# Patient Record
Sex: MALE | Race: WHITE | NOT HISPANIC OR LATINO | Employment: OTHER | URBAN - METROPOLITAN AREA
[De-identification: names, ages, dates, MRNs, and addresses within clinical notes are randomized per-mention and may not be internally consistent; named-entity substitution may affect disease eponyms.]

---

## 2019-07-01 ENCOUNTER — OFFICE VISIT (OUTPATIENT)
Dept: FAMILY MEDICINE | Facility: CLINIC | Age: 49
End: 2019-07-01

## 2019-07-01 VITALS
DIASTOLIC BLOOD PRESSURE: 79 MMHG | BODY MASS INDEX: 31.48 KG/M2 | HEART RATE: 92 BPM | SYSTOLIC BLOOD PRESSURE: 113 MMHG | WEIGHT: 258.5 LBS | TEMPERATURE: 98.5 F | OXYGEN SATURATION: 94 % | HEIGHT: 76 IN

## 2019-07-01 DIAGNOSIS — F41.1 GENERALIZED ANXIETY DISORDER: ICD-10-CM

## 2019-07-01 DIAGNOSIS — M54.50 CHRONIC BILATERAL LOW BACK PAIN WITHOUT SCIATICA: Primary | ICD-10-CM

## 2019-07-01 DIAGNOSIS — G89.29 CHRONIC BILATERAL LOW BACK PAIN WITHOUT SCIATICA: Primary | ICD-10-CM

## 2019-07-01 DIAGNOSIS — F17.220 TOBACCO DEPENDENCE DUE TO CHEWING TOBACCO: ICD-10-CM

## 2019-07-01 RX ORDER — QUETIAPINE FUMARATE 400 MG/1
400 TABLET, FILM COATED ORAL AT BEDTIME
COMMUNITY
End: 2019-07-01

## 2019-07-01 RX ORDER — NAPROXEN 500 MG/1
500 TABLET ORAL 2 TIMES DAILY WITH MEALS
COMMUNITY
End: 2019-07-01

## 2019-07-01 RX ORDER — QUETIAPINE FUMARATE 400 MG/1
400 TABLET, FILM COATED ORAL AT BEDTIME
Qty: 30 TABLET | Refills: 1 | Status: SHIPPED | OUTPATIENT
Start: 2019-07-01 | End: 2019-08-19

## 2019-07-01 RX ORDER — NAPROXEN 500 MG/1
500 TABLET ORAL 2 TIMES DAILY WITH MEALS
Qty: 60 TABLET | Refills: 1 | Status: SHIPPED | OUTPATIENT
Start: 2019-07-01 | End: 2019-08-19

## 2019-07-01 RX ORDER — GABAPENTIN 400 MG/1
1200 CAPSULE ORAL 3 TIMES DAILY
Qty: 270 CAPSULE | Refills: 1 | Status: SHIPPED | OUTPATIENT
Start: 2019-07-01 | End: 2019-08-19

## 2019-07-01 RX ORDER — GABAPENTIN 600 MG/1
600 TABLET ORAL 3 TIMES DAILY
COMMUNITY
End: 2019-07-01

## 2019-07-01 ASSESSMENT — MIFFLIN-ST. JEOR: SCORE: 2126.11

## 2019-07-01 NOTE — PROGRESS NOTES
REASON FOR VISIT:  Establish Care and Medication Refills      HISTORY OF PRESENT ILLNESS: Jose M Barclay is a 49 year old male, who is new to [facility] and in need of medication refills.  He has a hx of alcoholism, substance abuse and anxiety, and is currently in treatment at [facility] for his alcoholism.  He is also in a methadone program through [facility], but was told his insurance would only cover office visits to [facility].  He has been in treatment for 3 months.  He is in counseling through his [facility] recovery program.  He recently quit the use of chewing tobacco, and is using Nicorrete 4 mg gum.  He reports riding a bike and using some weight training frequently.  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  He reports all his immunizations are up to date.    Jose M's medications include Seroquel 400 mg at bedtime for sleep and anxiety, Gabapentin 400 mg tabs, taking 3 tabs TID to treat his anxiety as well, and Naproxen 500 mg BID and Zanaflex 4 mg BID for lower back pain.  He also uses Nicorrette gum 4 mg and methadone 80 mg daily.  He has recently run out of some of his medications, including his Seroquel, which he last took last night, and gabapentin, which he ran out of 3 days ago.  He reports having blood work through [facility] typically, but can not remember the last time he had his blood drawn.    Jose M is currently in treatment at [facility], for alcoholism.  He has been in treatment for 3 months.  He is also on the methadone program through [facility].  He notes that he went into treatment by his own choice, due to being sick of not being able to function.  He reports he had a combination of alcohol and substance abuse prior to treatment.      SOCIAL HISTORY:   Social History     Social History Narrative    7/1/2019    Jose M is , and has 2 living children who are adults.  He had a daughter, who passed away.  He developed issues with drugs and alcohol in his late 30s.  " He is currently receiving treatment at [facility], and is also in a methadone program at [facility].  He grew up in [place], [place] outside of [place].  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  He is currently receiving treatment at a [facility] program, and will then move to a group home house in the next few months.       MEDICATIONS:  Reviewed.       REVIEW OF SYSTEMS:  POSITIVE for hx of broken ribs and concussions.  Negative for weight change.  Otherwise, the ROS is negative.      OBJECTIVE:      EXAM:  VITAL SIGNS: /79 (BP Location: Left arm, Patient Position: Sitting, Cuff Size: Adult Large)   Pulse 92   Temp 98.5  F (36.9  C) (Oral)   Ht 1.918 m (6' 3.5\")   Wt 117.3 kg (258 lb 8 oz)   SpO2 94%   BMI 31.88 kg/m     Jose M is a 49 year old, muscular, male, who is in treatment for alcoholism and opiate abuse.  He appears anxious, but very pleasant and open about his medical issues.  He has brought with him records from [facility]. We are unable to locate his records from [facility] or elsewhere.  Constitutional: healthy and alert   Cardiovascular: negative, PMI normal. No lifts, heaves, or thrills. RRR. No murmurs, clicks gallops or rub  Respiratory:  Lungs clear bilaterally  Head: Normocephalic. No masses, lesions, tenderness or abnormalities  Neck: Neck supple. No adenopathy. Thyroid symmetric, normal size,, Carotids without bruits.  ENT: ENT exam normal, no neck nodes or sinus tenderness  MS: Good ROM of knees and hips  SKIN: no suspicious lesions or rashes        LABORATORY DATA: Not applicable.      ASSESSMENT AND PLAN:   1. Jose M is a 50 yo who is in addiction treatment for alcoholism and opiate abuse. He is presenting for his first visit to the [facility] as required by his treatment program. Although his treatment program is through [facility], Jose M was told that his insurance only covered visits in our [employer] system.   No past records available at todays visit.   He's in " "need of medication refills.   Brings in records from [facility] for recovery [phone]        A/P     1. Chronic bilateral low back pain without sciatica     - naproxen (NAPROSYN) 500 MG tablet; Take 1 tablet (500 mg) by mouth 2 times daily (with meals)  Dispense: 60 tablet; Refill: 1  - tiZANidine (ZANAFLEX) 4 MG tablet; Take 1 tablet (4 mg) by mouth 2 times daily  Dispense: 60 tablet; Refill: 1     2. Tobacco dependence due to chewing tobacco  -Continue to avoid use. Refilled:   - nicotine polacrilex (NICORETTE) 4 MG gum; Place 1 each (4 mg) inside cheek as needed for smoking cessation  Dispense: 100 each; Refill: 1     3. Generalized anxiety disorder  - Continue with counseling at [facility]. Also, refilled:   - gabapentin (NEURONTIN) 400 MG capsule; Take 3 capsules (1,200 mg) by mouth 3 times daily  Dispense: 270 capsule; Refill: 1  - QUEtiapine (SEROQUEL) 400 MG tablet; Take 1 tablet (400 mg) by mouth At Bedtime  Dispense: 30 tablet; Refill: 1     4. For Preventive Health Care;  -Await records from [facility] and any other systems within \"Care Everywhere\"  -Follow up in about 6-8 weeks. Discuss Immunizations (likely up to date), colorectal cancer screening, and Cardiovascular Cancer screening.       Call with any problems or questions.       IPedro, am serving as a scribe to document services personally performed by Jr Giron MD based on data collection and the provider's statements to me. Dr. Giron has reviewed, edited and approved the above note.     --Jr Giron MD  "

## 2019-07-01 NOTE — NURSING NOTE
"49 year old  Chief Complaint   Patient presents with     Recheck Medication     Establish care and medication refills       Blood pressure 113/79, pulse 92, temperature 98.5  F (36.9  C), temperature source Oral, height 1.918 m (6' 3.5\"), weight 117.3 kg (258 lb 8 oz), SpO2 94 %. Body mass index is 31.88 kg/m .  There is no problem list on file for this patient.      Wt Readings from Last 2 Encounters:   07/01/19 117.3 kg (258 lb 8 oz)     BP Readings from Last 3 Encounters:   07/01/19 113/79         Current Outpatient Medications   Medication     gabapentin (NEURONTIN) 600 MG tablet     naproxen (NAPROSYN) 500 MG tablet     nicotine polacrilex (NICORETTE) 4 MG gum     QUEtiapine (SEROQUEL) 400 MG tablet     tiZANidine (ZANAFLEX) 4 MG tablet     No current facility-administered medications for this visit.        Social History     Tobacco Use     Smoking status: Current Every Day Smoker     Types: Dip, chew, snus or snuff     Smokeless tobacco: Former User     Types: Chew   Substance Use Topics     Alcohol use: Not Currently     Drug use: Not Currently       Health Maintenance Due   Topic Date Due     PREVENTIVE CARE VISIT  1969     COLONOSCOPY  05/03/1979     HIV SCREENING  05/03/1984     DTAP/TDAP/TD IMMUNIZATION (1 - Tdap) 05/03/1994     LIPID  05/03/2004     PHQ-2  01/01/2019     ZOSTER IMMUNIZATION (1 of 2) 05/03/2019       No results found for: PAP      July 1, 2019 9:49 AM  "

## 2019-07-01 NOTE — PATIENT INSTRUCTIONS
"Jose M is a 50 yo who is in addiction treatment for alcoholism and opiate abuse. He is presenting for his first visit to the [facility] as required by his treatment program. Although his treatment program is through [facility], Jose M was told that his insurance only covered visits in our [email or web address] system.   [facility]   He's in need of medication refills.   Brings in records from [facility] for recovery [phone]      A/P    1. Chronic bilateral low back pain without sciatica    - naproxen (NAPROSYN) 500 MG tablet; Take 1 tablet (500 mg) by mouth 2 times daily (with meals)  Dispense: 60 tablet; Refill: 1  - tiZANidine (ZANAFLEX) 4 MG tablet; Take 1 tablet (4 mg) by mouth 2 times daily  Dispense: 60 tablet; Refill: 1    2. Tobacco dependence due to chewing tobacco  -Continue to avoid use. Refilled:   - nicotine polacrilex (NICORETTE) 4 MG gum; Place 1 each (4 mg) inside cheek as needed for smoking cessation  Dispense: 100 each; Refill: 1    3. Generalized anxiety disorder  - Continue with counseling at [facility]. Also, refilled:   - gabapentin (NEURONTIN) 400 MG capsule; Take 3 capsules (1,200 mg) by mouth 3 times daily  Dispense: 270 capsule; Refill: 1  - QUEtiapine (SEROQUEL) 400 MG tablet; Take 1 tablet (400 mg) by mouth At Bedtime  Dispense: 30 tablet; Refill: 1    4. For Preventive Health Care;  -Await records from [facility] and any other systems within \"Care Everywhere\"  -Follow up in about 6-8 weeks. Discuss Immunizations (likely up to date), colorectal cancer screening, and Cardiovascular Cancer screening.       "

## 2019-08-19 ENCOUNTER — OFFICE VISIT (OUTPATIENT)
Dept: FAMILY MEDICINE | Facility: CLINIC | Age: 49
End: 2019-08-19
Payer: COMMERCIAL

## 2019-08-19 VITALS
BODY MASS INDEX: 33.3 KG/M2 | WEIGHT: 270 LBS | DIASTOLIC BLOOD PRESSURE: 75 MMHG | SYSTOLIC BLOOD PRESSURE: 110 MMHG | OXYGEN SATURATION: 96 % | HEART RATE: 80 BPM | TEMPERATURE: 98.4 F

## 2019-08-19 DIAGNOSIS — M54.50 CHRONIC BILATERAL LOW BACK PAIN WITHOUT SCIATICA: ICD-10-CM

## 2019-08-19 DIAGNOSIS — R79.89 ELEVATED LFTS: ICD-10-CM

## 2019-08-19 DIAGNOSIS — F17.220 TOBACCO DEPENDENCE DUE TO CHEWING TOBACCO: ICD-10-CM

## 2019-08-19 DIAGNOSIS — G89.29 CHRONIC BILATERAL LOW BACK PAIN WITHOUT SCIATICA: ICD-10-CM

## 2019-08-19 DIAGNOSIS — R79.89 ABNORMAL TSH: ICD-10-CM

## 2019-08-19 DIAGNOSIS — R61 DIAPHORESIS: Primary | ICD-10-CM

## 2019-08-19 DIAGNOSIS — F41.1 GENERALIZED ANXIETY DISORDER: ICD-10-CM

## 2019-08-19 LAB
ALBUMIN SERPL-MCNC: 3.6 G/DL (ref 3.3–4.6)
ALP SERPL-CCNC: 66 U/L (ref 40–150)
ALT SERPL-CCNC: 44 U/L (ref 0–70)
AST SERPL-CCNC: 51 U/L (ref 0–55)
BILIRUB SERPL-MCNC: 0.6 MG/DL (ref 0.2–1.3)
BUN SERPL-MCNC: 23 MG/DL (ref 7–30)
CALCIUM SERPL-MCNC: 9.2 MG/DL (ref 8.5–10.4)
CHLORIDE SERPLBLD-SCNC: 106 MMOL/L (ref 94–109)
CO2 SERPL-SCNC: 29 MMOL/L (ref 20–32)
CREAT SERPL-MCNC: 0.8 MG/DL (ref 0.8–1.5)
EGFR CALCULATED (BLACK REFERENCE): 131.6
EGFR CALCULATED (NON BLACK REFERENCE): 108.8
GLUCOSE SERPL-MCNC: 105 MG/DL (ref 60–99)
HBA1C MFR BLD: 5.1 % (ref 4.1–5.7)
POTASSIUM SERPL-SCNC: 3.9 MMOL/L (ref 3.4–5.3)
PROT SERPL-MCNC: 7.6 G/DL (ref 6.8–8.8)
SODIUM SERPL-SCNC: 142 MMOL/L (ref 137.3–146.3)
T4 FREE SERPL-MCNC: 0.72 NG/DL (ref 0.76–1.46)
TSH SERPL DL<=0.005 MIU/L-ACNC: 4.05 MU/L (ref 0.4–4)

## 2019-08-19 RX ORDER — NAPROXEN 500 MG/1
500 TABLET ORAL 2 TIMES DAILY WITH MEALS
Qty: 60 TABLET | Refills: 1 | Status: SHIPPED | OUTPATIENT
Start: 2019-08-19 | End: 2019-10-11

## 2019-08-19 RX ORDER — QUETIAPINE FUMARATE 400 MG/1
400 TABLET, FILM COATED ORAL AT BEDTIME
Qty: 30 TABLET | Refills: 1 | Status: SHIPPED | OUTPATIENT
Start: 2019-08-19 | End: 2019-10-11

## 2019-08-19 RX ORDER — GABAPENTIN 400 MG/1
1200 CAPSULE ORAL 3 TIMES DAILY
Qty: 270 CAPSULE | Refills: 1 | Status: SHIPPED | OUTPATIENT
Start: 2019-08-19 | End: 2019-10-11

## 2019-08-19 NOTE — PATIENT INSTRUCTIONS
Jose M is a 50 yo who is still living at [facility]. Moving out Sept 3 to a step down location    He's here for medication refills. See notes from July 1, 2019 for details.   Also, has a new complaint of sweating and in the past has had high LFTs and slightly high TSH    PLAN:  1. Generalized anxiety disorder  - gabapentin (NEURONTIN) 400 MG capsule; Take 3 capsules (1,200 mg) by mouth 3 times daily  Dispense: 270 capsule; Refill: 1  - QUEtiapine (SEROQUEL) 400 MG tablet; Take 1 tablet (400 mg) by mouth At Bedtime  Dispense: 30 tablet; Refill: 1    2. Chronic bilateral low back pain without sciatica    - naproxen (NAPROSYN) 500 MG tablet; Take 1 tablet (500 mg) by mouth 2 times daily (with meals)  Dispense: 60 tablet; Refill: 1  - tiZANidine (ZANAFLEX) 4 MG tablet; Take 1 tablet (4 mg) by mouth 2 times daily  Dispense: 60 tablet; Refill: 1    Discussed decreasing the above medications but Jose M feels they are necessary daily    3. Tobacco dependence due to chewing tobacco    - nicotine polacrilex (NICORETTE) 4 MG gum; Place 1 each (4 mg) inside cheek as needed for smoking cessation  Dispense: 100 each; Refill: 1    4. Diaphoresis    - Hemoglobin A1c ([place])    5. Abnormal TSH    - TSH with free T4 reflex    6. Elevated LFTs    - Comprehensive Metabolic Panel ([place])    Follow up in 2 months, sooner if needed

## 2019-08-19 NOTE — NURSING NOTE
49 year old  Chief Complaint   Patient presents with     Refill Request     pt requesting refills on all his medications and also has a few questions that he wants to ask the doctor.       Blood pressure 110/75, pulse 80, temperature 98.4  F (36.9  C), temperature source Oral, weight 122.5 kg (270 lb), SpO2 96 %. Body mass index is 33.3 kg/m .  There is no problem list on file for this patient.      Wt Readings from Last 2 Encounters:   08/19/19 122.5 kg (270 lb)   07/01/19 117.3 kg (258 lb 8 oz)     BP Readings from Last 3 Encounters:   08/19/19 110/75   07/01/19 113/79         Current Outpatient Medications   Medication     gabapentin (NEURONTIN) 400 MG capsule     naproxen (NAPROSYN) 500 MG tablet     nicotine polacrilex (NICORETTE) 4 MG gum     QUEtiapine (SEROQUEL) 400 MG tablet     tiZANidine (ZANAFLEX) 4 MG tablet     No current facility-administered medications for this visit.        Social History     Tobacco Use     Smoking status: Current Every Day Smoker     Types: Dip, chew, snus or snuff     Smokeless tobacco: Former User     Types: Chew   Substance Use Topics     Alcohol use: Not Currently     Drug use: Not Currently       Health Maintenance Due   Topic Date Due     PREVENTIVE CARE VISIT  1969     COLONOSCOPY  05/03/1979     HIV SCREENING  05/03/1984     DTAP/TDAP/TD IMMUNIZATION (1 - Tdap) 05/03/1994     LIPID  05/03/2004     ZOSTER IMMUNIZATION (1 of 2) 05/03/2019       No results found for: PAP      August 19, 2019 1:23 PM

## 2019-08-19 NOTE — PROGRESS NOTES
REASON FOR VISIT: Anxiety, Chronic Bilateral Low Back Pain, Tobacco Dependence and Diaphoresis      HISTORY OF PRESENT ILLNESS: Jose M Barclay is a 49 year old male who presents for a medication follow up.  He is requesting medication refills on all his medications except for methadone, which he [place] from [facility].  He has a hx of alcoholism, substance abuse and anxiety, and is currently in treatment at [facility] for his alcoholism.  He is also in a methadone program through [facility], but was told his insurance would only cover office visits for [facility].  He quit chewing tobacco 4-5 weeks ago and has been using 4 mg Nicorette gum.  Since his last visit on 7/1/2019, he reports he has been doing well.  He is still in treatment at [facility], and is preparing to move to a step down program on 9/3/2019.  He reports his mood has been stable and he has been walking frequently.    Jose M's medications include Seroquel 400 mg at bedtime for sleep and anxiety, Gabapentin 400 mg tabs, taking 3 tabs TID to treat his anxiety as well, and Naproxen 500 mg BID and Zanaflex 4 mg BID for lower back pain.  He also uses Nicorette gum 4 mg and methadone 80 mg daily.  He receives his methadone from [facility].  He is requesting refills on medications other than methadone.    Jose M has noticed increased sweating over the past several months.  After walking a couple blocks or up stairs, he will be sweating excessively.  He also has been waking up very sweaty.  Jose M reports his last labs were completed in April, he had normal electrolytes and CBC.  He had a hepatic function panel on 4/25/2019, where his AST was 88 and ALT was 78, with all others in normal range.  He reports he was not drinking at this time.  His most recent TSH was 6.16 on 12/12/2018.  Jose M reports he has not had these labs rechecked since.         SOCIAL HISTORY:   Social History     Social History Narrative    7/1/2019    Jose M is , and has 2 living children who are adults.  He had a daughter,  who passed away.  He developed issues with drugs and alcohol in his late 30s.  He is currently receiving treatment at [facility], and is also in a methadone program at [facility].  He grew up in [place], [place] outside of [place].  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  He is currently receiving treatment at a [facility] program, and will then move to a shelter house in the next few months.         MEDICATIONS:  Reviewed.       REVIEW OF SYSTEMS:  POSITIVE for excessive sweating. Otherwise, the ROS is negative.      OBJECTIVE:      EXAM: Jose M is a 49 year old, pleasant male.  VITAL SIGNS: /75   Pulse 80   Temp 98.4  F (36.9  C) (Oral)   Wt 122.5 kg (270 lb)   SpO2 96%   BMI 33.30 kg/m    Constitutional: healthy and alert  Cardiovascular: negative, PMI normal. No lifts, heaves, or thrills. RRR. No murmurs, clicks gallops or rub  Respiratory: Lungs clear bilaterally.        LABORATORY DATA: No results found for this or any previous visit.        ASSESSMENT AND PLAN:   1. Jose M is a 50 yo who is still living at [facility]. Moving out Sept 3 to a step down location     He's here for medication refills. See notes from July 1, 2019 for details.   Also, has a new complaint of sweating and in the past has had high LFTs and slightly high TSH     PLAN:  1. Generalized anxiety disorder  - gabapentin (NEURONTIN) 400 MG capsule; Take 3 capsules (1,200 mg) by mouth 3 times daily  Dispense: 270 capsule; Refill: 1  - QUEtiapine (SEROQUEL) 400 MG tablet; Take 1 tablet (400 mg) by mouth At Bedtime  Dispense: 30 tablet; Refill: 1     2. Chronic bilateral low back pain without sciatica     - naproxen (NAPROSYN) 500 MG tablet; Take 1 tablet (500 mg) by mouth 2 times daily (with meals)  Dispense: 60 tablet; Refill: 1  - tiZANidine (ZANAFLEX) 4 MG tablet; Take 1 tablet (4 mg) by mouth 2 times daily  Dispense: 60 tablet; Refill: 1     Discussed decreasing the above medications but Jose M feels they  are necessary daily     3. Tobacco dependence due to chewing tobacco     - nicotine polacrilex (NICORETTE) 4 MG gum; Place 1 each (4 mg) inside cheek as needed for smoking cessation  Dispense: 100 each; Refill: 1     4. Diaphoresis     - Hemoglobin A1c ([place])     5. Abnormal TSH     - TSH with free T4 reflex     6. Elevated LFTs     - Comprehensive Metabolic Panel ([place])     Follow up in 2 months, sooner if needed      Call with any problems or questions.          I, Pedro Foster, am serving as a scribe to document services personally performed by Jr Giron MD based on data collection and the provider's statements to me. Dr. Giron has reviewed, edited and approved the above note.     --Jr Giron MD

## 2019-09-04 ENCOUNTER — TELEPHONE (OUTPATIENT)
Dept: FAMILY MEDICINE | Facility: CLINIC | Age: 49
End: 2019-09-04

## 2019-09-04 DIAGNOSIS — R94.6 ABNORMAL FINDING ON THYROID FUNCTION TEST: Primary | ICD-10-CM

## 2019-09-04 NOTE — TELEPHONE ENCOUNTER
-I phoned Jose M to discuss his lab results.   The TSH was very slightly high and the FT4 was very slightly low. I suggest that we recheck at next visit.     Also, apologized that we must reschedule his Oct 7 visit. He was fine with that and is awaiting a call from our clinic.     --Jr Giron MD

## 2019-10-11 ENCOUNTER — OFFICE VISIT (OUTPATIENT)
Dept: FAMILY MEDICINE | Facility: CLINIC | Age: 49
End: 2019-10-11

## 2019-10-11 ENCOUNTER — OFFICE VISIT (OUTPATIENT)
Dept: BEHAVIORAL HEALTH | Facility: CLINIC | Age: 49
End: 2019-10-11

## 2019-10-11 VITALS
OXYGEN SATURATION: 98 % | HEIGHT: 74 IN | WEIGHT: 275 LBS | HEART RATE: 86 BPM | TEMPERATURE: 97.9 F | BODY MASS INDEX: 35.29 KG/M2 | SYSTOLIC BLOOD PRESSURE: 97 MMHG | DIASTOLIC BLOOD PRESSURE: 65 MMHG

## 2019-10-11 DIAGNOSIS — M54.41 CHRONIC BILATERAL LOW BACK PAIN WITH RIGHT-SIDED SCIATICA: ICD-10-CM

## 2019-10-11 DIAGNOSIS — Z23 NEED FOR INFLUENZA VACCINATION: ICD-10-CM

## 2019-10-11 DIAGNOSIS — L84 CALLUS OF FOOT: ICD-10-CM

## 2019-10-11 DIAGNOSIS — G89.29 CHRONIC BILATERAL LOW BACK PAIN WITHOUT SCIATICA: ICD-10-CM

## 2019-10-11 DIAGNOSIS — G89.29 CHRONIC BILATERAL LOW BACK PAIN WITH RIGHT-SIDED SCIATICA: ICD-10-CM

## 2019-10-11 DIAGNOSIS — F10.20 ALCOHOL USE DISORDER, SEVERE, IN CONTROLLED ENVIRONMENT (H): Primary | ICD-10-CM

## 2019-10-11 DIAGNOSIS — F41.1 GENERALIZED ANXIETY DISORDER: Primary | ICD-10-CM

## 2019-10-11 DIAGNOSIS — F17.220 TOBACCO DEPENDENCE DUE TO CHEWING TOBACCO: ICD-10-CM

## 2019-10-11 DIAGNOSIS — R79.89 ABNORMAL SERUM THYROID STIMULATING HORMONE (TSH) LEVEL: ICD-10-CM

## 2019-10-11 DIAGNOSIS — M54.50 CHRONIC BILATERAL LOW BACK PAIN WITHOUT SCIATICA: ICD-10-CM

## 2019-10-11 LAB
T4 FREE SERPL-MCNC: 0.73 NG/DL (ref 0.76–1.46)
TSH SERPL DL<=0.005 MIU/L-ACNC: 5.46 MU/L (ref 0.4–4)

## 2019-10-11 RX ORDER — GABAPENTIN 400 MG/1
1200 CAPSULE ORAL 3 TIMES DAILY
Qty: 270 CAPSULE | Refills: 1 | Status: SHIPPED | OUTPATIENT
Start: 2019-10-11 | End: 2019-11-29

## 2019-10-11 RX ORDER — AMMONIUM LACTATE 12 G/100G
CREAM TOPICAL 2 TIMES DAILY
Qty: 385 G | Refills: 0 | Status: SHIPPED | OUTPATIENT
Start: 2019-10-11 | End: 2019-11-29

## 2019-10-11 RX ORDER — NAPROXEN 500 MG/1
500 TABLET ORAL 2 TIMES DAILY WITH MEALS
Qty: 60 TABLET | Refills: 1 | Status: SHIPPED | OUTPATIENT
Start: 2019-10-11 | End: 2019-11-29

## 2019-10-11 RX ORDER — QUETIAPINE FUMARATE 400 MG/1
400 TABLET, FILM COATED ORAL AT BEDTIME
Qty: 30 TABLET | Refills: 1 | Status: SHIPPED | OUTPATIENT
Start: 2019-10-11 | End: 2019-11-29

## 2019-10-11 ASSESSMENT — ANXIETY QUESTIONNAIRES
6. BECOMING EASILY ANNOYED OR IRRITABLE: SEVERAL DAYS
3. WORRYING TOO MUCH ABOUT DIFFERENT THINGS: SEVERAL DAYS
IF YOU CHECKED OFF ANY PROBLEMS ON THIS QUESTIONNAIRE, HOW DIFFICULT HAVE THESE PROBLEMS MADE IT FOR YOU TO DO YOUR WORK, TAKE CARE OF THINGS AT HOME, OR GET ALONG WITH OTHER PEOPLE: VERY DIFFICULT
5. BEING SO RESTLESS THAT IT IS HARD TO SIT STILL: SEVERAL DAYS
2. NOT BEING ABLE TO STOP OR CONTROL WORRYING: SEVERAL DAYS
GAD7 TOTAL SCORE: 7
7. FEELING AFRAID AS IF SOMETHING AWFUL MIGHT HAPPEN: NOT AT ALL
1. FEELING NERVOUS, ANXIOUS, OR ON EDGE: SEVERAL DAYS

## 2019-10-11 ASSESSMENT — MIFFLIN-ST. JEOR: SCORE: 2177.39

## 2019-10-11 ASSESSMENT — PATIENT HEALTH QUESTIONNAIRE - PHQ9
5. POOR APPETITE OR OVEREATING: MORE THAN HALF THE DAYS
SUM OF ALL RESPONSES TO PHQ QUESTIONS 1-9: 11

## 2019-10-11 NOTE — PATIENT INSTRUCTIONS
"ASSESSMENT/PLAN:    --*Main thing is worsening anxiety:  Had our Licensed social worker, Shawn Ullrich visit with Ray. They are currently meeting to discuss counseling options.   Continue:    - gabapentin (NEURONTIN) 400 MG capsule; Take 3 capsules (1,200 mg) by mouth 3 times daily  Dispense: 270 capsule; Refill: 1  - QUEtiapine (SEROQUEL) 400 MG tablet; Take 1 tablet (400 mg) by mouth At Bedtime  Dispense: 30 tablet; Refill: 1    --Need for influenza vaccination    - C RIV4 (FLUBLOK) VACCINE RECOMBINANT DNA PRSRV ANTIBIO FREE, IM  - ADMIN INFLUENZA VIRUS VACCINE    --Chronic bilateral low back pain without sciatica    - naproxen (NAPROSYN) 500 MG tablet; Take 1 tablet (500 mg) by mouth 2 times daily (with meals)  Dispense: 60 tablet; Refill: 1  - tiZANidine (ZANAFLEX) 4 MG tablet; Take 1 tablet (4 mg) by mouth 2 times daily  Dispense: 60 tablet; Refill: 1  - SPINE SURGERY REFERRAL    --Tobacco dependence due to chewing tobacco    - nicotine polacrilex (NICORETTE) 4 MG gum; Use 4 mg gum as needed. Please dispense MINT flavor  Dispense: 100 each; Refill: 1    --Borderline Thyroid abnormalities  Ordered TSH and FT4 to be checked today or with next blood draw. Ordered as a \"future lab\"    Follow up with Dr. Giron in 2 months. Return sooner as needed      --Jr Giron MD  [facility], Department of Family Medicine and Community Health  "

## 2019-10-11 NOTE — LETTER
[facility]  [address]  [place] [address]  Phone: [phone]  Fax: [phone]       Date: October 23, 2019      Jose M Barclay  [address] [facility] [address]        Hi Jose M,  Well, your thyroid tests were essentially unchanged with the levels being very slightly out of the normal range. Usually, things declare themselves with a follow up test.   I suggest another test in a few months.   Let us know if you have questions,  --Jr Giron MD    Resulted Orders   TSH with free T4 reflex   Result Value Ref Range    TSH 5.46 (H) 0.40 - 4.00 mU/L   T4 free   Result Value Ref Range    T4 Free 0.73 (L) 0.76 - 1.46 ng/dL

## 2019-10-11 NOTE — PROGRESS NOTES
Patient had appointment with his primary care physician, Dr. Giron. [facility] services were offered. No immediate safety/risk issues were reported or identified.  Explained the role of the [facility] and provided informational handout and contact information for the [facility].  Jose M expressed desire to engage in [facility] services for stress management, which will help him to maintain sobriety and manage chronic pain.  Jose M affirmed ability to schedule appt with [facility].           Shawn G. Ullrich, [facility], Behavioral Health Clinician

## 2019-10-11 NOTE — NURSING NOTE
"49 year old  Chief Complaint   Patient presents with     Recheck Medication     refill on meds ,  discus referral , Pyschologist, [facility]        Blood pressure 97/65, pulse 86, temperature 97.9  F (36.6  C), temperature source Oral, height 1.88 m (6' 2.02\"), weight 124.7 kg (275 lb), SpO2 98 %. Body mass index is 35.29 kg/m .  There is no problem list on file for this patient.      Wt Readings from Last 2 Encounters:   10/11/19 124.7 kg (275 lb)   08/19/19 122.5 kg (270 lb)     BP Readings from Last 3 Encounters:   10/11/19 97/65   08/19/19 110/75   07/01/19 113/79         Current Outpatient Medications   Medication     gabapentin (NEURONTIN) 400 MG capsule     naproxen (NAPROSYN) 500 MG tablet     nicotine polacrilex (NICORETTE) 4 MG gum     QUEtiapine (SEROQUEL) 400 MG tablet     tiZANidine (ZANAFLEX) 4 MG tablet     No current facility-administered medications for this visit.          Social History     Tobacco Use     Smoking status: Current Every Day Smoker     Types: Dip, chew, snus or snuff     Smokeless tobacco: Former User     Types: Chew   Substance Use Topics     Alcohol use: Not Currently     Drug use: Not Currently       Health Maintenance Due   Topic Date Due     PREVENTIVE CARE VISIT  1969     COLONOSCOPY  05/03/1979     HIV SCREENING  05/03/1984     PNEUMOCOCCAL IMMUNIZATION 19-64 MEDIUM RISK (1 of 1 - PPSV23) 05/03/1988     DTAP/TDAP/TD IMMUNIZATION (1 - Tdap) 05/03/1994     LIPID  05/03/2004     INFLUENZA VACCINE (1) 09/01/2019     ZOSTER IMMUNIZATION (1 of 2) 05/03/2019       No results found for: PAP  Prior to immunization administration, verified patients identity using patient s name and date of birth. Please see Immunization Activity for additional information.       "

## 2019-10-11 NOTE — PROGRESS NOTES
Jose M Barclay is a 49 year old male who presents to [facility] today for follow up. This is my 3rd time seeing him over the past 4 months.     Now living in [facility] residence with 5 other men. There is a full time nurse.     He needs refills of Seroquel, Naproxy, Tizanadine and Gabapentin.   Also, needs Nicorette gum (prefers mint).     * Jose M is suffering from worsening social anxiety that is causing him to feel depressed and anxious. Denies feelings of self-harm.     Also, Jose M states that he wants to see Neurosurgery at [facility] due to his back pain with sciatica R > L.   States his back pain is always at a 6-7/10 pain.     Review Of Systems:  As per above with back pain and increasing anxiety.   No chest pain or shortness of breath.   Cry callouses on heels.       Current Outpatient Medications   Medication Sig Dispense Refill     gabapentin (NEURONTIN) 400 MG capsule Take 3 capsules (1,200 mg) by mouth 3 times daily 270 capsule 1     naproxen (NAPROSYN) 500 MG tablet Take 1 tablet (500 mg) by mouth 2 times daily (with meals) 60 tablet 1     nicotine polacrilex (NICORETTE) 4 MG gum Place 1 each (4 mg) inside cheek as needed for smoking cessation 100 each 1     QUEtiapine (SEROQUEL) 400 MG tablet Take 1 tablet (400 mg) by mouth At Bedtime 30 tablet 1     tiZANidine (ZANAFLEX) 4 MG tablet Take 1 tablet (4 mg) by mouth 2 times daily 60 tablet 1       No Known Allergies     Social:   Social History     Patient does not qualify to have social determinant information on file (likely too young).   Social History Narrative    7/1/2019    Jose M is , and has 2 living children who are adults.  He had a daughter, who passed away.  He developed issues with drugs and alcohol in his late 30s.  He is currently receiving treatment at [facility], and is also in a methadone program at [facility].  He grew up in [place], right outside of [place].  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  He  "moved to [place] in 1997.  He is currently receiving treatment at a [facility] program, and will then move to a care home house in the next few months.         EXAM    Vitals: BP 97/65 (BP Location: Left arm, Patient Position: Sitting, Cuff Size: Adult Large)   Pulse 86   Temp 97.9  F (36.6  C) (Oral)   Ht 1.88 m (6' 2.02\")   Wt 124.7 kg (275 lb)   SpO2 98%   BMI 35.29 kg/m    BMI= Body mass index is 35.29 kg/m .  Jose M appears more anxious. He makes normal eye contact. Polite and cooperative with interview. Asking and receptive to help.   PHQ-9 at around 12 (sheet in other room now) with no feelings of self-harm      Feet/heels with callouses and very dry skin. No infection.    Labs from Aug reviewed with TSH slightly high at 4.05 and T4 slightly low at 0.72        ASSESSMENT/PLAN:    --*Main thing is worsening anxiety:  Had our Licensed social worker, Shawn Ullrich visit with Jose M. They are currently meeting to discuss counseling options.   Continue:    - gabapentin (NEURONTIN) 400 MG capsule; Take 3 capsules (1,200 mg) by mouth 3 times daily  Dispense: 270 capsule; Refill: 1  - QUEtiapine (SEROQUEL) 400 MG tablet; Take 1 tablet (400 mg) by mouth At Bedtime  Dispense: 30 tablet; Refill: 1    --Need for influenza vaccination    - C RIV4 (FLUBLOK) VACCINE RECOMBINANT DNA PRSRV ANTIBIO FREE, IM  - ADMIN INFLUENZA VIRUS VACCINE    --Chronic bilateral low back pain without sciatica    - naproxen (NAPROSYN) 500 MG tablet; Take 1 tablet (500 mg) by mouth 2 times daily (with meals)  Dispense: 60 tablet; Refill: 1  - tiZANidine (ZANAFLEX) 4 MG tablet; Take 1 tablet (4 mg) by mouth 2 times daily  Dispense: 60 tablet; Refill: 1  - SPINE SURGERY REFERRAL    --Tobacco dependence due to chewing tobacco    - nicotine polacrilex (NICORETTE) 4 MG gum; Use 4 mg gum as needed. Please dispense MINT flavor  Dispense: 100 each; Refill: 1    --Borderline Thyroid abnormalities  Ordered TSH and FT4 to be checked today or with next blood draw. " "Ordered as a \"future lab\"    -Callouses on feet  Lachydrin ointment    Follow up with Dr. Giron in 2 months. Return sooner as needed    Over 50% of the 25 min in room time spent in coordinating care    --Jr Giron MD  [facility], Department of Family Medicine and Community Health  "

## 2019-10-11 NOTE — NURSING NOTE
"Injectable Influenza Immunization Documentation    1.  Has the patient received the information for the injectable influenza vaccine? YES     2. Is the patient 6 months of age or older? YES     3. Does the patient have any of the following contraindications?         Severe allergy to eggs? No     Severe allergic reaction to previous influenza vaccines? No   Severe allergy to latex? No       History of Guillain-Atlantic syndrome? No     Currently have a temperature greater than 100.4F? No        4.  Severely egg allergic patients should have flu vaccine eligibility assessed by an MD, RN, or pharmacist, and those who received flu vaccine should be observed for 15 min by an MD, RN, Pharmacist, Medical Technician, or member of clinic staff.\": YES    5. Latex-allergic patients should be given latex-free influenza vaccine Yes. Please reference the Vaccine latex table to determine if your clinic s product is latex-containing.       Vaccination given by Zuleyka Caruso CMA        "

## 2019-10-12 ASSESSMENT — ANXIETY QUESTIONNAIRES: GAD7 TOTAL SCORE: 7

## 2019-11-04 ENCOUNTER — OFFICE VISIT (OUTPATIENT)
Dept: FAMILY MEDICINE | Facility: CLINIC | Age: 49
End: 2019-11-04

## 2019-11-04 VITALS
OXYGEN SATURATION: 98 % | BODY MASS INDEX: 36.06 KG/M2 | SYSTOLIC BLOOD PRESSURE: 93 MMHG | TEMPERATURE: 98.2 F | HEIGHT: 74 IN | HEART RATE: 79 BPM | DIASTOLIC BLOOD PRESSURE: 61 MMHG | RESPIRATION RATE: 18 BRPM | WEIGHT: 281 LBS

## 2019-11-04 DIAGNOSIS — F17.220 TOBACCO DEPENDENCE DUE TO CHEWING TOBACCO: ICD-10-CM

## 2019-11-04 DIAGNOSIS — R61 DIAPHORESIS: Primary | ICD-10-CM

## 2019-11-04 DIAGNOSIS — M54.41 CHRONIC BILATERAL LOW BACK PAIN WITH RIGHT-SIDED SCIATICA: ICD-10-CM

## 2019-11-04 DIAGNOSIS — M54.16 LUMBAR BACK PAIN WITH RADICULOPATHY AFFECTING LEFT LOWER EXTREMITY: ICD-10-CM

## 2019-11-04 DIAGNOSIS — G89.29 CHRONIC BILATERAL LOW BACK PAIN WITH RIGHT-SIDED SCIATICA: ICD-10-CM

## 2019-11-04 LAB
% GRANULOCYTES: 36 %G (ref 40–75)
ALBUMIN SERPL-MCNC: 3.6 G/DL (ref 3.3–4.6)
ALP SERPL-CCNC: 53 U/L (ref 40–150)
ALT SERPL-CCNC: 30 U/L (ref 0–70)
AST SERPL-CCNC: 34 U/L (ref 0–55)
BILIRUB SERPL-MCNC: 0.7 MG/DL (ref 0.2–1.3)
BUN SERPL-MCNC: 12 MG/DL (ref 7–30)
CALCIUM SERPL-MCNC: 9.3 MG/DL (ref 8.5–10.4)
CHLORIDE SERPLBLD-SCNC: 106 MMOL/L (ref 94–109)
CO2 SERPL-SCNC: 28 MMOL/L (ref 20–32)
CREAT SERPL-MCNC: 0.9 MG/DL (ref 0.8–1.5)
EGFR CALCULATED (BLACK REFERENCE): 114.9
EGFR CALCULATED (NON BLACK REFERENCE): 94.9
ERYTHROCYTE [DISTWIDTH] IN BLOOD BY AUTOMATED COUNT: 13.3 %
GLUCOSE SERPL-MCNC: 92 MG/DL (ref 60–99)
GRANULOCYTES #: 1.5 K/UL (ref 1.6–8.3)
HCT VFR BLD AUTO: 38.4 % (ref 40–53)
HEMOGLOBIN: 12.4 G/DL (ref 13.3–17.7)
LYMPHOCYTES # BLD AUTO: 2 K/UL (ref 0.8–5.3)
LYMPHOCYTES NFR BLD AUTO: 50.5 %L (ref 20–48)
MCH RBC QN AUTO: 29.9 PG (ref 26.5–35)
MCHC RBC AUTO-ENTMCNC: 32.3 G/DL (ref 32–36)
MCV RBC AUTO: 92.5 FL (ref 78–100)
MID #: 0.5 K/UL (ref 0–2.2)
MID %: 13.5 %M (ref 0–20)
PLATELET # BLD AUTO: 278 K/UL (ref 150–450)
POTASSIUM SERPL-SCNC: 4.2 MMOL/L (ref 3.4–5.3)
PROT SERPL-MCNC: 6.8 G/DL (ref 6.8–8.8)
RBC # BLD AUTO: 4.15 M/UL (ref 4.4–5.9)
SODIUM SERPL-SCNC: 141 MMOL/L (ref 137.3–146.3)
T4 FREE SERPL-MCNC: 0.75 NG/DL (ref 0.76–1.46)
TSH SERPL DL<=0.005 MIU/L-ACNC: 4.33 MU/L (ref 0.4–4)
WBC # BLD AUTO: 4 K/UL (ref 4–11)

## 2019-11-04 ASSESSMENT — MIFFLIN-ST. JEOR: SCORE: 2204.61

## 2019-11-04 ASSESSMENT — PAIN SCALES - GENERAL: PAINLEVEL: SEVERE PAIN (7)

## 2019-11-04 NOTE — PROGRESS NOTES
"REASON FOR VISIT:  Bilateral Low Back Pain      HISTORY OF PRESENT ILLNESS: Jose M Barclay is a 49 year old male who presents for bilateral low back pain.  I have seen him twice in the past 4 months to discuss this, and at his last visit, I gave him a spine surgery referral.  He has been taking naproxen 500 mg BID and tizanidine 4 mg BID for his back pain.  He has had some changes in his insurance recently, which have caused issues with following through with his spine surgery.  He has been found to be disabled by doctors in [facility] due to his back pain.  He is requesting another referral to spine surgery, as his back pain has been worsening.  This will be done at [facility], as he has had MRI's completed there previously.  Today, he reports that he does get sciatica in both legs, worse in his RIGHT than LEFT, causing him to wake up during the night.    He has continued to sweat excessively, which he has discussed in the past.  He has been needing to change shirts 2-3 times per day due to sweating.  He had an elevated TSH on 10/11/2019, which we will recheck today.  Previous CBC's have been normal.  He denies fevers.    He is requesting refills of his Nicorette gum.  He reports that his refills for his gum from his last visit on 10/11/2019 did not come through.  He is not smoking.    His callouses on his feet have been resolved with Lachydrin ointment.      SOCIAL HISTORY:   Social History     Patient does not qualify to have social determinant information on file (likely too young).   Social History Narrative    11/4/2019: He is living in a full house with 8 people, which has been stressful.  He had an interview for an apartment 3 weeks ago, and expects to hear from them soon.        10/11/2019    Jose M is , and has 2 living children who are adults.  He had a daughter, who passed away.  He developed issues with drugs and alcohol in his late 30s.  He is now in \"[facility] residence with Nurse assistance. Previously was " "receiving treatment at [facility], and is also in a methadone program at [facility].  He grew up in [place], right outside of [place].  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  He is currently receiving treatment at a [facility] program, and will then move to a shelter house in the next few months.           MEDICATIONS:  Reviewed.   Current Outpatient Medications   Medication Sig Dispense Refill     ammonium lactate (LAC-HYDRIN) 12 % external cream Apply topically 2 times daily 385 g 0     gabapentin (NEURONTIN) 400 MG capsule Take 3 capsules (1,200 mg) by mouth 3 times daily 270 capsule 1     naproxen (NAPROSYN) 500 MG tablet Take 1 tablet (500 mg) by mouth 2 times daily (with meals) 60 tablet 1     nicotine polacrilex (NICORETTE) 4 MG gum Use 4 mg gum as needed. Please dispense MINT flavor 100 each 1     QUEtiapine (SEROQUEL) 400 MG tablet Take 1 tablet (400 mg) by mouth At Bedtime 30 tablet 1     tiZANidine (ZANAFLEX) 4 MG tablet Take 1 tablet (4 mg) by mouth 2 times daily 60 tablet 1           REVIEW OF SYSTEMS:  POSITIVE for low back pain, excessive sweating.  Negative for fever.   Otherwise, the ROS is negative.      OBJECTIVE:      EXAM: Jose M is a 49 year old who appears to be anxious about his recent health issues.  VITAL SIGNS: BP 93/61   Pulse 79   Temp 98.2  F (36.8  C) (Oral)   Resp 18   Ht 1.88 m (6' 2.02\")   Wt 127.5 kg (281 lb)   SpO2 98%   BMI 36.06 kg/m    Constitutional: healthy and alert   Cardiovascular: negative, PMI normal. No lifts, heaves, or thrills. RRR. No murmurs, clicks gallops or rub  Respiratory: Lungs clear bilaterally  Neck: Neck supple. No adenopathy. Thyroid symmetric, normal size,          LABORATORY DATA: Labs, pending.      ASSESSMENT AND PLAN:   1. Tobacco dependence due to chewing tobacco  New Rx for: - nicotine polacrilex (NICORETTE) 4 MG gum; Use 4 mg gum as needed. Please dispense MINT flavor  Dispense: 100 each; " Refill: 1    2. Diaphoresis  No fevers. Will check labs  - CBC with Diff Plt ([facility])  - TSH with free T4 reflex  - Comprehensive Metabolic Panel ([place])  -CRP    3. Lumbar back pain with radiculopathy affecting both lower extremities  Per his request, referred to [facility] Neurosurgery as they have his past MRI reports.          I, Pedro Foster, am serving as a scribe to document services personally performed by Jr Giron MD based on data collection and the provider's statements to me. Dr. Giron has reviewed, edited and approved the above note.     --Jr Giron MD

## 2019-11-04 NOTE — LETTER
[facility]  [address]  [place] [facility] [address]  Phone: [phone]  Fax: [phone]       Date: November 7, 2019      Jose M [address]  [facility] [address]        Ray,  Your thyroid levels were closer to normal than in the previous check. I suggest waiting about 6 months prior to repeating the levels.   Rest of labs were in the generally normal range. We can discuss further at your next visit.  --Jr Giron MD    Resulted Orders   CBC with Diff Plt ([facility])   Result Value Ref Range    WBC 4.0 4.0 - 11.0 K/uL    Lymphocytes # 2.0 0.8 - 5.3 K/uL    % Lymphocytes 50.5 (H) 20.0 - 48.0 %L    Mid # 0.5 0.0 - 2.2 K/uL    Mid % 13.5 0.0 - 20.0 %M    GRANULOCYTES # 1.5 (L) 1.6 - 8.3 K/uL    % Granulocytes 36.0 (L) 40.0 - 75.0 %G    RBC 4.15 (L) 4.40 - 5.90 M/uL    Hemoglobin 12.4 (L) 13.3 - 17.7 g/dL    Hematocrit 38.4 (L) 40.0 - 53.0 %    MCV 92.5 78.0 - 100.0 fL    MCH 29.9 26.5 - 35.0 pg    MCHC 32.3 32.0 - 36.0 g/dL    Platelets 278.0 150.0 - 450.0 K/uL    RDW 13.3 %   TSH with free T4 reflex   Result Value Ref Range    TSH 4.33 (H) 0.40 - 4.00 mU/L   Comprehensive Metabolic Panel ([place])   Result Value Ref Range    Glucose 92.0 60.0 - 99.0 mg/dL    Urea Nitrogen 12.0 7.0 - 30.0 mg/dL    Calcium 9.3 8.5 - 10.4 mg/dL    Creatinine 0.9 0.8 - 1.5 mg/dL    eGFR Calculated (Non Black Reference) 94.9 >60.0    eGFR Calculated (Black Reference) 114.9 >60.0    Sodium 141.0 137.3 - 146.3 mmol/L    Potassium 4.2 3.4 - 5.3 mmol/L    Chloride 106.0 94.0 - 109.0 mmol/L    Carbon Dioxide 28.0 20.0 - 32.0 mmol/L    Albumin 3.6 3.3 - 4.6 g/dL    Alkaline Phosphatase 53.0 40.0 - 150.0 U/L    ALT 30.0 0.0 - 70.0 U/L    AST 34.0 0.0 - 55.0 U/L    Bilirubin Total 0.7 0.2 - 1.3 mg/dL    Protein Total 6.8 6.8 - 8.8 g/dL   T4 free   Result Value Ref Range    T4 Free 0.75 (L) 0.76 - 1.46 ng/dL

## 2019-11-04 NOTE — NURSING NOTE
"49 year old  Chief Complaint   Patient presents with     Back Pain     lower back pain that radiates to both legs but mainly the right leg       Blood pressure 93/61, pulse 79, temperature 98.2  F (36.8  C), temperature source Oral, resp. rate 18, height 1.88 m (6' 2.02\"), weight 127.5 kg (281 lb), SpO2 98 %. Body mass index is 36.06 kg/m .  There is no problem list on file for this patient.      Wt Readings from Last 2 Encounters:   11/04/19 127.5 kg (281 lb)   10/11/19 124.7 kg (275 lb)     BP Readings from Last 3 Encounters:   11/04/19 93/61   10/11/19 97/65   08/19/19 110/75         Current Outpatient Medications   Medication     ammonium lactate (LAC-HYDRIN) 12 % external cream     gabapentin (NEURONTIN) 400 MG capsule     naproxen (NAPROSYN) 500 MG tablet     nicotine polacrilex (NICORETTE) 4 MG gum     tiZANidine (ZANAFLEX) 4 MG tablet     QUEtiapine (SEROQUEL) 400 MG tablet     No current facility-administered medications for this visit.        Social History     Tobacco Use     Smoking status: Current Every Day Smoker     Types: Dip, chew, snus or snuff     Smokeless tobacco: Former User     Types: Chew   Substance Use Topics     Alcohol use: Not Currently     Drug use: Not Currently       Health Maintenance Due   Topic Date Due     PREVENTIVE CARE VISIT  1969     COLONOSCOPY  05/03/1979     HIV SCREENING  05/03/1984     LIPID  05/03/2004     ZOSTER IMMUNIZATION (1 of 2) 05/03/2019       No results found for: PAP      November 4, 2019 10:55 AM  "

## 2019-11-25 ENCOUNTER — TELEPHONE (OUTPATIENT)
Dept: ORTHOPEDICS | Facility: CLINIC | Age: 49
End: 2019-11-25

## 2019-11-25 NOTE — TELEPHONE ENCOUNTER
[facility] Call Center    Phone Message    May a detailed message be left on voicemail: yes    Reason for Call: Medication Refill Request    Has the patient contacted the pharmacy for the refill? Yes   Name of medication being requested: nicotine polacrilex (NICORETTE) 4 MG gum  Provider who prescribed the medication:   Pharmacy: [id number] - [place], [place] - [id number]  Date medication is needed: asap         Action Taken: Message routed to:  [facility]: thomas

## 2019-11-26 DIAGNOSIS — F17.220 TOBACCO DEPENDENCE DUE TO CHEWING TOBACCO: ICD-10-CM

## 2019-11-26 NOTE — TELEPHONE ENCOUNTER
[facility]    Phone Message    May a detailed message be left on voicemail: yes, Pt is wanting to get a call back from a nurse when nurse has put in refill request for Pt.     Reason for Call: Medication Refill Request    Has the patient contacted the pharmacy for the refill? Yes   Name of medication being requested: nicotine polacrilex (NICORETTE) 4 MG gum  Provider who prescribed the medication: Jr Giron  Pharmacy: [id number] - [place], [place] - [address]  Date medication is needed: 11/26/2019, ASAP,          Action Taken: Message routed to:  [facility]: [facility] Nurse Pool

## 2019-11-26 NOTE — TELEPHONE ENCOUNTER
Spoke to pharmacy, patient is filling Nicorette gum #100 every 7 days, last fills:  11/4/19 #100  11/11/19 #100    Routing refill request to provider for review/approval because: Do you want a referral to [facility] for smoking cessation and plan for Nicorette use?     Trena Harrison RN  11/26/19  8:59 AM

## 2019-11-29 ENCOUNTER — OFFICE VISIT (OUTPATIENT)
Dept: FAMILY MEDICINE | Facility: CLINIC | Age: 49
End: 2019-11-29

## 2019-11-29 VITALS
OXYGEN SATURATION: 96 % | HEART RATE: 87 BPM | BODY MASS INDEX: 37.22 KG/M2 | WEIGHT: 290 LBS | DIASTOLIC BLOOD PRESSURE: 78 MMHG | SYSTOLIC BLOOD PRESSURE: 115 MMHG | TEMPERATURE: 97.9 F | HEIGHT: 74 IN

## 2019-11-29 DIAGNOSIS — L84 CALLUS OF FOOT: ICD-10-CM

## 2019-11-29 DIAGNOSIS — F07.81 POST CONCUSSIVE ENCEPHALOPATHY: ICD-10-CM

## 2019-11-29 DIAGNOSIS — F10.21 ALCOHOLISM IN REMISSION (H): ICD-10-CM

## 2019-11-29 DIAGNOSIS — F41.1 GENERALIZED ANXIETY DISORDER: ICD-10-CM

## 2019-11-29 DIAGNOSIS — Z98.890 H/O SHOULDER SURGERY: ICD-10-CM

## 2019-11-29 DIAGNOSIS — R21 FACIAL RASH: Primary | ICD-10-CM

## 2019-11-29 DIAGNOSIS — G89.29 CHRONIC BILATERAL LOW BACK PAIN WITH SCIATICA, SCIATICA LATERALITY UNSPECIFIED: ICD-10-CM

## 2019-11-29 DIAGNOSIS — G89.29 CHRONIC BILATERAL LOW BACK PAIN WITHOUT SCIATICA: ICD-10-CM

## 2019-11-29 DIAGNOSIS — M54.50 CHRONIC BILATERAL LOW BACK PAIN WITHOUT SCIATICA: ICD-10-CM

## 2019-11-29 DIAGNOSIS — F17.220 TOBACCO DEPENDENCE DUE TO CHEWING TOBACCO: ICD-10-CM

## 2019-11-29 DIAGNOSIS — M54.40 CHRONIC BILATERAL LOW BACK PAIN WITH SCIATICA, SCIATICA LATERALITY UNSPECIFIED: ICD-10-CM

## 2019-11-29 RX ORDER — AMMONIUM LACTATE 12 G/100G
CREAM TOPICAL 2 TIMES DAILY
Qty: 385 G | Refills: 0 | Status: SHIPPED | OUTPATIENT
Start: 2019-11-29 | End: 2020-02-21

## 2019-11-29 RX ORDER — QUETIAPINE FUMARATE 400 MG/1
400 TABLET, FILM COATED ORAL AT BEDTIME
Qty: 30 TABLET | Refills: 1 | Status: SHIPPED | OUTPATIENT
Start: 2019-11-29 | End: 2020-01-31

## 2019-11-29 RX ORDER — NAPROXEN 500 MG/1
500 TABLET ORAL 2 TIMES DAILY WITH MEALS
Qty: 60 TABLET | Refills: 1 | Status: SHIPPED | OUTPATIENT
Start: 2019-11-29 | End: 2020-01-31

## 2019-11-29 RX ORDER — HYDROCORTISONE 2.5 %
CREAM (GRAM) TOPICAL 2 TIMES DAILY
Qty: 30 G | Refills: 1 | Status: SHIPPED | OUTPATIENT
Start: 2019-11-29 | End: 2020-03-03

## 2019-11-29 RX ORDER — GABAPENTIN 400 MG/1
1200 CAPSULE ORAL 3 TIMES DAILY
Qty: 270 CAPSULE | Refills: 1 | Status: SHIPPED | OUTPATIENT
Start: 2019-11-29 | End: 2020-01-31

## 2019-11-29 ASSESSMENT — MIFFLIN-ST. JEOR: SCORE: 2245.43

## 2019-11-29 NOTE — NURSING NOTE
"49 year old  Chief Complaint   Patient presents with     Recheck Medication     review of medication        Blood pressure 115/78, pulse 87, temperature 97.9  F (36.6  C), temperature source Oral, height 1.88 m (6' 2.02\"), weight 131.5 kg (290 lb), SpO2 96 %. Body mass index is 37.22 kg/m .  There is no problem list on file for this patient.      Wt Readings from Last 2 Encounters:   11/29/19 131.5 kg (290 lb)   11/04/19 127.5 kg (281 lb)     BP Readings from Last 3 Encounters:   11/29/19 115/78   11/04/19 93/61   10/11/19 97/65         Current Outpatient Medications   Medication     ammonium lactate (LAC-HYDRIN) 12 % external cream     gabapentin (NEURONTIN) 400 MG capsule     naproxen (NAPROSYN) 500 MG tablet     nicotine polacrilex (NICORETTE) 4 MG gum     QUEtiapine (SEROQUEL) 400 MG tablet     tiZANidine (ZANAFLEX) 4 MG tablet     No current facility-administered medications for this visit.        Social History     Tobacco Use     Smoking status: Current Every Day Smoker     Types: Dip, chew, snus or snuff     Smokeless tobacco: Former User     Types: Chew   Substance Use Topics     Alcohol use: Not Currently     Drug use: Not Currently       Health Maintenance Due   Topic Date Due     PREVENTIVE CARE VISIT  1969     COLONOSCOPY  05/03/1979     HIV SCREENING  05/03/1984     LIPID  05/03/2004     ZOSTER IMMUNIZATION (1 of 2) 05/03/2019       No results found for: PAP      November 29, 2019 1:54 PM    "

## 2019-11-29 NOTE — PATIENT INSTRUCTIONS
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 Only new issue is a facial rash.   This could be either due to dry skin with irritation from beard and shaving. Possibly an autoimmune rash by appearance      1. Tobacco dependence due to chewing tobacco    - nicotine polacrilex (NICORETTE) 4 MG gum; Use 4 mg gum as needed. Please dispense MINT flavor  Dispense: 100 each; Refill: 1  -OK to give 2 pieces per hour    2. Generalized anxiety disorder  -No change  - QUEtiapine (SEROQUEL) 400 MG tablet; Take 1 tablet (400 mg) by mouth At Bedtime  Dispense: 30 tablet; Refill: 1  - gabapentin (NEURONTIN) 400 MG capsule; Take 3 capsules (1,200 mg) by mouth 3 times daily  Dispense: 270 capsule; Refill: 1    3. Chronic bilateral low back pain without sciatica  No change  - tiZANidine (ZANAFLEX) 4 MG tablet; Take 1 tablet (4 mg) by mouth 2 times daily  Dispense: 60 tablet; Refill: 1  - naproxen (NAPROSYN) 500 MG tablet; Take 1 tablet (500 mg) by mouth 2 times daily (with meals)  Dispense: 60 tablet; Refill: 1    4. Callus of foot  -Improved with moisturizer  - ammonium lactate (LAC-HYDRIN) 12 % external cream; Apply topically 2 times daily  Dispense: 385 g; Refill: 0    5. Facial rash  See above  - hydrocortisone 2.5 % cream; Apply topically 2 times daily  Dispense: 30 g; Refill: 1  - DERMATOLOGY REFERRAL    6. Persistent complaint of sweating with normal recent labs  -Unclear cause  -Will ask Dermatology to evaluate    Follow up in 2 months    --Jr Giron MD  [facility], Department of Family Medicine and Community Health

## 2019-11-29 NOTE — PROGRESS NOTES
Hollis Barclay is a 49 year old male who presents to [facility] today for scheduled follow up. Jose M's history has been outlined in past notes.     He's doing OK. Still feeling very anxious. This is stable.   He is still sweating at times for no reason.   No fevers.   Has a new complaint of a facial rash for the past month. He notices this weather he shaves or not. Shaving irritates the rash, but not shaving doesn't help much.     About to move to a different living situation in [place] which is good for him.       Review Of Systems:  Has otherwise been in usual state of health.  This includes anxiety with a PEDRO LUIS score of 16  Also, PHQ-9 score of 12.  No plans for self-harms.     Problem list per EMR:  Patient Active Problem List   Diagnosis     Post concussive encephalopathy     H/O shoulder surgery     Alcoholism in remission (H)     Bilateral ACL tear     Chronic back pain       Current Outpatient Medications   Medication Sig Dispense Refill     ammonium lactate (LAC-HYDRIN) 12 % external cream Apply topically 2 times daily 385 g 0     gabapentin (NEURONTIN) 400 MG capsule Take 3 capsules (1,200 mg) by mouth 3 times daily 270 capsule 1     hydrocortisone 2.5 % cream Apply topically 2 times daily 30 g 1     naproxen (NAPROSYN) 500 MG tablet Take 1 tablet (500 mg) by mouth 2 times daily (with meals) 60 tablet 1     nicotine polacrilex (NICORETTE) 4 MG gum Use 4 mg gum as needed. Please dispense MINT flavor 100 each 1     QUEtiapine (SEROQUEL) 400 MG tablet Take 1 tablet (400 mg) by mouth At Bedtime 30 tablet 1     tiZANidine (ZANAFLEX) 4 MG tablet Take 1 tablet (4 mg) by mouth 2 times daily 60 tablet 1       No Known Allergies     Social:   Social History     Social History Narrative    11/4/2019: He is living in a full house with 8 people, which has been stressful.  He had an interview for an apartment 3 weeks ago, and expects to hear from them soon.        10/11/2019    Jose M is , and has 2 living  "children who are adults.  He had a daughter, who passed away.  He developed issues with drugs and alcohol in his late 30s.  He is now in [facility] residence with Nurse assistance. Previously was receiving treatment at [facility], and is also in a [facility].  He grew up in [place], right outside of [place].  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  He is currently receiving treatment at a [facility] program, and will then move to a jail house in the next few months.         EXAM    Vitals: /78 (BP Location: Left arm, Patient Position: Sitting, Cuff Size: Adult Large)   Pulse 87   Temp 97.9  F (36.6  C) (Oral)   Ht 1.88 m (6' 2.02\")   Wt 131.5 kg (290 lb)   SpO2 96%   BMI 37.22 kg/m    BMI= Body mass index is 37.22 kg/m .  Appears in usual state of health. Anxious, but appears calmer than usual  Face is with a blotchy, reddish discrete irregularly shaped macules over the beard line. He is clean shaved.  No rash elsewhere  Gait is normal.     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 Only new issue is a facial rash.   This could be either due to dry skin with irritation from beard and shaving. Possibly an autoimmune rash by appearance      1. Tobacco dependence due to chewing tobacco    - nicotine polacrilex (NICORETTE) 4 MG gum; Use 4 mg gum as needed. Please dispense MINT flavor  Dispense: 100 each; Refill: 1  -OK to give 2 pieces per hour    2. Generalized anxiety disorder  -No change  - QUEtiapine (SEROQUEL) 400 MG tablet; Take 1 tablet (400 mg) by mouth At Bedtime  Dispense: 30 tablet; Refill: 1  - gabapentin (NEURONTIN) 400 MG capsule; Take 3 capsules (1,200 mg) by mouth 3 times daily  Dispense: 270 capsule; Refill: 1    3. Chronic bilateral low back pain without sciatica  No change  - tiZANidine (ZANAFLEX) 4 MG tablet; Take 1 tablet (4 mg) by mouth 2 times daily  Dispense: 60 " tablet; Refill: 1  - naproxen (NAPROSYN) 500 MG tablet; Take 1 tablet (500 mg) by mouth 2 times daily (with meals)  Dispense: 60 tablet; Refill: 1    4. Callus of foot  -Improved with moisturizer  - ammonium lactate (LAC-HYDRIN) 12 % external cream; Apply topically 2 times daily  Dispense: 385 g; Refill: 0    5. Facial rash  See above  - hydrocortisone 2.5 % cream; Apply topically 2 times daily  Dispense: 30 g; Refill: 1  - DERMATOLOGY REFERRAL    6. Persistent complaint of sweating with normal recent labs  -Unclear cause  -Will ask Dermatology to evaluate    Follow up in 2 months  Over 50% of the 25 minutes in room time was spent discussing the above treatment and diagnostic plan    --Jr Giron MD  [facility], Department of Family Medicine and Community Health

## 2019-12-03 ENCOUNTER — MEDICAL CORRESPONDENCE (OUTPATIENT)
Dept: HEALTH INFORMATION MANAGEMENT | Facility: CLINIC | Age: 49
End: 2019-12-03

## 2019-12-13 ENCOUNTER — MEDICAL CORRESPONDENCE (OUTPATIENT)
Dept: HEALTH INFORMATION MANAGEMENT | Facility: CLINIC | Age: 49
End: 2019-12-13

## 2019-12-17 DIAGNOSIS — F17.220 TOBACCO DEPENDENCE DUE TO CHEWING TOBACCO: ICD-10-CM

## 2019-12-17 NOTE — TELEPHONE ENCOUNTER
Patient is due back to clinic in January - has follow up scheduled with PCP    Notes from last office visit:    - nicotine polacrilex (NICORETTE) 4 MG gum; Use 4 mg gum as needed. Please dispense MINT flavor  Dispense: 100 each; Refill: 1  -OK to give 2 pieces per hour       Trena Harrison RN  12/17/19  2:48 PM

## 2019-12-17 NOTE — TELEPHONE ENCOUNTER
Health Call Center    Phone Message    May a detailed message be left on voicemail: yes    Reason for Call: Other: Patient calling to request an updated script be sent to his pharmacy regarding Nicorette gum, patient stated he was told he can chew 2 pieces rather than 1 and has run out and his pharmacy will not refill until an updated script is sent. please call to discuss/confirm thank you.      Action Taken: Message routed to:  [facility]: [place]

## 2019-12-22 DIAGNOSIS — F17.220 TOBACCO DEPENDENCE DUE TO CHEWING TOBACCO: ICD-10-CM

## 2019-12-23 NOTE — TELEPHONE ENCOUNTER
Reviewed this refill  Request with Mack.  He states pt can use 1 to 2 pieces of gum,   Minimal interval of 1 hour between use, and max of 22 pieces per day.  He gave OK for  giving 240 pieces per fill, with 11 additional refills.     From pts' first use hx of gum , he seems to have used 22 pieces per day, as they only filled with 110 pieces 5 days ago. And he is now out    faxed clarification to pharmacy and also sent new refill to [place] pharmacy   Sheron Mittal RN  December 23, 2019 11:04 AM           START Amoxil TWICE DAILY x 10 DAYS    START Flovent Inhaler, WITH SPACING-CHAMBER, 1 puff TWICE DAILY, for 10 DAYS    RECHECK in 10-14 days if cough is not improved        Learning About Metered-Dose Inhalers for Children  What is a metered-dose inhaler? A metered-dose inhaler lets your child breathe medicine directly into the lungs. Inhaled medicine works faster than the same medicine in a pill. An inhaler lets your child take less medicine than he or she would if it were taken as a pill. \"Metered-dose\" means that the inhaler gives a measured amount of medicine each time your child uses it. This type of inhaler delivers medicine in the form of a liquid mist.  The doctor may want your child to use a spacer with the inhaler. A spacer is a chamber that attaches to the inhaler. The chamber holds the medicine before your child inhales it. That way, your child can inhale the medicine in as many breaths as he or she needs. Doctors recommend using a spacer with most metered-dose inhalers, especially those with corticosteroid medicines. A regular spacer has a mouthpiece. Some younger children have a hard time using it. They may need a face mask with a spacer instead. Your child can use the face mask instead of the mouthpiece. The mask fits over the child's mouth and nose. How does your child use a metered-dose inhaler? To get started  · Ask the doctor, respiratory therapist, or pharmacist to watch your child use the inhaler the first time. It might help if your child practices using it in front of a mirror. Be sure your child uses the inhaler exactly as prescribed. · Check that your child has the correct medicine. If your child uses several inhalers, put a label on each one so that he or she knows which one to use at the right time. · Keep track of how much medicine is in the inhaler. Check the label to see how many doses are in it.  If you and your child know how many puffs to take, you can replace the inhaler before it runs out. Your doctor or pharmacist can teach you and your child how to keep track of how much medicine is left. · If your child is using corticosteroids, have your child gargle and rinse the mouth with water after use. Or have your child brush his or her teeth and spit after using the inhaler. Do not let your child swallow the water. Swallowing the water will increase the chance that the medicine will get into the bloodstream. This may make it more likely that your child will have side effects from the medicine. To use a spacer with an inhaler  1. Have your child shake the inhaler. Remove the inhaler cap, and place the mouthpiece of the inhaler into the spacer. Check the inhaler instructions to see if you need to prime the inhaler before you use it. If it needs priming, follow the instructions on how to prime it. 2. Remove the cap from the spacer. 3. The inhaler should be upright with the mouthpiece at the bottom. 4. Have your child tilt his or her head back a little and breathe out slowly and completely. 5. Place the spacer's mouthpiece in your child's mouth. 6. Have your child press down on the inhaler to spray one puff of medicine into the spacer. Then have your child take one deep, slow breath. Have your child hold his or her breath for 10 seconds. This will let the medicine settle in the lungs. Some spacers have a whistle. If you hear it, have your child breathe in more slowly. 7. If your child needs to take a second dose, wait 30 to 60 seconds. This lets the inhaler valve refill. To use an inhaler without a spacer  1. Have your child shake the inhaler as directed. Remove the cap. Check the inhaler instructions to see if you need to prime your child's inhaler before you use it. If it needs priming, follow the instructions on how to prime it. 2. The inhaler should be upright with the mouthpiece at the bottom.   3. Have your child tilt his or her head back a little and breathe out slowly and completely. 4. The inhaler can be positioned in one of two ways:  ¨ The inhaler mouthpiece can be in your child's mouth. This method is easier for most children. It also lowers the risk that any of the medicine will get into the eyes. ¨ Or the mouthpiece can be held 1 to 2 inches in front of your child's open mouth. With this method, the lips should not be closed over the mouthpiece. Instead, your child's mouth should be open as wide as possible. This method, with the mouthpiece in front of your child's open mouth, may be better for getting the medicine into the lungs. But some children may find it too hard to do. 5. Your child can start taking slow, even breaths through the mouth. Press down on the inhaler one time. Then have your child inhale fully. 6. Have your child hold his or her breath for 10 seconds. This will let the medicine settle in the lungs. If your child needs to take a second dose, wait 30 to 60 seconds to let the inhaler valve refill. Follow-up care is a key part of your child's treatment and safety. Be sure to make and go to all appointments, and call your doctor if your child is having problems. It's also a good idea to know your child's test results and keep a list of the medicines your child takes. Where can you learn more? Go to http://kristi-jacek.info/. Enter D341 in the search box to learn more about \"Learning About Metered-Dose Inhalers for Children. \"  Current as of: March 25, 2017  Content Version: 11.3  © 6747-4134 [employer]. Care instructions adapted under license by [employer] (which disclaims liability or warranty for this information). If you have questions about a medical condition or this instruction, always ask your healthcare professional. [place] [address] any warranty or liability for your use of this information.            Cough in Children: Care Instructions  Your Care Instructions  A cough is how your child's body responds to something that bothers his or her throat or airways. Many things can cause a cough. Your child might cough because of a cold or the flu, bronchitis, or asthma. Cigarette smoke, postnasal drip, allergies, and stomach acid that backs up into the throat also can cause coughs. A cough is a symptom, not a disease. Most coughs stop when the cause, such as a cold, goes away. You can take a few steps at home to help your child cough less and feel better. Follow-up care is a key part of your child's treatment and safety. Be sure to make and go to all appointments, and call your doctor if your child is having problems. It's also a good idea to know your child's test results and keep a list of the medicines your child takes. How can you care for your child at home? · Have your child drink plenty of water and other fluids. This may help soothe a dry or sore throat. Honey or lemon juice in hot water or tea may ease a dry cough. Do not give honey to a child younger than 3year old. It may contain bacteria that are harmful to infants. · Be careful with cough and cold medicines. Don't give them to children younger than 6, because they don't work for children that age and can even be harmful. For children 6 and older, always follow all the instructions carefully. Make sure you know how much medicine to give and how long to use it. And use the dosing device if one is included. · Keep your child away from smoke. Do not smoke or let anyone else smoke around your child or in your house. · Help your child avoid exposure to smoke, dust, or other pollutants, or have your child wear a face mask. Check with your doctor or pharmacist to find out which type of face mask will give your child the most benefit. When should you call for help? Call 911 anytime you think your child may need emergency care. For example, call if:  ? · Your child has severe trouble breathing.  Symptoms may include:  ¨ Using the belly muscles to breathe. ¨ The chest sinking in or the nostrils flaring when your child struggles to breathe. ? · Your child's skin and fingernails are gray or blue. ? · Your child coughs up large amounts of blood or what looks like coffee grounds. ?Call your doctor now or seek immediate medical care if:  ? · Your child coughs up blood. ? · Your child has new or worse trouble breathing. ? · Your child has a new or higher fever. ? Watch closely for changes in your child's health, and be sure to contact your doctor if:  ? · Your child has a new symptom, such as an earache or a rash. ? · Your child coughs more deeply or more often, especially if you notice more mucus or a change in the color of the mucus. ? · Your child does not get better as expected. Where can you learn more? Go to http://kristi-jacek.info/. Enter G801 in the search box to learn more about \"Cough in Children: Care Instructions. \"  Current as of: May 12, 2017  Content Version: 11.4  © 6756-2016 [employer]. Care instructions adapted under license by [employer] (which disclaims liability or warranty for this information). If you have questions about a medical condition or this instruction, always ask your healthcare professional. [address] any warranty or liability for your use of this information.

## 2020-01-14 ENCOUNTER — TELEPHONE (OUTPATIENT)
Dept: FAMILY MEDICINE | Facility: CLINIC | Age: 50
End: 2020-01-14

## 2020-01-14 NOTE — TELEPHONE ENCOUNTER
[facility] Call Center    Phone Message    May a detailed message be left on voicemail: yes    Reason for Call: Other: Elyse from patients living facility called stating that patient is requesting something for heartburn, they do need an order for any medications and that order can be sent to [place]. If any questions please call Elyse thank you.      Action Taken: Message routed to:  [facility]: [place]

## 2020-01-15 NOTE — TELEPHONE ENCOUNTER
YADIRA Pappas or other staff to call back to discuss need for med for heartburn.  Angeli Garcia RN  [facility]

## 2020-01-16 NOTE — TELEPHONE ENCOUNTER
Spoke to pt - states he has been bothered by heartburn symptoms in recent days. He asked for TUMS, as this usually works for heartburn. Staff at his facility could not give him this, as they need a MD order for this. Pt states he just bought TUMS, and is using them.  His symptoms are relieved - he will call back prn.  Angeli Garcia RN  [facility]

## 2020-01-16 NOTE — TELEPHONE ENCOUNTER
Elyse returned call requesting a return call be made to the patient to discuss symptoms. Thank you. Patient can be reached at [phone]

## 2020-01-24 ENCOUNTER — TELEPHONE (OUTPATIENT)
Dept: ORTHOPEDICS | Facility: CLINIC | Age: 50
End: 2020-01-24

## 2020-01-24 ENCOUNTER — TELEPHONE (OUTPATIENT)
Dept: FAMILY MEDICINE | Facility: CLINIC | Age: 50
End: 2020-01-24

## 2020-01-24 DIAGNOSIS — M54.50 CHRONIC BILATERAL LOW BACK PAIN WITHOUT SCIATICA: ICD-10-CM

## 2020-01-24 DIAGNOSIS — F41.1 GENERALIZED ANXIETY DISORDER: ICD-10-CM

## 2020-01-24 DIAGNOSIS — R21 FACIAL RASH: ICD-10-CM

## 2020-01-24 DIAGNOSIS — L84 CALLUS OF FOOT: ICD-10-CM

## 2020-01-24 DIAGNOSIS — F17.220 TOBACCO DEPENDENCE DUE TO CHEWING TOBACCO: ICD-10-CM

## 2020-01-24 DIAGNOSIS — G89.29 CHRONIC BILATERAL LOW BACK PAIN WITHOUT SCIATICA: ICD-10-CM

## 2020-01-24 NOTE — TELEPHONE ENCOUNTER
Health Call Center    Phone Message    May a detailed message be left on voicemail: yes    Reason for Call: Other: Elyse calling from [facility] and stating that Jose M [place] on the [place] restricted [place] and need his medications transferred to their pharmacy, they need new scripts sent to [facility] fax [phone], include PRN's as well. Please call Elyse with any questions. Thank you.      Action Taken: Message routed to:  [facility]: [place]

## 2020-01-24 NOTE — TELEPHONE ENCOUNTER
[facility]    Phone Message    May a detailed message be left on voicemail: yes    Reason for Call: Other: [facility] needs level of need paperwork. It was faxed to the clinic, needs to be sent back asap for appt next week.      Action Taken: Message routed to:  [facility]: Nurses

## 2020-01-24 NOTE — TELEPHONE ENCOUNTER
Spoke to Delta at [facility] - patient can have new Rx sent to new pharmacy at his next appointment with PCP next Friday. Pharmacy has been updated.     [facility] (PHARMACY) - [place], [place]    Trena Harrison RN  01/24/20  12:23 PM

## 2020-01-27 NOTE — TELEPHONE ENCOUNTER
Level of Need Assessment Form for Medicaid transportation. Routing to [facility] for review/completion as deemed appropriate.     Trena Harrison RN  01/27/20  9:21 AM

## 2020-01-31 ENCOUNTER — OFFICE VISIT (OUTPATIENT)
Dept: FAMILY MEDICINE | Facility: CLINIC | Age: 50
End: 2020-01-31
Payer: MEDICAID

## 2020-01-31 VITALS
BODY MASS INDEX: 37.6 KG/M2 | HEART RATE: 103 BPM | WEIGHT: 293 LBS | HEIGHT: 74 IN | SYSTOLIC BLOOD PRESSURE: 140 MMHG | OXYGEN SATURATION: 93 % | TEMPERATURE: 97.5 F | DIASTOLIC BLOOD PRESSURE: 97 MMHG

## 2020-01-31 DIAGNOSIS — M54.50 CHRONIC BILATERAL LOW BACK PAIN WITHOUT SCIATICA: ICD-10-CM

## 2020-01-31 DIAGNOSIS — F17.220 TOBACCO DEPENDENCE DUE TO CHEWING TOBACCO: ICD-10-CM

## 2020-01-31 DIAGNOSIS — G89.29 CHRONIC BILATERAL LOW BACK PAIN WITHOUT SCIATICA: ICD-10-CM

## 2020-01-31 DIAGNOSIS — M79.18 RIGHT BUTTOCK PAIN: ICD-10-CM

## 2020-01-31 DIAGNOSIS — M79.18 RIGHT BUTTOCK PAIN: Primary | ICD-10-CM

## 2020-01-31 DIAGNOSIS — F41.1 GENERALIZED ANXIETY DISORDER: ICD-10-CM

## 2020-01-31 DIAGNOSIS — F40.10 SOCIAL ANXIETY DISORDER: ICD-10-CM

## 2020-01-31 RX ORDER — HYDROCODONE BITARTRATE AND ACETAMINOPHEN 5; 300 MG/1; MG/1
TABLET ORAL
Qty: 10 TABLET | Refills: 0 | Status: SHIPPED | OUTPATIENT
Start: 2020-01-31 | End: 2020-02-13

## 2020-01-31 RX ORDER — QUETIAPINE FUMARATE 400 MG/1
400 TABLET, FILM COATED ORAL AT BEDTIME
Qty: 30 TABLET | Refills: 1 | Status: SHIPPED | OUTPATIENT
Start: 2020-01-31 | End: 2020-06-17

## 2020-01-31 RX ORDER — GABAPENTIN 400 MG/1
1200 CAPSULE ORAL 3 TIMES DAILY
Qty: 270 CAPSULE | Refills: 1 | Status: SHIPPED | OUTPATIENT
Start: 2020-01-31 | End: 2020-06-17

## 2020-01-31 RX ORDER — NAPROXEN 500 MG/1
500 TABLET ORAL 2 TIMES DAILY WITH MEALS
Qty: 60 TABLET | Refills: 1 | Status: SHIPPED | OUTPATIENT
Start: 2020-01-31 | End: 2020-02-26

## 2020-01-31 RX ORDER — POLYETHYLENE GLYCOL 3350 17 G/17G
1 POWDER, FOR SOLUTION ORAL AT BEDTIME
Qty: 116 G | Refills: 0 | Status: SHIPPED | OUTPATIENT
Start: 2020-01-31 | End: 2020-03-03

## 2020-01-31 ASSESSMENT — MIFFLIN-ST. JEOR: SCORE: 2259.04

## 2020-01-31 ASSESSMENT — PAIN SCALES - GENERAL: PAINLEVEL: WORST PAIN (10)

## 2020-01-31 NOTE — PATIENT INSTRUCTIONS
Jose M is a 48 yo with chronic medical issues as documented in past and today here 1 day after a fall onto his RIGHT buttocks.   He's requested pain medication for that.     Refilled his chronic meds.    A/P  1. Generalized anxiety disorder    - gabapentin (NEURONTIN) 400 MG capsule; Take 3 capsules (1,200 mg) by mouth 3 times daily  Dispense: 270 capsule; Refill: 1  - QUEtiapine (SEROQUEL) 400 MG tablet; Take 1 tablet (400 mg) by mouth At Bedtime  Dispense: 30 tablet; Refill: 1    2. Chronic bilateral low back pain without sciatica    - naproxen (NAPROSYN) 500 MG tablet; Take 1 tablet (500 mg) by mouth 2 times daily (with meals)  Dispense: 60 tablet; Refill: 1  - tiZANidine (ZANAFLEX) 4 MG tablet; Take 1 tablet (4 mg) by mouth 2 times daily  Dispense: 60 tablet; Refill: 1    3. Tobacco dependence due to chewing tobacco    - nicotine polacrilex (NICORETTE) 4 MG gum; CHEW 1-2 PEICES BY MOUTH AS NEEDED. Min interval between administration- 1 hour.  Max of 22 pieces per day.  Dispense: 240 each; Refill: 11    4. Right buttock pain    - X-ray Pelvis w/ unilateral hip right; Future  - HYDROcodone-Acetaminophen (VICODIN) 5-300 MG TABS; Take 1 tab every 6 hours as needed for pain  Dispense: 10 tablet; Refill: 0  Also, stool softeners    - polyethylene glycol (MIRALAX) powder; Take 17 g (1 capful) by mouth At Bedtime  Dispense: 116 g; Refill: 0    5. Social anxiety disorder  Letter written so that he can ride cabs rather than buses.       Return in 2 months

## 2020-01-31 NOTE — NURSING NOTE
"49 year old  Chief Complaint   Patient presents with     Refill Request     pt is needing refill on Gabapentin, naproxen, Seroquel and Zanaflex,      Fall     Pt feel on ice yesterday on his right side leg and back and would like something for pain.        Blood pressure (!) 140/97, pulse 103, temperature 97.5  F (36.4  C), temperature source Oral, height 1.88 m (6' 2.02\"), weight 132.9 kg (293 lb), SpO2 93 %. Body mass index is 37.6 kg/m .  Patient Active Problem List   Diagnosis     Post concussive encephalopathy     H/O shoulder surgery     Alcoholism in remission (H)     Bilateral ACL tear     Chronic back pain       Wt Readings from Last 2 Encounters:   01/31/20 132.9 kg (293 lb)   11/29/19 131.5 kg (290 lb)     BP Readings from Last 3 Encounters:   01/31/20 (!) 140/97   11/29/19 115/78   11/04/19 93/61         Current Outpatient Medications   Medication     ammonium lactate (LAC-HYDRIN) 12 % external cream     gabapentin (NEURONTIN) 400 MG capsule     hydrocortisone 2.5 % cream     naproxen (NAPROSYN) 500 MG tablet     nicotine polacrilex (NICORETTE) 4 MG gum     QUEtiapine (SEROQUEL) 400 MG tablet     tiZANidine (ZANAFLEX) 4 MG tablet     No current facility-administered medications for this visit.        Social History     Tobacco Use     Smoking status: Current Every Day Smoker     Types: Dip, chew, snus or snuff     Smokeless tobacco: Former User     Types: Chew   Substance Use Topics     Alcohol use: Not Currently     Drug use: Not Currently       Health Maintenance Due   Topic Date Due     PREVENTIVE CARE VISIT  1970     COLONOSCOPY  05/03/1979     HIV SCREENING  05/03/1984     LIPID  05/03/2004     ZOSTER IMMUNIZATION (1 of 2) 05/03/2019       No results found for: PAP      January 31, 2020 12:58 PM  "

## 2020-01-31 NOTE — PROGRESS NOTES
"REASON FOR VISIT:  Chronic Bilateral Back Pain, R>L; and Anxiety      HISTORY OF PRESENT ILLNESS: Hollis Barclay is a 49 year old male who presents for chronic bilateral back pain, currently worse on the RIGHT than LEFT after a fall yesterday.  I have met him 5 times since 07/01/2019 for a variety of issues.    Jose M slipped on ice yesterday, and landed on his RIGHT hip and lower back.  He has been in significant pain since then, and notes he was unable to sleep last night.  He has had significant difficulty with ambulation.  He does have a history of chronic back pain, and is currently taking Naproxen 500 mg BID, tizanidine 4 mg BID and gabapentin 1200 mg TID for his chronic pain, of which he is requesting refills.  He is asking for \"something to get him through the day\".  Currently, he has been taking Tylenol for his pain, which has provided very little relief.    Jose M has a history of generalized anxiety, which we have discussed in the past.  He is currently taking gabapentin 1200 mg TID and seroquel 400 mg q hs for his anxiety.  He is currently living in an apartment with one roommate in [place], and notes this is doing well.    Jose M has a history of tobacco dependance, and has been using nicorette gum recently.  He is requesting refills today.    Also, Jose M had forms sent in from his insurance for his medical cab rides he receives.  He is asking if I have received these, and asking they be completed.    SOCIAL HISTORY:   Social History     Social History Narrative    1/31/2020:  Jose M is now living in an apartment with one roommate in [place].  He is happy with his current living situation.        11/4/2019: He is living in a full house with 8 people, which has been stressful.  He had an interview for an apartment 3 weeks ago, and expects to hear from them soon.        10/11/2019    Jose M is , and has 2 living children who are adults.  He had a daughter, who passed away.  He developed issues with " "drugs and alcohol in his late 30s.  He is now in [facility] residence with Nurse assistance. Previously was receiving treatment at [facility], and is also in a methadone program at [facility].  He grew up in [place] of [place].  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  He is currently receiving treatment at a [facility] program, and will then move to a snf house in the next few months.        MEDICATIONS:  Reviewed.     Current Outpatient Medications   Medication Sig Dispense Refill     ammonium lactate (LAC-HYDRIN) 12 % external cream Apply topically 2 times daily 385 g 0     gabapentin (NEURONTIN) 400 MG capsule Take 3 capsules (1,200 mg) by mouth 3 times daily 270 capsule 1     hydrocortisone 2.5 % cream Apply topically 2 times daily 30 g 1     naproxen (NAPROSYN) 500 MG tablet Take 1 tablet (500 mg) by mouth 2 times daily (with meals) 60 tablet 1     nicotine polacrilex (NICORETTE) 4 MG gum CHEW 1-2 PEICES BY MOUTH AS NEEDED. Min interval between administration- 1 hour.  Max of 22 pieces per day. 240 each 11     QUEtiapine (SEROQUEL) 400 MG tablet Take 1 tablet (400 mg) by mouth At Bedtime 30 tablet 1     tiZANidine (ZANAFLEX) 4 MG tablet Take 1 tablet (4 mg) by mouth 2 times daily 60 tablet 1         REVIEW OF SYSTEMS:  POSITIVE for chronic bilateral low back pain, currently worse on the RIGHT than LEFT due to a recent fall. Otherwise, the ROS is negative.      OBJECTIVE:      EXAM: Jose M is a 49 year old male who appears to be in significant pain from low right back and right buttocks pain..  VITAL SIGNS: BP (!) 140/97   Pulse 103   Temp 97.5  F (36.4  C) (Oral)   Ht 1.88 m (6' 2.02\")   Wt 132.9 kg (293 lb)   SpO2 93%   BMI 37.60 kg/m    He is able to rise from a seated position but grimaces with pain.  Able to bear weight.  Able to perform a straight leg raise.  Right low back has a lack of lordosis but no other gross abnormalities.  " No redness or warmth.  Pain is from the right lower lumbar spine through to the ischial tuberosity.  Right hip has generally normal range of motion.  Reflexes are +1 and bilaterally symmetrical at the patella and the Achilles.    Sensation is intact distally.      LABORATORY DATA: X-Rays completed and reviewed today.  AP of the pelvis and a lateral of the right hip - no fractures seen.        ASSESSMENT AND PLAN:   1. Jose M is a 48 yo with chronic medical issues as documented in past and today here 1 day after a fall onto his RIGHT buttocks.   He's requested pain medication for that.     Refilled his chronic meds.    A/P  1. Generalized anxiety disorder    - gabapentin (NEURONTIN) 400 MG capsule; Take 3 capsules (1,200 mg) by mouth 3 times daily  Dispense: 270 capsule; Refill: 1  - QUEtiapine (SEROQUEL) 400 MG tablet; Take 1 tablet (400 mg) by mouth At Bedtime  Dispense: 30 tablet; Refill: 1    2. Chronic bilateral low back pain without sciatica    - naproxen (NAPROSYN) 500 MG tablet; Take 1 tablet (500 mg) by mouth 2 times daily (with meals)  Dispense: 60 tablet; Refill: 1  - tiZANidine (ZANAFLEX) 4 MG tablet; Take 1 tablet (4 mg) by mouth 2 times daily  Dispense: 60 tablet; Refill: 1    3. Tobacco dependence due to chewing tobacco    - nicotine polacrilex (NICORETTE) 4 MG gum; CHEW 1-2 PEICES BY MOUTH AS NEEDED. Min interval between administration- 1 hour.  Max of 22 pieces per day.  Dispense: 240 each; Refill: 11    4. Right buttock pain  I checked the [place] prescribers database and no one else has been giving raise medications.  He denies any history of drug abuse although admits to history of alcohol abuse.  - X-ray Pelvis w/ unilateral hip right; Future  - HYDROcodone-Acetaminophen (VICODIN) 5-300 MG TABS; Take 1 tab every 6 hours as needed for pain  Dispense: 10 tablet; Refill: 0  Also, stool softeners    - polyethylene glycol (MIRALAX) powder; Take 17 g (1 capful) by mouth At Bedtime  Dispense: 116 g;  Refill: 0    5. Social anxiety disorder  Letter written so that he can ride cabs rather than buses.     Return in 2 months  Over 50% of the 25-minute in room time was spent discussing above care.    Call with any problems or questions.     I, Pedro Foster, am serving as a scribe to document services personally performed by Jr Giron MD based on data collection and the provider's statements to me. Dr. Giron has reviewed, edited and approved the above note.     --Jr Giron MD

## 2020-02-04 ENCOUNTER — TELEPHONE (OUTPATIENT)
Dept: FAMILY MEDICINE | Facility: CLINIC | Age: 50
End: 2020-02-04

## 2020-02-04 NOTE — TELEPHONE ENCOUNTER
Prior Authorization Retail Medication Request    Medication/Dose: hydrocodone 5-300 mg tabs  ICD code (if different than what is on RX):  same  Previously Tried and Failed:    Rationale:      Insurance Name:  same  Insurance ID:  same      Pharmacy Information (if different than what is on RX)  Name:  [facility]  Phone:  [phone]    Angeli Garcia RN  [facility]

## 2020-02-06 NOTE — TELEPHONE ENCOUNTER
Central Prior Authorization Team   Phone: [phone]    PA Initiation    Medication: HYDROcodone-Acetaminophen (VICODIN) 5-300 MG TABS  Insurance Company: [place] Medicaid ([facility]) - Phone [phone] Fax [phone]  Pharmacy Filling the Rx: [id number] [address]  Filling Pharmacy Phone: [phone]  Filling Pharmacy Fax: [phone]  Start Date: 2/6/2020

## 2020-02-07 NOTE — TELEPHONE ENCOUNTER
PRIOR AUTHORIZATION DENIED    Medication: HYDROcodone-Acetaminophen (VICODIN) 5-300 MG TABS - P/A DENIED    Denial Date: 2/6/2020    Denial Rational:       Appeal Information:

## 2020-02-13 ENCOUNTER — TELEPHONE (OUTPATIENT)
Dept: FAMILY MEDICINE | Facility: CLINIC | Age: 50
End: 2020-02-13

## 2020-02-13 ENCOUNTER — TELEPHONE (OUTPATIENT)
Dept: ORTHOPEDICS | Facility: CLINIC | Age: 50
End: 2020-02-13

## 2020-02-13 NOTE — TELEPHONE ENCOUNTER
[facility] Call [facility]    Phone Message    May a detailed message be left on voicemail: yes     Reason for Call: Medication Question or concern regarding medication   Prescription Clarification  Name of Medication: sertraline - not found on med list  Prescribing Provider: according to caller    Pharmacy: n/a   What on the order needs clarification? There was a error when uploading pt's med list - reporting pt has not taken sertraline for the last 2 months - notified called I was unable to find this med on our med list.          Action Taken: Message routed to:  [facility]: [facility]    Travel Screening: Not Applicable

## 2020-02-13 NOTE — TELEPHONE ENCOUNTER
[facility] Call [facility]    Phone Message    May a detailed message be left on voicemail: yes     Reason for Call: Other: Group Home Staff, Elyse, reporting to provider:  pt went to [facility] today due to increasing alcohol consumption. Pt was becoming unresponsive. Pt is on methadone. KRYSTIN was at .286.  Please call Group Home staff if needed. Thank you.    Action Taken: Message routed to:  [facility]: Nurse Pool    Travel Screening: Not Applicable

## 2020-02-13 NOTE — TELEPHONE ENCOUNTER
Talked to Elyse and informed her that sertraline is not on patient's medication list. Will fax over a Med List that we have on file. Also, patient is restricted to [facility], they were not aware of this.    Trena Harrison RN  02/13/20  2:35 PM

## 2020-02-13 NOTE — TELEPHONE ENCOUNTER
[facility] [place] shows [place], snapshot updated to reflect restrictions.     Patient is [place] eligible due to dx: Disabled  Eligibility begin date: 12/1/19  Eligible for CADI waiver    Restricted Recipient Program - [facility] unit [phone] or [phone]

## 2020-02-13 NOTE — TELEPHONE ENCOUNTER
[facility] Call [facility]    Phone Message    May a detailed message be left on voicemail: yes     Reason for Call: Medication Refill Request    Has the patient contacted the pharmacy for the refill? Yes   Name of medication being requested: sertraline  Provider who prescribed the medication: not sure, could not see medication in chart  Pharmacy: [facility] pharmacy, Fax# [phone]  Date medication is needed: asap. Thank you.         Action Taken: Message routed to:  [facility]: Nurse Pool    Travel Screening: Not Applicable

## 2020-02-20 DIAGNOSIS — L84 CALLUS OF FOOT: ICD-10-CM

## 2020-02-20 NOTE — TELEPHONE ENCOUNTER
Last time prescribed: 11/29/19 , 385g  x 0 refills  Last office visit: 1/31/2020  Next appointment: 3/30/2020    Prescription approved per [facility] Refill Protocol.  Trena Harrison RN  02/21/20  10:04 AM

## 2020-02-21 ENCOUNTER — TRANSFERRED RECORDS (OUTPATIENT)
Dept: HEALTH INFORMATION MANAGEMENT | Facility: CLINIC | Age: 50
End: 2020-02-21

## 2020-02-21 RX ORDER — AMMONIUM LACTATE 12 G/100G
CREAM TOPICAL 2 TIMES DAILY
Qty: 385 G | Refills: 0 | Status: SHIPPED | OUTPATIENT
Start: 2020-02-21 | End: 2020-03-03

## 2020-02-26 ENCOUNTER — TELEPHONE (OUTPATIENT)
Dept: FAMILY MEDICINE | Facility: CLINIC | Age: 50
End: 2020-02-26

## 2020-02-26 DIAGNOSIS — F41.1 GENERALIZED ANXIETY DISORDER: ICD-10-CM

## 2020-02-26 DIAGNOSIS — F10.21 ALCOHOLISM IN REMISSION (H): Primary | ICD-10-CM

## 2020-02-26 RX ORDER — FOLIC ACID 1 MG/1
1 TABLET ORAL DAILY
COMMUNITY
Start: 2020-02-26 | End: 2020-02-28

## 2020-02-26 RX ORDER — LANOLIN ALCOHOL/MO/W.PET/CERES
100 CREAM (GRAM) TOPICAL DAILY
COMMUNITY
Start: 2020-02-26 | End: 2020-02-28

## 2020-02-26 RX ORDER — HYDROXYZINE PAMOATE 50 MG/1
50 CAPSULE ORAL EVERY 4 HOURS PRN
COMMUNITY
Start: 2020-02-25 | End: 2020-02-28

## 2020-02-26 NOTE — TELEPHONE ENCOUNTER
[facility] Call Center    Phone Message    May a detailed message be left on voicemail: yes     Reason for Call: Other: The patient needs a Referral because he a [facility] patient and needs something sent to Medicaid that it's ok that he goes to [facility] for alcohol withdrawal, please call Rich's office to address any questions or concerns.      Action Taken: Message routed to:  [facility]: [facility]    Travel Screening: Not Applicable

## 2020-02-26 NOTE — TELEPHONE ENCOUNTER
I tried calling the number given by Rich, that number is not in service and not assigned. Not able to reach the caller.     Trena Harrison RN  02/26/20  1:44 PM

## 2020-02-26 NOTE — PROGRESS NOTES
Patient discharge from hospital with new med orders:  Start folic acid, Multivitamin, thiamine, hydroxyzine  Discontinue: naproxen    Orders signed by Dr. Giron and sent back to [facility]. Med list updated.     Trena Harrison RN  02/26/20  4:41 PM

## 2020-02-28 RX ORDER — MULTIVITAMIN,THERAPEUTIC
1 TABLET ORAL DAILY
Qty: 30 TABLET | Refills: 4 | Status: SHIPPED | OUTPATIENT
Start: 2020-02-28 | End: 2020-03-03

## 2020-02-28 RX ORDER — LANOLIN ALCOHOL/MO/W.PET/CERES
100 CREAM (GRAM) TOPICAL DAILY
Qty: 30 TABLET | Refills: 4 | Status: SHIPPED | OUTPATIENT
Start: 2020-02-28 | End: 2020-03-03

## 2020-02-28 RX ORDER — FOLIC ACID 1 MG/1
1 TABLET ORAL DAILY
Qty: 30 TABLET | Refills: 4 | Status: SHIPPED | OUTPATIENT
Start: 2020-02-28 | End: 2020-03-03

## 2020-02-28 RX ORDER — HYDROXYZINE PAMOATE 50 MG/1
50 CAPSULE ORAL EVERY 4 HOURS PRN
Qty: 90 CAPSULE | Refills: 4 | Status: SHIPPED | OUTPATIENT
Start: 2020-02-28 | End: 2020-03-03

## 2020-02-28 NOTE — PROGRESS NOTES
[facility] pharmacy requesting e-scribed orders sent to them for new orders. They cannot take the signed orders from the [place] since those are not really Rx.    Trena Harrison RN  02/28/20  11:13 AM

## 2020-03-02 ENCOUNTER — HOSPITAL ENCOUNTER (INPATIENT)
Facility: CLINIC | Age: 50
LOS: 2 days | Discharge: NURSING, REHAB OR LONG-TERM CARE | End: 2020-03-06
Attending: FAMILY MEDICINE | Admitting: PSYCHIATRY & NEUROLOGY
Payer: MEDICAID

## 2020-03-02 DIAGNOSIS — F11.10 OPIOID ABUSE, CONTINUOUS (H): ICD-10-CM

## 2020-03-02 DIAGNOSIS — F41.1 GENERALIZED ANXIETY DISORDER: ICD-10-CM

## 2020-03-02 DIAGNOSIS — F33.2 SEVERE RECURRENT MAJOR DEPRESSION WITHOUT PSYCHOTIC FEATURES (H): ICD-10-CM

## 2020-03-02 DIAGNOSIS — R45.1 AGITATION REQUIRING SEDATION PROTOCOL: ICD-10-CM

## 2020-03-02 DIAGNOSIS — F10.939 ALCOHOL WITHDRAWAL SYNDROME WITH COMPLICATION (H): ICD-10-CM

## 2020-03-02 DIAGNOSIS — Z87.828 HISTORY OF TRAUMATIC HEAD INJURY: ICD-10-CM

## 2020-03-02 DIAGNOSIS — F10.929 ALCOHOLIC INTOXICATION WITH COMPLICATION (H): ICD-10-CM

## 2020-03-02 DIAGNOSIS — Z87.820 HISTORY OF TRAUMATIC BRAIN INJURY: ICD-10-CM

## 2020-03-02 DIAGNOSIS — F41.9 ANXIETY: ICD-10-CM

## 2020-03-02 PROCEDURE — 99285 EMERGENCY DEPT VISIT HI MDM: CPT | Mod: Z6 | Performed by: FAMILY MEDICINE

## 2020-03-02 PROCEDURE — 99285 EMERGENCY DEPT VISIT HI MDM: CPT | Mod: 25 | Performed by: FAMILY MEDICINE

## 2020-03-02 RX ORDER — OLANZAPINE 10 MG/2ML
10 INJECTION, POWDER, FOR SOLUTION INTRAMUSCULAR ONCE
Status: COMPLETED | OUTPATIENT
Start: 2020-03-02 | End: 2020-03-03

## 2020-03-03 ENCOUNTER — APPOINTMENT (OUTPATIENT)
Dept: GENERAL RADIOLOGY | Facility: CLINIC | Age: 50
End: 2020-03-03
Attending: EMERGENCY MEDICINE
Payer: MEDICAID

## 2020-03-03 ENCOUNTER — TELEPHONE (OUTPATIENT)
Dept: BEHAVIORAL HEALTH | Facility: CLINIC | Age: 50
End: 2020-03-03

## 2020-03-03 ENCOUNTER — MEDICAL CORRESPONDENCE (OUTPATIENT)
Dept: HEALTH INFORMATION MANAGEMENT | Facility: CLINIC | Age: 50
End: 2020-03-03

## 2020-03-03 LAB
ALBUMIN SERPL-MCNC: 3.7 G/DL (ref 3.4–5)
ALCOHOL BREATH TEST: 0.4 (ref 0–0.01)
ALP SERPL-CCNC: 89 U/L (ref 40–150)
ALT SERPL W P-5'-P-CCNC: 141 U/L (ref 0–70)
AMPHETAMINES UR QL SCN: NEGATIVE
ANION GAP SERPL CALCULATED.3IONS-SCNC: 7 MMOL/L (ref 3–14)
AST SERPL W P-5'-P-CCNC: 200 U/L (ref 0–45)
BARBITURATES UR QL: NEGATIVE
BASOPHILS # BLD AUTO: 0 10E9/L (ref 0–0.2)
BASOPHILS NFR BLD AUTO: 0.6 %
BENZODIAZ UR QL: POSITIVE
BILIRUB SERPL-MCNC: 0.9 MG/DL (ref 0.2–1.3)
BUN SERPL-MCNC: 15 MG/DL (ref 7–30)
CALCIUM SERPL-MCNC: 8.4 MG/DL (ref 8.5–10.1)
CANNABINOIDS UR QL SCN: NEGATIVE
CHLORIDE SERPL-SCNC: 103 MMOL/L (ref 94–109)
CO2 SERPL-SCNC: 29 MMOL/L (ref 20–32)
COCAINE UR QL: NEGATIVE
CREAT SERPL-MCNC: 0.68 MG/DL (ref 0.66–1.25)
DIFFERENTIAL METHOD BLD: NORMAL
EOSINOPHIL # BLD AUTO: 0 10E9/L (ref 0–0.7)
EOSINOPHIL NFR BLD AUTO: 0.6 %
ERYTHROCYTE [DISTWIDTH] IN BLOOD BY AUTOMATED COUNT: 14.1 % (ref 10–15)
ETHANOL UR QL SCN: POSITIVE
GFR SERPL CREATININE-BSD FRML MDRD: >90 ML/MIN/{1.73_M2}
GLUCOSE SERPL-MCNC: 98 MG/DL (ref 70–99)
HCT VFR BLD AUTO: 42.1 % (ref 40–53)
HGB BLD-MCNC: 14.2 G/DL (ref 13.3–17.7)
IMM GRANULOCYTES # BLD: 0 10E9/L (ref 0–0.4)
IMM GRANULOCYTES NFR BLD: 0 %
LYMPHOCYTES # BLD AUTO: 1.9 10E9/L (ref 0.8–5.3)
LYMPHOCYTES NFR BLD AUTO: 38 %
MCH RBC QN AUTO: 30.8 PG (ref 26.5–33)
MCHC RBC AUTO-ENTMCNC: 33.7 G/DL (ref 31.5–36.5)
MCV RBC AUTO: 91 FL (ref 78–100)
MONOCYTES # BLD AUTO: 0.5 10E9/L (ref 0–1.3)
MONOCYTES NFR BLD AUTO: 9.6 %
NEUTROPHILS # BLD AUTO: 2.6 10E9/L (ref 1.6–8.3)
NEUTROPHILS NFR BLD AUTO: 51.2 %
NRBC # BLD AUTO: 0 10*3/UL
NRBC BLD AUTO-RTO: 0 /100
OPIATES UR QL SCN: NEGATIVE
PLATELET # BLD AUTO: 320 10E9/L (ref 150–450)
POTASSIUM SERPL-SCNC: 3.2 MMOL/L (ref 3.4–5.3)
PROT SERPL-MCNC: 7.8 G/DL (ref 6.8–8.8)
RBC # BLD AUTO: 4.61 10E12/L (ref 4.4–5.9)
SODIUM SERPL-SCNC: 139 MMOL/L (ref 133–144)
WBC # BLD AUTO: 5.1 10E9/L (ref 4–11)

## 2020-03-03 PROCEDURE — 25000128 H RX IP 250 OP 636: Performed by: FAMILY MEDICINE

## 2020-03-03 PROCEDURE — 80320 DRUG SCREEN QUANTALCOHOLS: CPT | Performed by: FAMILY MEDICINE

## 2020-03-03 PROCEDURE — 82075 ASSAY OF BREATH ETHANOL: CPT | Performed by: FAMILY MEDICINE

## 2020-03-03 PROCEDURE — 96372 THER/PROPH/DIAG INJ SC/IM: CPT | Performed by: FAMILY MEDICINE

## 2020-03-03 PROCEDURE — 80307 DRUG TEST PRSMV CHEM ANLYZR: CPT | Performed by: FAMILY MEDICINE

## 2020-03-03 PROCEDURE — 84100 ASSAY OF PHOSPHORUS: CPT | Performed by: EMERGENCY MEDICINE

## 2020-03-03 PROCEDURE — 25000132 ZZH RX MED GY IP 250 OP 250 PS 637: Performed by: FAMILY MEDICINE

## 2020-03-03 PROCEDURE — 25000132 ZZH RX MED GY IP 250 OP 250 PS 637: Performed by: EMERGENCY MEDICINE

## 2020-03-03 PROCEDURE — 83735 ASSAY OF MAGNESIUM: CPT | Performed by: EMERGENCY MEDICINE

## 2020-03-03 PROCEDURE — 80053 COMPREHEN METABOLIC PANEL: CPT | Performed by: EMERGENCY MEDICINE

## 2020-03-03 PROCEDURE — 71046 X-RAY EXAM CHEST 2 VIEWS: CPT

## 2020-03-03 PROCEDURE — 90791 PSYCH DIAGNOSTIC EVALUATION: CPT

## 2020-03-03 PROCEDURE — 85025 COMPLETE CBC W/AUTO DIFF WBC: CPT | Performed by: EMERGENCY MEDICINE

## 2020-03-03 RX ORDER — OLANZAPINE 10 MG/1
10 TABLET, ORALLY DISINTEGRATING ORAL
Status: COMPLETED | OUTPATIENT
Start: 2020-03-03 | End: 2020-03-03

## 2020-03-03 RX ORDER — FOLIC ACID 1 MG/1
1 TABLET ORAL DAILY
Status: DISCONTINUED | OUTPATIENT
Start: 2020-03-03 | End: 2020-03-06 | Stop reason: HOSPADM

## 2020-03-03 RX ORDER — METHADONE HYDROCHLORIDE 10 MG/ML
100 CONCENTRATE ORAL ONCE
Status: DISCONTINUED | OUTPATIENT
Start: 2020-03-03 | End: 2020-03-03 | Stop reason: CLARIF

## 2020-03-03 RX ORDER — LORAZEPAM 1 MG/1
1-4 TABLET ORAL EVERY 30 MIN PRN
Status: DISCONTINUED | OUTPATIENT
Start: 2020-03-03 | End: 2020-03-04 | Stop reason: CLARIF

## 2020-03-03 RX ORDER — QUETIAPINE FUMARATE 400 MG/1
400 TABLET, FILM COATED ORAL AT BEDTIME
Status: DISCONTINUED | OUTPATIENT
Start: 2020-03-03 | End: 2020-03-04

## 2020-03-03 RX ORDER — METHADONE HYDROCHLORIDE 5 MG/5ML
29 SOLUTION ORAL DAILY
Status: ON HOLD | COMMUNITY
End: 2021-08-23

## 2020-03-03 RX ORDER — MULTIPLE VITAMINS W/ MINERALS TAB 9MG-400MCG
1 TAB ORAL DAILY
Status: DISCONTINUED | OUTPATIENT
Start: 2020-03-03 | End: 2020-03-06 | Stop reason: HOSPADM

## 2020-03-03 RX ORDER — LORAZEPAM 1 MG/1
2 TABLET ORAL ONCE
Status: COMPLETED | OUTPATIENT
Start: 2020-03-03 | End: 2020-03-03

## 2020-03-03 RX ORDER — IBUPROFEN 600 MG/1
600 TABLET, FILM COATED ORAL ONCE
Status: COMPLETED | OUTPATIENT
Start: 2020-03-03 | End: 2020-03-03

## 2020-03-03 RX ORDER — POTASSIUM CHLORIDE 1.5 G/1.58G
40 POWDER, FOR SOLUTION ORAL ONCE
Status: COMPLETED | OUTPATIENT
Start: 2020-03-03 | End: 2020-03-04

## 2020-03-03 RX ORDER — METHADONE HYDROCHLORIDE 5 MG/5ML
100 SOLUTION ORAL ONCE
Status: COMPLETED | OUTPATIENT
Start: 2020-03-03 | End: 2020-03-03

## 2020-03-03 RX ORDER — LANOLIN ALCOHOL/MO/W.PET/CERES
100 CREAM (GRAM) TOPICAL DAILY
Status: DISCONTINUED | OUTPATIENT
Start: 2020-03-03 | End: 2020-03-06 | Stop reason: HOSPADM

## 2020-03-03 RX ORDER — ACETAMINOPHEN 325 MG/1
650 TABLET ORAL ONCE
Status: COMPLETED | OUTPATIENT
Start: 2020-03-03 | End: 2020-03-03

## 2020-03-03 RX ADMIN — NICOTINE POLACRILEX 4 MG: 4 GUM, CHEWING BUCCAL at 15:01

## 2020-03-03 RX ADMIN — LORAZEPAM 2 MG: 1 TABLET ORAL at 10:26

## 2020-03-03 RX ADMIN — QUETIAPINE FUMARATE 400 MG: 400 TABLET ORAL at 22:53

## 2020-03-03 RX ADMIN — MULTIPLE VITAMINS W/ MINERALS TAB 1 TABLET: TAB at 16:19

## 2020-03-03 RX ADMIN — NICOTINE POLACRILEX 4 MG: 4 GUM, CHEWING BUCCAL at 20:09

## 2020-03-03 RX ADMIN — LORAZEPAM 2 MG: 1 TABLET ORAL at 16:18

## 2020-03-03 RX ADMIN — LORAZEPAM 2 MG: 1 TABLET ORAL at 20:09

## 2020-03-03 RX ADMIN — NICOTINE POLACRILEX 4 MG: 4 GUM, CHEWING BUCCAL at 17:03

## 2020-03-03 RX ADMIN — THIAMINE HCL TAB 100 MG 100 MG: 100 TAB at 16:19

## 2020-03-03 RX ADMIN — ACETAMINOPHEN 650 MG: 325 TABLET, FILM COATED ORAL at 10:26

## 2020-03-03 RX ADMIN — NICOTINE POLACRILEX 4 MG: 4 GUM, CHEWING BUCCAL at 02:01

## 2020-03-03 RX ADMIN — OLANZAPINE 10 MG: 10 TABLET, ORALLY DISINTEGRATING ORAL at 13:41

## 2020-03-03 RX ADMIN — IBUPROFEN 600 MG: 600 TABLET ORAL at 15:02

## 2020-03-03 RX ADMIN — METHADONE HYDROCHLORIDE 100 MG: 5 SOLUTION ORAL at 13:04

## 2020-03-03 RX ADMIN — NICOTINE POLACRILEX 4 MG: 4 GUM, CHEWING BUCCAL at 13:41

## 2020-03-03 RX ADMIN — OLANZAPINE 10 MG: 10 INJECTION, POWDER, LYOPHILIZED, FOR SOLUTION INTRAMUSCULAR at 00:04

## 2020-03-03 RX ADMIN — FOLIC ACID 1 MG: 1 TABLET ORAL at 16:19

## 2020-03-03 NOTE — PHARMACY-ADMISSION MEDICATION HISTORY
Admission medication history interview status for the 3/2/2020 admission is complete. See Epic admission navigator for allergy information, pharmacy, prior to admission medications and immunization status.     Medication history interview sources:  patient, medication fill history, [facility] (see methadone note)    Changes made to PTA medication list (reason)  Added: methadone  Deleted: ammonium lactate cream, folic acid, hydrocortisone cream, hydroxyzine, multivitamin, polyethylene glycol, thiamine  Changed: updated nicotine dose    Additional medication history information (including reliability of information, actions taken by pharmacist):  - see methadone note for dose confirmation      Prior to Admission medications    Medication Sig Last Dose Taking? Auth Provider   gabapentin (NEURONTIN) 400 MG capsule Take 3 capsules (1,200 mg) by mouth 3 times daily 3/2/2020 at PM Yes Jr Giron MD   methadone (DOLPHINE) 5 MG/5ML solution Take 100 mg by mouth daily From [facility] 3/3/2020 at 1300 Yes Unknown, Entered By History   QUEtiapine (SEROQUEL) 400 MG tablet Take 1 tablet (400 mg) by mouth At Bedtime 3/2/2020 at Unknown time Yes Jr Giron MD   tiZANidine (ZANAFLEX) 4 MG tablet Take 1 tablet (4 mg) by mouth 2 times daily 3/2/2020 at Unknown time Yes Jr Giron MD   nicotine polacrilex (NICORETTE) 4 MG gum CHEW 1-2 PEICES BY MOUTH AS NEEDED. Min interval between administration- 1 hour.  Max of 22 pieces per day.  Patient taking differently: Place 8 mg inside cheek every hour as needed CHEW 1-2 PEICES BY MOUTH AS NEEDED. Min interval between administration- 1 hour.  Max of 22 pieces per day.   Jr Giron MD         Medication history completed by: Kunal Serra, PharmD

## 2020-03-03 NOTE — ED TRIAGE NOTES
At 1730 staff at [facility] called because pt was intoxicated. Pt blew 0.35 but no action was taken by EMS. Pt became more aggressive and combative at around 2200 and EMS was called again. Pt cannot form a sentence and was not cooperative with EMS on arrival. Pt brought to ED in spit zamora and restraints.

## 2020-03-03 NOTE — ED NOTES
At 2355 patient removed from restraints after stating that he would just go to sleep if they were removed and he would be cooperative. Stated that he would not harm staff or spit on staff.

## 2020-03-03 NOTE — ED NOTES
"Pt. arrived by EMS, yelling \"Fuck you\"  and  mumbling other incomprehensible words.  Fighting against restraints that EMS had applied.  When asked if restraints were removed could he cooperative with staff.  Pt. answered, \"No.\"  Code green called and pt. put in 5 pt. restraints.  MD notified and zyprexa ordered.  Pt. still fighting against restraints when medicated.  Spit zamora removed after pt. restrained.  "

## 2020-03-03 NOTE — ED NOTES
Attempted to meet with pt. for an assessment per MD request. After several requests pt. refuses to engage in an assessment, he is irritable, states he has a headache. He also refused a breathalyzer for the nursing staff. Pt. will be given some pain medication for his head ache and he agreed to participate in an assessment in one hour.

## 2020-03-03 NOTE — ED NOTES
Within an hour after restraint an in person face to face assessment was completed at 0048, including an evaluation of the patient's immediate reaction to the intervention, behavioral assessment and review/assessment of history, drugs and medications, recent labs, etc., and behavioral condition.  The patient experienced: No adverse physical outcome from seclusion/restraint initiation.  The intervention of restraint or seclusion needs to continue.       Alonzo Dailey MD  03/03/20 0048

## 2020-03-03 NOTE — PHARMACY
Called [facility] addition program to check on methadone dose.  Pt received 100 mg dose daily, two doses were given on 3/2 for 3/2 and 3/3.       Eunice Kirby RPH, PharmD

## 2020-03-03 NOTE — ED NOTES
Bed: ED14  Expected date:   Expected time:   Means of arrival:   [facility]  49[facility] intoxicated, combative  In 4pt restraints  ETA 2341
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
## 2020-03-03 NOTE — ED PROVIDER NOTES
History     Chief Complaint   Patient presents with     Alcohol Intoxication     pt intoxicated and combative     HPI  Hollis Barclay is a 49 year old male who presents emergency room by ambulance for alcohol intoxication with agitated behavior.  Patient is a poor history giver.  History per paramedics patient lives at a group home in [place].  Patient had been drinking apparently noted to be 0.35 earlier today.  Patient otherwise later became more agitated somewhat aggressive in his behavior therefore was transferred and restraints along with fascial for spitting to the ER.  Patient currently here in the ER states to our care team that he is going to be physically aggressive and poses a danger to others at least currently at this point in his current state of mind.  No other history was given at this point.  Reviewing records patient has had previous admissions for altered mental status due to acute alcohol intoxication along with some opioid abuse along with history of traumatic brain injury is been evaluated in the ER and also been hospitalized for alcohol withdrawal etc.  No reports of significant trauma identified or reported.    I have reviewed the Medications, Allergies, Past Medical and Surgical History, and Social History in the Epic system.    Review of Systems   Unable to perform ROS: Psychiatric disorder       Physical Exam   BP: 138/84  Pulse: 88  Temp: 97  F (36.1  C)  Resp: 16  Weight: 133.4 kg (294 lb)  SpO2: 99 %      Physical Exam  Vitals signs and nursing note reviewed.   Constitutional:       General: He is in acute distress.      Comments: Patient currently is in restraints per paramedics we have not transferred him yet to our care bed.  Patient seems still somewhat agitated not cooperative and calm here in the ER.  Poses a risk to others.   HENT:      Head: Normocephalic.      Comments: Negative siddiqui signs pupils both equal     Nose: Nose normal.   Eyes:      Extraocular Movements:  Extraocular movements intact.      Conjunctiva/sclera: Conjunctivae normal.      Pupils: Pupils are equal, round, and reactive to light.   Neck:      Musculoskeletal: No neck rigidity or muscular tenderness.   Cardiovascular:      Rate and Rhythm: Normal rate.   Pulmonary:      Effort: No respiratory distress.   Abdominal:      Tenderness: There is no guarding.   Musculoskeletal:         General: No swelling.   Skin:     Capillary Refill: Capillary refill takes less than 2 seconds.      Coloration: Skin is not jaundiced.      Findings: No bruising.   Neurological:      Comments: Patient history of traumatic brain injury acutely intoxicated this point somewhat poor history giver unclear as far as baseline.   Psychiatric:      Comments: Intoxicated agitated at this point posing danger to others at this point         ED Course        Procedures        In the ER patient was evaluated on arrival he was continued five-point restraints initially given Zyprexa 10 mg IM.  Patient then reassessed several times.  Now is been taken out of restraints would like Nicorette gum seems to be fixated on this at this point which we did offer him some.  We did order breathalyzer along with a drug screen also.  Reviewing records patient has had some episodes of some alcohol induced behavior changes at this point patient is more cooperative out of restraints we will continue to monitor the situation and reassess him once he is sober for any concerns of psychiatric illness.  At this point I reexamined him also there is no obvious signs of trauma as far as head trauma etc.  In an acute setting.    Plan at this point will be to let him sober up and reassess him from a mental health standpoint make sure there are no acute issues that are needed at this point.  Will sign out to the night physician.                Labs Ordered and Resulted from Time of ED Arrival Up to the Time of Departure from the ED   ALCOHOL BREATH TEST POCT - Abnormal;  Notable for the following components:       Result Value    Alcohol Breath Test 0.40 (*)     All other components within normal limits   DRUG ABUSE SCREEN 6 CHEM DEP URINE ([facility])            Assessments & Plan (with Medical Decision Making)  49-year-old male lives at a group home history of traumatic brain injury history of some underlying anxiety and other mental health issues borderline personality disorder etc. with history of alcohol use presented reported acutely intoxicated agitated behavior patient brought to the ER in restraints and initially continue these with Zyprexa 10 mg IM given.  Patient now taken out of restraints and obvious visible signs of trauma patient seemed fixated on wanting Nicorette gum he is been otherwise cooperative here in the ER.  Plan at this point once he is sober and calms down will need reassessment/assessment by mental health to make sure there is no underlying mental health issues and then further disposition can be made.  Will sign out to my physician in the ED.         I have reviewed the nursing notes.    I have reviewed the findings, diagnosis, plan and need for follow up with the patient.    New Prescriptions    No medications on file       Final diagnoses:   Alcoholic intoxication with complication (H)   Agitation requiring sedation protocol   History of traumatic head injury   Anxiety       3/2/2020   [facility] EMERGENCY DEPARTMENT    This note was created at least in part by the use of dragon voice dictation system. Inadvertent typographical errors may still exist.  Alonzo Dailey MD.         Alonzo Dailey MD  03/03/20 1994

## 2020-03-03 NOTE — ED NOTES
Patient is currently a boarder in the ED.   Patient was offered hygiene supplies: The patient is currently asleep. Hygiene supplies will be offered to the patient when they wake up.  Patient was offered to ambulate: Patient is asleep at this time.  Patient was ordered a breakfast/lunch/dinner tray: Yes, the patient was given their lunch tray and the patient will be given their lunch tray when the trays arrive.

## 2020-03-03 NOTE — TELEPHONE ENCOUNTER
S:  10 AM    ED calling requesting bed in detox for a 48 YO M w/ acute intoxication    B:  Hx of TBI, ETOH dependance, mood disorder, depression, anxiety, personality disorder,chronic LBP, came to  ED late last night-breathalizer was .40. Pt needed to be restrained when he first arrived.  Currently pt is irritable, shakey. Was  given Zyprexa 10mg shortly after arriving in ED and Ativan 2mg at 10 AM.   Previously in [facility] for ETOH detox  2/21/20-2/25/20.     Pt is restricted to [facility].  PCP is at [facility].   ED  informed  that pt may lose housing in his   unless he attends IP treatment.  Pt reports taking Seroquel and hydoxyzine        Utox pending      A: Vol      R: Awaiting lab results.  Placed on Detox board.

## 2020-03-03 NOTE — ED NOTES
Pt signed out to me by Dr. Henry at 7am      Situation:   Pt presented to the ER acutely intoxicated with a BAL of 0.4.  Pt was given zyprexa overnight due to agitation.     Plan:  Recommend reassessment in the morning when more awake.      Shift Report:  When I examined the pt he was thirsty and mildly anxious.   Pt was seen by DEC . Pt is currently in group home.    Will admit to detox for further care.       Signed:  Yojana Moore MD  March 3, 2020 at 10:15 AM         Yojana Moore MD  03/03/20 1015

## 2020-03-04 PROBLEM — F10.939 ALCOHOL WITHDRAWAL SYNDROME WITH COMPLICATION, WITH UNSPECIFIED COMPLICATION (H): Status: ACTIVE | Noted: 2020-03-04

## 2020-03-04 LAB
MAGNESIUM SERPL-MCNC: 1.7 MG/DL (ref 1.6–2.3)
PHOSPHATE SERPL-MCNC: 1.5 MG/DL (ref 2.5–4.5)

## 2020-03-04 PROCEDURE — 99207 ZZC CONSULT E&M CHANGED TO INITIAL LEVEL: CPT | Performed by: NURSE PRACTITIONER

## 2020-03-04 PROCEDURE — 25000132 ZZH RX MED GY IP 250 OP 250 PS 637: Performed by: EMERGENCY MEDICINE

## 2020-03-04 PROCEDURE — 25000132 ZZH RX MED GY IP 250 OP 250 PS 637: Performed by: PSYCHIATRY & NEUROLOGY

## 2020-03-04 PROCEDURE — 99222 1ST HOSP IP/OBS MODERATE 55: CPT | Performed by: NURSE PRACTITIONER

## 2020-03-04 PROCEDURE — 25000132 ZZH RX MED GY IP 250 OP 250 PS 637: Performed by: FAMILY MEDICINE

## 2020-03-04 PROCEDURE — 12800008 ZZH R&B CD ADULT

## 2020-03-04 PROCEDURE — 25000128 H RX IP 250 OP 636: Performed by: PSYCHIATRY & NEUROLOGY

## 2020-03-04 PROCEDURE — HZ2ZZZZ DETOXIFICATION SERVICES FOR SUBSTANCE ABUSE TREATMENT: ICD-10-PCS | Performed by: PSYCHIATRY & NEUROLOGY

## 2020-03-04 PROCEDURE — 99223 1ST HOSP IP/OBS HIGH 75: CPT | Mod: AI | Performed by: PSYCHIATRY & NEUROLOGY

## 2020-03-04 RX ORDER — TIZANIDINE 2 MG/1
4 TABLET ORAL
Status: DISCONTINUED | OUTPATIENT
Start: 2020-03-04 | End: 2020-03-04

## 2020-03-04 RX ORDER — IBUPROFEN 600 MG/1
600 TABLET, FILM COATED ORAL ONCE
Status: COMPLETED | OUTPATIENT
Start: 2020-03-04 | End: 2020-03-04

## 2020-03-04 RX ORDER — METHADONE HYDROCHLORIDE 5 MG/5ML
100 SOLUTION ORAL DAILY
Status: DISCONTINUED | OUTPATIENT
Start: 2020-03-04 | End: 2020-03-06 | Stop reason: HOSPADM

## 2020-03-04 RX ORDER — TRAZODONE HYDROCHLORIDE 50 MG/1
50 TABLET, FILM COATED ORAL
Status: DISCONTINUED | OUTPATIENT
Start: 2020-03-04 | End: 2020-03-06 | Stop reason: HOSPADM

## 2020-03-04 RX ORDER — QUETIAPINE FUMARATE 400 MG/1
400 TABLET, FILM COATED ORAL AT BEDTIME
Status: DISCONTINUED | OUTPATIENT
Start: 2020-03-04 | End: 2020-03-06 | Stop reason: HOSPADM

## 2020-03-04 RX ORDER — ONDANSETRON 4 MG/1
4 TABLET, ORALLY DISINTEGRATING ORAL EVERY 6 HOURS PRN
Status: DISCONTINUED | OUTPATIENT
Start: 2020-03-04 | End: 2020-03-06 | Stop reason: HOSPADM

## 2020-03-04 RX ORDER — LOPERAMIDE HCL 2 MG
2 CAPSULE ORAL 4 TIMES DAILY PRN
Status: DISCONTINUED | OUTPATIENT
Start: 2020-03-04 | End: 2020-03-06 | Stop reason: HOSPADM

## 2020-03-04 RX ORDER — ACETAMINOPHEN 325 MG/1
650 TABLET ORAL EVERY 4 HOURS PRN
Status: DISCONTINUED | OUTPATIENT
Start: 2020-03-04 | End: 2020-03-06 | Stop reason: HOSPADM

## 2020-03-04 RX ORDER — NALOXONE HYDROCHLORIDE 0.4 MG/ML
.1-.4 INJECTION, SOLUTION INTRAMUSCULAR; INTRAVENOUS; SUBCUTANEOUS
Status: DISCONTINUED | OUTPATIENT
Start: 2020-03-04 | End: 2020-03-06 | Stop reason: HOSPADM

## 2020-03-04 RX ORDER — OLANZAPINE 10 MG/2ML
10 INJECTION, POWDER, FOR SOLUTION INTRAMUSCULAR
Status: DISCONTINUED | OUTPATIENT
Start: 2020-03-04 | End: 2020-03-06 | Stop reason: HOSPADM

## 2020-03-04 RX ORDER — OLANZAPINE 10 MG/1
10 TABLET ORAL
Status: DISCONTINUED | OUTPATIENT
Start: 2020-03-04 | End: 2020-03-06 | Stop reason: HOSPADM

## 2020-03-04 RX ORDER — HYDROXYZINE HYDROCHLORIDE 25 MG/1
25 TABLET, FILM COATED ORAL EVERY 4 HOURS PRN
Status: DISCONTINUED | OUTPATIENT
Start: 2020-03-04 | End: 2020-03-06 | Stop reason: HOSPADM

## 2020-03-04 RX ORDER — BISACODYL 10 MG
10 SUPPOSITORY, RECTAL RECTAL DAILY PRN
Status: DISCONTINUED | OUTPATIENT
Start: 2020-03-04 | End: 2020-03-06 | Stop reason: HOSPADM

## 2020-03-04 RX ORDER — TIZANIDINE 2 MG/1
4 TABLET ORAL 2 TIMES DAILY PRN
Status: DISCONTINUED | OUTPATIENT
Start: 2020-03-04 | End: 2020-03-06 | Stop reason: HOSPADM

## 2020-03-04 RX ORDER — GABAPENTIN 400 MG/1
1200 CAPSULE ORAL 3 TIMES DAILY
Status: DISCONTINUED | OUTPATIENT
Start: 2020-03-04 | End: 2020-03-06 | Stop reason: HOSPADM

## 2020-03-04 RX ORDER — DIAZEPAM 5 MG
5-20 TABLET ORAL EVERY 30 MIN PRN
Status: DISCONTINUED | OUTPATIENT
Start: 2020-03-04 | End: 2020-03-06 | Stop reason: HOSPADM

## 2020-03-04 RX ORDER — ALUMINA, MAGNESIA, AND SIMETHICONE 2400; 2400; 240 MG/30ML; MG/30ML; MG/30ML
30 SUSPENSION ORAL EVERY 4 HOURS PRN
Status: DISCONTINUED | OUTPATIENT
Start: 2020-03-04 | End: 2020-03-06 | Stop reason: HOSPADM

## 2020-03-04 RX ADMIN — NICOTINE POLACRILEX 8 MG: 4 GUM, CHEWING ORAL at 06:44

## 2020-03-04 RX ADMIN — NICOTINE POLACRILEX 8 MG: 4 GUM, CHEWING ORAL at 18:01

## 2020-03-04 RX ADMIN — NICOTINE POLACRILEX 8 MG: 4 GUM, CHEWING ORAL at 14:10

## 2020-03-04 RX ADMIN — QUETIAPINE FUMARATE 400 MG: 400 TABLET ORAL at 21:27

## 2020-03-04 RX ADMIN — NICOTINE POLACRILEX 8 MG: 4 GUM, CHEWING ORAL at 21:55

## 2020-03-04 RX ADMIN — HYDROXYZINE HYDROCHLORIDE 25 MG: 25 TABLET, FILM COATED ORAL at 08:22

## 2020-03-04 RX ADMIN — DIAZEPAM 10 MG: 5 TABLET ORAL at 20:09

## 2020-03-04 RX ADMIN — GABAPENTIN 1200 MG: 400 CAPSULE ORAL at 11:51

## 2020-03-04 RX ADMIN — FOLIC ACID 1 MG: 1 TABLET ORAL at 08:22

## 2020-03-04 RX ADMIN — DIAZEPAM 10 MG: 5 TABLET ORAL at 06:42

## 2020-03-04 RX ADMIN — DIAZEPAM 10 MG: 5 TABLET ORAL at 11:49

## 2020-03-04 RX ADMIN — GABAPENTIN 1200 MG: 400 CAPSULE ORAL at 20:08

## 2020-03-04 RX ADMIN — DIAZEPAM 10 MG: 5 TABLET ORAL at 16:42

## 2020-03-04 RX ADMIN — GABAPENTIN 1200 MG: 400 CAPSULE ORAL at 08:21

## 2020-03-04 RX ADMIN — LORAZEPAM 2 MG: 1 TABLET ORAL at 00:44

## 2020-03-04 RX ADMIN — METHADONE HYDROCHLORIDE 100 MG: 5 SOLUTION ORAL at 14:07

## 2020-03-04 RX ADMIN — ONDANSETRON 4 MG: 4 TABLET, ORALLY DISINTEGRATING ORAL at 08:21

## 2020-03-04 RX ADMIN — NICOTINE POLACRILEX 8 MG: 4 GUM, CHEWING ORAL at 08:22

## 2020-03-04 RX ADMIN — NICOTINE POLACRILEX 8 MG: 4 GUM, CHEWING ORAL at 20:10

## 2020-03-04 RX ADMIN — DIAZEPAM 10 MG: 5 TABLET ORAL at 02:08

## 2020-03-04 RX ADMIN — NICOTINE POLACRILEX 8 MG: 4 GUM, CHEWING ORAL at 11:49

## 2020-03-04 RX ADMIN — TIZANIDINE 4 MG: 2 TABLET ORAL at 08:21

## 2020-03-04 RX ADMIN — POTASSIUM CHLORIDE 40 MEQ: 1.5 POWDER, FOR SOLUTION ORAL at 00:34

## 2020-03-04 RX ADMIN — NICOTINE POLACRILEX 4 MG: 4 GUM, CHEWING BUCCAL at 00:37

## 2020-03-04 RX ADMIN — IBUPROFEN 600 MG: 600 TABLET ORAL at 00:54

## 2020-03-04 RX ADMIN — DIAZEPAM 10 MG: 5 TABLET ORAL at 08:22

## 2020-03-04 ASSESSMENT — ACTIVITIES OF DAILY LIVING (ADL)
COGNITION: 0 - NO COGNITION ISSUES REPORTED
BATHING: 0-->INDEPENDENT
AMBULATION: 0-->INDEPENDENT
RETIRED_COMMUNICATION: 0-->UNDERSTANDS/COMMUNICATES WITHOUT DIFFICULTY
RETIRED_EATING: 0-->INDEPENDENT
FALL_HISTORY_WITHIN_LAST_SIX_MONTHS: NO
DRESS: 0-->INDEPENDENT
TRANSFERRING: 0-->INDEPENDENT
SWALLOWING: 0-->SWALLOWS FOODS/LIQUIDS WITHOUT DIFFICULTY
TOILETING: 0-->INDEPENDENT

## 2020-03-04 ASSESSMENT — MIFFLIN-ST. JEOR: SCORE: 2186.21

## 2020-03-04 NOTE — ED NOTES
ED to Behavioral Floor Handoff    SITUATION  Hollis Barclay is a 49 year old male who speaks English and lives in a home with family members The patient arrived in the ED by private car from home with a complaint of Alcohol Intoxication (pt intoxicated and combative)  .The patient's current symptoms started/worsened 5 day(s) ago and during this time the symptoms have increased.   In the ED, pt was diagnosed with   Final diagnoses:   Alcoholic intoxication with complication (H)   Agitation requiring sedation protocol   History of traumatic head injury   Anxiety        Initial vitals were: BP: 138/84  Pulse: 88  Temp: 97  F (36.1  C)  Resp: 16  Weight: 133.4 kg (294 lb)  SpO2: 99 %   --------  Is the patient diabetic? No   If yes, last blood glucose? --     If yes, was this treated in the ED? --  --------  Is the patient inebriated (ETOH) Yes or Impaired on other substances? Yes  MSSA done? Yes  Last MSSA score: 18  Were withdrawal symptoms treated? Yes  Does the patient have a seizure history? No. If yes, date of most recent seizure--  --------  Is the patient patient experiencing suicidal ideation? denies current or recent suicidal ideation     Homicidal ideation? denies current or recent homicidal ideation or behaviors.    Self-injurious behavior/urges? denies current or recent self injurious behavior or ideation.  ------  Was pt aggressive in the ED Yes  Was a code called Yes  Is the pt now cooperative? Yes  -------  Meds given in ED:   Medications   LORazepam (ATIVAN) tablet 1-4 mg (2 mg Oral Given 3/3/20 2009)   vitamin B1 (THIAMINE) tablet 100 mg (100 mg Oral Given 3/3/20 1619)   folic acid (FOLVITE) tablet 1 mg (1 mg Oral Given 3/3/20 1619)   multivitamin w/minerals (THERA-VIT-M) tablet 1 tablet (1 tablet Oral Given 3/3/20 1619)   OLANZapine (zyPREXA) injection 10 mg (10 mg Intramuscular Given 3/3/20 0004)   nicotine polacrilex (NICORETTE) gum 4 mg (4 mg Buccal Given 3/3/20 0201)   OLANZapine zydis (zyPREXA)  ODT tab 10 mg (10 mg Oral Given 3/3/20 1341)   acetaminophen (TYLENOL) tablet 650 mg (650 mg Oral Given 3/3/20 1026)   LORazepam (ATIVAN) tablet 2 mg (2 mg Oral Given 3/3/20 1026)   methadone (DOLPHINE) solution 100 mg (100 mg Oral Given 3/3/20 1304)   nicotine polacrilex (NICORETTE) gum 4 mg (4 mg Buccal Given 3/3/20 1341)   ibuprofen (ADVIL/MOTRIN) tablet 600 mg (600 mg Oral Given 3/3/20 1502)   nicotine polacrilex (NICORETTE) gum 4 mg (4 mg Buccal Given 3/3/20 1501)   nicotine polacrilex (NICORETTE) gum 4 mg (4 mg Buccal Given 3/3/20 1703)   nicotine polacrilex (NICORETTE) gum 4 mg (4 mg Buccal Given 3/3/20 2009)      Family present during ED course? No  Family currently present? No    BACKGROUND  Does the patient have a cognitive impairment or developmental disability? Yes  Allergies: No Known Allergies.   Social demographics are   Social History     Socioeconomic History     Marital status:      Spouse name: None     Number of children: None     Years of education: None     Highest education level: None   Occupational History     None   Social Needs     Financial resource strain: None     Food insecurity:     Worry: None     Inability: None     Transportation needs:     Medical: None     Non-medical: None   Tobacco Use     Smoking status: Current Every Day Smoker     Types: Dip, chew, snus or snuff     Smokeless tobacco: Former User     Types: Chew   Substance and Sexual Activity     Alcohol use: Yes     Comment: intoxicated     Drug use: Not Currently     Sexual activity: None   Lifestyle     Physical activity:     Days per week: None     Minutes per session: None     Stress: None   Relationships     Social connections:     Talks on phone: None     Gets together: None     Attends [employer] service: None     Active member of club or organization: None     Attends meetings of clubs or organizations: None     Relationship status: None     Intimate partner violence:     Fear of current or ex partner: None  "    Emotionally abused: None     Physically abused: None     Forced sexual activity: None   Other Topics Concern     None   Social History Narrative    1/31/2020:  Jose M is now living in an apartment with one roommate in [place].  He is happy with his current living situation.        11/4/2019: He is living in a full house with 8 people, which has been stressful.  He had an interview for an apartment 3 weeks ago, and expects to hear from them soon.        10/11/2019    Jose M is , and has 2 living children who are adults.  He had a daughter, who passed away.  He developed issues with drugs and alcohol in his late 30s.  He is now in [facility] residence with Nurse assistance. Previously was receiving treatment at [facility], and is also in a methadone program at [facility].  He grew up in [place], right outside of [place].  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  He is currently receiving treatment at a [facility] program, and will then move to a [facility] in the next few months.        ASSESSMENT  Labs results   Labs Ordered and Resulted from Time of ED Arrival Up to the Time of Departure from the ED   DRUG ABUSE SCREEN 6 CHEM DEP URINE ([facility]) - Abnormal; Notable for the following components:       Result Value    Benzodiazepine Qual Urine Positive (*)     Ethanol Qual Urine Positive (*)     All other components within normal limits   ALCOHOL BREATH TEST POCT - Abnormal; Notable for the following components:    Alcohol Breath Test 0.40 (*)     All other components within normal limits   MSSA SCORE AND VS   NOTIFY      Imaging Studies:   Recent Results (from the past 24 hour(s))   XR Chest 2 Views    Narrative    CHEST TWO VIEWS  3/3/2020 4:11 PM     HISTORY: Left-sided lateral rib pain.    COMPARISON: None.      Impression    IMPRESSION: Normal.    ELVIA KHOURY MD      Most recent vital signs BP (!) 167/104   Pulse 95   Temp 99.2  F (37.3  C) " (Oral)   Resp 16   Wt 133.4 kg (294 lb)   SpO2 97%   BMI 37.73 kg/m     Abnormal labs/tests/findings requiring intervention:---   Pain control: pt had none  Nausea control: pt had none    RECOMMENDATION  Are any infection precautions needed (MRSA, VRE, etc.)? No If yes, what infection? --  ---  Does the patient have mobility issues? independently. If yes, what device does the pt use? ---  ---  Is patient on 72 hour hold or commitment? No If on 72 hour hold, have hold and rights been given to patient? N/A  Are admitting orders written if after 10 p.m. ?No  Tasks needing to be completed:---     Mary Ellen Aguilar, [place]AP[place]O   [phone] [facility]   [phone] [facility]

## 2020-03-04 NOTE — PROGRESS NOTES
"Hollis Barclay is a 49 year old male who was referred by ems.  History was provided by PATIENT who was a fair historian. History limited by patient being in-and-out of sleep, somnolent, and mumbling.     CHIEF COMPLAINT: \"feeling sick\"    HISTORY OF PRESENT ILLNESS:      Patient is a 49 y.o. M with history of TBI and bipolar disorder, and long-standing history of alcohol addiction, who on 3/2 was brought by ambulance from group home to emergency room for alcohol intoxication and agitation. He was physically aggressive and given Zyprexa and put on restraints in ER. He was deemed medically stable and transferred to detox today 3/4 for alcohol withdrawal.     He reports feeling present symptoms of sweats, chills, nausea, and back pain. He reports being sober for almost a year until he relapsed ~1 month ago. He has been drinking ~2 L of vodka daily, last drink 2 days ago. He was unable to identify a trigger for why he started drinking 1 month ago.     He has a history of bipolar disorder and anxiety, on Zyprexa, gabapentin, and quetiapine. He says that when he is manic he experiences elated mood, inflated self-esteem, increased energy, decreased need for sleep, and impulsiveness. He is also on a methadone program, currently on 100 mg daily. He did receive doses on 3/2 and 3/3. He denies smoking or use of any recreational drugs recently.      Patient has been using the following substances: alcohol  Started at age 13, became a problem during his 20s.    Patient has tolerance, withdrawal, progressive use, loss of control, spending more time and more amount than intended. Patient has made attempts to quit, is experiencing cravings, and reports negative consequences.  Patient does not bowden.    Patient does have a history of seizures, reports 3-4 times.   Patient does have a history of delirium tremens.    Patient does have a history of overdose attempt.   Patient does not have a history of IV use.  Patient does not have a " history of hepatitis or HIV.      DRUG OF CHOICE - alcohol     PREVIOUS DETOX/TREATMENT PROGRAMS- has been in inpatient treatments 10+ times.   HISTORY OF OVERDOSE- reports attempted overdose                 USE DISORDER - CRITERIA  +admits to unsuccessful efforts to cut down or control use  +admits to cravings or a strong desire to use   + admits to failure to fulfill obligations at work, school or home  + admits to ruined relationships including divorces and estrangement from children  + admits to continued use despite negative consequences    Denies current thoughts of suicide or harming others.      Endorses current visual hallucinations.     PSYCHIATRIC REVIEW OF SYSTEMS:         Psychiatric Review of Systems:   Depression:   Reports: depressed mood, changes in sleep  Vero: : racing thoughts, increase in energy, elated mood, increased goal-directed activities, inflated self-esteem, impulsiveness.   Psychosis:   Reports: visual hallucinations  Denies: auditory hallucinations, paranoia  Anxiety:   Reports: feeling anxious chronically    Symptoms of attention deficit disorder include a failure to pay attention to detail, a pattern of careless mistakes, a pattern of inattentive listening, a failure to follow through with projects, poor personal organization, losing necessary objects, distractibility, forgetfulness.    Symptoms of borderline personality disorder include a fear of abandonment, unstable self-image, impulsive behavior, dissociative feeling, intense anger, unstable personal relationships, chronic feelings of boredom, periods of intense depressed mood.          PSYCHIATRIC HISTORY     Previous diagnoses:   1. Alcohol dependence, severe  2. Bipolar disorder  3. Anxiety  4. Tobacco dependence      Past court commitments: 1 time, last year  SIB /SUICIDE ATTEMPTS: 3 attempts of overdose on alcohol / drugs      Psych Hosp: 8-9 times  Outpatient Programs: none  Inpatient cd trt: 10-12 times  Out pt cd trt:  none    PAST PSYCH MED TRIALS   Says Seroquel is the only medication that has been effective for his bipolar disorder.       SOCIAL HISTORY                                                                         Living Situation/Family/Relationships- lives in a group home. . 2 living adult children. 1 diseased child.   Trauma History (self-report)- None  Legal- None    Early History/Education- Completed 9 years of education. Reports his childhood was happy.           Family History:   FAMILY HISTORY: History reviewed. No pertinent family history.  Family Mental Health History-  None known to patient.     Substance Use Problems - brother with alcohol dependence           Physical ROS:   The patient endorsed back pain. The remainder of 10-point review of systems was negative except as noted in HPI.         PTA Medications:     Medications Prior to Admission   Medication Sig Dispense Refill Last Dose     gabapentin (NEURONTIN) 400 MG capsule Take 3 capsules (1,200 mg) by mouth 3 times daily 270 capsule 1 3/2/2020 at PM     methadone (DOLPHINE) 5 MG/5ML solution Take 100 mg by mouth daily From [facility]   3/3/2020 at 1300     QUEtiapine (SEROQUEL) 400 MG tablet Take 1 tablet (400 mg) by mouth At Bedtime 30 tablet 1 3/2/2020 at Unknown time     tiZANidine (ZANAFLEX) 4 MG tablet Take 1 tablet (4 mg) by mouth 2 times daily 60 tablet 1 3/2/2020 at Unknown time     nicotine polacrilex (NICORETTE) 4 MG gum CHEW 1-2 PEICES BY MOUTH AS NEEDED. Min interval between administration- 1 hour.  Max of 22 pieces per day. (Patient taking differently: Place 8 mg inside cheek every hour as needed CHEW 1-2 PEICES BY MOUTH AS NEEDED. Min interval between administration- 1 hour.  Max of 22 pieces per day.) 240 each 11           Allergies:   No Known Allergies       Labs:     Recent Results (from the past 48 hour(s))   Alcohol breath test POCT    Collection Time: 03/03/20  2:05 AM   Result Value Ref Range    Alcohol Breath Test  0.40 (A) 0.00 - 0.01   Drug abuse screen 6 urine (chem dep) ([facility])    Collection Time: 03/03/20  2:43 PM   Result Value Ref Range    Amphetamine Qual Urine Negative NEG^Negative    Barbiturates Qual Urine Negative NEG^Negative    Benzodiazepine Qual Urine Positive (A) NEG^Negative    Cannabinoids Qual Urine Negative NEG^Negative    Cocaine Qual Urine Negative NEG^Negative    Ethanol Qual Urine Positive (A) NEG^Negative    Opiates Qualitative Urine Negative NEG^Negative   CBC with platelets differential    Collection Time: 03/03/20 10:52 PM   Result Value Ref Range    WBC 5.1 4.0 - 11.0 10e9/L    RBC Count 4.61 4.4 - 5.9 10e12/L    Hemoglobin 14.2 13.3 - 17.7 g/dL    Hematocrit 42.1 40.0 - 53.0 %    MCV 91 78 - 100 fl    MCH 30.8 26.5 - 33.0 pg    MCHC 33.7 31.5 - 36.5 g/dL    RDW 14.1 10.0 - 15.0 %    Platelet Count 320 150 - 450 10e9/L    Diff Method Automated Method     % Neutrophils 51.2 %    % Lymphocytes 38.0 %    % Monocytes 9.6 %    % Eosinophils 0.6 %    % Basophils 0.6 %    % Immature Granulocytes 0.0 %    Nucleated RBCs 0 0 /100    Absolute Neutrophil 2.6 1.6 - 8.3 10e9/L    Absolute Lymphocytes 1.9 0.8 - 5.3 10e9/L    Absolute Monocytes 0.5 0.0 - 1.3 10e9/L    Absolute Eosinophils 0.0 0.0 - 0.7 10e9/L    Absolute Basophils 0.0 0.0 - 0.2 10e9/L    Abs Immature Granulocytes 0.0 0 - 0.4 10e9/L    Absolute Nucleated RBC 0.0    Comprehensive metabolic panel    Collection Time: 03/03/20 10:52 PM   Result Value Ref Range    Sodium 139 133 - 144 mmol/L    Potassium 3.2 (L) 3.4 - 5.3 mmol/L    Chloride 103 94 - 109 mmol/L    Carbon Dioxide 29 20 - 32 mmol/L    Anion Gap 7 3 - 14 mmol/L    Glucose 98 70 - 99 mg/dL    Urea Nitrogen 15 7 - 30 mg/dL    Creatinine 0.68 0.66 - 1.25 mg/dL    GFR Estimate >90 >60 mL/min/[1.73_m2]    GFR Estimate If Black >90 >60 mL/min/[1.73_m2]    Calcium 8.4 (L) 8.5 - 10.1 mg/dL    Bilirubin Total 0.9 0.2 - 1.3 mg/dL    Albumin 3.7 3.4 - 5.0 g/dL    Protein Total 7.8 6.8 - 8.8 g/dL     "Alkaline Phosphatase 89 40 - 150 U/L     (H) 0 - 70 U/L     (H) 0 - 45 U/L          Physical and Psychiatric Examination:     BP (!) 135/103   Pulse 95   Temp 97.6  F (36.4  C)   Resp 16   Ht 1.93 m (6' 4\")   Wt 122.5 kg (270 lb)   SpO2 96%   BMI 32.87 kg/m    Weight is 270 lbs 0 oz  Body mass index is 32.87 kg/m .    Physical Exam:  General appearance: WDWN, alert. NAD.   HENT: normocephalic and atraumatic head. Eyes anicteric.   Respiratory: non-labored breathing.   Skin: diaphoretic.   Neuro: Somnolent. No focal deficits. Moves all 4 extremities.             Past Medical History:   PAST MEDICAL HISTORY:   Past Medical History:   Diagnosis Date     Alcoholism in remission (H)      Bilateral ACL tear      Chronic back pain      Closed left arm fracture 1985     H/O shoulder surgery      Post concussive encephalopathy      Social anxiety disorder      PAST SURGICAL HISTORY: History reviewed. No pertinent surgical history.       MENTAL STATUS EXAM:    Constitutional: General appearance of patient: Appropriately dressed but slightly unkempt adult male. In and out of sleep.     Attitude: superficially cooperative  Eye Contact: poor  Mood: tired  Affect: congruent   Speech: slow, mumbled,    Psychomotor Behavior: no evidence of tardive dyskinesia, dystonia, or tics  Thought Process: logical and linear  Associations: no loose associations  Thought Content: no evidence of psychotic thought or active suicidal ideation  Insight: fair  Judgment: fair  Oriented to: person and time, but not place  Attention Span and Concentration: patient continually falls asleep during exam  Recent and Remote Memory: intact  Language: english with appropriate syntax and vocabulary  Fund of Knowledge: appropriate  Muscle Strength and Tone: normal  Gait and Station: Normal    There are no abnormal or psychotic thoughts, no preoccupations, no overvalued ideas, no rumination, no obsessions, no compulsions, no somatic " concerns, no hypochrondriasis, no ideas of reference, and no delusions. Patient denies homicidal thoughts. Patient denies suicidal thoughts.     Musculoskeletal: Patient shows no abnormalities of motor activity: there is no tremor, no tic, and no dystonia. There is no apparent muscle atrophy, strength and tone appear normal, and there are no abnormal movements.  Patient has normal gait and stance.        dx  Alcohol use disorder severe  Alcohol withdrawal severe  Bipolar affective disorder moderate without psychosis  Opiate use disorder severe on methadone maintenance    Plan  Alcohol use disorder severe  Patient wants to do treatment  Patient with long-standing psychiatric and substance abuse history. His alcohol dependence has negatively affected his life in numerous ways, including with work and in relationships. He has elevated LFTs and a low potassium level, related to his alcohol intake. He is currently going through withdrawal. We will detox him and assess his appropriateness for treatment programs after he becomes more alert.     Alcohol withdrawal severe  Patient has a history of seizures and DTs  His withdrawal is complicated by having tremor agitation sweats elevated pulse 98 elevated temperature he scored a 9 on alcohol withdrawal score and received 20 mg of diazepam  Since his admission he has scored consistently 9 9 and received 40 mg of Valium      Bipolar affective disorder severe moderate without psychosis  We will continue Seroquel 400 mg patient signed neuroleptic consent    Opiate use disorder severe on methadone maintenance  Dose confirmed will continue methadone maintenance 100 mg  We will monitor for excessive sedation while on Valium for detox    Nicotine use disorder severe  Will order nicotine replacement    Anxiety disorder NOS  Will order hydroxyzine    Patient will be seen by internal medicine  Patient has been unable to stop using drugs in the community due to both physical and  psychological symptoms. Continued use will put the patient at risk for medical and/or psychiatric complications.    I HAVE REVIEWED AND SUMMARIZED OLD RECORDS including his medication reconcilation of his home medications  and PDMP   I HAVE SPOKEN WITH RN ABOUT MEDICATIONS AND DETOX SCORES  I HAVE SPOKEN WITH CM ABOUT PTS TREATMETN OPTIONS

## 2020-03-04 NOTE — ED NOTES
Patient was signed out to me at change of shift by Dr. Moore, he was seen last evening for alcohol intoxication.  Per Dr. Moore, patient was pending a detox bed.    It appears that labs had not been ordered for the patient so I have placed orders for a CBC and CMP.    Results for orders placed or performed during the hospital encounter of 03/02/20   XR Chest 2 Views     Status: None    Narrative    CHEST TWO VIEWS  3/3/2020 4:11 PM     HISTORY: Left-sided lateral rib pain.    COMPARISON: None.      Impression    IMPRESSION: Normal.    ELVIA KHOURY MD   Drug abuse screen 6 urine (chem dep) ([facility])     Status: Abnormal   Result Value Ref Range    Amphetamine Qual Urine Negative NEG^Negative    Barbiturates Qual Urine Negative NEG^Negative    Benzodiazepine Qual Urine Positive (A) NEG^Negative    Cannabinoids Qual Urine Negative NEG^Negative    Cocaine Qual Urine Negative NEG^Negative    Ethanol Qual Urine Positive (A) NEG^Negative    Opiates Qualitative Urine Negative NEG^Negative   CBC with platelets differential     Status: None   Result Value Ref Range    WBC 5.1 4.0 - 11.0 10e9/L    RBC Count 4.61 4.4 - 5.9 10e12/L    Hemoglobin 14.2 13.3 - 17.7 g/dL    Hematocrit 42.1 40.0 - 53.0 %    MCV 91 78 - 100 fl    MCH 30.8 26.5 - 33.0 pg    MCHC 33.7 31.5 - 36.5 g/dL    RDW 14.1 10.0 - 15.0 %    Platelet Count 320 150 - 450 10e9/L    Diff Method Automated Method     % Neutrophils 51.2 %    % Lymphocytes 38.0 %    % Monocytes 9.6 %    % Eosinophils 0.6 %    % Basophils 0.6 %    % Immature Granulocytes 0.0 %    Nucleated RBCs 0 0 /100    Absolute Neutrophil 2.6 1.6 - 8.3 10e9/L    Absolute Lymphocytes 1.9 0.8 - 5.3 10e9/L    Absolute Monocytes 0.5 0.0 - 1.3 10e9/L    Absolute Eosinophils 0.0 0.0 - 0.7 10e9/L    Absolute Basophils 0.0 0.0 - 0.2 10e9/L    Abs Immature Granulocytes 0.0 0 - 0.4 10e9/L    Absolute Nucleated RBC 0.0    Comprehensive metabolic panel     Status: Abnormal   Result Value Ref Range    Sodium 139 133  - 144 mmol/L    Potassium 3.2 (L) 3.4 - 5.3 mmol/L    Chloride 103 94 - 109 mmol/L    Carbon Dioxide 29 20 - 32 mmol/L    Anion Gap 7 3 - 14 mmol/L    Glucose 98 70 - 99 mg/dL    Urea Nitrogen 15 7 - 30 mg/dL    Creatinine 0.68 0.66 - 1.25 mg/dL    GFR Estimate >90 >60 mL/min/[1.73_m2]    GFR Estimate If Black >90 >60 mL/min/[1.73_m2]    Calcium 8.4 (L) 8.5 - 10.1 mg/dL    Bilirubin Total 0.9 0.2 - 1.3 mg/dL    Albumin 3.7 3.4 - 5.0 g/dL    Protein Total 7.8 6.8 - 8.8 g/dL    Alkaline Phosphatase 89 40 - 150 U/L     (H) 0 - 70 U/L     (H) 0 - 45 U/L   Alcohol breath test POCT     Status: Abnormal   Result Value Ref Range    Alcohol Breath Test 0.40 (A) 0.00 - 0.01       CBC shows a normal white count, normal hemoglobin, normal platelet count.  CMP shows minimal hypokalemia with a potassium of 3.2, we did order oral potassium replacement 40 mEq here in the emergency department.  Bilirubin is within normal limits, alkaline phosphatase is within normal limits.  ALT is 141 and AST is 200, likely secondary to alcoholic transaminitis.  He is stable at this point for admission to detox.     Tori Thompson MD  03/03/20 [id number]

## 2020-03-04 NOTE — PLAN OF CARE
Behavioral Team Discussion: (3/4/2020)    Continued Stay Criteria/Rationale: Patient admitted for alcohol withdrawal and Use Disorder.  Plan: The following services will be provided to the patient; psychiatric assessment, medication management, therapeutic milieu, individual and group support, and skills groups.   Participants: 3A Provider: Dr. Jeremías Toro MD; 3A RN's: Shukri Martin, RN; 3A CM's: Lima Grimes .  Summary/Recommendation: Providers will assess today for treatment recommendations, discharge planning, and aftercare plans. CM will meet with pt for discharge planning.   Medical/Physical: Chronic back pain  Precautions:   Behavioral Orders   Procedures     Assault precautions     Code 1 - Restrict to Unit     Routine Programming     As clinically indicated     Status 15     Every 15 minutes.     Withdrawal precautions     Rationale for change in precautions or plan: N/A  Progress: No Change.

## 2020-03-04 NOTE — PROGRESS NOTES
Met with Pt to initiate discharge planning.  Pt states he needs his head to clear before he can discuss his plans.  Advised Pt we could meet late in the day when he impoves.  Pt acknowledged.

## 2020-03-04 NOTE — CONSULTS
"  Internal Medicine Consult - Initial Visit       Hollis Barclay MRN# [id number]   YOB: 1970 Age: 49 year old   Date of Admission: 3/2/2020  PCP: Jr Giron  Date of Service: 3/4/2020    Referring Provider: Jeremías Toro MD  Reason for Consult: Medical co-management of detox          Assessment and Recommendations:   Hollis \"Jose M\"Ning is a 49 year old male with a history of TBI, polysubstance abuse, alcohol abuse, borderline personality disorder, anxiety, and depression admitted for detox from alcohol.        # Alcohol withdrawal, hx of alcohol abuse - MSSA 10 this shift.  Recently admitted to [facility] for alcohol detox in 2/2020.  Agitated and physical aggressive on admission requiring Zyprexa and restraints. History of withdrawal seizures and DTs per chart review.   - Seizure precautions     - Continue MSSA   - Folvite, multi-vites, thiamine supplementation   - Further management per Psychiatry     # Hx opiate abuse - On Methadone 100mg daily through MAT at [facility].  Last QTc 374 on EKG from 2/21/2020.  - Agree w/ continuing Methadone     # Elevated LFTs - ,  on admission. .  Likely 2/2 alcohol use.  TTP over RUQ.    - Recheck CMP in AM       # Hypokalemia - K 3.2 on admission.  Mag 1.7.  Received replacement x 1 prior to coming to unit.         Medicine will follow along peripherally pending repeat labs.  Thank you for this consult.       Sherie Gleason, CNP, APRN  Internal Medicine [facility] Hospitalist  [facility]  Pager [phone]           History of Present Illness:   History is obtained from the patient and medical record.     This patient is a 49 year old male with a history of TBI, polysubstance abuse, anxiety, and depression admitted for detox from alcohol.      Internal Medicine service was asked to see patient for medical co-management of detox.  Jose M is resting in bed.  He is minimally cooperative with answering questions.  He keeps " "his eyes closed and is no apparent distress.  When asked about symptoms, he replies \"all of the above\".            Review of Systems:   A 10 point ROS was performed and negative unless otherwise noted in HPI.           Past Medical History:   Reviewed and updated in Epic.  Past Medical History:   Diagnosis Date     Alcoholism in remission (H)      Bilateral ACL tear      Chronic back pain      Closed left arm fracture 1985     H/O shoulder surgery      Post concussive encephalopathy      Social anxiety disorder              Past Surgical History:   Reviewed and updated in Epic.  History reviewed. No pertinent surgical history.          Social History:   Reviewed and updated in [facility].  Social History     Socioeconomic History     Marital status:      Spouse name: Not on file     Number of children: Not on file     Years of education: Not on file     Highest education level: Not on file   Occupational History     Not on file   Social Needs     Financial resource strain: Not on file     Food insecurity:     Worry: Not on file     Inability: Not on file     Transportation needs:     Medical: Not on file     Non-medical: Not on file   Tobacco Use     Smoking status: Current Every Day Smoker     Types: Dip, chew, snus or snuff     Smokeless tobacco: Former User     Types: Chew   Substance and Sexual Activity     Alcohol use: Yes     Comment: intoxicated     Drug use: Not Currently     Sexual activity: Not on file   Lifestyle     Physical activity:     Days per week: Not on file     Minutes per session: Not on file     Stress: Not on file   Relationships     Social connections:     Talks on phone: Not on file     Gets together: Not on file     Attends Spiritism service: Not on file     Active member of club or organization: Not on file     Attends meetings of clubs or organizations: Not on file     Relationship status: Not on file     Intimate partner violence:     Fear of current or ex partner: Not on file     " "Emotionally abused: Not on file     Physically abused: Not on file     Forced sexual activity: Not on file   Other Topics Concern     Not on file   Social History Narrative    1/31/2020:  Jose M is now living in an apartment with one roommate in [place].  He is happy with his current living situation.        11/4/2019: He is living in a full house with 8 people, which has been stressful.  He had an interview for an apartment 3 weeks ago, and expects to hear from them soon.        10/11/2019    Jose M is , and has 2 living children who are adults.  He had a daughter, who passed away.  He developed issues with drugs and alcohol in his late 30s.  He is now in [facility] residence with Nurse assistance. Previously was receiving treatment at [facility], and is also in a methadone program at [facility].  He grew up in [place], [place] outside of [place].  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  He is currently receiving treatment at a [facility] program, [facility]              Family History:   Reviewed and updated in Epic.  History reviewed. No pertinent family history.          Allergies:   No Known Allergies          Medications:     Current Facility-Administered Medications   Medication     acetaminophen (TYLENOL) tablet 650 mg     alum & mag hydroxide-simethicone (MAALOX  ES) suspension 30 mL     bisacodyl (DULCOLAX) Suppository 10 mg     diazepam (VALIUM) tablet 5-20 mg     folic acid (FOLVITE) tablet 1 mg     gabapentin (NEURONTIN) capsule 1,200 mg     hydrOXYzine (ATARAX) tablet 25 mg     loperamide (IMODIUM) capsule 2 mg     magnesium hydroxide (MILK OF MAGNESIA) suspension 30 mL     multivitamin w/minerals (THERA-VIT-M) tablet 1 tablet     nicotine polacrilex (NICORETTE) gum 4-8 mg     OLANZapine (zyPREXA) tablet 10 mg    Or     OLANZapine (zyPREXA) injection 10 mg     ondansetron (ZOFRAN-ODT) ODT tab " "4 mg     QUEtiapine (SEROquel) tablet 400 mg     tiZANidine (ZANAFLEX) tablet 4 mg     traZODone (DESYREL) tablet 50 mg     vitamin B1 (THIAMINE) tablet 100 mg            Physical Exam:   Blood pressure (!) 135/103, pulse 95, temperature 97.6  F (36.4  C), resp. rate 16, height 1.93 m (6' 4\"), weight 122.5 kg (270 lb), SpO2 96 %.  Body mass index is 32.87 kg/m .    GENERAL: Sleepy, but oriented x 3. Well nourished, well developed.  No acute distress.    HEENT: Normocephalic, atraumatic.  Mucous membranes moist.   CV: RRR. S1, S2. No murmurs appreciated.   RESPIRATORY: Effort normal on room air. Lungs CTAB with no wheezing, rales, or rhonchi.   GI: Abdomen soft but slightly distended, bowel sounds present x all 4 quadrants. TTP over RUQ.   NEUROLOGICAL: No focal deficits.   MUSCULOSKELETAL: No joint swelling or tenderness. Moves all extremities.   EXTREMITIES: No gross deformities. No peripheral edema. Intact bilateral pedal pulses.   SKIN: Grossly warm, dry, and intact. No jaundice. No rashes.             Data:   I personally reviewed the following studies:    ROUTINE IP LABS (Last four results)  CMP   Recent Labs   Lab 03/03/20  2252      POTASSIUM 3.2*   CHLORIDE 103   CO2 29   ANIONGAP 7   GLC 98   BUN 15   CR 0.68   KACY 8.4*   PROTTOTAL 7.8   ALBUMIN 3.7   BILITOTAL 0.9   ALKPHOS 89   *   *     CBC   Recent Labs   Lab 03/03/20  2252   WBC 5.1   RBC 4.61   HGB 14.2   HCT 42.1   MCV 91   MCH 30.8   MCHC 33.7   RDW 14.1        INR No lab results found in last 7 days.    Unresulted Labs Ordered in the Past 30 Days of this Admission     No orders found from 2/1/2020 to 3/3/2020.             "

## 2020-03-04 NOTE — H&P
"[facility]    History and Physical - Hospitalist Service       Date of Admission:  3/2/2020    Assessment & Plan   Hollis Barclay is a 49 year old male admitted on 3/2/2020. He ***    #Acute Alcohol Intoxication, Agitation   #Chronic Pain  #     Diet: Regular Diet Adult    DVT Prophylaxis: {DVT PROPHYLAXIS:338084}  Barksdale Catheter: not present  Code Status: Full Code      Disposition Plan   Expected discharge: {estimated:834752}, recommended to {Expected disposition location:943866} once {DC Goals:312757}.  Entered: Shayy Michael 03/04/2020, 9:24 AM     The patient's care was discussed with the { :843136}.    Shayy DAKOTA Michael  [facility] [place]    ______________________________________________________________________    Chief Complaint   ***    {History obtained from:0589561::\"History is obtained from the patient\"}    History of Present Illness   Hollis Barclay is a 49 year old male with a history of alcohol abuse disorder with withdrawal, opioid use disorder, TBI, chronic pain, anxiety, and borderline personality disorder admitted from the ED *** for acute alcohol intoxication and agitation. Patient consumed roughly ***L/day with his last drink on ***. *** history of withdrawal seizures.       Review of Systems    {For notewriter, delete & use smartphrase \"ROSByAge\":336764}    Past Medical History    I have reviewed this patient's medical history and updated it with pertinent information if needed.   Past Medical History:   Diagnosis Date     Alcoholism in remission (H)      Bilateral ACL tear      Chronic back pain      Closed left arm fracture 1985     H/O shoulder surgery      Post concussive encephalopathy      Social anxiety disorder        Past Surgical History   I have reviewed this patient's surgical history and updated it with pertinent information if needed.  History reviewed. No pertinent surgical history.    Social History   I have reviewed " this patient's social history and updated it with pertinent information if needed.  Social History     Tobacco Use     Smoking status: Current Every Day Smoker     Types: Dip, chew, snus or snuff     Smokeless tobacco: Former User     Types: Chew   Substance Use Topics     Alcohol use: Yes     Comment: intoxicated     Drug use: Not Currently       Family History   I have reviewed this patient's family history and updated it with pertinent information if needed.   History reviewed. No pertinent family history.***    Prior to Admission Medications   Prior to Admission Medications   Prescriptions Last Dose Informant Patient Reported? Taking?   QUEtiapine (SEROQUEL) 400 MG tablet 3/2/2020 at Unknown time  No Yes   Sig: Take 1 tablet (400 mg) by mouth At Bedtime   gabapentin (NEURONTIN) 400 MG capsule 3/2/2020 at PM  No Yes   Sig: Take 3 capsules (1,200 mg) by mouth 3 times daily   methadone (DOLPHINE) 5 MG/5ML solution 3/3/2020 at 1300  Yes Yes   Sig: Take 100 mg by mouth daily From [facility] program   nicotine polacrilex (NICORETTE) 4 MG gum   No No   Sig: CHEW 1-2 PEICES BY MOUTH AS NEEDED. Min interval between administration- 1 hour.  Max of 22 pieces per day.   Patient taking differently: Place 8 mg inside cheek every hour as needed CHEW 1-2 PEICES BY MOUTH AS NEEDED. Min interval between administration- 1 hour.  Max of 22 pieces per day.   tiZANidine (ZANAFLEX) 4 MG tablet 3/2/2020 at Unknown time  No Yes   Sig: Take 1 tablet (4 mg) by mouth 2 times daily      Facility-Administered Medications: None     Allergies   No Known Allergies    Physical Exam   Vital Signs: Temp: 97.6  F (36.4  C) Temp src: Oral BP: (!) 135/103 Pulse: 95   Resp: 16 SpO2: 96 % O2 Device: None (Room air)    Weight: 270 lbs 0 oz    {OPTIONAL -- recommend using personal SmartPhrase or Notewriter for exam    :039452}    Data   Data reviewed today: I reviewed all medications, new labs and imaging results over the last 24 hours. I personally reviewed  "{Choose images/EKG's you personally looked at today:036609::\"no images or EKG's today\"}.    {OPTIONAL -- Collapsible Data:934995}  "

## 2020-03-04 NOTE — PROGRESS NOTES
Insurance Company Name Restricted ma  Restricted Recipient Program Please call the [facility] unit for additional information. The telephone number is [phone] or [phone].   You are a provider for a Restricted Recipient for: Physician Services , Inpatient Hospital , Outpatient Hospital , Diagnostic Lab   Provider number [id number], CRISS TORRES MD} is a provider for a Restricted Recipient for: Physician Services , Nurse Practitioner Services   Provider number [id number], [facility]} is a provider for a Restricted Recipient for: Physician Services , Nurse Practitioner Services , Diagnostic Lab   Provider number [id number], [facility]} is a provider for a Restricted Recipient for: Pharmacy   If you are providing services other than the restricted services listed above, you may bill DHS. If you have questions, please call [phone] or [phone].

## 2020-03-04 NOTE — PROGRESS NOTES
03/04/20 0112   Patient Belongings   Did you bring any home meds/supplements to the hospital?  No   Patient Belongings locker;returned to patient at discharge   Patient Belongings Put in Hospital Secure Location (Security or Locker, etc.) other (see comments)   Belongings Search Yes   Clothing Search Yes   Second Staff Nishant & Tony MATT   BLUE STORAGE BIN: shorts, sweats with string & socks  BLUE MED BIN: set of keys  SECURITY: NONE  NO WALLET. NO PHONE. NO SHOES. NO JACKET.  A               Admission:  I am responsible for any personal items that are not sent to the safe or pharmacy.  [facility] is not responsible for loss, theft or damage of any property in my possession.    Signature:  _________________________________ Date: _______  Time: _____                                              Staff Signature:  ____________________________ Date: ________  Time: _____      2nd Staff person, if patient is unable/unwilling to sign:    Signature: ________________________________ Date: ________  Time: _____     Discharge:  [facility] has returned all of my personal belongings:    Signature: _________________________________ Date: ________  Time: _____                                          Staff Signature:  ____________________________ Date: ________  Time: _____

## 2020-03-04 NOTE — PLAN OF CARE
"RICKIE Barclay is a 49 year old year old male voluntarily admitted to detox from alcohol.     S = Situation:   Patient reported drinking 1.75L of alcohol daily; last drink is unknown. BAL in the ED was 0.40 at 0205 on 3/3/20.    Per report, patient arrived in the ED in 5-points due to agitation and aggression.     B  = Background:   Patient has a history of altered mental status due to acute alcohol intoxication and hx of opiod use. Pt has a history of TBI.     He leaves in a group home. Group home informed him that he needed to go through detox before being admitted back. Pt plans to return to the group home upon discharge.    Hx of anxiety, chronic back pain and borderline personality disorder.     Vital Signs: BP (!) 122/90   Pulse 130   Temp 97.6  F (36.4  C) (Oral)   Resp 16   Ht 1.93 m (6' 4\")   Wt 122.5 kg (270 lb)   SpO2 93%   BMI 32.87 kg/m        Abnormal labs: ALT=141, THO=982, Potassium 3.2 (replaced).  A  =  Assessment:   Patient was cooperative with completing a search, but declined to do the admission assessment due to being tired from the Seroquel administered in the ED. MSSA=10; valium 10mg administered.    R =   Request or Recommendation:   15 minute checks  MSSA protocol with valium  Labs: lipid, B12, TSH, GGT  [place] [facility]  Withdrawal and assault precautions.    "

## 2020-03-04 NOTE — PLAN OF CARE
This worker attempting to confirm patient's methadone maintenance dose.  Left VM at [facility] @ [phone].  Awaiting response.    At a slightly later juncture, [facility] did respond, confirming patient's daily maintenance dose of 100 mg/day. Attending MD Toro updated accordingly as to above.

## 2020-03-04 NOTE — TELEPHONE ENCOUNTER
2334 ED called to say pt's labs have resulted and is ready for detox.    2339 CBC results within normal range  Drug screen positive for benzos  Potassium 3.2 (replaced in ED)  Calcium 8.4        2347 On-call accepts.  [place].  Placed in queue at 2349.  Unit notified at 2350 and will call ED when available for report.  ED notified at 2352.

## 2020-03-05 LAB
ALBUMIN SERPL-MCNC: 3 G/DL (ref 3.4–5)
ALP SERPL-CCNC: 75 U/L (ref 40–150)
ALT SERPL W P-5'-P-CCNC: 101 U/L (ref 0–70)
ANION GAP SERPL CALCULATED.3IONS-SCNC: 5 MMOL/L (ref 3–14)
AST SERPL W P-5'-P-CCNC: 113 U/L (ref 0–45)
BILIRUB SERPL-MCNC: 0.6 MG/DL (ref 0.2–1.3)
BUN SERPL-MCNC: 20 MG/DL (ref 7–30)
CALCIUM SERPL-MCNC: 8.5 MG/DL (ref 8.5–10.1)
CHLORIDE SERPL-SCNC: 104 MMOL/L (ref 94–109)
CHOLEST SERPL-MCNC: 158 MG/DL
CO2 SERPL-SCNC: 31 MMOL/L (ref 20–32)
CREAT SERPL-MCNC: 0.79 MG/DL (ref 0.66–1.25)
GFR SERPL CREATININE-BSD FRML MDRD: >90 ML/MIN/{1.73_M2}
GGT SERPL-CCNC: 205 U/L (ref 0–75)
GLUCOSE SERPL-MCNC: 100 MG/DL (ref 70–99)
HDLC SERPL-MCNC: 60 MG/DL
LDLC SERPL CALC-MCNC: 79 MG/DL
NONHDLC SERPL-MCNC: 98 MG/DL
POTASSIUM SERPL-SCNC: 3.2 MMOL/L (ref 3.4–5.3)
PROT SERPL-MCNC: 6.8 G/DL (ref 6.8–8.8)
SODIUM SERPL-SCNC: 140 MMOL/L (ref 133–144)
T4 FREE SERPL-MCNC: 0.8 NG/DL (ref 0.76–1.46)
TRIGL SERPL-MCNC: 95 MG/DL
TSH SERPL DL<=0.005 MIU/L-ACNC: 8.16 MU/L (ref 0.4–4)
VIT B12 SERPL-MCNC: 333 PG/ML (ref 193–986)

## 2020-03-05 PROCEDURE — 84443 ASSAY THYROID STIM HORMONE: CPT | Performed by: PSYCHIATRY & NEUROLOGY

## 2020-03-05 PROCEDURE — 80061 LIPID PANEL: CPT | Performed by: PSYCHIATRY & NEUROLOGY

## 2020-03-05 PROCEDURE — 82977 ASSAY OF GGT: CPT | Performed by: PSYCHIATRY & NEUROLOGY

## 2020-03-05 PROCEDURE — H2035 A/D TX PROGRAM, PER HOUR: HCPCS

## 2020-03-05 PROCEDURE — 99232 SBSQ HOSP IP/OBS MODERATE 35: CPT | Performed by: PSYCHIATRY & NEUROLOGY

## 2020-03-05 PROCEDURE — 25000132 ZZH RX MED GY IP 250 OP 250 PS 637: Performed by: PSYCHIATRY & NEUROLOGY

## 2020-03-05 PROCEDURE — 36415 COLL VENOUS BLD VENIPUNCTURE: CPT | Performed by: PSYCHIATRY & NEUROLOGY

## 2020-03-05 PROCEDURE — 80053 COMPREHEN METABOLIC PANEL: CPT | Performed by: PSYCHIATRY & NEUROLOGY

## 2020-03-05 PROCEDURE — 99207 ZZC CDG-MDM COMPONENT: MEETS MODERATE - UP CODED: CPT | Performed by: PSYCHIATRY & NEUROLOGY

## 2020-03-05 PROCEDURE — 12800008 ZZH R&B CD ADULT

## 2020-03-05 PROCEDURE — 84439 ASSAY OF FREE THYROXINE: CPT | Performed by: PSYCHIATRY & NEUROLOGY

## 2020-03-05 PROCEDURE — 82607 VITAMIN B-12: CPT | Performed by: PSYCHIATRY & NEUROLOGY

## 2020-03-05 PROCEDURE — 25000128 H RX IP 250 OP 636: Performed by: PSYCHIATRY & NEUROLOGY

## 2020-03-05 PROCEDURE — 25000132 ZZH RX MED GY IP 250 OP 250 PS 637: Performed by: EMERGENCY MEDICINE

## 2020-03-05 RX ADMIN — ONDANSETRON 4 MG: 4 TABLET, ORALLY DISINTEGRATING ORAL at 08:18

## 2020-03-05 RX ADMIN — NICOTINE POLACRILEX 8 MG: 4 GUM, CHEWING ORAL at 10:55

## 2020-03-05 RX ADMIN — NICOTINE POLACRILEX 8 MG: 4 GUM, CHEWING ORAL at 20:28

## 2020-03-05 RX ADMIN — DIAZEPAM 10 MG: 5 TABLET ORAL at 08:19

## 2020-03-05 RX ADMIN — GABAPENTIN 1200 MG: 400 CAPSULE ORAL at 19:08

## 2020-03-05 RX ADMIN — NICOTINE POLACRILEX 8 MG: 4 GUM, CHEWING ORAL at 21:45

## 2020-03-05 RX ADMIN — NICOTINE POLACRILEX 8 MG: 4 GUM, CHEWING ORAL at 16:03

## 2020-03-05 RX ADMIN — GABAPENTIN 1200 MG: 400 CAPSULE ORAL at 08:05

## 2020-03-05 RX ADMIN — NICOTINE POLACRILEX 8 MG: 4 GUM, CHEWING ORAL at 14:51

## 2020-03-05 RX ADMIN — ACETAMINOPHEN 650 MG: 325 TABLET, FILM COATED ORAL at 14:06

## 2020-03-05 RX ADMIN — METHADONE HYDROCHLORIDE 100 MG: 5 SOLUTION ORAL at 08:05

## 2020-03-05 RX ADMIN — NICOTINE POLACRILEX 8 MG: 4 GUM, CHEWING ORAL at 17:42

## 2020-03-05 RX ADMIN — ACETAMINOPHEN 650 MG: 325 TABLET, FILM COATED ORAL at 20:28

## 2020-03-05 RX ADMIN — THIAMINE HCL TAB 100 MG 100 MG: 100 TAB at 08:05

## 2020-03-05 RX ADMIN — QUETIAPINE FUMARATE 400 MG: 400 TABLET ORAL at 21:23

## 2020-03-05 RX ADMIN — MULTIPLE VITAMINS W/ MINERALS TAB 1 TABLET: TAB at 08:06

## 2020-03-05 RX ADMIN — NICOTINE POLACRILEX 8 MG: 4 GUM, CHEWING ORAL at 13:30

## 2020-03-05 RX ADMIN — GABAPENTIN 1200 MG: 400 CAPSULE ORAL at 13:30

## 2020-03-05 RX ADMIN — NICOTINE POLACRILEX 8 MG: 4 GUM, CHEWING ORAL at 08:05

## 2020-03-05 RX ADMIN — FOLIC ACID 1 MG: 1 TABLET ORAL at 08:05

## 2020-03-05 RX ADMIN — NICOTINE POLACRILEX 8 MG: 4 GUM, CHEWING ORAL at 19:08

## 2020-03-05 ASSESSMENT — ACTIVITIES OF DAILY LIVING (ADL): HYGIENE/GROOMING: INDEPENDENT

## 2020-03-05 NOTE — PROGRESS NOTES
"Met with Pt to offer assistance with discharge planning.  Pt states he is waiting to hear from his group home and plans to accept direction from them.  Offered to contact group home on Pt's behalf.  Pt stated: \"I just want to be left alone until I am through with withdrawal and then I will stay until everything is worked out.\"  Advised Pt that once he is out of withdrawal he will be discharged and the time constraint will limit 's ability to assist with discharge planning.  Pt did not offer a response.  Pt advised to request assistance if needed.  "

## 2020-03-05 NOTE — H&P
"Hollis Barclay is a 49 year old male who was referred by ems.  History was provided by PATIENT who was a fair historian. History limited by patient being in-and-out of sleep, somnolent, and mumbling.     CHIEF COMPLAINT: \"feeling sick\"    HISTORY OF PRESENT ILLNESS:      Patient is a 49 y.o. M with history of TBI and bipolar disorder, and long-standing history of alcohol addiction, who on 3/2 was brought by ambulance from group home to emergency room for alcohol intoxication and agitation. He was physically aggressive and given Zyprexa and put on restraints in ER. He was deemed medically stable and transferred to detox today 3/4 for alcohol withdrawal.     He reports feeling present symptoms of sweats, chills, nausea, and back pain. He reports being sober for almost a year until he relapsed ~1 month ago. He has been drinking ~2 L of vodka daily, last drink 2 days ago. He was unable to identify a trigger for why he started drinking 1 month ago.     He has a history of bipolar disorder and anxiety, on Zyprexa, gabapentin, and quetiapine. He says that when he is manic he experiences elated mood, inflated self-esteem, increased energy, decreased need for sleep, and impulsiveness. He is also on a methadone program, currently on 100 mg daily. He did receive doses on 3/2 and 3/3. He denies smoking or use of any recreational drugs recently.      Patient has been using the following substances: alcohol  Started at age 13, became a problem during his 20s.    Patient has tolerance, withdrawal, progressive use, loss of control, spending more time and more amount than intended. Patient has made attempts to quit, is experiencing cravings, and reports negative consequences.  Patient does not bowden.    Patient does have a history of seizures, reports 3-4 times.   Patient does have a history of delirium tremens.    Patient does have a history of overdose attempt.   Patient does not have a history of IV use.  Patient does not have a " history of hepatitis or HIV.      DRUG OF CHOICE - alcohol     PREVIOUS DETOX/TREATMENT PROGRAMS- has been in inpatient treatments 10+ times.   HISTORY OF OVERDOSE- reports attempted overdose                 USE DISORDER - CRITERIA  +admits to unsuccessful efforts to cut down or control use  +admits to cravings or a strong desire to use   + admits to failure to fulfill obligations at work, school or home  + admits to ruined relationships including divorces and estrangement from children  + admits to continued use despite negative consequences    Denies current thoughts of suicide or harming others.      Endorses current visual hallucinations.     PSYCHIATRIC REVIEW OF SYSTEMS:         Psychiatric Review of Systems:   Depression:   Reports: depressed mood, changes in sleep  Vero: : racing thoughts, increase in energy, elated mood, increased goal-directed activities, inflated self-esteem, impulsiveness.   Psychosis:   Reports: visual hallucinations  Denies: auditory hallucinations, paranoia  Anxiety:   Reports: feeling anxious chronically    Symptoms of attention deficit disorder include a failure to pay attention to detail, a pattern of careless mistakes, a pattern of inattentive listening, a failure to follow through with projects, poor personal organization, losing necessary objects, distractibility, forgetfulness.    Symptoms of borderline personality disorder include a fear of abandonment, unstable self-image, impulsive behavior, dissociative feeling, intense anger, unstable personal relationships, chronic feelings of boredom, periods of intense depressed mood.          PSYCHIATRIC HISTORY     Previous diagnoses:   1. Alcohol dependence, severe  2. Bipolar disorder  3. Anxiety  4. Tobacco dependence      Past court commitments: 1 time, last year  SIB /SUICIDE ATTEMPTS: 3 attempts of overdose on alcohol / drugs      Psych Hosp: 8-9 times  Outpatient Programs: none  Inpatient cd trt: 10-12 times  Out pt cd trt:  none    PAST PSYCH MED TRIALS   Says Seroquel is the only medication that has been effective for his bipolar disorder.       SOCIAL HISTORY                                                                         Living Situation/Family/Relationships- lives in a group home. . 2 living adult children. 1 diseased child.   Trauma History (self-report)- None  Legal- None    Early History/Education- Completed 9 years of education. Reports his childhood was happy.           Family History:   FAMILY HISTORY: History reviewed. No pertinent family history.  Family Mental Health History-  None known to patient.     Substance Use Problems - brother with alcohol dependence           Physical ROS:   The patient endorsed back pain. The remainder of 10-point review of systems was negative except as noted in HPI.         PTA Medications:     Medications Prior to Admission   Medication Sig Dispense Refill Last Dose     gabapentin (NEURONTIN) 400 MG capsule Take 3 capsules (1,200 mg) by mouth 3 times daily 270 capsule 1 3/2/2020 at PM     methadone (DOLPHINE) 5 MG/5ML solution Take 100 mg by mouth daily [place] [facility] [place]   3/3/2020 at 1300     QUEtiapine (SEROQUEL) 400 MG tablet Take 1 tablet (400 mg) by mouth At Bedtime 30 tablet 1 3/2/2020 at Unknown time     tiZANidine (ZANAFLEX) 4 MG tablet Take 1 tablet (4 mg) by mouth 2 times daily 60 tablet 1 3/2/2020 at Unknown time     nicotine polacrilex (NICORETTE) 4 MG gum CHEW 1-2 PEICES BY MOUTH AS NEEDED. Min interval between administration- 1 hour.  Max of 22 pieces per day. (Patient taking differently: Place 8 mg inside cheek every hour as needed CHEW 1-2 PEICES BY MOUTH AS NEEDED. Min interval between administration- 1 hour.  Max of 22 pieces per day.) 240 each 11           Allergies:   No Known Allergies       Labs:     Recent Results (from the past 48 hour(s))   Drug abuse screen 6 urine (chem dep) ([facility])    Collection Time: 03/03/20  2:43 PM   Result Value Ref Range     Amphetamine Qual Urine Negative NEG^Negative    Barbiturates Qual Urine Negative NEG^Negative    Benzodiazepine Qual Urine Positive (A) NEG^Negative    Cannabinoids Qual Urine Negative NEG^Negative    Cocaine Qual Urine Negative NEG^Negative    Ethanol Qual Urine Positive (A) NEG^Negative    Opiates Qualitative Urine Negative NEG^Negative   CBC with platelets differential    Collection Time: 03/03/20 10:52 PM   Result Value Ref Range    WBC 5.1 4.0 - 11.0 10e9/L    RBC Count 4.61 4.4 - 5.9 10e12/L    Hemoglobin 14.2 13.3 - 17.7 g/dL    Hematocrit 42.1 40.0 - 53.0 %    MCV 91 78 - 100 fl    MCH 30.8 26.5 - 33.0 pg    MCHC 33.7 31.5 - 36.5 g/dL    RDW 14.1 10.0 - 15.0 %    Platelet Count 320 150 - 450 10e9/L    Diff Method Automated Method     % Neutrophils 51.2 %    % Lymphocytes 38.0 %    % Monocytes 9.6 %    % Eosinophils 0.6 %    % Basophils 0.6 %    % Immature Granulocytes 0.0 %    Nucleated RBCs 0 0 /100    Absolute Neutrophil 2.6 1.6 - 8.3 10e9/L    Absolute Lymphocytes 1.9 0.8 - 5.3 10e9/L    Absolute Monocytes 0.5 0.0 - 1.3 10e9/L    Absolute Eosinophils 0.0 0.0 - 0.7 10e9/L    Absolute Basophils 0.0 0.0 - 0.2 10e9/L    Abs Immature Granulocytes 0.0 0 - 0.4 10e9/L    Absolute Nucleated RBC 0.0    Comprehensive metabolic panel    Collection Time: 03/03/20 10:52 PM   Result Value Ref Range    Sodium 139 133 - 144 mmol/L    Potassium 3.2 (L) 3.4 - 5.3 mmol/L    Chloride 103 94 - 109 mmol/L    Carbon Dioxide 29 20 - 32 mmol/L    Anion Gap 7 3 - 14 mmol/L    Glucose 98 70 - 99 mg/dL    Urea Nitrogen 15 7 - 30 mg/dL    Creatinine 0.68 0.66 - 1.25 mg/dL    GFR Estimate >90 >60 mL/min/[1.73_m2]    GFR Estimate If Black >90 >60 mL/min/[1.73_m2]    Calcium 8.4 (L) 8.5 - 10.1 mg/dL    Bilirubin Total 0.9 0.2 - 1.3 mg/dL    Albumin 3.7 3.4 - 5.0 g/dL    Protein Total 7.8 6.8 - 8.8 g/dL    Alkaline Phosphatase 89 40 - 150 U/L     (H) 0 - 70 U/L     (H) 0 - 45 U/L   Magnesium    Collection Time: 03/03/20  "10:52 PM   Result Value Ref Range    Magnesium 1.7 1.6 - 2.3 mg/dL   Phosphorus    Collection Time: 03/03/20 10:52 PM   Result Value Ref Range    Phosphorus 1.5 (L) 2.5 - 4.5 mg/dL   Lipid panel    Collection Time: 03/05/20  7:01 AM   Result Value Ref Range    Cholesterol 158 <200 mg/dL    Triglycerides 95 <150 mg/dL    HDL Cholesterol 60 >39 mg/dL    LDL Cholesterol Calculated 79 <100 mg/dL    Non HDL Cholesterol 98 <130 mg/dL   TSH with free T4 reflex and/or T3 as indicated    Collection Time: 03/05/20  7:01 AM   Result Value Ref Range    TSH 8.16 (H) 0.40 - 4.00 mU/L   GGT    Collection Time: 03/05/20  7:01 AM   Result Value Ref Range     (H) 0 - 75 U/L   Comprehensive metabolic panel    Collection Time: 03/05/20  7:01 AM   Result Value Ref Range    Sodium 140 133 - 144 mmol/L    Potassium 3.2 (L) 3.4 - 5.3 mmol/L    Chloride 104 94 - 109 mmol/L    Carbon Dioxide 31 20 - 32 mmol/L    Anion Gap 5 3 - 14 mmol/L    Glucose 100 (H) 70 - 99 mg/dL    Urea Nitrogen 20 7 - 30 mg/dL    Creatinine 0.79 0.66 - 1.25 mg/dL    GFR Estimate >90 >60 mL/min/[1.73_m2]    GFR Estimate If Black >90 >60 mL/min/[1.73_m2]    Calcium 8.5 8.5 - 10.1 mg/dL    Bilirubin Total 0.6 0.2 - 1.3 mg/dL    Albumin 3.0 (L) 3.4 - 5.0 g/dL    Protein Total 6.8 6.8 - 8.8 g/dL    Alkaline Phosphatase 75 40 - 150 U/L     (H) 0 - 70 U/L     (H) 0 - 45 U/L   T4 free    Collection Time: 03/05/20  7:01 AM   Result Value Ref Range    T4 Free 0.80 0.76 - 1.46 ng/dL          Physical and Psychiatric Examination:     /57   Pulse 99   Temp 97.4  F (36.3  C) (Oral)   Resp 16   Ht 1.93 m (6' 4\")   Wt 122.5 kg (270 lb)   SpO2 (!) 88%   BMI 32.87 kg/m    Weight is 270 lbs 0 oz  Body mass index is 32.87 kg/m .    Physical Exam:  General appearance: WDWN, alert. NAD.   HENT: normocephalic and atraumatic head. Eyes anicteric.   Respiratory: non-labored breathing.   Skin: diaphoretic.   Neuro: Somnolent. No focal deficits. Moves all 4 " extremities.             Past Medical History:   PAST MEDICAL HISTORY:   Past Medical History:   Diagnosis Date     Alcoholism in remission (H)      Bilateral ACL tear      Chronic back pain      Closed left arm fracture 1985     H/O shoulder surgery      Post concussive encephalopathy      Social anxiety disorder      PAST SURGICAL HISTORY: History reviewed. No pertinent surgical history.       MENTAL STATUS EXAM:    Constitutional: General appearance of patient: Appropriately dressed but slightly unkempt adult male. In and out of sleep.     Attitude: superficially cooperative  Eye Contact: poor  Mood: tired  Affect: congruent   Speech: slow, mumbled,    Psychomotor Behavior: no evidence of tardive dyskinesia, dystonia, or tics  Thought Process: logical and linear  Associations: no loose associations  Thought Content: no evidence of psychotic thought or active suicidal ideation  Insight: fair  Judgment: fair  Oriented to: person and time, but not place  Attention Span and Concentration: patient continually falls asleep during exam  Recent and Remote Memory: intact  Language: english with appropriate syntax and vocabulary  Fund of Knowledge: appropriate  Muscle Strength and Tone: normal  Gait and Station: Normal    There are no abnormal or psychotic thoughts, no preoccupations, no overvalued ideas, no rumination, no obsessions, no compulsions, no somatic concerns, no hypochrondriasis, no ideas of reference, and no delusions. Patient denies homicidal thoughts. Patient denies suicidal thoughts.     Musculoskeletal: Patient shows no abnormalities of motor activity: there is no tremor, no tic, and no dystonia. There is no apparent muscle atrophy, strength and tone appear normal, and there are no abnormal movements.  Patient has normal gait and stance.        dx  Alcohol use disorder severe  Alcohol withdrawal severe  Bipolar affective disorder moderate without psychosis  Opiate use disorder severe on methadone  maintenance    Plan  Alcohol use disorder severe  Patient wants to do treatment  Patient with long-standing psychiatric and substance abuse history. His alcohol dependence has negatively affected his life in numerous ways, including with work and in relationships. He has elevated LFTs and a low potassium level, related to his alcohol intake. He is currently going through withdrawal. We will detox him and assess his appropriateness for treatment programs after he becomes more alert.     Alcohol withdrawal severe  Patient has a history of seizures and DTs  His withdrawal is complicated by having tremor agitation sweats elevated pulse 98 elevated temperature he scored a 9 on alcohol withdrawal score and received 20 mg of diazepam  Since his admission he has scored consistently 9 9 and received 40 mg of Valium      Bipolar affective disorder severe moderate without psychosis  We will continue Seroquel 400 mg patient signed neuroleptic consent    Opiate use disorder severe on methadone maintenance  Dose confirmed will continue methadone maintenance 100 mg  We will monitor for excessive sedation while on Valium for detox    Nicotine use disorder severe  Will order nicotine replacement    Anxiety disorder NOS  Will order hydroxyzine    Patient will be seen by internal medicine  Patient has been unable to stop using drugs in the community due to both physical and psychological symptoms. Continued use will put the patient at risk for medical and/or psychiatric complications.    I HAVE REVIEWED AND SUMMARIZED OLD RECORDS including his medication reconcilation of his home medications  and PDMP   I HAVE SPOKEN WITH RN ABOUT MEDICATIONS AND DETOX SCORES  I HAVE SPOKEN WITH CM ABOUT PTS TREATMETN OPTIONS

## 2020-03-05 NOTE — PROGRESS NOTES
"Writer/RN received call from Pt's group home regarding treatment plans.    Leidy (primary advocate) [phone]    Leidy informed that Pt's housing is at risk due to recent behaviors. Leidy stated that Pt said he would do outpatient treatment \"last time\" but that he never attended. Leidy would like to encourage inpatient treatment.   "

## 2020-03-05 NOTE — PROGRESS NOTES
Pt signed AKIL for his residence.  Placed call and left VM.  Pt plans to find a second peer  and attend [facility] meetings in person rather than online.  Pt states he just completed a residential treatment program and feels harm reduction is his best approach.  Advised Pt to seek further assistance if needed.  Pt acknowledged.

## 2020-03-05 NOTE — PROGRESS NOTES
"[facility], [place]   Psychiatric Progress Note    Interim history   This is a 49 year old male with Alcohol use disorder severe  Alcohol withdrawal severe  Bipolar affective disorder moderate without psychosis  Opiate use disorder severe on methadone maintenancePt seen in rounds. Patient's mood is tired, poor energy  Energy Level:MODERATE  Sleep:No concerns, sleeps well through night  Appetite:normal motivation interest good  Denied any Suicidal/homicidal ideation/plan intent.  Denied any psychosis  No prior suicde attempts  No access to gun  Pt is in alcohol withdrawal still being monitered every 4 hrs for it,   Pt mssa score are monitered  Tolerating meds and has no side effects.              Medications:     Current Facility-Administered Medications   Medication     acetaminophen (TYLENOL) tablet 650 mg     alum & mag hydroxide-simethicone (MAALOX  ES) suspension 30 mL     bisacodyl (DULCOLAX) Suppository 10 mg     diazepam (VALIUM) tablet 5-20 mg     folic acid (FOLVITE) tablet 1 mg     gabapentin (NEURONTIN) capsule 1,200 mg     hydrOXYzine (ATARAX) tablet 25 mg     loperamide (IMODIUM) capsule 2 mg     magnesium hydroxide (MILK OF MAGNESIA) suspension 30 mL     methadone (DOLPHINE) solution 100 mg     multivitamin w/minerals (THERA-VIT-M) tablet 1 tablet     naloxone (NARCAN) injection 0.1-0.4 mg     nicotine polacrilex (NICORETTE) gum 4-8 mg     OLANZapine (zyPREXA) tablet 10 mg    Or     OLANZapine (zyPREXA) injection 10 mg     ondansetron (ZOFRAN-ODT) ODT tab 4 mg     QUEtiapine (SEROquel) tablet 400 mg     tiZANidine (ZANAFLEX) tablet 4 mg     traZODone (DESYREL) tablet 50 mg     vitamin B1 (THIAMINE) tablet 100 mg             Allergies:   No Known Allergies         Psychiatric Examination:   Blood pressure 136/57, pulse 99, temperature 97.4  F (36.3  C), temperature source Oral, resp. rate 16, height 1.93 m (6' 4\"), weight 122.5 kg (270 lb), SpO2 (!) 88 %.  Weight is 270 lbs " 0 oz  Body mass index is 32.87 kg/m .    Appearance:  awake, alert and adequately groomed  Attitude:  cooperative  Eye Contact:  good  Mood:  better  Affect:  appropriate and in normal range and mood congruent  Speech:  clear, coherent rate /rhythm are good  Psychomotor Behavior:  no evidence of tardive dyskinesia, dystonia, or tics and intact station, gait and muscle tone  Throught Process:  logical  Associations:  no loose associations  Thought Content:  no evidence of suicidal ideation or homicidal ideation, no evidence of psychotic thought, no auditory hallucinations present and no visual hallucinations present  Insight:  fair  Judgement:  intact  Oriented to:  time, person, and place  Attention Span and Concentration:  intact  Recent and Remote Memory:  intact  Language fund of knowledge are adequate         Labs:     No results found for: NTBNPI, NTBNP  Lab Results   Component Value Date    WBC 5.1 03/03/2020    HGB 14.2 03/03/2020    HCT 42.1 03/03/2020    MCV 91 03/03/2020     03/03/2020     Lab Results   Component Value Date    TSH 8.16 (H) 03/05/2020         DX      PLAN       dx  Alcohol use disorder severe  Alcohol withdrawal severe  Bipolar affective disorder moderate without psychosis  Opiate use disorder severe on methadone maintenance     Plan  Alcohol use disorder severe  Patient wants to do treatment  Patient with long-standing psychiatric and substance abuse history. His alcohol dependence has negatively affected his life in numerous ways, including with work and in relationships. He has elevated LFTs and a low potassium level, related to his alcohol intake. He is currently going through withdrawal. We will detox him and assess his appropriateness for treatment programs after he becomes more alert.      Alcohol withdrawal severe  Patient has a history of seizures and DTs  His withdrawal is complicated by having tremor agitation sweats elevated pulse 103 elevated temperature he scored a 7 on  alcohol withdrawal score and received 10 mg of diazepam  Since his admission he has scored consistently high and received 70 mg of Valium      MSSA    Eating Disturbances: ate and enjoyed all of it or not applicable  Tremor: 1 - not visibly apparent but can be felt by the examiner placing his fingertip slightly against the patient's fingertips  Sleep Disturbance: slept through the night or not applicable  Clouding of Sensorium: no evidence  Hallucinations: 0 - none  Quality of Contact: 0 - awareness of examiner and people around him/her  Agitation: 1 - somewhat more than normal activity  Paroxysmal Sweats: 1 - barely perceptible sweating  Temperature: 99.5 or below  Pulse: 3 - 90 to 99  Total MSSA Score: 7    Bipolar affective disorder severe moderate without psychosis  We will continue Seroquel 400 mg patient signed neuroleptic consent     Opiate use disorder severe on methadone maintenance  Dose confirmed will continue methadone maintenance 100 mg  We will monitor for excessive sedation while on Valium for detox     Nicotine use disorder severe  Will order nicotine replacement     Anxiety disorder NOS  Will order hydroxyzine   Alcohol intoxication/withdrawal, presently is on MSSA protocol with Valium. Continue the same MSSA protocol as ordered. Continue thiamine 100 mg p.o. daily, M.V.I. one p.o. daily and folate 1 mg p.o. Daily  Will continue mssa protocal to detox off alcohol on valium,  Pt is c/o of termor , agitation poor sleep and poor appetite, he has sweats, feels shakey  On mssa client scored scored today and  needed needed  mg po as of yet , total dose since admission was       Supportive psychotheraoy provided, arnaud talked about recovery enviroment, relapse prevention, triggers to use.  Discussed with patient many issues of addiction,triggers, relapse, and establishing a solid recovery program.  Asked pt to be med complinat     Pt not open to naltrexone/antabuse/campral    Intervention done by me during  this visit:    Laboratory/Imaging: reviewed with patient   Consults: internal medicine consult reviewed  Patient will be treated in therapeutic milieu with appropriate individual and group therapies as described.  PDMP CHECKED     Supportive psychotheraoy provided, arnaud talked about recovery enviroment, relapse prevention, triggers to use.  Discussed with patient many issues of addiction,triggers, relapse, and establishing a solid recovery program.  Asked pt to be med complinat   Medical diagnoses to be addressed this admission:    Plan:  # Elevated LFTs - ,  on admission. .  Likely 2/2 alcohol use.  TTP over RUQ.    - Recheck CMP in AM        # Hypokalemia - K 3.2 on admission.  Mag 1.7.  Received replacement x 1 prior to coming to unit.          Legal Status: voluntary    Safety Assessment:   Checks:  15 min  Precautions: withdrawal precautions  Pt has not required locked seclusion or restraints in the past 24 hours to maintain safety, please refer to RN documentation for further details.  Discussed with patient many issues of addiction,triggers, relapse, and establishing a solid recovery program.  Able to give informed consent:  YES   Discussed Risks/Benefits/Side Effects/Alternatives: YES    After discussion of the indications, risks, benefits, alternatives and consequences of no treatment, the patient elects to complete detox and do trt.

## 2020-03-06 VITALS
BODY MASS INDEX: 32.88 KG/M2 | RESPIRATION RATE: 16 BRPM | OXYGEN SATURATION: 96 % | DIASTOLIC BLOOD PRESSURE: 101 MMHG | HEART RATE: 83 BPM | HEIGHT: 76 IN | TEMPERATURE: 96.4 F | WEIGHT: 270 LBS | SYSTOLIC BLOOD PRESSURE: 149 MMHG

## 2020-03-06 PROCEDURE — 99239 HOSP IP/OBS DSCHRG MGMT >30: CPT | Performed by: PSYCHIATRY & NEUROLOGY

## 2020-03-06 PROCEDURE — 25000132 ZZH RX MED GY IP 250 OP 250 PS 637: Performed by: PSYCHIATRY & NEUROLOGY

## 2020-03-06 PROCEDURE — 25000132 ZZH RX MED GY IP 250 OP 250 PS 637: Performed by: EMERGENCY MEDICINE

## 2020-03-06 RX ADMIN — FOLIC ACID 1 MG: 1 TABLET ORAL at 07:59

## 2020-03-06 RX ADMIN — MULTIPLE VITAMINS W/ MINERALS TAB 1 TABLET: TAB at 07:59

## 2020-03-06 RX ADMIN — ACETAMINOPHEN 650 MG: 325 TABLET, FILM COATED ORAL at 04:47

## 2020-03-06 RX ADMIN — ACETAMINOPHEN 650 MG: 325 TABLET, FILM COATED ORAL at 09:27

## 2020-03-06 RX ADMIN — METHADONE HYDROCHLORIDE 100 MG: 5 SOLUTION ORAL at 07:59

## 2020-03-06 RX ADMIN — NICOTINE POLACRILEX 8 MG: 4 GUM, CHEWING ORAL at 07:21

## 2020-03-06 RX ADMIN — NICOTINE POLACRILEX 8 MG: 4 GUM, CHEWING ORAL at 04:47

## 2020-03-06 RX ADMIN — NICOTINE POLACRILEX 8 MG: 4 GUM, CHEWING ORAL at 09:27

## 2020-03-06 RX ADMIN — NICOTINE POLACRILEX 8 MG: 4 GUM, CHEWING ORAL at 08:29

## 2020-03-06 RX ADMIN — GABAPENTIN 1200 MG: 400 CAPSULE ORAL at 07:59

## 2020-03-06 RX ADMIN — NICOTINE POLACRILEX 8 MG: 4 GUM, CHEWING ORAL at 10:39

## 2020-03-06 RX ADMIN — THIAMINE HCL TAB 100 MG 100 MG: 100 TAB at 07:59

## 2020-03-06 ASSESSMENT — ACTIVITIES OF DAILY LIVING (ADL)
HYGIENE/GROOMING: INDEPENDENT
ORAL_HYGIENE: INDEPENDENT
DRESS: INDEPENDENT
LAUNDRY: WITH SUPERVISION

## 2020-03-06 NOTE — PLAN OF CARE
Problem: Substance Withdrawal  Goal: Substance Withdrawal  Description  Signs and symptoms of listed problems will be absent or manageable.  1. Detoxification from Alcohol using the [facility] valium protocol  2. Patient will complete assessment paperwork  3.  Patient will meet with  to discuss treatment and discharge planning  4. Physical examination and Lab evaluation by MD  5. Patients oral intake will be greater than 75 % of meals to meet estimated needs  6. Adequate fluid intake     Outcome: Adequate for Discharge  Pt's alcohol withdrawal is complete. Pt had received a total of 70 mg of valium and 8 mg of ativan for withdrawal. No oversedation noted. Pt out on unit. Anxious to be discharge back home. States he lives in [facility] in [facility].  Pt reports his mood as slightly anxious rating his anxiety level a 5 on a 0-10 severe scale. Denies SI. Reports adequate sleep and appetite. Pt is on methadone maintenance 100 mg a day and will follow up with [facility].   Pt states he has a supply of methadone at home. Pt states he has a supply of all his medications at home. Pt assisted with transportation home per unit manager.   Reviewed discharge instructions with patient and he verbalized understanding.   MAR and lab copy for discharge provided. Pt states he attends smart recovery groups.   See discharge instructions.

## 2020-03-06 NOTE — DISCHARGE INSTRUCTIONS
Behavioral [facility] Discharge Planning and Instructions  THANK YOU FOR CHOOSING THE [address]: [phone]    Summary: You were admitted to, then processed through,   on 3/4/20 for detoxification from alcohol.  A medical examination was performed that included lab work. You have met with a  and opted to return to your residence and increase your after care to include 2 peer coaches and attending meetings in person.  Please make your recovery a priority, Mr. Barclay.     Main Diagnosis:  Per Dr. Toro psychiatrist  DISCHARGE DIAGNOSES:   Axis I:     1.  Alcohol use disorder, severe.   2.  Bipolar affective disorder, moderate, without psychotic use disorder.   3.  Opiate use disorder, severe.     Major Treatments, Procedures and Findings:  Your alcohol withdrawal was treated with valium using the Modified Selective Severity Assessment (MSSA) protocol protocol.   You have had a chemical dependency assessment.  You have had blood drawn, and the results have been reviewed with you.  Please take a copy of your laboratory work with you to your next provider appointment.    You plan [place] [facility] [place] [facility]    Symptoms to Report:  If you experience more anxiety, confusion, sleeplessness, deep sadness or thoughts of suicide, notify your treatment team or notify your primary care physician. IF THE SYMPTOMS YOU ARE EXPERIENCING ARE A MEDICAL EMERGENCY, CALL 911 IMMEDIATELY.     Lifestyle Adjustment: Health Action Plan:  1.Create a daily schedule  2. Eat Healthy  3. Plan Enjoyable Sober Activities  4. Use Problem Solving Skills and Deal with Issues as they Arise.   5. Be Physically Active  6. Take your medications as prescribed  7. Get enough restful sleep  8. Practice Relaxation  9. Spend time with Supportive People  10. No use of alcohol, illegal drugs or addictive medications other than what is currently prescribed.   11.AA, NA Sponsor are excellent  resources for support      Please keep your methadone in a safe place preferably in a locked box and out of reach of children/pets/others.     Safety Notice:  The rates for overdoses and deaths from opioids is constantly rising and is a current health epidemic. Please be aware that your tolerance level to any form of opioids has decreased since your hospitalization. Resuming opioid use and/or taking opioids in any form or in combination with any other drugs or alcohol is life-threatening.    Be advised that returning to your drug-using environment and being around people using drugs puts you at high risk for relapse. Healthier support is available daily at local AA or NA meetings.        Primary Provider:  Dr. Dejesus  [address]  [phone]  Friday, March 20 2020 @ 1:00 pm    [facility] [place] maintenance       Resources:  [facility] [phone] Support Group:  AA/NA and Sponsor/support.  Crisis Intervention: 405.321.1802 or 265-871-6766. Call anytime for help.  [place] on Mental Illness (www.[place].ari.org): 220.201.5716 or 207-576-7966.  Alcoholics Anonymous (www.alcoholics-anonymous.org): Check your phone book for your local chapter.  Suicide Awareness Voices of Education (www.save.org): 417.595.3611  National Suicide Prevention Line (www.mentalhealthmn.org): 496.940.1706  Mental Health Consumer/Survivor Network of [place] (www.mhcsn.net): 804.114.3333 or 044-438-6912.  Mental Health Association of [place] (www.mentalhealth.org): 584.375.4160 or 615-318-0569  Substance Abuse and Mental Health Services. (www.samhsa.gov)    [place] [facility] ([facility])  [facility] connects people seeking recovery to resources that help foster and sustain long-term recovery.  Whether you are seeking resources for treatment, transportation, housing, job training, education, health care or other pathways to recovery, [facility] is a great place to start. 668.961.6794  www.[place]covery.org    General Medication Instruction: See your medication papers for instructions. Take all medicines as directed.  Make no changes unless your doctor suggests them. Go to all your doctor visits.  Be sure to have all your required lab tests. This way, your medicines may be refilled on time. Do not use any drugs not prescribed by your provider. AA/NA and sponsors are excellent resources for support. Avoid alcohol at all costs!    Please Note:  If you have any questions at anytime after you are discharged please call the [facility] [place] detoxification garber [address]AW at [phone].  [facility], Behavioral Intake [phone]. Please take this discharge folder with you to all your follow up appointments, it contains your lab results, diagnosis, medication list and discharge recommendations. THANK YOU FOR CHOOSING THE [facility]

## 2020-03-06 NOTE — PROGRESS NOTES
Behavioral Health  Note    Behavioral Health  Spirituality Group Note    UNIT [address]    Name: Hollis Barclay YOB: 1970   MRN: [id number] Age: 49 year old      Patient attended -led group, which included discussion of spirituality, coping with illness and building resilience.    Patient attended group for 1.0 hrs.    The patient actively participated in group discussion and patient demonstrated an appreciation of topic's application for their personal circumstances.     Topic/Focus of today's spirituality group: Self-Compassion and Reverence - Transforming our Narrative for Recovery (Shame vs. Agency)  IMR tie-in: Taking Action  Practice Taught: Design-your-own [place]-Kindness Meditation      Shayy Lopez MDiv, [facility]  Lead , Adult Mental Health and Addiction  Pager [phone]

## 2020-03-06 NOTE — DISCHARGE SUMMARY
Admit Date:     03/02/2020        More than 35 minutes spent on discharge summary, doing the discharge instructions, discharge medication, discharge mental status examination.      DISCHARGE DIAGNOSES:   Axis I:     1.  Alcohol use disorder, severe.   2.  Bipolar affective disorder, moderate, without psychotic use disorder.   3.  Opiate use disorder, severe.        Please see detailed admission note by Dr. Toro on 03/04/2020.      HOSPITAL COURSE:  During the hospitalization, the patient was detoxed off alcohol using MSSA protocol.  He had a history of DTs and seizures.  He was continued on Seroquel.  He was continued on methadone.  During the hospitalization, the patient was seen by Sherie Gleason for Internal Medicine consult.  He had elevated liver enzymes.  He had hypokalemia, which was replaced.  Methadone dose was confirmed.  EKG was normal.  During the hospitalization, the patient had lab work done, which shows normal comprehensive metabolic panel.  Albumin is 3, ALT is 101.  AST is 113.  TSH is 8.16, but T4 was normal at 0.80.  During the hospitalization, the patient's energy, motivation, sleep and interest improved.  He did not have any suicidal or homicidal ideation, plan or intent.  He met with the  and his plan is to do treatment.  His plan is to go back to his group home.  His plan is to attend smart meetings in person.  His prognosis is guarded if he relapses.      DISCHARGE DISPOSITION:  The patient going home.  He has to make his primary care appointments.  He has not done it yet.  He will be doing that before he gets discharged.      DISCHARGE MENTAL STATUS EXAMINATION:  The patient is alert, oriented x 3.  Recent and remote memory, language and fund of knowledge are all adequate.  No loose associations.  The patient does not have any active suicidal or homicidal ideation, plan or intent.       Primary Provider:  Dr. Dejesus  [address]  [address]  [phone]  Friday, March 20 2020 @ 1:00 pm     [facility] [place]    Jose M Barclay   Home Medication Instructions GIDEON:[id number]    Printed on:03/06/20 4227   Medication Information                      gabapentin (NEURONTIN) 400 MG capsule  Take 3 capsules (1,200 mg) by mouth 3 times daily             methadone (DOLPHINE) 5 MG/5ML solution  Take 100 mg by mouth daily From [facility]             nicotine polacrilex (NICORETTE) 4 MG gum  CHEW 1-2 PEICES BY MOUTH AS NEEDED. Min interval between administration- 1 hour.  Max of 22 pieces per day.             QUEtiapine (SEROQUEL) 400 MG tablet  Take 1 tablet (400 mg) by mouth At Bedtime             tiZANidine (ZANAFLEX) 4 MG tablet  Take 1 tablet (4 mg) by mouth 2 times daily                    Labs:     Recent Results (from the past 48 hour(s))   Vitamin B12    Collection Time: 03/05/20  7:01 AM   Result Value Ref Range    Vitamin B12 333 193 - 986 pg/mL   Lipid panel    Collection Time: 03/05/20  7:01 AM   Result Value Ref Range    Cholesterol 158 <200 mg/dL    Triglycerides 95 <150 mg/dL    HDL Cholesterol 60 >39 mg/dL    LDL Cholesterol Calculated 79 <100 mg/dL    Non HDL Cholesterol 98 <130 mg/dL   TSH with free T4 reflex and/or T3 as indicated    Collection Time: 03/05/20  7:01 AM   Result Value Ref Range    TSH 8.16 (H) 0.40 - 4.00 mU/L   GGT    Collection Time: 03/05/20  7:01 AM   Result Value Ref Range     (H) 0 - 75 U/L   Comprehensive metabolic panel    Collection Time: 03/05/20  7:01 AM   Result Value Ref Range    Sodium 140 133 - 144 mmol/L    Potassium 3.2 (L) 3.4 - 5.3 mmol/L    Chloride 104 94 - 109 mmol/L    Carbon Dioxide 31 20 - 32 mmol/L    Anion Gap 5 3 - 14 mmol/L    Glucose 100 (H) 70 - 99 mg/dL    Urea Nitrogen 20 7 - 30 mg/dL    Creatinine 0.79 0.66 - 1.25 mg/dL    GFR Estimate >90 >60 mL/min/[1.73_m2]    GFR Estimate If Black >90 >60 mL/min/[1.73_m2]    Calcium 8.5 8.5 - 10.1 mg/dL    Bilirubin Total 0.6 0.2 - 1.3 mg/dL     Albumin 3.0 (L) 3.4 - 5.0 g/dL    Protein Total 6.8 6.8 - 8.8 g/dL    Alkaline Phosphatase 75 40 - 150 U/L     (H) 0 - 70 U/L     (H) 0 - 45 U/L   T4 free    Collection Time: 03/05/20  7:01 AM   Result Value Ref Range    T4 Free 0.80 0.76 - 1.46 ng/dL     Because this patient meets criteria for an Alcohol Use Disorder, I performed the following brief intervention on the date of this note:              1) Expressed concern that the patient is drinking at unhealthy levels known to increase their risk of alcohol related problems              2) Gave feedback linking alcohol use and health, including personalized feedback explaining how alcohol use can interact with their medical and/or psychiatric problems, and with prescribed medications.              3) Advised patient to abstain.      DISCHARGE MENTAL STATUS EXAMINATION:  The patient is alert, oriented x3.  Good fund of knowledge.  Good use of language.  Recent and remote memory, language, fund of knowledge are all adequate.  Euthymic mood congruent affect  Speech normal rate/rhythm linear tp no loose asso,The patient does not have any active suicidal or homicidal ideation.  Does not have any auditory or visual hallucination.  Fair insight/judgment At this time, the patient was stable to be discharged.        Pt was not determined to not be a danger to himself or others. At the current time of discharge, the patient does not meet criteria for involuntary hospitalization. On the day of discharge, the patient reports that they do not have suicidal or homicidal ideation and would never hurt themselves or others. Steps taken to minimize risk include: assessing patient s behavior and thought process daily during hospital stay, discharging patient with adequate plan for follow up for mental and physical health and discussing safety plan of returning to the hospital should the patient ever have thoughts of harming themselves or others. Therefore, based on  all available evidence including the factors cited above, the patient does not appear to be at imminent risk for self-harm, and is appropriate for outpatient level of care.     Educated about side effects/risk vs benefits /alternative including non treatment.Pt consented to be on medication.     .Total time spent on discharge summary more than 35 min  More than  20 min  planning, coordination of care, medication reconciliation and performance of physical exam on day of discharge.Care was coordinated with unit RN and unit therapist    .      GOVIND CROWLEY MD             D: 2020   T: 2020   MT: HELDER      Name:     GRETA FERREIRA   MRN:      [id number]        Account:        [id number]   :      1970           Admit Date:     2020                                  Discharge Date:       Document: [id number]       cc: Jr Giron MD

## 2020-03-08 ENCOUNTER — TRANSFERRED RECORDS (OUTPATIENT)
Dept: HEALTH INFORMATION MANAGEMENT | Facility: CLINIC | Age: 50
End: 2020-03-08

## 2020-03-08 ENCOUNTER — APPOINTMENT (OUTPATIENT)
Dept: CT IMAGING | Facility: CLINIC | Age: 50
End: 2020-03-08
Attending: EMERGENCY MEDICINE
Payer: MEDICAID

## 2020-03-08 ENCOUNTER — HOSPITAL ENCOUNTER (OUTPATIENT)
Facility: CLINIC | Age: 50
Setting detail: OBSERVATION
Discharge: HOME | End: 2020-03-11
Attending: EMERGENCY MEDICINE | Admitting: SURGERY
Payer: MEDICAID

## 2020-03-08 DIAGNOSIS — S22.31XA CLOSED FRACTURE OF ONE RIB OF RIGHT SIDE, INITIAL ENCOUNTER: Primary | ICD-10-CM
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
## 2020-03-08 PROCEDURE — 96375 TX/PRO/DX INJ NEW DRUG ADDON: CPT | Performed by: EMERGENCY MEDICINE

## 2020-03-08 PROCEDURE — 25000128 H RX IP 250 OP 636: Performed by: EMERGENCY MEDICINE

## 2020-03-08 PROCEDURE — 96374 THER/PROPH/DIAG INJ IV PUSH: CPT | Performed by: EMERGENCY MEDICINE

## 2020-03-08 PROCEDURE — 99285 EMERGENCY DEPT VISIT HI MDM: CPT | Mod: Z6 | Performed by: EMERGENCY MEDICINE

## 2020-03-08 PROCEDURE — 25000132 ZZH RX MED GY IP 250 OP 250 PS 637: Performed by: EMERGENCY MEDICINE

## 2020-03-08 PROCEDURE — 72125 CT NECK SPINE W/O DYE: CPT

## 2020-03-08 PROCEDURE — 70450 CT HEAD/BRAIN W/O DYE: CPT

## 2020-03-08 PROCEDURE — 68200002 ZZH TRAUMA EVALUATION W/O CC LEVEL II: Performed by: EMERGENCY MEDICINE

## 2020-03-08 PROCEDURE — 99285 EMERGENCY DEPT VISIT HI MDM: CPT | Mod: 25 | Performed by: EMERGENCY MEDICINE

## 2020-03-08 RX ORDER — LORAZEPAM 2 MG/ML
0.5 INJECTION INTRAMUSCULAR ONCE
Status: COMPLETED | OUTPATIENT
Start: 2020-03-08 | End: 2020-03-08

## 2020-03-08 RX ORDER — HYDROMORPHONE HYDROCHLORIDE 1 MG/ML
0.5 INJECTION, SOLUTION INTRAMUSCULAR; INTRAVENOUS; SUBCUTANEOUS
Status: COMPLETED | OUTPATIENT
Start: 2020-03-08 | End: 2020-03-08

## 2020-03-08 RX ADMIN — NICOTINE POLACRILEX 8 MG: 4 GUM, CHEWING ORAL at 23:24

## 2020-03-08 RX ADMIN — LORAZEPAM 0.5 MG: 2 INJECTION INTRAMUSCULAR; INTRAVENOUS at 22:25

## 2020-03-08 RX ADMIN — HYDROMORPHONE HYDROCHLORIDE 0.5 MG: 1 INJECTION, SOLUTION INTRAMUSCULAR; INTRAVENOUS; SUBCUTANEOUS at 22:25

## 2020-03-08 NOTE — LETTER
Health Information Management Services               Recipient:  Jessica Agudelo      Sender:  MARQUEZ [place], [facility]. Phone: [phone], Pager: [phone]        Date: March 10, 2020  Patient Name:  Hollis Barclay  Routing Message:    Please globally assess RF for TCU, he is ready for discharge and may need a Level II screen. If you have nursing questions please call [phone].        The documents accompanying this notice contain confidential information belonging to the sender.  This information is intended only for the use of the individual or entity named above.  The authorized recipient of this information is prohibited from disclosing this information to any other party and is required to destroy the information after its stated need has been fulfilled, unless otherwise required by state law.      If you are not the intended recipient, you are hereby notified that any disclosure, copy, distribution or action taken in reliance on the contents of these documents is strictly prohibited.  If you have received this document in error, please return it by fax to [phone] with a note on the cover sheet explaining why you are returning it (e.g. not your patient, not your provider, etc.).  If you need further assistance, please call [employer] Centralized Transcription at [phone].  Documents may also be returned by mail to [employer], , [facility] [address], [place], [place] [address].

## 2020-03-08 NOTE — LETTER
Transition Communication Hand-off for Care Transitions to Next Level of Care Provider    Name: Hollis Barclay  : 1970  MRN #: [id number]  Primary Care Provider: Jr Giron     Primary Clinic: [address] [place] [id number]     Reason for Hospitalization:  Closed fracture of one rib of right side, initial encounter [S22.31XA]  Admit Date/Time: 3/8/2020 10:03 PM  Discharge Date: 3/11/2020  Payor Source: Payor[place] / Plan: [place] REST[place] / Product Type: Medicaid /        Reason for Communication Hand-off Referral: Other continuity of care    Discharge Plan: discharged to home with HH PT/OT     Discharge Needs Assessment:  Needs      Most Recent Value   Equipment Currently Used at Home  none            Follow-up plan:    Future Appointments   Date Time Provider Department Center   3/30/2020  2:20 PM Jr Giron MD [facility] Own[facility]       Any outstanding tests or procedures:        Referrals     Future Labs/Procedures    Home Care PT Referral for Hospital Discharge     Comments:    [facility] [phone]    PT/OT to eval and treat    Your provider has ordered home care - physical therapy. If you have not been contacted within 2 days of your discharge please call the department phone number listed on the top of this document.          Supplies     Future Labs/Procedures    Walker Order     Process Instructions:    By signing this order, the Authorizing Provider is attesting that they have completed a face-to-face evaluation on the patient to determine their need for this equipment in the last 60 days. A new face-to-face evaluation is required each time  A new prescription for on eo f the specified items is ordered.   If an additional provider completed the evaluation, please indicate their name below.     **As of 2018, an order requisition and face sheet will print for all DME orders. Please give printed order and face sheet to patient if not obtaining product  from [facility] closet.     Comments:    DME Documentation:   Describe the reason for need to support medical necessity: difficulty walking due to right 11th rib fracture.     I, the undersigned, certify that the above prescribed supplies are medically necessary for this patient and is both reasonable and necessary in reference to accepted standards of medical and necessary in reference to accepted standards of medical practice in the treatment of this patient's condition and is not prescribed as a convenience.          Key Recommendations:  F/u with PCP as previously scheduled    ENRIQUE Hurtado  Phone: [phone]  Pager: [phone]  To contact weekend RNCC, dial [phone] and enter pager number [phone] at prompt. This pager can not be contacted by text page or outside line.     AVS/Discharge Summary is the source of truth; this is a helpful guide for improved communication of patient story

## 2020-03-08 NOTE — LETTER
Health Information Management Services               Recipient:  Vic ambrociotomas Dalia        Sender:  MARQUEZ Beach, [facility]. Phone: [phone], Pager: [phone]        Date: March 10, 2020  Patient Name:  Hollis Barclay  Routing Message:    Please globally review RF for TCU, he is ready for discharge and may need a Level II screen. If you have nursing questions please call [phone].        The documents accompanying this notice contain confidential information belonging to the sender.  This information is intended only for the use of the individual or entity named above.  The authorized recipient of this information is prohibited from disclosing this information to any other party and is required to destroy the information after its stated need has been fulfilled, unless otherwise required by state law.      If you are not the intended recipient, you are hereby notified that any disclosure, copy, distribution or action taken in reliance on the contents of these documents is strictly prohibited.  If you have received this document in error, please return it by fax to [phone] with a note on the cover sheet explaining why you are returning it (e.g. not your patient, not your provider, etc.).  If you need further assistance, please call [employer] Centralized Transcription at [phone].  Documents may also be returned by mail to [employer], , [facility] [address], [place], [place] [address].

## 2020-03-09 ENCOUNTER — APPOINTMENT (OUTPATIENT)
Dept: PHYSICAL THERAPY | Facility: CLINIC | Age: 50
End: 2020-03-09
Payer: MEDICAID

## 2020-03-09 PROBLEM — S22.39XA RIB FRACTURE: Status: ACTIVE | Noted: 2020-03-09

## 2020-03-09 PROCEDURE — 99233 SBSQ HOSP IP/OBS HIGH 50: CPT | Performed by: NURSE PRACTITIONER

## 2020-03-09 PROCEDURE — 25000132 ZZH RX MED GY IP 250 OP 250 PS 637: Performed by: NURSE PRACTITIONER

## 2020-03-09 PROCEDURE — 25000132 ZZH RX MED GY IP 250 OP 250 PS 637: Performed by: SURGERY

## 2020-03-09 PROCEDURE — 97530 THERAPEUTIC ACTIVITIES: CPT | Mod: GP

## 2020-03-09 PROCEDURE — 96376 TX/PRO/DX INJ SAME DRUG ADON: CPT

## 2020-03-09 PROCEDURE — 25000128 H RX IP 250 OP 636: Performed by: EMERGENCY MEDICINE

## 2020-03-09 PROCEDURE — 25000128 H RX IP 250 OP 636: Performed by: STUDENT IN AN ORGANIZED HEALTH CARE EDUCATION/TRAINING PROGRAM

## 2020-03-09 PROCEDURE — 96375 TX/PRO/DX INJ NEW DRUG ADDON: CPT

## 2020-03-09 PROCEDURE — 96376 TX/PRO/DX INJ SAME DRUG ADON: CPT | Performed by: EMERGENCY MEDICINE

## 2020-03-09 PROCEDURE — 25000132 ZZH RX MED GY IP 250 OP 250 PS 637: Performed by: STUDENT IN AN ORGANIZED HEALTH CARE EDUCATION/TRAINING PROGRAM

## 2020-03-09 PROCEDURE — G0378 HOSPITAL OBSERVATION PER HR: HCPCS

## 2020-03-09 PROCEDURE — 97162 PT EVAL MOD COMPLEX 30 MIN: CPT | Mod: GP,59

## 2020-03-09 PROCEDURE — 40000141 ZZH STATISTIC PERIPHERAL IV START W/O US GUIDANCE

## 2020-03-09 RX ORDER — KETOROLAC TROMETHAMINE 30 MG/ML
30 INJECTION, SOLUTION INTRAMUSCULAR; INTRAVENOUS ONCE
Status: DISCONTINUED | OUTPATIENT
Start: 2020-03-09 | End: 2020-03-09

## 2020-03-09 RX ORDER — METHOCARBAMOL 750 MG/1
750 TABLET, FILM COATED ORAL 3 TIMES DAILY
Qty: 15 TABLET | Refills: 0 | COMMUNITY
Start: 2020-03-09 | End: 2020-04-18

## 2020-03-09 RX ORDER — NALOXONE HYDROCHLORIDE 0.4 MG/ML
.1-.4 INJECTION, SOLUTION INTRAMUSCULAR; INTRAVENOUS; SUBCUTANEOUS
Status: DISCONTINUED | OUTPATIENT
Start: 2020-03-09 | End: 2020-03-11 | Stop reason: HOSPADM

## 2020-03-09 RX ORDER — ONDANSETRON 4 MG/1
4 TABLET, ORALLY DISINTEGRATING ORAL EVERY 6 HOURS PRN
Status: DISCONTINUED | OUTPATIENT
Start: 2020-03-09 | End: 2020-03-11 | Stop reason: HOSPADM

## 2020-03-09 RX ORDER — HYDROXYZINE HYDROCHLORIDE 10 MG/1
10 TABLET, FILM COATED ORAL EVERY 4 HOURS PRN
Status: DISCONTINUED | OUTPATIENT
Start: 2020-03-09 | End: 2020-03-11 | Stop reason: HOSPADM

## 2020-03-09 RX ORDER — GABAPENTIN 300 MG/1
1200 CAPSULE ORAL 3 TIMES DAILY
Status: DISCONTINUED | OUTPATIENT
Start: 2020-03-09 | End: 2020-03-11 | Stop reason: HOSPADM

## 2020-03-09 RX ORDER — ACETAMINOPHEN 325 MG/1
650 TABLET ORAL EVERY 4 HOURS PRN
Status: DISCONTINUED | OUTPATIENT
Start: 2020-03-09 | End: 2020-03-11 | Stop reason: HOSPADM

## 2020-03-09 RX ORDER — OXYCODONE HYDROCHLORIDE 5 MG/1
10-15 TABLET ORAL EVERY 4 HOURS PRN
Status: DISCONTINUED | OUTPATIENT
Start: 2020-03-09 | End: 2020-03-11 | Stop reason: HOSPADM

## 2020-03-09 RX ORDER — DIAZEPAM 5 MG
5 TABLET ORAL ONCE
Status: COMPLETED | OUTPATIENT
Start: 2020-03-09 | End: 2020-03-09

## 2020-03-09 RX ORDER — QUETIAPINE FUMARATE 100 MG/1
400 TABLET, FILM COATED ORAL AT BEDTIME
Status: DISCONTINUED | OUTPATIENT
Start: 2020-03-09 | End: 2020-03-11 | Stop reason: HOSPADM

## 2020-03-09 RX ORDER — LIDOCAINE 4 G/G
2 PATCH TOPICAL
Status: DISCONTINUED | OUTPATIENT
Start: 2020-03-09 | End: 2020-03-09

## 2020-03-09 RX ORDER — ACETAMINOPHEN 650 MG/1
650 SUPPOSITORY RECTAL EVERY 4 HOURS PRN
Status: DISCONTINUED | OUTPATIENT
Start: 2020-03-09 | End: 2020-03-11 | Stop reason: HOSPADM

## 2020-03-09 RX ORDER — HYDROMORPHONE HYDROCHLORIDE 1 MG/ML
0.5 INJECTION, SOLUTION INTRAMUSCULAR; INTRAVENOUS; SUBCUTANEOUS
Status: DISCONTINUED | OUTPATIENT
Start: 2020-03-09 | End: 2020-03-09

## 2020-03-09 RX ORDER — METHOCARBAMOL 750 MG/1
750 TABLET, FILM COATED ORAL 3 TIMES DAILY
Status: DISCONTINUED | OUTPATIENT
Start: 2020-03-09 | End: 2020-03-11 | Stop reason: HOSPADM

## 2020-03-09 RX ORDER — ACETAMINOPHEN 325 MG/1
650 TABLET ORAL EVERY 4 HOURS PRN
COMMUNITY
Start: 2020-03-09 | End: 2021-01-25

## 2020-03-09 RX ORDER — LIDOCAINE 40 MG/G
CREAM TOPICAL
Status: DISCONTINUED | OUTPATIENT
Start: 2020-03-09 | End: 2020-03-11 | Stop reason: HOSPADM

## 2020-03-09 RX ORDER — METHOCARBAMOL 750 MG/1
750 TABLET, FILM COATED ORAL 3 TIMES DAILY PRN
Status: DISCONTINUED | OUTPATIENT
Start: 2020-03-09 | End: 2020-03-09

## 2020-03-09 RX ORDER — OXYCODONE HYDROCHLORIDE 5 MG/1
5-10 TABLET ORAL
Status: DISCONTINUED | OUTPATIENT
Start: 2020-03-09 | End: 2020-03-09

## 2020-03-09 RX ORDER — METHADONE HYDROCHLORIDE 5 MG/5ML
100 SOLUTION ORAL DAILY
Status: DISCONTINUED | OUTPATIENT
Start: 2020-03-09 | End: 2020-03-09

## 2020-03-09 RX ORDER — ONDANSETRON 2 MG/ML
4 INJECTION INTRAMUSCULAR; INTRAVENOUS EVERY 6 HOURS PRN
Status: DISCONTINUED | OUTPATIENT
Start: 2020-03-09 | End: 2020-03-11 | Stop reason: HOSPADM

## 2020-03-09 RX ORDER — DIPHENHYDRAMINE HYDROCHLORIDE 50 MG/ML
25 INJECTION INTRAMUSCULAR; INTRAVENOUS ONCE
Status: DISCONTINUED | OUTPATIENT
Start: 2020-03-09 | End: 2020-03-09

## 2020-03-09 RX ORDER — KETOROLAC TROMETHAMINE 15 MG/ML
15 INJECTION, SOLUTION INTRAMUSCULAR; INTRAVENOUS ONCE
Status: DISCONTINUED | OUTPATIENT
Start: 2020-03-09 | End: 2020-03-09

## 2020-03-09 RX ORDER — OXYCODONE HYDROCHLORIDE 10 MG/1
10-15 TABLET ORAL EVERY 4 HOURS PRN
Qty: 10 TABLET | Refills: 0 | Status: SHIPPED | OUTPATIENT
Start: 2020-03-09 | End: 2020-03-11

## 2020-03-09 RX ORDER — LIDOCAINE 4 G/G
2 PATCH TOPICAL
Status: DISCONTINUED | OUTPATIENT
Start: 2020-03-09 | End: 2020-03-11 | Stop reason: HOSPADM

## 2020-03-09 RX ORDER — METHADONE HYDROCHLORIDE 10 MG/ML
100 CONCENTRATE ORAL DAILY
Status: DISCONTINUED | OUTPATIENT
Start: 2020-03-09 | End: 2020-03-11 | Stop reason: HOSPADM

## 2020-03-09 RX ADMIN — LIDOCAINE 2 PATCH: 560 PATCH PERCUTANEOUS; TOPICAL; TRANSDERMAL at 20:04

## 2020-03-09 RX ADMIN — ONDANSETRON 4 MG: 2 INJECTION INTRAMUSCULAR; INTRAVENOUS at 08:34

## 2020-03-09 RX ADMIN — HYDROMORPHONE HYDROCHLORIDE 1 MG: 1 INJECTION, SOLUTION INTRAMUSCULAR; INTRAVENOUS; SUBCUTANEOUS at 04:05

## 2020-03-09 RX ADMIN — OXYCODONE HYDROCHLORIDE 15 MG: 5 TABLET ORAL at 09:31

## 2020-03-09 RX ADMIN — NICOTINE POLACRILEX 8 MG: 4 GUM, CHEWING ORAL at 12:35

## 2020-03-09 RX ADMIN — METHOCARBAMOL 750 MG: 750 TABLET, FILM COATED ORAL at 13:57

## 2020-03-09 RX ADMIN — QUETIAPINE FUMARATE 400 MG: 100 TABLET ORAL at 23:11

## 2020-03-09 RX ADMIN — NICOTINE POLACRILEX 8 MG: 4 GUM, CHEWING ORAL at 17:23

## 2020-03-09 RX ADMIN — TIZANIDINE 4 MG: 4 TABLET ORAL at 08:24

## 2020-03-09 RX ADMIN — GABAPENTIN 1200 MG: 300 CAPSULE ORAL at 08:24

## 2020-03-09 RX ADMIN — OXYCODONE HYDROCHLORIDE 15 MG: 5 TABLET ORAL at 13:57

## 2020-03-09 RX ADMIN — METHOCARBAMOL 750 MG: 750 TABLET, FILM COATED ORAL at 20:04

## 2020-03-09 RX ADMIN — NICOTINE POLACRILEX 8 MG: 4 GUM, CHEWING ORAL at 10:39

## 2020-03-09 RX ADMIN — METHOCARBAMOL 750 MG: 750 TABLET, FILM COATED ORAL at 04:08

## 2020-03-09 RX ADMIN — NICOTINE POLACRILEX 8 MG: 4 GUM, CHEWING ORAL at 16:22

## 2020-03-09 RX ADMIN — NICOTINE POLACRILEX 8 MG: 4 GUM, CHEWING ORAL at 23:11

## 2020-03-09 RX ADMIN — NICOTINE POLACRILEX 8 MG: 4 GUM, CHEWING ORAL at 14:01

## 2020-03-09 RX ADMIN — NICOTINE POLACRILEX 8 MG: 4 GUM, CHEWING ORAL at 07:08

## 2020-03-09 RX ADMIN — DIAZEPAM 5 MG: 5 TABLET ORAL at 04:07

## 2020-03-09 RX ADMIN — HYDROXYZINE HYDROCHLORIDE 10 MG: 10 TABLET, FILM COATED ORAL at 21:52

## 2020-03-09 RX ADMIN — OXYCODONE HYDROCHLORIDE 15 MG: 5 TABLET ORAL at 18:01

## 2020-03-09 RX ADMIN — NICOTINE POLACRILEX 8 MG: 4 GUM, CHEWING ORAL at 18:39

## 2020-03-09 RX ADMIN — NICOTINE POLACRILEX 8 MG: 4 GUM, CHEWING ORAL at 05:24

## 2020-03-09 RX ADMIN — TIZANIDINE 4 MG: 4 TABLET ORAL at 16:22

## 2020-03-09 RX ADMIN — HYDROMORPHONE HYDROCHLORIDE 0.5 MG: 1 INJECTION, SOLUTION INTRAMUSCULAR; INTRAVENOUS; SUBCUTANEOUS at 07:00

## 2020-03-09 RX ADMIN — HYDROMORPHONE HYDROCHLORIDE 0.5 MG: 1 INJECTION, SOLUTION INTRAMUSCULAR; INTRAVENOUS; SUBCUTANEOUS at 03:06

## 2020-03-09 RX ADMIN — NICOTINE POLACRILEX 8 MG: 4 GUM, CHEWING ORAL at 21:52

## 2020-03-09 RX ADMIN — ONDANSETRON 4 MG: 4 TABLET, ORALLY DISINTEGRATING ORAL at 16:22

## 2020-03-09 RX ADMIN — METHADONE HYDROCHLORIDE 100 MG: 10 CONCENTRATE ORAL at 11:08

## 2020-03-09 RX ADMIN — NICOTINE POLACRILEX 8 MG: 4 GUM, CHEWING ORAL at 04:09

## 2020-03-09 RX ADMIN — HYDROMORPHONE HYDROCHLORIDE 0.5 MG: 1 INJECTION, SOLUTION INTRAMUSCULAR; INTRAVENOUS; SUBCUTANEOUS at 00:44

## 2020-03-09 RX ADMIN — ACETAMINOPHEN 650 MG: 325 TABLET, FILM COATED ORAL at 13:57

## 2020-03-09 RX ADMIN — NICOTINE POLACRILEX 8 MG: 4 GUM, CHEWING ORAL at 20:09

## 2020-03-09 RX ADMIN — ACETAMINOPHEN 650 MG: 325 TABLET, FILM COATED ORAL at 20:16

## 2020-03-09 RX ADMIN — GABAPENTIN 1200 MG: 300 CAPSULE ORAL at 13:56

## 2020-03-09 RX ADMIN — OXYCODONE HYDROCHLORIDE 15 MG: 5 TABLET ORAL at 21:52

## 2020-03-09 RX ADMIN — OXYCODONE HYDROCHLORIDE 10 MG: 5 TABLET ORAL at 05:20

## 2020-03-09 RX ADMIN — GABAPENTIN 1200 MG: 300 CAPSULE ORAL at 20:04

## 2020-03-09 ASSESSMENT — MIFFLIN-ST. JEOR: SCORE: 2261.5

## 2020-03-09 NOTE — PROGRESS NOTES
"/75 (BP Location: Left arm)   Pulse 86   Temp 98.3  F (36.8  C) (Oral)   Resp 18   Ht 1.93 m (6' 4\")   Wt 130 kg (286 lb 9.6 oz)   SpO2 97%   BMI 34.89 kg/m      Observation Goals:    -Adequate pain control on oral analgesia.   Met   -Vital Signs normal or at patient baseline.   Met  -Tolerating oral intake to maintain hydration.   Met  -Diagnostic tests and consults completed and resulted   Met  -Cleared for discharge from consultants' standpoint if involved in care   Met  -Safe disposition plan has been identified   Met  -Return to baseline functional status  Not Met     "

## 2020-03-09 NOTE — ED TRIAGE NOTES
Arrives via EMS, xfer from [facility]. States fell on ice yesterday, identified R rib fracture prior to transfer. On methadone maintenance, given methadone at [facility]. Vitally stable on room air. Given lidocaine patch prior to transfer.

## 2020-03-09 NOTE — PLAN OF CARE
Situation: Admission from ED. Right 11th rib fracture due to fall. PMH of alcohol dependence, chronic low back pain and social anxiety disorder.  Neuros: A&Ox4. Calls appropriately. Cooperative with cares. Anxious.   Activity: Ax1   Cardiac: HTN. Provider notified.   Respiratory: O2 99% on RA. Denies SOB   Diet: Regular  GI/: +BS. Last BM 3/7/20. Using urinal at bed side  Skin: Pt refused full skin check due to pain.  RN unable to assess legs, buttocks, Coccyx and back. All other skin CDI   Lines/Drains: L PIV SL  Pain: Reports severe r sided rib pain    PRN:  PRN dilaudid X2. Robaxin X1. Oxy x1. One time dose of valium and dilaudid also given.  Plan: Continue POC. Pain management.      No acute changes this shift

## 2020-03-09 NOTE — PROGRESS NOTES
Tertiary Survey Progress Note     Date of Service (when I saw the patient): 03/09/2020     Assessment & Plan     Trauma Mechanism: mechanical fall  Known Injuries:  1. Right 11th rib fracture  2. TBI with no intracranial hemmorhage    Tertiary Survey completed with no additional injuries identified                Procedure(s):none  Other diagnosis  Generalized anxiety disorder taking gabapentin and quetiapine at home  Chronic bilateral low back pain without sciatica taking naproxen and tizanidine at home  Tobacco dependence using gum  Social anxiety disorder  Right hip pain 2/2 fall 1/30/2020      Musculoskeletal:  #fall  #rib fractures  -pain management (see below)  - Physical therapy consult.  -encouraged activity, working with therapy, up out of bed    Neuro/Pain:  # Acute pain  #social anxiety  #generalized anxiety disorder  #tobacco dependence  #chronic low back pain  #chronic alcohol dependence  #history of opioid abuse  -multimodal pain relief: acetaminophen, gabapentin PTA 1200mg tid, lidocaine patches, methadone at home dose, robaxin 750 tid, tizanidine PTA 4mg bid, oxycodone 10-15 q 4 hours prn.   -Pt refused toradol with concurrent benedryl dosing secondary to concerns over hives  -anxiety medications: PTA quetiapine, started atarax for anxiety  -Nicotine gum prn  - Monitor neurological status.   - Maintain circadian rhythm.  Lights on during the day.  Off at night, minimize cares at night.  OOB during the day.  -No IV opioids or benzos to be given, patient needs to be compliant with therapy, discussed at length    Pulmonary:  # Right 11th rib fracture on OSH CT  - Supplemental oxygen to keep saturation above 92 %.  - Aggressive pulmonary hygiene. Incentive spirometer while awake   -pain management    Cardiovascular:    #no current acute issues    GI/Nutrition:    #no current acute issues  -regular diet    Renal/ Fluids/Electrolytes:  #no current  acute issues     Endocrine:  #no current acute issues  - No management indication.     Infectious disease:   #no current acute issues  - No indications for antibiotics.     Hematology:    #no current acute issues   - Admitted with Hb of 14.2.   - Threshold for transfusion if hgb <7.0 or signs/symptoms of hypoperfusion.       Skin:  #no current acute issues  - dilgent cares to prevent skin breakdown and wound formation.      Lines/ tubes/ drains:  - PIV    General Cares:    PPI/H2 blocker:  none   DVT prophylaxis: mechanical   Bowel Regimen/Date of last stool: ordered   Pulmonary toilet: IS   ETOH screen completed: yes   Lines / drains: PIV    Discharge goals:     Adequate pain management: ongoing    VSS x24 hours: ongoing    Hemoglobin stable x 48 hours: ongoing    Ambulating safely and/or therapy evals complete: ongoing    Drains/lines removed or plan in place to manage: ongoing    Teaching done: ongoing    Expected D/C date: 3/9/2020    Interval History    Overnight focused on pain management. Reports poor control. Complains of all over pain and states is unable to move in any way. Denies chest pain, denies shortness of breath.     Review of Systems   Review Of Systems  Skin: negative  Eyes: negative  Ears/Nose/Throat: negative  Respiratory: No shortness of breath, dyspnea on exertion, cough, or hemoptysis  Cardiovascular: negative  Gastrointestinal: negative  Genitourinary: negative  Musculoskeletal: positive for negative and back pain  Neurologic: positive for positive for positive for negative and local weakness and local weakness  Psychiatric: positive for excessive alcohol consumption  Hematologic/Lymphatic/Immunologic: negative and bleeding disorder  Endocrine: negative and diabetes     Physical Exam     [place] Coma Scale - Total 15/15       Frailty Questionnaire: Patient age 49      Physical Exam  Constitutional:       General: He is not in acute distress.     Appearance: Normal appearance. He is not  ill-appearing or diaphoretic.   HENT:      Head: Normocephalic and atraumatic.      Right Ear: External ear normal.      Left Ear: External ear normal.      Nose: Nose normal. No congestion or rhinorrhea.      Mouth/Throat:      Mouth: Mucous membranes are moist.      Pharynx: No oropharyngeal exudate or posterior oropharyngeal erythema.   Eyes:      Extraocular Movements: Extraocular movements intact.      Conjunctiva/sclera: Conjunctivae normal.      Pupils: Pupils are equal, round, and reactive to light.   Neck:      Musculoskeletal: Normal range of motion and neck supple. No neck rigidity or muscular tenderness.   Cardiovascular:      Rate and Rhythm: Normal rate and regular rhythm.      Pulses: Normal pulses.      Heart sounds: Normal heart sounds. No murmur. No friction rub. No gallop.    Pulmonary:      Effort: Pulmonary effort is normal. No respiratory distress.      Breath sounds: Normal breath sounds. No stridor. No wheezing, rhonchi or rales.   Abdominal:      General: There is no distension.      Palpations: Abdomen is soft.      Tenderness: There is no abdominal tenderness. There is no guarding.   Musculoskeletal: Normal range of motion.         General: No swelling, tenderness, deformity or signs of injury.      Right lower leg: No edema.      Left lower leg: No edema.   Skin:     General: Skin is warm and dry.      Capillary Refill: Capillary refill takes less than 2 seconds.   Neurological:      General: No focal deficit present.      Mental Status: He is alert and oriented to person, place, and time. Mental status is at baseline.      Cranial Nerves: No cranial nerve deficit.      Sensory: No sensory deficit.   Psychiatric:      Comments: Cooperative for conversation, refused to turn for observation of skin on back. Semi agitated and perseverating.          Temp: 95.5  F (35.3  C) Temp src: Oral BP: (!) 158/91 Pulse: 75 Heart Rate: 68 Resp: 18 SpO2: 97 % O2 Device: None (Room air)    Vitals:     03/09/20 0325   Weight: 130 kg (286 lb 9.6 oz)     Vital Signs with Ranges  Temp:  [95.5  F (35.3  C)-98  F (36.7  C)] 95.5  F (35.3  C)  Pulse:  [66-75] 75  Heart Rate:  [68] 68  Resp:  [16-18] 18  BP: (144-171)/() 158/91  SpO2:  [97 %-99 %] 97 %  No intake/output data recorded.      Reed Cunningham NP  To contact the trauma service use job code pager [phone],   Numeric texts or alpha text through [facility]

## 2020-03-09 NOTE — PLAN OF CARE
"BP (!) 148/93 (BP Location: Left arm)   Pulse 73   Temp 96.3  F (35.7  C) (Oral)   Resp 18   Ht 1.93 m (6' 4\")   Wt 130 kg (286 lb 9.6 oz)   SpO2 99%   BMI 34.89 kg/m     Patient reports pain is tolerable with Oxycodone every 4 hours, Tylenol, Robaxin, and Neurontin. Patient has refused to get up out of bed, but has committed to work with PT this afternoon.  Abdomen soft with bowel sounds present, patient reports he is passing gas but no BM this shift. Tolerating diet. Voiding adequate amounts of urine.   "

## 2020-03-09 NOTE — ED NOTES
Bed: [facility]  Expected date:   Expected time:   Means of arrival:   Comments:  [facility] xfer  Trauma consult

## 2020-03-09 NOTE — H&P
"[facility], [place]    History and Physical / Consult note: Trauma Service       Date of Admission:  3/8/2020    Time of Admission/Consult Request (page/call): 2125    Time of my evaluation: 2200  Consulting services:  None    Assessment & Plan    Trauma mechanism: Mechanical Fall   Time/date of injury: 2000 3/7/2020  Known Injuries:  1. Right 11th rib fracture  2. TBI with no intracranial hemmorhage    Plan:  - Admit to Trauma, observation, attending: Dr. Carrillo  - Pain control with tylenol, PRN oxy/dilaudid  - OT TBI screen  - Pulmonary toilet  - Regular diet    Code status: Full confirmed with patient.     General Cares:  GI Prophylaxis: none  DVT Prophylaxis: mechanical  Date of last stool/Bowel Regimen:3/7/20  Pulmonary toilet: yes  ETOH: This patient was asked if in the last 3-6 months there has been a time when he had  5 or more drinks in a single day/outing.. Patient answer to the screening question was in the negative. No intervention needed.  Primary Care Physician   Jr Giron    Chief Complaint   Right flank/back pain, right hip pain    History is obtained from the patient    History of Present Illness   Hollis Barclay is a 49 year old male who presents with right flank pain and right hip pain after falling on ice last night. He reports he slipped and fell backwards landing on his right side and hitting his head. He reports \"blacking out\" briefly. He denies pain elsewhere. He reports some nausea due to the pain, denies vomiting. He denies changes in sensation, headache, changes in vision, parathesias, or weakness.     Past Medical History    I have reviewed this patient's medical history and updated it with pertinent information if needed.   Past Medical History:   Diagnosis Date     Alcoholism in remission (H)      Bilateral ACL tear      Chronic back pain      Closed left arm fracture 1985     H/O shoulder surgery      Post concussive encephalopathy      Social " anxiety disorder        Past Surgical History   I have reviewed this patient's surgical history and updated it with pertinent information if needed.  History reviewed. No pertinent surgical history.  Prior to Admission Medications   Prior to Admission Medications   Prescriptions Last Dose Informant Patient Reported? Taking?   QUEtiapine (SEROQUEL) 400 MG tablet   No No   Sig: Take 1 tablet (400 mg) by mouth At Bedtime   gabapentin (NEURONTIN) 400 MG capsule   No No   Sig: Take 3 capsules (1,200 mg) by mouth 3 times daily   methadone (DOLPHINE) 5 MG/5ML solution   Yes No   Sig: Take 100 mg by mouth daily From [facility] program   nicotine polacrilex (NICORETTE) 4 MG gum   No No   Sig: CHEW 1-2 PEICES BY MOUTH AS NEEDED. Min interval between administration- 1 hour.  Max of 22 pieces per day.   Patient taking differently: Place 8 mg inside cheek every hour as needed CHEW 1-2 PEICES BY MOUTH AS NEEDED. Min interval between administration- 1 hour.  Max of 22 pieces per day.   tiZANidine (ZANAFLEX) 4 MG tablet   No No   Sig: Take 1 tablet (4 mg) by mouth 2 times daily      Facility-Administered Medications: None     Allergies   Allergies   Allergen Reactions     Nsaids        Social History   Social History     Socioeconomic History     Marital status:      Spouse name: Not on file     Number of children: Not on file     Years of education: Not on file     Highest education level: Not on file   Occupational History     Not on file   Social Needs     Financial resource strain: Not on file     Food insecurity     Worry: Not on file     Inability: Not on file     Transportation needs     Medical: Not on file     Non-medical: Not on file   Tobacco Use     Smoking status: Current Every Day Smoker     Types: Dip, chew, snus or snuff     Smokeless tobacco: Former User     Types: Chew   Substance and Sexual Activity     Alcohol use: Yes     Comment: intoxicated     Drug use: Not Currently     Sexual activity: Not on file  "  Lifestyle     Physical activity     Days per week: Not on file     Minutes per session: Not on file     Stress: Not on file   Relationships     Social connections     Talks on phone: Not on file     Gets together: Not on file     Attends Jewish service: Not on file     Active member of club or organization: Not on file     Attends meetings of clubs or organizations: Not on file     Relationship status: Not on file     Intimate partner violence     Fear of current or ex partner: Not on file     Emotionally abused: Not on file     Physically abused: Not on file     Forced sexual activity: Not on file   Other Topics Concern     Not on file   Social History Narrative    1/31/2020:  Jose M is now living in an apartment with one roommate in [place].  He is happy with his current living situation.        11/4/2019: He is living in a full house with 8 people, which has been stressful.  He had an interview for an apartment 3 weeks ago, and expects to hear from them soon.        10/11/2019    Jose M is , and has 2 living children who are adults.  He had a daughter, who passed away.  He developed issues with drugs and alcohol in his late 30s.  He is now in [facility] residence with Nurse assistance. Previously was receiving treatment at [facility], and is also in a methadone program at [facility].  He grew up in [place], right outside of [place].  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  He is currently receiving treatment at a [facility] program, and will then move to a senior care house in the next few months.       Family History   I have reviewed this patient's family history and updated it with pertinent information if needed.   History reviewed. No pertinent family history.    Review of Systems   CONSTITUTIONAL: No fever, chills, sweats, fatigue   EYES: no visual blurring, no double vision or visual loss  ENT: no decrease in hearing, no tinnitus, no vertigo, " no hoarseness  RESPIRATORY: no shortness of breath, no cough, no sputum   CARDIOVASCULAR: no palpitations, no chest  pain, no exertional chest pain or pressure  GASTROINTESTINAL: no nausea or vomiting, or abd pain  GENITOURINARY: no dysuria, no frequency or hesitancy, no hematuria  MUSCULOSKELETAL: no weakness, no redness, no swelling, no joint pain,   SKIN: no rashes, ecchymoses, abrasions or lacerations  NEUROLOGIC: no numbness or tingling of hands, no numbness or tingling  of feet, no syncope, no tremors or weakness  PSYCHIATRIC: no sleep disturbances, no anxiety or depression    Physical Exam   Temp: 96.8  F (36  C) Temp src: Oral BP: (!) 151/108 Pulse: 66   Resp: 18 SpO2: 97 % O2 Device: None (Room air)    Vital Signs with Ranges  Temp:  [96.8  F (36  C)] 96.8  F (36  C)  Pulse:  [66] 66  Resp:  [18] 18  BP: (151)/(108) 151/108  SpO2:  [97 %] 97 % 0 lbs 0 oz    Primary Survey:  Airway: patient talking  Breathing: symmetric respiratory effort bilaterally  Circulation: central pulses present and peripheral pulses present  Disability: Pupils - left 4 mm and brisk, right 4 mm and brisk   [place] Coma Scale - Total 15/15  Eye Response (E): 4  4= spontaneous,  3= to verbal/voice, 2=  to pain, 1= No response   Verbal Response (V): 5   5= Orientated, converses,  4= Confused, converses, 3= Inappropriate words,  2= Incomprehensible sounds,  1=No response   Motor Response (M): 6   6= Obeys commands, 5= Localizes to pain, 4= Withdrawal to pain, 3=Fexion to pain, 2= Extension to pain, 1= No response    Secondary Survey:  General: alert, oriented to person, place, time  Neuro: PERRLA. EOMI. CN II-XII grossly intact. No focal deficits. Strength 5/5 x 4 extremities.  Sensation intact.  Head: small posterior contusion, normocephalic, trachea midline  Eyes:  Pupils 4mm, EOMI, corneas and conjunctivae clear  Ears: pearly grey bilateral TMs and non-inflamed external ear canals  Nose: nares patent, no drainage, nasal septum  non-tender  Mouth/Throat: no exudates or erythema,  no dental tenderness or malocclusions, no tongue lacerations  Neck:  no cervical collar present. +midline tenderness, + pain with ROM, + pain with axial loading   Chest/Pulmonary: normal respiratory rate and rhythm,  bilateral clear breath sounds, no wheezes, rales or rhonchi, no chest wall tenderness or deformities,   Cardiovascular: S1, S2,  normal and regular rate and rhythm, no murmurs  Abdomen: soft, non-tender, no guarding, no rebound tenderness and no tenderness to palpation  :  pelvis stable to lateral compression, no kaba, no urine assessed  Musculoskel/Extremities: normal extremities, full AROM of major joints without tenderness, edema, erythema, ecchymosis, or abrasions. palpable PP. no edema.   Back/Spine: no deformity, no midline tenderness, no sacral tenderness, no step-offs and no abrasions or contusions  Hands: no gross deformities of hands or fingers. Full AROM of hand and fingers in flexion and extension.  strength equal and symmetric.   Psychiatric: affect/mood normal, cooperative, normal judgement/insight and memory intact  Skin: no rashes, laceration, ecchymosis, skin warm and dry.     I personally reviewed:  The chest x-ray - negative for injury, CT report from [facility] positive for right 11th rib fracture  The pelvis x-ray: negative    Studies:  CT Head w/o Contrast   Final Result   IMPRESSION:   HEAD CT:   1.  No acute intracranial process.      CERVICAL SPINE CT:   1.  Multilevel degenerative changes throughout the cervical spine. No evidence of an acute osseous abnormality.   2.  There is at least moderate canal stenosis from C4-C5 through C6-C7 secondary to degenerative change. Multilevel bilateral neural foraminal narrowing, greatest at C5-C6.      CT Cervical Spine w/o Contrast   Final Result   IMPRESSION:   HEAD CT:   1.  No acute intracranial process.      CERVICAL SPINE CT:   1.  Multilevel degenerative changes throughout the  cervical spine. No evidence of an acute osseous abnormality.   2.  There is at least moderate canal stenosis from C4-C5 through C6-C7 secondary to degenerative change. Multilevel bilateral neural foraminal narrowing, greatest at C5-C6.          Ty Miles

## 2020-03-09 NOTE — DISCHARGE SUMMARY
"[facility], [place]    Discharge Summary  Trauma Surgery Service    Date of Admission:  3/8/2020  Date of Discharge:  3/9/2020  Attending Physician: Dr. Santillan  Discharging Provider: Reed Cunningham CNP  Date of Service (when I saw the patient): 03/09/20    Primary Provider: Jr Giron  Primary Care clinic: [address] [place] [id number]  Phone: [phone]  Fax number: [phone]     Discharge Diagnoses   1. Right 11th rib fracture  2. Closed head injury with positive loss of consciousness    Other diagnosis  Generalized anxiety disorder taking gabapentin and quetiapine  Chronic bilateral low back pain without sciatica taking naproxen and tizanidine  Tobacco dependence using gum  Social anxiety disorder  Right hip pain 2/2 fall 1/30/2020    Tertiary Survey 3/9/2020 completed with no additional injuries identified     Hospital Course    Hollis Barclay is a 49 year old male who presents with right flank pain and right hip pain after falling on ice 3/7. He reports he slipped and fell backwards landing on his right side and hitting his head. He reports \"blacking out\" briefly. He denies pain elsewhere. He reported some nausea due to the pain, denied vomiting. He denied changes in sensation, headache, changes in vision, parathesias, or weakness.       Neurosurgery Head Injury:  CT evaluation at the OSH did not reveal any intracranial processes. IMPRESSION:  HEAD CT:  1.  No acute intracranial process.  No follow up needed.       Rib Fractures:  # Right 11th rib fracture on OSH CT  In rib fracture management, pulmonary toilet and good pain control allowing for good pulmonary toilet is paramount.  Prior to discharge, pain was controlled for Hollis Barclay as noted below.    In order to prevent common pulmonary complications found with rib fracture patients, Hollis Barclay will need to continue with aggressive pulmonary toileting that includes, incentive spirometry, " coughing and deep breathing exercises.  We recommend these continue at a minimum of QID for one month after discharge.  Patient has been provided for handout with instructions regarding this. Activity as tolerated per rib pain. Follow up with primary care provider for any follow up issues.     Acute pain  The patient was treated for his rib fracture pain with multimodal pain therapy including acetaminophen 650 mg q 4hr prn, lidocaine 4% patches, oxycodone 10-15 mg q 4hr prn, atarax 10mg q4hr prn. His gabapentin dose was increased from PTA level of 400mg tid to 1200 mg tid to aid with his acute pain level. He is being discharged with the dose of 400mg tid. He is being discharged with 10 doses of oxycodone and 10 doses of atarax. Mr. Barclay is expected to follow up with his primary care provider, Dr. Giron, regarding his need for further acute pain management.     Chronic Pain  Mr. Barclay's chronic pain was managed utilizing his prior to admission medications: gabapentin 400mg tid, methadone 100 mg daily, robaxin 750mg tid, and tizanidine 4mg bid. It is expected that he will follow up with the [facility] methadone clinic for further dosing of his methadone. It is expected that he will follow up with Jr Giron MD, regarding dosing of his gabapentin, robaxin, and tizanidine.   Other medical issues  This patient has been diagnosed with social anxiety, generalized anxiety disorder, tobacco dependence, chronic low back pain, and has a history of chronic alcohol dependence and opioid abuse. His PTA medications were prescribed at previous dosage levels, and it is expected he will follow up with these needs with his regular primary care physician, Jr Giron MD.     Therapy Recommendations:   Current status of physical therapies on discharge: The patient will not be eligible for TCU stay 2/2 criminal background and alcohol use behaviors. It has been discussed with the patient that he will be going home and  has the ability to have home therapy. He expresses understanding of this.   Current status: Pain and pain behaviors are significantly limiting to mobility.  Stood at bedside with very tremulous legs and poor balance, high pain.  Reports difficulty weight-bearing on RLE due to rib and lateral thigh pain.  Unable to ambulate.  High falls risk for many reasons, and question if he would induce a fall with staff - recommend 2 person assist with walker and gait belt for all mobility for safety.  Functional performance is inconsistent, pain presentation is inconsistent.  Barriers to return to prior living situation: pain, unable to ambulate, 2 flights of stairs to enter his apartment  Recommendations for discharge: TCU  Rationale for recommendations: Unfortunately there are many barriers to TCU placement.  He has not demonstrated safey to leave the hospital, however functional capabilities have been variable and difficult to assess.  Recommend HH services.  Will issue pt a FWW for homegoing.      SUBJECTIVE: Patient complains of chronic pain throughout the night. Provided home methadone dose. Patient expresses anxiety at thought of going home, intent or etiology of anxiety unknown whether regarding home care or PTA anxiety and substance use. Denies chest pain, denies shortness of breath or difficulty breathing.     Physical Exam   Temp: 95.7  F (35.4  C) Temp src: Oral BP: (!) 142/79 Pulse: 81 Heart Rate: 77 Resp: 16 SpO2: 99 % O2 Device: None (Room air)    Vitals:    03/09/20 0325   Weight: 130 kg (286 lb 9.6 oz)     Vital Signs with Ranges  Temp:  [95.7  F (35.4  C)-98.5  F (36.9  C)] 95.7  F (35.4  C)  Pulse:  [73-94] 81  Heart Rate:  [71-83] 77  Resp:  [15-18] 16  BP: (116-148)/(71-93) 142/79  SpO2:  [93 %-99 %] 99 %  I/O last 3 completed shifts:  In: 340 [P.O.:340]  Out: 800 [Urine:800]    Constitutional: Awake, alert, restless, and appears stated age.  Eyes: Lids and lashes normal, pupils equal, round and reactive to  "light, extra ocular muscles intact, sclera clear, conjunctiva normal.  ENT: Normocephalic, without obvious abnormality, atraumatic, sinuses nontender on palpation, external ears without lesions  Respiratory: No increased work of breathing, good air exchange, clear to auscultation bilaterally, no crackles or wheezing.  Cardiovascular: Normal apical impulse, regular rate and rhythm, normal S1 and S2, no S3 or S4, and no murmur noted.  GI: No scars, normal bowel sounds, soft, non-distended, non-tender, no masses palpated, no hepatosplenomegaly.  Skin: No bruising or bleeding, normal skin color, texture, turgor, no redness, warmth, or swelling, no rashes, no lesions, no abnormal moles, nails normal without discoloration or clubbing and no jaundice.  Musculoskeletal: There is no redness, warmth, or swelling of the joints.  Full range of motion noted.  Motor strength is 5 out of 5 all extremities bilaterally.  Tone is normal.  Neurologic: Awake, alert, oriented to name, place and time.  Cranial nerves II-XII are grossly intact.   Neuropsychiatric: Calm, normal eye contact, alert, normal affect, oriented to self, place, time and situation, memory for past and recent events intact and thought process normal. Inconsistent tremor present when being observed by others that has irregular, body wide appearance but focused in hands and lower legs.    Discharge Disposition   Discharged to home  Condition at discharge: Stable  Discharge VS: Blood pressure (!) 142/79, pulse 81, temperature 95.7  F (35.4  C), temperature source Oral, resp. rate 16, height 1.93 m (6' 4\"), weight 130 kg (286 lb 9.6 oz), SpO2 99 %.    Consultations This Hospital Stay   OCCUPATIONAL THERAPY ADULT IP CONSULT  PHYSICAL THERAPY ADULT IP CONSULT  VASCULAR ACCESS CARE ADULT IP CONSULT    Discharge Orders      Reason for your hospital stay    Fall, single rib fracture     Adult [facility] Follow-up and recommended labs and tests    Follow up with your primary " "care provider for continued medical care and hospital follow up in 5-10 days.  Follow up with your [facility] methadone clinic for continuing pain management.    Trauma Clinic- as needed  [facility]  [address]  [address] [place] [address]   Appointments: [phone]        You have been involved in a recent trauma incident resulting in an injury.  Studies show us that people affected by trauma have higher levels of post-traumatic stress disorder (PTSD) and/or depressive symptoms during the year following an injury.     Please consider the following.  Have you:  Had migraines about the event(s) or thought about the event(s) when you didn't want to?  Tried hard not to think about the event(s) or went out of your way to avoid situations that reminded you of the event(s)?  Been constantly on guard, watchful, or easily startled?  Felt numb or detached from people, activities, or your surroundings?   Felt guilty or unable to stop blaming yourself or others for the event(s) or any problems the event (s) may have caused?    If you answered \"yes\" to 3 or more of these questions, or if you simply want to discuss any of your feelings further, we recommend that you talk with your Primary Care Provider or a mental health professional.     Activity    Your activity upon discharge: activity as tolerated.   Contact primary care provider for increasing chest wall pain, shortness of breath. Follow up with your usual [facility] pain management clinic for ongoing pain management needs.     When to contact your care team    Call your primary doctor if you have any of the following:  increased shortness of breath, difficulty or increasing pain with breathing.     Walker Order    DME Documentation:   Describe the reason for need to support medical necessity: difficulty walking due to right 11th rib fracture.     I, the undersigned, certify that the above prescribed supplies are medically necessary for this patient " and is both reasonable and necessary in reference to accepted standards of medical and necessary in reference to accepted standards of medical practice in the treatment of this patient's condition and is not prescribed as a convenience.     Diet    Follow this diet upon discharge: Orders Placed This Encounter      Regular Diet Adult     Discharge Medications   Current Discharge Medication List      START taking these medications    Details   acetaminophen (TYLENOL) 325 MG tablet Take 2 tablets (650 mg) by mouth every 4 hours as needed for mild pain  Qty:      Associated Diagnoses: Closed fracture of one rib of right side, initial encounter      hydrOXYzine (ATARAX) 10 MG tablet Take 1 tablet (10 mg) by mouth every 4 hours as needed for anxiety  Qty: 10 tablet, Refills: 0    Associated Diagnoses: Closed fracture of one rib of right side, initial encounter      methocarbamol (ROBAXIN) 750 MG tablet Take 1 tablet (750 mg) by mouth 3 times daily  Qty: 15 tablet, Refills: 0    Associated Diagnoses: Closed fracture of one rib of right side, initial encounter      oxyCODONE (ROXICODONE) 10 MG tablet Take 1-1.5 tablets (10-15 mg) by mouth every 4 hours as needed for moderate to severe pain  Qty: 10 tablet, Refills: 0    Associated Diagnoses: Closed fracture of one rib of right side, initial encounter         CONTINUE these medications which have NOT CHANGED    Details   gabapentin (NEURONTIN) 400 MG capsule Take 3 capsules (1,200 mg) by mouth 3 times daily  Qty: 270 capsule, Refills: 1    Associated Diagnoses: Generalized anxiety disorder      methadone (DOLPHINE) 5 MG/5ML solution Take 100 mg by mouth daily From [facility] program      nicotine polacrilex (NICORETTE) 4 MG gum CHEW 1-2 PEICES BY MOUTH AS NEEDED. Min interval between administration- 1 hour.  Max of 22 pieces per day.  Qty: 240 each, Refills: 11    Associated Diagnoses: Tobacco dependence due to chewing tobacco      QUEtiapine (SEROQUEL) 400 MG tablet Take 1 tablet  (400 mg) by mouth At Bedtime  Qty: 30 tablet, Refills: 1    Associated Diagnoses: Generalized anxiety disorder      tiZANidine (ZANAFLEX) 4 MG tablet Take 1 tablet (4 mg) by mouth 2 times daily  Qty: 60 tablet, Refills: 1    Associated Diagnoses: Chronic bilateral low back pain without sciatica           Allergies   Allergies   Allergen Reactions     Nsaids      Data   Most Recent 3 CBC's:  Recent Labs   Lab Test 03/03/20 2252 11/04/19  1109   WBC 5.1  --    HGB 14.2 12.4*   MCV 91 92.5     --       Most Recent 3 BMP's:  Recent Labs   Lab Test 03/05/20  0701 03/03/20  2252 11/04/19  1116    139 141.0   POTASSIUM 3.2* 3.2* 4.2   CHLORIDE 104 103 106.0   CO2 31 29 28.0   BUN 20 15 12.0   CR 0.79 0.68 0.9   ANIONGAP 5 7  --    KACY 8.5 8.4* 9.3   * 98 92.0     Most Recent 2 LFT's:  Recent Labs   Lab Test 03/05/20  0701 03/03/20  2252   * 200*   * 141*   ALKPHOS 75 89   BILITOTAL 0.6 0.9     Most Recent INR's and Anticoagulation Dosing History:  Anticoagulation Dose History     There is no flowsheet data to display.        Most Recent 3 Troponin's:No lab results found.  Most Recent 6 Bacteria Isolates From Any Culture (See EPIC Reports for Culture Details):No lab results found.  Most Recent TSH, T4 and A1c Labs:  Recent Labs   Lab Test 03/05/20  0701 08/19/19  1351   TSH 8.16*   < >  --    T4 0.80   < >  --    A1C  --   --  5.1    < > = values in this interval not displayed.     Results for orders placed or performed during the hospital encounter of 03/08/20   CT Head w/o Contrast    Narrative    EXAM: CT HEAD W/O CONTRAST, CT CERVICAL SPINE W/O CONTRAST  LOCATION: [facility]  DATE/TIME: 3/8/2020 11:50 PM    INDICATION: Pain after fall  COMPARISON: None.  TECHNIQUE:   HEAD CT: Without IV contrast. Multiplanar reformats. Dose reduction techniques were used.  CERVICAL SPINE CT: Routine without IV contrast. Multiplanar reformats. Dose reduction techniques were  used.    FINDINGS:   HEAD CT:   INTRACRANIAL CONTENTS: No intracranial hemorrhage, extraaxial collection, or mass effect.  No CT evidence of acute infarct. Normal parenchymal attenuation. Normal ventricles and sulci. Atherosclerotic calcification in the left vertebral artery    VISUALIZED ORBITS/SINUSES/MASTOIDS: No intraorbital abnormality. No paranasal sinus mucosal disease. No middle ear or mastoid effusion.    BONES/SOFT TISSUES: No acute abnormality.    CERVICAL SPINE CT:   VERTEBRA: Trace degenerative retrolisthesis of C5 on C6. No fracture or posttraumatic subluxation.     CANAL/FORAMINA: Multilevel degenerative disc height loss, uncovertebral hypertrophy, and facet arthropathy. There is at least moderate canal stenosis from C4-C5 through C6-C7. Multilevel bilateral neural foraminal narrowing, greatest at C5-C6 where there   is moderate to severe narrowing bilaterally.    PARASPINAL: No extraspinal abnormality. Visualized lung fields are clear.      Impression    IMPRESSION:  HEAD CT:  1.  No acute intracranial process.    CERVICAL SPINE CT:  1.  Multilevel degenerative changes throughout the cervical spine. No evidence of an acute osseous abnormality.  2.  There is at least moderate canal stenosis from C4-C5 through C6-C7 secondary to degenerative change. Multilevel bilateral neural foraminal narrowing, greatest at C5-C6.   CT Cervical Spine w/o Contrast    Narrative    EXAM: CT HEAD W/O CONTRAST, CT CERVICAL SPINE W/O CONTRAST  LOCATION: [facility]  DATE/TIME: 3/8/2020 11:50 PM    INDICATION: Pain after fall  COMPARISON: None.  TECHNIQUE:   HEAD CT: Without IV contrast. Multiplanar reformats. Dose reduction techniques were used.  CERVICAL SPINE CT: Routine without IV contrast. Multiplanar reformats. Dose reduction techniques were used.    FINDINGS:   HEAD CT:   INTRACRANIAL CONTENTS: No intracranial hemorrhage, extraaxial collection, or mass effect.  No CT evidence of acute infarct. Normal  parenchymal attenuation. Normal ventricles and sulci. Atherosclerotic calcification in the left vertebral artery    VISUALIZED ORBITS/SINUSES/MASTOIDS: No intraorbital abnormality. No paranasal sinus mucosal disease. No middle ear or mastoid effusion.    BONES/SOFT TISSUES: No acute abnormality.    CERVICAL SPINE CT:   VERTEBRA: Trace degenerative retrolisthesis of C5 on C6. No fracture or posttraumatic subluxation.     CANAL/FORAMINA: Multilevel degenerative disc height loss, uncovertebral hypertrophy, and facet arthropathy. There is at least moderate canal stenosis from C4-C5 through C6-C7. Multilevel bilateral neural foraminal narrowing, greatest at C5-C6 where there   is moderate to severe narrowing bilaterally.    PARASPINAL: No extraspinal abnormality. Visualized lung fields are clear.      Impression    IMPRESSION:  HEAD CT:  1.  No acute intracranial process.    CERVICAL SPINE CT:  1.  Multilevel degenerative changes throughout the cervical spine. No evidence of an acute osseous abnormality.  2.  There is at least moderate canal stenosis from C4-C5 through C6-C7 secondary to degenerative change. Multilevel bilateral neural foraminal narrowing, greatest at C5-C6.       Time Spent on this Encounter   Reed MAZA NP, personally saw the patient today and spent greater than 30 minutes discharging this patient.    We appreciate the opportunity to care for your patient while in the hospital.  Should you have any questions about their injuries or this discharge summary our contact information is below.    Trauma Services  [facility]   Department of Critical Care and Acute Care Surgery  [address] [place] [address] [address]  [place] [address]  Office: [phone]

## 2020-03-09 NOTE — PLAN OF CARE
Discharge Planner PT   Patient plan for discharge: does not feel he can go home.   Current status: PT eval completed to facilitate discharge planning. VSS throughout session, reports no less than 8/10 pain in L flank, timing of mobility with pain meds. Pt very tremulous at rest, VSS throughout. Pt is modA for supine>sit, very effortful. Requires mod A initially in sitting for safety 2/2 heavy forward lean and CGA for sitting ~5min. Pt demos partial stand w/ modA of 2 and FWW. Requires max A of 2 for sit>supine.   Barriers to return to prior living situation: medical, pain, stairs, mobility, safety  Recommendations for discharge: TCU  Rationale for recommendations: Pt is well below baseline, limited by pain and unsafe without significant assist for mobility. Pt unsafe to discharge to previous living situation at this time. Recommend rehab stay to progress IND w/ functional mobility prior to discharge home. Pt on OBS status, will monitor and progress safe discharge planning/ functional IND as indicated.        Entered by: Imani Luke 03/09/2020 2:46 PM

## 2020-03-09 NOTE — PROGRESS NOTES
03/09/20 1400   Quick Adds   Type of Visit Initial PT Evaluation       Present no   Living Environment   Lives With alone   Living Arrangements apartment   Home Accessibility stairs to enter home   Number of Stairs, Main Entrance other (see comments)  (2 flights)   Stair Railings, Main Entrance railing on right side (ascending)   Transportation Anticipated family or friend will provide   Living Environment Comment PT: Pt reports living alone in apartment, does not have much support from friends/ family in area.    Self-Care   Usual Activity Tolerance good   Current Activity Tolerance poor   Regular Exercise No   Equipment Currently Used at Home none   Activity/Exercise/Self-Care Comment PT: Pt reports no regular exercise program, community ambulator.    Functional Level Prior   Ambulation 0-->independent   Transferring 0-->independent   Toileting 0-->independent   Bathing 0-->independent   Communication 0-->understands/communicates without difficulty   Swallowing 0-->swallows foods/liquids without difficulty   Cognition 0 - no cognition issues reported   Fall history within last six months yes   Number of times patient has fallen within last six months 3   Which of the above functional risks had a recent onset or change? ambulation;transferring   Prior Functional Level Comment PT: Pt reports falling on ice and hitting backside. Has fallen two other times this winter, reports all on ice.   General Information   Onset of Illness/Injury or Date of Surgery - Date 03/08/20   Referring Physician Ty Miles MD    Patient/Family Goals Statement PT: to decrease pain   Pertinent History of Current Problem (include personal factors and/or comorbidities that impact the POC) PT: fell on ice, 11th rib fx and TBI   Precautions/Limitations fall precautions   Heart Disease Risk Factors Lack of physical activity;Medical history;Gender;Age   General Observations PT: Pt in supine, agreeable to  therapy with encouragement. Very tremulous   General Info Comments PT: Activity orders: Up ad kathleen   Cognitive Status Examination   Orientation orientation to person, place and time   Level of Consciousness alert   Follows Commands and Answers Questions 100% of the time   Personal Safety and Judgment impaired;at risk behaviors demonstrated   Memory intact   Cognitive Comment PT: Pt perseverating on topics, requries firm cues in safety 2/2 decreased safety awareness, willing to push himself beyond what is safe   Pain Assessment   Patient Currently in Pain Yes, see Vital Sign flowsheet  (significant pain, 8/10)   Integumentary/Edema   Integumentary/Edema no deficits were identifed   Posture    Posture Forward head position;Protracted shoulders   Range of Motion (ROM)   ROM Comment WFL, limited 2/2 pain   Strength   Strength Comments appearing WFL but limite acutely 2/2 pain, does not tolerate formal assessment   Bed Mobility   Bed Mobility Comments mod A supine>sit, maxA of 2 sit>supine, unsafe when coming to sit EOB   Transfer Skills   Transfer Comments modA of 2 to FWW, partial stand   Gait   Gait Comments NT   Balance   Balance Comments requires mod A initially for heavy forward lean when coming to sit EOB, CGA for safety once finding balance.    Sensory Examination   Sensory Perception Comments reports no sensory changes   Coordination   Coordination Comments tremulous whole body, limiting fine motor coordination   Muscle Tone   Muscle Tone no deficits were identified   Modality Interventions   Planned Modality Interventions Comments none   General Therapy Interventions   Planned Therapy Interventions bed mobility training;gait training;transfer training;risk factor education;home program guidelines;progressive activity/exercise;strengthening   Clinical Impression   Criteria for Skilled Therapeutic Intervention yes, treatment indicated   PT Diagnosis impaired functional mobility   Influenced by the following  "impairments pain, medical status, decreased activity tolerance   Functional limitations due to impairments impaired bed mobility, transfers, gait, decreased ability to access home environment.    Clinical Presentation Evolving/Changing   Clinical Presentation Rationale current status, clinical judgement, home set up   Clinical Decision Making (Complexity) Moderate complexity   Therapy Frequency 5x/week   Predicted Duration of Therapy Intervention (days/wks) 3-4 days   Anticipated Equipment Needs at Discharge walker   Anticipated Discharge Disposition Transitional Care Facility;Home with Home Therapy   Risk & Benefits of therapy have been explained Yes   Patient, Family & other staff in agreement with plan of care Yes   Clinical Impression Comments see care plan note   [facility] TM \"6 Clicks\"   2016, Trustees of [facility], under license to [employer].  All rights reserved.   6 Clicks Short Forms Basic Mobility Inpatient Short Form   [facility]  \"6 Clicks\" V.2 Basic Mobility Inpatient Short Form   1. Turning from your back to your side while in a flat bed without using bedrails? 2 - A Lot   2. Moving from lying on your back to sitting on the side of a flat bed without using bedrails? 2 - A Lot   3. Moving to and from a bed to a chair (including a wheelchair)? 1 - Total   4. Standing up from a chair using your arms (e.g., wheelchair, or bedside chair)? 2 - A Lot   5. To walk in hospital room? 1 - Total   6. Climbing 3-5 steps with a railing? 1 - Total   Basic Mobility Raw Score (Score out of 24.Lower scores equate to lower levels of function) 9   Total Evaluation Time   Total Evaluation Time (Minutes) 5     "

## 2020-03-10 ENCOUNTER — APPOINTMENT (OUTPATIENT)
Dept: PHYSICAL THERAPY | Facility: CLINIC | Age: 50
End: 2020-03-10
Payer: MEDICAID

## 2020-03-10 ENCOUNTER — HOME CARE/HOSPICE - HEALTHEAST (OUTPATIENT)
Dept: HOME HEALTH SERVICES | Facility: HOME HEALTH | Age: 50
End: 2020-03-10

## 2020-03-10 PROCEDURE — 25000132 ZZH RX MED GY IP 250 OP 250 PS 637: Performed by: NURSE PRACTITIONER

## 2020-03-10 PROCEDURE — 25000132 ZZH RX MED GY IP 250 OP 250 PS 637: Performed by: STUDENT IN AN ORGANIZED HEALTH CARE EDUCATION/TRAINING PROGRAM

## 2020-03-10 PROCEDURE — G0378 HOSPITAL OBSERVATION PER HR: HCPCS

## 2020-03-10 PROCEDURE — 25000132 ZZH RX MED GY IP 250 OP 250 PS 637: Performed by: SURGERY

## 2020-03-10 PROCEDURE — 97530 THERAPEUTIC ACTIVITIES: CPT | Mod: GP

## 2020-03-10 RX ORDER — HYDROXYZINE HYDROCHLORIDE 10 MG/1
10 TABLET, FILM COATED ORAL EVERY 4 HOURS PRN
Qty: 10 TABLET | Refills: 0 | Status: SHIPPED | OUTPATIENT
Start: 2020-03-10 | End: 2020-06-17

## 2020-03-10 RX ADMIN — OXYCODONE HYDROCHLORIDE 15 MG: 5 TABLET ORAL at 17:48

## 2020-03-10 RX ADMIN — TIZANIDINE 4 MG: 4 TABLET ORAL at 09:10

## 2020-03-10 RX ADMIN — NICOTINE POLACRILEX 8 MG: 4 GUM, CHEWING ORAL at 11:43

## 2020-03-10 RX ADMIN — NICOTINE POLACRILEX 8 MG: 4 GUM, CHEWING ORAL at 14:59

## 2020-03-10 RX ADMIN — OXYCODONE HYDROCHLORIDE 15 MG: 5 TABLET ORAL at 09:10

## 2020-03-10 RX ADMIN — NICOTINE POLACRILEX 8 MG: 4 GUM, CHEWING ORAL at 17:17

## 2020-03-10 RX ADMIN — NICOTINE POLACRILEX 8 MG: 4 GUM, CHEWING ORAL at 23:58

## 2020-03-10 RX ADMIN — OXYCODONE HYDROCHLORIDE 15 MG: 5 TABLET ORAL at 13:42

## 2020-03-10 RX ADMIN — NICOTINE POLACRILEX 8 MG: 4 GUM, CHEWING ORAL at 20:18

## 2020-03-10 RX ADMIN — METHADONE HYDROCHLORIDE 100 MG: 10 CONCENTRATE ORAL at 09:16

## 2020-03-10 RX ADMIN — NICOTINE POLACRILEX 8 MG: 4 GUM, CHEWING ORAL at 13:48

## 2020-03-10 RX ADMIN — ACETAMINOPHEN 650 MG: 325 TABLET, FILM COATED ORAL at 00:24

## 2020-03-10 RX ADMIN — METHOCARBAMOL 750 MG: 750 TABLET, FILM COATED ORAL at 13:42

## 2020-03-10 RX ADMIN — OXYCODONE HYDROCHLORIDE 15 MG: 5 TABLET ORAL at 01:58

## 2020-03-10 RX ADMIN — NICOTINE POLACRILEX 8 MG: 4 GUM, CHEWING ORAL at 09:10

## 2020-03-10 RX ADMIN — NICOTINE POLACRILEX 8 MG: 4 GUM, CHEWING ORAL at 02:05

## 2020-03-10 RX ADMIN — NICOTINE POLACRILEX 8 MG: 4 GUM, CHEWING ORAL at 10:09

## 2020-03-10 RX ADMIN — TIZANIDINE 4 MG: 4 TABLET ORAL at 17:17

## 2020-03-10 RX ADMIN — METHOCARBAMOL 750 MG: 750 TABLET, FILM COATED ORAL at 09:10

## 2020-03-10 RX ADMIN — QUETIAPINE FUMARATE 400 MG: 100 TABLET ORAL at 22:52

## 2020-03-10 RX ADMIN — NICOTINE POLACRILEX 8 MG: 4 GUM, CHEWING ORAL at 22:52

## 2020-03-10 RX ADMIN — METHOCARBAMOL 750 MG: 750 TABLET, FILM COATED ORAL at 20:14

## 2020-03-10 RX ADMIN — OXYCODONE HYDROCHLORIDE 15 MG: 5 TABLET ORAL at 21:44

## 2020-03-10 RX ADMIN — GABAPENTIN 1200 MG: 300 CAPSULE ORAL at 09:10

## 2020-03-10 RX ADMIN — GABAPENTIN 1200 MG: 300 CAPSULE ORAL at 20:14

## 2020-03-10 RX ADMIN — NICOTINE POLACRILEX 8 MG: 4 GUM, CHEWING ORAL at 21:44

## 2020-03-10 RX ADMIN — ACETAMINOPHEN 650 MG: 325 TABLET, FILM COATED ORAL at 14:59

## 2020-03-10 RX ADMIN — GABAPENTIN 1200 MG: 300 CAPSULE ORAL at 13:42

## 2020-03-10 RX ADMIN — NICOTINE POLACRILEX 8 MG: 4 GUM, CHEWING ORAL at 01:07

## 2020-03-10 NOTE — PROGRESS NOTES
Observation goals:      -Adequate pain control on oral analgesia.   Met   -Vital Signs normal or at patient baseline.   Met  -Tolerating oral intake to maintain hydration.   Met  -Diagnostic tests and consults completed and resulted   Met  -Cleared for discharge from consultants' standpoint if involved in care   Met  -Safe disposition plan has been identified   Met  -Return to baseline functional status  Not Met- patient's pain is limiting his mobility

## 2020-03-10 NOTE — PLAN OF CARE
Discharge Planner PT   Patient plan for discharge: unsure  Current status: Pain and pain behaviors are significantly limiting to mobility.  Stood at bedside with very tremulous legs and poor balance, high pain.  Reports difficulty weight-bearing on RLE due to rib and lateral thigh pain.  Unable to ambulate.  High falls risk for many reasons, and question if he would induce a fall with staff - recommend 2 person assist with walker and gait belt for all mobility for safety.  Functional performance is inconsistent, pain presentation is inconsistent.  Addendum 1150:  Spoke with Trauma team, pt will discharge hospital today. Pt plans to scoot up the stairs from seated position.  Barriers to return to prior living situation: pain, unable to ambulate, 2 flights of stairs to enter his apartment  Recommendations for discharge: TCU  Rationale for recommendations: Unfortunately there are many barriers to TCU placement.  He has not demonstrated safey to leave the hospital, however functional capabilities have been variable and difficult to assess.  Recommend HH services.  Will issue pt a FWW for homegoing.        Entered by: Destiny Winters 03/10/2020 10:50 AM

## 2020-03-10 NOTE — PLAN OF CARE
-Observation goals:      -Adequate pain control on oral analgesia.   Met   -Vital Signs normal or at patient baseline.   Met  -Tolerating oral intake to maintain hydration.   Met  -Diagnostic tests and consults completed and resulted   Met  -Cleared for discharge from consultants' standpoint if involved in care   Met  -Safe disposition plan has been identified   Met  -Return to baseline functional status  Not Met- patient's pain is limiting his mobility              There has been no changes since last documented. Patient has been sleeping.

## 2020-03-10 NOTE — PLAN OF CARE
"/79 (BP Location: Left arm)   Pulse 81   Temp 95.9  F (35.5  C) (Oral)   Resp 18   Ht 1.93 m (6' 4\")   Wt 130 kg (286 lb 9.6 oz)   SpO2 92%   BMI 34.89 kg/m    Patient reports pain is tolerable with Oxycodone every 4 hours, Tylenol, Robaxin,and Neurontin. Patient able to sit up in bed and stand at bed side. Walked to door by himself and using walker, writer not witnessed.  Plan to work with PT in the am and then discharge. Continue with POC.  "

## 2020-03-10 NOTE — PROGRESS NOTES
[facility]   Referral received via care team for home care services. Patient's service address is in our [facility] service area. Patient will receive services from [facility]. All patient demographics and orders will be sent to [facility]. Care team notified. For any questions or concerns regarding home care services please call [phone].     Sheri Grier RN   [facility] Care Liaison   [phone]

## 2020-03-10 NOTE — SIGNIFICANT EVENT
Significant Event Note    Time of event: 3:56 PM March 10, 2020    Description of event:  Patient was set to leave the facility to return to home. Rides to his contracted pharmacy and to his home were arranged. As he was being prepared to leave, he had an unwitnessed episode of unsteadiness leading to near-fall.     Plan:  Trauma NP discussed with patient, patient will have one additional therapy session tomorrow, 3/11/2020, and then be discharged to home via wheelchair if necessary. He may have home therapy if he agrees.     Discussed with: bedside nurse, patient    Reed Cunningham, MIAN

## 2020-03-10 NOTE — ED PROVIDER NOTES
[place] EMERGENCY DEPARTMENT ([facility])  3/08/20  History     Chief Complaint   Patient presents with     Fall     The history is provided by the patient and medical records.     Hollis Barclay is a 49 year old male with a past medical history of alcohol dependence, chronic low back pain, and social anxiety disorder who presents to the Emergency Department for evaluation after a fall.  Per chart review, patient was seen at [facility] ED earlier today (3/8/2020) for increased anxiety, and pain.  Patient underwent a CT abdomen pelvis with IV contrast with results posted below.    EXAM: CT ABDOMEN PELVIS WO ORAL W IV CONTRAST-3/8/2020  IMPRESSION:   1.  New tiny nonobstructing stone left kidney.    2.  Fatty liver.    3.  Previous appendectomy.  Addendum: Mildly displaced right 11th rib fracture.    Patient was then transferred to the [facility] for further evaluation    Patient presents the ED today after transfer from [facility] ED after he had a mechanical fall on ice.  Patient states he landed on his back during this fall.  Patient states it is difficult to walk, and has been increasingly weak since the fall.  Patient also states he is fairly anxious.    Past Medical History:   Diagnosis Date     Alcoholism in remission (H)      Bilateral ACL tear      Chronic back pain      Closed left arm fracture 1985     H/O shoulder surgery      Post concussive encephalopathy      Social anxiety disorder        History reviewed. No pertinent surgical history.    History reviewed. No pertinent family history.    Social History     Tobacco Use     Smoking status: Current Every Day Smoker     Types: Dip, chew, snus or snuff     Smokeless tobacco: Former User     Types: Chew   Substance Use Topics     Alcohol use: Yes     Comment: intoxicated       Current Facility-Administered Medications   Medication     acetaminophen (TYLENOL) Suppository 650 mg     acetaminophen (TYLENOL) tablet  "650 mg     gabapentin (NEURONTIN) capsule 1,200 mg     hydrOXYzine (ATARAX) tablet 10 mg     Lidocaine (LIDOCARE) 4 % Patch 2 patch    And     lidocaine patch in PLACE     lidocaine (LMX4) cream     lidocaine 1 % 0.1-1 mL     methadone (DOLOPHINE-INTENSOL) 10 MG/ML (HIGH CONC) solution 100 mg     methocarbamol (ROBAXIN) tablet 750 mg     naloxone (NARCAN) injection 0.1-0.4 mg     nicotine (NICORETTE) gum 8 mg     ondansetron (ZOFRAN-ODT) ODT tab 4 mg    Or     ondansetron (ZOFRAN) injection 4 mg     oxyCODONE (ROXICODONE) tablet 10-15 mg     QUEtiapine (SEROquel) tablet 400 mg     sodium chloride (PF) 0.9% PF flush 3 mL     sodium chloride (PF) 0.9% PF flush 3 mL     tiZANidine (ZANAFLEX) tablet 4 mg        Allergies   Allergen Reactions     Nsaids      I have reviewed the Medications, Allergies, Past Medical and Surgical History, and Social History in the Epic system.    Review of Systems   Constitutional: Negative for fever.   HENT: Negative for congestion.    Eyes: Negative for redness.   Respiratory: Negative for shortness of breath.    Cardiovascular: Negative for chest pain.   Gastrointestinal: Positive for abdominal pain.   Genitourinary: Negative for difficulty urinating.   Musculoskeletal: Positive for back pain. Negative for arthralgias and neck stiffness.   Skin: Negative for color change.   Neurological: Positive for weakness. Negative for headaches.   Psychiatric/Behavioral: Negative for confusion.       Physical Exam   BP: (!) 151/108  Pulse: 66  Heart Rate: 68  Temp: 96.8  F (36  C)  Resp: 18  Height: 193 cm (6' 4\")  Weight: 130 kg (286 lb 9.6 oz)  SpO2: 97 %      Physical Exam  Constitutional:       General: He is not in acute distress.     Appearance: He is not diaphoretic.   HENT:      Head: Normocephalic.      Mouth/Throat:      Pharynx: No oropharyngeal exudate.   Eyes:      Extraocular Movements: Extraocular movements intact.   Neck:      Musculoskeletal: Neck supple.  Upper cervical posterior " neck tenderness  Cardiovascular:      Heart sounds: Normal heart sounds.   Pulmonary:      Effort: No respiratory distress.      Breath sounds: Normal breath sounds.   Abdominal:      General: There is no distension.      Palpations: Abdomen is soft.      Tenderness: There is no abdominal tenderness.   Musculoskeletal:         General: No deformity.   Skin:     General: Skin is dry.   Neurological:      Mental Status: He is alert.      Comments: alert   Psychiatric:         Behavior: Behavior normal.         ED Course   10:23 PM  The patient was seen and examined by Oliver Christie DO in Room EDW.    Medications   nicotine (NICORETTE) gum 8 mg (8 mg Buccal Given 3/9/20 2311)   gabapentin (NEURONTIN) capsule 1,200 mg (1,200 mg Oral Given 3/9/20 2004)   QUEtiapine (SEROquel) tablet 400 mg (400 mg Oral Given 3/9/20 2311)   tiZANidine (ZANAFLEX) tablet 4 mg (4 mg Oral Given 3/9/20 1622)   lidocaine 1 % 0.1-1 mL (has no administration in time range)   lidocaine (LMX4) cream (has no administration in time range)   sodium chloride (PF) 0.9% PF flush 3 mL (has no administration in time range)   sodium chloride (PF) 0.9% PF flush 3 mL (3 mLs Intracatheter Given 3/9/20 2009)   ondansetron (ZOFRAN-ODT) ODT tab 4 mg (4 mg Oral Given 3/9/20 1622)     Or   ondansetron (ZOFRAN) injection 4 mg ( Intravenous See Alternative 3/9/20 1622)   acetaminophen (TYLENOL) tablet 650 mg (650 mg Oral Given 3/10/20 0024)   acetaminophen (TYLENOL) Suppository 650 mg (has no administration in time range)   naloxone (NARCAN) injection 0.1-0.4 mg (has no administration in time range)   oxyCODONE (ROXICODONE) tablet 10-15 mg (15 mg Oral Given 3/9/20 2152)   Lidocaine (LIDOCARE) 4 % Patch 2 patch (2 patches Transdermal Patch/Med Applied 3/9/20 2004)     And   lidocaine patch in PLACE ( Transdermal Patch in Place 3/9/20 2013)   methocarbamol (ROBAXIN) tablet 750 mg (750 mg Oral Given 3/9/20 2004)   methadone (DOLOPHINE-INTENSOL) 10 MG/ML (HIGH CONC)  solution 100 mg (100 mg Oral Given 3/9/20 1108)   hydrOXYzine (ATARAX) tablet 10 mg (10 mg Oral Given 3/9/20 2152)   HYDROmorphone (PF) (DILAUDID) injection 0.5 mg (0.5 mg Intravenous Given 3/8/20 2225)   LORazepam (ATIVAN) injection 0.5 mg (0.5 mg Intravenous Given 3/8/20 2225)   diazepam (VALIUM) tablet 5 mg (5 mg Oral Given 3/9/20 0407)   HYDROmorphone (DILAUDID) injection 1 mg (1 mg Intravenous Given 3/9/20 0405)         Procedures         Results for orders placed or performed during the hospital encounter of 03/08/20   CT Head w/o Contrast     Status: None    Narrative    EXAM: CT HEAD W/O CONTRAST, CT CERVICAL SPINE W/O CONTRAST  LOCATION: [facility]  DATE/TIME: 3/8/2020 11:50 PM    INDICATION: Pain after fall  COMPARISON: None.  TECHNIQUE:   HEAD CT: Without IV contrast. Multiplanar reformats. Dose reduction techniques were used.  CERVICAL SPINE CT: Routine without IV contrast. Multiplanar reformats. Dose reduction techniques were used.    FINDINGS:   HEAD CT:   INTRACRANIAL CONTENTS: No intracranial hemorrhage, extraaxial collection, or mass effect.  No CT evidence of acute infarct. Normal parenchymal attenuation. Normal ventricles and sulci. Atherosclerotic calcification in the left vertebral artery    VISUALIZED ORBITS/SINUSES/MASTOIDS: No intraorbital abnormality. No paranasal sinus mucosal disease. No middle ear or mastoid effusion.    BONES/SOFT TISSUES: No acute abnormality.    CERVICAL SPINE CT:   VERTEBRA: Trace degenerative retrolisthesis of C5 on C6. No fracture or posttraumatic subluxation.     CANAL/FORAMINA: Multilevel degenerative disc height loss, uncovertebral hypertrophy, and facet arthropathy. There is at least moderate canal stenosis from C4-C5 through C6-C7. Multilevel bilateral neural foraminal narrowing, greatest at C5-C6 where there   is moderate to severe narrowing bilaterally.    PARASPINAL: No extraspinal abnormality. Visualized lung fields are clear.      Impression     IMPRESSION:  HEAD CT:  1.  No acute intracranial process.    CERVICAL SPINE CT:  1.  Multilevel degenerative changes throughout the cervical spine. No evidence of an acute osseous abnormality.  2.  There is at least moderate canal stenosis from C4-C5 through C6-C7 secondary to degenerative change. Multilevel bilateral neural foraminal narrowing, greatest at C5-C6.   CT Cervical Spine w/o Contrast     Status: None    Narrative    EXAM: CT HEAD W/O CONTRAST, CT CERVICAL SPINE W/O CONTRAST  LOCATION: [facility]  DATE/TIME: 3/8/2020 11:50 PM    INDICATION: Pain after fall  COMPARISON: None.  TECHNIQUE:   HEAD CT: Without IV contrast. Multiplanar reformats. Dose reduction techniques were used.  CERVICAL SPINE CT: Routine without IV contrast. Multiplanar reformats. Dose reduction techniques were used.    FINDINGS:   HEAD CT:   INTRACRANIAL CONTENTS: No intracranial hemorrhage, extraaxial collection, or mass effect.  No CT evidence of acute infarct. Normal parenchymal attenuation. Normal ventricles and sulci. Atherosclerotic calcification in the left vertebral artery    VISUALIZED ORBITS/SINUSES/MASTOIDS: No intraorbital abnormality. No paranasal sinus mucosal disease. No middle ear or mastoid effusion.    BONES/SOFT TISSUES: No acute abnormality.    CERVICAL SPINE CT:   VERTEBRA: Trace degenerative retrolisthesis of C5 on C6. No fracture or posttraumatic subluxation.     CANAL/FORAMINA: Multilevel degenerative disc height loss, uncovertebral hypertrophy, and facet arthropathy. There is at least moderate canal stenosis from C4-C5 through C6-C7. Multilevel bilateral neural foraminal narrowing, greatest at C5-C6 where there   is moderate to severe narrowing bilaterally.    PARASPINAL: No extraspinal abnormality. Visualized lung fields are clear.      Impression    IMPRESSION:  HEAD CT:  1.  No acute intracranial process.    CERVICAL SPINE CT:  1.  Multilevel degenerative changes throughout the cervical  spine. No evidence of an acute osseous abnormality.  2.  There is at least moderate canal stenosis from C4-C5 through C6-C7 secondary to degenerative change. Multilevel bilateral neural foraminal narrowing, greatest at C5-C6.       Labs Ordered and Resulted from Time of ED Arrival Up to the Time of Departure from the ED - No data to display  No results found for this or any previous visit (from the past 24 hour(s)).       Assessments & Plan (with Medical Decision Making)   49-year-old male presents office with a chief complaint of right 11th displaced rib fracture.  Pain control has been an issue with him as he is very opiate tolerant and on home methadone.  He was transferred from [facility].  He received further patient medication here in the emergency department.  He did have some degree of tenderness to his neck.  CT scan of the head neck was not performed at previous facility so it was done here.  No acute injuries are identified.  Patient was evaluated by trauma surgery service and they will admit the patient for pain control.    I have reviewed the nursing notes.    I have reviewed the findings, diagnosis, plan and need for follow up with the patient.    Current Discharge Medication List      START taking these medications    Details   acetaminophen (TYLENOL) 325 MG tablet Take 2 tablets (650 mg) by mouth every 4 hours as needed for mild pain  Qty:      Associated Diagnoses: Closed fracture of one rib of right side, initial encounter      methocarbamol (ROBAXIN) 750 MG tablet Take 1 tablet (750 mg) by mouth 3 times daily  Qty: 15 tablet, Refills: 0    Associated Diagnoses: Closed fracture of one rib of right side, initial encounter      oxyCODONE (ROXICODONE) 10 MG tablet Take 1-1.5 tablets (10-15 mg) by mouth every 4 hours as needed for moderate to severe pain  Qty: 10 tablet, Refills: 0    Associated Diagnoses: Closed fracture of one rib of right side, initial encounter             Final diagnoses:   Closed  fracture of one rib of right side, initial encounter   I, Josh Ann, am serving as a trained medical scribe to document services personally performed by  Ty Christie DO, based on the provider's statements to me.      I, Ty Christie DO, was physically present and have reviewed and verified the accuracy of this note documented by Josh Ann.     3/8/2020   [facility], [place], EMERGENCY DEPARTMENT     Ty Christie DO  03/10/20 0059

## 2020-03-10 NOTE — PROGRESS NOTES
Care Coordinator Progress Note    Admission Date/Time:  3/8/2020  Attending MD:  Kushal Carrillo MD    Data  Chart reviewed, discussed with interdisciplinary team.   Patient was admitted for: Closed fracture of one rib of right side, initial encounter.    Concerns with insurance coverage for discharge needs: None.  Current Living Situation: Patient lives alone.  Support System: Uninvolved  Services Involved: no services involved prior to admission  Transportation at Discharge: MA transportation,  MNET [phone]  Transportation to Medical Appointments:   - MNET  Barriers to Discharge: rehab recommending TCU, lives alone     Coordination of Care and Referrals: Provided patient/family with options for Home Care.        Assessment  Patient is a 49 year old male who is admitted with right rib fracture.  PT recommending TCU, but due to variety of medical/social background TCU is not an option.  PT recommending home PT/OT.  Met with patient at bedside to introduce RNCC role and discuss discharge planning.  Patient reports living in an independent apartment in [place].  Patient is on methadone and goes daily to [facility] for his methadone daily.  He has MA MNET rides scheduled daily for these appointments.  Patient agreeable to home PT/OT.  After giving choice of agency patient chose [facility](P: [phone], F: [phone]), referral made and orders placed.  Patient is restricted to pharmacy.  Patient will need to  all discharge medications from [facility](P: [phone]).  Patient also will need MNET ride arranged for him to get to the pharmacy and then to his apartment.  Patient reports being very unsteady on his feet and does not feel like he is safe to go home.  PT plans to issue patient a walker for discharge.  He plans on going for a walk with nursing this afternoon.  Patient does report he has stairs to get into his apartment complex and once he gets in there is more stairs to  get into his apartment.  He reports he has no friends or family that are able to assist him.  RNCC will continue to follow and assist with discharge planning.      Update 4pm: Per nursing patient tried to get up by himself and walk to the hallway and had a near fall.  Plan is for patient to work with PT tomorrow morning and then discharge to home.  Bedside RN also updated this writer that staff, Delta, from [facility](P: [phone], F: [phone]), called and would like discharge orders and perscriptions faxed to them at time of discharge as they manage his medications.  Called and left a vm to discuss what services they provide for the patient.  Awaiting a return call.                     Plan  Anticipated Discharge Date:  3/11/2020  Anticipated Discharge Plan:  Home with [facility] for home PT/OT    ENRIQUE Hurtado  Phone: [phone]  Pager: [phone]  To contact weekend RNCC, dial [phone] and enter pager number [phone] at prompt. This pager can not be contacted by text page or outside line.

## 2020-03-10 NOTE — PROGRESS NOTES
"Social Work: Assessment with Discharge Plan    Patient Name:  Hollis Barclay  :  1970  Age:  49 year old  MRN:  [id number]  Risk/Complexity Score:  Filed Complexity Screen Score: 8  Completed assessment with:  Chart review, pt    Presenting Information   Reason for Referral:  Discharge plan and Potential need for community services upon discharge  Date of Intake:  March 10, 2020  Referral Source:  Chart Review  Decision Maker:  Pt  Alternate Decision Maker:  Per [facility] policy  Health Care Directive: Did not discuss this visit  Living Situation:  [facility]ment[facility] which per the agency website is a supportive living environment that has nursing staff available on site, has 24 hour staffing (staff manage medications and provide meals), and offers case management.   Previous Functional Status:  Independent  Patient and family understanding of hospitalization:  Restorative  Cultural/Language/Spiritual Considerations:  Pt is an english speaking male  Adjustment to Illness:  Difficult to assess but pt presents as in quite a bit of pain.   Pt was seen laying still and comfortably in bed and after a couple minutes of talking with SW began to have leg shaking and arm shaking. SW pointed out that pt was about to spill his drink he was holding and pt apologized and said he shakes a lot from a TBI. SW asked if pt is saying that a head injury leads to his shaking and pt said yes, and that agitation makes the shaking worse and said at time of visit he was in pain and stressed.   Pt reported being scared to go home by himself and is worried that if he slips and falls he will \"break my head\".     Physical Health  Reason for Admission:    1. Closed fracture of one rib of right side, initial encounter      Services Needed/Recommended:  TCU    Mental Health/Chemical Dependency  Diagnosis:  Per H&P pt has a diagnosis of alcoholism in remission and social anxiety disorder. Per chart review pt also has a diagnosis " of borderline personality disorder and has an extensive history of inpatient psych hospitalizations, with an indication of at least 2 hospitalizations in the last 2 years (which would trigger the need for a Level II [place] screen for SNF placement).   SW asked pt how he feels about being at a TCU with social anxiety and pt reported that if there is a goal or focus or something he is working towards vs just hanging out with people it would be ok.   Pt reported he had an 11 month period of sobriety then relapsed. Pt denied having been drinking during the fall that led to this admission but acknowledged recent alcohol use. Pt noted having a sobriety mentor.   Per chart review and a recent visit to [facility] there were concerns about pt faking shaking and confabulating.   Support/Services in Place:  Pt reported he has a  at his supportive living facility but denies having a mental health . It is unclear if pt has any official mental health services in place.   Services Needed/Recommended:  Pt would potentially benefit from a mental health  and potentially DBT.     Support System  Significant relationship at present time:  Unclear  Family of origin is available for support:  Unclear  Other support available:  Pt reports having a CADI  and a  at his living complex. Pt also noted having a sobriety mentor.   Gaps in support system:  Unclear  Patient is caregiver to:  None     Provider Information   Primary Care Physician:  Jr Giron   [phone]   Clinic:  [address] [place] [address] / [facility] [address]      :  CADI  but pt was not able to provide their name or phone number. Pt also noted having a  at his living complex and RNCC is going to attempt to reach this person- see RNCC documentation for details.     Financial   Income Source:  Did not discuss  Financial Concerns:  Pt reported having concerns that if he goes to a TCU it  "may be considered \"double dipping\" in terms of his MA. Pt reported that his MA/CADI waiver pays for his apartment and he is worried that if he goes to a TCU he may lose his housing and end up homeless. SW encouraged pt to contact his housing and his CADI  to explore this.   Insurance:    Payor/Plan Subscriber Name Rel Member # Group #   MEDICAID [place] - MEDIC[place]* GRETA FERREIRA [facility] [id number]       [address] [address]       Discharge Plan   Patient and family discharge goal:  Pt reported he plans to go home tomorrow but also reported he would be \"grateful\" to TCU referrals being made knowing that placement may not be an option for pt.   Pt reported understanding that his criminal record may be an issue but said he didn't hurt anyone. SW explained that SNFs do background checks on everyone as a part of their admission screening process. SW also explained that pt's alcohol use will likely be a concern for SNFs.   Provided education on discharge plan:  YES  Patient agreeable to discharge plan:  Potentially. Pt would like TCU referrals made but wants to ensure he won't lose his apartment if he goes to a TCU. SW explained that securing placement for pt could be difficult given social factors for pt.   Pt reported he doesn't expect a \"[facility]\" kind of place but \"[facility]\". SW explained that given factors of pt's situation it may not be a cream of the crop place if pt is accepted somewhere and pt may be accepted at what pt would consider a not so great facility.   A list of Medicare Certified Facilities was provided to the patient and/or family to encourage patient choice. Patient's choices for facility are:  No, as pt will have limited options for placement. SW explained referrals that can be made within the  system as well as global referrals that can be made to [facility]. Pt was agreeable to these referrals being made and deciding how to proceed after talking with " his CADI  and working with PT tomorrow.   MARYCRUZ made referrals to the following TCUs:  - [facility]- Silvia will review, waiting to hear back. MARYCRUZ had also asked Silvia to review pt for ARU and Silvia reported that after review pt is declined for ARU due to not meeting medical management needs and having no rehab diagnosis needs.   - [facility]- referral sent via Epic, waiting to hear back.   - [facility] (p. [phone], efax. [phone], has access to Epic)- left vm for Jammie, referral sent via Epic, waiting to hear back.   - [facility] (p. [phone], efax. [phone])- spoke with Dalia, referral sent via Epic, waiting to hear back.   Will NH provide Skilled rehabilitation or complex medical:  YES  General information regarding anticipated insurance coverage and possible out of pocket cost was discussed. Patient and patient's family are aware patient may incur the cost of transportation to the facility, pending insurance payment: Not at this time  Barriers to discharge:  Bed availability/acceptance, pt would need a Level II screen if there is an accepting TCU    Discharge Recommendations   Anticipated Disposition:  TBD  Transportation Needs:  Medical:  Other:  W/c or cab  Name of Transportation Company and Phone:  [facility] if needed- [phone]    Additional comments   Pt is 49 year old male whom admitted after falling and sustaining a rib fracture. Pt is medically ready for discharge once a safe discharge plan is established.     MARQUEZ Beach, LICSW     [phone] (pager) [phone]  3/10/2020

## 2020-03-10 NOTE — PROGRESS NOTES
"/79 (BP Location: Left arm)   Pulse 86   Temp 97.3  F (36.3  C) (Oral)   Resp 16   Ht 1.93 m (6' 4\")   Wt 130 kg (286 lb 9.6 oz)   SpO2 95%   BMI 34.89 kg/m      Observation Goals:     -Adequate pain control on oral analgesia.   Met   -Vital Signs normal or at patient baseline.   Met  -Tolerating oral intake to maintain hydration.   Met  -Diagnostic tests and consults completed and resulted   Met  -Cleared for discharge from consultants' standpoint if involved in care   Met  -Safe disposition plan has been identified   Met  -Return to baseline functional status  Not Met- pt still complains of increased pain, which is limiting his ability to be ambulatory      "

## 2020-03-10 NOTE — PLAN OF CARE
-Adequate pain control on oral analgesia.   Met   -Vital Signs normal or at patient baseline.   Met  -Tolerating oral intake to maintain hydration.   Met  -Diagnostic tests and consults completed and resulted   Met  -Cleared for discharge from consultants' standpoint if involved in care   Met  -Safe disposition plan has been identified   Met  -Return to baseline functional status  Not Met- patient's pain is limiting his mobility               Patient has discharge orders and they were reviewed with patient. Patient tried getting up by himself and had to sit down in hallway with help of someone bringing a chair.  Paged trauma to question discharge at this time.

## 2020-03-10 NOTE — PLAN OF CARE
-Observation goals:     -Adequate pain control on oral analgesia.   Met   -Vital Signs normal or at patient baseline.   Met  -Tolerating oral intake to maintain hydration.   Met  -Diagnostic tests and consults completed and resulted   Met  -Cleared for discharge from consultants' standpoint if involved in care   Met  -Safe disposition plan has been identified   Met  -Return to baseline functional status  Not Met- patient's pain is limiting his mobility             Vitals:   T: 96.5 (oral)   R: 15  HR: 77  BP: 117/71  O2:93% on RA     During the shift:   Patient was taking Nicotine gum hourly, vitals remained stable throughout the night. Right peripheral IV flushed & is patent. Oxy was given for pain management. Sound asleep during the last set of vitals (4:34 am).

## 2020-03-11 ENCOUNTER — APPOINTMENT (OUTPATIENT)
Dept: PHYSICAL THERAPY | Facility: CLINIC | Age: 50
End: 2020-03-11
Payer: MEDICAID

## 2020-03-11 ENCOUNTER — PATIENT OUTREACH (OUTPATIENT)
Dept: CARE COORDINATION | Facility: CLINIC | Age: 50
End: 2020-03-11

## 2020-03-11 VITALS
DIASTOLIC BLOOD PRESSURE: 69 MMHG | HEART RATE: 81 BPM | RESPIRATION RATE: 18 BRPM | HEIGHT: 76 IN | WEIGHT: 286.6 LBS | BODY MASS INDEX: 34.9 KG/M2 | OXYGEN SATURATION: 96 % | TEMPERATURE: 96.9 F | SYSTOLIC BLOOD PRESSURE: 127 MMHG

## 2020-03-11 LAB — POTASSIUM SERPL-SCNC: 3.7 MMOL/L (ref 3.4–5.3)

## 2020-03-11 PROCEDURE — G0378 HOSPITAL OBSERVATION PER HR: HCPCS

## 2020-03-11 PROCEDURE — 99221 1ST HOSP IP/OBS SF/LOW 40: CPT | Performed by: NURSE PRACTITIONER

## 2020-03-11 PROCEDURE — 99239 HOSP IP/OBS DSCHRG MGMT >30: CPT | Performed by: NURSE PRACTITIONER

## 2020-03-11 PROCEDURE — 25000132 ZZH RX MED GY IP 250 OP 250 PS 637: Performed by: SURGERY

## 2020-03-11 PROCEDURE — 25000132 ZZH RX MED GY IP 250 OP 250 PS 637: Performed by: NURSE PRACTITIONER

## 2020-03-11 PROCEDURE — 25000132 ZZH RX MED GY IP 250 OP 250 PS 637: Performed by: STUDENT IN AN ORGANIZED HEALTH CARE EDUCATION/TRAINING PROGRAM

## 2020-03-11 PROCEDURE — 97530 THERAPEUTIC ACTIVITIES: CPT | Mod: GP

## 2020-03-11 PROCEDURE — 84132 ASSAY OF SERUM POTASSIUM: CPT | Performed by: NURSE PRACTITIONER

## 2020-03-11 PROCEDURE — 97116 GAIT TRAINING THERAPY: CPT | Mod: GP,59

## 2020-03-11 PROCEDURE — 36415 COLL VENOUS BLD VENIPUNCTURE: CPT | Performed by: NURSE PRACTITIONER

## 2020-03-11 RX ORDER — METHOCARBAMOL 750 MG/1
750 TABLET, FILM COATED ORAL 3 TIMES DAILY
Status: DISCONTINUED | OUTPATIENT
Start: 2020-03-11 | End: 2020-03-11

## 2020-03-11 RX ORDER — OXYCODONE HYDROCHLORIDE 5 MG/1
10 TABLET ORAL EVERY 6 HOURS PRN
Qty: 12 TABLET | Refills: 0 | Status: SHIPPED | OUTPATIENT
Start: 2020-03-11 | End: 2020-04-18

## 2020-03-11 RX ADMIN — METHADONE HYDROCHLORIDE 100 MG: 10 CONCENTRATE ORAL at 07:57

## 2020-03-11 RX ADMIN — NICOTINE POLACRILEX 8 MG: 4 GUM, CHEWING ORAL at 07:58

## 2020-03-11 RX ADMIN — NICOTINE POLACRILEX 8 MG: 4 GUM, CHEWING ORAL at 03:09

## 2020-03-11 RX ADMIN — ACETAMINOPHEN 650 MG: 325 TABLET, FILM COATED ORAL at 01:19

## 2020-03-11 RX ADMIN — NICOTINE POLACRILEX 8 MG: 4 GUM, CHEWING ORAL at 09:31

## 2020-03-11 RX ADMIN — OXYCODONE HYDROCHLORIDE 15 MG: 5 TABLET ORAL at 02:09

## 2020-03-11 RX ADMIN — OXYCODONE HYDROCHLORIDE 15 MG: 5 TABLET ORAL at 11:47

## 2020-03-11 RX ADMIN — NICOTINE POLACRILEX 8 MG: 4 GUM, CHEWING ORAL at 10:44

## 2020-03-11 RX ADMIN — NICOTINE POLACRILEX 8 MG: 4 GUM, CHEWING ORAL at 01:13

## 2020-03-11 RX ADMIN — OXYCODONE HYDROCHLORIDE 15 MG: 5 TABLET ORAL at 07:57

## 2020-03-11 RX ADMIN — GABAPENTIN 1200 MG: 300 CAPSULE ORAL at 07:57

## 2020-03-11 RX ADMIN — NICOTINE POLACRILEX 8 MG: 4 GUM, CHEWING ORAL at 12:57

## 2020-03-11 RX ADMIN — NICOTINE POLACRILEX 8 MG: 4 GUM, CHEWING ORAL at 11:42

## 2020-03-11 RX ADMIN — TIZANIDINE 4 MG: 4 TABLET ORAL at 07:58

## 2020-03-11 RX ADMIN — METHOCARBAMOL 750 MG: 750 TABLET, FILM COATED ORAL at 07:58

## 2020-03-11 RX ADMIN — ACETAMINOPHEN 650 MG: 325 TABLET, FILM COATED ORAL at 09:31

## 2020-03-11 NOTE — PLAN OF CARE
7B PT -   Discharge Planner PT   Patient plan for discharge: home with FWW  Current status: Patient with drastically improved functional mobility since last session although with inconsistencies throughout session including variability between IND for bed mobility and mod A pending supports as well as IND for sit<>stand versus 2 person min A to FWW. Despite fluctuations in functional performance patient ambulated 100', 50', 50', 50', 50' in session and navigated x8 stairs safely. Patient notes improvement in functional performance and decrease in pain with distraction.   Barriers to return to prior living situation: medical status  Recommendations for discharge: home with FWW and HH PT  Rationale for recommendations: While patient continues to be below baseline mobility PT recommends patient discharge home when medically stable as patient demonstrates sufficient mobility for safe bed mobility, transfers, gait, and stairs without assist. Patient will benefit from continued PT in home health setting to maximize IND although anticipate patient to continue to demonstrate functional improvements without PT intervention.   Entered by: Oliver Grey 03/11/2020 11:55 AM

## 2020-03-11 NOTE — PLAN OF CARE
A&O (forgetful) VSS on RA. PIV SL. Up to commode with gait belt and walker. Reg diet, fair/good appetite. Plan to discharge tomorrow after PT. Calls to make needs known.

## 2020-03-11 NOTE — PROGRESS NOTES
[facility]   Patients's insurance is out of network with [facility]. [facility] has requested this patient's home care episode be transferred to our deferral partner [facility] [phone]. Care team and patient notified.     Sheri Grier RN   [facility] Liaison   [phone]

## 2020-03-11 NOTE — PLAN OF CARE
"/71 (BP Location: Left arm)   Pulse 81   Temp 98.3  F (36.8  C) (Oral)   Resp 18   Ht 1.93 m (6' 4\")   Wt 130 kg (286 lb 9.6 oz)   SpO2 91%   BMI 34.89 kg/m      Observation Goals:    - Adequate pain control on oral analgesia.   Met  - Vital Signs normal or at patient baseline.   Met  - Tolerating oral intake to maintain hydration.   Met  - Diagnostic tests and consults completed and resulted   Met  - Cleared for discharge from consultants' standpoint if involved in care   Met  - Safe disposition plan has been identified   Met  - Return to baseline functional status   Meeting     Plan-discharge 3/11 to prior living arrangement   "

## 2020-03-11 NOTE — PLAN OF CARE
Physical Therapy Discharge Summary    Reason for therapy discharge:    Discharged to home with home therapy.    Progress towards therapy goal(s). See goals on Care Plan in [facility] electronic health record for goal details.  Goals partially met.  Barriers to achieving goals:   discharge from facility.    Therapy recommendation(s):    Continued therapy is recommended.  Rationale/Recommendations:  patient will benefit from skilled home PT for further progression of mobility and pain management.

## 2020-03-11 NOTE — DISCHARGE SUMMARY
"[facility], [place]    Discharge Summary  Trauma Surgery Service    Date of Admission:  3/8/2020  Date of Discharge:  3/9/2020  Attending Physician: Dr. Santillan  Discharging Provider: Reed Cunningham CNP  Date of Service (when I saw the patient): 03/09/20    Primary Provider: Jr Giron  Primary Care clinic: 1 [address] [place] [id number]  Phone: [phone]  Fax number: [phone]     Discharge Diagnoses   1. Right 11th rib fracture  2. Closed head injury with positive loss of consciousness    Other diagnosis  Generalized anxiety disorder taking gabapentin and quetiapine  Chronic bilateral low back pain without sciatica taking naproxen and tizanidine  Tobacco dependence using gum  Social anxiety disorder  Right hip pain 2/2 fall 1/30/2020    Tertiary Survey 3/9/2020 completed with no additional injuries identified     Hospital Course    Hollis Barclay is a 49 year old male who presents with right flank pain and right hip pain after falling on ice 3/7. He reports he slipped and fell backwards landing on his right side and hitting his head. He reports \"blacking out\" briefly. He denies pain elsewhere. He reported some nausea due to the pain, denied vomiting. He denied changes in sensation, headache, changes in vision, parathesias, or weakness. He was ready to be discharged yesterday, but it was decided that he needed to work with physical therapy one more day and then be discharged. I rechecked his potassium today and it was normal. He is agreeable to go home and feels that he is safe with the use of the walker.      Neurosurgery Head Injury:  CT evaluation at the OSH did not reveal any intracranial processes. IMPRESSION:  HEAD CT:  1.  No acute intracranial process.  No follow up needed.       Rib Fractures:  # Right 11th rib fracture on OSH CT  In rib fracture management, pulmonary toilet and good pain control allowing for good pulmonary toilet is paramount.  " Prior to discharge, pain was controlled for Hollis Barclay as noted below.    In order to prevent common pulmonary complications found with rib fracture patients, Hollis Barclay will need to continue with aggressive pulmonary toileting that includes, incentive spirometry, coughing and deep breathing exercises.  We recommend these continue at a minimum of QID for one month after discharge.  Patient has been provided for handout with instructions regarding this. Activity as tolerated per rib pain. Follow up with primary care provider for any follow up issues.     Acute pain  The patient was treated for his rib fracture pain with multimodal pain therapy including acetaminophen 650 mg q 4hr prn, lidocaine 4% patches, oxycodone 10-15 mg q 4hr prn, atarax 10mg q4hr prn. His gabapentin dose was increased from PTA level of 400mg tid to 1200 mg tid to aid with his acute pain level. He is being discharged with the dose of 400mg tid. He is being discharged with 12 tablets of oxycodone and 10 doses of atarax. Mr. Barclay is expected to follow up with his primary care provider, Dr. Giron, regarding his need for further acute pain management.     Chronic Pain  Mr. Barclay's chronic pain was managed utilizing his prior to admission medications: gabapentin 400mg tid, methadone 100 mg daily, robaxin 750mg tid, and tizanidine 4mg bid. It is expected that he will follow up with the [facility] [place] [place] for further dosing of his methadone. It is expected that he will follow up with rJ Giron MD, regarding dosing of his gabapentin, robaxin, and tizanidine.   Other medical issues  This patient has been diagnosed with social anxiety, generalized anxiety disorder, tobacco dependence, chronic low back pain, and has a history of chronic alcohol dependence and opioid abuse. His PTA medications were prescribed at previous dosage levels, and it is expected he will follow up with these needs with his regular primary care physician,  Jr Giron MD.     Therapy Recommendations:   Current status of physical therapies on discharge: The patient will not be eligible for TCU stay 2/2 criminal background and alcohol use behaviors. It has been discussed with the patient that he will be going home and has the ability to have home therapy. He expresses understanding of this.   Barriers to return to prior living situation: medical status  Recommendations for discharge: home with FWW and HH PT  Rationale for recommendations: While patient continues to be below baseline mobility PT recommends patient discharge home when medically stable as patient demonstrates sufficient mobility for safe bed mobility, transfers, gait, and stairs without assist. Patient will benefit from continued PT in home health setting to maximize IND although anticipate patient to continue to demonstrate functional improvements without PT intervention.     SUBJECTIVE: States he feels better today and is ready to go home.      Physical Exam   Temp: 96.9  F (36.1  C) Temp src: Oral BP: 127/69   Heart Rate: 75 Resp: 18 SpO2: 96 % O2 Device: None (Room air)    Vitals:    03/09/20 0325   Weight: 130 kg (286 lb 9.6 oz)     Vital Signs with Ranges  Temp:  [95.9  F (35.5  C)-98.3  F (36.8  C)] 96.9  F (36.1  C)  Heart Rate:  [66-92] 75  Resp:  [15-18] 18  BP: (104-127)/(64-79) 127/69  SpO2:  [91 %-97 %] 96 %  I/O last 3 completed shifts:  In: 1140 [P.O.:1140]  Out: 1825 [Urine:1825]    Constitutional: Awake, alert, restless, and appears stated age.  Eyes: Lids and lashes normal, pupils equal, round and reactive to light, extra ocular muscles intact, sclera clear, conjunctiva normal.  ENT: Normocephalic, without obvious abnormality, atraumatic, sinuses nontender on palpation, external ears without lesions  Respiratory: No increased work of breathing, good air exchange, clear to auscultation bilaterally, no crackles or wheezing.  Cardiovascular: Normal apical impulse, regular rate and  "rhythm, normal S1 and S2, no S3 or S4, and no murmur noted.  GI: No scars, normal bowel sounds, soft, non-distended, non-tender, no masses palpated, no hepatosplenomegaly.  Skin: No bruising or bleeding, normal skin color, texture, turgor, no redness, warmth, or swelling, no rashes, no lesions, no abnormal moles, nails normal without discoloration or clubbing and no jaundice.  Musculoskeletal: There is no redness, warmth, or swelling of the joints.  Full range of motion noted.  Motor strength is 5 out of 5 all extremities bilaterally.  Tone is normal.  Neurologic: Awake, alert, oriented to name, place and time.  Cranial nerves II-XII are grossly intact.   Neuropsychiatric: Calm, normal eye contact, alert, normal affect, oriented to self, place, time and situation, memory for past and recent events intact and thought process normal. Inconsistent tremor present when being observed by others that has irregular, body wide appearance but focused in hands and lower legs.    Discharge Disposition   Discharged to home  Condition at discharge: Stable  Discharge VS: Blood pressure 127/69, pulse 81, temperature 96.9  F (36.1  C), temperature source Oral, resp. rate 18, height 1.93 m (6' 4\"), weight 130 kg (286 lb 9.6 oz), SpO2 96 %.    Consultations This Hospital Stay   OCCUPATIONAL THERAPY ADULT IP CONSULT  PHYSICAL THERAPY ADULT IP CONSULT  VASCULAR ACCESS CARE ADULT IP CONSULT  PSYCHIATRY IP CONSULT    Discharge Orders      Home Care PT Referral for Hospital Discharge      Reason for your hospital stay    Fall, single rib fracture     Adult [facility] Follow-up and recommended labs and tests    Follow up with your primary care provider for continued medical care and hospital follow up in 5-10 days.  Follow up with your [facility] methadone clinic for continuing pain management.    Trauma Clinic- as needed  [facility]  [address]  [address] [place] [address]   Appointments: [phone]        You " "have been involved in a recent trauma incident resulting in an injury.  Studies show us that people affected by trauma have higher levels of post-traumatic stress disorder (PTSD) and/or depressive symptoms during the year following an injury.     Please consider the following.  Have you:  Had migraines about the event(s) or thought about the event(s) when you didn't want to?  Tried hard not to think about the event(s) or went out of your way to avoid situations that reminded you of the event(s)?  Been constantly on guard, watchful, or easily startled?  Felt numb or detached from people, activities, or your surroundings?   Felt guilty or unable to stop blaming yourself or others for the event(s) or any problems the event (s) may have caused?    If you answered \"yes\" to 3 or more of these questions, or if you simply want to discuss any of your feelings further, we recommend that you talk with your Primary Care Provider or a mental health professional.     Activity    Your activity upon discharge: activity as tolerated.   Contact primary care provider for increasing chest wall pain, shortness of breath. Follow up with your usual [facility] pain management clinic for ongoing pain management needs.     When to contact your care team    Call your primary doctor if you have any of the following:  increased shortness of breath, difficulty or increasing pain with breathing.     MD face to face encounter    Documentation of Face to Face and Certification for Home Health Services    I certify that patient: Hollis Barclay is under my care and that I, or a nurse practitioner or physician's assistant working with me, had a face-to-face encounter that meets the physician face-to-face encounter requirements with this patient on: 3/10/2020.    This encounter with the patient was in whole, or in part, for the following medical condition, which is the primary reason for home health care: rib fracture.    I certify that, based on my " findings, the following services are medically necessary home health services: Occupational Therapy and Physical Therapy.    My clinical findings support the need for the above services because: Occupational Therapy Services are needed to assess and treat cognitive ability and address ADL safety due to impairment in endurance. and Physical Therapy Services are needed to assess and treat the following functional impairments: mobility.    Based on the above findings. I certify that this patient is confined to the home and needs intermittent skilled nursing care, physical therapy and/or speech therapy.  The patient is under my care, and I have initiated the establishment of the plan of care.  This patient will be followed by a physician who will periodically review the plan of care.  Physician/Provider to provide follow up care: Jr Giron    Attending hospital physician (the Medicare certified [place] provider): Chauncey Dickens NP  Physician Signature: See electronic signature associated with these discharge orders.  Date: 3/10/2020     Walker Order    DME Documentation:   Describe the reason for need to support medical necessity: difficulty walking due to right 11th rib fracture.     I, the undersigned, certify that the above prescribed supplies are medically necessary for this patient and is both reasonable and necessary in reference to accepted standards of medical and necessary in reference to accepted standards of medical practice in the treatment of this patient's condition and is not prescribed as a convenience.     Diet    Follow this diet upon discharge: Orders Placed This Encounter      Regular Diet Adult     Discharge Medications   Current Discharge Medication List      START taking these medications    Details   acetaminophen (TYLENOL) 325 MG tablet Take 2 tablets (650 mg) by mouth every 4 hours as needed for mild pain  Qty:      Associated Diagnoses: Closed fracture of one rib of right side, initial  encounter      hydrOXYzine (ATARAX) 10 MG tablet Take 1 tablet (10 mg) by mouth every 4 hours as needed for anxiety  Qty: 10 tablet, Refills: 0    Associated Diagnoses: Closed fracture of one rib of right side, initial encounter      methocarbamol (ROBAXIN) 750 MG tablet Take 1 tablet (750 mg) by mouth 3 times daily  Qty: 15 tablet, Refills: 0    Associated Diagnoses: Closed fracture of one rib of right side, initial encounter      oxyCODONE (ROXICODONE) 5 MG tablet Take 2 tablets (10 mg) by mouth every 6 hours as needed for pain  Qty: 12 tablet, Refills: 0    Associated Diagnoses: Closed fracture of one rib of right side, initial encounter         CONTINUE these medications which have NOT CHANGED    Details   gabapentin (NEURONTIN) 400 MG capsule Take 3 capsules (1,200 mg) by mouth 3 times daily  Qty: 270 capsule, Refills: 1    Associated Diagnoses: Generalized anxiety disorder      methadone (DOLPHINE) 5 MG/5ML solution Take 100 mg by mouth daily From [facility]      nicotine polacrilex (NICORETTE) 4 MG gum CHEW 1-2 PEICES BY MOUTH AS NEEDED. Min interval between administration- 1 hour.  Max of 22 pieces per day.  Qty: 240 each, Refills: 11    Associated Diagnoses: Tobacco dependence due to chewing tobacco      QUEtiapine (SEROQUEL) 400 MG tablet Take 1 tablet (400 mg) by mouth At Bedtime  Qty: 30 tablet, Refills: 1    Associated Diagnoses: Generalized anxiety disorder      tiZANidine (ZANAFLEX) 4 MG tablet Take 1 tablet (4 mg) by mouth 2 times daily  Qty: 60 tablet, Refills: 1    Associated Diagnoses: Chronic bilateral low back pain without sciatica           Allergies   Allergies   Allergen Reactions     Nsaids      Data   Most Recent 3 CBC's:  Recent Labs   Lab Test 03/03/20 2252 11/04/19  1109   WBC 5.1  --    HGB 14.2 12.4*   MCV 91 92.5     --       Most Recent 3 BMP's:  Recent Labs   Lab Test 03/11/20  0759 03/05/20  0701 03/03/20 2252 11/04/19  1116   NA  --  140 139 141.0   POTASSIUM 3.7 3.2*  3.2* 4.2   CHLORIDE  --  104 103 106.0   CO2  --  31 29 28.0   BUN  --  20 15 12.0   CR  --  0.79 0.68 0.9   ANIONGAP  --  5 7  --    KACY  --  8.5 8.4* 9.3   GLC  --  100* 98 92.0     Most Recent 2 LFT's:  Recent Labs   Lab Test 03/05/20  0701 03/03/20  2252   * 200*   * 141*   ALKPHOS 75 89   BILITOTAL 0.6 0.9     Most Recent INR's and Anticoagulation Dosing History:  Anticoagulation Dose History     There is no flowsheet data to display.        Most Recent 3 Troponin's:No lab results found.  Most Recent 6 Bacteria Isolates From Any Culture (See EPIC Reports for Culture Details):No lab results found.  Most Recent TSH, T4 and A1c Labs:  Recent Labs   Lab Test 03/05/20  0701  08/19/19  1351   TSH 8.16*   < >  --    T4 0.80   < >  --    A1C  --   --  5.1    < > = values in this interval not displayed.     Results for orders placed or performed during the hospital encounter of 03/08/20   CT Head w/o Contrast    Narrative    EXAM: CT HEAD W/O CONTRAST, CT CERVICAL SPINE W/O CONTRAST  LOCATION: [facility]  DATE/TIME: 3/8/2020 11:50 PM    INDICATION: Pain after fall  COMPARISON: None.  TECHNIQUE:   HEAD CT: Without IV contrast. Multiplanar reformats. Dose reduction techniques were used.  CERVICAL SPINE CT: Routine without IV contrast. Multiplanar reformats. Dose reduction techniques were used.    FINDINGS:   HEAD CT:   INTRACRANIAL CONTENTS: No intracranial hemorrhage, extraaxial collection, or mass effect.  No CT evidence of acute infarct. Normal parenchymal attenuation. Normal ventricles and sulci. Atherosclerotic calcification in the left vertebral artery    VISUALIZED ORBITS/SINUSES/MASTOIDS: No intraorbital abnormality. No paranasal sinus mucosal disease. No middle ear or mastoid effusion.    BONES/SOFT TISSUES: No acute abnormality.    CERVICAL SPINE CT:   VERTEBRA: Trace degenerative retrolisthesis of C5 on C6. No fracture or posttraumatic subluxation.     CANAL/FORAMINA: Multilevel  degenerative disc height loss, uncovertebral hypertrophy, and facet arthropathy. There is at least moderate canal stenosis from C4-C5 through C6-C7. Multilevel bilateral neural foraminal narrowing, greatest at C5-C6 where there   is moderate to severe narrowing bilaterally.    PARASPINAL: No extraspinal abnormality. Visualized lung fields are clear.      Impression    IMPRESSION:  HEAD CT:  1.  No acute intracranial process.    CERVICAL SPINE CT:  1.  Multilevel degenerative changes throughout the cervical spine. No evidence of an acute osseous abnormality.  2.  There is at least moderate canal stenosis from C4-C5 through C6-C7 secondary to degenerative change. Multilevel bilateral neural foraminal narrowing, greatest at C5-C6.   CT Cervical Spine w/o Contrast    Narrative    EXAM: CT HEAD W/O CONTRAST, CT CERVICAL SPINE W/O CONTRAST  LOCATION: [facility]  DATE/TIME: 3/8/2020 11:50 PM    INDICATION: Pain after fall  COMPARISON: None.  TECHNIQUE:   HEAD CT: Without IV contrast. Multiplanar reformats. Dose reduction techniques were used.  CERVICAL SPINE CT: Routine without IV contrast. Multiplanar reformats. Dose reduction techniques were used.    FINDINGS:   HEAD CT:   INTRACRANIAL CONTENTS: No intracranial hemorrhage, extraaxial collection, or mass effect.  No CT evidence of acute infarct. Normal parenchymal attenuation. Normal ventricles and sulci. Atherosclerotic calcification in the left vertebral artery    VISUALIZED ORBITS/SINUSES/MASTOIDS: No intraorbital abnormality. No paranasal sinus mucosal disease. No middle ear or mastoid effusion.    BONES/SOFT TISSUES: No acute abnormality.    CERVICAL SPINE CT:   VERTEBRA: Trace degenerative retrolisthesis of C5 on C6. No fracture or posttraumatic subluxation.     CANAL/FORAMINA: Multilevel degenerative disc height loss, uncovertebral hypertrophy, and facet arthropathy. There is at least moderate canal stenosis from C4-C5 through C6-C7. Multilevel  bilateral neural foraminal narrowing, greatest at C5-C6 where there   is moderate to severe narrowing bilaterally.    PARASPINAL: No extraspinal abnormality. Visualized lung fields are clear.      Impression    IMPRESSION:  HEAD CT:  1.  No acute intracranial process.    CERVICAL SPINE CT:  1.  Multilevel degenerative changes throughout the cervical spine. No evidence of an acute osseous abnormality.  2.  There is at least moderate canal stenosis from C4-C5 through C6-C7 secondary to degenerative change. Multilevel bilateral neural foraminal narrowing, greatest at C5-C6.       Time Spent on this Encounter   Madelyn MAAZ NP, personally saw the patient today and spent greater than 30 minutes discharging this patient.    We appreciate the opportunity to care for your patient while in the hospital.  Should you have any questions about their injuries or this discharge summary our contact information is below.    Trauma Services  [facility]   Department of Critical Care and Acute Care Surgery  [address] [place] [address] [address]  [place] [address]  Office: [phone]
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
## 2020-03-11 NOTE — PROGRESS NOTES
Vitals:    03/10/20 2049 03/11/20 0008 03/11/20 0319 03/11/20 0746   BP: 104/71 123/64 126/71 127/69   BP Location: Left arm Left arm Left arm Left arm   Pulse:       Resp: 18 15 18 18   Temp: 98.3  F (36.8  C) 98.1  F (36.7  C) 96.6  F (35.9  C) 96.9  F (36.1  C)   TempSrc: Oral Oral Oral Oral   SpO2: 91% 97% 95% 96%   Weight:       Height:         Pt discharged back to group home with taxi ride. Prescription sent with patient. Pt given AVS, explained details and pt verbalized understanding. Belongings sent with patient.

## 2020-03-11 NOTE — PROGRESS NOTES
Social Work Services Progress Note    Hospital Day: 4  Date of Initial Social Work Evaluation:  3/10/20- please see for details  Collaborated with:  Chart review, Trauma team, nursing facilities, ENRIQUE Hopkins    Data:  Pt is 49 year old male whom admitted after falling and sustaining a rib fracture. Pt is medically ready for discharge once a safe discharge plan is established.     SW made TCU referrals yesterday in case this is needed.     Per Trauma NP Madelyn pt is ready for discharge today.     Received update from ENRIQUE Hopkins that PT staff told her pt is able to discharge to home instead of TCU.     Received updates from the following TCUs:  - [facility]- pt is declined due to not having a medical management need.   - [facility]- no bed availability and no anticipated openings.     Intervention:  SW did not meet with pt for this encounter.     Assessment:  See bedside RN, PT/OT, medical team notes    Plan:    Anticipated Disposition:  Home with services    Barriers to d/c plan:  NA    Follow Up:  TBD, MARYCRUZ will sign off at this time as RNCC will assist with discharge to home.    MARQUEZ Beach, [address]   [phone] (pager) [phone]  3/11/2020

## 2020-03-11 NOTE — PROGRESS NOTES
Vitals:    03/10/20 2049 03/11/20 0008 03/11/20 0319 03/11/20 0746   BP: 104/71 123/64 126/71 127/69   BP Location: Left arm Left arm Left arm Left arm   Pulse:       Resp: 18 15 18 18   Temp: 98.3  F (36.8  C) 98.1  F (36.7  C) 96.6  F (35.9  C) 96.9  F (36.1  C)   TempSrc: Oral Oral Oral Oral   SpO2: 91% 97% 95% 96%   Weight:       Height:         Pt up with SBA, walker. Voiding spont. Some pain, took oxycodone, gabapentin, acetaminophen, robaxin. Tolerating diet. Probable discharge later. Continue to monitor.

## 2020-03-11 NOTE — PROGRESS NOTES
Care Coordinator - Discharge Planning    Admission Date/Time:  3/8/2020  Attending MD:  No att. providers found     Data  Date of initial CC assessment:  3/10/2020  Chart reviewed, discussed with interdisciplinary team.   Patient was admitted for:   1. Closed fracture of one rib of right side, initial encounter         Assessment   Full assessment completed in previous note    PT now recommending home with walker and HHPT.  Walker issued to patient by PT.  Called and spoke with Delta at [facility](P: [phone], F: [phone]).  Delta verified that there is 24/7 staff available at his apartment complex.  Staff is not able to physically assist patient, but assist patient with meals, medication management, rides to appointments etc.  They also arrange his appointments for him to get to his methadone clinic daily.  She verified that patient can receive home care services in his apartment.  Let her know that patient would have home PT/OT.  [facility] unable to accept the referral and have sent referral to [facility] who can accept.  She asked that discharge orders be faxed, this was done.  Per MD team patient medically ready for discharge to home.  [facility] cab ride arranged to transport patient to his pharmacy to  his medications and then to his apartment.  Ride was set up with  transportation.  Patient to f/u in clinic with his PCP 3/30 as previously scheduled.              Plan  Anticipated Discharge Date:  3/11/2020  Anticipated Discharge Plan:  Return to home with resumption of services and HH PT/OT through [facility]    CTS Handoff completed:  YES    ENRIQUE Hurtado  Phone: [phone]  Pager: [phone]  To contact weekend RNSAJI, dial [phone] and enter pager number [phone] at prompt. This pager can not be contacted by text page or outside line.

## 2020-03-11 NOTE — PLAN OF CARE
Observation Goals:     - Adequate pain control on oral analgesia.   Met  - Vital Signs normal or at patient baseline.   Met  - Tolerating oral intake to maintain hydration.   Met  - Diagnostic tests and consults completed and resulted   Met  - Cleared for discharge from consultants' standpoint if involved in care   Met  - Safe disposition plan has been identified   Met  - Return to baseline functional status   Meeting      Plan-discharge 3/11 to prior living arrangement

## 2020-03-12 NOTE — PROGRESS NOTES
Tried calling patient two times.  This number is no longer in service. There will be no post discharge call made and chart will be closed. Deja Braxton CMA Post Discharge Team

## 2020-03-19 NOTE — PROGRESS NOTES
[facility]      OUTPATIENT PHYSICAL THERAPY EVALUATION  PLAN OF TREATMENT FOR OUTPATIENT REHABILITATION  (COMPLETE FOR INITIAL CLAIMS ONLY)  Patient's Last Name, First Name, M.I.  YOB: 1970  Hollis Barclay                           Provider's Name  [facility] Medical Record No.  [id number]                               Onset Date:  03/08/20   Start of Care Date:  (P) 03/09/20      Type:     _X_PT   ___OT   ___SLP Medical Diagnosis:  (P) see above for PMH and diagnosis                        PT Diagnosis:  impaired functional mobility   Visits from SOC:  1   _________________________________________________________________________________  Plan of Treatment/Functional Goals    Planned Interventions:  , none  bed mobility training, gait training, transfer training, risk factor education, home program guidelines, progressive activity/exercise, strengthening,       Goals: See Physical Therapy Goals on Care Plan in [employer] electronic health record.    Therapy Frequency: 5x/week  Predicted Duration of Therapy Intervention: 3-4 days  ___Fabián Pedro, BRICE___________________________________________________________________________    I CERTIFY THE NEED FOR THESE SERVICES FURNISHED UNDER        THIS PLAN OF TREATMENT AND WHILE UNDER MY CARE     (Physician co-signature of this document indicates review and certification of the therapy plan).                Certification date from: (P) 03/09/20, Certification date to: (P) 03/11/20    Referring Physician: Ty Miles MD             Initial Assessment        See Physical Therapy evaluation dated (P) 03/09/20 in Epic electronic health record.

## 2020-03-26 ENCOUNTER — TELEPHONE (OUTPATIENT)
Dept: FAMILY MEDICINE | Facility: CLINIC | Age: 50
End: 2020-03-26

## 2020-03-26 NOTE — TELEPHONE ENCOUNTER
[facility] Call Center    Phone Message    May a detailed message be left on voicemail: no     Reason for Call: Medication Question or concern regarding medication   Prescription Clarification  Name of Medication: Multi vitamin and Thiamine  Prescribing Provider: Dr. Giron   Pharmacy: [facility]   What on the order needs clarification? Danelle at 's care center requesting these medications be sent to the pt's pharmacy and a copy of the order be faxed to them at [phone]          Action Taken: Message routed to:  [facility]: [place]    Travel Screening: Not Applicable

## 2020-03-27 NOTE — TELEPHONE ENCOUNTER
Called the [place] pharmacy, they do have on file the Rx for Multivitamin and Thiamine, #30 + 4 refills. They report that patient's MA insurance was not active so they were not able to process it.     I called Danelle at [facility] facility, [facility] - relayed message above and asked them to contact pharmacy with patient's new MA information.    Call back to clinic for further questions or assistance.     Trena Harrison RN  03/27/20  10:44 AM

## 2020-04-02 ENCOUNTER — TELEPHONE (OUTPATIENT)
Dept: FAMILY MEDICINE | Facility: CLINIC | Age: 50
End: 2020-04-02

## 2020-04-02 NOTE — TELEPHONE ENCOUNTER
[facility] Call Center    Phone Message    May a detailed message be left on voicemail: no     Reason for Call: Other: Babita with [facility] states pt has been admitted to their hospital, and due to his restricted MA status, needs signed referrals from his PCP. Babita is sending forms over now; please sign and send forms.    Action Taken: Message routed to:  [facility]: [facility] [place]    Travel Screening: Not Applicable

## 2020-04-02 NOTE — TELEPHONE ENCOUNTER
Referral completed and submitted to [facility]. Copy of referral faxed to [facility] for their records.    EFRAIN Harris

## 2020-04-06 NOTE — PROGRESS NOTES
[facility]      OUTPATIENT PHYSICAL THERAPY EVALUATION  PLAN OF TREATMENT FOR OUTPATIENT REHABILITATION  (COMPLETE FOR INITIAL CLAIMS ONLY)  Patient's Last Name, First Name, M.I.  YOB: 1970  Hollis Barclay                           Provider's Name  [facility] Medical Record No.  [id number]                               Onset Date:  03/08/20   Start of Care Date:  03/09/20      Type:     _X_PT   ___OT   ___SLP Medical Diagnosis:  see above for PMH and diagnosis                        PT Diagnosis:  impaired functional mobility   Visits from SOC:  1   _________________________________________________________________________________  Plan of Treatment/Functional Goals    Planned Interventions:  , none  bed mobility training, gait training, transfer training, risk factor education, home program guidelines, progressive activity/exercise, strengthening,       Goals: See Physical Therapy Goals on Care Plan in [employer] electronic health record.    Therapy Frequency: 5x/week  Predicted Duration of Therapy Intervention: 3-4 days  ___Ty Miles MD____________________________________________________________________________    I CERTIFY THE NEED FOR THESE SERVICES FURNISHED UNDER        THIS PLAN OF TREATMENT AND WHILE UNDER MY CARE     (Physician co-signature of this document indicates review and certification of the therapy plan).                Certification date from: 03/09/20, Certification date to: 03/11/20    Referring Physician: Ty Miles MD             Initial Assessment        See Physical Therapy evaluation dated 03/09/20 in Epic electronic health record.

## 2020-04-13 ENCOUNTER — TELEPHONE (OUTPATIENT)
Dept: FAMILY MEDICINE | Facility: CLINIC | Age: 50
End: 2020-04-13

## 2020-04-13 NOTE — TELEPHONE ENCOUNTER
Pe[place] [facility] [place] - patient being discharged from hospital on 4/15/20. While in hospital, patient found to have Hep C detected on PCR.  ALT and AST elevated while in hospital, these have come down while in hospital. They ask for referral to GI.     Advised Perez to schedule with Dr. Velasco at [facility], as Dr. Giron is out of the clinic indefinitely. [place] Medicaid states patient can be seen by any provider at [facility].     Patient to have TELEPHONE VISIT.  Referral form for GI is available once patient has visit with provider.     Trena Harrison RN  04/13/20  3:38 PM

## 2020-04-13 NOTE — TELEPHONE ENCOUNTER
[facility] Call Center    Phone Message    May a detailed message be left on voicemail: yes     Reason for Call: Other: Perez is a nurse coordinator calling from the [facility] to schedule a hospital follow up with Dr. Giron for Ray. Ray will be discharged from their hospital tomorow (4/14/20). Perez can be called back to schedule if it is before noon tomorrow, otherwise Ray can be called to schedule. Perez also stated that their providers would like Ray to be referred to a GI clinic, but because Ray is restricted they are not able to refer him and would like Dr. Giron to look into referring him. Please give Perez a call back [phone] to schedule for Ray if it is before noon tomorrow (4/14/20). If not, please give Ray a call at your earliest convenience to schedule.     Action Taken: Message routed to:  [facility]: Primary Care    Travel Screening: Not Applicable

## 2020-04-15 ENCOUNTER — TELEPHONE (OUTPATIENT)
Dept: FAMILY MEDICINE | Facility: CLINIC | Age: 50
End: 2020-04-15

## 2020-04-15 NOTE — TELEPHONE ENCOUNTER
Called Elyse at [facility] - informed her that patient would need Telephone Visit for completion of the Level of Need Assessment Form, and also for HFU. Patient has been in hospital and ED about 15 times since his last office visit. Patient is restricted recipient with [place] Medicaid - OK to schedule with any provider at [facility].     Trena Harrison RN  04/15/20  12:43 PM

## 2020-04-15 NOTE — TELEPHONE ENCOUNTER
[facility] Call Center    Phone Message    May a detailed message be left on voicemail: yes     Reason for Call: Form or Letter   Type or form/letter needing completion: Ray's transportation company [employer] sent a letter for levels of need for Dr. Giron to sign so rides can be set up for pt's appointments. Please include that cabs are good for pt to use because buses gives patient anxiety causing him to miss his appointments. [employer] should have sent the letter to the clinic yesterday. Please fill it out and send it back. Thank you.   Provider: Dr. Giron  Date form needed: ASAP so transportation can be set up.   Once completed: Fax form to: Directions should be on the form.       Action Taken: Message routed to:  [facility]    [place] Screening: Not Applicable

## 2020-04-16 ENCOUNTER — TELEPHONE (OUTPATIENT)
Dept: FAMILY MEDICINE | Facility: CLINIC | Age: 50
End: 2020-04-16

## 2020-04-16 ENCOUNTER — NURSE TRIAGE (OUTPATIENT)
Dept: NURSING | Facility: CLINIC | Age: 50
End: 2020-04-16

## 2020-04-17 ENCOUNTER — TELEPHONE (OUTPATIENT)
Dept: FAMILY MEDICINE | Facility: CLINIC | Age: 50
End: 2020-04-17

## 2020-04-17 NOTE — TELEPHONE ENCOUNTER
[facility]    Phone Message    May a detailed message be left on voicemail: yes     Reason for Call: Other: Monika calling from [facility] to let Dr. Giron know that Jose M has been without Methadone for two days, because Jose M refuses to take the bus to the methadone clinic. Please give Monika a call back with any additional questions.     Action Taken: Message routed to:  [facility]: Primary Care    Travel Screening: Not Applicable

## 2020-04-17 NOTE — TELEPHONE ENCOUNTER
Call from: Leidy, staff from [facility] ([facility])  Phone #: [phone]    Leidy currently not with patient Ray at this time. Staff requests this message sent to PCP. Reports that patient is having some tremors and involuntary movements. Thinks it may be caused by current change in medication dose. FNA unable to triage and gather further information as staff is not with the patient at this time.     Requests for a PCP phone visit. FNA advised to have her call clinic on 4/17 by midday if she does not hear back in AM.  PCP Dr. Giron, currently covered by Dr. Rod Crews RN/[place] Nurse Advisor      Reason for Disposition    [1] Caller requesting NON-URGENT health information AND [2] PCP's office is the best resource    Protocols used: INFORMATION ONLY CALL-A-

## 2020-04-17 NOTE — TELEPHONE ENCOUNTER
Clinic Action Needed: Yes, follow up for phone appointment  FNA Triage Call  Presenting Problem:    Call from: Leidy, staff from [facility] (senior care)  Phone #: [phone]    Leidy currently not with patient Ray at this time. Staff requests this message sent to PCP. Reports that patient is having some tremors and involuntary movements. Thinks it may be caused by current change in medication dose. FNA unable to triage and gather further information as staff is not with the patient at this time.     Requests for a PCP phone visit. FNA advised to have her call clinic on 4/17 by midday if she does not hear back in AM.  PCP Dr. Giron, currently covered by Dr. Rod Crews RN/[place] Nurse Advisor      Routed to: RN pool    Please be sure to close this encounter once this patient's issue/question has been addressed.

## 2020-04-17 NOTE — TELEPHONE ENCOUNTER
Called staff at [facility]. Patient returned there from hospital stay on 4/14/20 evening. Discharge dose of sertraline 150 mg daily. Patient reported tremors/shaking on 4/15/20 and refused continuing sertraline. Today, no tremors/shaking. Patient has no other symptoms. Patient is agreeable to returning to sertraline if titrated up, or alternative medication.     Patient has HFU appointment today with Dr. Velasco, can discuss medication management at that appointment. [facility] asks for an update following that appointment to know medications. Will send an updated Med List to them.    Trena Harrison RN  04/17/20  8:15 AM

## 2020-04-18 ENCOUNTER — HOSPITAL ENCOUNTER (EMERGENCY)
Facility: CLINIC | Age: 50
Discharge: HOME | End: 2020-04-18
Attending: EMERGENCY MEDICINE | Admitting: EMERGENCY MEDICINE
Payer: MEDICAID

## 2020-04-18 ENCOUNTER — TELEPHONE (OUTPATIENT)
Dept: BEHAVIORAL HEALTH | Facility: CLINIC | Age: 50
End: 2020-04-18

## 2020-04-18 VITALS
RESPIRATION RATE: 18 BRPM | HEART RATE: 105 BPM | DIASTOLIC BLOOD PRESSURE: 80 MMHG | OXYGEN SATURATION: 97 % | SYSTOLIC BLOOD PRESSURE: 114 MMHG | TEMPERATURE: 97.8 F

## 2020-04-18 DIAGNOSIS — F10.20 UNCOMPLICATED ALCOHOL DEPENDENCE (H): ICD-10-CM

## 2020-04-18 DIAGNOSIS — F11.23 OPIOID DEPENDENCE WITH WITHDRAWAL (H): ICD-10-CM

## 2020-04-18 LAB
ALBUMIN SERPL-MCNC: 4.4 G/DL (ref 3.4–5)
ALCOHOL BREATH TEST: 0.15 (ref 0–0.01)
ALP SERPL-CCNC: 89 U/L (ref 40–150)
ALT SERPL W P-5'-P-CCNC: 289 U/L (ref 0–70)
AMPHETAMINES UR QL SCN: POSITIVE
ANION GAP SERPL CALCULATED.3IONS-SCNC: 15 MMOL/L (ref 3–14)
AST SERPL W P-5'-P-CCNC: 640 U/L (ref 0–45)
BARBITURATES UR QL: NEGATIVE
BASOPHILS # BLD AUTO: 0 10E9/L (ref 0–0.2)
BASOPHILS NFR BLD AUTO: 0.1 %
BENZODIAZ UR QL: POSITIVE
BILIRUB SERPL-MCNC: 1 MG/DL (ref 0.2–1.3)
BUN SERPL-MCNC: 60 MG/DL (ref 7–30)
CALCIUM SERPL-MCNC: 9.1 MG/DL (ref 8.5–10.1)
CANNABINOIDS UR QL SCN: NEGATIVE
CHLORIDE SERPL-SCNC: 94 MMOL/L (ref 94–109)
CO2 SERPL-SCNC: 23 MMOL/L (ref 20–32)
COCAINE UR QL: NEGATIVE
CREAT SERPL-MCNC: 1.05 MG/DL (ref 0.66–1.25)
DIFFERENTIAL METHOD BLD: ABNORMAL
EOSINOPHIL # BLD AUTO: 0 10E9/L (ref 0–0.7)
EOSINOPHIL NFR BLD AUTO: 0.1 %
ERYTHROCYTE [DISTWIDTH] IN BLOOD BY AUTOMATED COUNT: 13.7 % (ref 10–15)
ETHANOL UR QL SCN: POSITIVE
GFR SERPL CREATININE-BSD FRML MDRD: 82 ML/MIN/{1.73_M2}
GLUCOSE SERPL-MCNC: 112 MG/DL (ref 70–99)
HCT VFR BLD AUTO: 44.5 % (ref 40–53)
HGB BLD-MCNC: 15.3 G/DL (ref 13.3–17.7)
IMM GRANULOCYTES # BLD: 0 10E9/L (ref 0–0.4)
IMM GRANULOCYTES NFR BLD: 0.3 %
LYMPHOCYTES # BLD AUTO: 2.4 10E9/L (ref 0.8–5.3)
LYMPHOCYTES NFR BLD AUTO: 22 %
MCH RBC QN AUTO: 30.9 PG (ref 26.5–33)
MCHC RBC AUTO-ENTMCNC: 34.4 G/DL (ref 31.5–36.5)
MCV RBC AUTO: 90 FL (ref 78–100)
MONOCYTES # BLD AUTO: 0.7 10E9/L (ref 0–1.3)
MONOCYTES NFR BLD AUTO: 6 %
NEUTROPHILS # BLD AUTO: 8 10E9/L (ref 1.6–8.3)
NEUTROPHILS NFR BLD AUTO: 71.5 %
NRBC # BLD AUTO: 0 10*3/UL
NRBC BLD AUTO-RTO: 0 /100
OPIATES UR QL SCN: NEGATIVE
PLATELET # BLD AUTO: 386 10E9/L (ref 150–450)
POTASSIUM SERPL-SCNC: 3 MMOL/L (ref 3.4–5.3)
PROT SERPL-MCNC: 9.4 G/DL (ref 6.8–8.8)
RBC # BLD AUTO: 4.95 10E12/L (ref 4.4–5.9)
SARS-COV-2 PCR COMMENT: NORMAL
SARS-COV-2 RNA SPEC QL NAA+PROBE: NEGATIVE
SARS-COV-2 RNA SPEC QL NAA+PROBE: NORMAL
SODIUM SERPL-SCNC: 132 MMOL/L (ref 133–144)
SPECIMEN SOURCE: NORMAL
SPECIMEN SOURCE: NORMAL
WBC # BLD AUTO: 11.1 10E9/L (ref 4–11)

## 2020-04-18 PROCEDURE — 82075 ASSAY OF BREATH ETHANOL: CPT | Performed by: EMERGENCY MEDICINE

## 2020-04-18 PROCEDURE — 80307 DRUG TEST PRSMV CHEM ANLYZR: CPT | Performed by: EMERGENCY MEDICINE

## 2020-04-18 PROCEDURE — 25000132 ZZH RX MED GY IP 250 OP 250 PS 637: Performed by: EMERGENCY MEDICINE

## 2020-04-18 PROCEDURE — 85025 COMPLETE CBC W/AUTO DIFF WBC: CPT | Performed by: EMERGENCY MEDICINE

## 2020-04-18 PROCEDURE — 80053 COMPREHEN METABOLIC PANEL: CPT | Performed by: EMERGENCY MEDICINE

## 2020-04-18 PROCEDURE — 99284 EMERGENCY DEPT VISIT MOD MDM: CPT | Mod: Z6 | Performed by: EMERGENCY MEDICINE

## 2020-04-18 PROCEDURE — 80320 DRUG SCREEN QUANTALCOHOLS: CPT | Performed by: EMERGENCY MEDICINE

## 2020-04-18 PROCEDURE — 99284 EMERGENCY DEPT VISIT MOD MDM: CPT | Performed by: EMERGENCY MEDICINE

## 2020-04-18 PROCEDURE — 87635 SARS-COV-2 COVID-19 AMP PRB: CPT | Performed by: EMERGENCY MEDICINE

## 2020-04-18 RX ORDER — POTASSIUM CHLORIDE 1.5 G/1.58G
40 POWDER, FOR SOLUTION ORAL ONCE
Status: COMPLETED | OUTPATIENT
Start: 2020-04-18 | End: 2020-04-18

## 2020-04-18 RX ORDER — LANOLIN ALCOHOL/MO/W.PET/CERES
100 CREAM (GRAM) TOPICAL DAILY
Status: DISCONTINUED | OUTPATIENT
Start: 2020-04-18 | End: 2020-04-18 | Stop reason: HOSPADM

## 2020-04-18 RX ORDER — FOLIC ACID 1 MG/1
1 TABLET ORAL DAILY
Status: DISCONTINUED | OUTPATIENT
Start: 2020-04-18 | End: 2020-04-18 | Stop reason: HOSPADM

## 2020-04-18 RX ORDER — HYDROCORTISONE 2.5 %
CREAM (GRAM) TOPICAL 2 TIMES DAILY
Status: ON HOLD | COMMUNITY
End: 2020-08-19

## 2020-04-18 RX ORDER — MULTIPLE VITAMINS W/ MINERALS TAB 9MG-400MCG
1 TAB ORAL DAILY
Status: DISCONTINUED | OUTPATIENT
Start: 2020-04-18 | End: 2020-04-18 | Stop reason: HOSPADM

## 2020-04-18 RX ORDER — FOLIC ACID 1 MG/1
1 TABLET ORAL DAILY
COMMUNITY
End: 2020-07-20

## 2020-04-18 RX ORDER — METHADONE HYDROCHLORIDE 5 MG/5ML
90 SOLUTION ORAL ONCE
Status: COMPLETED | OUTPATIENT
Start: 2020-04-18 | End: 2020-04-18

## 2020-04-18 RX ORDER — AMMONIUM LACTATE 12 G/100G
CREAM TOPICAL 2 TIMES DAILY
Status: ON HOLD | COMMUNITY
End: 2020-08-19

## 2020-04-18 RX ORDER — DIAZEPAM 5 MG
5-20 TABLET ORAL EVERY 30 MIN PRN
Status: DISCONTINUED | OUTPATIENT
Start: 2020-04-18 | End: 2020-04-18 | Stop reason: HOSPADM

## 2020-04-18 RX ADMIN — METHADONE HYDROCHLORIDE 90 MG: 5 SOLUTION ORAL at 17:32

## 2020-04-18 RX ADMIN — DIAZEPAM 10 MG: 5 TABLET ORAL at 15:49

## 2020-04-18 RX ADMIN — THERA TABS 1 TABLET: TAB at 15:49

## 2020-04-18 RX ADMIN — THIAMINE HCL TAB 100 MG 100 MG: 100 TAB at 15:49

## 2020-04-18 RX ADMIN — NICOTINE POLACRILEX 8 MG: 4 GUM, CHEWING ORAL at 17:36

## 2020-04-18 RX ADMIN — FOLIC ACID 1 MG: 1 TABLET ORAL at 15:49

## 2020-04-18 RX ADMIN — POTASSIUM CHLORIDE 40 MEQ: 1.5 POWDER, FOR SOLUTION ORAL at 14:21

## 2020-04-18 ASSESSMENT — ENCOUNTER SYMPTOMS
ADENOPATHY: 0
HEADACHES: 0
BRUISES/BLEEDS EASILY: 0
SLEEP DISTURBANCE: 0
CONFUSION: 0
FEVER: 0
ABDOMINAL PAIN: 0
HALLUCINATIONS: 0
COLOR CHANGE: 0
EYE REDNESS: 0
CHILLS: 0
SHORTNESS OF BREATH: 0
DIFFICULTY URINATING: 0
POLYDIPSIA: 0
VOMITING: 0
ARTHRALGIAS: 0
NECK STIFFNESS: 0
NAUSEA: 0

## 2020-04-18 NOTE — ED NOTES
"Patient was signed out to me at change of shift by Dr. Troy.  Please see his note for initial details.  Briefly, this is a 49-year-old male with a history of opiate abuse and alcohol abuse who presented to the emergency department in withdrawal and requesting detox.  Plan per Dr. Troy was to admit to 3A detox.  One complicating factor was that when the patient initially arrived in the emergency department he answered \"yes \"to all of the COVID screening questions.  When Dr. Troy asked these questions he denied having the symptoms.  Dr. Troy did speak to [facility] about this and the request was that they get a COVID swab on this patient.  Dr. Troy had been told by the lab that we would get results within a few hours.  I subsequently received a phone call from lab indicating that this is not a test that will result in a few hours and their average turnaround time is 24 hours.  He therefore cannot be admitted to 3A and would need to go to medicine service.    In the meantime the patient did begin to complain of opiate withdrawal.  Reportedly had not had his methadone in several days.  Our ED pharmacist did call and verify his dose of 90 mg of methadone.  I did give him 1 dose of methadone, 90 mg here in the ED.  He subsequently indicated that he would like to be discharged and is no longer interested in detox.  Patient was then discharged.     Tori Thompson MD  04/18/20 6910    "

## 2020-04-18 NOTE — ED NOTES
Pt has a lot of gum in his mouth. He refuses to spit it out. He has been offered a patch for nicotine but he has refused to take it

## 2020-04-18 NOTE — ED PROVIDER NOTES
"    [facility] EMERGENCY DEPARTMENT ([facility])    4/18/20      History     Chief Complaint   Patient presents with     Addiction Problem     Pt has been drinking and has not taken his methadone x 1 week     HPI  Hollis Barclay is a 49 year old male with a complicated past medical history of social anxiety disorder, postconcussive encephalopathy, alcoholism, opioid dependence (on methadone treatment), ABRAHAM, pneumococcal sepsis, and severe recurrent major depression who presents to the Emergency Department for evaluation of alcohol intoxication, and methadone withdrawal.  Patient states that he has not been taking his methadone for 5-6 days.  When asked why he has stopped taking this he states that he \"is not sure\".  Patient states he has been on methadone treatment for roughly a year and is currently on 100 mg.  He denies any other drug use for the past 5 days but states he has been drinking alcohol, heavily.  Patient states he has been drinking 6- 1/2 gallons of of vodka over the last 5 days.  Last drink was last night.  No history of seizures from alcohol withdrawal.  The patient denies any history of diabetes, or hypertension.  Patient does endorse wavering thoughts of suicide, but denies homicidal ideation.  He denies any URI symptoms including fevers, or cough.    Past Medical History  Past Medical History:   Diagnosis Date     Alcoholism in remission (H)      Bilateral ACL tear      Chronic back pain      Closed left arm fracture 1985     H/O shoulder surgery      Post concussive encephalopathy      Social anxiety disorder      History reviewed. No pertinent surgical history.  ammonium lactate (AMLACTIN) 12 % external cream  folic acid (FOLVITE) 1 MG tablet  hydrocortisone 2.5 % cream  methadone (DOLPHINE) 5 MG/5ML solution  nicotine polacrilex (NICORETTE) 4 MG gum  QUEtiapine (SEROQUEL) 400 MG tablet  sertraline (ZOLOFT) 50 MG tablet  tiZANidine (ZANAFLEX) 4 MG tablet  acetaminophen (TYLENOL) 325 MG " tablet  gabapentin (NEURONTIN) 400 MG capsule  hydrOXYzine (ATARAX) 10 MG tablet      Allergies   Allergen Reactions     Nsaids      Past medical history, past surgical history, medications, and allergies were reviewed with the patient. Additional pertinent items: None    Family History  No family history on file.  Family history was reviewed with the patient. Additional pertinent items: None    Social History  Social History     Tobacco Use     Smoking status: Former Smoker     Types: Dip, chew, snus or snuff     Smokeless tobacco: Former User     Types: Chew   Substance Use Topics     Alcohol use: Yes     Drug use: Not Currently      Social history was reviewed with the patient. Additional pertinent items: None    Review of Systems   Constitutional: Negative for chills and fever.   HENT: Negative for congestion.    Eyes: Negative for redness.   Respiratory: Negative for shortness of breath.    Cardiovascular: Negative for chest pain.   Gastrointestinal: Negative for abdominal pain, nausea and vomiting.   Endocrine: Negative for polydipsia and polyuria.   Genitourinary: Negative for difficulty urinating.   Musculoskeletal: Negative for arthralgias and neck stiffness.   Skin: Negative for color change.   Neurological: Negative for headaches.   Hematological: Negative for adenopathy. Does not bruise/bleed easily.   Psychiatric/Behavioral: Positive for suicidal ideas. Negative for behavioral problems, confusion, hallucinations and sleep disturbance.   All other systems reviewed and are negative.    A complete review of systems was performed with pertinent positives and negatives noted in the HPI, and all other systems negative.    Physical Exam   BP: (!) 155/99  Pulse: 104  Heart Rate: 91  Temp: 97.5  F (36.4  C)  Resp: 18  SpO2: 95 %  Physical Exam  Vitals signs and nursing note reviewed.   Constitutional:       General: He is not in acute distress.     Appearance: Normal appearance. He is not diaphoretic.   HENT:       Head: Normocephalic and atraumatic.      Nose: Nose normal.      Mouth/Throat:      Mouth: Mucous membranes are moist.      Pharynx: Oropharynx is clear.   Eyes:      General: No scleral icterus.     Extraocular Movements: Extraocular movements intact.      Conjunctiva/sclera: Conjunctivae normal.   Neck:      Musculoskeletal: Normal range of motion and neck supple.   Cardiovascular:      Rate and Rhythm: Normal rate.   Pulmonary:      Effort: No respiratory distress.      Breath sounds: Normal breath sounds.   Musculoskeletal: Normal range of motion.   Skin:     Coloration: Skin is not pale.      Findings: No erythema or rash.   Neurological:      General: No focal deficit present.      Mental Status: He is alert and oriented to person, place, and time.      Sensory: No sensory deficit.      Coordination: Coordination normal.   Psychiatric:         Mood and Affect: Mood normal.         Thought Content: Thought content normal.      Comments: Passive suicidal thoughts without plan.  No homicidal ideations.  Linear thought process.  No signs of active hallucinations.         ED Course   12:42 PM  The patient was seen and examined by Yas Troy MD in Room ED05.     Procedures                         Results for orders placed or performed during the hospital encounter of 04/18/20   Comprehensive metabolic panel     Status: Abnormal   Result Value Ref Range    Sodium 132 (L) 133 - 144 mmol/L    Potassium 3.0 (L) 3.4 - 5.3 mmol/L    Chloride 94 94 - 109 mmol/L    Carbon Dioxide 23 20 - 32 mmol/L    Anion Gap 15 (H) 3 - 14 mmol/L    Glucose 112 (H) 70 - 99 mg/dL    Urea Nitrogen 60 (H) 7 - 30 mg/dL    Creatinine 1.05 0.66 - 1.25 mg/dL    GFR Estimate 82 >60 mL/min/[1.73_m2]    GFR Estimate If Black >90 >60 mL/min/[1.73_m2]    Calcium 9.1 8.5 - 10.1 mg/dL    Bilirubin Total 1.0 0.2 - 1.3 mg/dL    Albumin 4.4 3.4 - 5.0 g/dL    Protein Total 9.4 (H) 6.8 - 8.8 g/dL    Alkaline Phosphatase 89 40 - 150 U/L     (H) 0  - 70 U/L     (HH) 0 - 45 U/L   CBC with platelets differential     Status: Abnormal   Result Value Ref Range    WBC 11.1 (H) 4.0 - 11.0 10e9/L    RBC Count 4.95 4.4 - 5.9 10e12/L    Hemoglobin 15.3 13.3 - 17.7 g/dL    Hematocrit 44.5 40.0 - 53.0 %    MCV 90 78 - 100 fl    MCH 30.9 26.5 - 33.0 pg    MCHC 34.4 31.5 - 36.5 g/dL    RDW 13.7 10.0 - 15.0 %    Platelet Count 386 150 - 450 10e9/L    Diff Method Automated Method     % Neutrophils 71.5 %    % Lymphocytes 22.0 %    % Monocytes 6.0 %    % Eosinophils 0.1 %    % Basophils 0.1 %    % Immature Granulocytes 0.3 %    Nucleated RBCs 0 0 /100    Absolute Neutrophil 8.0 1.6 - 8.3 10e9/L    Absolute Lymphocytes 2.4 0.8 - 5.3 10e9/L    Absolute Monocytes 0.7 0.0 - 1.3 10e9/L    Absolute Eosinophils 0.0 0.0 - 0.7 10e9/L    Absolute Basophils 0.0 0.0 - 0.2 10e9/L    Abs Immature Granulocytes 0.0 0 - 0.4 10e9/L    Absolute Nucleated RBC 0.0    Drug abuse screen 6 urine (chem dep)     Status: Abnormal   Result Value Ref Range    Amphetamine Qual Urine Positive (A) NEG^Negative    Barbiturates Qual Urine Negative NEG^Negative    Benzodiazepine Qual Urine Positive (A) NEG^Negative    Cannabinoids Qual Urine Negative NEG^Negative    Cocaine Qual Urine Negative NEG^Negative    Ethanol Qual Urine Positive (A) NEG^Negative    Opiates Qualitative Urine Negative NEG^Negative   COVID-19 Virus (Coronavirus) by PCR Nasopharyngeal     Status: None    Specimen: Nasopharyngeal   Result Value Ref Range    COVID-19 Virus PCR to [facility] - Source Nasopharyngeal     COVID-19 Virus PCR to [facility] - Result       Test received-See reflex to IDDL test SARS CoV2 (COVID-19) Virus RT-PCR   Alcohol breath test POCT     Status: Abnormal   Result Value Ref Range    Alcohol Breath Test 0.149 (A) 0.00 - 0.01     Medications   diazepam (VALIUM) tablet 5-20 mg (10 mg Oral Given 4/18/20 9705)   multivitamin w/minerals (THERA-VIT-M) tablet 1 tablet (1 tablet Oral Given 4/18/20 9956)   folic acid  (FOLVITE) tablet 1 mg (1 mg Oral Given 4/18/20 1549)   vitamin B1 (THIAMINE) tablet 100 mg (100 mg Oral Given 4/18/20 1549)   potassium chloride (KLOR-CON) Packet 40 mEq (40 mEq Oral Given 4/18/20 1421)        Assessments & Plan (with Medical Decision Making)   49-year-old man presenting with willingness to check into detox for management of alcohol dependence.  Patient also states that he is on daily methadone which she has not had for 5 days.  His COWS score is 5, placing him in mild opiate withdrawal category.  He will be given ODT Zofran and oral clonidine for management of opiate withdrawal.  Breathalyzed alcohol level is elevated 0.149.  Laboratory studies will be obtained and he will be admitted to detox.    Patient's laboratory studies returned with mild leukocytosis, WBC is 11,100. There is no evidence of anemia, hemoglobin is normal at 15.3.  Electrolytes show no evidence of dehydration, creatinine is normal at 1.05.  There is hypokalemia with potassium of 3.0.  This will be repleted with oral potassium chloride solution.  AST is elevated 640 and ALT is elevated 289; this is consistent with alcoholic hepatitis.  COVID 19 testing was performed per request of 3A admitting physician.      I have reviewed the nursing notes. I have reviewed the findings, diagnosis, plan and need for follow up with the patient.    New Prescriptions    No medications on file       Final diagnoses:   Uncomplicated alcohol dependence (H)   Opioid dependence with withdrawal (H)   Josh MAZA, am serving as a trained medical scribe to document services personally performed by Yas Troy MD, based on the provider's statements to me.      Yas MAZA MD, was physically present and have reviewed and verified the accuracy of this note documented by Josh Ann.      Emergency Medicine   [facility], [place], EMERGENCY DEPARTMENT  4/18/2020     Yas Troy MD  04/18/20 0810

## 2020-04-18 NOTE — ED NOTES
Patient indicates he no longer would like to be admitted to the hospital. Patient requests to go home following the dose of Methadone. Provider notified.

## 2020-04-18 NOTE — PHARMACY-CONSULT NOTE
Methadone Clinic Information Note    Clinic Name: [facility].   Clinic Location (city): [place], [place]   Received methadone 90mg po qday 3/28-4/1; was hospitalized at [facility] 4/1-4/14.  Received 90mg dose at group home on 4/15 but did not return to clinic for further doses.       Jessica Monet, PharmD, MPH, BCPS

## 2020-04-18 NOTE — ED AVS SNAPSHOT
[facility], [place], Emergency Department  [address]  [facility] [address]  Phone:  [phone]  Fax:  [phone]                                    Hollis Barclay   MRN: [id number]    Department:  [facility], Emergency Department   Date of Visit:  4/18/2020           After Visit Summary Signature Page    I have received my discharge instructions, and my questions have been answered. I have discussed any challenges I see with this plan with the nurse or doctor.    ..........................................................................................................................................  Patient/Patient Representative Signature      ..........................................................................................................................................  Patient Representative Print Name and Relationship to Patient    ..................................................               ................................................  Date                                   Time    ..........................................................................................................................................  Reviewed by Signature/Title    ...................................................              ..............................................  Date                                               Time          22EPIC Rev 08/18

## 2020-04-18 NOTE — ED NOTES
ED to Behavioral Floor Handoff    SITUATION  Hollis Barclay is a 49 year old male who speaks English and lives in a home alone The patient arrived in the ED by ambulance from home with a complaint of Addiction Problem (Pt has been drinking and has not taken his methadone x 1 week)  .The patient's current symptoms started/worsened 1 month(s) ago and during this time the symptoms have increased.   In the ED, pt was diagnosed with   Final diagnoses:   Uncomplicated alcohol dependence (H)   Opioid dependence with withdrawal (H)        Initial vitals were: BP: (!) 155/99  Pulse: 104  Heart Rate: 91  Temp: 97.5  F (36.4  C)  Resp: 18  SpO2: 95 %   --------  Is the patient diabetic? No   If yes, last blood glucose? --     If yes, was this treated in the ED? --  --------  Is the patient inebriated (ETOH) Yes or Impaired on other substances? Yes  MSSA done? Yes  Last MSSA score: --    Were withdrawal symptoms treated? Yes  Does the patient have a seizure history? Yes. If yes, date of most recent seizure--  --------  Is the patient patient experiencing suicidal ideation? denies current or recent suicidal ideation     Homicidal ideation? denies current or recent homicidal ideation or behaviors.    Self-injurious behavior/urges? denies current or recent self injurious behavior or ideation.  ------  Was pt aggressive in the ED No  Was a code called No  Is the pt now cooperative? Yes  -------  Meds given in ED:   Medications   diazepam (VALIUM) tablet 5-20 mg (10 mg Oral Given 4/18/20 1549)   multivitamin w/minerals (THERA-VIT-M) tablet 1 tablet (1 tablet Oral Given 4/18/20 1549)   folic acid (FOLVITE) tablet 1 mg (1 mg Oral Given 4/18/20 1549)   vitamin B1 (THIAMINE) tablet 100 mg (100 mg Oral Given 4/18/20 1549)   potassium chloride (KLOR-CON) Packet 40 mEq (40 mEq Oral Given 4/18/20 1421)      Family present during ED course? No  Family currently present? No    BACKGROUND  Does the patient have a cognitive impairment or  "developmental disability? No  Allergies:   Allergies   Allergen Reactions     Nsaids    .   Social demographics are   Social History     Socioeconomic History     Marital status:      Spouse name: None     Number of children: None     Years of education: None     Highest education level: None   Occupational History     None   Social Needs     Financial resource strain: None     Food insecurity     Worry: None     Inability: None     Transportation needs     Medical: None     Non-medical: None   Tobacco Use     Smoking status: Former Smoker     Types: Dip, chew, snus or snuff     Smokeless tobacco: Former User     Types: Chew   Substance and Sexual Activity     Alcohol use: Yes     Drug use: Not Currently     Sexual activity: None   Lifestyle     Physical activity     Days per week: None     Minutes per session: None     Stress: None   Relationships     Social connections     Talks on phone: None     Gets together: None     Attends Zoroastrianism service: None     Active member of club or organization: None     Attends meetings of clubs or organizations: None     Relationship status: None     Intimate partner violence     Fear of current or ex partner: None     Emotionally abused: None     Physically abused: None     Forced sexual activity: None   Other Topics Concern     None   Social History Narrative    1/31/2020:  Jose M is now living in an apartment with one roommate in [place].  He is happy with his current living situation.        11/4/2019: He is living in a full house with 8 people, which has been stressful.  He had an interview for an apartment 3 weeks ago, and expects to hear from them soon.        10/11/2019    Jose M is , and has 2 living children who are adults.  He had a daughter, who passed away.  He developed issues with drugs and alcohol in his late 30s.  He is now in [facility] residence with Nurse assistance. Previously was receiving treatment at [facility], and is also in a methadone program " at [facility].  He grew up in [place], right outside of [place].  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  He is currently receiving treatment at a [facility] program, and will then move to a [facility] house in the next few months.        ASSESSMENT  Labs results   Labs Ordered and Resulted from Time of ED Arrival Up to the Time of Departure from the ED   COMPREHENSIVE METABOLIC PANEL - Abnormal; Notable for the following components:       Result Value    Sodium 132 (*)     Potassium 3.0 (*)     Anion Gap 15 (*)     Glucose 112 (*)     Urea Nitrogen 60 (*)     Protein Total 9.4 (*)      (*)      (*)     All other components within normal limits   CBC WITH PLATELETS DIFFERENTIAL - Abnormal; Notable for the following components:    WBC 11.1 (*)     All other components within normal limits   DRUG ABUSE SCREEN 6 CHEM DEP URINE ([facility]) - Abnormal; Notable for the following components:    Amphetamine Qual Urine Positive (*)     Benzodiazepine Qual Urine Positive (*)     Ethanol Qual Urine Positive (*)     All other components within normal limits   ALCOHOL BREATH TEST POCT - Abnormal; Notable for the following components:    Alcohol Breath Test 0.149 (*)     All other components within normal limits   COVID-19 VIRUS (CORONAVIRUS) BY PCR   SARS-COV-2 (COVID-19) VIRUS RT-PCR   MSSA SCORE AND VS   NOTIFY      Imaging Studies: No results found for this or any previous visit (from the past 24 hour(s)).   Most recent vital signs BP (!) 143/95   Pulse 104   Temp 97.5  F (36.4  C) (Oral)   Resp 16   SpO2 97%    Abnormal labs/tests/findings requiring intervention:---   Pain control: poor  Nausea control: good    RECOMMENDATION  Are any infection precautions needed (MRSA, VRE, etc.)? yes If yes, what infection? -- Patient is currently covid ruleout due to self reported sxs  ---  Does the patient have mobility issues? independently. If yes, what device does  the pt use? ---  ---  Is patient on 72 hour hold or commitment? No If on 72 hour hold, have hold and rights been given to patient? N/A  Are admitting orders written if after 10 p.m. ?N/A  Tasks needing to be completed:---     Deja Au RN   [facility]--    [phone] [place] ED   [phone] [facility]

## 2020-04-18 NOTE — ED NOTES
I have performed an in person assessment of the patient. Based on this assessment the patient no longer requires a one on one attendant at this point in time.    Yas Troy MD  12:31 PM  April 18, 2020         Yas Troy MD  04/18/20 7016

## 2020-04-18 NOTE — ED NOTES
Sepsis BPA alert has fired and lactate was ordered No   If not, the reason was provider confirmed patient was not septic and Dr. Thompson was notified.  Vitals 24 hr (last day)     Date/Time Temp Resp Pulse Heart Rate Pulse/Heart Rate Source BP    04/18/20 1658  97.7 (36.5)  24  120  --  Monitor  114/82    04/18/20 1523  97.5 (36.4)  16  104  --  Monitor  (!) 143/95    04/18/20 1416  98 (36.7)  16  --  93  --  113/80    04/18/20 1152  97.5 (36.4)  18  --  91  --  (!) 155/99            WBC   Date Value Ref Range Status   04/18/2020 11.1 (H) 4.0 - 11.0 10e9/L Final

## 2020-04-18 NOTE — PHARMACY-ADMISSION MEDICATION HISTORY
Admission Medication History status for the 4/18/2020 admission is complete.  See EPIC admission navigator for Prior to Admission medications.    Medication history sources:  MAR from [facility] ([phone]), Care Everywhere ([facility]), Dispense Report, Chart Review    Medication history source reliability: Good - MAR complete but patient was hospitalized at [facility] 4/1-4/14/20 so missing administration information from that time    Medication adherence:  Poor - patient frequently refuses medications at group home    Changes made to PTA medication list (reason)  Added: Ammonium Lactate, Hydrocortisone Cream  Deleted:     -Oxycodone 5 mg, take 2 tablets every 6 hours prn pain (discontinued per MAR)    -Methocarbamol 750 mg three times daily (not on MAR, only 5 days supply prescribed after March hospitalization per 3/11/20 Discharge Summary)   Changed: None    Additional medication history information (including reliability of information, actions taken by pharmacist):   -Per MAR, patient doses methadone at the methadone clinic on Monday, Thursday, and Friday and doses at the facility Sunday, Tuesday, Wednesday, and Saturday.   -Called methadone clinic ([facility]) to verify dose, patient was on 90 mg daily from 3/28-4/1 but this is their last documented dose. Patient was likely receiving doses during hospitalization from 4/1-4/14 and had a 90 mg dose administered at group home on 4/15. Patient was documented absent from methadone clinic on 4/16 and 4/17.   -Per 4/17 Telephone Encounter by MASOOD Harrison, patient was discharged from hospital with a dose of sertraline 150 mg daily. Patient reported tremors/shaking on 4/15/20 and refused 150 mg dose, but MAR shows took 50 mg dose. Patient is agreeable to returning to sertraline if titrated up, or alternative medication.   -Discharge Summary from [facility] on 4/14/20 also included discharge orders for:    Vitamin D3 1000  units daily     Hydroxyzine 50 mg every 4 hours prn anxiety   Trazodone 50 mg at bedtime as needed for sleep    Urea 20% cream, apply to skin at bedtime  However, these medications were not included on group home MAR.   -PRN orders for acetaminophen, hydroxyzine, and nicotine gum are on MAR but last doses not included. Only PRN medication administered in April was the nicotine gum.   -Patient received oxycodone discontinued on 4/15/20 per MAR. Patient received prescription for 12 tablets on 3/12/20 after breaking a rib so likely ran out well before 4/15.     Time spent in this activity: 1 hour    Medication history completed by: CAITLIN Sotelo    Prior to Admission medications    Medication Sig Last Dose Taking? Auth Provider   ammonium lactate (AMLACTIN) 12 % external cream Apply topically 2 times daily 4/18/2020 at Unknown time Yes Unknown, Entered By History   folic acid (FOLVITE) 1 MG tablet Take 1 mg by mouth daily 4/16/2020 at Unknown time Yes Reported, Patient   hydrocortisone 2.5 % cream Apply topically 2 times daily 4/15/2020 at Unknown time Yes Unknown, Entered By History   methadone (DOLPHINE) 5 MG/5ML solution Take 90 mg by mouth daily From [facility] program  4/15/2020 at Unknown time Yes Unknown, Entered By History   nicotine polacrilex (NICORETTE) 4 MG gum Place 4-8 mg inside cheek every hour as needed (nicotine cravings) Max 22 pieces per day. 4/18/2020 at Unknown time Yes Unknown, Entered By History   QUEtiapine (SEROQUEL) 400 MG tablet Take 1 tablet (400 mg) by mouth At Bedtime 4/16/2020 at Unknown time Yes Jr Giron MD   sertraline (ZOLOFT) 50 MG tablet Take 50 mg by mouth every morning  4/15/2020 at Unknown time Yes Reported, Patient   tiZANidine (ZANAFLEX) 4 MG tablet Take 1 tablet (4 mg) by mouth 2 times daily 4/16/2020 at Unknown time Yes Jr Giron MD   acetaminophen (TYLENOL) 325 MG tablet Take 2 tablets (650 mg) by mouth every 4 hours as needed for mild pain Unknown at  Unknown time  Reed Cunningham NP   gabapentin (NEURONTIN) 400 MG capsule Take 3 capsules (1,200 mg) by mouth 3 times daily 4/16/2020 at Unknown time  Jr Giron MD   hydrOXYzine (ATARAX) 10 MG tablet Take 1 tablet (10 mg) by mouth every 4 hours as needed for anxiety Unknown at Unknown time  Reed Cunningham NP

## 2020-04-18 NOTE — TELEPHONE ENCOUNTER
"Pt presents in  ed seeking detox for etoh.  B: pt drinking daily to intoxication, blackouts. Pt states drinking 6 gallons (approx 24 litres) of vodka past 4 days.  Unable to verify this amount. Pt last drink was \"last Night\". Breathalyzer 0.149.  Pt also states 100mg methadone from [facility] clinic.  States no mthadone past 5 days due to etoh binge. Denies mh symptoms.   A: etoh detox. Cooperative vol.  R:   -- Pt has been flagged as positive for Covid screening. ANS notified, states will need to be tested and ruled out before admit to detox.   ED notified    "

## 2020-04-18 NOTE — DISCHARGE INSTRUCTIONS
You came to the emergency department today requesting detox.  You have subsequently requested discharge.  We recommend that you follow-up with your established clinics.  If you are interested in detox in the future, call the phone number listed below to find out if there are any detox beds available.    [facility] Intake: [phone]

## 2020-04-20 ENCOUNTER — TELEPHONE (OUTPATIENT)
Dept: EMERGENCY MEDICINE | Facility: CLINIC | Age: 50
End: 2020-04-20

## 2020-04-20 NOTE — TELEPHONE ENCOUNTER
[facility] Emergency Department Lab result notification     Patient/parent Name  Tres/patient advocate for [facility]     Reason for call  Requesting lab result    Lab Result  Coronavirus (COVID-19) Notification    Patient notified of Negative COVID-19.    Patient can discontinue Quarantine and is free to resume normal activities.  If Patient has questions that you are not able to answer they can contact PCP or MD hotline ([phone])    Please Contact your PCP clinic or return to the Emergency department if your:  Symptoms worsen or other concerning symptom's.    Current symptoms  10:39AM: Requesting Covid 19 results, has no other questions.  Recommendations/Instructions  Results given.    [RN Name]  Khloe Rodriguez RN  [facility] RN  Lung Nodule and ED Lab Result RN  Epic pool (ED late result f/u RN): P [id number]  FV INCIDENTAL RADIOLOGY F/U NURSES: P [id number]  # [phone]      Copy of Lab result   SARS-CoV-2 COVID-19 Virus (Coronavirus) RT-PCR Nasopharyngeal [[id number]] (Order [id number])   Order Requisition     SARS-CoV-2 COVID-19 Virus (Coronavirus) RT-PCR Nasopharyngeal (Order #[id number]) on 4/18/20    Exam Information     Exam Date  Exam Time  Accession #  Results     4/18/20   2:05 PM  [id number]     Specimen Information:  Nasopharyngeal          Component  Collected  Lab    SARS-CoV-2 Virus Specimen Source  04/18/2020  2:05 PM  225    Nasopharyngeal    SARS-CoV-2 PCR Result  04/18/2020  2:05 PM  225    NEGATIVE    Comment:    SARS-CoV2 (COVID-19) RNA not detected, presumed negative.    SARS-CoV-2 PCR Comment  04/18/2020  2:05 PM  225    The Simplexa COVID-19 direct PCR assay by [employer] on the [employer] instrument has been   given Emergency Use Authorization (EUA) for the in vitro qualitative detection of RNA from    the SARS-CoV2 virus in nasopharyngeal swabs in viral transport medium from patients with   signs and symptoms of infection who are suspected of  COVID-19. Performance is unknown in   asymptomatic patients.    Comment:    Nasopharyngeal specimen is the preferred choice for swab-based SARS-CoV2   testing. Sample types other than nasopharyngeal swabs were not included in the    EUA. The test result from other sample types must be integrated into clinical    context for interpretation.   A negative result does not rule out the presence of real-time PCR inhibitors   in the specimen or COVID-19 RNA in concentration below the limit of detection   of the assay. The possibility of a false negative should be considered if the   patients recent exposure or clinical presentation suggests COVID-19.   Additional testing or repeat testing requires consultation with the   laboratory.   Positive results should also be reported in accordance with local, state, and   federal regulations. This test was validated by [facility]. This laboratory is certified under the   Clinical Laboratory Improvement Amendments of 1988 (CLIA-88) as qualified to   perform high complexity clinical laboratory testing.

## 2020-04-21 ENCOUNTER — TELEPHONE (OUTPATIENT)
Dept: BEHAVIORAL HEALTH | Facility: CLINIC | Age: 50
End: 2020-04-21

## 2020-04-21 NOTE — TELEPHONE ENCOUNTER
[facility] rec'd request from pt's PCP to follow up with pt after recent ED visit.     [facility] contacted Ray, no answer, left  requesting he return message by contacting [facility] and request to speak with John (did not leave specific medical information or nature of services on vm).    Shawn Ullrich [facility], [facility]  Behavioral health Clinician

## 2020-04-22 ENCOUNTER — MEDICAL CORRESPONDENCE (OUTPATIENT)
Dept: HEALTH INFORMATION MANAGEMENT | Facility: CLINIC | Age: 50
End: 2020-04-22

## 2020-04-22 ENCOUNTER — TELEPHONE (OUTPATIENT)
Dept: ORTHOPEDICS | Facility: CLINIC | Age: 50
End: 2020-04-22

## 2020-04-22 NOTE — TELEPHONE ENCOUNTER
Request received from [facility] for referral to their clinic to treat patient for opioid addiction. Form for Medical Referral for [place] Restricted Recipient Program completed and faxed to [facility] [place] [facility].     Trena Harrison RN  04/22/20  10:52 AM

## 2020-04-22 NOTE — TELEPHONE ENCOUNTER
[facility] Call Center    Phone Message    May a detailed message be left on voicemail: yes     Reason for Call: Other: Request: Clinic would like to be added to the Restricted Recipient Program. Please call Alisha back with any questions or concerns.    Action Taken: Message routed to:  [facility]: PCC    Travel Screening: Not Applicable

## 2020-04-22 NOTE — PROGRESS NOTES
[facility]      OUTPATIENT PHYSICAL THERAPY EVALUATION  PLAN OF TREATMENT FOR OUTPATIENT REHABILITATION  (COMPLETE FOR INITIAL CLAIMS ONLY)  Patient's Last Name, First Name, M.I.  YOB: 1970  Hollis Barclay                           Provider's Name  [facility] Medical Record No.  [id number]                               Onset Date:  03/08/20   Start of Care Date:  03/09/20      Type:     _X_PT   ___OT   ___SLP Medical Diagnosis:  see above for PMH and diagnosis                        PT Diagnosis:  impaired functional mobility   Visits from SOC:  1   _________________________________________________________________________________  Plan of Treatment/Functional Goals    Planned Interventions:  , none  bed mobility training, gait training, transfer training, risk factor education, home program guidelines, progressive activity/exercise, strengthening,       Goals: See Physical Therapy Goals on Care Plan in [employer] electronic health record.    Therapy Frequency: 5x/week  Predicted Duration of Therapy Intervention: 3-4 days  __________Fabián Pedro CNP__________________________________________________    I CERTIFY THE NEED FOR THESE SERVICES FURNISHED UNDER        THIS PLAN OF TREATMENT AND WHILE UNDER MY CARE     (Physician co-signature of this document indicates review and certification of the therapy plan).                Certification date from: 03/09/20, Certification date to: 03/11/20    Referring Physician: Ty Miles MD             Initial Assessment        See Physical Therapy evaluation dated 03/09/20 in Epic electronic health record.

## 2020-05-27 ENCOUNTER — TELEPHONE (OUTPATIENT)
Dept: FAMILY MEDICINE | Facility: CLINIC | Age: 50
End: 2020-05-27

## 2020-05-27 NOTE — TELEPHONE ENCOUNTER
Called and LVM for MARYCRUZ Benitez at [facility]. Patient should be discharged with 30 days of medication to allow for him to remain on medication and to get into PCP for hospital follow up appointment. Patient has been in ED/hospital about 15 times since last office visit. Please call back to speak to a nurse and/or to schedule with PCP.    If his restriction requires a referral to the ordering inpatient provider, we can submit that as well/.    Trena Harrison RN  05/27/20  1:28 PM

## 2020-05-27 NOTE — TELEPHONE ENCOUNTER
Health Call Center    Phone Message    May a detailed message be left on voicemail: yes     Reason for Call: Other: Eli calling from [facility] to request some prescriptions for Jose M. Eli said that Jose M is a resctricted patient, so they needed to reach out to Dr. Giron team to prescribe the medications. Eli says Jose M is needing Gabapentin 600 mg three times a day, and also Seroquel 400 mg. Eli said that Jose M would get this from the [id number] - [place], [place] - [phone]. Please give Eli a call with any questions at her direct line of [phone]. Jsoe M will be discharging from there soon.      Action Taken: Message routed to:  [facility]: Primary Care    Travel Screening: Not Applicable

## 2020-06-08 ENCOUNTER — VIRTUAL VISIT (OUTPATIENT)
Dept: FAMILY MEDICINE | Facility: CLINIC | Age: 50
End: 2020-06-08
Payer: MEDICAID

## 2020-06-08 DIAGNOSIS — R45.851 SUICIDAL IDEATION: ICD-10-CM

## 2020-06-08 DIAGNOSIS — F10.21 ALCOHOLISM IN REMISSION (H): ICD-10-CM

## 2020-06-08 DIAGNOSIS — F33.9 EPISODE OF RECURRENT MAJOR DEPRESSIVE DISORDER, UNSPECIFIED DEPRESSION EPISODE SEVERITY (H): ICD-10-CM

## 2020-06-08 DIAGNOSIS — F40.10 SOCIAL ANXIETY DISORDER: Primary | ICD-10-CM

## 2020-06-08 RX ORDER — BUPROPION HYDROCHLORIDE 100 MG/1
200 TABLET ORAL 2 TIMES DAILY
Qty: 120 TABLET | Refills: 1 | Status: SHIPPED | OUTPATIENT
Start: 2020-06-08 | End: 2020-08-04

## 2020-06-08 RX ORDER — BUPROPION HYDROCHLORIDE 200 MG/1
200 TABLET, EXTENDED RELEASE ORAL
COMMUNITY
Start: 2020-05-28 | End: 2020-06-08

## 2020-06-08 RX ORDER — ARIPIPRAZOLE 2 MG/1
2 TABLET ORAL
COMMUNITY
Start: 2020-05-29 | End: 2020-07-20

## 2020-06-08 NOTE — PROGRESS NOTES
"Family Medicine Telephone Visit Note  Consent and telephone details are below    Subjective     Hollis Barclay is a 50 year old male who presents to clinic today for the following health issues:    Chief Complaint   Patient presents with     Hospital F/U     pt was seen for Mental Health he was in the hospital for 37 days     Refill Request     pt requesting refills on all medications     We are speaking for the first time since he became suicidal, and resorted back to drinking alcohol.  He had a emergency room visits on March 31.  Then had an inpatient psychiatric stay.  Had another emergency room visit April 18 where he had been drinking 6-1/2 gallons of vodka over the last 5 days per the emergency room report .  Had another emergency room visit on April 21.  He now states to be doing very well.  He continues to have flashes of suicidal thoughts, but these are becoming less frequent.     He is now back in \"intensive rehab services\" which is a 90 day program with hopes of transitioning back to an apartment. His counselor is \"Anastacia\", an employee of [employer]. He needs a referral for psychiatry.     Also, in need of a change Wellbutrin IR that he takes twice daily.  States that he is tapering off of methadone.      Review of Systems     States that he is otherwise in his usual state of health.  Denies any drinking of alcohol over the past 4 weeks.  As per above occasional intermittent suicidal ideation but this is becoming less frequent.    Objective:  Jose M sounds in a better mood than any other previous visit I have had with him.  Granted this was over the phone but he appeared quite calm.  He had just the 2 concerns being the referral for psychiatry and the medication.  No other complaints or concerns.  Happy to return in another month.    Assessment/Plan:  Jose M is a 51 yo with multiple medical issues who most recently had a return to alcohol use and increased depression and suicidal ideation    1. Social " "anxiety disorder    2. Suicidal ideation    3. Alcoholism in remission (H)  - MENTAL HEALTH REFERRAL  - Adult; Psychiatry; Psychiatry; [facility]: Psychiatry Clinic [phone]; We will contact you to schedule the appointment or please call with any questions    4. Episode of recurrent major depressive disorder, unspecified depression episode severity (H)    - buPROPion (WELLBUTRIN) 100 MG tablet; Take 2 tablets (200 mg) by mouth 2 times daily  Dispense: 120 tablet; Refill: 1    Follow up in 1 month    TELEPHONE VISIT DETAILS and Consent  Hollis Barclay is a 50 year old male who is being evaluated via a billable telephone visit.      The patient has been notified of following:     \"This telephone visit will be conducted via a call between you and your physician/provider. We have found that certain health care needs can be provided without the need for a physical exam.  This service lets us provide the care you need with a short phone conversation.  If a prescription is necessary we can send it directly to your pharmacy.  If lab work is needed we can place an order for that and you can then stop by our lab to have the test done at a later time.    Telephone visits are billed at different rates depending on your insurance coverage. During this emergency period, for some insurers they may be billed the same as an in-person visit.  Please reach out to your insurance provider with any questions.    If during the course of the call the physician/provider feels a telephone visit is not appropriate, you will not be charged for this service.\"    Patient has given verbal consent for Telephone visit?  Yes    How would you like to obtain your AVS? [employer]    After Visit Information:  Patient chose to view AVS via [employer]    Appointment start time: 10:25  Appointment end time: 10:40  Phone call duration:  15 minutes              "

## 2020-06-17 DIAGNOSIS — F33.9 EPISODE OF RECURRENT MAJOR DEPRESSIVE DISORDER, UNSPECIFIED DEPRESSION EPISODE SEVERITY (H): ICD-10-CM

## 2020-06-17 DIAGNOSIS — F41.1 GENERALIZED ANXIETY DISORDER: ICD-10-CM

## 2020-06-17 DIAGNOSIS — F17.200 TOBACCO USE DISORDER: Primary | ICD-10-CM

## 2020-06-17 RX ORDER — ARIPIPRAZOLE 2 MG/1
2 TABLET ORAL DAILY
Qty: 30 TABLET | Refills: 1 | OUTPATIENT
Start: 2020-06-17

## 2020-06-17 RX ORDER — HYDROXYZINE PAMOATE 25 MG/1
25 CAPSULE ORAL EVERY 6 HOURS PRN
Qty: 90 CAPSULE | Refills: 1 | Status: SHIPPED | OUTPATIENT
Start: 2020-06-17 | End: 2021-01-25

## 2020-06-17 RX ORDER — GABAPENTIN 600 MG/1
1200 TABLET ORAL 3 TIMES DAILY
Qty: 180 TABLET | Refills: 1 | Status: SHIPPED | OUTPATIENT
Start: 2020-06-17 | End: 2020-07-20

## 2020-06-17 RX ORDER — QUETIAPINE FUMARATE 400 MG/1
400 TABLET, FILM COATED ORAL AT BEDTIME
Qty: 30 TABLET | Refills: 1 | Status: SHIPPED | OUTPATIENT
Start: 2020-06-17 | End: 2020-08-26

## 2020-06-17 NOTE — TELEPHONE ENCOUNTER
[facility] Call Center    Phone Message    May a detailed message be left on voicemail: Yes    Reason for Call: Medication Refill Request    Has the patient contacted the pharmacy for the refill? Yes   Name of medication being requested: ARIPiprazole (ABILIFY) 2 MG tablet, hydrOXYzine (ATARAX) 10 MG tablet, gabapentin (NEURONTIN) 400 MG capsule, QUEtiapine (SEROQUEL) 400 MG tablet, tiZANidine (ZANAFLEX) 4 MG tablet, and nicotine polacrilex (NICORETTE) 4 MG gum   Provider who prescribed the medication: Jr Giron MD  Pharmacy: [facility] [place] - [place], [place] - [address]   Date medication is needed: ASAP    Patient would like a call back from Dr. Giron Nurse. Patient states he needs his medication and that he has been sick the last 3 days. Patient states that [facility] faxed in a request on Saturday and Monday. Patient just had a virtual visit with Dr. iGron on 06/08/2020. Please advise.     Action Taken: Message routed to:  [facility]: [facility]    Travel Screening: Not Applicable

## 2020-06-17 NOTE — TELEPHONE ENCOUNTER
I called the number provided - it is for [employer] and they gave me the residents phone number: [phone]    1. aripiprazole 2 mg q.a.m. new medication - do you want to fill it or should it go to psychiatry?  2. Vistaril 25 mg q.6 hourly p.r.n. New dose  3. Neurontin 600 mg 2 tablets 3 times a day NEW DOSE  4. nicotine gum 4 mg q.2 hourly new dose  5. quetiapine 400 mg at bedtime.       Sertraline and tizanidine taken off medication list, these was not on discharge med list.     Trena Harrison RN  06/17/20  4:08 PM

## 2020-06-18 RX ORDER — BUPROPION HYDROCHLORIDE 100 MG/1
TABLET ORAL
Qty: 120 TABLET | Refills: 1 | OUTPATIENT
Start: 2020-06-18

## 2020-06-18 NOTE — TELEPHONE ENCOUNTER
Called and spoke to pharmacy - patient received the bupropion order and still has a refill remaining. This was sent in error and they do not need bupropion at this time.     Trena Harrison RN  06/18/20  12:28 PM

## 2020-07-20 ENCOUNTER — VIRTUAL VISIT (OUTPATIENT)
Dept: FAMILY MEDICINE | Facility: CLINIC | Age: 50
End: 2020-07-20
Payer: MEDICAID

## 2020-07-20 VITALS — BODY MASS INDEX: 32.88 KG/M2 | HEIGHT: 76 IN | WEIGHT: 270 LBS

## 2020-07-20 DIAGNOSIS — F40.10 SOCIAL ANXIETY DISORDER: Primary | ICD-10-CM

## 2020-07-20 DIAGNOSIS — F41.1 GENERALIZED ANXIETY DISORDER: ICD-10-CM

## 2020-07-20 DIAGNOSIS — F39 MOOD DISORDER (H): ICD-10-CM

## 2020-07-20 DIAGNOSIS — Z12.12 SCREENING FOR COLORECTAL CANCER: ICD-10-CM

## 2020-07-20 DIAGNOSIS — G89.29 OTHER CHRONIC PAIN: ICD-10-CM

## 2020-07-20 DIAGNOSIS — F33.9 EPISODE OF RECURRENT MAJOR DEPRESSIVE DISORDER, UNSPECIFIED DEPRESSION EPISODE SEVERITY (H): ICD-10-CM

## 2020-07-20 DIAGNOSIS — F10.21 ALCOHOLISM IN REMISSION (H): ICD-10-CM

## 2020-07-20 DIAGNOSIS — Z12.11 SCREENING FOR COLORECTAL CANCER: ICD-10-CM

## 2020-07-20 RX ORDER — ARIPIPRAZOLE 2 MG/1
2 TABLET ORAL DAILY
Qty: 60 TABLET | Refills: 0 | Status: SHIPPED | OUTPATIENT
Start: 2020-07-20 | End: 2020-09-21

## 2020-07-20 RX ORDER — GABAPENTIN 600 MG/1
1200 TABLET ORAL 3 TIMES DAILY
Qty: 180 TABLET | Refills: 1 | Status: SHIPPED | OUTPATIENT
Start: 2020-07-20 | End: 2020-10-05

## 2020-07-20 RX ORDER — FOLIC ACID 1 MG/1
1 TABLET ORAL DAILY
Qty: 60 TABLET | Refills: 0 | Status: SHIPPED | OUTPATIENT
Start: 2020-07-20 | End: 2020-09-21

## 2020-07-20 ASSESSMENT — MIFFLIN-ST. JEOR: SCORE: 2181.21

## 2020-07-20 NOTE — PROGRESS NOTES
"Family Medicine Telephone Visit Note    TELEPHONE VISIT DETAILS and Consent  Can you please confirm what state you are currently located in? [place]      Hollis Barclay is a 50 year old male who is being evaluated via a billable telephone visit.        The patient has been notified of following:     \"This telephone visit will be conducted via a call between you and your physician/provider. We have found that certain health care needs can be provided without the need for a physical exam.  This service lets us provide the care you need with a short phone conversation.  If a prescription is necessary we can send it directly to your pharmacy.  If lab work is needed we can place an order for that and you can then stop by our lab to have the test done at a later time.    Telephone visits are billed at different rates depending on your insurance coverage. During this emergency period, for some insurers they may be billed the same as an in-person visit.  Please reach out to your insurance provider with any questions.    If during the course of the call the physician/provider feels a telephone visit is not appropriate, you will not be charged for this service.\"    Patient has given verbal consent for Telephone visit?  Yes    How would you like to obtain your AVS? Mail a copy    Appointment start time: 1pm  [facility]     Hollis Barclay is a 50 year old male who presents for a scheduled telephone visit today for the following health issues:    Chief Complaint   Patient presents with     medication refills     Referral     Psycharist, colonscopy      Today, needs a refill on a few medications, e.g. Abilify which works with his Seroquel for mood stabilization.  Neurontin and folic acid.    Also a referral for a colonoscopy  Requests a psychiatrist referral.    Jose M sounded very well. He's still staying at the [facility] (with [facility] others). This is on \"lock-down.\" Overall, he's getting along with the others. Has a single room. Due to the " "pandemic, the 7 individuals are all avoiding outside contact.     Moving into own apartment in 1 month.         Current Outpatient Medications   Medication Sig Dispense Refill     acetaminophen (TYLENOL) 325 MG tablet Take 2 tablets (650 mg) by mouth every 4 hours as needed for mild pain       ARIPiprazole (ABILIFY) 2 MG tablet Take 2 mg by mouth       buPROPion (WELLBUTRIN) 100 MG tablet Take 2 tablets (200 mg) by mouth 2 times daily 120 tablet 1     folic acid (FOLVITE) 1 MG tablet Take 1 mg by mouth daily       gabapentin (NEURONTIN) 600 MG tablet Take 2 tablets (1,200 mg) by mouth 3 times daily 180 tablet 1     methadone (DOLPHINE) 5 MG/5ML solution Take 90 mg by mouth daily From [facility] program        nicotine polacrilex (NICORETTE) 4 MG gum Place 1 each (4 mg) inside cheek every 2 hours as needed (nicotine cravings) 360 each 1     QUEtiapine (SEROQUEL) 400 MG tablet Take 1 tablet (400 mg) by mouth At Bedtime 30 tablet 1     ammonium lactate (AMLACTIN) 12 % external cream Apply topically 2 times daily       hydrocortisone 2.5 % cream Apply topically 2 times daily       hydrOXYzine (VISTARIL) 25 MG capsule Take 1 capsule (25 mg) by mouth every 6 hours as needed for itching (Patient not taking: Reported on 7/20/2020) 90 capsule 1         Review of Systems   Constitutional, HEENT, cardiovascular, pulmonary, gi and gu systems are negative, except as otherwise noted.      Objective    Ht 1.93 m (6' 4\")   Wt 122.5 kg (270 lb)   BMI 32.87 kg/m            Body mass index is 32.87 kg/m .  Ray sounded well.   Seemed far less anxious than in past.         Assessment/Plan:    1. Social anxiety disorder  2. Generalized anxiety disorder  3. Episode of recurrent major depressive disorder, unspecified depression episode severity (H)    - MENTAL HEALTH REFERRAL  - Adult; Outpatient Treatment; Individual/Couples/Family/Group Therapy/Health Psychology; [facility]: [facility] [phone]; We will contact you to " schedule the appointment or please call with any questions    4. Screening for colorectal cancer    - GASTROENTEROLOGY ADULT REF PROCEDURE ONLY; Future    5. Other chronic pain    - gabapentin (NEURONTIN) 600 MG tablet; Take 2 tablets (1,200 mg) by mouth 3 times daily  Dispense: 180 tablet; Refill: 1    6. Mood disorder (H)    - ARIPiprazole (ABILIFY) 2 MG tablet; Take 1 tablet (2 mg) by mouth daily  Dispense: 60 tablet; Refill: 0    7. Alcoholism in remission (H)    - folic acid (FOLVITE) 1 MG tablet; Take 1 tablet (1 mg) by mouth daily  Dispense: 60 tablet; Refill: 0    Return to clinic in about 6 weeks.   Transferred to  for scheduling.     After Visit Information:  Will print and mail AVS     Appointment start time: 1pm  Appointment end time: 1:15pm  Phone call duration:  15 minutes

## 2020-08-03 DIAGNOSIS — F33.9 EPISODE OF RECURRENT MAJOR DEPRESSIVE DISORDER, UNSPECIFIED DEPRESSION EPISODE SEVERITY (H): ICD-10-CM

## 2020-08-04 RX ORDER — BUPROPION HYDROCHLORIDE 100 MG/1
TABLET ORAL
Qty: 120 TABLET | Refills: 1 | Status: SHIPPED | OUTPATIENT
Start: 2020-08-04 | End: 2020-10-05

## 2020-08-04 NOTE — TELEPHONE ENCOUNTER
Last Office Visit: 7/20/20 with Dr. Giron  [facility] Appointments: 9/9/20  Medication last refilled: 6/8/20     Medication is being filled for 1 time refill only due to:  Keep upcoming appointment    Trena Harrison RN  08/04/20  11:00 AM

## 2020-08-19 ENCOUNTER — RESULTS ONLY (OUTPATIENT)
Dept: EMERGENCY MEDICINE | Facility: CLINIC | Age: 50
End: 2020-08-19

## 2020-08-19 ENCOUNTER — HOSPITAL ENCOUNTER (INPATIENT)
Facility: CLINIC | Age: 50
LOS: 1 days | Discharge: HOME | End: 2020-08-20
Attending: EMERGENCY MEDICINE | Admitting: HOSPITALIST
Payer: MEDICAID

## 2020-08-19 DIAGNOSIS — F10.239 ALCOHOL DEPENDENCE WITH WITHDRAWAL WITH COMPLICATION (H): ICD-10-CM

## 2020-08-19 DIAGNOSIS — K08.89 PAIN, DENTAL: ICD-10-CM

## 2020-08-19 PROBLEM — F10.939 ALCOHOL WITHDRAWAL (H): Status: ACTIVE | Noted: 2020-08-19

## 2020-08-19 LAB
ALBUMIN SERPL-MCNC: 4.3 G/DL (ref 3.4–5)
ALP SERPL-CCNC: 59 U/L (ref 40–150)
ALT SERPL W P-5'-P-CCNC: 49 U/L (ref 0–70)
ANION GAP SERPL CALCULATED.3IONS-SCNC: 3 MMOL/L (ref 3–14)
AST SERPL W P-5'-P-CCNC: 46 U/L (ref 0–45)
BILIRUB SERPL-MCNC: 0.5 MG/DL (ref 0.2–1.3)
BUN SERPL-MCNC: 14 MG/DL (ref 7–30)
CALCIUM SERPL-MCNC: 8.7 MG/DL (ref 8.5–10.1)
CHLORIDE SERPL-SCNC: 104 MMOL/L (ref 94–109)
CO2 SERPL-SCNC: 30 MMOL/L (ref 20–32)
CREAT SERPL-MCNC: 0.62 MG/DL (ref 0.66–1.25)
DIFFERENTIAL METHOD BLD: NORMAL
ERYTHROCYTE [DISTWIDTH] IN BLOOD BY AUTOMATED COUNT: 14.3 % (ref 10–15)
ETHANOL SERPL-MCNC: 0.18 G/DL
GFR SERPL CREATININE-BSD FRML MDRD: >90 ML/MIN/{1.73_M2}
GLUCOSE SERPL-MCNC: 112 MG/DL (ref 70–99)
HCT VFR BLD AUTO: 43.8 % (ref 40–53)
HGB BLD-MCNC: 14.8 G/DL (ref 13.3–17.7)
LIPASE SERPL-CCNC: 46 U/L (ref 73–393)
LYMPHOCYTES # BLD AUTO: 2.1 10E9/L (ref 0.8–5.3)
LYMPHOCYTES NFR BLD AUTO: 30 %
MAGNESIUM SERPL-MCNC: 2.3 MG/DL (ref 1.6–2.3)
MCH RBC QN AUTO: 30.5 PG (ref 26.5–33)
MCHC RBC AUTO-ENTMCNC: 33.8 G/DL (ref 31.5–36.5)
MCV RBC AUTO: 90 FL (ref 78–100)
MONOCYTES # BLD AUTO: 0.4 10E9/L (ref 0–1.3)
MONOCYTES NFR BLD AUTO: 5 %
NEUTROPHILS # BLD AUTO: 4.6 10E9/L (ref 1.6–8.3)
NEUTROPHILS NFR BLD AUTO: 65 %
PHOSPHATE SERPL-MCNC: 3.7 MG/DL (ref 2.5–4.5)
PLATELET # BLD AUTO: 331 10E9/L (ref 150–450)
POTASSIUM SERPL-SCNC: 3.9 MMOL/L (ref 3.4–5.3)
PROT SERPL-MCNC: 8.8 G/DL (ref 6.8–8.8)
RBC # BLD AUTO: 4.86 10E12/L (ref 4.4–5.9)
SODIUM SERPL-SCNC: 137 MMOL/L (ref 133–144)
WBC # BLD AUTO: 7.1 10E9/L (ref 4–11)

## 2020-08-19 PROCEDURE — 93005 ELECTROCARDIOGRAM TRACING: CPT

## 2020-08-19 PROCEDURE — 25000132 ZZH RX MED GY IP 250 OP 250 PS 637: Performed by: HOSPITALIST

## 2020-08-19 PROCEDURE — 80320 DRUG SCREEN QUANTALCOHOLS: CPT | Performed by: EMERGENCY MEDICINE

## 2020-08-19 PROCEDURE — 25800030 ZZH RX IP 258 OP 636: Performed by: EMERGENCY MEDICINE

## 2020-08-19 PROCEDURE — 99222 1ST HOSP IP/OBS MODERATE 55: CPT | Performed by: PSYCHIATRY & NEUROLOGY

## 2020-08-19 PROCEDURE — U0003 INFECTIOUS AGENT DETECTION BY NUCLEIC ACID (DNA OR RNA); SEVERE ACUTE RESPIRATORY SYNDROME CORONAVIRUS 2 (SARS-COV-2) (CORONAVIRUS DISEASE [COVID-19]), AMPLIFIED PROBE TECHNIQUE, MAKING USE OF HIGH THROUGHPUT TECHNOLOGIES AS DESCRIBED BY CMS-2020-01-R: HCPCS | Performed by: EMERGENCY MEDICINE

## 2020-08-19 PROCEDURE — 25000128 H RX IP 250 OP 636: Performed by: HOSPITALIST

## 2020-08-19 PROCEDURE — 80053 COMPREHEN METABOLIC PANEL: CPT | Performed by: EMERGENCY MEDICINE

## 2020-08-19 PROCEDURE — 99223 1ST HOSP IP/OBS HIGH 75: CPT | Mod: AI | Performed by: HOSPITALIST

## 2020-08-19 PROCEDURE — 85025 COMPLETE CBC W/AUTO DIFF WBC: CPT | Performed by: EMERGENCY MEDICINE

## 2020-08-19 PROCEDURE — 25800030 ZZH RX IP 258 OP 636: Performed by: HOSPITALIST

## 2020-08-19 PROCEDURE — 84100 ASSAY OF PHOSPHORUS: CPT | Performed by: EMERGENCY MEDICINE

## 2020-08-19 PROCEDURE — 12000000 ZZH R&B MED SURG/OB

## 2020-08-19 PROCEDURE — 96365 THER/PROPH/DIAG IV INF INIT: CPT

## 2020-08-19 PROCEDURE — 99285 EMERGENCY DEPT VISIT HI MDM: CPT | Mod: 25

## 2020-08-19 PROCEDURE — 25000128 H RX IP 250 OP 636: Performed by: EMERGENCY MEDICINE

## 2020-08-19 PROCEDURE — 25000132 ZZH RX MED GY IP 250 OP 250 PS 637: Performed by: EMERGENCY MEDICINE

## 2020-08-19 PROCEDURE — HZ2ZZZZ DETOXIFICATION SERVICES FOR SUBSTANCE ABUSE TREATMENT: ICD-10-PCS | Performed by: HOSPITALIST

## 2020-08-19 PROCEDURE — 83690 ASSAY OF LIPASE: CPT | Performed by: EMERGENCY MEDICINE

## 2020-08-19 PROCEDURE — 25000125 ZZHC RX 250: Performed by: EMERGENCY MEDICINE

## 2020-08-19 PROCEDURE — C9803 HOPD COVID-19 SPEC COLLECT: HCPCS

## 2020-08-19 PROCEDURE — 83735 ASSAY OF MAGNESIUM: CPT | Performed by: EMERGENCY MEDICINE

## 2020-08-19 RX ORDER — POTASSIUM CHLORIDE 1500 MG/1
20-40 TABLET, EXTENDED RELEASE ORAL
Status: DISCONTINUED | OUTPATIENT
Start: 2020-08-19 | End: 2020-08-20 | Stop reason: HOSPADM

## 2020-08-19 RX ORDER — POTASSIUM CHLORIDE 1.5 G/1.58G
20-40 POWDER, FOR SOLUTION ORAL
Status: DISCONTINUED | OUTPATIENT
Start: 2020-08-19 | End: 2020-08-20 | Stop reason: HOSPADM

## 2020-08-19 RX ORDER — PROCHLORPERAZINE 25 MG
25 SUPPOSITORY, RECTAL RECTAL EVERY 12 HOURS PRN
Status: DISCONTINUED | OUTPATIENT
Start: 2020-08-19 | End: 2020-08-20 | Stop reason: HOSPADM

## 2020-08-19 RX ORDER — DIAZEPAM 10 MG/2ML
5-10 INJECTION, SOLUTION INTRAMUSCULAR; INTRAVENOUS EVERY 30 MIN PRN
Status: DISCONTINUED | OUTPATIENT
Start: 2020-08-19 | End: 2020-08-20 | Stop reason: HOSPADM

## 2020-08-19 RX ORDER — AMOXICILLIN 250 MG
1 CAPSULE ORAL 2 TIMES DAILY PRN
Status: DISCONTINUED | OUTPATIENT
Start: 2020-08-19 | End: 2020-08-20 | Stop reason: HOSPADM

## 2020-08-19 RX ORDER — SODIUM CHLORIDE, SODIUM LACTATE, POTASSIUM CHLORIDE, CALCIUM CHLORIDE 600; 310; 30; 20 MG/100ML; MG/100ML; MG/100ML; MG/100ML
INJECTION, SOLUTION INTRAVENOUS CONTINUOUS
Status: DISCONTINUED | OUTPATIENT
Start: 2020-08-19 | End: 2020-08-20 | Stop reason: HOSPADM

## 2020-08-19 RX ORDER — ACETAMINOPHEN 325 MG/1
650 TABLET ORAL ONCE
Status: COMPLETED | OUTPATIENT
Start: 2020-08-19 | End: 2020-08-19

## 2020-08-19 RX ORDER — DIAZEPAM 10 MG/2ML
INJECTION, SOLUTION INTRAMUSCULAR; INTRAVENOUS
Status: DISCONTINUED
Start: 2020-08-19 | End: 2020-08-19

## 2020-08-19 RX ORDER — POTASSIUM CHLORIDE 7.45 MG/ML
10 INJECTION INTRAVENOUS
Status: DISCONTINUED | OUTPATIENT
Start: 2020-08-19 | End: 2020-08-20 | Stop reason: HOSPADM

## 2020-08-19 RX ORDER — ACETAMINOPHEN 325 MG/1
650 TABLET ORAL EVERY 4 HOURS PRN
Status: DISCONTINUED | OUTPATIENT
Start: 2020-08-19 | End: 2020-08-20 | Stop reason: HOSPADM

## 2020-08-19 RX ORDER — BISACODYL 10 MG
10 SUPPOSITORY, RECTAL RECTAL DAILY PRN
Status: DISCONTINUED | OUTPATIENT
Start: 2020-08-19 | End: 2020-08-20 | Stop reason: HOSPADM

## 2020-08-19 RX ORDER — POTASSIUM CL/LIDO/0.9 % NACL 10MEQ/0.1L
10 INTRAVENOUS SOLUTION, PIGGYBACK (ML) INTRAVENOUS
Status: DISCONTINUED | OUTPATIENT
Start: 2020-08-19 | End: 2020-08-20 | Stop reason: HOSPADM

## 2020-08-19 RX ORDER — MULTIPLE VITAMINS W/ MINERALS TAB 9MG-400MCG
1 TAB ORAL DAILY
Status: DISCONTINUED | OUTPATIENT
Start: 2020-08-19 | End: 2020-08-20 | Stop reason: HOSPADM

## 2020-08-19 RX ORDER — DIAZEPAM 5 MG
10 TABLET ORAL EVERY 30 MIN PRN
Status: DISCONTINUED | OUTPATIENT
Start: 2020-08-19 | End: 2020-08-20 | Stop reason: HOSPADM

## 2020-08-19 RX ORDER — BUPROPION HYDROCHLORIDE 100 MG/1
200 TABLET ORAL 2 TIMES DAILY
Status: DISCONTINUED | OUTPATIENT
Start: 2020-08-19 | End: 2020-08-20 | Stop reason: HOSPADM

## 2020-08-19 RX ORDER — LIDOCAINE 40 MG/G
CREAM TOPICAL
Status: DISCONTINUED | OUTPATIENT
Start: 2020-08-19 | End: 2020-08-20 | Stop reason: HOSPADM

## 2020-08-19 RX ORDER — AMOXICILLIN 250 MG
2 CAPSULE ORAL 2 TIMES DAILY PRN
Status: DISCONTINUED | OUTPATIENT
Start: 2020-08-19 | End: 2020-08-20 | Stop reason: HOSPADM

## 2020-08-19 RX ORDER — POTASSIUM CHLORIDE 29.8 MG/ML
20 INJECTION INTRAVENOUS
Status: DISCONTINUED | OUTPATIENT
Start: 2020-08-19 | End: 2020-08-19 | Stop reason: CLARIF

## 2020-08-19 RX ORDER — MAGNESIUM SULFATE HEPTAHYDRATE 40 MG/ML
4 INJECTION, SOLUTION INTRAVENOUS EVERY 4 HOURS PRN
Status: DISCONTINUED | OUTPATIENT
Start: 2020-08-19 | End: 2020-08-20 | Stop reason: HOSPADM

## 2020-08-19 RX ORDER — ARIPIPRAZOLE 2 MG/1
2 TABLET ORAL DAILY
Status: DISCONTINUED | OUTPATIENT
Start: 2020-08-19 | End: 2020-08-20 | Stop reason: HOSPADM

## 2020-08-19 RX ORDER — ONDANSETRON 4 MG/1
4 TABLET, ORALLY DISINTEGRATING ORAL EVERY 6 HOURS PRN
Status: DISCONTINUED | OUTPATIENT
Start: 2020-08-19 | End: 2020-08-20 | Stop reason: HOSPADM

## 2020-08-19 RX ORDER — ACETAMINOPHEN 650 MG/1
650 SUPPOSITORY RECTAL EVERY 4 HOURS PRN
Status: DISCONTINUED | OUTPATIENT
Start: 2020-08-19 | End: 2020-08-20 | Stop reason: HOSPADM

## 2020-08-19 RX ORDER — HYDROXYZINE PAMOATE 25 MG/1
25 CAPSULE ORAL EVERY 6 HOURS PRN
Status: DISCONTINUED | OUTPATIENT
Start: 2020-08-19 | End: 2020-08-20 | Stop reason: HOSPADM

## 2020-08-19 RX ORDER — IBUPROFEN 600 MG/1
TABLET, FILM COATED ORAL
Status: DISCONTINUED
Start: 2020-08-19 | End: 2020-08-19

## 2020-08-19 RX ORDER — FOLIC ACID 1 MG/1
1 TABLET ORAL DAILY
Status: DISCONTINUED | OUTPATIENT
Start: 2020-08-19 | End: 2020-08-20 | Stop reason: HOSPADM

## 2020-08-19 RX ORDER — METHADONE HYDROCHLORIDE 10 MG/ML
90 CONCENTRATE ORAL EVERY MORNING
Status: DISCONTINUED | OUTPATIENT
Start: 2020-08-19 | End: 2020-08-19

## 2020-08-19 RX ORDER — LANOLIN ALCOHOL/MO/W.PET/CERES
100 CREAM (GRAM) TOPICAL DAILY
Status: DISCONTINUED | OUTPATIENT
Start: 2020-08-19 | End: 2020-08-20 | Stop reason: HOSPADM

## 2020-08-19 RX ORDER — POLYETHYLENE GLYCOL 3350 17 G/17G
17 POWDER, FOR SOLUTION ORAL DAILY PRN
Status: DISCONTINUED | OUTPATIENT
Start: 2020-08-19 | End: 2020-08-20 | Stop reason: HOSPADM

## 2020-08-19 RX ORDER — GABAPENTIN 600 MG/1
1200 TABLET ORAL 3 TIMES DAILY
Status: DISCONTINUED | OUTPATIENT
Start: 2020-08-19 | End: 2020-08-20 | Stop reason: HOSPADM

## 2020-08-19 RX ORDER — QUETIAPINE FUMARATE 25 MG/1
25-50 TABLET, FILM COATED ORAL EVERY 6 HOURS PRN
Status: DISCONTINUED | OUTPATIENT
Start: 2020-08-19 | End: 2020-08-20 | Stop reason: HOSPADM

## 2020-08-19 RX ORDER — NALOXONE HYDROCHLORIDE 0.4 MG/ML
.1-.4 INJECTION, SOLUTION INTRAMUSCULAR; INTRAVENOUS; SUBCUTANEOUS
Status: DISCONTINUED | OUTPATIENT
Start: 2020-08-19 | End: 2020-08-20 | Stop reason: HOSPADM

## 2020-08-19 RX ORDER — PROCHLORPERAZINE MALEATE 5 MG
10 TABLET ORAL EVERY 6 HOURS PRN
Status: DISCONTINUED | OUTPATIENT
Start: 2020-08-19 | End: 2020-08-20 | Stop reason: HOSPADM

## 2020-08-19 RX ORDER — ONDANSETRON 2 MG/ML
4 INJECTION INTRAMUSCULAR; INTRAVENOUS EVERY 6 HOURS PRN
Status: DISCONTINUED | OUTPATIENT
Start: 2020-08-19 | End: 2020-08-20 | Stop reason: HOSPADM

## 2020-08-19 RX ORDER — METHADONE HYDROCHLORIDE 10 MG/ML
55 CONCENTRATE ORAL DAILY
Status: DISCONTINUED | OUTPATIENT
Start: 2020-08-19 | End: 2020-08-20 | Stop reason: HOSPADM

## 2020-08-19 RX ADMIN — HYDROXYZINE PAMOATE 25 MG: 25 CAPSULE ORAL at 21:07

## 2020-08-19 RX ADMIN — SODIUM CHLORIDE, POTASSIUM CHLORIDE, SODIUM LACTATE AND CALCIUM CHLORIDE: 600; 310; 30; 20 INJECTION, SOLUTION INTRAVENOUS at 06:38

## 2020-08-19 RX ADMIN — NICOTINE POLACRILEX 8 MG: 4 GUM, CHEWING BUCCAL at 15:41

## 2020-08-19 RX ADMIN — NICOTINE POLACRILEX 8 MG: 4 GUM, CHEWING BUCCAL at 16:48

## 2020-08-19 RX ADMIN — NICOTINE POLACRILEX 8 MG: 4 GUM, CHEWING BUCCAL at 19:39

## 2020-08-19 RX ADMIN — NICOTINE POLACRILEX 8 MG: 4 GUM, CHEWING BUCCAL at 13:32

## 2020-08-19 RX ADMIN — BUPROPION HYDROCHLORIDE 200 MG: 100 TABLET, FILM COATED ORAL at 15:54

## 2020-08-19 RX ADMIN — FOLIC ACID: 5 INJECTION, SOLUTION INTRAMUSCULAR; INTRAVENOUS; SUBCUTANEOUS at 05:11

## 2020-08-19 RX ADMIN — NICOTINE POLACRILEX 8 MG: 4 GUM, CHEWING BUCCAL at 21:08

## 2020-08-19 RX ADMIN — DIAZEPAM 10 MG: 5 TABLET ORAL at 10:29

## 2020-08-19 RX ADMIN — DIAZEPAM 10 MG: 5 TABLET ORAL at 19:39

## 2020-08-19 RX ADMIN — DIAZEPAM 10 MG: 5 TABLET ORAL at 08:27

## 2020-08-19 RX ADMIN — GABAPENTIN 1200 MG: 600 TABLET, FILM COATED ORAL at 15:41

## 2020-08-19 RX ADMIN — ACETAMINOPHEN 650 MG: 325 TABLET, FILM COATED ORAL at 06:43

## 2020-08-19 RX ADMIN — DIAZEPAM 10 MG: 5 TABLET ORAL at 07:08

## 2020-08-19 RX ADMIN — ACETAMINOPHEN 650 MG: 325 TABLET, FILM COATED ORAL at 18:43

## 2020-08-19 RX ADMIN — NICOTINE POLACRILEX 8 MG: 4 GUM, CHEWING BUCCAL at 18:06

## 2020-08-19 RX ADMIN — ACETAMINOPHEN 650 MG: 325 TABLET, FILM COATED ORAL at 10:23

## 2020-08-19 RX ADMIN — DIAZEPAM 10 MG: 5 INJECTION, SOLUTION INTRAMUSCULAR; INTRAVENOUS at 06:33

## 2020-08-19 RX ADMIN — GABAPENTIN 1200 MG: 600 TABLET, FILM COATED ORAL at 10:23

## 2020-08-19 RX ADMIN — SODIUM CHLORIDE, POTASSIUM CHLORIDE, SODIUM LACTATE AND CALCIUM CHLORIDE: 600; 310; 30; 20 INJECTION, SOLUTION INTRAVENOUS at 21:46

## 2020-08-19 RX ADMIN — DIAZEPAM 10 MG: 5 TABLET ORAL at 16:48

## 2020-08-19 RX ADMIN — AMOXICILLIN AND CLAVULANATE POTASSIUM 1 TABLET: 875; 125 TABLET, FILM COATED ORAL at 19:39

## 2020-08-19 RX ADMIN — NICOTINE POLACRILEX 8 MG: 4 GUM, CHEWING BUCCAL at 14:39

## 2020-08-19 RX ADMIN — DIAZEPAM 10 MG: 5 TABLET ORAL at 11:55

## 2020-08-19 RX ADMIN — METHADONE HYDROCHLORIDE 55 MG: 10 CONCENTRATE ORAL at 10:21

## 2020-08-19 RX ADMIN — QUETIAPINE 400 MG: 300 TABLET, FILM COATED ORAL at 21:07

## 2020-08-19 RX ADMIN — BUPROPION HYDROCHLORIDE 200 MG: 100 TABLET, FILM COATED ORAL at 10:24

## 2020-08-19 RX ADMIN — FOLIC ACID 1 MG: 1 TABLET ORAL at 08:27

## 2020-08-19 RX ADMIN — MULTIPLE VITAMINS W/ MINERALS TAB 1 TABLET: TAB at 08:27

## 2020-08-19 RX ADMIN — GABAPENTIN 1200 MG: 600 TABLET, FILM COATED ORAL at 21:07

## 2020-08-19 RX ADMIN — ARIPIPRAZOLE 2 MG: 2 TABLET ORAL at 10:23

## 2020-08-19 RX ADMIN — DIAZEPAM 10 MG: 5 TABLET ORAL at 14:42

## 2020-08-19 RX ADMIN — NICOTINE POLACRILEX 8 MG: 4 GUM, CHEWING BUCCAL at 12:03

## 2020-08-19 RX ADMIN — THIAMINE HCL TAB 100 MG 100 MG: 100 TAB at 08:27

## 2020-08-19 RX ADMIN — ACETAMINOPHEN 650 MG: 325 TABLET, FILM COATED ORAL at 05:11

## 2020-08-19 RX ADMIN — SODIUM CHLORIDE, POTASSIUM CHLORIDE, SODIUM LACTATE AND CALCIUM CHLORIDE: 600; 310; 30; 20 INJECTION, SOLUTION INTRAVENOUS at 14:41

## 2020-08-19 RX ADMIN — ONDANSETRON 4 MG: 2 INJECTION INTRAMUSCULAR; INTRAVENOUS at 09:18

## 2020-08-19 RX ADMIN — ACETAMINOPHEN 650 MG: 325 TABLET, FILM COATED ORAL at 14:39

## 2020-08-19 RX ADMIN — DIAZEPAM 10 MG: 5 TABLET ORAL at 21:07

## 2020-08-19 RX ADMIN — NICOTINE POLACRILEX 8 MG: 4 GUM, CHEWING BUCCAL at 10:29

## 2020-08-19 ASSESSMENT — ENCOUNTER SYMPTOMS
HEADACHES: 1
ABDOMINAL PAIN: 0
VOMITING: 1
SHORTNESS OF BREATH: 0
FEVER: 0
COUGH: 0
NAUSEA: 1

## 2020-08-19 ASSESSMENT — ACTIVITIES OF DAILY LIVING (ADL)
RETIRED_COMMUNICATION: 0-->UNDERSTANDS/COMMUNICATES WITHOUT DIFFICULTY
FALL_HISTORY_WITHIN_LAST_SIX_MONTHS: NO
ADLS_ACUITY_SCORE: 14
BATHING: 0-->INDEPENDENT
COGNITION: 0 - NO COGNITION ISSUES REPORTED
DRESS: 0-->INDEPENDENT
AMBULATION: 0-->INDEPENDENT
ADLS_ACUITY_SCORE: 14
TRANSFERRING: 0-->INDEPENDENT
ADLS_ACUITY_SCORE: 14
SWALLOWING: 0-->SWALLOWS FOODS/LIQUIDS WITHOUT DIFFICULTY
TOILETING: 0-->INDEPENDENT
RETIRED_EATING: 0-->INDEPENDENT

## 2020-08-19 ASSESSMENT — MIFFLIN-ST. JEOR: SCORE: 2290.08

## 2020-08-19 NOTE — H&P
"      [facility]    History and Physical - Hospitalist Service       Date of Admission:  8/19/2020    Assessment & Plan   Hollis Barclay is a 50 year old male admitted on 8/19/2020.  Past history of depression, anxiety, opioid dependence on methadone maintenance, alcohol abuse who presents with acute alcohol withdrawal after quitting drinking.    ADDENDUM:  Paged regarding patient reporting some drainage from painful tooth.  Will order Augmentin.  Can evaluate in AM if any imaging or OMFS input indicated.    Acute alcohol withdrawal  Alcohol dependence  Hx of alcohol abuse, achieving intermittent periods of sobriety.  Reports drinking 1/2 gallon of whiskey daily for several months with last drink at 6pm on 8/18.  Hypertensive, tremulous with tongue fasciculations and marked anxiety at arrival with admission BAL 0.18.  AST mildly elevated.  Reports history of withdrawal seizure.  - CIWA protocol with prn valium  - continue PTA gabapentin  - oral thiamine, folate, MVI  -  ml/hr   - electrolyte protocol  - he is agreeable to Psych and CD consults    Anxiety   Depression  Reports mood is \"pretty bad\" and is agreeable to Psych evaluation.  - continue PTA abilify, Wellbutrin, Seroquel pending Psych input    Opioid dependence on methadone maintenance  - resume PTA methadone once dose verified    Nicotine dependence  - continue PTA nicotine gum     Diet: Advance Diet as Tolerated: Clear Liquid Diet    DVT Prophylaxis: Pneumatic Compression Devices  Barksdale Catheter: not present  Code Status: Full Code           Disposition Plan   Expected discharge: 2 - 3 days, recommended to prior living arrangement once alcohol withdrawal resolved.  Entered: Eric Muniz MD 08/19/2020, 8:49 AM     The patient's care was discussed with the Bedside Nurse and Patient.    Eric Muniz MD  [facility]    ______________________________________________________________________    Chief Complaint   Alcohol " "withdrawal    History is obtained from the patient and chart review    History of Present Illness   Hollis Barclay is a 50 year old male who reports he has been drinking approximately 1/2 gallon of whiskey daily for the past several months.  He decided to quit and his last drink was 6 PM last evening and he now presents in acute alcohol withdrawal.  He complains of anxiety, diaphoresis, shakes and sensation of skin crawling.  He reports symptoms are similar to prior episodes of withdrawal.  He reports a history of alcohol withdrawal seizure.  He reports his mood is \"pretty bad\" and is agreeable to both psychiatric and CD counselor evaluation.  He complains of some chronic dental pain which generally improves after taking his methadone for the day and reports his pain is currently at baseline.  Also reports some nausea.  Denies any abdominal pain.  He is primarily concerned about getting his methadone dosing this morning.  His remainder review of systems otherwise negative.    Review of Systems    The 10 point Review of Systems is negative other than noted in the HPI or here.     Past Medical History    Alcohol abuse  Depression  Anxiety  Opioid dependence on methadone maintenance    Past Surgical History   Right shoulder surgery x3  Left elbow bursectomy  Appendectomy    Social History   Reports drinking 1/2 gallon whiskey daily for the past several months.  He has achieved intermittent periods of sobriety.  History of chewing tobacco, though is currently using nicotine gum.  He denies any illicit drug use.    Family History     Denies any significant family history.    Prior to Admission Medications   Prior to Admission Medications   Prescriptions Last Dose Informant Patient Reported? Taking?   ARIPiprazole (ABILIFY) 2 MG tablet 8/18/2020 at Unknown time  No Yes   Sig: Take 1 tablet (2 mg) by mouth daily   QUEtiapine (SEROQUEL) 400 MG tablet 8/18/2020 at Unknown time  No Yes   Sig: Take 1 tablet (400 mg) by mouth " At Bedtime   acetaminophen (TYLENOL) 325 MG tablet Past Week at Unknown time  No Yes   Sig: Take 2 tablets (650 mg) by mouth every 4 hours as needed for mild pain   buPROPion (WELLBUTRIN) 100 MG tablet 8/18/2020 at Unknown time  No Yes   Sig: TAKE 2 TABLETS BY MOUTH TWICE A DAY   folic acid (FOLVITE) 1 MG tablet 8/18/2020 at Unknown time  No Yes   Sig: Take 1 tablet (1 mg) by mouth daily   gabapentin (NEURONTIN) 600 MG tablet 8/18/2020 at Unknown time  No Yes   Sig: Take 2 tablets (1,200 mg) by mouth 3 times daily   hydrOXYzine (VISTARIL) 25 MG capsule 8/18/2020 at Unknown time  No Yes   Sig: Take 1 capsule (25 mg) by mouth every 6 hours as needed for itching   methadone (DOLPHINE) 5 MG/5ML solution 8/18/2020 at Unknown time  Yes Yes   Sig: Take 90 mg by mouth daily From [facility] program    nicotine polacrilex (NICORETTE) 4 MG gum   No No   Sig: Place 1 each (4 mg) inside cheek every 2 hours as needed (nicotine cravings)   Patient taking differently: Place 8 mg inside cheek every hour as needed (nicotine cravings) Max of 22 pieces per day.      Facility-Administered Medications: None     Allergies   Allergies   Allergen Reactions     Nsaids        Physical Exam   Vital Signs: Temp: 98.4  F (36.9  C) Temp src: Oral BP: (!) 142/97 Pulse: 95   Resp: 16 SpO2: 99 % O2 Device: None (Room air)    Weight: 294 lbs 0 oz    General Appearance: Well-nourished male, anxious and restless in bed  Eyes: Pupils equal, round reactive to light, sclera anicteric  HEENT: Mucous membranes moist, no neck lymphadenopathy, tongue fasciculations  Respiratory: Lungs clear to auscultation bilaterally, no wheeze or crackles, no tachypnea  Cardiovascular: Regular rhythm, normal S1/S2, no murmurs, rubs or gallops  GI: Abdomen soft, nontender, nondistended, normal bowel sounds  Lymph/Hematologic: No peripheral edema  Skin: No rash or bruising  Musculoskeletal: Extremities warm well perfused  Neurologic: Alert and appropriate, cranial nerves grossly  intact, significant tremulousness of the extremities  Psychiatric: Anxious affect    Data   Data reviewed today: I reviewed all medications, new labs and imaging results over the last 24 hours. I personally reviewed no images or EKG's today.    Recent Labs   Lab 08/19/20  0315   WBC 7.1   HGB 14.8   MCV 90         POTASSIUM 3.9   CHLORIDE 104   CO2 30   BUN 14   CR 0.62*   ANIONGAP 3   KACY 8.7   *   ALBUMIN 4.3   PROTTOTAL 8.8   BILITOTAL 0.5   ALKPHOS 59   ALT 49   AST 46*   LIPASE 46*

## 2020-08-19 NOTE — PHARMACY-ADMISSION MEDICATION HISTORY
Methadone Clinic Information Note    Clinic Name: [facility]  Clinic Location (city): [place], [place]  Phone Number: [phone]  Prescriber's Name: Dr. Raheem Gonzalez

## 2020-08-19 NOTE — ED PROVIDER NOTES
"  History   Chief Complaint  Nausea     HPI   Hollis Barclay is a 50 year old male with a history of alcoholism, opioid abuse, and suicidal ideation who presents via EMS for evaluation of nausea.  The patient reports that he was last sober 18 months ago and was drinking up until 1800 this evening; however, he ran out of alcohol. Since then, he has been experiencing nausea, vomiting, and a severe headache. The patient also states that he  busted a tooth.   He denies planning on continuing to drink in the morning and states that he needs to get back to his sobriety program.     Allergies  No medication allergies     Medications  Wellbutrin  Seroquel  Methadone    Past Medical History  Alcoholism  ACL tear  Chronic back pain  Social anxiety disorder  Rib fracture  Post concussive encephalopathy   Suicidal ideation  Bipolar affective disorder   Anxiety  TBI  Borderline personality disorder     Past Surgical History  Appendectomy  Shoulder surgery  Cholecystectomy  Bursectomy  Arm surgery     Family History  No family history on file.    Social History  Tobacco use: currently chews, former smoker  Alcohol use: alcoholism  Drug use: history of opioid abuse  Marital Status:      Review of Systems   Constitutional: Negative for fever.   HENT: Positive for dental problem.    Respiratory: Negative for cough and shortness of breath.    Cardiovascular: Negative for chest pain.   Gastrointestinal: Positive for nausea and vomiting. Negative for abdominal pain.   Neurological: Positive for headaches.   All other systems reviewed and are negative.    Physical Exam     Patient Vitals for the past 24 hrs:   BP Temp Temp src Pulse Resp SpO2 Height Weight   08/19/20 0430 123/74 -- -- -- -- 96 % -- --   08/19/20 0429 -- -- -- -- -- -- 1.93 m (6' 4\") 133.4 kg (294 lb)   08/19/20 0300 (!) 153/113 97.7  F (36.5  C) Temporal 87 18 99 % -- --     Physical Exam  General: Appears well-developed and well-nourished.   Head: No signs of " trauma.   Mouth/Throat: Oropharynx is clear and moist. Poor dentition.  No fluctuance of facial swelling.  Neck: Normal range of motion. No nuchal rigidity. No cervical adenopathy  CV: Tachycardic and regular rhythm.    Resp: Effort normal and breath sounds normal. No respiratory distress.   GI: Soft. There is no tenderness.  No rebound or guarding.  Normal bowel sounds.  No CVA tenderness.  MSK: Normal range of motion. no edema. No Calf tenderness.  Neuro: The patient is alert and oriented. strength in upper/lower extremities normal and symmetrical. Sensation normal. Speech normal.  GCS 15  Skin: Skin is warm and dry. No rash noted.   Psych: Anxious       Emergency Department Course   EKG  Indication: nausea   Time: 0304  Rate 89 bpm. VA interval 194. QRS duration 94. QT/QTc 380/462. P-R-T axes 42 -60 60.  Normal sinus rhythm. Left axis deviation. Abnormal ECG.   Read time: 0307  Read by Ulises Howard MD    Laboratory:  Laboratory findings were communicated with the patient who voiced understanding of the findings.    CBC: WBC: 7.1, HGB: 14.8, PLT: 331  CMP: Glucose 112 (H), Creatinine 0.62 (L), AST 46 (H) o/w WNL   Lipase: 46 (L)  Alcohol ethyl: 0.18 (H)    Asymptomatic COVID: pending    Interventions:  5 mg valium (x2)  Banana bag  600 mg Ibuprofen     Emergency Department Course:  Past medical records, nursing notes, and vitals reviewed.    0245 I physically examined the patient as documented above.    EKG obtained in the ED, see results above.     IV was inserted and blood was drawn for laboratory testing, results above.    A Nasopharyngeal swab was obtained here in the ED.    0522 I rechecked the patient and discussed the findings of their workup thus far.    0539 I consulted with Dr. Reddy, on call hospitalist, regarding the patient's history and presentation here in the emergency department.     Findings and plan explained to the Patient who consents to admission. Discussed the patient with   Maureen, who will admit the patient to a obs bed for further monitoring, evaluation, and treatment.    I personally reviewed the laboratory results with the Patient and answered all related questions prior to admission.     Impression & Plan   Medical Decision Making:  Hollis Barclay is a 50-year-old gentleman who presents requesting help for alcohol.  Reports that he has been drinking heavily but has not had any alcohol since approximately 6 PM. He reports going through withdrawal with nausea, vomiting, anxiety, and generally not feeling well.  Also reports having tooth pain which has been getting worse.  Patient was tachycardic and hypertensive initially.  After receiving fluids and IV Valium his vital signs did improve.  As this point he is primarily complaining of tooth and head pain.  He was given Tylenol and ibuprofen with little effect.  I did offer to do dental block but the patient did not desire this.  Patient is on chronic methadone for history of opiate abuse.  Given the patient did desire detox from alcohol, I ultimately decided to admit the patient to the hospital where he can continue to receive his methadone along with other medications.    Diagnosis:    ICD-10-CM    1. Alcohol dependence with withdrawal with complication (H)  F10.239    2. Pain, dental  K08.89        Disposition:  Admitted to Dr. Reddy.    Scribe Disclosure:  I, Cleve Zamora, am serving as a scribe on 8/19/2020 at 4:31 AM to personally document services performed by Ulises Howard MD based on my observations and the provider's statements to me.        Ulises Howard MD  08/19/20 3196

## 2020-08-19 NOTE — PROGRESS NOTES
Spoke with Aidan at [facility]. Discussed that patient is restricted to their hospital. Presently they do not have a bed. Requested that our unit call them daily regarding bed situation..

## 2020-08-19 NOTE — ED TRIAGE NOTES
Patient found to be sleeping on the bus when it reached [facility] station,  tried to wake him but was unable to do so. EMS was called and patient was found to be intoxicated but stated that he had not had a drink since 1800 and he said that he was going through withdrawal. Patient also complained of a severe headache and dental pain.

## 2020-08-19 NOTE — CONSULTS
"Consult Date:  08/19/2020      REASON FOR CONSULTATION:  Depression, anxiety and alcohol abuse.      REQUESTING PHYSICIAN:  Dr. Muniz.      IDENTIFYING DATA:  Jose M is a 50-year-old gentleman with a known alcohol use disorder, history of traumatic brain injury and depression.  He comes in an acute alcohol withdrawal, drinking a half gallon of whiskey daily for several months.  Last drink was yesterday.      CHIEF COMPLAINT:  \"I'm in withdrawal.\"      HISTORY OF PRESENT ILLNESS:  The patient is a 50-year-old gentleman with a known history of an alcohol use disorder, opiate use disorder and depression.  He typically takes 90 mg of methadone daily through [facility]'s methadone program.  He is currently flushed, diaphoretic, retching.  He indicates that he has been drinking heavily.  He usually takes Abilify, Wellbutrin and Seroquel.  He is not currently endorsing active symptoms of depression, anxiety, or suicidal thinking.  It looks like he had a recent office visit with his provider and things were \"going fine,\" but obviously he did not disclose that he had been drinking.  He had some treatment through [facility].  He is a bit uncooperative with me, fixating on wanting his methadone because he has not taken it for at least a day.  I reviewed his laboratory studies and he does have mild elevations in his lipase at 46 along with his AST at 46.  His drug screen was not ordered this admission, but his blood alcohol level was 0.18.      On further questioning, he does not endorse hypomania, katey, psychosis, generalized anxiety, panic, OCD, eating disorder history, or ADHD.  He describes consequences from his \"traumatic brain injury\" leading to concerns about depression.  He says he might be interested in talking with somebody about getting treatment.  From what I can tell, he was taking Abilify, Seroquel, Wellbutrin, Methadone and Vistaril, along with gabapentin.  He says he would like to continue those " medications.      PAST PSYCHIATRIC HISTORY:  As above.      PAST CHEMICAL DEPENDENCY HISTORY:  Notable for an alcohol use disorder and an opiate use disorder.  He is currently on methadone maintenance through [facility] 90 mg daily.      FAMILY HISTORY:  Unknown.      SOCIAL HISTORY:  The patient is  with children.  He was agitated with me during the visit and unable or unwilling to answer questions about his work situation or social environment.      REVIEW OF SYSTEMS:  A 10-point review of systems is unchanged from Dr. Muniz's H and P 08/19/2020 at 9:01 a.m.      MOST RECENT VITAL SIGNS:  Blood pressure 142/97, temp 98.4, pulse 95, respiratory rate 16, oxygen saturation 99%.      MENTAL STATUS EXAMINATION:  Appearance:  The patient is flushed.  He is sitting at the bedside retching.  He is a poor historian.  Speech is irritable in tone.  Use of language appropriate.  Motor exam fidgety and agitated.  Affect is irritable.  Mood dysphoric.  Thought process logical, coherent and goal directed.  No loosening of associations, no flight of ideas.  No formal thought disorder on exam.  Thought content negative for hallucinations, delusions, paranoia, suicidal or homicidal ideation.  Insight and judgment intact.  Cognitive exam:  The patient is alert and oriented x 3.  Concentration poor.  Recent and remote memory intact.  General fund of knowledge average.      IMPRESSION:  The patient is a 50-year-old gentleman presenting with a severe alcohol use disorder, currently in florid withdrawal.  He has a concurrent opiate use disorder and a history of depression with TBI.  We should resume his usual outpatient psych meds, including his methadone.  I ordered the 90 mg dose this morning, but would also suggest that get verified.  I looked at records and it does appear he is on 90 mg through [facility].      DIAGNOSES:   1.  Alcohol use disorder, severe.   2.  Alcohol withdrawal.   3.  Major depressive  disorder by history.   4.  Opiate use disorder.   5.  TBI.      PLAN:   1.  Continue current outpatient psychotropic meds.   2.  Consider CD assessment if he is agreeable to this once he clears withdrawal.   3.  Resume his outpatient dose of methadone.   4.  Reconsult Psychiatry as needed.         FLORINA FOWLER MD             D: 2020   T: 2020   MT: HELDER      Name:     GRETA FERREIRA   MRN:      [id number]        Account:       [id number]   :      1970           Consult Date:  2020      Document: [id number]

## 2020-08-19 NOTE — PLAN OF CARE
Summary:     DATE & TIME: 8/19/20 5877-6739  Cognitive Concerns/ Orientation : A&Ox4   BEHAVIOR & AGGRESSION TOOL COLOR: green  CIWA SCORE: 14,8,12, 5; total of 30 mg of valium PO this shift so far.  ABNL VS/O2: VSS  MOBILITY: up in room independently; initially refused bed alarm but has demonstrated steadiness  PAIN MANAGMENT: scheduled methadone and prn tylenol; chronic pain of back and acute pain of tooth upper left.  DIET: clears, advanced to regular this afternoon  BOWEL/BLADDER: continent  ABNL LAB/BG:   DRAIN/DEVICES: GISELLE PIV, LR @ 125/hr.  TELEMETRY RHYTHM: n/a  SKIN: declines assessment; no issues apparent; is diaphoretic at times.  TESTS/PROCEDURES:   D/C DAY/GOALS/PLACE: when alcohol withdrawal resolved and plan  OTHER IMPORTANT INFO: Nicorette gum prn.

## 2020-08-19 NOTE — ED NOTES
Bed: ED06  Expected date:   Expected time:   Means of arrival:   Comments:  531  50 Alcohol Withdrawls  Headache/Nausea  0220

## 2020-08-19 NOTE — ED NOTES
"[facility]  ED Nurse Handoff Report    ED Chief complaint: Alcohol Intoxication and Withdrawal      ED Diagnosis:   Final diagnoses:   None       Code Status: Full Code    Allergies:   Allergies   Allergen Reactions     Nsaids        Patient Story: patient here with nausea and vomiting.He has Hx of alcoholism ,opioid abuse,suicidal ideation. He last drank at 6 pm yesterday. He was found at [email or web address] bus stop sleeping . He came here via EMS. He also c/o a headache and tooth pain.Patient is also in a methadone clinic  Focused Assessment:  See above    Treatments and/or interventions provided: tylenol,motrin banana bag,and valium 5 mg times 2  Patient's response to treatments and/or interventions: ongoing    To be done/followed up on inpatient unit:        Does this patient have any cognitive concerns?:     Activity level - Baseline/Home:  Independent  Activity Level - Current:   Independent    Patient's Preferred language: English   Needed?: No    Isolation: None  Infection: Not Applicable  Bariatric?: No    Vital Signs:   Vitals:    08/19/20 0300 08/19/20 0429 08/19/20 0430   BP: (!) 153/113  123/74   Pulse: 87     Resp: 18     Temp: 97.7  F (36.5  C)     TempSrc: Temporal     SpO2: 99%  96%   Weight:  133.4 kg (294 lb)    Height:  1.93 m (6' 4\")        Cardiac Rhythm:     Was the PSS-3 completed:   Yes  What interventions are required if any?               Family Comments: .  OBS brochure/video discussed/provided to patient/family: No              Name of person given brochure if not patient: .              Relationship to patient: .    For the majority of the shift this patient's behavior was Green.   Behavioral interventions performed were none.    ED NURSE PHONE NUMBER: [phone]       "

## 2020-08-19 NOTE — PHARMACY-ADMISSION MEDICATION HISTORY
Pharmacy Medication History  Admission medication history interview status for the 8/19/2020  admission is complete. See EPIC admission navigator for prior to admission medications     Medication history sources: Patient, [facility]  Medication history source reliability: Good    Significant changes made to the medication list:  Changed methadone dose to 55 mg daily (was 90 mg daily)    Additional medication history information:   Pt was last seen at [facility] methadone clinic on 8/13 and received 6 take home doses.    Medication reconciliation completed by provider prior to medication history? Yes    Time spent in this activity: 15 minutes       Prior to Admission medications    Medication Sig Last Dose Taking? Auth Provider   acetaminophen (TYLENOL) 325 MG tablet Take 2 tablets (650 mg) by mouth every 4 hours as needed for mild pain Past Week at Unknown time Yes Reed Cunningham, MIAN   ARIPiprazole (ABILIFY) 2 MG tablet Take 1 tablet (2 mg) by mouth daily 8/18/2020 at Unknown time Yes Jr Giron MD   buPROPion (WELLBUTRIN) 100 MG tablet TAKE 2 TABLETS BY MOUTH TWICE A DAY 8/18/2020 at Unknown time Yes Jr Giron MD   folic acid (FOLVITE) 1 MG tablet Take 1 tablet (1 mg) by mouth daily 8/18/2020 at Unknown time Yes Jr Giron MD   gabapentin (NEURONTIN) 600 MG tablet Take 2 tablets (1,200 mg) by mouth 3 times daily 8/18/2020 at Unknown time Yes Jr Giron MD   hydrOXYzine (VISTARIL) 25 MG capsule Take 1 capsule (25 mg) by mouth every 6 hours as needed for itching 8/18/2020 at Unknown time Yes Jr Giron MD   methadone (DOLPHINE) 5 MG/5ML solution Take 55 mg by mouth daily From [facility] program  8/18/2020 at Unknown time Yes Unknown, Entered By History   nicotine polacrilex (NICORETTE) 4 MG gum Place 1 each (4 mg) inside cheek every 2 hours as needed (nicotine cravings)  Patient taking differently: Place 8 mg inside cheek every hour as needed (nicotine cravings)  Max of 22 pieces per day.  Yes Jr Giron MD   QUEtiapine (SEROQUEL) 400 MG tablet Take 1 tablet (400 mg) by mouth At Bedtime 8/18/2020 at Unknown time Yes Jr Giron MD

## 2020-08-19 NOTE — CONSULTS
8/19/20 CD consult acknowledged.      Per chart review, pt does not have active funding listed. The writer e-mailed unit  requesting sw provide [facility] Rule 25 application to patient. Requested unit  e-mail chem dep assessors completed Rule 25 application and at that time consult will be completed if patient is appropriate for consult and appropriate for CD treatment services.         Tania Rueda, LPCC, LADC

## 2020-08-19 NOTE — PROGRESS NOTES
RECEIVING UNIT ED HANDOFF REVIEW    ED Nurse Handoff Report was reviewed by: Jeremías Linda RN on August 19, 2020 at 5:59 AM

## 2020-08-20 VITALS
OXYGEN SATURATION: 97 % | HEIGHT: 76 IN | SYSTOLIC BLOOD PRESSURE: 144 MMHG | WEIGHT: 294 LBS | RESPIRATION RATE: 18 BRPM | BODY MASS INDEX: 35.8 KG/M2 | TEMPERATURE: 97.6 F | DIASTOLIC BLOOD PRESSURE: 89 MMHG | HEART RATE: 83 BPM

## 2020-08-20 LAB
SARS-COV-2 RNA SPEC QL NAA+PROBE: NOT DETECTED
SPECIMEN SOURCE: NORMAL

## 2020-08-20 PROCEDURE — 99239 HOSP IP/OBS DSCHRG MGMT >30: CPT | Performed by: STUDENT IN AN ORGANIZED HEALTH CARE EDUCATION/TRAINING PROGRAM

## 2020-08-20 PROCEDURE — 25000132 ZZH RX MED GY IP 250 OP 250 PS 637: Performed by: HOSPITALIST

## 2020-08-20 PROCEDURE — 25800030 ZZH RX IP 258 OP 636: Performed by: HOSPITALIST

## 2020-08-20 RX ADMIN — GABAPENTIN 1200 MG: 600 TABLET, FILM COATED ORAL at 08:12

## 2020-08-20 RX ADMIN — NICOTINE POLACRILEX 8 MG: 4 GUM, CHEWING BUCCAL at 00:01

## 2020-08-20 RX ADMIN — METHADONE HYDROCHLORIDE 55 MG: 10 CONCENTRATE ORAL at 09:00

## 2020-08-20 RX ADMIN — SODIUM CHLORIDE, POTASSIUM CHLORIDE, SODIUM LACTATE AND CALCIUM CHLORIDE: 600; 310; 30; 20 INJECTION, SOLUTION INTRAVENOUS at 04:58

## 2020-08-20 RX ADMIN — NICOTINE POLACRILEX 8 MG: 4 GUM, CHEWING BUCCAL at 05:07

## 2020-08-20 RX ADMIN — AMOXICILLIN AND CLAVULANATE POTASSIUM 1 TABLET: 875; 125 TABLET, FILM COATED ORAL at 08:12

## 2020-08-20 RX ADMIN — THIAMINE HCL TAB 100 MG 100 MG: 100 TAB at 08:13

## 2020-08-20 RX ADMIN — FOLIC ACID 1 MG: 1 TABLET ORAL at 08:13

## 2020-08-20 RX ADMIN — BUPROPION HYDROCHLORIDE 200 MG: 100 TABLET, FILM COATED ORAL at 08:13

## 2020-08-20 RX ADMIN — ARIPIPRAZOLE 2 MG: 2 TABLET ORAL at 08:13

## 2020-08-20 RX ADMIN — MULTIPLE VITAMINS W/ MINERALS TAB 1 TABLET: TAB at 08:13

## 2020-08-20 RX ADMIN — ACETAMINOPHEN 650 MG: 325 TABLET, FILM COATED ORAL at 05:06

## 2020-08-20 ASSESSMENT — ACTIVITIES OF DAILY LIVING (ADL)
ADLS_ACUITY_SCORE: 14

## 2020-08-20 NOTE — PLAN OF CARE
"Summary:   admit with Etoh withdrawal yest.  DATE & TIME: 8/19/20 3-11pm shift  Cognitive Concerns/ Orientation : A&Ox4   BEHAVIOR & AGGRESSION TOOL COLOR: yellow  CIWA SCORE: 11, 8, 7. 30 mg valium tonight - 60mg total today.   ABNL VS/O2: VSS on RA- anxious  MOBILITY: up in room independently; demonstrates steadiness on feet  PAIN MANAGMENT: scheduled methadone and prn tylenol; chronic pain of back and acute pain of tooth lower left. MD notified for tooth pain but meds restricted right now- no change - add Augmentin.  DIET: regular diet- better appetite- declined use of zofran this karl.  BOWEL/BLADDER: continent voiding well  ABNL LAB/BG: covid negative  DRAIN/DEVICES: GISELLE PIV, LR @ 125/hr.  TELEMETRY RHYTHM: n/a  SKIN: declines full skin assess. no issues apparent; is diaphoretic at times.  TESTS/PROCEDURES: none scheduled  D/C DAY/GOALS/PLACE: when alcohol withdrawal resolved and plan- pt hopes to discharge \"tomorrow back home.\"  OTHER IMPORTANT INFO: Nicorette gum prn- using frequently- every awake hour.- pt very upset when his [facility] housing called for an update which was not given because the patient didn't consent to RN releasing any information- initially he wanted to leave AMA but after the patient made an unknown phone call- he decided to stay- he says he must leave by 8 AM tomorrow to make it to the 9AM meeting with his care coordinator and - he states this meeting can't be done over the phone.     MD on-call felt pt would be holdable tonight if decided to leave but he stayed- this warrants early involvement of MD, psych, and security for the patient ASAP on AM shift.   "

## 2020-08-20 NOTE — PROGRESS NOTES
On Call    In Alcohol withdrawal, had 80 mg of Valium so far today.  Was considering leaving AMA.    If attempts to leave would place a 72 hour hold  Patient now agreeable to staying.     Jairon Berman MD  Pager: [phone]  Cell Phone:  [phone]

## 2020-08-20 NOTE — PLAN OF CARE
Summary:   admit with Etoh withdrawal   DATE & TIME: 8/19/20 4652-1428  Cognitive Concerns/ Orientation : A&Ox4   BEHAVIOR & AGGRESSION TOOL COLOR: Green  CIWA SCORE: 6, 7  ABNL VS/O2: VSS ex. HTN  on RA-  Mildly anxious  MOBILITY: up in room independently;steady on feet  PAIN MANAGMENT: denies pain  DIET: regular diet, denies nausea  BOWEL/BLADDER: continent voiding in BR  ABNL LAB/BG: covid negative  DRAIN/DEVICES: GISELLE PIV, LR @ 125/hr.  TELEMETRY RHYTHM: n/a  SKIN: declines full skin assess. no issues apparent; slightly diaphoretic at times.  TESTS/PROCEDURES: none scheduled  D/C DAY/GOALS/PLACE: when alcohol withdrawal resolved and safe discharge plan in place.  OTHER IMPORTANT INFO: PRN Nicorette gum. Continue to monitor.

## 2020-08-20 NOTE — PLAN OF CARE
Discharge    Patient discharged to home  via LYFT; ambulated to exit door with accompaniment.  Care plan note:  CIWA score 6; no valium given since yesterday.  States will cease drinking alcohol with meetings/support.  He continues to have tooth pain, on oral antibiotic and he plans to see dentist next week.      Listed belongings gathered and returned to patient. Yes  Care Plan and Patient education resolved: Yes  Prescriptions if needed, hard copies sent with patient  escribed to [facility] per patient request.  Home and hospital acquired medications returned to patient: NA  Medication Bin checked and emptied on discharge Yes  Follow up appointment made for patient: No, he preferred to make the appointment himself.

## 2020-08-20 NOTE — PROVIDER NOTIFICATION
MD Notification    Notified Person: MD    Notified Person Name: MD Adan    Notification Date/Time: 8/20/2020 0514    Notification Interaction: amcom    Purpose of Notification:      Room 614 bed 1 R.F.    pt. requesting that his scheduled Methadone be given early around 7a instead of 9a when it is scheduled. States he normally takes it first thing in the morning.     Thanks,  Ligia MAYFIELD       Orders Received:    Okay to give medication Early    Comments:

## 2020-08-21 NOTE — DISCHARGE SUMMARY
"[facility]  Hospitalist Discharge Summary      Date of Admission:  8/19/2020  Date of Discharge:  8/20/2020  9:24 AM  Discharging Provider: Darrell Drummond MD      Discharge Diagnoses     Acute alcohol withdrawal  Alcohol dependence    Follow-ups Needed After Discharge   Follow-up Appointments     Follow-up and recommended labs and tests       Follow up with primary care provider, Jr Giron, within 7 days   for hospital follow- up.  The following labs/tests are recommended: None.             Unresulted Labs Ordered in the Past 30 Days of this Admission     No orders found from 7/20/2020 to 8/20/2020.        Discharge Disposition   Discharged to home  Condition at discharge: Stable    Hospital Course     Hollis Barclay is a 50 year old male admitted on 8/19/2020.  Past history of depression, anxiety, opioid dependence on methadone maintenance, alcohol abuse who presents with acute alcohol withdrawal after quitting drinking.     Acute alcohol withdrawal  Alcohol dependence  Hx of alcohol abuse, achieving intermittent periods of sobriety.  Reports drinking 1/2 gallon of whiskey daily for several months with last drink at 6pm on 8/18.  Hypertensive, tremulous with tongue fasciculations and marked anxiety at arrival with admission BAL 0.18.  AST mildly elevated.  Reports history of withdrawal seizure. Improved with CIWA protocol and did not require any ativan night prior to discharge.   Plan:  - continue PTA gabapentin  - Encouraged alcohol cessation/abstinence     Anxiety   Depression  Reports mood is \"pretty bad\" and is agreeable to Psych evaluation.  - continue PTA abilify, Wellbutrin, Seroquel pending Psych input     Opioid dependence on methadone maintenance  - resume PTA methadone      Nicotine dependence  - continue PTA nicotine gum    Painful tooth.   Assessment: possibly tooth cavitity versus developing abscess (but no fever, no drainage).   Plan  - Augmentin  - Follow up with dentist "     Consultations This Hospital Stay   PSYCHIATRY IP CONSULT  CHEMICAL DEPENDENCY IP CONSULT    Code Status   Prior    Time Spent on this Encounter   I, Darrell Drummond MD, personally saw the patient today and spent greater than 30 minutes discharging this patient.       Darrell Drummond MD  [facility]  ______________________________________________________________________    Physical Exam   Vital Signs:                   Weight: 294 lbs 0 oz    Primary Care Physician   Jr Giron    Discharge Orders      Reason for your hospital stay    You were admitted for alcohol withdrawal and needed medications to treat significant alcohol withdrawals. .     Follow-up and recommended labs and tests     Follow up with primary care provider, Jr Giron, within 7 days for hospital follow- up.  The following labs/tests are recommended: None.     Activity    Your activity upon discharge: activity as tolerated     Diet    Follow this diet upon discharge: Orders Placed This Encounter      Advance Diet as Tolerated: Regular Diet Adult       Significant Results and Procedures   Most Recent 3 CBC's:  Recent Labs   Lab Test 08/19/20  0315 04/18/20  1302 03/03/20  2252   WBC 7.1 11.1* 5.1   HGB 14.8 15.3 14.2   MCV 90 90 91    386 320     Most Recent 3 BMP's:  Recent Labs   Lab Test 08/19/20  0315 04/18/20  1302 03/11/20  0759 03/05/20  0701    132*  --  140   POTASSIUM 3.9 3.0* 3.7 3.2*   CHLORIDE 104 94  --  104   CO2 30 23  --  31   BUN 14 60*  --  20   CR 0.62* 1.05  --  0.79   ANIONGAP 3 15*  --  5   KACY 8.7 9.1  --  8.5   * 112*  --  100*     Most Recent 2 LFT's:  Recent Labs   Lab Test 08/19/20 0315 04/18/20  1302   AST 46* 640*   ALT 49 289*   ALKPHOS 59 89   BILITOTAL 0.5 1.0     Most Recent 3 INR's:No lab results found.,   Results for orders placed or performed during the hospital encounter of 03/08/20   CT Head w/o Contrast    Narrative    EXAM: CT HEAD W/O CONTRAST, CT CERVICAL  SPINE W/O CONTRAST  LOCATION: [facility]  DATE/TIME: 3/8/2020 11:50 PM    INDICATION: Pain after fall  COMPARISON: None.  TECHNIQUE:   HEAD CT: Without IV contrast. Multiplanar reformats. Dose reduction techniques were used.  CERVICAL SPINE CT: Routine without IV contrast. Multiplanar reformats. Dose reduction techniques were used.    FINDINGS:   HEAD CT:   INTRACRANIAL CONTENTS: No intracranial hemorrhage, extraaxial collection, or mass effect.  No CT evidence of acute infarct. Normal parenchymal attenuation. Normal ventricles and sulci. Atherosclerotic calcification in the left vertebral artery    VISUALIZED ORBITS/SINUSES/MASTOIDS: No intraorbital abnormality. No paranasal sinus mucosal disease. No middle ear or mastoid effusion.    BONES/SOFT TISSUES: No acute abnormality.    CERVICAL SPINE CT:   VERTEBRA: Trace degenerative retrolisthesis of C5 on C6. No fracture or posttraumatic subluxation.     CANAL/FORAMINA: Multilevel degenerative disc height loss, uncovertebral hypertrophy, and facet arthropathy. There is at least moderate canal stenosis from C4-C5 through C6-C7. Multilevel bilateral neural foraminal narrowing, greatest at C5-C6 where there   is moderate to severe narrowing bilaterally.    PARASPINAL: No extraspinal abnormality. Visualized lung fields are clear.      Impression    IMPRESSION:  HEAD CT:  1.  No acute intracranial process.    CERVICAL SPINE CT:  1.  Multilevel degenerative changes throughout the cervical spine. No evidence of an acute osseous abnormality.  2.  There is at least moderate canal stenosis from C4-C5 through C6-C7 secondary to degenerative change. Multilevel bilateral neural foraminal narrowing, greatest at C5-C6.   CT Cervical Spine w/o Contrast    Narrative    EXAM: CT HEAD W/O CONTRAST, CT CERVICAL SPINE W/O CONTRAST  LOCATION: [facility]  DATE/TIME: 3/8/2020 11:50 PM    INDICATION: Pain after fall  COMPARISON: None.  TECHNIQUE:   HEAD CT: Without  IV contrast. Multiplanar reformats. Dose reduction techniques were used.  CERVICAL SPINE CT: Routine without IV contrast. Multiplanar reformats. Dose reduction techniques were used.    FINDINGS:   HEAD CT:   INTRACRANIAL CONTENTS: No intracranial hemorrhage, extraaxial collection, or mass effect.  No CT evidence of acute infarct. Normal parenchymal attenuation. Normal ventricles and sulci. Atherosclerotic calcification in the left vertebral artery    VISUALIZED ORBITS/SINUSES/MASTOIDS: No intraorbital abnormality. No paranasal sinus mucosal disease. No middle ear or mastoid effusion.    BONES/SOFT TISSUES: No acute abnormality.    CERVICAL SPINE CT:   VERTEBRA: Trace degenerative retrolisthesis of C5 on C6. No fracture or posttraumatic subluxation.     CANAL/FORAMINA: Multilevel degenerative disc height loss, uncovertebral hypertrophy, and facet arthropathy. There is at least moderate canal stenosis from C4-C5 through C6-C7. Multilevel bilateral neural foraminal narrowing, greatest at C5-C6 where there   is moderate to severe narrowing bilaterally.    PARASPINAL: No extraspinal abnormality. Visualized lung fields are clear.      Impression    IMPRESSION:  HEAD CT:  1.  No acute intracranial process.    CERVICAL SPINE CT:  1.  Multilevel degenerative changes throughout the cervical spine. No evidence of an acute osseous abnormality.  2.  There is at least moderate canal stenosis from C4-C5 through C6-C7 secondary to degenerative change. Multilevel bilateral neural foraminal narrowing, greatest at C5-C6.       Discharge Medications   Discharge Medication List as of 8/20/2020  9:08 AM      CONTINUE these medications which have CHANGED    Details   amoxicillin-clavulanate (AUGMENTIN) 875-125 MG tablet Take 1 tablet by mouth every 12 hours, Disp-14 tablet,R-0, E-Prescribe         CONTINUE these medications which have NOT CHANGED    Details   acetaminophen (TYLENOL) 325 MG tablet Take 2 tablets (650 mg) by mouth every 4  hours as needed for mild pain, OTC      ARIPiprazole (ABILIFY) 2 MG tablet Take 1 tablet (2 mg) by mouth daily, Disp-60 tablet,R-0, E-Prescribe      buPROPion (WELLBUTRIN) 100 MG tablet TAKE 2 TABLETS BY MOUTH TWICE A DAY, Disp-120 tablet,R-1, E-PrescribeMaximum Refills Reached      folic acid (FOLVITE) 1 MG tablet Take 1 tablet (1 mg) by mouth daily, Disp-60 tablet,R-0, E-Prescribe      gabapentin (NEURONTIN) 600 MG tablet Take 2 tablets (1,200 mg) by mouth 3 times daily, Disp-180 tablet,R-1, E-Prescribe      hydrOXYzine (VISTARIL) 25 MG capsule Take 1 capsule (25 mg) by mouth every 6 hours as needed for itching, Disp-90 capsule,R-1, E-Prescribe      methadone (DOLPHINE) 5 MG/5ML solution Take 55 mg by mouth daily [place] [facility] [place]      nicotine polacrilex (NICORETTE) 4 MG gum Place 1 each (4 mg) inside cheek every 2 hours as needed (nicotine cravings), Disp-360 each,R-1, E-Prescribe      QUEtiapine (SEROQUEL) 400 MG tablet Take 1 tablet (400 mg) by mouth At Bedtime, Disp-30 tablet,R-1, E-Prescribe           Allergies   Allergies   Allergen Reactions     Nsaids

## 2020-08-24 DIAGNOSIS — F41.1 GENERALIZED ANXIETY DISORDER: ICD-10-CM

## 2020-08-25 ENCOUNTER — TELEPHONE (OUTPATIENT)
Dept: NEUROSURGERY | Facility: CLINIC | Age: 50
End: 2020-08-25

## 2020-08-25 ENCOUNTER — TELEPHONE (OUTPATIENT)
Dept: GASTROENTEROLOGY | Facility: CLINIC | Age: 50
End: 2020-08-25

## 2020-08-25 ENCOUNTER — HOSPITAL ENCOUNTER (OUTPATIENT)
Facility: CLINIC | Age: 50
End: 2020-08-25
Attending: INTERNAL MEDICINE | Admitting: INTERNAL MEDICINE
Payer: MEDICAID

## 2020-08-25 ENCOUNTER — TELEPHONE (OUTPATIENT)
Dept: ORTHOPEDICS | Facility: CLINIC | Age: 50
End: 2020-08-25

## 2020-08-25 DIAGNOSIS — Z11.59 ENCOUNTER FOR SCREENING FOR OTHER VIRAL DISEASES: Primary | ICD-10-CM

## 2020-08-25 NOTE — TELEPHONE ENCOUNTER
Last time prescribed: 6/17/20 , 30 tabs/caps x 1 refills  Last office visit: 7/20/20  Next appointment: 9/9/20    Medication is being filled for 1 time refill only due to:  KEEP UPCOMING APPOINTMENT WITH DR. BRIAN Harrison RN  08/26/20  11:43 AM

## 2020-08-25 NOTE — TELEPHONE ENCOUNTER
[facility] Call Center    Phone Message    May a detailed message be left on voicemail: yes     Reason for Call: Medication Refill Request    Has the patient contacted the pharmacy for the refill? Yes   Name of medication being requested: pt would like something else besides gabapentin, Like lyrica. He wants to titrate off of Methadone  Provider who prescribed the medication: Dr Giron  Pharmacy: [facility] [place] [id number] - [place], [place] - [address]    Date medication is needed: ASAP   Pt doctor on staff said that he should switch from gabapentin to Lyrica. Please call to discuss.      Action Taken: Message routed to:  [facility]    [place] Screening: Not Applicable

## 2020-08-25 NOTE — TELEPHONE ENCOUNTER
Patient is scheduled for colonoscopy with Dr. Hale    Spoke with: patient    Date of Procedure: 9-10-20    Location: [address]    Sedation Type MAC    Informed patient they will need an adult  yes    Informed Patient of COVID Test Requirement yes    Preferred Pharmacy for Pre Prescription chart    Confirmed Nurse will call to complete assessment yes    Additional comments: none

## 2020-08-25 NOTE — TELEPHONE ENCOUNTER
[facility] Call Center    Phone Message    May a detailed message be left on voicemail: yes     Reason for Call: Appointment Intake    Referring Provider Name: Jr Giron MD  Diagnosis and/or Symptoms: Lumbar back pain with radiculopathy affecting left lower extremity, chronic bilateral low back pain with right-sided sciatica    Referral in [facility] on 11/4/19.     Action Taken: Message routed to:  [facility] ([facility]):  NEUROSURGERY    Travel Screening: Not Applicable

## 2020-08-25 NOTE — TELEPHONE ENCOUNTER
Patient states he wants to go off his methadone, he feels being tied to going in every 2 weeks is too constricting and [place] go for 2 weeks at a time otherwise do without it. Advised pt that he should discuss with methadone provider through [facility] who manages that. He has upcoming appointment with PCP here to discuss medications. He reports wanting to change to a different pain medication. No change in medications after last ED visit last week. All questions answered.     Trena Harrison RN  08/25/20  12:47 PM

## 2020-08-26 RX ORDER — QUETIAPINE FUMARATE 400 MG/1
400 TABLET, FILM COATED ORAL AT BEDTIME
Qty: 30 TABLET | Refills: 0 | Status: SHIPPED | OUTPATIENT
Start: 2020-08-26 | End: 2020-09-23

## 2020-09-02 DIAGNOSIS — Z12.11 SPECIAL SCREENING FOR MALIGNANT NEOPLASMS, COLON: Primary | ICD-10-CM

## 2020-09-02 RX ORDER — BISACODYL 5 MG/1
10 TABLET, DELAYED RELEASE ORAL DAILY
Qty: 4 TABLET | Refills: 0 | Status: SHIPPED | OUTPATIENT
Start: 2020-09-02 | End: 2020-09-04

## 2020-09-02 NOTE — NURSING NOTE
[facility] Dulcolax et 2-day Golytely: [id number] - [place], [place] - [address] [place] [address] Road, [address]     Anisha Thomas RN   [facility]

## 2020-09-04 ENCOUNTER — DOCUMENTATION ONLY (OUTPATIENT)
Dept: CARE COORDINATION | Facility: CLINIC | Age: 50
End: 2020-09-04

## 2020-09-09 ENCOUNTER — TELEPHONE (OUTPATIENT)
Dept: GASTROENTEROLOGY | Facility: CLINIC | Age: 50
End: 2020-09-09

## 2020-09-09 DIAGNOSIS — F17.200 TOBACCO USE DISORDER: ICD-10-CM

## 2020-09-09 RX ORDER — ONDANSETRON 2 MG/ML
4 INJECTION INTRAMUSCULAR; INTRAVENOUS
Status: CANCELLED | OUTPATIENT
Start: 2020-09-09

## 2020-09-09 RX ORDER — LIDOCAINE 40 MG/G
CREAM TOPICAL
Status: CANCELLED | OUTPATIENT
Start: 2020-09-09

## 2020-09-09 NOTE — TELEPHONE ENCOUNTER
Called patient to inquire about COVID test status, as no valid COVID test appears in the [facility] system.  Unable to reach patient, LVM for return call. Explained that if COVID test was not done within 4 day window of procedure, procedure would have to be cancelled.  Callback number provided.

## 2020-09-10 PROCEDURE — 45378 DIAGNOSTIC COLONOSCOPY: CPT | Performed by: INTERNAL MEDICINE

## 2020-09-10 NOTE — TELEPHONE ENCOUNTER
Last seen virtual visit 7/20/20, no future appt noted     Medication is being filled for 1 time refill only due to:  Patient needs to be seen because needs BP recheck .       Sheron Mittal RN  September 10, 2020 11:11 AM

## 2020-09-14 DIAGNOSIS — F39 MOOD DISORDER (H): ICD-10-CM

## 2020-09-14 DIAGNOSIS — F10.21 ALCOHOLISM IN REMISSION (H): ICD-10-CM

## 2020-09-15 NOTE — TELEPHONE ENCOUNTER
Last office visit was on 07/20/2020, no future visits scheduled.    Patient needs to schedule an appointment with PCP.    Trena Harrison RN  09/21/20  9:15 AM

## 2020-09-21 RX ORDER — ARIPIPRAZOLE 2 MG/1
2 TABLET ORAL DAILY
Qty: 30 TABLET | Refills: 2 | Status: SHIPPED | OUTPATIENT
Start: 2020-09-21 | End: 2020-10-05

## 2020-09-21 RX ORDER — FOLIC ACID 1 MG/1
1 TABLET ORAL DAILY
Qty: 30 TABLET | Refills: 2 | Status: SHIPPED | OUTPATIENT
Start: 2020-09-21 | End: 2020-12-08

## 2020-09-22 ENCOUNTER — PATIENT OUTREACH (OUTPATIENT)
Dept: FAMILY MEDICINE | Facility: CLINIC | Age: 50
End: 2020-09-22

## 2020-09-22 DIAGNOSIS — F41.1 GENERALIZED ANXIETY DISORDER: ICD-10-CM

## 2020-09-22 NOTE — TELEPHONE ENCOUNTER
Spoke w/pt, informed him he was due for a visit with Dr. Giron. Also discussed whether he has followed up with psych. Says he is on a waiting list to see psych through [facility] but has not gotten an appointment yet.  Transferred pt to  to schedule follow up appointment.    EFRAIN Harris

## 2020-09-22 NOTE — TELEPHONE ENCOUNTER
Last time prescribed: 8/26/20 , 30 tabs/caps x 0 refills  Last office visit: 7/20/20  Next appointment: 10/5/20    Patient needs to keep upcoming appointment. He has h/o not showing up for appointments.     Trena Harrison RN  09/23/20  3:28 PM

## 2020-09-23 RX ORDER — QUETIAPINE FUMARATE 400 MG/1
400 TABLET, FILM COATED ORAL AT BEDTIME
Qty: 30 TABLET | Refills: 0 | Status: SHIPPED | OUTPATIENT
Start: 2020-09-23 | End: 2020-10-05

## 2020-09-24 DIAGNOSIS — F17.200 TOBACCO USE DISORDER: ICD-10-CM

## 2020-09-25 NOTE — TELEPHONE ENCOUNTER
Last Office Visit: 7/20/20  Future [facility] Appointments: 10/5/20  Medication last refilled: 9/10/20 #360 pieces    BP Readings from Last 3 Encounters:   08/20/20 (!) 144/89   04/18/20 114/80   03/11/20 127/69     Medication is being filled for 1 time refill only due to:  Needs to keep upcoming appointment.     Trena Harrison RN  09/25/20  12:58 PM

## 2020-10-05 ENCOUNTER — OFFICE VISIT (OUTPATIENT)
Dept: FAMILY MEDICINE | Facility: CLINIC | Age: 50
End: 2020-10-05
Payer: MEDICAID

## 2020-10-05 VITALS
WEIGHT: 282.5 LBS | BODY MASS INDEX: 34.4 KG/M2 | HEART RATE: 106 BPM | OXYGEN SATURATION: 95 % | TEMPERATURE: 98.6 F | SYSTOLIC BLOOD PRESSURE: 115 MMHG | HEIGHT: 76 IN | DIASTOLIC BLOOD PRESSURE: 82 MMHG

## 2020-10-05 DIAGNOSIS — G89.29 OTHER CHRONIC PAIN: ICD-10-CM

## 2020-10-05 DIAGNOSIS — F33.9 EPISODE OF RECURRENT MAJOR DEPRESSIVE DISORDER, UNSPECIFIED DEPRESSION EPISODE SEVERITY (H): ICD-10-CM

## 2020-10-05 DIAGNOSIS — S61.210A LACERATION OF RIGHT INDEX FINGER WITHOUT FOREIGN BODY WITHOUT DAMAGE TO NAIL, INITIAL ENCOUNTER: ICD-10-CM

## 2020-10-05 DIAGNOSIS — F41.1 GENERALIZED ANXIETY DISORDER: ICD-10-CM

## 2020-10-05 DIAGNOSIS — Z23 NEED FOR PROPHYLACTIC VACCINATION AND INOCULATION AGAINST INFLUENZA: Primary | ICD-10-CM

## 2020-10-05 DIAGNOSIS — F17.200 TOBACCO USE DISORDER: ICD-10-CM

## 2020-10-05 DIAGNOSIS — F39 MOOD DISORDER (H): ICD-10-CM

## 2020-10-05 RX ORDER — BUPROPION HYDROCHLORIDE 100 MG/1
TABLET ORAL
Qty: 360 TABLET | Refills: 1 | Status: SHIPPED | OUTPATIENT
Start: 2020-10-05 | End: 2021-01-25

## 2020-10-05 RX ORDER — MUPIROCIN 20 MG/G
OINTMENT TOPICAL 3 TIMES DAILY
Qty: 22 G | Refills: 0 | Status: SHIPPED | OUTPATIENT
Start: 2020-10-05 | End: 2020-11-11

## 2020-10-05 RX ORDER — QUETIAPINE FUMARATE 400 MG/1
400 TABLET, FILM COATED ORAL AT BEDTIME
Qty: 90 TABLET | Refills: 1 | Status: SHIPPED | OUTPATIENT
Start: 2020-10-05 | End: 2021-03-25

## 2020-10-05 RX ORDER — GABAPENTIN 600 MG/1
1200 TABLET ORAL 3 TIMES DAILY
Qty: 540 TABLET | Refills: 1 | Status: SHIPPED | OUTPATIENT
Start: 2020-10-05 | End: 2021-03-25

## 2020-10-05 RX ORDER — ARIPIPRAZOLE 2 MG/1
2 TABLET ORAL DAILY
Qty: 30 TABLET | Refills: 2 | Status: SHIPPED | OUTPATIENT
Start: 2020-10-05 | End: 2021-01-25

## 2020-10-05 ASSESSMENT — ANXIETY QUESTIONNAIRES
5. BEING SO RESTLESS THAT IT IS HARD TO SIT STILL: NOT AT ALL
7. FEELING AFRAID AS IF SOMETHING AWFUL MIGHT HAPPEN: NOT AT ALL
3. WORRYING TOO MUCH ABOUT DIFFERENT THINGS: SEVERAL DAYS
1. FEELING NERVOUS, ANXIOUS, OR ON EDGE: SEVERAL DAYS
IF YOU CHECKED OFF ANY PROBLEMS ON THIS QUESTIONNAIRE, HOW DIFFICULT HAVE THESE PROBLEMS MADE IT FOR YOU TO DO YOUR WORK, TAKE CARE OF THINGS AT HOME, OR GET ALONG WITH OTHER PEOPLE: SOMEWHAT DIFFICULT
6. BECOMING EASILY ANNOYED OR IRRITABLE: SEVERAL DAYS
2. NOT BEING ABLE TO STOP OR CONTROL WORRYING: MORE THAN HALF THE DAYS
GAD7 TOTAL SCORE: 6

## 2020-10-05 ASSESSMENT — PATIENT HEALTH QUESTIONNAIRE - PHQ9
5. POOR APPETITE OR OVEREATING: SEVERAL DAYS
SUM OF ALL RESPONSES TO PHQ QUESTIONS 1-9: 8

## 2020-10-05 ASSESSMENT — MIFFLIN-ST. JEOR: SCORE: 2237.66

## 2020-10-05 NOTE — PROGRESS NOTES
"Hollis Barclay is a 50 year old male who presents to [facility] today for Q-3 month med recheck.   He's doing well.   Now, into Speed walking and light weights. He's lost 12 lbs in past 2 months. No alcohol for 2 months.   Still living in assisted [facility]\" mental health home. Name is [facility]       Review Of Systems:  Cut dorsum of LEFT index finger in sink 2 days ago.   Up to date on Tetanus immunization.     Cardiovascular: negative  Respiratory: No shortness of breath, dyspnea on exertion, cough, or hemoptysis  Gastrointestinal: negative  Genitourinary: negative    Problem list per EMR:  Patient Active Problem List   Diagnosis     Post concussive encephalopathy     H/O shoulder surgery     Alcoholism in remission (H)     Bilateral ACL tear     Chronic back pain     Social anxiety disorder     Alcohol withdrawal syndrome with complication, with unspecified complication (H)     Rib fracture     Alcohol withdrawal (H)       Current Outpatient Medications   Medication Sig Dispense Refill     acetaminophen (TYLENOL) 325 MG tablet Take 2 tablets (650 mg) by mouth every 4 hours as needed for mild pain       ARIPiprazole (ABILIFY) 2 MG tablet Take 1 tablet (2 mg) by mouth daily 30 tablet 2     buPROPion (WELLBUTRIN) 100 MG tablet TAKE 2 TABLETS BY MOUTH TWICE A  tablet 1     folic acid (FOLVITE) 1 MG tablet Take 1 tablet (1 mg) by mouth daily 30 tablet 2     gabapentin (NEURONTIN) 600 MG tablet Take 2 tablets (1,200 mg) by mouth 3 times daily 180 tablet 1     hydrOXYzine (VISTARIL) 25 MG capsule Take 1 capsule (25 mg) by mouth every 6 hours as needed for itching 90 capsule 1     methadone (DOLPHINE) 5 MG/5ML solution Take 55 mg by mouth daily From [facility] program        nicotine polacrilex (NICORETTE) 4 MG gum PLACE 1 GUM INSIDE CHEEK EVERY 2 HOURS AS NEEDED FOR SMOKING CESSATION ( MAX 22 PIECE DAILY ) 100 each 0     QUEtiapine (SEROQUEL) 400 MG tablet Take 1 tablet (400 mg) by mouth At Bedtime 30 " "tablet 0       Allergies   Allergen Reactions     Nsaids         Social:   Unchanged. See HPI    EXAM    Vitals: /82 (BP Location: Right arm, Patient Position: Sitting, Cuff Size: Adult Large)   Pulse 106   Temp 98.6  F (37  C) (Temporal)   Ht 1.93 m (6' 3.98\")   Wt 128.1 kg (282 lb 8 oz)   SpO2 95%   BMI 34.40 kg/m    BMI= Body mass index is 34.4 kg/m .  Appears calmer than in the past. Improved eye-contact. Appears less anxious.    PHQ-9 at 8. No feelings of self-harm.  PEDRO LUIS-7 at 6.       LEFT hand index finger dorsum of PIP with laceration. Slight redness around. No extending redness. Full ROM      Impression:   51 yo with multiple physical and mental health issues. Doing better than any time that I have seen in the past. He's exercising more, has lost about 12.5 lbs, and has decreased his methadone from 110/day to 55 /day. Managed by [facility].   ASSESSMENT/PLAN:    1. Generalized anxiety disorder  Refill- QUEtiapine (SEROQUEL) 400 MG tablet; Take 1 tablet (400 mg) by mouth At Bedtime  Dispense: 90 tablet; Refill: 1  - gabapentin (NEURONTIN) 600 MG tablet; Take 2 tablets (1,200 mg) by mouth 3 times daily  Dispense: 540 tablet; Refill: 1    2. Tobacco use disorder  Refill  - nicotine polacrilex (NICORETTE) 4 MG gum; PLACE 2 GUM INSIDE CHEEK EVERY 2 HOURS AS NEEDED FOR SMOKING CESSATION ( MAX 22 PIECE DAILY )  Dispense: 240 each; Refill: 6    3. Other chronic pain  Refill:   - gabapentin (NEURONTIN) 600 MG tablet; Take 2 tablets (1,200 mg) by mouth 3 times daily  Dispense: 540 tablet; Refill: 1    4. Episode of recurrent major depressive disorder, unspecified depression episode severity (H)  Doing well.   - buPROPion (WELLBUTRIN) 100 MG tablet; TAKE 2 TABLETS BY MOUTH TWICE A DAY  Dispense: 360 tablet; Refill: 1    5. Mood disorder (H)  Asked again if he's seen Psychiatry. He has not due to COVID. States that he's on the wait list.   Emphasized again for the need to have an evaluation when able. Will refill " for a month with 2 Refills.   - ARIPiprazole (ABILIFY) 2 MG tablet; Take 1 tablet (2 mg) by mouth daily  Dispense: 30 tablet; Refill: 2    6. Need for prophylactic vaccination and inoculation against influenza    - FluBlok preserve-free/prefilled, 18+ years [69546]  - ADMIN INFLUENZA VIRUS VACCINE    7. Laceration of right index finger without foreign body without damage to nail, initial encounter  Apply  - mupirocin (BACTROBAN) 2 % external ointment; Apply topically 3 times daily  Dispense: 22 g; Refill: 0  -*If any extending redness or other signs of infection, return to clinic    Follow up in 3 months. Sooner if needed.   --Jr Giron MD  [facility], Department of Family Medicine and Community Health

## 2020-10-05 NOTE — NURSING NOTE
"50 year old  Chief Complaint   Patient presents with     Recheck Medication     f/u check refills meds      Laceration     right hand 2nd finger x 2 days       Blood pressure 115/82, pulse 106, temperature 98.6  F (37  C), temperature source Temporal, height 1.93 m (6' 3.98\"), weight 128.1 kg (282 lb 8 oz), SpO2 95 %. Body mass index is 34.4 kg/m .  Patient Active Problem List   Diagnosis     Post concussive encephalopathy     H/O shoulder surgery     Alcoholism in remission (H)     Bilateral ACL tear     Chronic back pain     Social anxiety disorder     Alcohol withdrawal syndrome with complication, with unspecified complication (H)     Rib fracture     Alcohol withdrawal (H)       Wt Readings from Last 2 Encounters:   10/05/20 128.1 kg (282 lb 8 oz)   08/19/20 133.4 kg (294 lb)     BP Readings from Last 3 Encounters:   10/05/20 115/82   08/20/20 (!) 144/89   04/18/20 114/80         Current Outpatient Medications   Medication     acetaminophen (TYLENOL) 325 MG tablet     ARIPiprazole (ABILIFY) 2 MG tablet     buPROPion (WELLBUTRIN) 100 MG tablet     folic acid (FOLVITE) 1 MG tablet     gabapentin (NEURONTIN) 600 MG tablet     hydrOXYzine (VISTARIL) 25 MG capsule     methadone (DOLPHINE) 5 MG/5ML solution     nicotine polacrilex (NICORETTE) 4 MG gum     QUEtiapine (SEROQUEL) 400 MG tablet     No current facility-administered medications for this visit.        Social History     Tobacco Use     Smoking status: Former Smoker     Types: Dip, chew, snus or snuff     Smokeless tobacco: Former User     Types: Chew   Substance Use Topics     Alcohol use: Yes     Drug use: Not Currently       Health Maintenance Due   Topic Date Due     PREVENTIVE CARE VISIT  1970     DEPRESSION ACTION PLAN  1970     COLORECTAL CANCER SCREENING  05/03/1979     HIV SCREENING  05/03/1984     HEPATITIS B IMMUNIZATION (1 of 3 - Risk 3-dose series) 05/03/1988     ZOSTER IMMUNIZATION (1 of 2) 05/03/2019     PHQ-9  04/11/2020     " INFLUENZA VACCINE (1) 09/01/2020       No results found for: PAP      October 5, 2020 11:15 AM  Prior to immunization administration, verified patients identity using patient s name and date of birth. Please see Immunization Activity for additional information.     Screening Questionnaire for Adult Immunization    Are you sick today?   No   Do you have allergies to medications, food, a vaccine component or latex?   No   Have you ever had a serious reaction after receiving a vaccination?   No   Do you have a long-term health problem with heart, lung, kidney, or metabolic disease (e.g., diabetes), asthma, a blood disorder, no spleen, complement component deficiency, a cochlear implant, or a spinal fluid leak?  Are you on long-term aspirin therapy?   No   Do you have cancer, leukemia, HIV/AIDS, or any other immune system problem?   No   Do you have a parent, brother, or sister with an immune system problem?   No   In the past 3 months, have you taken medications that affect  your immune system, such as prednisone, other steroids, or anticancer drugs; drugs for the treatment of rheumatoid arthritis, Crohn s disease, or psoriasis; or have you had radiation treatments?   No   Have you had a seizure, or a brain or other nervous system problem?   Yes   During the past year, have you received a transfusion of blood or blood    products, or been given immune (gamma) globulin or antiviral drug?   No   For women: Are you pregnant or is there a chance you could become       pregnant during the next month?   No   Have you received any vaccinations in the past 4 weeks?   No     Immunization questionnaire answers were all negative.        Per orders of Dr. Giron , injection of flu shot  given by Brii Odell. Patient instructed to remain in clinic for 15 minutes afterwards, and to report any adverse reaction to me immediately.       Screening performed by Brii Odell on 10/5/2020 at 11:15 AM.

## 2020-10-05 NOTE — PATIENT INSTRUCTIONS
Impression:   51 yo with multiple physical and mental health issues. Doing better than any time that I have seen in the past. He's exercising more, has lost about 12.5 lbs, and has decreased his methadone from 110/day to 55 /day. [facility]   ASSESSMENT/PLAN:    1. Generalized anxiety disorder  Refill- QUEtiapine (SEROQUEL) 400 MG tablet; Take 1 tablet (400 mg) by mouth At Bedtime  Dispense: 90 tablet; Refill: 1  - gabapentin (NEURONTIN) 600 MG tablet; Take 2 tablets (1,200 mg) by mouth 3 times daily  Dispense: 540 tablet; Refill: 1    2. Tobacco use disorder  Refill  - nicotine polacrilex (NICORETTE) 4 MG gum; PLACE 2 GUM INSIDE CHEEK EVERY 2 HOURS AS NEEDED FOR SMOKING CESSATION ( MAX 22 PIECE DAILY )  Dispense: 240 each; Refill: 6    3. Other chronic pain  Refill:   - gabapentin (NEURONTIN) 600 MG tablet; Take 2 tablets (1,200 mg) by mouth 3 times daily  Dispense: 540 tablet; Refill: 1    4. Episode of recurrent major depressive disorder, unspecified depression episode severity (H)  Doing well.   - buPROPion (WELLBUTRIN) 100 MG tablet; TAKE 2 TABLETS BY MOUTH TWICE A DAY  Dispense: 360 tablet; Refill: 1    5. Mood disorder (H)  Asked again if he's seen Psychiatry. He has not due to COVID. States that he's on the wait list.   Emphasized again for the need to have an evaluation when able. Will refill for a month with 2 Refills.   - ARIPiprazole (ABILIFY) 2 MG tablet; Take 1 tablet (2 mg) by mouth daily  Dispense: 30 tablet; Refill: 2    6. Need for prophylactic vaccination and inoculation against influenza    - FluBlok preserve-free/prefilled, 18+ years [04384]  - ADMIN INFLUENZA VIRUS VACCINE    7. Laceration of right index finger without foreign body without damage to nail, initial encounter  Apply  - mupirocin (BACTROBAN) 2 % external ointment; Apply topically 3 times daily  Dispense: 22 g; Refill: 0  -*If any extending redness or other signs of infection, return to clinic    Follow up in 3 months.  Sooner if needed.     --Jr Giron MD  [facility], Department of Family Medicine and Community Health

## 2020-10-06 ASSESSMENT — ANXIETY QUESTIONNAIRES: GAD7 TOTAL SCORE: 6

## 2020-11-10 DIAGNOSIS — K59.00 CONSTIPATION: ICD-10-CM

## 2020-11-10 DIAGNOSIS — K59.00 CONSTIPATION, UNSPECIFIED CONSTIPATION TYPE: Primary | ICD-10-CM

## 2020-11-10 DIAGNOSIS — S61.210A LACERATION OF RIGHT INDEX FINGER WITHOUT FOREIGN BODY WITHOUT DAMAGE TO NAIL, INITIAL ENCOUNTER: ICD-10-CM

## 2020-11-11 RX ORDER — MUPIROCIN 20 MG/G
OINTMENT TOPICAL
Qty: 22 G | Refills: 0 | Status: SHIPPED | OUTPATIENT
Start: 2020-11-11 | End: 2021-01-25

## 2020-11-11 NOTE — TELEPHONE ENCOUNTER
[facility] Call Center    Phone Message    May a detailed message be left on voicemail: yes     Reason for Call: Medication Question or concern regarding medication   Prescription Clarification  Name of Medication: docusate sodium 100mg  Prescribing Provider: n/a   Pharmacy: Jr Giron MD   What on the order needs clarification?   The patient would like to get a refill for this medication, however its not on the med list please review and follow up with patient asap          Action Taken: Message routed to:  [facility]: [facility]    Travel Screening: Not Applicable

## 2020-11-11 NOTE — TELEPHONE ENCOUNTER
Per Dr. Giron,  OK for refill on this ointment one more time.   I spoke with nurse Alisha  At pt's residence,  And she states his wound is doing very well, healing and no signs and sx of any infection.  They would just like to have another tube on hand.       Sheron Mittal RN  November 11, 2020 1:47 PM

## 2020-11-12 RX ORDER — DOCUSATE SODIUM 100 MG/1
100 CAPSULE, LIQUID FILLED ORAL 2 TIMES DAILY
Qty: 60 CAPSULE | Refills: 11 | Status: SHIPPED | OUTPATIENT
Start: 2020-11-12 | End: 2021-01-25

## 2020-11-12 NOTE — TELEPHONE ENCOUNTER
Routing refill request to provider for review/approval because: COLACE not on the Medication List - I called the RN at his facility - he has been taking this BID. I've teed up this, approve if OK.    Trena Harrison RN  11/12/20  10:07 AM

## 2020-11-16 ENCOUNTER — MEDICAL CORRESPONDENCE (OUTPATIENT)
Dept: HEALTH INFORMATION MANAGEMENT | Facility: CLINIC | Age: 50
End: 2020-11-16

## 2020-11-17 ENCOUNTER — TELEPHONE (OUTPATIENT)
Dept: ORTHOPEDICS | Facility: CLINIC | Age: 50
End: 2020-11-17

## 2020-11-17 NOTE — TELEPHONE ENCOUNTER
Plan for  When patient withdrawls        [facility]    Phone Message    May a detailed message be left on voicemail: yes     Reason for Call: Medication Question or concern regarding medication   Prescription Clarification  Name of Medication: methadone (DOLPHINE) 5 MG/5ML solution  Prescribing Provider: N/A   Pharmacy: N/A   What on the order needs clarification? Home Care Nurse is reporting that the patient is requesting for 1 weeks worth of this medication to be dispensed to him. Nurse reports patient states he cannot go in and take the medication everyday. Nurse reports that the patient said either he gets 1 weeks worth of medications or he will stop taking the medication. Nurse would like a call back with a plan for when the patient is having withdraws. Nurse states that the patient is their new client.         Action Taken: Message routed to:  [facility]: [facility]    Travel Screening: Not Applicable

## 2020-11-18 NOTE — TELEPHONE ENCOUNTER
Called CHAPARRO Lee at [facility] and informed her that Dr. Giron does not order the methadone, and pt has received this at [facility]. She will call there next to discuss his methadone medication request.     Trena Harrison RN  11/18/20  2:52 PM

## 2020-12-07 DIAGNOSIS — F10.21 ALCOHOLISM IN REMISSION (H): ICD-10-CM

## 2020-12-08 RX ORDER — FOLIC ACID 1 MG/1
TABLET ORAL
Qty: 90 TABLET | Refills: 1 | Status: ON HOLD | OUTPATIENT
Start: 2020-12-08 | End: 2021-05-04

## 2020-12-08 NOTE — TELEPHONE ENCOUNTER
Last seen 10/5/20,  Future appt 1/6/21      Prescription approved per [facility] [place] Refill Protocol.      Sheron Mittal RN  December 8, 2020 1:22 PM

## 2020-12-11 DIAGNOSIS — F17.200 TOBACCO USE DISORDER: ICD-10-CM

## 2020-12-14 NOTE — TELEPHONE ENCOUNTER
Last Office Visit: 10/5/20  Future [facility] Appointments: none  Medication last refilled: 10/5/20 #240 + 6 refills    Call to pharmacy - patient has used up all the refills. He is taking the max of 22 per day so the current order of #240 is lasting about 10 days - they give him 2 x boxes of 110 pieces each.     Dr. Giron - do you want to continue with ordering this? I've teed up a 30-day supply which would be #660 (6 boxes of 110 each)    Trena Harrison RN  12/14/20  11:09 AM

## 2020-12-29 DIAGNOSIS — G89.29 OTHER CHRONIC PAIN: Primary | ICD-10-CM

## 2020-12-30 RX ORDER — IBUPROFEN 800 MG/1
TABLET, FILM COATED ORAL
Qty: 90 TABLET | Refills: 0 | Status: SHIPPED | OUTPATIENT
Start: 2020-12-30 | End: 2021-01-25

## 2020-12-30 NOTE — TELEPHONE ENCOUNTER
Routing refill request to provider for review/approval because:    Not on Med List - never prescribed from here    Labs out of range:  AST not WNL    Trena Harrison RN  12/30/20  2:39 PM

## 2021-01-06 ENCOUNTER — NURSE TRIAGE (OUTPATIENT)
Dept: NURSING | Facility: CLINIC | Age: 51
End: 2021-01-06

## 2021-01-11 ENCOUNTER — TELEPHONE (OUTPATIENT)
Dept: ORTHOPEDICS | Facility: CLINIC | Age: 51
End: 2021-01-11

## 2021-01-11 NOTE — TELEPHONE ENCOUNTER
"[facility] Call Center    Phone Message    May a detailed message be left on voicemail: yes     Reason for Call: Other: Patient needing something other than \"[place]\" for constipation. Patient states he is struggling with this. Patient needs an appointment but message in chart states he needs to speak to financial counselor first. Message left for her to follow up with patient. Please follow up with patient regarding constipation. Thank you!      Action Taken: Message routed to:  [facility]: [facility]    Travel Screening: Not Applicable                                                                        "

## 2021-01-25 ENCOUNTER — TELEPHONE (OUTPATIENT)
Dept: FAMILY MEDICINE | Facility: CLINIC | Age: 51
End: 2021-01-25

## 2021-01-25 ENCOUNTER — OFFICE VISIT (OUTPATIENT)
Dept: FAMILY MEDICINE | Facility: CLINIC | Age: 51
End: 2021-01-25
Payer: MEDICAID

## 2021-01-25 VITALS
HEIGHT: 76 IN | HEART RATE: 87 BPM | TEMPERATURE: 97.1 F | OXYGEN SATURATION: 96 % | SYSTOLIC BLOOD PRESSURE: 128 MMHG | BODY MASS INDEX: 35.19 KG/M2 | WEIGHT: 289 LBS | DIASTOLIC BLOOD PRESSURE: 88 MMHG

## 2021-01-25 DIAGNOSIS — F33.9 EPISODE OF RECURRENT MAJOR DEPRESSIVE DISORDER, UNSPECIFIED DEPRESSION EPISODE SEVERITY (H): ICD-10-CM

## 2021-01-25 DIAGNOSIS — F10.21 ALCOHOLISM IN REMISSION (H): ICD-10-CM

## 2021-01-25 DIAGNOSIS — F39 MOOD DISORDER (H): ICD-10-CM

## 2021-01-25 DIAGNOSIS — F41.1 GENERALIZED ANXIETY DISORDER: ICD-10-CM

## 2021-01-25 DIAGNOSIS — S22.31XA CLOSED FRACTURE OF ONE RIB OF RIGHT SIDE, INITIAL ENCOUNTER: ICD-10-CM

## 2021-01-25 DIAGNOSIS — G89.29 OTHER CHRONIC PAIN: ICD-10-CM

## 2021-01-25 DIAGNOSIS — M25.461 PAIN AND SWELLING OF RIGHT KNEE: Primary | ICD-10-CM

## 2021-01-25 DIAGNOSIS — M25.561 PAIN AND SWELLING OF RIGHT KNEE: Primary | ICD-10-CM

## 2021-01-25 DIAGNOSIS — K59.00 CONSTIPATION, UNSPECIFIED CONSTIPATION TYPE: ICD-10-CM

## 2021-01-25 RX ORDER — IBUPROFEN 800 MG/1
800 TABLET, FILM COATED ORAL EVERY 8 HOURS PRN
Qty: 90 TABLET | Refills: 1 | Status: SHIPPED | OUTPATIENT
Start: 2021-01-25 | End: 2021-03-25

## 2021-01-25 RX ORDER — DOCUSATE SODIUM 100 MG/1
100 CAPSULE, LIQUID FILLED ORAL 2 TIMES DAILY
Qty: 60 CAPSULE | Refills: 11 | Status: ON HOLD | OUTPATIENT
Start: 2021-01-25 | End: 2021-05-04

## 2021-01-25 RX ORDER — ACETAMINOPHEN 500 MG
TABLET ORAL
Qty: 180 TABLET | Refills: 2 | Status: ON HOLD | OUTPATIENT
Start: 2021-01-25 | End: 2021-05-04

## 2021-01-25 RX ORDER — BUPROPION HYDROCHLORIDE 100 MG/1
TABLET ORAL
Qty: 360 TABLET | Refills: 1 | Status: ON HOLD | OUTPATIENT
Start: 2021-01-25 | End: 2021-05-04

## 2021-01-25 RX ORDER — ARIPIPRAZOLE 2 MG/1
2 TABLET ORAL DAILY
Qty: 30 TABLET | Refills: 2 | Status: SHIPPED | OUTPATIENT
Start: 2021-01-25 | End: 2021-03-25

## 2021-01-25 ASSESSMENT — ANXIETY QUESTIONNAIRES
7. FEELING AFRAID AS IF SOMETHING AWFUL MIGHT HAPPEN: SEVERAL DAYS
GAD7 TOTAL SCORE: 7
IF YOU CHECKED OFF ANY PROBLEMS ON THIS QUESTIONNAIRE, HOW DIFFICULT HAVE THESE PROBLEMS MADE IT FOR YOU TO DO YOUR WORK, TAKE CARE OF THINGS AT HOME, OR GET ALONG WITH OTHER PEOPLE: SOMEWHAT DIFFICULT
1. FEELING NERVOUS, ANXIOUS, OR ON EDGE: SEVERAL DAYS
6. BECOMING EASILY ANNOYED OR IRRITABLE: SEVERAL DAYS
5. BEING SO RESTLESS THAT IT IS HARD TO SIT STILL: SEVERAL DAYS
3. WORRYING TOO MUCH ABOUT DIFFERENT THINGS: SEVERAL DAYS
2. NOT BEING ABLE TO STOP OR CONTROL WORRYING: SEVERAL DAYS

## 2021-01-25 ASSESSMENT — MIFFLIN-ST. JEOR: SCORE: 2267.15

## 2021-01-25 ASSESSMENT — PATIENT HEALTH QUESTIONNAIRE - PHQ9: 5. POOR APPETITE OR OVEREATING: SEVERAL DAYS

## 2021-01-25 NOTE — TELEPHONE ENCOUNTER
[place]ified on [place] patient needs to go to [facility] for diagnostic testing as his insurance is restricted to that location for these services.    Eunice

## 2021-01-25 NOTE — PATIENT INSTRUCTIONS
ASSESSMENT/PLAN:    1. RIGHT knee posterior swelling. Likely a Baker's Cyst   - Sent for Ultrasound to evaluate  - Discussed pathology of Baker's Cysts  -Ice (20 mins at a time, 3 times/day)  -Compression with knee sleeve if able to get a pharmacy    2. Chronic pain, stable. Refilled several meds      3. Tobacco abuse  - not smoking  -Refilled Nicotine gum    4. Opioid addiction   - On methadone per [facility]    5. Follow up in 3 months    --Jr Giron MD

## 2021-01-25 NOTE — NURSING NOTE
"50 year old  Chief Complaint   Patient presents with     Recheck Medication     medication refills      Musculoskeletal Problem     right knee lump on the back tendon  painful x 1.5 wks        Blood pressure (!) 170/84, pulse 87, temperature 97.1  F (36.2  C), temperature source Temporal, height 1.93 m (6' 3.98\"), weight 131.1 kg (289 lb), SpO2 96 %. Body mass index is 35.19 kg/m .  Patient Active Problem List   Diagnosis     Post concussive encephalopathy     H/O shoulder surgery     Alcoholism in remission (H)     Bilateral ACL tear     Chronic back pain     Social anxiety disorder     Alcohol withdrawal syndrome with complication, with unspecified complication (H)     Rib fracture     Alcohol withdrawal (H)       Wt Readings from Last 2 Encounters:   01/25/21 131.1 kg (289 lb)   10/05/20 128.1 kg (282 lb 8 oz)     BP Readings from Last 3 Encounters:   01/25/21 (!) 170/84   10/05/20 115/82   08/20/20 (!) 144/89         Current Outpatient Medications   Medication     acetaminophen (TYLENOL) 325 MG tablet     ARIPiprazole (ABILIFY) 2 MG tablet     buPROPion (WELLBUTRIN) 100 MG tablet     docusate sodium (COLACE) 100 MG capsule     folic acid (FOLVITE) 1 MG tablet     gabapentin (NEURONTIN) 600 MG tablet     ibuprofen (ADVIL/MOTRIN) 800 MG tablet     methadone (DOLPHINE) 5 MG/5ML solution     mupirocin (BACTROBAN) 2 % external ointment     nicotine polacrilex (NICORETTE) 4 MG gum     QUEtiapine (SEROQUEL) 400 MG tablet     No current facility-administered medications for this visit.        Social History     Tobacco Use     Smoking status: Former Smoker     Types: Dip, chew, snus or snuff     Smokeless tobacco: Former User     Types: Chew   Substance Use Topics     Alcohol use: Yes     Drug use: Not Currently       Health Maintenance Due   Topic Date Due     PREVENTIVE CARE VISIT  1969     DEPRESSION ACTION PLAN  1969     COLORECTAL CANCER SCREENING  05/03/1979     HIV SCREENING  05/03/1984     HEPATITIS " C SCREENING  05/03/1987     HEPATITIS B IMMUNIZATION (1 of 3 - Risk 3-dose series) 05/03/1988     ZOSTER IMMUNIZATION (1 of 2) 05/03/2019       No results found for: PAP      January 25, 2021 10:41 AM

## 2021-01-25 NOTE — PROGRESS NOTES
OVERVIEW: Hollis Barclay is a 50 year old male who presents to [facility] today for evaluation of a swelling behind his right knee and for multiple medication refills      ASSESSMENT/PLAN:    1. RIGHT knee posterior swelling. Likely a Baker's Cyst   - Sent for Ultrasound to evaluate  - Discussed pathology of Baker's Cysts  -Ice (20 mins at a time, 3 times/day)  -Compression with knee sleeve if able to get a pharmacy  For scheduling at [facility] ([facility], [facility] [place]), call [phone] or [phone]     For scheduling in the [place] ([facility] [place]) call  [phone] or  [phone]        For scheduling in [place] ([facility], [facility]) call [phone]  or   [phone]          2. Chronic pain, stable. Refilled several meds      3. Tobacco abuse  - not smoking  -Refilled Nicotine gum    4. Opioid addiction   - On methadone per [facility]    5. Follow up in 3 months    --Jr Girno MD      SUBJECTIVE:   Jose M has been doing fairly well recently.    Still living in [place]. Has a housemate. Has lots of independence. Under [employer] program (for people with TBI who are trying to get independent).     He presents today for a refill of multiple medications.  In addition he reports that a swelling behind his right he developed about a week or 2 ago.  He cannot recall any new injury to his knees.  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      Review Of Systems:    Has otherwise been in usual state of health.  Still in chronic pain.  This is unchanged.    Problem list per EMR:  Patient Active Problem List   Diagnosis     Post concussive encephalopathy     H/O shoulder surgery     Alcoholism in remission (H)     Bilateral ACL tear     Chronic back pain     Social anxiety disorder     Alcohol withdrawal syndrome with complication, with unspecified complication (H)     Rib fracture      "Alcohol withdrawal (H)       Current Outpatient Medications   Medication Sig Dispense Refill     acetaminophen (TYLENOL) 325 MG tablet Take 2 tablets (650 mg) by mouth every 4 hours as needed for mild pain       ARIPiprazole (ABILIFY) 2 MG tablet Take 1 tablet (2 mg) by mouth daily 30 tablet 2     buPROPion (WELLBUTRIN) 100 MG tablet TAKE 2 TABLETS BY MOUTH TWICE A  tablet 1     docusate sodium (COLACE) 100 MG capsule Take 1 capsule (100 mg) by mouth 2 times daily 60 capsule 11     folic acid (FOLVITE) 1 MG tablet TAKE 1 TABLET BY MOUTH DAILY 90 tablet 1     gabapentin (NEURONTIN) 600 MG tablet Take 2 tablets (1,200 mg) by mouth 3 times daily 540 tablet 1     ibuprofen (ADVIL/MOTRIN) 800 MG tablet TAKE 1 TABLET BY MOUTH THREE TIMES A DAY WITH FOOD AS NEEDED 90 tablet 0     methadone (DOLPHINE) 5 MG/5ML solution Take 55 mg by mouth daily From [facility] program        mupirocin (BACTROBAN) 2 % external ointment APPLY TOPICALLY THREE TIMES A DAY 22 g 0     nicotine polacrilex (NICORETTE) 4 MG gum PLACE 2 GUM INSIDE CHEEK EVERY 2 HOURS AS NEEDED FOR SMOKING CESSATION ( MAX 22 PIECE DAILY ) 660 each 3     QUEtiapine (SEROQUEL) 400 MG tablet Take 1 tablet (400 mg) by mouth At Bedtime 90 tablet 1       Allergies   Allergen Reactions     Nsaids             Social:   See above      OBJECTIVE    Vitals: BP (!) 170/84 (BP Location: Right arm, Patient Position: Sitting, Cuff Size: Adult Large)   Pulse 87   Temp 97.1  F (36.2  C) (Temporal)   Ht 1.93 m (6' 3.98\")   Wt 131.1 kg (289 lb)   SpO2 96%   BMI 35.19 kg/m    BMI= Body mass index is 35.19 kg/m .     Repeat BP  /88   Pulse 87   Temp 97.1  F (36.2  C) (Temporal)   Ht 1.93 m (6' 3.98\")   Wt 131.1 kg (289 lb)   SpO2 96%   BMI 35.19 kg/m      Wt Readings from Last 5 Encounters:   01/25/21 131.1 kg (289 lb)   10/05/20 128.1 kg (282 lb 8 oz)   08/19/20 133.4 kg (294 lb)   07/20/20 122.5 kg (270 lb)   03/09/20 130 kg (286 lb 9.6 oz)     Rate appears more calm " than during usual visits.  He is a very large muscularly built 50-year-old.    He is able to rise from a seated position without difficulty.  His gait is in the normal range.    Right knee-    Posterior aspect has a swelling that appears consistent with a Baker's cyst.  He has a small effusion noted anteriorly.  No redness or warmth.  He is able to get to near full extension.  Also has active flexion.  His ligaments appear stable throughout.  His hip has pain-free range of motion.    SEE TOP OF NOTE FOR ASSESSMENT AND PLAN    --Jr Giron MD  [facility], Department of Family Medicine and Community Health

## 2021-01-26 ASSESSMENT — ANXIETY QUESTIONNAIRES: GAD7 TOTAL SCORE: 7

## 2021-01-27 DIAGNOSIS — G89.29 OTHER CHRONIC PAIN: ICD-10-CM

## 2021-01-28 RX ORDER — IBUPROFEN 800 MG/1
TABLET, FILM COATED ORAL
Qty: 90 TABLET | Refills: 1 | OUTPATIENT
Start: 2021-01-28

## 2021-01-28 NOTE — TELEPHONE ENCOUNTER
This ibuprofen med refill was just done on 1/25/21 per Mack when pt in for appt     Duplicate.    Sheron Mittal RN  January 28, 2021 7:31 AM

## 2021-02-01 DIAGNOSIS — G89.29 OTHER CHRONIC PAIN: ICD-10-CM

## 2021-02-03 ENCOUNTER — ANCILLARY PROCEDURE (OUTPATIENT)
Dept: ULTRASOUND IMAGING | Facility: CLINIC | Age: 51
End: 2021-02-03
Attending: FAMILY MEDICINE
Payer: MEDICAID

## 2021-02-03 DIAGNOSIS — M25.461 PAIN AND SWELLING OF RIGHT KNEE: ICD-10-CM

## 2021-02-03 DIAGNOSIS — M25.561 PAIN AND SWELLING OF RIGHT KNEE: ICD-10-CM

## 2021-02-03 RX ORDER — IBUPROFEN 800 MG/1
TABLET, FILM COATED ORAL
Qty: 90 TABLET | Refills: 1 | OUTPATIENT
Start: 2021-02-03

## 2021-02-03 NOTE — TELEPHONE ENCOUNTER
Duplicate request - this medication was filled on 1/25/21 #90 + 1 refill.    Trena Harrison RN  02/03/21  11:13 AM

## 2021-02-22 ENCOUNTER — TELEPHONE (OUTPATIENT)
Dept: FAMILY MEDICINE | Facility: CLINIC | Age: 51
End: 2021-02-22

## 2021-02-22 NOTE — TELEPHONE ENCOUNTER
Pt left message with the  clinic - states he had LE ultrasound earlier in Feb - he is wanting Dr Giron to call him with result.   Pt does not have Mychart , but it appears a result letter was sent.   Pt requesting Dr Giron call for discussion - [phone].  Angeli Garcia RN  [facility]    At 5:20pm on Feb 22 I phoned Ray. He did not answer. I left him a message stating that there was no clot, and that the swelling is likely due to a cyst from knee arthritic swelling.   --Jr Giron MD

## 2021-02-28 DIAGNOSIS — G89.29 OTHER CHRONIC PAIN: ICD-10-CM

## 2021-03-01 ENCOUNTER — TELEPHONE (OUTPATIENT)
Dept: FAMILY MEDICINE | Facility: CLINIC | Age: 51
End: 2021-03-01

## 2021-03-01 RX ORDER — IBUPROFEN 800 MG/1
TABLET, FILM COATED ORAL
Qty: 90 TABLET | Refills: 1 | OUTPATIENT
Start: 2021-03-01

## 2021-03-01 NOTE — TELEPHONE ENCOUNTER
"Pt had US of lower right leg 2/3/21 - negative for DVT. He calls today because past 4 days he c/o swelling of that right leg from knee part way down calf to top of socks. He describes it as \"fluid/edema dammed up at the elastic from the socks\". He had a bulge in back of knee before and could have been Baker's cyst. He still has that but it has reduced in size. He denies chest pain, shortness of breath, change in skin color or temperature compared to other leg. He describes the swelling as being about 1/4 to 1/3 the size of other leg. It is better upon waking and worsens as day goes on. Elevation has helped.    Dr. Giron - please advise.    Trena Harrison RN  03/01/21  12:15 PM    "

## 2021-03-01 NOTE — TELEPHONE ENCOUNTER
Refill for ibuprofen denied for now - not due until 3/25/21. Phoned [facility], and spoke to staff there re: this.  Angeli Garcia RN  [facility]

## 2021-03-02 NOTE — TELEPHONE ENCOUNTER
Patient called and relayed the message from Dr. Giron. Pt verbalized understanding and agrees with this plan.     Trena Harrison, RN  03/02/21  9:13 AM  +++++++++++++++++++++++++++++++++  This sounds like the Baker's cyst ruptured and there's swelling going down the leg. I suggest gentle compression, e.g. an Ace wrap.   Can you let him know? If he has further concerns, can go to E.R. as would need a repeat ultrasound.   OK?   --Chauncey

## 2021-03-22 DIAGNOSIS — F39 MOOD DISORDER (H): ICD-10-CM

## 2021-03-22 DIAGNOSIS — G89.29 OTHER CHRONIC PAIN: ICD-10-CM

## 2021-03-22 DIAGNOSIS — F41.1 GENERALIZED ANXIETY DISORDER: ICD-10-CM

## 2021-03-25 DIAGNOSIS — F17.200 TOBACCO USE DISORDER: ICD-10-CM

## 2021-03-25 RX ORDER — IBUPROFEN 800 MG/1
TABLET, FILM COATED ORAL
Qty: 90 TABLET | Refills: 1 | Status: ON HOLD | OUTPATIENT
Start: 2021-03-25 | End: 2021-05-04

## 2021-03-25 RX ORDER — GABAPENTIN 600 MG/1
TABLET ORAL
Qty: 180 TABLET | Refills: 3 | Status: ON HOLD | OUTPATIENT
Start: 2021-03-25 | End: 2021-05-04

## 2021-03-25 RX ORDER — ARIPIPRAZOLE 2 MG/1
TABLET ORAL
Qty: 30 TABLET | Refills: 3 | Status: ON HOLD | OUTPATIENT
Start: 2021-03-25 | End: 2021-05-04

## 2021-03-25 RX ORDER — QUETIAPINE FUMARATE 400 MG/1
TABLET, FILM COATED ORAL
Qty: 30 TABLET | Refills: 3 | Status: ON HOLD | OUTPATIENT
Start: 2021-03-25 | End: 2021-05-04

## 2021-03-25 NOTE — TELEPHONE ENCOUNTER
Last Office Visit: 1/25/21 and return in 3 months  Future [facility] Appointments: none  Medication last refilled:     SEROQUEL - 10/5/20 #90 + 1 RF    IBUPROFEN - 1/25/21 #90 + 1 RF     GABAPENTIN - 10/5/20 #540 + 1RF    ABILIFY - 1/25/21 #30 + 1 RF     Routing refill request to provider for review/approval because:  Do you want to continue ordering all these for patient? He is due back for a visit with you in April/May.     Trena Harrison RN  03/25/21  11:37 AM

## 2021-03-26 NOTE — TELEPHONE ENCOUNTER
Last Office Visit: 1/25/21  Future [facility] Appointments: 4/26/21  Medication last refilled: 12/14/20 - 30 day supply + 3 refills    Called pharmacy - last refill picked up 3/1/21 and pt is getting this filled early each month. Informed that the supply of #660 should last 30 days, they will note this at their end.     Next fill is then due: 4/13/21    Dr. Giron - do you want to keep the Rx as is or increase # from 22 pieces max/day? Should he see Kelly for smoking cessation options if he is using #660 in a couple weeks?    Trena Harrison, RN  03/26/21  3:14 PM

## 2021-04-01 ENCOUNTER — TELEPHONE (OUTPATIENT)
Dept: FAMILY MEDICINE | Facility: CLINIC | Age: 51
End: 2021-04-01

## 2021-04-01 NOTE — TELEPHONE ENCOUNTER
Who is calling? Patient   Medication name: nicotine polacrilex (NICORETTE)   Is this a refill request? Yes  Have they contacted the pharmacy?  Yes  Pharmacy: [facility] [place] [id number] - [place], [place] - [address]  Question/Concern: completely out and wants filled asap, says he's biting his nails at this point   Would patient like a call back? No

## 2021-04-05 NOTE — TELEPHONE ENCOUNTER
"I suggest keeping \"as is,\" and if he needs more then yes to see Alisa Mendoza-Chauncey   " Pt tolerated IVFs well. Ativan given for nausea.

## 2021-04-28 ENCOUNTER — HOSPITAL ENCOUNTER (EMERGENCY)
Facility: CLINIC | Age: 51
Discharge: ANOTHER HOSPITAL | End: 2021-04-29
Attending: EMERGENCY MEDICINE | Admitting: EMERGENCY MEDICINE
Payer: MEDICAID

## 2021-04-28 DIAGNOSIS — E87.6 HYPOKALEMIA: ICD-10-CM

## 2021-04-28 DIAGNOSIS — F10.920 ALCOHOLIC INTOXICATION WITHOUT COMPLICATION (H): ICD-10-CM

## 2021-04-28 PROCEDURE — 36415 COLL VENOUS BLD VENIPUNCTURE: CPT

## 2021-04-28 PROCEDURE — 96368 THER/DIAG CONCURRENT INF: CPT

## 2021-04-28 PROCEDURE — 99285 EMERGENCY DEPT VISIT HI MDM: CPT | Mod: 25

## 2021-04-28 PROCEDURE — 96365 THER/PROPH/DIAG IV INF INIT: CPT

## 2021-04-28 PROCEDURE — 250N000013 HC RX MED GY IP 250 OP 250 PS 637: Performed by: EMERGENCY MEDICINE

## 2021-04-28 PROCEDURE — C9803 HOPD COVID-19 SPEC COLLECT: HCPCS

## 2021-04-28 PROCEDURE — 82075 ASSAY OF BREATH ETHANOL: CPT

## 2021-04-28 RX ORDER — QUETIAPINE FUMARATE 200 MG/1
400 TABLET, FILM COATED ORAL AT BEDTIME
Status: DISCONTINUED | OUTPATIENT
Start: 2021-04-28 | End: 2021-04-29 | Stop reason: HOSPADM

## 2021-04-28 RX ORDER — OLANZAPINE 10 MG/1
10 TABLET, ORALLY DISINTEGRATING ORAL 2 TIMES DAILY PRN
Status: DISCONTINUED | OUTPATIENT
Start: 2021-04-28 | End: 2021-04-29 | Stop reason: HOSPADM

## 2021-04-28 RX ADMIN — QUETIAPINE 400 MG: 200 TABLET, FILM COATED ORAL at 23:32

## 2021-04-28 ASSESSMENT — MIFFLIN-ST. JEOR: SCORE: 2217.5

## 2021-04-29 ENCOUNTER — HOSPITAL ENCOUNTER (INPATIENT)
Facility: CLINIC | Age: 51
LOS: 6 days | Discharge: PSYCHIATRIC OR SUBSTANCE USE FACILITY | End: 2021-05-05
Attending: PSYCHIATRY & NEUROLOGY | Admitting: PSYCHIATRY & NEUROLOGY
Payer: MEDICAID

## 2021-04-29 ENCOUNTER — TELEPHONE (OUTPATIENT)
Dept: BEHAVIORAL HEALTH | Facility: CLINIC | Age: 51
End: 2021-04-29

## 2021-04-29 VITALS
HEART RATE: 95 BPM | SYSTOLIC BLOOD PRESSURE: 155 MMHG | WEIGHT: 278 LBS | BODY MASS INDEX: 33.85 KG/M2 | DIASTOLIC BLOOD PRESSURE: 85 MMHG | HEIGHT: 76 IN | RESPIRATION RATE: 20 BRPM | TEMPERATURE: 97.2 F | OXYGEN SATURATION: 96 %

## 2021-04-29 DIAGNOSIS — K59.00 CONSTIPATION, UNSPECIFIED CONSTIPATION TYPE: ICD-10-CM

## 2021-04-29 DIAGNOSIS — F17.200 TOBACCO USE DISORDER: ICD-10-CM

## 2021-04-29 DIAGNOSIS — F39 MOOD DISORDER (H): ICD-10-CM

## 2021-04-29 DIAGNOSIS — F10.20 UNCOMPLICATED ALCOHOL DEPENDENCE (H): Primary | ICD-10-CM

## 2021-04-29 DIAGNOSIS — F41.1 GENERALIZED ANXIETY DISORDER: ICD-10-CM

## 2021-04-29 DIAGNOSIS — F10.21 ALCOHOLISM IN REMISSION (H): ICD-10-CM

## 2021-04-29 LAB
ALBUMIN SERPL-MCNC: 3.2 G/DL (ref 3.4–5)
ALBUMIN UR-MCNC: 50 MG/DL
ALCOHOL BREATH TEST: 0.04 (ref 0–0.01)
ALP SERPL-CCNC: 82 U/L (ref 40–150)
ALT SERPL W P-5'-P-CCNC: 121 U/L (ref 0–70)
AMPHETAMINES UR QL SCN: POSITIVE
ANION GAP SERPL CALCULATED.3IONS-SCNC: 7 MMOL/L (ref 3–14)
APPEARANCE UR: CLEAR
AST SERPL W P-5'-P-CCNC: 234 U/L (ref 0–45)
BARBITURATES UR QL: NEGATIVE
BASOPHILS # BLD AUTO: 0 10E9/L (ref 0–0.2)
BASOPHILS NFR BLD AUTO: 0.8 %
BENZODIAZ UR QL: NEGATIVE
BILIRUB SERPL-MCNC: 0.8 MG/DL (ref 0.2–1.3)
BILIRUB UR QL STRIP: NEGATIVE
BUN SERPL-MCNC: 20 MG/DL (ref 7–30)
CALCIUM SERPL-MCNC: 8.4 MG/DL (ref 8.5–10.1)
CANNABINOIDS UR QL SCN: NEGATIVE
CHLORIDE SERPL-SCNC: 98 MMOL/L (ref 94–109)
CO2 SERPL-SCNC: 30 MMOL/L (ref 20–32)
COCAINE UR QL: POSITIVE
COLOR UR AUTO: YELLOW
CREAT SERPL-MCNC: 0.83 MG/DL (ref 0.66–1.25)
DIFFERENTIAL METHOD BLD: ABNORMAL
EOSINOPHIL # BLD AUTO: 0 10E9/L (ref 0–0.7)
EOSINOPHIL NFR BLD AUTO: 0.8 %
ERYTHROCYTE [DISTWIDTH] IN BLOOD BY AUTOMATED COUNT: 13.2 % (ref 10–15)
GFR SERPL CREATININE-BSD FRML MDRD: >90 ML/MIN/{1.73_M2}
GLUCOSE SERPL-MCNC: 78 MG/DL (ref 70–99)
GLUCOSE UR STRIP-MCNC: NEGATIVE MG/DL
HCT VFR BLD AUTO: 38.7 % (ref 40–53)
HGB BLD-MCNC: 13 G/DL (ref 13.3–17.7)
HGB UR QL STRIP: NEGATIVE
IMM GRANULOCYTES # BLD: 0 10E9/L (ref 0–0.4)
IMM GRANULOCYTES NFR BLD: 0.4 %
KETONES UR STRIP-MCNC: NEGATIVE MG/DL
LABORATORY COMMENT REPORT: NORMAL
LEUKOCYTE ESTERASE UR QL STRIP: NEGATIVE
LYMPHOCYTES # BLD AUTO: 2.2 10E9/L (ref 0.8–5.3)
LYMPHOCYTES NFR BLD AUTO: 46.8 %
MCH RBC QN AUTO: 32.1 PG (ref 26.5–33)
MCHC RBC AUTO-ENTMCNC: 33.6 G/DL (ref 31.5–36.5)
MCV RBC AUTO: 96 FL (ref 78–100)
MONOCYTES # BLD AUTO: 0.5 10E9/L (ref 0–1.3)
MONOCYTES NFR BLD AUTO: 10.5 %
MUCOUS THREADS #/AREA URNS LPF: PRESENT /LPF
NEUTROPHILS # BLD AUTO: 1.9 10E9/L (ref 1.6–8.3)
NEUTROPHILS NFR BLD AUTO: 40.7 %
NITRATE UR QL: NEGATIVE
NRBC # BLD AUTO: 0 10*3/UL
NRBC BLD AUTO-RTO: 0 /100
OPIATES UR QL SCN: NEGATIVE
PCP UR QL SCN: NEGATIVE
PH UR STRIP: 6 PH (ref 5–7)
PLATELET # BLD AUTO: 180 10E9/L (ref 150–450)
PLATELET # BLD EST: ABNORMAL 10*3/UL
POTASSIUM SERPL-SCNC: 2.9 MMOL/L (ref 3.4–5.3)
PROT SERPL-MCNC: 7 G/DL (ref 6.8–8.8)
RBC # BLD AUTO: 4.05 10E12/L (ref 4.4–5.9)
RBC #/AREA URNS AUTO: 1 /HPF (ref 0–2)
RBC MORPH BLD: ABNORMAL
SARS-COV-2 RNA RESP QL NAA+PROBE: NEGATIVE
SODIUM SERPL-SCNC: 135 MMOL/L (ref 133–144)
SOURCE: ABNORMAL
SP GR UR STRIP: 1.03 (ref 1–1.03)
SPECIMEN SOURCE: NORMAL
SPERM #/AREA URNS HPF: PRESENT /HPF
SQUAMOUS #/AREA URNS AUTO: 0 /HPF (ref 0–1)
UROBILINOGEN UR STRIP-MCNC: 0 MG/DL (ref 0–2)
WBC # BLD AUTO: 4.8 10E9/L (ref 4–11)
WBC #/AREA URNS AUTO: 1 /HPF (ref 0–5)

## 2021-04-29 PROCEDURE — HZ2ZZZZ DETOXIFICATION SERVICES FOR SUBSTANCE ABUSE TREATMENT: ICD-10-PCS | Performed by: PSYCHIATRY & NEUROLOGY

## 2021-04-29 PROCEDURE — 85025 COMPLETE CBC W/AUTO DIFF WBC: CPT | Performed by: EMERGENCY MEDICINE

## 2021-04-29 PROCEDURE — 250N000013 HC RX MED GY IP 250 OP 250 PS 637: Performed by: EMERGENCY MEDICINE

## 2021-04-29 PROCEDURE — 250N000011 HC RX IP 250 OP 636: Performed by: PSYCHIATRY & NEUROLOGY

## 2021-04-29 PROCEDURE — 250N000013 HC RX MED GY IP 250 OP 250 PS 637: Performed by: PSYCHIATRY & NEUROLOGY

## 2021-04-29 PROCEDURE — 250N000013 HC RX MED GY IP 250 OP 250 PS 637: Performed by: PHYSICIAN ASSISTANT

## 2021-04-29 PROCEDURE — 80053 COMPREHEN METABOLIC PANEL: CPT | Performed by: EMERGENCY MEDICINE

## 2021-04-29 PROCEDURE — 258N000003 HC RX IP 258 OP 636: Performed by: EMERGENCY MEDICINE

## 2021-04-29 PROCEDURE — 128N000004 HC R&B CD ADULT

## 2021-04-29 PROCEDURE — 80307 DRUG TEST PRSMV CHEM ANLYZR: CPT | Performed by: EMERGENCY MEDICINE

## 2021-04-29 PROCEDURE — 250N000011 HC RX IP 250 OP 636: Performed by: EMERGENCY MEDICINE

## 2021-04-29 PROCEDURE — 81001 URINALYSIS AUTO W/SCOPE: CPT | Performed by: EMERGENCY MEDICINE

## 2021-04-29 PROCEDURE — 87635 SARS-COV-2 COVID-19 AMP PRB: CPT | Performed by: EMERGENCY MEDICINE

## 2021-04-29 PROCEDURE — 99223 1ST HOSP IP/OBS HIGH 75: CPT | Mod: AI | Performed by: PSYCHIATRY & NEUROLOGY

## 2021-04-29 RX ORDER — POTASSIUM CHLORIDE 7.45 MG/ML
10 INJECTION INTRAVENOUS ONCE
Status: COMPLETED | OUTPATIENT
Start: 2021-04-29 | End: 2021-04-29

## 2021-04-29 RX ORDER — MAGNESIUM SULFATE HEPTAHYDRATE 40 MG/ML
2 INJECTION, SOLUTION INTRAVENOUS ONCE
Status: COMPLETED | OUTPATIENT
Start: 2021-04-29 | End: 2021-04-29

## 2021-04-29 RX ORDER — METHADONE HYDROCHLORIDE 10 MG/ML
55 CONCENTRATE ORAL ONCE
Status: COMPLETED | OUTPATIENT
Start: 2021-04-29 | End: 2021-04-29

## 2021-04-29 RX ORDER — POTASSIUM CHLORIDE 1.5 G/1.58G
40 POWDER, FOR SOLUTION ORAL ONCE
Status: COMPLETED | OUTPATIENT
Start: 2021-04-29 | End: 2021-04-29

## 2021-04-29 RX ORDER — QUETIAPINE FUMARATE 400 MG/1
400 TABLET, FILM COATED ORAL AT BEDTIME
Status: DISCONTINUED | OUTPATIENT
Start: 2021-04-29 | End: 2021-05-05 | Stop reason: HOSPADM

## 2021-04-29 RX ORDER — METHADONE HYDROCHLORIDE 5 MG/5ML
55 SOLUTION ORAL DAILY
Status: DISCONTINUED | OUTPATIENT
Start: 2021-04-30 | End: 2021-05-05 | Stop reason: HOSPADM

## 2021-04-29 RX ORDER — NALOXONE HYDROCHLORIDE 0.4 MG/ML
0.2 INJECTION, SOLUTION INTRAMUSCULAR; INTRAVENOUS; SUBCUTANEOUS
Status: DISCONTINUED | OUTPATIENT
Start: 2021-04-29 | End: 2021-05-05 | Stop reason: HOSPADM

## 2021-04-29 RX ORDER — FOLIC ACID 1 MG/1
1 TABLET ORAL DAILY
Status: DISCONTINUED | OUTPATIENT
Start: 2021-04-29 | End: 2021-05-05 | Stop reason: HOSPADM

## 2021-04-29 RX ORDER — SODIUM CHLORIDE 9 MG/ML
INJECTION, SOLUTION INTRAVENOUS ONCE
Status: COMPLETED | OUTPATIENT
Start: 2021-04-29 | End: 2021-04-29

## 2021-04-29 RX ORDER — IBUPROFEN 600 MG/1
600 TABLET, FILM COATED ORAL EVERY 6 HOURS PRN
Status: DISPENSED | OUTPATIENT
Start: 2021-04-29 | End: 2021-05-01

## 2021-04-29 RX ORDER — HYDROXYZINE HYDROCHLORIDE 25 MG/1
25 TABLET, FILM COATED ORAL EVERY 4 HOURS PRN
Status: DISCONTINUED | OUTPATIENT
Start: 2021-04-29 | End: 2021-05-01

## 2021-04-29 RX ORDER — NALOXONE HYDROCHLORIDE 0.4 MG/ML
0.4 INJECTION, SOLUTION INTRAMUSCULAR; INTRAVENOUS; SUBCUTANEOUS
Status: DISCONTINUED | OUTPATIENT
Start: 2021-04-29 | End: 2021-05-05 | Stop reason: HOSPADM

## 2021-04-29 RX ORDER — GABAPENTIN 600 MG/1
1200 TABLET ORAL 3 TIMES DAILY
Status: DISCONTINUED | OUTPATIENT
Start: 2021-04-29 | End: 2021-05-05 | Stop reason: HOSPADM

## 2021-04-29 RX ORDER — DIAZEPAM 5 MG
10 TABLET ORAL ONCE
Status: COMPLETED | OUTPATIENT
Start: 2021-04-29 | End: 2021-04-29

## 2021-04-29 RX ORDER — OLANZAPINE 5 MG/1
5 TABLET, ORALLY DISINTEGRATING ORAL 3 TIMES DAILY PRN
Status: DISCONTINUED | OUTPATIENT
Start: 2021-04-29 | End: 2021-05-01

## 2021-04-29 RX ORDER — LANOLIN ALCOHOL/MO/W.PET/CERES
100 CREAM (GRAM) TOPICAL DAILY
Status: DISCONTINUED | OUTPATIENT
Start: 2021-04-29 | End: 2021-05-05 | Stop reason: HOSPADM

## 2021-04-29 RX ORDER — DOCUSATE SODIUM 100 MG/1
100 CAPSULE, LIQUID FILLED ORAL 2 TIMES DAILY
Status: DISCONTINUED | OUTPATIENT
Start: 2021-04-29 | End: 2021-05-05 | Stop reason: HOSPADM

## 2021-04-29 RX ORDER — POTASSIUM CHLORIDE 750 MG/1
40 TABLET, EXTENDED RELEASE ORAL ONCE
Status: COMPLETED | OUTPATIENT
Start: 2021-04-29 | End: 2021-04-29

## 2021-04-29 RX ORDER — ONDANSETRON 4 MG/1
4 TABLET, ORALLY DISINTEGRATING ORAL EVERY 6 HOURS PRN
Status: DISCONTINUED | OUTPATIENT
Start: 2021-04-29 | End: 2021-05-05 | Stop reason: HOSPADM

## 2021-04-29 RX ORDER — ARIPIPRAZOLE 2 MG/1
2 TABLET ORAL DAILY
Status: DISCONTINUED | OUTPATIENT
Start: 2021-04-29 | End: 2021-05-05 | Stop reason: HOSPADM

## 2021-04-29 RX ORDER — DIAZEPAM 5 MG
5-20 TABLET ORAL EVERY 30 MIN PRN
Status: DISCONTINUED | OUTPATIENT
Start: 2021-04-29 | End: 2021-05-03

## 2021-04-29 RX ORDER — ONDANSETRON 4 MG/1
4 TABLET, ORALLY DISINTEGRATING ORAL ONCE
Status: COMPLETED | OUTPATIENT
Start: 2021-04-29 | End: 2021-04-29

## 2021-04-29 RX ORDER — MULTIPLE VITAMINS W/ MINERALS TAB 9MG-400MCG
1 TAB ORAL DAILY
Status: DISCONTINUED | OUTPATIENT
Start: 2021-04-29 | End: 2021-05-05 | Stop reason: HOSPADM

## 2021-04-29 RX ORDER — ATENOLOL 50 MG/1
50 TABLET ORAL DAILY PRN
Status: DISCONTINUED | OUTPATIENT
Start: 2021-04-29 | End: 2021-05-03

## 2021-04-29 RX ADMIN — DOCUSATE SODIUM 100 MG: 100 CAPSULE, LIQUID FILLED ORAL at 20:15

## 2021-04-29 RX ADMIN — DIAZEPAM 10 MG: 5 TABLET ORAL at 13:55

## 2021-04-29 RX ADMIN — NICOTINE POLACRILEX 8 MG: 4 GUM, CHEWING BUCCAL at 17:31

## 2021-04-29 RX ADMIN — MAGNESIUM SULFATE HEPTAHYDRATE 2 G: 40 INJECTION, SOLUTION INTRAVENOUS at 09:51

## 2021-04-29 RX ADMIN — ONDANSETRON 4 MG: 4 TABLET, ORALLY DISINTEGRATING ORAL at 20:15

## 2021-04-29 RX ADMIN — NICOTINE POLACRILEX 8 MG: 4 GUM, CHEWING BUCCAL at 16:09

## 2021-04-29 RX ADMIN — DIAZEPAM 10 MG: 5 TABLET ORAL at 16:13

## 2021-04-29 RX ADMIN — METHADONE HYDROCHLORIDE 55 MG: 10 CONCENTRATE ORAL at 03:50

## 2021-04-29 RX ADMIN — DIAZEPAM 10 MG: 5 TABLET ORAL at 17:32

## 2021-04-29 RX ADMIN — NICOTINE POLACRILEX 8 MG: 4 GUM, CHEWING BUCCAL at 20:15

## 2021-04-29 RX ADMIN — ARIPIPRAZOLE 2 MG: 2 TABLET ORAL at 14:00

## 2021-04-29 RX ADMIN — IBUPROFEN 600 MG: 600 TABLET ORAL at 14:23

## 2021-04-29 RX ADMIN — QUETIAPINE FUMARATE 400 MG: 400 TABLET ORAL at 21:09

## 2021-04-29 RX ADMIN — POTASSIUM CHLORIDE 40 MEQ: 750 TABLET, EXTENDED RELEASE ORAL at 13:55

## 2021-04-29 RX ADMIN — DIAZEPAM 10 MG: 5 TABLET ORAL at 11:55

## 2021-04-29 RX ADMIN — NICOTINE POLACRILEX 4 MG: 4 GUM, CHEWING BUCCAL at 14:22

## 2021-04-29 RX ADMIN — GABAPENTIN 1200 MG: 600 TABLET, FILM COATED ORAL at 20:15

## 2021-04-29 RX ADMIN — POTASSIUM CHLORIDE 10 MEQ: 7.46 INJECTION, SOLUTION INTRAVENOUS at 09:51

## 2021-04-29 RX ADMIN — POTASSIUM CHLORIDE 40 MEQ: 1.5 POWDER, FOR SOLUTION ORAL at 09:53

## 2021-04-29 RX ADMIN — GABAPENTIN 1200 MG: 600 TABLET, FILM COATED ORAL at 14:00

## 2021-04-29 RX ADMIN — DIAZEPAM 10 MG: 5 TABLET ORAL at 20:15

## 2021-04-29 RX ADMIN — ONDANSETRON 4 MG: 4 TABLET, ORALLY DISINTEGRATING ORAL at 07:25

## 2021-04-29 RX ADMIN — ONDANSETRON 4 MG: 4 TABLET, ORALLY DISINTEGRATING ORAL at 14:24

## 2021-04-29 RX ADMIN — SODIUM CHLORIDE: 9 INJECTION, SOLUTION INTRAVENOUS at 10:02

## 2021-04-29 RX ADMIN — DIAZEPAM 10 MG: 5 TABLET ORAL at 23:56

## 2021-04-29 ASSESSMENT — ACTIVITIES OF DAILY LIVING (ADL)
DIFFICULTY_EATING/SWALLOWING: NO
NUMBER_OF_TIMES_PATIENT_HAS_FALLEN_WITHIN_LAST_SIX_MONTHS: 1
DRESSING/BATHING_DIFFICULTY: NO
DIFFICULTY_COMMUNICATING: NO
WALKING_OR_CLIMBING_STAIRS_DIFFICULTY: NO
FALL_HISTORY_WITHIN_LAST_SIX_MONTHS: YES
TOILETING_ISSUES: NO

## 2021-04-29 ASSESSMENT — MIFFLIN-ST. JEOR: SCORE: 2158.53

## 2021-04-29 NOTE — ED NOTES
Patient open eyes to verbal stimulation. RN introduced self to patient. Updated on continued POC and waits. Patient complaints of back pain. Patient reports chronic pain. Patient states he normally takes Methadone for his pain. States he missed yesterday's dose. Patient reports he lives at [facility] home at [address] [place] [address] [place] [address]. Patient does not know the facilities phone number.

## 2021-04-29 NOTE — ED NOTES
RN called [facility] @ [phone]. No answer. Voice mail [facility]. MD notified

## 2021-04-29 NOTE — ED PROVIDER NOTES
"  History   Chief Complaint:  Alcohol Intoxication     The history is provided by the patient.      Hollis Barclay is a 50 year old male with history of alcoholism, bipolar affective disorder, and chronic pain on methadone who presents with alcohol intoxication. Jose M endorses use of alcohol and cocaine (\"just a little bit\") today. When he returned to his group home intoxicated, they were concerned and called EMS. Jose M denies suicidal ideation and has no concerns at this time.    Review of Systems   Psychiatric/Behavioral: Negative for suicidal ideas.   All other systems reviewed and are negative.    Allergies:  NSAIDS    Medications:  Abilify  Wellbutrin  Colace  Folvite  Gabapentin  Methadone  Seroquel    Past Medical History:    Alcoholism in remission  Bilateral ACL tear  Chronic back pain  Closed left arm fracture  H/O shoulder injury  Post concussive encephalopathy  Social anxiety disorder  Rib fracture     Past Surgical History:    No reported surgical history    Family History:    No reported family history    Social History:  Unaccompanied in ED  Arrives from a group home  Alcohol use as per HPI  Cocaine use today    Physical Exam     Patient Vitals for the past 24 hrs:   BP Temp Temp src Pulse Resp SpO2 Height Weight   04/29/21 0317 109/55 -- -- 106 20 94 % -- --   04/28/21 2217 122/78 97.2  F (36.2  C) Temporal 101 18 97 % 1.93 m (6' 4\") 126.1 kg (278 lb)       Physical Exam  General: WD/WN; well appearing middle aged  man; cooperative  Head:  Atraumatic  Eyes:  Conjunctivae, lids, and sclerae are normal  ENT:    Normal nose; MMM  Neck:  Supple; normal ROM  Resp:  No respiratory distress  GI:  Nondistended    MS:  Normal ROM  Skin:  Warm; non-diaphoretic; no pallor  Neuro: Awake; A&Ox3  Psych:  Normal mood and affect; mildly dysarthric speech; no suicidal thought content  Vitals reviewed.    Emergency Department Course     Emergency Department Course:  Reviewed:  I reviewed nursing notes, vitals, " past medical history, and Care Everywhere.    Assessments:  2232 I obtained history and examined the patient as noted above.   0605 I rechecked the patient.    Interventions:  2332 Seroquel 400 mg PO  0350 Methadone 55 mg PO    Disposition:  Care of the patient was transferred to my colleague Dr. Evans pending discharge when his group home can be reached.     Impression & Plan   Medical Decision Making:  Jose M is a 50 year old man who endorses the use of alcohol and cocaine today.  Reportedly his group home was concerned by his intoxication which prompted his visit.  He has no complaints.  He denies suicidal ideation.  He appears well on exam with mildly dysarthric speech consistent with intoxication.  While under my care he was allowed to rest and metabolize his alcohol.  He was given his home Seroquel and methadone. On final reevaluation, he is clinically sober and appropriate for discharge.  We are having some difficulty getting ahold of his group home and will continue attempts to contact them such that he can be discharged.  At the end of my shift, Jose M is awaiting discharge and was endorsed to my partner, Dr. Evans.    Diagnosis:    ICD-10-CM    1. Alcoholic intoxication without complication (H)  F10.920        Discharge Medications:  New Prescriptions    No medications on file       Scribe Disclosure:  I, Melrose Justin, am serving as a scribe at 10:37 PM on 4/28/2021 to document services personally performed by Kalli Barroso MD based on my observations and the provider's statements to me.              Kalli Barroso MD  04/29/21 0757

## 2021-04-29 NOTE — PROGRESS NOTES
PDMP as of 4/29/2021:     Hollis Barclay  Risk Indicators  NARX SCORES  Narcotic  000  Sedative  000  Stimulant  000  Explanation and GuidanceOVERDOSE RISK SCORE 000  (Range 000-999)  Explanation and Guidance  ADDITIONAL RISK INDICATORS ( 0 )  Explanation and GuidanceThis NarxCare report is based on search criteria supplied and the data entered by the dispensing pharmacy. For more information about any prescription, please contact the dispensing pharmacy or the prescriber. NarxCare scores and reports are intended to aid, not replace, medical decision making. None of the information presented should be used as sole justification for providing or refusing to provide medications. The information on this report is not warranted as accurate or complete.  Graphs  RX GRAPH   Narcotic  Buprenorphine  Sedative  Stimulant  Other  All Prescribers  Prescribers  2 - Jr Chong  1 - Ubaldo Kendall  Timeline 04/29  2m  6m  1y  2y  Buprenorphine mg  16  4  0  28  Timeline 04/29  2m  6m  1y  2y  Morphine MgEq (MME)  200  80  0  320  Timeline 04/29  2m  6m  1y  2y  Lorazepam MgEq (LME)  10  2  0  18  Timeline 04/29  2m  6m  1y  2y  *Per CDC guidance, the MME conversion factors prescribed or provided as part of the medication-assisted treatment for opioid use disorder should not be used to benchmark against dosage thresholds meant for opioids prescribed for pain. Buprenorphine products have no agreed upon morphine equivalency, and as partial opioid agonists, are not expected to be associated with overdose risk in the same dose-dependent manner as doses for full agonist opioids. MME = morphine milligram equivalents. LME = Lorazepam milligram equivalents. mg = dose in milligrams.  Summary  Summary  Total Prescriptions:  13  Total Prescribers:  2  Total Pharmacies:  1  Narcotics* (excluding Buprenorphine)  Current Qty:  0  Current MME/day:  0.00  30 Day Avg MME/day:  0.00  Sedatives*  Current Qty:  0  Current LME/day:  0.00  30  Day Avg LME/day:  0.00  Buprenorphine*  Current Qty:  0  Current mg/day:  0.00  30 Day Avg mg/day:  0.00  Rx Data  PRESCRIPTIONS  Total Prescriptions: 13  Total Private Pay: 0  Fill Date ID Written Drug Qty Days Prescriber Rx # Pharmacy Refill Daily Dose * Pymt Type   04/22/2021 2 03/25/2021 Gabapentin 600 Mg Tablet  180.00 [address] [id number] Gen (0831) 1/3  [place]icaid [place]  03/29/2021 2 03/25/2021 Gabapentin 600 Mg Tablet  180.00 30 St Sto [id number] Gen (0831) 0/3  [place]icaid [place]  02/25/2021 2 10/05/2020 Gabapentin 600 Mg Tablet  180.00 30 St Sto [id number] Gen (0831) 5/5  [place]  01/27/2021 2 10/05/2020 Gabapentin 600 Mg Tablet  180.00 30 St Sto [id number] Gen (0831) 4/5  [place]  12/30/2020 2 10/05/2020 Gabapentin 600 Mg Tablet  180.00 30 St Sto [id number] Gen (0831) 3/4  [place]  12/04/2020 2 10/05/2020 Gabapentin 600 Mg Tablet  180.00 30 St Sto [id number] Gen (0831) 2/3  [place]  11/10/2020 2 10/05/2020 Gabapentin 600 Mg Tablet  180.00 30 St Sto [id number] Gen (0831) 1/2  [place]  10/15/2020 1 10/05/2020 Gabapentin 600 Mg Tablet  180.00 30 St Sto [id number] Gen (0831) 0/1  [place]  09/14/2020 1 07/20/2020 Gabapentin 600 Mg Tablet  180.00 30 St Sto [id number] Gen (0831) 1/1  [place]  08/17/2020 1 07/20/2020 Gabapentin 600 Mg Tablet  180.00 30 St Sto [id number] Gen (0831) 0/1  [place]  07/13/2020 1 06/17/2020 Gabapentin 600 Mg Tablet  180.00 30 St Sto [id number] Gen (0831) 1/1  [place]  06/17/2020 1 06/17/2020 Gabapentin 600 Mg Tablet  180.00 30 St Sto [id number] Gen (0831) 0/1  [place]  05/28/2020 1 05/28/2020 Gabapentin 600 Mg Tablet  90.00 [address] [id number] Gen (0831) 0/0  [place]icaid [place]  *Per CDC guidance, the MME conversion factors prescribed or provided as part of the medication-assisted treatment for opioid use disorder should not be used to benchmark against dosage thresholds meant for opioids prescribed for pain. Buprenorphine products have no agreed upon morphine equivalency, and as partial opioid agonists, are not  expected to be associated with overdose risk in the same dose-dependent manner as doses for full agonist opioids. MME = morphine milligram equivalents. LME = Lorazepam milligram equivalents. mg = dose in milligrams.  Providers  Total Providers: 2  Name Address [facility] Zipcode Phone  Ubaldo Kendall [address] [place] [phone]  Jr Giron  [facility] [address] [facility] [address] [phone]  Pharmacies  Total Pharmacies: 1  Name Address [facility] Zipcode Phone  [facility] (5526) [address] [place] [phone]  The report provided is based upon the search criteria entered and the corresponding data as it has been reported by dispenser(s). If erroneous information is identified or additional information is needed, please contact the dispenser or the prescriber provided on the report. Date Sold signifies the date the prescription was sold (left the pharmacy). The absence of Date Sold does not necessarily indicate the prescription was not dispensed. Fill Date represents the date the medication was filled or prepared by the pharmacy. Note, federal regulation (CFR Title 42: Part 2) requires patient consent prior to releasing certain patient data from federally funded opioid treatment programs (OTPs). As such, controlled substances dispensed from OTPs for medication-assisted treatment may not appear in the [place]  report. Morphine milligram equivalent (MME) conversion factors published by the CDC are used in the MME calculation. Per the CDC, the MME conversion factor is intended only for analytic purposes where prescription data are used to retrospectively calculate daily MME to inform analyses of risks associated with opioid prescribing. This value does not constitute clinical guidance or recommendations for converting patients from one form of opioid analgesic to another. Per the CDC, the conversion factors for drugs prescribed or  provided, as part of medication-assisted treatment for opioid use disorder should not be used to benchmark against MME dosage thresholds meant for opioids prescribed for pain. Buprenorphine products listed in the CDC s MME file do not have an associated conversion factor. Lastly, the [facility] notes, in clinical practice, calculating MME for methadone often involves a sliding-scale approach, whereby the conversion factor increases with increasing dose. The conversion factor of 3 for methadone presented in this file could underestimate MME for a given patient. This report contains confidential information, including patient identifiers, and is not a public record. The information on this report must be treated as protected health information and is only to be disclosed to others as authorized by applicable state and Federal regulations.

## 2021-04-29 NOTE — ED NOTES
Patient resting quietly on cart with eyes closed. Respirations are regular and unlabored. RN at bedside with discharge paperwork. Patient began to have hand and body tremors when RN entered room. Patient reports that he would like to see his doctor. Patient reports that he has been drinking 1/5 - 1/2 gallon of Vodka a day for the past 2 months and would like help getting sober. MD Barroso updated. VORB.

## 2021-04-29 NOTE — TELEPHONE ENCOUNTER
Patient cleared and ready for behavioral bed placement: Yes     S: 51 y/o male presented to the [facility] ED requesting detox.     B: Hx Alcohol abuse, anxiety, TBI, schizophrenia, bipolar d/o, chronic pain back.  Pt reported he drinks about 1/5-1/2 gallon of alcohol a day for the past few months.  BIB EMS from his group home after he was found to be intoxicated.  Pt has a hx of chronic back pain for which he received methadone but missed his dose yesterday.  Pt reported he has a hx of seizures, vomiting, tremors and hallucinations with withdrawal.  No reported MH sx.    A: Voluntary  Alert and ambulating independently.  No acute medical concerns at this time.  COVID has been ordered.  No reported sx.  Drug screen, CBC, CMP and UA have been ordered.  Awaiting lab results    R: 0708 ED notified about Intake having to verify if pt would be able to receive his methadone while admitted to detox.  Awaiting labs.  Case has been passed to days at 0708 to track.

## 2021-04-29 NOTE — PHARMACY
Methadone Clinic Information Note    Clinic Name: [facility] [place] [facility]  Clinic Location & Phone Number: [place], [place] [phone]    Verified patient's current methadone dose is 55 mg daily.   Last dose was administered on 4/27/21.   Does patient receive take-home doses? No    Jairon Marquez, PharmD, BCPS  [facility] Building: Ascom *[phone]

## 2021-04-29 NOTE — PLAN OF CARE
Behavioral Team Discussion: (4/30/2021)    Continued Stay Criteria/Rationale: Patient admitted for alcohol withdrawal, complicated.  Plan: The following services will be provided to the patient; psychiatric assessment, medication management, therapeutic milieu, individual and group support, and skills groups.   Participants: 3A Provider: Dr. Mian Barriga MD; 3A RN's: Madelyn Hinojosa RN; 3A CM's: Lima Grimes MA [facility] and Babita Bedoya [facility]   Summary/Recommendation: Providers will assess today for treatment recommendations, discharge planning, and aftercare plans. CM will meet with pt for discharge planning.   Medical/Physical: Internal medicine consult to be  completed 4/30/2021.  Precautions:   Behavioral Orders   Procedures     Assault precautions     Code 1 - Restrict to Unit     Fall precautions     Routine Programming     As clinically indicated     Seizure precautions     Status 15     Every 15 minutes.     Withdrawal precautions     Rationale for change in precautions or plan: N/A  Progress: No Change.

## 2021-04-29 NOTE — ED NOTES
Patient updated on continued POC and waits. Patient verbalized understanding. Denies any needs at present. Bed in lowest position with side rails up x 2. Lights remain off per patient's request. Patient refused offered blankets. Patient has water at bedside. Door shut per patient's request.

## 2021-04-29 NOTE — ED NOTES
Patient lying on right side with eyes closed. Respirations are regular and unlabored. Patient repositions self independently on cart. Continue to monitor.

## 2021-04-29 NOTE — ED TRIAGE NOTES
"Pt arrives via EMS from group home. Pt was away from group home and became intoxicated. Does not know how much he drank.. States he also snorted \"3/4 of an 8-ball\" of cocaine. Pt is fidgety and borderline uncooperative. Of note pt has Hx of schizophrenia and Bipolar   "

## 2021-04-29 NOTE — H&P
Identifying info formation   patient is a 49 y.o. M with history of TBI and bipolar disorder, and long-standing history of alcohol addiction,   Patient also has history of schizophrenia    Patient is a poor historian for a lot of symptoms he says he has traumatic brain injury and he cannot remember.  History of present illness    Patient came from the group home he has been living in a group home for the past 5 months .   2 months ago he relapsed to drinking alcohol.  He has been drinking half a gallon daily  He has tolerance to alcohol withdrawal from alcohol progressive loss of control tried to quit unsuccessfully     In terms of withdrawal he reports feeling present symptoms of sshakes, nausea skins crawling headaches, anxiety     He has a history of bipolar disorder and anxiety, on Zyprexa, gabapentin, and quetiapine.     He says that when he is manic he experiences elated mood, inflated self-esteem, increased energy, decreased need for sleep, and impulsiveness.     He is also on a methadone program, currently on 55 mg mg daily.  He was taking previously 100 mg his dose was reduced by patient to 55 mg       He denies smoking .    Patient has been using cocaine and methamphetamine he gets quite irritable when pressed for details he says he has traumatic brain injury cannot remember.      Patient has been using the following substances: alcohol  Started at age 13, became a problem during his 20s.     Patient has tolerance, withdrawal, progressive use, loss of control, spending more time and more amount than intended. Patient has made attempts to quit, is experiencing cravings, and reports negative consequences.  Patient does not bowden.     Patient does have a history of seizures, reports 3-4 times.   Patient does have a history of delirium tremens.     Patient does have a history of overdose attempt.   Patient does not have a history of IV use.  Patient does not have a history of hepatitis or HIV.      DRUG OF  CHOICE - alcohol     PREVIOUS DETOX/TREATMENT PROGRAMS- has been in inpatient treatments 10+ times.   HISTORY OF OVERDOSE- reports attempted overdose     H/o opiate addiction and is on methadone ,vague on details   Denies current thoughts of suicide or harming others.       Endorses current visual hallucinations.      PSYCHIATRIC REVIEW OF SYSTEMS:          Psychiatric Review of Systems:   Depression:    He is vague not able to elicit neurovegetative symptoms of depression  Vero: : He is vague not able to elicit symptoms of vero  In previous reports he did say racing thoughts, increase in energy, elated mood, increased goal-directed activities, inflated self-esteem, impulsiveness.   Psychosis:   Denied  visual hallucinations  Denies: auditory hallucinations, paranoia  Anxiety:   Reports: feeling anxious but vague in describing symptoms     Symptoms of attention deficit disorder include a failure to pay attention to detail, a pattern of careless mistakes, a pattern of inattentive listening, a failure to follow through with projects, poor personal organization, losing necessary objects, distractibility, forgetfulness.     Symptoms of borderline personality disorder include a fear of abandonment, unstable self-image, impulsive behavior, dissociative feeling, intense anger, unstable personal relationships, chronic feelings of boredom, periods of intense depressed mood.            PSYCHIATRIC HISTORY      Schizophrenia bipolar affective disorder  1. Alcohol dependence, severe  2. Bipolar disorder  3. Anxiety  4. Tobacco dependence        Past court commitments: 1 time, 2019 vague on details  SIB /SUICIDE ATTEMPTS: 3 attempts of overdose on alcohol / drugs        Psych Hosp: 8-9 times  Outpatient Programs: none  Inpatient cd trt: 10-12 times  Out pt cd trt: none     PAST PSYCH MED TRIALS   Says Seroquel is the only medication that has been effective for his bipolar disorder.         SOCIAL HISTORY                                                                          Living Situation/Family/Relationships- lives in a group home. . 2 living adult children. 1 diseased child.   Trauma History (self-report)- None  Legal- None     Early History/Education- Completed 9 years of education. Reports his childhood was happy.             Family History:     Family Mental Health History-  None known to patient.     Substance Use Problems - brother with alcohol dependence          Medical h/o   A 10-point review of systems is reviewed and is negative except for psychiatric symptoms above.       Allergies reviewed    vitals patient's temperature is 97.2 respiratory is 20 blood pressure is 139/92  Appearance:  awake, alert, appeared as age stated, adequate groomed and slightly unkempt  Attitude:  cooperative  Eye Contact:  good  Mood:   Irritable  Affect:  congruent   Speech:  clear, coherent normal rate   Psychomotor Behavior:  no evidence of tardive dyskinesia, dystonia, or tics  Thought Process:  logical, linear and goal oriented  Associations:  no loose associations  Thought Content:  no evidence of psychotic thought and active suicidal ideation present  Denied any active suicidal /homicidation ideation plan intent   Insight:  fair  Judgment:  fair  Oriented to:  time, person, and place  Attention Span and Concentration:  intact  Recent and Remote Memory:  intact  Language:  english with appropriate syntax and vocabulary  Fund of Knowledge: appropriate  Muscle Strength and Tone: normal  Gait and Station: Normal      Assessment and plan    Patient is 50-year old  male he has a biological split predisposition brother has alcoholism.  Patient has chronic mental illness is living in a group home but continues to use alcohol his U tox is also positive for methamphetamine and cocaine         Diagnosis  Alcohol use disorder severe  Alcohol withdrawal severe  Opiate use disorder on methadone maintenance  Bipolar affective disorder  most recent episode depressed chronic    Plan  Patient will detox of alcohol using [facility] protocol on Valium  Patient complains of feeling nauseous tremulous  vitals patient's temperature is 97.2 respiratory is 20 blood pressure is 139/92    Patient will have symptomatic detox from cocaine and methamphetamine    Patient is taking  methadone maintenance doses not confirmed will reorder once dose is confirmed      For his bipolar affective disorder patient takes Seroquel gabapentin will reorder the doses    Patient has hypokalemia potassium is 2.9 we will talk with internal medicine in order internal medicine against consult    Patient has elevated liver enzymes   pattern suggestive of alcoholism nonviral suspected we will put internal medicine consult       I HAVE REVIEWED LABS WITH PT AND TALKED ABOUT RESULTS WITH PT  I HAVE REVIEWED AND SUMMARIZED OLD RECORDS including his medication reconcilation of his home medications  and PDMP   I HAVE SPOKEN WITH RN ABOUT MEDICATIONS AND withdrawl SCORES  I HAVE SPOKEN WITH CM ABOUT PTS TREATMETN OPTIONS     Length of stay 3 to 5 days    Patient on CD treatment

## 2021-04-29 NOTE — PHARMACY-ADMISSION MEDICATION HISTORY
Admission Medication History Completed by Pharmacy    See [facility] Admission Navigator for allergy information, preferred outpatient pharmacy, prior to admission medications and immunization status.     Medication History Sources:     Dispense report    Staff at [facility] ([phone])    Changes made to PTA medication list (reason):    Added: None    Deleted: None    Changed: None    Additional Information:    Patient not interviewed as part of this medication history. Please discuss any OTC/non-prescription medication use and last doses with the patient that may not be reflected in the list below.    Methadone dose verified with [facility] services, please refer to that note for complete details.    Dispense report:  o Gabapentin 600 mg last filled 04/22/2021 for quantity 180 for 30 day supply    Prior to Admission medications    Medication Sig Last Dose Taking? Auth Provider   acetaminophen (TYLENOL) 500 MG tablet Take 1-2 tabs every 8 hours as needed for pain. No more than 6 tabs in 24 hours 4/28/2021 at Unknown time Yes Jr Giron MD   ARIPiprazole (ABILIFY) 2 MG tablet TAKE 1 TABLET BY MOUTH DAILY 4/28/2021 at AM Yes Jr Giron MD   buPROPion (WELLBUTRIN) 100 MG tablet TAKE 2 TABLETS BY MOUTH TWICE A DAY 4/28/2021 at 1700 Yes Jr Giron MD   docusate sodium (COLACE) 100 MG capsule Take 1 capsule (100 mg) by mouth 2 times daily 4/28/2021 at PM Yes Jr Giron MD   folic acid (FOLVITE) 1 MG tablet TAKE 1 TABLET BY MOUTH DAILY 4/28/2021 at AM Yes Jr Giron MD   gabapentin (NEURONTIN) 600 MG tablet TAKE 2 TABLETS BY MOUTH THREE TIMES A DAY 4/28/2021 at 1600 Yes Jr Giron MD   ibuprofen (ADVIL/MOTRIN) 800 MG tablet TAKE 1 TABLET BY MOUTH EVERY 8 HOURS AS NEEDED FOR PAIN  Yes Jr Giron MD   methadone (DOLPHINE) 5 MG/5ML solution Take 55 mg by mouth daily From [facility] program  4/27/2021 at Unknown time Yes  Unknown, Entered By History   nicotine polacrilex (NICORETTE) 4 MG gum PLACE 2 GUM INSIDE CHEEK EVERY 2 HOURS AS NEEDED FOR SMOKING CESSATION ( MAX 22 PIECE DAILY )  Yes Jr Giron MD   QUEtiapine (SEROQUEL) 400 MG tablet TAKE 1 TABLET BY MOUTH EVERY NIGHT AT BEDTIME 4/28/2021 at PM Yes Jr Giron MD       Date completed: 04/29/21    Medication history completed by: Deja Hernandez, HansaD

## 2021-04-29 NOTE — PROGRESS NOTES
04/29/21 1251   Patient Belongings   Did you bring any home meds/supplements to the hospital?  No   Patient Belongings other (see comments)   Belongings Search Yes   Clothing Search Yes   Second Staff oskar     Nothing in storage: no extra clothing     nothing in med room: no phone, no wallet,     A               Admission:  I am responsible for any personal items that are not sent to the safe or pharmacy.  [facility] is not responsible for loss, theft or damage of any property in my possession.    Signature:  _________________________________ Date: _______  Time: _____                                              Staff Signature:  ____________________________ Date: ________  Time: _____      2nd Staff person, if patient is unable/unwilling to sign:    Signature: ________________________________ Date: ________  Time: _____     Discharge:  [facility] has returned all of my personal belongings:    Signature: _________________________________ Date: ________  Time: _____                                          Staff Signature:  ____________________________ Date: ________  Time: _____

## 2021-04-29 NOTE — ED NOTES
RN reached a female caregiver at [facility]. Notified of patient's readiness to return to his residence. Caregiver states patient has [employer] to arrange for transportation home. Caregiver reports patient know how to use [employer] to arrange transportation home.

## 2021-04-29 NOTE — ED NOTES
"Patient reports willingness to go to detox, \"as long as it is somewhere that will give me meds to help with my withdrawal, not just somewhere they lock me in a room.\" RN informed patient that she would check on detox availabilities and then make a plan with him. Patient verbalized understanding. Patient's room door closed per his request.  "

## 2021-04-29 NOTE — PLAN OF CARE
Coming to us from the [facility], patient arrives on garber seeking solace from the ravages of alcohol abuse. Averaging approximately 1/2 gallon of spirit of hard liquor per day for several years, he is also on Methadone maintenance from [facility] at 55 mg(ml) pr day.  He denies other substance abuse at the moment. Upon admission, patient is very tremulous, somewhat anxious, and somewhat unsteady of gait.  He denies current suicidality, yet does acknowledge more than once suicide attempt in his past. He also reports a solitary seizure in his past, approximately six months ago.   He is looking forward to eventual transition to a treatment facility.  Will continue to monitor as prudent.  He is Restricted Recipient.

## 2021-04-29 NOTE — ED NOTES
Patient continues to rest quietly on cart with eyes closed. Respirations are regular and unlabored. Patient repositions self independently on cart. Continue to monitor.

## 2021-04-29 NOTE — TELEPHONE ENCOUNTER
R: Awaiting labs to be drawn @ 7:15am pending placement  Pt breathe alcohol is .044   ALT 0 - 70 U/L 121High      AST 0 - 45 U/L 234High      COVID: resulted  NEGATIVE    Paged provider at 8:14am to present for 3A/Cortez Toro called back at 8:16am and request a pharmacy reconciliation and clarity of Methadone dose.   Intake called [facility] ED nurse @ 8:18am and requested Dr's request of Medication reconciliation - and confirming Pts methadone dose.    Provider called at 8:34a and also requests [facility] has  pt sign a release for the methadone program  Pt put in que at 8:36am  3A Charge nurse called for disposition at 8:37am  ED Updated with placement AND  to call for report asap -   Pharmacy rec completed.

## 2021-04-29 NOTE — PROGRESS NOTES
3/4/2020   Insurance Company Name Restricted ma  Restricted Recipient Program Please call the [facility] unit for additional information. The telephone number is [phone] or [phone].   You are a provider for a Restricted Recipient for: Physician Services , Inpatient Hospital , Outpatient Hospital , Diagnostic Lab   Provider number [id number], CRISS TORRES MD} is a provider for a Restricted Recipient for: Physician Services , Nurse Practitioner Services   Provider number [id number], [facility]} is a provider for a Restricted Recipient for: Physician Services , Nurse Practitioner Services , Diagnostic Lab   Provider number [id number], [facility]} is a provider for a Restricted Recipient for: Pharmacy   If you are providing services other than the restricted services listed above, you may bill DHS. If you have questions, please call [phone] or [phone].

## 2021-04-29 NOTE — ED NOTES
Patient is resting quietly on cart with eyes closed. Respirations are regular and unlabored. Patient repositions self independently on cart. Continue to monitor.

## 2021-04-29 NOTE — ED NOTES
Patient sitting on end of cart with head in his hands. Patient complaints of feeling tremulous and anxious Patient updated on continued POC and needs for admission to [facility]. Patient states willingness to be admitted and to have medical testing done (breathylizer, labs, urine, covid swab). MD Evans updated. VORB. Patient swabbed for covid without complications. Patient tolerated well. Labs drawn without complications. Specimens labeled and sent for analysis. Patient complaints of nausea. Patient medicated as ordered per protocol. Patient given urinal and requested to provide a urine sample.

## 2021-04-29 NOTE — ED NOTES
Patient lying quietly on cart with eyes closed. Respirations are regular and unlabored. Patient repositions self independently on cart. Continue to monitor.

## 2021-04-30 ENCOUNTER — APPOINTMENT (OUTPATIENT)
Dept: ULTRASOUND IMAGING | Facility: CLINIC | Age: 51
End: 2021-04-30
Attending: NURSE PRACTITIONER
Payer: MEDICAID

## 2021-04-30 LAB
ALBUMIN SERPL-MCNC: 3 G/DL (ref 3.4–5)
ALP SERPL-CCNC: 77 U/L (ref 40–150)
ALT SERPL W P-5'-P-CCNC: 97 U/L (ref 0–70)
ANION GAP SERPL CALCULATED.3IONS-SCNC: 5 MMOL/L (ref 3–14)
AST SERPL W P-5'-P-CCNC: 150 U/L (ref 0–45)
BILIRUB SERPL-MCNC: 0.5 MG/DL (ref 0.2–1.3)
BUN SERPL-MCNC: 21 MG/DL (ref 7–30)
CALCIUM SERPL-MCNC: 8.9 MG/DL (ref 8.5–10.1)
CHLORIDE SERPL-SCNC: 104 MMOL/L (ref 94–109)
CO2 SERPL-SCNC: 30 MMOL/L (ref 20–32)
CREAT SERPL-MCNC: 0.8 MG/DL (ref 0.66–1.25)
GFR SERPL CREATININE-BSD FRML MDRD: >90 ML/MIN/{1.73_M2}
GLUCOSE SERPL-MCNC: 118 MG/DL (ref 70–99)
MAGNESIUM SERPL-MCNC: 2.1 MG/DL (ref 1.6–2.3)
POTASSIUM SERPL-SCNC: 3.8 MMOL/L (ref 3.4–5.3)
PROT SERPL-MCNC: 6.3 G/DL (ref 6.8–8.8)
SODIUM SERPL-SCNC: 139 MMOL/L (ref 133–144)

## 2021-04-30 PROCEDURE — 80053 COMPREHEN METABOLIC PANEL: CPT | Performed by: NURSE PRACTITIONER

## 2021-04-30 PROCEDURE — G0463 HOSPITAL OUTPT CLINIC VISIT: HCPCS

## 2021-04-30 PROCEDURE — 250N000013 HC RX MED GY IP 250 OP 250 PS 637: Performed by: PSYCHIATRY & NEUROLOGY

## 2021-04-30 PROCEDURE — 36415 COLL VENOUS BLD VENIPUNCTURE: CPT | Performed by: NURSE PRACTITIONER

## 2021-04-30 PROCEDURE — 83735 ASSAY OF MAGNESIUM: CPT | Performed by: NURSE PRACTITIONER

## 2021-04-30 PROCEDURE — 99232 SBSQ HOSP IP/OBS MODERATE 35: CPT | Performed by: PSYCHIATRY & NEUROLOGY

## 2021-04-30 PROCEDURE — 128N000004 HC R&B CD ADULT

## 2021-04-30 PROCEDURE — 93971 EXTREMITY STUDY: CPT | Mod: 26 | Performed by: RADIOLOGY

## 2021-04-30 PROCEDURE — 99221 1ST HOSP IP/OBS SF/LOW 40: CPT | Performed by: NURSE PRACTITIONER

## 2021-04-30 PROCEDURE — 99207 PR CDG-HISTORY COMPONENT: MEETS DETAILED - DOWN CODED LACK OF PFSH: CPT | Performed by: NURSE PRACTITIONER

## 2021-04-30 PROCEDURE — 93971 EXTREMITY STUDY: CPT | Mod: RT

## 2021-04-30 PROCEDURE — 250N000011 HC RX IP 250 OP 636: Performed by: PSYCHIATRY & NEUROLOGY

## 2021-04-30 PROCEDURE — 99207 PR CONSULT E&M CHANGED TO INITIAL LEVEL: CPT | Performed by: NURSE PRACTITIONER

## 2021-04-30 PROCEDURE — 250N000013 HC RX MED GY IP 250 OP 250 PS 637: Performed by: NURSE PRACTITIONER

## 2021-04-30 RX ORDER — CLONIDINE HYDROCHLORIDE 0.1 MG/1
0.1 TABLET ORAL 2 TIMES DAILY PRN
Status: DISCONTINUED | OUTPATIENT
Start: 2021-04-30 | End: 2021-05-02

## 2021-04-30 RX ADMIN — DIAZEPAM 10 MG: 5 TABLET ORAL at 04:28

## 2021-04-30 RX ADMIN — CLONIDINE HYDROCHLORIDE 0.1 MG: 0.1 TABLET ORAL at 08:38

## 2021-04-30 RX ADMIN — DIAZEPAM 10 MG: 5 TABLET ORAL at 16:14

## 2021-04-30 RX ADMIN — IBUPROFEN 600 MG: 600 TABLET ORAL at 14:15

## 2021-04-30 RX ADMIN — NICOTINE POLACRILEX 8 MG: 4 GUM, CHEWING BUCCAL at 18:01

## 2021-04-30 RX ADMIN — ARIPIPRAZOLE 2 MG: 2 TABLET ORAL at 08:23

## 2021-04-30 RX ADMIN — THIAMINE HCL TAB 100 MG 100 MG: 100 TAB at 08:23

## 2021-04-30 RX ADMIN — NICOTINE POLACRILEX 8 MG: 4 GUM, CHEWING BUCCAL at 11:37

## 2021-04-30 RX ADMIN — METHADONE HYDROCHLORIDE 55 MG: 5 SOLUTION ORAL at 08:25

## 2021-04-30 RX ADMIN — FOLIC ACID 1 MG: 1 TABLET ORAL at 08:23

## 2021-04-30 RX ADMIN — DOCUSATE SODIUM 100 MG: 100 CAPSULE, LIQUID FILLED ORAL at 20:12

## 2021-04-30 RX ADMIN — DIAZEPAM 10 MG: 5 TABLET ORAL at 12:23

## 2021-04-30 RX ADMIN — NICOTINE POLACRILEX 8 MG: 4 GUM, CHEWING BUCCAL at 20:12

## 2021-04-30 RX ADMIN — DIAZEPAM 10 MG: 5 TABLET ORAL at 14:15

## 2021-04-30 RX ADMIN — DIAZEPAM 10 MG: 5 TABLET ORAL at 10:16

## 2021-04-30 RX ADMIN — NICOTINE POLACRILEX 8 MG: 4 GUM, CHEWING BUCCAL at 22:19

## 2021-04-30 RX ADMIN — DIAZEPAM 10 MG: 5 TABLET ORAL at 02:14

## 2021-04-30 RX ADMIN — NICOTINE POLACRILEX 8 MG: 4 GUM, CHEWING BUCCAL at 13:23

## 2021-04-30 RX ADMIN — GABAPENTIN 1200 MG: 600 TABLET, FILM COATED ORAL at 13:23

## 2021-04-30 RX ADMIN — ONDANSETRON 4 MG: 4 TABLET, ORALLY DISINTEGRATING ORAL at 08:22

## 2021-04-30 RX ADMIN — ONDANSETRON 4 MG: 4 TABLET, ORALLY DISINTEGRATING ORAL at 16:14

## 2021-04-30 RX ADMIN — DIAZEPAM 10 MG: 5 TABLET ORAL at 20:12

## 2021-04-30 RX ADMIN — GABAPENTIN 1200 MG: 600 TABLET, FILM COATED ORAL at 20:11

## 2021-04-30 RX ADMIN — MULTIPLE VITAMINS W/ MINERALS TAB 1 TABLET: TAB at 08:23

## 2021-04-30 RX ADMIN — IBUPROFEN 600 MG: 600 TABLET ORAL at 04:27

## 2021-04-30 RX ADMIN — DOCUSATE SODIUM 100 MG: 100 CAPSULE, LIQUID FILLED ORAL at 08:23

## 2021-04-30 RX ADMIN — DIAZEPAM 10 MG: 5 TABLET ORAL at 08:23

## 2021-04-30 RX ADMIN — GABAPENTIN 1200 MG: 600 TABLET, FILM COATED ORAL at 08:23

## 2021-04-30 RX ADMIN — QUETIAPINE FUMARATE 400 MG: 400 TABLET ORAL at 22:00

## 2021-04-30 ASSESSMENT — ACTIVITIES OF DAILY LIVING (ADL)
ORAL_HYGIENE: INDEPENDENT
DRESS: STREET CLOTHES;INDEPENDENT
ORAL_HYGIENE: INDEPENDENT
LAUNDRY: WITH SUPERVISION
LAUNDRY: WITH SUPERVISION
DRESS: INDEPENDENT
HYGIENE/GROOMING: INDEPENDENT
HYGIENE/GROOMING: INDEPENDENT

## 2021-04-30 NOTE — PROGRESS NOTES
"[facility], [place]   Psychiatric Progress Note        Interim History:   The patient's care was discussed with the treatment team during the daily team meeting and/or staff's chart notes were reviewed.  Staff report patient is still in detox. Has a restricted provider. May have a DVT.     The patient reports that he feels \"like crap.\" Still having shakes, has a headache and is nauseated. Was able to eat some. Slept \"a little bit.\" Denies SI. Is not interested in CD treatment at this time and wants to detox only.          Medications:       ARIPiprazole  2 mg Oral Daily     docusate sodium  100 mg Oral BID     folic acid  1 mg Oral Daily     gabapentin  1,200 mg Oral TID     methadone  55 mg Oral Daily     multivitamin w/minerals  1 tablet Oral Daily     QUEtiapine  400 mg Oral At Bedtime     thiamine  100 mg Oral Daily          Allergies:     Allergies   Allergen Reactions     Cucumber Extract      Cucumber the vegable     Nsaids      No problem with oral NSAIDs--Toradol injection itching only.          Labs:     Recent Results (from the past 24 hour(s))   Comprehensive metabolic panel    Collection Time: 04/30/21 11:23 AM   Result Value Ref Range    Sodium 139 133 - 144 mmol/L    Potassium 3.8 3.4 - 5.3 mmol/L    Chloride 104 94 - 109 mmol/L    Carbon Dioxide 30 20 - 32 mmol/L    Anion Gap 5 3 - 14 mmol/L    Glucose 118 (H) 70 - 99 mg/dL    Urea Nitrogen 21 7 - 30 mg/dL    Creatinine 0.80 0.66 - 1.25 mg/dL    GFR Estimate >90 >60 mL/min/[1.73_m2]    GFR Estimate If Black >90 >60 mL/min/[1.73_m2]    Calcium 8.9 8.5 - 10.1 mg/dL    Bilirubin Total 0.5 0.2 - 1.3 mg/dL    Albumin 3.0 (L) 3.4 - 5.0 g/dL    Protein Total 6.3 (L) 6.8 - 8.8 g/dL    Alkaline Phosphatase 77 40 - 150 U/L    ALT 97 (H) 0 - 70 U/L     (H) 0 - 45 U/L   Magnesium    Collection Time: 04/30/21 11:23 AM   Result Value Ref Range    Magnesium 2.1 1.6 - 2.3 mg/dL          Psychiatric Examination:     /72   " "Pulse 79   Temp 98.5  F (36.9  C) (Temporal)   Resp 16   Ht 1.93 m (6' 4\")   Wt 120.2 kg (265 lb)   SpO2 94%   BMI 32.26 kg/m    Weight is 265 lbs 0 oz  Body mass index is 32.26 kg/m .  Orthostatic Vitals     None            Appearance: awake, alert and adequately groomed  Attitude:  cooperative  Eye Contact:  fair  Mood:  \"crap\"  Affect:  intensity is blunted  Speech:  clear, coherent  Psychomotor Behavior:  no evidence of tardive dyskinesia, dystonia, or tics  Thought Process:  linear  Associations:  no loose associations  Thought Content:  no evidence of suicidal ideation or homicidal ideation and no evidence of psychotic thought  Insight:  fair  Judgement:  fair  Oriented to:  time, person, and place  Attention Span and Concentration:  intact  Recent and Remote Memory:  fair    Clinical Global Impressions  First:     Most recent:            Precautions:     Behavioral Orders   Procedures     Assault precautions     Code 2     Fall precautions     Routine Programming     As clinically indicated     Seizure precautions     Status 15     Every 15 minutes.     Withdrawal precautions          Diagnoses:     Alcohol use disorder severe  Alcohol withdrawal severe  Opiate use disorder on methadone maintenance  Bipolar affective disorder most recent episode depressed chronic         Plan:     1) Continue MSSA.   2) Continue Abilify, Seroquel and gabapentin.   3) Wellbutrin held on admission.   4) Continue Methadone.   5) Patient wants detox only and then back to his group home.       Disposition Plan   Reason for ongoing admission: requires detoxification from substance that poses a risk of bodily harm during withdrawal period  Discharge location: home with self-care  Discharge Medications: not ordered  Follow-up Appointments: not scheduled  Legal Status: voluntary    Entered by: Mian Barriga on 4/30/2021 at 12:44 PM                "

## 2021-04-30 NOTE — CONSULTS
[facility] Nurse Inpatient Wound Assessment   Reason for consultation: Evaluate and treat bilateral foot wounds    Assessment  Bilateral foot wounds due to Trauma  Status: initial assessment    Treatment Plan  Bilateral feet wounds: Daily    Apply sween cream (pink-topped tube) to bottoms of feet, ideally after a shower.   Please be careful in shower as cream can make feet slippery when wet, even after 24 hrs.  OK to wear socks, no topical dressing needed on sores.      Orders Written  Recommended provider order: None, at this time  [facility] Nurse follow-up plan:weekly  Nursing to notify the Provider(s) and re-consult the [facility] Nurse if wound(s) deteriorates or new skin concern.    Patient History  According to provider note(s):  patient is a 49 y.o. M with history of TBI and bipolar disorder, and long-standing history of alcohol addiction,   Patient also has history of schizophrenia     Patient is a poor historian for a lot of symptoms he says he has traumatic brain injury and he cannot remember.    Objective Data    Active Diet Order  Orders Placed This Encounter      Regular Diet Adult      Output:   No intake/output data recorded.    Risk Assessment:   Sensory Perception: 4-->no impairment  Moisture: 3-->occasionally moist  Activity: 3-->walks occasionally  Mobility: 4-->no limitation  Nutrition: 3-->adequate  Friction and Shear: 3-->no apparent problem  Elder Score: 20                          Labs:   Recent Labs   Lab 04/30/21  1123 04/29/21  0718   ALBUMIN 3.0* 3.2*   HGB  --  13.0*   WBC  --  4.8       Physical Exam  Areas of skin assessed: focused bilateral feet    Wound Location:  Bilateral feet    Left medial foot    Left lateral foot    Right foot    Date of last photo 4/30  Wound History: present on admission, appears to be healing lacerations or splits in callous due to dry feet.  Pt thought he may have cut his feet walking without socks or shoes, but unsure.  Noted very dry calloused feet on assessment.  No active  drainage at this time, does not appear infected.     Wound Base: 100 % non-granular tissue     Palpation of the wound bed: normal      Drainage: none     Description of drainage: none     Measurements (length x width x depth, in cm)   Left medial: 0.4 x 1.3 x 0.2 cm  Left lateral: 0.1 x 1 x 0.1 cm  Right great toe base: 0.3 x 1.8 x 0.2 cm  Periwound skin: intact, dry/scaly and calloused      Color: normal and consistent with surrounding tissue      Temperature: normal   Odor: none  Pain: denies , none      Interventions  Visual inspection and assessment completed   Wound Care Rationale Protect periwound skin and Promote moist wound healing without tissue dehydration   Wound Care: completed by RN  Supplies: floor stock, discussed with RN and discussed with patient  Current off-loading measures: Pillows  Current support surface: Standard  Foam mattress  Education provided to: plan of care and wound progress  Discussed plan of care with Patient and Nurse    Lisa Frank RN CWOCN

## 2021-04-30 NOTE — PROGRESS NOTES
Met with Pt to initiate discharge planning.  Pt is currently declining any assistance.  Pt appeared distressed as he was attempting to eat and struggling with tremors.  Informed Pt that writer would check in with him later.  Pt acknowledged.

## 2021-04-30 NOTE — CONSULTS
Internal Medicine Consult - Initial Visit       Hollis Barclay MRN# [id number]   YOB: 1970 Age: 50 year old   Date of Admission: 4/29/2021  PCP: Jr Giron  Date of Service: 4/30/2021    Referring Provider: Mian Barriga MD  Reason for Consult: Medical co-management of detox          Assessment and Recommendations:   Hollis Barclay is a 50 year old male with a history of alcohol use disorder, chronic pain on methadone, bipolar disorder, schizophrenia, depression, and anxiety admitted to station 3A for alcohol withdrawal and detox.      # Alcohol withdrawal, hx of alcohol use disorder   # Polysubstance abuse  MSSA 10 this shift.  Unable to quantify amount of alcohol consumed lately.  Reports hx of withdrawal seizures.  Utox on admission positive for amphetamines and cocaine.   - Seizure precautions   - Continue MSSA   - Folvite, multi-vites, thiamine supplementation   - Further management per Psychiatry     # Acute HTN - Likely 2/2 alcohol withdrawal.  BPs elevated to 170/117 this AM.  No known hx underlying HTN.    - Start Clonidine 0.1mg BID PRN for SBP > 160,      # RLE swelling and tenderness - Reports increased pain, swelling, and overall muscle tightness in RLE for at least the past month.  Denies falls or trauma to the leg.   - Check US RLE to evaluate for DVT    # Possible RLE abscess  # Bilateral wounds to feet  Reports area of swelling on anterior aspect of thigh directly above knee ongoing for last 1-2 months.  TTP on exam and occasionally w/ bleeding and pus.  On exam, has cuts on soles of feet and below R great toe in various stages of healing, but reporting worsening pain.  No drainage or erythema.   - US soft tissue to evaluate for possible abscess above R knee along thigh   - Keep affected areas clean and dry   - If drainage noted from fluctuant area above knee, please send sterile cx     - WOCN placed for foot wounds     # Elevated LFTs - ,   on admission.  2:1 hepatocellular injury pattern c/w alcohol use.  Repeat LFTs today are downtrending, ALT 97, .      # Hypokalemia - K 2.9 on admission, replaced x 1 with improvement to 3.8. Mag wnl.     # Chronic pain on methadone   - Continue PTA Methadone 55mg daily   - Would check EKG to ensure stable QTc if considering dose increase    # Schizophrenia, bipolar disorder, depression, anxiety - On Abilify 2mg daily, Wellbutrin 200mg BID, and Seroquel 400mg at HS PTA.    - Hold Wellbutrin for now given risk for seizure w/ alcohol withdrawal   - Further management per Psychiatry     Medicine will continue to follow along for medical co-management and review of US results.  Recommendations relayed to primary team via this progress note.  Thank you for the opportunity to be involved in this patient's care.    Sherie Gleason CNP, APRN  Internal Medicine [facility] Hospitalist  [facility]  Pager [phone]           History of Present Illness:   History is obtained from the patient and medical record.     This patient is a 50 year old male with a history of alcohol use disorder, chronic pain on methadone, bipolar disorder, schizophrenia, depression, and anxiety admitted to station 3A for alcohol withdrawal and detox.     Internal Medicine service was asked to see patient for medical co-management of detox.  Jose M is resting in bed.  Initially is very tremulous and does not want to talk, but symptoms improved and agreeable to history and exam.  Jose M reports concern about wounds on his soles of his feet.  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  He denies history of diabetes, but is worried about this risk with his history of alcohol use.  He also reports R leg swelling that is new.  He has noticed this over the last 1-2 months.  Denies any related trauma or injury to his leg.  Denies hx of blood clots.  He denies  "chest pain and dyspnea.  He also notes having two recent episodes where he \"dropped out\" and \"felt like a marionette that had all the strings cut\" and felt numb all over.  No issues like this since being admitted to .  He does endorse a history of seizures related to alcohol withdrawal.             Review of Systems:   A 10 point ROS was performed and negative unless otherwise noted in HPI.           Past Medical History:   Reviewed and updated in Epic.  Past Medical History:   Diagnosis Date     Alcoholism in remission (H)      Bilateral ACL tear      Chronic back pain      Closed left arm fracture 1985     H/O shoulder surgery      Post concussive encephalopathy      Social anxiety disorder              Past Surgical History:   Reviewed and updated in Epic.  No past surgical history on file.          Social History:   Reviewed and updated in [facility].  Social History     Socioeconomic History     Marital status:      Spouse name: Not on file     Number of children: Not on file     Years of education: Not on file     Highest education level: Not on file   Occupational History     Not on file   Social Needs     Financial resource strain: Not on file     Food insecurity     Worry: Not on file     Inability: Not on file     Transportation needs     Medical: Not on file     Non-medical: Not on file   Tobacco Use     Smoking status: Former Smoker     Types: Dip, chew, snus or snuff     Smokeless tobacco: Former User     Types: Chew   Substance and Sexual Activity     Alcohol use: Yes     Drug use: Not Currently     Sexual activity: Not on file   Lifestyle     Physical activity     Days per week: Not on file     Minutes per session: Not on file     Stress: Not on file   Relationships     Social connections     Talks on phone: Not on file     Gets together: Not on file     Attends [employer] service: Not on file     Active member of club or organization: Not on file     Attends meetings of clubs or organizations: Not " "on file     Relationship status: Not on file     Intimate partner violence     Fear of current or ex partner: Not on file     Emotionally abused: Not on file     Physically abused: Not on file     Forced sexual activity: Not on file   Other Topics Concern     Not on file   Social History Narrative    1/31/2020:  Jose M is now living in an apartment with one roommate in [place].  He is happy with his current living situation.        11/4/2019: He is living in a full house with 8 people, which has been stressful.  He had an interview for an apartment 3 weeks ago, and expects to hear from them soon.        10/11/2019    Jose M is , and has 2 living children who are adults.  He had a daughter, who passed away.  He developed issues with drugs and alcohol in his late 30s.  He is now in [facility] residence with Nurse assistance. Previously was receiving treatment at [facility], and is also in a methadone program at [facility].  He grew up in [place], right outside of [place].  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  He is currently receiving treatment at a [facility] program, [facility]              Family History:   Reviewed and updated in Epic.  No family history on file.          Allergies:     Allergies   Allergen Reactions     Cucumber Extract      Cucumber the vegable     Nsaids      No problem with oral NSAIDs--Toradol injection itching only.             Medications:     Current Facility-Administered Medications   Medication     ARIPiprazole (ABILIFY) tablet 2 mg     atenolol (TENORMIN) tablet 50 mg     cloNIDine (CATAPRES) tablet 0.1 mg     diazepam (VALIUM) tablet 5-20 mg     docusate sodium (COLACE) capsule 100 mg     folic acid (FOLVITE) tablet 1 mg     gabapentin (NEURONTIN) tablet 1,200 mg     hydrOXYzine (ATARAX) tablet 25 mg     ibuprofen (ADVIL/MOTRIN) tablet 600 mg     methadone (DOLPHINE) " "solution 55 mg     multivitamin w/minerals (THERA-VIT-M) tablet 1 tablet     naloxone (NARCAN) injection 0.2 mg    Or     naloxone (NARCAN) injection 0.4 mg    Or     naloxone (NARCAN) injection 0.2 mg    Or     naloxone (NARCAN) injection 0.4 mg     nicotine polacrilex (NICORETTE) gum 4-8 mg     OLANZapine zydis (zyPREXA) ODT tab 5 mg     ondansetron (ZOFRAN-ODT) ODT tab 4 mg     QUEtiapine (SEROquel) tablet 400 mg     thiamine (B-1) tablet 100 mg            Physical Exam:   Blood pressure (!) 170/117, pulse 76, temperature 98.2  F (36.8  C), temperature source Temporal, resp. rate 16, height 1.93 m (6' 4\"), weight 120.2 kg (265 lb), SpO2 100 %.  Body mass index is 32.26 kg/m .    GENERAL: Alert and oriented x 3. Well nourished, well developed.  Tall body habitus.  No acute distress.    HEENT: Normocephalic, atraumatic. Anicteric sclera. Mucous membranes moist.   CV: RRR. S1, S2. No murmurs appreciated.   RESPIRATORY: Effort normal on room air. Lungs CTAB with no wheezing, rales, or rhonchi.   GI: Abdomen soft and non distended, bowel sounds present x all 4 quadrants. No tenderness, rebound, or guarding.   NEUROLOGICAL: No focal deficits. Follows commands.  Strength equal in upper and lower extremities.   MUSCULOSKELETAL: No joint swelling or tenderness. Moves all extremities.   EXTREMITIES: No gross deformities. RLE w/ non pitting edema, trace erythema, and muscle tightness, R greater than L.   SKIN: Grossly warm, dry, and intact. No jaundice.  Small gray discolored area of fluctuance above R knee, TTP but no bleeding or exudate.             Data:   I personally reviewed the following studies:    ROUTINE IP LABS (Last four results)  CMP   Recent Labs   Lab 04/29/21  0718      POTASSIUM 2.9*   CHLORIDE 98   CO2 30   ANIONGAP 7   GLC 78   BUN 20   CR 0.83   KACY 8.4*   PROTTOTAL 7.0   ALBUMIN 3.2*   BILITOTAL 0.8   ALKPHOS 82   *   *     CBC   Recent Labs   Lab 04/29/21  0718   WBC 4.8   RBC 4.05* "   HGB 13.0*   HCT 38.7*   MCV 96   MCH 32.1   MCHC 33.6   RDW 13.2        INR No lab results found in last 7 days.    Unresulted Labs Ordered in the Past 30 Days of this Admission     No orders found for last 31 day(s).

## 2021-04-30 NOTE — PROGRESS NOTES
MSSA scores of 8/8/9 pt receives 30mg of valium for alcohol. Up once mid shift c/o headache and received Ibuprofen 600mg to good effect as he sleeps the remainder of noc.

## 2021-04-30 NOTE — CONSULTS
"  Internal Medicine Consult - Initial Visit       Hollis Barclay MRN# [id number]   YOB: 1970 Age: 50 year old   Date of Admission: 4/29/2021  PCP: Jr Giron  Date of Service: 4/30/2021    Referring Provider: Mian Barriga MD  Reason for Consult: Co-management alcohol withdrawl         Assessment and Recommendations:   Hollis Barclay is a 50 year old year old with a history of alcoholism, schizophrenia, bipolar affective disorder, and chronic pain on methadone seeking alcohol detox.    # Alcohol Substance Use Disorder - Elevated LFTs, ALT (121), Ast (234) likely 2/2 alcohol withdrawal.  # Polysubstance abuse    # Hypokalemia - K+ levels 2.9 mmol/L on admission.    #Hypertension - Elevated B/P 117/72. Utox 4/29/21 positive for cocaine and amphetamine use. Subjective endorsement of cocaine use \"3/4 of an 8-ball\" prior to admission on 4/28/21        Recommendations reviewed with attending physician  ***.       ***  Physician Assistant Student  [facility]            History of Present Illness:   History is obtained from the patient and medical record.     Hollis Barclay is a 50 year old year old with a history of alcoholism, schizophrenia, bipolar affective disorder, and chronic pain on methadone admitted for alcohol detox. Internal Medicine service was asked to see patient for assessment of hypokalemia    Hx of alcohol withdrawal seizures              Review of Systems:   A 10 point ROS was performed and negative unless otherwise noted in HPI.           Past Medical History:   Reviewed and updated in Epic.  Past Medical History:   Diagnosis Date     Alcoholism in remission (H)      Bilateral ACL tear      Chronic back pain      Closed left arm fracture 1985     H/O shoulder surgery      Post concussive encephalopathy      Social anxiety disorder              Past Surgical History:   Reviewed and updated in Epic.  No past surgical history on file.          Social " "History:   Reviewed and updated in [facility].  Social History     Socioeconomic History     Marital status:      Spouse name: Not on file     Number of children: Not on file     Years of education: Not on file     Highest education level: Not on file   Occupational History     Not on file   Social Needs     Financial resource strain: Not on file     Food insecurity     Worry: Not on file     Inability: Not on file     Transportation needs     Medical: Not on file     Non-medical: Not on file   Tobacco Use     Smoking status: Former Smoker     Types: Dip, chew, snus or snuff     Smokeless tobacco: Former User     Types: Chew   Substance and Sexual Activity     Alcohol use: Yes     Drug use: Not Currently     Sexual activity: Not on file   Lifestyle     Physical activity     Days per week: Not on file     Minutes per session: Not on file     Stress: Not on file   Relationships     Social connections     Talks on phone: Not on file     Gets together: Not on file     Attends Worship service: Not on file     Active member of club or organization: Not on file     Attends meetings of clubs or organizations: Not on file     Relationship status: Not on file     Intimate partner violence     Fear of current or ex partner: Not on file     Emotionally abused: Not on file     Physically abused: Not on file     Forced sexual activity: Not on file   Other Topics Concern     Not on file   Social History Narrative    1/31/2020:  Jose M is now living in an apartment with one roommate in [place].  He is happy with his current living situation.        11/4/2019: He is living in a full house with 8 people, which has been stressful.  He had an interview for an apartment 3 weeks ago, and expects to hear from them soon.        10/11/2019    Jose M is , and has 2 living children who are adults.  He had a daughter, who passed away.  He developed issues with drugs and alcohol in his late 30s.  He is now in \"[facility] residence " "with Nurse assistance. Previously was receiving treatment at [facility], and [place] also [place] [facility]  He grew up in [place], [place] outside of [place].  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  He is currently receiving treatment at a [facility] program, and will then move to a jail house in the next few months.              Family History:   Reviewed and updated in Epic.  No family history on file.          Allergies:     Allergies   Allergen Reactions     Cucumber Extract      Cucumber the vegable     Nsaids      No problem with oral NSAIDs--Toradol injection itching only.             Medications:     Current Facility-Administered Medications   Medication     ARIPiprazole (ABILIFY) tablet 2 mg     atenolol (TENORMIN) tablet 50 mg     cloNIDine (CATAPRES) tablet 0.1 mg     diazepam (VALIUM) tablet 5-20 mg     docusate sodium (COLACE) capsule 100 mg     folic acid (FOLVITE) tablet 1 mg     gabapentin (NEURONTIN) tablet 1,200 mg     hydrOXYzine (ATARAX) tablet 25 mg     ibuprofen (ADVIL/MOTRIN) tablet 600 mg     methadone (DOLPHINE) solution 55 mg     multivitamin w/minerals (THERA-VIT-M) tablet 1 tablet     naloxone (NARCAN) injection 0.2 mg    Or     naloxone (NARCAN) injection 0.4 mg    Or     naloxone (NARCAN) injection 0.2 mg    Or     naloxone (NARCAN) injection 0.4 mg     nicotine polacrilex (NICORETTE) gum 4-8 mg     OLANZapine zydis (zyPREXA) ODT tab 5 mg     ondansetron (ZOFRAN-ODT) ODT tab 4 mg     QUEtiapine (SEROquel) tablet 400 mg     thiamine (B-1) tablet 100 mg            Physical Exam:   Blood pressure (!) 170/117, pulse 76, temperature 98.2  F (36.8  C), temperature source Temporal, resp. rate 16, height 1.93 m (6' 4\"), weight 120.2 kg (265 lb), SpO2 100 %.  Body mass index is 32.26 kg/m .    GENERAL: Alert and oriented x 3. Well nourished, well developed.  No acute distress.    HEENT: Normocephalic, atraumatic. Anicteric " sclera. Mucous membranes moist.   CV: RRR. S1, S2. No murmurs appreciated.   RESPIRATORY: Effort normal on ***. Lungs CTAB with no wheezing, rales, or rhonchi.   GI: Abdomen soft and non distended, bowel sounds present x all 4 quadrants. No tenderness, rebound, or guarding.   NEUROLOGICAL: No focal deficits. Follows commands.  Strength *** in upper and lower extremities.   MUSCULOSKELETAL: No joint swelling or tenderness. Moves all extremities.   EXTREMITIES: No gross deformities. No peripheral edema. Intact bilateral pedal pulses.   SKIN: Grossly warm, dry, and intact. No jaundice. No rashes.             Data:   I personally reviewed the following studies:  ***  Unresulted Labs Ordered in the Past 30 Days of this Admission     No orders found for last 31 day(s).

## 2021-04-30 NOTE — PROGRESS NOTES
This RN contacted ultrasound. Per ultrasound they will call back when they are ready to take the patient.

## 2021-04-30 NOTE — PLAN OF CARE
"Problem: Behavioral Health Plan of Care  Goal: Plan of Care Review  Recent Flowsheet Documentation  Taken 4/30/2021 0909 by Madelyn Hinojosa RN  Plan of Care Reviewed With: patient  Patient Agreement with Plan of Care: agrees     Problem: Substance Withdrawal  Goal: Substance Withdrawal  Description: Signs and symptoms of listed problems will be absent or manageable.  Outcome: Declining    Patient is up and visible in the milieu for breakfast and withdrawal assessments. Patient is ambulating balanced and steady. Patient is alert and oriented x 4. Patient is not attending/participating in unit programming. Pt is not social with peers. Affect is tense/flat, mood is agitated/tense/irritable. Patient denies SI/SIB/HI. Pt denies auditory/visual hallucinations but did report \"last night I was seeing spots.\" Pt currently denies this. Patient verbally contracts for safety on the unit. Patient is tolerating medications well, denies any current side effects.     Patient reports a good appetite, but poor sleep. Patient wants detox only. Rest, fluids, and food encouraged. Status 15 checks remain. Patient denies any unmet needs at this time.     Blood pressure 117/72, pulse 79, temperature 98.5  F (36.9  C), temperature source Temporal, resp. rate 16, height 1.93 m (6' 4\"), weight 120.2 kg (265 lb), SpO2 94 %.    "

## 2021-04-30 NOTE — PROVIDER NOTIFICATION
"   04/30/21 0808   Vital Signs   Temp 98.2  F (36.8  C)   Temp src Temporal   Resp 16   Pulse 76   Pulse Rate Source Monitor   BP (!) 170/117     Patient hypertensive in the context of etoh withdrawal. Patient MSSA's this shift = 10, 10, 10 and 9. Patient medicated with 10mg valium x 4 per MSSA protocol (see MAR). Patient is highly tremulous, diaphoretic. Patient unable to hold a cup of water at times. Internal medicine CNP, Kristin Gleason, paged and notified of patient BP in AM. Clonidine 0.1mg given x 1 per parameters and Internal medicine order  (see MAR).     Patient is highly tremulous, diaphoretic. Patient is requesting double portions with all meals on the unit and states, \"I'm a big juan, this food isn't enough.\" Patient presents as agitated, irritable. Patient requested/received Zofran 4mg ODT x 1 for nausea (See MAR).     Patient vital signs re-assessed:  Blood pressure (!) 157/96, pulse 77, temperature 98.1  F (36.7  C), temperature source Temporal, resp. rate 16, height 1.93 m (6' 4\"), weight 120.2 kg (265 lb), SpO2 94 %.    At 1200, patient reports to this RN that he has cuts on his feet from prior to admission. Patient reports he thinks he got these from walking outside. Patient has cuts on his feet in all various stages of healing. Patient cleaned feet and has them covered at this time. Internal medicine CNP, Kristin Gleason, notified of this. Wound care consult order placed per Internal Medicine.   "

## 2021-05-01 PROCEDURE — H2035 A/D TX PROGRAM, PER HOUR: HCPCS | Mod: HQ

## 2021-05-01 PROCEDURE — 250N000013 HC RX MED GY IP 250 OP 250 PS 637: Performed by: PSYCHIATRY & NEUROLOGY

## 2021-05-01 PROCEDURE — 128N000004 HC R&B CD ADULT

## 2021-05-01 RX ORDER — IBUPROFEN 600 MG/1
600 TABLET, FILM COATED ORAL EVERY 6 HOURS PRN
Status: DISCONTINUED | OUTPATIENT
Start: 2021-05-01 | End: 2021-05-05 | Stop reason: HOSPADM

## 2021-05-01 RX ORDER — OLANZAPINE 5 MG/1
5 TABLET, ORALLY DISINTEGRATING ORAL 3 TIMES DAILY PRN
Status: DISCONTINUED | OUTPATIENT
Start: 2021-05-01 | End: 2021-05-05 | Stop reason: HOSPADM

## 2021-05-01 RX ORDER — DIPHENHYDRAMINE HCL 25 MG
25 CAPSULE ORAL EVERY 6 HOURS PRN
Status: DISCONTINUED | OUTPATIENT
Start: 2021-05-01 | End: 2021-05-04

## 2021-05-01 RX ADMIN — NICOTINE POLACRILEX 8 MG: 4 GUM, CHEWING BUCCAL at 18:33

## 2021-05-01 RX ADMIN — METHADONE HYDROCHLORIDE 55 MG: 5 SOLUTION ORAL at 08:09

## 2021-05-01 RX ADMIN — THIAMINE HCL TAB 100 MG 100 MG: 100 TAB at 08:00

## 2021-05-01 RX ADMIN — ARIPIPRAZOLE 2 MG: 2 TABLET ORAL at 08:00

## 2021-05-01 RX ADMIN — GABAPENTIN 1200 MG: 600 TABLET, FILM COATED ORAL at 14:18

## 2021-05-01 RX ADMIN — HYDROXYZINE HYDROCHLORIDE 25 MG: 25 TABLET, FILM COATED ORAL at 00:47

## 2021-05-01 RX ADMIN — NICOTINE POLACRILEX 8 MG: 4 GUM, CHEWING BUCCAL at 23:26

## 2021-05-01 RX ADMIN — NICOTINE POLACRILEX 8 MG: 4 GUM, CHEWING BUCCAL at 19:44

## 2021-05-01 RX ADMIN — GABAPENTIN 1200 MG: 600 TABLET, FILM COATED ORAL at 20:10

## 2021-05-01 RX ADMIN — QUETIAPINE FUMARATE 400 MG: 400 TABLET ORAL at 21:06

## 2021-05-01 RX ADMIN — MULTIPLE VITAMINS W/ MINERALS TAB 1 TABLET: TAB at 08:00

## 2021-05-01 RX ADMIN — DOCUSATE SODIUM 100 MG: 100 CAPSULE, LIQUID FILLED ORAL at 08:00

## 2021-05-01 RX ADMIN — OLANZAPINE 5 MG: 5 TABLET, ORALLY DISINTEGRATING ORAL at 08:06

## 2021-05-01 RX ADMIN — DIPHENHYDRAMINE HYDROCHLORIDE 25 MG: 25 CAPSULE ORAL at 11:06

## 2021-05-01 RX ADMIN — NICOTINE POLACRILEX 8 MG: 4 GUM, CHEWING BUCCAL at 02:04

## 2021-05-01 RX ADMIN — DIAZEPAM 10 MG: 5 TABLET ORAL at 00:08

## 2021-05-01 RX ADMIN — DIAZEPAM 10 MG: 5 TABLET ORAL at 04:53

## 2021-05-01 RX ADMIN — IBUPROFEN 600 MG: 600 TABLET ORAL at 04:54

## 2021-05-01 RX ADMIN — DOCUSATE SODIUM 100 MG: 100 CAPSULE, LIQUID FILLED ORAL at 20:10

## 2021-05-01 RX ADMIN — IBUPROFEN 600 MG: 600 TABLET ORAL at 10:35

## 2021-05-01 RX ADMIN — NICOTINE POLACRILEX 8 MG: 4 GUM, CHEWING BUCCAL at 16:06

## 2021-05-01 RX ADMIN — FOLIC ACID 1 MG: 1 TABLET ORAL at 08:00

## 2021-05-01 RX ADMIN — DIAZEPAM 5 MG: 5 TABLET ORAL at 16:06

## 2021-05-01 RX ADMIN — NICOTINE POLACRILEX 8 MG: 4 GUM, CHEWING BUCCAL at 08:02

## 2021-05-01 RX ADMIN — NICOTINE POLACRILEX 8 MG: 4 GUM, CHEWING BUCCAL at 14:17

## 2021-05-01 RX ADMIN — NICOTINE POLACRILEX 8 MG: 4 GUM, CHEWING BUCCAL at 11:06

## 2021-05-01 RX ADMIN — NICOTINE POLACRILEX 8 MG: 4 GUM, CHEWING BUCCAL at 21:06

## 2021-05-01 RX ADMIN — GABAPENTIN 1200 MG: 600 TABLET, FILM COATED ORAL at 08:00

## 2021-05-01 RX ADMIN — IBUPROFEN 600 MG: 600 TABLET ORAL at 16:41

## 2021-05-01 RX ADMIN — DIAZEPAM 10 MG: 5 TABLET ORAL at 08:00

## 2021-05-01 ASSESSMENT — ACTIVITIES OF DAILY LIVING (ADL)
LAUNDRY: WITH SUPERVISION
DRESS: SCRUBS (BEHAVIORAL HEALTH);INDEPENDENT
HYGIENE/GROOMING: INDEPENDENT;PROMPTS
ORAL_HYGIENE: INDEPENDENT

## 2021-05-01 NOTE — PROGRESS NOTES
MSSA scores of 11/11 pt receives 20 mg of valium this shift. Pt has poor sleep despite receiving prn hydroxyzine early in shift.

## 2021-05-01 NOTE — PLAN OF CARE
"S: Jose M has been more visible in the milieu, today. He has participated in Art Therapy group.  He is eating and drinking normally.  He denies any thoughts of self harm, suicide or thoughts of harming others.  He complained of low posterior flank pain, rated at an 8.  Writer obtained an order for Ibuprofen which he previously had ordered for a limited time, but the order had .  Pt has NSAIDS listed as one of his allergies.  When writer brought it to his attention he denied having an allergy to NSAIDS, stating, \"I'm not allergic to NSAIDS, I don't know how that got into my chart\". \"I've already been taking it since I was here.\"  Writer observed that he had received it many times already since being hereand it was documented on his record.  Jose M had his VS taken and came to writer to have his assessment done.  His BP was quite elevated (146/114).  He exhibits minimal physical symptoms of withdrawal: no sweats, no tremors.  He is restless. When he was told he only scored a six and he would not be receiving any diazepam, he seemed distressed.  Writer offered other PRN's: Benedryl, Zyprexa but he did not want anything other than the diazepam. Writer brought out his scheduled medications and a PRN clonidine.  He refused the clonidine.  While writer was reporting off [facility] called and wanted to know if he was scheduled to discharge tomorrow. There was no AKIL, so the RN, Elle Davison told them he would call back tomorrow.  Seconds later, Jose M came out of his room and the RN told Jose M about the call. He was allowed to use the phone to call his group home and he did not get through.    He signed an AKIL FOR THE GROUP HOME  He also asked for something to eat and was given a turkey sandwich. A dose of nicotine gum was administered.  B: Pt admitted for alcohol withdrawal and detoxification.  A:  Pt in moderate alcohol withdrawal AEB MSSA scores of 8 & 6.  R:  Administered diazepam 5 mg, ibuprofen 600 mg, scheduled Colace " 100 mg, gabapentin 1,200 mg and Seroquel 400 mg. Nicotine gum two (4 mg) pieces was given five times this evening.  Continue to monitor and medicate as ordered and indicated.

## 2021-05-01 NOTE — PLAN OF CARE
S:  Jose M has been minimally visible in the milieu, instead staying in his room reading.  He has not participated in Music or AA via [facility] group.  He left the unit  briefly (escorted by EULALIO Nj) for a right LE US to rule out DVT.  The results stated that there was no evidence of DVT.  Pt was informed that the US results were negative for a blood clot.  Nothing else was discussed.       He is eating and drinking normally. He received double portions.  Writer observed him during dinner.  His appetite is good. He appears less tremulous while taking bites, then when he presents to the desk at assessment time, or when asking for something (possibly exaggerating his tremor?)  He denies any thoughts of self harm, suicide or thoughts of harming others.  He endorsed anxiety and depression which he rated at 8 & 7 respectively.  He reported tactile hallucinations, stated it felt like he had ants crawling on his arms. He showered and applied lotion to his feet.  He came to the desk around ten o'clock and requested a snack.  He ate a meals-worth of snack food and took his Seroquel.  He requested someone find him a book, he didn't like what was in the 3AW bookcase.  One of the PA's went looking for something more interesting.  He stated that he goes through a book or two a day.   B: Pt admitted for alcohol withdrawal and detoxification.  Please see Epic for detailed PMH.   A:  Pt in moderate alcohol withdrawal AEB MSSA scores of 12 & 13.  R:  Administered diazepam 10 mg twice, ondansetron ODT 4 mg once and nicotine gum two pieces (4 mg each) thrice and regularly scheduled medications docusate 100 mg, gabapentin 1200 mg and quetiapine 400 mg.  Continue to monitor and medicate as ordered and indicated.

## 2021-05-02 PROCEDURE — 250N000013 HC RX MED GY IP 250 OP 250 PS 637: Performed by: PSYCHIATRY & NEUROLOGY

## 2021-05-02 PROCEDURE — 128N000004 HC R&B CD ADULT

## 2021-05-02 RX ORDER — CLONIDINE HYDROCHLORIDE 0.1 MG/1
0.1 TABLET ORAL 2 TIMES DAILY
Status: DISCONTINUED | OUTPATIENT
Start: 2021-05-03 | End: 2021-05-05 | Stop reason: HOSPADM

## 2021-05-02 RX ADMIN — GABAPENTIN 1200 MG: 600 TABLET, FILM COATED ORAL at 13:24

## 2021-05-02 RX ADMIN — NICOTINE POLACRILEX 8 MG: 4 GUM, CHEWING BUCCAL at 19:57

## 2021-05-02 RX ADMIN — NICOTINE POLACRILEX 8 MG: 4 GUM, CHEWING BUCCAL at 16:15

## 2021-05-02 RX ADMIN — ARIPIPRAZOLE 2 MG: 2 TABLET ORAL at 08:01

## 2021-05-02 RX ADMIN — IBUPROFEN 600 MG: 600 TABLET ORAL at 18:35

## 2021-05-02 RX ADMIN — NICOTINE POLACRILEX 8 MG: 4 GUM, CHEWING BUCCAL at 11:00

## 2021-05-02 RX ADMIN — METHADONE HYDROCHLORIDE 55 MG: 5 SOLUTION ORAL at 08:02

## 2021-05-02 RX ADMIN — GABAPENTIN 1200 MG: 600 TABLET, FILM COATED ORAL at 19:56

## 2021-05-02 RX ADMIN — FOLIC ACID 1 MG: 1 TABLET ORAL at 08:02

## 2021-05-02 RX ADMIN — NICOTINE POLACRILEX 8 MG: 4 GUM, CHEWING BUCCAL at 04:30

## 2021-05-02 RX ADMIN — NICOTINE POLACRILEX 8 MG: 4 GUM, CHEWING BUCCAL at 08:01

## 2021-05-02 RX ADMIN — THIAMINE HCL TAB 100 MG 100 MG: 100 TAB at 08:01

## 2021-05-02 RX ADMIN — QUETIAPINE FUMARATE 400 MG: 400 TABLET ORAL at 21:13

## 2021-05-02 RX ADMIN — DOCUSATE SODIUM 100 MG: 100 CAPSULE, LIQUID FILLED ORAL at 19:56

## 2021-05-02 RX ADMIN — OLANZAPINE 5 MG: 5 TABLET, ORALLY DISINTEGRATING ORAL at 13:24

## 2021-05-02 RX ADMIN — DIPHENHYDRAMINE HYDROCHLORIDE 25 MG: 25 CAPSULE ORAL at 00:50

## 2021-05-02 RX ADMIN — DOCUSATE SODIUM 100 MG: 100 CAPSULE, LIQUID FILLED ORAL at 08:01

## 2021-05-02 RX ADMIN — OLANZAPINE 5 MG: 5 TABLET, ORALLY DISINTEGRATING ORAL at 22:08

## 2021-05-02 RX ADMIN — NICOTINE POLACRILEX 8 MG: 4 GUM, CHEWING BUCCAL at 18:31

## 2021-05-02 RX ADMIN — MULTIPLE VITAMINS W/ MINERALS TAB 1 TABLET: TAB at 08:01

## 2021-05-02 RX ADMIN — NICOTINE POLACRILEX 8 MG: 4 GUM, CHEWING BUCCAL at 02:01

## 2021-05-02 RX ADMIN — GABAPENTIN 1200 MG: 600 TABLET, FILM COATED ORAL at 08:01

## 2021-05-02 RX ADMIN — NICOTINE POLACRILEX 8 MG: 4 GUM, CHEWING BUCCAL at 13:28

## 2021-05-02 RX ADMIN — NICOTINE POLACRILEX 8 MG: 4 GUM, CHEWING BUCCAL at 22:08

## 2021-05-02 RX ADMIN — IBUPROFEN 600 MG: 600 TABLET ORAL at 11:00

## 2021-05-02 RX ADMIN — IBUPROFEN 600 MG: 600 TABLET ORAL at 04:28

## 2021-05-02 ASSESSMENT — ACTIVITIES OF DAILY LIVING (ADL)
DRESS: SCRUBS (BEHAVIORAL HEALTH);INDEPENDENT;STREET CLOTHES
DRESS: SCRUBS (BEHAVIORAL HEALTH)
HYGIENE/GROOMING: INDEPENDENT
ORAL_HYGIENE: INDEPENDENT
LAUNDRY: WITH SUPERVISION
ORAL_HYGIENE: INDEPENDENT
LAUNDRY: WITH SUPERVISION
HYGIENE/GROOMING: INDEPENDENT

## 2021-05-02 NOTE — PROGRESS NOTES
" 05/1/21 1800   Groups   Details    (Pyschotherapy)         Number of patients attending the group:  5  Group Length:  1 Hours     Group Therapy Type:      Summary of Group / Topics Discussed:        The  Psychotherapy group goal is to promote insight to positive choice and change. Group processing is within a supportive and safe environment. Patients will process emotions using verbal group and expressive psychotherapy interventions including visual art/writing interventions.     Group interventions support patients by: Letter of encouragement to self     Modalities to reach these goals include: Supportive Listening, Positive Psychology     Subjective -patient report of mood today- \"despondent\"     Objective/ Intervention- Goal of group and Therapeutic modality utilized- group processing/ writing post card to encourage self on recovery journey     Group Response- engaged     Patient Response-Pt participated on the project and was thoughtful in his writing. In his check in he reported being despondent but his writing was hopeful and optimistic. He did have problems complying with masking in group policy, he didn't seem to be defiant but just forgetful it appeared.    Carlos Walter, YADIRAFT, ATR-BC                 "

## 2021-05-02 NOTE — PLAN OF CARE
"S:  Jose M has been visible in the milieu only when he comes out to get something. He has not spent any time socializing. He has not participated in group.  He is eating and drinking normally.  He denies any thoughts of self harm, suicide or thoughts of harming others.  He stated that his anxiety was a 9 and his depression was a 10.  He identified his mood as \"stressed out\" because he learned that his group home stated that they would not be taking him back due to his destructive and threatening behavior. He was assured that the [facility]  would be working with him to help facilitate his transition to another residence.  B: Pt admitted for alcohol withdrawal and detoxification.  See Epic for detailed PMH.  A:  Pt medically stable in the alcohol withdrawal process AEB MSSA score of 6 & and < 8 for more than 24 hours.  R:  Pt removed from the alcohol withdrawal monitoring process (taken OOD).  Administered Ibuprofen 600 mg for generalized pain rated at a 7 and nicotine gum 4 mg (2 pieces) given four times and olanzapine 5 mg.  Continue to monitor and medicate as ordered and indicated.   "

## 2021-05-02 NOTE — PLAN OF CARE
"  Problem: Substance Withdrawal  Intervention: Substance Withdrawal  Recent Flowsheet Documentation  Taken 5/2/2021 1356 by Elle Miramontes RN  Substance Withdrawal Interventions:   interventions implemented as appropriate   monitor substance withdrawal process   seizure precautions   encourage nutrition and hydration   assess patient response to medication  Alcohol Withdrawl Interventions:   monitor need for prn medication   provide emotional support   establish therapeutic relationship   assist with developing & utilizing healthy coping strategies    Pt being monitored for alcohol withdrawal. No medications for alcohol withdrawal this shift. Pt has received a total of 175 mg of valium since admission.   No oversedation noted.     Pt isolative to his room.     Out for meals.     Pt has been attempting to get a hold of the [facility] his living facility.   A person mony from [facility] called to the desk to report that they will not be taking Ray back as he has done destructive behavior and that we should not discharge him.   Mony said that they have been trying to contact Rays outside  but have been unable to.     Mony was provided unit  Lima Winslow contact information and instructed him to call her tomorrow.     Jose M is aware that [facility] may not take him back.   At first he said \"I'm just going to leave and go drink.\"  Pt then wanted valium.   Pt encouraged to discussed situation with Lima tomorrow.   Pt accepting of taking a prn of Zyprexa.     Pt went to room.     Pt denies feeling depressed. Denies SI. Reports he does feel anxious.     Medicine is continuing to monitor  possible issue with fluid in his knee.     Pt continues on methadone 55 mg.   He signed a AKIL for [facility] addiction clinic.     Pt is on a restricted pharmacy and MD.   No new medications have been ordered and pt says there are refills available at his restricted pharmacy per pt's report.              "

## 2021-05-02 NOTE — PROGRESS NOTES
MSSA score of 5, pt required no valium this shift. Pt sleeps only about 3 hours this noc despite administration of benadryl and ibuprofen for chronic back pain. Pt declined offer of PRN Zyprexa.

## 2021-05-03 PROCEDURE — 250N000013 HC RX MED GY IP 250 OP 250 PS 637: Performed by: PSYCHIATRY & NEUROLOGY

## 2021-05-03 PROCEDURE — 250N000013 HC RX MED GY IP 250 OP 250 PS 637: Performed by: PHYSICIAN ASSISTANT

## 2021-05-03 PROCEDURE — 99231 SBSQ HOSP IP/OBS SF/LOW 25: CPT | Performed by: PHYSICIAN ASSISTANT

## 2021-05-03 PROCEDURE — 99232 SBSQ HOSP IP/OBS MODERATE 35: CPT | Performed by: PSYCHIATRY & NEUROLOGY

## 2021-05-03 PROCEDURE — 128N000004 HC R&B CD ADULT

## 2021-05-03 PROCEDURE — 250N000013 HC RX MED GY IP 250 OP 250 PS 637: Performed by: NURSE PRACTITIONER

## 2021-05-03 RX ORDER — LIDOCAINE 4 G/G
2 PATCH TOPICAL
Status: DISCONTINUED | OUTPATIENT
Start: 2021-05-03 | End: 2021-05-05 | Stop reason: HOSPADM

## 2021-05-03 RX ORDER — ACETAMINOPHEN 500 MG
500 TABLET ORAL EVERY 6 HOURS PRN
Status: DISCONTINUED | OUTPATIENT
Start: 2021-05-03 | End: 2021-05-05 | Stop reason: HOSPADM

## 2021-05-03 RX ADMIN — CLONIDINE HYDROCHLORIDE 0.1 MG: 0.1 TABLET ORAL at 20:40

## 2021-05-03 RX ADMIN — NICOTINE POLACRILEX 4 MG: 4 GUM, CHEWING BUCCAL at 21:59

## 2021-05-03 RX ADMIN — DIPHENHYDRAMINE HYDROCHLORIDE 25 MG: 25 CAPSULE ORAL at 00:50

## 2021-05-03 RX ADMIN — ARIPIPRAZOLE 2 MG: 2 TABLET ORAL at 07:53

## 2021-05-03 RX ADMIN — NICOTINE POLACRILEX 8 MG: 4 GUM, CHEWING BUCCAL at 12:20

## 2021-05-03 RX ADMIN — METHADONE HYDROCHLORIDE 55 MG: 5 SOLUTION ORAL at 07:53

## 2021-05-03 RX ADMIN — IBUPROFEN 600 MG: 600 TABLET ORAL at 12:22

## 2021-05-03 RX ADMIN — CLONIDINE HYDROCHLORIDE 0.1 MG: 0.1 TABLET ORAL at 07:54

## 2021-05-03 RX ADMIN — NICOTINE POLACRILEX 8 MG: 4 GUM, CHEWING BUCCAL at 00:51

## 2021-05-03 RX ADMIN — ACETAMINOPHEN 500 MG: 500 TABLET, FILM COATED ORAL at 12:23

## 2021-05-03 RX ADMIN — IBUPROFEN 600 MG: 600 TABLET ORAL at 07:09

## 2021-05-03 RX ADMIN — NICOTINE POLACRILEX 8 MG: 4 GUM, CHEWING BUCCAL at 18:48

## 2021-05-03 RX ADMIN — MULTIPLE VITAMINS W/ MINERALS TAB 1 TABLET: TAB at 07:53

## 2021-05-03 RX ADMIN — NICOTINE POLACRILEX 8 MG: 4 GUM, CHEWING BUCCAL at 14:33

## 2021-05-03 RX ADMIN — DOCUSATE SODIUM 100 MG: 100 CAPSULE, LIQUID FILLED ORAL at 07:53

## 2021-05-03 RX ADMIN — NICOTINE POLACRILEX 8 MG: 4 GUM, CHEWING BUCCAL at 10:09

## 2021-05-03 RX ADMIN — NICOTINE POLACRILEX 8 MG: 4 GUM, CHEWING BUCCAL at 07:10

## 2021-05-03 RX ADMIN — LIDOCAINE 2 PATCH: 560 PATCH PERCUTANEOUS; TOPICAL; TRANSDERMAL at 20:40

## 2021-05-03 RX ADMIN — FOLIC ACID 1 MG: 1 TABLET ORAL at 07:53

## 2021-05-03 RX ADMIN — GABAPENTIN 1200 MG: 600 TABLET, FILM COATED ORAL at 12:20

## 2021-05-03 RX ADMIN — QUETIAPINE FUMARATE 400 MG: 400 TABLET ORAL at 20:39

## 2021-05-03 RX ADMIN — NICOTINE POLACRILEX 8 MG: 4 GUM, CHEWING BUCCAL at 16:15

## 2021-05-03 RX ADMIN — DIPHENHYDRAMINE HYDROCHLORIDE 25 MG: 25 CAPSULE ORAL at 20:40

## 2021-05-03 RX ADMIN — GABAPENTIN 1200 MG: 600 TABLET, FILM COATED ORAL at 07:54

## 2021-05-03 RX ADMIN — NICOTINE POLACRILEX 8 MG: 4 GUM, CHEWING BUCCAL at 20:40

## 2021-05-03 RX ADMIN — DICLOFENAC SODIUM 4 G: 10 GEL TOPICAL at 11:33

## 2021-05-03 RX ADMIN — THIAMINE HCL TAB 100 MG 100 MG: 100 TAB at 07:54

## 2021-05-03 RX ADMIN — GABAPENTIN 1200 MG: 600 TABLET, FILM COATED ORAL at 20:39

## 2021-05-03 RX ADMIN — ATENOLOL 50 MG: 50 TABLET ORAL at 00:50

## 2021-05-03 NOTE — PROGRESS NOTES
Brief Internal Medicine Note   2 May 2021     US reviewed.  Negative for clot.  Small suprapatellar fluid collection noted.  No obvious appearance of cellulitis.  Reviewed findings w/ IM attending MD.  Will continue to monitor for now.      BPs higher throughout the day.  Pt noted to appear anxious on the unit and per chart review, experienced stressors today regarding his discharge plan.  Added clonidine as scheduled medication for now w/ hold parameters and PRN Hydralazine.   IM will follow peripherally for monitoring BPs in setting of withdrawal.     Sherie Gleason, CNP, APRN  Internal Medicine [facility]  Pager [phone]

## 2021-05-03 NOTE — PLAN OF CARE
Pt slept 5 hours this shift. Approached desk around 0045 complaining of shakiness, appeared to be irritable. VS taken, HR of 106. PRN atenolol and benadryl given at this time. Pt later requested PRN ibuprofen. No other concerns at this time.

## 2021-05-03 NOTE — PROGRESS NOTES
Brief Medicine Note    Medicine following for knee pain, rash. I attempted to meet with the patient this morning. He became agitated and I was unable to thoroughly evaluate his knee.     Paged by RN that patient is having ongoing pain and also some low back pain. He is receiving gabapentin, ibuprofen PRN, and is also on methadone.     Plan:   - Tylenol 500 mg q6h PRN. Maximum of 2g/day due to elevated LFTs.   - Trial topical lidocaine patch (do not apply to areas of broken skin)   - Trial topical Voltaren gel (do not apply to areas of broken skin)      Paloma William PA-C  Hospitalist Service  Pager: [phone]

## 2021-05-03 NOTE — PROGRESS NOTES
"Writer updated that pt is not able to return to his current group home due to violent behaviors. Pt signed AKIL for group home [facility], placed call and spoke with director Channing ([phone]). Channing confirms pt is unable to return due to property destruction, violent behaviors, and threatening staff and residents. Writer updated pt who states \"I have bad social anxiety so I can't be in large settings like shelters. If I don't have a place to go you better send me to mental health or I will freak the fuck out.\" Writer assured pt that we will work with his current  Elisabeth through [employer] [facility]. Pt signed AKIL for [facility], placed call to Elisabeth ([phone]) and left voicemail message requesting return call to discuss options. Waiting for return call.     Update: Pt approached writer, stated he spoke with [facility] and is upset that they are \"kicking me out for having a seizure and falling into the side of the house.\" Pt reports he does not understand why he is being discharged from [facility] \"when other people do crack cocaine in their rooms.\" Pt states \"how am I supposed to stay calm, I just found out I am homeless, fuck homelessness. I'll go cut my throat. I ain't being homeless again.\" Writer attempted to redirect pt to staying calm while we work on getting more information on possible referrals to alternate homes. Updated RN that pt may benefit from PRN medication for anxiety. [facility] continues    Update II: No return call from [facility], placed call to supervisor Nadia at [phone]. Left message requesting return call.  "

## 2021-05-03 NOTE — PROGRESS NOTES
"[facility], [place]   Psychiatric Progress Note        Interim History:   The patient's care was discussed with the treatment team during the daily team meeting and/or staff's chart notes were reviewed.  Staff report patient is out of detox. May not be able to return to his group home.     The patient reports that he still feels \"like crap.\" Not sleeping well. Says that he has still had nausea. Denies SI. Still endorses \"shakes and sweats.\" Not sure what is going on with his group home.          Medications:       ARIPiprazole  2 mg Oral Daily     cloNIDine  0.1 mg Oral BID     diclofenac  4 g Topical 4x Daily     docusate sodium  100 mg Oral BID     folic acid  1 mg Oral Daily     gabapentin  1,200 mg Oral TID     lidocaine  2 patch Transdermal Q24h    And     lidocaine   Transdermal Q8H     methadone  55 mg Oral Daily     multivitamin w/minerals  1 tablet Oral Daily     QUEtiapine  400 mg Oral At Bedtime     thiamine  100 mg Oral Daily          Allergies:     Allergies   Allergen Reactions     Hydroxyzine Hives     Previously unreported by patient, this is a new patient declaration as of 5/1/21.     Cucumber Extract      Cucumber the vegable     Nsaids      No problem with oral NSAIDs--Toradol injection itching only.          Labs:     No results found for this or any previous visit (from the past 24 hour(s)).       Psychiatric Examination:     /74   Pulse 69   Temp 97.5  F (36.4  C) (Temporal)   Resp 16   Ht 1.93 m (6' 4\")   Wt 120.2 kg (265 lb)   SpO2 98%   BMI 32.26 kg/m    Weight is 265 lbs 0 oz  Body mass index is 32.26 kg/m .  Orthostatic Vitals     None            Appearance: awake, alert and adequately groomed  Attitude:  cooperative  Eye Contact:  fair  Mood:  \"crap\"  Affect:  intensity is blunted  Speech:  clear, coherent  Psychomotor Behavior:  no evidence of tardive dyskinesia, dystonia, or tics  Thought Process:  linear  Associations:  no loose " associations  Thought Content:  no evidence of suicidal ideation or homicidal ideation and no evidence of psychotic thought  Insight:  fair  Judgement:  fair  Oriented to:  time, person, and place  Attention Span and Concentration:  intact  Recent and Remote Memory:  fair            Precautions:     Behavioral Orders   Procedures     Assault precautions     Code 2     Fall precautions     Routine Programming     As clinically indicated     Seizure precautions     Status 15     Every 15 minutes.          Diagnoses:     Alcohol use disorder severe  Alcohol withdrawal severe  Opiate use disorder on methadone maintenance  Bipolar affective disorder most recent episode depressed chronic         Plan:     1) Stop MSSA.   2) Continue Abilify, Seroquel and gabapentin.   3) Wellbutrin held on admission.   4) Continue Methadone.   5) Patient wants detox only and then back to his group home. Group home is not allowing him to return. CTC exploring options for disposition.       Disposition Plan   Reason for ongoing admission: requires detoxification from substance that poses a risk of bodily harm during withdrawal period  Discharge location: home with self-care  Discharge Medications: not ordered  Follow-up Appointments: not scheduled  Legal Status: voluntary    Entered by: Mian Barriga on 5/3//2021 at 11:31 AM

## 2021-05-03 NOTE — PLAN OF CARE
Pt out of detox.  Observed up in milieu throughout shift.  Irritable in morning but mood appeared to soften throughout shift.  Pt complained of ongoing back and knee pain not improved by ibuprofen or gabapentin, medicine team ordered Voltaren gel which was given with some relief.  Pt is eating and drinking normally.  Pt is not allowed to return to current group home d/t hx of aggression, threats, and property damage.  SW is working on new placement.  Pt denies thoughts of self-harm, suicide, or violence towards others excepting outburst to SW following news of group home discharge.  Will continue to monitor and follow plan of care.

## 2021-05-04 ENCOUNTER — TELEPHONE (OUTPATIENT)
Dept: FAMILY MEDICINE | Facility: CLINIC | Age: 51
End: 2021-05-04

## 2021-05-04 PROCEDURE — 250N000013 HC RX MED GY IP 250 OP 250 PS 637: Performed by: NURSE PRACTITIONER

## 2021-05-04 PROCEDURE — 128N000004 HC R&B CD ADULT

## 2021-05-04 PROCEDURE — 250N000013 HC RX MED GY IP 250 OP 250 PS 637: Performed by: PSYCHIATRY & NEUROLOGY

## 2021-05-04 PROCEDURE — 99232 SBSQ HOSP IP/OBS MODERATE 35: CPT | Performed by: PSYCHIATRY & NEUROLOGY

## 2021-05-04 PROCEDURE — 250N000013 HC RX MED GY IP 250 OP 250 PS 637: Performed by: CLINICAL NURSE SPECIALIST

## 2021-05-04 PROCEDURE — 250N000013 HC RX MED GY IP 250 OP 250 PS 637: Performed by: PHYSICIAN ASSISTANT

## 2021-05-04 RX ORDER — BUPROPION HYDROCHLORIDE 100 MG/1
100 TABLET ORAL 2 TIMES DAILY
Qty: 60 TABLET | Refills: 0 | Status: SHIPPED | OUTPATIENT
Start: 2021-05-04 | End: 2021-08-16

## 2021-05-04 RX ORDER — BUPROPION HYDROCHLORIDE 150 MG/1
150 TABLET ORAL DAILY
Qty: 30 TABLET | Refills: 0 | Status: SHIPPED | OUTPATIENT
Start: 2021-05-04 | End: 2021-05-04

## 2021-05-04 RX ORDER — FOLIC ACID 1 MG/1
1000 TABLET ORAL DAILY
Qty: 90 TABLET | Refills: 0 | Status: SHIPPED | OUTPATIENT
Start: 2021-05-04 | End: 2021-06-03

## 2021-05-04 RX ORDER — BUPROPION HYDROCHLORIDE 150 MG/1
150 TABLET ORAL DAILY
Status: DISCONTINUED | OUTPATIENT
Start: 2021-05-04 | End: 2021-05-04

## 2021-05-04 RX ORDER — LANOLIN ALCOHOL/MO/W.PET/CERES
100 CREAM (GRAM) TOPICAL DAILY
Qty: 30 TABLET | Refills: 0 | Status: SHIPPED | OUTPATIENT
Start: 2021-05-05 | End: 2021-08-16

## 2021-05-04 RX ORDER — GABAPENTIN 600 MG/1
1200 TABLET ORAL 3 TIMES DAILY
Qty: 180 TABLET | Refills: 0 | Status: SHIPPED | OUTPATIENT
Start: 2021-05-04 | End: 2021-08-02

## 2021-05-04 RX ORDER — MULTIPLE VITAMINS W/ MINERALS TAB 9MG-400MCG
1 TAB ORAL DAILY
Qty: 30 TABLET | Refills: 0 | Status: SHIPPED | OUTPATIENT
Start: 2021-05-05 | End: 2021-08-16

## 2021-05-04 RX ORDER — QUETIAPINE FUMARATE 400 MG/1
400 TABLET, FILM COATED ORAL AT BEDTIME
Qty: 30 TABLET | Refills: 1 | Status: ON HOLD | OUTPATIENT
Start: 2021-05-04 | End: 2021-08-23

## 2021-05-04 RX ORDER — BUPROPION HYDROCHLORIDE 100 MG/1
100 TABLET ORAL 2 TIMES DAILY
Status: DISCONTINUED | OUTPATIENT
Start: 2021-05-04 | End: 2021-05-05 | Stop reason: HOSPADM

## 2021-05-04 RX ORDER — QUETIAPINE FUMARATE 100 MG/1
100 TABLET, FILM COATED ORAL
Status: DISCONTINUED | OUTPATIENT
Start: 2021-05-04 | End: 2021-05-05 | Stop reason: HOSPADM

## 2021-05-04 RX ORDER — DIPHENHYDRAMINE HCL 50 MG
50 CAPSULE ORAL EVERY 6 HOURS PRN
Status: DISCONTINUED | OUTPATIENT
Start: 2021-05-04 | End: 2021-05-05 | Stop reason: HOSPADM

## 2021-05-04 RX ORDER — ARIPIPRAZOLE 2 MG/1
2 TABLET ORAL DAILY
Qty: 30 TABLET | Refills: 0 | Status: ON HOLD | OUTPATIENT
Start: 2021-05-04 | End: 2021-08-23

## 2021-05-04 RX ORDER — DOCUSATE SODIUM 100 MG/1
100 CAPSULE, LIQUID FILLED ORAL 2 TIMES DAILY
Qty: 60 CAPSULE | Refills: 0 | Status: ON HOLD | OUTPATIENT
Start: 2021-05-04 | End: 2021-08-23

## 2021-05-04 RX ADMIN — DIPHENHYDRAMINE HYDROCHLORIDE 50 MG: 50 CAPSULE ORAL at 02:24

## 2021-05-04 RX ADMIN — DOCUSATE SODIUM 100 MG: 100 CAPSULE, LIQUID FILLED ORAL at 07:59

## 2021-05-04 RX ADMIN — MULTIPLE VITAMINS W/ MINERALS TAB 1 TABLET: TAB at 07:59

## 2021-05-04 RX ADMIN — GABAPENTIN 1200 MG: 600 TABLET, FILM COATED ORAL at 07:59

## 2021-05-04 RX ADMIN — THIAMINE HCL TAB 100 MG 100 MG: 100 TAB at 07:59

## 2021-05-04 RX ADMIN — NICOTINE POLACRILEX 8 MG: 4 GUM, CHEWING BUCCAL at 07:59

## 2021-05-04 RX ADMIN — ACETAMINOPHEN 500 MG: 500 TABLET, FILM COATED ORAL at 13:55

## 2021-05-04 RX ADMIN — QUETIAPINE FUMARATE 400 MG: 400 TABLET ORAL at 21:26

## 2021-05-04 RX ADMIN — CLONIDINE HYDROCHLORIDE 0.1 MG: 0.1 TABLET ORAL at 07:59

## 2021-05-04 RX ADMIN — IBUPROFEN 600 MG: 600 TABLET ORAL at 02:23

## 2021-05-04 RX ADMIN — BUPROPION HYDROCHLORIDE 100 MG: 100 TABLET, FILM COATED ORAL at 11:50

## 2021-05-04 RX ADMIN — IBUPROFEN 600 MG: 600 TABLET ORAL at 07:59

## 2021-05-04 RX ADMIN — IBUPROFEN 600 MG: 600 TABLET ORAL at 13:55

## 2021-05-04 RX ADMIN — METHADONE HYDROCHLORIDE 55 MG: 5 SOLUTION ORAL at 07:58

## 2021-05-04 RX ADMIN — ACETAMINOPHEN 500 MG: 500 TABLET, FILM COATED ORAL at 01:14

## 2021-05-04 RX ADMIN — FOLIC ACID 1 MG: 1 TABLET ORAL at 07:59

## 2021-05-04 RX ADMIN — DICLOFENAC SODIUM 4 G: 10 GEL TOPICAL at 13:56

## 2021-05-04 RX ADMIN — ARIPIPRAZOLE 2 MG: 2 TABLET ORAL at 07:59

## 2021-05-04 RX ADMIN — BUPROPION HYDROCHLORIDE 100 MG: 100 TABLET, FILM COATED ORAL at 16:39

## 2021-05-04 RX ADMIN — CLONIDINE HYDROCHLORIDE 0.1 MG: 0.1 TABLET ORAL at 21:25

## 2021-05-04 RX ADMIN — GABAPENTIN 1200 MG: 600 TABLET, FILM COATED ORAL at 21:26

## 2021-05-04 RX ADMIN — ACETAMINOPHEN 500 MG: 500 TABLET, FILM COATED ORAL at 07:59

## 2021-05-04 RX ADMIN — NICOTINE POLACRILEX 8 MG: 4 GUM, CHEWING BUCCAL at 09:31

## 2021-05-04 RX ADMIN — NICOTINE POLACRILEX 8 MG: 4 GUM, CHEWING BUCCAL at 11:39

## 2021-05-04 RX ADMIN — GABAPENTIN 1200 MG: 600 TABLET, FILM COATED ORAL at 13:55

## 2021-05-04 RX ADMIN — ACETAMINOPHEN 500 MG: 500 TABLET, FILM COATED ORAL at 21:25

## 2021-05-04 RX ADMIN — NICOTINE POLACRILEX 8 MG: 4 GUM, CHEWING BUCCAL at 16:39

## 2021-05-04 RX ADMIN — NICOTINE POLACRILEX 8 MG: 4 GUM, CHEWING BUCCAL at 21:26

## 2021-05-04 RX ADMIN — NICOTINE POLACRILEX 8 MG: 4 GUM, CHEWING BUCCAL at 13:55

## 2021-05-04 RX ADMIN — IBUPROFEN 600 MG: 600 TABLET ORAL at 22:30

## 2021-05-04 RX ADMIN — NICOTINE POLACRILEX 8 MG: 4 GUM, CHEWING BUCCAL at 17:49

## 2021-05-04 RX ADMIN — QUETIAPINE FUMARATE 100 MG: 100 TABLET ORAL at 02:24

## 2021-05-04 RX ADMIN — DIPHENHYDRAMINE HYDROCHLORIDE 50 MG: 50 CAPSULE ORAL at 19:47

## 2021-05-04 RX ADMIN — NICOTINE POLACRILEX 8 MG: 4 GUM, CHEWING BUCCAL at 19:29

## 2021-05-04 NOTE — TELEPHONE ENCOUNTER
Called Dmitry back and got prescriber information to submit a referral to the restricted recipient program.  Referral completed.    Maryann Galicia, ROSIOSW

## 2021-05-04 NOTE — PROGRESS NOTES
Pt scheduled to admit to [facility] on 5/5 by 12:00 noon. Cab being arranged for transportation at 10:30 am.  Pt and team notified, AVS updated. Left voicemail message with  Elisabeth providing update.    Update: Received call from pt's previous group home, [facility]. They will be dropping off pt's belongings to the unit sometime today. Report that pt has hunting knives in his belongings. Staff informed, knives will be sent down to security after belongings have been searched. Pt will need to follow-up with security after discharge from hospital to claim his knives.

## 2021-05-04 NOTE — TELEPHONE ENCOUNTER
Who is calling? Dmitry - [facility]  Reason for Call:Austenloisabhinav is currently in [facility].  Dmitry is requesting that the restriction on patient's medications be lifted so they can prescribe medications. Dmitry is asking that this be done as soon as possible, since the patient will be discharged tomorrow morning.

## 2021-05-04 NOTE — PROGRESS NOTES
Discharge pharmacy called indicating difficulty in removing restriction. This RN referred pharmacy to call [facility] per info in note from RN Dmitry Waller ([phone]). Pharmacy said that [place] did not know the patient.    RN gave phone numbers in note from Opal Neville ([phone]; [phone]) to discharge pharmacy at 17:50 (this RN was unable to respond sooner to pharmacy due to other issue on unit)    Pharmacist said he would work on issue for 10 minutes until pharmacy closed for the night. Pharmacist is aware Pt is leaving at 10:30a on 5/5.

## 2021-05-04 NOTE — PLAN OF CARE
"Pt has been pacing the ross and is now sitting in chair across from nurse station. Pt appears restless and mildly agitated.     Pt was overheard telling Psrhett LEAVITT that \"I am out of here tomorrow regardless\" and also asked Psy DAGMAR if he could get something \"for my nerves\"    Pt received lidocaine patches at [facility] for back pain. Pt refused voltaren gel.  "

## 2021-05-04 NOTE — PROGRESS NOTES
4/30/2021  Insurance Company Name restricted ma  Insurance Company Phone # not applicable  Rep Name not applicable  Call Ref # not applicable  Term date not applicable  Services not covered   No Special Transportation.  No Hospice.    Restricted Recipient Program  Please call the [facility] unit for additional information. The telephone number is [phone] or [phone].  Provider number [id number], CRISS TORRES MD is a provider for a Restricted Recipient for: Physician Services ,  Nurse Practitioner Services  Provider number [id number], [facility] is a provider for a Restricted Recipient for: Physician Services ,  Nurse Practitioner Services ,  Diagnostic Lab  Provider number [id number], [facility] is a provider for a Restricted Recipient for: Pharmacy  Provider number [id number], [facility] is a provider for a Restricted Recipient for: Physician Services ,  Inpatient Hospital ,  Outpatient Hospital ,  Diagnostic Lab  If you are providing services other than the restricted services listed above, you may bill DHS. If you have questions, please call [phone] or [phone].

## 2021-05-04 NOTE — PROGRESS NOTES
"Pt has been pacing the ross and is now sitting in chair across from nurse station. Pt appears restless and mildly agitated.    Pt was overheard telling Gabrielle LEAVITT that \"I am out of here tomorrow regardless\" and also asked Gabrielle LEAVITT if he could get something \"for my nerves\"  "

## 2021-05-04 NOTE — PLAN OF CARE
"This worker has attempted contact with patient's Restricted Recipient provider, Dr. DAKOTA Giron of the [facility], #[phone]. Clinic liaison accepted this worker's request to expedite completion and sending of required forms, so that [facility] may in turn fill all discharge medication orders.  Awaiting return call from clinic  and/or RN to verify/update progress on any lifting of said restriction.  Will continue to monitor as prudent.    Patient continue to report and maintain absence of all suicidal ideation.   Addendum: [facility] liaison returned call, noting that all required information to effectively \"lift\" restriction was completed.  This worker called [facility], requesting that all discharge medications be filled today and delivered to [facility], in preparation for tomorrow's early AM discharge.    "

## 2021-05-04 NOTE — PROGRESS NOTES
"[facility], [place]   Psychiatric Progress Note        Interim History:   The patient's care was discussed with the treatment team during the daily team meeting and/or staff's chart notes were reviewed.  Staff report patient is out of detox. May not be able to return to his group home.     Patient reports that he is stressed he slept horribly his energy is down he lost his appetite    He denies any active suicide ideation plan or intent he denies any auditory visual hallucinations    He mainly wants to restart his Wellbutrin for his mood      Patient doesn't want to go to treatment we have not been able to contact his          Medications:       ARIPiprazole  2 mg Oral Daily     cloNIDine  0.1 mg Oral BID     diclofenac  4 g Topical 4x Daily     docusate sodium  100 mg Oral BID     folic acid  1 mg Oral Daily     gabapentin  1,200 mg Oral TID     lidocaine  2 patch Transdermal Q24h    And     lidocaine   Transdermal Q8H     methadone  55 mg Oral Daily     multivitamin w/minerals  1 tablet Oral Daily     QUEtiapine  400 mg Oral At Bedtime     thiamine  100 mg Oral Daily          Allergies:     Allergies   Allergen Reactions     Hydroxyzine Hives     Previously unreported by patient, this is a new patient declaration as of 5/1/21.     Cucumber Extract      Cucumber the vegable     Nsaids      No problem with oral NSAIDs--Toradol injection itching only.          Labs:     No results found for this or any previous visit (from the past 24 hour(s)).       Psychiatric Examination:     BP (!) 141/92   Pulse 70   Temp 97.7  F (36.5  C) (Temporal)   Resp 16   Ht 1.93 m (6' 4\")   Wt 120.2 kg (265 lb)   SpO2 98%   BMI 32.26 kg/m    Weight is 265 lbs 0 oz  Body mass index is 32.26 kg/m .  Orthostatic Vitals     None            Appearance: awake, alert and adequately groomed  Attitude:  cooperative  Eye Contact:  fair  Mood:  \"crap\"  Affect:  intensity is blunted  Speech:  clear, " coherent  Psychomotor Behavior:  no evidence of tardive dyskinesia, dystonia, or tics  Thought Process:  linear  Associations:  no loose associations  Thought Content:  no evidence of suicidal ideation or homicidal ideation and no evidence of psychotic thought  Insight:  fair  Judgement:  fair  Oriented to:  time, person, and place  Attention Span and Concentration:  intact  Recent and Remote Memory:  fair            Precautions:     Behavioral Orders   Procedures     Assault precautions     Code 2     Fall precautions     Routine Programming     As clinically indicated     Seizure precautions     Status 15     Every 15 minutes.          Diagnoses:     Alcohol use disorder severe  Alcohol withdrawal severe  Opiate use disorder on methadone maintenance  Bipolar affective disorder most recent episode depressed chronic         Plan:     1) Stop MSSA.   2) Continue Abilify, Seroquel and gabapentin.   3) Wellbutrin will restart  4) Continue Methadone.   5) Patient wants detox only and then back to his group home. Group home is not allowing him to return. CTC exploring options for disposition.       Disposition Plan   Reason for ongoing admission: requires detoxification from substance that poses a risk of bodily harm during withdrawal period  Discharge location: home with self-care  Discharge Medications: not ordered  Follow-up Appointments: not scheduled  Legal Status: voluntary    Entered by: Jeremías Toro on 5/3//2021 at 10:31 AM

## 2021-05-04 NOTE — PROGRESS NOTES
Pt awake and agitated requesting meds for sleep. Contacted Psyc on-call and seroquel 100mg and Benadryl 50mg given per order along with tylenol then later Ibuprofen for HA pain with good results as t is observed to sleep through-out the noc.

## 2021-05-05 ENCOUNTER — TELEPHONE (OUTPATIENT)
Dept: FAMILY MEDICINE | Facility: CLINIC | Age: 51
End: 2021-05-05

## 2021-05-05 VITALS
SYSTOLIC BLOOD PRESSURE: 115 MMHG | OXYGEN SATURATION: 98 % | BODY MASS INDEX: 32.27 KG/M2 | RESPIRATION RATE: 16 BRPM | HEIGHT: 76 IN | TEMPERATURE: 97.3 F | DIASTOLIC BLOOD PRESSURE: 77 MMHG | HEART RATE: 78 BPM | WEIGHT: 265 LBS

## 2021-05-05 PROCEDURE — 250N000013 HC RX MED GY IP 250 OP 250 PS 637: Performed by: CLINICAL NURSE SPECIALIST

## 2021-05-05 PROCEDURE — 250N000013 HC RX MED GY IP 250 OP 250 PS 637: Performed by: NURSE PRACTITIONER

## 2021-05-05 PROCEDURE — 250N000013 HC RX MED GY IP 250 OP 250 PS 637: Performed by: PSYCHIATRY & NEUROLOGY

## 2021-05-05 PROCEDURE — 250N000013 HC RX MED GY IP 250 OP 250 PS 637: Performed by: PHYSICIAN ASSISTANT

## 2021-05-05 PROCEDURE — 99239 HOSP IP/OBS DSCHRG MGMT >30: CPT | Performed by: PSYCHIATRY & NEUROLOGY

## 2021-05-05 RX ADMIN — IBUPROFEN 600 MG: 600 TABLET ORAL at 06:17

## 2021-05-05 RX ADMIN — DIPHENHYDRAMINE HYDROCHLORIDE 50 MG: 50 CAPSULE ORAL at 01:51

## 2021-05-05 RX ADMIN — ACETAMINOPHEN 500 MG: 500 TABLET, FILM COATED ORAL at 08:16

## 2021-05-05 RX ADMIN — THIAMINE HCL TAB 100 MG 100 MG: 100 TAB at 08:16

## 2021-05-05 RX ADMIN — FOLIC ACID 1 MG: 1 TABLET ORAL at 08:16

## 2021-05-05 RX ADMIN — GABAPENTIN 1200 MG: 600 TABLET, FILM COATED ORAL at 08:15

## 2021-05-05 RX ADMIN — MULTIPLE VITAMINS W/ MINERALS TAB 1 TABLET: TAB at 08:16

## 2021-05-05 RX ADMIN — BUPROPION HYDROCHLORIDE 100 MG: 100 TABLET, FILM COATED ORAL at 08:16

## 2021-05-05 RX ADMIN — QUETIAPINE FUMARATE 100 MG: 100 TABLET ORAL at 01:51

## 2021-05-05 RX ADMIN — CLONIDINE HYDROCHLORIDE 0.1 MG: 0.1 TABLET ORAL at 08:16

## 2021-05-05 RX ADMIN — ARIPIPRAZOLE 2 MG: 2 TABLET ORAL at 08:16

## 2021-05-05 RX ADMIN — NICOTINE POLACRILEX 8 MG: 4 GUM, CHEWING BUCCAL at 08:16

## 2021-05-05 RX ADMIN — DOCUSATE SODIUM 100 MG: 100 CAPSULE, LIQUID FILLED ORAL at 08:16

## 2021-05-05 RX ADMIN — METHADONE HYDROCHLORIDE 55 MG: 5 SOLUTION ORAL at 08:17

## 2021-05-05 ASSESSMENT — ACTIVITIES OF DAILY LIVING (ADL)
HYGIENE/GROOMING: INDEPENDENT
LAUNDRY: WITH SUPERVISION
ORAL_HYGIENE: INDEPENDENT
DRESS: INDEPENDENT

## 2021-05-05 NOTE — PLAN OF CARE
Pt to be discharged to [facility] beds at the [facility]. AVS and labs reviewed with patient, all questions answered and copies sent with patient. Pt discharged with all medications he brought in, all medications ordered by provider too soon to fill. Pt discharged with all belongings dropped off by group home. Discharged by cab.

## 2021-05-05 NOTE — PROGRESS NOTES
Pt requests and receives seroquel 100mg and Benadryl 50mg at 0200 with good results sleeping until 0600 then requests and receives ibuprofen 600mg for chronic back pain. Pt denies any suicidal or homicidal ideation.

## 2021-05-05 NOTE — DISCHARGE INSTRUCTIONS
Behavioral Discharge Planning and Instructions  THANK YOU FOR CHOOSING THE [facility]  [facility]  [phone]    Summary: You were admitted to [facility] on 4/29/21 for detoxification from alcohol.  A medical exam was performed that included lab work. You have met with a  and opted to be referred to [facility].  Please take care and make your recovery a priority, Hollis!    Recommendation:  Lydia Colvin     Main Diagnosis: Per Dr. Linus MD; psychiatrist  Alcohol use disorder severe  Alcohol withdrawal severe  Opiate use disorder on methadone maintenance  Bipolar affective disorder most recent episode depressed chronic    Major Treatments, Procedures and Findings:  You were detoxed from alcohol with the Modified Selective Severity Protocol using Valium. You have met with a  to develop a treatment plan for discharge.  You have had labs drawn and a copy of those labs will be sent home with you.  Please bring your lab results with to your follow up doctor appointment.    You were maintained on your methadone dosing. You plan to continue with [facility] addiction clinic For methadone maintenance.     Symptoms to Report:  If you experience more anxiety, confusion, sleeplessness, deep sadness or thoughts of suicide, notify your treatment team or notify your primary care physician. IF ANY OF THE SYMPTOMS YOU ARE EXPERIENCING ARE A MEDICAL EMERGENCY CALL 911 IMMEDIATELY.     Lifestyle Adjustment: Health Action Plan:  1.Create a daily schedule  2. Eat Healthy  3. Plan Enjoyable Sober Activities  4. Use Problem Solving Skills and Deal with Issues as they Arise.   5. Be Physically Active  6. Take your medications as prescribed  7. Get enough restful sleep  8. Practice Relaxation  9. Spend time with Supportive People  10. No use of alcohol, illegal drugs or addictive medications other than what is currently prescribed.   11.AA, NA Sponsor are excellent resources for support  12. Explore  "how  you can utilize spirituality in your recovery    Primary Provider:  [facility]  Medical clinic in [place], [place]  COVID-19 info: [email or web address]  Address: [address], [place] [address]  Phone: [phone]    [facility]  Addiction treatment center in [place], [place]  Located in: [facility]  Address: [address] [place] [address] [place] [address]  Phone: [phone]    Disposition: [facility]  May 5th at 10:30 am  [address] [place] [address]  [phone]    Facts about COVID19 at www.cdc.gov/COVID19 and www.[place].gov/covid19    Keeping hands clean is one of the most important steps we can take to avoid getting sick and spreading germs to others.  Please wash your hands frequently and lather with soap for at least 20 seconds!      You are aware you should make a follow up appointment with your primary care doctor for medical and medication management    Resources:     Resources for on line recovery meetings:    *due to covid-19 AA/NA meetings are being held online*    AA meetings can be found online; search for them at: http://aa-intergroup.org/directory.php  AA meetings via Z[place]OM for [place] area can be found online at: https://a[place].org/find-a-meeting/holiday-closings/  NA meetings via Z[place]OM for [place] area can be found online at: https://sites.hoccer.com/view/[place]regionofnarcoticsanonymous/home?authuser=2  Www.Bellmetric.org  has online resources for meeting and recovery care including Podcast \"Let's Talk:Addiction & Recovery Podcasts  Www.mnrecovery.org     DISCHARGE RESOURCES:    Recovery apps for your phone to locate current in person and zoom recovery meetings  [place] - meeting carmen  AA  - meeting carmen  Meeting guide - meeting carmen  Quick NA meeting - meeting carmen  Manisha- has various apps    My Mental Health Crisis Plan allows individuals with serious mental illness " to:  Clearly state treatment preferences, including treatments to use and those not to use; medications to use and those not to use; preferences for hospitals; and preferences for doctors and other mental health professionals.  Decide who can act on their behalf, by designating a trusted person (sometimes referred to as  healthcare agent,   proxy,  or  health care power of  ) as a decision-maker on their behalf. Some states require appointment of a decision-maker to carry out the PAD instructions.  Identify whom to notify in the event of a mental health crisis.  Share the plan with others, including doctors, other members of the care team, and family and friends.    -SMART Recovery - self management for addiction recovery:  www.smartrecovery.org    -Pathways ~ A Health Crisis Resource & Support Center: 609.719.6968.  -[facility] [phone]   -[facility] Behavioral Intake 691-569-1118 or 847-965-1160.  -Suicide Awareness Voices of Education (SAVE) (www.save.org): 499-670-UNMZ (1952)  -National Suicide Prevention Line (www.mentalhealthmn.org): 342-929-YZFG (4313)  -National Trinity on Mental Illness (www.mn.ari.org): 611.886.5090 or 695-451-6431.  -Ymkl5ljbi: text the word LIFE to 32404 for immediate support and crisis intervention  -Mental Health Consumer/Survivor Network of [place] (www.mhcsn.net): 833.978.1778 or 184-361-4355  -Mental Health Association of [place] (www.mentalhealth.org): 826.510.3060 or 146-744-1329     -Substance Abuse and Mental Health Services (www.samhsa.gov)  -Harm Reduction Coalition (www. Harmreduction.org)  -www.prescribetoprevent.org or http://prescribetoprevent.org/video  -Poison control 5-169-136-7870   **[place] Opioid Prevention Coalition: www.opioidcoalition.org    Sober Support Group Information:  AA/NA & Sponsor/Support  -Alcoholics Anonymous (www.alcoholics-anonymous.org): for local information 24 hours/day  -AA Intergroup service office in  [place] (http://www.aastpaul.org/) 659.919.4301  -AA Intergroup service office in [facility]: 765.950.1536. (http://www.a[place].org/)  -Narcotics Anonymous (www.naminn[place].org) (365) 805-7159   **Sober Fun Activities: www.soberNovian Healthactivities.[employer]/[facility]//[facility] Recovery Connection ([facility])  [facility] connects people seeking recovery to resources that help foster and sustain long-term recovery.  Whether you are seeking resources for treatment, transportation, housing, job training, education, health care or other pathways to recovery, [facility] is a great place to start.    Phone: 545.315.2818.  www.minnesotaRyan (Great listing of all types of recovery and non-recovery related resources)      General Medication Instructions:   See your medication sheet(s) for instructions.   Take all medicines as directed.  Make no changes unless your doctor suggests them.   Go to all your doctor visits.  Be sure to have all your required lab tests. This way, your medicines can be refilled on time.  Do not use any drugs not prescribed by your provider.  AA/NA and Sponsors are excellent resources for support  Avoid alcohol.    Any follow up concerns:  Nursing questions call the Unit -[facility] [phone]  Medical Record call [phone]  Outpatient Behavioral Intake call [phone]  LP+ Wait List/Bed Availability call [phone]    The entire treatment team has appreciated the opportunity to work with you Hollis.  We wish you the best in the future and with your lifelong recovery goals. Please bring this discharge folder with you to all follow up appointments.  It contains your lab results, diagnosis, medication list and discharge recommendations.    THANK YOU FOR CHOOSING THE [facility]

## 2021-05-05 NOTE — PLAN OF CARE
Problem: Suicidal Behavior  Goal: Suicidal Behavior is Absent or Managed  Outcome: Improving       Pt denied SI. Pt plan is to discharge on Wed 5/5. Pt endorsed back pain, received Tylenol 500 mg.

## 2021-05-05 NOTE — TELEPHONE ENCOUNTER
Jerry from [place] Restricted Recipient program called and said that they need 2 referrals for Ray's inpatient stay. One needs to be for the facility and another for the provider dr. Jermeías Toro. He also stated the most recent referral sent was not accurate. Let me know if I can help at all.    Eunice

## 2021-05-05 NOTE — DISCHARGE SUMMARY
Hollis Barclay MRN# [id number]   Age: 51 year old YOB: 1970     Date of Admission:  4/29/2021  Date of Discharge:  5/5/2021  Admitting Physician:  Jeremías Toro MD  Discharge Physician:  Jeremías Toro MD      DISCHARGE  DX    Alcohol use disorder severe    Opiate use disorder on methadone maintenance  Bipolar affective disorder most recent episode depressed chronic         Event Leading to Hospitalization:     See Admission note by admitting provider for patient encounter. for additional details.          Hospital Course:   PATIENT was admitted to [facility] attending  under DR toro, please review the detailed admit note on 4/29/2021   The patient was placed under status 15 (15 minute checks) to ensure patient safety.   MSSA protocol was initiated due to the patient's history of alcohol abuse and concern for withdrawal symptoms.  CBC, BMP and utox obtained.    All outpatient medications were continued he was continued on methadone maintenance      PATIENTdid participate in groups and was visible in the milieu.     The patient's symptoms of alcohol withdrawal improved.     Patients energy motivation , sleep appetite improved.  Pt completed detox . It was un eventful.      Discussed with patient medications for craving.  Spoke with patient about triggers coping skills relapse prevention.    CONSULTS DONE DURING PATIENTS HOSPITALIZATION.  Patient was seen by medicine on date 4/30/2021    This as per their medical consult        Assessment and Recommendations:   Hollis Barclay is a 50 year old male with a history of alcohol use disorder, chronic pain on methadone, bipolar disorder, schizophrenia, depression, and anxiety admitted to station 3[facility] for alcohol withdrawal and detox.       # Alcohol withdrawal, hx of alcohol use disorder   # Polysubstance abuse  MSSA 10 this shift.  Unable to quantify amount of alcohol consumed lately.  Reports hx of withdrawal seizures.  Utox on  admission positive for amphetamines and cocaine.   - Seizure precautions   - Continue MSSA   - Folvite, multi-vites, thiamine supplementation   - Further management per Psychiatry      # Acute HTN - Likely 2/2 alcohol withdrawal.  BPs elevated to 170/117 this AM.  No known hx underlying HTN.    - Start Clonidine 0.1mg BID PRN for SBP > 160,       # RLE swelling and tenderness - Reports increased pain, swelling, and overall muscle tightness in RLE for at least the past month.  Denies falls or trauma to the leg.   - Check US RLE to evaluate for DVT     # Possible RLE abscess  # Bilateral wounds to feet  Reports area of swelling on anterior aspect of thigh directly above knee ongoing for last 1-2 months.  TTP on exam and occasionally w/ bleeding and pus.  On exam, has cuts on soles of feet and below R great toe in various stages of healing, but reporting worsening pain.  No drainage or erythema.   - US soft tissue to evaluate for possible abscess above R knee along thigh   - Keep affected areas clean and dry   - If drainage noted from fluctuant area above knee, please send sterile cx     - WOCN placed for foot wounds      # Elevated LFTs - ,  on admission.  2:1 hepatocellular injury pattern c/w alcohol use.  Repeat LFTs today are downtrending, ALT 97, .       # Hypokalemia - K 2.9 on admission, replaced x 1 with improvement to 3.8. Mag wnl.     # Chronic pain on methadone   - Continue PTA Methadone 55mg daily   - Would check EKG to ensure stable QTc if considering dose increase     # Schizophrenia, bipolar disorder, depression, anxiety - On Abilify 2mg daily, Wellbutrin 200mg BID, and Seroquel 400mg at HS PTA.    - Hold Wellbutrin for now given risk for seizure w/ alcohol withdrawal   - Further management per Psychiatry        He was seen by the wound consult  Bilateral foot wounds due to Trauma  Status: initial assessment     Treatment Plan  Bilateral feet wounds: Daily    Apply sween cream  (pink-topped tube) to bottoms of feet, ideally after a shower.   Please be careful in shower as cream can make feet slippery when wet, even after 24 hrs.  OK to wear socks, no topical dressing needed on sores.      Orders Written  Recommended provider order: None, at this time  WO Nurse follow-up plan:weekly  Nursing to notify the Provider(s) and re-consult the WOC Nurse if wound(s) deteriorates or new skin concern.         Pt was seen by cm  As per recommendations from cm  Pt scheduled to admit to [facility] on 5/5 by 12:00 noon. Cab being arranged for transportation at 10:30 am.  Pt and team notified, AVS updated. Left voicemail message with  Elisabeth providing update.           Labs:reviewed with patient     No results found for this or any previous visit (from the past 48 hour(s)).      Recent Results (from the past 240 hour(s))   Alcohol breath test POCT    Collection Time: 04/29/21  7:03 AM   Result Value Ref Range    Alcohol Breath Test 0.044 (A) 0.00 - 0.01   Asymptomatic SARS-CoV-2 COVID-19 Virus (Coronavirus) by PCR    Collection Time: 04/29/21  7:04 AM    Specimen: Nasopharyngeal   Result Value Ref Range    SARS-CoV-2 Virus Specimen Source Nasopharyngeal     SARS-CoV-2 PCR Result NEGATIVE     SARS-CoV-2 PCR Comment (Note)    CBC with platelets differential    Collection Time: 04/29/21  7:18 AM   Result Value Ref Range    WBC 4.8 4.0 - 11.0 10e9/L    RBC Count 4.05 (L) 4.4 - 5.9 10e12/L    Hemoglobin 13.0 (L) 13.3 - 17.7 g/dL    Hematocrit 38.7 (L) 40.0 - 53.0 %    MCV 96 78 - 100 fl    MCH 32.1 26.5 - 33.0 pg    MCHC 33.6 31.5 - 36.5 g/dL    RDW 13.2 10.0 - 15.0 %    Platelet Count 180 150 - 450 10e9/L    Diff Method Automated Method     % Neutrophils 40.7 %    % Lymphocytes 46.8 %    % Monocytes 10.5 %    % Eosinophils 0.8 %    % Basophils 0.8 %    % Immature Granulocytes 0.4 %    Nucleated RBCs 0 0 /100    Absolute Neutrophil 1.9 1.6 - 8.3 10e9/L    Absolute Lymphocytes 2.2 0.8 - 5.3  10e9/L    Absolute Monocytes 0.5 0.0 - 1.3 10e9/L    Absolute Eosinophils 0.0 0.0 - 0.7 10e9/L    Absolute Basophils 0.0 0.0 - 0.2 10e9/L    Abs Immature Granulocytes 0.0 0 - 0.4 10e9/L    Absolute Nucleated RBC 0.0     RBC Morphology Consistent with reported results     Platelet Estimate       Automated count confirmed.  Platelet morphology is normal.   Comprehensive metabolic panel    Collection Time: 04/29/21  7:18 AM   Result Value Ref Range    Sodium 135 133 - 144 mmol/L    Potassium 2.9 (L) 3.4 - 5.3 mmol/L    Chloride 98 94 - 109 mmol/L    Carbon Dioxide 30 20 - 32 mmol/L    Anion Gap 7 3 - 14 mmol/L    Glucose 78 70 - 99 mg/dL    Urea Nitrogen 20 7 - 30 mg/dL    Creatinine 0.83 0.66 - 1.25 mg/dL    GFR Estimate >90 >60 mL/min/[1.73_m2]    GFR Estimate If Black >90 >60 mL/min/[1.73_m2]    Calcium 8.4 (L) 8.5 - 10.1 mg/dL    Bilirubin Total 0.8 0.2 - 1.3 mg/dL    Albumin 3.2 (L) 3.4 - 5.0 g/dL    Protein Total 7.0 6.8 - 8.8 g/dL    Alkaline Phosphatase 82 40 - 150 U/L     (H) 0 - 70 U/L     (H) 0 - 45 U/L   Drug abuse screen 77 urine (FL, RH, SH)    Collection Time: 04/29/21 10:00 AM   Result Value Ref Range    Amphetamine Qual Urine Positive (A) NEG^Negative    Barbiturates Qual Urine Negative NEG^Negative    Benzodiazepine Qual Urine Negative NEG^Negative    Cannabinoids Qual Urine Negative NEG^Negative    Cocaine Qual Urine Positive (A) NEG^Negative    Opiates Qualitative Urine Negative NEG^Negative    PCP Qual Urine Negative NEG^Negative   UA with Microscopic    Collection Time: 04/29/21 10:00 AM   Result Value Ref Range    Color Urine Yellow     Appearance Urine Clear     Glucose Urine Negative NEG^Negative mg/dL    Bilirubin Urine Negative NEG^Negative    Ketones Urine Negative NEG^Negative mg/dL    Specific Gravity Urine 1.028 1.003 - 1.035    Blood Urine Negative NEG^Negative    pH Urine 6.0 5.0 - 7.0 pH    Protein Albumin Urine 50 (A) NEG^Negative mg/dL    Urobilinogen mg/dL 0.0 0.0 -  2.0 mg/dL    Nitrite Urine Negative NEG^Negative    Leukocyte Esterase Urine Negative NEG^Negative    Source Midstream Urine     WBC Urine 1 0 - 5 /HPF    RBC Urine 1 0 - 2 /HPF    Squamous Epithelial /HPF Urine 0 0 - 1 /HPF    Mucous Urine Present (A) NEG^Negative /LPF    sperm Present (A) NEG^Negative /HPF   Comprehensive metabolic panel    Collection Time: 04/30/21 11:23 AM   Result Value Ref Range    Sodium 139 133 - 144 mmol/L    Potassium 3.8 3.4 - 5.3 mmol/L    Chloride 104 94 - 109 mmol/L    Carbon Dioxide 30 20 - 32 mmol/L    Anion Gap 5 3 - 14 mmol/L    Glucose 118 (H) 70 - 99 mg/dL    Urea Nitrogen 21 7 - 30 mg/dL    Creatinine 0.80 0.66 - 1.25 mg/dL    GFR Estimate >90 >60 mL/min/[1.73_m2]    GFR Estimate If Black >90 >60 mL/min/[1.73_m2]    Calcium 8.9 8.5 - 10.1 mg/dL    Bilirubin Total 0.5 0.2 - 1.3 mg/dL    Albumin 3.0 (L) 3.4 - 5.0 g/dL    Protein Total 6.3 (L) 6.8 - 8.8 g/dL    Alkaline Phosphatase 77 40 - 150 U/L    ALT 97 (H) 0 - 70 U/L     (H) 0 - 45 U/L   Magnesium    Collection Time: 04/30/21 11:23 AM   Result Value Ref Range    Magnesium 2.1 1.6 - 2.3 mg/dL            Because this patient meets criteria for an Alcohol Use Disorder, I performed the following brief intervention on the date of this note:              1) Expressed concern that the patient is drinking at unhealthy levels known to increase their risk of alcohol related problems              2) Gave feedback linking alcohol use and health, including personalized feedback explaining how alcohol use can interact with their medical and/or psychiatric problems, and with prescribed medications.              3) Advised patient to abstain.        Discussed with patient many issues of addiction,triggers, relapse, and establishing a solid recovery program.    DISCHARGE MENTAL STATUS EXAMINATION:  The patient is alert, oriented x3.  Good fund of knowledge.  Good use of language.  Recent and remote memory, language, fund of knowledge  are all adequate.  Euthymic mood congruent affect  Speech normal rate/rhythm linear tp no loose asso,The patient does not have any active suicidal or homicidal ideation.  Does not have any auditory or visual hallucination.  Fair insight/judgment At this time, the patient was stable to be discharged.        Pt was not determined to not be a danger to himself or others. At the current time of discharge, the patient does not meet criteria for involuntary hospitalization. On the day of discharge, the patient reports that they do not have suicidal or homicidal ideation and would never hurt themselves or others. Steps taken to minimize risk include: assessing patient s behavior and thought process daily during hospital stay, discharging patient with adequate plan for follow up for mental and physical health and discussing safety plan of returning to the hospital should the patient ever have thoughts of harming themselves or others. Therefore, based on all available evidence including the factors cited above, the patient does not appear to be at imminent risk for self-harm, and is appropriate for outpatient level of care.     Educated about side effects/risk vs benefits /alternative including non treatment.Pt consented to be on medication.     .Total time spent on discharge summary more than 35 min  More than  20 min  planning, coordination of care, medication reconciliation and performance of physical exam on day of discharge.Care was coordinated with unit RN and unit therapist       Jose M Barclay   Home Medication Instructions GIDEON:[id number]    Printed on:05/05/21 7485   Medication Information                      ARIPiprazole (ABILIFY) 2 MG tablet  Take 1 tablet (2 mg) by mouth daily             buPROPion (WELLBUTRIN) 100 MG tablet  Take 1 tablet (100 mg) by mouth 2 times daily             docusate sodium (COLACE) 100 MG capsule  Take 1 capsule (100 mg) by mouth 2 times daily             folic acid (FOLVITE) 1 MG  "tablet  Take 1 tablet (1,000 mcg) by mouth daily             gabapentin (NEURONTIN) 600 MG tablet  Take 2 tablets (1,200 mg) by mouth 3 times daily             methadone (DOLPHINE) 5 MG/5ML solution  Take 55 mg by mouth daily From [facility] program              multivitamin w/minerals (THERA-VIT-M) tablet  Take 1 tablet by mouth daily             nicotine polacrilex (NICORETTE) 4 MG gum  Take 1 gun q2 hr             QUEtiapine (SEROQUEL) 400 MG tablet  Take 1 tablet (400 mg) by mouth At Bedtime             thiamine (B-1) 100 MG tablet  Take 1 tablet (100 mg) by mouth daily                  Disposition: [facility]      Facts about COVID19 at www.cdc.gov/COVID19 and www.[place].gov/covid19     Keeping hands clean is one of the most important steps we can take to avoid getting sick and spreading germs to others.  Please wash your hands frequently and lather with soap for at least 20 seconds!     Medical Follow-Up:Primary Provider:  [facility]  Medical clinic in [place], [place]  COVID-19 info: [employer]  Address: [address] [place] [id number]  Phone: [phone]     [facility]  Addiction treatment center in [place], [place]  Located in: [facility]  Address: [address] [place] [address]  Phone: [phone]     Disposition: [facility]  May 5th at 10:30 am  [address] [place] [address]  [phone]       Treatment Follow-Up:mission lodge  .        \"Much or all of the text in this note was generated through the use of Dragon Dictate voice to text software. Errors in spelling or words which appear to be out of contact are unintentional, may be present due having escaped editing\"     "

## 2021-05-20 DIAGNOSIS — F17.200 TOBACCO USE DISORDER: ICD-10-CM

## 2021-05-21 NOTE — TELEPHONE ENCOUNTER
Last Office Visit: 1/25/21  [facility] [place]: None  Medication last refilled: 4/13/21 for 660    Prescription approved per [facility] Refill Protocol.      Sheron Mittal RN  May 21, 2021 10:13 AM

## 2021-06-01 DIAGNOSIS — F10.21 ALCOHOLISM IN REMISSION (H): ICD-10-CM

## 2021-06-02 NOTE — TELEPHONE ENCOUNTER
[facility] Call Center    Phone Message    May a detailed message be left on voicemail: yes     Reason for Call: Other: Pharmacy calling to request refill be sent over as soon as able, as patient has called them a couple of times to follow up on it already. Please send, or reach out to discuss     Action Taken: Message routed to:  [facility] ortho    Travel Screening: Not Applicable    Unsure who this would be routed to as patient is seen in FP, but pharmacist disconnected before able to transfer.

## 2021-06-03 RX ORDER — FOLIC ACID 1 MG/1
TABLET ORAL
Qty: 90 TABLET | Refills: 0 | Status: ON HOLD | OUTPATIENT
Start: 2021-06-03 | End: 2021-08-23

## 2021-06-15 DIAGNOSIS — G89.29 OTHER CHRONIC PAIN: ICD-10-CM

## 2021-06-15 RX ORDER — IBUPROFEN 800 MG/1
TABLET, FILM COATED ORAL
Qty: 90 TABLET | Refills: 1 | OUTPATIENT
Start: 2021-06-15

## 2021-06-15 NOTE — TELEPHONE ENCOUNTER
Last visit 1/25/21, no future visit.  Refusing refill for ibuprofen.  Angeli Garcia RN  [facility]

## 2021-06-18 ENCOUNTER — TELEPHONE (OUTPATIENT)
Dept: FAMILY MEDICINE | Facility: CLINIC | Age: 51
End: 2021-06-18

## 2021-06-18 NOTE — TELEPHONE ENCOUNTER
Who is calling? Jair   Reason for Call: Patient will be discharged today and sent to [facility] and will need medication refills. Jair will fax the specific medication requests once the MD's note is finished.

## 2021-06-18 NOTE — TELEPHONE ENCOUNTER
Care Coordinator from [facility] calls back. I informed her that according to our chart, patient is restricted to [facility] for ED and hospital care. He is at [place], and provider there needs to fill medications until patient is seen in clinic by PCP. Advised that CC contact pt's insurance and discuss with them. Patient will need clinic visit with PCP for further refills of any medications. All questions answered.   Trena Harrison RN  06/18/21  12:02 PM

## 2021-06-28 ENCOUNTER — TRANSFERRED RECORDS (OUTPATIENT)
Dept: HEALTH INFORMATION MANAGEMENT | Facility: CLINIC | Age: 51
End: 2021-06-28

## 2021-07-05 ENCOUNTER — TELEPHONE (OUTPATIENT)
Dept: FAMILY MEDICINE | Facility: CLINIC | Age: 51
End: 2021-07-05

## 2021-07-05 NOTE — TELEPHONE ENCOUNTER
"Call placed to patient to check in after his recent discharge from [facility]. He was there following hospitalization, and that was a temporary stay. The phone number listed is not able to go through to patient. It was tried several times, and indicates \"the patient is not able to take calls at this time\".     Trena Harrison MS RN-BC  07/05/21  2:11 PM      "

## 2021-07-06 NOTE — TELEPHONE ENCOUNTER
Tried patient's phone again, still not accepting calls. Called pt's emergency contact, that phone number is not accepting calls. Through chart review, saw different numbers listed for orthopedic visit, tried those numbers, they are for [facility].  Found patient's 's number, called and LM for CM requesting updated contact information. Awaiting return call.    ROSIO HarrisSW

## 2021-07-06 NOTE — TELEPHONE ENCOUNTER
Maryann - can you please try and reach this patient, or find a way to reach him? The phone number listed is not able to accept calls. He was recently hospitalized, discharged to crisis rehab, then discharged. Would like for him to follow up in the clinic with Dr. Giron.    Trena Harrison, MS RN-BC  07/06/21  12:16 PM

## 2021-07-07 DIAGNOSIS — F17.200 TOBACCO USE DISORDER: ICD-10-CM

## 2021-07-07 NOTE — TELEPHONE ENCOUNTER
Last Office Visit: 1/25/21  Future [facility] Appointments: None  Medication last refilled: 5/21/21 #660 with 0refills    Prescription approved per [facility] Refill Protocol.    Nora Felipe RN, BSN

## 2021-07-12 ENCOUNTER — TELEPHONE (OUTPATIENT)
Dept: FAMILY MEDICINE | Facility: CLINIC | Age: 51
End: 2021-07-12

## 2021-07-12 NOTE — TELEPHONE ENCOUNTER
Spoke with RN at [facility] where patient has been treated since 7/9/21 and discharging today. Pt is telling the RN that he gets oxycodone from Dr. Giron and is requesting oxycodone order at time of discharge for pain management. Informed RN that patient is not prescribed oxycodone and that is not recommended due to patient's opioid dependency problem, treated with methadone. RN and discharging MD agree that patient should not be prescribed oxycodone. Patient does have HFU appointment on 7/17/21.     Based on discharge instructions, needs follow up:     Dr. Brizuela - [facility] for spine problem    [facility] or [facility] (M[facility]one Clinic)  [address] [facility] [address]   Phone: [phone]     Jair Ochoa, DO   Specialty: Otolaryngology   [address] [place] [address] [address]  [facility] [address]   Phone: [phone]       From discharge plan: Plan was made to continue only methadone and gabapentin and Tylenol for pain control, not to prescribe any other narcotics including oxycodone    -- severe degenerative L3-S1, moderately severe bilateral foraminal stenosis L3-S1, Grade 2 spondylolisthesis L5/S1.   - seen by Dr. Brizuela/Spine  - recommend possible outpatient surgery  - order placed      Trena Harrison MS RN-BC  07/12/21  4:06 PM

## 2021-07-19 ENCOUNTER — PATIENT OUTREACH (OUTPATIENT)
Dept: FAMILY MEDICINE | Facility: CLINIC | Age: 51
End: 2021-07-19

## 2021-08-02 ENCOUNTER — TELEPHONE (OUTPATIENT)
Dept: FAMILY MEDICINE | Facility: CLINIC | Age: 51
End: 2021-08-02

## 2021-08-02 DIAGNOSIS — F10.20 UNCOMPLICATED ALCOHOL DEPENDENCE (H): ICD-10-CM

## 2021-08-02 RX ORDER — GABAPENTIN 600 MG/1
1200 TABLET ORAL 3 TIMES DAILY
Qty: 180 TABLET | Refills: 0 | Status: CANCELLED | OUTPATIENT
Start: 2021-08-02

## 2021-08-02 NOTE — TELEPHONE ENCOUNTER
Last Office Visit: 1/25/21  [facility] [place] 8/11/21  Medication last refilled: 5/4/21 #180 with 0 refill(s)     verified - last fill date: 7/5/21 # 30    Routing refill request to provider for review/approval because:  Drug not on the [facility] refill protocol     Patient is in a residential treatment center after his most recent hospitalization.      Nora Felipe RN, BSN

## 2021-08-02 NOTE — TELEPHONE ENCOUNTER
Last Office Visit: 1/25/21  Future [facility] Appointments: None  Medication last refilled: 5/4/21 #180 with 0 refill(s)     Verified:  6/18/21  #90     Message sent to schedulers to call patient and schedule appointment. DENIED UNTIL SEEN.    Routing refill request to provider for review/approval because:  Drug not on the [facility] refill protocol     Nora Felipe RN, BSN

## 2021-08-02 NOTE — TELEPHONE ENCOUNTER
"Called [facility] and patient is no longer at that location, as they are a short stay. Called [facility] - they provided the phone number as [phone] and report that patient is in a \"group home\" outpatient setting that offers 24-hour staffing. Called and spoke to the patient, he just needs gabapentin refilled for now, and agrees to schedule a follow up appointment with Dr. Giron.   Trena Harrison MS RN-BC  08/02/21  2:02 PM      "

## 2021-08-04 RX ORDER — GABAPENTIN 600 MG/1
TABLET ORAL
Qty: 180 TABLET | Refills: 0 | Status: ON HOLD | OUTPATIENT
Start: 2021-08-04 | End: 2021-08-23

## 2021-08-04 NOTE — TELEPHONE ENCOUNTER
Routing to Dr. Velasco because Dr. Giron out of clinic this week. Patient is scheduled with Dr. Giron next week. He is currently in [facility] group home.    Trena Harrison MS RN-BC  08/04/21  10:10 AM

## 2021-08-04 NOTE — TELEPHONE ENCOUNTER
Agreed to one time refill while Dr. Giron is out of the office.    Hollis was seen today for refill request.    Diagnoses and all orders for this visit:    Uncomplicated alcohol dependence (H)  -     gabapentin (NEURONTIN) 600 MG tablet; TAKE 2 TABLETS BY MOUTH THREE TIMES A DAY      Jose A Velasco MD  12:23 PM, August 4, 2021

## 2021-08-07 ENCOUNTER — HOSPITAL ENCOUNTER (EMERGENCY)
Facility: CLINIC | Age: 51
Discharge: HOME | End: 2021-08-07
Attending: NURSE PRACTITIONER | Admitting: NURSE PRACTITIONER
Payer: MEDICAID

## 2021-08-07 ENCOUNTER — APPOINTMENT (OUTPATIENT)
Dept: GENERAL RADIOLOGY | Facility: CLINIC | Age: 51
End: 2021-08-07
Attending: NURSE PRACTITIONER
Payer: MEDICAID

## 2021-08-07 VITALS
TEMPERATURE: 98.6 F | DIASTOLIC BLOOD PRESSURE: 112 MMHG | RESPIRATION RATE: 18 BRPM | SYSTOLIC BLOOD PRESSURE: 161 MMHG | HEART RATE: 91 BPM | OXYGEN SATURATION: 100 %
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
## 2021-08-07 DIAGNOSIS — G89.29 CHRONIC BILATERAL LOW BACK PAIN WITH RIGHT-SIDED SCIATICA: ICD-10-CM

## 2021-08-07 DIAGNOSIS — M54.41 CHRONIC BILATERAL LOW BACK PAIN WITH RIGHT-SIDED SCIATICA: ICD-10-CM

## 2021-08-07 PROCEDURE — 72072 X-RAY EXAM THORAC SPINE 3VWS: CPT

## 2021-08-07 PROCEDURE — 72100 X-RAY EXAM L-S SPINE 2/3 VWS: CPT

## 2021-08-07 PROCEDURE — 250N000013 HC RX MED GY IP 250 OP 250 PS 637: Performed by: NURSE PRACTITIONER

## 2021-08-07 PROCEDURE — 99284 EMERGENCY DEPT VISIT MOD MDM: CPT

## 2021-08-07 PROCEDURE — 250N000013 HC RX MED GY IP 250 OP 250 PS 637: Performed by: EMERGENCY MEDICINE

## 2021-08-07 RX ORDER — OXYCODONE HYDROCHLORIDE 5 MG/1
10 TABLET ORAL ONCE
Status: COMPLETED | OUTPATIENT
Start: 2021-08-07 | End: 2021-08-07

## 2021-08-07 RX ORDER — PREDNISONE 20 MG/1
TABLET ORAL
Qty: 10 TABLET | Refills: 0 | Status: SHIPPED | OUTPATIENT
Start: 2021-08-07 | End: 2021-08-16

## 2021-08-07 RX ORDER — LIDOCAINE 4 G/G
1 PATCH TOPICAL ONCE
Status: DISCONTINUED | OUTPATIENT
Start: 2021-08-07 | End: 2021-08-07 | Stop reason: HOSPADM

## 2021-08-07 RX ORDER — METHOCARBAMOL 500 MG/1
500 TABLET, FILM COATED ORAL 4 TIMES DAILY PRN
Qty: 20 TABLET | Refills: 0 | Status: SHIPPED | OUTPATIENT
Start: 2021-08-07 | End: 2021-08-16

## 2021-08-07 RX ORDER — OXYCODONE HYDROCHLORIDE 5 MG/1
5 TABLET ORAL EVERY 6 HOURS PRN
Qty: 10 TABLET | Refills: 0 | Status: SHIPPED | OUTPATIENT
Start: 2021-08-07 | End: 2021-08-10

## 2021-08-07 RX ADMIN — OXYCODONE HYDROCHLORIDE 10 MG: 5 TABLET ORAL at 14:02

## 2021-08-07 RX ADMIN — LIDOCAINE 1 PATCH: 246 PATCH TOPICAL at 12:17

## 2021-08-07 ASSESSMENT — ENCOUNTER SYMPTOMS
BACK PAIN: 1
VOMITING: 0
HEADACHES: 0
NECK PAIN: 0
WEAKNESS: 1

## 2021-08-07 NOTE — ED TRIAGE NOTES
From group home (TBI years ago). Last night tripped on stairs, twisted and fell onto lower back. Took Tylenol and Ibuprofen with no relief, woke up today in excruciating pain. EMS called, started IV and gave 100mcg Fentanyl total. VSS en route.

## 2021-08-07 NOTE — ED NOTES
Bed: ED10  Expected date:   Expected time:   Means of arrival:   Comments:  [place] [address] 51 M back pain

## 2021-08-07 NOTE — ED PROVIDER NOTES
"  History   Chief Complaint:  Back Pain and Fall     The history is provided by the patient.      Hollis Barclay is a 51 year old male on Gabapentin with history of bipolar disorder and TBI who presents via EMS with back pain after a fall. The patient reports he fell on the stairs onto his lower back about 15 hours ago. He denies LOC or hitting his head. The patient reports he went to sleep and woke up 11 hours ago in pain. He notes he is unable to lift leg without feeling pain in his back and his right leg feels \"draggy.\" His pain is now exacerbated with any movement. He reports that he took Ibuprofen and Tylenol but has seen no improvement in pain. He called EMS who gave him 100 mcg Fentanyl en route. He denies headache, neck pain, vomiting, and  problems. He is following with Dr. Brizuela, of surgery, for his chronic baseline back pain (see MRI below) and Dr. Giron, PCP.      MRI L Spine WO 6/11/21  IMPRESSION:  1. Chronic L5 pars defects with grade 1 anterolisthesis of L5 on S1, reactive marrow edema about the disc space and asymmetric to the right with adjacent soft tissue edema, reflecting active degenerative changes and which may function as a pain generator. Edematous changes about the L5-S1 disc level are most favored to represent degenerative change rather than secondary to inflammatory or infectious process. However, correlation with clinical symptoms and bioinflammatory markers is recommended  2. Severe right foraminal stenosis and moderate left foraminal stenosis with ganglionic abutment/compression at the L5-S1 level.  3. Other findings and details in the body of the report.    Review of Systems   Gastrointestinal: Negative for vomiting.   Genitourinary: Negative.    Musculoskeletal: Positive for back pain. Negative for neck pain.   Neurological: Positive for weakness (Right leg). Negative for syncope and headaches.   All other systems reviewed and are " negative.    Allergies:  Hydroxyzine  Nsaids     Medications:  aripiprazole  bupropion  docusate sodium  folic acid   gabapentin  methadone   nicotine polacrilex  quetiapine   Zoloft  Seroquel  Neurontin  Desyrel  Naproxen   tizanidine     Past Medical History:    Alcoholism in remission  Bilateral ACT tear  Bipolar disorder  Chemical dependency  Chronic back pain  Closed left arm fracture  Shoulder surgery  Post concussive encephalopathy   Social anxiety disorder  TBI  Alcohol dependence  Alcohol withdrawal  Rib fracture  PEDRO LUIS     Past Surgical History:    Shoulder replacement  Irrigation and debridement x 4  Bursectomy  Cholecystectomy  Appendectomy      Family History:    Alcohol/drug  Heart disease      Social History:  Patient presents alone.  Patient lives in group home.    Physical Exam     Patient Vitals for the past 24 hrs:   BP Temp Temp src Pulse Resp SpO2   08/07/21 1142 (!) 157/93 98.6  F (37  C) Temporal 94 18 100 %       Physical Exam   Dr. Monroe    GENERAL: Tremulous looks uncomfortable laying on his side  HEAD: atraumatic  EYES: pupils reactive, extraocular muscles intact, conjunctivae normal  ENT:  mucus membranes moist  NECK:  trachea midline, normal range of motion  RESPIRATORY: no tachypnea, breath sounds clear to auscultation   CVS: normal S1/S2, no murmurs, intact distal pulses  ABDOMEN: soft, nontender, nondistention  MUSCULOSKELETAL: Low back pain, straight leg raise reproduces pain on the right slightly on the left with straight leg raise good dorsiflexion plantarflexion of the great toe  SKIN: warm and dry, no acute rashes or ulceration  NEURO: GCS 15, cranial nerves intact, alert and oriented x3  PSYCH:  Mood/affect normal        Emergency Department Course   Imaging:  XR Lumbar Spine 2-3 views:  Three-view lumbar spine. Five lumbar type vertebral bodies   identified. Overall height is satisfactory. 4 mm retrolisthesis L3-L4,   6 mm anterior subluxation L5-S1 with presumed L5-S1 bilateral  pars   interarticularis defects minimally displaced. Interspace narrowing   L3-L4 through L5-S1. L5-S1 gas-vacuum phenomenon. Rightward lumbar   curve apex L3. No evidence for displaced lower rib fractures.   Satisfactory sacral and coccygeal alignment, as per radiology.     XR Thoracic Spine 3 views:  12 rib bearing thoracic segments are identified with overall   satisfactory height and alignment. No evidence for displaced rib   fractures. Degenerative cervical spondylosis. Otherwise negative, as per radiology.     Emergency Department Course:    Reviewed:  I reviewed nursing notes, vitals, past medical history and care everywhere    Assessments:  1300    Patient was transferred to me from Angie Madrid CNP. I accepted the patient.  1328 I obtained history and examined the patient as noted above.   1352 I rechecked the patient and explained findings.     Consults:   1330  I discussed distribution of pain medications with the group home director.     Interventions:  1217 Lidocare, 1 patch, Transdermal  1402 Roxicodone, 10 mg, Oral    Disposition:  The patient was discharged to home.     Impression & Plan   Medical Decision Making:    I was asked to see the patient for low back pain.  Reviewed his charts and see significant chronic back issues as well as being on methadone.  He reports to me that he has been off of methadone for 1 week and that in order to have surgery he needs to be off of methadone and I see a note in the computer with recent MRI and spine surgery consult and could do an outpatient surgery.  He does currently live in a group home and reports that they manage his medications.  I called to the  and spoke to him and he notes that the patient has been off of methadone and that they do in fact give his medications.  I tried reaching out to the methadone clinic and was unsuccessful talking to them as it was a Saturday afternoon.  I sent a email/sticky note to his primary care doctor.  I  "reviewed his medication history from [place] prescription monitoring and see gabapentin being listed but no other narcotics.  See notes stating that the patient has a high narcotic dependency or addiction history and will \"do anything\".  Certainly this is high risk case in terms of addiction history in terms of pain management but does report being off of methadone and trying to get back on track and have surgery.  Discussed with him short quantity of pain medication and muscle relaxers that will be controlled by the group home staff.  Further pain management will need to be done by his primary care doctor and or care team.  He is not have any red flags to warrant immediate imaging or admission at this time.         Diagnosis:    ICD-10-CM    1. Chronic bilateral low back pain with right-sided sciatica  M54.41     G89.29    2. Fall, initial encounter  W19.XXXA        Discharge Medications:  New Prescriptions    METHOCARBAMOL (ROBAXIN) 500 MG TABLET    Take 1 tablet (500 mg) by mouth 4 times daily as needed for muscle spasms    OXYCODONE (ROXICODONE) 5 MG TABLET    Take 1 tablet (5 mg) by mouth every 6 hours as needed for pain    PREDNISONE (DELTASONE) 20 MG TABLET    Take two tablets (= 40mg) each day for 5 (five) days       Scribe Disclosure:  I, Laura Villa, am serving as a scribe at 11:44 AM on 8/7/2021 to document services personally performed by Angie Madrid NP based on my observations and the provider's statements to me    I, Prema Luke, am serving as a scribe at 1:39 PM on 8/7/2021 to document services personally performed by Ben Monroe MD based on my observations and the provider's statements to me.            Ben Monroe MD  08/08/21 1035    "

## 2021-08-07 NOTE — ED NOTES
"Patient used call light and writer entered room. Patient reports, \"I asked the lady that was in here if I could talk to another doctor and she wasn't too happy with me. I want to see another doctor.\" Writer informed patient that another provider was asked to see patient and would be in as soon as possible. Patient verbalized understanding.   "

## 2021-08-16 ENCOUNTER — MEDICAL CORRESPONDENCE (OUTPATIENT)
Dept: HEALTH INFORMATION MANAGEMENT | Facility: CLINIC | Age: 51
End: 2021-08-16

## 2021-08-16 ENCOUNTER — HOSPITAL ENCOUNTER (INPATIENT)
Facility: CLINIC | Age: 51
LOS: 6 days | Discharge: HOME | End: 2021-08-23
Attending: EMERGENCY MEDICINE | Admitting: PSYCHIATRY & NEUROLOGY
Payer: MEDICAID

## 2021-08-16 ENCOUNTER — APPOINTMENT (OUTPATIENT)
Dept: GENERAL RADIOLOGY | Facility: CLINIC | Age: 51
End: 2021-08-16
Attending: EMERGENCY MEDICINE
Payer: MEDICAID

## 2021-08-16 ENCOUNTER — TELEPHONE (OUTPATIENT)
Dept: BEHAVIORAL HEALTH | Facility: CLINIC | Age: 51
End: 2021-08-16

## 2021-08-16 DIAGNOSIS — R79.89 LFT ELEVATION: ICD-10-CM

## 2021-08-16 DIAGNOSIS — Z20.822 CONTACT WITH AND (SUSPECTED) EXPOSURE TO COVID-19: ICD-10-CM

## 2021-08-16 DIAGNOSIS — R74.01 NONSPECIFIC ELEVATION OF LEVELS OF TRANSAMINASE OR LACTIC ACID DEHYDROGENASE (LDH): ICD-10-CM

## 2021-08-16 DIAGNOSIS — F10.21 ALCOHOLISM IN REMISSION (H): ICD-10-CM

## 2021-08-16 DIAGNOSIS — K59.00 CONSTIPATION, UNSPECIFIED CONSTIPATION TYPE: ICD-10-CM
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
## 2021-08-16 DIAGNOSIS — R00.0 TACHYCARDIA, UNSPECIFIED: ICD-10-CM

## 2021-08-16 DIAGNOSIS — F10.20 UNCOMPLICATED ALCOHOL DEPENDENCE (H): ICD-10-CM

## 2021-08-16 DIAGNOSIS — R74.02 NONSPECIFIC ELEVATION OF LEVELS OF TRANSAMINASE OR LACTIC ACID DEHYDROGENASE (LDH): ICD-10-CM

## 2021-08-16 DIAGNOSIS — F39 MOOD DISORDER (H): ICD-10-CM

## 2021-08-16 DIAGNOSIS — F10.229 ALCOHOL DEPENDENCE WITH INTOXICATION WITH COMPLICATION (H): Primary | ICD-10-CM

## 2021-08-16 DIAGNOSIS — S61.412A LACERATION OF LEFT HAND WITHOUT FOREIGN BODY, INITIAL ENCOUNTER: ICD-10-CM

## 2021-08-16 LAB
ALBUMIN SERPL-MCNC: 4 G/DL (ref 3.4–5)
ALCOHOL BREATH TEST: 0.2 (ref 0–0.01)
ALP SERPL-CCNC: 95 U/L (ref 40–150)
ALT SERPL W P-5'-P-CCNC: 87 U/L (ref 0–70)
AMPHETAMINES UR QL SCN: NORMAL
ANION GAP SERPL CALCULATED.3IONS-SCNC: 11 MMOL/L (ref 3–14)
AST SERPL W P-5'-P-CCNC: 63 U/L (ref 0–45)
BARBITURATES UR QL: NORMAL
BASOPHILS # BLD AUTO: 0.1 10E3/UL (ref 0–0.2)
BASOPHILS NFR BLD AUTO: 1 %
BENZODIAZ UR QL: NORMAL
BILIRUB SERPL-MCNC: 0.5 MG/DL (ref 0.2–1.3)
BUN SERPL-MCNC: 21 MG/DL (ref 7–30)
CALCIUM SERPL-MCNC: 8.8 MG/DL (ref 8.5–10.1)
CANNABINOIDS UR QL SCN: NORMAL
CHLORIDE BLD-SCNC: 104 MMOL/L (ref 94–109)
CO2 SERPL-SCNC: 25 MMOL/L (ref 20–32)
COCAINE UR QL: NORMAL
CREAT SERPL-MCNC: 0.75 MG/DL (ref 0.66–1.25)
EOSINOPHIL # BLD AUTO: 0 10E3/UL (ref 0–0.7)
EOSINOPHIL NFR BLD AUTO: 0 %
ERYTHROCYTE [DISTWIDTH] IN BLOOD BY AUTOMATED COUNT: 14.2 % (ref 10–15)
ETHANOL SERPL-MCNC: 0.22 G/DL
GFR SERPL CREATININE-BSD FRML MDRD: >90 ML/MIN/1.73M2
GLUCOSE BLD-MCNC: 103 MG/DL (ref 70–99)
HCT VFR BLD AUTO: 47.9 % (ref 40–53)
HGB BLD-MCNC: 15.9 G/DL (ref 13.3–17.7)
HOLD SPECIMEN: NORMAL
IMM GRANULOCYTES # BLD: 0 10E3/UL
IMM GRANULOCYTES NFR BLD: 0 %
LYMPHOCYTES # BLD AUTO: 3.5 10E3/UL (ref 0.8–5.3)
LYMPHOCYTES NFR BLD AUTO: 36 %
MAGNESIUM SERPL-MCNC: 2.2 MG/DL (ref 1.6–2.3)
MCH RBC QN AUTO: 29.9 PG (ref 26.5–33)
MCHC RBC AUTO-ENTMCNC: 33.2 G/DL (ref 31.5–36.5)
MCV RBC AUTO: 90 FL (ref 78–100)
MONOCYTES # BLD AUTO: 0.6 10E3/UL (ref 0–1.3)
MONOCYTES NFR BLD AUTO: 6 %
NEUTROPHILS # BLD AUTO: 5.4 10E3/UL (ref 1.6–8.3)
NEUTROPHILS NFR BLD AUTO: 57 %
NRBC # BLD AUTO: 0 10E3/UL
NRBC BLD AUTO-RTO: 0 /100
OPIATES UR QL SCN: NORMAL
PLATELET # BLD AUTO: 628 10E3/UL (ref 150–450)
POTASSIUM BLD-SCNC: 3.4 MMOL/L (ref 3.4–5.3)
PROT SERPL-MCNC: 8.8 G/DL (ref 6.8–8.8)
RBC # BLD AUTO: 5.31 10E6/UL (ref 4.4–5.9)
SARS-COV-2 RNA RESP QL NAA+PROBE: NEGATIVE
SODIUM SERPL-SCNC: 140 MMOL/L (ref 133–144)
WBC # BLD AUTO: 9.5 10E3/UL (ref 4–11)

## 2021-08-16 PROCEDURE — 80053 COMPREHEN METABOLIC PANEL: CPT | Performed by: EMERGENCY MEDICINE

## 2021-08-16 PROCEDURE — 85025 COMPLETE CBC W/AUTO DIFF WBC: CPT | Performed by: EMERGENCY MEDICINE

## 2021-08-16 PROCEDURE — 12001 RPR S/N/AX/GEN/TRNK 2.5CM/<: CPT | Performed by: EMERGENCY MEDICINE

## 2021-08-16 PROCEDURE — 90791 PSYCH DIAGNOSTIC EVALUATION: CPT

## 2021-08-16 PROCEDURE — HZ2ZZZZ DETOXIFICATION SERVICES FOR SUBSTANCE ABUSE TREATMENT: ICD-10-PCS | Performed by: PSYCHIATRY & NEUROLOGY

## 2021-08-16 PROCEDURE — 82075 ASSAY OF BREATH ETHANOL: CPT | Performed by: EMERGENCY MEDICINE

## 2021-08-16 PROCEDURE — 99285 EMERGENCY DEPT VISIT HI MDM: CPT | Mod: 25 | Performed by: EMERGENCY MEDICINE

## 2021-08-16 PROCEDURE — 96375 TX/PRO/DX INJ NEW DRUG ADDON: CPT | Performed by: EMERGENCY MEDICINE

## 2021-08-16 PROCEDURE — 96361 HYDRATE IV INFUSION ADD-ON: CPT | Performed by: EMERGENCY MEDICINE

## 2021-08-16 PROCEDURE — 73130 X-RAY EXAM OF HAND: CPT | Mod: LT

## 2021-08-16 PROCEDURE — 96365 THER/PROPH/DIAG IV INF INIT: CPT | Performed by: EMERGENCY MEDICINE

## 2021-08-16 PROCEDURE — 258N000003 HC RX IP 258 OP 636: Performed by: EMERGENCY MEDICINE

## 2021-08-16 PROCEDURE — 250N000009 HC RX 250: Performed by: EMERGENCY MEDICINE

## 2021-08-16 PROCEDURE — U0003 INFECTIOUS AGENT DETECTION BY NUCLEIC ACID (DNA OR RNA); SEVERE ACUTE RESPIRATORY SYNDROME CORONAVIRUS 2 (SARS-COV-2) (CORONAVIRUS DISEASE [COVID-19]), AMPLIFIED PROBE TECHNIQUE, MAKING USE OF HIGH THROUGHPUT TECHNOLOGIES AS DESCRIBED BY CMS-2020-01-R: HCPCS | Performed by: EMERGENCY MEDICINE

## 2021-08-16 PROCEDURE — 250N000013 HC RX MED GY IP 250 OP 250 PS 637: Performed by: EMERGENCY MEDICINE

## 2021-08-16 PROCEDURE — 93005 ELECTROCARDIOGRAM TRACING: CPT | Performed by: EMERGENCY MEDICINE

## 2021-08-16 PROCEDURE — C9803 HOPD COVID-19 SPEC COLLECT: HCPCS | Performed by: EMERGENCY MEDICINE

## 2021-08-16 PROCEDURE — 83735 ASSAY OF MAGNESIUM: CPT | Performed by: EMERGENCY MEDICINE

## 2021-08-16 PROCEDURE — 250N000011 HC RX IP 250 OP 636: Performed by: EMERGENCY MEDICINE

## 2021-08-16 PROCEDURE — 80307 DRUG TEST PRSMV CHEM ANLYZR: CPT | Performed by: EMERGENCY MEDICINE

## 2021-08-16 PROCEDURE — 96366 THER/PROPH/DIAG IV INF ADDON: CPT | Performed by: EMERGENCY MEDICINE

## 2021-08-16 PROCEDURE — 82077 ASSAY SPEC XCP UR&BREATH IA: CPT | Performed by: EMERGENCY MEDICINE

## 2021-08-16 PROCEDURE — 86140 C-REACTIVE PROTEIN: CPT | Performed by: NURSE PRACTITIONER

## 2021-08-16 PROCEDURE — 93010 ELECTROCARDIOGRAM REPORT: CPT | Performed by: EMERGENCY MEDICINE

## 2021-08-16 PROCEDURE — 36415 COLL VENOUS BLD VENIPUNCTURE: CPT | Performed by: EMERGENCY MEDICINE

## 2021-08-16 RX ORDER — LIDOCAINE HYDROCHLORIDE AND EPINEPHRINE 10; 10 MG/ML; UG/ML
INJECTION, SOLUTION INFILTRATION; PERINEURAL
Status: DISCONTINUED
Start: 2021-08-16 | End: 2021-08-16 | Stop reason: HOSPADM

## 2021-08-16 RX ORDER — ATENOLOL 50 MG/1
50 TABLET ORAL ONCE
Status: COMPLETED | OUTPATIENT
Start: 2021-08-16 | End: 2021-08-16

## 2021-08-16 RX ORDER — LORAZEPAM 1 MG/1
1-4 TABLET ORAL EVERY 30 MIN PRN
Status: DISCONTINUED | OUTPATIENT
Start: 2021-08-16 | End: 2021-08-16

## 2021-08-16 RX ORDER — LIDOCAINE HYDROCHLORIDE AND EPINEPHRINE 10; 10 MG/ML; UG/ML
20 INJECTION, SOLUTION INFILTRATION; PERINEURAL ONCE
Status: DISCONTINUED | OUTPATIENT
Start: 2021-08-16 | End: 2021-08-23 | Stop reason: HOSPADM

## 2021-08-16 RX ORDER — IBUPROFEN 800 MG/1
800 TABLET, FILM COATED ORAL EVERY 8 HOURS PRN
Status: ON HOLD | COMMUNITY
End: 2021-08-23

## 2021-08-16 RX ORDER — OLANZAPINE 10 MG/1
10 TABLET, ORALLY DISINTEGRATING ORAL ONCE
Status: COMPLETED | OUTPATIENT
Start: 2021-08-16 | End: 2021-08-16

## 2021-08-16 RX ORDER — METHADONE HYDROCHLORIDE 10 MG/1
30 TABLET ORAL ONCE
Status: DISCONTINUED | OUTPATIENT
Start: 2021-08-16 | End: 2021-08-16 | Stop reason: CLARIF

## 2021-08-16 RX ORDER — DIAZEPAM 5 MG
5-20 TABLET ORAL EVERY 30 MIN PRN
Status: DISCONTINUED | OUTPATIENT
Start: 2021-08-16 | End: 2021-08-17

## 2021-08-16 RX ORDER — DIAZEPAM 10 MG/2ML
10 INJECTION, SOLUTION INTRAMUSCULAR; INTRAVENOUS ONCE
Status: COMPLETED | OUTPATIENT
Start: 2021-08-16 | End: 2021-08-16

## 2021-08-16 RX ADMIN — NICOTINE POLACRILEX 2 MG: 2 GUM, CHEWING BUCCAL at 16:33

## 2021-08-16 RX ADMIN — DIAZEPAM 10 MG: 5 INJECTION, SOLUTION INTRAMUSCULAR; INTRAVENOUS at 19:23

## 2021-08-16 RX ADMIN — ATENOLOL 50 MG: 50 TABLET ORAL at 20:07

## 2021-08-16 RX ADMIN — NICOTINE POLACRILEX 4 MG: 2 GUM, CHEWING BUCCAL at 20:07

## 2021-08-16 RX ADMIN — FOLIC ACID: 5 INJECTION, SOLUTION INTRAMUSCULAR; INTRAVENOUS; SUBCUTANEOUS at 19:22

## 2021-08-16 RX ADMIN — LORAZEPAM 1 MG: 1 TABLET ORAL at 18:07

## 2021-08-16 RX ADMIN — NICOTINE POLACRILEX 2 MG: 2 GUM, CHEWING BUCCAL at 18:07

## 2021-08-16 RX ADMIN — NICOTINE POLACRILEX 2 MG: 2 GUM, CHEWING BUCCAL at 15:39

## 2021-08-16 RX ADMIN — SODIUM CHLORIDE 1000 ML: 9 INJECTION, SOLUTION INTRAVENOUS at 23:00

## 2021-08-16 RX ADMIN — NICOTINE POLACRILEX 4 MG: 2 GUM, CHEWING BUCCAL at 23:49

## 2021-08-16 RX ADMIN — DIAZEPAM 10 MG: 5 TABLET ORAL at 21:34

## 2021-08-16 RX ADMIN — OLANZAPINE 10 MG: 10 TABLET, ORALLY DISINTEGRATING ORAL at 14:11

## 2021-08-16 RX ADMIN — SODIUM CHLORIDE 1000 ML: 9 INJECTION, SOLUTION INTRAVENOUS at 18:21

## 2021-08-16 RX ADMIN — LORAZEPAM 2 MG: 1 TABLET ORAL at 16:08

## 2021-08-16 ASSESSMENT — ENCOUNTER SYMPTOMS
ARTHRALGIAS: 0
HEADACHES: 0
NECK STIFFNESS: 0
COLOR CHANGE: 0
SHORTNESS OF BREATH: 0
WOUND: 1
FEVER: 0
CONFUSION: 0
DIFFICULTY URINATING: 0
EYE REDNESS: 0
ABDOMINAL PAIN: 0

## 2021-08-16 NOTE — TELEPHONE ENCOUNTER
R:3A/Linus  513pm- Intake spoke with MD Barriga, Linus is requesting a DEC assessment be done first before moving forward with admission along with pt staying calm and cooperative provider reported to put pt under him.   515pm: Intake updated the ED to move forward with DEC.   1038pm: DEC  called intake  1040pm: pt placed on unit, MICD  1052pm-intake spoke with oncall provider.   1058pm- Unit notified, can take on night shift if pt of 1:1, if needing SIO not able to take on nights.  11:00pm- ED notified    Pt is endorsing plan or intent by overdosing,alcohol or cutting his wrists  Pt has acted on these in the past.   Pt able to maintain safety on unit  Pt reports feeling safer in hosp  Pt believes that his GH is being bugged, pt thinks hes being monitored through his watch  Pt thinks people are trying to keep tabs on him  Pt has been calm and collected  Pt has not had any aggression.    A: Vol

## 2021-08-16 NOTE — ED NOTES
Bed: HW04  Expected date: 8/16/21  Expected time: 1:25 PM  Means of arrival: Ambulance  Comments:  [address] 54M SI superficial cuts 10min out at 13:08

## 2021-08-16 NOTE — ED PROVIDER NOTES
"    [facility] EMERGENCY DEPARTMENT ([facility])   August 16, 2021  History     Chief Complaint   Patient presents with     Suicidal     cut hands, stabbed hand with fork     The history is provided by the patient and medical records.     Hollis Barclay is a 51 year old male with PMH significant for bipolar disorder, bipolar personality disorder, TBI, chronic pain syndrome on methadone, and polysubstance abuse who presents to the Emergency Department for evaluation of suicidal ideation.  Patient came here via ambulance for aggression towards group home staff and alcohol intoxication.  Patient was using a hunting knife to cut his left hand stating, \"I want to die\".  He also reports that he stuck a fork into that hand as well.  He reports this happened a couple hours ago.  Patient states that he has not been taking his methadone for the past 4-5 days.  He is worried that they are giving him something that is not his medication and he states that \"they are after me I part of some beta program, they bug me, there everywhere\".  He reports that he stopped taking his psychiatric medications a couple days ago.  He states that he got his hand to \"feel something real\".  Patient denies auditory visual hallucinations.  Patient also notes that he has been drinking a half a gallon of vodka a day for the past month.  He states that he has a history of alcohol withdrawal seizures and DTs.    PAST MEDICAL HISTORY:   Past Medical History:   Diagnosis Date     Alcoholism in remission (H)      Bilateral ACL tear      Bipolar disorder (H)      Borderline personality disorder (H)      Chemical dependency (H)      Chronic back pain      Closed left arm fracture 1985     H/O shoulder surgery      Post concussive encephalopathy      Social anxiety disorder      Social anxiety disorder      TBI (traumatic brain injury) (H)        PAST SURGICAL HISTORY: No past surgical history on file.    Past medical history, past surgical history, " medications, and allergies were reviewed with the patient. Additional pertinent items: None    FAMILY HISTORY: No family history on file.    SOCIAL HISTORY:   Social History     Tobacco Use     Smoking status: Former Smoker     Types: Dip, chew, snus or snuff     Smokeless tobacco: Former User     Types: Chew   Substance Use Topics     Alcohol use: Yes     Social history was reviewed with the patient. Additional pertinent items: None      Patient's Medications   New Prescriptions    No medications on file   Previous Medications    ARIPIPRAZOLE (ABILIFY) 2 MG TABLET    Take 1 tablet (2 mg) by mouth daily    DOCUSATE SODIUM (COLACE) 100 MG CAPSULE    Take 1 capsule (100 mg) by mouth 2 times daily    FOLIC ACID (FOLVITE) 1 MG TABLET    TAKE 1 TABLET BY MOUTH DAILY    GABAPENTIN (NEURONTIN) 600 MG TABLET    TAKE 2 TABLETS BY MOUTH THREE TIMES A DAY    IBUPROFEN (ADVIL/MOTRIN) 800 MG TABLET    Take 800 mg by mouth every 8 hours as needed for moderate pain    METHADONE (DOLPHINE) 5 MG/5ML SOLUTION    Take 55 mg by mouth daily [place] [facility] [place]     QUETIAPINE (SEROQUEL) 400 MG TABLET    Take 1 tablet (400 mg) by mouth At Bedtime   Modified Medications    No medications on file   Discontinued Medications    BUPROPION (WELLBUTRIN) 100 MG TABLET    Take 1 tablet (100 mg) by mouth 2 times daily    METHOCARBAMOL (ROBAXIN) 500 MG TABLET    Take 1 tablet (500 mg) by mouth 4 times daily as needed for muscle spasms    MULTIVITAMIN W/MINERALS (THERA-VIT-M) TABLET    Take 1 tablet by mouth daily    NICOTINE POLACRILEX (NICORETTE) 4 MG GUM    PLACE 2 GUM INSIDE CHEEK EVERY 2 HOURS AS NEEDED FOR SMOKING CESSATION ( MAX 22 PIECE DAILY )    PREDNISONE (DELTASONE) 20 MG TABLET    Take two tablets (= 40mg) each day for 5 (five) days    THIAMINE (B-1) 100 MG TABLET    Take 1 tablet (100 mg) by mouth daily          Allergies   Allergen Reactions     Hydroxyzine Hives     Previously unreported by patient, this is a new patient declaration as  of 5/1/21.     Cucumber Extract      Cucumber the vegable     Nsaids      No problem with oral NSAIDs--Toradol injection itching only.        Review of Systems   Constitutional: Negative for fever.   HENT: Negative for congestion.    Eyes: Negative for redness.   Respiratory: Negative for shortness of breath.    Cardiovascular: Negative for chest pain.   Gastrointestinal: Negative for abdominal pain.   Genitourinary: Negative for difficulty urinating.   Musculoskeletal: Negative for arthralgias and neck stiffness.   Skin: Positive for wound (left hand). Negative for color change.   Neurological: Negative for headaches.   Psychiatric/Behavioral: Positive for self-injury and suicidal ideas. Negative for confusion.   All other systems reviewed and are negative.        Physical Exam   BP: 139/78  Pulse: 67  Temp: 98.5  F (36.9  C)  Resp: 18  SpO2: 98 %      Physical Exam  Constitutional:       General: He is not in acute distress.     Appearance: He is well-developed. He is not diaphoretic.   HENT:      Head: Normocephalic and atraumatic.      Mouth/Throat:      Pharynx: No oropharyngeal exudate.   Eyes:      General: No scleral icterus.     Extraocular Movements:      Right eye: Nystagmus present.      Left eye: Nystagmus present.      Pupils: Pupils are equal, round, and reactive to light.   Cardiovascular:      Heart sounds: Normal heart sounds.   Pulmonary:      Effort: No respiratory distress.      Breath sounds: Normal breath sounds.   Abdominal:      General: Bowel sounds are normal.      Palpations: Abdomen is soft.      Tenderness: There is no abdominal tenderness.   Musculoskeletal:         General: No tenderness.   Skin:     General: Skin is warm.      Findings: No rash.   Neurological:      Mental Status: He is alert.      GCS: GCS eye subscore is 4. GCS verbal subscore is 5. GCS motor subscore is 6.      Cranial Nerves: Cranial nerve deficit (bilateral, lateral gaze nystagmus) present.   Psychiatric:          Speech: Speech is slurred.         ED Course   1:58 PM  The patient was seen and examined by Jr Smyth MD in Room HW04.      [facility]    -Laceration Repair    Date/Time: 8/16/2021 2:22 PM  Performed by: Jr Smyth MD  Authorized by: Jr Smyth MD     LACERATION DETAILS     Location:  Hand    Hand location:  L hand, dorsum    Length (cm):  2    Depth (mm):  3    REPAIR TYPE:     Repair type:  Simple      EXPLORATION:     Hemostasis achieved with:  Direct pressure    Wound exploration: wound explored through full range of motion and entire depth of wound probed and visualized      Wound extent: areolar tissue violated      Contaminated: no      TREATMENT:     Area cleansed with:  Saline and soap and water    Amount of cleaning:  Standard    Irrigation solution:  Tap water    Irrigation method:  Tap    Visualized foreign bodies/material removed: no      SKIN REPAIR     Repair method:  Sutures    Suture size:  4-0    Suture material:  Prolene    Suture technique:  Simple interrupted    Number of sutures:  2    APPROXIMATION     Approximation:  Close    POST-PROCEDURE DETAILS     Dressing:  Open (no dressing)      PROCEDURE   Patient Tolerance:  Patient tolerated the procedure well with no immediate complications                The medical record was reviewed and interpreted.  Current labs reviewed and interpreted.  Previous labs reviewed and interpreted.                 Results for orders placed or performed during the hospital encounter of 08/16/21 (from the past 24 hour(s))   Alcohol breath test POCT   Result Value Ref Range    Alcohol Breath Test 0.201 (A) 0.00 - 0.01   Extra Tube    Narrative    The following orders were created for panel order Extra Tube.  Procedure                               Abnormality         Status                     ---------                               -----------         ------                     Extra Red Top  Tube[[id number]]                               Final result                 Please view results for these tests on the individual orders.   Extra Red Top Tube   Result Value Ref Range    Hold Specimen JIC    Ethyl Alcohol Level   Result Value Ref Range    Alcohol ethyl 0.22 (H) <=0.01 g/dL   CBC with platelets differential    Narrative    The following orders were created for panel order CBC with platelets differential.  Procedure                               Abnormality         Status                     ---------                               -----------         ------                     CBC with platelets and d...[[id number]]  Abnormal            Final result                 Please view results for these tests on the individual orders.   Comprehensive metabolic panel   Result Value Ref Range    Sodium 140 133 - 144 mmol/L    Potassium 3.4 3.4 - 5.3 mmol/L    Chloride 104 94 - 109 mmol/L    Carbon Dioxide (CO2) 25 20 - 32 mmol/L    Anion Gap 11 3 - 14 mmol/L    Urea Nitrogen 21 7 - 30 mg/dL    Creatinine 0.75 0.66 - 1.25 mg/dL    Calcium 8.8 8.5 - 10.1 mg/dL    Glucose 103 (H) 70 - 99 mg/dL    Alkaline Phosphatase 95 40 - 150 U/L    AST 63 (H) 0 - 45 U/L    ALT 87 (H) 0 - 70 U/L    Protein Total 8.8 6.8 - 8.8 g/dL    Albumin 4.0 3.4 - 5.0 g/dL    Bilirubin Total 0.5 0.2 - 1.3 mg/dL    GFR Estimate >90 >60 mL/min/1.73m2   CBC with platelets and differential   Result Value Ref Range    WBC Count 9.5 4.0 - 11.0 10e3/uL    RBC Count 5.31 4.40 - 5.90 10e6/uL    Hemoglobin 15.9 13.3 - 17.7 g/dL    Hematocrit 47.9 40.0 - 53.0 %    MCV 90 78 - 100 fL    MCH 29.9 26.5 - 33.0 pg    MCHC 33.2 31.5 - 36.5 g/dL    RDW 14.2 10.0 - 15.0 %    Platelet Count 628 (H) 150 - 450 10e3/uL    % Neutrophils 57 %    % Lymphocytes 36 %    % Monocytes 6 %    % Eosinophils 0 %    % Basophils 1 %    % Immature Granulocytes 0 %    NRBCs per 100 WBC 0 <1 /100    Absolute Neutrophils 5.4 1.6 - 8.3 10e3/uL    Absolute Lymphocytes 3.5 0.8 - 5.3  10e3/uL    Absolute Monocytes 0.6 0.0 - 1.3 10e3/uL    Absolute Eosinophils 0.0 0.0 - 0.7 10e3/uL    Absolute Basophils 0.1 0.0 - 0.2 10e3/uL    Absolute Immature Granulocytes 0.0 <=0.0 10e3/uL    Absolute NRBCs 0.0 10e3/uL   Urine Drugs of Abuse Screen    Narrative    The following orders were created for panel order Urine Drugs of Abuse Screen.  Procedure                               Abnormality         Status                     ---------                               -----------         ------                     Drug abuse screen 1 urin...[[id number]]  Normal              Final result                 Please view results for these tests on the individual orders.   Drug abuse screen 1 urine (ED)   Result Value Ref Range    Amphetamines Urine Screen Negative Screen Negative    Barbiturates Urine Screen Negative Screen Negative    Benzodiazepines Urine Screen Negative Screen Negative    Cannabinoids Urine Screen Negative Screen Negative    Cocaine Urine Screen Negative Screen Negative    Opiates Urine Screen Negative Screen Negative     Medications   lidocaine 1% with EPINEPHrine 1:100,000 injection 20 mL (has no administration in time range)   lidocaine 1% with EPINEPHrine 1:100,000 1 %-1:839016 injection (has no administration in time range)   LORazepam (ATIVAN) tablet 1-4 mg (2 mg Oral Given 8/16/21 1608)   OLANZapine zydis (zyPREXA) ODT tab 10 mg (10 mg Oral Given 8/16/21 1411)   nicotine (NICORETTE) gum 4 mg (2 mg Buccal Given 8/16/21 1539)             Assessments & Plan (with Medical Decision Making)   51-year-old male who presents with self-injurious behavior and claims of hallucinations.  Differential includes substance-induced mood disorder, psychosis, other primary mental illness, withdrawal.  Exam reveals a left hand laceration and stigmata consistent with alcohol abuse and intoxication.  Laboratories reveal a CBC and comprehensive metabolic panel that are grossly unremarkable with exception of  elevations in ALT, AST and platelet count of 620.  Alcohol level is elevated at 0.20.  Urine toxicology screen is negative.  Ethanol level was elevated at 0.22.  Patient was placed on MSSA protocol.  The case was discussed at length with chemical dependency intake and the patient will be admitted to detox for further evaluation and management.    I have reviewed the nursing notes.    I have reviewed the findings, diagnosis, plan and need for follow up with the patient.    New Prescriptions    No medications on file       Final diagnoses:   Uncomplicated alcohol dependence (H)   LFT elevation   Laceration of left hand without foreign body, initial encounter     I, Pedro Benjamin, am serving as a trained medical scribe to document services personally performed by Jr Smyth MD, based on the provider's statements to me.     IJr MD, was physically present and have reviewed and verified the accuracy of this note documented by Pedro Benjamin.    Jr Smyth MD    8/16/2021   [facility] EMERGENCY DEPARTMENT     Jr Smyth MD  08/16/21 [id number]

## 2021-08-16 NOTE — ED TRIAGE NOTES
"Patient presents via ambulance due to aggression towards group home staff and alcohol intoxication. Patient was using hunting knife to cut left hand stating, \"I want to die.\" Group home staff called EMS; when police got there patient reported wanting to be seen in the hospital. Patient is here voluntarily. Cooperative for EMS. Per [facility].   "

## 2021-08-16 NOTE — PHARMACY-ADMISSION MEDICATION HISTORY
Admission Medication History Completed by Pharmacy    See [facility] Admission Navigator for allergy information, preferred outpatient pharmacy, prior to admission medications and immunization status.     Medication history sources:  [facility] medication list    Pertinent changes made to PTA medication list:  Added: N/A  Deleted: N/A  Changed: N/A    Additional medication history information:   - All meds per  medication list. Patient not interviewed as part of this medication history. Please discuss any OTC/non-prescription medication use and last doses with the patient that may not be reflected in the list below.    Prior to Admission medications    Medication Sig Last Dose Taking? Auth Provider   ARIPiprazole (ABILIFY) 2 MG tablet Take 1 tablet (2 mg) by mouth daily 8/16/2021 Yes Jeremías Toro MD   docusate sodium (COLACE) 100 MG capsule Take 1 capsule (100 mg) by mouth 2 times daily 8/16/2021 Yes Jeremías Toro MD   folic acid (FOLVITE) 1 MG tablet TAKE 1 TABLET BY MOUTH DAILY 8/16/2021 Yes Jr Giron MD   gabapentin (NEURONTIN) 600 MG tablet TAKE 2 TABLETS BY MOUTH THREE TIMES A DAY 8/16/2021 Yes Jose A Velasco MD   ibuprofen (ADVIL/MOTRIN) 800 MG tablet Take 800 mg by mouth every 8 hours as needed for moderate pain PRN Yes Unknown, Entered By History   methadone (DOLPHINE) 5 MG/5ML solution Take 29 mg by mouth daily From [facility]  8/2/2021 Yes Unknown, Entered By History   QUEtiapine (SEROQUEL) 400 MG tablet Take 1 tablet (400 mg) by mouth At Bedtime 8/16/2021 Yes Jeremías Toro MD     Date completed: 08/16/21    Medication history completed by:   Jairon Marquez, PharmD, BCPS  [facility]  Emergency Department: Ascom *[phone]

## 2021-08-16 NOTE — PHARMACY
Methadone Clinic Information Note    Clinic Name: [facility]  Clinic Location & Phone Number: [place], [place] [phone]    Verified patient's current methadone dose is 29 mg daily.   Last dose was administered on 8/2/21.  Does patient receive take-home doses? No    Jairon Marquez, PharmD, BCPS  [facility]  Emergency Department: Ascom *[phone]

## 2021-08-16 NOTE — ED NOTES
Patient received as sign-out at change of shift.  Please see initial note for complete details.  51 year old male who presented to the ED with alcohol intoxication.  Awaiting admission to detox  Acute events during this shift: Accepting psychiatrist requested DEC assessment.    6:08 PM the DEC  called in question whether patient was medically stable.  His vital signs were reviewed at this time and he is tachycardic with a heart rate of 134 and also has a lowblood pressure of 94/61.  He was reassessed at this time.  He is tremulous and tachycardic.  His pulse seems regular.  An EKG was ordered.  IV access was ordered and normal saline fluid resuscitation was ordered.         EKG Interpretation:      Interpreted by FLORINA BEDOYA MD, MD  Time reviewed:1850   Symptoms at time of EKG: tachycardia   Rhythm: Normal sinus  and Sinus tachycardia  Rate: 134  Axis: Normal  Ectopy: None  Conduction: Normal  ST Segments/ T Waves: No ST-T wave changes and No acute ischemic changes  Q Waves: None  Comparison to prior: Previously NSR    Clinical Impression: sinus tachycardia    Results for orders placed or performed during the hospital encounter of 08/16/21   -Laceration Repair     Status: None    Narrative    Jr Smyth MD     8/16/2021  4:24 PM  [facility]    -Laceration Repair    Date/Time: 8/16/2021 2:22 PM  Performed by: Jr Smyth MD  Authorized by: Jr Smyth MD     LACERATION DETAILS     Location:  Hand    Hand location:  L hand, dorsum    Length (cm):  2    Depth (mm):  3    REPAIR TYPE:     Repair type:  Simple      EXPLORATION:     Hemostasis achieved with:  Direct pressure    Wound exploration: wound explored through full range of motion and   entire depth of wound probed and visualized      Wound extent: areolar tissue violated      Contaminated: no      TREATMENT:     Area cleansed with:  Saline and soap and water    Amount of cleaning:  Standard     Irrigation solution:  Tap water    Irrigation method:  Tap    Visualized foreign bodies/material removed: no      SKIN REPAIR     Repair method:  Sutures    Suture size:  4-0    Suture material:  Prolene    Suture technique:  Simple interrupted    Number of sutures:  2    APPROXIMATION     Approximation:  Close    POST-PROCEDURE DETAILS     Dressing:  Open (no dressing)      PROCEDURE   Patient Tolerance:  Patient tolerated the procedure well with no immediate   complications     XR Hand Left G/E 3 Views     Status: None    Narrative    XR LEFT HAND THREE OR MORE VIEWS   8/16/2021 4:26 PM     HISTORY:  Left hand injury. Pain.      Impression    IMPRESSION: There are 2 small adjacent metallic foreign bodies at the  dorsal aspect of the third distal phalanx base. No acute fracture  identified.    PIOTR MORALEZ MD         SYSTEM ID:  [id number]   Ethyl Alcohol Level     Status: Abnormal   Result Value Ref Range    Alcohol ethyl 0.22 (H) <=0.01 g/dL   CBC with platelets differential     Status: Abnormal    Narrative    The following orders were created for panel order CBC with platelets differential.  Procedure                               Abnormality         Status                     ---------                               -----------         ------                     CBC with platelets and d...[[id number]]  Abnormal            Final result                 Please view results for these tests on the individual orders.   Comprehensive metabolic panel     Status: Abnormal   Result Value Ref Range    Sodium 140 133 - 144 mmol/L    Potassium 3.4 3.4 - 5.3 mmol/L    Chloride 104 94 - 109 mmol/L    Carbon Dioxide (CO2) 25 20 - 32 mmol/L    Anion Gap 11 3 - 14 mmol/L    Urea Nitrogen 21 7 - 30 mg/dL    Creatinine 0.75 0.66 - 1.25 mg/dL    Calcium 8.8 8.5 - 10.1 mg/dL    Glucose 103 (H) 70 - 99 mg/dL    Alkaline Phosphatase 95 40 - 150 U/L    AST 63 (H) 0 - 45 U/L    ALT 87 (H) 0 - 70 U/L    Protein Total 8.8 6.8 - 8.8 g/dL     Albumin 4.0 3.4 - 5.0 g/dL    Bilirubin Total 0.5 0.2 - 1.3 mg/dL    GFR Estimate >90 >60 mL/min/1.73m2   CBC with platelets and differential     Status: Abnormal   Result Value Ref Range    WBC Count 9.5 4.0 - 11.0 10e3/uL    RBC Count 5.31 4.40 - 5.90 10e6/uL    Hemoglobin 15.9 13.3 - 17.7 g/dL    Hematocrit 47.9 40.0 - 53.0 %    MCV 90 78 - 100 fL    MCH 29.9 26.5 - 33.0 pg    MCHC 33.2 31.5 - 36.5 g/dL    RDW 14.2 10.0 - 15.0 %    Platelet Count 628 (H) 150 - 450 10e3/uL    % Neutrophils 57 %    % Lymphocytes 36 %    % Monocytes 6 %    % Eosinophils 0 %    % Basophils 1 %    % Immature Granulocytes 0 %    NRBCs per 100 WBC 0 <1 /100    Absolute Neutrophils 5.4 1.6 - 8.3 10e3/uL    Absolute Lymphocytes 3.5 0.8 - 5.3 10e3/uL    Absolute Monocytes 0.6 0.0 - 1.3 10e3/uL    Absolute Eosinophils 0.0 0.0 - 0.7 10e3/uL    Absolute Basophils 0.1 0.0 - 0.2 10e3/uL    Absolute Immature Granulocytes 0.0 <=0.0 10e3/uL    Absolute NRBCs 0.0 10e3/uL   Drug abuse screen 1 urine (ED)     Status: Normal   Result Value Ref Range    Amphetamines Urine Screen Negative Screen Negative    Barbiturates Urine Screen Negative Screen Negative    Benzodiazepines Urine Screen Negative Screen Negative    Cannabinoids Urine Screen Negative Screen Negative    Cocaine Urine Screen Negative Screen Negative    Opiates Urine Screen Negative Screen Negative   Extra Red Top Tube     Status: None   Result Value Ref Range    Hold Specimen [facility]    Asymptomatic COVID-19 Virus (Coronavirus) by PCR Nasopharyngeal     Status: Normal    Specimen: Nasopharyngeal; Swab   Result Value Ref Range    SARS CoV2 PCR Negative Negative    Narrative    Testing was performed using the carlos  SARS-CoV-2 & Influenza A/B Assay on the carlos  [employer]  System.  This test should be ordered for the detection of SARS-COV-2 in individuals who meet SARS-CoV-2 clinical and/or epidemiological criteria. Test performance is unknown in asymptomatic patients.  This test is for in vitro  diagnostic use under the FDA EUA for laboratories certified under CLIA to perform moderate and/or high complexity testing. This test has not been FDA cleared or approved.  A negative test does not rule out the presence of PCR inhibitors in the specimen or target RNA in concentration below the limit of detection for the assay. The possibility of a false negative should be considered if the patient's recent exposure or clinical presentation suggests COVID-19.  [facility] are certified under the Clinical Laboratory Improvement Amendments of 1988 (CLIA-88) as qualified to perform moderate and/or high complexity laboratory testing.   Magnesium     Status: Normal   Result Value Ref Range    Magnesium 2.2 1.6 - 2.3 mg/dL   Alcohol breath test POCT     Status: Abnormal   Result Value Ref Range    Alcohol Breath Test 0.201 (A) 0.00 - 0.01   Urine Drugs of Abuse Screen     Status: Normal    Narrative    The following orders were created for panel order Urine Drugs of Abuse Screen.  Procedure                               Abnormality         Status                     ---------                               -----------         ------                     Drug abuse screen 1 urin...[[id number]]  Normal              Final result                 Please view results for these tests on the individual orders.   Extra Tube     Status: None    Narrative    The following orders were created for panel order Extra Tube.  Procedure                               Abnormality         Status                     ---------                               -----------         ------                     Extra Red Top Tube[[id number]]                               Final result                 Please view results for these tests on the individual orders.      Medications   lidocaine 1% with EPINEPHrine 1:100,000 injection 20 mL (has no administration in time range)   diazepam (VALIUM) tablet 5-20 mg (10 mg Oral Given 8/16/21 2131)  "  0.9% sodium chloride BOLUS (has no administration in time range)   OLANZapine zydis (zyPREXA) ODT tab 10 mg (10 mg Oral Given 8/16/21 1411)   nicotine (NICORETTE) gum 4 mg (2 mg Buccal Given 8/16/21 1539)   nicotine (NICORETTE) gum 4 mg (2 mg Buccal Given 8/16/21 1633)   nicotine (NICORETTE) gum 4 mg (2 mg Buccal Given 8/16/21 1807)   0.9% sodium chloride BOLUS (0 mLs Intravenous Stopped 8/16/21 1933)   sodium chloride 0.9 % 1,000 mL with Infuvite Adult 10 mL, thiamine 100 mg, folic acid 1 mg infusion ( Intravenous Stopped 8/16/21 2311)   diazepam (VALIUM) injection 10 mg (10 mg Intravenous Given 8/16/21 1923)   nicotine (NICORETTE) gum 4 mg (4 mg Buccal Given 8/16/21 2007)   atenolol (TENORMIN) tablet 50 mg (50 mg Oral Given 8/16/21 2007)      11:21 PM patient's vital signs normal.  He is no longer tremulous.  He was reinterviewed at this time.  He states he is able to contract for safety on the detox unit and does not have any concern that he will harm himself.  He states that he feels safe \"in a controlled setting.\"       Eric Lindsay MD  08/16/21 4083    "

## 2021-08-16 NOTE — TELEPHONE ENCOUNTER
S: [facility], [facility], 51/M, alcohol detox     B: Pt reports a half a gallon of vodka daily for the last month   Pt reports hx of sz and DTs  Pt denies other sub use     Medically cleared, eating, drinking, ambulating indep  Patient cleared and ready for behavioral bed placement: Yes   No covid concerns, test ordered     A: Voluntary     R:     CD admit     409pm - Intake left  for Chris   435pm - Estrellita called from [facility] and reports the pt might not be appropriate due to some MH concerns   439PM - Chris says that the pt would need a higher acuity unit and would like to escalate it to Linus who is acting med director for [facility]  441pm - Linus paged     Passed to mohamud to track

## 2021-08-17 PROBLEM — R79.89 LFT ELEVATION: Status: ACTIVE | Noted: 2021-08-17

## 2021-08-17 PROBLEM — S61.412A LACERATION OF LEFT HAND WITHOUT FOREIGN BODY, INITIAL ENCOUNTER: Status: ACTIVE | Noted: 2021-08-17

## 2021-08-17 LAB
ATRIAL RATE - MUSE: 134 BPM
DIASTOLIC BLOOD PRESSURE - MUSE: NORMAL MMHG
INTERPRETATION ECG - MUSE: NORMAL
P AXIS - MUSE: 54 DEGREES
PR INTERVAL - MUSE: 158 MS
QRS DURATION - MUSE: 78 MS
QT - MUSE: 302 MS
QTC - MUSE: 450 MS
R AXIS - MUSE: 0 DEGREES
SYSTOLIC BLOOD PRESSURE - MUSE: NORMAL MMHG
T AXIS - MUSE: 41 DEGREES
VENTRICULAR RATE- MUSE: 134 BPM

## 2021-08-17 PROCEDURE — H2032 ACTIVITY THERAPY, PER 15 MIN: HCPCS

## 2021-08-17 PROCEDURE — 250N000013 HC RX MED GY IP 250 OP 250 PS 637: Performed by: EMERGENCY MEDICINE

## 2021-08-17 PROCEDURE — 128N000001 HC R&B CD/MH ADULT

## 2021-08-17 PROCEDURE — 250N000013 HC RX MED GY IP 250 OP 250 PS 637: Performed by: PSYCHIATRY & NEUROLOGY

## 2021-08-17 PROCEDURE — 124N000001 HC R&B MH

## 2021-08-17 RX ORDER — BUPRENORPHINE 2 MG/1
4 TABLET SUBLINGUAL 4 TIMES DAILY
Status: DISCONTINUED | OUTPATIENT
Start: 2021-08-17 | End: 2021-08-19

## 2021-08-17 RX ORDER — NALOXONE HYDROCHLORIDE 0.4 MG/ML
0.4 INJECTION, SOLUTION INTRAMUSCULAR; INTRAVENOUS; SUBCUTANEOUS
Status: DISCONTINUED | OUTPATIENT
Start: 2021-08-17 | End: 2021-08-23 | Stop reason: HOSPADM

## 2021-08-17 RX ORDER — NALOXONE HYDROCHLORIDE 0.4 MG/ML
0.2 INJECTION, SOLUTION INTRAMUSCULAR; INTRAVENOUS; SUBCUTANEOUS
Status: DISCONTINUED | OUTPATIENT
Start: 2021-08-17 | End: 2021-08-23 | Stop reason: HOSPADM

## 2021-08-17 RX ORDER — MAGNESIUM HYDROXIDE/ALUMINUM HYDROXICE/SIMETHICONE 120; 1200; 1200 MG/30ML; MG/30ML; MG/30ML
30 SUSPENSION ORAL EVERY 4 HOURS PRN
Status: DISCONTINUED | OUTPATIENT
Start: 2021-08-17 | End: 2021-08-23 | Stop reason: HOSPADM

## 2021-08-17 RX ORDER — ACETAMINOPHEN 325 MG/1
650 TABLET ORAL EVERY 4 HOURS PRN
Status: DISCONTINUED | OUTPATIENT
Start: 2021-08-17 | End: 2021-08-23 | Stop reason: HOSPADM

## 2021-08-17 RX ORDER — MULTIPLE VITAMINS W/ MINERALS TAB 9MG-400MCG
1 TAB ORAL DAILY
Status: DISCONTINUED | OUTPATIENT
Start: 2021-08-17 | End: 2021-08-23 | Stop reason: HOSPADM

## 2021-08-17 RX ORDER — LANOLIN ALCOHOL/MO/W.PET/CERES
100 CREAM (GRAM) TOPICAL DAILY
Status: DISCONTINUED | OUTPATIENT
Start: 2021-08-17 | End: 2021-08-23 | Stop reason: HOSPADM

## 2021-08-17 RX ORDER — BUPRENORPHINE 2 MG/1
4 TABLET SUBLINGUAL 2 TIMES DAILY
Status: DISCONTINUED | OUTPATIENT
Start: 2021-08-17 | End: 2021-08-17

## 2021-08-17 RX ORDER — QUETIAPINE FUMARATE 400 MG/1
400 TABLET, FILM COATED ORAL AT BEDTIME
Status: DISCONTINUED | OUTPATIENT
Start: 2021-08-17 | End: 2021-08-17 | Stop reason: CLARIF

## 2021-08-17 RX ORDER — GABAPENTIN 300 MG/1
1200 CAPSULE ORAL 3 TIMES DAILY
Status: DISCONTINUED | OUTPATIENT
Start: 2021-08-17 | End: 2021-08-17

## 2021-08-17 RX ORDER — ACETAMINOPHEN 500 MG
1000 TABLET ORAL ONCE
Status: COMPLETED | OUTPATIENT
Start: 2021-08-17 | End: 2021-08-17

## 2021-08-17 RX ORDER — ONDANSETRON 4 MG/1
4 TABLET, ORALLY DISINTEGRATING ORAL EVERY 6 HOURS PRN
Status: DISCONTINUED | OUTPATIENT
Start: 2021-08-17 | End: 2021-08-23 | Stop reason: HOSPADM

## 2021-08-17 RX ORDER — DIAZEPAM 5 MG
5-20 TABLET ORAL EVERY 30 MIN PRN
Status: DISCONTINUED | OUTPATIENT
Start: 2021-08-17 | End: 2021-08-21

## 2021-08-17 RX ORDER — ARIPIPRAZOLE 2 MG/1
2 TABLET ORAL DAILY
Status: DISCONTINUED | OUTPATIENT
Start: 2021-08-17 | End: 2021-08-23 | Stop reason: HOSPADM

## 2021-08-17 RX ORDER — ATENOLOL 50 MG/1
50 TABLET ORAL DAILY PRN
Status: DISCONTINUED | OUTPATIENT
Start: 2021-08-17 | End: 2021-08-23 | Stop reason: HOSPADM

## 2021-08-17 RX ORDER — QUETIAPINE FUMARATE 400 MG/1
400 TABLET, FILM COATED ORAL
Status: DISCONTINUED | OUTPATIENT
Start: 2021-08-17 | End: 2021-08-18

## 2021-08-17 RX ORDER — GABAPENTIN 600 MG/1
1200 TABLET ORAL 3 TIMES DAILY
Status: DISCONTINUED | OUTPATIENT
Start: 2021-08-17 | End: 2021-08-17

## 2021-08-17 RX ORDER — DOCUSATE SODIUM 100 MG/1
100 CAPSULE, LIQUID FILLED ORAL 2 TIMES DAILY
Status: DISCONTINUED | OUTPATIENT
Start: 2021-08-17 | End: 2021-08-23 | Stop reason: HOSPADM

## 2021-08-17 RX ORDER — GABAPENTIN 300 MG/1
600 CAPSULE ORAL 3 TIMES DAILY
Status: DISCONTINUED | OUTPATIENT
Start: 2021-08-17 | End: 2021-08-17

## 2021-08-17 RX ORDER — BUPRENORPHINE 2 MG/1
4 TABLET SUBLINGUAL 3 TIMES DAILY
Status: DISCONTINUED | OUTPATIENT
Start: 2021-08-17 | End: 2021-08-17

## 2021-08-17 RX ORDER — FOLIC ACID 1 MG/1
1 TABLET ORAL DAILY
Status: DISCONTINUED | OUTPATIENT
Start: 2021-08-17 | End: 2021-08-23 | Stop reason: HOSPADM

## 2021-08-17 RX ORDER — GABAPENTIN 400 MG/1
1200 CAPSULE ORAL 3 TIMES DAILY
Status: DISCONTINUED | OUTPATIENT
Start: 2021-08-17 | End: 2021-08-18

## 2021-08-17 RX ADMIN — FOLIC ACID 1 MG: 1 TABLET ORAL at 12:58

## 2021-08-17 RX ADMIN — GABAPENTIN 1200 MG: 400 CAPSULE ORAL at 13:00

## 2021-08-17 RX ADMIN — BUPRENORPHINE HCL 4 MG: 2 TABLET SUBLINGUAL at 20:09

## 2021-08-17 RX ADMIN — DIAZEPAM 10 MG: 5 TABLET ORAL at 03:04

## 2021-08-17 RX ADMIN — ACETAMINOPHEN 650 MG: 325 TABLET, FILM COATED ORAL at 12:57

## 2021-08-17 RX ADMIN — DIAZEPAM 10 MG: 5 TABLET ORAL at 00:09

## 2021-08-17 RX ADMIN — ACETAMINOPHEN 1000 MG: 500 TABLET ORAL at 03:48

## 2021-08-17 RX ADMIN — GABAPENTIN 1200 MG: 400 CAPSULE ORAL at 03:47

## 2021-08-17 RX ADMIN — THIAMINE HCL TAB 100 MG 100 MG: 100 TAB at 12:58

## 2021-08-17 RX ADMIN — DIAZEPAM 10 MG: 5 TABLET ORAL at 16:05

## 2021-08-17 RX ADMIN — ARIPIPRAZOLE 2 MG: 2 TABLET ORAL at 08:40

## 2021-08-17 RX ADMIN — NICOTINE POLACRILEX 8 MG: 4 GUM, CHEWING ORAL at 20:12

## 2021-08-17 RX ADMIN — QUETIAPINE FUMARATE 400 MG: 100 TABLET ORAL at 03:48

## 2021-08-17 RX ADMIN — GABAPENTIN 1200 MG: 400 CAPSULE ORAL at 08:41

## 2021-08-17 RX ADMIN — DIAZEPAM 5 MG: 5 TABLET ORAL at 09:09

## 2021-08-17 RX ADMIN — GABAPENTIN 1200 MG: 400 CAPSULE ORAL at 20:09

## 2021-08-17 RX ADMIN — BUPRENORPHINE HCL 4 MG: 2 TABLET SUBLINGUAL at 14:32

## 2021-08-17 RX ADMIN — Medication 1 TABLET: at 12:58

## 2021-08-17 RX ADMIN — NICOTINE POLACRILEX 8 MG: 4 GUM, CHEWING ORAL at 22:25

## 2021-08-17 RX ADMIN — BUPRENORPHINE HCL 4 MG: 2 TABLET SUBLINGUAL at 17:36

## 2021-08-17 RX ADMIN — BUPRENORPHINE HCL 4 MG: 2 TABLET SUBLINGUAL at 13:04

## 2021-08-17 RX ADMIN — QUETIAPINE FUMARATE 400 MG: 400 TABLET ORAL at 21:59

## 2021-08-17 RX ADMIN — NICOTINE POLACRILEX 8 MG: 4 GUM, CHEWING ORAL at 16:21

## 2021-08-17 RX ADMIN — DIAZEPAM 10 MG: 5 TABLET ORAL at 20:09

## 2021-08-17 RX ADMIN — DIAZEPAM 10 MG: 5 TABLET ORAL at 14:35

## 2021-08-17 RX ADMIN — DOCUSATE SODIUM 100 MG: 100 CAPSULE, LIQUID FILLED ORAL at 20:09

## 2021-08-17 RX ADMIN — DIAZEPAM 10 MG: 5 TABLET ORAL at 12:58

## 2021-08-17 RX ADMIN — NICOTINE POLACRILEX 8 MG: 4 GUM, CHEWING ORAL at 13:01

## 2021-08-17 ASSESSMENT — MIFFLIN-ST. JEOR: SCORE: 2103.64

## 2021-08-17 ASSESSMENT — ACTIVITIES OF DAILY LIVING (ADL)
ORAL_HYGIENE: INDEPENDENT
DIFFICULTY_COMMUNICATING: NO
HYGIENE/GROOMING: SHOWER;INDEPENDENT
DRESS: SCRUBS (BEHAVIORAL HEALTH)
LAUNDRY: WITH SUPERVISION
DRESSING/BATHING_DIFFICULTY: NO
TOILETING_ISSUES: NO
DIFFICULTY_EATING/SWALLOWING: NO

## 2021-08-17 NOTE — PROGRESS NOTES
"   08/17/21 1442   Patient Belongings   Did you bring any home meds/supplements to the hospital?  No   Patient Belongings other (see comments)   Patient Belongings Put in Hospital Secure Location (Security or Locker, etc.) other (see comments)   Belongings Search Yes   Clothing Search Yes   Second Staff Jose A     MED ROOM BIN:   \"just hear for the alcohol\" t shirt  SECURITY:   Ebt card  A             Admission:  I am responsible for any personal items that are not sent to the safe or pharmacy.  [facility] is not responsible for loss, theft or damage of any property in my possession.    Signature:  _________________________________ Date: _______  Time: _____                                              Staff Signature:  ____________________________ Date: ________  Time: _____      2nd Staff person, if patient is unable/unwilling to sign:    Signature: ________________________________ Date: ________  Time: _____   Discharge:  [facility] has returned all of my personal belongings:    Signature: _________________________________ Date: ________  Time: _____                                          Staff Signature:  ____________________________ Date: ________  Time: _____     "

## 2021-08-17 NOTE — ED NOTES
[facility] ED Mental Health Handoff Note:       Brief HPI:  This is a 51 year old male signed out to me by Dr. Lindsay.  See initial ED Provider note for full details of the presentation. Interval history is pertinent for no acute issues overnight.    Home meds reviewed and ordered/administered: Yes    Medically stable for inpatient mental health admission: Yes.    Evaluated by mental health: Yes. The recommendation is for inpatient mental health treatment. Bed search in process    Safety concerns: At the time I received sign out, there were no safety concerns.    Hold Status:  Active Orders   Legal    Health Officer Authority to Detain (SHAWNA)     Frequency: Effective Now     Start Date/Time: 08/16/21 1445      Number of Occurrences: Until Specified     Order Comments: This patient presented with circumstances that have led me to be reasonably suspicious that the patient is at significant risk of self-harm. The patient's judgment to this situation appears to be impaired. Given the circumstances in which the patient presented, it is likely that the patient is at significant risk of attempting self harm if this situation is not investigated further. I am highly concerned that the patient is mentally ill and currently cannot safely care for oneself. This represents endangerment to the patient's well-being and safety, and I am placing a Health Officer Authority hold upon the patient at this time.              Exam:   Patient Vitals for the past 24 hrs:   BP Temp Temp src Pulse Resp SpO2 Weight   08/16/21 2245 125/81 -- -- 93 17 100 % --   08/16/21 1945 128/89 -- -- (!) 122 18 100 % --   08/16/21 1900 (!) 168/96 -- -- (!) 129 20 100 % --   08/16/21 1853 -- -- -- (!) 132 16 -- 120.2 kg (264 lb 15.9 oz)   08/16/21 1845 -- -- -- (!) 125 22 -- --   08/16/21 1830 -- -- -- (!) 128 15 -- --   08/16/21 1752 94/61 98.2  F (36.8  C) Oral (!) 134 18 95 % --   08/16/21 1531 -- 98.4  F (36.9  C) Oral (!) 139 18 98 % --    08/16/21 1344 139/78 98.5  F (36.9  C) Oral 67 18 98 % --           ED Course:    Medications   lidocaine 1% with EPINEPHrine 1:100,000 injection 20 mL (has no administration in time range)   diazepam (VALIUM) tablet 5-20 mg (10 mg Oral Given 8/17/21 0304)   QUEtiapine (SEROquel) tablet 400 mg (400 mg Oral Given 8/17/21 0348)   ARIPiprazole (ABILIFY) tablet 2 mg (has no administration in time range)   gabapentin (NEURONTIN) capsule 1,200 mg (1,200 mg Oral Given 8/17/21 0347)   OLANZapine zydis (zyPREXA) ODT tab 10 mg (10 mg Oral Given 8/16/21 1411)   nicotine (NICORETTE) gum 4 mg (2 mg Buccal Given 8/16/21 1539)   nicotine (NICORETTE) gum 4 mg (2 mg Buccal Given 8/16/21 1633)   nicotine (NICORETTE) gum 4 mg (2 mg Buccal Given 8/16/21 1807)   0.9% sodium chloride BOLUS (0 mLs Intravenous Stopped 8/16/21 1933)   sodium chloride 0.9 % 1,000 mL with Infuvite Adult 10 mL, thiamine 100 mg, folic acid 1 mg infusion ( Intravenous Stopped 8/16/21 2311)   diazepam (VALIUM) injection 10 mg (10 mg Intravenous Given 8/16/21 1923)   nicotine (NICORETTE) gum 4 mg (4 mg Buccal Given 8/16/21 2007)   atenolol (TENORMIN) tablet 50 mg (50 mg Oral Given 8/16/21 2007)   0.9% sodium chloride BOLUS (0 mLs Intravenous Stopped 8/17/21 0009)   nicotine (NICORETTE) gum 4 mg (4 mg Buccal Given 8/16/21 2349)   acetaminophen (TYLENOL) tablet 1,000 mg (1,000 mg Oral Given 8/17/21 0348)            There were no significant events during my shift.    Patient was signed out to the oncoming provider, Dr. Ramírez      Impression:    ICD-10-CM    1. Uncomplicated alcohol dependence (H)  F10.20    2. LFT elevation  R79.89    3. Laceration of left hand without foreign body, initial encounter  S61.412A        Plan:    1. Awaiting inpatient mental health admission/transfer.      RESULTS:   Results for orders placed or performed during the hospital encounter of 08/16/21 (from the past 24 hour(s))   Alcohol breath test POCT     Status: Abnormal    Collection  Time: 08/16/21  1:52 PM   Result Value Ref Range    Alcohol Breath Test 0.201 (A) 0.00 - 0.01   -Laceration Repair     Status: None    Collection Time: 08/16/21  2:22 PM    Jr Padilla MD     8/16/2021  4:24 PM  [facility]    -Laceration Repair    Date/Time: 8/16/2021 2:22 PM  Performed by: Jr Smyth MD  Authorized by: Jr Smyth MD     LACERATION DETAILS     Location:  Hand    Hand location:  L hand, dorsum    Length (cm):  2    Depth (mm):  3    REPAIR TYPE:     Repair type:  Simple      EXPLORATION:     Hemostasis achieved with:  Direct pressure    Wound exploration: wound explored through full range of motion and   entire depth of wound probed and visualized      Wound extent: areolar tissue violated      Contaminated: no      TREATMENT:     Area cleansed with:  Saline and soap and water    Amount of cleaning:  Standard    Irrigation solution:  Tap water    Irrigation method:  Tap    Visualized foreign bodies/material removed: no      SKIN REPAIR     Repair method:  Sutures    Suture size:  4-0    Suture material:  Prolene    Suture technique:  Simple interrupted    Number of sutures:  2    APPROXIMATION     Approximation:  Close    POST-PROCEDURE DETAILS     Dressing:  Open (no dressing)      PROCEDURE   Patient Tolerance:  Patient tolerated the procedure well with no immediate   complications     Extra Tube     Status: None    Collection Time: 08/16/21  2:28 PM    Narrative    The following orders were created for panel order Extra Tube.  Procedure                               Abnormality         Status                     ---------                               -----------         ------                     Extra Red Top Tube[[id number]]                               Final result                 Please view results for these tests on the individual orders.   Extra Red Top Tube     Status: None    Collection Time: 08/16/21  2:28 PM    Result Value Ref Range    Hold Specimen [facility]    Ethyl Alcohol Level     Status: Abnormal    Collection Time: 08/16/21  2:33 PM   Result Value Ref Range    Alcohol ethyl 0.22 (H) <=0.01 g/dL   CBC with platelets differential     Status: Abnormal    Collection Time: 08/16/21  2:33 PM    Narrative    The following orders were created for panel order CBC with platelets differential.  Procedure                               Abnormality         Status                     ---------                               -----------         ------                     CBC with platelets and d...[[id number]]  Abnormal            Final result                 Please view results for these tests on the individual orders.   Comprehensive metabolic panel     Status: Abnormal    Collection Time: 08/16/21  2:33 PM   Result Value Ref Range    Sodium 140 133 - 144 mmol/L    Potassium 3.4 3.4 - 5.3 mmol/L    Chloride 104 94 - 109 mmol/L    Carbon Dioxide (CO2) 25 20 - 32 mmol/L    Anion Gap 11 3 - 14 mmol/L    Urea Nitrogen 21 7 - 30 mg/dL    Creatinine 0.75 0.66 - 1.25 mg/dL    Calcium 8.8 8.5 - 10.1 mg/dL    Glucose 103 (H) 70 - 99 mg/dL    Alkaline Phosphatase 95 40 - 150 U/L    AST 63 (H) 0 - 45 U/L    ALT 87 (H) 0 - 70 U/L    Protein Total 8.8 6.8 - 8.8 g/dL    Albumin 4.0 3.4 - 5.0 g/dL    Bilirubin Total 0.5 0.2 - 1.3 mg/dL    GFR Estimate >90 >60 mL/min/1.73m2   CBC with platelets and differential     Status: Abnormal    Collection Time: 08/16/21  2:33 PM   Result Value Ref Range    WBC Count 9.5 4.0 - 11.0 10e3/uL    RBC Count 5.31 4.40 - 5.90 10e6/uL    Hemoglobin 15.9 13.3 - 17.7 g/dL    Hematocrit 47.9 40.0 - 53.0 %    MCV 90 78 - 100 fL    MCH 29.9 26.5 - 33.0 pg    MCHC 33.2 31.5 - 36.5 g/dL    RDW 14.2 10.0 - 15.0 %    Platelet Count 628 (H) 150 - 450 10e3/uL    % Neutrophils 57 %    % Lymphocytes 36 %    % Monocytes 6 %    % Eosinophils 0 %    % Basophils 1 %    % Immature Granulocytes 0 %    NRBCs per 100 WBC 0 <1 /100     Absolute Neutrophils 5.4 1.6 - 8.3 10e3/uL    Absolute Lymphocytes 3.5 0.8 - 5.3 10e3/uL    Absolute Monocytes 0.6 0.0 - 1.3 10e3/uL    Absolute Eosinophils 0.0 0.0 - 0.7 10e3/uL    Absolute Basophils 0.1 0.0 - 0.2 10e3/uL    Absolute Immature Granulocytes 0.0 <=0.0 10e3/uL    Absolute NRBCs 0.0 10e3/uL   Magnesium     Status: Normal    Collection Time: 08/16/21  2:33 PM   Result Value Ref Range    Magnesium 2.2 1.6 - 2.3 mg/dL   Urine Drugs of Abuse Screen     Status: Normal    Collection Time: 08/16/21  2:39 PM    Narrative    The following orders were created for panel order Urine Drugs of Abuse Screen.  Procedure                               Abnormality         Status                     ---------                               -----------         ------                     Drug abuse screen 1 urin...[[id number]]  Normal              Final result                 Please view results for these tests on the individual orders.   Drug abuse screen 1 urine (ED)     Status: Normal    Collection Time: 08/16/21  2:39 PM   Result Value Ref Range    Amphetamines Urine Screen Negative Screen Negative    Barbiturates Urine Screen Negative Screen Negative    Benzodiazepines Urine Screen Negative Screen Negative    Cannabinoids Urine Screen Negative Screen Negative    Cocaine Urine Screen Negative Screen Negative    Opiates Urine Screen Negative Screen Negative   XR Hand Left G/E 3 Views     Status: None    Collection Time: 08/16/21  4:26 PM    Narrative    XR LEFT HAND THREE OR MORE VIEWS   8/16/2021 4:26 PM     HISTORY:  Left hand injury. Pain.      Impression    IMPRESSION: There are 2 small adjacent metallic foreign bodies at the  dorsal aspect of the third distal phalanx base. No acute fracture  identified.    PIOTR MORALEZ MD         SYSTEM ID:  [id number]   Asymptomatic COVID-19 Virus (Coronavirus) by PCR Nasopharyngeal     Status: Normal    Collection Time: 08/16/21  4:27 PM    Specimen: Nasopharyngeal; Swab   Result Value  Ref Range    SARS CoV2 PCR Negative Negative    Narrative    Testing was performed using the carlos  SARS-CoV-2 & Influenza A/B Assay on the carlos  [employer]  System.  This test should be ordered for the detection of SARS-COV-2 in individuals who meet SARS-CoV-2 clinical and/or epidemiological criteria. Test performance is unknown in asymptomatic patients.  This test is for in vitro diagnostic use under the FDA EUA for laboratories certified under CLIA to perform moderate and/or high complexity testing. This test has not been FDA cleared or approved.  A negative test does not rule out the presence of PCR inhibitors in the specimen or target RNA in concentration below the limit of detection for the assay. The possibility of a false negative should be considered if the patient's recent exposure or clinical presentation suggests COVID-19.  [facility] are certified under the Clinical Laboratory Improvement Amendments of 1988 (CLIA-88) as qualified to perform moderate and/or high complexity laboratory testing.   EKG 12-lead, tracing only     Status: None (Preliminary result)    Collection Time: 08/16/21  7:07 PM   Result Value Ref Range    Systolic Blood Pressure  mmHg    Diastolic Blood Pressure  mmHg    Ventricular Rate 134 BPM    Atrial Rate 134 BPM    HI Interval 158 ms    QRS Duration 78 ms     ms    QTc 450 ms    P Axis 54 degrees    R AXIS 0 degrees    T Axis 41 degrees    Interpretation ECG Sinus tachycardia  Otherwise normal ECG                MD Raman Norris, Danelle Ledbetter MD  08/17/21 7444
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
## 2021-08-17 NOTE — PROGRESS NOTES
Vicente,  called unit ([phone]) wanting informations about potential discharge for pt. Pt refused to sign AKIL for facility. This was communicated to Vicente and she was understanding of information.

## 2021-08-17 NOTE — PLAN OF CARE
"Music Therapy Group note    Clinical Hours in session: 0.5    Number of patients in group: 4    Scope of service: psychodynamic     Patient progress: initial encounter    Intervention: Future Self Song/Letter    Goal of group: reflection     Patient response/reaction to treatment intervention(s): Music Therapist shared live, original song example of a letter or song to oneself.  Jose M listened and engaged in written prompt for their own letter afterwards.  He was called out of group for phone call with doctor for half the session, but did return and share his letter with MT after group was over.  He also spoke of his younger daughter dying being the catalyst for much of his drinking, to anesthetize.  Calm and cooperative.  He stated \"it's very important for me to be calm.\"       Shawanda Mora, [facility]  Board-Certified Music Therapist           "

## 2021-08-17 NOTE — ED NOTES
Hollis Barclay  August 16, 2021    Critical Safety Issues: Patient presents in the ED for suicidal ideation with plan and intent.        Current Suicidal Ideation/Self-Injurious Concerns/Methods: Cutting and Ingestion medications      Current or Historical Inappropriate Sexual Behavior: No      Current or Historical Aggression/Homicidal Ideation: Agitation/Hyperactivity, History of Violence and Impaired Self-Control      Triggers: Paranoia    Updated care team: Yes    For additional details see full LMHP assessment.       Nettie Sandoval LPCC

## 2021-08-17 NOTE — H&P
"51 year old man admitted from a group home with need for detox from alcohol and treating opiate withdrawal, after cutting ans stabbing hand with dining utensils.    8/17 1700: Blood pressure 139/79, pulse 106, temperature 97.8  F (36.6  C), temperature source Temporal, resp. rate 16, height 1.93 m (6' 4\"), weight 115.2 kg (254 lb), SpO2 97 %.    Telemed not successful due to technical difficulties, telephone interview occurred.    General impression: Alert.   Affect: fair  Mood: fair    Speech:  Slight dysarthric, rambling and production increased.   Eye contact: na.    Psychomotor behavior: na  Gait: normal by description    Abnormal movements: not assessed  Delusions: charted in the ER, reported today the home is \"bugged\" and he is in a \"beta program\" with testing and survalence.  Hallucinations:  possible    Thoughts: delusional  Associations: intact  Judgement:  poor  Insight: poor  Cognitions: intact in conversation  Memory:  intact in conversation  Orientation: normal    Not suicidal in hospital    Imp: 1.  Bipolar illness mixed severe with psychosis  2.  Opiate dependence with withdrawal  3.  Alcohol dependence with withdrawal, severe, complicated by Bipolar illness.  4.  History of knee injury and head injury    Plan: Stabilize psych by resuming meds,   2.  Valium for alcohol  3.  Subutex for opiates  From the ER:  Jr Smyth MD   Physician   Emergency Medicine   ED Provider Notes       Signed   Date of Service:  8/16/2021  1:20 PM   Creation Time:  8/16/2021  1:50 PM         Procedure Orders   -Laceration Repair [[id number]] ordered by Jr Smyth MD          [facility] EMERGENCY DEPARTMENT ([facility])   August 16, 2021  History           Chief Complaint   Patient presents with     Suicidal       cut hands, stabbed hand with fork      The history is provided by the patient and medical records.      Hollis Barclay is a 51 year old male with PMH significant for " "bipolar disorder, bipolar personality disorder, TBI, chronic pain syndrome on methadone, and polysubstance abuse who presents to the Emergency Department for evaluation of suicidal ideation.  Patient came here via ambulance for aggression towards group home staff and alcohol intoxication.  Patient was using a hunting knife to cut his left hand stating, \"I want to die\".  He also reports that he stuck a fork into that hand as well.  He reports this happened a couple hours ago.  Patient states that he has not been taking his methadone for the past 4-5 days.  He is worried that they are giving him something that is not his medication and he states that \"they are after me I part of some beta program, they bug me, there everywhere\".  He reports that he stopped taking his psychiatric medications a couple days ago.  He states that he got his hand to \"feel something real\".  Patient denies auditory visual hallucinations.  Patient also notes that he has been drinking a half a gallon of vodka a day for the past month.  He states that he has a history of alcohol withdrawal seizures and DTs.     PAST MEDICAL HISTORY:   Past Medical History        Past Medical History:   Diagnosis Date     Alcoholism in remission (H)       Bilateral ACL tear       Bipolar disorder (H)       Borderline personality disorder (H)       Chemical dependency (H)       Chronic back pain       Closed left arm fracture 1985     H/O shoulder surgery       Post concussive encephalopathy       Social anxiety disorder       Social anxiety disorder       TBI (traumatic brain injury) (H)              PAST SURGICAL HISTORY:   Past Surgical History   No past surgical history on file.        Past medical history, past surgical history, medications, and allergies were reviewed with the patient. Additional pertinent items: None     FAMILY HISTORY:   Family History   No family history on file.        SOCIAL HISTORY:   Social History            Tobacco Use     Smoking " status: Former Smoker       Types: Dip, chew, snus or snuff     Smokeless tobacco: Former User       Types: Chew   Substance Use Topics     Alcohol use: Yes      Social history was reviewed with the patient. Additional pertinent items: None             Patient's Medications   New Prescriptions     No medications on file   Previous Medications     ARIPIPRAZOLE (ABILIFY) 2 MG TABLET    Take 1 tablet (2 mg) by mouth daily     DOCUSATE SODIUM (COLACE) 100 MG CAPSULE    Take 1 capsule (100 mg) by mouth 2 times daily     FOLIC ACID (FOLVITE) 1 MG TABLET    TAKE 1 TABLET BY MOUTH DAILY     GABAPENTIN (NEURONTIN) 600 MG TABLET    TAKE 2 TABLETS BY MOUTH THREE TIMES A DAY     IBUPROFEN (ADVIL/MOTRIN) 800 MG TABLET    Take 800 mg by mouth every 8 hours as needed for moderate pain     METHADONE (DOLPHINE) 5 MG/5ML SOLUTION    Take 55 mg by mouth daily From [facility]      QUETIAPINE (SEROQUEL) 400 MG TABLET    Take 1 tablet (400 mg) by mouth At Bedtime   Modified Medications     No medications on file   Discontinued Medications     BUPROPION (WELLBUTRIN) 100 MG TABLET    Take 1 tablet (100 mg) by mouth 2 times daily     METHOCARBAMOL (ROBAXIN) 500 MG TABLET    Take 1 tablet (500 mg) by mouth 4 times daily as needed for muscle spasms     MULTIVITAMIN W/MINERALS (THERA-VIT-M) TABLET    Take 1 tablet by mouth daily     NICOTINE POLACRILEX (NICORETTE) 4 MG GUM    PLACE 2 GUM INSIDE CHEEK EVERY 2 HOURS AS NEEDED FOR SMOKING CESSATION ( MAX 22 PIECE DAILY )     PREDNISONE (DELTASONE) 20 MG TABLET    Take two tablets (= 40mg) each day for 5 (five) days     THIAMINE (B-1) 100 MG TABLET    Take 1 tablet (100 mg) by mouth daily                  Allergies   Allergen Reactions     Hydroxyzine Hives       Previously unreported by patient, this is a new patient declaration as of 5/1/21.     Cucumber Extract         Cucumber the vegable     Nsaids         No problem with oral NSAIDs--Toradol injection itching only.         Review of  Systems   Constitutional: Negative for fever.   HENT: Negative for congestion.    Eyes: Negative for redness.   Respiratory: Negative for shortness of breath.    Cardiovascular: Negative for chest pain.   Gastrointestinal: Negative for abdominal pain.   Genitourinary: Negative for difficulty urinating.   Musculoskeletal: Negative for arthralgias and neck stiffness.   Skin: Positive for wound (left hand). Negative for color change.   Neurological: Negative for headaches.   Psychiatric/Behavioral: Positive for self-injury and suicidal ideas. Negative for confusion.   All other systems reviewed and are negative.           Physical Exam   BP: 139/78  Pulse: 67  Temp: 98.5  F (36.9  C)  Resp: 18  SpO2: 98 %        Physical Exam  Constitutional:       General: He is not in acute distress.     Appearance: He is well-developed. He is not diaphoretic.   HENT:      Head: Normocephalic and atraumatic.      Mouth/Throat:      Pharynx: No oropharyngeal exudate.   Eyes:      General: No scleral icterus.     Extraocular Movements:      Right eye: Nystagmus present.      Left eye: Nystagmus present.      Pupils: Pupils are equal, round, and reactive to light.   Cardiovascular:      Heart sounds: Normal heart sounds.   Pulmonary:      Effort: No respiratory distress.      Breath sounds: Normal breath sounds.   Abdominal:      General: Bowel sounds are normal.      Palpations: Abdomen is soft.      Tenderness: There is no abdominal tenderness.   Musculoskeletal:         General: No tenderness.   Skin:     General: Skin is warm.      Findings: No rash.   Neurological:      Mental Status: He is alert.      GCS: GCS eye subscore is 4. GCS verbal subscore is 5. GCS motor subscore is 6.      Cranial Nerves: Cranial nerve deficit (bilateral, lateral gaze nystagmus) present.   Psychiatric:         Speech: Speech is slurred.            ED Course   1:58 PM  The patient was seen and examined by Jr Smyth MD in Room [address]  [facility]    -Laceration Repair     Date/Time: 8/16/2021 2:22 PM  Performed by: Jr Smyth MD  Authorized by: Jr Smyth MD      LACERATION DETAILS     Location:  Hand    Hand location:  L hand, dorsum    Length (cm):  2    Depth (mm):  3     REPAIR TYPE:     Repair type:  Simple        EXPLORATION:     Hemostasis achieved with:  Direct pressure    Wound exploration: wound explored through full range of motion and entire depth of wound probed and visualized      Wound extent: areolar tissue violated      Contaminated: no       TREATMENT:     Area cleansed with:  Saline and soap and water    Amount of cleaning:  Standard    Irrigation solution:  Tap water    Irrigation method:  Tap    Visualized foreign bodies/material removed: no       SKIN REPAIR     Repair method:  Sutures    Suture size:  4-0    Suture material:  Prolene    Suture technique:  Simple interrupted    Number of sutures:  2     APPROXIMATION     Approximation:  Close     POST-PROCEDURE DETAILS     Dressing:  Open (no dressing)        PROCEDURE   Patient Tolerance:  Patient tolerated the procedure well with no immediate complications                   The medical record was reviewed and interpreted.  Current labs reviewed and interpreted.  Previous labs reviewed and interpreted.                     Results for orders placed or performed during the hospital encounter of 08/16/21 (from the past 24 hour(s))   Alcohol breath test POCT   Result Value Ref Range     Alcohol Breath Test 0.201 (A) 0.00 - 0.01   Extra Tube     Narrative     The following orders were created for panel order Extra Tube.  Procedure                               Abnormality         Status                     ---------                               -----------         ------                     Extra Red Top Tube[[id number]]                               Final result                  Please view results for these tests on the individual orders.    Extra Red Top Tube   Result Value Ref Range     Hold Specimen [facility]     Ethyl Alcohol Level   Result Value Ref Range     Alcohol ethyl 0.22 (H) <=0.01 g/dL   CBC with platelets differential     Narrative     The following orders were created for panel order CBC with platelets differential.  Procedure                               Abnormality         Status                     ---------                               -----------         ------                     CBC with platelets and d...[[id number]]  Abnormal            Final result                  Please view results for these tests on the individual orders.   Comprehensive metabolic panel   Result Value Ref Range     Sodium 140 133 - 144 mmol/L     Potassium 3.4 3.4 - 5.3 mmol/L     Chloride 104 94 - 109 mmol/L     Carbon Dioxide (CO2) 25 20 - 32 mmol/L     Anion Gap 11 3 - 14 mmol/L     Urea Nitrogen 21 7 - 30 mg/dL     Creatinine 0.75 0.66 - 1.25 mg/dL     Calcium 8.8 8.5 - 10.1 mg/dL     Glucose 103 (H) 70 - 99 mg/dL     Alkaline Phosphatase 95 40 - 150 U/L     AST 63 (H) 0 - 45 U/L     ALT 87 (H) 0 - 70 U/L     Protein Total 8.8 6.8 - 8.8 g/dL     Albumin 4.0 3.4 - 5.0 g/dL     Bilirubin Total 0.5 0.2 - 1.3 mg/dL     GFR Estimate >90 >60 mL/min/1.73m2   CBC with platelets and differential   Result Value Ref Range     WBC Count 9.5 4.0 - 11.0 10e3/uL     RBC Count 5.31 4.40 - 5.90 10e6/uL     Hemoglobin 15.9 13.3 - 17.7 g/dL     Hematocrit 47.9 40.0 - 53.0 %     MCV 90 78 - 100 fL     MCH 29.9 26.5 - 33.0 pg     MCHC 33.2 31.5 - 36.5 g/dL     RDW 14.2 10.0 - 15.0 %     Platelet Count 628 (H) 150 - 450 10e3/uL     % Neutrophils 57 %     % Lymphocytes 36 %     % Monocytes 6 %     % Eosinophils 0 %     % Basophils 1 %     % Immature Granulocytes 0 %     NRBCs per 100 WBC 0 <1 /100     Absolute Neutrophils 5.4 1.6 - 8.3 10e3/uL     Absolute Lymphocytes 3.5 0.8 - 5.3 10e3/uL     Absolute Monocytes 0.6 0.0 - 1.3 10e3/uL     Absolute Eosinophils 0.0 0.0 - 0.7 10e3/uL      Absolute Basophils 0.1 0.0 - 0.2 10e3/uL     Absolute Immature Granulocytes 0.0 <=0.0 10e3/uL     Absolute NRBCs 0.0 10e3/uL   Urine Drugs of Abuse Screen     Narrative     The following orders were created for panel order Urine Drugs of Abuse Screen.  Procedure                               Abnormality         Status                     ---------                               -----------         ------                     Drug abuse screen 1 urin...[[id number]]  Normal              Final result                  Please view results for these tests on the individual orders.   Drug abuse screen 1 urine (ED)   Result Value Ref Range     Amphetamines Urine Screen Negative Screen Negative     Barbiturates Urine Screen Negative Screen Negative     Benzodiazepines Urine Screen Negative Screen Negative     Cannabinoids Urine Screen Negative Screen Negative     Cocaine Urine Screen Negative Screen Negative     Opiates Urine Screen Negative Screen Negative      Medications   lidocaine 1% with EPINEPHrine 1:100,000 injection 20 mL (has no administration in time range)   lidocaine 1% with EPINEPHrine 1:100,000 1 %-1:095920 injection (has no administration in time range)   LORazepam (ATIVAN) tablet 1-4 mg (2 mg Oral Given 8/16/21 1608)   OLANZapine zydis (zyPREXA) ODT tab 10 mg (10 mg Oral Given 8/16/21 1411)   nicotine (NICORETTE) gum 4 mg (2 mg Buccal Given 8/16/21 1539)            Assessments & Plan (with Medical Decision Making)   51-year-old male who presents with self-injurious behavior and claims of hallucinations.  Differential includes substance-induced mood disorder, psychosis, other primary mental illness, withdrawal.  Exam reveals a left hand laceration and stigmata consistent with alcohol abuse and intoxication.  Laboratories reveal a CBC and comprehensive metabolic panel that are grossly unremarkable with exception of elevations in ALT, AST and platelet count of 620.  Alcohol level is elevated at 0.20.  Urine  toxicology screen is negative.  Ethanol level was elevated at 0.22.  Patient was placed on MSSA protocol.  The case was discussed at length with chemical dependency intake and the patient will be admitted to detox for further evaluation and management.     I have reviewed the nursing notes.     I have reviewed the findings, diagnosis, plan and need for follow up with the patient.         New Prescriptions     No medications on file         Final diagnoses:   Uncomplicated alcohol dependence (H)   LFT elevation   Laceration of left hand without foreign body, initial encounter      I, Pedro Benjamin, am serving as a trained medical scribe to document services personally performed by Jr Smyth MD, based on the provider's statements to me.      I, Jr Smyth MD, was physically present and have reviewed and verified the accuracy of this note documented by Pedro Benjamin.     Jr Smyth MD     8/16/2021   [facility] EMERGENCY DEPARTMENT     Jr Smyth MD  08/16/21 [id number]
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
## 2021-08-18 ENCOUNTER — APPOINTMENT (OUTPATIENT)
Dept: GENERAL RADIOLOGY | Facility: CLINIC | Age: 51
End: 2021-08-18
Attending: NURSE PRACTITIONER
Payer: MEDICAID

## 2021-08-18 LAB — CRP SERPL-MCNC: <2.9 MG/L (ref 0–8)

## 2021-08-18 PROCEDURE — 124N000001 HC R&B MH

## 2021-08-18 PROCEDURE — 99207 PR CONSULT E&M CHANGED TO INITIAL LEVEL: CPT | Performed by: NURSE PRACTITIONER

## 2021-08-18 PROCEDURE — 99222 1ST HOSP IP/OBS MODERATE 55: CPT | Performed by: NURSE PRACTITIONER

## 2021-08-18 PROCEDURE — 73562 X-RAY EXAM OF KNEE 3: CPT | Mod: 26 | Performed by: RADIOLOGY

## 2021-08-18 PROCEDURE — 128N000001 HC R&B CD/MH ADULT

## 2021-08-18 PROCEDURE — 250N000013 HC RX MED GY IP 250 OP 250 PS 637: Performed by: NURSE PRACTITIONER

## 2021-08-18 PROCEDURE — 73562 X-RAY EXAM OF KNEE 3: CPT | Mod: RT

## 2021-08-18 PROCEDURE — 250N000013 HC RX MED GY IP 250 OP 250 PS 637: Performed by: PSYCHIATRY & NEUROLOGY

## 2021-08-18 PROCEDURE — 250N000013 HC RX MED GY IP 250 OP 250 PS 637: Performed by: EMERGENCY MEDICINE

## 2021-08-18 RX ORDER — GABAPENTIN 400 MG/1
1200 CAPSULE ORAL 4 TIMES DAILY
Status: DISCONTINUED | OUTPATIENT
Start: 2021-08-18 | End: 2021-08-23 | Stop reason: HOSPADM

## 2021-08-18 RX ORDER — DOXYCYCLINE 100 MG/1
100 CAPSULE ORAL EVERY 12 HOURS SCHEDULED
Status: COMPLETED | OUTPATIENT
Start: 2021-08-18 | End: 2021-08-22

## 2021-08-18 RX ORDER — QUETIAPINE FUMARATE 200 MG/1
200 TABLET, FILM COATED ORAL
Status: DISCONTINUED | OUTPATIENT
Start: 2021-08-18 | End: 2021-08-20

## 2021-08-18 RX ORDER — LIDOCAINE 4 G/G
1-3 PATCH TOPICAL
Status: DISCONTINUED | OUTPATIENT
Start: 2021-08-18 | End: 2021-08-23 | Stop reason: HOSPADM

## 2021-08-18 RX ORDER — PROPRANOLOL HYDROCHLORIDE 10 MG/1
10 TABLET ORAL 4 TIMES DAILY
Status: DISCONTINUED | OUTPATIENT
Start: 2021-08-18 | End: 2021-08-23 | Stop reason: HOSPADM

## 2021-08-18 RX ADMIN — NICOTINE POLACRILEX 8 MG: 4 GUM, CHEWING ORAL at 21:56

## 2021-08-18 RX ADMIN — PROPRANOLOL HYDROCHLORIDE 10 MG: 10 TABLET ORAL at 10:51

## 2021-08-18 RX ADMIN — DOXYCYCLINE 100 MG: 100 CAPSULE ORAL at 20:30

## 2021-08-18 RX ADMIN — Medication 1 TABLET: at 08:27

## 2021-08-18 RX ADMIN — PROPRANOLOL HYDROCHLORIDE 10 MG: 10 TABLET ORAL at 16:10

## 2021-08-18 RX ADMIN — FOLIC ACID 1 MG: 1 TABLET ORAL at 08:27

## 2021-08-18 RX ADMIN — DOXYCYCLINE 100 MG: 100 CAPSULE ORAL at 10:50

## 2021-08-18 RX ADMIN — QUETIAPINE 200 MG: 200 TABLET, FILM COATED ORAL at 21:56

## 2021-08-18 RX ADMIN — GABAPENTIN 1200 MG: 400 CAPSULE ORAL at 08:27

## 2021-08-18 RX ADMIN — BUPRENORPHINE HCL 4 MG: 2 TABLET SUBLINGUAL at 20:30

## 2021-08-18 RX ADMIN — THIAMINE HCL TAB 100 MG 100 MG: 100 TAB at 08:26

## 2021-08-18 RX ADMIN — DIAZEPAM 10 MG: 5 TABLET ORAL at 08:31

## 2021-08-18 RX ADMIN — BUPRENORPHINE HCL 4 MG: 2 TABLET SUBLINGUAL at 08:26

## 2021-08-18 RX ADMIN — ARIPIPRAZOLE 2 MG: 2 TABLET ORAL at 08:27

## 2021-08-18 RX ADMIN — DIAZEPAM 10 MG: 5 TABLET ORAL at 01:28

## 2021-08-18 RX ADMIN — DOCUSATE SODIUM 100 MG: 100 CAPSULE, LIQUID FILLED ORAL at 20:30

## 2021-08-18 RX ADMIN — GABAPENTIN 1200 MG: 400 CAPSULE ORAL at 20:30

## 2021-08-18 RX ADMIN — DIAZEPAM 10 MG: 5 TABLET ORAL at 16:10

## 2021-08-18 RX ADMIN — NICOTINE POLACRILEX 8 MG: 4 GUM, CHEWING ORAL at 10:55

## 2021-08-18 RX ADMIN — BUPRENORPHINE HCL 4 MG: 2 TABLET SUBLINGUAL at 16:11

## 2021-08-18 RX ADMIN — DIAZEPAM 10 MG: 5 TABLET ORAL at 03:58

## 2021-08-18 RX ADMIN — NICOTINE POLACRILEX 8 MG: 4 GUM, CHEWING ORAL at 14:33

## 2021-08-18 RX ADMIN — NICOTINE POLACRILEX 8 MG: 4 GUM, CHEWING ORAL at 16:11

## 2021-08-18 RX ADMIN — DOCUSATE SODIUM 100 MG: 100 CAPSULE, LIQUID FILLED ORAL at 08:27

## 2021-08-18 RX ADMIN — ACETAMINOPHEN 650 MG: 325 TABLET, FILM COATED ORAL at 08:26

## 2021-08-18 RX ADMIN — DIAZEPAM 10 MG: 5 TABLET ORAL at 10:54

## 2021-08-18 RX ADMIN — NICOTINE POLACRILEX 8 MG: 4 GUM, CHEWING ORAL at 20:50

## 2021-08-18 RX ADMIN — GABAPENTIN 1200 MG: 400 CAPSULE ORAL at 16:10

## 2021-08-18 RX ADMIN — PROPRANOLOL HYDROCHLORIDE 10 MG: 10 TABLET ORAL at 20:30

## 2021-08-18 RX ADMIN — GABAPENTIN 1200 MG: 400 CAPSULE ORAL at 10:51

## 2021-08-18 RX ADMIN — NICOTINE POLACRILEX 8 MG: 4 GUM, CHEWING ORAL at 08:26

## 2021-08-18 RX ADMIN — BUPRENORPHINE HCL 4 MG: 2 TABLET SUBLINGUAL at 11:47

## 2021-08-18 RX ADMIN — ACETAMINOPHEN 650 MG: 325 TABLET, FILM COATED ORAL at 03:48

## 2021-08-18 RX ADMIN — ACETAMINOPHEN 650 MG: 325 TABLET, FILM COATED ORAL at 14:33

## 2021-08-18 ASSESSMENT — ACTIVITIES OF DAILY LIVING (ADL)
HYGIENE/GROOMING: INDEPENDENT
DRESS: INDEPENDENT
ORAL_HYGIENE: INDEPENDENT

## 2021-08-18 NOTE — PROGRESS NOTES
PDMP as of 8/18/2021:     Hollis Barclay  Risk Indicators  NARX SCORES  Narcotic  080  Sedative  050  Stimulant  000  Explanation and GuidanceOVERDOSE RISK SCORE 200  (Range 000-999)  Explanation and Guidance  ADDITIONAL RISK INDICATORS ( 0 )  Explanation and GuidanceThis NarxCare report is based on search criteria supplied and the data entered by the dispensing pharmacy. For more information about any prescription, please contact the dispensing pharmacy or the prescriber. NarxCare scores and reports are intended to aid, not replace, medical decision making. None of the information presented should be used as sole justification for providing or refusing to provide medications. The information on this report is not warranted as accurate or complete.  Graphs  RX GRAPH   Narcotic  Buprenorphine  Sedative  Stimulant  Other  All Prescribers  Prescribers  4 - Jose A Velasco  3 - Ben Monroe MD  2 - Jr Chong  1 - NAA Broussard  Timeline  08/18  2m  6m  1y  2y  Buprenorphine mg  16  4  0  28  Timeline  08/18  2m  6m  1y  2y  Morphine MgEq (MME)  200  80  0  320  Timeline  08/18  2m  6m  1y  2y  Lorazepam MgEq (LME)  10  2  0  18  Timeline  08/18  2m  6m  1y  2y  *Per CDC guidance, the MME conversion factors prescribed or provided as part of the medication-assisted treatment for opioid use disorder should not be used to benchmark against dosage thresholds meant for opioids prescribed for pain. Buprenorphine products have no agreed upon morphine equivalency, and as partial opioid agonists, are not expected to be associated with overdose risk in the same dose-dependent manner as doses for full agonist opioids. MME = morphine milligram equivalents. LME = Lorazepam milligram equivalents. mg = dose in milligrams.  Summary  Summary  Total Prescriptions:  14  Total Prescribers:  4  Total Pharmacies:  3  Narcotics* (excluding Buprenorphine)  Current Qty:  0  Current MME/day:  0.00  30 Day Avg  MME/day:  2.50  Sedatives*  Current Qty:  0  Current LME/day:  0.00  30 Day Avg LME/day:  0.00  Buprenorphine*  Current Qty:  0  Current mg/day:  0.00  30 Day Avg mg/day:  0.00  Rx Data  PRESCRIPTIONS  Total Prescriptions: 14  Total Private Pay: 0  Fill Date ID Written Sold Drug Qty Days Prescriber Rx # Pharmacy Refill Daily Dose * Pymt Type   08/09/2021 2 08/09/2021 08/09/2021 Oxycodone Hcl 5 Mg Tablet  10.00 3  [facility] [id number] (7799) 0/0 25.00 MME [place]  08/04/2021 7 08/04/2021  Gabapentin 600 Mg Tablet  180.00 30 Ma Shaina [id number] Gen (0831) 0/0  [place]icaid [place]  07/05/2021 6 03/25/2021  Gabapentin 600 Mg Tablet  180.00 30 St Sto [id number] Gen (0831) 3/3  [place]  06/18/2021 1 06/18/2021 06/18/2021 Gabapentin 600 Mg Tablet  90.00 15 [facility] [id number] All (0577) 0/0  [place]icaid [place]  05/20/2021 5 03/25/2021  Gabapentin 600 Mg Tablet  180.00 30 St Sto [id number] Gen (0831) 2/3  [place]  04/22/2021 4 03/25/2021  Gabapentin 600 Mg Tablet  180.00 30 St Sto [id number] Gen (0831) 1/3  [place]  03/29/2021 4 03/25/2021  Gabapentin 600 Mg Tablet  180.00 30 St Sto [id number] Gen (0831) 0/3  [place]  02/25/2021 4 10/05/2020  Gabapentin 600 Mg Tablet  180.00 30 St Sto [id number] Gen (0831) 5/5  [place]  01/27/2021 4 10/05/2020  Gabapentin 600 Mg Tablet  180.00 30 St Sto [id number] Gen (0831) 4/5  [place]  12/30/2020 4 10/05/2020  Gabapentin 600 Mg Tablet  180.00 30 St Sto [id number] Gen (0831) 3/4  [place]  12/04/2020 4 10/05/2020  Gabapentin 600 Mg Tablet  180.00 30 St Sto [id number] Gen (0831) 2/3  [place]  11/10/2020 4 10/05/2020  Gabapentin 600 Mg Tablet  180.00 30 St Sto [id number] Gen (0831) 1/2  [place]  10/15/2020 3 10/05/2020  Gabapentin 600 Mg Tablet  180.00 30 St Sto [id number] Gen (0831) 0/1  [place]  09/14/2020 3 07/20/2020  Gabapentin 600 Mg Tablet  180.00 30 St Sto [id number] Gen (0831) 1/1  [place]  *Per CDC guidance, the MME conversion factors prescribed or provided as part of the medication-assisted  treatment for opioid use disorder should not be used to benchmark against dosage thresholds meant for opioids prescribed for pain. Buprenorphine products have no agreed upon morphine equivalency, and as partial opioid agonists, are not expected to be associated with overdose risk in the same dose-dependent manner as doses for full agonist opioids. MME = morphine milligram equivalents. LME = Lorazepam milligram equivalents. mg = dose in milligrams.  Providers  Total Providers: 4  Name Address [facility] [place] Zipcode Phone  Diane Lucero,  [address] [place] [address] [place] [address] [phone]  Ben Monroe MD [address] [facility] [id number] [phone]  Jr Giron  [address] [facility] [address]84[address] [phone]  Jose A Velasco [address] [facility] [id number] [phone]  Pharmacies  Total Pharmacies: 3  Name Address [facility] Zipcode Phone  [facility] [phone]) [address] [place] [address] [phone]  [employer] [phone]) [address] [facility] [address] [phone]  [facility] (3826) [address] [place] [address] [phone]  The report provided is based upon the search criteria entered and the corresponding data as it has been reported by dispenser(s). If erroneous information is identified or additional information is needed, please contact the dispenser or the prescriber provided on the report. Date Sold signifies the date the prescription was sold (left the pharmacy). The absence of Date Sold does not necessarily indicate the prescription was not dispensed. Fill Date represents the date the medication was filled or prepared by the pharmacy. Note, federal regulation (CFR Title 42: Part 2) requires patient consent prior to releasing certain patient data from federally funded opioid treatment programs (OTPs). As such, controlled substances dispensed from OTPs for medication-assisted treatment may not  appear in the [place]  report. Morphine milligram equivalent (MME) conversion factors published by the CDC are used in the MME calculation. Per the CDC, the MME conversion factor is intended only for analytic purposes where prescription data are used to retrospectively calculate daily MME to inform analyses of risks associated with opioid prescribing. This value does not constitute clinical guidance or recommendations for converting patients from one form of opioid analgesic to another. Per the CDC, the conversion factors for drugs prescribed or provided, as part of medication-assisted treatment for opioid use disorder should not be used to benchmark against MME dosage thresholds meant for opioids prescribed for pain. Buprenorphine products listed in the CDC s MME file do not have an associated conversion factor. Lastly, the CDC notes, in clinical practice, calculating MME for methadone often involves a sliding-scale approach, whereby the conversion factor increases with increasing dose. The conversion factor of 3 for methadone presented in this file could underestimate MME for a given patient. This report contains confidential information, including patient identifiers, and is not a public record. The information on this report must be treated as protected health information and is only to be disclosed to others as authorized by applicable state and Federal regulations.

## 2021-08-18 NOTE — PROGRESS NOTES
"Met with pt to discuss aftercare plans. Pt appeared to have delusions regarding his sober home, reports sober home is doing poor business but unable to discuss details specifically. Pt's speech was garbled and at times word salad. Pt's eye's were rolling into the back of his head. Writer asked pt if he had just been medicated, pt stated no and that he was waiting for medications but having a difficult time with anxiety. Pt was aprehensive to sign AKIL for group home but pt did eventually sign under the condition that all to be discussed was return back to group home, no sharing of medical records or additional information. Pt did appear to be paranoid stating \"don't tell them what I am saying, they don't want the  involved.\" It was difficult to understand what pt's needs were, but pt does appear to be agreeable to return back to group [facility]. Contact is Vicente: [phone] extension 2.   CM waiting for return call from [facility] to obtain additional collateral and information.    Update: Spoke with Vicente who reports pt can return to group home. Pt will need to be sent via cab to [facility], address in [place]. Vicente is aware pt will most likely discharge on 8/19/ or 8/20. Pt and treatment team updated.  "

## 2021-08-18 NOTE — PROGRESS NOTES
"SPIRITUAL HEALTH SERVICES  SPIRITUAL ASSESSMENT Progress Note  [facility] ([facility]) 3AW     REFERRAL SOURCE: Pt request for spiritual care on admission.    Check-in visit with Jose M in the TV lounge.  Jose M began by stating his higher power is \"Marcello Rc my Lord\" and how important his leena has been throughout his life.  Jose M mentioned his mother as a good source of support.    Jose M was garbled in his speech and nodded off in mid-sentence during our conversation multiple times and was agreeable to visiting later when he feels better. I shared brief prayer.    Plan: I will follow up with Jose M later today or tomorrow.    Liss Kerns MDiv  Associate   Pager [phone]  Office [phone]  [facility] remains available 24/7 for emergent requests/referrals, either by having the switchboard page the on-call  or by entering an ASAP/STAT consult in Epic (this will also page the on-call ). Routine Epic consults receive an initial response within 24 hours.    "

## 2021-08-18 NOTE — PLAN OF CARE
Behavioral Team Discussion: (8/18/2021)    Continued Stay Criteria/Rationale: Patient admitted for alcohol withdrawal, complicated by suicidal ideation, aggression, polysubstance abuse, bipolar disorder, borderline personality disorder, TBI.  Plan: The following services will be provided to the patient; psychiatric assessment, medication management, therapeutic milieu, individual and group support, and skills groups.   Participants: 3A Provider: Dr. Ulises Perez MD; 3A RN's: Dmitry Waller RN; 3A CM's: Opal Neville [facility], Lima Grimes MA [facility] and Babita Bedoya [facility]   Summary/Recommendation: Providers will assess today for treatment recommendations, discharge planning, and aftercare plans. CM will meet with pt for discharge planning.   Medical/Physical: Internal medicine consult to be completed 8/18/2021.  Precautions:   Behavioral Orders   Procedures     Code 1 - Restrict to Unit     Discontinue 1:1 attendant for suicide risk     Order Specific Question:   I have performed an in person assessment of the patient     Answer:   Based on this assessment the patient no longer requires a one on one attendant at this point in time.     Routine Programming     As clinically indicated     Status 15     Every 15 minutes.     Suicide precautions     Patients on Suicide Precautions should have a Combination Diet ordered that includes a Diet selection(s) AND a Behavioral Tray selection for Safe Tray - with utensils, or Safe Tray - NO utensils       Withdrawal precautions     Rationale for change in precautions or plan: N/A  Progress: No Change.

## 2021-08-18 NOTE — CONSULTS
Internal Medicine Consult - Initial Visit       Hollis Barclay MRN# [id number]   YOB: 1970 Age: 51 year old   Date of Admission: 8/16/2021  PCP: Jr Giron  Date of Service: 8/18/2021    Referring Provider: Ulises Perez MD  Reason for Consult: Medical co-management of detox          Assessment and Recommendations:   Hollis Barclay is a 51 year old male with a history of TBI, alcohol use disorder, polysubstance abuse, chronic pain on methadone, bipolar disorder, schizophrenia, depression, and anxiety admitted to station 3A for alcohol withdrawal and detox and possible suicidal ideation.      # Alcohol withdrawal, hx of alcohol use disorder - MSSA 8 this shift.  Reports hx of withdrawal seizures.    - Seizure precautions   - Continue MSSA   - Folvite, multi-vites, thiamine supplementation   - Further management per Psychiatry     # Self inflicted L hand wound w/ possible early cellulitis  # Multiple superficial lacerations   Pt stabbed dorsal surface of L hand with a fork and multiple small lac w/ knife.  S/p simple prolene sutures x 2 in the ED on 8/16.  Today noted to have swelling and serous drainage from two puncture sites.  No fevers, WBC 9.5.    - Start doxycycline 100mg BID x 5 days   - Ibuprofen PRN for pain, can also try cold packs   - Elevated hand when resting   - Monitor closely   - CRP, CBC in AM   - Sutures out in 7-10 days     # Elevated LFTs - Likely 2/2 alcohol use, though ALT > AST.  Denies abdominal pain.  - Check CMP in 1-2 days to ensure downtrend     # Thrombocytosis - Plt 628.  Likely reactive in setting of hand wound and possible cellulitis.    - Check CRP   - Repeat in 1-2 days to ensure downtrend    # R knee pain and swelling - New this admission.  Hx of lower extremity swelling, but no hx DVT/VTE.  Unsure of any trauma to the leg.  Previous US in 4/2021 w/ moderate knee joint effusion in suprapatellar space.      # Chronic pain - Previously on Methadone, but  per chart, has not taken in last 4-5 days to admission.  Normal QTc on EKG.   - Defer to primary team to resume methadone     # Schizophrenia, bipolar disorder, depression, anxiety - Per chart review, had stopped taking his psychiatric medications several days prior to admission.  On exam, he has garbled, non-sensical speech about his psychiatric illness but can describe medical issues and concerns.  Verbalized that he felt depressed and suicidal at his group home.   - Defer to Psychiatry     Medicine will continue to follow along to review repeat labs.  Recommendations relayed to primary team via this progress note.  Thank you for the opportunity to be involved in this patient's care.      Sherie Gleason, CNP, APRN  Internal Medicine [facility] Hospitalist  [facility]  Pager [phone]           History of Present Illness:   History is obtained from the patient and medical record.     This patient is a 51 year old male with a history of TBI, alcohol use disorder, polysubstance abuse, chronic pain on methadone, bipolar disorder, schizophrenia, depression, and anxiety admitted to station 3A for alcohol withdrawal and detox and possible suicidal ideation.        Internal Medicine service was asked to see patient for medical co-management of detox.  Jose M is seen in his room.  He reports left hand pain after stabbing himself with a fork and making multiple small lacerations with a knife.  He also reports bilateral knee pain right worse than left as well as swelling.  He is not known to have any history of DVTs.  Reports some difficulty with standing.  He denies abdominal pain, chest pain, shortness of breath, and fevers.  Eating and drinking okay.          Review of Systems:   A 10 point ROS was performed and negative unless otherwise noted in HPI.           Past Medical History:   Reviewed and updated in Epic.  Past Medical History:   Diagnosis Date     Alcoholism in remission (H)      Bilateral ACL  "tear      Bipolar disorder (H)      Borderline personality disorder (H)      Chemical dependency (H)      Chronic back pain      Closed left arm fracture 1985     H/O shoulder surgery      Post concussive encephalopathy      Social anxiety disorder      Social anxiety disorder      TBI (traumatic brain injury) (H)              Past Surgical History:   Reviewed and updated in Epic.  No past surgical history on file.          Social History:   Reviewed and updated in [facility].  Social History     Socioeconomic History     Marital status:      Spouse name: Not on file     Number of children: Not on file     Years of education: Not on file     Highest education level: Not on file   Occupational History     Not on file   Tobacco Use     Smoking status: Former Smoker     Types: Dip, chew, snus or snuff     Smokeless tobacco: Former User     Types: Chew   Substance and Sexual Activity     Alcohol use: Yes     Drug use: Not Currently     Sexual activity: Not on file   Other Topics Concern     Not on file   Social History Narrative    1/31/2020:  Jose M is now living in an apartment with one roommate in [place].  He is happy with his current living situation.        11/4/2019: He is living in a full house with 8 people, which has been stressful.  He had an interview for an apartment 3 weeks ago, and expects to hear from them soon.        10/11/2019    Jose M is , and has 2 living children who are adults.  He had a daughter, who passed away.  He developed issues with drugs and alcohol in his late 30s.  He is now in [facility] residence with Nurse assistance. Previously was receiving treatment at [facility] [place], and is also in a methadone program at [facility].  He grew up in [place], [place] outside of [place].  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  He is currently receiving treatment at a [facility] [place] program, and will then move to a [facility] in the next few " months.     Social Determinants of Health     Financial Resource Strain:      Difficulty of Paying Living Expenses:    Food Insecurity:      Worried About Running Out of Food in the Last Year:      Ran Out of Food in the Last Year:    Transportation Needs:      Lack of Transportation (Medical):      Lack of Transportation (Non-Medical):    Physical Activity:      Days of Exercise per Week:      Minutes of Exercise per Session:    Stress:      Feeling of Stress :    Social Connections:      Frequency of Communication with Friends and Family:      Frequency of Social Gatherings with Friends and Family:      Attends Judaism Services:      Active Member of Clubs or Organizations:      Attends Club or Organization Meetings:      Marital Status:    Intimate Partner Violence:      Fear of Current or Ex-Partner:      Emotionally Abused:      Physically Abused:      Sexually Abused:               Family History:   Reviewed and updated in Epic.  No family history on file.          Allergies:     Allergies   Allergen Reactions     Hydroxyzine Hives     Previously unreported by patient, this is a new patient declaration as of 5/1/21.     Cucumber Extract      Cucumber the vegable     Nsaids      No problem with oral NSAIDs--Toradol injection itching only.             Medications:     Current Facility-Administered Medications   Medication     acetaminophen (TYLENOL) tablet 650 mg     alum & mag hydroxide-simethicone (MAALOX) suspension 30 mL     ARIPiprazole (ABILIFY) tablet 2 mg     atenolol (TENORMIN) tablet 50 mg     buprenorphine (SUBUTEX) sublingual tablet 4 mg     diazepam (VALIUM) tablet 5-20 mg     docusate sodium (COLACE) capsule 100 mg     doxycycline hyclate (VIBRAMYCIN) capsule 100 mg     folic acid (FOLVITE) tablet 1 mg     gabapentin (NEURONTIN) capsule 1,200 mg     lidocaine 1% with EPINEPHrine 1:100,000 injection 20 mL     multivitamin w/minerals (THERA-VIT-M) tablet 1 tablet     naloxone (NARCAN) injection 0.2  "mg    Or     naloxone (NARCAN) injection 0.4 mg    Or     naloxone (NARCAN) injection 0.2 mg    Or     naloxone (NARCAN) injection 0.4 mg     nicotine (NICORETTE) gum 4-8 mg     ondansetron (ZOFRAN-ODT) ODT tab 4 mg     propranolol (INDERAL) tablet 10 mg     QUEtiapine (SEROquel) tablet 200 mg     thiamine (B-1) tablet 100 mg            Physical Exam:   Blood pressure 127/86, pulse 88, temperature 97.9  F (36.6  C), temperature source Temporal, resp. rate 16, height 1.93 m (6' 4\"), weight 115.2 kg (254 lb), SpO2 100 %.  Body mass index is 30.92 kg/m .    GENERAL: Drowsy.  Well nourished, well developed.  No acute distress.    HEENT: Normocephalic, atraumatic. Anicteric sclera. Mucous membranes moist.   CV: RRR. S1, S2. No murmurs appreciated.   RESPIRATORY: Effort normal on room air. Lungs CTAB with no wheezing, rales, or rhonchi.   GI: Abdomen soft and non distended, bowel sounds present x all 4 quadrants. No tenderness, rebound, or guarding.   NEUROLOGICAL: No focal deficits. Follows commands.  Strength equal in upper and lower extremities.   MUSCULOSKELETAL: R knee w/ posterior swelling. Moves all extremities.   EXTREMITIES: No gross deformities. No peripheral edema. Intact bilateral pedal pulses.   SKIN: Grossly warm, dry, and intact. No jaundice. Dorsal surface L hand with multiple superficial lacerations two deeper puncture wounds with clear drainage, no pus.  Small healing wound on L knee.              Data:   I personally reviewed the following studies:    ROUTINE IP LABS (Last four results)  CMP Recent Labs   Lab 08/16/21  1433      POTASSIUM 3.4   CHLORIDE 104   CO2 25   ANIONGAP 11   *   BUN 21   CR 0.75   KACY 8.8   MAG 2.2   PROTTOTAL 8.8   ALBUMIN 4.0   BILITOTAL 0.5   ALKPHOS 95   AST 63*   ALT 87*     CBC   Recent Labs   Lab 08/16/21  1433   WBC 9.5   RBC 5.31   HGB 15.9   HCT 47.9   MCV 90   MCH 29.9   MCHC 33.2   RDW 14.2   *     INR No lab results found in last 7 " days.      Unresulted Labs Ordered in the Past 30 Days of this Admission     No orders found from 7/17/2021 to 8/17/2021.

## 2021-08-18 NOTE — PLAN OF CARE
"  Problem: Substance Withdrawal  Goal: Substance Withdrawal  Description: Signs and symptoms of listed problems will be absent or manageable.  Outcome: No Change     Pt was visible in the milieu.  He stated he was able to eat all of his dinner. MSSA score of 9 and 9 given 10 mg of valium x 2. Pt states he is having VH of \"floater/spots.\" He stated he is \"sick and tired of life, but feel safe here.\" Pt wants to go to outpatient treatment after detox. COWs score of 13 and 13 given subutex 4mg x 2 during shift. Pt denies any other concerns during shift. Will continue to monitor.   "

## 2021-08-18 NOTE — PROGRESS NOTES
"Patient seen, chart reviewed, care discussed with staff.  Blood pressure 127/86, pulse 88, temperature 97.9  F (36.6  C), temperature source Temporal, resp. rate 16, height 1.93 m (6' 4\"), weight 115.2 kg (254 lb), SpO2 100 %.    General appearance: distressed  Alert.   Affect: anxious,   Mood: distressed, a bit intense  Speech:  normal.   Eye contact:  good.    Psychomotor behavior: restless, tremorsl  Gait: steady  Abnormal movements: mild cogwheeling, appears to have akathisia  Delusions:  Present regarding group home  Hallucinations:  none  Thoughts: distressed, focused on withdrawal  Associations: intact  Judgement: poor  Insight: poor  Cognitions: intact in conversation  Memory:  intact in conversation  Orientation: normal    Not suicidal.    Imp: Bipolar mixed with psychosis  2.  Akathisia from re-starting neuroleptics  3.  Alcohol withdrawal, moderate  4.  Opiate withdrawal,, stabilizing  5.  Note platelets of 628    Plan: Add propranolol  2.  Increase gabapentin to qid  3.  Continue withdrawal protocol and Subutex    Current Facility-Administered Medications   Medication     acetaminophen (TYLENOL) tablet 650 mg     alum & mag hydroxide-simethicone (MAALOX) suspension 30 mL     ARIPiprazole (ABILIFY) tablet 2 mg     atenolol (TENORMIN) tablet 50 mg     buprenorphine (SUBUTEX) sublingual tablet 4 mg     diazepam (VALIUM) tablet 5-20 mg     docusate sodium (COLACE) capsule 100 mg     doxycycline hyclate (VIBRAMYCIN) capsule 100 mg     folic acid (FOLVITE) tablet 1 mg     gabapentin (NEURONTIN) capsule 1,200 mg     lidocaine 1% with EPINEPHrine 1:100,000 injection 20 mL     multivitamin w/minerals (THERA-VIT-M) tablet 1 tablet     naloxone (NARCAN) injection 0.2 mg    Or     naloxone (NARCAN) injection 0.4 mg    Or     naloxone (NARCAN) injection 0.2 mg    Or     naloxone (NARCAN) injection 0.4 mg     nicotine (NICORETTE) gum 4-8 mg     ondansetron (ZOFRAN-ODT) ODT tab 4 mg     propranolol (INDERAL) tablet 10 " mg     QUEtiapine (SEROquel) tablet 200 mg     thiamine (B-1) tablet 100 mg     Recent Results (from the past 168 hour(s))   Alcohol breath test POCT    Collection Time: 08/16/21  1:52 PM   Result Value Ref Range    Alcohol Breath Test 0.201 (A) 0.00 - 0.01   Extra Red Top Tube    Collection Time: 08/16/21  2:28 PM   Result Value Ref Range    Hold Specimen JIC    Ethyl Alcohol Level    Collection Time: 08/16/21  2:33 PM   Result Value Ref Range    Alcohol ethyl 0.22 (H) <=0.01 g/dL   Comprehensive metabolic panel    Collection Time: 08/16/21  2:33 PM   Result Value Ref Range    Sodium 140 133 - 144 mmol/L    Potassium 3.4 3.4 - 5.3 mmol/L    Chloride 104 94 - 109 mmol/L    Carbon Dioxide (CO2) 25 20 - 32 mmol/L    Anion Gap 11 3 - 14 mmol/L    Urea Nitrogen 21 7 - 30 mg/dL    Creatinine 0.75 0.66 - 1.25 mg/dL    Calcium 8.8 8.5 - 10.1 mg/dL    Glucose 103 (H) 70 - 99 mg/dL    Alkaline Phosphatase 95 40 - 150 U/L    AST 63 (H) 0 - 45 U/L    ALT 87 (H) 0 - 70 U/L    Protein Total 8.8 6.8 - 8.8 g/dL    Albumin 4.0 3.4 - 5.0 g/dL    Bilirubin Total 0.5 0.2 - 1.3 mg/dL    GFR Estimate >90 >60 mL/min/1.73m2   CBC with platelets and differential    Collection Time: 08/16/21  2:33 PM   Result Value Ref Range    WBC Count 9.5 4.0 - 11.0 10e3/uL    RBC Count 5.31 4.40 - 5.90 10e6/uL    Hemoglobin 15.9 13.3 - 17.7 g/dL    Hematocrit 47.9 40.0 - 53.0 %    MCV 90 78 - 100 fL    MCH 29.9 26.5 - 33.0 pg    MCHC 33.2 31.5 - 36.5 g/dL    RDW 14.2 10.0 - 15.0 %    Platelet Count 628 (H) 150 - 450 10e3/uL    % Neutrophils 57 %    % Lymphocytes 36 %    % Monocytes 6 %    % Eosinophils 0 %    % Basophils 1 %    % Immature Granulocytes 0 %    NRBCs per 100 WBC 0 <1 /100    Absolute Neutrophils 5.4 1.6 - 8.3 10e3/uL    Absolute Lymphocytes 3.5 0.8 - 5.3 10e3/uL    Absolute Monocytes 0.6 0.0 - 1.3 10e3/uL    Absolute Eosinophils 0.0 0.0 - 0.7 10e3/uL    Absolute Basophils 0.1 0.0 - 0.2 10e3/uL    Absolute Immature Granulocytes 0.0 <=0.0  10e3/uL    Absolute NRBCs 0.0 10e3/uL   Magnesium    Collection Time: 08/16/21  2:33 PM   Result Value Ref Range    Magnesium 2.2 1.6 - 2.3 mg/dL   Drug abuse screen 1 urine (ED)    Collection Time: 08/16/21  2:39 PM   Result Value Ref Range    Amphetamines Urine Screen Negative Screen Negative    Barbiturates Urine Screen Negative Screen Negative    Benzodiazepines Urine Screen Negative Screen Negative    Cannabinoids Urine Screen Negative Screen Negative    Cocaine Urine Screen Negative Screen Negative    Opiates Urine Screen Negative Screen Negative   Asymptomatic COVID-19 Virus (Coronavirus) by PCR Nasopharyngeal    Collection Time: 08/16/21  4:27 PM    Specimen: Nasopharyngeal; Swab   Result Value Ref Range    SARS CoV2 PCR Negative Negative   EKG 12-lead, tracing only    Collection Time: 08/16/21  7:07 PM   Result Value Ref Range    Systolic Blood Pressure  mmHg    Diastolic Blood Pressure  mmHg    Ventricular Rate 134 BPM    Atrial Rate 134 BPM    CT Interval 158 ms    QRS Duration 78 ms     ms    QTc 450 ms    P Axis 54 degrees    R AXIS 0 degrees    T Axis 41 degrees    Interpretation ECG       Sinus tachycardia  Otherwise normal ECG    Unconfirmed report - interpretation of this ECG is computer generated - see medical record for final interpretation  Confirmed by - EMERGENCY ROOM, PHYSICIAN ([id number]),  CHUCK AGUILLON ([id number]) on 8/17/2021 7:09:05 AM

## 2021-08-18 NOTE — PLAN OF CARE
"  Problem: Substance Withdrawal  Goal: Substance Withdrawal  Description: Signs and symptoms of listed problems will be absent or manageable.  Outcome: Improving     Pt was restless, slept in short naps first part of the noc. MSSA  was 12 and 11, requiring valium x2 and tylenol for shoulders and lower back pain. Ice pack helpful for for some time. Pt was able to sleep when given ice packs for his back. He, however, woke up early at about 0630 asking for more medication. Tylenol not due till after 0700. Pt was told, is willing to wait. \" I'm being patient\"  Sat in the chair next to the nursing station doing stretches and said it helped his back. Pt slept approximately 5.5 hours.  "

## 2021-08-18 NOTE — PROGRESS NOTES
Restricted MA   PATIENT RESTRICTED TO [facility]     Benefit Information: Services Restricted to Following Provider   Covered: Individual   Service: Professional (Physician)   Insurance Type: Medicaid   Contracted Service Provider: CRISS TORRES MD   Health Care Financing Administration National Provider Identifier: [id number]     Benefit Information: Services Restricted to Following Provider   Covered: Individual   Service: Professional (Physician)   Insurance Type: Medicaid   Contracted Service Provider: [facility] National Provider Identifier: [id number]     Benefit Information: Services Restricted to Following Provider   Covered: Individual   Service: Diagnostic Lab   Insurance Type: Medicaid   Contracted Service Provider: [facility] National Provider Identifier: [id number]     Benefit Information: Services Restricted to Following Provider   Covered: Individual   Service: Professional (Physician)   Insurance Type: Medicaid   Contracted Service Provider: [facility] National Provider Identifier: [id number]     Benefit Information: Services Restricted to Following Provider   Covered: Individual   Service: Hospital - Inpatient   Insurance Type: Medicaid   Contracted Service Provider: [facility] National Provider Identifier: [id number]     Benefit Information: Services Restricted to Following Provider   Covered: Individual   Service: Hospital - Outpatient   Insurance Type: Medicaid   Contracted Service Provider: [facility] National Provider Identifier: [id number]     Benefit Information: Services Restricted to Following Provider   Covered: Individual   Service: Diagnostic Lab   Insurance Type: Medicaid   Contracted Service Provider: [facility]  Administration National Provider Identifier: [id number]

## 2021-08-19 LAB
CRP SERPL-MCNC: 51 MG/L (ref 0–8)
ERYTHROCYTE [DISTWIDTH] IN BLOOD BY AUTOMATED COUNT: 14 % (ref 10–15)
HCT VFR BLD AUTO: 40.5 % (ref 40–53)
HGB BLD-MCNC: 13.3 G/DL (ref 13.3–17.7)
MCH RBC QN AUTO: 31 PG (ref 26.5–33)
MCHC RBC AUTO-ENTMCNC: 32.8 G/DL (ref 31.5–36.5)
MCV RBC AUTO: 94 FL (ref 78–100)
PLATELET # BLD AUTO: 286 10E3/UL (ref 150–450)
RBC # BLD AUTO: 4.29 10E6/UL (ref 4.4–5.9)
WBC # BLD AUTO: 13.3 10E3/UL (ref 4–11)

## 2021-08-19 PROCEDURE — 128N000001 HC R&B CD/MH ADULT

## 2021-08-19 PROCEDURE — 250N000013 HC RX MED GY IP 250 OP 250 PS 637: Performed by: EMERGENCY MEDICINE

## 2021-08-19 PROCEDURE — 250N000013 HC RX MED GY IP 250 OP 250 PS 637: Performed by: NURSE PRACTITIONER

## 2021-08-19 PROCEDURE — 124N000001 HC R&B MH

## 2021-08-19 PROCEDURE — 85027 COMPLETE CBC AUTOMATED: CPT | Performed by: NURSE PRACTITIONER

## 2021-08-19 PROCEDURE — 250N000013 HC RX MED GY IP 250 OP 250 PS 637: Performed by: PSYCHIATRY & NEUROLOGY

## 2021-08-19 PROCEDURE — 36415 COLL VENOUS BLD VENIPUNCTURE: CPT | Performed by: NURSE PRACTITIONER

## 2021-08-19 PROCEDURE — 86140 C-REACTIVE PROTEIN: CPT | Performed by: NURSE PRACTITIONER

## 2021-08-19 RX ORDER — BUPRENORPHINE 8 MG/1
8 TABLET SUBLINGUAL 3 TIMES DAILY
Status: DISCONTINUED | OUTPATIENT
Start: 2021-08-19 | End: 2021-08-23

## 2021-08-19 RX ADMIN — DOCUSATE SODIUM 100 MG: 100 CAPSULE, LIQUID FILLED ORAL at 20:44

## 2021-08-19 RX ADMIN — ALUMINUM HYDROXIDE, MAGNESIUM HYDROXIDE, AND SIMETHICONE 30 ML: 200; 200; 20 SUSPENSION ORAL at 09:42

## 2021-08-19 RX ADMIN — Medication 1 TABLET: at 08:34

## 2021-08-19 RX ADMIN — BUPRENORPHINE HYDROCHLORIDE 8 MG: 8 TABLET SUBLINGUAL at 20:44

## 2021-08-19 RX ADMIN — ARIPIPRAZOLE 2 MG: 2 TABLET ORAL at 08:34

## 2021-08-19 RX ADMIN — QUETIAPINE 200 MG: 200 TABLET, FILM COATED ORAL at 21:57

## 2021-08-19 RX ADMIN — BUPRENORPHINE HCL 4 MG: 2 TABLET SUBLINGUAL at 08:31

## 2021-08-19 RX ADMIN — PROPRANOLOL HYDROCHLORIDE 10 MG: 10 TABLET ORAL at 16:50

## 2021-08-19 RX ADMIN — THIAMINE HCL TAB 100 MG 100 MG: 100 TAB at 08:34

## 2021-08-19 RX ADMIN — DOXYCYCLINE 100 MG: 100 CAPSULE ORAL at 20:44

## 2021-08-19 RX ADMIN — NICOTINE POLACRILEX 8 MG: 4 GUM, CHEWING ORAL at 20:52

## 2021-08-19 RX ADMIN — NICOTINE POLACRILEX 8 MG: 4 GUM, CHEWING ORAL at 13:17

## 2021-08-19 RX ADMIN — NICOTINE POLACRILEX 8 MG: 4 GUM, CHEWING ORAL at 16:55

## 2021-08-19 RX ADMIN — GABAPENTIN 1200 MG: 400 CAPSULE ORAL at 16:50

## 2021-08-19 RX ADMIN — NICOTINE POLACRILEX 8 MG: 4 GUM, CHEWING ORAL at 18:10

## 2021-08-19 RX ADMIN — DIAZEPAM 5 MG: 5 TABLET ORAL at 08:41

## 2021-08-19 RX ADMIN — GABAPENTIN 1200 MG: 400 CAPSULE ORAL at 20:44

## 2021-08-19 RX ADMIN — NICOTINE POLACRILEX 8 MG: 4 GUM, CHEWING ORAL at 22:02

## 2021-08-19 RX ADMIN — ACETAMINOPHEN 650 MG: 325 TABLET, FILM COATED ORAL at 13:20

## 2021-08-19 RX ADMIN — PROPRANOLOL HYDROCHLORIDE 10 MG: 10 TABLET ORAL at 11:39

## 2021-08-19 RX ADMIN — PROPRANOLOL HYDROCHLORIDE 10 MG: 10 TABLET ORAL at 08:34

## 2021-08-19 RX ADMIN — GABAPENTIN 1200 MG: 400 CAPSULE ORAL at 08:34

## 2021-08-19 RX ADMIN — PROPRANOLOL HYDROCHLORIDE 10 MG: 10 TABLET ORAL at 20:44

## 2021-08-19 RX ADMIN — DOCUSATE SODIUM 100 MG: 100 CAPSULE, LIQUID FILLED ORAL at 08:34

## 2021-08-19 RX ADMIN — BUPRENORPHINE HYDROCHLORIDE 8 MG: 8 TABLET SUBLINGUAL at 17:06

## 2021-08-19 RX ADMIN — FOLIC ACID 1 MG: 1 TABLET ORAL at 08:34

## 2021-08-19 RX ADMIN — NICOTINE POLACRILEX 8 MG: 4 GUM, CHEWING ORAL at 19:23

## 2021-08-19 RX ADMIN — BUPRENORPHINE HCL 4 MG: 2 TABLET SUBLINGUAL at 11:39

## 2021-08-19 RX ADMIN — GABAPENTIN 1200 MG: 400 CAPSULE ORAL at 11:38

## 2021-08-19 RX ADMIN — DOXYCYCLINE 100 MG: 100 CAPSULE ORAL at 08:35

## 2021-08-19 ASSESSMENT — ACTIVITIES OF DAILY LIVING (ADL)
ORAL_HYGIENE: INDEPENDENT
HYGIENE/GROOMING: HANDWASHING;SHOWER;INDEPENDENT
LAUNDRY: UNABLE TO COMPLETE
HYGIENE/GROOMING: PROMPTS
ORAL_HYGIENE: INDEPENDENT
DRESS: SCRUBS (BEHAVIORAL HEALTH);INDEPENDENT
DRESS: SCRUBS (BEHAVIORAL HEALTH)
LAUNDRY: UNABLE TO COMPLETE

## 2021-08-19 NOTE — PROVIDER NOTIFICATION
08/19/21 1100   COWS (Clinical Opiate Withdrawal Scale)   Resting Pulse Rate 1-->pulse rate    Sweating 1-->subjective report of chills or flushing   Restlessness 0-->able to sit still   Pupil Size 0-->pupils pinned or normal size for room light   Bone or Joint Aches 0-->not present   Runny Nose or Tearing 0-->none present   GI Upset 0-->no GI symptoms   Tremor 2-->slight tremor observable   Yawning 0-->no yawning   Anxiety or Irritability 0-->none   Gooseflesh Skin 0-->skin is smooth   Score 4   Modified Selective Severity Assessment (MSSA)   Eating Disturbances 0   Tremor 1   Sleep Disturbance 0   Clouding of Sensorium 0   Hallucinations 0   Quality of Contact 0   Agitation 1   Paroxysmal Sweats 0   Temperature 1   Pulse 3   Total MSSA Score 6     Scheduled subutex provided.  Nursing will continue to monitor.

## 2021-08-19 NOTE — PLAN OF CARE
Pt was visible and active in the milieu throughout the evening. Pt attended groups. Affect is restless and anxious. Pt denies SI/SIB.    MSSA scores this evenin & 5. 10mg of valium given this evening.    Seroquel PRN given at 2200 for sleep.    Pt has high dose of Gabapentin ordered. Writer called on-call provider to confirm dose ok to give. Provider ok'd dose.    Pt has serous drainage from would in hand. Pt given bacitracin and a band aid to cover it.      Continue to monitor MSSA per protocol.

## 2021-08-19 NOTE — PROGRESS NOTES
"SPIRITUAL HEALTH SERVICES  SPIRITUAL ASSESSMENT Progress Note  [facility] ([facility]) 3AW     REFERRAL SOURCE: follow up    Brief visit with Jose M in the [facility] area after breakfast.  \"I feel like crap\" was his check-in for today. Jose M was shaky but more clear in his speech today.  Shared brief prayer at Jose M's request.    Plan: I will follow up tomorrow.    Liss Kerns MDiv  Associate   Pager [phone]  Office [phone]  [facility] remains available 24/7 for emergent requests/referrals, either by having the switchboard page the on-call  or by entering an ASAP/STAT consult in Epic (this will also page the on-call ). Routine Epic consults receive an initial response within 24 hours.    "

## 2021-08-19 NOTE — PROVIDER NOTIFICATION
"Provider paged at patient request for additional pain medication after insisting \"vallium is for pain.\"     PRN tylenol given for description of \"aches all over.\"    Nursing will continue to monitor.  "

## 2021-08-19 NOTE — PLAN OF CARE
Problem: Substance Withdrawal  Goal: Substance Withdrawal  Description: Signs and symptoms of listed problems will be absent or manageable.  Outcome: Improving  Patient's MSSA score was a 6 and he appeared to be asleep throughout the night. No problem reported or identified.

## 2021-08-19 NOTE — PLAN OF CARE
"  Problem: Substance Withdrawal  Goal: Substance Withdrawal  Description: patient will remain safe while Signs and symptoms of listed problems will be absent or manageable.  Outcome: No Change    Patient in detox for alcohol and on Methadone maintenance; Beginning of shift, 1600 patient appears sedated and BP initially 97/50 and pulse 80, alert and oriented to person, place, date/time and agrees to drink fluids and eat snack before writer gives medications.     Recheck of VS improved at 1700  /79   Pulse 82   Temp 97.3  F (36.3  C) (Temporal)   Resp 16   Ht 1.93 m (6' 4\")   Wt 115.2 kg (254 lb)   SpO2 95%   BMI 30.92 kg/m   MSSA scores of 6 and 4 and COWS of 11 and 6, observed restlessness, anxiety, pain and mood lability; denies any SI/SIB or fall concerns; appetite good ensuring writer calls for his double portions this evening that were not initially delivered, takes medications including increased Subutex 8 mg and nicotine gum; accepting of direction, takes shower and up ambulating safely balanced gait and states he feels his knees are strong and not a falls risk;   Taking doxycycline for self inflicted hand injury and laceration given history of MRSA, drainage (guaze dressing applied) and elevated WBC's and CRP that Medicine is following patient for. Patient requesting results of his knee x-ray; no warmth, discoloration observed and patient advised to rest, ice and elevate until meet with provider tomorrow to discuss-patient agrees to this plan; multiple requests and cooperative accepting of direction and able to wait for staff to fulfill requests. Prn nicotine gum and Seroquel at HS. Patient more content/less irritable with increased medication and engaging in thoughtful conversation with staff.              "

## 2021-08-19 NOTE — PROVIDER NOTIFICATION
"   08/19/21 0800   COWS (Clinical Opiate Withdrawal Scale)   Resting Pulse Rate 2-->pulse rate 101-120   Sweating 1-->subjective report of chills or flushing   Restlessness 0-->able to sit still   Pupil Size 0-->pupils pinned or normal size for room light   Bone or Joint Aches 1-->mild/diffuse discomfort   Runny Nose or Tearing 0-->none present   GI Upset 1-->stomach cramps   Tremor 2-->slight tremor observable   Yawning 0-->no yawning   Anxiety or Irritability 1-->patient reports increasing irritability or anxiousness   Gooseflesh Skin 0-->skin is smooth   Score 8   Modified Selective Severity Assessment (MSSA)   Eating Disturbances 1   Tremor 3   Sleep Disturbance 0   Clouding of Sensorium 0   Hallucinations 0   Quality of Contact 0   Agitation 0   Paroxysmal Sweats 1   Temperature 1   Pulse 5   Total MSSA Score 11     /68   Pulse 112   Temp 98.2  F (36.8  C) (Temporal)   Resp 16   Ht 1.93 m (6' 4\")   Wt 115.2 kg (254 lb)   SpO2 95%   BMI 30.92 kg/m      5 mg PRN valium given. Scheduled subutex given.    Nursing will continue to monitor.  "

## 2021-08-19 NOTE — PLAN OF CARE
"Patient is med-compliant and possibly med-seeking as requests for \"more valium\" for \"pain\", \"for withdrawal\" and has not been accepting of information related to the MSSA process for monitoring withdrawal. Patient reported sleeping \"fine\", seen eating 100% and was provided PRN tylenol for \"pain all over\" at 1300 and reported \"Not enough help.\" Staff reported patient napping with head on lunch plate (in food) shortly after c/o intolerable pain. LAST valium 0841.    Patient denies SI/SIB.     08/19/21 1400   Sleep/Rest/Relaxation   Sleep/Rest/Relaxation (WDL) WDL   Behavioral Health   Thoughts/Cognition (WDL) ex   Insight poor   Judgement impaired   Affect/Mood (WDL) ex   Affect blunted, flat   Mood irritable   ADL Assessment (WDL) ex   Hygiene neglected grooming - unclean body, hair, teeth   Suicidality (WDL) WDL   1. Wish to be Dead (Recent) No   2. Non-Specific Active Suicidal Thoughts (Recent) No   Change in Protective Factors? No   Enviromental Risk Factors None   Self Injury (WDL) WDL   Elopement (WDL) WDL   Activity (WDL) WDL   Speech (WDL) ex   Medication Sensitivity (WDL) Ex   Medication Sensitivity sedation  (found asleep face in food/while prior c/o unmanageable pain)   Psychomotor Gait (WDL) Ex   Psychomotor / Gait slow   Overt Agression (WDL) WDL   Substance Withdrawal   Substance Withdrawal Modified Selective Severity Assessment (MSSA)   Substance Withdrawal Interventions   Substance Withdrawal Interventions monitor substance withdrawal process;assess patient response to medication;assess medication adherance   Alcohol Withdrawl Interventions monitor need for prn medication   Coping/Psychosocial   Verbalized Emotional State anxiety   Plan of Care Reviewed With patient   Patient Agreement with Plan of Care disagrees (describe)  (need more medication)   Trust Relationship/Rapport care explained;choices provided   Psycho Education   Type of Intervention 1:1 intervention   Response participates with " encouragement   Hours 0.5   Treatment Detail check in   Elder Risk Assessment   Sensory Perception 4-->no impairment   Moisture 4-->rarely moist   Activity 4-->walks frequently   Mobility 4-->no limitation   Nutrition 3-->adequate   Friction and Shear 3-->no apparent problem   Elder Score 22   Safety   Withdrawal monitor withdrawal process   Fall Assessment   Get up and Go Test 0 - pushes up, successful in 1 attempt   Fall Risk Interventions   Within arms reach in use No   Fall precaution band in place? No (see comments)  (refuse)   Cognition interventions no longer indicated   C-SSRS (Frequent Screener)   Q2 Suicidal Thoughts (Since Last Contact) no   Q6 Suicide Behavior no   Violence Risk   Feels Like Hurting Others no   Previous Attempt to Harm Others no   Activities of Daily Living   Hygiene/Grooming prompts   Oral Hygiene independent   Dress scrubs (behavioral health)   Laundry unable to complete   Room Organization independent     Nursing will continue to monitor.

## 2021-08-19 NOTE — PROGRESS NOTES
"Patient seen via telemedicine.  Care discussed with treatment team staff.  Blood pressure 97/58, pulse 99, temperature 97.8  F (36.6  C), temperature source Temporal, resp. rate 16, height 1.93 m (6' 4\"), weight 115.2 kg (254 lb), SpO2 95 %.    Patient Active Problem List   Diagnosis     Post concussive encephalopathy     H/O shoulder surgery     Alcoholism in remission (H)     Bilateral ACL tear     Chronic back pain     Social anxiety disorder     Alcohol withdrawal syndrome with complication, with unspecified complication (H)     Rib fracture     Alcohol withdrawal (H)     Alcohol dependence (H)     LFT elevation     Laceration of left hand without foreign body, initial encounter   Alcohol scores vary if he is being formally observed or observed across the room, sleepy at times with Valium  General appearance: fair  Alert.   Affect: fair, labile  Mood: fair    Speech:  normal.   Eye contact:  good.    Psychomotor behavior: normal  Gait: steady but looks pained  Abnormal movements: none  Delusions: less but present  Hallucinations:  none  Thoughts: concrete, foxused on pain, withdrawal  Associations: intact  Judgement: fair  Insight: fair  Cognitions: intact in conversation  Memory:  intact in conversation  Orientation: normal    Not suicidal.    Plan: increase Subutex to FDA max of 24mg day    Current Facility-Administered Medications   Medication     acetaminophen (TYLENOL) tablet 650 mg     alum & mag hydroxide-simethicone (MAALOX) suspension 30 mL     ARIPiprazole (ABILIFY) tablet 2 mg     atenolol (TENORMIN) tablet 50 mg     buprenorphine (SUBUTEX) sublingual tablet 8 mg     diazepam (VALIUM) tablet 5-20 mg     docusate sodium (COLACE) capsule 100 mg     doxycycline hyclate (VIBRAMYCIN) capsule 100 mg     folic acid (FOLVITE) tablet 1 mg     gabapentin (NEURONTIN) capsule 1,200 mg     Lidocaine (LIDOCARE) 4 % Patch 1-3 patch    And     lidocaine patch in PLACE     lidocaine 1% with EPINEPHrine 1:100,000 " injection 20 mL     multivitamin w/minerals (THERA-VIT-M) tablet 1 tablet     naloxone (NARCAN) injection 0.2 mg    Or     naloxone (NARCAN) injection 0.4 mg    Or     naloxone (NARCAN) injection 0.2 mg    Or     naloxone (NARCAN) injection 0.4 mg     nicotine polacrilex (NICORETTE) gum 4-8 mg     ondansetron (ZOFRAN-ODT) ODT tab 4 mg     propranolol (INDERAL) tablet 10 mg     QUEtiapine (SEROquel) tablet 200 mg     thiamine (B-1) tablet 100 mg     Recent Results (from the past 168 hour(s))   Alcohol breath test POCT    Collection Time: 08/16/21  1:52 PM   Result Value Ref Range    Alcohol Breath Test 0.201 (A) 0.00 - 0.01   Extra Red Top Tube    Collection Time: 08/16/21  2:28 PM   Result Value Ref Range    Hold Specimen JIC    Ethyl Alcohol Level    Collection Time: 08/16/21  2:33 PM   Result Value Ref Range    Alcohol ethyl 0.22 (H) <=0.01 g/dL   Comprehensive metabolic panel    Collection Time: 08/16/21  2:33 PM   Result Value Ref Range    Sodium 140 133 - 144 mmol/L    Potassium 3.4 3.4 - 5.3 mmol/L    Chloride 104 94 - 109 mmol/L    Carbon Dioxide (CO2) 25 20 - 32 mmol/L    Anion Gap 11 3 - 14 mmol/L    Urea Nitrogen 21 7 - 30 mg/dL    Creatinine 0.75 0.66 - 1.25 mg/dL    Calcium 8.8 8.5 - 10.1 mg/dL    Glucose 103 (H) 70 - 99 mg/dL    Alkaline Phosphatase 95 40 - 150 U/L    AST 63 (H) 0 - 45 U/L    ALT 87 (H) 0 - 70 U/L    Protein Total 8.8 6.8 - 8.8 g/dL    Albumin 4.0 3.4 - 5.0 g/dL    Bilirubin Total 0.5 0.2 - 1.3 mg/dL    GFR Estimate >90 >60 mL/min/1.73m2   CBC with platelets and differential    Collection Time: 08/16/21  2:33 PM   Result Value Ref Range    WBC Count 9.5 4.0 - 11.0 10e3/uL    RBC Count 5.31 4.40 - 5.90 10e6/uL    Hemoglobin 15.9 13.3 - 17.7 g/dL    Hematocrit 47.9 40.0 - 53.0 %    MCV 90 78 - 100 fL    MCH 29.9 26.5 - 33.0 pg    MCHC 33.2 31.5 - 36.5 g/dL    RDW 14.2 10.0 - 15.0 %    Platelet Count 628 (H) 150 - 450 10e3/uL    % Neutrophils 57 %    % Lymphocytes 36 %    % Monocytes 6 %     % Eosinophils 0 %    % Basophils 1 %    % Immature Granulocytes 0 %    NRBCs per 100 WBC 0 <1 /100    Absolute Neutrophils 5.4 1.6 - 8.3 10e3/uL    Absolute Lymphocytes 3.5 0.8 - 5.3 10e3/uL    Absolute Monocytes 0.6 0.0 - 1.3 10e3/uL    Absolute Eosinophils 0.0 0.0 - 0.7 10e3/uL    Absolute Basophils 0.1 0.0 - 0.2 10e3/uL    Absolute Immature Granulocytes 0.0 <=0.0 10e3/uL    Absolute NRBCs 0.0 10e3/uL   Magnesium    Collection Time: 08/16/21  2:33 PM   Result Value Ref Range    Magnesium 2.2 1.6 - 2.3 mg/dL   CRP inflammation    Collection Time: 08/16/21  2:33 PM   Result Value Ref Range    CRP Inflammation <2.9 0.0 - 8.0 mg/L   Drug abuse screen 1 urine (ED)    Collection Time: 08/16/21  2:39 PM   Result Value Ref Range    Amphetamines Urine Screen Negative Screen Negative    Barbiturates Urine Screen Negative Screen Negative    Benzodiazepines Urine Screen Negative Screen Negative    Cannabinoids Urine Screen Negative Screen Negative    Cocaine Urine Screen Negative Screen Negative    Opiates Urine Screen Negative Screen Negative   Asymptomatic COVID-19 Virus (Coronavirus) by PCR Nasopharyngeal    Collection Time: 08/16/21  4:27 PM    Specimen: Nasopharyngeal; Swab   Result Value Ref Range    SARS CoV2 PCR Negative Negative   EKG 12-lead, tracing only    Collection Time: 08/16/21  7:07 PM   Result Value Ref Range    Systolic Blood Pressure  mmHg    Diastolic Blood Pressure  mmHg    Ventricular Rate 134 BPM    Atrial Rate 134 BPM    MN Interval 158 ms    QRS Duration 78 ms     ms    QTc 450 ms    P Axis 54 degrees    R AXIS 0 degrees    T Axis 41 degrees    Interpretation ECG       Sinus tachycardia  Otherwise normal ECG    Unconfirmed report - interpretation of this ECG is computer generated - see medical record for final interpretation  Confirmed by - EMERGENCY ROOM, PHYSICIAN ([id number]),  CHUCK AGUILLON ([id number]) on 8/17/2021 7:09:05 AM     CRP inflammation    Collection Time: 08/19/21  7:40 AM    Result Value Ref Range    CRP Inflammation 51.0 (H) 0.0 - 8.0 mg/L   CBC with platelets    Collection Time: 08/19/21  7:40 AM   Result Value Ref Range    WBC Count 13.3 (H) 4.0 - 11.0 10e3/uL    RBC Count 4.29 (L) 4.40 - 5.90 10e6/uL    Hemoglobin 13.3 13.3 - 17.7 g/dL    Hematocrit 40.5 40.0 - 53.0 %    MCV 94 78 - 100 fL    MCH 31.0 26.5 - 33.0 pg    MCHC 32.8 31.5 - 36.5 g/dL    RDW 14.0 10.0 - 15.0 %    Platelet Count 286 150 - 450 10e3/uL       Video-Visit Details    Type of service:  Video Visit    Video Start Time (time video started): 1505    Video End Time (time video stopped): 1515    Originating Location (pt. Location): [facility] system[facility]    Distant Location (provider location): Provider remote location    Mode of Communication:  Video Conference via [employer]    Physician has received verbal consent for a Video Visit from the patient? Yes      Ulises Perez MD

## 2021-08-20 PROCEDURE — 128N000001 HC R&B CD/MH ADULT

## 2021-08-20 PROCEDURE — 124N000001 HC R&B MH

## 2021-08-20 PROCEDURE — 250N000013 HC RX MED GY IP 250 OP 250 PS 637: Performed by: EMERGENCY MEDICINE

## 2021-08-20 PROCEDURE — 250N000013 HC RX MED GY IP 250 OP 250 PS 637: Performed by: PSYCHIATRY & NEUROLOGY

## 2021-08-20 PROCEDURE — 250N000013 HC RX MED GY IP 250 OP 250 PS 637: Performed by: NURSE PRACTITIONER

## 2021-08-20 RX ORDER — QUETIAPINE FUMARATE 300 MG/1
300 TABLET, FILM COATED ORAL AT BEDTIME
Status: DISCONTINUED | OUTPATIENT
Start: 2021-08-20 | End: 2021-08-23 | Stop reason: HOSPADM

## 2021-08-20 RX ADMIN — NICOTINE POLACRILEX 8 MG: 4 GUM, CHEWING ORAL at 13:08

## 2021-08-20 RX ADMIN — ACETAMINOPHEN 650 MG: 325 TABLET, FILM COATED ORAL at 23:00

## 2021-08-20 RX ADMIN — DOXYCYCLINE 100 MG: 100 CAPSULE ORAL at 08:52

## 2021-08-20 RX ADMIN — DIAZEPAM 10 MG: 5 TABLET ORAL at 08:53

## 2021-08-20 RX ADMIN — NICOTINE POLACRILEX 8 MG: 4 GUM, CHEWING ORAL at 16:40

## 2021-08-20 RX ADMIN — FOLIC ACID 1 MG: 1 TABLET ORAL at 08:52

## 2021-08-20 RX ADMIN — BUPRENORPHINE HYDROCHLORIDE 8 MG: 8 TABLET SUBLINGUAL at 20:26

## 2021-08-20 RX ADMIN — PROPRANOLOL HYDROCHLORIDE 10 MG: 10 TABLET ORAL at 16:40

## 2021-08-20 RX ADMIN — DOXYCYCLINE 100 MG: 100 CAPSULE ORAL at 20:26

## 2021-08-20 RX ADMIN — PROPRANOLOL HYDROCHLORIDE 10 MG: 10 TABLET ORAL at 08:52

## 2021-08-20 RX ADMIN — ARIPIPRAZOLE 2 MG: 2 TABLET ORAL at 08:51

## 2021-08-20 RX ADMIN — DOCUSATE SODIUM 100 MG: 100 CAPSULE, LIQUID FILLED ORAL at 08:51

## 2021-08-20 RX ADMIN — NICOTINE POLACRILEX 8 MG: 4 GUM, CHEWING ORAL at 20:28

## 2021-08-20 RX ADMIN — QUETIAPINE 300 MG: 300 TABLET, FILM COATED ORAL at 22:06

## 2021-08-20 RX ADMIN — GABAPENTIN 1200 MG: 400 CAPSULE ORAL at 16:40

## 2021-08-20 RX ADMIN — ACETAMINOPHEN 650 MG: 325 TABLET, FILM COATED ORAL at 13:10

## 2021-08-20 RX ADMIN — DIAZEPAM 10 MG: 5 TABLET ORAL at 14:06

## 2021-08-20 RX ADMIN — BUPRENORPHINE HYDROCHLORIDE 8 MG: 8 TABLET SUBLINGUAL at 14:06

## 2021-08-20 RX ADMIN — PROPRANOLOL HYDROCHLORIDE 10 MG: 10 TABLET ORAL at 13:08

## 2021-08-20 RX ADMIN — GABAPENTIN 1200 MG: 400 CAPSULE ORAL at 13:07

## 2021-08-20 RX ADMIN — NICOTINE POLACRILEX 8 MG: 4 GUM, CHEWING ORAL at 08:53

## 2021-08-20 RX ADMIN — NICOTINE POLACRILEX 8 MG: 4 GUM, CHEWING ORAL at 18:31

## 2021-08-20 RX ADMIN — THIAMINE HCL TAB 100 MG 100 MG: 100 TAB at 08:53

## 2021-08-20 RX ADMIN — NICOTINE POLACRILEX 4 MG: 4 GUM, CHEWING ORAL at 14:07

## 2021-08-20 RX ADMIN — DOCUSATE SODIUM 100 MG: 100 CAPSULE, LIQUID FILLED ORAL at 20:26

## 2021-08-20 RX ADMIN — GABAPENTIN 1200 MG: 400 CAPSULE ORAL at 20:26

## 2021-08-20 RX ADMIN — BUPRENORPHINE HYDROCHLORIDE 8 MG: 8 TABLET SUBLINGUAL at 08:51

## 2021-08-20 RX ADMIN — Medication 1 TABLET: at 08:52

## 2021-08-20 RX ADMIN — PROPRANOLOL HYDROCHLORIDE 10 MG: 10 TABLET ORAL at 20:26

## 2021-08-20 RX ADMIN — GABAPENTIN 1200 MG: 400 CAPSULE ORAL at 08:52

## 2021-08-20 ASSESSMENT — ACTIVITIES OF DAILY LIVING (ADL)
LAUNDRY: UNABLE TO COMPLETE
HYGIENE/GROOMING: INDEPENDENT
DRESS: SCRUBS (BEHAVIORAL HEALTH)
ORAL_HYGIENE: INDEPENDENT

## 2021-08-20 NOTE — PLAN OF CARE
"Music Therapy Group note    Clinical Hours in session: 1.0    Number of patients in group: 6    Scope of service: psychodynamic     Patient progress: improving    Intervention: Letting Go     Goal of group: personal reflection    Patient response/reaction to treatment intervention(s): Jose M was receptive to music and written prompts on the topic in group today.  He was talkative, expressing desire for change and some hope.  He validated peers responses appropriately.  He validated himself for \"not getting mad at my nurse today\".  He was engaged and cooperative.     Shawanda Mora, [facility]  Board-Certified Music Therapist           "

## 2021-08-20 NOTE — PROGRESS NOTES
If pt discharges over the weekend, please send to [facility] in cab at the following address:     [facility]  [address] [place] [address]      *Please call Vicente at [facility] ([phone] or [phone]) to update on discharge.

## 2021-08-20 NOTE — PROGRESS NOTES
"Patient seen, chart reviewed, care discussed with staff.  Blood pressure 124/78, pulse 82, temperature 98.2  F (36.8  C), temperature source Temporal, resp. rate 16, height 1.93 m (6' 4\"), weight 115.2 kg (254 lb), SpO2 94 %.  Still in detox, he feels the valium is not given enough, notes he \"feels like crap\" and is afraid \"I fried my brain\"  General appearance: disheveled  Alert.   Affect: fair  Mood: fair    Speech:  A bit increased  Eye contact:  good.    Psychomotor behavior: tremulous  Gait: steady.    Abnormal movements: no TD  Delusions: about group home, continued  Hallucinations:  none  Thoughts: concrete, focused on withdrawal  Associations: intact  Judgement: poor  Insight: poor  Cognitions: intact in conversation  Memory:  intact in conversation  Orientation: normal    Not suicidal.  Sleep poor.   I noted I do not believe this relapse caused permanent damage    Patient Active Problem List   Diagnosis     Post concussive encephalopathy     H/O shoulder surgery     Alcoholism in remission (H)     Bilateral ACL tear     Chronic back pain     Social anxiety disorder     Alcohol withdrawal syndrome with complication, with unspecified complication (H)     Rib fracture     Alcohol withdrawal (H)     Alcohol dependence (H)     LFT elevation     Laceration of left hand without foreign body, initial encounter     Recent Results (from the past 168 hour(s))   Alcohol breath test POCT    Collection Time: 08/16/21  1:52 PM   Result Value Ref Range    Alcohol Breath Test 0.201 (A) 0.00 - 0.01   Extra Red Top Tube    Collection Time: 08/16/21  2:28 PM   Result Value Ref Range    Hold Specimen JIC    Ethyl Alcohol Level    Collection Time: 08/16/21  2:33 PM   Result Value Ref Range    Alcohol ethyl 0.22 (H) <=0.01 g/dL   Comprehensive metabolic panel    Collection Time: 08/16/21  2:33 PM   Result Value Ref Range    Sodium 140 133 - 144 mmol/L    Potassium 3.4 3.4 - 5.3 mmol/L    Chloride 104 94 - 109 mmol/L    Carbon " Dioxide (CO2) 25 20 - 32 mmol/L    Anion Gap 11 3 - 14 mmol/L    Urea Nitrogen 21 7 - 30 mg/dL    Creatinine 0.75 0.66 - 1.25 mg/dL    Calcium 8.8 8.5 - 10.1 mg/dL    Glucose 103 (H) 70 - 99 mg/dL    Alkaline Phosphatase 95 40 - 150 U/L    AST 63 (H) 0 - 45 U/L    ALT 87 (H) 0 - 70 U/L    Protein Total 8.8 6.8 - 8.8 g/dL    Albumin 4.0 3.4 - 5.0 g/dL    Bilirubin Total 0.5 0.2 - 1.3 mg/dL    GFR Estimate >90 >60 mL/min/1.73m2   CBC with platelets and differential    Collection Time: 08/16/21  2:33 PM   Result Value Ref Range    WBC Count 9.5 4.0 - 11.0 10e3/uL    RBC Count 5.31 4.40 - 5.90 10e6/uL    Hemoglobin 15.9 13.3 - 17.7 g/dL    Hematocrit 47.9 40.0 - 53.0 %    MCV 90 78 - 100 fL    MCH 29.9 26.5 - 33.0 pg    MCHC 33.2 31.5 - 36.5 g/dL    RDW 14.2 10.0 - 15.0 %    Platelet Count 628 (H) 150 - 450 10e3/uL    % Neutrophils 57 %    % Lymphocytes 36 %    % Monocytes 6 %    % Eosinophils 0 %    % Basophils 1 %    % Immature Granulocytes 0 %    NRBCs per 100 WBC 0 <1 /100    Absolute Neutrophils 5.4 1.6 - 8.3 10e3/uL    Absolute Lymphocytes 3.5 0.8 - 5.3 10e3/uL    Absolute Monocytes 0.6 0.0 - 1.3 10e3/uL    Absolute Eosinophils 0.0 0.0 - 0.7 10e3/uL    Absolute Basophils 0.1 0.0 - 0.2 10e3/uL    Absolute Immature Granulocytes 0.0 <=0.0 10e3/uL    Absolute NRBCs 0.0 10e3/uL   Magnesium    Collection Time: 08/16/21  2:33 PM   Result Value Ref Range    Magnesium 2.2 1.6 - 2.3 mg/dL   CRP inflammation    Collection Time: 08/16/21  2:33 PM   Result Value Ref Range    CRP Inflammation <2.9 0.0 - 8.0 mg/L   Drug abuse screen 1 urine (ED)    Collection Time: 08/16/21  2:39 PM   Result Value Ref Range    Amphetamines Urine Screen Negative Screen Negative    Barbiturates Urine Screen Negative Screen Negative    Benzodiazepines Urine Screen Negative Screen Negative    Cannabinoids Urine Screen Negative Screen Negative    Cocaine Urine Screen Negative Screen Negative    Opiates Urine Screen Negative Screen Negative    Asymptomatic COVID-19 Virus (Coronavirus) by PCR Nasopharyngeal    Collection Time: 08/16/21  4:27 PM    Specimen: Nasopharyngeal; Swab   Result Value Ref Range    SARS CoV2 PCR Negative Negative   EKG 12-lead, tracing only    Collection Time: 08/16/21  7:07 PM   Result Value Ref Range    Systolic Blood Pressure  mmHg    Diastolic Blood Pressure  mmHg    Ventricular Rate 134 BPM    Atrial Rate 134 BPM    OK Interval 158 ms    QRS Duration 78 ms     ms    QTc 450 ms    P Axis 54 degrees    R AXIS 0 degrees    T Axis 41 degrees    Interpretation ECG       Sinus tachycardia  Otherwise normal ECG    Unconfirmed report - interpretation of this ECG is computer generated - see medical record for final interpretation  Confirmed by - EMERGENCY ROOM, PHYSICIAN ([id number]),  CHUCK AGUILLON ([id number]) on 8/17/2021 7:09:05 AM     CRP inflammation    Collection Time: 08/19/21  7:40 AM   Result Value Ref Range    CRP Inflammation 51.0 (H) 0.0 - 8.0 mg/L   CBC with platelets    Collection Time: 08/19/21  7:40 AM   Result Value Ref Range    WBC Count 13.3 (H) 4.0 - 11.0 10e3/uL    RBC Count 4.29 (L) 4.40 - 5.90 10e6/uL    Hemoglobin 13.3 13.3 - 17.7 g/dL    Hematocrit 40.5 40.0 - 53.0 %    MCV 94 78 - 100 fL    MCH 31.0 26.5 - 33.0 pg    MCHC 32.8 31.5 - 36.5 g/dL    RDW 14.0 10.0 - 15.0 %    Platelet Count 286 150 - 450 10e3/uL     Plan:  Increase Seroquel  2.  Continue stabilizing

## 2021-08-20 NOTE — PLAN OF CARE
Patient visible in milieu, tense and irritable at times.  Bright affect, denies SI/SIB, endorses depression/anxiety.  MSSA this shift, 9 5, 9: COW's 11, 7, 9.  Received valium x 2.  Patient also on scheduled subutex.  Patient received prn tylenol per request this afternoon for head-aches.  Showered, good appetite, medication compliant.  Laceration on back of L hand, covered with bandaid.  Patient on antibiotics vibramycin.  Resting comfortably in room, will continue to monitor closely.

## 2021-08-21 LAB
CRP SERPL-MCNC: 66 MG/L (ref 0–8)
ERYTHROCYTE [DISTWIDTH] IN BLOOD BY AUTOMATED COUNT: 13.7 % (ref 10–15)
HCT VFR BLD AUTO: 36.5 % (ref 40–53)
HGB BLD-MCNC: 12.1 G/DL (ref 13.3–17.7)
MCH RBC QN AUTO: 30.8 PG (ref 26.5–33)
MCHC RBC AUTO-ENTMCNC: 33.2 G/DL (ref 31.5–36.5)
MCV RBC AUTO: 93 FL (ref 78–100)
PLATELET # BLD AUTO: 277 10E3/UL (ref 150–450)
RBC # BLD AUTO: 3.93 10E6/UL (ref 4.4–5.9)
WBC # BLD AUTO: 5.4 10E3/UL (ref 4–11)

## 2021-08-21 PROCEDURE — 85027 COMPLETE CBC AUTOMATED: CPT | Performed by: NURSE PRACTITIONER

## 2021-08-21 PROCEDURE — 250N000013 HC RX MED GY IP 250 OP 250 PS 637: Performed by: NURSE PRACTITIONER

## 2021-08-21 PROCEDURE — 250N000013 HC RX MED GY IP 250 OP 250 PS 637: Performed by: EMERGENCY MEDICINE

## 2021-08-21 PROCEDURE — 128N000001 HC R&B CD/MH ADULT

## 2021-08-21 PROCEDURE — 36416 COLLJ CAPILLARY BLOOD SPEC: CPT | Performed by: NURSE PRACTITIONER

## 2021-08-21 PROCEDURE — 250N000013 HC RX MED GY IP 250 OP 250 PS 637: Performed by: PSYCHIATRY & NEUROLOGY

## 2021-08-21 PROCEDURE — 86140 C-REACTIVE PROTEIN: CPT | Performed by: NURSE PRACTITIONER

## 2021-08-21 PROCEDURE — 124N000001 HC R&B MH

## 2021-08-21 RX ORDER — DIAZEPAM 5 MG
5 TABLET ORAL 3 TIMES DAILY
Status: COMPLETED | OUTPATIENT
Start: 2021-08-21 | End: 2021-08-23

## 2021-08-21 RX ADMIN — NICOTINE POLACRILEX 8 MG: 4 GUM, CHEWING ORAL at 12:54

## 2021-08-21 RX ADMIN — DIAZEPAM 5 MG: 5 TABLET ORAL at 13:42

## 2021-08-21 RX ADMIN — BUPRENORPHINE HYDROCHLORIDE 8 MG: 8 TABLET SUBLINGUAL at 07:28

## 2021-08-21 RX ADMIN — DOCUSATE SODIUM 100 MG: 100 CAPSULE, LIQUID FILLED ORAL at 07:28

## 2021-08-21 RX ADMIN — DOCUSATE SODIUM 100 MG: 100 CAPSULE, LIQUID FILLED ORAL at 20:28

## 2021-08-21 RX ADMIN — DIAZEPAM 5 MG: 5 TABLET ORAL at 20:28

## 2021-08-21 RX ADMIN — NICOTINE POLACRILEX 8 MG: 4 GUM, CHEWING ORAL at 14:00

## 2021-08-21 RX ADMIN — PROPRANOLOL HYDROCHLORIDE 10 MG: 10 TABLET ORAL at 16:58

## 2021-08-21 RX ADMIN — THIAMINE HCL TAB 100 MG 100 MG: 100 TAB at 07:28

## 2021-08-21 RX ADMIN — BUPRENORPHINE HYDROCHLORIDE 8 MG: 8 TABLET SUBLINGUAL at 13:42

## 2021-08-21 RX ADMIN — DOXYCYCLINE 100 MG: 100 CAPSULE ORAL at 20:27

## 2021-08-21 RX ADMIN — GABAPENTIN 1200 MG: 400 CAPSULE ORAL at 16:58

## 2021-08-21 RX ADMIN — PROPRANOLOL HYDROCHLORIDE 10 MG: 10 TABLET ORAL at 20:28

## 2021-08-21 RX ADMIN — GABAPENTIN 1200 MG: 400 CAPSULE ORAL at 07:28

## 2021-08-21 RX ADMIN — Medication 1 TABLET: at 07:28

## 2021-08-21 RX ADMIN — DICLOFENAC SODIUM 2 G: 10 GEL TOPICAL at 20:39

## 2021-08-21 RX ADMIN — PROPRANOLOL HYDROCHLORIDE 10 MG: 10 TABLET ORAL at 07:28

## 2021-08-21 RX ADMIN — ARIPIPRAZOLE 2 MG: 2 TABLET ORAL at 07:28

## 2021-08-21 RX ADMIN — QUETIAPINE 300 MG: 300 TABLET, FILM COATED ORAL at 21:55

## 2021-08-21 RX ADMIN — GABAPENTIN 1200 MG: 400 CAPSULE ORAL at 12:08

## 2021-08-21 RX ADMIN — NICOTINE POLACRILEX 8 MG: 4 GUM, CHEWING ORAL at 09:45

## 2021-08-21 RX ADMIN — PROPRANOLOL HYDROCHLORIDE 10 MG: 10 TABLET ORAL at 12:08

## 2021-08-21 RX ADMIN — FOLIC ACID 1 MG: 1 TABLET ORAL at 07:28

## 2021-08-21 RX ADMIN — ACETAMINOPHEN 650 MG: 325 TABLET, FILM COATED ORAL at 22:05

## 2021-08-21 RX ADMIN — GABAPENTIN 1200 MG: 400 CAPSULE ORAL at 20:27

## 2021-08-21 RX ADMIN — DOXYCYCLINE 100 MG: 100 CAPSULE ORAL at 07:28

## 2021-08-21 RX ADMIN — NICOTINE POLACRILEX 8 MG: 4 GUM, CHEWING ORAL at 16:58

## 2021-08-21 RX ADMIN — NICOTINE POLACRILEX 8 MG: 4 GUM, CHEWING ORAL at 20:27

## 2021-08-21 RX ADMIN — ACETAMINOPHEN 650 MG: 325 TABLET, FILM COATED ORAL at 09:44

## 2021-08-21 RX ADMIN — BUPRENORPHINE HYDROCHLORIDE 8 MG: 8 TABLET SUBLINGUAL at 20:27

## 2021-08-21 RX ADMIN — DIAZEPAM 10 MG: 5 TABLET ORAL at 09:43

## 2021-08-21 RX ADMIN — DICLOFENAC SODIUM 2 G: 10 GEL TOPICAL at 16:58

## 2021-08-21 ASSESSMENT — ACTIVITIES OF DAILY LIVING (ADL)
LAUNDRY: WITH SUPERVISION
ORAL_HYGIENE: INDEPENDENT
DRESS: SCRUBS (BEHAVIORAL HEALTH);INDEPENDENT
HYGIENE/GROOMING: INDEPENDENT

## 2021-08-21 NOTE — PROGRESS NOTES
"S:  Jose M has been visible in the milieu.  He has participated in Art therapy group.  He is eating and drinking normally.  He denies any thoughts of self harm, suicide or thoughts of harming others. He stated that, \"Earlier he thought about it (suicide) quite a bit.  Being here is hell\".  He endorses depression at a 10 and anxiety at a 9. He complained of right knee pain rated at a 9 and after receiving a topical analgesic said it had helped, bringing the pain level down to a 7.  His demeanor was less irritable this evening.  He even said he felt better knowing that he would be receiving a diazepam taper.  Pt /o of a HA.  B: Pt admitted for alcohol withdrawal and detoxification.  A:  Pt in minimal alcohol and opiate withdrawal AEB COWS and MSSA scores of 4 & 6 and 6 & 6.  R:  Administered scheduled gabapentin 1,200 mg and propranolol 10 mg as scheduled and PRN Voltaren gel 2 gm, nicotine gum (4 mg) twice and Tylenol 650 mg for HA. Continue to monitor and medicate as ordered and indicated.  "

## 2021-08-21 NOTE — PLAN OF CARE
Pt slept 4.5 hours. MSSA score 4 @0400. Pt woke at 0545 approached the desk and became dysregulated to the point of yelling down the hallway and throwing a cup of water and ice at the nurses station. Pt postured at staff however turned to walk back toward room. Pt verbalized being upset after being educated on withdrawal protocols. Pt continued to ramble at staff in loud voice down the entire hallway, waking another pt. Pt remains on Withdrawal precautions. Pt remains on 15 minute checks. Will continue to monitor.

## 2021-08-21 NOTE — PROGRESS NOTES
"S:  Jose M has been visible in the milieu.  He has participated in Music therapy and AA group.  He is eating and drinking normally.  He denies any current thoughts of self harm, suicide or thoughts of harming others.  However, he did say that sometimes he feels like \"Taking a bullet to the brain\".  He stated that he did not feel that way now, but he is depressed (rated at 7) and anxious (rated at an 8).  He talked about not wanting to lose his housing and that would happen if he went to an IP treatment.  B: Pt admitted for alcohol/opiate withdrawal and detoxification.  A:  Pt in minimal alcohol and mild opiate withdrawal AEB MSSA and COWS scores of 4 & 6 and 8 & 7 respectively.  R:  Administered regularly scheduled medications, nicotine gum PRN twice (8 mg each)and Tylenol 650 mg.  He refused the lidoderm patch, stated it causes his skin to blister.  He required no medication for alcohol withdrawal this shift. Continue to monitor and medicate as ordered and indicated.  "

## 2021-08-21 NOTE — PLAN OF CARE
"Patient has experienced a somewhat challenging day on Adan 3A.  He reports absence of all suicidal ideation while on adan.  He continues to focus, however, on desire for scheduled and PRN medications.  His presentation while not being formally observed is typically markedly different from moments when being assessed.  Having already received over 170 mg of Valium since his current admission, patient has transitioned from PRN Valium to a Valium taper, in order to wean in a therapeutically sound and safe manner, and also allow for eventual discharge from facility with patient officially \"Out of Detox\" and no longer consuming Valium  When this worker explained above to patient, he eventually accepted the information in a non-aggressive manner, moving his concerns elsewhere.  Will continue to monitor as prudent.    "

## 2021-08-22 PROCEDURE — 250N000013 HC RX MED GY IP 250 OP 250 PS 637: Performed by: EMERGENCY MEDICINE

## 2021-08-22 PROCEDURE — 124N000001 HC R&B MH

## 2021-08-22 PROCEDURE — 128N000001 HC R&B CD/MH ADULT

## 2021-08-22 PROCEDURE — 250N000013 HC RX MED GY IP 250 OP 250 PS 637: Performed by: PSYCHIATRY & NEUROLOGY

## 2021-08-22 PROCEDURE — 250N000013 HC RX MED GY IP 250 OP 250 PS 637: Performed by: NURSE PRACTITIONER

## 2021-08-22 RX ORDER — QUETIAPINE FUMARATE 50 MG/1
50 TABLET, FILM COATED ORAL 2 TIMES DAILY PRN
Status: DISCONTINUED | OUTPATIENT
Start: 2021-08-22 | End: 2021-08-23 | Stop reason: HOSPADM

## 2021-08-22 RX ADMIN — NICOTINE POLACRILEX 8 MG: 4 GUM, CHEWING ORAL at 14:30

## 2021-08-22 RX ADMIN — ACETAMINOPHEN 650 MG: 325 TABLET, FILM COATED ORAL at 22:18

## 2021-08-22 RX ADMIN — NICOTINE POLACRILEX 8 MG: 4 GUM, CHEWING ORAL at 16:43

## 2021-08-22 RX ADMIN — PROPRANOLOL HYDROCHLORIDE 10 MG: 10 TABLET ORAL at 16:39

## 2021-08-22 RX ADMIN — DIAZEPAM 5 MG: 5 TABLET ORAL at 16:39

## 2021-08-22 RX ADMIN — THIAMINE HCL TAB 100 MG 100 MG: 100 TAB at 07:32

## 2021-08-22 RX ADMIN — DOCUSATE SODIUM 100 MG: 100 CAPSULE, LIQUID FILLED ORAL at 07:31

## 2021-08-22 RX ADMIN — NICOTINE POLACRILEX 8 MG: 4 GUM, CHEWING ORAL at 20:37

## 2021-08-22 RX ADMIN — ACETAMINOPHEN 650 MG: 325 TABLET, FILM COATED ORAL at 07:49

## 2021-08-22 RX ADMIN — NICOTINE POLACRILEX 8 MG: 4 GUM, CHEWING ORAL at 13:19

## 2021-08-22 RX ADMIN — GABAPENTIN 1200 MG: 400 CAPSULE ORAL at 07:32

## 2021-08-22 RX ADMIN — DIAZEPAM 5 MG: 5 TABLET ORAL at 07:31

## 2021-08-22 RX ADMIN — PROPRANOLOL HYDROCHLORIDE 10 MG: 10 TABLET ORAL at 07:31

## 2021-08-22 RX ADMIN — NICOTINE POLACRILEX 8 MG: 4 GUM, CHEWING ORAL at 10:35

## 2021-08-22 RX ADMIN — PROPRANOLOL HYDROCHLORIDE 10 MG: 10 TABLET ORAL at 20:37

## 2021-08-22 RX ADMIN — NICOTINE POLACRILEX 8 MG: 4 GUM, CHEWING ORAL at 07:48

## 2021-08-22 RX ADMIN — NICOTINE POLACRILEX 8 MG: 4 GUM, CHEWING ORAL at 22:38

## 2021-08-22 RX ADMIN — Medication 1 TABLET: at 07:32

## 2021-08-22 RX ADMIN — DICLOFENAC SODIUM 2 G: 10 GEL TOPICAL at 08:54

## 2021-08-22 RX ADMIN — DOXYCYCLINE 100 MG: 100 CAPSULE ORAL at 07:31

## 2021-08-22 RX ADMIN — DICLOFENAC SODIUM 2 G: 10 GEL TOPICAL at 13:33

## 2021-08-22 RX ADMIN — DIAZEPAM 5 MG: 5 TABLET ORAL at 20:40

## 2021-08-22 RX ADMIN — GABAPENTIN 1200 MG: 400 CAPSULE ORAL at 11:12

## 2021-08-22 RX ADMIN — BUPRENORPHINE HYDROCHLORIDE 8 MG: 8 TABLET SUBLINGUAL at 07:32

## 2021-08-22 RX ADMIN — DOXYCYCLINE 100 MG: 100 CAPSULE ORAL at 20:37

## 2021-08-22 RX ADMIN — ACETAMINOPHEN 650 MG: 325 TABLET, FILM COATED ORAL at 11:39

## 2021-08-22 RX ADMIN — BUPRENORPHINE HYDROCHLORIDE 8 MG: 8 TABLET SUBLINGUAL at 13:19

## 2021-08-22 RX ADMIN — QUETIAPINE 300 MG: 300 TABLET, FILM COATED ORAL at 22:18

## 2021-08-22 RX ADMIN — DICLOFENAC SODIUM 2 G: 10 GEL TOPICAL at 22:21

## 2021-08-22 RX ADMIN — NICOTINE POLACRILEX 8 MG: 4 GUM, CHEWING ORAL at 11:40

## 2021-08-22 RX ADMIN — GABAPENTIN 1200 MG: 400 CAPSULE ORAL at 16:39

## 2021-08-22 RX ADMIN — FOLIC ACID 1 MG: 1 TABLET ORAL at 07:31

## 2021-08-22 RX ADMIN — DOCUSATE SODIUM 100 MG: 100 CAPSULE, LIQUID FILLED ORAL at 20:37

## 2021-08-22 RX ADMIN — ARIPIPRAZOLE 2 MG: 2 TABLET ORAL at 07:31

## 2021-08-22 RX ADMIN — BUPRENORPHINE HYDROCHLORIDE 8 MG: 8 TABLET SUBLINGUAL at 20:37

## 2021-08-22 RX ADMIN — GABAPENTIN 1200 MG: 400 CAPSULE ORAL at 20:37

## 2021-08-22 ASSESSMENT — ACTIVITIES OF DAILY LIVING (ADL)
ORAL_HYGIENE: INDEPENDENT
HYGIENE/GROOMING: INDEPENDENT
DRESS: SCRUBS (BEHAVIORAL HEALTH);INDEPENDENT
LAUNDRY: WITH SUPERVISION

## 2021-08-22 NOTE — PROGRESS NOTES
"SPIRITUAL HEALTH SERVICES   Spiritual Assessment Progress Note (Behavioral Health/CD Focus)  [facility] ([facility]) 3AW    REFERRAL SOURCE: follow up    On this visit, I met with Jose M in pt room for 15 minutes.  I engaged Jose M in reflective conversation around his motivations to quit drinking, explored his experience of God, and provided affirmation of positive characteristics.    EXPERIENCE OF ILLNESS/HOSPITALIZATION:  Jose M continues to have difficulty focusing, slurred speech and at times nods off in mid-sentence. Jose M spoke of today being \"up and down\" and spoke of his physical pain, and \"conundrum in considering motivations to keep drinking or to stop.\" Jose M clarified that his body could keep going, but it is his mind that tells him that this is no longer working and he should stop.    MEANING-MAKING:  Jose M identified a gonsalez strength of his is that when he commits to something he will do it; \"If I (Jose M) were to tell you that i'd go home today and not drink then i'd do it because I don't lie and because I told you.\" We wondered together what Jose M could commit to and feel successful in following through with.    SPIRITUALITY/VALUES/Latter-day:  [employer]ian.   Jose M talked today about valuing honesty and commitment, and how hard it is for him to pray and ask God for help when he knows in the back of his mind that he's thinking about drinking again. I offered re-framing around themes of God's unconditional love and help amidst knowing all about us.    COPING/SPIRITUAL PRACTICES: Jose M identified the negative coping practice of \"90% of the reason I drink is because of physical pain.\"    SUPPORT SYSTEMS:     PLAN: I will follow up with Jose M tomorrow/early this week.                                                                                                                                             Liss Kerns MDiv  Associate   Pager [phone]  Office [phone]  [facility] remains available 24/7 for emergent requests/referrals, " either by having the switchboard page the on-call  or by entering an ASAP/STAT consult in Epic (this will also page the on-call ). Routine Epic consults receive an initial response within 24 hours.

## 2021-08-22 NOTE — PROGRESS NOTES
"Patient is up to the nursing station and interactions are frequent but not intimidating at times and other interactions he seems to try and intimidate staff or appears angry in demeanor.     Pt up to the front nursing station frequently today reporting various complaints/issues.  He was heard loudly verbalizing his displeasure about someone putting his breakfast tray away \"I just went to the bathroom and now my tray is gone\". This interaction occurred near room 322 and that is approximately 90 feet from the nursing station and could easily be heard from the nursing station. Asked EULALIO Pappas to look for his tray in the tray cart and it was located and his meal was eaten. Per Jose A ZIMMERMAN pt had handed his tray to him on completion of breakfast and Jose A returned it to the cart. Pt stating he had nicotine gum on his tray and \"they took it\". No unchewed gum found on the tray in the cart per Mian TSAI. CHAPARRO Andres immediately gave pt additional gum. Pt at times has his mood border on aggressive and hostile but he seems to at least hold his composure and just loudly vent to no one in particular.     Another instance of him being hostile was regarding the therapeutic video in the morning \"I just watched this yesterday\" he states while walking away from the [facility]. Pt then asked for a phone and was told by CHAPARRO Davison that phones were off during group times. Pt then saw a patient in the [facility] on a portable phone and aggressively asked why that patient was allowed a phone. Elle Davison explained to the patient that sometimes exceptions for phones are made in cases that include assessments or interviews. Pt loudly said while walking away \"yeah, yeah I love you, I miss you\" stating this is what that patient was stating on the phone.     Updated all staff to be cautious for aggressive behavior and to maintain safe distance from patient due to the lability of his mood. Did updated CHAPARRO Andres who is pursuing assault precautions.   "

## 2021-08-22 NOTE — PROGRESS NOTES
08/21/21 2300   Groups   Details    (Psychotherapy)   Number of patients attending the group:  12  Group Length:  1 Hours     Group Therapy Type: Psychotherapy     Summary of Group / Topics Discussed:        The  Psychotherapy group goal is to promote insight to positive choice and change. Group processing is within a supportive and safe environment. Patients will process emotions using verbal group and expressive psychotherapy interventions including visual art/writing interventions.     Group interventions support patients by: cultivating resilience, self efficacy/empowerment and hope/optimism     Modalities to reach these goals include: positive/solution focused psychology  and Expressive Arts Therapies     Subjective -patient report of mood today- no report, he arrived late     Objective/ Intervention- Goal of group and Therapeutic modality utilized- watercolor symbol of strength and discussion about strength and resilience     Group Response- engaged large group     Patient Response- Pt was pleasant, cooperative and engaged.  He was quite vocal and conversational. He used a lot of symbolism and metaphor in his discussion and his paintings.    Carlos Walter, YADIRAFT, ATR-BC

## 2021-08-22 NOTE — PLAN OF CARE
Patient has experienced a largely positive day on Adan 3A.  He reports absence of all suicidal ideation while on adan.  He continues to verbalize discomfort in various areas of his body--primarily his right knee.  He finds some relief from scheduled medications, as well as PRN analgesic gel.  He is sporadically disgruntled, and sent into escalation at seemingly minor occurrences.  He has, however, accepted without difficulty his Valium taper, knowing that it is linked to ultimate discharge date. His alcohol-related symptoms, including a previously more marked tremor, have decreased significantly in the last 24 hours.  He looks forward to his eventual discharge to his previous group home.  Will continue to monitor as prudent.

## 2021-08-23 ENCOUNTER — TELEPHONE (OUTPATIENT)
Dept: FAMILY MEDICINE | Facility: CLINIC | Age: 51
End: 2021-08-23

## 2021-08-23 VITALS
SYSTOLIC BLOOD PRESSURE: 166 MMHG | OXYGEN SATURATION: 92 % | DIASTOLIC BLOOD PRESSURE: 96 MMHG | BODY MASS INDEX: 30.93 KG/M2 | HEIGHT: 76 IN | WEIGHT: 254 LBS | HEART RATE: 82 BPM | RESPIRATION RATE: 16 BRPM | TEMPERATURE: 97.2 F

## 2021-08-23 LAB — CRP SERPL-MCNC: 39 MG/L (ref 0–8)

## 2021-08-23 PROCEDURE — 86140 C-REACTIVE PROTEIN: CPT | Performed by: NURSE PRACTITIONER

## 2021-08-23 PROCEDURE — 99231 SBSQ HOSP IP/OBS SF/LOW 25: CPT | Performed by: PHYSICIAN ASSISTANT

## 2021-08-23 PROCEDURE — 250N000013 HC RX MED GY IP 250 OP 250 PS 637: Performed by: PSYCHIATRY & NEUROLOGY

## 2021-08-23 PROCEDURE — 250N000013 HC RX MED GY IP 250 OP 250 PS 637: Performed by: EMERGENCY MEDICINE

## 2021-08-23 PROCEDURE — 99207 PR CDG-MDM COMPONENT: MEETS MODERATE - UP CODED: CPT | Performed by: PHYSICIAN ASSISTANT

## 2021-08-23 PROCEDURE — 36416 COLLJ CAPILLARY BLOOD SPEC: CPT | Performed by: NURSE PRACTITIONER

## 2021-08-23 PROCEDURE — 99239 HOSP IP/OBS DSCHRG MGMT >30: CPT | Performed by: PSYCHIATRY & NEUROLOGY

## 2021-08-23 RX ORDER — BUPRENORPHINE AND NALOXONE 8; 2 MG/1; MG/1
1 FILM, SOLUBLE BUCCAL; SUBLINGUAL 3 TIMES DAILY
Status: DISCONTINUED | OUTPATIENT
Start: 2021-08-23 | End: 2021-08-23 | Stop reason: HOSPADM

## 2021-08-23 RX ORDER — ARIPIPRAZOLE 2 MG/1
2 TABLET ORAL DAILY
Qty: 30 TABLET | Refills: 0 | Status: ON HOLD | OUTPATIENT
Start: 2021-08-23 | End: 2021-09-27

## 2021-08-23 RX ORDER — QUETIAPINE FUMARATE 50 MG/1
50 TABLET, FILM COATED ORAL 2 TIMES DAILY PRN
Qty: 60 TABLET | Refills: 0 | Status: ON HOLD | OUTPATIENT
Start: 2021-08-23 | End: 2021-09-27

## 2021-08-23 RX ORDER — BUPRENORPHINE AND NALOXONE 8; 2 MG/1; MG/1
1 FILM, SOLUBLE BUCCAL; SUBLINGUAL 3 TIMES DAILY
Qty: 27 FILM | Refills: 0 | Status: SHIPPED | OUTPATIENT
Start: 2021-08-23 | End: 2021-09-01

## 2021-08-23 RX ORDER — QUETIAPINE FUMARATE 300 MG/1
300 TABLET, FILM COATED ORAL AT BEDTIME
Qty: 30 TABLET | Refills: 0 | Status: ON HOLD | OUTPATIENT
Start: 2021-08-23 | End: 2021-09-27

## 2021-08-23 RX ORDER — PROPRANOLOL HYDROCHLORIDE 10 MG/1
10 TABLET ORAL 4 TIMES DAILY
Qty: 60 TABLET | Refills: 1 | Status: ON HOLD | OUTPATIENT
Start: 2021-08-23 | End: 2021-09-27

## 2021-08-23 RX ORDER — LANOLIN ALCOHOL/MO/W.PET/CERES
100 CREAM (GRAM) TOPICAL DAILY
Qty: 30 TABLET | Refills: 0 | Status: ON HOLD | OUTPATIENT
Start: 2021-08-24 | End: 2021-09-27

## 2021-08-23 RX ORDER — FOLIC ACID 1 MG/1
1000 TABLET ORAL DAILY
Qty: 90 TABLET | Refills: 0 | Status: ON HOLD | OUTPATIENT
Start: 2021-08-23 | End: 2021-09-27

## 2021-08-23 RX ORDER — MULTIPLE VITAMINS W/ MINERALS TAB 9MG-400MCG
1 TAB ORAL DAILY
Qty: 30 TABLET | Refills: 0 | Status: ON HOLD | OUTPATIENT
Start: 2021-08-24 | End: 2021-09-27

## 2021-08-23 RX ORDER — DOCUSATE SODIUM 100 MG/1
100 CAPSULE, LIQUID FILLED ORAL 2 TIMES DAILY
Qty: 60 CAPSULE | Refills: 0 | Status: ON HOLD | OUTPATIENT
Start: 2021-08-23 | End: 2021-09-27

## 2021-08-23 RX ORDER — GABAPENTIN 400 MG/1
1200 CAPSULE ORAL 4 TIMES DAILY
Qty: 180 CAPSULE | Refills: 1 | Status: ON HOLD | OUTPATIENT
Start: 2021-08-23 | End: 2021-09-27

## 2021-08-23 RX ORDER — BUPRENORPHINE AND NALOXONE 8; 2 MG/1; MG/1
1 FILM, SOLUBLE BUCCAL; SUBLINGUAL 3 TIMES DAILY
Status: DISCONTINUED | OUTPATIENT
Start: 2021-08-23 | End: 2021-08-23

## 2021-08-23 RX ADMIN — FOLIC ACID 1 MG: 1 TABLET ORAL at 07:50

## 2021-08-23 RX ADMIN — THIAMINE HCL TAB 100 MG 100 MG: 100 TAB at 07:50

## 2021-08-23 RX ADMIN — NICOTINE POLACRILEX 8 MG: 4 GUM, CHEWING ORAL at 09:08

## 2021-08-23 RX ADMIN — BUPRENORPHINE HYDROCHLORIDE 8 MG: 8 TABLET SUBLINGUAL at 07:50

## 2021-08-23 RX ADMIN — DIAZEPAM 5 MG: 5 TABLET ORAL at 10:32

## 2021-08-23 RX ADMIN — GABAPENTIN 1200 MG: 400 CAPSULE ORAL at 12:10

## 2021-08-23 RX ADMIN — GABAPENTIN 1200 MG: 400 CAPSULE ORAL at 07:50

## 2021-08-23 RX ADMIN — PROPRANOLOL HYDROCHLORIDE 10 MG: 10 TABLET ORAL at 12:10

## 2021-08-23 RX ADMIN — ACETAMINOPHEN 650 MG: 325 TABLET, FILM COATED ORAL at 07:51

## 2021-08-23 RX ADMIN — PROPRANOLOL HYDROCHLORIDE 10 MG: 10 TABLET ORAL at 07:51

## 2021-08-23 RX ADMIN — NICOTINE POLACRILEX 8 MG: 4 GUM, CHEWING ORAL at 10:32

## 2021-08-23 RX ADMIN — DOCUSATE SODIUM 100 MG: 100 CAPSULE, LIQUID FILLED ORAL at 07:50

## 2021-08-23 RX ADMIN — Medication 1 TABLET: at 07:50

## 2021-08-23 RX ADMIN — NICOTINE POLACRILEX 8 MG: 4 GUM, CHEWING ORAL at 07:50

## 2021-08-23 RX ADMIN — ARIPIPRAZOLE 2 MG: 2 TABLET ORAL at 07:50

## 2021-08-23 NOTE — PROGRESS NOTES
Brief Medicine Note    Medicine following for left hand wound.     Patient examined today. He reports his hand pain and redness are both improving. Not having any drainage. His CRP is improved today.     Plan:   - He should continue doxycycline as prescribed to complete a 5-day course   - He will need to follow-up in clinic for suture removal 7-10 days after his initial lac repair   - Counseled patient on at-home wound care, signs/symptoms that would require re-evaluation    - Discharge instructions added to discharge navigator    Medicine will sign off. Please do not hesitate to contact if new questions or concerns arise.     Paloma William PA-C  Hospitalist Service  Pager: [phone]

## 2021-08-23 NOTE — PLAN OF CARE
"S:  Jose M has been visible in the milieu.  He has not participated in group.  He is eating and drinking normally.  He denies any thoughts of self harm, suicide or thoughts of harming others.  He states that he has depression rated at a 6 and anxiety rated at a 7.  When asked about his mood he said he \"Had no ups or downs, just kind of flat\".  He has been patient when waiting for his turn to receive his evening meds, deferring to others and allowing others to have a turn before him.  Jose M returned to the desk and asked for some Tylenol right before going to bed.  When writer went to get his medication from the [facility], he asked writer to go ahead and give him the last dose of diazepam for the day.  B: Pt admitted for alcohol and opiate withdrawal and detoxification.  A:  Pt in minimal opiate withdrawal AEB COWS scores of 4 & 7.  He is on a diazepam taper and is no longer being monitored for alcohol withdrawal.  R:  Administered one dose of diazepam 5 mg. He refused the  eight o'clock dose, but later requested it.  A second 5 mg dose was administered.  He received PRN doses of nicotine gum 8 mg, thrice, diclofenac gel 2 gm once and Tylenol 650 mg for back pain.  Continue to monitor and medicate as ordered and indicated.   "

## 2021-08-23 NOTE — TELEPHONE ENCOUNTER
Spoke with patient regarding his recent issue with not showing for appointments scheduled.  Patient is in a complex situation currently that makes receiving reminders difficult.  I also discussed with Dr Giron and Maryann (). They are in support of patient continuing with [facility] being his care home.    Cate Lion RN

## 2021-08-23 NOTE — DISCHARGE SUMMARY
Hollis Barclay MRN# [id number]   Age: 51 year old YOB: 1970     Date of Admission:  8/16/2021  Date of Discharge:  8/24/2021  Admitting Physician:  Ulises Perez MD  Discharge Physician:  Jeremías Toro MD      DISCHARGE  DX  Bipolar affective disorder most recent episode depressed  Opiate use disorder on Suboxone maintenance  Alcohol use disorder severe           Event Leading to Hospitalization:     See Admission note by admitting provider for patient encounter. for additional details.          Hospital Course:   PATIENT was admitted to Station 3Awith attending  under DR toro, please review the detailed admit note on 8/16 by Dr. Ulises Perez   The patient was placed under status 15 (15 minute checks) to ensure patient safety.   MSSA protocol was initiated due to the patient's history of alcohol abuse and concern for withdrawal symptoms.  CBC, BMP and utox obtained.    All outpatient medications were continued   His Seroquel was increased  He denied any active suicidal ideation plan or intent    PATIENTdid participate in groups and was visible in the milieu.     The patient's symptoms of alcohol withdrawal improved.     Patients energy motivation , sleep appetite improved.  Pt completed detox . It was un eventful.      Discussed with patient medications for craving.  Spoke with patient about triggers coping skills relapse prevention.    CONSULTS DONE DURING PATIENTS HOSPITALIZATION.  Patient was seen by medicine on date 8/18/2021    This as per their medical consult    Referring Provider: Ulises Perez MD  Reason for Consult: Medical co-management of detox           Assessment and Recommendations:   Hollis Barclay is a 51 year old male with a history of TBI, alcohol use disorder, polysubstance abuse, chronic pain on methadone, bipolar disorder, schizophrenia, depression, and anxiety admitted to station 3A for alcohol withdrawal and detox and possible suicidal ideation.       # Alcohol  withdrawal, hx of alcohol use disorder - MSSA 8 this shift.  Reports hx of withdrawal seizures.    - Seizure precautions   - Continue MSSA   - Folvite, multi-vites, thiamine supplementation   - Further management per Psychiatry     # Self inflicted L hand wound w/ possible early cellulitis  # Multiple superficial lacerations   Pt stabbed dorsal surface of L hand with a fork and multiple small lac w/ knife.  S/p simple prolene sutures x 2 in the ED on 8/16.  Today noted to have swelling and serous drainage from two puncture sites.  No fevers, WBC 9.5.    - Start doxycycline 100mg BID x 5 days   - Ibuprofen PRN for pain, can also try cold packs   - Elevated hand when resting   - Monitor closely   - CRP, CBC in AM   - Sutures out in 7-10 days      # Elevated LFTs - Likely 2/2 alcohol use, though ALT > AST.  Denies abdominal pain.  - Check CMP in 1-2 days to ensure downtrend      # Thrombocytosis - Plt 628.  Likely reactive in setting of hand wound and possible cellulitis.    - Check CRP   - Repeat in 1-2 days to ensure downtrend     # R knee pain and swelling - New this admission.  Hx of lower extremity swelling, but no hx DVT/VTE.  Unsure of any trauma to the leg.  Previous US in 4/2021 w/ moderate knee joint effusion in suprapatellar space.      # Chronic pain - Previously on Methadone, but per chart, has not taken in last 4-5 days to admission.  Normal QTc on EKG.   - Defer to primary team to resume methadone      # Schizophrenia, bipolar disorder, depression, anxiety - Per chart review, had stopped taking his psychiatric medications several days prior to admission.  On exam, he has garbled, non-sensical speech about his psychiatric illness but can describe medical issues and concerns.  Verbalized that he felt depressed and suicidal at his group home.   - Defer to Psychiatry        He was seen on the date of discharge by internal medicine assessment the noteMedicine Note     Medicine following for left hand wound.       Patient examined today. He reports his hand pain and redness are both improving. Not having any drainage. His CRP is improved today.      Plan:   - He should continue doxycycline as prescribed to complete a 5-day course   - He will need to follow-up in clinic for suture removal 7-10 days after his initial lac repair   - Counseled patient on at-home wound care, signs/symptoms that would require re-evaluation    - Discharge instructions added to discharge navigator     Medicine will sign off. Please do not hesitate to contact if new questions or concerns arise.          Pt was seen by cm  As per recommendations from cm  pt discharges over the weekend, please send to [facility] in cab at the following address:      [facility]  [address] [place] [address]        *Please call Vicente at [facility] ([phone] or [phone]) to update on discharge.                    Labs:reviewed with patient       Recent Results (from the past 48 hour(s))   CRP inflammation    Collection Time: 08/23/21  7:31 AM   Result Value Ref Range    CRP Inflammation 39.0 (H) 0.0 - 8.0 mg/L         Recent Results (from the past 240 hour(s))   Alcohol breath test POCT    Collection Time: 08/16/21  1:52 PM   Result Value Ref Range    Alcohol Breath Test 0.201 (A) 0.00 - 0.01   Extra Red Top Tube    Collection Time: 08/16/21  2:28 PM   Result Value Ref Range    Hold Specimen JIC    Ethyl Alcohol Level    Collection Time: 08/16/21  2:33 PM   Result Value Ref Range    Alcohol ethyl 0.22 (H) <=0.01 g/dL   Comprehensive metabolic panel    Collection Time: 08/16/21  2:33 PM   Result Value Ref Range    Sodium 140 133 - 144 mmol/L    Potassium 3.4 3.4 - 5.3 mmol/L    Chloride 104 94 - 109 mmol/L    Carbon Dioxide (CO2) 25 20 - 32 mmol/L    Anion Gap 11 3 - 14 mmol/L    Urea Nitrogen 21 7 - 30 mg/dL    Creatinine 0.75 0.66 - 1.25 mg/dL    Calcium 8.8 8.5 - 10.1 mg/dL    Glucose 103 (H) 70 - 99 mg/dL    Alkaline Phosphatase 95 40 -  150 U/L    AST 63 (H) 0 - 45 U/L    ALT 87 (H) 0 - 70 U/L    Protein Total 8.8 6.8 - 8.8 g/dL    Albumin 4.0 3.4 - 5.0 g/dL    Bilirubin Total 0.5 0.2 - 1.3 mg/dL    GFR Estimate >90 >60 mL/min/1.73m2   CBC with platelets and differential    Collection Time: 08/16/21  2:33 PM   Result Value Ref Range    WBC Count 9.5 4.0 - 11.0 10e3/uL    RBC Count 5.31 4.40 - 5.90 10e6/uL    Hemoglobin 15.9 13.3 - 17.7 g/dL    Hematocrit 47.9 40.0 - 53.0 %    MCV 90 78 - 100 fL    MCH 29.9 26.5 - 33.0 pg    MCHC 33.2 31.5 - 36.5 g/dL    RDW 14.2 10.0 - 15.0 %    Platelet Count 628 (H) 150 - 450 10e3/uL    % Neutrophils 57 %    % Lymphocytes 36 %    % Monocytes 6 %    % Eosinophils 0 %    % Basophils 1 %    % Immature Granulocytes 0 %    NRBCs per 100 WBC 0 <1 /100    Absolute Neutrophils 5.4 1.6 - 8.3 10e3/uL    Absolute Lymphocytes 3.5 0.8 - 5.3 10e3/uL    Absolute Monocytes 0.6 0.0 - 1.3 10e3/uL    Absolute Eosinophils 0.0 0.0 - 0.7 10e3/uL    Absolute Basophils 0.1 0.0 - 0.2 10e3/uL    Absolute Immature Granulocytes 0.0 <=0.0 10e3/uL    Absolute NRBCs 0.0 10e3/uL   Magnesium    Collection Time: 08/16/21  2:33 PM   Result Value Ref Range    Magnesium 2.2 1.6 - 2.3 mg/dL   CRP inflammation    Collection Time: 08/16/21  2:33 PM   Result Value Ref Range    CRP Inflammation <2.9 0.0 - 8.0 mg/L   Drug abuse screen 1 urine (ED)    Collection Time: 08/16/21  2:39 PM   Result Value Ref Range    Amphetamines Urine Screen Negative Screen Negative    Barbiturates Urine Screen Negative Screen Negative    Benzodiazepines Urine Screen Negative Screen Negative    Cannabinoids Urine Screen Negative Screen Negative    Cocaine Urine Screen Negative Screen Negative    Opiates Urine Screen Negative Screen Negative   Asymptomatic COVID-19 Virus (Coronavirus) by PCR Nasopharyngeal    Collection Time: 08/16/21  4:27 PM    Specimen: Nasopharyngeal; Swab   Result Value Ref Range    SARS CoV2 PCR Negative Negative   EKG 12-lead, tracing only    Collection  Time: 08/16/21  7:07 PM   Result Value Ref Range    Systolic Blood Pressure  mmHg    Diastolic Blood Pressure  mmHg    Ventricular Rate 134 BPM    Atrial Rate 134 BPM    ID Interval 158 ms    QRS Duration 78 ms     ms    QTc 450 ms    P Axis 54 degrees    R AXIS 0 degrees    T Axis 41 degrees    Interpretation ECG       Sinus tachycardia  Otherwise normal ECG    Unconfirmed report - interpretation of this ECG is computer generated - see medical record for final interpretation  Confirmed by - EMERGENCY ROOM, PHYSICIAN ([id number]),  CHUCK AGUILLON ([id number]) on 8/17/2021 7:09:05 AM     CRP inflammation    Collection Time: 08/19/21  7:40 AM   Result Value Ref Range    CRP Inflammation 51.0 (H) 0.0 - 8.0 mg/L   CBC with platelets    Collection Time: 08/19/21  7:40 AM   Result Value Ref Range    WBC Count 13.3 (H) 4.0 - 11.0 10e3/uL    RBC Count 4.29 (L) 4.40 - 5.90 10e6/uL    Hemoglobin 13.3 13.3 - 17.7 g/dL    Hematocrit 40.5 40.0 - 53.0 %    MCV 94 78 - 100 fL    MCH 31.0 26.5 - 33.0 pg    MCHC 32.8 31.5 - 36.5 g/dL    RDW 14.0 10.0 - 15.0 %    Platelet Count 286 150 - 450 10e3/uL   CRP inflammation    Collection Time: 08/21/21  7:26 AM   Result Value Ref Range    CRP Inflammation 66.0 (H) 0.0 - 8.0 mg/L   CBC with platelets    Collection Time: 08/21/21  7:26 AM   Result Value Ref Range    WBC Count 5.4 4.0 - 11.0 10e3/uL    RBC Count 3.93 (L) 4.40 - 5.90 10e6/uL    Hemoglobin 12.1 (L) 13.3 - 17.7 g/dL    Hematocrit 36.5 (L) 40.0 - 53.0 %    MCV 93 78 - 100 fL    MCH 30.8 26.5 - 33.0 pg    MCHC 33.2 31.5 - 36.5 g/dL    RDW 13.7 10.0 - 15.0 %    Platelet Count 277 150 - 450 10e3/uL   CRP inflammation    Collection Time: 08/23/21  7:31 AM   Result Value Ref Range    CRP Inflammation 39.0 (H) 0.0 - 8.0 mg/L            Because this patient meets criteria for an Alcohol Use Disorder, I performed the following brief intervention on the date of this note:              1) Expressed concern that the patient is drinking  at unhealthy levels known to increase their risk of alcohol related problems              2) Gave feedback linking alcohol use and health, including personalized feedback explaining how alcohol use can interact with their medical and/or psychiatric problems, and with prescribed medications.              3) Advised patient to abstain.    PT counseled on nicotine cessation and nicotine replacement provided    Discussed with patient many issues of addiction,triggers, relapse, and establishing a solid recovery program.    DISCHARGE MENTAL STATUS EXAMINATION:  The patient is alert, oriented x3.  Good fund of knowledge.  Good use of language.  Recent and remote memory, language, fund of knowledge are all adequate.  Euthymic mood congruent affect  Speech normal rate/rhythm linear tp no loose asso,The patient does not have any active suicidal or homicidal ideation.  Does not have any auditory or visual hallucination.  Fair insight/judgment At this time, the patient was stable to be discharged.        Pt was not determined to not be a danger to himself or others. At the current time of discharge, the patient does not meet criteria for involuntary hospitalization. On the day of discharge, the patient reports that they do not have suicidal or homicidal ideation and would never hurt themselves or others. Steps taken to minimize risk include: assessing patient s behavior and thought process daily during hospital stay, discharging patient with adequate plan for follow up for mental and physical health and discussing safety plan of returning to the hospital should the patient ever have thoughts of harming themselves or others. Therefore, based on all available evidence including the factors cited above, the patient does not appear to be at imminent risk for self-harm, and is appropriate for outpatient level of care.     Educated about side effects/risk vs benefits /alternative including non treatment.Pt consented to be on  medication.     .Total time spent on discharge summary more than 35 min  More than  20 min  planning, coordination of care, medication reconciliation and performance of physical exam on day of discharge.Care was coordinated with unit RN and unit therapist  His discharge medications are as below   Jose M Barclay   Home Medication Instructions GIDEON:[id number]    Printed on:08/24/21 1312   Medication Information                      ARIPiprazole (ABILIFY) 2 MG tablet  Take 1 tablet (2 mg) by mouth daily             buprenorphine HCl-naloxone HCl (SUBOXONE) 8-2 MG per film  Place 1 Film under the tongue 3 times daily for 9 days             diclofenac (VOLTAREN) 1 % topical gel  Apply 2 g topically 4 times daily as needed for moderate pain             docusate sodium (COLACE) 100 MG capsule  Take 1 capsule (100 mg) by mouth 2 times daily             folic acid (FOLVITE) 1 MG tablet  Take 1 tablet (1,000 mcg) by mouth daily             gabapentin (NEURONTIN) 400 MG capsule  Take 3 capsules (1,200 mg) by mouth 4 times daily             multivitamin w/minerals (THERA-VIT-M) tablet  Take 1 tablet by mouth daily             nicotine polacrilex (NICORETTE) 4 MG gum  Place 1-2 each (4-8 mg) inside cheek every hour as needed for other (nicotine withdrawal symptoms)             propranolol (INDERAL) 10 MG tablet  Take 1 tablet (10 mg) by mouth 4 times daily             QUEtiapine (SEROQUEL) 300 MG tablet  Take 1 tablet (300 mg) by mouth At Bedtime             QUEtiapine (SEROQUEL) 50 MG tablet  Take 1 tablet (50 mg) by mouth 2 times daily as needed (agitation)             thiamine (B-1) 100 MG tablet  Take 1 tablet (100 mg) by mouth daily                  Jose M Barclay   Home Medication Instructions GIDEON:[id number]    Printed on:08/23/21 1326   Medication Information                      ARIPiprazole (ABILIFY) 2 MG tablet  Take 1 tablet (2 mg) by mouth daily             buprenorphine HCl-naloxone HCl (SUBOXONE) 8-2 MG per  "film  Place 1 Film under the tongue 3 times daily for 9 days             diclofenac (VOLTAREN) 1 % topical gel  Apply 2 g topically 4 times daily as needed for moderate pain             docusate sodium (COLACE) 100 MG capsule  Take 1 capsule (100 mg) by mouth 2 times daily             folic acid (FOLVITE) 1 MG tablet  Take 1 tablet (1,000 mcg) by mouth daily             gabapentin (NEURONTIN) 400 MG capsule  Take 3 capsules (1,200 mg) by mouth 4 times daily             multivitamin w/minerals (THERA-VIT-M) tablet  Take 1 tablet by mouth daily             nicotine polacrilex (NICORETTE) 4 MG gum  Place 1-2 each (4-8 mg) inside cheek every hour as needed for other (nicotine withdrawal symptoms)             propranolol (INDERAL) 10 MG tablet  Take 1 tablet (10 mg) by mouth 4 times daily             QUEtiapine (SEROQUEL) 300 MG tablet  Take 1 tablet (300 mg) by mouth At Bedtime             QUEtiapine (SEROQUEL) 50 MG tablet  Take 1 tablet (50 mg) by mouth 2 times daily as needed (agitation)             thiamine (B-1) 100 MG tablet  Take 1 tablet (100 mg) by mouth daily                  Disposition: Medical Follow Up: Patient will see Dr. Giron of the [facility] on Wednesday, September 15, 2021 @ 10:25 AM     Group Home: [facility]: [address] [place] [address]  # [phone] x [phone] or x [phone]        Facts about COVID19 at www.cdc.gov/COVID19 and www.[place].gov/covid19     Keeping hands clean is one of the most important steps we can take to avoid getting sick and spreading germs to others.  Please wash your hands frequently and lather with soap for at least 20 seconds!     Patient at the time of discharge denied any active suicide lesion plan or intent he was optimistic of his recovery and looking forward to following with a Suboxone provider:  .        \"Much or all of the text in this note was generated through the use of Dragon Dictate voice to text software. Errors in spelling or words which " "appear to be out of contact are unintentional, may be present due having escaped editing\"     "

## 2021-08-23 NOTE — PROGRESS NOTES
[facility], [place]   Psychiatric Progress Note     Patient denied any active suicide ideation plan or intent

## 2021-08-23 NOTE — DISCHARGE INSTRUCTIONS
Behavioral [facility] Discharge Planning and Instructions  THANK YOU FOR CHOOSING THE [facility]  [facility]: [phone]    Summary: You were admitted to, then processed through, [facility] on August 17, 2021 for detoxification from alcohol.  A medical examination was performed that included lab work. You have met with a  and opted to return to your group home.  Please make your recovery a priority, Mr. Barclay.    Main Diagnosis:  Alcohol Dependence: COMPLICATED    Major Treatments, Procedures and Findings:  You were detoxified from alcohol using the appropriate protocol(s). You have had a chemical dependency assessment.  You have had blood drawn, and the results have been reviewed with you.  Please take a copy of your laboratory work with you to your next provider appointment.    Symptoms to Report:  If you experience more anxiety, confusion, sleeplessness, deep sadness or thoughts of suicide, notify your treatment team or notify your primary care physician. IF THE SYMPTOMS YOU ARE EXPERIENCING ARE A MEDICAL EMERGENCY, CALL 911 IMMEDIATELY.     Lifestyle Adjustment: Adjust your lifestyle to get enough sleep, relaxation, exercise and excellent nutrition. Continue to develop healthy coping skills to decrease stress and promote a sober living environment. Do NOT use alcohol, illegal drugs or addictive medications other than what is currently prescribed. AA, NA, and a sponsor are excellent resources for support.     Medical Follow Up: Patient will see Dr. Giron of the [facility] on Wednesday, September 15, 2021 @ 10:25 AM    Group Home: [facility]: [address] [place] [address]  # [phone] x [phone] or x [phone]    Resources:  [facility] [phone] Support Group:  AA/NA and Sponsor/support.  Crisis Intervention: 672.296.7322 or 010-214-3982. Call anytime for help.  [place] on [place]ness (www.[place].ari.org): 285.679.7877 or 050-629-7052.   Alcoholics Anonymous (www.alcoholics-anonymous.org): Check your phone book for your local chapter.  Suicide Awareness Voices of Education (www.save.org): 132.758.8301  National Suicide Prevention Line (www.mentalhealthmn.org): 692.976.1810  Mental Health Consumer/Survivor Network of [place] (www.mhcsn.net): 582.311.9234 or 740-942-5371.  Mental Health Association of [place] (www.mentalhealth.org): 796.696.2305 or 163-788-9564  Substance Abuse and Mental Health Services. (www.samhsa.gov)    [place] [facility] ([facility])  [facility] connects people seeking recovery to resources that help foster and sustain long-term recovery.  Whether you are seeking resources for treatment, transportation, housing, job training, education, health care or other pathways to recovery, [facility] is a great place to start. [phone] www.[facility].org    General Medication Instruction: See your medication papers for instructions. Take all medicines as directed.  Make no changes unless your doctor suggests them. Go to all your doctor visits.  Be sure to have all your required lab tests. This way, your medicines may be refilled on time. Do not use any drugs not prescribed by your provider. AA/NA and sponsors are excellent resources for support. Avoid alcohol at all costs!    Please Note:  If you have any questions at anytime after you are discharged please call the [facility] [place] detoxification garber 3AW at [phone].  [facility], Behavioral Intake [phone]. Please take this discharge folder with you to all your follow up appointments, it contains your lab results, diagnosis, medication list and discharge recommendations.    THANK YOU FOR CHOOSING THE [facility]

## 2021-08-24 ENCOUNTER — PATIENT OUTREACH (OUTPATIENT)
Dept: CARE COORDINATION | Facility: CLINIC | Age: 51
End: 2021-08-24

## 2021-08-24 DIAGNOSIS — Z71.89 OTHER SPECIFIED COUNSELING: ICD-10-CM

## 2021-08-24 NOTE — PROGRESS NOTES
Clinic Care Coordination Contact    Background: Care Coordination referral placed from [facility] discharge report for reason of patient meeting criteria for a TCM outreach call by [facility] team.    Assessment: Upon chart review, [facility] Team member will cancel/close the referral for TCM outreach due to reason below:     Patient has discharged to another healthcare facility, skilled nursing facility or group home.       Plan: Care Coordination referral for TCM outreach canceled.    DEVORAH Mei  [facility] [place], [facility]

## 2021-09-12 ENCOUNTER — TELEPHONE (OUTPATIENT)
Dept: BEHAVIORAL HEALTH | Facility: CLINIC | Age: 51
End: 2021-09-12

## 2021-09-12 ENCOUNTER — HOSPITAL ENCOUNTER (INPATIENT)
Facility: CLINIC | Age: 51
LOS: 15 days | Discharge: HOME | End: 2021-09-27
Attending: EMERGENCY MEDICINE | Admitting: PSYCHIATRY & NEUROLOGY
Payer: MEDICAID

## 2021-09-12 DIAGNOSIS — F10.939 ALCOHOL WITHDRAWAL SYNDROME WITH COMPLICATION, WITH UNSPECIFIED COMPLICATION (H): ICD-10-CM

## 2021-09-12 DIAGNOSIS — F10.21 ALCOHOLISM IN REMISSION (H): ICD-10-CM

## 2021-09-12 DIAGNOSIS — K59.00 CONSTIPATION, UNSPECIFIED CONSTIPATION TYPE: ICD-10-CM

## 2021-09-12 DIAGNOSIS — I10 ESSENTIAL HYPERTENSION: Primary | ICD-10-CM

## 2021-09-12 DIAGNOSIS — M54.42 CHRONIC RIGHT-SIDED LOW BACK PAIN WITH BILATERAL SCIATICA: ICD-10-CM

## 2021-09-12 DIAGNOSIS — F39 MOOD DISORDER (H): ICD-10-CM

## 2021-09-12 DIAGNOSIS — F15.120: ICD-10-CM

## 2021-09-12 DIAGNOSIS — E53.8 VITAMIN B12 DEFICIENCY (NON ANEMIC): ICD-10-CM

## 2021-09-12 DIAGNOSIS — F14.10 COCAINE ABUSE (H): ICD-10-CM

## 2021-09-12 DIAGNOSIS — F11.20 UNCOMPLICATED OPIOID DEPENDENCE (H): ICD-10-CM

## 2021-09-12 DIAGNOSIS — F07.81 POST CONCUSSIVE ENCEPHALOPATHY: ICD-10-CM

## 2021-09-12 DIAGNOSIS — K21.00 GASTROESOPHAGEAL REFLUX DISEASE WITH ESOPHAGITIS, UNSPECIFIED WHETHER HEMORRHAGE: ICD-10-CM

## 2021-09-12 DIAGNOSIS — F10.239 ALCOHOL DEPENDENCE WITH WITHDRAWAL WITH COMPLICATION (H): ICD-10-CM

## 2021-09-12 DIAGNOSIS — E03.9 HYPOTHYROIDISM, UNSPECIFIED TYPE: ICD-10-CM

## 2021-09-12 DIAGNOSIS — B49 FUNGAL INFECTION: ICD-10-CM

## 2021-09-12 DIAGNOSIS — G89.29 CHRONIC RIGHT-SIDED LOW BACK PAIN WITH BILATERAL SCIATICA: ICD-10-CM

## 2021-09-12 DIAGNOSIS — F25.1 SCHIZOAFFECTIVE DISORDER, DEPRESSIVE TYPE (H): ICD-10-CM

## 2021-09-12 DIAGNOSIS — Z20.822 CONTACT WITH AND (SUSPECTED) EXPOSURE TO COVID-19: ICD-10-CM

## 2021-09-12 DIAGNOSIS — M54.41 CHRONIC RIGHT-SIDED LOW BACK PAIN WITH BILATERAL SCIATICA: ICD-10-CM

## 2021-09-12 DIAGNOSIS — F10.229 ALCOHOL DEPENDENCE WITH INTOXICATION WITH COMPLICATION (H): ICD-10-CM

## 2021-09-12 LAB
ALBUMIN SERPL-MCNC: 3.7 G/DL (ref 3.4–5)
ALCOHOL BREATH TEST: 0 (ref 0–0.01)
ALP SERPL-CCNC: 91 U/L (ref 40–150)
ALT SERPL W P-5'-P-CCNC: 65 U/L (ref 0–70)
AMPHETAMINES UR QL SCN: ABNORMAL
ANION GAP SERPL CALCULATED.3IONS-SCNC: 6 MMOL/L (ref 3–14)
AST SERPL W P-5'-P-CCNC: 40 U/L (ref 0–45)
BARBITURATES UR QL: ABNORMAL
BASOPHILS # BLD AUTO: 0 10E3/UL (ref 0–0.2)
BASOPHILS NFR BLD AUTO: 1 %
BENZODIAZ UR QL: ABNORMAL
BILIRUB SERPL-MCNC: 0.7 MG/DL (ref 0.2–1.3)
BUN SERPL-MCNC: 25 MG/DL (ref 7–30)
CALCIUM SERPL-MCNC: 8.9 MG/DL (ref 8.5–10.1)
CANNABINOIDS UR QL SCN: ABNORMAL
CHLORIDE BLD-SCNC: 102 MMOL/L (ref 94–109)
CO2 SERPL-SCNC: 28 MMOL/L (ref 20–32)
COCAINE UR QL: ABNORMAL
CREAT SERPL-MCNC: 0.82 MG/DL (ref 0.66–1.25)
EOSINOPHIL # BLD AUTO: 0 10E3/UL (ref 0–0.7)
EOSINOPHIL NFR BLD AUTO: 1 %
ERYTHROCYTE [DISTWIDTH] IN BLOOD BY AUTOMATED COUNT: 14 % (ref 10–15)
ETHANOL SERPL-MCNC: <0.01 G/DL
GFR SERPL CREATININE-BSD FRML MDRD: >90 ML/MIN/1.73M2
GLUCOSE BLD-MCNC: 91 MG/DL (ref 70–99)
HCT VFR BLD AUTO: 40.6 % (ref 40–53)
HGB BLD-MCNC: 13.5 G/DL (ref 13.3–17.7)
HOLD SPECIMEN: NORMAL
IMM GRANULOCYTES # BLD: 0 10E3/UL
IMM GRANULOCYTES NFR BLD: 0 %
LYMPHOCYTES # BLD AUTO: 2.8 10E3/UL (ref 0.8–5.3)
LYMPHOCYTES NFR BLD AUTO: 43 %
MCH RBC QN AUTO: 30.5 PG (ref 26.5–33)
MCHC RBC AUTO-ENTMCNC: 33.3 G/DL (ref 31.5–36.5)
MCV RBC AUTO: 92 FL (ref 78–100)
MONOCYTES # BLD AUTO: 0.8 10E3/UL (ref 0–1.3)
MONOCYTES NFR BLD AUTO: 12 %
NEUTROPHILS # BLD AUTO: 2.8 10E3/UL (ref 1.6–8.3)
NEUTROPHILS NFR BLD AUTO: 43 %
NRBC # BLD AUTO: 0 10E3/UL
NRBC BLD AUTO-RTO: 0 /100
OPIATES UR QL SCN: ABNORMAL
PLATELET # BLD AUTO: 293 10E3/UL (ref 150–450)
POTASSIUM BLD-SCNC: 3.7 MMOL/L (ref 3.4–5.3)
PROT SERPL-MCNC: 7.8 G/DL (ref 6.8–8.8)
RBC # BLD AUTO: 4.42 10E6/UL (ref 4.4–5.9)
SARS-COV-2 RNA RESP QL NAA+PROBE: NEGATIVE
SODIUM SERPL-SCNC: 136 MMOL/L (ref 133–144)
WBC # BLD AUTO: 6.5 10E3/UL (ref 4–11)

## 2021-09-12 PROCEDURE — 124N000003 HC R&B MH SENIOR/ADOLESCENT

## 2021-09-12 PROCEDURE — 36415 COLL VENOUS BLD VENIPUNCTURE: CPT | Performed by: EMERGENCY MEDICINE

## 2021-09-12 PROCEDURE — 250N000009 HC RX 250: Performed by: EMERGENCY MEDICINE

## 2021-09-12 PROCEDURE — 82075 ASSAY OF BREATH ETHANOL: CPT | Performed by: EMERGENCY MEDICINE

## 2021-09-12 PROCEDURE — 250N000013 HC RX MED GY IP 250 OP 250 PS 637: Performed by: PSYCHIATRY & NEUROLOGY

## 2021-09-12 PROCEDURE — 96365 THER/PROPH/DIAG IV INF INIT: CPT | Performed by: EMERGENCY MEDICINE

## 2021-09-12 PROCEDURE — 99285 EMERGENCY DEPT VISIT HI MDM: CPT | Performed by: EMERGENCY MEDICINE

## 2021-09-12 PROCEDURE — 250N000013 HC RX MED GY IP 250 OP 250 PS 637: Performed by: EMERGENCY MEDICINE

## 2021-09-12 PROCEDURE — 80307 DRUG TEST PRSMV CHEM ANLYZR: CPT | Performed by: EMERGENCY MEDICINE

## 2021-09-12 PROCEDURE — 90791 PSYCH DIAGNOSTIC EVALUATION: CPT

## 2021-09-12 PROCEDURE — 250N000011 HC RX IP 250 OP 636: Performed by: EMERGENCY MEDICINE

## 2021-09-12 PROCEDURE — 99285 EMERGENCY DEPT VISIT HI MDM: CPT | Mod: 25 | Performed by: EMERGENCY MEDICINE

## 2021-09-12 PROCEDURE — 96361 HYDRATE IV INFUSION ADD-ON: CPT | Performed by: EMERGENCY MEDICINE

## 2021-09-12 PROCEDURE — 85025 COMPLETE CBC W/AUTO DIFF WBC: CPT | Performed by: EMERGENCY MEDICINE

## 2021-09-12 PROCEDURE — C9803 HOPD COVID-19 SPEC COLLECT: HCPCS | Performed by: EMERGENCY MEDICINE

## 2021-09-12 PROCEDURE — 80053 COMPREHEN METABOLIC PANEL: CPT | Performed by: EMERGENCY MEDICINE

## 2021-09-12 PROCEDURE — 82077 ASSAY SPEC XCP UR&BREATH IA: CPT | Performed by: EMERGENCY MEDICINE

## 2021-09-12 PROCEDURE — HZ2ZZZZ DETOXIFICATION SERVICES FOR SUBSTANCE ABUSE TREATMENT: ICD-10-PCS | Performed by: NURSE PRACTITIONER

## 2021-09-12 PROCEDURE — 258N000003 HC RX IP 258 OP 636: Performed by: EMERGENCY MEDICINE

## 2021-09-12 PROCEDURE — U0003 INFECTIOUS AGENT DETECTION BY NUCLEIC ACID (DNA OR RNA); SEVERE ACUTE RESPIRATORY SYNDROME CORONAVIRUS 2 (SARS-COV-2) (CORONAVIRUS DISEASE [COVID-19]), AMPLIFIED PROBE TECHNIQUE, MAKING USE OF HIGH THROUGHPUT TECHNOLOGIES AS DESCRIBED BY CMS-2020-01-R: HCPCS | Performed by: EMERGENCY MEDICINE

## 2021-09-12 PROCEDURE — 96366 THER/PROPH/DIAG IV INF ADDON: CPT | Performed by: EMERGENCY MEDICINE

## 2021-09-12 PROCEDURE — 96375 TX/PRO/DX INJ NEW DRUG ADDON: CPT | Performed by: EMERGENCY MEDICINE

## 2021-09-12 RX ORDER — BUPRENORPHINE AND NALOXONE 4; 1 MG/1; MG/1
1 FILM, SOLUBLE BUCCAL; SUBLINGUAL 2 TIMES DAILY
Status: DISCONTINUED | OUTPATIENT
Start: 2021-09-12 | End: 2021-09-12

## 2021-09-12 RX ORDER — LANOLIN ALCOHOL/MO/W.PET/CERES
100 CREAM (GRAM) TOPICAL DAILY
Status: DISCONTINUED | OUTPATIENT
Start: 2021-09-12 | End: 2021-09-27 | Stop reason: HOSPADM

## 2021-09-12 RX ORDER — ACETAMINOPHEN 500 MG
1000 TABLET ORAL ONCE
Status: COMPLETED | OUTPATIENT
Start: 2021-09-12 | End: 2021-09-12

## 2021-09-12 RX ORDER — LOPERAMIDE HCL 2 MG
2 CAPSULE ORAL 4 TIMES DAILY PRN
Status: DISCONTINUED | OUTPATIENT
Start: 2021-09-12 | End: 2021-09-27 | Stop reason: HOSPADM

## 2021-09-12 RX ORDER — LORAZEPAM 1 MG/1
1-4 TABLET ORAL EVERY 30 MIN PRN
Status: DISCONTINUED | OUTPATIENT
Start: 2021-09-12 | End: 2021-09-12

## 2021-09-12 RX ORDER — OLANZAPINE 10 MG/2ML
10 INJECTION, POWDER, FOR SOLUTION INTRAMUSCULAR 3 TIMES DAILY PRN
Status: DISCONTINUED | OUTPATIENT
Start: 2021-09-12 | End: 2021-09-27 | Stop reason: HOSPADM

## 2021-09-12 RX ORDER — AMLODIPINE BESYLATE 5 MG/1
5 TABLET ORAL DAILY
Status: DISCONTINUED | OUTPATIENT
Start: 2021-09-12 | End: 2021-09-27 | Stop reason: HOSPADM

## 2021-09-12 RX ORDER — PROPRANOLOL HYDROCHLORIDE 10 MG/1
10 TABLET ORAL 3 TIMES DAILY
Status: DISCONTINUED | OUTPATIENT
Start: 2021-09-12 | End: 2021-09-27 | Stop reason: HOSPADM

## 2021-09-12 RX ORDER — ONDANSETRON 2 MG/ML
4 INJECTION INTRAMUSCULAR; INTRAVENOUS ONCE
Status: COMPLETED | OUTPATIENT
Start: 2021-09-12 | End: 2021-09-12

## 2021-09-12 RX ORDER — GABAPENTIN 300 MG/1
600 CAPSULE ORAL 4 TIMES DAILY
Status: DISCONTINUED | OUTPATIENT
Start: 2021-09-12 | End: 2021-09-13

## 2021-09-12 RX ORDER — TRAZODONE HYDROCHLORIDE 50 MG/1
50 TABLET, FILM COATED ORAL
Status: DISCONTINUED | OUTPATIENT
Start: 2021-09-12 | End: 2021-09-27 | Stop reason: HOSPADM

## 2021-09-12 RX ORDER — DIAZEPAM 5 MG
5-20 TABLET ORAL EVERY 30 MIN PRN
Status: DISCONTINUED | OUTPATIENT
Start: 2021-09-12 | End: 2021-09-15

## 2021-09-12 RX ORDER — BUPRENORPHINE AND NALOXONE 4; 1 MG/1; MG/1
1 FILM, SOLUBLE BUCCAL; SUBLINGUAL DAILY
Status: DISCONTINUED | OUTPATIENT
Start: 2021-09-12 | End: 2021-09-12

## 2021-09-12 RX ORDER — AMOXICILLIN 250 MG
1 CAPSULE ORAL 2 TIMES DAILY PRN
Status: DISCONTINUED | OUTPATIENT
Start: 2021-09-12 | End: 2021-09-27 | Stop reason: HOSPADM

## 2021-09-12 RX ORDER — OLANZAPINE 10 MG/1
10 TABLET ORAL 3 TIMES DAILY PRN
Status: DISCONTINUED | OUTPATIENT
Start: 2021-09-12 | End: 2021-09-27 | Stop reason: HOSPADM

## 2021-09-12 RX ORDER — MULTIPLE VITAMINS W/ MINERALS TAB 9MG-400MCG
1 TAB ORAL DAILY
Status: DISCONTINUED | OUTPATIENT
Start: 2021-09-12 | End: 2021-09-27 | Stop reason: HOSPADM

## 2021-09-12 RX ORDER — ACETAMINOPHEN 325 MG/1
650 TABLET ORAL EVERY 4 HOURS PRN
Status: DISCONTINUED | OUTPATIENT
Start: 2021-09-12 | End: 2021-09-27 | Stop reason: HOSPADM

## 2021-09-12 RX ORDER — ONDANSETRON 4 MG/1
4 TABLET, ORALLY DISINTEGRATING ORAL EVERY 6 HOURS PRN
Status: DISCONTINUED | OUTPATIENT
Start: 2021-09-12 | End: 2021-09-27 | Stop reason: HOSPADM

## 2021-09-12 RX ORDER — MAGNESIUM HYDROXIDE/ALUMINUM HYDROXICE/SIMETHICONE 120; 1200; 1200 MG/30ML; MG/30ML; MG/30ML
30 SUSPENSION ORAL EVERY 4 HOURS PRN
Status: DISCONTINUED | OUTPATIENT
Start: 2021-09-12 | End: 2021-09-27 | Stop reason: HOSPADM

## 2021-09-12 RX ORDER — ARIPIPRAZOLE 2 MG/1
2 TABLET ORAL DAILY
Status: DISCONTINUED | OUTPATIENT
Start: 2021-09-12 | End: 2021-09-27 | Stop reason: HOSPADM

## 2021-09-12 RX ORDER — QUETIAPINE FUMARATE 50 MG/1
50 TABLET, FILM COATED ORAL 2 TIMES DAILY
Status: DISCONTINUED | OUTPATIENT
Start: 2021-09-12 | End: 2021-09-17

## 2021-09-12 RX ORDER — QUETIAPINE FUMARATE 300 MG/1
300 TABLET, FILM COATED ORAL AT BEDTIME
Status: DISCONTINUED | OUTPATIENT
Start: 2021-09-12 | End: 2021-09-27 | Stop reason: HOSPADM

## 2021-09-12 RX ORDER — FOLIC ACID 1 MG/1
1000 TABLET ORAL DAILY
Status: DISCONTINUED | OUTPATIENT
Start: 2021-09-12 | End: 2021-09-27 | Stop reason: HOSPADM

## 2021-09-12 RX ORDER — AMLODIPINE BESYLATE 5 MG/1
5 TABLET ORAL DAILY
Status: ON HOLD | COMMUNITY
Start: 2021-06-19 | End: 2021-09-27

## 2021-09-12 RX ORDER — DOCUSATE SODIUM 100 MG/1
100 CAPSULE, LIQUID FILLED ORAL 2 TIMES DAILY
Status: DISCONTINUED | OUTPATIENT
Start: 2021-09-12 | End: 2021-09-27 | Stop reason: HOSPADM

## 2021-09-12 RX ORDER — BUPRENORPHINE AND NALOXONE 4; 1 MG/1; MG/1
1 FILM, SOLUBLE BUCCAL; SUBLINGUAL 3 TIMES DAILY
Status: DISCONTINUED | OUTPATIENT
Start: 2021-09-12 | End: 2021-09-13

## 2021-09-12 RX ADMIN — PROPRANOLOL HYDROCHLORIDE 10 MG: 10 TABLET ORAL at 09:26

## 2021-09-12 RX ADMIN — NICOTINE POLACRILEX 4 MG: 2 GUM, CHEWING BUCCAL at 17:32

## 2021-09-12 RX ADMIN — DIAZEPAM 10 MG: 5 TABLET ORAL at 19:37

## 2021-09-12 RX ADMIN — NICOTINE POLACRILEX 4 MG: 2 GUM, CHEWING BUCCAL at 15:39

## 2021-09-12 RX ADMIN — GABAPENTIN 600 MG: 300 CAPSULE ORAL at 19:37

## 2021-09-12 RX ADMIN — DICLOFENAC SODIUM 2 G: 10 GEL TOPICAL at 22:09

## 2021-09-12 RX ADMIN — NICOTINE POLACRILEX 4 MG: 2 GUM, CHEWING BUCCAL at 09:52

## 2021-09-12 RX ADMIN — ACETAMINOPHEN 1000 MG: 500 TABLET ORAL at 15:06

## 2021-09-12 RX ADMIN — MULTIPLE VITAMINS W/ MINERALS TAB 1 TABLET: TAB at 18:33

## 2021-09-12 RX ADMIN — FOLIC ACID 1000 MCG: 1 TABLET ORAL at 18:34

## 2021-09-12 RX ADMIN — QUETIAPINE FUMARATE 50 MG: 50 TABLET ORAL at 19:37

## 2021-09-12 RX ADMIN — NICOTINE POLACRILEX 8 MG: 2 GUM, CHEWING ORAL at 19:37

## 2021-09-12 RX ADMIN — SODIUM CHLORIDE 2000 ML: 9 INJECTION, SOLUTION INTRAVENOUS at 09:24

## 2021-09-12 RX ADMIN — ARIPIPRAZOLE 2 MG: 2 TABLET ORAL at 09:23

## 2021-09-12 RX ADMIN — AMLODIPINE BESYLATE 5 MG: 5 TABLET ORAL at 09:26

## 2021-09-12 RX ADMIN — BUPRENORPHINE AND NALOXONE 1 FILM: 4; 1 FILM, SOLUBLE BUCCAL; SUBLINGUAL at 19:38

## 2021-09-12 RX ADMIN — LORAZEPAM 4 MG: 1 TABLET ORAL at 10:29

## 2021-09-12 RX ADMIN — ALUMINUM HYDROXIDE, MAGNESIUM HYDROXIDE, AND SIMETHICONE 30 ML: 200; 200; 20 SUSPENSION ORAL at 22:50

## 2021-09-12 RX ADMIN — BUPRENORPHINE AND NALOXONE 1 FILM: 4; 1 FILM, SOLUBLE BUCCAL; SUBLINGUAL at 13:56

## 2021-09-12 RX ADMIN — NICOTINE POLACRILEX 4 MG: 2 GUM, CHEWING BUCCAL at 13:03

## 2021-09-12 RX ADMIN — LORAZEPAM 2 MG: 1 TABLET ORAL at 15:06

## 2021-09-12 RX ADMIN — NICOTINE POLACRILEX 8 MG: 2 GUM, CHEWING ORAL at 22:08

## 2021-09-12 RX ADMIN — THIAMINE HCL TAB 100 MG 100 MG: 100 TAB at 18:34

## 2021-09-12 RX ADMIN — QUETIAPINE FUMARATE 50 MG: 50 TABLET ORAL at 09:26

## 2021-09-12 RX ADMIN — DIAZEPAM 5 MG: 5 TABLET ORAL at 22:07

## 2021-09-12 RX ADMIN — LIDOCAINE HYDROCHLORIDE 30 ML: 20 SOLUTION ORAL; TOPICAL at 13:55

## 2021-09-12 RX ADMIN — NICOTINE POLACRILEX 8 MG: 2 GUM, CHEWING ORAL at 18:33

## 2021-09-12 RX ADMIN — QUETIAPINE 300 MG: 300 TABLET, FILM COATED ORAL at 22:07

## 2021-09-12 RX ADMIN — PROPRANOLOL HYDROCHLORIDE 10 MG: 10 TABLET ORAL at 15:07

## 2021-09-12 RX ADMIN — DOCUSATE SODIUM 100 MG: 100 CAPSULE, LIQUID FILLED ORAL at 19:37

## 2021-09-12 RX ADMIN — FOLIC ACID: 5 INJECTION, SOLUTION INTRAMUSCULAR; INTRAVENOUS; SUBCUTANEOUS at 10:30

## 2021-09-12 RX ADMIN — LORAZEPAM 2 MG: 1 TABLET ORAL at 17:44

## 2021-09-12 RX ADMIN — ONDANSETRON 4 MG: 2 INJECTION INTRAMUSCULAR; INTRAVENOUS at 09:24

## 2021-09-12 RX ADMIN — PROPRANOLOL HYDROCHLORIDE 10 MG: 10 TABLET ORAL at 19:37

## 2021-09-12 ASSESSMENT — ENCOUNTER SYMPTOMS
SHORTNESS OF BREATH: 0
NECK STIFFNESS: 0
HEADACHES: 0
FEVER: 0
CONFUSION: 0
ARTHRALGIAS: 0
DIFFICULTY URINATING: 0
COLOR CHANGE: 0
ABDOMINAL PAIN: 0
EYE REDNESS: 0

## 2021-09-12 ASSESSMENT — ACTIVITIES OF DAILY LIVING (ADL)
DOING_ERRANDS_INDEPENDENTLY_DIFFICULTY: YES
DRESSING/BATHING_DIFFICULTY: NO
NUMBER_OF_TIMES_PATIENT_HAS_FALLEN_WITHIN_LAST_SIX_MONTHS: 2
TOILETING_ISSUES: NO
DIFFICULTY_COMMUNICATING: NO
DRESS: INDEPENDENT
PATIENT_/_FAMILY_COMMUNICATION_STYLE: SPOKEN LANGUAGE (ENGLISH OR BILINGUAL)
CONCENTRATING,_REMEMBERING_OR_MAKING_DECISIONS_DIFFICULTY: YES
FALL_HISTORY_WITHIN_LAST_SIX_MONTHS: YES
ORAL_HYGIENE: INDEPENDENT
VISION_MANAGEMENT: GLASSES FOR READING
WALKING_OR_CLIMBING_STAIRS_DIFFICULTY: NO
HEARING_DIFFICULTY_OR_DEAF: NO
WEAR_GLASSES_OR_BLIND: YES
DIFFICULTY_EATING/SWALLOWING: NO
WHICH_OF_THE_ABOVE_FUNCTIONAL_RISKS_HAD_A_RECENT_ONSET_OR_CHANGE?: FALL HISTORY
LAUNDRY: UNABLE TO COMPLETE
HYGIENE/GROOMING: INDEPENDENT

## 2021-09-12 ASSESSMENT — MIFFLIN-ST. JEOR: SCORE: 2181.66

## 2021-09-12 NOTE — ED NOTES
Bed: HW01  Expected date: 9/12/21  Expected time: 7:39 AM  Means of arrival: Ambulance  Comments:  [place] [address] 50yo male, ETOH

## 2021-09-12 NOTE — PHARMACY-ADMISSION MEDICATION HISTORY
Admission Medication History Completed by Pharmacy    See Epic Admission Navigator for allergy information, preferred outpatient pharmacy and prior to admission medications.     Medication History Sources:     Prescription fill history via Epic Surescripts data    Patient (via iPad in ED)     Additional Information:    No changes in PTA med list since patient discharged on 8/23    Prior to Admission medications    Medication Sig Last Dose  Auth Provider   amLODIPine (NORVASC) 5 MG tablet Take 5 mg by mouth daily Past Week  Reported, Patient       ARIPiprazole (ABILIFY) 2 MG tablet Take 1 tablet (2 mg) by mouth daily Past Week  Jeremías Toro MD       diclofenac (VOLTAREN) 1 % topical gel Apply 2 g topically 4 times daily as needed for moderate pain Past Week  Jeremías Toro MD       docusate sodium (COLACE) 100 MG capsule Take 1 capsule (100 mg) by mouth 2 times daily Past Week  Jeremías Toro MD       folic acid (FOLVITE) 1 MG tablet Take 1 tablet (1,000 mcg) by mouth daily Past Week  Jeremías Toro MD       gabapentin (NEURONTIN) 400 MG capsule Take 3 capsules (1,200 mg) by mouth 4 times daily Past Week  Jeremías Toro MD       multivitamin w/minerals (THERA-VIT-M) tablet Take 1 tablet by mouth daily Past Week  Jeremías Toro MD       nicotine polacrilex (NICORETTE) 4 MG gum Place 1-2 each (4-8 mg) inside cheek every hour as needed for other (nicotine withdrawal symptoms)     Past Week  Jeremías Toro MD   propranolol (INDERAL) 10 MG tablet Take 1 tablet (10 mg) by mouth 4 times daily Past Week  Jeremías Toro MD       QUEtiapine (SEROQUEL) 300 MG tablet Take 1 tablet (300 mg) by mouth At Bedtime Past Week  Jeremías Toro MD       QUEtiapine (SEROQUEL) 50 MG tablet Take 1 tablet (50 mg) by mouth 2 times daily as needed (agitation) Past Week  Jeremías Toro MD       thiamine (B-1) 100 MG tablet Take 1 tablet (100 mg) by mouth daily Past Week  Jeremías Toro  MD         Date completed: 09/12/21    Medication history completed by:   Ngoc Mancia, Pharm.D., [facility]  Behavioral Health Inpatient Pharmacist  [facility] ([facility]) Emergency Department  Phone: *[phone] ([employer])) or [phone]

## 2021-09-12 NOTE — ED PROVIDER NOTES
"ED Provider Note  [facility]      History     Chief Complaint   Patient presents with     Alcohol Problem     Here detox from alcohol, drinks a gallon on vodka daily, last drink was 12 hrs ago, hx of seizure and DT's     HPI  Hollis Barclay is a 51 year old male with a history of polysubstance abuse, chronic pain on Suboxone, bipolar disorder, schizophrenia, depression and alcohol use disorder who presents requesting detox.  Patient was discharged approximately 3 weeks ago (August 23 )stating that he was sober for 3 days and then relapsed.  He is drinking about a half a gallon of vodka a day with his last drink being approximately 9 hours ago.  He states that he has a history of DTs.  He states that he missed his appointment to refill Suboxone 3 days ago because \"I was hammered\".  He currently denies use of other drugs.  Apparently at discharge, he was given 9 days worth of Suboxone 8/2 mg tabs 3 times daily in replacement of methadone that he had discontinued prior to his admission on August 16.    Past Medical History  Past Medical History:   Diagnosis Date     Alcoholism in remission (H)      Bilateral ACL tear      Bipolar disorder (H)      Borderline personality disorder (H)      Chemical dependency (H)      Chronic back pain      Closed left arm fracture 1985     H/O shoulder surgery      Post concussive encephalopathy      Social anxiety disorder      Social anxiety disorder      TBI (traumatic brain injury) (H)      History reviewed. No pertinent surgical history.  amLODIPine (NORVASC) 5 MG tablet  ARIPiprazole (ABILIFY) 2 MG tablet  diclofenac (VOLTAREN) 1 % topical gel  docusate sodium (COLACE) 100 MG capsule  folic acid (FOLVITE) 1 MG tablet  gabapentin (NEURONTIN) 400 MG capsule  multivitamin w/minerals (THERA-VIT-M) tablet  nicotine polacrilex (NICORETTE) 4 MG gum  propranolol (INDERAL) 10 MG tablet  QUEtiapine (SEROQUEL) 300 MG tablet  QUEtiapine (SEROQUEL) 50 MG " tablet  thiamine (B-1) 100 MG tablet      Allergies   Allergen Reactions     Hydroxyzine Hives     Previously unreported by patient, this is a new patient declaration as of 5/1/21.     Cucumber Extract      Cucumber the vegable     Nsaids      No problem with oral NSAIDs--Toradol injection itching only.     Family History  History reviewed. No pertinent family history.  Social History   Social History     Tobacco Use     Smoking status: Former Smoker     Types: Dip, chew, snus or snuff     Smokeless tobacco: Former User     Types: Chew   Substance Use Topics     Alcohol use: Yes     Drug use: Not Currently      Past medical history, past surgical history, medications, allergies, family history, and social history were reviewed with the patient. No additional pertinent items.       Review of Systems   Constitutional: Negative for fever.   HENT: Negative for congestion.    Eyes: Negative for redness.   Respiratory: Negative for shortness of breath.    Cardiovascular: Negative for chest pain.   Gastrointestinal: Negative for abdominal pain.   Genitourinary: Negative for difficulty urinating.   Musculoskeletal: Negative for arthralgias and neck stiffness.   Skin: Negative for color change.   Neurological: Negative for headaches.   Psychiatric/Behavioral: Negative for confusion.     A complete review of systems was performed with pertinent positives and negatives noted in the HPI, and all other systems negative.    Physical Exam   BP: 100/65  Pulse: (!) 127  Temp: (!) 96  F (35.6  C)  Resp: 16  SpO2: 94 %  Physical Exam  Constitutional:       General: He is not in acute distress.     Appearance: He is not diaphoretic.   HENT:      Head: Atraumatic.   Eyes:      General: No scleral icterus.     Pupils: Pupils are equal, round, and reactive to light.   Cardiovascular:      Heart sounds: Normal heart sounds.   Pulmonary:      Effort: No respiratory distress.      Breath sounds: Normal breath sounds.   Abdominal:       General: Bowel sounds are normal.      Palpations: Abdomen is soft.      Tenderness: There is no abdominal tenderness.   Musculoskeletal:         General: No tenderness.   Skin:     General: Skin is warm.      Findings: No rash.   Neurological:      Motor: Tremor present.         ED Course      Procedures       The medical record was reviewed and interpreted.  Current labs reviewed and interpreted.  Previous labs reviewed and interpreted.       Results for orders placed or performed during the hospital encounter of 09/12/21   Extra Blue Top Tube     Status: None   Result Value Ref Range    Hold Specimen JIC    Extra Red Top Tube     Status: None   Result Value Ref Range    Hold Specimen JIC    Extra Green Top (Lithium Heparin) Tube     Status: None   Result Value Ref Range    Hold Specimen JIC    Extra Purple Top Tube     Status: None   Result Value Ref Range    Hold Specimen JIC    Extra Green Top (Lithium Heparin) ON ICE     Status: None   Result Value Ref Range    Hold Specimen JIC    CBC with platelets differential     Status: None    Narrative    The following orders were created for panel order CBC with platelets differential.  Procedure                               Abnormality         Status                     ---------                               -----------         ------                     CBC with platelets and d...[[id number]]                      Final result                 Please view results for these tests on the individual orders.   Comprehensive metabolic panel     Status: Normal   Result Value Ref Range    Sodium 136 133 - 144 mmol/L    Potassium 3.7 3.4 - 5.3 mmol/L    Chloride 102 94 - 109 mmol/L    Carbon Dioxide (CO2) 28 20 - 32 mmol/L    Anion Gap 6 3 - 14 mmol/L    Urea Nitrogen 25 7 - 30 mg/dL    Creatinine 0.82 0.66 - 1.25 mg/dL    Calcium 8.9 8.5 - 10.1 mg/dL    Glucose 91 70 - 99 mg/dL    Alkaline Phosphatase 91 40 - 150 U/L    AST 40 0 - 45 U/L    ALT 65 0 - 70 U/L    Protein Total  7.8 6.8 - 8.8 g/dL    Albumin 3.7 3.4 - 5.0 g/dL    Bilirubin Total 0.7 0.2 - 1.3 mg/dL    GFR Estimate >90 >60 mL/min/1.73m2   Ethyl Alcohol Level     Status: Normal   Result Value Ref Range    Alcohol ethyl <0.01 <=0.01 g/dL   Asymptomatic COVID-19 Virus (Coronavirus) by PCR Nasopharyngeal     Status: Normal    Specimen: Nasopharyngeal; Swab   Result Value Ref Range    SARS CoV2 PCR Negative Negative    Narrative    Testing was performed using the Xpert Xpress SARS-CoV-2 Assay on the  Cepheid Gene-Xpert Instrument Systems. Additional information about  this Emergency Use Authorization (EUA) assay can be found via the Lab  Guide. This test should be ordered for the detection of SARS-CoV-2 in  individuals who meet SARS-CoV-2 clinical and/or epidemiological  criteria. Test performance is unknown in asymptomatic patients. This  test is for in vitro diagnostic use under the FDA EUA for  laboratories certified under CLIA to perform high complexity testing.  This test has not been FDA cleared or approved. A negative result  does not rule out the presence of PCR inhibitors in the specimen or  target RNA in concentration below the limit of detection for the  assay. The possibility of a false negative should be considered if  the patient's recent exposure or clinical presentation suggests  COVID-19. This test was validated by the [facility]. This laboratory is certified under  the Clinical Laboratory Improvement Amendments of 1988 (CLIA-88) as  qualified to perform high complexity laboratory testing.     Drug abuse screen 1 urine (ED)     Status: Abnormal   Result Value Ref Range    Amphetamines Urine Screen Positive (A) Screen Negative    Barbiturates Urine Screen Negative Screen Negative    Benzodiazepines Urine Screen Positive (A) Screen Negative    Cannabinoids Urine Screen Negative Screen Negative    Cocaine Urine Screen Positive (A) Screen Negative    Opiates Urine  Screen Negative Screen Negative   CBC with platelets and differential     Status: None   Result Value Ref Range    WBC Count 6.5 4.0 - 11.0 10e3/uL    RBC Count 4.42 4.40 - 5.90 10e6/uL    Hemoglobin 13.5 13.3 - 17.7 g/dL    Hematocrit 40.6 40.0 - 53.0 %    MCV 92 78 - 100 fL    MCH 30.5 26.5 - 33.0 pg    MCHC 33.3 31.5 - 36.5 g/dL    RDW 14.0 10.0 - 15.0 %    Platelet Count 293 150 - 450 10e3/uL    % Neutrophils 43 %    % Lymphocytes 43 %    % Monocytes 12 %    % Eosinophils 1 %    % Basophils 1 %    % Immature Granulocytes 0 %    NRBCs per 100 WBC 0 <1 /100    Absolute Neutrophils 2.8 1.6 - 8.3 10e3/uL    Absolute Lymphocytes 2.8 0.8 - 5.3 10e3/uL    Absolute Monocytes 0.8 0.0 - 1.3 10e3/uL    Absolute Eosinophils 0.0 0.0 - 0.7 10e3/uL    Absolute Basophils 0.0 0.0 - 0.2 10e3/uL    Absolute Immature Granulocytes 0.0 <=0.0 10e3/uL    Absolute NRBCs 0.0 10e3/uL   Alcohol breath test POCT     Status: Normal   Result Value Ref Range    Alcohol Breath Test 0 0.00 - 0.01   [place] Draw     Status: None    Narrative    The following orders were created for panel order [place] Draw.  Procedure                               Abnormality         Status                     ---------                               -----------         ------                     Extra Blue Top Tube[[id number]]                              Final result               Extra Red Top Tube[[id number]]                               Final result               Extra Green Top (Lithium...[[id number]]                      Final result               Extra Purple Top Tube[[id number]]                            Final result               Extra Green Top (Lithium...[[id number]]                      Final result                 Please view results for these tests on the individual orders.   Urine Drugs of Abuse Screen     Status: Abnormal    Narrative    The following orders were created for panel order Urine Drugs of Abuse Screen.  Procedure                                Abnormality         Status                     ---------                               -----------         ------                     Drug abuse screen 1 urin...[[id number]]  Abnormal            Final result                 Please view results for these tests on the individual orders.     Medications   ARIPiprazole (ABILIFY) tablet 2 mg (2 mg Oral Given 9/12/21 0923)   amLODIPine (NORVASC) tablet 5 mg (5 mg Oral Given 9/12/21 0926)   propranolol (INDERAL) tablet 10 mg (10 mg Oral Given 9/12/21 1507)   QUEtiapine (SEROquel) tablet 50 mg (50 mg Oral Given 9/12/21 0926)   LORazepam (ATIVAN) tablet 1-4 mg (2 mg Oral Given 9/12/21 1506)   buprenorphine HCl-naloxone HCl (SUBOXONE) 4-1 MG per film 1 Film (1 Film Sublingual Given 9/12/21 1356)   nicotine (NICORETTE) gum 4 mg (4 mg Buccal Given 9/12/21 1539)   buprenorphine HCl-naloxone HCl (SUBOXONE) 4-1 MG per film 1 Film (has no administration in time range)   ondansetron (ZOFRAN) injection 4 mg (4 mg Intravenous Given 9/12/21 0924)   0.9% sodium chloride BOLUS (0 mLs Intravenous Stopped 9/12/21 1307)   sodium chloride 0.9 % 1,000 mL with Infuvite Adult 10 mL, thiamine 100 mg, folic acid 1 mg, magnesium sulfate 2 g infusion ( Intravenous Stopped 9/12/21 1307)   nicotine (NICORETTE) gum 4 mg (4 mg Buccal Given 9/12/21 0952)   nicotine (NICORETTE) gum 4 mg (4 mg Buccal Given 9/12/21 1303)   lidocaine (XYLOCAINE) 2 % 15 mL, alum & mag hydroxide-simethicone (MAALOX) 15 mL GI Cocktail (30 mLs Oral Given 9/12/21 1355)   acetaminophen (TYLENOL) tablet 1,000 mg (1,000 mg Oral Given 9/12/21 1506)        Assessments & Plan (with Medical Decision Making)   51-year-old male who presents requesting detox from alcohol differential included intoxication, medication reaction, withdrawal, malingering, mental illness.  Exam revealed the patient to have elevated heart rate and tremulousness consistent with alcohol withdrawal.  Alcohol level was 0.  CBC and comprehensive metabolic panel  were unremarkable.  Urine toxicology was positive for amphetamines, cocaine and benzodiazepines.  Patient was initiated on MSSA protocol and given Suboxone with history of opioid dependence.  Patient improved and was able to eat and drink without difficulty.  The case was discussed at length with the behavioral  as well as behavioral intake.  Patient will be admitted to mental health with dual diagnosis for alcohol withdrawal.    I have reviewed the nursing notes. I have reviewed the findings, diagnosis, plan and need for follow up with the patient.    New Prescriptions    No medications on file       Final diagnoses:   Alcohol dependence with withdrawal with complication (H)   Uncomplicated opioid dependence (H)       --  Jr Smyth MD  [facility] EMERGENCY DEPARTMENT  9/12/2021     Jr Smyth MD  09/12/21 5813
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
## 2021-09-12 NOTE — TELEPHONE ENCOUNTER
"S:  KING Parson called at 12:38pm with 51y/M in [place] ED for IP MH -  See [place]     P:  Pt presents to ED with complex care - seeking detox from al;cohol, but also endorsing SI.  Pt has dx of Polysubstance Abuse, Bipolar DO, hx of a TBI, and Personality DO.   Pt UTOX is positive for Amphetamines, Benzos and cocaine.  Pt endorses drinking 1 gallon of Vodka daily since last detox admission and is very remorseful for relapse 2 weeks ago.  Pt is remorseful and sad and regrets his choice to relapse and intermittent SI thoughts with plans.  Pt has been spitting out his meds.    Per care coordination nore by Dr Toro -  \"DUE TO this patients h/o of complex mental health needs, h/o suicide ideation, self harm and medication seeking behavior, an alternative admission should be attempted. This patient is best served by an admission to a mental health unit\"    A:  Vol    R:  Patient cleared and ready for behavioral bed placement: Yes   Pt placed on adult work list pending bed availability          "

## 2021-09-12 NOTE — ED TRIAGE NOTES
Pt states he missed his suboxone for over 24 hours because he has been drinking alcohol and missed his appointment at suboxone clinic. States has been drinking for about 2.5 weeks, 1.5 gallons or more of vodka daily, last drink about 10 hours ago. Pt reports history of withdrawal seizures, reports grand mal seizure a couple months ago.

## 2021-09-12 NOTE — SAFE
Hollis Barclay  September 12, 2021  SAFE Note    Critical Safety Issues: Detox from alcohol, benzos, cocaine, amphetamines. He missed his Suboxone dose for 24 hours. Hx. Of DT and withdrawal.       Current Suicidal Ideation/Self-Injurious Concerns/Methods: Ingestion drugs, pills. alcohol      Current or Historical Inappropriate Sexual Behavior: Unknown      Current or Historical Aggression/Homicidal Ideation: None - N/A    Updated care team: Yes: Dr. Smyth RN, Intake.     For additional details see full LMHP assessment.       MARQUEZ Dyer

## 2021-09-12 NOTE — TELEPHONE ENCOUNTER
507pm - Uma, on call provider, paged   511pm - Uma accepts     R: LAZ/Linus/Janis     Pt placed in queue   516pm - unit charge notified, ready for report   520pm - ED charge notified via text page

## 2021-09-13 LAB
CHOLEST SERPL-MCNC: 177 MG/DL
FOLATE SERPL-MCNC: 20.1 NG/ML
GGT SERPL-CCNC: 97 U/L (ref 0–75)
HDLC SERPL-MCNC: 78 MG/DL
LDLC SERPL CALC-MCNC: 87 MG/DL
NONHDLC SERPL-MCNC: 99 MG/DL
T4 FREE SERPL-MCNC: 0.72 NG/DL (ref 0.76–1.46)
TRIGL SERPL-MCNC: 59 MG/DL
TSH SERPL DL<=0.005 MIU/L-ACNC: 9.73 MU/L (ref 0.4–4)
VIT B12 SERPL-MCNC: 70 PG/ML (ref 193–986)

## 2021-09-13 PROCEDURE — G0177 OPPS/PHP; TRAIN & EDUC SERV: HCPCS

## 2021-09-13 PROCEDURE — 82977 ASSAY OF GGT: CPT | Performed by: PSYCHIATRY & NEUROLOGY

## 2021-09-13 PROCEDURE — 82746 ASSAY OF FOLIC ACID SERUM: CPT | Performed by: PSYCHIATRY & NEUROLOGY

## 2021-09-13 PROCEDURE — 84439 ASSAY OF FREE THYROXINE: CPT | Performed by: PSYCHIATRY & NEUROLOGY

## 2021-09-13 PROCEDURE — 99223 1ST HOSP IP/OBS HIGH 75: CPT | Mod: AI | Performed by: NURSE PRACTITIONER

## 2021-09-13 PROCEDURE — 124N000003 HC R&B MH SENIOR/ADOLESCENT

## 2021-09-13 PROCEDURE — 84443 ASSAY THYROID STIM HORMONE: CPT | Performed by: PSYCHIATRY & NEUROLOGY

## 2021-09-13 PROCEDURE — 36415 COLL VENOUS BLD VENIPUNCTURE: CPT | Performed by: PSYCHIATRY & NEUROLOGY

## 2021-09-13 PROCEDURE — 82607 VITAMIN B-12: CPT | Performed by: PSYCHIATRY & NEUROLOGY

## 2021-09-13 PROCEDURE — 250N000013 HC RX MED GY IP 250 OP 250 PS 637: Performed by: PSYCHIATRY & NEUROLOGY

## 2021-09-13 PROCEDURE — 82465 ASSAY BLD/SERUM CHOLESTEROL: CPT | Performed by: PSYCHIATRY & NEUROLOGY

## 2021-09-13 PROCEDURE — 250N000013 HC RX MED GY IP 250 OP 250 PS 637: Performed by: NURSE PRACTITIONER

## 2021-09-13 RX ORDER — GABAPENTIN 400 MG/1
1200 CAPSULE ORAL 3 TIMES DAILY
Status: DISCONTINUED | OUTPATIENT
Start: 2021-09-13 | End: 2021-09-17

## 2021-09-13 RX ORDER — BUPRENORPHINE AND NALOXONE 4; 1 MG/1; MG/1
2 FILM, SOLUBLE BUCCAL; SUBLINGUAL 3 TIMES DAILY
Status: DISCONTINUED | OUTPATIENT
Start: 2021-09-13 | End: 2021-09-27 | Stop reason: HOSPADM

## 2021-09-13 RX ADMIN — ACETAMINOPHEN 650 MG: 325 TABLET, FILM COATED ORAL at 17:20

## 2021-09-13 RX ADMIN — BUPRENORPHINE AND NALOXONE 2 FILM: 4; 1 FILM, SOLUBLE BUCCAL; SUBLINGUAL at 14:10

## 2021-09-13 RX ADMIN — GABAPENTIN 1200 MG: 400 CAPSULE ORAL at 08:25

## 2021-09-13 RX ADMIN — QUETIAPINE FUMARATE 50 MG: 50 TABLET ORAL at 20:12

## 2021-09-13 RX ADMIN — DIAZEPAM 5 MG: 5 TABLET ORAL at 14:10

## 2021-09-13 RX ADMIN — GABAPENTIN 1200 MG: 400 CAPSULE ORAL at 20:11

## 2021-09-13 RX ADMIN — NICOTINE POLACRILEX 8 MG: 2 GUM, CHEWING ORAL at 14:17

## 2021-09-13 RX ADMIN — NICOTINE POLACRILEX 8 MG: 2 GUM, CHEWING ORAL at 20:17

## 2021-09-13 RX ADMIN — NICOTINE POLACRILEX 8 MG: 2 GUM, CHEWING ORAL at 08:27

## 2021-09-13 RX ADMIN — DOCUSATE SODIUM 100 MG: 100 CAPSULE, LIQUID FILLED ORAL at 20:12

## 2021-09-13 RX ADMIN — NICOTINE POLACRILEX 8 MG: 2 GUM, CHEWING ORAL at 12:52

## 2021-09-13 RX ADMIN — DIAZEPAM 10 MG: 5 TABLET ORAL at 10:09

## 2021-09-13 RX ADMIN — NICOTINE POLACRILEX 8 MG: 2 GUM, CHEWING ORAL at 10:10

## 2021-09-13 RX ADMIN — ACETAMINOPHEN 650 MG: 325 TABLET, FILM COATED ORAL at 00:13

## 2021-09-13 RX ADMIN — ACETAMINOPHEN 650 MG: 325 TABLET, FILM COATED ORAL at 08:35

## 2021-09-13 RX ADMIN — PROPRANOLOL HYDROCHLORIDE 10 MG: 10 TABLET ORAL at 14:10

## 2021-09-13 RX ADMIN — ACETAMINOPHEN 650 MG: 325 TABLET, FILM COATED ORAL at 14:15

## 2021-09-13 RX ADMIN — ARIPIPRAZOLE 2 MG: 2 TABLET ORAL at 08:25

## 2021-09-13 RX ADMIN — ACETAMINOPHEN 650 MG: 325 TABLET, FILM COATED ORAL at 23:07

## 2021-09-13 RX ADMIN — BUPRENORPHINE AND NALOXONE 2 FILM: 4; 1 FILM, SOLUBLE BUCCAL; SUBLINGUAL at 08:26

## 2021-09-13 RX ADMIN — FOLIC ACID 1000 MCG: 1 TABLET ORAL at 08:26

## 2021-09-13 RX ADMIN — DOCUSATE SODIUM 100 MG: 100 CAPSULE, LIQUID FILLED ORAL at 08:25

## 2021-09-13 RX ADMIN — PROPRANOLOL HYDROCHLORIDE 10 MG: 10 TABLET ORAL at 08:25

## 2021-09-13 RX ADMIN — QUETIAPINE 300 MG: 300 TABLET, FILM COATED ORAL at 21:02

## 2021-09-13 RX ADMIN — DIAZEPAM 10 MG: 5 TABLET ORAL at 06:34

## 2021-09-13 RX ADMIN — MULTIPLE VITAMINS W/ MINERALS TAB 1 TABLET: TAB at 08:26

## 2021-09-13 RX ADMIN — GABAPENTIN 1200 MG: 400 CAPSULE ORAL at 14:10

## 2021-09-13 RX ADMIN — NICOTINE POLACRILEX 8 MG: 2 GUM, CHEWING ORAL at 18:31

## 2021-09-13 RX ADMIN — DIAZEPAM 10 MG: 5 TABLET ORAL at 00:04

## 2021-09-13 RX ADMIN — PROPRANOLOL HYDROCHLORIDE 10 MG: 10 TABLET ORAL at 20:11

## 2021-09-13 RX ADMIN — BUPRENORPHINE AND NALOXONE 2 FILM: 4; 1 FILM, SOLUBLE BUCCAL; SUBLINGUAL at 20:11

## 2021-09-13 RX ADMIN — QUETIAPINE FUMARATE 50 MG: 50 TABLET ORAL at 08:25

## 2021-09-13 RX ADMIN — THIAMINE HCL TAB 100 MG 100 MG: 100 TAB at 08:25

## 2021-09-13 ASSESSMENT — ACTIVITIES OF DAILY LIVING (ADL)
HYGIENE/GROOMING: INDEPENDENT
LAUNDRY: UNABLE TO COMPLETE
LAUNDRY: WITH SUPERVISION
HYGIENE/GROOMING: INDEPENDENT
DRESS: SCRUBS (BEHAVIORAL HEALTH);INDEPENDENT
ORAL_HYGIENE: INDEPENDENT
DRESS: SCRUBS (BEHAVIORAL HEALTH);STREET CLOTHES
ORAL_HYGIENE: INDEPENDENT
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
## 2021-09-13 NOTE — PLAN OF CARE
Pt. presented with flat/blunted affect but brighten on approach. His mood is  calm and cooperative with care. Rated his anxiety 10/10 and depression 8/10. Reported visual and auditory hallucination of seeing ghosts and whispers sound. Pt. denies SI and SIB. PRN tylenol given for back and leg pain x2 with minimal relief. He complained of having diarrhea x3, IM notified for PRN imodium.   MSSA score 7 at 0820, 11 at 1000 ( Received 10 mg of valium) and last score were 9 at 1403 ( 5 mg valium was given). COWS score was 6 and 6. Taking 4 nicotine gum at once, given x4 during the shift. Appetite is good, ate 100% of his breakfast and lunch. Social with staff and peers. Played card with other patient. He participated in group activities. Continue to monitor pt. per plan of care.

## 2021-09-13 NOTE — PLAN OF CARE
Problem: Substance Withdrawal  Goal: Substance Withdrawal  Description: Signs and symptoms of listed problems will be absent or manageable.  Outcome: No Change  Flowsheets (Taken 9/13/2021 0336)  Substance Withdrawal Assessed: all  Substance Withdrawal Present:   anxiety   affect   mood   sleep   insight     Patient slept on and off. He is actively showing alcohol withdrawal symptoms. His MSSA scores were 10-6 and 9. He was given Valium 10 mgs x 2. He remains easily agitated especially when redirected. BP ranges from 120//75; Pulse ranges from 73-87. Patient slept for 5 hours only. He also woke up early.

## 2021-09-13 NOTE — PROGRESS NOTES
"Initial Psychosocial Assessment     I have reviewed the chart, met with the patient, and developed Care Plan.  Information for assessment was obtained from: Chart review and patient interview.     Presenting Problem:  Per ED note:  Patient is a 51 year old  male who presented to the ED by Medics related to concerns for alcohol and Suboxon withdrawal. He states he has been drinking about one gallon of Vodka daily for the last two weeks, within two days of his most recent discharge from [facility]. He also reports being off his Suboxone for 24+ hours because he ran out and missed his appointment on Sept. 9th for it because he was intoxicated. He was also supposed to have an appointment for out pt. treatment but was not sure where or anything about a Rule 25.  He states last night he was at a friends using various drugs and returned to his group home. His  went through his room and found many empty bottles. He has been told that he will no longer be able to drink alcohol at the house as he was drinking more than they were aware of. The group home called 911 for patient and he presented requesting detox. Patient reports severe withdrawal symptoms at this time, he is particularly focused on getting Suboxone. He has a history of withdrawal seizures and DT's. He refuses to go to [address] [place] [address] and that if he did not get a detox bed he would return to his group home. Then he reported many complaints with his withdrawal symptoms. Dr. Smyth stated he does need some type of admission.        History of Mental Health and Chemical Dependency:  Prior admits at [facility] for MH, hx of commitment. Dx schizoaffective, bipolar, social anxiety, borderline personality d/o per pt. Hx of 2 serious overdoses. One year ago, pt took 90 day supply of 400 mg tabs of Seroquel with 5th vodka. Nine months ago, pt took \"a couple bottles of ritalin with 5th of vodka\".   Family Description ([place], Family " Psychiatric History):     Significant Life Events (Illness, Abuse, Trauma, Death):     Living Situation:   Unascertained at this time  Educational Background:    Studied mechanical engineering  Occupational History:   Unascertained at this time  Financial Status:  He is on Medical Assistance, and CADI.      Legal Issues:  None noted     Ethnic/Cultural Issues:  None noted.      Spiritual Orientation:   [place]   Service History:  Unknown     Social Functioning (organization, interests):  Games     Current Treatment Providers are:    Primary Care Provider: Yes, Dr. Jr Giron, [facility]  CADI worker named [place]  Educents Service Assessment/Plan:  Patient will have psychiatric assessment and medication management by the psychiatrist. Medications will be reviewed and adjusted per provider as indicated. The treatment team will continue to assess and stabilize the patient's mental health symptoms with the use of medications and therapeutic programming. Hospital staff will provide a safe environment and a therapeutic milieu. Staff will continue to assess patient as needed. Patient will participate in unit groups and activities. Patient will receive individual and group support on the unit.      CTC will do individual inpatient treatment planning and after care planning. CTC will discuss options for increasing community supports with the patient. CTC will coordinate with outpatient providers and will place referrals to ensure appropriate follow up care is in place.

## 2021-09-13 NOTE — CONSULTS
Patient Name: Hollis Barclay MRN# [id number]   Age: 51 year old YOB: 1970     Date of Service: 9/13/2021    Circumstances of recent discharge and re-admittance noted. Please refer to recent medicine H&P in charting completed by this writer and dated 8/18/21, which was reviewed and is up to date.     In brief, Jose M Barclay is a 51 year old male with a history of TBI, HTN, alcohol use disorder, polysubstance abuse, chronic pain, bipolar disorder, schizophrenia, depression, and anxiety who is admitted to [facility] for detox.  He was recently admitted to [facility] for alcohol withdrawal and detox 8/16-8/24.        Diagnoses:  # Polysubstance abuse - Utox this admission positive for amphetamines, cocaine, and benzos.  Neg for ETOH, but patient reports drinking about a 1/2 gallon vodka daily since relapsing 3 days after discharge from [facility] on 8/24.     # Schizophrenia, bipolar disorder - On Abilify, Propranolol, and Seroquel PTA.  Defer to psychiatry.   # HTN - Continue PTA Norvasc w/ hold parameters.    # Chronic pain - Started on Suboxone during admission 8 /16-8/24.  Per chart review, missed his recent appointment to refill Suboxone due to being intoxicated.  On Gabapentin 1200mg TID PTA.  Agree w/ continuing Suboxone and Gabapentin.  Utox neg for opioids.    # Hypothyroidism - TSH 9.73, free T4 0.72.  Possibly 2/2 substance use.  Will repeat in 1-2 days and initiate replacement if remains abnormal.    # Vitamin B12 deficiency - B12 low at 70 this admission.  Start replacement with cyanocobalamin 1000mcg daily.        Vitals and laboratory data were reviewed and are significant as discussed above. Please call the on-call [facility] for any follow up medical concerns if they arise.     Sherie Gleason, CNP, APRN  Internal Medicine [facility]  Pager [phone]

## 2021-09-13 NOTE — PROGRESS NOTES
09/12/21 8101   Patient Belongings   Did you bring any home meds/supplements to the hospital?  No   Patient Belongings locker   Patient Belongings Put in Hospital Secure Location (Security or Locker, etc.) clothing;shoes   Belongings Search Yes   Clothing Search Yes  (search done by Fei REYES with standby assist.)       In patient locker: 1 pair of sandals and 1 pair of pants.     A               Admission:  I am responsible for any personal items that are not sent to the safe or pharmacy.  [facility] is not responsible for loss, theft or damage of any property in my possession.    Signature:  _________________________________ Date: _______  Time: _____                                              Staff Signature:  ____________________________ Date: ________  Time: _____      2nd Staff person, if patient is unable/unwilling to sign:    Signature: ________________________________ Date: ________  Time: _____     Discharge:  [place] has returned all of my personal belongings:    Signature: _________________________________ Date: ________  Time: _____                                          Staff Signature:  ____________________________ Date: ________  Time: _____

## 2021-09-13 NOTE — PROGRESS NOTES
BEHAVIORAL TEAM DISCUSSION     Participants: Ulises Perez MD; Kathie MOYER, OT/R; Hemal Barreto, RN; Ton gil [facility].    Progress:    Patient is a 51 year old  male who presented to the ED by [facility] related to concerns for alcohol and Suboxon withdrawal. He stated he had been drinking about one gallon of Vodka daily for the last two weeks, within two days of his most recent discharge from [facility] Detox. He also reported being off his Suboxone for 24+ hours because he ran out and missed his appointment on Sept. 9th for it because he was intoxicated. He was also supposed to have an appointment for out pt. treatment but was not sure where or anything about a Rule 25.     Anticipated Length of stay: 5-7 days  Medical/Physical: See medical       Precautions:                Behavioral Orders   Procedures     Code 1 - Restrict to Unit             Order Specific Question:   I have performed an in person assessment of the patient       Answer:   Based on this assessment the patient does not require a one on one attendant at this point in time.       Order Specific Question:   Rationale       Answer:   Patient states able to remain safe in hospital       Order Specific Question:   Rationale       Answer:   Routine observations are sufficient to monitor safety.       Order Specific Question:   Rationale       Answer:   Modifications to care environment made to mitigate safety risk     Routine Programming       As clinically indicated     Status 15       Every 15 minutes.     Suicide precautions       Patients on Suicide Precautions should have a Combination Diet ordered that includes a Diet selection(s) AND a Behavioral Tray selection for Safe Tray - with utensils, or Safe Tray - NO utensils      Disposition Plan    Reason for ongoing admission: Pt is currently undergoing assessment and treatment  Disposition: Pt will return home upon discharge  Legal Status: Voluntary.     Plan: Psychiatric evaluation; medication  evaluation and adjustments as appropriate. Referrals for any new mental health services that are appropriate.  Rationale for change in precautions or plan: None  Pt will be engaged in the therapeutic milieu and groups where he will learn and practice positive coping skills to address his symptoms. Care coordination will target appropriate disposition needs.

## 2021-09-13 NOTE — PROGRESS NOTES
"   09/13/21 1200   General Information   Date Initially Attended OT 09/13/21   Clinical Impression   Affect Appropriate to situation   Orientation Oriented to person, place and time   Appearance and ADLs General cleanliness observed in most areas   Attention to Internal Stimuli No observed signs   Interaction Skills Initiates appropriately with staff;Initiates appropriately with peers   Ability to Communicate Needs Independent   Verbal Content Clear;Appropriate to topic;Articulate   Ability to Maintain Boundaries Maintains appropriate verbal boundaries;Maintains appropriate physical boundaries   Participation Initiates participation   Concentration Concentrates 50 minutes   Ability to Concentrate Without difficulty   Follows and Comprehends Directions Independently follows multi-step directions   Memory Delayed and immediate recall intact   Organization Independently organizes all tasks   Decision Making Independent   Planning and Problem Solving Independently plans ahead   Ability to Apply and Learn Concepts Applies within group structure   Frustrations / Stress Tolerance Independently identifies sources of frustration/stress   Level of Insight Insightful into needs, issues, goals   Self Esteem Can identify positives   Social Supports Has knowledge of support systems   General Observation/Plan   General Observations/Plan See Comments   Attended 2 of 2 OT groups. He participated for 55 minutes in a goal oriented task group with 4 pts. He took time to make decisions, plan and organize steps on a familiar step task. He asked assistance when needed with supplies, was social and pleasant with others. He talked about the plan and need to find better coping skills other than drinking. He stated reason for admission as \"detox help\". A personal strength he identified was \"refuse to give up\". OT goals he chose to focus on included deal with frustration more effectively, improve time management and ask for help when needed. " He also participated for 45 minute with 3 pts in an activity utilizing visuospatial concepts, which he did easily and successfully. He stated he has worked on this area extensively since his brain injury and finds it fun and easier. With his past profession as a , it would also be assumed this to be a strong area for him. He offered assistance to others and seemed to appreciate the opportunity to engage with activities. Plan: Will encourage attendance, provide opportunity for more creative and complex task work to provide a feeling of success and challenge and opportunity to ask for help if needed in practicing this skill. Provide opportunity for exploring and expanding coping skills and signs of when to use them. Assess further.
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
## 2021-09-14 PROCEDURE — 99233 SBSQ HOSP IP/OBS HIGH 50: CPT | Performed by: NURSE PRACTITIONER

## 2021-09-14 PROCEDURE — 250N000013 HC RX MED GY IP 250 OP 250 PS 637: Performed by: NURSE PRACTITIONER

## 2021-09-14 PROCEDURE — 250N000013 HC RX MED GY IP 250 OP 250 PS 637: Performed by: PSYCHIATRY & NEUROLOGY

## 2021-09-14 PROCEDURE — 124N000003 HC R&B MH SENIOR/ADOLESCENT

## 2021-09-14 PROCEDURE — G0177 OPPS/PHP; TRAIN & EDUC SERV: HCPCS

## 2021-09-14 RX ADMIN — ARIPIPRAZOLE 2 MG: 2 TABLET ORAL at 08:05

## 2021-09-14 RX ADMIN — NICOTINE POLACRILEX 8 MG: 2 GUM, CHEWING ORAL at 16:09

## 2021-09-14 RX ADMIN — BUPRENORPHINE AND NALOXONE 2 FILM: 4; 1 FILM, SOLUBLE BUCCAL; SUBLINGUAL at 14:00

## 2021-09-14 RX ADMIN — MULTIPLE VITAMINS W/ MINERALS TAB 1 TABLET: TAB at 08:04

## 2021-09-14 RX ADMIN — PROPRANOLOL HYDROCHLORIDE 10 MG: 10 TABLET ORAL at 08:04

## 2021-09-14 RX ADMIN — GABAPENTIN 1200 MG: 400 CAPSULE ORAL at 08:05

## 2021-09-14 RX ADMIN — GABAPENTIN 1200 MG: 400 CAPSULE ORAL at 19:55

## 2021-09-14 RX ADMIN — FOLIC ACID 1000 MCG: 1 TABLET ORAL at 08:05

## 2021-09-14 RX ADMIN — ACETAMINOPHEN 650 MG: 325 TABLET, FILM COATED ORAL at 16:09

## 2021-09-14 RX ADMIN — ACETAMINOPHEN 650 MG: 325 TABLET, FILM COATED ORAL at 21:29

## 2021-09-14 RX ADMIN — NICOTINE POLACRILEX 8 MG: 2 GUM, CHEWING ORAL at 19:10

## 2021-09-14 RX ADMIN — THIAMINE HCL TAB 100 MG 100 MG: 100 TAB at 08:04

## 2021-09-14 RX ADMIN — ACETAMINOPHEN 650 MG: 325 TABLET, FILM COATED ORAL at 11:25

## 2021-09-14 RX ADMIN — NICOTINE POLACRILEX 8 MG: 2 GUM, CHEWING ORAL at 12:59

## 2021-09-14 RX ADMIN — NICOTINE POLACRILEX 8 MG: 2 GUM, CHEWING ORAL at 21:29

## 2021-09-14 RX ADMIN — NICOTINE POLACRILEX 8 MG: 2 GUM, CHEWING ORAL at 08:05

## 2021-09-14 RX ADMIN — BUPRENORPHINE AND NALOXONE 2 FILM: 4; 1 FILM, SOLUBLE BUCCAL; SUBLINGUAL at 08:05

## 2021-09-14 RX ADMIN — OLANZAPINE 10 MG: 10 TABLET, FILM COATED ORAL at 10:15

## 2021-09-14 RX ADMIN — DIAZEPAM 5 MG: 5 TABLET ORAL at 00:21

## 2021-09-14 RX ADMIN — NICOTINE POLACRILEX 8 MG: 2 GUM, CHEWING ORAL at 13:59

## 2021-09-14 RX ADMIN — QUETIAPINE 300 MG: 300 TABLET, FILM COATED ORAL at 21:21

## 2021-09-14 RX ADMIN — PROPRANOLOL HYDROCHLORIDE 10 MG: 10 TABLET ORAL at 19:55

## 2021-09-14 RX ADMIN — PROPRANOLOL HYDROCHLORIDE 10 MG: 10 TABLET ORAL at 13:59

## 2021-09-14 RX ADMIN — DIAZEPAM 10 MG: 5 TABLET ORAL at 03:32

## 2021-09-14 RX ADMIN — GABAPENTIN 1200 MG: 400 CAPSULE ORAL at 13:59

## 2021-09-14 RX ADMIN — DOCUSATE SODIUM 100 MG: 100 CAPSULE, LIQUID FILLED ORAL at 08:05

## 2021-09-14 RX ADMIN — Medication 1000 MCG: at 08:04

## 2021-09-14 RX ADMIN — DOCUSATE SODIUM 100 MG: 100 CAPSULE, LIQUID FILLED ORAL at 19:55

## 2021-09-14 RX ADMIN — NICOTINE POLACRILEX 8 MG: 2 GUM, CHEWING ORAL at 10:15

## 2021-09-14 RX ADMIN — NICOTINE POLACRILEX 8 MG: 2 GUM, CHEWING ORAL at 11:25

## 2021-09-14 RX ADMIN — BUPRENORPHINE AND NALOXONE 2 FILM: 4; 1 FILM, SOLUBLE BUCCAL; SUBLINGUAL at 19:55

## 2021-09-14 ASSESSMENT — ACTIVITIES OF DAILY LIVING (ADL)
ORAL_HYGIENE: INDEPENDENT
HYGIENE/GROOMING: INDEPENDENT
LAUNDRY: WITH SUPERVISION
ORAL_HYGIENE: INDEPENDENT
DRESS: SCRUBS (BEHAVIORAL HEALTH)
HYGIENE/GROOMING: INDEPENDENT
DRESS: STREET CLOTHES;SCRUBS (BEHAVIORAL HEALTH)
LAUNDRY: WITH SUPERVISION

## 2021-09-14 ASSESSMENT — MIFFLIN-ST. JEOR: SCORE: 2167.59

## 2021-09-14 NOTE — PLAN OF CARE
"  Problem: OT General Care Plan  Goal: OT Goal 1  Description: Will consistently attend OT groups and improve coping strategies with increasing repertoire of ideas and understanding of symptoms of when to use the strategies.       Note: Attended the AM OT group for 55 minutes with 6 pts for a goal oriented task group. He worked on a familiar step task, took time to plan and organize the details of the steps and followed through with taking time to do this instead of his initial request to not plan and \"ad kathleen\".  He stated understanding the value of organizing thoughts and his work. He was pleasant and social, joined in conversation and appeared engaged and interested. He supported others in a kind manner.      "

## 2021-09-14 NOTE — PLAN OF CARE
"  Problem: Suicidal Behavior  Goal: Suicidal Behavior is Absent or Managed  Outcome: Improving     Problem: Substance Withdrawal  Goal: Substance Withdrawal  Description: Signs and symptoms of listed problems will be absent or manageable.  Outcome: Improving  Pt. has flat/blunted affect, mood has been calm during this shift. Stated his anxiety and depression triggers by his chronic lower back pain. Reported that hallucination is getting better. Pt. denies SI and SIB. PRN tylenol given for back pain with minimal relief per pt. Report, hot pack also provided for pain. MSSA score 6 and 5, didn't qualify to receive PRN valium, pt. was okay with that. COWS score discontinued. Refused his scheduled Seroquel this morning and stated, \" If I take it in the morning, it will make me drowsy and groggy\" 10 mg Zyprexa given per provider recomendation, after he received zyprexa he said, ''It makes my depression worse\" Taking 4 nicotine gum 2 mg at once, given x5. Appetite is good. Social with peers. Attended group. Continue to monitor pt. per plan of care.  "

## 2021-09-14 NOTE — PROGRESS NOTES
Writer observed pt in lounge and pt was mumbling words while in conversation with another pt and eyes were struggling to stay open, almost as if pt was falling asleep while both walking and sitting. Pt appears to be slumped over in chair and when walking, as well as shuffling feet.

## 2021-09-14 NOTE — PROGRESS NOTES
"[facility], [place]   Psychiatric Progress Note        Interim History:   From H&P: Hollis Barclay is a 51 year old male with a history of polysubstance abuse, chronic pain on Suboxone, bipolar disorder, schizophrenia, depression and alcohol use disorder who presents requesting detox.  Patient was discharged approximately 3 weeks ago (August 23 )stating that he was sober for 3 days and then relapsed.  He is drinking about a half a gallon of vodka a day with his last drink being approximately 9 hours ago.  He states that he has a history of DTs.  He states that he missed his appointment to refill Suboxone 3 days ago because \"I was hammered\".  He currently denies use of other drugs.  Apparently at discharge, he was given 9 days worth of Suboxone 8/2 mg tabs 3 times daily in replacement of methadone that he had discontinued prior to his admission on August 16.    Team meeting report: The patient's care was discussed with the treatment team during the daily team meeting and/or staff's chart notes were reviewed.  Staff report patient has been cooperative.  Mood is labile.  He is irritable.  He is requesting more medications even though his MSSA scores are low.  Patient is attending groups and participating appropriately. Patient is eating well and taking medications as prescribed.  Slept 2.5 hours.     Met with patient.  He is irritable.  Thinks he is still withdrawing from alcohol \"I know my body\".  He is arguing with his MSSA score have been low today because he was given blood pressure medications in the morning.  He refused to take  Seroquel this morning.  Reports difficulty sleeping due to a nightmare.  He has been awaken several times during the night for MSSA.  Reports depression and anxiety, but cannot quantify.  Appetite is intact.    We will transfer to Dr. Perez, per patient's request, starting tomorrow. Dr. Perez had worked with the patient when he was on 3A few weeks ago.       "    Medications:       amLODIPine  5 mg Oral Daily     ARIPiprazole  2 mg Oral Daily     buprenorphine HCl-naloxone HCl  2 Film Sublingual TID     cyanocobalamin  1,000 mcg Sublingual Daily     docusate sodium  100 mg Oral BID     folic acid  1,000 mcg Oral Daily     gabapentin  1,200 mg Oral TID     multivitamin w/minerals  1 tablet Oral Daily     propranolol  10 mg Oral TID     QUEtiapine  300 mg Oral At Bedtime     QUEtiapine  50 mg Oral BID     thiamine  100 mg Oral Daily          Allergies:     Allergies   Allergen Reactions     Hydroxyzine Hives     Previously unreported by patient, this is a new patient declaration as of 5/1/21.     Cucumber Extract      Cucumber the vegable     Nsaids      No problem with oral NSAIDs--Toradol injection itching only.          Labs:     Recent Results (from the past 672 hour(s))   CRP inflammation    Collection Time: 08/19/21  7:40 AM   Result Value Ref Range    CRP Inflammation 51.0 (H) 0.0 - 8.0 mg/L   CBC with platelets    Collection Time: 08/19/21  7:40 AM   Result Value Ref Range    WBC Count 13.3 (H) 4.0 - 11.0 10e3/uL    RBC Count 4.29 (L) 4.40 - 5.90 10e6/uL    Hemoglobin 13.3 13.3 - 17.7 g/dL    Hematocrit 40.5 40.0 - 53.0 %    MCV 94 78 - 100 fL    MCH 31.0 26.5 - 33.0 pg    MCHC 32.8 31.5 - 36.5 g/dL    RDW 14.0 10.0 - 15.0 %    Platelet Count 286 150 - 450 10e3/uL   CRP inflammation    Collection Time: 08/21/21  7:26 AM   Result Value Ref Range    CRP Inflammation 66.0 (H) 0.0 - 8.0 mg/L   CBC with platelets    Collection Time: 08/21/21  7:26 AM   Result Value Ref Range    WBC Count 5.4 4.0 - 11.0 10e3/uL    RBC Count 3.93 (L) 4.40 - 5.90 10e6/uL    Hemoglobin 12.1 (L) 13.3 - 17.7 g/dL    Hematocrit 36.5 (L) 40.0 - 53.0 %    MCV 93 78 - 100 fL    MCH 30.8 26.5 - 33.0 pg    MCHC 33.2 31.5 - 36.5 g/dL    RDW 13.7 10.0 - 15.0 %    Platelet Count 277 150 - 450 10e3/uL   CRP inflammation    Collection Time: 08/23/21  7:31 AM   Result Value Ref Range    CRP Inflammation  39.0 (H) 0.0 - 8.0 mg/L   Extra Blue Top Tube    Collection Time: 09/12/21  8:43 AM   Result Value Ref Range    Hold Specimen [facility]    Extra Red Top Tube    Collection Time: 09/12/21  8:43 AM   Result Value Ref Range    Hold Specimen [facility]    Extra Purple Top Tube    Collection Time: 09/12/21  8:43 AM   Result Value Ref Range    Hold Specimen [facility]    Extra Green Top (Lithium Heparin) ON ICE    Collection Time: 09/12/21  8:43 AM   Result Value Ref Range    Hold Specimen [facility]    CBC with platelets and differential    Collection Time: 09/12/21  8:43 AM   Result Value Ref Range    WBC Count 6.5 4.0 - 11.0 10e3/uL    RBC Count 4.42 4.40 - 5.90 10e6/uL    Hemoglobin 13.5 13.3 - 17.7 g/dL    Hematocrit 40.6 40.0 - 53.0 %    MCV 92 78 - 100 fL    MCH 30.5 26.5 - 33.0 pg    MCHC 33.3 31.5 - 36.5 g/dL    RDW 14.0 10.0 - 15.0 %    Platelet Count 293 150 - 450 10e3/uL    % Neutrophils 43 %    % Lymphocytes 43 %    % Monocytes 12 %    % Eosinophils 1 %    % Basophils 1 %    % Immature Granulocytes 0 %    NRBCs per 100 WBC 0 <1 /100    Absolute Neutrophils 2.8 1.6 - 8.3 10e3/uL    Absolute Lymphocytes 2.8 0.8 - 5.3 10e3/uL    Absolute Monocytes 0.8 0.0 - 1.3 10e3/uL    Absolute Eosinophils 0.0 0.0 - 0.7 10e3/uL    Absolute Basophils 0.0 0.0 - 0.2 10e3/uL    Absolute Immature Granulocytes 0.0 <=0.0 10e3/uL    Absolute NRBCs 0.0 10e3/uL   Extra Green Top (Lithium Heparin) Tube    Collection Time: 09/12/21  8:44 AM   Result Value Ref Range    Hold Specimen [facility]    Comprehensive metabolic panel    Collection Time: 09/12/21  8:44 AM   Result Value Ref Range    Sodium 136 133 - 144 mmol/L    Potassium 3.7 3.4 - 5.3 mmol/L    Chloride 102 94 - 109 mmol/L    Carbon Dioxide (CO2) 28 20 - 32 mmol/L    Anion Gap 6 3 - 14 mmol/L    Urea Nitrogen 25 7 - 30 mg/dL    Creatinine 0.82 0.66 - 1.25 mg/dL    Calcium 8.9 8.5 - 10.1 mg/dL    Glucose 91 70 - 99 mg/dL    Alkaline Phosphatase 91 40 - 150 U/L    AST 40 0 - 45 U/L    ALT 65 0 - 70 U/L    Protein  Total 7.8 6.8 - 8.8 g/dL    Albumin 3.7 3.4 - 5.0 g/dL    Bilirubin Total 0.7 0.2 - 1.3 mg/dL    GFR Estimate >90 >60 mL/min/1.73m2   Ethyl Alcohol Level    Collection Time: 09/12/21  8:44 AM   Result Value Ref Range    Alcohol ethyl <0.01 <=0.01 g/dL   Drug abuse screen 1 urine (ED)    Collection Time: 09/12/21  8:52 AM   Result Value Ref Range    Amphetamines Urine Screen Positive (A) Screen Negative    Barbiturates Urine Screen Negative Screen Negative    Benzodiazepines Urine Screen Positive (A) Screen Negative    Cannabinoids Urine Screen Negative Screen Negative    Cocaine Urine Screen Positive (A) Screen Negative    Opiates Urine Screen Negative Screen Negative   Alcohol breath test POCT    Collection Time: 09/12/21  8:55 AM   Result Value Ref Range    Alcohol Breath Test 0 0.00 - 0.01   Asymptomatic COVID-19 Virus (Coronavirus) by PCR Nasopharyngeal    Collection Time: 09/12/21  9:04 AM    Specimen: Nasopharyngeal; Swab   Result Value Ref Range    SARS CoV2 PCR Negative Negative   GGT    Collection Time: 09/13/21  7:38 AM   Result Value Ref Range    GGT 97 (H) 0 - 75 U/L   Lipid panel    Collection Time: 09/13/21  7:38 AM   Result Value Ref Range    Cholesterol 177 <200 mg/dL    Triglycerides 59 <150 mg/dL    Direct Measure HDL 78 >=40 mg/dL    LDL Cholesterol Calculated 87 <=100 mg/dL    Non HDL Cholesterol 99 <130 mg/dL   TSH with free T4 reflex and/or T3 as indicated    Collection Time: 09/13/21  7:38 AM   Result Value Ref Range    TSH 9.73 (H) 0.40 - 4.00 mU/L   Vitamin B12    Collection Time: 09/13/21  7:38 AM   Result Value Ref Range    Vitamin B12 70 (L) 193 - 986 pg/mL   Folate    Collection Time: 09/13/21  7:38 AM   Result Value Ref Range    Folic Acid 20.1 >=5.4 ng/mL   T4 free    Collection Time: 09/13/21  7:38 AM   Result Value Ref Range    Free T4 0.72 (L) 0.76 - 1.46 ng/dL            Psychiatric Examination:   Temp: 97.1  F (36.2  C) Temp src: Temporal BP: 121/82 (sitting) Pulse: 73   Resp: 16  SpO2: 96 % O2 Device: None (Room air)    Weight is 268 lbs 1.6 oz  Body mass index is 32.63 kg/m .    Appearance: adequately groomed, alert, cooperative, mild distress and mildly obese  Attitude:  cooperative  Eye Contact:  good  Mood:  anxious, depressed and better  Affect:  appropriate and in normal range  Speech:  clear, coherent  Psychomotor Behavior:  no evidence of tardive dyskinesia, dystonia, or tics  Throught Process:  logical and goal oriented  Associations:  no loose associations  Thought Content:  no evidence of suicidal ideation or homicidal ideation  Insight:  good  Judgement:  intact  Oriented to:  time, person, and place  Attention Span and Concentration:  intact  Recent and Remote Memory:  intact         Precautions:     Behavioral Orders   Procedures     Assault precautions     Hx of threatening, aggressive, and hostile bx 2/2 TBI     Code 1 - Restrict to Unit     Discontinue 1:1 attendant for suicide risk     Order Specific Question:   I have performed an in person assessment of the patient     Answer:   Based on this assessment the patient no longer requires a one on one attendant at this point in time.     Order Specific Question:   Rationale     Answer:   Medical Record Reviewed     Order Specific Question:   Rationale     Answer:   Patient States able to remain safe in hospital     Order Specific Question:   Rationale     Answer:   Modifications to care environment made to mitigate safety risk     Order Specific Question:   Rationale     Answer:   Routine observations are sufficient to monitor safety.     Fall precautions     Routine Programming     As clinically indicated     Seizure precautions     Self Injury Precaution     Status 15     Every 15 minutes.     Suicide precautions     Patients on Suicide Precautions should have a Combination Diet ordered that includes a Diet selection(s) AND a Behavioral Tray selection for Safe Tray - with utensils, or Safe Tray - NO utensils       Withdrawal  precautions          DIagnoses:   1.  Alcohol use disorder, severe, with complications.  2.  Alcohol withdrawals  3.  Stimulant use disorder (cocaine, methamphetamines)  4.  Opiate use disorder  5.  Bipolar disorder, mixed, moderate, with psychosis  6.  Borderline personality disorder  7.  PTSD  8.  Medical problems include: Chronic pain, hypertension, hypothyroidism, vitamin B12 deficiency  9.  TBI         Plan:     The patient is a 51 years old,  male who was admitted for alcohol detox, depression, and suicidal ideation.  He is interested in outpatient CD treatment.  He worries that if he goes to inpatient CD treatment, he will lose his CADI services.  Medications will include the following:    --Abilify 2 mg every morning.    --Suboxone 8/2 mg, 3 times a day.    --Gabapentin 1200 mg 3 times a day.    --Seroquel 300 mg at bedtime and 50 mg twice a day.    --Thiamine, folic acid, and multivitamins for alcohol withdrawals.      --MSSA with Valium for alcohol withdrawals.    --Nicotine gum and patch for nicotine withdrawals.    --Additional medications will include Zofran, loperamide, Zyprexa, and trazodone.      --Blood work was reviewed.    --Multiple abnormalities.    --Vitamin B12 is 70.    --U tox is positive for benzodiazepines, cocaine, and methamphetamines.  Alcohol level is 0.01.      --The patient was consulted on nature of illness and treatment options.   --Care was coordinated      Disposition Plan   Reason for ongoing admission: is unable to care for self due to polysubstance abuse  Disposition: home  Estimated length of stay: 5-7 days  Legal Status:  voluntary  Discharge will be granted once symptoms improved.    Dr. Perez will take over tomorrow.     Den KLEIN, CNP        This note was created with the help of Dragon dictation system. All grammatical/typing errors or context distortion are unintentional and inherent to software.    More than 35 minutes were spent for assessment,  documentation, counseling and  coordination of care.

## 2021-09-14 NOTE — PROGRESS NOTES
Pt woke up several times during the night to get something to eat. Furthermore, pt c/o people banging on his door a few instances as well and was agitated at times due to this. Writer and staff explained to pt that he might have heard us rolling the VS machine down the ross and people walking in the ross. Pt scored a 8 on the MSSA scale and a 11 on the COWS scale at 0000 and received a PRN Valium of 5 mg. Later in the night pt scored a 6 on the COWS scale and 12 on the MSSA scale and received 10 mg PRN Valium.     Pt slept approx 2.5 hours.

## 2021-09-14 NOTE — PLAN OF CARE
"Pt presents with blunted, flat affect and irritable mood. Denies SI/SIB and hallucinations this evening. Denies depression, reports that he is sad but it is situational. Rates anxiety 9/10. MSSA scores 5 and 6, no Valium given. COWS scores 6 and 7 - pt on Suboxone TID. When writer first met pt he was walking the hallways with another pt. Pt appeared sedated, eyes only half open and pt was noted to have garbled, slurred speech. When asked how he was he stated \"Fine, but I think the DTs are coming back on.\" When pt was informed that he appeared tired, he stated, \"I have not been sleeping well at night.\" Pt is A&Ox4. Pt continued to walk the hallways after this. Pt does not appear to be tremulous, this evening while playing a game of dice with peers, pt was able to write to keep score and write with no issue. At dinner time, pt requested a yogurt out of the fridge. Pt requested two yogurts, writer advised pt that our supply was limited and we would have to ensure that yogurt would be available to other pts so only one would be given at this time. Pt became agitated with writer and started to talk loudly in the milieu. Pt was directed to the conference room where we could discuss his issue, pt initially refused stating that he had \"social anxiety\" but then agreed to speak in the conference room with the door partially opened. Pt advised writer at this time that he is here voluntarily and that he is here for help, he stated that he did not appreciate writer commenting on how he appeared tired and refusing him medications. At this time, pt continued to ramble on regarding various aspects about how writer denied him food, etc. Pt was advised that writer has a responsibility to ensure safety of their patients and that at that time he did not qualify for intervention per MSSA for ETOH withdrawal. Pt continued to talk over writer and would not allow writer to explain the process. At dinner, pt consumed a large amount of food " - his tray (pt receives double portions), plus numerous crackers and sunbutter, and a yogurt. Later in the evening pt reported still being hungry and that's what helps with his withdrawal symptoms. Reported to writer that he would be in withdrawal until about Wednesday. Complained of lower back pain which is chronic 7/10 - provided with PRN Tylenol which per pt was minimally effective. Pt was present and social in the milieu, spends a lot of time with a female pt on the unit. Noted to get agitated when he is not given what he requests right away. VSS. Medication compliant. Reports that he showered on AM shift. No other concerns or complaints noted.

## 2021-09-15 PROCEDURE — 250N000013 HC RX MED GY IP 250 OP 250 PS 637: Performed by: NURSE PRACTITIONER

## 2021-09-15 PROCEDURE — G0177 OPPS/PHP; TRAIN & EDUC SERV: HCPCS

## 2021-09-15 PROCEDURE — 250N000013 HC RX MED GY IP 250 OP 250 PS 637: Performed by: PSYCHIATRY & NEUROLOGY

## 2021-09-15 PROCEDURE — 124N000003 HC R&B MH SENIOR/ADOLESCENT

## 2021-09-15 RX ORDER — ACAMPROSATE CALCIUM 333 MG/1
666 TABLET, DELAYED RELEASE ORAL 3 TIMES DAILY
Status: DISCONTINUED | OUTPATIENT
Start: 2021-09-15 | End: 2021-09-27 | Stop reason: HOSPADM

## 2021-09-15 RX ORDER — PREGABALIN 25 MG/1
25 CAPSULE ORAL 3 TIMES DAILY
Status: DISCONTINUED | OUTPATIENT
Start: 2021-09-15 | End: 2021-09-17

## 2021-09-15 RX ADMIN — AMLODIPINE BESYLATE 5 MG: 5 TABLET ORAL at 08:28

## 2021-09-15 RX ADMIN — NICOTINE POLACRILEX 8 MG: 2 GUM, CHEWING ORAL at 15:27

## 2021-09-15 RX ADMIN — NICOTINE POLACRILEX 8 MG: 2 GUM, CHEWING ORAL at 21:55

## 2021-09-15 RX ADMIN — GABAPENTIN 1200 MG: 400 CAPSULE ORAL at 08:29

## 2021-09-15 RX ADMIN — NICOTINE POLACRILEX 4 MG: 2 GUM, CHEWING ORAL at 07:19

## 2021-09-15 RX ADMIN — NICOTINE POLACRILEX 8 MG: 2 GUM, CHEWING ORAL at 13:33

## 2021-09-15 RX ADMIN — MULTIPLE VITAMINS W/ MINERALS TAB 1 TABLET: TAB at 08:29

## 2021-09-15 RX ADMIN — PROPRANOLOL HYDROCHLORIDE 10 MG: 10 TABLET ORAL at 08:28

## 2021-09-15 RX ADMIN — NICOTINE POLACRILEX 8 MG: 2 GUM, CHEWING ORAL at 12:36

## 2021-09-15 RX ADMIN — GABAPENTIN 1200 MG: 400 CAPSULE ORAL at 20:29

## 2021-09-15 RX ADMIN — ARIPIPRAZOLE 2 MG: 2 TABLET ORAL at 08:29

## 2021-09-15 RX ADMIN — ACETAMINOPHEN 650 MG: 325 TABLET, FILM COATED ORAL at 15:26

## 2021-09-15 RX ADMIN — DOCUSATE SODIUM 100 MG: 100 CAPSULE, LIQUID FILLED ORAL at 08:29

## 2021-09-15 RX ADMIN — BUPRENORPHINE AND NALOXONE 2 FILM: 4; 1 FILM, SOLUBLE BUCCAL; SUBLINGUAL at 13:28

## 2021-09-15 RX ADMIN — DICLOFENAC SODIUM 2 G: 10 GEL TOPICAL at 15:28

## 2021-09-15 RX ADMIN — NICOTINE POLACRILEX 8 MG: 2 GUM, CHEWING ORAL at 20:29

## 2021-09-15 RX ADMIN — ACAMPROSATE CALCIUM 666 MG: 333 TABLET, DELAYED RELEASE ORAL at 13:27

## 2021-09-15 RX ADMIN — PREGABALIN 25 MG: 25 CAPSULE ORAL at 15:26

## 2021-09-15 RX ADMIN — QUETIAPINE 300 MG: 300 TABLET, FILM COATED ORAL at 21:55

## 2021-09-15 RX ADMIN — PREGABALIN 25 MG: 25 CAPSULE ORAL at 20:29

## 2021-09-15 RX ADMIN — Medication 1000 MCG: at 08:29

## 2021-09-15 RX ADMIN — THIAMINE HCL TAB 100 MG 100 MG: 100 TAB at 08:29

## 2021-09-15 RX ADMIN — BUPRENORPHINE AND NALOXONE 2 FILM: 4; 1 FILM, SOLUBLE BUCCAL; SUBLINGUAL at 08:29

## 2021-09-15 RX ADMIN — GABAPENTIN 1200 MG: 400 CAPSULE ORAL at 13:27

## 2021-09-15 RX ADMIN — NICOTINE POLACRILEX 8 MG: 2 GUM, CHEWING ORAL at 17:37

## 2021-09-15 RX ADMIN — ACAMPROSATE CALCIUM 666 MG: 333 TABLET, DELAYED RELEASE ORAL at 20:28

## 2021-09-15 RX ADMIN — BUPRENORPHINE AND NALOXONE 2 FILM: 4; 1 FILM, SOLUBLE BUCCAL; SUBLINGUAL at 20:28

## 2021-09-15 RX ADMIN — PROPRANOLOL HYDROCHLORIDE 10 MG: 10 TABLET ORAL at 13:27

## 2021-09-15 RX ADMIN — DOCUSATE SODIUM 100 MG: 100 CAPSULE, LIQUID FILLED ORAL at 20:29

## 2021-09-15 RX ADMIN — PROPRANOLOL HYDROCHLORIDE 10 MG: 10 TABLET ORAL at 20:29

## 2021-09-15 RX ADMIN — FOLIC ACID 1000 MCG: 1 TABLET ORAL at 08:29

## 2021-09-15 ASSESSMENT — ACTIVITIES OF DAILY LIVING (ADL)
DRESS: STREET CLOTHES
ORAL_HYGIENE: INDEPENDENT
HYGIENE/GROOMING: INDEPENDENT
LAUNDRY: WITH SUPERVISION

## 2021-09-15 NOTE — PROGRESS NOTES
"Patient seen, chart reviewed, care discussed with staff.  Blood pressure 114/76, pulse 83, temperature 97.3  F (36.3  C), temperature source Temporal, resp. rate 16, height 1.93 m (6' 4\"), weight 121.6 kg (268 lb 1.6 oz), SpO2 96 %.    Patient Active Problem List   Diagnosis     Post concussive encephalopathy     H/O shoulder surgery     Alcoholism in remission (H)     Bilateral ACL tear     Chronic back pain     Social anxiety disorder     Alcohol withdrawal syndrome with complication, with unspecified complication (H)     Rib fracture     Alcohol withdrawal (H)     Alcohol dependence (H)     LFT elevation     Laceration of left hand without foreign body, initial encounter     Suicidal ideation     By report, he has completed withdrawal from alcohol.  Pain discussed, he feels little relief from Gabapentin.  Relapse discussed, he notes his mother's advancing illness was a trigger.    General appearance: good  Alert.   Affect: fair, not irritable as withdrawal fades  Mood: fair    Speech:  normal.   Eye contact:  good.    Psychomotor behavior: normal  Gait: normal.    Abnormal movements: none  Delusions: none  Hallucinations:  none  Thoughts: logical  Associations: intact  Judgement: fair  Insight: fair  Cognitions: intact in conversation  Memory:  intact in conversation  Orientation: normal    Not suicidal.    Plan:  Stop MSSA  2.  Add Lyrica, will cross taper from Gabapentin if tolerated  3.  Add Campral    Current Facility-Administered Medications   Medication     acamprosate (CAMPRAL) EC tablet 666 mg     acetaminophen (TYLENOL) tablet 650 mg     alum & mag hydroxide-simethicone (MAALOX) suspension 30 mL     amLODIPine (NORVASC) tablet 5 mg     ARIPiprazole (ABILIFY) tablet 2 mg     buprenorphine HCl-naloxone HCl (SUBOXONE) 4-1 MG per film 2 Film     cyanocobalamin (VITAMIN B-12) sublingual tablet 1,000 mcg     diazepam (VALIUM) tablet 5-20 mg     diclofenac (VOLTAREN) 1 % topical gel 2 g     docusate sodium " (COLACE) capsule 100 mg     folic acid (FOLVITE) tablet 1,000 mcg     gabapentin (NEURONTIN) capsule 1,200 mg     loperamide (IMODIUM) capsule 2 mg     multivitamin w/minerals (THERA-VIT-M) tablet 1 tablet     nicotine polacrilex (NICORETTE) gum 4-8 mg     OLANZapine (zyPREXA) tablet 10 mg    Or     OLANZapine (zyPREXA) injection 10 mg     ondansetron (ZOFRAN-ODT) ODT tab 4 mg     pregabalin (LYRICA) capsule 25 mg     propranolol (INDERAL) tablet 10 mg     QUEtiapine (SEROquel) tablet 300 mg     QUEtiapine (SEROquel) tablet 50 mg     senna-docusate (SENOKOT-S/PERICOLACE) 8.6-50 MG per tablet 1 tablet     thiamine (B-1) tablet 100 mg     traZODone (DESYREL) tablet 50 mg     Recent Results (from the past 168 hour(s))   Extra Blue Top Tube    Collection Time: 09/12/21  8:43 AM   Result Value Ref Range    Hold Specimen [facility]    Extra Red Top Tube    Collection Time: 09/12/21  8:43 AM   Result Value Ref Range    Hold Specimen [facility]    Extra Purple Top Tube    Collection Time: 09/12/21  8:43 AM   Result Value Ref Range    Hold Specimen [facility]    Extra Green Top (Lithium Heparin) ON ICE    Collection Time: 09/12/21  8:43 AM   Result Value Ref Range    Hold Specimen [facility]    CBC with platelets and differential    Collection Time: 09/12/21  8:43 AM   Result Value Ref Range    WBC Count 6.5 4.0 - 11.0 10e3/uL    RBC Count 4.42 4.40 - 5.90 10e6/uL    Hemoglobin 13.5 13.3 - 17.7 g/dL    Hematocrit 40.6 40.0 - 53.0 %    MCV 92 78 - 100 fL    MCH 30.5 26.5 - 33.0 pg    MCHC 33.3 31.5 - 36.5 g/dL    RDW 14.0 10.0 - 15.0 %    Platelet Count 293 150 - 450 10e3/uL    % Neutrophils 43 %    % Lymphocytes 43 %    % Monocytes 12 %    % Eosinophils 1 %    % Basophils 1 %    % Immature Granulocytes 0 %    NRBCs per 100 WBC 0 <1 /100    Absolute Neutrophils 2.8 1.6 - 8.3 10e3/uL    Absolute Lymphocytes 2.8 0.8 - 5.3 10e3/uL    Absolute Monocytes 0.8 0.0 - 1.3 10e3/uL    Absolute Eosinophils 0.0 0.0 - 0.7 10e3/uL    Absolute Basophils 0.0 0.0 - 0.2  10e3/uL    Absolute Immature Granulocytes 0.0 <=0.0 10e3/uL    Absolute NRBCs 0.0 10e3/uL   Extra Green Top (Lithium Heparin) Tube    Collection Time: 09/12/21  8:44 AM   Result Value Ref Range    Hold Specimen [facility]    Comprehensive metabolic panel    Collection Time: 09/12/21  8:44 AM   Result Value Ref Range    Sodium 136 133 - 144 mmol/L    Potassium 3.7 3.4 - 5.3 mmol/L    Chloride 102 94 - 109 mmol/L    Carbon Dioxide (CO2) 28 20 - 32 mmol/L    Anion Gap 6 3 - 14 mmol/L    Urea Nitrogen 25 7 - 30 mg/dL    Creatinine 0.82 0.66 - 1.25 mg/dL    Calcium 8.9 8.5 - 10.1 mg/dL    Glucose 91 70 - 99 mg/dL    Alkaline Phosphatase 91 40 - 150 U/L    AST 40 0 - 45 U/L    ALT 65 0 - 70 U/L    Protein Total 7.8 6.8 - 8.8 g/dL    Albumin 3.7 3.4 - 5.0 g/dL    Bilirubin Total 0.7 0.2 - 1.3 mg/dL    GFR Estimate >90 >60 mL/min/1.73m2   Ethyl Alcohol Level    Collection Time: 09/12/21  8:44 AM   Result Value Ref Range    Alcohol ethyl <0.01 <=0.01 g/dL   Drug abuse screen 1 urine (ED)    Collection Time: 09/12/21  8:52 AM   Result Value Ref Range    Amphetamines Urine Screen Positive (A) Screen Negative    Barbiturates Urine Screen Negative Screen Negative    Benzodiazepines Urine Screen Positive (A) Screen Negative    Cannabinoids Urine Screen Negative Screen Negative    Cocaine Urine Screen Positive (A) Screen Negative    Opiates Urine Screen Negative Screen Negative   Alcohol breath test POCT    Collection Time: 09/12/21  8:55 AM   Result Value Ref Range    Alcohol Breath Test 0 0.00 - 0.01   Asymptomatic COVID-19 Virus (Coronavirus) by PCR Nasopharyngeal    Collection Time: 09/12/21  9:04 AM    Specimen: Nasopharyngeal; Swab   Result Value Ref Range    SARS CoV2 PCR Negative Negative   GGT    Collection Time: 09/13/21  7:38 AM   Result Value Ref Range    GGT 97 (H) 0 - 75 U/L   Lipid panel    Collection Time: 09/13/21  7:38 AM   Result Value Ref Range    Cholesterol 177 <200 mg/dL    Triglycerides 59 <150 mg/dL    Direct  Measure HDL 78 >=40 mg/dL    LDL Cholesterol Calculated 87 <=100 mg/dL    Non HDL Cholesterol 99 <130 mg/dL   TSH with free T4 reflex and/or T3 as indicated    Collection Time: 09/13/21  7:38 AM   Result Value Ref Range    TSH 9.73 (H) 0.40 - 4.00 mU/L   Vitamin B12    Collection Time: 09/13/21  7:38 AM   Result Value Ref Range    Vitamin B12 70 (L) 193 - 986 pg/mL   Folate    Collection Time: 09/13/21  7:38 AM   Result Value Ref Range    Folic Acid 20.1 >=5.4 ng/mL   T4 free    Collection Time: 09/13/21  7:38 AM   Result Value Ref Range    Free T4 0.72 (L) 0.76 - 1.46 ng/dL

## 2021-09-15 NOTE — PLAN OF CARE
Problem: OT General Care Plan  Goal: OT Goal 1  Description: Will consistently attend OT groups and improve coping strategies with increasing repertoire of ideas and understanding of symptoms of when to use the strategies.       Note: Attended 3 of 3 OT groups. He participated in 55 minutes X2 of goal oriented tasks groups with 7 pts. He worked at a constant pace, took time to plan and problem solve in work that involved multiple complex steps. He accepted assistance, and asked for it as needed. He seemed accepting of limitations and rules and followed through with no complaints. He also stated feeling positive and hopeful with new medications he is starting and is appreciative of the help he is receiving. He was social and added in elaboration on comments. He also participated in the afternoon session for 60 minutes with 6 pts focused on the Process of Recovery, identifying healthy perspectives and ways in managing one's positive growth. He was actively involved, spoke up and offered elaboration on comments with insightful content.

## 2021-09-15 NOTE — PLAN OF CARE
Assessment:  Jose M was up ad kathleen, attended groups. Pt denied having suicidal ideation or self harm thoughts. Jose M's MSSA score this am was 6, his last dose of Valium was at 0332 on 9/14/21 which is greater than 24 hours.  Discussed with MD and MSSA was discontinued.    Jose M continues to report having chronic pain, Lyrica was started and he is hopeful that will help with his pain. Jose M's mood was mostly calm, he was med adherent, cooperative.

## 2021-09-15 NOTE — PLAN OF CARE
Problem: Substance Withdrawal  Goal: Substance Withdrawal  Description: Signs and symptoms of listed problems will be absent or manageable.  Outcome: Improving     Patient's MSSA score is 6. No active withdrawal symptoms observed or noted. He slept for a total of 5.5. hours.

## 2021-09-15 NOTE — PLAN OF CARE
Pt presents with blunted, flat affect and irritable mood. Denies SI/SIB and hallucinations. When asked about depression and anxiety, he states it is up and down but he is managing. Pt was present in the milieu most of the shift. Continues to appear sedated, falling asleep while sitting at the table, speech is garbled and slurred, eyes are only half open when having conversation with pt. Pt became agitated this evening about rounds being done and PAs being intrusive while he is on the toilet, advised pt that this is a safety requirement and must be done. Pt also became upset about not getting the correct food for dinner although review of his menu that he submitted showed that he did not [place] what he was requesting. Writer obtained menu for tomorrow and reviewed with pt to ensure it was correct - copy was placed in pt chart incase of any issues tomorrow. Pt c/o lower back pain 7/10 and was provided with PRN Tylenol x2 which was minimally effective per patient. Pt refused 2000 Seroquel 50 mg this evening, only wanting the 300 mg at HS. MSSA scores 7 and 5 - no Valium given. COWS scores 6 and 5 - pt on scheduled Suboxone TID. VSS. Medication compliant. Appetite and fluid intake more than adequate, pt requests lots of snacks. Did not attend evening group. No other concerns or complaints noted.

## 2021-09-16 LAB
T4 FREE SERPL-MCNC: 0.77 NG/DL (ref 0.76–1.46)
TSH SERPL DL<=0.005 MIU/L-ACNC: 6.3 MU/L (ref 0.4–4)

## 2021-09-16 PROCEDURE — 250N000013 HC RX MED GY IP 250 OP 250 PS 637: Performed by: PSYCHIATRY & NEUROLOGY

## 2021-09-16 PROCEDURE — 84443 ASSAY THYROID STIM HORMONE: CPT | Performed by: NURSE PRACTITIONER

## 2021-09-16 PROCEDURE — 84439 ASSAY OF FREE THYROXINE: CPT | Performed by: NURSE PRACTITIONER

## 2021-09-16 PROCEDURE — 250N000013 HC RX MED GY IP 250 OP 250 PS 637: Performed by: NURSE PRACTITIONER

## 2021-09-16 PROCEDURE — 36415 COLL VENOUS BLD VENIPUNCTURE: CPT | Performed by: NURSE PRACTITIONER

## 2021-09-16 PROCEDURE — 124N000003 HC R&B MH SENIOR/ADOLESCENT

## 2021-09-16 PROCEDURE — G0177 OPPS/PHP; TRAIN & EDUC SERV: HCPCS

## 2021-09-16 RX ORDER — LIDOCAINE 4 G/G
1 PATCH TOPICAL
Status: DISCONTINUED | OUTPATIENT
Start: 2021-09-17 | End: 2021-09-27 | Stop reason: HOSPADM

## 2021-09-16 RX ADMIN — ACETAMINOPHEN 650 MG: 325 TABLET, FILM COATED ORAL at 08:18

## 2021-09-16 RX ADMIN — PREGABALIN 25 MG: 25 CAPSULE ORAL at 08:10

## 2021-09-16 RX ADMIN — Medication 1000 MCG: at 08:10

## 2021-09-16 RX ADMIN — BUPRENORPHINE AND NALOXONE 2 FILM: 4; 1 FILM, SOLUBLE BUCCAL; SUBLINGUAL at 13:34

## 2021-09-16 RX ADMIN — NICOTINE POLACRILEX 8 MG: 2 GUM, CHEWING ORAL at 21:57

## 2021-09-16 RX ADMIN — GABAPENTIN 1200 MG: 400 CAPSULE ORAL at 13:34

## 2021-09-16 RX ADMIN — NICOTINE POLACRILEX 8 MG: 2 GUM, CHEWING ORAL at 08:09

## 2021-09-16 RX ADMIN — NICOTINE POLACRILEX 8 MG: 2 GUM, CHEWING ORAL at 11:39

## 2021-09-16 RX ADMIN — PROPRANOLOL HYDROCHLORIDE 10 MG: 10 TABLET ORAL at 13:35

## 2021-09-16 RX ADMIN — BUPRENORPHINE AND NALOXONE 2 FILM: 4; 1 FILM, SOLUBLE BUCCAL; SUBLINGUAL at 08:10

## 2021-09-16 RX ADMIN — ACETAMINOPHEN 650 MG: 325 TABLET, FILM COATED ORAL at 00:03

## 2021-09-16 RX ADMIN — ACAMPROSATE CALCIUM 666 MG: 333 TABLET, DELAYED RELEASE ORAL at 20:10

## 2021-09-16 RX ADMIN — FOLIC ACID 1000 MCG: 1 TABLET ORAL at 08:21

## 2021-09-16 RX ADMIN — MULTIPLE VITAMINS W/ MINERALS TAB 1 TABLET: TAB at 08:11

## 2021-09-16 RX ADMIN — NICOTINE POLACRILEX 4 MG: 2 GUM, CHEWING ORAL at 16:11

## 2021-09-16 RX ADMIN — ACAMPROSATE CALCIUM 666 MG: 333 TABLET, DELAYED RELEASE ORAL at 13:34

## 2021-09-16 RX ADMIN — BUPRENORPHINE AND NALOXONE 2 FILM: 4; 1 FILM, SOLUBLE BUCCAL; SUBLINGUAL at 20:10

## 2021-09-16 RX ADMIN — PROPRANOLOL HYDROCHLORIDE 10 MG: 10 TABLET ORAL at 08:10

## 2021-09-16 RX ADMIN — NICOTINE POLACRILEX 8 MG: 2 GUM, CHEWING ORAL at 18:01

## 2021-09-16 RX ADMIN — PREGABALIN 25 MG: 25 CAPSULE ORAL at 20:10

## 2021-09-16 RX ADMIN — GABAPENTIN 1200 MG: 400 CAPSULE ORAL at 20:09

## 2021-09-16 RX ADMIN — DICLOFENAC SODIUM 2 G: 10 GEL TOPICAL at 11:40

## 2021-09-16 RX ADMIN — GABAPENTIN 1200 MG: 400 CAPSULE ORAL at 08:10

## 2021-09-16 RX ADMIN — ACAMPROSATE CALCIUM 666 MG: 333 TABLET, DELAYED RELEASE ORAL at 08:10

## 2021-09-16 RX ADMIN — PREGABALIN 25 MG: 25 CAPSULE ORAL at 13:34

## 2021-09-16 RX ADMIN — ACETAMINOPHEN 650 MG: 325 TABLET, FILM COATED ORAL at 12:33

## 2021-09-16 RX ADMIN — PROPRANOLOL HYDROCHLORIDE 10 MG: 10 TABLET ORAL at 20:09

## 2021-09-16 RX ADMIN — AMLODIPINE BESYLATE 5 MG: 5 TABLET ORAL at 08:11

## 2021-09-16 RX ADMIN — ACETAMINOPHEN 650 MG: 325 TABLET, FILM COATED ORAL at 18:02

## 2021-09-16 RX ADMIN — THIAMINE HCL TAB 100 MG 100 MG: 100 TAB at 08:09

## 2021-09-16 RX ADMIN — NICOTINE POLACRILEX 8 MG: 2 GUM, CHEWING ORAL at 13:34

## 2021-09-16 RX ADMIN — QUETIAPINE 300 MG: 300 TABLET, FILM COATED ORAL at 21:57

## 2021-09-16 RX ADMIN — ARIPIPRAZOLE 2 MG: 2 TABLET ORAL at 08:11

## 2021-09-16 RX ADMIN — DOCUSATE SODIUM 100 MG: 100 CAPSULE, LIQUID FILLED ORAL at 08:10

## 2021-09-16 RX ADMIN — NICOTINE POLACRILEX 8 MG: 2 GUM, CHEWING ORAL at 10:02

## 2021-09-16 RX ADMIN — DOCUSATE SODIUM 100 MG: 100 CAPSULE, LIQUID FILLED ORAL at 20:11

## 2021-09-16 RX ADMIN — NICOTINE POLACRILEX 8 MG: 2 GUM, CHEWING ORAL at 20:51

## 2021-09-16 RX ADMIN — NICOTINE POLACRILEX 8 MG: 2 GUM, CHEWING ORAL at 00:02

## 2021-09-16 ASSESSMENT — ACTIVITIES OF DAILY LIVING (ADL)
HYGIENE/GROOMING: INDEPENDENT
DRESS: SCRUBS (BEHAVIORAL HEALTH)
DRESS: INDEPENDENT
LAUNDRY: UNABLE TO COMPLETE
ORAL_HYGIENE: INDEPENDENT
ORAL_HYGIENE: INDEPENDENT
HYGIENE/GROOMING: INDEPENDENT

## 2021-09-16 ASSESSMENT — MIFFLIN-ST. JEOR: SCORE: 2217.04

## 2021-09-16 NOTE — PLAN OF CARE
"  Problem: Suicidal Behavior  Goal: Suicidal Behavior is Absent or Managed  Outcome: Improving    Pt. has flat/blunted affect, anxious mood. Continues to report being anxious and depressed, feels \"Down\", rated his anxiety 8/10 and pt. reported that he is more depressed than being anxious today. Denies SI, SIB and hallucination. PRN tylenol given for lower back pain x2, PRN Voltaren gel applied to right knee, that was effective. He is medication complaint but refused morning dose of Seroquel. Nicotine gum given per pt. request. Appetite is good. Social with peers. Attended group. Continue to monitor pt. per plan of care.  "

## 2021-09-16 NOTE — PLAN OF CARE
Pt slept 7.25 hours.  He received tylenol 650 mg per request for knee pain.  He had good results.  He fell a sleep shortly after that.

## 2021-09-16 NOTE — PLAN OF CARE
Assessment/Intervention/Current Symptoms and Care Coordination  Patient is currently undergoing stabilization.  Care coordination     Discharge Plan or Goal  To home with appropriate outpatient appointments when stable     Barriers to Discharge   Medication changes are in process.      Referral Status  Psychiatry medication management     Legal Status  Voluntary

## 2021-09-16 NOTE — PLAN OF CARE
Problem: OT General Care Plan  Goal: OT Goal 1  Description: Will consistently attend OT groups and improve coping strategies with increasing repertoire of ideas and understanding of symptoms of when to use the strategies.       Note: Attended 1 of 2 OT groups, being the am session for 60 minutes with 4 pts in a goal oriented task group. Work continues to be organized with him taking time to plan details and ask for assistance with supplies as needed. He was social and elaborated on interactions. He offered supportive and helpful ideas to a peer in a thoughtful manner.

## 2021-09-16 NOTE — PLAN OF CARE
"  Pt visible in milieu. Playing cards with peers, engaging in conversations, laughing, bright. Irritable at times. Angry and c/o \"I'm supposed to get double portions, and I got barely more than that patient.\" However, pt had eaten everything on his tray already when he registered his complaint, so he was informed that when the food cannot be visualized first, it cannot be confirmed whether he received what he ordered or not. Another staff later reported that patient had indeed received double portions of meat and potatoes. Irritable also while waiting for nicotine gum. Continues to report depression and anxiety as variable throughout the day. Denies active SI. Med-compliant but refused Seroquel 50 mg po dose scheduled at 2000.   "

## 2021-09-17 PROCEDURE — 250N000013 HC RX MED GY IP 250 OP 250 PS 637: Performed by: PSYCHIATRY & NEUROLOGY

## 2021-09-17 PROCEDURE — 124N000003 HC R&B MH SENIOR/ADOLESCENT

## 2021-09-17 PROCEDURE — 250N000013 HC RX MED GY IP 250 OP 250 PS 637: Performed by: NURSE PRACTITIONER

## 2021-09-17 PROCEDURE — G0177 OPPS/PHP; TRAIN & EDUC SERV: HCPCS

## 2021-09-17 RX ORDER — LEVOTHYROXINE SODIUM 25 UG/1
25 TABLET ORAL
Status: DISCONTINUED | OUTPATIENT
Start: 2021-09-17 | End: 2021-09-20

## 2021-09-17 RX ORDER — GABAPENTIN 400 MG/1
800 CAPSULE ORAL 3 TIMES DAILY
Status: DISCONTINUED | OUTPATIENT
Start: 2021-09-17 | End: 2021-09-20

## 2021-09-17 RX ORDER — PREGABALIN 25 MG/1
50 CAPSULE ORAL 3 TIMES DAILY
Status: DISCONTINUED | OUTPATIENT
Start: 2021-09-17 | End: 2021-09-20

## 2021-09-17 RX ADMIN — BUPRENORPHINE AND NALOXONE 2 FILM: 4; 1 FILM, SOLUBLE BUCCAL; SUBLINGUAL at 13:26

## 2021-09-17 RX ADMIN — ACETAMINOPHEN 650 MG: 325 TABLET, FILM COATED ORAL at 21:57

## 2021-09-17 RX ADMIN — GABAPENTIN 800 MG: 400 CAPSULE ORAL at 20:12

## 2021-09-17 RX ADMIN — DOCUSATE SODIUM 100 MG: 100 CAPSULE, LIQUID FILLED ORAL at 08:50

## 2021-09-17 RX ADMIN — QUETIAPINE 300 MG: 300 TABLET, FILM COATED ORAL at 21:57

## 2021-09-17 RX ADMIN — Medication 1000 MCG: at 08:49

## 2021-09-17 RX ADMIN — NICOTINE POLACRILEX 8 MG: 2 GUM, CHEWING ORAL at 07:05

## 2021-09-17 RX ADMIN — AMLODIPINE BESYLATE 5 MG: 5 TABLET ORAL at 08:49

## 2021-09-17 RX ADMIN — MULTIPLE VITAMINS W/ MINERALS TAB 1 TABLET: TAB at 08:50

## 2021-09-17 RX ADMIN — PREGABALIN 25 MG: 25 CAPSULE ORAL at 08:49

## 2021-09-17 RX ADMIN — PREGABALIN 50 MG: 25 CAPSULE ORAL at 13:24

## 2021-09-17 RX ADMIN — BUPRENORPHINE AND NALOXONE 2 FILM: 4; 1 FILM, SOLUBLE BUCCAL; SUBLINGUAL at 08:50

## 2021-09-17 RX ADMIN — PROPRANOLOL HYDROCHLORIDE 10 MG: 10 TABLET ORAL at 08:50

## 2021-09-17 RX ADMIN — GABAPENTIN 1200 MG: 400 CAPSULE ORAL at 08:47

## 2021-09-17 RX ADMIN — BUPRENORPHINE AND NALOXONE 2 FILM: 4; 1 FILM, SOLUBLE BUCCAL; SUBLINGUAL at 20:13

## 2021-09-17 RX ADMIN — DOCUSATE SODIUM 100 MG: 100 CAPSULE, LIQUID FILLED ORAL at 20:14

## 2021-09-17 RX ADMIN — THIAMINE HCL TAB 100 MG 100 MG: 100 TAB at 08:50

## 2021-09-17 RX ADMIN — NICOTINE POLACRILEX 8 MG: 2 GUM, CHEWING ORAL at 20:16

## 2021-09-17 RX ADMIN — NICOTINE POLACRILEX 8 MG: 2 GUM, CHEWING ORAL at 16:34

## 2021-09-17 RX ADMIN — NICOTINE POLACRILEX 8 MG: 2 GUM, CHEWING ORAL at 21:57

## 2021-09-17 RX ADMIN — FOLIC ACID 1000 MCG: 1 TABLET ORAL at 08:49

## 2021-09-17 RX ADMIN — NICOTINE POLACRILEX 8 MG: 2 GUM, CHEWING ORAL at 14:54

## 2021-09-17 RX ADMIN — ACETAMINOPHEN 650 MG: 325 TABLET, FILM COATED ORAL at 14:54

## 2021-09-17 RX ADMIN — PROPRANOLOL HYDROCHLORIDE 10 MG: 10 TABLET ORAL at 20:12

## 2021-09-17 RX ADMIN — ACAMPROSATE CALCIUM 666 MG: 333 TABLET, DELAYED RELEASE ORAL at 13:24

## 2021-09-17 RX ADMIN — ARIPIPRAZOLE 2 MG: 2 TABLET ORAL at 08:49

## 2021-09-17 RX ADMIN — NICOTINE POLACRILEX 8 MG: 2 GUM, CHEWING ORAL at 11:20

## 2021-09-17 RX ADMIN — NICOTINE POLACRILEX 8 MG: 2 GUM, CHEWING ORAL at 19:00

## 2021-09-17 RX ADMIN — LIDOCAINE 1 PATCH: 246 PATCH TOPICAL at 08:47

## 2021-09-17 RX ADMIN — ACETAMINOPHEN 650 MG: 325 TABLET, FILM COATED ORAL at 07:05

## 2021-09-17 RX ADMIN — ACAMPROSATE CALCIUM 666 MG: 333 TABLET, DELAYED RELEASE ORAL at 08:49

## 2021-09-17 RX ADMIN — LEVOTHYROXINE SODIUM 25 MCG: 25 TABLET ORAL at 13:24

## 2021-09-17 RX ADMIN — PROPRANOLOL HYDROCHLORIDE 10 MG: 10 TABLET ORAL at 13:24

## 2021-09-17 RX ADMIN — ACAMPROSATE CALCIUM 666 MG: 333 TABLET, DELAYED RELEASE ORAL at 20:13

## 2021-09-17 RX ADMIN — GABAPENTIN 800 MG: 400 CAPSULE ORAL at 13:24

## 2021-09-17 RX ADMIN — PREGABALIN 50 MG: 25 CAPSULE ORAL at 20:13

## 2021-09-17 RX ADMIN — NICOTINE POLACRILEX 8 MG: 2 GUM, CHEWING ORAL at 13:27

## 2021-09-17 ASSESSMENT — ACTIVITIES OF DAILY LIVING (ADL)
HYGIENE/GROOMING: INDEPENDENT
HYGIENE/GROOMING: INDEPENDENT
DRESS: INDEPENDENT
ORAL_HYGIENE: INDEPENDENT
ORAL_HYGIENE: INDEPENDENT
DRESS: INDEPENDENT

## 2021-09-17 NOTE — PLAN OF CARE
Problem: Adult Inpatient Plan of Care  Goal: Plan of Care Review  9/17/2021 1649 by Johanna Rodriguez RN  Outcome: Improving  Flowsheets (Taken 9/17/2021 1631)  Plan of Care Reviewed With: patient  9/17/2021 1147 by Johanna Rodriguez RN  Outcome: Improving  Flowsheets (Taken 9/17/2021 1131)  Plan of Care Reviewed With: patient     Problem: Behavioral Health Plan of Care  Goal: Plan of Care Review  Outcome: Improving  Flowsheets  Taken 9/17/2021 1631  Plan of Care Reviewed With: patient  Patient Agreement with Plan of Care: agrees  Taken 9/17/2021 1131  Plan of Care Reviewed With: patient  Patient Agreement with Plan of Care: agrees     Problem: Suicidal Behavior  Goal: Suicidal Behavior is Absent or Managed  9/17/2021 1649 by Johanna Rodriguez RN  Outcome: Improving  9/17/2021 1147 by Johanna Rodriguez RN  Outcome: Improving     Problem: Substance Withdrawal  Goal: Social and Therapeutic (Substance Withdrawal)  Description: Signs and symptoms of listed problems will be absent or manageable.  Outcome: Improving    Patient reports being in a better mood than earlier on today, engaged with peers, visible on the unit, pleasant, flat affect, cooperative with assessment.     High anxiety and depression, 8 on a scale of 0-10. Denies auditory or visual hallucinations, denies suicidal ideation, no assault behavior.   Contracts for safety.    Prn nicotine gum hourly.     Medication compliant.    Lower back pain, right knee pain, pain managed with medication, rest and emotional support.    Staff will continue to monitor patient closely.

## 2021-09-17 NOTE — PLAN OF CARE
Problem: Sleep Disturbance (Anxiety Signs/Symptoms)  Goal: Improved Sleep (Anxiety Signs/Symptoms)  Outcome: No Change     Slept all night for 7 hours  No complaints made

## 2021-09-17 NOTE — PLAN OF CARE
"  Problem: Adult Inpatient Plan of Care  Goal: Plan of Care Review  Outcome: Improving  Flowsheets (Taken 9/17/2021 1131)  Plan of Care Reviewed With: patient     Problem: Suicidal Behavior  Goal: Suicidal Behavior is Absent or Managed  Outcome: Improving     Problem: Substance Withdrawal  Goal: Social and Therapeutic (Substance Withdrawal)  Description: Signs and symptoms of listed problems will be absent or manageable.  Outcome: Improving    Patient is pleasant for the most part, easily irritable, flat and blunted affects. Reports \"I'm  easily agitated, I am trying to stay away from people.\" Patient encouraged to focus on strengths, and positive coping methods. Patient reports wishing to be dead but denies suicidal ideation.     Cooperative with assessment.     Anxiety 8, depression 8 on a scale of 0-10. No self injurious behavior, no assault behavior. Contracts for safety.    Observed engaging with select peers. Attended unit group, participating in group activity.     Prn Nicotine gum given two times this shift.  Prn tylenol given for pain.     Declined scheduled Seroquel in the morning, compliant with all other medication.    Consumed 100% of breakfast and lunch.    Pain on right knee and lower back. Pain 5 on a scale of 0-10. Other vital signs stable.     Staff will continue to monitor patient closely.      "

## 2021-09-17 NOTE — PROGRESS NOTES
"Patient seen, chart reviewed, care discussed with staff.  Blood pressure 110/70, pulse 75, temperature 97.5  F (36.4  C), temperature source Temporal, resp. rate 16, height 1.93 m (6' 4\"), weight 126.6 kg (279 lb), SpO2 99 %.    Patient Active Problem List   Diagnosis     Post concussive encephalopathy     H/O shoulder surgery     Alcoholism in remission (H)     Bilateral ACL tear     Chronic back pain     Social anxiety disorder     Alcohol withdrawal syndrome with complication, with unspecified complication (H)     Rib fracture     Alcohol withdrawal (H)     Alcohol dependence (H)     LFT elevation     Laceration of left hand without foreign body, initial encounter     Suicidal ideation     By report, irritable at times.  Reporting high anxiety and depression levels, wishes he were dead. Refusing daytime Seroquel.  He complains of tiredness, fatigue, low energy, and the Seroquel makes him sleepy in the daytime.  He feels the irritability is from pain.  No side effects of Lyrica starting.  He notes history of low thyroid function, TSH is 6.3.  He also complains of lack of unit reading material.    General appearance:  fair  Alert.   Affect: fair, irritable at times.  Mood: fair    Speech:  normal.   Eye contact:  good.    Psychomotor behavior: normal  Gait: normal.    Abnormal movements:  Delusions:  Hallucinations:    Thoughts: logical  Associations: intact  Judgement:  Insight:  Cognitions: intact in conversation  Memory:  intact in conversation  Orientation: normal    Not suicidal.    Imp:  Bipolar mixed, moderate  PTSD  Chemical dependency  History of TBI  Chemical dependency with relapse  Chronic pain    Plan: Stop daytime Seroquel  2.  Add Synthroid  3.  Facilitate access to station 20 reading material  4.  He would like referall for a psychiatrist and OP MHCD program, in person if possible  5.  Increase Lyrica to 50mg TID, decrease Gabapentin to 800mg TID    Current Facility-Administered Medications "   Medication     acamprosate (CAMPRAL) EC tablet 666 mg     acetaminophen (TYLENOL) tablet 650 mg     alum & mag hydroxide-simethicone (MAALOX) suspension 30 mL     amLODIPine (NORVASC) tablet 5 mg     ARIPiprazole (ABILIFY) tablet 2 mg     buprenorphine HCl-naloxone HCl (SUBOXONE) 4-1 MG per film 2 Film     cyanocobalamin (VITAMIN B-12) sublingual tablet 1,000 mcg     diclofenac (VOLTAREN) 1 % topical gel 2 g     docusate sodium (COLACE) capsule 100 mg     folic acid (FOLVITE) tablet 1,000 mcg     gabapentin (NEURONTIN) capsule 800 mg     levothyroxine (SYNTHROID/LEVOTHROID) tablet 25 mcg     Lidocaine (LIDOCARE) 4 % Patch 1 patch     lidocaine patch in PLACE     loperamide (IMODIUM) capsule 2 mg     multivitamin w/minerals (THERA-VIT-M) tablet 1 tablet     nicotine (NICORETTE) gum 4-8 mg     OLANZapine (zyPREXA) tablet 10 mg    Or     OLANZapine (zyPREXA) injection 10 mg     ondansetron (ZOFRAN-ODT) ODT tab 4 mg     pregabalin (LYRICA) capsule 50 mg     propranolol (INDERAL) tablet 10 mg     QUEtiapine (SEROquel) tablet 300 mg     senna-docusate (SENOKOT-S/PERICOLACE) 8.6-50 MG per tablet 1 tablet     thiamine (B-1) tablet 100 mg     traZODone (DESYREL) tablet 50 mg     Recent Results (from the past 168 hour(s))   Extra Blue Top Tube    Collection Time: 09/12/21  8:43 AM   Result Value Ref Range    Hold Specimen J[facility]    Extra Red Top Tube    Collection Time: 09/12/21  8:43 AM   Result Value Ref Range    Hold Specimen JIC    Extra Purple Top Tube    Collection Time: 09/12/21  8:43 AM   Result Value Ref Range    Hold Specimen JIC    Extra Green Top (Lithium Heparin) ON ICE    Collection Time: 09/12/21  8:43 AM   Result Value Ref Range    Hold Specimen [facility]    CBC with platelets and differential    Collection Time: 09/12/21  8:43 AM   Result Value Ref Range    WBC Count 6.5 4.0 - 11.0 10e3/uL    RBC Count 4.42 4.40 - 5.90 10e6/uL    Hemoglobin 13.5 13.3 - 17.7 g/dL    Hematocrit 40.6 40.0 - 53.0 %    MCV 92 78 - 100 fL     MCH 30.5 26.5 - 33.0 pg    MCHC 33.3 31.5 - 36.5 g/dL    RDW 14.0 10.0 - 15.0 %    Platelet Count 293 150 - 450 10e3/uL    % Neutrophils 43 %    % Lymphocytes 43 %    % Monocytes 12 %    % Eosinophils 1 %    % Basophils 1 %    % Immature Granulocytes 0 %    NRBCs per 100 WBC 0 <1 /100    Absolute Neutrophils 2.8 1.6 - 8.3 10e3/uL    Absolute Lymphocytes 2.8 0.8 - 5.3 10e3/uL    Absolute Monocytes 0.8 0.0 - 1.3 10e3/uL    Absolute Eosinophils 0.0 0.0 - 0.7 10e3/uL    Absolute Basophils 0.0 0.0 - 0.2 10e3/uL    Absolute Immature Granulocytes 0.0 <=0.0 10e3/uL    Absolute NRBCs 0.0 10e3/uL   Extra Green Top (Lithium Heparin) Tube    Collection Time: 09/12/21  8:44 AM   Result Value Ref Range    Hold Specimen [facility]    Comprehensive metabolic panel    Collection Time: 09/12/21  8:44 AM   Result Value Ref Range    Sodium 136 133 - 144 mmol/L    Potassium 3.7 3.4 - 5.3 mmol/L    Chloride 102 94 - 109 mmol/L    Carbon Dioxide (CO2) 28 20 - 32 mmol/L    Anion Gap 6 3 - 14 mmol/L    Urea Nitrogen 25 7 - 30 mg/dL    Creatinine 0.82 0.66 - 1.25 mg/dL    Calcium 8.9 8.5 - 10.1 mg/dL    Glucose 91 70 - 99 mg/dL    Alkaline Phosphatase 91 40 - 150 U/L    AST 40 0 - 45 U/L    ALT 65 0 - 70 U/L    Protein Total 7.8 6.8 - 8.8 g/dL    Albumin 3.7 3.4 - 5.0 g/dL    Bilirubin Total 0.7 0.2 - 1.3 mg/dL    GFR Estimate >90 >60 mL/min/1.73m2   Ethyl Alcohol Level    Collection Time: 09/12/21  8:44 AM   Result Value Ref Range    Alcohol ethyl <0.01 <=0.01 g/dL   Drug abuse screen 1 urine (ED)    Collection Time: 09/12/21  8:52 AM   Result Value Ref Range    Amphetamines Urine Screen Positive (A) Screen Negative    Barbiturates Urine Screen Negative Screen Negative    Benzodiazepines Urine Screen Positive (A) Screen Negative    Cannabinoids Urine Screen Negative Screen Negative    Cocaine Urine Screen Positive (A) Screen Negative    Opiates Urine Screen Negative Screen Negative   Alcohol breath test POCT    Collection Time: 09/12/21  8:55  AM   Result Value Ref Range    Alcohol Breath Test 0 0.00 - 0.01   Asymptomatic COVID-19 Virus (Coronavirus) by PCR Nasopharyngeal    Collection Time: 09/12/21  9:04 AM    Specimen: Nasopharyngeal; Swab   Result Value Ref Range    SARS CoV2 PCR Negative Negative   GGT    Collection Time: 09/13/21  7:38 AM   Result Value Ref Range    GGT 97 (H) 0 - 75 U/L   Lipid panel    Collection Time: 09/13/21  7:38 AM   Result Value Ref Range    Cholesterol 177 <200 mg/dL    Triglycerides 59 <150 mg/dL    Direct Measure HDL 78 >=40 mg/dL    LDL Cholesterol Calculated 87 <=100 mg/dL    Non HDL Cholesterol 99 <130 mg/dL   TSH with free T4 reflex and/or T3 as indicated    Collection Time: 09/13/21  7:38 AM   Result Value Ref Range    TSH 9.73 (H) 0.40 - 4.00 mU/L   Vitamin B12    Collection Time: 09/13/21  7:38 AM   Result Value Ref Range    Vitamin B12 70 (L) 193 - 986 pg/mL   Folate    Collection Time: 09/13/21  7:38 AM   Result Value Ref Range    Folic Acid 20.1 >=5.4 ng/mL   T4 free    Collection Time: 09/13/21  7:38 AM   Result Value Ref Range    Free T4 0.72 (L) 0.76 - 1.46 ng/dL   TSH with free T4 reflex    Collection Time: 09/16/21  8:28 AM   Result Value Ref Range    TSH 6.30 (H) 0.40 - 4.00 mU/L   T4 free    Collection Time: 09/16/21  8:28 AM   Result Value Ref Range    Free T4 0.77 0.76 - 1.46 ng/dL

## 2021-09-17 NOTE — PLAN OF CARE
Assessment/Intervention/Current Symptoms and Care Coordination  Patient is currently undergoing stabilization.  Plan is for pt to complete outpatient MICD with psychiatric medication management, upon attaining stability.  Would like to do in-person program     Discharge Plan or Goal  To home with appropriate outpatient appointments when stable     Barriers to Discharge   Medication changes are in process.      Referral Status  Psychiatry medication management     Legal Status  Voluntary

## 2021-09-17 NOTE — PLAN OF CARE
"Hollis had a shift consistent with prior. He largely did not interact with peers or staff unless getting his needs met. He did some pacing of the halls in the afternoon and had a generally blunt and flat affect. He attended parts of some groups.    He does not know what his discharge plan is, he is hoping to find out more tomorrow, however for once he feels like he has \"hope\". He stated that his dr here has been treating him like a person instead of a number.   "

## 2021-09-17 NOTE — PLAN OF CARE
Problem: OT General Care Plan  Goal: OT Goal 1  Description: Will consistently attend OT groups and improve coping strategies with increasing repertoire of ideas and understanding of symptoms of when to use the strategies.       Outcome: Improving  Note: Attended the AM OT groups for 55 minutes with 7 pts being a goal oriented task group. He was actively involved working on his task of fine detail, made decisions and asked for assistance when needed with supplies. He seemed accepting of limitations given when asked for OT supplies to use in the lounge and explanations why this is not allowed.  Affect appeared serious. Pleasant on approach during this group. Attended the afternoon OT group for 60 minutes with 7 pts focused on identifying positive things one can do better than in the past. He asked for information about mental health diagnoses which he was told we are able to provide if he would ask his CM or Nursing staff. It was also explained we do not focus on intense therapy generally while in the hospital and generally focus on stabilizing so one can begin this work if needed in the community with a longer term therapist relationship. He also participated in a quick thinking activity, which he offered multiple answers for, appeared engaged and involved. He stood up and stretched on a couple occasions as he has stated helps his back.

## 2021-09-18 PROCEDURE — 250N000013 HC RX MED GY IP 250 OP 250 PS 637: Performed by: PSYCHIATRY & NEUROLOGY

## 2021-09-18 PROCEDURE — 250N000013 HC RX MED GY IP 250 OP 250 PS 637: Performed by: NURSE PRACTITIONER

## 2021-09-18 PROCEDURE — 124N000003 HC R&B MH SENIOR/ADOLESCENT

## 2021-09-18 RX ADMIN — ACETAMINOPHEN 650 MG: 325 TABLET, FILM COATED ORAL at 06:59

## 2021-09-18 RX ADMIN — ARIPIPRAZOLE 2 MG: 2 TABLET ORAL at 08:08

## 2021-09-18 RX ADMIN — GABAPENTIN 800 MG: 400 CAPSULE ORAL at 20:04

## 2021-09-18 RX ADMIN — NICOTINE POLACRILEX 8 MG: 2 GUM, CHEWING ORAL at 16:22

## 2021-09-18 RX ADMIN — NICOTINE POLACRILEX 8 MG: 2 GUM, CHEWING ORAL at 20:05

## 2021-09-18 RX ADMIN — BUPRENORPHINE AND NALOXONE 2 FILM: 4; 1 FILM, SOLUBLE BUCCAL; SUBLINGUAL at 08:07

## 2021-09-18 RX ADMIN — NICOTINE POLACRILEX 8 MG: 2 GUM, CHEWING ORAL at 11:52

## 2021-09-18 RX ADMIN — Medication 1000 MCG: at 08:07

## 2021-09-18 RX ADMIN — NICOTINE POLACRILEX 8 MG: 2 GUM, CHEWING ORAL at 07:00

## 2021-09-18 RX ADMIN — LIDOCAINE 1 PATCH: 246 PATCH TOPICAL at 08:07

## 2021-09-18 RX ADMIN — DOCUSATE SODIUM 100 MG: 100 CAPSULE, LIQUID FILLED ORAL at 08:08

## 2021-09-18 RX ADMIN — NICOTINE POLACRILEX 8 MG: 2 GUM, CHEWING ORAL at 08:08

## 2021-09-18 RX ADMIN — BUPRENORPHINE AND NALOXONE 2 FILM: 4; 1 FILM, SOLUBLE BUCCAL; SUBLINGUAL at 20:04

## 2021-09-18 RX ADMIN — ACAMPROSATE CALCIUM 666 MG: 333 TABLET, DELAYED RELEASE ORAL at 20:04

## 2021-09-18 RX ADMIN — QUETIAPINE 300 MG: 300 TABLET, FILM COATED ORAL at 21:49

## 2021-09-18 RX ADMIN — BUPRENORPHINE AND NALOXONE 2 FILM: 4; 1 FILM, SOLUBLE BUCCAL; SUBLINGUAL at 14:12

## 2021-09-18 RX ADMIN — ACAMPROSATE CALCIUM 666 MG: 333 TABLET, DELAYED RELEASE ORAL at 14:12

## 2021-09-18 RX ADMIN — FOLIC ACID 1000 MCG: 1 TABLET ORAL at 08:08

## 2021-09-18 RX ADMIN — NICOTINE POLACRILEX 6 MG: 2 GUM, CHEWING ORAL at 09:59

## 2021-09-18 RX ADMIN — PROPRANOLOL HYDROCHLORIDE 10 MG: 10 TABLET ORAL at 08:08

## 2021-09-18 RX ADMIN — THIAMINE HCL TAB 100 MG 100 MG: 100 TAB at 08:08

## 2021-09-18 RX ADMIN — PROPRANOLOL HYDROCHLORIDE 10 MG: 10 TABLET ORAL at 14:12

## 2021-09-18 RX ADMIN — LEVOTHYROXINE SODIUM 25 MCG: 25 TABLET ORAL at 07:00

## 2021-09-18 RX ADMIN — NICOTINE POLACRILEX 8 MG: 2 GUM, CHEWING ORAL at 17:57

## 2021-09-18 RX ADMIN — PROPRANOLOL HYDROCHLORIDE 10 MG: 10 TABLET ORAL at 20:05

## 2021-09-18 RX ADMIN — ACETAMINOPHEN 650 MG: 325 TABLET, FILM COATED ORAL at 21:49

## 2021-09-18 RX ADMIN — NICOTINE POLACRILEX 8 MG: 2 GUM, CHEWING ORAL at 21:50

## 2021-09-18 RX ADMIN — NICOTINE POLACRILEX 8 MG: 2 GUM, CHEWING ORAL at 14:12

## 2021-09-18 RX ADMIN — PREGABALIN 50 MG: 25 CAPSULE ORAL at 08:08

## 2021-09-18 RX ADMIN — PREGABALIN 50 MG: 25 CAPSULE ORAL at 20:04

## 2021-09-18 RX ADMIN — MULTIPLE VITAMINS W/ MINERALS TAB 1 TABLET: TAB at 08:07

## 2021-09-18 RX ADMIN — PREGABALIN 50 MG: 25 CAPSULE ORAL at 14:12

## 2021-09-18 RX ADMIN — DOCUSATE SODIUM 100 MG: 100 CAPSULE, LIQUID FILLED ORAL at 20:04

## 2021-09-18 RX ADMIN — ACETAMINOPHEN 650 MG: 325 TABLET, FILM COATED ORAL at 14:12

## 2021-09-18 RX ADMIN — GABAPENTIN 800 MG: 400 CAPSULE ORAL at 14:12

## 2021-09-18 RX ADMIN — GABAPENTIN 800 MG: 400 CAPSULE ORAL at 08:07

## 2021-09-18 RX ADMIN — ACAMPROSATE CALCIUM 666 MG: 333 TABLET, DELAYED RELEASE ORAL at 08:07

## 2021-09-18 ASSESSMENT — ACTIVITIES OF DAILY LIVING (ADL)
DRESS: INDEPENDENT
ORAL_HYGIENE: INDEPENDENT
HYGIENE/GROOMING: INDEPENDENT
ORAL_HYGIENE: INDEPENDENT
LAUNDRY: WITH SUPERVISION
DRESS: INDEPENDENT
HYGIENE/GROOMING: INDEPENDENT

## 2021-09-18 NOTE — PLAN OF CARE
Patient was calmer and less irritable today. He attended and participated in group activities and is cooperative with care. He endorses anxiety and depression which he rate 6-7/10, denies any thoughts of SI/SIB. Pt requested and was given PRN acetaminophen for chronic pain, denies any medication side effects.

## 2021-09-18 NOTE — PLAN OF CARE
"Patient visible in the lounge and requesting nicotine gum.  \"I take it regularly like every 1 1/2 hrs or so\".  Also requesting tylenol for generalized discomfort but will be given later as it is too early yet.  Rates depression at 5-8/10 and anxiety at 6-7/10, all depending on how he is feeling throughout the day.  At times he wishes he were dead but denies SI.  Social with peers tonight and played games with them. Tylenol prn given at HS per request   P:  Continue same plan of care.  "

## 2021-09-18 NOTE — PLAN OF CARE
Problem: Sleep Disturbance (Anxiety Signs/Symptoms)  Goal: Improved Sleep (Anxiety Signs/Symptoms)  Outcome: No Change     Pt was awake at the start of the shift and at 0115  Offered prn Trazodone but declined to take it , had snacks.  Slept for a total of 6 hours  0659 Nicorette 8 mg gum and Tylenol 650 mg given for lower back pain.

## 2021-09-19 PROCEDURE — 250N000013 HC RX MED GY IP 250 OP 250 PS 637: Performed by: NURSE PRACTITIONER

## 2021-09-19 PROCEDURE — 124N000003 HC R&B MH SENIOR/ADOLESCENT

## 2021-09-19 PROCEDURE — 250N000013 HC RX MED GY IP 250 OP 250 PS 637: Performed by: PSYCHIATRY & NEUROLOGY

## 2021-09-19 RX ADMIN — LIDOCAINE 1 PATCH: 246 PATCH TOPICAL at 08:24

## 2021-09-19 RX ADMIN — ACAMPROSATE CALCIUM 666 MG: 333 TABLET, DELAYED RELEASE ORAL at 13:26

## 2021-09-19 RX ADMIN — NICOTINE POLACRILEX 8 MG: 2 GUM, CHEWING ORAL at 06:59

## 2021-09-19 RX ADMIN — PROPRANOLOL HYDROCHLORIDE 10 MG: 10 TABLET ORAL at 13:27

## 2021-09-19 RX ADMIN — AMLODIPINE BESYLATE 5 MG: 5 TABLET ORAL at 08:25

## 2021-09-19 RX ADMIN — BUPRENORPHINE AND NALOXONE 2 FILM: 4; 1 FILM, SOLUBLE BUCCAL; SUBLINGUAL at 08:25

## 2021-09-19 RX ADMIN — GABAPENTIN 800 MG: 400 CAPSULE ORAL at 20:05

## 2021-09-19 RX ADMIN — GABAPENTIN 800 MG: 400 CAPSULE ORAL at 08:25

## 2021-09-19 RX ADMIN — PROPRANOLOL HYDROCHLORIDE 10 MG: 10 TABLET ORAL at 20:05

## 2021-09-19 RX ADMIN — NICOTINE POLACRILEX 8 MG: 2 GUM, CHEWING ORAL at 13:27

## 2021-09-19 RX ADMIN — LEVOTHYROXINE SODIUM 25 MCG: 25 TABLET ORAL at 06:57

## 2021-09-19 RX ADMIN — DOCUSATE SODIUM 100 MG: 100 CAPSULE, LIQUID FILLED ORAL at 20:05

## 2021-09-19 RX ADMIN — NICOTINE POLACRILEX 8 MG: 2 GUM, CHEWING ORAL at 08:25

## 2021-09-19 RX ADMIN — FOLIC ACID 1000 MCG: 1 TABLET ORAL at 08:25

## 2021-09-19 RX ADMIN — PREGABALIN 50 MG: 25 CAPSULE ORAL at 13:27

## 2021-09-19 RX ADMIN — ACETAMINOPHEN 650 MG: 325 TABLET, FILM COATED ORAL at 18:03

## 2021-09-19 RX ADMIN — NICOTINE POLACRILEX 8 MG: 2 GUM, CHEWING ORAL at 18:02

## 2021-09-19 RX ADMIN — NICOTINE POLACRILEX 8 MG: 2 GUM, CHEWING ORAL at 11:15

## 2021-09-19 RX ADMIN — DOCUSATE SODIUM 100 MG: 100 CAPSULE, LIQUID FILLED ORAL at 08:25

## 2021-09-19 RX ADMIN — MULTIPLE VITAMINS W/ MINERALS TAB 1 TABLET: TAB at 08:26

## 2021-09-19 RX ADMIN — PROPRANOLOL HYDROCHLORIDE 10 MG: 10 TABLET ORAL at 08:25

## 2021-09-19 RX ADMIN — GABAPENTIN 800 MG: 400 CAPSULE ORAL at 13:27

## 2021-09-19 RX ADMIN — PREGABALIN 50 MG: 25 CAPSULE ORAL at 20:03

## 2021-09-19 RX ADMIN — THIAMINE HCL TAB 100 MG 100 MG: 100 TAB at 08:25

## 2021-09-19 RX ADMIN — BUPRENORPHINE AND NALOXONE 2 FILM: 4; 1 FILM, SOLUBLE BUCCAL; SUBLINGUAL at 20:03

## 2021-09-19 RX ADMIN — ARIPIPRAZOLE 2 MG: 2 TABLET ORAL at 08:25

## 2021-09-19 RX ADMIN — Medication 1000 MCG: at 08:26

## 2021-09-19 RX ADMIN — NICOTINE POLACRILEX 8 MG: 2 GUM, CHEWING ORAL at 12:29

## 2021-09-19 RX ADMIN — QUETIAPINE 300 MG: 300 TABLET, FILM COATED ORAL at 22:17

## 2021-09-19 RX ADMIN — ACETAMINOPHEN 650 MG: 325 TABLET, FILM COATED ORAL at 12:29

## 2021-09-19 RX ADMIN — PREGABALIN 50 MG: 25 CAPSULE ORAL at 08:25

## 2021-09-19 RX ADMIN — ACETAMINOPHEN 650 MG: 325 TABLET, FILM COATED ORAL at 06:59

## 2021-09-19 RX ADMIN — ACAMPROSATE CALCIUM 666 MG: 333 TABLET, DELAYED RELEASE ORAL at 20:04

## 2021-09-19 RX ADMIN — NICOTINE POLACRILEX 8 MG: 2 GUM, CHEWING ORAL at 16:38

## 2021-09-19 RX ADMIN — ACAMPROSATE CALCIUM 666 MG: 333 TABLET, DELAYED RELEASE ORAL at 08:25

## 2021-09-19 RX ADMIN — NICOTINE POLACRILEX 8 MG: 2 GUM, CHEWING ORAL at 20:03

## 2021-09-19 RX ADMIN — NICOTINE POLACRILEX 8 MG: 2 GUM, CHEWING ORAL at 22:17

## 2021-09-19 RX ADMIN — BUPRENORPHINE AND NALOXONE 2 FILM: 4; 1 FILM, SOLUBLE BUCCAL; SUBLINGUAL at 13:27

## 2021-09-19 ASSESSMENT — ACTIVITIES OF DAILY LIVING (ADL)
HYGIENE/GROOMING: INDEPENDENT
ORAL_HYGIENE: INDEPENDENT
DRESS: INDEPENDENT

## 2021-09-19 ASSESSMENT — MIFFLIN-ST. JEOR: SCORE: 2189.82

## 2021-09-19 NOTE — PLAN OF CARE
Patient is calm, social and attending unit activities. He reports anxiety and depression at 6/10, denies thoughts to harm self. He complained of generalized pain, requested and was given PRN Acetaminophen with some improvement. He was overheard sharing with another patient about his previous suicidal ideations and plan, he was redirected and told to not share that information with other patients which agreed and accepted.

## 2021-09-19 NOTE — PLAN OF CARE
Problem: Sleep Disturbance (Anxiety Signs/Symptoms)  Goal: Improved Sleep (Anxiety Signs/Symptoms)  Outcome: No Change     Slept for 6 hours tonight.  Pt was awake mid shift but did not request for any sleep medication.

## 2021-09-20 PROCEDURE — 250N000013 HC RX MED GY IP 250 OP 250 PS 637: Performed by: NURSE PRACTITIONER

## 2021-09-20 PROCEDURE — 124N000003 HC R&B MH SENIOR/ADOLESCENT

## 2021-09-20 PROCEDURE — G0177 OPPS/PHP; TRAIN & EDUC SERV: HCPCS

## 2021-09-20 PROCEDURE — 250N000013 HC RX MED GY IP 250 OP 250 PS 637: Performed by: PSYCHIATRY & NEUROLOGY

## 2021-09-20 RX ORDER — PREGABALIN 25 MG/1
75 CAPSULE ORAL 3 TIMES DAILY
Status: DISCONTINUED | OUTPATIENT
Start: 2021-09-20 | End: 2021-09-21

## 2021-09-20 RX ORDER — GABAPENTIN 400 MG/1
400 CAPSULE ORAL 3 TIMES DAILY
Status: DISCONTINUED | OUTPATIENT
Start: 2021-09-20 | End: 2021-09-27 | Stop reason: HOSPADM

## 2021-09-20 RX ORDER — CHLORAL HYDRATE 500 MG
1 CAPSULE ORAL 2 TIMES DAILY
Status: DISCONTINUED | OUTPATIENT
Start: 2021-09-20 | End: 2021-09-27 | Stop reason: HOSPADM

## 2021-09-20 RX ORDER — LEVOTHYROXINE SODIUM 50 UG/1
50 TABLET ORAL
Status: DISCONTINUED | OUTPATIENT
Start: 2021-09-21 | End: 2021-09-22

## 2021-09-20 RX ADMIN — PREGABALIN 50 MG: 25 CAPSULE ORAL at 08:00

## 2021-09-20 RX ADMIN — GABAPENTIN 800 MG: 400 CAPSULE ORAL at 13:57

## 2021-09-20 RX ADMIN — GABAPENTIN 800 MG: 400 CAPSULE ORAL at 08:00

## 2021-09-20 RX ADMIN — ACETAMINOPHEN 650 MG: 325 TABLET, FILM COATED ORAL at 07:19

## 2021-09-20 RX ADMIN — FOLIC ACID 1000 MCG: 1 TABLET ORAL at 08:00

## 2021-09-20 RX ADMIN — NICOTINE POLACRILEX 8 MG: 2 GUM, CHEWING ORAL at 19:17

## 2021-09-20 RX ADMIN — NICOTINE POLACRILEX 8 MG: 2 GUM, CHEWING ORAL at 21:51

## 2021-09-20 RX ADMIN — MULTIPLE VITAMINS W/ MINERALS TAB 1 TABLET: TAB at 08:00

## 2021-09-20 RX ADMIN — NICOTINE POLACRILEX 8 MG: 2 GUM, CHEWING ORAL at 13:58

## 2021-09-20 RX ADMIN — PROPRANOLOL HYDROCHLORIDE 10 MG: 10 TABLET ORAL at 19:11

## 2021-09-20 RX ADMIN — LIDOCAINE 1 PATCH: 246 PATCH TOPICAL at 08:00

## 2021-09-20 RX ADMIN — BUPRENORPHINE AND NALOXONE 2 FILM: 4; 1 FILM, SOLUBLE BUCCAL; SUBLINGUAL at 08:01

## 2021-09-20 RX ADMIN — NICOTINE POLACRILEX 8 MG: 2 GUM, CHEWING ORAL at 08:20

## 2021-09-20 RX ADMIN — QUETIAPINE 300 MG: 300 TABLET, FILM COATED ORAL at 21:48

## 2021-09-20 RX ADMIN — NICOTINE POLACRILEX 8 MG: 2 GUM, CHEWING ORAL at 09:28

## 2021-09-20 RX ADMIN — ACAMPROSATE CALCIUM 666 MG: 333 TABLET, DELAYED RELEASE ORAL at 19:12

## 2021-09-20 RX ADMIN — ACETAMINOPHEN 650 MG: 325 TABLET, FILM COATED ORAL at 18:06

## 2021-09-20 RX ADMIN — DOCUSATE SODIUM 100 MG: 100 CAPSULE, LIQUID FILLED ORAL at 19:11

## 2021-09-20 RX ADMIN — LEVOTHYROXINE SODIUM 25 MCG: 25 TABLET ORAL at 07:15

## 2021-09-20 RX ADMIN — PROPRANOLOL HYDROCHLORIDE 10 MG: 10 TABLET ORAL at 08:00

## 2021-09-20 RX ADMIN — DOCUSATE SODIUM 100 MG: 100 CAPSULE, LIQUID FILLED ORAL at 08:01

## 2021-09-20 RX ADMIN — ARIPIPRAZOLE 2 MG: 2 TABLET ORAL at 08:00

## 2021-09-20 RX ADMIN — Medication 1000 MCG: at 08:00

## 2021-09-20 RX ADMIN — ACAMPROSATE CALCIUM 666 MG: 333 TABLET, DELAYED RELEASE ORAL at 13:57

## 2021-09-20 RX ADMIN — NICOTINE POLACRILEX 8 MG: 2 GUM, CHEWING ORAL at 18:06

## 2021-09-20 RX ADMIN — Medication 1 G: at 19:11

## 2021-09-20 RX ADMIN — NICOTINE POLACRILEX 8 MG: 2 GUM, CHEWING ORAL at 12:12

## 2021-09-20 RX ADMIN — NICOTINE POLACRILEX 8 MG: 2 GUM, CHEWING ORAL at 16:17

## 2021-09-20 RX ADMIN — GABAPENTIN 400 MG: 400 CAPSULE ORAL at 19:11

## 2021-09-20 RX ADMIN — PREGABALIN 75 MG: 25 CAPSULE ORAL at 19:11

## 2021-09-20 RX ADMIN — BUPRENORPHINE AND NALOXONE 2 FILM: 4; 1 FILM, SOLUBLE BUCCAL; SUBLINGUAL at 19:12

## 2021-09-20 RX ADMIN — ACAMPROSATE CALCIUM 666 MG: 333 TABLET, DELAYED RELEASE ORAL at 08:00

## 2021-09-20 RX ADMIN — THIAMINE HCL TAB 100 MG 100 MG: 100 TAB at 08:00

## 2021-09-20 RX ADMIN — NICOTINE POLACRILEX 8 MG: 2 GUM, CHEWING ORAL at 07:19

## 2021-09-20 RX ADMIN — ACETAMINOPHEN 650 MG: 325 TABLET, FILM COATED ORAL at 12:12

## 2021-09-20 RX ADMIN — BUPRENORPHINE AND NALOXONE 2 FILM: 4; 1 FILM, SOLUBLE BUCCAL; SUBLINGUAL at 13:57

## 2021-09-20 RX ADMIN — PROPRANOLOL HYDROCHLORIDE 10 MG: 10 TABLET ORAL at 13:57

## 2021-09-20 RX ADMIN — PREGABALIN 50 MG: 25 CAPSULE ORAL at 13:57

## 2021-09-20 ASSESSMENT — ACTIVITIES OF DAILY LIVING (ADL)
LAUNDRY: WITH SUPERVISION
DRESS: INDEPENDENT
ORAL_HYGIENE: INDEPENDENT
HYGIENE/GROOMING: INDEPENDENT

## 2021-09-20 NOTE — PLAN OF CARE
Problem: OT General Care Plan  Goal: OT Goal 1  Description: Will consistently attend OT groups and improve coping strategies with increasing repertoire of ideas and understanding of symptoms of when to use the strategies.    Pt attended 2 out of 2 OT groups offered. Pt actively participated in occupational therapy clinic with 6 patients total x90 minutes. Pt was able to ask for assistance as needed, and independently return to a creative expression task. Pt demonstrated good focus, planning, problem solving, and attention to detail. Organized in his task approach. Social with peers and writer, and politely offered assistance to peers intermittently throughout. When writer introduced self, he mentioned that writer has the same name as his daughter who passed away. Mentioned being an  in the past, and explained how he uses some of these skills he learned for problem solving. Pleasant, cooperative, and engaged throughout groups today.

## 2021-09-20 NOTE — PLAN OF CARE
CTC: Writer met with this pt. Reviewed his options for follow up care. Have referred pt for outpatient follow up at [facility]. His assessment is scheduled and entered on the AVS.

## 2021-09-20 NOTE — PLAN OF CARE
Continues to rate depression and anxiety at 6/10, states the SI thoughts come and go.  Attends some of the groups.  Has been pleasant and cooperative tonight.  No other concerns voiced.  Tylenol given for generalized discomfort.  P:  continue same plan of care.

## 2021-09-20 NOTE — PLAN OF CARE
Problem: Sleep Disturbance (Anxiety Signs/Symptoms)  Goal: Improved Sleep (Anxiety Signs/Symptoms)  Outcome: Declining     Slept poorly for 5 hours, did not request for sleep medication  0719 Tylenol 650 mg and Nicotine gum 8 mg given for pain.

## 2021-09-20 NOTE — PROGRESS NOTES
"Patient seen, chart reviewed, care discussed with staff.  Blood pressure 114/78, pulse 67, temperature 98.4  F (36.9  C), temperature source Oral, resp. rate 16, height 1.93 m (6' 4\"), weight 123.8 kg (273 lb), SpO2 100 %.    Patient Active Problem List   Diagnosis     Post concussive encephalopathy     H/O shoulder surgery     Alcoholism in remission (H)     Bilateral ACL tear     Chronic back pain     Social anxiety disorder     Alcohol withdrawal syndrome with complication, with unspecified complication (H)     Rib fracture     Alcohol withdrawal (H)     Alcohol dependence (H)     LFT elevation     Laceration of left hand without foreign body, initial encounter     Suicidal ideation     Slept 5 hours.      General appearance: good  Alert.   Affect: fair  Mood: fair    Speech:  normal.   Eye contact:  good.    Psychomotor behavior: normal  Gait: normal.    Abnormal movements: none  Delusions: none  Hallucinations:   none  Thoughts: logical  Associations: intact  Judgement: fair  Insight: good  Cognitions: intact in conversation  Memory:  intact in conversation  Orientation: normal    Not suicidal.    Current Facility-Administered Medications   Medication     acamprosate (CAMPRAL) EC tablet 666 mg     acetaminophen (TYLENOL) tablet 650 mg     alum & mag hydroxide-simethicone (MAALOX) suspension 30 mL     amLODIPine (NORVASC) tablet 5 mg     ARIPiprazole (ABILIFY) tablet 2 mg     buprenorphine HCl-naloxone HCl (SUBOXONE) 4-1 MG per film 2 Film     cyanocobalamin (VITAMIN B-12) sublingual tablet 1,000 mcg     diclofenac (VOLTAREN) 1 % topical gel 2 g     docusate sodium (COLACE) capsule 100 mg     fish oil-omega-3 fatty acids capsule 1 g     folic acid (FOLVITE) tablet 1,000 mcg     gabapentin (NEURONTIN) capsule 400 mg     [START ON 9/21/2021] levothyroxine (SYNTHROID/LEVOTHROID) tablet 50 mcg     Lidocaine (LIDOCARE) 4 % Patch 1 patch     lidocaine patch in PLACE     loperamide (IMODIUM) capsule 2 mg     " multivitamin w/minerals (THERA-VIT-M) tablet 1 tablet     nicotine (NICORETTE) gum 4-8 mg     OLANZapine (zyPREXA) tablet 10 mg    Or     OLANZapine (zyPREXA) injection 10 mg     ondansetron (ZOFRAN-ODT) ODT tab 4 mg     pregabalin (LYRICA) capsule 75 mg     propranolol (INDERAL) tablet 10 mg     QUEtiapine (SEROquel) tablet 300 mg     senna-docusate (SENOKOT-S/PERICOLACE) 8.6-50 MG per tablet 1 tablet     thiamine (B-1) tablet 100 mg     traZODone (DESYREL) tablet 50 mg     Recent Results (from the past 168 hour(s))   TSH with free T4 reflex    Collection Time: 09/16/21  8:28 AM   Result Value Ref Range    TSH 6.30 (H) 0.40 - 4.00 mU/L   T4 free    Collection Time: 09/16/21  8:28 AM   Result Value Ref Range    Free T4 0.77 0.76 - 1.46 ng/dL   Imp:  Bipolar mixed, moderate  PTSD  Chemical dependency  History of TBI  Chemical dependency with relapse  Chronic pain    Plan: increase Lyrica  2.  Increase Synthroid  3.  Decrease Gabapentin    Recent Results (from the past 168 hour(s))   TSH with free T4 reflex    Collection Time: 09/16/21  8:28 AM   Result Value Ref Range    TSH 6.30 (H) 0.40 - 4.00 mU/L   T4 free    Collection Time: 09/16/21  8:28 AM   Result Value Ref Range    Free T4 0.77 0.76 - 1.46 ng/dL

## 2021-09-20 NOTE — PLAN OF CARE
"Patient was calm, social and attending group activities. He reports feeling more tense and anxious today due to discharge plans. He states \" I am just anxious about what happens after I discharge\". He rates anxiety at 8/10, depression at 5/10, denies suicidal ideations.   "

## 2021-09-20 NOTE — DISCHARGE INSTRUCTIONS
Behavioral Discharge Planning and Instructions    Summary: You were admitted on 9/12/2021  due to Bipolar I Disorder, Most Recent Episode mixed, moderate with psychosis.  You were treated by Ulises Perez MD, and discharged on 9/27/2021 from [facility] [address] [place] [address] to Home    Main Diagnosis:   1.  Alcohol use disorder, severe, with complications.  2.  Alcohol withdrawals  3.  Stimulant use disorder (cocaine, methamphetamines)  4.  Opiate use disorder  5.  Bipolar disorder, mixed, moderate, with psychosis  6.  Borderline personality disorder  7.  PTSD  8.  Medical problems include: Chronic pain, hypertension, hypothyroidism, vitamin B12 deficiency  9.  TBI    Health Care Follow-up:   Primary Care Provider:  Jr Giron MD  Address: [address] [place] [address]  Phone: [phone]  Appointment Date/Time: Wednesday, September 22 2021 at 11:00am.    Psychiatry Follow-Up:  Provider: Dr. Dylan Calles  [place] [address]  Phone: [phone]  Fax: [phone]  Appointment Date/Time: Monday, October 11, 2021 at 1:40pm (this will be an hour-long appointment. It will be a phone appointment. Please contact the clinic if you decide to change to in-person appointment).    Psychotherapy Follow-Up:  Provider: Olga Lacey  [place] [address]  Phone: [phone]  Fax: [phone]  Appointment Date/Time: Thursday, November 4, 2021 at 3:00pm.    OUTPATIENT TREATMENT  : [address], [address]  [place] [address]  Phone: [phone]  Appointment Date/Time: Tuesday, September 28 2021 at 10:00am. This will be in-person appointment. This will be a 2 hour appointment.  '  Suboxone  Clinic  Date/time:10/27/2021@2:45PM Provider : [facility]  Address: [address] [place] [id number]  Phone: [phone]    Attend all scheduled appointments with your  outpatient providers. Call at least 24 hours in advance if you need to reschedule an appointment to ensure continued access to your outpatient providers.     Major Treatments, Procedures and Findings:  You were provided with: a psychiatric assessment, assessed for medical stability, medication evaluation and/or management, group therapy, CD evaluation/assessment, milieu management and medical interventions    Symptoms to Report: feeling more aggressive, increased confusion, losing more sleep, mood getting worse or thoughts of suicide    Early warning signs can include: increased depression or anxiety sleep disturbances increased thoughts or behaviors of suicide or self-harm  increased unusual thinking, such as paranoia or hearing voices    Safety and Wellness:  Take all medicines as directed.  Make no changes unless your doctor suggests them.      Follow treatment recommendations.  Refrain from alcohol and non-prescribed drugs.  If there is a concern for safety, call 911.    Resources:   Crisis Intervention: 409.427.1100 or 754-653-2538 (TTY: 802.507.4754).  Call anytime for help.  National [place] on Mental Illness (www.[place].ari.org): 732.864.6579 or 410-892-5287.  Alcoholics Anonymous (www.alcoholics-anonymous.org): Check your phone book for your local chapter.  Suicide Awareness Voices of Education (SAVE) (www.save.org): 975-366-ZHIS (9499)  National Suicide Prevention Line (www.mentalhealthmn.org): 755-128-BXMQ (6051)  Mental Health Consumer/Survivor Network of [place] (www.mhcsn.net): 577.811.3292 or 559-356-9693  Mental Health Association of [place] (www.mentalhealth.org): 851.280.9245 or 842-401-8488  Self- Management and Recovery Training., SMART-- Toll free: 195.502.8700  www.[employer]org  [facility] Crisis Response 006 161-7048  [facility] Crisis Response 133-189-6686  [facility] Crisis Response 414-412-5602  [facility] Crisis (COPE) Response - Adult 783 204-9688  [facility] Crisis  "Response - Adult [phone]  [facility] Rapid Response 760-954-0485  Text 4 Life: txt \"LIFE\" to [address] for immediate support and crisis intervention  Crisis text line: Text \"[place]\" to 032376. Free, confidential, 24/7.  Crisis Intervention: 206.440.9087 or 952-874-2432. Call anytime for help.   [facility] Mental Health Crisis Team - Child: [phone]  [facility] Mental Health Crisis Response Team - Child: [phone]  [facility] Mental Health Crisis: 4-830-188-6696   [facility] Mental Health Crisis Team:  [phone]  [facility] Crisis Response Team (CRT):  [phone] or [phone]     General Medication Instructions:   See your medication sheet(s) for instructions.   Take all medicines as directed.  Make no changes unless your doctor suggests them.   Go to all your doctor visits.  Be sure to have all your required lab tests. This way, your medicines can be refilled on time.  Do not use any drugs not prescribed by your doctor.  Avoid alcohol.    Advance Directives:   Scanned document on file with [facility]? No scanned doc  Is document scanned? Pt states no documents  Honoring Choices Your Rights Handout: Informed and given  Was more information offered? Materials given    The Treatment team has appreciated the opportunity to work with you. If you have any questions or concerns about your recent admission, you can contact the unit which can receive your call 24 hours a day, 7 days a week. They will be able to get in touch with a Provider if needed. The unit number is [phone].        "

## 2021-09-21 LAB — SARS-COV-2 RNA RESP QL NAA+PROBE: NEGATIVE

## 2021-09-21 PROCEDURE — 250N000013 HC RX MED GY IP 250 OP 250 PS 637: Performed by: PSYCHIATRY & NEUROLOGY

## 2021-09-21 PROCEDURE — G0177 OPPS/PHP; TRAIN & EDUC SERV: HCPCS

## 2021-09-21 PROCEDURE — 250N000013 HC RX MED GY IP 250 OP 250 PS 637: Performed by: NURSE PRACTITIONER

## 2021-09-21 PROCEDURE — U0005 INFEC AGEN DETEC AMPLI PROBE: HCPCS | Performed by: PSYCHIATRY & NEUROLOGY

## 2021-09-21 PROCEDURE — 124N000003 HC R&B MH SENIOR/ADOLESCENT

## 2021-09-21 RX ORDER — PREGABALIN 100 MG/1
100 CAPSULE ORAL 3 TIMES DAILY
Status: DISCONTINUED | OUTPATIENT
Start: 2021-09-21 | End: 2021-09-22

## 2021-09-21 RX ADMIN — ACETAMINOPHEN 650 MG: 325 TABLET, FILM COATED ORAL at 22:08

## 2021-09-21 RX ADMIN — Medication 1 G: at 08:02

## 2021-09-21 RX ADMIN — ACETAMINOPHEN 650 MG: 325 TABLET, FILM COATED ORAL at 07:10

## 2021-09-21 RX ADMIN — FOLIC ACID 1000 MCG: 1 TABLET ORAL at 08:03

## 2021-09-21 RX ADMIN — MULTIPLE VITAMINS W/ MINERALS TAB 1 TABLET: TAB at 08:02

## 2021-09-21 RX ADMIN — ACAMPROSATE CALCIUM 666 MG: 333 TABLET, DELAYED RELEASE ORAL at 08:01

## 2021-09-21 RX ADMIN — BUPRENORPHINE AND NALOXONE 2 FILM: 4; 1 FILM, SOLUBLE BUCCAL; SUBLINGUAL at 19:15

## 2021-09-21 RX ADMIN — THIAMINE HCL TAB 100 MG 100 MG: 100 TAB at 08:02

## 2021-09-21 RX ADMIN — DOCUSATE SODIUM 100 MG: 100 CAPSULE, LIQUID FILLED ORAL at 19:15

## 2021-09-21 RX ADMIN — NICOTINE POLACRILEX 8 MG: 2 GUM, CHEWING ORAL at 10:09

## 2021-09-21 RX ADMIN — NICOTINE POLACRILEX 8 MG: 2 GUM, CHEWING ORAL at 11:29

## 2021-09-21 RX ADMIN — Medication 1 G: at 22:11

## 2021-09-21 RX ADMIN — NICOTINE POLACRILEX 8 MG: 2 GUM, CHEWING ORAL at 21:32

## 2021-09-21 RX ADMIN — BUPRENORPHINE AND NALOXONE 2 FILM: 4; 1 FILM, SOLUBLE BUCCAL; SUBLINGUAL at 08:03

## 2021-09-21 RX ADMIN — NICOTINE POLACRILEX 8 MG: 2 GUM, CHEWING ORAL at 19:15

## 2021-09-21 RX ADMIN — NICOTINE POLACRILEX 8 MG: 2 GUM, CHEWING ORAL at 17:26

## 2021-09-21 RX ADMIN — LEVOTHYROXINE SODIUM 50 MCG: 50 TABLET ORAL at 07:10

## 2021-09-21 RX ADMIN — NICOTINE POLACRILEX 8 MG: 2 GUM, CHEWING ORAL at 07:10

## 2021-09-21 RX ADMIN — BUPRENORPHINE AND NALOXONE 2 FILM: 4; 1 FILM, SOLUBLE BUCCAL; SUBLINGUAL at 13:25

## 2021-09-21 RX ADMIN — NICOTINE POLACRILEX 8 MG: 2 GUM, CHEWING ORAL at 12:44

## 2021-09-21 RX ADMIN — NICOTINE POLACRILEX 8 MG: 2 GUM, CHEWING ORAL at 15:13

## 2021-09-21 RX ADMIN — ARIPIPRAZOLE 2 MG: 2 TABLET ORAL at 08:03

## 2021-09-21 RX ADMIN — LIDOCAINE 1 PATCH: 246 PATCH TOPICAL at 08:03

## 2021-09-21 RX ADMIN — PREGABALIN 100 MG: 100 CAPSULE ORAL at 13:25

## 2021-09-21 RX ADMIN — DOCUSATE SODIUM 100 MG: 100 CAPSULE, LIQUID FILLED ORAL at 08:01

## 2021-09-21 RX ADMIN — QUETIAPINE 300 MG: 300 TABLET, FILM COATED ORAL at 22:07

## 2021-09-21 RX ADMIN — PROPRANOLOL HYDROCHLORIDE 10 MG: 10 TABLET ORAL at 13:26

## 2021-09-21 RX ADMIN — ACETAMINOPHEN 650 MG: 325 TABLET, FILM COATED ORAL at 00:38

## 2021-09-21 RX ADMIN — AMLODIPINE BESYLATE 5 MG: 5 TABLET ORAL at 08:03

## 2021-09-21 RX ADMIN — ACETAMINOPHEN 650 MG: 325 TABLET, FILM COATED ORAL at 17:51

## 2021-09-21 RX ADMIN — NICOTINE POLACRILEX 4 MG: 2 GUM, CHEWING ORAL at 09:02

## 2021-09-21 RX ADMIN — Medication 1000 MCG: at 08:02

## 2021-09-21 RX ADMIN — GABAPENTIN 400 MG: 400 CAPSULE ORAL at 19:15

## 2021-09-21 RX ADMIN — ACAMPROSATE CALCIUM 666 MG: 333 TABLET, DELAYED RELEASE ORAL at 13:25

## 2021-09-21 RX ADMIN — PREGABALIN 75 MG: 25 CAPSULE ORAL at 08:01

## 2021-09-21 RX ADMIN — PREGABALIN 100 MG: 100 CAPSULE ORAL at 19:15

## 2021-09-21 RX ADMIN — PROPRANOLOL HYDROCHLORIDE 10 MG: 10 TABLET ORAL at 19:15

## 2021-09-21 RX ADMIN — GABAPENTIN 400 MG: 400 CAPSULE ORAL at 08:03

## 2021-09-21 RX ADMIN — PROPRANOLOL HYDROCHLORIDE 10 MG: 10 TABLET ORAL at 08:02

## 2021-09-21 RX ADMIN — ACAMPROSATE CALCIUM 666 MG: 333 TABLET, DELAYED RELEASE ORAL at 19:15

## 2021-09-21 RX ADMIN — GABAPENTIN 400 MG: 400 CAPSULE ORAL at 13:25

## 2021-09-21 RX ADMIN — ACETAMINOPHEN 650 MG: 325 TABLET, FILM COATED ORAL at 11:29

## 2021-09-21 ASSESSMENT — ACTIVITIES OF DAILY LIVING (ADL)
DRESS: INDEPENDENT
HYGIENE/GROOMING: INDEPENDENT
LAUNDRY: WITH SUPERVISION
ORAL_HYGIENE: INDEPENDENT

## 2021-09-21 NOTE — PLAN OF CARE
Assessment/Intervention/Current Symptoms and Care Coordination  -Reviewed chart  -Rounded with team  -Met with pt. He obtained numbers to call for his medical transportation (he wanted to call and have these scheduled ahead of time since it requires being scheduled a head of time)     Discharge Plan or Goal  Upon stabilization, pt will be discharged with outpatient services     Barriers to Discharge   Pt is currently undergoing stabilization.     Referral Status  Pt has been referred to [facility] for outpatient. His assessment is on Tuesday, September 28. AVS is ready     Legal Status     Pt is voluntary.

## 2021-09-21 NOTE — PLAN OF CARE
BEHAVIORAL TEAM DISCUSSION     Participants:    Ulises Perez MD;  Elvis Arshad RN; Ton gil [facility]; Lorna Kim [facility], Jamin De Leon, [facility]  .  Progress:    Hollis Barclay is a 51 year old male with a history of polysubstance abuse, chronic pain on Suboxone, bipolar disorder, schizophrenia, depression and alcohol use disorder who presents requesting detox.  Patient was discharged approximately 3 weeks ago (August 23 )stating that he was sober for 3 days and then relapsed.       Anticipated Length of stay: 5-7 days  Medical/Physical: See medical          Precautions:              Behavioral Orders   Procedures     Code 1 - Restrict to Unit     Elopement precautions       Patient attempted to open the exit doors.     Fall precautions     Routine Programming       As clinically indicated     Status 15       Every 15 minutes.      Disposition Plan   Pt will be engaged in the therapeutic milieu and groups where she will learn skills to cope with symptoms of mental illness. Care coordination will be implemented, targeting services this pt will be needed during and after this hospitalization.  Reason for ongoing admission: is unable to care for self due to katey  Disposition: TBD  Estimated length of stay: 2-7 days  Legal Status:  Voluntary  Discharge will be granted once symptoms improved.

## 2021-09-21 NOTE — PLAN OF CARE
"Patient continues to present with full range affect and in calm mood. He was active in the milieu socializing with peers and attending group activities. He reports some anxiety and depression related upcoming discharge. He states \"I want to make sure I have my resources lined up before I discharge\". He reported chronic generalized pain, requested and was given PRN Acetaminophen with some improvement. He denies any thoughts of SI/SIB.    "

## 2021-09-21 NOTE — PLAN OF CARE
Problem: Sleep Disturbance  Goal: Adequate Sleep/Rest  Outcome: No Change  Sleep was interrupted due to lowe back pain  0038 Tylenol 650 mg given, with some relief.  Slept for 6.5 hours  0710 Tylenol 650 given for lower back pain  and Nicotine gum 8 mg given

## 2021-09-21 NOTE — PROGRESS NOTES
"Patient seen, chart reviewed, care discussed with staff.  Blood pressure 113/77, pulse 88, temperature 97.5  F (36.4  C), temperature source Temporal, resp. rate 16, height 1.93 m (6' 4\"), weight 123.8 kg (273 lb), SpO2 96 %.    By report, mood improving, slept better.    General appearance: good  Alert.   Affect: fair  Mood: fair    Speech:  normal.   Eye contact:  good.    Psychomotor behavior: normal  Gait: normal, pain affected  Abnormal movements: none  Delusions: none  Hallucinations:  none  Thoughts: logical  Associations: intact  Judgement: good  Insight: good  Cognitions: intact in conversation  Memory:  intact in conversation  Orientation: normal    Not suicidal.    With his TBI, he needs all plans in place before safe discharge    Plan:  Increase Lyrica to 100mg TID, tolerating 75mg doses    Current Facility-Administered Medications   Medication     acamprosate (CAMPRAL) EC tablet 666 mg     acetaminophen (TYLENOL) tablet 650 mg     alum & mag hydroxide-simethicone (MAALOX) suspension 30 mL     amLODIPine (NORVASC) tablet 5 mg     ARIPiprazole (ABILIFY) tablet 2 mg     buprenorphine HCl-naloxone HCl (SUBOXONE) 4-1 MG per film 2 Film     cyanocobalamin (VITAMIN B-12) sublingual tablet 1,000 mcg     diclofenac (VOLTAREN) 1 % topical gel 2 g     docusate sodium (COLACE) capsule 100 mg     fish oil-omega-3 fatty acids capsule 1 g     folic acid (FOLVITE) tablet 1,000 mcg     gabapentin (NEURONTIN) capsule 400 mg     levothyroxine (SYNTHROID/LEVOTHROID) tablet 50 mcg     Lidocaine (LIDOCARE) 4 % Patch 1 patch     lidocaine patch in PLACE     loperamide (IMODIUM) capsule 2 mg     multivitamin w/minerals (THERA-VIT-M) tablet 1 tablet     nicotine (NICORETTE) gum 4-8 mg     OLANZapine (zyPREXA) tablet 10 mg    Or     OLANZapine (zyPREXA) injection 10 mg     ondansetron (ZOFRAN-ODT) ODT tab 4 mg     pregabalin (LYRICA) capsule 100 mg     propranolol (INDERAL) tablet 10 mg     QUEtiapine (SEROquel) tablet 300 mg "     senna-docusate (SENOKOT-S/PERICOLACE) 8.6-50 MG per tablet 1 tablet     thiamine (B-1) tablet 100 mg     traZODone (DESYREL) tablet 50 mg     Recent Results (from the past 168 hour(s))   TSH with free T4 reflex    Collection Time: 09/16/21  8:28 AM   Result Value Ref Range    TSH 6.30 (H) 0.40 - 4.00 mU/L   T4 free    Collection Time: 09/16/21  8:28 AM   Result Value Ref Range    Free T4 0.77 0.76 - 1.46 ng/dL

## 2021-09-21 NOTE — PLAN OF CARE
"  Problem: Activity and Energy Impairment (Depressive Signs/Symptoms)  Goal: Optimized Energy Level (Depressive Signs/Symptoms)  Outcome: Improving  Intervention: Optimize Energy Level  Recent Flowsheet Documentation  Taken 9/20/2021 1900 by Bushra Dunn RN  Activity (Behavioral Health): up ad kathleen     Problem: Social, Occupational or Functional Impairment (Anxiety Signs/Symptoms)  Goal: Enhanced Social, Occupational or Functional Skills (Anxiety Signs/Symptoms)  Intervention: Promote Social, Occupational and Functional Ability  Recent Flowsheet Documentation  Taken 9/20/2021 1900 by Bushra Dunn RN  Trust Relationship/Rapport:    choices provided    empathic listening provided   Pt was visible in milieu, affect somewhat tense, mood reported as anxious. Rated anxiety at 7/10, depression at 5/10. Greatest stressor \" Worried about not getting  the right resources/services in place after discharge\" to keep him from relapsing and ending up being hospitalized again. States he is hopeful since  is working on his after discharge services. Coping mechanisms is talking with peers and staff while in here, \"I was so alone out there and that's hard\". Plan is to have therapy when he is discharged and \"to get to the bottom of as to why I am so angry with my self\". Denies any active SI with plan, states occasionally wishes to be death. Denies any hallucinations, SIB or HI at this time.   Pt took a shower, eating and drinking adequately. Medication compliant.   PRNs   nicotine gum 8 mg   x 4  Tylenol 650 mg for pain x 1  "

## 2021-09-21 NOTE — PLAN OF CARE
"Problem: OT General Care Plan  Goal: OT Goal 1  Description: Will consistently attend OT groups and improve coping strategies with increasing repertoire of ideas and understanding of symptoms of when to use the strategies.    Pt attended 2 out of 2 OT groups offered. Pt actively participated in occupational therapy clinic with 5 patients total x50 minutes. Pt was able to ask for assistance as needed, and independently return to a creative expression task. Pt demonstrated good focus, planning, and problem solving. Social with peers and writer throughout, and politely offered assistance to peers who were working on a similar task. Pt actively participated in a structured occupational therapy group of 6 patients total x45 minutes with a focus on connecting song titles to life events and personal feelings. Using a list of song titles, pt identified What's My Age Again, Big Shot (\"I was too big for my britches\"), and Comfortably Numb (\"I felt like that for the last few years and it didn't work for me\") as song titles that relate to his life. Pt identified Time For Me To Fly as a song title that indicates something about his future. Pt also independently identified \"Long Black Veil and 17 & Life by [employer]\" as songs that he personally relates to. Pt then completed a visual scanning task while listening to identified songs to facilitate focus and organization. Calm, pleasant, cooperative, and engaged throughout groups.    "

## 2021-09-22 PROCEDURE — 250N000013 HC RX MED GY IP 250 OP 250 PS 637: Performed by: PSYCHIATRY & NEUROLOGY

## 2021-09-22 PROCEDURE — 250N000013 HC RX MED GY IP 250 OP 250 PS 637: Performed by: NURSE PRACTITIONER

## 2021-09-22 PROCEDURE — 124N000003 HC R&B MH SENIOR/ADOLESCENT

## 2021-09-22 PROCEDURE — H2032 ACTIVITY THERAPY, PER 15 MIN: HCPCS

## 2021-09-22 PROCEDURE — G0177 OPPS/PHP; TRAIN & EDUC SERV: HCPCS

## 2021-09-22 RX ORDER — LEVOTHYROXINE SODIUM 75 UG/1
75 TABLET ORAL
Status: DISCONTINUED | OUTPATIENT
Start: 2021-09-23 | End: 2021-09-27 | Stop reason: HOSPADM

## 2021-09-22 RX ADMIN — PREGABALIN 150 MG: 100 CAPSULE ORAL at 13:18

## 2021-09-22 RX ADMIN — NICOTINE POLACRILEX 8 MG: 2 GUM, CHEWING ORAL at 16:04

## 2021-09-22 RX ADMIN — FOLIC ACID 1000 MCG: 1 TABLET ORAL at 08:11

## 2021-09-22 RX ADMIN — NICOTINE POLACRILEX 8 MG: 2 GUM, CHEWING ORAL at 07:13

## 2021-09-22 RX ADMIN — PREGABALIN 100 MG: 100 CAPSULE ORAL at 08:11

## 2021-09-22 RX ADMIN — BUPRENORPHINE AND NALOXONE 2 FILM: 4; 1 FILM, SOLUBLE BUCCAL; SUBLINGUAL at 20:05

## 2021-09-22 RX ADMIN — PREGABALIN 150 MG: 100 CAPSULE ORAL at 20:04

## 2021-09-22 RX ADMIN — NICOTINE POLACRILEX 8 MG: 2 GUM, CHEWING ORAL at 08:10

## 2021-09-22 RX ADMIN — NICOTINE POLACRILEX 8 MG: 2 GUM, CHEWING ORAL at 20:05

## 2021-09-22 RX ADMIN — NICOTINE POLACRILEX 8 MG: 2 GUM, CHEWING ORAL at 22:06

## 2021-09-22 RX ADMIN — Medication 1 G: at 13:15

## 2021-09-22 RX ADMIN — NICOTINE POLACRILEX 8 MG: 2 GUM, CHEWING ORAL at 13:19

## 2021-09-22 RX ADMIN — LEVOTHYROXINE SODIUM 50 MCG: 50 TABLET ORAL at 07:07

## 2021-09-22 RX ADMIN — ARIPIPRAZOLE 2 MG: 2 TABLET ORAL at 08:10

## 2021-09-22 RX ADMIN — PROPRANOLOL HYDROCHLORIDE 10 MG: 10 TABLET ORAL at 20:05

## 2021-09-22 RX ADMIN — NICOTINE POLACRILEX 8 MG: 2 GUM, CHEWING ORAL at 14:23

## 2021-09-22 RX ADMIN — GABAPENTIN 400 MG: 400 CAPSULE ORAL at 20:04

## 2021-09-22 RX ADMIN — LIDOCAINE 1 PATCH: 246 PATCH TOPICAL at 08:10

## 2021-09-22 RX ADMIN — PROPRANOLOL HYDROCHLORIDE 10 MG: 10 TABLET ORAL at 08:10

## 2021-09-22 RX ADMIN — ACAMPROSATE CALCIUM 666 MG: 333 TABLET, DELAYED RELEASE ORAL at 13:17

## 2021-09-22 RX ADMIN — ACAMPROSATE CALCIUM 666 MG: 333 TABLET, DELAYED RELEASE ORAL at 08:10

## 2021-09-22 RX ADMIN — ACETAMINOPHEN 650 MG: 325 TABLET, FILM COATED ORAL at 16:04

## 2021-09-22 RX ADMIN — Medication 1000 MCG: at 08:10

## 2021-09-22 RX ADMIN — ACAMPROSATE CALCIUM 666 MG: 333 TABLET, DELAYED RELEASE ORAL at 20:04

## 2021-09-22 RX ADMIN — QUETIAPINE 300 MG: 300 TABLET, FILM COATED ORAL at 22:06

## 2021-09-22 RX ADMIN — PROPRANOLOL HYDROCHLORIDE 10 MG: 10 TABLET ORAL at 13:18

## 2021-09-22 RX ADMIN — Medication 1 G: at 20:04

## 2021-09-22 RX ADMIN — NICOTINE POLACRILEX 8 MG: 2 GUM, CHEWING ORAL at 12:17

## 2021-09-22 RX ADMIN — MULTIPLE VITAMINS W/ MINERALS TAB 1 TABLET: TAB at 08:11

## 2021-09-22 RX ADMIN — GABAPENTIN 400 MG: 400 CAPSULE ORAL at 08:11

## 2021-09-22 RX ADMIN — THIAMINE HCL TAB 100 MG 100 MG: 100 TAB at 08:11

## 2021-09-22 RX ADMIN — BUPRENORPHINE AND NALOXONE 2 FILM: 4; 1 FILM, SOLUBLE BUCCAL; SUBLINGUAL at 13:17

## 2021-09-22 RX ADMIN — DOCUSATE SODIUM 100 MG: 100 CAPSULE, LIQUID FILLED ORAL at 08:11

## 2021-09-22 RX ADMIN — NICOTINE POLACRILEX 8 MG: 2 GUM, CHEWING ORAL at 00:40

## 2021-09-22 RX ADMIN — GABAPENTIN 400 MG: 400 CAPSULE ORAL at 13:17

## 2021-09-22 RX ADMIN — ACETAMINOPHEN 650 MG: 325 TABLET, FILM COATED ORAL at 07:13

## 2021-09-22 RX ADMIN — NICOTINE POLACRILEX 8 MG: 2 GUM, CHEWING ORAL at 17:53

## 2021-09-22 RX ADMIN — DOCUSATE SODIUM 100 MG: 100 CAPSULE, LIQUID FILLED ORAL at 20:04

## 2021-09-22 RX ADMIN — ACETAMINOPHEN 650 MG: 325 TABLET, FILM COATED ORAL at 22:06

## 2021-09-22 RX ADMIN — ACETAMINOPHEN, ASPIRIN AND CAFFEINE 2 TABLET: 250; 250; 65 TABLET, FILM COATED ORAL at 18:51

## 2021-09-22 RX ADMIN — BUPRENORPHINE AND NALOXONE 2 FILM: 4; 1 FILM, SOLUBLE BUCCAL; SUBLINGUAL at 08:11

## 2021-09-22 RX ADMIN — NICOTINE POLACRILEX 8 MG: 2 GUM, CHEWING ORAL at 09:43

## 2021-09-22 RX ADMIN — ACETAMINOPHEN 650 MG: 325 TABLET, FILM COATED ORAL at 12:17

## 2021-09-22 ASSESSMENT — ACTIVITIES OF DAILY LIVING (ADL)
HYGIENE/GROOMING: INDEPENDENT
ORAL_HYGIENE: INDEPENDENT
LAUNDRY: WITH SUPERVISION
DRESS: INDEPENDENT

## 2021-09-22 NOTE — PLAN OF CARE
[facility] called the [facility], Suboxone clinic in effort to schedule a follow up appointment after discharge. An appointment was scheduled for 10/27/21@2:45pm

## 2021-09-22 NOTE — PLAN OF CARE
Problem: OT General Care Plan  Goal: OT Goal 1  Description: Will consistently attend OT groups and improve coping strategies with increasing repertoire of ideas and understanding of symptoms of when to use the strategies.    Pt attended 2 out of 2 OT groups offered. Pt actively participated in occupational therapy clinic with 6 patients total x90 minutes. Pt was able to ask for assistance as needed, and independently return to a creative expression task. Pt demonstrated good focus, planning, problem solving, and attention to detail. Social with peers and writer, and politely assisted a peer who was working on a similar task. Calm, pleasant, cooperative, and engaged.

## 2021-09-22 NOTE — PROGRESS NOTES
"Patient seen, chart reviewed, care discussed with staff.  Blood pressure 119/79, pulse 80, temperature 97.1  F (36.2  C), temperature source Oral, resp. rate 16, height 1.93 m (6' 4\"), weight 123.8 kg (273 lb), SpO2 99 %.    By report, slept 5.5 hours,   General appearance: good  Alert.   Affect: fair  Mood: fair    Speech:  normal.   Eye contact:  good.    Psychomotor behavior: normal  Gait: normal.    Abnormal movements: none  Delusions: none  Hallucinations:   none  Thoughts: logical  Associations: intact  Judgement: good  Insight: good  Cognitions: intact in conversation  Memory:  intact in conversation  Orientation: normal    Not suicidal.  Considerable pain continues.  Tolerating Lyrica increases    We discussed losses and grief, and his blaming of himself for events    Imp:  Bipolar mixed, moderate  PTSD  Chemical dependency  History of TBI  Chemical dependency with relapse  Chronic pain    [facility]ams: increase Lyrica to 150mg TID  2.  Increase Synthroid.  Last stable dose was 100mcg    Current Facility-Administered Medications   Medication     acamprosate (CAMPRAL) EC tablet 666 mg     acetaminophen (TYLENOL) tablet 650 mg     alum & mag hydroxide-simethicone (MAALOX) suspension 30 mL     amLODIPine (NORVASC) tablet 5 mg     ARIPiprazole (ABILIFY) tablet 2 mg     buprenorphine HCl-naloxone HCl (SUBOXONE) 4-1 MG per film 2 Film     cyanocobalamin (VITAMIN B-12) sublingual tablet 1,000 mcg     diclofenac (VOLTAREN) 1 % topical gel 2 g     docusate sodium (COLACE) capsule 100 mg     fish oil-omega-3 fatty acids capsule 1 g     folic acid (FOLVITE) tablet 1,000 mcg     gabapentin (NEURONTIN) capsule 400 mg     [START ON 9/23/2021] levothyroxine (SYNTHROID/LEVOTHROID) tablet 75 mcg     Lidocaine (LIDOCARE) 4 % Patch 1 patch     lidocaine patch in PLACE     loperamide (IMODIUM) capsule 2 mg     multivitamin w/minerals (THERA-VIT-M) tablet 1 tablet     nicotine (NICORETTE) gum 4-8 mg     OLANZapine (zyPREXA) tablet 10 mg "    Or     OLANZapine (zyPREXA) injection 10 mg     ondansetron (ZOFRAN-ODT) ODT tab 4 mg     pregabalin (LYRICA) capsule 150 mg     propranolol (INDERAL) tablet 10 mg     QUEtiapine (SEROquel) tablet 300 mg     senna-docusate (SENOKOT-S/PERICOLACE) 8.6-50 MG per tablet 1 tablet     thiamine (B-1) tablet 100 mg     traZODone (DESYREL) tablet 50 mg     Recent Results (from the past 168 hour(s))   TSH with free T4 reflex    Collection Time: 09/16/21  8:28 AM   Result Value Ref Range    TSH 6.30 (H) 0.40 - 4.00 mU/L   T4 free    Collection Time: 09/16/21  8:28 AM   Result Value Ref Range    Free T4 0.77 0.76 - 1.46 ng/dL   Asymptomatic COVID-19 Virus (Coronavirus) by PCR Nasopharyngeal    Collection Time: 09/21/21  2:37 PM    Specimen: Nasopharyngeal; Swab   Result Value Ref Range    SARS CoV2 PCR Negative Negative

## 2021-09-22 NOTE — PLAN OF CARE
"  Pt bright, visible, social. Continues to report depressive symptoms and anxiety related to discharge planning and aftercare. Concerns about thyroid function. Continues to c/o generalized pain and back pain. \"I slipped in the shower and wrenched my back. All it takes is a little movement for that disc to slip out of place.\" Requested & was given Tylenol 650 mg po PRN. Also requested 2000 meds early citing his pain issues. Denies SI. Pleasant, calm, cooperative. Med-compliant. Continue with current treatment plan and recommendations. Continue to monitor and reassess symptoms. Monitor response to medications. Monitor progress towards treatment goals. Encourage groups and participation.   "

## 2021-09-22 NOTE — PLAN OF CARE
Assessment/Intervention/Current Symptoms and Care Coordination  -Reviewed chart  -Rounded with team  -Met with pt. He is progressing towards discharge as he continues to stabilize. He has been referred to a Saboxone clinic for follow up. He reported he is making progress towards his goals. Stabilization is ongoing.     Discharge Plan or Goal  Upon stabilization, pt will be discharged with outpatient services     Barriers to Discharge   Pt is currently undergoing stabilization.     Referral Status  Outpatient services     Legal Status     Pt is voluntary.

## 2021-09-22 NOTE — PLAN OF CARE
Patient was mostly calm, had brief periods of intense anxiety and irritability. He reports feeling better after speaking with MD and having some of his medications adjusted. He was seen socializing with peers and attending group activities. He rates anxiety at 6/10, depression at 5/10, denies suicidal ideations.

## 2021-09-22 NOTE — PLAN OF CARE
Night Shift Summary (9/21/21 into 09/22/21)    Pt appeared to be sleeping comfortably. Breathing quiet and unlabored. Appeared to have slept 5.5 hours. Complained of back pain. Rated it as 6/10. PRN Tylenol given per request at 0713H.   Needs attended to. On seizure, suicide, fall, assault and SIB precautions. Staff will continue to monitor and assess.

## 2021-09-22 NOTE — PLAN OF CARE
"  Pt visible, bright, social. Reports HA pain, rates severity at 7/10. Reports two separate doses of Tylenol 650 mg po PRN were ineffective. Declining offer of cold or warm pack. Requesting \"that med with the caffeine, Excedrin.\" On-call provider gave TORB for Excedrin 2 tabs one time. Med administered. Pt reported adequate relief later.  "

## 2021-09-23 PROCEDURE — 250N000013 HC RX MED GY IP 250 OP 250 PS 637: Performed by: PSYCHIATRY & NEUROLOGY

## 2021-09-23 PROCEDURE — 124N000003 HC R&B MH SENIOR/ADOLESCENT

## 2021-09-23 PROCEDURE — H2032 ACTIVITY THERAPY, PER 15 MIN: HCPCS

## 2021-09-23 PROCEDURE — 250N000013 HC RX MED GY IP 250 OP 250 PS 637: Performed by: NURSE PRACTITIONER

## 2021-09-23 PROCEDURE — 99221 1ST HOSP IP/OBS SF/LOW 40: CPT | Performed by: PODIATRIST

## 2021-09-23 PROCEDURE — 250N000013 HC RX MED GY IP 250 OP 250 PS 637: Performed by: PODIATRIST

## 2021-09-23 PROCEDURE — G0177 OPPS/PHP; TRAIN & EDUC SERV: HCPCS

## 2021-09-23 RX ORDER — MICONAZOLE NITRATE 20 MG/G
CREAM TOPICAL 2 TIMES DAILY
Status: DISCONTINUED | OUTPATIENT
Start: 2021-09-23 | End: 2021-09-27 | Stop reason: HOSPADM

## 2021-09-23 RX ORDER — UREA 1 ML/10ML
LOTION TOPICAL DAILY
Status: DISCONTINUED | OUTPATIENT
Start: 2021-09-23 | End: 2021-09-27 | Stop reason: HOSPADM

## 2021-09-23 RX ADMIN — ACETAMINOPHEN 650 MG: 325 TABLET, FILM COATED ORAL at 09:22

## 2021-09-23 RX ADMIN — PREGABALIN 150 MG: 100 CAPSULE ORAL at 19:09

## 2021-09-23 RX ADMIN — FOLIC ACID 1000 MCG: 1 TABLET ORAL at 07:52

## 2021-09-23 RX ADMIN — NICOTINE POLACRILEX 8 MG: 2 GUM, CHEWING ORAL at 15:59

## 2021-09-23 RX ADMIN — Medication 1 G: at 19:10

## 2021-09-23 RX ADMIN — NICOTINE POLACRILEX 8 MG: 2 GUM, CHEWING ORAL at 12:39

## 2021-09-23 RX ADMIN — ACAMPROSATE CALCIUM 666 MG: 333 TABLET, DELAYED RELEASE ORAL at 07:51

## 2021-09-23 RX ADMIN — LEVOTHYROXINE SODIUM 75 MCG: 75 TABLET ORAL at 07:11

## 2021-09-23 RX ADMIN — MULTIPLE VITAMINS W/ MINERALS TAB 1 TABLET: TAB at 07:51

## 2021-09-23 RX ADMIN — NICOTINE POLACRILEX 8 MG: 2 GUM, CHEWING ORAL at 17:45

## 2021-09-23 RX ADMIN — THIAMINE HCL TAB 100 MG 100 MG: 100 TAB at 07:53

## 2021-09-23 RX ADMIN — GABAPENTIN 400 MG: 400 CAPSULE ORAL at 13:08

## 2021-09-23 RX ADMIN — NICOTINE POLACRILEX 8 MG: 2 GUM, CHEWING ORAL at 22:32

## 2021-09-23 RX ADMIN — QUETIAPINE 300 MG: 300 TABLET, FILM COATED ORAL at 22:22

## 2021-09-23 RX ADMIN — DOCUSATE SODIUM 100 MG: 100 CAPSULE, LIQUID FILLED ORAL at 07:53

## 2021-09-23 RX ADMIN — ACETAMINOPHEN 650 MG: 325 TABLET, FILM COATED ORAL at 20:33

## 2021-09-23 RX ADMIN — GABAPENTIN 400 MG: 400 CAPSULE ORAL at 19:10

## 2021-09-23 RX ADMIN — PROPRANOLOL HYDROCHLORIDE 10 MG: 10 TABLET ORAL at 13:09

## 2021-09-23 RX ADMIN — NICOTINE POLACRILEX 8 MG: 2 GUM, CHEWING ORAL at 11:25

## 2021-09-23 RX ADMIN — Medication 1000 MCG: at 07:51

## 2021-09-23 RX ADMIN — ACETAMINOPHEN 650 MG: 325 TABLET, FILM COATED ORAL at 13:40

## 2021-09-23 RX ADMIN — MICONAZOLE NITRATE: 20 CREAM TOPICAL at 19:17

## 2021-09-23 RX ADMIN — Medication 1 G: at 07:51

## 2021-09-23 RX ADMIN — GABAPENTIN 400 MG: 400 CAPSULE ORAL at 07:52

## 2021-09-23 RX ADMIN — PREGABALIN 150 MG: 100 CAPSULE ORAL at 13:08

## 2021-09-23 RX ADMIN — UREA: 1 LOTION TOPICAL at 19:10

## 2021-09-23 RX ADMIN — ACAMPROSATE CALCIUM 666 MG: 333 TABLET, DELAYED RELEASE ORAL at 19:10

## 2021-09-23 RX ADMIN — ACAMPROSATE CALCIUM 666 MG: 333 TABLET, DELAYED RELEASE ORAL at 13:08

## 2021-09-23 RX ADMIN — ARIPIPRAZOLE 2 MG: 2 TABLET ORAL at 07:51

## 2021-09-23 RX ADMIN — PREGABALIN 150 MG: 100 CAPSULE ORAL at 07:52

## 2021-09-23 RX ADMIN — BUPRENORPHINE AND NALOXONE 2 FILM: 4; 1 FILM, SOLUBLE BUCCAL; SUBLINGUAL at 07:51

## 2021-09-23 RX ADMIN — BUPRENORPHINE AND NALOXONE 2 FILM: 4; 1 FILM, SOLUBLE BUCCAL; SUBLINGUAL at 13:08

## 2021-09-23 RX ADMIN — NICOTINE POLACRILEX 8 MG: 2 GUM, CHEWING ORAL at 19:17

## 2021-09-23 RX ADMIN — BUPRENORPHINE AND NALOXONE 2 FILM: 4; 1 FILM, SOLUBLE BUCCAL; SUBLINGUAL at 19:10

## 2021-09-23 RX ADMIN — NICOTINE POLACRILEX 8 MG: 2 GUM, CHEWING ORAL at 07:50

## 2021-09-23 RX ADMIN — DOCUSATE SODIUM 100 MG: 100 CAPSULE, LIQUID FILLED ORAL at 19:10

## 2021-09-23 RX ADMIN — PROPRANOLOL HYDROCHLORIDE 10 MG: 10 TABLET ORAL at 19:10

## 2021-09-23 RX ADMIN — NICOTINE POLACRILEX 8 MG: 2 GUM, CHEWING ORAL at 20:31

## 2021-09-23 ASSESSMENT — ACTIVITIES OF DAILY LIVING (ADL)
ORAL_HYGIENE: INDEPENDENT
HYGIENE/GROOMING: INDEPENDENT
LAUNDRY: WITH SUPERVISION
DRESS: INDEPENDENT
LAUNDRY: WITH SUPERVISION
DRESS: INDEPENDENT
HYGIENE/GROOMING: INDEPENDENT
ORAL_HYGIENE: INDEPENDENT

## 2021-09-23 ASSESSMENT — MIFFLIN-ST. JEOR: SCORE: 2198.89

## 2021-09-23 NOTE — PLAN OF CARE
Problem: OT General Care Plan  Goal: OT Goal 1  Description: Will consistently attend OT groups and improve coping strategies with increasing repertoire of ideas and understanding of symptoms of when to use the strategies.    Pt attended 1 out of 2 OT groups offered. Pt actively participated in occupational therapy clinic with 6 patients total x55 minutes. Pt was able to ask for assistance as needed, and independently return to a creative expression task. Pt demonstrated good focus, planning, and particularly creative and strategic problem solving. Social with peers and writer throughout, and politely offered assistance to a peer. Calm, pleasant, cooperative, and engaged.

## 2021-09-23 NOTE — PLAN OF CARE
"  Problem: General Rehab Plan of Care  Goal: Therapeutic Recreation/Music Therapy Goal  Description: The patient and/or their representative will achieve their patient-specific goals related to the plan of care.  The patient-specific goals include: positive social engagement   Outcome: No Change     Jose M attended art therapy group for 1 hour (7 patients total). Jose M reported feeling \"happy\" today. He participated in the art activity to make collaborative group artwork. He interacted appropriately with peers and contributed positively to the art. He engaged in group discussion about the process, complimented peers on their work, and offered encouragement to a peer who was struggling. His affect appeared bright.   "

## 2021-09-23 NOTE — PLAN OF CARE
Problem: Suicidal Behavior  Goal: Suicidal Behavior is Absent or Managed  Outcome: Improving     Problem: Activity and Energy Impairment (Depressive Signs/Symptoms)  Goal: Optimized Energy Level (Depressive Signs/Symptoms)  Outcome: Improving     Problem: Sleep Disturbance (Anxiety Signs/Symptoms)  Goal: Improved Sleep (Anxiety Signs/Symptoms)  Outcome: Improving    Patient is visible in the milieu social with both staff and peers. He took all his morning medications plus Nicorette gum 8 mgs. He denies feeling depressed but a little bit anxious about discharge. He attended and actively participated in group activities. Denied racing thoughts. Denied SI/SIB nor hallucinations. He slept soundly last night. He is alert and oriented x 4. With good organization of thoughts. Has good eye contact too. His appetite is excellent. He complained of pain on his left knee. Tylenol 650 mgs given as prn for pain.     He complained of pain on his callouses found on his toes and both heels. Dr. Perez notified with an order for Podiatry consult.

## 2021-09-23 NOTE — CONSULTS
Past Medical History:   Diagnosis Date     Alcoholism in remission (H)      Bilateral ACL tear      Bipolar disorder (H)      Borderline personality disorder (H)      Chemical dependency (H)      Chronic back pain      Closed left arm fracture 1985     H/O shoulder surgery      Post concussive encephalopathy      Social anxiety disorder      Social anxiety disorder      TBI (traumatic brain injury) (H)      Patient Active Problem List   Diagnosis     Post concussive encephalopathy     H/O shoulder surgery     Alcoholism in remission (H)     Bilateral ACL tear     Chronic back pain     Social anxiety disorder     Alcohol withdrawal syndrome with complication, with unspecified complication (H)     Rib fracture     Alcohol withdrawal (H)     Alcohol dependence (H)     LFT elevation     Laceration of left hand without foreign body, initial encounter     Suicidal ideation     History reviewed. No pertinent surgical history.  Social History     Socioeconomic History     Marital status:      Spouse name: Not on file     Number of children: Not on file     Years of education: Not on file     Highest education level: Not on file   Occupational History     Not on file   Tobacco Use     Smoking status: Former Smoker     Types: Dip, chew, snus or snuff     Smokeless tobacco: Former User     Types: Chew   Substance and Sexual Activity     Alcohol use: Yes     Drug use: Not Currently     Sexual activity: Not on file   Other Topics Concern     Not on file   Social History Narrative    1/31/2020:  Jose M is now living in an apartment with one roommate in [place].  He is happy with his current living situation.        11/4/2019: He is living in a full house with 8 people, which has been stressful.  He had an interview for an apartment 3 weeks ago, and expects to hear from them soon.        10/11/2019    Jose M is , and has 2 living children who are adults.  He had a daughter, who passed away.  He developed issues  "with drugs and alcohol in his late 30s.  He is now in [facility] residence with Nurse assistance. Previously was receiving treatment at [facility], and is also in a methadone program [facility].  He grew up in [place], right outside of [place].  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  He is currently receiving treatment at a [facility] program, and will then move to a [facility] house in the next few months.     Social Determinants of Health     Financial Resource Strain:      Difficulty of Paying Living Expenses:    Food Insecurity:      Worried About Running Out of Food in the Last Year:      Ran Out of Food in the Last Year:    Transportation Needs:      Lack of Transportation (Medical):      Lack of Transportation (Non-Medical):    Physical Activity:      Days of Exercise per Week:      Minutes of Exercise per Session:    Stress:      Feeling of Stress :    Social Connections:      Frequency of Communication with Friends and Family:      Frequency of Social Gatherings with Friends and Family:      Attends Yarsanism Services:      Active Member of Clubs or Organizations:      Attends Club or Organization Meetings:      Marital Status:    Intimate Partner Violence:      Fear of Current or Ex-Partner:      Emotionally Abused:      Physically Abused:      Sexually Abused:      History reviewed. No pertinent family history.  Lab Results   Component Value Date    WBC 6.5 09/12/2021    WBC 4.8 04/29/2021     Lab Results   Component Value Date    RBC 4.42 09/12/2021    RBC 4.05 04/29/2021     Lab Results   Component Value Date    HGB 13.5 09/12/2021    HGB 13.0 04/29/2021     Lab Results   Component Value Date    HCT 40.6 09/12/2021    HCT 38.7 04/29/2021     No components found for: MCT  Lab Results   Component Value Date    MCV 92 09/12/2021    MCV 96 04/29/2021     Lab Results   Component Value Date    MCH 30.5 09/12/2021    MCH 32.1 04/29/2021     Lab Results "   Component Value Date    MCHC 33.3 09/12/2021    MCHC 33.6 04/29/2021     Lab Results   Component Value Date    RDW 14.0 09/12/2021    RDW 13.2 04/29/2021     Lab Results   Component Value Date     09/12/2021     04/29/2021     Last Comprehensive Metabolic Panel:  Sodium   Date Value Ref Range Status   09/12/2021 136 133 - 144 mmol/L Final   04/30/2021 139 133 - 144 mmol/L Final     Potassium   Date Value Ref Range Status   09/12/2021 3.7 3.4 - 5.3 mmol/L Final   04/30/2021 3.8 3.4 - 5.3 mmol/L Final     Chloride   Date Value Ref Range Status   09/12/2021 102 94 - 109 mmol/L Final   04/30/2021 104 94 - 109 mmol/L Final     Carbon Dioxide   Date Value Ref Range Status   04/30/2021 30 20 - 32 mmol/L Final     Carbon Dioxide (CO2)   Date Value Ref Range Status   09/12/2021 28 20 - 32 mmol/L Final     Anion Gap   Date Value Ref Range Status   09/12/2021 6 3 - 14 mmol/L Final   04/30/2021 5 3 - 14 mmol/L Final     Glucose   Date Value Ref Range Status   09/12/2021 91 70 - 99 mg/dL Final   04/30/2021 118 (H) 70 - 99 mg/dL Final     Urea Nitrogen   Date Value Ref Range Status   09/12/2021 25 7 - 30 mg/dL Final   04/30/2021 21 7 - 30 mg/dL Final     Creatinine   Date Value Ref Range Status   09/12/2021 0.82 0.66 - 1.25 mg/dL Final   04/30/2021 0.80 0.66 - 1.25 mg/dL Final     GFR Estimate   Date Value Ref Range Status   09/12/2021 >90 >60 mL/min/1.73m2 Final     Comment:     As of July 11, 2021, eGFR is calculated by the CKD-EPI creatinine equation, without race adjustment. eGFR can be influenced by muscle mass, exercise, and diet. The reported eGFR is an estimation only and is only applicable if the renal function is stable.   04/30/2021 >90 >60 mL/min/[1.73_m2] Final     Comment:     Non  GFR Calc  Starting 12/18/2018, serum creatinine based estimated GFR (eGFR) will be   calculated using the Chronic Kidney Disease Epidemiology Collaboration   (CKD-EPI) equation.       Calcium   Date Value  Ref Range Status   09/12/2021 8.9 8.5 - 10.1 mg/dL Final   04/30/2021 8.9 8.5 - 10.1 mg/dL Final     Lab Results   Component Value Date    AST 40 09/12/2021     04/30/2021     Lab Results   Component Value Date    ALT 65 09/12/2021    ALT 97 04/30/2021     No results found for: BILICONJ   Lab Results   Component Value Date    BILITOTAL 0.7 09/12/2021    BILITOTAL 0.5 04/30/2021     Lab Results   Component Value Date    ALBUMIN 3.7 09/12/2021    ALBUMIN 3.0 04/30/2021     Lab Results   Component Value Date    PROTTOTAL 7.8 09/12/2021    PROTTOTAL 6.3 04/30/2021      Lab Results   Component Value Date    ALKPHOS 91 09/12/2021    ALKPHOS 77 04/30/2021     S-51 year old consulted to Podiatry by Ulises Perez MD for painful calluses.  Patient seen at bedside and relates he gets painful cracking on heels and feet, has used Amlactin that has not helped much.  O-Dp and Pt 2/4 bilaterally. Dorsally contracted digits 1-5 bilaterally with functionall Hallux Limitus. Hyperkeratotic cracked skin on plantar heels, plantar medial Hallux, plantar medial 1st mpj and plantar mpjs bilaterally.  Dry scaly skin in moccasin pattern bilaterally.  There is no erythema, no edema, no odor, no calor, no drainage bilaterally.  A/P-  Tinea Pedis with callus causing pain bilaterally.  Diagnosis and treatment discussed with the patient.  Prescription for Miconazole cream and Urea 10% cream given and use discussed with him.  Follow up with Podiatry in clinic in one month.

## 2021-09-23 NOTE — PLAN OF CARE
Problem: Sleep Disturbance (Anxiety Signs/Symptoms)  Goal: Improved Sleep (Anxiety Signs/Symptoms)  Outcome: Improving    Patient slept well the whole night for 6.5 hours. No complaints of pain noted nor observed.

## 2021-09-24 PROCEDURE — 124N000003 HC R&B MH SENIOR/ADOLESCENT

## 2021-09-24 PROCEDURE — 250N000013 HC RX MED GY IP 250 OP 250 PS 637: Performed by: PSYCHIATRY & NEUROLOGY

## 2021-09-24 PROCEDURE — 250N000013 HC RX MED GY IP 250 OP 250 PS 637: Performed by: NURSE PRACTITIONER

## 2021-09-24 PROCEDURE — G0177 OPPS/PHP; TRAIN & EDUC SERV: HCPCS

## 2021-09-24 RX ORDER — IBUPROFEN 200 MG
800 TABLET ORAL EVERY 8 HOURS PRN
Status: DISCONTINUED | OUTPATIENT
Start: 2021-09-24 | End: 2021-09-27 | Stop reason: HOSPADM

## 2021-09-24 RX ORDER — PREGABALIN 100 MG/1
200 CAPSULE ORAL 3 TIMES DAILY
Status: DISCONTINUED | OUTPATIENT
Start: 2021-09-24 | End: 2021-09-27 | Stop reason: HOSPADM

## 2021-09-24 RX ORDER — PANTOPRAZOLE SODIUM 40 MG/1
40 TABLET, DELAYED RELEASE ORAL
Status: DISCONTINUED | OUTPATIENT
Start: 2021-09-24 | End: 2021-09-27 | Stop reason: HOSPADM

## 2021-09-24 RX ADMIN — NICOTINE POLACRILEX 8 MG: 2 GUM, CHEWING ORAL at 21:42

## 2021-09-24 RX ADMIN — BUPRENORPHINE AND NALOXONE 2 FILM: 4; 1 FILM, SOLUBLE BUCCAL; SUBLINGUAL at 19:28

## 2021-09-24 RX ADMIN — PANTOPRAZOLE SODIUM 40 MG: 40 TABLET, DELAYED RELEASE ORAL at 13:18

## 2021-09-24 RX ADMIN — BUPRENORPHINE AND NALOXONE 2 FILM: 4; 1 FILM, SOLUBLE BUCCAL; SUBLINGUAL at 13:19

## 2021-09-24 RX ADMIN — NICOTINE POLACRILEX 8 MG: 2 GUM, CHEWING ORAL at 11:54

## 2021-09-24 RX ADMIN — GABAPENTIN 400 MG: 400 CAPSULE ORAL at 08:07

## 2021-09-24 RX ADMIN — GABAPENTIN 400 MG: 400 CAPSULE ORAL at 13:18

## 2021-09-24 RX ADMIN — NICOTINE POLACRILEX 8 MG: 2 GUM, CHEWING ORAL at 18:18

## 2021-09-24 RX ADMIN — ACETAMINOPHEN 650 MG: 325 TABLET, FILM COATED ORAL at 20:43

## 2021-09-24 RX ADMIN — NICOTINE POLACRILEX 8 MG: 2 GUM, CHEWING ORAL at 14:37

## 2021-09-24 RX ADMIN — THIAMINE HCL TAB 100 MG 100 MG: 100 TAB at 08:07

## 2021-09-24 RX ADMIN — ACETAMINOPHEN 650 MG: 325 TABLET, FILM COATED ORAL at 07:15

## 2021-09-24 RX ADMIN — ARIPIPRAZOLE 2 MG: 2 TABLET ORAL at 08:07

## 2021-09-24 RX ADMIN — IBUPROFEN 800 MG: 200 TABLET, FILM COATED ORAL at 13:18

## 2021-09-24 RX ADMIN — PREGABALIN 150 MG: 100 CAPSULE ORAL at 08:07

## 2021-09-24 RX ADMIN — BUPRENORPHINE AND NALOXONE 2 FILM: 4; 1 FILM, SOLUBLE BUCCAL; SUBLINGUAL at 08:06

## 2021-09-24 RX ADMIN — MULTIPLE VITAMINS W/ MINERALS TAB 1 TABLET: TAB at 08:07

## 2021-09-24 RX ADMIN — NICOTINE POLACRILEX 8 MG: 2 GUM, CHEWING ORAL at 13:19

## 2021-09-24 RX ADMIN — ACAMPROSATE CALCIUM 666 MG: 333 TABLET, DELAYED RELEASE ORAL at 13:18

## 2021-09-24 RX ADMIN — NICOTINE POLACRILEX 8 MG: 2 GUM, CHEWING ORAL at 16:13

## 2021-09-24 RX ADMIN — PROPRANOLOL HYDROCHLORIDE 10 MG: 10 TABLET ORAL at 13:18

## 2021-09-24 RX ADMIN — LEVOTHYROXINE SODIUM 75 MCG: 75 TABLET ORAL at 07:15

## 2021-09-24 RX ADMIN — PREGABALIN 200 MG: 100 CAPSULE ORAL at 19:25

## 2021-09-24 RX ADMIN — QUETIAPINE 300 MG: 300 TABLET, FILM COATED ORAL at 21:42

## 2021-09-24 RX ADMIN — UREA: 1 LOTION TOPICAL at 08:13

## 2021-09-24 RX ADMIN — PREGABALIN 200 MG: 100 CAPSULE ORAL at 13:18

## 2021-09-24 RX ADMIN — DICLOFENAC SODIUM 2 G: 10 GEL TOPICAL at 19:31

## 2021-09-24 RX ADMIN — GABAPENTIN 400 MG: 400 CAPSULE ORAL at 19:26

## 2021-09-24 RX ADMIN — Medication 1000 MCG: at 08:06

## 2021-09-24 RX ADMIN — NICOTINE POLACRILEX 8 MG: 2 GUM, CHEWING ORAL at 07:16

## 2021-09-24 RX ADMIN — NICOTINE POLACRILEX 8 MG: 2 GUM, CHEWING ORAL at 19:33

## 2021-09-24 RX ADMIN — ACAMPROSATE CALCIUM 666 MG: 333 TABLET, DELAYED RELEASE ORAL at 08:07

## 2021-09-24 RX ADMIN — NICOTINE POLACRILEX 8 MG: 2 GUM, CHEWING ORAL at 20:43

## 2021-09-24 RX ADMIN — DOCUSATE SODIUM 100 MG: 100 CAPSULE, LIQUID FILLED ORAL at 08:07

## 2021-09-24 RX ADMIN — LIDOCAINE 1 PATCH: 246 PATCH TOPICAL at 10:02

## 2021-09-24 RX ADMIN — FOLIC ACID 1000 MCG: 1 TABLET ORAL at 08:07

## 2021-09-24 RX ADMIN — NICOTINE POLACRILEX 8 MG: 2 GUM, CHEWING ORAL at 10:05

## 2021-09-24 RX ADMIN — Medication 1 G: at 19:26

## 2021-09-24 RX ADMIN — NICOTINE POLACRILEX 8 MG: 2 GUM, CHEWING ORAL at 08:15

## 2021-09-24 RX ADMIN — MICONAZOLE NITRATE: 20 CREAM TOPICAL at 19:40

## 2021-09-24 RX ADMIN — DOCUSATE SODIUM 100 MG: 100 CAPSULE, LIQUID FILLED ORAL at 19:26

## 2021-09-24 RX ADMIN — PROPRANOLOL HYDROCHLORIDE 10 MG: 10 TABLET ORAL at 19:26

## 2021-09-24 RX ADMIN — Medication 1 G: at 08:07

## 2021-09-24 RX ADMIN — ACAMPROSATE CALCIUM 666 MG: 333 TABLET, DELAYED RELEASE ORAL at 19:27

## 2021-09-24 RX ADMIN — MICONAZOLE NITRATE: 20 CREAM TOPICAL at 08:13

## 2021-09-24 ASSESSMENT — ACTIVITIES OF DAILY LIVING (ADL)
HYGIENE/GROOMING: INDEPENDENT
DRESS: INDEPENDENT
ORAL_HYGIENE: INDEPENDENT
HYGIENE/GROOMING: INDEPENDENT
DRESS: INDEPENDENT
LAUNDRY: WITH SUPERVISION
LAUNDRY: WITH SUPERVISION
ORAL_HYGIENE: INDEPENDENT
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
## 2021-09-24 NOTE — PLAN OF CARE
Problem: Sleep Disturbance  Goal: Adequate Sleep/Rest  Outcome: No Change     Slept well , up for snacks x 1 in his room  Slept for 7 hours  0715 Tylenol 650 mg given for lower back pains.

## 2021-09-24 NOTE — PLAN OF CARE
Problem: OT General Care Plan  Goal: OT Goal 1  Description: Will consistently attend OT groups and improve coping strategies with increasing repertoire of ideas and understanding of symptoms of when to use the strategies.    Pt attended 2 out of 2 OT groups offered. Pt actively participated in occupational therapy clinic with 4-6 patients total x100 minutes. Pt was able to ask for assistance as needed, and independently return to a multi-step creative expression task. Pt demonstrated excellent focus, planning, problem solving, and attention to detail. Very organized and efficient in his task approach. Social with peers and writer, and spoke about his intention to purchase materials he used in group upon discharge. He expressed gratitude for OT clinic groups, explaining that this group is therapeutic and beneficial for him. He continues to be calm, pleasant, cooperative, and engaged throughout all groups.

## 2021-09-24 NOTE — PROGRESS NOTES
"Patient seen, chart reviewed, care discussed with staff.  Blood pressure 104/70, pulse 87, temperature 97.2  F (36.2  C), temperature source Temporal, resp. rate 16, height 1.93 m (6' 4\"), weight 124.7 kg (275 lb), SpO2 98 %.    By report, stabilizing.  Pain remains an issue.  Sleep better    General appearance: good  Alert.   Affect: fair  Mood: fair    Speech:  normal.   Eye contact:  good.    Psychomotor behavior: normal  Gait: normal.    Abnormal movements: none  Delusions: none  Hallucinations:  none  Thoughts: logical  Associations: intact  Judgement: good  Insight: good  Cognitions: intact in conversation  Memory:  intact in conversation  Orientation: normal    Not suicidal.  Follow-up plans finalizing.    Current Facility-Administered Medications   Medication     acamprosate (CAMPRAL) EC tablet 666 mg     acetaminophen (TYLENOL) tablet 650 mg     alum & mag hydroxide-simethicone (MAALOX) suspension 30 mL     amLODIPine (NORVASC) tablet 5 mg     ARIPiprazole (ABILIFY) tablet 2 mg     buprenorphine HCl-naloxone HCl (SUBOXONE) 4-1 MG per film 2 Film     cyanocobalamin (VITAMIN B-12) sublingual tablet 1,000 mcg     diclofenac (VOLTAREN) 1 % topical gel 2 g     docusate sodium (COLACE) capsule 100 mg     fish oil-omega-3 fatty acids capsule 1 g     folic acid (FOLVITE) tablet 1,000 mcg     gabapentin (NEURONTIN) capsule 400 mg     ibuprofen (ADVIL/MOTRIN) tablet 800 mg     levothyroxine (SYNTHROID/LEVOTHROID) tablet 75 mcg     Lidocaine (LIDOCARE) 4 % Patch 1 patch     lidocaine patch in PLACE     loperamide (IMODIUM) capsule 2 mg     miconazole with skin protectant (SELENA ANTIFUNGAL) 2 % cream     multivitamin w/minerals (THERA-VIT-M) tablet 1 tablet     nicotine (NICORETTE) gum 4-8 mg     OLANZapine (zyPREXA) tablet 10 mg    Or     OLANZapine (zyPREXA) injection 10 mg     ondansetron (ZOFRAN-ODT) ODT tab 4 mg     pantoprazole (PROTONIX) EC tablet 40 mg     pregabalin (LYRICA) capsule 200 mg     propranolol " (INDERAL) tablet 10 mg     QUEtiapine (SEROquel) tablet 300 mg     senna-docusate (SENOKOT-S/PERICOLACE) 8.6-50 MG per tablet 1 tablet     thiamine (B-1) tablet 100 mg     traZODone (DESYREL) tablet 50 mg     urea (CARMOL) 10 % lotion     Recent Results (from the past 168 hour(s))   Asymptomatic COVID-19 Virus (Coronavirus) by PCR Nasopharyngeal    Collection Time: 09/21/21  2:37 PM    Specimen: Nasopharyngeal; Swab   Result Value Ref Range    SARS CoV2 PCR Negative Negative     Imp:  Bipolar mixed, moderate  PTSD  Chemical dependency  History of TBI  Chemical dependency with relapse  Chronic pain    Plan: increase Lyrica to 200mg TID  Plan discharge 9/27

## 2021-09-24 NOTE — PLAN OF CARE
"  Problem: Mood Impairment (Anxiety Signs/Symptoms)  Goal: Improved Mood Symptoms (Anxiety Signs/Symptoms)  9/24/2021 1047 by Judie Curtis, RN  Outcome: No Change  Flowsheets (Taken 9/24/2021 1047)  Mutually Determined Action Steps (Improved Mood Symptoms):    names internal triggers    adheres to medication regimen    shares insight re: need for meds  9/24/2021 1046 by Judie Curtis, RN  Outcome: No Change       Pt verbalized he slept \"ok\" and his mood is \"ok\". Pt rates anxiety and depression 6/10 (10 being the worst), pt verbalized he has chronic SI that \"comes and goes\", pt contracts for safety. Pt is grateful for his \"mother\" and his goal for today is to meet with provider to talk about increasing his medications. Amlodipine held d/t not meeting BP parameters. Propranolol held, no hold parameters, Dr. Perez aware and plan is to add hold parameters.     Pt reported left knee pain rated 8/10 (10 being the worst), pt verbalized this is chronic, \"it's bone on bone\", pt put lidocaine patch on knee with minimal relief reported, pt declined tylenol, pt plans to ask provider for an order for ibuprofen as that is what he uses at home. PRN ibuprofen given for knee pain with relief reported.   "

## 2021-09-24 NOTE — PLAN OF CARE
Problem: General Rehab Plan of Care  Goal: Therapeutic Recreation/Music Therapy Goal (Art Therapy)  Description: The patient and/or their representative will achieve their patient-specific goals related to the plan of care.  The patient-specific goals include: trauma containment  Outcome: Improving     Art Therapy directive is to create a drawing of a safe place and to identify five items within safe place that represent each of the five senses. Goals of directive: emotional expression, trauma containment.  Pt was an active participant, focused on task for the full duration of group. Pt finished drawing and briefly shared with group. Pt geoff an image of a fishing boat and shared five sensory items within this safe place. Pts mood was calm, pleasant participant.

## 2021-09-24 NOTE — PLAN OF CARE
Assessment/Intervention/Current Symptoms and Care Coordination  -Reviewed chart  -Rounded with team  -Met with pt. He is progressing towards discharge next week as as he continues to stabilize. He has been referred to a Saboxone clinic for follow up. He reported he is making progress towards his goals. Stabilization is ongoing.     Discharge Plan or Goal  AVS is completed with after care services scheduled , pt will be discharged with outpatient services.     Barriers to Discharge   Pt is currently undergoing stabilization.     Referral Status  Outpatient services     Legal Status     Pt is voluntary.

## 2021-09-25 PROCEDURE — 250N000013 HC RX MED GY IP 250 OP 250 PS 637: Performed by: PSYCHIATRY & NEUROLOGY

## 2021-09-25 PROCEDURE — H2032 ACTIVITY THERAPY, PER 15 MIN: HCPCS

## 2021-09-25 PROCEDURE — 250N000013 HC RX MED GY IP 250 OP 250 PS 637: Performed by: NURSE PRACTITIONER

## 2021-09-25 PROCEDURE — 124N000003 HC R&B MH SENIOR/ADOLESCENT

## 2021-09-25 RX ADMIN — Medication 1 G: at 19:09

## 2021-09-25 RX ADMIN — THIAMINE HCL TAB 100 MG 100 MG: 100 TAB at 07:05

## 2021-09-25 RX ADMIN — IBUPROFEN 800 MG: 200 TABLET, FILM COATED ORAL at 13:15

## 2021-09-25 RX ADMIN — LEVOTHYROXINE SODIUM 75 MCG: 75 TABLET ORAL at 06:56

## 2021-09-25 RX ADMIN — PREGABALIN 200 MG: 100 CAPSULE ORAL at 13:10

## 2021-09-25 RX ADMIN — NICOTINE POLACRILEX 8 MG: 2 GUM, CHEWING ORAL at 21:49

## 2021-09-25 RX ADMIN — ACAMPROSATE CALCIUM 666 MG: 333 TABLET, DELAYED RELEASE ORAL at 13:10

## 2021-09-25 RX ADMIN — PROPRANOLOL HYDROCHLORIDE 10 MG: 10 TABLET ORAL at 13:10

## 2021-09-25 RX ADMIN — IBUPROFEN 800 MG: 200 TABLET, FILM COATED ORAL at 21:40

## 2021-09-25 RX ADMIN — PANTOPRAZOLE SODIUM 40 MG: 40 TABLET, DELAYED RELEASE ORAL at 06:56

## 2021-09-25 RX ADMIN — UREA: 1 LOTION TOPICAL at 08:15

## 2021-09-25 RX ADMIN — FOLIC ACID 1000 MCG: 1 TABLET ORAL at 07:05

## 2021-09-25 RX ADMIN — ARIPIPRAZOLE 2 MG: 2 TABLET ORAL at 07:05

## 2021-09-25 RX ADMIN — NICOTINE POLACRILEX 8 MG: 2 GUM, CHEWING ORAL at 09:57

## 2021-09-25 RX ADMIN — GABAPENTIN 400 MG: 400 CAPSULE ORAL at 19:11

## 2021-09-25 RX ADMIN — NICOTINE POLACRILEX 8 MG: 2 GUM, CHEWING ORAL at 19:12

## 2021-09-25 RX ADMIN — LIDOCAINE 1 PATCH: 246 PATCH TOPICAL at 08:59

## 2021-09-25 RX ADMIN — ACAMPROSATE CALCIUM 666 MG: 333 TABLET, DELAYED RELEASE ORAL at 19:09

## 2021-09-25 RX ADMIN — NICOTINE POLACRILEX 8 MG: 2 GUM, CHEWING ORAL at 08:15

## 2021-09-25 RX ADMIN — BUPRENORPHINE AND NALOXONE 2 FILM: 4; 1 FILM, SOLUBLE BUCCAL; SUBLINGUAL at 13:10

## 2021-09-25 RX ADMIN — PREGABALIN 200 MG: 100 CAPSULE ORAL at 07:05

## 2021-09-25 RX ADMIN — Medication 1000 MCG: at 07:04

## 2021-09-25 RX ADMIN — AMLODIPINE BESYLATE 5 MG: 5 TABLET ORAL at 07:05

## 2021-09-25 RX ADMIN — PROPRANOLOL HYDROCHLORIDE 10 MG: 10 TABLET ORAL at 07:05

## 2021-09-25 RX ADMIN — NICOTINE POLACRILEX 8 MG: 2 GUM, CHEWING ORAL at 20:12

## 2021-09-25 RX ADMIN — PROPRANOLOL HYDROCHLORIDE 10 MG: 10 TABLET ORAL at 19:08

## 2021-09-25 RX ADMIN — GABAPENTIN 400 MG: 400 CAPSULE ORAL at 07:05

## 2021-09-25 RX ADMIN — PREGABALIN 200 MG: 100 CAPSULE ORAL at 19:11

## 2021-09-25 RX ADMIN — MULTIPLE VITAMINS W/ MINERALS TAB 1 TABLET: TAB at 07:05

## 2021-09-25 RX ADMIN — ACETAMINOPHEN 650 MG: 325 TABLET, FILM COATED ORAL at 14:24

## 2021-09-25 RX ADMIN — QUETIAPINE 300 MG: 300 TABLET, FILM COATED ORAL at 21:49

## 2021-09-25 RX ADMIN — NICOTINE POLACRILEX 8 MG: 2 GUM, CHEWING ORAL at 16:29

## 2021-09-25 RX ADMIN — ACAMPROSATE CALCIUM 666 MG: 333 TABLET, DELAYED RELEASE ORAL at 07:04

## 2021-09-25 RX ADMIN — DOCUSATE SODIUM 100 MG: 100 CAPSULE, LIQUID FILLED ORAL at 19:09

## 2021-09-25 RX ADMIN — NICOTINE POLACRILEX 8 MG: 2 GUM, CHEWING ORAL at 11:38

## 2021-09-25 RX ADMIN — MICONAZOLE NITRATE: 20 CREAM TOPICAL at 07:06

## 2021-09-25 RX ADMIN — ACETAMINOPHEN 650 MG: 325 TABLET, FILM COATED ORAL at 20:10

## 2021-09-25 RX ADMIN — Medication 1 G: at 07:04

## 2021-09-25 RX ADMIN — GABAPENTIN 400 MG: 400 CAPSULE ORAL at 13:10

## 2021-09-25 RX ADMIN — DOCUSATE SODIUM 100 MG: 100 CAPSULE, LIQUID FILLED ORAL at 07:05

## 2021-09-25 RX ADMIN — NICOTINE POLACRILEX 8 MG: 2 GUM, CHEWING ORAL at 14:25

## 2021-09-25 RX ADMIN — NICOTINE POLACRILEX 8 MG: 2 GUM, CHEWING ORAL at 07:06

## 2021-09-25 RX ADMIN — BUPRENORPHINE AND NALOXONE 2 FILM: 4; 1 FILM, SOLUBLE BUCCAL; SUBLINGUAL at 19:11

## 2021-09-25 RX ADMIN — BUPRENORPHINE AND NALOXONE 2 FILM: 4; 1 FILM, SOLUBLE BUCCAL; SUBLINGUAL at 07:06

## 2021-09-25 RX ADMIN — NICOTINE POLACRILEX 8 MG: 2 GUM, CHEWING ORAL at 13:10

## 2021-09-25 ASSESSMENT — ACTIVITIES OF DAILY LIVING (ADL)
DRESS: INDEPENDENT
DRESS: INDEPENDENT
HYGIENE/GROOMING: INDEPENDENT
ORAL_HYGIENE: INDEPENDENT
LAUNDRY: WITH SUPERVISION
HYGIENE/GROOMING: INDEPENDENT
ORAL_HYGIENE: INDEPENDENT

## 2021-09-25 NOTE — PLAN OF CARE
"Pt is active on the unit.  He denies SI/SIB.  States he has anxiety regarding his future, but overall he feels \"OK.\"  Pt exhibits poor boundaries with peers.  Pt gives advice to peers and asks makes requests to staff on their behalf.  One brief episode of irritability with an RN who was speaking to their pt (pt was interjecting himself into the conversation).  Pt attending groups.  Appetite and sleep are good.      PRN Tylenol 650 mg x 1 for low back pain  PRN Nicorette gum 8 mg x 5  "

## 2021-09-25 NOTE — PLAN OF CARE
Problem: Sleep Disturbance (Anxiety Signs/Symptoms)  Goal: Improved Sleep (Anxiety Signs/Symptoms)  Outcome: No Change    Patient slept late last night. He slept a total of 6 hours. No complaints of pain noted.

## 2021-09-25 NOTE — PLAN OF CARE
"Pt. presented with full range affect and calm mood. Visible in the lounge. Social with peers, actively attending group activities. Denies SI, SIB and hallucination. Stated his depression is \" Up and down\". Reported some anxiety related to his discharge on Monday. PRN ibuprofen given for reported lower back pain with relief. Pt. took off his lidocaine patch, he stated that it is not helping him after a couple of hours, PRN Nicotine gum was given per pt. request. Continue to monitor pt. as plan of care.    "

## 2021-09-26 PROCEDURE — 250N000013 HC RX MED GY IP 250 OP 250 PS 637: Performed by: PSYCHIATRY & NEUROLOGY

## 2021-09-26 PROCEDURE — 250N000013 HC RX MED GY IP 250 OP 250 PS 637: Performed by: PODIATRIST

## 2021-09-26 PROCEDURE — H2032 ACTIVITY THERAPY, PER 15 MIN: HCPCS

## 2021-09-26 PROCEDURE — 250N000013 HC RX MED GY IP 250 OP 250 PS 637: Performed by: NURSE PRACTITIONER

## 2021-09-26 PROCEDURE — 124N000003 HC R&B MH SENIOR/ADOLESCENT

## 2021-09-26 RX ADMIN — PREGABALIN 200 MG: 100 CAPSULE ORAL at 19:14

## 2021-09-26 RX ADMIN — PREGABALIN 200 MG: 100 CAPSULE ORAL at 07:08

## 2021-09-26 RX ADMIN — PREGABALIN 200 MG: 100 CAPSULE ORAL at 13:28

## 2021-09-26 RX ADMIN — GABAPENTIN 400 MG: 400 CAPSULE ORAL at 13:28

## 2021-09-26 RX ADMIN — ACETAMINOPHEN 650 MG: 325 TABLET, FILM COATED ORAL at 10:02

## 2021-09-26 RX ADMIN — IBUPROFEN 800 MG: 200 TABLET, FILM COATED ORAL at 07:11

## 2021-09-26 RX ADMIN — DOCUSATE SODIUM 100 MG: 100 CAPSULE, LIQUID FILLED ORAL at 07:08

## 2021-09-26 RX ADMIN — LIDOCAINE 1 PATCH: 246 PATCH TOPICAL at 09:01

## 2021-09-26 RX ADMIN — IBUPROFEN 800 MG: 200 TABLET, FILM COATED ORAL at 17:09

## 2021-09-26 RX ADMIN — ACETAMINOPHEN 650 MG: 325 TABLET, FILM COATED ORAL at 21:34

## 2021-09-26 RX ADMIN — QUETIAPINE 300 MG: 300 TABLET, FILM COATED ORAL at 22:11

## 2021-09-26 RX ADMIN — THIAMINE HCL TAB 100 MG 100 MG: 100 TAB at 07:08

## 2021-09-26 RX ADMIN — GABAPENTIN 400 MG: 400 CAPSULE ORAL at 07:09

## 2021-09-26 RX ADMIN — PROPRANOLOL HYDROCHLORIDE 10 MG: 10 TABLET ORAL at 07:09

## 2021-09-26 RX ADMIN — Medication 1000 MCG: at 07:08

## 2021-09-26 RX ADMIN — DOCUSATE SODIUM 100 MG: 100 CAPSULE, LIQUID FILLED ORAL at 19:15

## 2021-09-26 RX ADMIN — NICOTINE POLACRILEX 8 MG: 2 GUM, CHEWING ORAL at 14:21

## 2021-09-26 RX ADMIN — PROPRANOLOL HYDROCHLORIDE 10 MG: 10 TABLET ORAL at 14:20

## 2021-09-26 RX ADMIN — ARIPIPRAZOLE 2 MG: 2 TABLET ORAL at 07:08

## 2021-09-26 RX ADMIN — NICOTINE POLACRILEX 8 MG: 2 GUM, CHEWING ORAL at 17:09

## 2021-09-26 RX ADMIN — PANTOPRAZOLE SODIUM 40 MG: 40 TABLET, DELAYED RELEASE ORAL at 06:54

## 2021-09-26 RX ADMIN — NICOTINE POLACRILEX 8 MG: 2 GUM, CHEWING ORAL at 19:13

## 2021-09-26 RX ADMIN — PROPRANOLOL HYDROCHLORIDE 10 MG: 10 TABLET ORAL at 19:16

## 2021-09-26 RX ADMIN — NICOTINE POLACRILEX 8 MG: 2 GUM, CHEWING ORAL at 11:28

## 2021-09-26 RX ADMIN — BUPRENORPHINE AND NALOXONE 2 FILM: 4; 1 FILM, SOLUBLE BUCCAL; SUBLINGUAL at 13:27

## 2021-09-26 RX ADMIN — NICOTINE POLACRILEX 8 MG: 2 GUM, CHEWING ORAL at 10:03

## 2021-09-26 RX ADMIN — NICOTINE POLACRILEX 8 MG: 2 GUM, CHEWING ORAL at 15:23

## 2021-09-26 RX ADMIN — BUPRENORPHINE AND NALOXONE 2 FILM: 4; 1 FILM, SOLUBLE BUCCAL; SUBLINGUAL at 19:15

## 2021-09-26 RX ADMIN — LEVOTHYROXINE SODIUM 75 MCG: 75 TABLET ORAL at 06:54

## 2021-09-26 RX ADMIN — AMLODIPINE BESYLATE 5 MG: 5 TABLET ORAL at 07:08

## 2021-09-26 RX ADMIN — BUPRENORPHINE AND NALOXONE 2 FILM: 4; 1 FILM, SOLUBLE BUCCAL; SUBLINGUAL at 07:08

## 2021-09-26 RX ADMIN — NICOTINE POLACRILEX 8 MG: 2 GUM, CHEWING ORAL at 21:34

## 2021-09-26 RX ADMIN — ACAMPROSATE CALCIUM 666 MG: 333 TABLET, DELAYED RELEASE ORAL at 19:14

## 2021-09-26 RX ADMIN — MICONAZOLE NITRATE: 20 CREAM TOPICAL at 09:01

## 2021-09-26 RX ADMIN — NICOTINE POLACRILEX 8 MG: 2 GUM, CHEWING ORAL at 12:39

## 2021-09-26 RX ADMIN — NICOTINE POLACRILEX 8 MG: 2 GUM, CHEWING ORAL at 08:40

## 2021-09-26 RX ADMIN — ACAMPROSATE CALCIUM 666 MG: 333 TABLET, DELAYED RELEASE ORAL at 13:28

## 2021-09-26 RX ADMIN — NICOTINE POLACRILEX 8 MG: 2 GUM, CHEWING ORAL at 07:11

## 2021-09-26 RX ADMIN — ACAMPROSATE CALCIUM 666 MG: 333 TABLET, DELAYED RELEASE ORAL at 07:08

## 2021-09-26 RX ADMIN — UREA: 1 LOTION TOPICAL at 09:01

## 2021-09-26 RX ADMIN — Medication 1 G: at 07:08

## 2021-09-26 RX ADMIN — GABAPENTIN 400 MG: 400 CAPSULE ORAL at 19:16

## 2021-09-26 RX ADMIN — Medication 1 G: at 19:16

## 2021-09-26 RX ADMIN — MULTIPLE VITAMINS W/ MINERALS TAB 1 TABLET: TAB at 07:09

## 2021-09-26 RX ADMIN — FOLIC ACID 1000 MCG: 1 TABLET ORAL at 07:09

## 2021-09-26 ASSESSMENT — ACTIVITIES OF DAILY LIVING (ADL)
HYGIENE/GROOMING: INDEPENDENT
HYGIENE/GROOMING: INDEPENDENT
DRESS: SCRUBS (BEHAVIORAL HEALTH)
DRESS: SCRUBS (BEHAVIORAL HEALTH);INDEPENDENT
LAUNDRY: WITH SUPERVISION
ORAL_HYGIENE: INDEPENDENT
ORAL_HYGIENE: INDEPENDENT

## 2021-09-26 NOTE — PLAN OF CARE
Problem: General Rehab Plan of Care  Goal: Therapeutic Recreation/Music Therapy Goal  Description: The patient and/or their representative will achieve their patient-specific goals related to the plan of care.  The patient-specific goals include:  Outcome: Improving     Jose M attended art therapy group for 1 hour (8 patients total). He engaged in group discussion about positive relationships. He participated in the art directive to make collaborative art about relationships. He interacted appropriately with peer and created positive imagery about healthy relationships. He shared her art with the group and appeared to have a bright affect. When a male peer began making very negative hopeless comments, Jose M encouraged him and talked about how everything that has happened has contributed to making you who you are as a person.

## 2021-09-26 NOTE — PLAN OF CARE
Problem: Sleep Disturbance  Goal: Adequate Sleep/Rest  Outcome: Improving     Patient was seen reading a book close to midnight until 1:00 a.m. No complaints of pain on his left know nor lower back. He slept for a total of 6 hours.

## 2021-09-26 NOTE — PLAN OF CARE
"Pt is calm, pleasant and cooperative.  His affect is flat, brightens during interactions.  He is visible in the milieu and attended groups.  Pt exhibited improved boundaries this evening and was appropriate in his interactions with peers and staff.  Appetite and sleep are good.  Pt states he has \"some anxiety\" regarding discharge on Monday.  Pt also states that he feels hopeful about his discharge plan.  \"I think this will work - I needed things to be in place.\"      PRN Nicotine gum 8 mg x 4   PRN Tylenol 650 mg x 1 for low back pain - pt reports minimal benefit   PRN Ibuprofen 800 mg x 1 for low back pain - pt reports as helpful  "

## 2021-09-26 NOTE — PLAN OF CARE
"Pt. is calm and pleasant, visible in the lounge. Full range affect. Social with staff and peers, attended group activities. Denies SI, SIB and hallucination. Stated his depression is \" Improved\" Continues to report some anxiety related to his discharge. PRN Tylenol given for lower back and right knee pain with relief. His appetite is good, reported that he can't sleep throughout the night due to pain. PRN Nicotine gum was given per pt. request. Continue to monitor pt. as plan of care.     "

## 2021-09-27 VITALS
DIASTOLIC BLOOD PRESSURE: 70 MMHG | HEIGHT: 76 IN | HEART RATE: 79 BPM | BODY MASS INDEX: 33.49 KG/M2 | OXYGEN SATURATION: 95 % | TEMPERATURE: 97.5 F | SYSTOLIC BLOOD PRESSURE: 110 MMHG | WEIGHT: 275 LBS | RESPIRATION RATE: 16 BRPM

## 2021-09-27 PROCEDURE — 250N000013 HC RX MED GY IP 250 OP 250 PS 637: Performed by: PSYCHIATRY & NEUROLOGY

## 2021-09-27 PROCEDURE — 250N000013 HC RX MED GY IP 250 OP 250 PS 637: Performed by: NURSE PRACTITIONER

## 2021-09-27 PROCEDURE — G0177 OPPS/PHP; TRAIN & EDUC SERV: HCPCS

## 2021-09-27 RX ORDER — LEVOTHYROXINE SODIUM 75 UG/1
75 TABLET ORAL
Qty: 30 TABLET | Refills: 1 | Status: ON HOLD | OUTPATIENT
Start: 2021-09-28 | End: 2021-12-23

## 2021-09-27 RX ORDER — CHLORAL HYDRATE 500 MG
1 CAPSULE ORAL 2 TIMES DAILY
Qty: 180 CAPSULE | Refills: 1 | Status: ON HOLD | OUTPATIENT
Start: 2021-09-27 | End: 2021-11-14

## 2021-09-27 RX ORDER — FOLIC ACID 1 MG/1
1000 TABLET ORAL DAILY
Qty: 90 TABLET | Refills: 1 | Status: ON HOLD | OUTPATIENT
Start: 2021-09-27 | End: 2021-12-23

## 2021-09-27 RX ORDER — QUETIAPINE FUMARATE 300 MG/1
300 TABLET, FILM COATED ORAL AT BEDTIME
Qty: 30 TABLET | Refills: 1 | Status: ON HOLD | OUTPATIENT
Start: 2021-09-27 | End: 2021-12-23

## 2021-09-27 RX ORDER — IBUPROFEN 800 MG/1
800 TABLET, FILM COATED ORAL EVERY 8 HOURS PRN
Qty: 90 TABLET | Refills: 1 | Status: ON HOLD | OUTPATIENT
Start: 2021-09-27 | End: 2021-11-14

## 2021-09-27 RX ORDER — DOCUSATE SODIUM 100 MG/1
100 CAPSULE, LIQUID FILLED ORAL 2 TIMES DAILY
Qty: 60 CAPSULE | Refills: 1 | Status: SHIPPED | OUTPATIENT
Start: 2021-09-27 | End: 2021-12-13

## 2021-09-27 RX ORDER — LANOLIN ALCOHOL/MO/W.PET/CERES
100 CREAM (GRAM) TOPICAL DAILY
Qty: 30 TABLET | Refills: 1 | Status: ON HOLD | OUTPATIENT
Start: 2021-09-27 | End: 2021-11-14

## 2021-09-27 RX ORDER — PROPRANOLOL HYDROCHLORIDE 10 MG/1
10 TABLET ORAL 3 TIMES DAILY
Qty: 90 TABLET | Refills: 1 | Status: ON HOLD | OUTPATIENT
Start: 2021-09-27 | End: 2021-12-11

## 2021-09-27 RX ORDER — ACAMPROSATE CALCIUM 333 MG/1
666 TABLET, DELAYED RELEASE ORAL 3 TIMES DAILY
Qty: 180 TABLET | Refills: 1 | Status: ON HOLD | OUTPATIENT
Start: 2021-09-27 | End: 2021-11-19

## 2021-09-27 RX ORDER — PANTOPRAZOLE SODIUM 40 MG/1
40 TABLET, DELAYED RELEASE ORAL
Qty: 30 TABLET | Refills: 1 | Status: ON HOLD | OUTPATIENT
Start: 2021-09-28 | End: 2021-12-23

## 2021-09-27 RX ORDER — MICONAZOLE NITRATE 20 MG/G
CREAM TOPICAL 2 TIMES DAILY
Qty: 30 G | Refills: 1 | Status: ON HOLD | OUTPATIENT
Start: 2021-09-27 | End: 2021-12-23

## 2021-09-27 RX ORDER — MULTIPLE VITAMINS W/ MINERALS TAB 9MG-400MCG
1 TAB ORAL DAILY
Qty: 30 TABLET | Refills: 1 | Status: ON HOLD | OUTPATIENT
Start: 2021-09-27 | End: 2021-12-23

## 2021-09-27 RX ORDER — ARIPIPRAZOLE 2 MG/1
2 TABLET ORAL DAILY
Qty: 30 TABLET | Refills: 1 | Status: ON HOLD | OUTPATIENT
Start: 2021-09-27 | End: 2021-11-19

## 2021-09-27 RX ORDER — AMLODIPINE BESYLATE 5 MG/1
5 TABLET ORAL DAILY
Qty: 30 TABLET | Refills: 1 | Status: ON HOLD | OUTPATIENT
Start: 2021-09-27 | End: 2021-12-23

## 2021-09-27 RX ORDER — LIDOCAINE 4 G/G
1 PATCH TOPICAL EVERY 24 HOURS
Qty: 30 PATCH | Refills: 1 | Status: ON HOLD | OUTPATIENT
Start: 2021-09-27 | End: 2021-11-14

## 2021-09-27 RX ORDER — BUPRENORPHINE AND NALOXONE 8; 2 MG/1; MG/1
1 FILM, SOLUBLE BUCCAL; SUBLINGUAL 3 TIMES DAILY
Qty: 90 EACH | Refills: 0 | Status: ON HOLD | OUTPATIENT
Start: 2021-09-27 | End: 2021-11-14

## 2021-09-27 RX ORDER — GABAPENTIN 400 MG/1
400 CAPSULE ORAL 3 TIMES DAILY
Qty: 90 CAPSULE | Refills: 1 | Status: ON HOLD | OUTPATIENT
Start: 2021-09-27 | End: 2021-11-19

## 2021-09-27 RX ORDER — ACETAMINOPHEN 325 MG/1
650 TABLET ORAL EVERY 4 HOURS PRN
Qty: 120 TABLET | Refills: 1 | Status: ON HOLD | OUTPATIENT
Start: 2021-09-27 | End: 2021-11-14

## 2021-09-27 RX ORDER — UREA 1 ML/10ML
LOTION TOPICAL DAILY
Qty: 236 ML | Refills: 1 | Status: ON HOLD | OUTPATIENT
Start: 2021-09-28 | End: 2021-11-14

## 2021-09-27 RX ORDER — PREGABALIN 200 MG/1
200 CAPSULE ORAL 3 TIMES DAILY
Qty: 90 CAPSULE | Refills: 1 | Status: ON HOLD | OUTPATIENT
Start: 2021-09-27 | End: 2021-11-14

## 2021-09-27 RX ADMIN — Medication 1000 MCG: at 07:00

## 2021-09-27 RX ADMIN — GABAPENTIN 400 MG: 400 CAPSULE ORAL at 13:08

## 2021-09-27 RX ADMIN — PANTOPRAZOLE SODIUM 40 MG: 40 TABLET, DELAYED RELEASE ORAL at 06:53

## 2021-09-27 RX ADMIN — NICOTINE POLACRILEX 8 MG: 2 GUM, CHEWING ORAL at 10:05

## 2021-09-27 RX ADMIN — ACAMPROSATE CALCIUM 666 MG: 333 TABLET, DELAYED RELEASE ORAL at 13:08

## 2021-09-27 RX ADMIN — PROPRANOLOL HYDROCHLORIDE 10 MG: 10 TABLET ORAL at 07:01

## 2021-09-27 RX ADMIN — LEVOTHYROXINE SODIUM 75 MCG: 75 TABLET ORAL at 06:53

## 2021-09-27 RX ADMIN — ACETAMINOPHEN 650 MG: 325 TABLET, FILM COATED ORAL at 14:16

## 2021-09-27 RX ADMIN — IBUPROFEN 800 MG: 200 TABLET, FILM COATED ORAL at 07:05

## 2021-09-27 RX ADMIN — NICOTINE POLACRILEX 8 MG: 2 GUM, CHEWING ORAL at 12:40

## 2021-09-27 RX ADMIN — BUPRENORPHINE AND NALOXONE 2 FILM: 4; 1 FILM, SOLUBLE BUCCAL; SUBLINGUAL at 13:08

## 2021-09-27 RX ADMIN — Medication 1 G: at 07:01

## 2021-09-27 RX ADMIN — BUPRENORPHINE AND NALOXONE 2 FILM: 4; 1 FILM, SOLUBLE BUCCAL; SUBLINGUAL at 07:00

## 2021-09-27 RX ADMIN — PREGABALIN 200 MG: 100 CAPSULE ORAL at 13:08

## 2021-09-27 RX ADMIN — THIAMINE HCL TAB 100 MG 100 MG: 100 TAB at 07:01

## 2021-09-27 RX ADMIN — ACETAMINOPHEN 650 MG: 325 TABLET, FILM COATED ORAL at 08:40

## 2021-09-27 RX ADMIN — ACAMPROSATE CALCIUM 666 MG: 333 TABLET, DELAYED RELEASE ORAL at 07:00

## 2021-09-27 RX ADMIN — MULTIPLE VITAMINS W/ MINERALS TAB 1 TABLET: TAB at 07:01

## 2021-09-27 RX ADMIN — PROPRANOLOL HYDROCHLORIDE 10 MG: 10 TABLET ORAL at 13:08

## 2021-09-27 RX ADMIN — DOCUSATE SODIUM 100 MG: 100 CAPSULE, LIQUID FILLED ORAL at 07:00

## 2021-09-27 RX ADMIN — NICOTINE POLACRILEX 8 MG: 2 GUM, CHEWING ORAL at 08:40

## 2021-09-27 RX ADMIN — NICOTINE POLACRILEX 8 MG: 2 GUM, CHEWING ORAL at 07:05

## 2021-09-27 RX ADMIN — GABAPENTIN 400 MG: 400 CAPSULE ORAL at 07:01

## 2021-09-27 RX ADMIN — AMLODIPINE BESYLATE 5 MG: 5 TABLET ORAL at 07:01

## 2021-09-27 RX ADMIN — FOLIC ACID 1000 MCG: 1 TABLET ORAL at 07:01

## 2021-09-27 RX ADMIN — LIDOCAINE 1 PATCH: 246 PATCH TOPICAL at 08:40

## 2021-09-27 RX ADMIN — NICOTINE POLACRILEX 8 MG: 2 GUM, CHEWING ORAL at 14:16

## 2021-09-27 RX ADMIN — ARIPIPRAZOLE 2 MG: 2 TABLET ORAL at 07:00

## 2021-09-27 RX ADMIN — PREGABALIN 200 MG: 100 CAPSULE ORAL at 07:01

## 2021-09-27 ASSESSMENT — ACTIVITIES OF DAILY LIVING (ADL)
ORAL_HYGIENE: INDEPENDENT
HYGIENE/GROOMING: INDEPENDENT
DRESS: SCRUBS (BEHAVIORAL HEALTH);INDEPENDENT

## 2021-09-27 NOTE — PLAN OF CARE
Problem: Sleep Disturbance (Anxiety Signs/Symptoms)  Goal: Improved Sleep (Anxiety Signs/Symptoms)  Outcome: Improving    Patient still awake at the start of the shift. He was reading a book until past 1:00 a.m. He slept comfortably for a total of 6 hours. No complaints made.

## 2021-09-27 NOTE — PROGRESS NOTES
"  Pt has been calm and cooperative. Has full range affect, good eye contact during conversation, mood is calm. Denies depression, reported some anxiety related to his discharge but pt. stated he is hopeful. Denies SI, SIB, and hallucinations. Social with staff and peer, participated in group activities. Adult suicide risk assessment completed. Pt stated no gun at home. Identified coping skils and mechanisms, completed/charted in the MAR.   Pt. discharged to home around 1430 pm, and left with personal belongings, taxi/ transport arranged for ride. Pt. received his personal belongings. Pt. received complete discharge paperwork and medications as filled by discharge pharmacy. Discharge pharmacy contacted for fish oil which wasn't filled by the pharmacy, they said it is not covered by hs insurance. Pt. made aware and script given to him so he can buy by him self. Received one box of nicotine gum from discharge pharmacy per provider order, pt. was upset and stated \" The gum is not even worth until I get to my primary doctor appointment\" discharge pharmacy contacted regarding to this, They said pt. can get refill when he runs out as he got a refill order for the nicotine gum, pt also made aware on this. Pt. was given times of last dose for all discharge medications in writing on discharge medication sheets. Discharge teaching included medication management, sign and symptoms of anxiety, depression and aggressive behaviors. Pt. is aware to follow up with his upcoming appointments. Pt. had no further questions at the time of discharge and no unmet needs were identified.   "

## 2021-09-27 NOTE — PLAN OF CARE
Pt actively participated in a structured Therapeutic Recreation group with a focus on leisure participation and exercise. Pt arrived late to group, but followed along to the guided chair exercise routine.  Pt was encouraged to use positive imagery with the deep breathing and stretching to foster relaxation, improves focus, and reduce stress.
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
## 2021-09-27 NOTE — PLAN OF CARE
Pt is calm and cooperative.  Pt has flat affect, brightens on approach.  Pt denies depression and SI.  Pt endorses anxiety and anticipation re: discharge tomorrow.  Pt states he feels positive about discharge plans and his future. Pt exhibited good boundaries this evening.  He engages appropriately with peers and staff.      PRN Ibuprofen x 1 for low back pain - pt reports moderate results  PRN Tylenol x 1 for low back / L knee pain  PRN Nicotine gum 8 mg x 3

## 2021-09-27 NOTE — PLAN OF CARE
Problem: OT General Care Plan  Goal: OT Goal 1  Description: Will consistently attend OT groups and improve coping strategies with increasing repertoire of ideas and understanding of symptoms of when to use the strategies.    Pt attended 2 out of 2 OT groups offered. Pt actively participated in occupational therapy clinic with 5 patients total x110 minutes. Pt was able to ask for assistance as needed, and independently initiate a creative expression task. Pt demonstrated excellent focus, planning, problem solving, and attention to detail. Organized in his task approach. Social with peers and writer. Calm, pleasant, cooperative, and engaged. Politely thanked writer for groups offered throughout his hospitalization.

## 2021-10-01 DIAGNOSIS — F10.229 ALCOHOL DEPENDENCE WITH INTOXICATION WITH COMPLICATION (H): ICD-10-CM

## 2021-10-01 NOTE — TELEPHONE ENCOUNTER
This  Med was just refilled with 100 pieces with 1 refill on 9/27/21 by ngozi Perez, discharge scripts from recent admission at the [facility].      Duplicate, not needed now.     Sheron Mittal RN  October 1, 2021 3:00 PM

## 2021-10-08 ENCOUNTER — TELEPHONE (OUTPATIENT)
Dept: FAMILY MEDICINE | Facility: CLINIC | Age: 51
End: 2021-10-08

## 2021-10-08 NOTE — TELEPHONE ENCOUNTER
Call placed to [facility] where patient was referred after discharge from inpatient psych stay at [facility] 9/27/21. The patient has not yet checked in there, and the 2 phone numbers they had for him are not in working order. They do not know where patient is, and do not have a way to contact him. If patient does show up at their facility, they will tell him to contact PCP clinic - speak to RN or schedule clinic visit.     Trena Harrison MS RN-BC  10/08/21  3:00 PM

## 2021-10-29 ENCOUNTER — DOCUMENTATION ONLY (OUTPATIENT)
Dept: ADDICTION MEDICINE | Facility: CLINIC | Age: 51
End: 2021-10-29

## 2021-10-29 NOTE — PROGRESS NOTES
On Wed, Oct 27 Jose M arrived to [facility], mistakenly coming to our clinic instead of his appt at [facility] with Dr. Mahoney. I spoke with him to help arrange to speak with us when he returned home, as he indicated he had no working phone with him and expected to connect with a worker upon reurning home, who can connect him for the visit.    I provided him a direct callback to me, so I can help arrange this at the time of visit. I also provided behavioral access #. He did not call for his appt.    Will ideally get him in for an ASAP appt with provider at [facility] or  if he calls to reschedule.    Edmar Grant MD

## 2021-11-13 ENCOUNTER — HOSPITAL ENCOUNTER (INPATIENT)
Facility: CLINIC | Age: 51
LOS: 5 days | Discharge: PSYCHIATRIC OR SUBSTANCE USE FACILITY | End: 2021-11-19
Attending: EMERGENCY MEDICINE | Admitting: INTERNAL MEDICINE
Payer: MEDICAID

## 2021-11-13 DIAGNOSIS — F10.939 ALCOHOL WITHDRAWAL SYNDROME WITH COMPLICATION (H): ICD-10-CM

## 2021-11-13 DIAGNOSIS — T50.902A INTENTIONAL DRUG OVERDOSE, INITIAL ENCOUNTER (H): ICD-10-CM

## 2021-11-13 DIAGNOSIS — R45.851 SUICIDAL IDEATION: ICD-10-CM

## 2021-11-13 DIAGNOSIS — F10.929 ALCOHOLIC INTOXICATION WITH COMPLICATION (H): ICD-10-CM

## 2021-11-13 DIAGNOSIS — K59.00 CONSTIPATION, UNSPECIFIED CONSTIPATION TYPE: Primary | ICD-10-CM

## 2021-11-13 LAB
ALBUMIN SERPL-MCNC: 3.6 G/DL (ref 3.4–5)
ALP SERPL-CCNC: 81 U/L (ref 40–150)
ALT SERPL W P-5'-P-CCNC: 89 U/L (ref 0–70)
ANION GAP SERPL CALCULATED.3IONS-SCNC: 11 MMOL/L (ref 3–14)
APAP SERPL-MCNC: 9 MG/L (ref 10–30)
AST SERPL W P-5'-P-CCNC: 87 U/L (ref 0–45)
ATRIAL RATE - MUSE: 78 BPM
BASOPHILS # BLD AUTO: 0 10E3/UL (ref 0–0.2)
BASOPHILS NFR BLD AUTO: 1 %
BILIRUB SERPL-MCNC: 0.7 MG/DL (ref 0.2–1.3)
BUN SERPL-MCNC: 18 MG/DL (ref 7–30)
CALCIUM SERPL-MCNC: 8.7 MG/DL (ref 8.5–10.1)
CHLORIDE BLD-SCNC: 101 MMOL/L (ref 94–109)
CO2 SERPL-SCNC: 27 MMOL/L (ref 20–32)
CREAT SERPL-MCNC: 0.6 MG/DL (ref 0.66–1.25)
DIASTOLIC BLOOD PRESSURE - MUSE: NORMAL MMHG
EOSINOPHIL # BLD AUTO: 0 10E3/UL (ref 0–0.7)
EOSINOPHIL NFR BLD AUTO: 0 %
ERYTHROCYTE [DISTWIDTH] IN BLOOD BY AUTOMATED COUNT: 13.1 % (ref 10–15)
ETHANOL SERPL-MCNC: 0.23 G/DL
GFR SERPL CREATININE-BSD FRML MDRD: >90 ML/MIN/1.73M2
GLUCOSE BLD-MCNC: 102 MG/DL (ref 70–99)
HCT VFR BLD AUTO: 40.7 % (ref 40–53)
HGB BLD-MCNC: 13.6 G/DL (ref 13.3–17.7)
IMM GRANULOCYTES # BLD: 0 10E3/UL
IMM GRANULOCYTES NFR BLD: 1 %
INTERPRETATION ECG - MUSE: NORMAL
LYMPHOCYTES # BLD AUTO: 1.6 10E3/UL (ref 0.8–5.3)
LYMPHOCYTES NFR BLD AUTO: 25 %
MAGNESIUM SERPL-MCNC: 2.2 MG/DL (ref 1.6–2.3)
MCH RBC QN AUTO: 29.5 PG (ref 26.5–33)
MCHC RBC AUTO-ENTMCNC: 33.4 G/DL (ref 31.5–36.5)
MCV RBC AUTO: 88 FL (ref 78–100)
MONOCYTES # BLD AUTO: 0.2 10E3/UL (ref 0–1.3)
MONOCYTES NFR BLD AUTO: 4 %
NEUTROPHILS # BLD AUTO: 4.6 10E3/UL (ref 1.6–8.3)
NEUTROPHILS NFR BLD AUTO: 69 %
NRBC # BLD AUTO: 0 10E3/UL
NRBC BLD AUTO-RTO: 0 /100
P AXIS - MUSE: 43 DEGREES
PLATELET # BLD AUTO: 387 10E3/UL (ref 150–450)
POTASSIUM BLD-SCNC: 3.3 MMOL/L (ref 3.4–5.3)
PR INTERVAL - MUSE: 176 MS
PROT SERPL-MCNC: 8 G/DL (ref 6.8–8.8)
QRS DURATION - MUSE: 130 MS
QT - MUSE: 422 MS
QTC - MUSE: 481 MS
R AXIS - MUSE: -53 DEGREES
RBC # BLD AUTO: 4.61 10E6/UL (ref 4.4–5.9)
SALICYLATES SERPL-MCNC: <2 MG/DL
SARS-COV-2 RNA RESP QL NAA+PROBE: NEGATIVE
SODIUM SERPL-SCNC: 139 MMOL/L (ref 133–144)
SYSTOLIC BLOOD PRESSURE - MUSE: NORMAL MMHG
T AXIS - MUSE: 71 DEGREES
VENTRICULAR RATE- MUSE: 78 BPM
WBC # BLD AUTO: 6.5 10E3/UL (ref 4–11)

## 2021-11-13 PROCEDURE — 82040 ASSAY OF SERUM ALBUMIN: CPT | Performed by: EMERGENCY MEDICINE

## 2021-11-13 PROCEDURE — 87635 SARS-COV-2 COVID-19 AMP PRB: CPT | Performed by: EMERGENCY MEDICINE

## 2021-11-13 PROCEDURE — 96361 HYDRATE IV INFUSION ADD-ON: CPT

## 2021-11-13 PROCEDURE — 99285 EMERGENCY DEPT VISIT HI MDM: CPT | Mod: 25

## 2021-11-13 PROCEDURE — 250N000011 HC RX IP 250 OP 636: Performed by: EMERGENCY MEDICINE

## 2021-11-13 PROCEDURE — C9803 HOPD COVID-19 SPEC COLLECT: HCPCS

## 2021-11-13 PROCEDURE — 250N000011 HC RX IP 250 OP 636

## 2021-11-13 PROCEDURE — 85025 COMPLETE CBC W/AUTO DIFF WBC: CPT | Performed by: EMERGENCY MEDICINE

## 2021-11-13 PROCEDURE — HZ2ZZZZ DETOXIFICATION SERVICES FOR SUBSTANCE ABUSE TREATMENT: ICD-10-PCS | Performed by: EMERGENCY MEDICINE

## 2021-11-13 PROCEDURE — 250N000013 HC RX MED GY IP 250 OP 250 PS 637: Performed by: EMERGENCY MEDICINE

## 2021-11-13 PROCEDURE — 80143 DRUG ASSAY ACETAMINOPHEN: CPT | Performed by: EMERGENCY MEDICINE

## 2021-11-13 PROCEDURE — 82077 ASSAY SPEC XCP UR&BREATH IA: CPT | Performed by: EMERGENCY MEDICINE

## 2021-11-13 PROCEDURE — 80179 DRUG ASSAY SALICYLATE: CPT | Performed by: EMERGENCY MEDICINE

## 2021-11-13 PROCEDURE — 36415 COLL VENOUS BLD VENIPUNCTURE: CPT | Performed by: EMERGENCY MEDICINE

## 2021-11-13 PROCEDURE — 84100 ASSAY OF PHOSPHORUS: CPT | Performed by: EMERGENCY MEDICINE

## 2021-11-13 PROCEDURE — 93005 ELECTROCARDIOGRAM TRACING: CPT

## 2021-11-13 PROCEDURE — 83735 ASSAY OF MAGNESIUM: CPT | Performed by: EMERGENCY MEDICINE

## 2021-11-13 PROCEDURE — 96374 THER/PROPH/DIAG INJ IV PUSH: CPT

## 2021-11-13 PROCEDURE — 258N000003 HC RX IP 258 OP 636: Performed by: EMERGENCY MEDICINE

## 2021-11-13 RX ORDER — LORAZEPAM 2 MG/ML
1 INJECTION INTRAMUSCULAR ONCE
Status: COMPLETED | OUTPATIENT
Start: 2021-11-13 | End: 2021-11-13

## 2021-11-13 RX ORDER — ONDANSETRON 2 MG/ML
INJECTION INTRAMUSCULAR; INTRAVENOUS
Status: COMPLETED
Start: 2021-11-13 | End: 2021-11-13

## 2021-11-13 RX ORDER — SODIUM CHLORIDE 9 MG/ML
INJECTION, SOLUTION INTRAVENOUS CONTINUOUS
Status: DISCONTINUED | OUTPATIENT
Start: 2021-11-13 | End: 2021-11-15

## 2021-11-13 RX ADMIN — SODIUM CHLORIDE 1000 ML: 9 INJECTION, SOLUTION INTRAVENOUS at 15:00

## 2021-11-13 RX ADMIN — NICOTINE POLACRILEX 2 MG: 2 GUM, CHEWING ORAL at 18:58

## 2021-11-13 RX ADMIN — NICOTINE POLACRILEX 2 MG: 2 GUM, CHEWING ORAL at 20:44

## 2021-11-13 RX ADMIN — NICOTINE POLACRILEX 2 MG: 2 GUM, CHEWING ORAL at 22:47

## 2021-11-13 RX ADMIN — ONDANSETRON 4 MG: 2 INJECTION INTRAMUSCULAR; INTRAVENOUS at 20:47

## 2021-11-13 RX ADMIN — LORAZEPAM 1 MG: 2 INJECTION INTRAMUSCULAR; INTRAVENOUS at 21:00

## 2021-11-13 ASSESSMENT — MIFFLIN-ST. JEOR: SCORE: 2221.57

## 2021-11-13 NOTE — ED NOTES
Pt sts he took 22 seroquel 300mg this am. Pt sts he also took 4 8mg suboxone before arrival. Pt sts he wants to die

## 2021-11-13 NOTE — ED PROVIDER NOTES
History   Chief Complaint:  Psychiatric Evaluation       HPI   Hollis Barclay is a 51 year old male with history of alcoholism, opioid dependence, and a suicide attempt who presents with alcohol intoxication and overdose. Per the nurse, the patient drank 1 gallon of vodka between today and yesterday, 5 tabs of oxycodone at midnight last night, 22 tabs of seroquel today, and 4 8mg tabs of suboxone today. The nurse also found 4 more tabs of suboxone in his sock. The patient reports that he has been feeling that he doesn't want to live anymore. The patient's group home requested that he come to the ER for evaluation of suicidality.    Review of Systems   Psychiatric/Behavioral: Positive for suicidal ideas.        Alcohol intoxication and drug overdose   All other systems reviewed and are negative.        Allergies:  Hydroxyzine  Cucumber Extract  Nsaids  Ketorolac    Medications:  Wellbutrin  Celebrex  Flexeril  Atarax  Vistaril  Dolophine  Naprosyn  Zoloft  Flomax  Zanaflex  Desyrel  Tylenol  Abilify  Neurotonin  Inderal  Campral  Suboxone  Protonix  Lyrica    Past Medical History:     Alcoholism  Bilateral ACL tear  Bipolar disorder  Borderline personality disorder  Opioid use disorder  Social anxiety disorder  TBI  Suicidal ideation    Lactic acidosis  Sepsis due to streptococcus penumoniae  BPH with urinary obstruction  Acute kidney injury  Acute bacterial prostatitis    Past Surgical History:    Shoulder replacement     Family History:    The patient denies past family history.    Social History:  The patient lives in a group home. He is a former smoker.    Physical Exam     Patient Vitals for the past 24 hrs:   BP Temp Temp src Pulse Resp SpO2 Height Weight   11/13/21 1630 128/74 -- -- 82 29 95 % -- --   11/13/21 1615 117/63 -- -- 78 9 94 % -- --   11/13/21 1600 135/89 -- -- 82 9 99 % -- --   11/13/21 1545 100/69 -- -- 79 11 (!) 84 % -- --   11/13/21 1530 125/81 -- -- 78 10 95 % -- --   11/13/21 1515 133/78  "-- -- 78 12 (!) 73 % -- --   11/13/21 1500 (!) 149/94 -- -- 85 20 100 % -- --   11/13/21 1434 (!) 140/90 98.8  F (37.1  C) Temporal 86 16 100 % 1.93 m (6' 4\") 127 kg (280 lb)       Physical Exam  GENERAL: Emotionally upset.  HEAD: atraumatic  EYES: pupils reactive, extraocular muscles intact, conjunctivae normal  ENT:  mucus membranes moist  NECK:  trachea midline, normal range of motion  RESPIRATORY: no tachypnea, breath sounds clear to auscultation   CVS: normal S1/S2, no murmurs, intact distal pulses  ABDOMEN: soft, nontender, nondistention  MUSCULOSKELETAL: no deformities  SKIN: warm and dry, no acute rashes or ulceration  NEURO: GCS 15, cranial nerves intact, alert and oriented x3  PSYCH: Mostly upset saying he wants to end his life    Emergency Department Course     ECG  ECG taken at 1631, ECG read at 1633  Normal sinus rhythm  Left axis deviation  Nonspecific intraventricular block  Abnormal ECG  Rate 78 bpm. UT interval 176 ms. QRS duration 130 ms. QT/QTc 422/481 ms. P-R-T axes 43 -53 71.     Laboratory:  CMP: Potassium 3.3 (L), Creatinine 0.60 (L), Glucose 102 (H), AST 87 (H), ALT 89 (H), o/w WNL     CBC: WBC 6.5, HGB 13.6,     Alcohol ethyl: 0.23 (H)    Magnesium: 2.2    Salicylate level :<2    Acetaminophen level: 9 (L)    Procedures  None    Emergency Department Course:  Reviewed:  I reviewed nursing notes, vitals, past medical history and Care Everywhere    Assessments:  1505 I obtained history and examined the patient as noted above.   1650 I rechecked the patient and explained findings.    Consults:  1520 I spoke with Poison Control.     Disposition:  The patient was transferred to [facility].     Impression & Plan     Medical Decision Making:  Patient presents from group home feeling suicidal and states he wants to end his life.  He reports that he drank alcohol yesterday afternoon and throughout the day.  He also took 5 oxycodone last night around midnight and 22 Seroquel this morning around 8 AM " and Suboxone 8mg 4 of them prior to arrival.  Here is awake and alert.  Unclear as to the accuracy of his report.  Poison control was contacted and suggested 12 hours of monitoring with supportive care.  He received and 2 boxes of food.  Initially he transiently dropped his oxygen and then improved.  Patient will be continue to monitor from a overdose standpoint.  Aspirin and Tylenol are negative.  He is intoxicated.  Patient be moved to [facility] once the overdose is cleared.  Patient signed out to my partner awaiting ongoing monitoring of his overdose with plans to go to [facility] afterwards.      Diagnosis:    ICD-10-CM    1. Alcoholic intoxication with complication (H)  F10.929    2. Suicidal ideation  R45.851    3. Intentional drug overdose, initial encounter (H)  T50.902A        Discharge Medications:  New Prescriptions    No medications on file       Scribe Disclosure:  Maureen MAZA, am serving as a scribe at 3:07 PM on 11/13/2021 to document services personally performed by Ben Monroe MD based on my observations and the provider's statements to me.      Ben Monroe MD  11/13/21 6713

## 2021-11-13 NOTE — ED NOTES
Poison control recommended pt be watched for 12 hrs post suboxone ingestion which was at approx 1pm

## 2021-11-13 NOTE — ED NOTES
Pt sent by cab to ED FOR SUICIDE IDEATION lio pt drank 2 bottles of vodka between today and yesterday

## 2021-11-14 PROBLEM — T50.902A INTENTIONAL DRUG OVERDOSE, INITIAL ENCOUNTER (H): Status: ACTIVE | Noted: 2021-11-14

## 2021-11-14 PROBLEM — F10.929 ALCOHOLIC INTOXICATION WITH COMPLICATION (H): Status: ACTIVE | Noted: 2021-11-14

## 2021-11-14 LAB
AMPHETAMINES UR QL SCN: ABNORMAL
BARBITURATES UR QL: ABNORMAL
BENZODIAZ UR QL: ABNORMAL
CANNABINOIDS UR QL SCN: ABNORMAL
COCAINE UR QL: ABNORMAL
MAGNESIUM SERPL-MCNC: 2.1 MG/DL (ref 1.6–2.3)
OPIATES UR QL SCN: ABNORMAL
PCP UR QL SCN: ABNORMAL
PHOSPHATE SERPL-MCNC: 2.9 MG/DL (ref 2.5–4.5)
POTASSIUM BLD-SCNC: 3 MMOL/L (ref 3.4–5.3)
POTASSIUM BLD-SCNC: 3.3 MMOL/L (ref 3.4–5.3)
POTASSIUM BLD-SCNC: 3.6 MMOL/L (ref 3.4–5.3)

## 2021-11-14 PROCEDURE — 999N000190 HC STATISTIC VAT ROUNDS

## 2021-11-14 PROCEDURE — 250N000011 HC RX IP 250 OP 636: Performed by: HOSPITALIST

## 2021-11-14 PROCEDURE — 80307 DRUG TEST PRSMV CHEM ANLYZR: CPT | Performed by: INTERNAL MEDICINE

## 2021-11-14 PROCEDURE — C9113 INJ PANTOPRAZOLE SODIUM, VIA: HCPCS | Performed by: INTERNAL MEDICINE

## 2021-11-14 PROCEDURE — 250N000013 HC RX MED GY IP 250 OP 250 PS 637: Performed by: EMERGENCY MEDICINE

## 2021-11-14 PROCEDURE — 96376 TX/PRO/DX INJ SAME DRUG ADON: CPT

## 2021-11-14 PROCEDURE — 250N000011 HC RX IP 250 OP 636: Performed by: INTERNAL MEDICINE

## 2021-11-14 PROCEDURE — 250N000013 HC RX MED GY IP 250 OP 250 PS 637: Performed by: INTERNAL MEDICINE

## 2021-11-14 PROCEDURE — 258N000003 HC RX IP 258 OP 636: Performed by: INTERNAL MEDICINE

## 2021-11-14 PROCEDURE — 83735 ASSAY OF MAGNESIUM: CPT | Performed by: INTERNAL MEDICINE

## 2021-11-14 PROCEDURE — 99232 SBSQ HOSP IP/OBS MODERATE 35: CPT | Performed by: HOSPITALIST

## 2021-11-14 PROCEDURE — 84132 ASSAY OF SERUM POTASSIUM: CPT | Performed by: INTERNAL MEDICINE

## 2021-11-14 PROCEDURE — 99222 1ST HOSP IP/OBS MODERATE 55: CPT | Mod: AI | Performed by: INTERNAL MEDICINE

## 2021-11-14 PROCEDURE — 96375 TX/PRO/DX INJ NEW DRUG ADDON: CPT

## 2021-11-14 PROCEDURE — 36415 COLL VENOUS BLD VENIPUNCTURE: CPT | Performed by: INTERNAL MEDICINE

## 2021-11-14 PROCEDURE — 250N000013 HC RX MED GY IP 250 OP 250 PS 637: Performed by: HOSPITALIST

## 2021-11-14 PROCEDURE — 250N000011 HC RX IP 250 OP 636: Performed by: EMERGENCY MEDICINE

## 2021-11-14 PROCEDURE — 120N000001 HC R&B MED SURG/OB

## 2021-11-14 PROCEDURE — 250N000011 HC RX IP 250 OP 636

## 2021-11-14 PROCEDURE — 36415 COLL VENOUS BLD VENIPUNCTURE: CPT | Performed by: HOSPITALIST

## 2021-11-14 PROCEDURE — 84132 ASSAY OF SERUM POTASSIUM: CPT | Performed by: HOSPITALIST

## 2021-11-14 RX ORDER — HALOPERIDOL 5 MG/ML
2.5-5 INJECTION INTRAMUSCULAR EVERY 6 HOURS PRN
Status: DISCONTINUED | OUTPATIENT
Start: 2021-11-14 | End: 2021-11-18

## 2021-11-14 RX ORDER — FOLIC ACID 1 MG/1
1000 TABLET ORAL DAILY
Status: DISCONTINUED | OUTPATIENT
Start: 2021-11-14 | End: 2021-11-14

## 2021-11-14 RX ORDER — POTASSIUM CHLORIDE 1500 MG/1
20 TABLET, EXTENDED RELEASE ORAL ONCE
Status: COMPLETED | OUTPATIENT
Start: 2021-11-14 | End: 2021-11-14

## 2021-11-14 RX ORDER — DIAZEPAM 5 MG
10 TABLET ORAL EVERY 30 MIN PRN
Status: DISCONTINUED | OUTPATIENT
Start: 2021-11-14 | End: 2021-11-17

## 2021-11-14 RX ORDER — AMOXICILLIN 250 MG
2 CAPSULE ORAL 2 TIMES DAILY PRN
Status: DISCONTINUED | OUTPATIENT
Start: 2021-11-14 | End: 2021-11-17

## 2021-11-14 RX ORDER — LORAZEPAM 2 MG/ML
1-2 INJECTION INTRAMUSCULAR EVERY 30 MIN PRN
Status: DISCONTINUED | OUTPATIENT
Start: 2021-11-14 | End: 2021-11-14

## 2021-11-14 RX ORDER — ACETAMINOPHEN 325 MG/1
650 TABLET ORAL EVERY 6 HOURS PRN
Status: DISCONTINUED | OUTPATIENT
Start: 2021-11-14 | End: 2021-11-19 | Stop reason: HOSPADM

## 2021-11-14 RX ORDER — PREGABALIN 200 MG/1
200 CAPSULE ORAL 3 TIMES DAILY
Status: ON HOLD | COMMUNITY
End: 2021-11-19

## 2021-11-14 RX ORDER — DIAZEPAM 10 MG/2ML
5-10 INJECTION, SOLUTION INTRAMUSCULAR; INTRAVENOUS EVERY 30 MIN PRN
Status: DISCONTINUED | OUTPATIENT
Start: 2021-11-14 | End: 2021-11-17

## 2021-11-14 RX ORDER — ONDANSETRON 4 MG/1
4 TABLET, ORALLY DISINTEGRATING ORAL EVERY 6 HOURS PRN
Status: DISCONTINUED | OUTPATIENT
Start: 2021-11-14 | End: 2021-11-19 | Stop reason: HOSPADM

## 2021-11-14 RX ORDER — MAGNESIUM SULFATE HEPTAHYDRATE 40 MG/ML
2 INJECTION, SOLUTION INTRAVENOUS ONCE
Status: DISCONTINUED | OUTPATIENT
Start: 2021-11-14 | End: 2021-11-14

## 2021-11-14 RX ORDER — MULTIPLE VITAMINS W/ MINERALS TAB 9MG-400MCG
1 TAB ORAL DAILY
Status: DISCONTINUED | OUTPATIENT
Start: 2021-11-14 | End: 2021-11-19 | Stop reason: HOSPADM

## 2021-11-14 RX ORDER — POTASSIUM CHLORIDE 1500 MG/1
40 TABLET, EXTENDED RELEASE ORAL ONCE
Status: COMPLETED | OUTPATIENT
Start: 2021-11-14 | End: 2021-11-14

## 2021-11-14 RX ORDER — POTASSIUM CHLORIDE 1500 MG/1
20 TABLET, EXTENDED RELEASE ORAL ONCE
Status: DISCONTINUED | OUTPATIENT
Start: 2021-11-14 | End: 2021-11-14

## 2021-11-14 RX ORDER — FLUMAZENIL 0.1 MG/ML
0.2 INJECTION, SOLUTION INTRAVENOUS
Status: DISCONTINUED | OUTPATIENT
Start: 2021-11-14 | End: 2021-11-14

## 2021-11-14 RX ORDER — FOLIC ACID 1 MG/1
1 TABLET ORAL DAILY
Status: DISCONTINUED | OUTPATIENT
Start: 2021-11-14 | End: 2021-11-19 | Stop reason: HOSPADM

## 2021-11-14 RX ORDER — ONDANSETRON 2 MG/ML
4 INJECTION INTRAMUSCULAR; INTRAVENOUS EVERY 6 HOURS PRN
Status: DISCONTINUED | OUTPATIENT
Start: 2021-11-14 | End: 2021-11-19 | Stop reason: HOSPADM

## 2021-11-14 RX ORDER — FLUMAZENIL 0.1 MG/ML
0.2 INJECTION, SOLUTION INTRAVENOUS
Status: DISCONTINUED | OUTPATIENT
Start: 2021-11-14 | End: 2021-11-19 | Stop reason: HOSPADM

## 2021-11-14 RX ORDER — ACETAMINOPHEN 650 MG/1
650 SUPPOSITORY RECTAL EVERY 6 HOURS PRN
Status: DISCONTINUED | OUTPATIENT
Start: 2021-11-14 | End: 2021-11-19 | Stop reason: HOSPADM

## 2021-11-14 RX ORDER — AMOXICILLIN 250 MG
1 CAPSULE ORAL 2 TIMES DAILY PRN
Status: DISCONTINUED | OUTPATIENT
Start: 2021-11-14 | End: 2021-11-17

## 2021-11-14 RX ORDER — LORAZEPAM 1 MG/1
1-2 TABLET ORAL EVERY 30 MIN PRN
Status: DISCONTINUED | OUTPATIENT
Start: 2021-11-14 | End: 2021-11-14

## 2021-11-14 RX ORDER — ARIPIPRAZOLE 2 MG/1
2 TABLET ORAL DAILY
Status: DISCONTINUED | OUTPATIENT
Start: 2021-11-14 | End: 2021-11-17

## 2021-11-14 RX ORDER — LIDOCAINE 40 MG/G
CREAM TOPICAL
Status: DISCONTINUED | OUTPATIENT
Start: 2021-11-14 | End: 2021-11-19 | Stop reason: HOSPADM

## 2021-11-14 RX ORDER — PANTOPRAZOLE SODIUM 40 MG/1
40 TABLET, DELAYED RELEASE ORAL
Status: DISCONTINUED | OUTPATIENT
Start: 2021-11-15 | End: 2021-11-19 | Stop reason: HOSPADM

## 2021-11-14 RX ORDER — PREGABALIN 100 MG/1
200 CAPSULE ORAL 3 TIMES DAILY
Status: DISCONTINUED | OUTPATIENT
Start: 2021-11-14 | End: 2021-11-19 | Stop reason: HOSPADM

## 2021-11-14 RX ORDER — OLANZAPINE 5 MG/1
5-10 TABLET, ORALLY DISINTEGRATING ORAL EVERY 6 HOURS PRN
Status: DISCONTINUED | OUTPATIENT
Start: 2021-11-14 | End: 2021-11-17

## 2021-11-14 RX ORDER — CLONIDINE HYDROCHLORIDE 0.1 MG/1
0.1 TABLET ORAL EVERY 8 HOURS
Status: DISCONTINUED | OUTPATIENT
Start: 2021-11-14 | End: 2021-11-17

## 2021-11-14 RX ORDER — AMLODIPINE BESYLATE 5 MG/1
5 TABLET ORAL DAILY
Status: DISCONTINUED | OUTPATIENT
Start: 2021-11-14 | End: 2021-11-19 | Stop reason: HOSPADM

## 2021-11-14 RX ORDER — ONDANSETRON 2 MG/ML
INJECTION INTRAMUSCULAR; INTRAVENOUS
Status: COMPLETED
Start: 2021-11-14 | End: 2021-11-14

## 2021-11-14 RX ORDER — PROPRANOLOL HYDROCHLORIDE 10 MG/1
10 TABLET ORAL 3 TIMES DAILY
Status: DISCONTINUED | OUTPATIENT
Start: 2021-11-14 | End: 2021-11-19 | Stop reason: HOSPADM

## 2021-11-14 RX ORDER — DOCUSATE SODIUM 100 MG/1
100 CAPSULE, LIQUID FILLED ORAL 2 TIMES DAILY
Status: DISCONTINUED | OUTPATIENT
Start: 2021-11-14 | End: 2021-11-19 | Stop reason: HOSPADM

## 2021-11-14 RX ORDER — LEVOTHYROXINE SODIUM 75 UG/1
75 TABLET ORAL
Status: DISCONTINUED | OUTPATIENT
Start: 2021-11-15 | End: 2021-11-19 | Stop reason: HOSPADM

## 2021-11-14 RX ADMIN — DIAZEPAM 10 MG: 5 TABLET ORAL at 08:48

## 2021-11-14 RX ADMIN — CLONIDINE HYDROCHLORIDE 0.1 MG: 0.1 TABLET ORAL at 15:44

## 2021-11-14 RX ADMIN — DIAZEPAM 10 MG: 5 TABLET ORAL at 15:44

## 2021-11-14 RX ADMIN — LORAZEPAM 2 MG: 1 TABLET ORAL at 05:38

## 2021-11-14 RX ADMIN — ARIPIPRAZOLE 2 MG: 2 TABLET ORAL at 13:31

## 2021-11-14 RX ADMIN — NICOTINE POLACRILEX 4 MG: 2 GUM, CHEWING ORAL at 19:40

## 2021-11-14 RX ADMIN — NICOTINE POLACRILEX 2 MG: 2 GUM, CHEWING ORAL at 05:38

## 2021-11-14 RX ADMIN — FOLIC ACID 1 MG: 1 TABLET ORAL at 08:35

## 2021-11-14 RX ADMIN — SODIUM CHLORIDE: 9 INJECTION, SOLUTION INTRAVENOUS at 08:38

## 2021-11-14 RX ADMIN — POTASSIUM CHLORIDE 40 MEQ: 1500 TABLET, EXTENDED RELEASE ORAL at 16:43

## 2021-11-14 RX ADMIN — CLONIDINE HYDROCHLORIDE 0.1 MG: 0.1 TABLET ORAL at 08:48

## 2021-11-14 RX ADMIN — NICOTINE POLACRILEX 4 MG: 2 GUM, CHEWING ORAL at 13:26

## 2021-11-14 RX ADMIN — NICOTINE POLACRILEX 4 MG: 2 GUM, CHEWING ORAL at 18:02

## 2021-11-14 RX ADMIN — ONDANSETRON 4 MG: 2 INJECTION INTRAMUSCULAR; INTRAVENOUS at 00:40

## 2021-11-14 RX ADMIN — NICOTINE POLACRILEX 4 MG: 2 GUM, CHEWING ORAL at 10:51

## 2021-11-14 RX ADMIN — PREGABALIN 200 MG: 100 CAPSULE ORAL at 21:44

## 2021-11-14 RX ADMIN — DIAZEPAM 10 MG: 5 TABLET ORAL at 13:26

## 2021-11-14 RX ADMIN — POTASSIUM CHLORIDE 20 MEQ: 1500 TABLET, EXTENDED RELEASE ORAL at 10:51

## 2021-11-14 RX ADMIN — DIAZEPAM 10 MG: 5 TABLET ORAL at 10:51

## 2021-11-14 RX ADMIN — PROPRANOLOL HYDROCHLORIDE 10 MG: 10 TABLET ORAL at 21:44

## 2021-11-14 RX ADMIN — POTASSIUM CHLORIDE 40 MEQ: 1500 TABLET, EXTENDED RELEASE ORAL at 08:48

## 2021-11-14 RX ADMIN — ONDANSETRON 4 MG: 2 INJECTION INTRAMUSCULAR; INTRAVENOUS at 21:44

## 2021-11-14 RX ADMIN — DOCUSATE SODIUM 100 MG: 100 CAPSULE, LIQUID FILLED ORAL at 21:44

## 2021-11-14 RX ADMIN — PREGABALIN 200 MG: 100 CAPSULE ORAL at 16:43

## 2021-11-14 RX ADMIN — ACETAMINOPHEN 650 MG: 325 TABLET, FILM COATED ORAL at 21:44

## 2021-11-14 RX ADMIN — NICOTINE POLACRILEX 2 MG: 2 GUM, CHEWING ORAL at 00:40

## 2021-11-14 RX ADMIN — NICOTINE POLACRILEX 2 MG: 2 GUM, CHEWING ORAL at 04:34

## 2021-11-14 RX ADMIN — ACETAMINOPHEN 650 MG: 325 TABLET, FILM COATED ORAL at 13:31

## 2021-11-14 RX ADMIN — SODIUM CHLORIDE: 9 INJECTION, SOLUTION INTRAVENOUS at 18:02

## 2021-11-14 RX ADMIN — DIAZEPAM 10 MG: 5 TABLET ORAL at 19:40

## 2021-11-14 RX ADMIN — LORAZEPAM 2 MG: 2 INJECTION INTRAMUSCULAR; INTRAVENOUS at 01:26

## 2021-11-14 RX ADMIN — NICOTINE POLACRILEX 4 MG: 2 GUM, CHEWING ORAL at 15:50

## 2021-11-14 RX ADMIN — GABAPENTIN 400 MG: 300 CAPSULE ORAL at 21:44

## 2021-11-14 RX ADMIN — PANTOPRAZOLE SODIUM 40 MG: 40 INJECTION, POWDER, FOR SOLUTION INTRAVENOUS at 08:36

## 2021-11-14 RX ADMIN — MULTIPLE VITAMINS W/ MINERALS TAB 1 TABLET: TAB at 08:35

## 2021-11-14 RX ADMIN — ONDANSETRON 4 MG: 2 INJECTION INTRAMUSCULAR; INTRAVENOUS at 15:44

## 2021-11-14 RX ADMIN — DIAZEPAM 10 MG: 5 TABLET ORAL at 21:44

## 2021-11-14 RX ADMIN — THIAMINE HCL TAB 100 MG 100 MG: 100 TAB at 08:36

## 2021-11-14 RX ADMIN — NICOTINE POLACRILEX 4 MG: 2 GUM, CHEWING ORAL at 21:44

## 2021-11-14 RX ADMIN — OLANZAPINE 5 MG: 5 TABLET, ORALLY DISINTEGRATING ORAL at 15:44

## 2021-11-14 RX ADMIN — PROPRANOLOL HYDROCHLORIDE 10 MG: 10 TABLET ORAL at 15:33

## 2021-11-14 RX ADMIN — AMLODIPINE BESYLATE 5 MG: 5 TABLET ORAL at 13:32

## 2021-11-14 RX ADMIN — NICOTINE POLACRILEX 4 MG: 2 GUM, CHEWING ORAL at 08:48

## 2021-11-14 RX ADMIN — GABAPENTIN 400 MG: 300 CAPSULE ORAL at 15:33

## 2021-11-14 ASSESSMENT — ACTIVITIES OF DAILY LIVING (ADL)
ADLS_ACUITY_SCORE: 7
ADLS_ACUITY_SCORE: 7
ADLS_ACUITY_SCORE: 14
ADLS_ACUITY_SCORE: 7
ADLS_ACUITY_SCORE: 14
ADLS_ACUITY_SCORE: 7
ADLS_ACUITY_SCORE: 14
ADLS_ACUITY_SCORE: 7
ADLS_ACUITY_SCORE: 5
ADLS_ACUITY_SCORE: 7
ADLS_ACUITY_SCORE: 14

## 2021-11-14 ASSESSMENT — MIFFLIN-ST. JEOR: SCORE: 2241.5

## 2021-11-14 NOTE — PLAN OF CARE
"Summary: ED admit this AM for Alcohol intoxication and overdose of seroquel/suboxone.    DATE & TIME: 11/14/2021 [id number]   Cognitive Concerns/ Orientation : A&O x4   BEHAVIOR & AGGRESSION TOOL COLOR: Green, flat/depressed affect  CIWA SCORE: 11, 8, 6 for tremor, visual and tactile disturbances. States he sees small dots on ceiling/in room and feels like things are crawling on his skin. Given zyprexa x1 for hallucinations. Patient is going through active withdrawal but does often ask when his next dose of valium is. Explained its not scheduled and its based on our assessment, he replied with \"well you know I drink 1/2 gallon of vodka a day so I have bad symptoms and its worse because I don't have my suboxone\"   ABNL VS/O2: BP slightly elevated at 155/95, other VSS on RA  MOBILITY: Ax1 w/GB  PAIN MANAGMENT: c/o mild headache, given tylenol x1  DIET: Regular, tolerating. Some intermittent nausea, given zofran.  BOWEL/BLADDER: continent, no BM  ABNL LAB/BG: K 3.0 replaced and recheck at 3.3, replaced again and recheck at 2100. ALT/AST 89/87.   DRAIN/DEVICES: PIV infusing NS at 125 ml/hr  TELEMETRY RHYTHM: NSR  SKIN: Scattered scabs on hands and head.   TESTS/PROCEDURES: None  D/C DAY/GOALS/PLACE: pending when out of alcohol withdrawal and safe disposition. From group home.   OTHER IMPORTANT INFO: Psych to see patient. States he still is suicidal, \"doesn't want to be alive anymore\", but no active plan. Sitter bedside for suicide precautions. Seizure precautions in place. Given nicotine gum throughout shift.       Suicide Handoff:  Current condition (current mood & behavior): green, cooperative  Sitter present: yes  Every 15 minute documentation by NA/RN completed for Shift: yes  Room safety Suicide Checklist completed in Epic: yes  Patient's color of severity (suicide scale): YELLOW  Order for psych consult placed (if appropriate): yes  Suicide care plan added: yes        "

## 2021-11-14 NOTE — PROGRESS NOTES
[facility]  Medicine Progress Note - Hospitalist Service       Date of Admission:  11/13/2021    Assessment & Plan           Hollis Barclay is a 51 year old male admitted on 11/13/2021. He presents to the emergency department from his group home     Acute uncomplicated alcohol withdrawal: Patient drinks 1/2 gallon hard alcohol per day, reports he drank 1 gallon of vodka with suicide attempt.  Following suicide attempt, was monitored in the emergency department.  Cleared from ingestion by poison control at approximately 1 AM, though subsequently developed alcohol withdrawal.  - Protonix daily  -CIWA protocol with Valium available for withdrawal symptoms.  -Psychiatry consulted  -Continue to encourage alcohol cessation  -Prior to admission acamprosate on hold  -Thiamine, folate, multivitamin daily  -Seizure precautions; history of convulsions in July, though not on antiepileptics with no epileptiform activity on EEG at that time.  -Admit to inpatient status; patient's typical alcohol withdrawal last approximately 3 days     Suicide attempt: Patient with a history of TBI, depression, intermittent suicidality.  Attempted to kill himself by taking 4 Suboxone doses as well as 22 tablets of 300 mg Seroquel, drinking 1 gallon of alcohol.  Timing of ingestion approximately 1 PM. recommendation by Poison control was for 12 hours of monitoring, which patient completed in the emergency department prior to developing alcohol withdrawal.  Depression with anxiety:  -Bedside sitter  -Suicide precautions  -Psychiatry consulted; anticipate transition to inpatient psychiatry when alcohol withdrawal has resolved  -Resume PTA propranolol. Holding Seroquel and Suboxone pending psychiatry consultation  -Continue with telemetry, EKG shows QTc 480  -Patient is currently voluntary.  If he demands to leave prior to being cleared by psychiatry, he would be holdable.  This was discussed with patient on  admission.     Narcotic dependence: History of polysubstance abuse including methamphetamine, cocaine, narcotics.  Had been on methadone and now has been transitioned to Suboxone  -Psychiatry consulted as above; appreciate recommendations regarding resumption of Suboxone versus transition back to methadone given suicide attempt.  Note that methadone does have some risk for prolonged QT, though patient has tolerated this medication historically.  Uncertain why he transitioned off of methadone previously.     Nicotine dependence: Patient uses chewing tobacco, transitioning to Nicorette gum.  -4 mg nicotine gum every hour as needed ordered  -Continue to encourage cessation     Hypokalemia: Mild at 3.3.  -2 g IV magnesium administered in the emergency department  -Potassium replacement protocol in place.  -Regular diet as tolerated     Diet: Combination Diet Regular Diet Adult; Safe Tray - with utensils    DVT Prophylaxis: Pneumatic Compression Devices  Barksdale Catheter: Not present  Central Lines: None  Code Status: Full Code      Disposition Plan   Expected discharge: 11/17/2021   recommended to TBD possibly inpatient psychiatry unit once : Withdrawal resolves, psychiatry eval completed.     The patient's care was discussed with the Bedside Nurse and Patient.    Vinay Carreon MD  Hospitalist Service  [facility]  Securely message with the Vocera Web Console (learn more here)  Text page via [employer] Paging/Directory    Clinically Significant Risk Factors Present on Admission                   ______________________________________________________________________    Interval History   Chart reviewed, patient was evaluated this morning.  Reports ongoing nausea but no vomiting.  No abdominal pain.  No diarrhea.  -Ongoing shakiness and restlessness but no agitation or hallucinations.  -Reports ongoing suicidal thoughts.    Data reviewed today: I reviewed all medications, new labs and imaging results  over the last 24 hours. I personally reviewed the EKG tracing showing Sinus rhythm, no ischemic changes, .    Physical Exam   Vital Signs: Temp: 98.5  F (36.9  C) Temp src: Oral BP: (!) 155/95 Pulse: 85   Resp: 16 SpO2: 95 % O2 Device: None (Room air) Oxygen Delivery: 3 LPM  Weight: 284 lbs 6.29 oz    General: AAOx3, restless and tremulous but appears comfortable.  HEENT: PERRLA EOMI. Mucosa moist.   Lungs: Bilateral equal air entry. Clear to auscultation, normal work of breathing.   CVS: S1S2 regular, no tachycardia or murmur.   Abdomen: Soft, NT, ND. BS heard.  MSK: No edema or deformities.  Neuro: AAOX3. CN 2-12 normal. Strength symmetrical.  Skin: No rash.       Data   Recent Labs   Lab 11/14/21  0832 11/13/21  1549 11/13/21  1548   WBC  --   --  6.5   HGB  --   --  13.6   MCV  --   --  88   PLT  --   --  387   NA  --  139  --    POTASSIUM 3.0* 3.3*  --    CHLORIDE  --  101  --    CO2  --  27  --    BUN  --  18  --    CR  --  0.60*  --    ANIONGAP  --  11  --    KACY  --  8.7  --    GLC  --  102*  --    ALBUMIN  --  3.6  --    PROTTOTAL  --  8.0  --    BILITOTAL  --  0.7  --    ALKPHOS  --  81  --    ALT  --  89*  --    AST  --  87*  --      No results found for this or any previous visit (from the past 24 hour(s)).  Medications     sodium chloride 125 mL/hr at 11/14/21 0838       cloNIDine  0.1 mg Oral Q8H     folic acid  1 mg Oral Daily     multivitamin w/minerals  1 tablet Oral Daily     pantoprazole (PROTONIX) IV  40 mg Intravenous Daily with breakfast     sodium chloride (PF)  3 mL Intracatheter Q8H     thiamine  100 mg Oral Daily

## 2021-11-14 NOTE — PROGRESS NOTES
RECEIVING UNIT ED HANDOFF REVIEW    ED Nurse Handoff Report was reviewed by: Danelle Luke RN on November 14, 2021 at 7:38 AM
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
## 2021-11-14 NOTE — PHARMACY-ADMISSION MEDICATION HISTORY
Pharmacy Medication History  Admission medication history interview status for the 11/13/2021  admission is complete. See EPIC admission navigator for prior to admission medications     Location of Interview: N/A  Medication history sources: MAR ([facility])    Significant changes made to the medication list:      In the past week, patient estimated taking medication this percent of the time: greater than 90%    Additional medication history information:   Updated per group home med list, last doses yesterday AM    Medication reconciliation completed by provider prior to medication history? No    Time spent in this activity: 15min    Prior to Admission medications    Medication Sig Last Dose Taking? Auth Provider   acamprosate (CAMPRAL) 333 MG EC tablet Take 2 tablets (666 mg) by mouth 3 times daily  Patient taking differently: Take 666 mg by mouth 3 times daily @ 07:00, 12:00, 19:00 11/13/2021 at AM Yes Ulises Perez MD   amLODIPine (NORVASC) 5 MG tablet Take 1 tablet (5 mg) by mouth daily 11/13/2021 at AM Yes Ulises Perez MD   ARIPiprazole (ABILIFY) 2 MG tablet Take 1 tablet (2 mg) by mouth daily 11/13/2021 at AM Yes Ulises Perez MD   diclofenac (VOLTAREN) 1 % topical gel Apply 2 g topically 4 times daily as needed for moderate pain prn at prn Yes Ulises Perez MD   docusate sodium (COLACE) 100 MG capsule Take 1 capsule (100 mg) by mouth 2 times daily 11/13/2021 at AM Yes Ulises Perez MD   folic acid (FOLVITE) 1 MG tablet Take 1 tablet (1,000 mcg) by mouth daily 11/13/2021 at AM Yes Ulises Perez MD   gabapentin (NEURONTIN) 400 MG capsule Take 1 capsule (400 mg) by mouth 3 times daily 11/13/2021 at AM Yes Ulises Perez MD   levothyroxine (SYNTHROID/LEVOTHROID) 75 MCG tablet Take 1 tablet (75 mcg) by mouth every morning (before breakfast) 11/13/2021 at AM Yes Ulises Perez MD   miconazole with skin protectant (SELENA ANTIFUNGAL) 2 % CREA cream Apply topically 2 times daily  Patient taking differently:  Apply topically 2 times daily For athlete's foot 11/13/2021 at AM Yes Ulises Perez MD   multivitamin w/minerals (THERA-VIT-M) tablet Take 1 tablet by mouth daily 11/13/2021 at AM Yes Ulises Perez MD   nicotine polacrilex (NICORETTE) 4 MG gum Place 1-2 each (4-8 mg) inside cheek every hour as needed for other (nicotine withdrawal symptoms) prn at prn Yes Ulises Perez MD   pantoprazole (PROTONIX) 40 MG EC tablet Take 1 tablet (40 mg) by mouth every morning (before breakfast) 11/13/2021 at AM Yes Ulises Perez MD   propranolol (INDERAL) 10 MG tablet Take 1 tablet (10 mg) by mouth 3 times daily 11/13/2021 at AM Yes Ulises Perez MD   QUEtiapine (SEROQUEL) 300 MG tablet Take 1 tablet (300 mg) by mouth At Bedtime 11/12/2021 at Unknown time Yes Ulises Perez MD       The information provided in this note is only as accurate as the sources available at the time of update(s)

## 2021-11-14 NOTE — ED NOTES
[facility]  ED Nurse Handoff Report    ED Chief complaint: Psychiatric Evaluation      ED Diagnosis:   Final diagnoses:   Alcoholic intoxication with complication (H)   Suicidal ideation   Intentional drug overdose, initial encounter (H)   Alcohol withdrawal syndrome with complication (H)       Code Status: Full Code    Allergies:   Allergies   Allergen Reactions     Hydroxyzine Hives     Previously unreported by patient, this is a new patient declaration as of 5/1/21.     Cucumber Extract      Cucumber the vegable     Nsaids      No problem with oral NSAIDs--Toradol injection itching only.       Patient Story: Pt c/o feeling psychotic and suicidal from drinking 2 bottles of vodka and 5 tablets of oxycodone  Focused Assessment:  See flowsheets/notes    Treatments and/or interventions provided: See MAR  Patient's response to treatments and/or interventions: see Flowsheets    To be done/followed up on inpatient unit:      Does this patient have any cognitive concerns?: none    Activity level - Baseline/Home:  Independent  Activity Level - Current:   Independent    Patient's Preferred language: English   Needed?: No    Isolation: None  Infection: Not Applicable  Patient tested for COVID 19 prior to admission: YES  Bariatric?: No    Vital Signs:   Vitals:    11/14/21 0145 11/14/21 0200 11/14/21 0215 11/14/21 0430   BP: 122/79 (!) 135/92 132/85 (!) 153/97   Pulse: 84 89 80 87   Resp: 12 11 12 10   Temp:  98  F (36.7  C) 97.8  F (36.6  C)    TempSrc:  Oral Temporal    SpO2: 91% 95% 94%    Weight:       Height:           Cardiac Rhythm:     Was the PSS-3 completed:   Yes  What interventions are required if any?    Interventions: DEC consulted;Monitored via video  Required Interventions: Room searched;Room made safe;Patient searched;Belongings removed  High Risk Required Interventions: Provider notified    Family Comments:   OBS brochure/video discussed/provided to patient/family: N/A               Name of person given brochure if not patient:               Relationship to patient:     For the majority of the shift this patient's behavior was Yellow.   Behavioral interventions performed were locked unit, video monitoring.    ED NURSE PHONE NUMBER: [phone]

## 2021-11-15 LAB
ALBUMIN SERPL-MCNC: 3 G/DL (ref 3.4–5)
ALP SERPL-CCNC: 68 U/L (ref 40–150)
ALT SERPL W P-5'-P-CCNC: 120 U/L (ref 0–70)
ANION GAP SERPL CALCULATED.3IONS-SCNC: 8 MMOL/L (ref 3–14)
AST SERPL W P-5'-P-CCNC: 131 U/L (ref 0–45)
BILIRUB SERPL-MCNC: 0.4 MG/DL (ref 0.2–1.3)
BUN SERPL-MCNC: 18 MG/DL (ref 7–30)
CALCIUM SERPL-MCNC: 8 MG/DL (ref 8.5–10.1)
CHLORIDE BLD-SCNC: 106 MMOL/L (ref 94–109)
CO2 SERPL-SCNC: 26 MMOL/L (ref 20–32)
CREAT SERPL-MCNC: 0.53 MG/DL (ref 0.66–1.25)
ERYTHROCYTE [DISTWIDTH] IN BLOOD BY AUTOMATED COUNT: 13.3 % (ref 10–15)
GFR SERPL CREATININE-BSD FRML MDRD: >90 ML/MIN/1.73M2
GLUCOSE BLD-MCNC: 97 MG/DL (ref 70–99)
HCT VFR BLD AUTO: 36.5 % (ref 40–53)
HGB BLD-MCNC: 11.6 G/DL (ref 13.3–17.7)
MAGNESIUM SERPL-MCNC: 1.9 MG/DL (ref 1.6–2.3)
MCH RBC QN AUTO: 29.7 PG (ref 26.5–33)
MCHC RBC AUTO-ENTMCNC: 31.8 G/DL (ref 31.5–36.5)
MCV RBC AUTO: 93 FL (ref 78–100)
PLATELET # BLD AUTO: 289 10E3/UL (ref 150–450)
POTASSIUM BLD-SCNC: 3.8 MMOL/L (ref 3.4–5.3)
PROT SERPL-MCNC: 6.6 G/DL (ref 6.8–8.8)
RBC # BLD AUTO: 3.91 10E6/UL (ref 4.4–5.9)
SODIUM SERPL-SCNC: 140 MMOL/L (ref 133–144)
WBC # BLD AUTO: 3.7 10E3/UL (ref 4–11)

## 2021-11-15 PROCEDURE — 85027 COMPLETE CBC AUTOMATED: CPT | Performed by: INTERNAL MEDICINE

## 2021-11-15 PROCEDURE — 99207 PR CONSULT E&M CHANGED TO INITIAL LEVEL: CPT | Performed by: PSYCHIATRY & NEUROLOGY

## 2021-11-15 PROCEDURE — 83735 ASSAY OF MAGNESIUM: CPT | Performed by: HOSPITALIST

## 2021-11-15 PROCEDURE — 36415 COLL VENOUS BLD VENIPUNCTURE: CPT | Performed by: INTERNAL MEDICINE

## 2021-11-15 PROCEDURE — 250N000013 HC RX MED GY IP 250 OP 250 PS 637: Performed by: HOSPITALIST

## 2021-11-15 PROCEDURE — 250N000013 HC RX MED GY IP 250 OP 250 PS 637: Performed by: INTERNAL MEDICINE

## 2021-11-15 PROCEDURE — 99232 SBSQ HOSP IP/OBS MODERATE 35: CPT | Performed by: HOSPITALIST

## 2021-11-15 PROCEDURE — 82040 ASSAY OF SERUM ALBUMIN: CPT | Performed by: INTERNAL MEDICINE

## 2021-11-15 PROCEDURE — 93005 ELECTROCARDIOGRAM TRACING: CPT

## 2021-11-15 PROCEDURE — 120N000001 HC R&B MED SURG/OB

## 2021-11-15 PROCEDURE — 250N000013 HC RX MED GY IP 250 OP 250 PS 637: Performed by: PSYCHIATRY & NEUROLOGY

## 2021-11-15 PROCEDURE — 99222 1ST HOSP IP/OBS MODERATE 55: CPT | Performed by: PSYCHIATRY & NEUROLOGY

## 2021-11-15 PROCEDURE — 250N000011 HC RX IP 250 OP 636: Performed by: HOSPITALIST

## 2021-11-15 PROCEDURE — 258N000003 HC RX IP 258 OP 636: Performed by: INTERNAL MEDICINE

## 2021-11-15 RX ORDER — QUETIAPINE FUMARATE 100 MG/1
300 TABLET, FILM COATED ORAL AT BEDTIME
Status: DISCONTINUED | OUTPATIENT
Start: 2021-11-15 | End: 2021-11-19 | Stop reason: HOSPADM

## 2021-11-15 RX ORDER — BUPRENORPHINE AND NALOXONE 8; 2 MG/1; MG/1
1 FILM, SOLUBLE BUCCAL; SUBLINGUAL
Status: DISCONTINUED | OUTPATIENT
Start: 2021-11-15 | End: 2021-11-19 | Stop reason: HOSPADM

## 2021-11-15 RX ORDER — LORAZEPAM 0.5 MG/1
0.5 TABLET ORAL EVERY 8 HOURS PRN
Status: DISCONTINUED | OUTPATIENT
Start: 2021-11-15 | End: 2021-11-17

## 2021-11-15 RX ADMIN — NICOTINE POLACRILEX 4 MG: 2 GUM, CHEWING ORAL at 20:22

## 2021-11-15 RX ADMIN — DIAZEPAM 10 MG: 5 TABLET ORAL at 13:18

## 2021-11-15 RX ADMIN — DIAZEPAM 10 MG: 5 TABLET ORAL at 20:22

## 2021-11-15 RX ADMIN — QUETIAPINE FUMARATE 300 MG: 100 TABLET ORAL at 21:03

## 2021-11-15 RX ADMIN — THIAMINE HCL TAB 100 MG 100 MG: 100 TAB at 07:08

## 2021-11-15 RX ADMIN — CLONIDINE HYDROCHLORIDE 0.1 MG: 0.1 TABLET ORAL at 01:17

## 2021-11-15 RX ADMIN — ONDANSETRON 4 MG: 4 TABLET, ORALLY DISINTEGRATING ORAL at 07:10

## 2021-11-15 RX ADMIN — ACETAMINOPHEN 650 MG: 325 TABLET, FILM COATED ORAL at 19:27

## 2021-11-15 RX ADMIN — BUPRENORPHINE AND NALOXONE 1 FILM: 8; 2 FILM, SOLUBLE BUCCAL; SUBLINGUAL at 11:07

## 2021-11-15 RX ADMIN — ARIPIPRAZOLE 2 MG: 2 TABLET ORAL at 08:49

## 2021-11-15 RX ADMIN — DIAZEPAM 10 MG: 5 TABLET ORAL at 05:04

## 2021-11-15 RX ADMIN — PROPRANOLOL HYDROCHLORIDE 10 MG: 10 TABLET ORAL at 20:23

## 2021-11-15 RX ADMIN — DOCUSATE SODIUM 100 MG: 100 CAPSULE, LIQUID FILLED ORAL at 08:49

## 2021-11-15 RX ADMIN — NICOTINE POLACRILEX 4 MG: 2 GUM, CHEWING ORAL at 07:08

## 2021-11-15 RX ADMIN — AMLODIPINE BESYLATE 5 MG: 5 TABLET ORAL at 08:49

## 2021-11-15 RX ADMIN — DIAZEPAM 10 MG: 5 TABLET ORAL at 01:14

## 2021-11-15 RX ADMIN — FOLIC ACID 1 MG: 1 TABLET ORAL at 07:08

## 2021-11-15 RX ADMIN — PANTOPRAZOLE SODIUM 40 MG: 40 TABLET, DELAYED RELEASE ORAL at 07:09

## 2021-11-15 RX ADMIN — ACETAMINOPHEN 650 MG: 325 TABLET, FILM COATED ORAL at 07:07

## 2021-11-15 RX ADMIN — GABAPENTIN 400 MG: 300 CAPSULE ORAL at 20:23

## 2021-11-15 RX ADMIN — ACETAMINOPHEN 650 MG: 325 TABLET, FILM COATED ORAL at 13:18

## 2021-11-15 RX ADMIN — SODIUM CHLORIDE: 9 INJECTION, SOLUTION INTRAVENOUS at 01:43

## 2021-11-15 RX ADMIN — CLONIDINE HYDROCHLORIDE 0.1 MG: 0.1 TABLET ORAL at 15:49

## 2021-11-15 RX ADMIN — PROPRANOLOL HYDROCHLORIDE 10 MG: 10 TABLET ORAL at 08:49

## 2021-11-15 RX ADMIN — DIAZEPAM 10 MG: 5 TABLET ORAL at 10:55

## 2021-11-15 RX ADMIN — NICOTINE POLACRILEX 4 MG: 2 GUM, CHEWING ORAL at 13:32

## 2021-11-15 RX ADMIN — NICOTINE POLACRILEX 4 MG: 2 GUM, CHEWING ORAL at 10:59

## 2021-11-15 RX ADMIN — PREGABALIN 200 MG: 100 CAPSULE ORAL at 20:23

## 2021-11-15 RX ADMIN — DOCUSATE SODIUM 100 MG: 100 CAPSULE, LIQUID FILLED ORAL at 20:23

## 2021-11-15 RX ADMIN — DIAZEPAM 10 MG: 5 TABLET ORAL at 07:18

## 2021-11-15 RX ADMIN — NICOTINE POLACRILEX 4 MG: 2 GUM, CHEWING ORAL at 15:53

## 2021-11-15 RX ADMIN — PROPRANOLOL HYDROCHLORIDE 10 MG: 10 TABLET ORAL at 15:48

## 2021-11-15 RX ADMIN — GABAPENTIN 400 MG: 300 CAPSULE ORAL at 08:48

## 2021-11-15 RX ADMIN — LORAZEPAM 0.5 MG: 0.5 TABLET ORAL at 17:09

## 2021-11-15 RX ADMIN — MULTIPLE VITAMINS W/ MINERALS TAB 1 TABLET: TAB at 07:09

## 2021-11-15 RX ADMIN — PREGABALIN 200 MG: 100 CAPSULE ORAL at 08:49

## 2021-11-15 RX ADMIN — CLONIDINE HYDROCHLORIDE 0.1 MG: 0.1 TABLET ORAL at 08:49

## 2021-11-15 RX ADMIN — BUPRENORPHINE AND NALOXONE 1 FILM: 8; 2 FILM, SOLUBLE BUCCAL; SUBLINGUAL at 17:05

## 2021-11-15 RX ADMIN — NICOTINE POLACRILEX 4 MG: 2 GUM, CHEWING ORAL at 05:04

## 2021-11-15 RX ADMIN — PREGABALIN 200 MG: 100 CAPSULE ORAL at 15:49

## 2021-11-15 RX ADMIN — LEVOTHYROXINE SODIUM 75 MCG: 75 TABLET ORAL at 07:09

## 2021-11-15 RX ADMIN — GABAPENTIN 400 MG: 300 CAPSULE ORAL at 15:49

## 2021-11-15 RX ADMIN — NICOTINE POLACRILEX 4 MG: 2 GUM, CHEWING ORAL at 01:15

## 2021-11-15 RX ADMIN — NICOTINE POLACRILEX 4 MG: 2 GUM, CHEWING ORAL at 19:27

## 2021-11-15 RX ADMIN — ACETAMINOPHEN 650 MG: 325 TABLET, FILM COATED ORAL at 01:15

## 2021-11-15 ASSESSMENT — ACTIVITIES OF DAILY LIVING (ADL)
ADLS_ACUITY_SCORE: 9
ADLS_ACUITY_SCORE: 9
ADLS_ACUITY_SCORE: 7
ADLS_ACUITY_SCORE: 9
ADLS_ACUITY_SCORE: 8
ADLS_ACUITY_SCORE: 7
ADLS_ACUITY_SCORE: 9
ADLS_ACUITY_SCORE: 9
ADLS_ACUITY_SCORE: 7
ADLS_ACUITY_SCORE: 8
ADLS_ACUITY_SCORE: 9
ADLS_ACUITY_SCORE: 7
ADLS_ACUITY_SCORE: 8
ADLS_ACUITY_SCORE: 9
ADLS_ACUITY_SCORE: 7
ADLS_ACUITY_SCORE: 7
ADLS_ACUITY_SCORE: 9
ADLS_ACUITY_SCORE: 7
ADLS_ACUITY_SCORE: 7
ADLS_ACUITY_SCORE: 9

## 2021-11-15 NOTE — PLAN OF CARE
Summary: ED admit this AM for Alcohol intoxication and overdose of seroquel/suboxone.    DATE & TIME: 11/15/2021 7637-5127  Cognitive Concerns/ Orientation : A&O x4, agitated at times, flat affect but cooperative. PRN Ativan available Q8H for agitation. Ativan given x1.  BEHAVIOR & AGGRESSION TOOL COLOR: Green  CIWA SCORE: 7, 15 (Valium PO given x1), 7, 12 (Valium PO given x1), 6  ABNL VS/O2: VSS on RA  MOBILITY: Independent in room   PAIN MANAGMENT: c/o moderate headache, Tylenol given x1  DIET: Regular, good appetite   BOWEL/BLADDER: continent, no BM; pt reports constipation  ABNL LAB/BG: WBC 3.7, Hgb 11.6, ,   DRAIN/DEVICES: IV SL  TELEMETRY RHYTHM: NSR  SKIN: Scattered scabs on hands and head. Feet calloused; lotion applied. +2 BLE edema noted.   TESTS/PROCEDURES: None  D/C DAY/GOALS/PLACE: Pending medical clearance and when inpatient psych bed available  OTHER IMPORTANT INFO: LS diminished, BS active x4. Contact precautions maintained for MRSA. Seizure precautions maintained. SW maintained; sitter at bedside. Pt reports still being suicidal but no active plan. Pt states he just wants a drink. Nicorette given PRN. Restarted Suboxone today. Pt reports baseline numbness and tingling in feet.     Current condition (current mood & behavior): Agitated at times, but cooperative   Sitter present: yes  Every 15 minute documentation by NA/RN completed for Shift: yes  Room safety Suicide Checklist completed in Epic: yes  Patient's color of severity (suicide scale): YELLOW  Order for psych consult placed (if appropriate): yes  Suicide care plan added: yes      Daysi Madrigal RN

## 2021-11-15 NOTE — PLAN OF CARE
Cognitive Concerns/ Orientation : A&O x4   BEHAVIOR & AGGRESSION TOOL COLOR: Green, flat affect  CIWA SCORE: 11/3/2/9 for tremor, anxiety tactile disturbances-Valium 10 mg x 2 given  ABNL VS/O2: VSS on RA  MOBILITY: Ax1 w/GB  PAIN MANAGMENT: c/o mild headache, given Tylenol x1  DIET: Regular, tolerating  BOWEL/BLADDER: continent, no BM  ABNL LAB/BG: K 3.0 replaced and recheck 3.6,  ALT/AST 89/87.   DRAIN/DEVICES: PIV infusing NS at 125 ml/hr  TELEMETRY RHYTHM: NSR  SKIN: Scattered scabs on hands and head.   TESTS/PROCEDURES: None  D/C DAY/GOALS/PLACE: pending when out of alcohol withdrawal and safe disposition. From group home.   OTHER IMPORTANT INFO: Psych to see patient. Pt remains suicidal but no active plan. Sitter bedside for suicide precautions. Seizure precautions in place. Given nicotine gum x 2 this shift.     Current condition anxious, cooperative  Sitter present: yes  Every 15 minute documentation by NA/RN completed for Shift: yes  Room safety Suicide Checklist completed in Epic: yes  Patient's color of severity yellow  Order for psych consult placed (if appropriate): yes  Suicide care plan added: yes      Cindy Clark RN
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
## 2021-11-15 NOTE — PROGRESS NOTES
[facility]  Medicine Progress Note - Hospitalist Service       Date of Admission:  11/13/2021    Assessment & Plan           Hollis Barclay is a 51 year old male admitted on 11/13/2021. He presents to the emergency department from his group home     Acute uncomplicated alcohol withdrawal: Patient drinks 1/2 gallon hard alcohol per day, reports he drank 1 gallon of vodka with suicide attempt.  Following suicide attempt, was monitored in the emergency department.  Cleared from ingestion by poison control at approximately 1 AM, though subsequently developed alcohol withdrawal.  - Protonix daily  -CIWA protocol with Valium available for withdrawal symptoms.  -Psychiatry consulted, PTA meds resumed, recommending transition to inpatient psychiatry once withdrawal resolves  -Continue to encourage alcohol cessation  -Prior to admission acamprosate on hold  -Thiamine, folate, multivitamin daily  -Seizure precautions; history of convulsions in July, though not on antiepileptics with no epileptiform activity on EEG at that time.  -Admit to inpatient status; patient's typical alcohol withdrawal last approximately 3 days     Suicide attempt: Patient with a history of TBI, depression, intermittent suicidality.  Attempted to kill himself by taking 4 Suboxone doses as well as 22 tablets of 300 mg Seroquel, drinking 1 gallon of alcohol.  Timing of ingestion approximately 1 PM. recommendation by Poison control was for 12 hours of monitoring, which patient completed in the emergency department prior to developing alcohol withdrawal.  Depression with anxiety:  -Bedside sitter  -Suicide precautions  -Psychiatry consulted; recommending transition to inpatient psychiatry once alcohol withdrawal resolves  -Resumed PTA propranolol, Seroquel and Suboxone. Abilify added.  -Continue with telemetry, EKG shows QTc 480  -Patient is currently voluntary.  If he demands to leave prior to being cleared by psychiatry, he  would be holdable.  This was discussed with patient on admission.     Narcotic dependence: History of polysubstance abuse including methamphetamine, cocaine, narcotics.  Had been on methadone and now has been transitioned to Suboxone  -Psychiatry consulted as above; appreciate recommendations regarding resumption of Suboxone versus transition back to methadone given suicide attempt.  Note that methadone does have some risk for prolonged QT, though patient has tolerated this medication historically.  Uncertain why he transitioned off of methadone previously.     Nicotine dependence: Patient uses chewing tobacco, transitioning to Nicorette gum.  -4 mg nicotine gum every hour as needed ordered  -Continue to encourage cessation     Hypokalemia: Mild at 3.3.  -2 g IV magnesium administered in the emergency department  -Potassium replacement protocol in place.  -Regular diet as tolerated    Constipation  Bowel regimen available       Diet: Combination Diet Regular Diet Adult; Safe Tray - with utensils    DVT Prophylaxis: Pneumatic Compression Devices  Barksdale Catheter: Not present  Central Lines: None  Code Status: Full Code      Disposition Plan   Expected discharge: 11/17/2021   recommended to inpatient psychiatry unit once : Withdrawal resolves      The patient's care was discussed with the Bedside Nurse and Patient CC    Vinay Carreon MD  Hospitalist Service  [facility]  Securely message with the Vocera Web Console (learn more here)  Text page via [employer] Paging/Directory    Clinically Significant Risk Factors Present on Admission                ______________________________________________________________________    Interval History     Discussed with nursing staff, and evaluated patient. Still going through withdrawal- tremulous. Patient feels unsteady on walking.   Reports constipation but no abd pain or vomiting  No headache, nausea.       Data reviewed today: I reviewed all medications,  new labs and imaging results over the last 24 hours. I personally reviewed the EKG tracing showing Sinus rhythm, no ischemic changes, .    Physical Exam   Vital Signs: Temp: 97.6  F (36.4  C) Temp src: Axillary BP: 104/70 Pulse: 67   Resp: 18 SpO2: 96 % O2 Device: None (Room air)    Weight: 284 lbs 6.29 oz    General: AAOx3, mildly tremulous but appears comfortable.  HEENT: PERRLA EOMI. Mucosa moist.   Lungs: Bilateral equal air entry. Clear to auscultation, normal work of breathing.   CVS: S1S2 regular, no tachycardia or murmur.   Abdomen: Soft, NT, ND. BS heard.  MSK: No edema or deformities.  Neuro/psych: AAOX3. CN 2-12 normal. Strength symmetrical. Calm and pleasant/co operative. No agitation or hallucination  Skin: No rash.       Data   Recent Labs   Lab 11/14/21  2117 11/14/21  1554 11/14/21  0832 11/13/21  1549 11/13/21  1549 11/13/21  1548   WBC  --   --   --   --   --  6.5   HGB  --   --   --   --   --  13.6   MCV  --   --   --   --   --  88   PLT  --   --   --   --   --  387   NA  --   --   --   --  139  --    POTASSIUM 3.6 3.3* 3.0*   < > 3.3*  --    CHLORIDE  --   --   --   --  101  --    CO2  --   --   --   --  27  --    BUN  --   --   --   --  18  --    CR  --   --   --   --  0.60*  --    ANIONGAP  --   --   --   --  11  --    KACY  --   --   --   --  8.7  --    GLC  --   --   --   --  102*  --    ALBUMIN  --   --   --   --  3.6  --    PROTTOTAL  --   --   --   --  8.0  --    BILITOTAL  --   --   --   --  0.7  --    ALKPHOS  --   --   --   --  81  --    ALT  --   --   --   --  89*  --    AST  --   --   --   --  87*  --     < > = values in this interval not displayed.     No results found for this or any previous visit (from the past 24 hour(s)).  Medications       amLODIPine  5 mg Oral Daily     ARIPiprazole  2 mg Oral Daily     buprenorphine HCl-naloxone HCl  1 Film Sublingual TID     cloNIDine  0.1 mg Oral Q8H     docusate sodium  100 mg Oral BID     folic acid  1 mg Oral Daily     gabapentin   400 mg Oral TID     levothyroxine  75 mcg Oral QAM AC     multivitamin w/minerals  1 tablet Oral Daily     pantoprazole  40 mg Oral QAM AC     Pregabalin  200 mg Oral TID     propranolol  10 mg Oral TID     QUEtiapine  300 mg Oral At Bedtime     sodium chloride (PF)  3 mL Intracatheter Q8H     thiamine  100 mg Oral Daily

## 2021-11-15 NOTE — PROGRESS NOTES
5257-7204: No change in patient status. BP slightly elevated 150's systolic, other VSS on RA. Up SBA. Tylenol given for headache, zofran for nausea. CIWA 10, 8- given valium for tremors, anxiety, visual/auditory hallucinations that patient report. Nicotine gum frequently. K recheck was 3.6. Sitter bedside for suicide precautions.     Current condition (current mood & behavior): Green, cooperative, anxious at times  Sitter present: yes  Every 15 minute documentation by NA/RN completed for Shift: yes  Room safety Suicide Checklist completed in Epic: yes  Patient's color of severity (suicide scale): YELLOW  Order for psych consult placed (if appropriate): yes  Suicide care plan added: yes

## 2021-11-16 LAB
ATRIAL RATE - MUSE: 55 BPM
DIASTOLIC BLOOD PRESSURE - MUSE: NORMAL MMHG
INTERPRETATION ECG - MUSE: NORMAL
P AXIS - MUSE: 38 DEGREES
PR INTERVAL - MUSE: 206 MS
QRS DURATION - MUSE: 100 MS
QT - MUSE: 472 MS
QTC - MUSE: 451 MS
R AXIS - MUSE: -9 DEGREES
SYSTOLIC BLOOD PRESSURE - MUSE: NORMAL MMHG
T AXIS - MUSE: 31 DEGREES
VENTRICULAR RATE- MUSE: 55 BPM

## 2021-11-16 PROCEDURE — 120N000001 HC R&B MED SURG/OB

## 2021-11-16 PROCEDURE — 250N000013 HC RX MED GY IP 250 OP 250 PS 637: Performed by: PSYCHIATRY & NEUROLOGY

## 2021-11-16 PROCEDURE — 99232 SBSQ HOSP IP/OBS MODERATE 35: CPT | Performed by: HOSPITALIST

## 2021-11-16 PROCEDURE — 250N000013 HC RX MED GY IP 250 OP 250 PS 637: Performed by: INTERNAL MEDICINE

## 2021-11-16 PROCEDURE — 250N000013 HC RX MED GY IP 250 OP 250 PS 637: Performed by: HOSPITALIST

## 2021-11-16 RX ADMIN — PROPRANOLOL HYDROCHLORIDE 10 MG: 10 TABLET ORAL at 15:13

## 2021-11-16 RX ADMIN — THIAMINE HCL TAB 100 MG 100 MG: 100 TAB at 08:24

## 2021-11-16 RX ADMIN — NICOTINE POLACRILEX 4 MG: 2 GUM, CHEWING ORAL at 15:13

## 2021-11-16 RX ADMIN — LEVOTHYROXINE SODIUM 75 MCG: 75 TABLET ORAL at 08:24

## 2021-11-16 RX ADMIN — NICOTINE POLACRILEX 4 MG: 2 GUM, CHEWING ORAL at 11:41

## 2021-11-16 RX ADMIN — GABAPENTIN 400 MG: 300 CAPSULE ORAL at 15:14

## 2021-11-16 RX ADMIN — CLONIDINE HYDROCHLORIDE 0.1 MG: 0.1 TABLET ORAL at 00:06

## 2021-11-16 RX ADMIN — AMLODIPINE BESYLATE 5 MG: 5 TABLET ORAL at 08:23

## 2021-11-16 RX ADMIN — PROPRANOLOL HYDROCHLORIDE 10 MG: 10 TABLET ORAL at 08:24

## 2021-11-16 RX ADMIN — PROPRANOLOL HYDROCHLORIDE 10 MG: 10 TABLET ORAL at 22:08

## 2021-11-16 RX ADMIN — NICOTINE POLACRILEX 4 MG: 2 GUM, CHEWING ORAL at 19:07

## 2021-11-16 RX ADMIN — DOCUSATE SODIUM 100 MG: 100 CAPSULE, LIQUID FILLED ORAL at 22:09

## 2021-11-16 RX ADMIN — BUPRENORPHINE AND NALOXONE 1 FILM: 8; 2 FILM, SOLUBLE BUCCAL; SUBLINGUAL at 08:24

## 2021-11-16 RX ADMIN — PREGABALIN 200 MG: 100 CAPSULE ORAL at 08:23

## 2021-11-16 RX ADMIN — DOCUSATE SODIUM 100 MG: 100 CAPSULE, LIQUID FILLED ORAL at 09:08

## 2021-11-16 RX ADMIN — PANTOPRAZOLE SODIUM 40 MG: 40 TABLET, DELAYED RELEASE ORAL at 08:23

## 2021-11-16 RX ADMIN — FOLIC ACID 1 MG: 1 TABLET ORAL at 08:23

## 2021-11-16 RX ADMIN — ARIPIPRAZOLE 2 MG: 2 TABLET ORAL at 08:23

## 2021-11-16 RX ADMIN — MULTIPLE VITAMINS W/ MINERALS TAB 1 TABLET: TAB at 08:24

## 2021-11-16 RX ADMIN — GABAPENTIN 400 MG: 300 CAPSULE ORAL at 22:09

## 2021-11-16 RX ADMIN — PREGABALIN 200 MG: 100 CAPSULE ORAL at 15:13

## 2021-11-16 RX ADMIN — CLONIDINE HYDROCHLORIDE 0.1 MG: 0.1 TABLET ORAL at 16:37

## 2021-11-16 RX ADMIN — DIAZEPAM 10 MG: 5 TABLET ORAL at 09:16

## 2021-11-16 RX ADMIN — GABAPENTIN 400 MG: 300 CAPSULE ORAL at 08:23

## 2021-11-16 RX ADMIN — CLONIDINE HYDROCHLORIDE 0.1 MG: 0.1 TABLET ORAL at 08:24

## 2021-11-16 RX ADMIN — BUPRENORPHINE AND NALOXONE 1 FILM: 8; 2 FILM, SOLUBLE BUCCAL; SUBLINGUAL at 11:40

## 2021-11-16 ASSESSMENT — ACTIVITIES OF DAILY LIVING (ADL)
ADLS_ACUITY_SCORE: 8

## 2021-11-16 NOTE — PLAN OF CARE
Summary: Alcohol intoxication and overdose of seroquel/suboxone.    DATE & TIME: 11/16/2021 1502-8019  Cognitive Concerns/ Orientation : A&Ox4- slightly sedated from valium -agitated,anxious at times, cooperative. PRN Ativan available Q8H for agitation, prn valium administered x1  BEHAVIOR & AGGRESSION TOOL COLOR: Green  CIWA SCORE: 9, 7 (Valium PO given x1)  ABNL VS/O2: VSS on RA  MOBILITY: Independent in room   PAIN MANAGMENT: mild headache- generalized withdrawal discomfort per pt   DIET: Regular, good appetite   BOWEL/BLADDER: continent, no BM; pt reports constipation. Colace given  ABNL LAB/BG: WBC 3.7, Hgb 11.6, ,   DRAIN/DEVICES: IV SL  TELEMETRY RHYTHM: NSR  SKIN: Scattered scabs on hands and head. Feet calloused. TESTS/PROCEDURES: None  D/C DAY/GOALS/PLACE: Pending medical clearance and when inpatient psych bed available.  OTHER IMPORTANT INFO: Contact precautions maintained for MRSA. Seizure precautions maintained. SW maintained; sitter at bedside. Pt reports still being suicidal but no active plan. Nicorette 4mg given PRN. Pt reports baseline numbness and tingling in feet. Intermittent sleeping after PRN valium.        Current condition (current mood & behavior) restless and agitated at start of shift, cooperative. Sitter present: Yes  Every 15 minute documentation by NA/RN completed for Shift: Yes  Room safety Suicide Checklist completed in Epic: Yes  Patient's color of severity (suicide scale): YELLOW  Order for psych consult placed (if appropriate): Yes  Suicide care plan added: Yes

## 2021-11-17 PROCEDURE — 250N000013 HC RX MED GY IP 250 OP 250 PS 637: Performed by: INTERNAL MEDICINE

## 2021-11-17 PROCEDURE — 250N000013 HC RX MED GY IP 250 OP 250 PS 637: Performed by: HOSPITALIST

## 2021-11-17 PROCEDURE — 99232 SBSQ HOSP IP/OBS MODERATE 35: CPT | Performed by: PSYCHIATRY & NEUROLOGY

## 2021-11-17 PROCEDURE — 120N000001 HC R&B MED SURG/OB

## 2021-11-17 PROCEDURE — 99233 SBSQ HOSP IP/OBS HIGH 50: CPT | Performed by: INTERNAL MEDICINE

## 2021-11-17 PROCEDURE — 250N000013 HC RX MED GY IP 250 OP 250 PS 637: Performed by: PSYCHIATRY & NEUROLOGY

## 2021-11-17 RX ORDER — OLANZAPINE 5 MG/1
5 TABLET, ORALLY DISINTEGRATING ORAL EVERY 6 HOURS PRN
Status: DISCONTINUED | OUTPATIENT
Start: 2021-11-17 | End: 2021-11-19 | Stop reason: HOSPADM

## 2021-11-17 RX ORDER — CETIRIZINE HYDROCHLORIDE 10 MG/1
10 TABLET ORAL DAILY PRN
Status: DISCONTINUED | OUTPATIENT
Start: 2021-11-17 | End: 2021-11-19 | Stop reason: HOSPADM

## 2021-11-17 RX ORDER — FLUTICASONE PROPIONATE 50 MCG
1 SPRAY, SUSPENSION (ML) NASAL DAILY
Status: DISCONTINUED | OUTPATIENT
Start: 2021-11-17 | End: 2021-11-19 | Stop reason: HOSPADM

## 2021-11-17 RX ORDER — GABAPENTIN 300 MG/1
300 CAPSULE ORAL 3 TIMES DAILY
Status: DISCONTINUED | OUTPATIENT
Start: 2021-11-17 | End: 2021-11-19 | Stop reason: HOSPADM

## 2021-11-17 RX ORDER — NAPROXEN 500 MG/1
500 TABLET ORAL 3 TIMES DAILY PRN
Status: DISCONTINUED | OUTPATIENT
Start: 2021-11-17 | End: 2021-11-19 | Stop reason: HOSPADM

## 2021-11-17 RX ORDER — POLYETHYLENE GLYCOL 3350 17 G/17G
17 POWDER, FOR SOLUTION ORAL DAILY PRN
Status: DISCONTINUED | OUTPATIENT
Start: 2021-11-17 | End: 2021-11-19 | Stop reason: HOSPADM

## 2021-11-17 RX ORDER — DIAZEPAM 10 MG/2ML
5-10 INJECTION, SOLUTION INTRAMUSCULAR; INTRAVENOUS EVERY 30 MIN PRN
Status: DISCONTINUED | OUTPATIENT
Start: 2021-11-17 | End: 2021-11-19

## 2021-11-17 RX ORDER — OLANZAPINE 5 MG/1
10 TABLET, ORALLY DISINTEGRATING ORAL 2 TIMES DAILY
Status: DISCONTINUED | OUTPATIENT
Start: 2021-11-17 | End: 2021-11-19 | Stop reason: HOSPADM

## 2021-11-17 RX ORDER — SENNOSIDES 8.6 MG
1-2 TABLET ORAL 2 TIMES DAILY PRN
Status: DISCONTINUED | OUTPATIENT
Start: 2021-11-17 | End: 2021-11-19 | Stop reason: HOSPADM

## 2021-11-17 RX ORDER — DIAZEPAM 5 MG
10 TABLET ORAL EVERY 4 HOURS PRN
Status: DISCONTINUED | OUTPATIENT
Start: 2021-11-17 | End: 2021-11-19

## 2021-11-17 RX ADMIN — PROPRANOLOL HYDROCHLORIDE 10 MG: 10 TABLET ORAL at 08:23

## 2021-11-17 RX ADMIN — NAPROXEN 500 MG: 500 TABLET ORAL at 22:05

## 2021-11-17 RX ADMIN — POLYETHYLENE GLYCOL 3350 17 G: 17 POWDER, FOR SOLUTION ORAL at 15:26

## 2021-11-17 RX ADMIN — NICOTINE POLACRILEX 4 MG: 2 GUM, CHEWING ORAL at 04:54

## 2021-11-17 RX ADMIN — BUPRENORPHINE AND NALOXONE 1 FILM: 8; 2 FILM, SOLUBLE BUCCAL; SUBLINGUAL at 17:21

## 2021-11-17 RX ADMIN — PROPRANOLOL HYDROCHLORIDE 10 MG: 10 TABLET ORAL at 22:05

## 2021-11-17 RX ADMIN — NICOTINE POLACRILEX 4 MG: 2 GUM, CHEWING ORAL at 17:17

## 2021-11-17 RX ADMIN — MULTIPLE VITAMINS W/ MINERALS TAB 1 TABLET: TAB at 07:57

## 2021-11-17 RX ADMIN — ACETAMINOPHEN 650 MG: 325 TABLET, FILM COATED ORAL at 20:01

## 2021-11-17 RX ADMIN — OLANZAPINE 5 MG: 5 TABLET, ORALLY DISINTEGRATING ORAL at 14:04

## 2021-11-17 RX ADMIN — GABAPENTIN 300 MG: 300 CAPSULE ORAL at 09:07

## 2021-11-17 RX ADMIN — CETIRIZINE HYDROCHLORIDE 10 MG: 10 TABLET, FILM COATED ORAL at 17:18

## 2021-11-17 RX ADMIN — LORAZEPAM 0.5 MG: 0.5 TABLET ORAL at 06:05

## 2021-11-17 RX ADMIN — BUPRENORPHINE AND NALOXONE 1 FILM: 8; 2 FILM, SOLUBLE BUCCAL; SUBLINGUAL at 07:56

## 2021-11-17 RX ADMIN — GABAPENTIN 300 MG: 300 CAPSULE ORAL at 22:05

## 2021-11-17 RX ADMIN — NICOTINE POLACRILEX 4 MG: 2 GUM, CHEWING ORAL at 15:25

## 2021-11-17 RX ADMIN — NICOTINE POLACRILEX 4 MG: 2 GUM, CHEWING ORAL at 13:33

## 2021-11-17 RX ADMIN — DIAZEPAM 10 MG: 5 TABLET ORAL at 15:14

## 2021-11-17 RX ADMIN — NICOTINE POLACRILEX 4 MG: 2 GUM, CHEWING ORAL at 20:07

## 2021-11-17 RX ADMIN — DIAZEPAM 10 MG: 5 TABLET ORAL at 09:17

## 2021-11-17 RX ADMIN — QUETIAPINE FUMARATE 300 MG: 100 TABLET ORAL at 22:05

## 2021-11-17 RX ADMIN — PROPRANOLOL HYDROCHLORIDE 10 MG: 10 TABLET ORAL at 15:14

## 2021-11-17 RX ADMIN — DOCUSATE SODIUM 100 MG: 100 CAPSULE, LIQUID FILLED ORAL at 20:06

## 2021-11-17 RX ADMIN — PANTOPRAZOLE SODIUM 40 MG: 40 TABLET, DELAYED RELEASE ORAL at 07:57

## 2021-11-17 RX ADMIN — NAPROXEN 500 MG: 500 TABLET ORAL at 17:18

## 2021-11-17 RX ADMIN — OLANZAPINE 10 MG: 5 TABLET, ORALLY DISINTEGRATING ORAL at 17:19

## 2021-11-17 RX ADMIN — THIAMINE HCL TAB 100 MG 100 MG: 100 TAB at 07:57

## 2021-11-17 RX ADMIN — CLONIDINE HYDROCHLORIDE 0.1 MG: 0.1 TABLET ORAL at 00:23

## 2021-11-17 RX ADMIN — NICOTINE POLACRILEX 4 MG: 2 GUM, CHEWING ORAL at 22:08

## 2021-11-17 RX ADMIN — NICOTINE POLACRILEX 4 MG: 2 GUM, CHEWING ORAL at 01:04

## 2021-11-17 RX ADMIN — NAPROXEN 500 MG: 500 TABLET ORAL at 11:02

## 2021-11-17 RX ADMIN — NICOTINE POLACRILEX 4 MG: 2 GUM, CHEWING ORAL at 08:23

## 2021-11-17 RX ADMIN — NICOTINE POLACRILEX 4 MG: 2 GUM, CHEWING ORAL at 11:02

## 2021-11-17 RX ADMIN — LEVOTHYROXINE SODIUM 75 MCG: 75 TABLET ORAL at 07:57

## 2021-11-17 RX ADMIN — PREGABALIN 200 MG: 100 CAPSULE ORAL at 09:07

## 2021-11-17 RX ADMIN — BUPRENORPHINE AND NALOXONE 1 FILM: 8; 2 FILM, SOLUBLE BUCCAL; SUBLINGUAL at 12:39

## 2021-11-17 RX ADMIN — DIAZEPAM 10 MG: 5 TABLET ORAL at 20:06

## 2021-11-17 RX ADMIN — NICOTINE POLACRILEX 4 MG: 2 GUM, CHEWING ORAL at 06:31

## 2021-11-17 RX ADMIN — AMLODIPINE BESYLATE 5 MG: 5 TABLET ORAL at 08:23

## 2021-11-17 RX ADMIN — GABAPENTIN 300 MG: 300 CAPSULE ORAL at 15:14

## 2021-11-17 RX ADMIN — OLANZAPINE 10 MG: 5 TABLET, ORALLY DISINTEGRATING ORAL at 07:56

## 2021-11-17 RX ADMIN — DIAZEPAM 10 MG: 5 TABLET ORAL at 08:23

## 2021-11-17 RX ADMIN — DOCUSATE SODIUM 100 MG: 100 CAPSULE, LIQUID FILLED ORAL at 08:23

## 2021-11-17 RX ADMIN — FLUTICASONE PROPIONATE 1 SPRAY: 50 SPRAY, METERED NASAL at 08:23

## 2021-11-17 RX ADMIN — FOLIC ACID 1 MG: 1 TABLET ORAL at 07:57

## 2021-11-17 ASSESSMENT — ACTIVITIES OF DAILY LIVING (ADL)
ADLS_ACUITY_SCORE: 8

## 2021-11-17 NOTE — PLAN OF CARE
"Summary: Alcohol intoxication and overdose of seroquel/suboxone.    DATE & TIME: 11/16/21-11/17/21 6825-6637  Cognitive Concerns/ Orientation : A&Ox3; disoriented to situation at times. Pt very anxious this shift, PRN ativan given. Sitter at bedside for SW. Pt stating suicidal ideation this shift.   BEHAVIOR & AGGRESSION TOOL COLOR: Green  CIWA SCORE: 6, 4 (tremors, anxiety/agitation)    ABNL VS/O2: VSS on RA, pt refusing continuous pulse ox   MOBILITY: SBA in room   PAIN MANAGMENT: denies   DIET: Regular, good appetite   BOWEL/BLADDER: Continent  ABNL LAB/BG: N/A  DRAIN/DEVICES: PIV SL  TELEMETRY RHYTHM: NSR  SKIN: Scattered scabs on hands and head. Feet calloused. L hand +2 edema.  TESTS/PROCEDURES: None  D/C DAY/GOALS/PLACE: Pending medical clearance and when inpatient psych bed available.  OTHER IMPORTANT INFO: Contact precautions maintained for MRSA. Seizure precautions maintained. Pt reports he wants to \"walk out of the window.\" Nicorette 4mg given PRN x3. Pt reports baseline numbness and tingling in feet. Pt c/o congestion, Fluticasone nasal spray ordered. Psych to see this AM to review medications possibly causing lethargy.   "

## 2021-11-17 NOTE — PLAN OF CARE
"Summary: Alcohol intoxication and overdose of seroquel/suboxone.    DATE & TIME: 11/17/21 07-19  Cognitive Concerns/ Orientation : A&Ox3; disoriented to situation at times. Pt very anxious this shift, PRN ativan given. Sitter at bedside for SW. Pt stating suicidal ideation this shift.   BEHAVIOR & AGGRESSION TOOL COLOR: Green  CIWA SCORE: 19/8/4 /12 (tremors, hallucinations, extreme anxiety/agitation and headache)    ABNL VS/O2: VSS on RA, pt refusing continuous pulse ox   MOBILITY: SBA in room , wlks in [place]ss with sitter  PAIN MANAGMENT: denies   DIET: Regular, good appetite   BOWEL/BLADDER: Continent  ABNL LAB/BG: N/A  DRAIN/DEVICES: PIV SL  TELEMETRY RHYTHM: NSR  SKIN: Scattered scabs on hands and head. Feet calloused. L hand +1 edema.  TESTS/PROCEDURES: None  D/C DAY/GOALS/PLACE: Pending medical clearance and when inpatient psych bed available, ?11/18  OTHER IMPORTANT INFO: Contact precautions maintained for MRSA. Seizure precautions maintained. Pt reports he wants to \" bang his head against the wall ,walk out of the window.\" Nicorette 4mg given PRN x2. Pt reports baseline numbness and tingling in feet. Pt c/o congestion, fluticasone ordered. Psych saw this AM and adjusted medications. Naproxen ordered for headache. Zyrtec ordered for nasal congestion.  "

## 2021-11-17 NOTE — PROVIDER NOTIFICATION
"Dr. Knox paged- \"FYI- per report, pt has been lethargic overnight last night, day shift, and now this evening. Falling asleep between sentences. Held suboxone, Seroquel, and  lyrica this evening.\".   "

## 2021-11-17 NOTE — CONSULTS
"[facility] Psychiatric Consult Progress Note    Interval History:   Pt seen, chart reviewed, case discussed with nursing staff and treating clinicians.  I met with the patient on station 66.  He  Remains fidgety, anxious, agitated.  Historically he says that when he has withdrawal the use of Valium.  They have a very small dose of Ativan written for and they been using that in place of the Valium which is on the CIWA protocol.  He does remember responding to Zyprexa, has a historical diagnosis of bipolar disorder, there are significant issues with polypharmacy as he was on Lyrica, gabapentin, Abilify, and Seroquel prior to admission.  His blood pressure is slightly elevated, he looks flushed, says at times he sees \"globes floating in front of my eyes \".  He is oriented to the hospital.  He continues to verbalize passive suicidal thoughts and has a sitter.  He does have some slight elevation in his liver function tests but his bilirubin is [id number].  We talked about scheduling some Zyprexa, utilizing Valium for his anxiety/withdrawal symptoms.  I made  a modification to the Valium protocol so that he could use oral doses every 4 hours as needed depending on his level of anxiety or scoring on the CIWA.     Review of systems:   10 point Review of Systems completed by Dr. Parks, and is  is negative other than noted in the HPI     Medications:       amLODIPine  5 mg Oral Daily     ARIPiprazole  2 mg Oral Daily     buprenorphine HCl-naloxone HCl  1 Film Sublingual TID     docusate sodium  100 mg Oral BID     fluticasone  1 spray Both Nostrils Daily     folic acid  1 mg Oral Daily     [Held by provider] gabapentin  300 mg Oral TID     levothyroxine  75 mcg Oral QAM AC     multivitamin w/minerals  1 tablet Oral Daily     pantoprazole  40 mg Oral QAM AC     [Held by provider] Pregabalin  200 mg Oral TID     propranolol  10 mg Oral TID     QUEtiapine  300 mg Oral At Bedtime     sodium chloride (PF)  3 mL " Intracatheter Q8H     thiamine  100 mg Oral Daily     acetaminophen **OR** acetaminophen, diazepam **OR** diazepam, flumazenil, OLANZapine zydis **OR** haloperidol lactate, lidocaine 4%, lidocaine (buffered or not buffered), LORazepam, melatonin, nicotine polacrilex, ondansetron **OR** ondansetron, senna-docusate **OR** senna-docusate, sodium chloride (PF)    Mental Status Examination:     Appearance:  awake, alert, adequately groomed, dressed in hospital scrubs and severe distress  Eye Contact:  intense  Speech:  pressured speech  Language:Normal  Psychomotor Behavior:  fidgeting and physical agitation  Mood:  anxious  Affect:  mood congruent and intensity is heightened  Thought Process:  logical, linear and goal oriented no loose associations  Thought Content:  passive suicidal ideation present and visual hallucinations present  Oriented to:  time, person, and place  Attention Span and Concentration:  fair  Recent and Remote Memory:  intact  Fund of Knowledge: appropriate  Muscle Strength and Tone: normal  Gait and Station: Normal  Insight:  fair  Judgment:  limited        Labs/Vitals:   No results found for this or any previous visit (from the past 24 hour(s)).  B/P: 124/93, T: 98.3, P: 77, R: 18    Impression:   Mr. Martinez is a 51-year-old gentleman presenting with a drug overdose, severe alcohol use, a historical diagnosis of traumatic brain injury and bipolar disorder.  At this juncture I would use Valium to mitigate withdrawal and anxiety.  I think scheduling Zyprexa may also help with agitation and mood stabilization.  The plan is to eventually move him to psychiatry when he is more stable medically and clears withdrawal.  He was drinking a large amount of alcohol prior to admission      DIagnoses:   1.  Alcohol use disorder, severe  2.  Delirium secondary to alcohol withdrawal, drug overdose   3.  Traumatic brain injury  4.  Opiate use disorder  5.  Bipolar disorder, type I, most recent episode  unspecified  6.  Borderline personality disorder         Plan:   1. Written information given on medications. Side effects, risks, benefits reviewed.  2.  Schedule Zyprexa Zydis 10 mg twice daily, 5 mg every 6 hours as needed  3.  Utilize Valium 10 mg every 6 hours as needed as part of the [facility] protocol for withdrawal symptoms/anxiety  4.  Discontinue Abilify, will continue Seroquel and a modest dose of gabapentin as he has been on this long-term  5.  Lyrica is currently on hold  6.  Maintain sitter with anticipated transfer to psychiatry when stable.  He is cooperative with this plan at this time.      Attestation:  Patient has been seen and evaluated by me,  Eric Parks MD

## 2021-11-17 NOTE — PROGRESS NOTES
[facility]    Medicine Progress Note - Hospitalist Service       Date of Admission:  11/13/2021    Assessment & Plan   Hollis Barclay is a 51 year old male with hx of bipolar disorder, borderline personality disorder, and polysubstance abuse (tobacco, alcohol, cocaine, methamphetamine, opioids) on Suboxone maintenance who was admitted on 11/13/2021 for acute alcohol withdrawal following suicide attempt by overdose     # Acute alcohol withdrawal  # Suicide attempt by ingestion overdose  # Acute toxic encephalopathy due to above, Resolved  # Bipolar disorder type I  # Borderline personality disorder  # Social anxiety  # History of TBI  * PTA: aripiprazole 2 mg daily, Seroquel 300 mg qhs, gabapentin 400 mg TID, propanolol 10 mg TID, Suboxone  * Attempted suicide with 4 Suboxone doses, 22 tablets of 300 mg Seroquel, and drinking 1 gallon of alcohol. He was monitored for 12 hours per Poison Control recommendations, and then developed acute alcohol withdrawal  * Treated with diazepam per CIWA protocol, thiamine/folate/MVI, and electrolyte replacement protocol.   * Psychiatry was consulted on admission; medications were adjusted as noted below  - Continues on CIWA protocol with diazepam prn  - Aripiprazole has been d/c'd in favor of Zyprexa 10 mg BID, 5 mg q6h prn  - He has been initiated on Suboxone 8-2 mg TID   - Continues on PTA Seroquel, gabapentin, and propanolol  - Suicide precautions in place with bedside attendant  - Discharge to inpatient Psychiatry once alcohol withdrawal resolves  - Patient is currently voluntary; will need to be placed on 72 hour hold if he wants to leave AMA    Elevated transaminases   Due to alcoholic hepatitis  - Repeat LFTs in AM    Polysubstance abuse  History of tobacco, alcohol, methamphetamine, cocaine, and opioid abuse. Had been on methadone then transitioned to Suboxone  - Suboxone has been resumed per Psychiatry as noted above  - Plan to discharge to  inpatient Psychiatry once alcohol withdrawal resolves     Tobacco use disorder  Patient uses chewing tobacco, transitioning to Nicorette gum.  - Nicotine gum prn     Chronic back pain  [PTA: gabapentin 400 mg TID, pregabalin 200 mg TID]  - Continues on PTA gabapentin  - Holding PTA pregabalin    Essential hypertension  [PTA: amlodipine 5 mg daily, propanolol 10 mg TID]  - Continues on PTA meds    GERD  - Continues on PTA pantoprazole    Constipation  - On docusate 100 mg BID  - Senna #1-2 bid prn, Miralax daily prn    Hypokalemia, Resolved  Potassium was replaced per protocol during this hospitalization      Diet: Combination Diet Regular Diet Adult; Safe Tray - with utensils    DVT Prophylaxis: Pneumatic Compression Devices  Barksdale Catheter: Not present  Code Status: Full Code         Disposition: Expected discharge to inpatient Psychiatry once alcohol withdrawal resolves, ?Fri    The patient's care was discussed with the Bedside Nurse and Patient.    Danelle Dotson MD  Hospitalist Service  [facility]  Contact information available via [facility] Paging/Directory    ______________________________________________________________________    Interval History   Some concerns for oversedation over past 24 hours, but alert and very restless this morning. Medications adjusted by Psychiatry. Denies cp/sob, dizziness/lightheadedness. Chronic back pain stable.    Time Spent on this Encounter   I spent 35 minutes on the unit/floor managing the care of Hollis Barclay. Over 50% of my time was spent on the following:   - Counseling the patient and/or family regarding: prognosis and risks and benefits of treatment options  - Coordination of care with the: consultant(s) and nurse    Danelle Dotson MD    Data reviewed today: I reviewed all medications, new labs and imaging results over the last 24 hours. I personally reviewed no images or EKG's today.    Physical Exam   Vital Signs: Temp: 99.2  F  (37.3  C) Temp src: Oral BP: 119/68 Pulse: 83   Resp: 18 SpO2: 94 % O2 Device: None (Room air)    Weight: 284 lbs 6.29 oz  Constitutional: Restless  HEENT: Sclera white, MMM  Respiratory: Breathing non-labored. Lungs CTAB - no wheezes, crackles, or rhonchi  Cardiovascular: Heart RRR, no m/r/g. No pedal edema  GI: +BS, abd soft/NT  Skin/Integument: No rash  Musculoskeletal: Normal muscle bulk and tone  Neuro: Alert and appropriate, restless, DURAN  Psych: Restless, but cooperative    Data   Recent Labs   Lab 11/15/21  1109 11/15/21  0929 11/14/21  2117 11/14/21  1554 11/14/21  0832 11/13/21  1549 11/13/21  1548   WBC 3.7*  --   --   --   --   --  6.5   HGB 11.6*  --   --   --   --   --  13.6   MCV 93  --   --   --   --   --  88     --   --   --   --   --  387   NA  --  140  --   --   --  139  --    POTASSIUM  --  3.8 3.6 3.3*   < > 3.3*  --    CHLORIDE  --  106  --   --   --  101  --    CO2  --  26  --   --   --  27  --    BUN  --  18  --   --   --  18  --    CR  --  0.53*  --   --   --  0.60*  --    ANIONGAP  --  8  --   --   --  11  --    KACY  --  8.0*  --   --   --  8.7  --    GLC  --  97  --   --   --  102*  --    ALBUMIN  --  3.0*  --   --   --  3.6  --    PROTTOTAL  --  6.6*  --   --   --  8.0  --    BILITOTAL  --  0.4  --   --   --  0.7  --    ALKPHOS  --  68  --   --   --  81  --    ALT  --  120*  --   --   --  89*  --    AST  --  131*  --   --   --  87*  --     < > = values in this interval not displayed.         No results found for this or any previous visit (from the past 24 hour(s)).    Medications       amLODIPine  5 mg Oral Daily     buprenorphine HCl-naloxone HCl  1 Film Sublingual TID     docusate sodium  100 mg Oral BID     fluticasone  1 spray Both Nostrils Daily     folic acid  1 mg Oral Daily     gabapentin  300 mg Oral TID     [START ON 11/18/2021] influenza recomb quadrivalent PF  0.5 mL Intramuscular Prior to discharge     levothyroxine  75 mcg Oral QAM AC     multivitamin w/minerals  1  tablet Oral Daily     OLANZapine zydis  10 mg Oral BID     pantoprazole  40 mg Oral QAM AC     Pregabalin  200 mg Oral TID     propranolol  10 mg Oral TID     QUEtiapine  300 mg Oral At Bedtime     sodium chloride (PF)  3 mL Intracatheter Q8H     thiamine  100 mg Oral Daily

## 2021-11-17 NOTE — PROVIDER NOTIFICATION
MD Notification    Notified Person: MD    Notified Person Name: Dr. Lozano    Notification Date/Time: 11/17/21 0635    Notification Interaction: Text-paged    Purpose of Notification: Can you place orders for nasal spray? Pt congested this AM.     Orders Received:    Comments:

## 2021-11-17 NOTE — PROGRESS NOTES
[facility]  Medicine Progress Note - Hospitalist Service       Date of Admission:  11/13/2021    Assessment & Plan           Hollis Barclay is a 51 year old male admitted on 11/13/2021. He presents to the emergency department from his group home     Acute uncomplicated alcohol withdrawal: Patient drinks 1/2 gallon hard alcohol per day, reports he drank 1 gallon of vodka with suicide attempt.  Following suicide attempt, was monitored in the emergency department.  Cleared from ingestion by poison control at approximately 1 AM, though subsequently developed alcohol withdrawal.  - Protonix daily  -CIWA protocol with Valium available for withdrawal symptoms. Still needing valium.   -Psychiatry consulted, PTA meds resumed, recommending transition to inpatient psychiatry once withdrawal resolves  -Continue to encourage alcohol cessation  -Prior to admission acamprosate on hold  -Thiamine, folate, multivitamin daily  -Seizure precautions; history of convulsions in July, though not on antiepileptics with no epileptiform activity on EEG at that time.  -Mild transaminitis due to alcohol, follow labs periodically.      Suicide attempt: Patient with a history of TBI, depression, intermittent suicidality.  Attempted to kill himself by taking 4 Suboxone doses as well as 22 tablets of 300 mg Seroquel, drinking 1 gallon of alcohol.  Timing of ingestion approximately 1 PM. recommendation by Poison control was for 12 hours of monitoring, which patient completed in the emergency department prior to developing alcohol withdrawal.  Depression with anxiety:  -Bedside sitter  -Suicide precautions  -Psychiatry consulted; recommending transition to inpatient psychiatry once alcohol withdrawal resolves  -Resumed PTA propranolol, Seroquel and Suboxone. Abilify added per psychiatry  -Continue with telemetry, EKG shows QTc 480  -Patient is currently voluntary.  If he demands to leave prior to being cleared by  psychiatry, he would be holdable.  This was discussed with patient on admission.  -some of the elevated CIWA scores due to his underlying anxiety necessitating valium use. Unlikely to have worsening withdrawal after 72 hours. Patient does not want seroquel stating it worsens his anxiety. Reconsult psychiatry again, and will discuss with psychiatry to transfer to inpatient.      Narcotic dependence: History of polysubstance abuse including methamphetamine, cocaine, narcotics.  Had been on methadone and now has been transitioned to Suboxone  -Psychiatry consulted as above; appreciate recommendations regarding resumption of Suboxone versus transition back to methadone given suicide attempt.  Note that methadone does have some risk for prolonged QT, though patient has tolerated this medication historically.  Uncertain why he transitioned off of methadone previously.     Nicotine dependence: Patient uses chewing tobacco, transitioning to Nicorette gum.  -4 mg nicotine gum every hour as needed ordered  -Continue to encourage cessation     Hypokalemia: Mild at 3.3.  -2 g IV magnesium administered in the emergency department  -Potassium replacement protocol in place.  -Regular diet as tolerated    Constipation  Bowel regimen available       Diet: Combination Diet Regular Diet Adult; Safe Tray - with utensils    DVT Prophylaxis: Pneumatic Compression Devices  Barksdale Catheter: Not present  Central Lines: None  Code Status: Full Code      Disposition Plan   Expected discharge: 11/17/2021   recommended to inpatient psychiatry unit once : Withdrawal resolves.     The patient's care was discussed with the Bedside Nurse and Patient CC    Vinay Carreon MD  Hospitalist Service  [facility]  Securely message with the Vocera Web Console (learn more here)  Text page via [employer] Paging/Directory    Clinically Significant Risk Factors Present on Admission                 ______________________________________________________________________    Interval History     Discussed with nursing staff, and evaluated patient this morning. He reports ongoing severe anxiety. Generalized achiness. Not in visible withdrawal but scoring high mostly due to underlying anxiety and reports of nausea and headache although tolerating diet and appears comfortable. Received valium this am per KOMAL.    Data reviewed today: I reviewed all medications, new labs   results over the last 24 hours. I personally reviewed no images or EKG's today      Physical Exam   Vital Signs: Temp: 98.5  F (36.9  C) Temp src: Oral BP: 116/73 Pulse: 80   Resp: 18 SpO2: 95 % O2 Device: None (Room air)    Weight: 284 lbs 6.29 oz    General: AAOx3,  appears comfortable.  HEENT: PERRLA EOMI. Mucosa moist.   Lungs: Bilateral equal air entry. Clear to auscultation, normal work of breathing.   CVS: S1S2 regular, no tachycardia or murmur.   Abdomen: Soft, NT, ND. BS heard.  MSK: No edema or deformities.  Neuro/psych: AAOX3. CN 2-12 normal. Strength symmetrical. Calm and pleasant/co operative. No agitation or hallucination  Skin: No rash.       Data   Recent Labs   Lab 11/15/21  1109 11/15/21  0929 11/14/21  2117 11/14/21  1554 11/14/21  0832 11/13/21  1549 11/13/21  1548   WBC 3.7*  --   --   --   --   --  6.5   HGB 11.6*  --   --   --   --   --  13.6   MCV 93  --   --   --   --   --  88     --   --   --   --   --  387   NA  --  140  --   --   --  139  --    POTASSIUM  --  3.8 3.6 3.3*   < > 3.3*  --    CHLORIDE  --  106  --   --   --  101  --    CO2  --  26  --   --   --  27  --    BUN  --  18  --   --   --  18  --    CR  --  0.53*  --   --   --  0.60*  --    ANIONGAP  --  8  --   --   --  11  --    KACY  --  8.0*  --   --   --  8.7  --    GLC  --  97  --   --   --  102*  --    ALBUMIN  --  3.0*  --   --   --  3.6  --    PROTTOTAL  --  6.6*  --   --   --  8.0  --    BILITOTAL  --  0.4  --   --   --  0.7  --    ALKPHOS  --   68  --   --   --  81  --    ALT  --  120*  --   --   --  89*  --    AST  --  131*  --   --   --  87*  --     < > = values in this interval not displayed.     No results found for this or any previous visit (from the past 24 hour(s)).  Medications       amLODIPine  5 mg Oral Daily     ARIPiprazole  2 mg Oral Daily     buprenorphine HCl-naloxone HCl  1 Film Sublingual TID     cloNIDine  0.1 mg Oral Q8H     docusate sodium  100 mg Oral BID     folic acid  1 mg Oral Daily     gabapentin  400 mg Oral TID     levothyroxine  75 mcg Oral QAM AC     multivitamin w/minerals  1 tablet Oral Daily     pantoprazole  40 mg Oral QAM AC     Pregabalin  200 mg Oral TID     propranolol  10 mg Oral TID     QUEtiapine  300 mg Oral At Bedtime     sodium chloride (PF)  3 mL Intracatheter Q8H     thiamine  100 mg Oral Daily

## 2021-11-17 NOTE — PLAN OF CARE
Summary: Alcohol intoxication and overdose of seroquel/suboxone.    DATE & TIME: 11/16/2021 [id number]  Cognitive Concerns/ Orientation : A&Ox4- very groggy and slightly disoriented upon waking but reorients. Lethargic all shift. Falling asleep between sentences. Sitter at bedside for SW. MD paged with HI. Bedtime sedating meds held.  BEHAVIOR & AGGRESSION TOOL COLOR: Green  CIWA SCORE: 0, 2   ABNL VS/O2: VSS on RA  MOBILITY: SBA in room   PAIN MANAGMENT: denies   DIET: Regular, good appetite   BOWEL/BLADDER: continent, BM x1  ABNL LAB/BG: WBC 3.7, Hgb 11.6, ,   DRAIN/DEVICES: IV SL  TELEMETRY RHYTHM: NSR  SKIN: Scattered scabs on hands and head. Feet calloused. TESTS/PROCEDURES: None  D/C DAY/GOALS/PLACE: Pending medical clearance and when inpatient psych bed available.  OTHER IMPORTANT INFO: Contact precautions maintained for MRSA. Seizure precautions maintained. Pt reports still being suicidal but no active plan. Nicorette 4mg given PRN. Pt reports baseline numbness and tingling in feet.      Current condition (current mood & behavior) restless and agitated at start of shift, cooperative. Sitter present: Yes  Every 15 minute documentation by NA/RN completed for Shift: Yes  Room safety Suicide Checklist completed in Epic: Yes  Patient's color of severity (suicide scale): YELLOW  Order for psych consult placed (if appropriate): Yes  Suicide care plan added: Yes

## 2021-11-18 ENCOUNTER — TELEPHONE (OUTPATIENT)
Dept: BEHAVIORAL HEALTH | Facility: CLINIC | Age: 51
End: 2021-11-18
Payer: MEDICAID

## 2021-11-18 LAB
ALBUMIN SERPL-MCNC: 2.8 G/DL (ref 3.4–5)
ALP SERPL-CCNC: 59 U/L (ref 40–150)
ALT SERPL W P-5'-P-CCNC: 93 U/L (ref 0–70)
ANION GAP SERPL CALCULATED.3IONS-SCNC: 5 MMOL/L (ref 3–14)
AST SERPL W P-5'-P-CCNC: 84 U/L (ref 0–45)
BILIRUB SERPL-MCNC: 0.3 MG/DL (ref 0.2–1.3)
BUN SERPL-MCNC: 13 MG/DL (ref 7–30)
CALCIUM SERPL-MCNC: 8.1 MG/DL (ref 8.5–10.1)
CHLORIDE BLD-SCNC: 108 MMOL/L (ref 94–109)
CO2 SERPL-SCNC: 28 MMOL/L (ref 20–32)
CREAT SERPL-MCNC: 0.69 MG/DL (ref 0.66–1.25)
GFR SERPL CREATININE-BSD FRML MDRD: >90 ML/MIN/1.73M2
GLUCOSE BLD-MCNC: 106 MG/DL (ref 70–99)
POTASSIUM BLD-SCNC: 3.9 MMOL/L (ref 3.4–5.3)
PROT SERPL-MCNC: 6.2 G/DL (ref 6.8–8.8)
SODIUM SERPL-SCNC: 141 MMOL/L (ref 133–144)

## 2021-11-18 PROCEDURE — G0008 ADMIN INFLUENZA VIRUS VAC: HCPCS | Performed by: INTERNAL MEDICINE

## 2021-11-18 PROCEDURE — 90682 RIV4 VACC RECOMBINANT DNA IM: CPT | Performed by: INTERNAL MEDICINE

## 2021-11-18 PROCEDURE — 82040 ASSAY OF SERUM ALBUMIN: CPT | Performed by: INTERNAL MEDICINE

## 2021-11-18 PROCEDURE — 250N000013 HC RX MED GY IP 250 OP 250 PS 637: Performed by: INTERNAL MEDICINE

## 2021-11-18 PROCEDURE — 250N000013 HC RX MED GY IP 250 OP 250 PS 637: Performed by: PSYCHIATRY & NEUROLOGY

## 2021-11-18 PROCEDURE — 99233 SBSQ HOSP IP/OBS HIGH 50: CPT | Performed by: INTERNAL MEDICINE

## 2021-11-18 PROCEDURE — 36415 COLL VENOUS BLD VENIPUNCTURE: CPT | Performed by: INTERNAL MEDICINE

## 2021-11-18 PROCEDURE — 250N000011 HC RX IP 250 OP 636: Performed by: INTERNAL MEDICINE

## 2021-11-18 PROCEDURE — 120N000001 HC R&B MED SURG/OB

## 2021-11-18 PROCEDURE — 250N000013 HC RX MED GY IP 250 OP 250 PS 637: Performed by: HOSPITALIST

## 2021-11-18 RX ADMIN — OLANZAPINE 10 MG: 5 TABLET, ORALLY DISINTEGRATING ORAL at 18:37

## 2021-11-18 RX ADMIN — PANTOPRAZOLE SODIUM 40 MG: 40 TABLET, DELAYED RELEASE ORAL at 06:18

## 2021-11-18 RX ADMIN — PROPRANOLOL HYDROCHLORIDE 10 MG: 10 TABLET ORAL at 08:59

## 2021-11-18 RX ADMIN — NICOTINE POLACRILEX 4 MG: 2 GUM, CHEWING ORAL at 16:21

## 2021-11-18 RX ADMIN — INFLUENZA A VIRUS A/WISCONSIN/588/2019 (H1N1) RECOMBINANT HEMAGGLUTININ ANTIGEN, INFLUENZA A VIRUS A/TASMANIA/503/2020 (H3N2) RECOMBINANT HEMAGGLUTININ ANTIGEN, INFLUENZA B VIRUS B/WASHINGTON/02/2019 RECOMBINANT HEMAGGLUTININ ANTIGEN, AND INFLUENZA B VIRUS B/PHUKET/3073/2013 RECOMBINANT HEMAGGLUTININ ANTIGEN 0.5 ML: 45; 45; 45; 45 INJECTION INTRAMUSCULAR at 11:19

## 2021-11-18 RX ADMIN — NICOTINE POLACRILEX 4 MG: 2 GUM, CHEWING ORAL at 20:28

## 2021-11-18 RX ADMIN — OLANZAPINE 5 MG: 5 TABLET, ORALLY DISINTEGRATING ORAL at 06:19

## 2021-11-18 RX ADMIN — FOLIC ACID 1 MG: 1 TABLET ORAL at 08:59

## 2021-11-18 RX ADMIN — NAPROXEN 500 MG: 500 TABLET ORAL at 20:30

## 2021-11-18 RX ADMIN — NICOTINE POLACRILEX 4 MG: 2 GUM, CHEWING ORAL at 06:19

## 2021-11-18 RX ADMIN — PROPRANOLOL HYDROCHLORIDE 10 MG: 10 TABLET ORAL at 16:21

## 2021-11-18 RX ADMIN — OLANZAPINE 10 MG: 5 TABLET, ORALLY DISINTEGRATING ORAL at 08:59

## 2021-11-18 RX ADMIN — GABAPENTIN 300 MG: 300 CAPSULE ORAL at 08:59

## 2021-11-18 RX ADMIN — DOCUSATE SODIUM 100 MG: 100 CAPSULE, LIQUID FILLED ORAL at 20:28

## 2021-11-18 RX ADMIN — QUETIAPINE FUMARATE 300 MG: 100 TABLET ORAL at 20:27

## 2021-11-18 RX ADMIN — NICOTINE POLACRILEX 4 MG: 2 GUM, CHEWING ORAL at 00:23

## 2021-11-18 RX ADMIN — DOCUSATE SODIUM 100 MG: 100 CAPSULE, LIQUID FILLED ORAL at 08:59

## 2021-11-18 RX ADMIN — BUPRENORPHINE AND NALOXONE 1 FILM: 8; 2 FILM, SOLUBLE BUCCAL; SUBLINGUAL at 18:37

## 2021-11-18 RX ADMIN — BUPRENORPHINE AND NALOXONE 1 FILM: 8; 2 FILM, SOLUBLE BUCCAL; SUBLINGUAL at 09:00

## 2021-11-18 RX ADMIN — FLUTICASONE PROPIONATE 1 SPRAY: 50 SPRAY, METERED NASAL at 08:58

## 2021-11-18 RX ADMIN — NICOTINE POLACRILEX 4 MG: 2 GUM, CHEWING ORAL at 18:37

## 2021-11-18 RX ADMIN — THIAMINE HCL TAB 100 MG 100 MG: 100 TAB at 08:59

## 2021-11-18 RX ADMIN — BUPRENORPHINE AND NALOXONE 1 FILM: 8; 2 FILM, SOLUBLE BUCCAL; SUBLINGUAL at 12:17

## 2021-11-18 RX ADMIN — AMLODIPINE BESYLATE 5 MG: 5 TABLET ORAL at 08:59

## 2021-11-18 RX ADMIN — NAPROXEN 500 MG: 500 TABLET ORAL at 16:30

## 2021-11-18 RX ADMIN — GABAPENTIN 300 MG: 300 CAPSULE ORAL at 22:03

## 2021-11-18 RX ADMIN — NICOTINE POLACRILEX 4 MG: 2 GUM, CHEWING ORAL at 11:17

## 2021-11-18 RX ADMIN — MULTIPLE VITAMINS W/ MINERALS TAB 1 TABLET: TAB at 08:59

## 2021-11-18 RX ADMIN — DIAZEPAM 10 MG: 5 TABLET ORAL at 20:28

## 2021-11-18 RX ADMIN — LEVOTHYROXINE SODIUM 75 MCG: 75 TABLET ORAL at 06:18

## 2021-11-18 RX ADMIN — NAPROXEN 500 MG: 500 TABLET ORAL at 08:59

## 2021-11-18 RX ADMIN — GABAPENTIN 300 MG: 300 CAPSULE ORAL at 16:21

## 2021-11-18 RX ADMIN — PROPRANOLOL HYDROCHLORIDE 10 MG: 10 TABLET ORAL at 22:06

## 2021-11-18 RX ADMIN — DIAZEPAM 10 MG: 5 TABLET ORAL at 02:33

## 2021-11-18 RX ADMIN — CETIRIZINE HYDROCHLORIDE 10 MG: 10 TABLET, FILM COATED ORAL at 08:59

## 2021-11-18 ASSESSMENT — ACTIVITIES OF DAILY LIVING (ADL)
ADLS_ACUITY_SCORE: 8
ADLS_ACUITY_SCORE: 8
ADLS_ACUITY_SCORE: 7
ADLS_ACUITY_SCORE: 8
ADLS_ACUITY_SCORE: 7
ADLS_ACUITY_SCORE: 7
ADLS_ACUITY_SCORE: 8
ADLS_ACUITY_SCORE: 8
ADLS_ACUITY_SCORE: 7
ADLS_ACUITY_SCORE: 8
ADLS_ACUITY_SCORE: 7
ADLS_ACUITY_SCORE: 8
ADLS_ACUITY_SCORE: 7
ADLS_ACUITY_SCORE: 7
ADLS_ACUITY_SCORE: 8
ADLS_ACUITY_SCORE: 7
ADLS_ACUITY_SCORE: 8

## 2021-11-18 NOTE — PROGRESS NOTES
[facility]    Medicine Progress Note - Hospitalist Service       Date of Admission:  11/13/2021    Assessment & Plan   Hollis Barclay is a 51 year old male with hx of bipolar disorder, borderline personality disorder, and polysubstance abuse (tobacco, alcohol, cocaine, methamphetamine, opioids) on Suboxone maintenance who was admitted on 11/13/2021 for acute alcohol withdrawal following suicide attempt by overdose     # Acute alcohol withdrawal  # Suicide attempt by ingestion overdose  # Acute toxic encephalopathy due to above, Resolved  # Bipolar disorder type I  # Borderline personality disorder  # Social anxiety  # History of TBI  * PTA: aripiprazole 2 mg daily, Seroquel 300 mg qhs, gabapentin 400 mg TID, propanolol 10 mg TID, Suboxone  * Attempted suicide with 4 Suboxone doses, 22 tablets of 300 mg Seroquel, and drinking 1 gallon of alcohol. He was monitored for 12 hours per Poison Control recommendations, and then developed acute alcohol withdrawal  * Treated with diazepam per CIWA protocol, thiamine/folate/MVI, and electrolyte replacement protocol.   * Psychiatry was consulted on admission; medications were adjusted as noted below  - Continues on CIWA protocol with diazepam prn  - Aripiprazole has been d/c'd in favor of Zyprexa 10 mg BID, 5 mg q6h prn  - He has been initiated on Suboxone 8-2 mg TID   - Continues on PTA Seroquel, gabapentin, and propanolol  - Suicide precautions in place with bedside attendant  - Discharge to inpatient Psychiatry once alcohol withdrawal resolves  - Patient is currently voluntary; will need to be placed on 72 hour hold if he wants to leave AMA    Elevated transaminases   Due to alcoholic hepatitis  - Repeat LFTs to be drawn this morning    Polysubstance abuse  History of tobacco, alcohol, methamphetamine, cocaine, and opioid abuse. Had been on methadone then transitioned to Suboxone  - Suboxone has been resumed per Psychiatry as noted above  - Plan to  "discharge to inpatient Psychiatry once alcohol withdrawal resolves     Tobacco use disorder  Patient uses chewing tobacco, transitioning to Nicorette gum.  - Nicotine gum prn     Chronic back pain  [PTA: gabapentin 400 mg TID, pregabalin 200 mg TID]  - Continues on PTA gabapentin  - Heating pad prn, Naproxen 500 mg TID prn ordered  - Holding PTA pregabalin    Essential hypertension  [PTA: amlodipine 5 mg daily, propanolol 10 mg TID]  - Continues on PTA meds    GERD  - Continues on PTA pantoprazole    Constipation  - On docusate 100 mg BID  - Senna #1-2 bid prn, Miralax daily prn    Hypokalemia, Resolved  Potassium was replaced per protocol during this hospitalization      Diet: Combination Diet Regular Diet Adult; Safe Tray - with utensils    DVT Prophylaxis: Pneumatic Compression Devices  Barksdale Catheter: Not present  Code Status: Full Code         Disposition: Expected discharge to inpatient Psychiatry once alcohol withdrawal resolves, ?Fri-Sat    The patient's care was discussed with the Bedside Nurse and Patient.    Danelle Dotson MD  Hospitalist Service  [facility]  Contact information available via [facility] Paging/Directory    ______________________________________________________________________    Interval History   No acute events overnight. More calm and cooperative this morning. Concerns by bedside RN of oversedation after receiving diazepam in addition to his existing medications. Continues to have ongoing hallucinations, states, \"it's like a constant movie in my head.\" Chronic back pain stable. Medications reviewed with bedside RN - will give AM medications as scheduled and minimize diazepam as able. Psychiatry to follow up.    Time Spent on this Encounter   I spent 35 minutes on the unit/floor managing the care of Hollis Barclay. Over 50% of my time was spent on the following:   - Counseling the patient and/or family regarding: management of alcohol withdrawal  - " Coordination of care with the: consultant(s) and nurse    Danelle Dotson MD    Data reviewed today: I reviewed all medications, new labs and imaging results over the last 24 hours. I personally reviewed no images or EKG's today.    Physical Exam   Vital Signs: Temp: 97.5  F (36.4  C) Temp src: Axillary BP: (!) 156/83 Pulse: 90   Resp: 22 SpO2: 94 % O2 Device: None (Room air)    Weight: 284 lbs 6.29 oz  Constitutional: Restless  HEENT: Sclera white, MMM  Respiratory: Breathing non-labored. Lungs CTAB - no wheezes, crackles, or rhonchi  Cardiovascular: Heart RRR, no m/r/g. No pedal edema  GI: +BS, abd soft/NT  Skin/Integument: No rash  Musculoskeletal: Normal muscle bulk and tone  Neuro: Alert and appropriate, restless, DURAN  Psych: Restless, but cooperative    Data   Recent Labs   Lab 11/15/21  1109 11/15/21  0929 11/14/21  2117 11/14/21  1554 11/14/21  0832 11/13/21  1549 11/13/21  1548   WBC 3.7*  --   --   --   --   --  6.5   HGB 11.6*  --   --   --   --   --  13.6   MCV 93  --   --   --   --   --  88     --   --   --   --   --  387   NA  --  140  --   --   --  139  --    POTASSIUM  --  3.8 3.6 3.3*   < > 3.3*  --    CHLORIDE  --  106  --   --   --  101  --    CO2  --  26  --   --   --  27  --    BUN  --  18  --   --   --  18  --    CR  --  0.53*  --   --   --  0.60*  --    ANIONGAP  --  8  --   --   --  11  --    KACY  --  8.0*  --   --   --  8.7  --    GLC  --  97  --   --   --  102*  --    ALBUMIN  --  3.0*  --   --   --  3.6  --    PROTTOTAL  --  6.6*  --   --   --  8.0  --    BILITOTAL  --  0.4  --   --   --  0.7  --    ALKPHOS  --  68  --   --   --  81  --    ALT  --  120*  --   --   --  89*  --    AST  --  131*  --   --   --  87*  --     < > = values in this interval not displayed.         No results found for this or any previous visit (from the past 24 hour(s)).    Medications       amLODIPine  5 mg Oral Daily     buprenorphine HCl-naloxone HCl  1 Film Sublingual TID     docusate sodium  100 mg  Oral BID     fluticasone  1 spray Both Nostrils Daily     folic acid  1 mg Oral Daily     gabapentin  300 mg Oral TID     influenza recomb quadrivalent PF  0.5 mL Intramuscular Prior to discharge     levothyroxine  75 mcg Oral QAM AC     multivitamin w/minerals  1 tablet Oral Daily     OLANZapine zydis  10 mg Oral BID     pantoprazole  40 mg Oral QAM AC     [Held by provider] Pregabalin  200 mg Oral TID     propranolol  10 mg Oral TID     QUEtiapine  300 mg Oral At Bedtime     sodium chloride (PF)  3 mL Intracatheter Q8H     thiamine  100 mg Oral Daily

## 2021-11-18 NOTE — TELEPHONE ENCOUNTER
S:  1:05 PMCall from DEC   [address] [phone]   requesting IP MH placement for a 50 YO M    B:  Hx of Bipolar, TBI, depression, substance use DO admitted on 11/13/21 after a SA via ingestion of  alcohol and 5 tabs oxycodone  22 Seroquel  And 4 tabs of suboxone. Cleared from Poison Control on 11/13/21.  Patient went through alcohol withdrawal and is now at baseline, medically cleared and agreeable to transfer to a mental health unit.  Patient is up ad kathleen, independent with all ADLs, irritable a  times.        Doc to doc:   Danelle Dotson  [phone]    A:  voluntary    R:  Patient cleared and ready for behavioral bed placement: Yes

## 2021-11-18 NOTE — PLAN OF CARE
"Summary: Alcohol intoxication and overdose of seroquel/suboxone.    DATE & TIME: 11/18/21 07-19  Cognitive Concerns/ Orientation : Alert and oriented, disoriented to situation at times and date Anxious but redirectable.   BEHAVIOR & AGGRESSION TOOL COLOR: Green  CIWA SCORE: 13/0-no intervention as pt lethargic(tremors,anxiety/agitation and headache)    ABNL VS/O2: VSS on RA, pt refusing continuous pulse ox   MOBILITY: SBA in room , wlks in ross with sitter  PAIN MANAGMENT: Complains of right knee pain, states \"bone on bone\" requesting cortisone injection.  Naproxen, Tylenol and Aqua K pad utilized   DIET: Regular, good appetite   BOWEL/BLADDER: Continent  ABNL LAB/BG:   DRAIN/DEVICES: Pt pulled IV out, MD aware  TELEMETRY RHYTHM: dc'd  SKIN: Scattered scabs on hands and head. Feet calloused.   TESTS/PROCEDURES: None  D/C DAY/GOALS/PLACE: Pending medical clearance and when inpatient psych bed available, ?11/18  OTHER IMPORTANT INFO: Contact precautions maintained for MRSA. Seizure precautions maintained. Pt reports he wants to \" bang his head against the wall ,walk out of the window.\" Nicorette 4mg given PRN x2. Pt reports baseline numbness and tingling in feet. Pt c/o congestion, fluticasone ordered. Psych saw this AM and adjusted medications. Naproxen ordered for headache. Zyrtec ordered for nasal congestion.   "

## 2021-11-18 NOTE — CONSULTS
[facility], Consult & Liaison    Hollis Barclay  November 18, 2021      Psychiatry consult completed. Note to follow. Coordinated with charge RN, Babita, who informed he is medically cleared and OK for transfer to psych.  met with patient, agree to transfer, plan to call central intake.     Madelyn Cano, [facility], [facility], Consult & Liaison Service  [phone]

## 2021-11-18 NOTE — PROGRESS NOTES
Patient A O X 3. Contact precaution for MRSA. Independent but suicidal. Pain and anxiety is managed with zyprexa. Patient hallucinate a lot.  Patient has a sitter at his bedside.

## 2021-11-19 ENCOUNTER — HOSPITAL ENCOUNTER (INPATIENT)
Facility: CLINIC | Age: 51
LOS: 3 days | Discharge: ANOTHER HOSPITAL | End: 2021-11-22
Attending: PSYCHIATRY & NEUROLOGY | Admitting: HOSPITALIST
Payer: MEDICAID

## 2021-11-19 VITALS
HEART RATE: 71 BPM | WEIGHT: 284.39 LBS | BODY MASS INDEX: 34.63 KG/M2 | TEMPERATURE: 97.8 F | RESPIRATION RATE: 20 BRPM | SYSTOLIC BLOOD PRESSURE: 122 MMHG | DIASTOLIC BLOOD PRESSURE: 66 MMHG | HEIGHT: 76 IN | OXYGEN SATURATION: 96 %

## 2021-11-19 PROBLEM — F39 MOOD DISORDER (H): Status: ACTIVE | Noted: 2021-11-19

## 2021-11-19 PROCEDURE — 99239 HOSP IP/OBS DSCHRG MGMT >30: CPT | Performed by: INTERNAL MEDICINE

## 2021-11-19 PROCEDURE — 250N000013 HC RX MED GY IP 250 OP 250 PS 637: Performed by: INTERNAL MEDICINE

## 2021-11-19 PROCEDURE — 250N000013 HC RX MED GY IP 250 OP 250 PS 637: Performed by: PSYCHIATRY & NEUROLOGY

## 2021-11-19 PROCEDURE — 99207 PR NO CHARGE LOS: CPT | Performed by: HOSPITALIST

## 2021-11-19 PROCEDURE — 128N000001 HC R&B CD/MH ADULT

## 2021-11-19 PROCEDURE — 124N000001 HC R&B MH

## 2021-11-19 PROCEDURE — 250N000013 HC RX MED GY IP 250 OP 250 PS 637: Performed by: HOSPITALIST

## 2021-11-19 RX ORDER — DIAZEPAM 10 MG/2ML
5-10 INJECTION, SOLUTION INTRAMUSCULAR; INTRAVENOUS EVERY 30 MIN PRN
Status: DISCONTINUED | OUTPATIENT
Start: 2021-11-19 | End: 2021-11-19 | Stop reason: HOSPADM

## 2021-11-19 RX ORDER — DIAZEPAM 5 MG
10 TABLET ORAL EVERY 30 MIN PRN
Status: DISCONTINUED | OUTPATIENT
Start: 2021-11-19 | End: 2021-11-19 | Stop reason: HOSPADM

## 2021-11-19 RX ORDER — BUPRENORPHINE AND NALOXONE 8; 2 MG/1; MG/1
1 FILM, SOLUBLE BUCCAL; SUBLINGUAL
Status: ON HOLD | DISCHARGE
Start: 2021-11-19 | End: 2021-12-23

## 2021-11-19 RX ORDER — NAPROXEN 500 MG/1
500 TABLET ORAL 2 TIMES DAILY PRN
Status: ON HOLD | DISCHARGE
Start: 2021-11-19 | End: 2021-12-11

## 2021-11-19 RX ORDER — GABAPENTIN 300 MG/1
300 CAPSULE ORAL 3 TIMES DAILY
Status: ON HOLD | DISCHARGE
Start: 2021-11-19 | End: 2021-12-11

## 2021-11-19 RX ORDER — LORAZEPAM 2 MG/ML
.5-1 INJECTION INTRAMUSCULAR EVERY 4 HOURS PRN
Status: DISCONTINUED | OUTPATIENT
Start: 2021-11-19 | End: 2021-11-19

## 2021-11-19 RX ORDER — ONDANSETRON 4 MG/1
4 TABLET, ORALLY DISINTEGRATING ORAL EVERY 6 HOURS PRN
Status: ON HOLD | DISCHARGE
Start: 2021-11-19 | End: 2021-12-23

## 2021-11-19 RX ORDER — ACETAMINOPHEN 325 MG/1
650 TABLET ORAL EVERY 4 HOURS PRN
Status: DISCONTINUED | OUTPATIENT
Start: 2021-11-19 | End: 2021-11-22 | Stop reason: HOSPADM

## 2021-11-19 RX ORDER — GABAPENTIN 100 MG/1
100 CAPSULE ORAL EVERY 6 HOURS PRN
Status: DISCONTINUED | OUTPATIENT
Start: 2021-11-19 | End: 2021-11-22 | Stop reason: HOSPADM

## 2021-11-19 RX ORDER — AMOXICILLIN 250 MG
1 CAPSULE ORAL 2 TIMES DAILY PRN
Status: DISCONTINUED | OUTPATIENT
Start: 2021-11-19 | End: 2021-11-22 | Stop reason: HOSPADM

## 2021-11-19 RX ORDER — OLANZAPINE 10 MG/1
10 TABLET, ORALLY DISINTEGRATING ORAL 2 TIMES DAILY
Status: ON HOLD | DISCHARGE
Start: 2021-11-19 | End: 2021-12-11

## 2021-11-19 RX ORDER — OLANZAPINE 10 MG/1
10 TABLET ORAL 3 TIMES DAILY PRN
Status: DISCONTINUED | OUTPATIENT
Start: 2021-11-19 | End: 2021-11-22 | Stop reason: HOSPADM

## 2021-11-19 RX ORDER — LANOLIN ALCOHOL/MO/W.PET/CERES
3 CREAM (GRAM) TOPICAL
Status: DISCONTINUED | OUTPATIENT
Start: 2021-11-19 | End: 2021-11-22 | Stop reason: HOSPADM

## 2021-11-19 RX ORDER — LORAZEPAM 0.5 MG/1
.5-1 TABLET ORAL EVERY 4 HOURS PRN
Status: DISCONTINUED | OUTPATIENT
Start: 2021-11-19 | End: 2021-11-19

## 2021-11-19 RX ORDER — SENNOSIDES 8.6 MG
1-2 TABLET ORAL 2 TIMES DAILY PRN
Status: ON HOLD | DISCHARGE
Start: 2021-11-19 | End: 2021-12-23

## 2021-11-19 RX ORDER — FOLIC ACID 1 MG/1
1000 TABLET ORAL DAILY
Status: DISCONTINUED | OUTPATIENT
Start: 2021-11-20 | End: 2021-11-22 | Stop reason: HOSPADM

## 2021-11-19 RX ORDER — MAGNESIUM HYDROXIDE/ALUMINUM HYDROXICE/SIMETHICONE 120; 1200; 1200 MG/30ML; MG/30ML; MG/30ML
30 SUSPENSION ORAL EVERY 4 HOURS PRN
Status: DISCONTINUED | OUTPATIENT
Start: 2021-11-19 | End: 2021-11-22 | Stop reason: HOSPADM

## 2021-11-19 RX ORDER — OLANZAPINE 10 MG/2ML
10 INJECTION, POWDER, FOR SOLUTION INTRAMUSCULAR 3 TIMES DAILY PRN
Status: DISCONTINUED | OUTPATIENT
Start: 2021-11-19 | End: 2021-11-22 | Stop reason: HOSPADM

## 2021-11-19 RX ORDER — OLANZAPINE 5 MG/1
5 TABLET, ORALLY DISINTEGRATING ORAL EVERY 6 HOURS PRN
Status: ON HOLD | DISCHARGE
Start: 2021-11-19 | End: 2021-12-23

## 2021-11-19 RX ORDER — POLYETHYLENE GLYCOL 3350 17 G/17G
17 POWDER, FOR SOLUTION ORAL DAILY
Qty: 510 G | Status: ON HOLD | DISCHARGE
Start: 2021-11-19 | End: 2021-12-23

## 2021-11-19 RX ADMIN — PROPRANOLOL HYDROCHLORIDE 10 MG: 10 TABLET ORAL at 08:22

## 2021-11-19 RX ADMIN — OLANZAPINE 10 MG: 5 TABLET, ORALLY DISINTEGRATING ORAL at 08:22

## 2021-11-19 RX ADMIN — BUPRENORPHINE AND NALOXONE 1 FILM: 8; 2 FILM, SOLUBLE BUCCAL; SUBLINGUAL at 11:44

## 2021-11-19 RX ADMIN — NICOTINE POLACRILEX 4 MG: 2 GUM, CHEWING ORAL at 08:21

## 2021-11-19 RX ADMIN — PANTOPRAZOLE SODIUM 40 MG: 40 TABLET, DELAYED RELEASE ORAL at 08:22

## 2021-11-19 RX ADMIN — DIAZEPAM 10 MG: 5 TABLET ORAL at 18:51

## 2021-11-19 RX ADMIN — ACETAMINOPHEN 650 MG: 325 TABLET, FILM COATED ORAL at 13:53

## 2021-11-19 RX ADMIN — POLYETHYLENE GLYCOL 3350 17 G: 17 POWDER, FOR SOLUTION ORAL at 08:31

## 2021-11-19 RX ADMIN — BUPRENORPHINE AND NALOXONE 1 FILM: 8; 2 FILM, SOLUBLE BUCCAL; SUBLINGUAL at 17:29

## 2021-11-19 RX ADMIN — DIAZEPAM 10 MG: 5 TABLET ORAL at 17:29

## 2021-11-19 RX ADMIN — OLANZAPINE 5 MG: 5 TABLET, ORALLY DISINTEGRATING ORAL at 11:46

## 2021-11-19 RX ADMIN — NICOTINE POLACRILEX 4 MG: 2 GUM, CHEWING ORAL at 19:26

## 2021-11-19 RX ADMIN — CETIRIZINE HYDROCHLORIDE 10 MG: 10 TABLET, FILM COATED ORAL at 13:53

## 2021-11-19 RX ADMIN — BUPRENORPHINE AND NALOXONE 1 FILM: 8; 2 FILM, SOLUBLE BUCCAL; SUBLINGUAL at 08:22

## 2021-11-19 RX ADMIN — NICOTINE POLACRILEX 4 MG: 2 GUM, CHEWING ORAL at 11:50

## 2021-11-19 RX ADMIN — NICOTINE POLACRILEX 4 MG: 2 GUM, CHEWING ORAL at 12:57

## 2021-11-19 RX ADMIN — NICOTINE POLACRILEX 4 MG: 4 GUM, CHEWING ORAL at 23:12

## 2021-11-19 RX ADMIN — PROPRANOLOL HYDROCHLORIDE 10 MG: 10 TABLET ORAL at 16:17

## 2021-11-19 RX ADMIN — FLUTICASONE PROPIONATE 1 SPRAY: 50 SPRAY, METERED NASAL at 08:28

## 2021-11-19 RX ADMIN — ACETAMINOPHEN 650 MG: 325 TABLET, FILM COATED ORAL at 02:38

## 2021-11-19 RX ADMIN — NICOTINE POLACRILEX 4 MG: 2 GUM, CHEWING ORAL at 20:47

## 2021-11-19 RX ADMIN — DIAZEPAM 10 MG: 5 TABLET ORAL at 20:42

## 2021-11-19 RX ADMIN — NAPROXEN 500 MG: 500 TABLET ORAL at 17:29

## 2021-11-19 RX ADMIN — NICOTINE POLACRILEX 4 MG: 2 GUM, CHEWING ORAL at 16:17

## 2021-11-19 RX ADMIN — NAPROXEN 500 MG: 500 TABLET ORAL at 13:53

## 2021-11-19 RX ADMIN — LEVOTHYROXINE SODIUM 75 MCG: 75 TABLET ORAL at 08:22

## 2021-11-19 RX ADMIN — FOLIC ACID 1 MG: 1 TABLET ORAL at 08:22

## 2021-11-19 RX ADMIN — DOCUSATE SODIUM 100 MG: 100 CAPSULE, LIQUID FILLED ORAL at 08:22

## 2021-11-19 RX ADMIN — MULTIPLE VITAMINS W/ MINERALS TAB 1 TABLET: TAB at 08:22

## 2021-11-19 RX ADMIN — AMLODIPINE BESYLATE 5 MG: 5 TABLET ORAL at 08:22

## 2021-11-19 RX ADMIN — GABAPENTIN 300 MG: 300 CAPSULE ORAL at 16:17

## 2021-11-19 RX ADMIN — GABAPENTIN 300 MG: 300 CAPSULE ORAL at 08:22

## 2021-11-19 RX ADMIN — NICOTINE POLACRILEX 4 MG: 2 GUM, CHEWING ORAL at 02:39

## 2021-11-19 RX ADMIN — NICOTINE POLACRILEX 4 MG: 2 GUM, CHEWING ORAL at 17:33

## 2021-11-19 ASSESSMENT — ACTIVITIES OF DAILY LIVING (ADL)
HYGIENE/GROOMING: INDEPENDENT
ADLS_ACUITY_SCORE: 7
ADLS_ACUITY_SCORE: 7
ADLS_ACUITY_SCORE: 8
DOING_ERRANDS_INDEPENDENTLY_DIFFICULTY: NO
ADLS_ACUITY_SCORE: 7
ADLS_ACUITY_SCORE: 8
DRESS: INDEPENDENT
ADLS_ACUITY_SCORE: 7
WALKING_OR_CLIMBING_STAIRS_DIFFICULTY: NO
ADLS_ACUITY_SCORE: 8
ADLS_ACUITY_SCORE: 7
CONCENTRATING,_REMEMBERING_OR_MAKING_DECISIONS_DIFFICULTY: NO
ADLS_ACUITY_SCORE: 7
DIFFICULTY_COMMUNICATING: NO
ADLS_ACUITY_SCORE: 7
ADLS_ACUITY_SCORE: 8
DIFFICULTY_EATING/SWALLOWING: NO
ADLS_ACUITY_SCORE: 8
TOILETING_ISSUES: NO
FALL_HISTORY_WITHIN_LAST_SIX_MONTHS: NO
DRESSING/BATHING_DIFFICULTY: NO
ORAL_HYGIENE: INDEPENDENT
ADLS_ACUITY_SCORE: 8
ADLS_ACUITY_SCORE: 7

## 2021-11-19 ASSESSMENT — MIFFLIN-ST. JEOR: SCORE: 2306.85

## 2021-11-19 NOTE — PLAN OF CARE
Summary: Alcohol intoxication and overdose of seroquel/suboxone.    DATE & TIME: 11/18/21 9065-4245  Cognitive Concerns/ Orientation : Alert and oriented, disoriented to situation at times and date Anxious but redirectable.   BEHAVIOR & AGGRESSION TOOL COLOR: Green  CIWA SCORE: 6(tremors & slight anxiety),0-no intervention as pt lethargic(tremors,anxiety/agitation and headache)    ABNL VS/O2: VSS on RA, pt refusing continuous pulse ox   MOBILITY: SBA in room , wlks in ross with sitter  PAIN MANAGMENT: Complains of abd and back pain, PRN tylenol given x1   DIET: Regular, good appetite   BOWEL/BLADDER: Continent  ABNL LAB/BG:   DRAIN/DEVICES: Pt pulled IV out, MD aware  TELEMETRY RHYTHM: dc'd  SKIN: Scattered scabs on hands and head. Feet calloused.   TESTS/PROCEDURES: None  D/C DAY/GOALS/PLACE: Pending medical clearance and when inpatient psych bed available, ?  OTHER IMPORTANT INFO: Contact precautions maintained for MRSA. Seizure precautions maintained.  Nicorette 4mg given PRN x1 and Tylenol x1. Pt reports baseline numbness and tingling in feet. Pt c/o congestion, fluticasone ordered. Psych saw him yesterday and adjusted medications. Naproxen ordered for headache. Zyrtec ordered for nasal congestion.

## 2021-11-19 NOTE — PLAN OF CARE
A/O x4, intermittently anxious,VSS on room air, ativan administered for CIWA score 21, back and knee pain managed with naproxen, suicide precautions, has sitter, independent, continent, contact precautions, discharge to psychiatric unit pending bed availability

## 2021-11-19 NOTE — PLAN OF CARE
Summary: Alcohol intoxication and overdose of seroquel/suboxone.    DATE & TIME: 11/19/21   Cognitive Concerns/ Orientation: A&O x4, anxious/restless, watching tv between cares.   BEHAVIOR & AGGRESSION TOOL COLOR: Yellow, punched the wall.   CIWA SCORE:Stopped in AM, restarted in afternoon d/t pt restlessness/reports of anxiety.  ABNL VS/O2: VSS on RA, refuses cont pulse ox monitoring  MOBILITY: SBA for suicide precautions, steady gait, up ad kathleen freq when anxious.   PAIN MANAGMENT: Denies  DIET: Regular, excellent diet  BOWEL/BLADDER: Continent. Voiding adeq. PRN miralax given for c/o constipation, BM x1.  ABNL LAB/BG: NA  DRAIN/DEVICES: None  TELEMETRY RHYTHM: NA  SKIN: Scattered scabs on hands and head. Feet calloused.   TESTS/PROCEDURES: None  D/C DAY/GOALS/PLACE: Transfer to inpt psych when bed avail, pt aware.  OTHER IMPORTANT INFO: Reports SOB/anxiety, encourage distraction techniques. Sitter at bedside for suicide precautions. Contact precautions for hx of MRSA. Seizure precautions, side rails padded. Place on 72 hr hold if pt attempts to leave AMA. Psych consult placed from tomorrow.

## 2021-11-19 NOTE — PROGRESS NOTES
[facility]    Medicine Progress Note - Hospitalist Service       Date of Admission:  11/13/2021    Assessment & Plan   Hollis Barclay is a 51 year old male with hx of bipolar disorder, borderline personality disorder, and polysubstance abuse (tobacco, alcohol, cocaine, methamphetamine, opioids) on Suboxone maintenance who was admitted on 11/13/2021 for acute alcohol withdrawal following suicide attempt by overdose     # Acute alcohol withdrawal, Resolved  # Suicide attempt by ingestion overdose  # Acute toxic encephalopathy due to above, Resolved  # Bipolar disorder type I  # Borderline personality disorder  # Social anxiety  # History of TBI  * PTA: aripiprazole 2 mg daily, Seroquel 300 mg qhs, gabapentin 400 mg TID, propanolol 10 mg TID, Suboxone  * Attempted suicide with 4 Suboxone doses, 22 tablets of 300 mg Seroquel, and drinking 1 gallon of alcohol. He was monitored for 12 hours per Poison Control recommendations, and then developed acute alcohol withdrawal  * Treated with diazepam per CIWA protocol, thiamine/folate/MVI, and electrolyte replacement protocol.   * Psychiatry was consulted on admission; medications were adjusted as noted below  - Continues on CIWA with diazepam per protocol  - Aripiprazole has been d/c'd in favor of Zyprexa 10 mg BID, 5 mg q6h prn  - He has been initiated on Suboxone 8-2 mg TID   - PTA gabapentin was decreased to 300 mg TID  - Continues on PTA Seroquel and propanolol  - Suicide precautions in place with bedside attendant  - Psychiatry follow up in AM ordered for ongoing breakthrough anxiety  - Plan to discharge to inpatient Psychiatry once off CIWA  - Patient is currently voluntary; will need to be placed on 72 hour hold if he wants to leave AMA    Elevated transaminases, Improved  Due to alcoholic hepatitis. Improved with abstinence from alcohol  - Check LFTs periodically    Polysubstance abuse  History of tobacco, alcohol, methamphetamine, cocaine,  and opioid abuse. Had been on methadone then transitioned to Suboxone  - Suboxone has been resumed per Psychiatry as noted above  - Plan to discharge to inpatient Psychiatry once off CIWA     Tobacco use disorder  Patient uses chewing tobacco, transitioning to Nicorette gum.  - Continues on nicotine gum prn     Chronic back pain  [PTA: gabapentin 400 mg TID, pregabalin 200 mg TID]  - Gabapentin was decreased to 300 mg TID by Psychiatry  - Pregabalin has been discontinued  - Naproxen 500 mg TID prn ordered while hospitalized, decreased to BID prn at discharge    Essential hypertension  [PTA: amlodipine 5 mg daily, propanolol 10 mg TID]  - Continues on PTA meds    GERD  - Continues on PTA pantoprazole    Constipation, Improved  - Continues on docusate 100 mg BID  - Senna #1-2 bid prn, Miralax daily prn ordered during this hospitalization    Hypokalemia, Resolved  Potassium was replaced per protocol during this hospitalization      Diet: Combination Diet Regular Diet Adult; Safe Tray - with utensils  Diet    DVT Prophylaxis: Pneumatic Compression Devices  Barksdale Catheter: Not present  Code Status: Full Code         Disposition: Expected discharge to inpatient Psychiatry once off CIWA, possibly over the weekend    The patient's care was discussed with the Bedside Nurse, Care Coordinator/ and Patient.    Danelle Dotson MD  Hospitalist Service  [facility]  Contact information available via [facility] Paging/Directory    ______________________________________________________________________    Interval History   No events overnight. Seemed to be past alcohol withdrawal this morning, but has since become increasingly restless and anxious; difficult to ascertain withdrawal vs uncontrolled baseline anxiety. CIWA with diazepam resumed. Psychiatry follow up requested    Time Spent on this Encounter   I spent 35 minutes on the unit/floor managing the care of Hollis Barclay. Over 50% of my  time was spent on the following: completing discharge orders, completing transfer paperwork, and coordinating discharge with charge RN and CC    Danelle Dotson MD    Data reviewed today: I reviewed all medications, new labs and imaging results over the last 24 hours. I personally reviewed no images or EKG's today.    Physical Exam   Vital Signs: Temp: 97.3  F (36.3  C) Temp src: Axillary BP: 105/61 Pulse: 73   Resp: 16 SpO2: 94 % O2 Device: None (Room air)    Weight: 284 lbs 6.29 oz  Constitutional: Restless  HEENT: Sclera white, MMM  Respiratory: Breathing non-labored. Lungs CTAB - no wheezes, crackles, or rhonchi  Cardiovascular: Heart RRR, no m/r/g. No pedal edema  GI: +BS, abd soft/NT  Skin/Integument: No rash  Musculoskeletal: Normal muscle bulk and tone  Neuro: Alert and appropriate, DURAN  Psych: Somewhat restless, but cooperative    Data   Recent Labs   Lab 11/18/21  0934 11/15/21  1109 11/15/21  0929 11/14/21  2117 11/14/21  0832 11/13/21  1549 11/13/21  1548   0000   WBC  --  3.7*  --   --   --   --  6.5  --    HGB  --  11.6*  --   --   --   --  13.6  --    MCV  --  93  --   --   --   --  88  --    PLT  --  289  --   --   --   --  387  --      --  140  --   --  139  --   --    POTASSIUM 3.9  --  3.8 3.6   < > 3.3*  --   --    CHLORIDE 108  --  106  --   --  101  --   --    CO2 28  --  26  --   --  27  --   --    BUN 13  --  18  --   --  18  --   --    CR 0.69  --  0.53*  --   --  0.60*  --   --    ANIONGAP 5  --  8  --   --  11  --   --    KACY 8.1*  --  8.0*  --   --  8.7  --   --    *  --  97  --   --  102*  --   --    ALBUMIN 2.8*  --  3.0*  --   --  3.6  --    < >   PROTTOTAL 6.2*  --  6.6*  --   --  8.0  --    < >   BILITOTAL 0.3  --  0.4  --   --  0.7  --    < >   ALKPHOS 59  --  68  --   --  81  --    < >   ALT 93*  --  120*  --   --  89*  --    < >   AST 84*  --  131*  --   --  87*  --    < >    < > = values in this interval not displayed.         No results found for this or any  previous visit (from the past 24 hour(s)).    Medications       amLODIPine  5 mg Oral Daily     buprenorphine HCl-naloxone HCl  1 Film Sublingual TID     docusate sodium  100 mg Oral BID     fluticasone  1 spray Both Nostrils Daily     folic acid  1 mg Oral Daily     gabapentin  300 mg Oral TID     levothyroxine  75 mcg Oral QAM AC     multivitamin w/minerals  1 tablet Oral Daily     OLANZapine zydis  10 mg Oral BID     pantoprazole  40 mg Oral QAM AC     [Held by provider] Pregabalin  200 mg Oral TID     propranolol  10 mg Oral TID     QUEtiapine  300 mg Oral At Bedtime     sodium chloride (PF)  3 mL Intracatheter Q8H

## 2021-11-19 NOTE — CONSULTS
"[facility] Extended Care, Consult & Liaison    Hollis Barclay  November 19, 2021    Session start: 12:00 pm  Session end: 12:20 pm  Session duration in minutes: 20  CPT utilized: 28704 - Psychotherapy (with patient) - 30 (16-37*) min  Patient was seen in-person.  Anticipated number of sessions or this episode of care: 1-4    Reason for consult: Jose M is 51 year old White  male . Psychiatry consult was requested due to concerns for ongoing hallucinations and need for transfer to [facility]. Patient was seen by [facility] Consult & Liaison team.     Presenting problem : Remains fidgety, anxious, agitated. Often raising voice. Patient expresses being upset he hasn't been given anything for anxiety. I reviewed Dr. Parks note with him and explained that he was given Zyprexa, patient becomes agitated, yelling, stating \"it doesn't do shit\". Patient reports still suicidal. Patient states \"I just don't want to be here anymore\". Patient open to admission to  unit, stating \"if it will help me\".     Mental Status Exam   Affect: Labile  Appearance: Appropriate   Attention Span/Concentration: Attentive    Eye Contact: Variable  Fund of Knowledge: Appropriate   Language /Speech Content: Fluent  Language /Speech Volume: Loud   Language /Speech Rate/Productions: Hyperverbal and Pressured   Recent Memory: Variable  Remote Memory: Variable  Mood: Irritable   Orientation:   Person: Yes   Place: Yes  Time of Day: Yes   Date: Yes   Situation (Do they understand why they are here?): Yes   Psychomotor Behavior: Agitated and Hyperactive   Thought Content: Hallucinations and Suicidal  Thought Form: Intact    Current medications:   Current Facility-Administered Medications   Medication     acetaminophen (TYLENOL) tablet 650 mg    Or     acetaminophen (TYLENOL) Suppository 650 mg     amLODIPine (NORVASC) tablet 5 mg     buprenorphine HCl-naloxone HCl (SUBOXONE) 8-2 MG per film 1 Film     cetirizine (zyrTEC) tablet 10 mg     diazepam (VALIUM) tablet 10 " mg    Or     diazepam (VALIUM) injection 5-10 mg     docusate sodium (COLACE) capsule 100 mg     flumazenil (ROMAZICON) injection 0.2 mg     fluticasone (FLONASE) 50 MCG/ACT spray 1 spray     folic acid (FOLVITE) tablet 1 mg     gabapentin (NEURONTIN) capsule 300 mg     levothyroxine (SYNTHROID/LEVOTHROID) tablet 75 mcg     lidocaine (LMX4) cream     lidocaine 1 % 0.1-1 mL     multivitamin w/minerals (THERA-VIT-M) tablet 1 tablet     naproxen (NAPROSYN) tablet 500 mg     nicotine (NICORETTE) gum 4 mg     OLANZapine zydis (zyPREXA) ODT tab 10 mg     OLANZapine zydis (zyPREXA) ODT tab 5 mg     ondansetron (ZOFRAN) injection 4 mg    Or     ondansetron (ZOFRAN-ODT) ODT tab 4 mg     pantoprazole (PROTONIX) EC tablet 40 mg     polyethylene glycol (MIRALAX) Packet 17 g     [Held by provider] pregabalin (LYRICA) capsule 200 mg     propranolol (INDERAL) tablet 10 mg     QUEtiapine (SEROquel) tablet 300 mg     sennosides (SENOKOT) tablet 1-2 tablet     sodium chloride (PF) 0.9% PF flush 3 mL     sodium chloride (PF) 0.9% PF flush 3 mL              Therapeutic intervention and progress:  Therapeutic intervention consisted of building therapeutic rapport, active listening, validation and normalizing. Patient is not making progress towards treatment goals as evidenced by appearing incresasingly anxious, identifying he is still suicidal.     Collateral information:   Reviewed chart and coordinated with charge nurse Babita, who informed  that patient is medically cleared. At one point thought he was still experiencing withdrawal symptoms, but it appears to be more mental health related (anxious, hallucinations, fidgeting).      Diagnosis:     Substance-Related & Addictive Disorders Alcohol Use Disorder   303.90 (F10.20) Severe In a controlled environment, by history;    296.53 Bipolar I Disorder Current or Most Recent Episode Depressed, Severe, by history;       Plan:     Maintain current transition plan. Patient on list  "with central intake to transfer to inpatient psychiatry. Patient is currently voluntary, but would need to be placed on a 72 hour hold if he wants to leave AMA     When speaking with nurse, appears patient only being offered Zyprexa, per Dr. Parks's note yesterday, \"I made  a modification to the Valium protocol so that he could use oral doses every 4 hours as needed depending on his level of anxiety OR scoring on the CIWA.\"  Appears Valium is also available for breakthrough anxiety.     Madelyn Cano, [facility], [facility] Extended [facility], Consult & Liaison Service  [phone]  "

## 2021-11-19 NOTE — TELEPHONE ENCOUNTER
Bed avail at [facility] on 5500     428pm - Intake called and left VM for Christiano, on call provider, awaiting call back   Alvarado called back and reports she is not on call this weekend. Will look into it and call intake back   447pm - Christiano saldana, declines doc to doc     R: 5500/[place]     Pt placed in queue   518pm - unit charge unavail, intake awaiting call back   603pm - unit charge notified, 7pm for report   605pm - med unit notified

## 2021-11-20 PROBLEM — T50.902D: Status: ACTIVE | Noted: 2021-11-20

## 2021-11-20 PROBLEM — F11.20 OPIOID DEPENDENCE ON AGONIST THERAPY (H): Chronic | Status: ACTIVE | Noted: 2021-11-20

## 2021-11-20 PROBLEM — F31.4: Chronic | Status: ACTIVE | Noted: 2021-11-19

## 2021-11-20 PROCEDURE — 99223 1ST HOSP IP/OBS HIGH 75: CPT | Mod: AI | Performed by: NURSE PRACTITIONER

## 2021-11-20 PROCEDURE — 250N000013 HC RX MED GY IP 250 OP 250 PS 637: Performed by: INTERNAL MEDICINE

## 2021-11-20 PROCEDURE — 128N000001 HC R&B CD/MH ADULT

## 2021-11-20 PROCEDURE — 250N000013 HC RX MED GY IP 250 OP 250 PS 637: Performed by: NURSE PRACTITIONER

## 2021-11-20 PROCEDURE — HZ2ZZZZ DETOXIFICATION SERVICES FOR SUBSTANCE ABUSE TREATMENT: ICD-10-PCS | Performed by: INTERNAL MEDICINE

## 2021-11-20 PROCEDURE — 124N000001 HC R&B MH

## 2021-11-20 PROCEDURE — 250N000011 HC RX IP 250 OP 636: Performed by: PSYCHIATRY & NEUROLOGY

## 2021-11-20 PROCEDURE — 99207 PR NO CHARGE LOS: CPT | Performed by: INTERNAL MEDICINE

## 2021-11-20 PROCEDURE — 99207 PR NO BILLABLE SERVICE THIS VISIT: CPT | Performed by: NURSE PRACTITIONER

## 2021-11-20 PROCEDURE — 250N000013 HC RX MED GY IP 250 OP 250 PS 637: Performed by: PSYCHIATRY & NEUROLOGY

## 2021-11-20 RX ORDER — QUETIAPINE FUMARATE 300 MG/1
300 TABLET, FILM COATED ORAL AT BEDTIME
Status: DISCONTINUED | OUTPATIENT
Start: 2021-11-20 | End: 2021-11-22 | Stop reason: HOSPADM

## 2021-11-20 RX ORDER — LEVOTHYROXINE SODIUM 75 UG/1
75 TABLET ORAL
Status: DISCONTINUED | OUTPATIENT
Start: 2021-11-20 | End: 2021-11-22 | Stop reason: HOSPADM

## 2021-11-20 RX ORDER — AMLODIPINE BESYLATE 5 MG/1
5 TABLET ORAL DAILY
Status: DISCONTINUED | OUTPATIENT
Start: 2021-11-20 | End: 2021-11-22 | Stop reason: HOSPADM

## 2021-11-20 RX ORDER — BUPRENORPHINE 2 MG/1
4 TABLET SUBLINGUAL DAILY
Status: DISCONTINUED | OUTPATIENT
Start: 2021-11-21 | End: 2021-11-22

## 2021-11-20 RX ORDER — HALOPERIDOL 5 MG/ML
5 INJECTION INTRAMUSCULAR ONCE
Status: COMPLETED | OUTPATIENT
Start: 2021-11-20 | End: 2021-11-20

## 2021-11-20 RX ORDER — PROPRANOLOL HYDROCHLORIDE 10 MG/1
10 TABLET ORAL 3 TIMES DAILY
Status: DISCONTINUED | OUTPATIENT
Start: 2021-11-20 | End: 2021-11-22 | Stop reason: HOSPADM

## 2021-11-20 RX ORDER — ARIPIPRAZOLE 5 MG/1
5 TABLET ORAL DAILY
Status: DISCONTINUED | OUTPATIENT
Start: 2021-11-20 | End: 2021-11-22 | Stop reason: HOSPADM

## 2021-11-20 RX ORDER — GABAPENTIN 300 MG/1
300 CAPSULE ORAL 3 TIMES DAILY
Status: DISCONTINUED | OUTPATIENT
Start: 2021-11-20 | End: 2021-11-22

## 2021-11-20 RX ORDER — OLANZAPINE 10 MG/1
10 TABLET, ORALLY DISINTEGRATING ORAL 2 TIMES DAILY
Status: DISCONTINUED | OUTPATIENT
Start: 2021-11-20 | End: 2021-11-20

## 2021-11-20 RX ORDER — BUPRENORPHINE 2 MG/1
2 TABLET SUBLINGUAL ONCE
Status: COMPLETED | OUTPATIENT
Start: 2021-11-20 | End: 2021-11-20

## 2021-11-20 RX ORDER — PANTOPRAZOLE SODIUM 40 MG/1
40 TABLET, DELAYED RELEASE ORAL
Status: DISCONTINUED | OUTPATIENT
Start: 2021-11-20 | End: 2021-11-22 | Stop reason: HOSPADM

## 2021-11-20 RX ORDER — MULTIPLE VITAMINS W/ MINERALS TAB 9MG-400MCG
1 TAB ORAL DAILY
Status: DISCONTINUED | OUTPATIENT
Start: 2021-11-20 | End: 2021-11-22 | Stop reason: HOSPADM

## 2021-11-20 RX ORDER — DIPHENHYDRAMINE HYDROCHLORIDE 50 MG/ML
50 INJECTION INTRAMUSCULAR; INTRAVENOUS ONCE
Status: COMPLETED | OUTPATIENT
Start: 2021-11-20 | End: 2021-11-20

## 2021-11-20 RX ADMIN — NICOTINE POLACRILEX 8 MG: 4 GUM, CHEWING ORAL at 09:37

## 2021-11-20 RX ADMIN — ARIPIPRAZOLE 5 MG: 5 TABLET ORAL at 12:14

## 2021-11-20 RX ADMIN — NICOTINE POLACRILEX 8 MG: 4 GUM, CHEWING ORAL at 16:50

## 2021-11-20 RX ADMIN — ACETAMINOPHEN 650 MG: 325 TABLET ORAL at 15:27

## 2021-11-20 RX ADMIN — NICOTINE POLACRILEX 8 MG: 4 GUM, CHEWING ORAL at 15:25

## 2021-11-20 RX ADMIN — OLANZAPINE 10 MG: 10 TABLET, FILM COATED ORAL at 05:34

## 2021-11-20 RX ADMIN — FOLIC ACID 1000 MCG: 1 TABLET ORAL at 08:50

## 2021-11-20 RX ADMIN — QUETIAPINE FUMARATE 300 MG: 300 TABLET ORAL at 21:02

## 2021-11-20 RX ADMIN — OLANZAPINE 10 MG: 10 TABLET, FILM COATED ORAL at 14:00

## 2021-11-20 RX ADMIN — ACETAMINOPHEN 650 MG: 325 TABLET ORAL at 09:03

## 2021-11-20 RX ADMIN — GABAPENTIN 300 MG: 300 CAPSULE ORAL at 08:50

## 2021-11-20 RX ADMIN — LEVOTHYROXINE SODIUM 75 MCG: 75 TABLET ORAL at 08:51

## 2021-11-20 RX ADMIN — GABAPENTIN 300 MG: 300 CAPSULE ORAL at 13:14

## 2021-11-20 RX ADMIN — AMLODIPINE BESYLATE 5 MG: 5 TABLET ORAL at 08:50

## 2021-11-20 RX ADMIN — ALUMINUM HYDROXIDE, MAGNESIUM HYDROXIDE, AND SIMETHICONE 30 ML: 200; 200; 20 SUSPENSION ORAL at 11:47

## 2021-11-20 RX ADMIN — PANTOPRAZOLE SODIUM 40 MG: 20 TABLET, DELAYED RELEASE ORAL at 08:51

## 2021-11-20 RX ADMIN — ACETAMINOPHEN 650 MG: 325 TABLET ORAL at 21:01

## 2021-11-20 RX ADMIN — NICOTINE POLACRILEX 4 MG: 4 GUM, CHEWING ORAL at 03:22

## 2021-11-20 RX ADMIN — HALOPERIDOL LACTATE 5 MG: 5 INJECTION, SOLUTION INTRAMUSCULAR at 07:35

## 2021-11-20 RX ADMIN — PROPRANOLOL HYDROCHLORIDE 10 MG: 10 TABLET ORAL at 19:07

## 2021-11-20 RX ADMIN — PROPRANOLOL HYDROCHLORIDE 10 MG: 10 TABLET ORAL at 13:14

## 2021-11-20 RX ADMIN — NICOTINE POLACRILEX 8 MG: 4 GUM, CHEWING ORAL at 10:59

## 2021-11-20 RX ADMIN — Medication 3 MG: at 21:02

## 2021-11-20 RX ADMIN — GABAPENTIN 300 MG: 300 CAPSULE ORAL at 19:07

## 2021-11-20 RX ADMIN — NICOTINE POLACRILEX 8 MG: 4 GUM, CHEWING ORAL at 13:14

## 2021-11-20 RX ADMIN — MULTIPLE VITAMINS W/ MINERALS TAB 1 TABLET: TAB at 08:51

## 2021-11-20 RX ADMIN — NICOTINE POLACRILEX 4 MG: 4 GUM, CHEWING ORAL at 06:23

## 2021-11-20 RX ADMIN — DIPHENHYDRAMINE HYDROCHLORIDE 50 MG: 50 INJECTION, SOLUTION INTRAMUSCULAR; INTRAVENOUS at 07:35

## 2021-11-20 RX ADMIN — BUPRENORPHINE HYDROCHLORIDE 2 MG: 2 TABLET SUBLINGUAL at 12:53

## 2021-11-20 RX ADMIN — NICOTINE POLACRILEX 4 MG: 4 GUM, CHEWING ORAL at 04:30

## 2021-11-20 RX ADMIN — ACETAMINOPHEN 650 MG: 325 TABLET ORAL at 04:30

## 2021-11-20 RX ADMIN — NICOTINE POLACRILEX 8 MG: 4 GUM, CHEWING ORAL at 21:02

## 2021-11-20 RX ADMIN — GABAPENTIN 100 MG: 100 CAPSULE ORAL at 13:59

## 2021-11-20 RX ADMIN — NICOTINE POLACRILEX 8 MG: 4 GUM, CHEWING ORAL at 19:00

## 2021-11-20 RX ADMIN — PROPRANOLOL HYDROCHLORIDE 10 MG: 10 TABLET ORAL at 08:51

## 2021-11-20 RX ADMIN — NICOTINE POLACRILEX 8 MG: 4 GUM, CHEWING ORAL at 14:18

## 2021-11-20 ASSESSMENT — ACTIVITIES OF DAILY LIVING (ADL)
ORAL_HYGIENE: INDEPENDENT
DRESS: INDEPENDENT
HYGIENE/GROOMING: INDEPENDENT
HYGIENE/GROOMING: INDEPENDENT
DRESS: INDEPENDENT
LAUNDRY: WITH SUPERVISION
ORAL_HYGIENE: INDEPENDENT

## 2021-11-20 NOTE — PROGRESS NOTES
Admitted a 52 y/o male per stretcher from [facility] accompanied by EMS. Patient is ambulatory, alert and oriented, not in acute distress. Patient was initially cooperative during the admission procedure but as the assessment was nearing the end, patient bacame irritable, angry and did not want to proceed with assessment. Patient was offered snacks and drinks which he accepted. Initially, he declined snacks when it was first offered. Patient is labile. He wants PTA meds re-instated. Writer offered anxti-anxiety meds but he declined and became angry saying it's either the medication he used to be taking or out of here. Earlier, patient threw a cup with food inside contents were strewn all over the floor.   Down to gown done. Skin intact and no issues. VS stable.   Endorses R knee pain 7/10, anxiety 8/10, depression 9/10, visual hallucinations of shapes and pictures. Denies suicidal ideation, homicidal ideation, AH. Patient contracts for safety. He is on suicide precaution.   Received Nicotine gum  X1.   Will continue to monitor.

## 2021-11-20 NOTE — PHARMACY-ADMISSION MEDICATION HISTORY
Pharmacy Note - Admission Medication History    Pertinent Provider Information: Patient was discharged from [facility]. Obtained med history information from previous med history note on 11/14/21 and discharge summary.     ______________________________________________________________________    Prior To Admission (PTA) med list completed and updated in EMR.       PTA Med List   Medication Sig Last Dose     amLODIPine (NORVASC) 5 MG tablet Take 1 tablet (5 mg) by mouth daily      buprenorphine HCl-naloxone HCl (SUBOXONE) 8-2 MG per film Place 1 Film under the tongue 3 times daily      diclofenac (VOLTAREN) 1 % topical gel Apply 2 g topically 4 times daily as needed for moderate pain      docusate sodium (COLACE) 100 MG capsule Take 1 capsule (100 mg) by mouth 2 times daily      folic acid (FOLVITE) 1 MG tablet Take 1 tablet (1,000 mcg) by mouth daily      gabapentin (NEURONTIN) 300 MG capsule Take 1 capsule (300 mg) by mouth 3 times daily      levothyroxine (SYNTHROID/LEVOTHROID) 75 MCG tablet Take 1 tablet (75 mcg) by mouth every morning (before breakfast)      miconazole with skin protectant (SELENA ANTIFUNGAL) 2 % CREA cream Apply topically 2 times daily (Patient taking differently: Apply topically 2 times daily For athlete's foot)      multivitamin w/minerals (THERA-VIT-M) tablet Take 1 tablet by mouth daily      naproxen (NAPROSYN) 500 MG tablet Take 1 tablet (500 mg) by mouth 2 times daily as needed for moderate pain or headaches      nicotine polacrilex (NICORETTE) 4 MG gum Place 1-2 each (4-8 mg) inside cheek every hour as needed for other (nicotine withdrawal symptoms)      OLANZapine zydis (ZYPREXA) 10 MG ODT Take 1 tablet (10 mg) by mouth 2 times daily      OLANZapine zydis (ZYPREXA) 5 MG ODT Take 1 tablet (5 mg) by mouth every 6 hours as needed for agitation (anxiety)      ondansetron (ZOFRAN-ODT) 4 MG ODT tab Take 1 tablet (4 mg) by mouth every 6 hours as needed (Nausea and Vomiting)       pantoprazole (PROTONIX) 40 MG EC tablet Take 1 tablet (40 mg) by mouth every morning (before breakfast)      polyethylene glycol (MIRALAX) 17 GM/Dose powder Take 17 g by mouth daily      propranolol (INDERAL) 10 MG tablet Take 1 tablet (10 mg) by mouth 3 times daily      QUEtiapine (SEROQUEL) 300 MG tablet Take 1 tablet (300 mg) by mouth At Bedtime      sennosides (SENOKOT) 8.6 MG tablet Take 1-2 tablets by mouth 2 times daily as needed for constipation        Information source(s): Hospital records  Method of interview communication: N/A    Summary of Changes to PTA Med List: N/A    Patient does not anticipate needing any multi-use medications during admission.    The information provided in this note is only as accurate as the sources available at the time of the update(s).    Thank you for the opportunity to participate in the care of this patient.    Deja Cochran [facility]  11/19/2021 10:31 PM

## 2021-11-20 NOTE — PLAN OF CARE
Problem: Behavioral Health Plan of Care  Goal: Adheres to Safety Considerations for Self and Others  Outcome: Improving     Problem: Suicidal Behavior  Goal: Suicidal Behavior is Absent or Managed  Outcome: Improving   Pt is on suicidal precaution, no suicide noted during the night. Pt slept > 6 hours, complained of right knee pain 7/10,  had Tylenol 650 mg at 0430 and did verbalize some relief after.  Pt also had nicotine gum x 3 , Zyprexa 10 mg tab for agitation at 0530, will continue to monitor.

## 2021-11-20 NOTE — PROGRESS NOTES
11/20/21 1410   Engagement   Intervention Group   Topic Detail Processing bingo to encourage sharing of thoughts/feeling, coping, symptom management, scanning, delayed gratification, social skill expansion, building insight and an opportunity to experience success   Attendance Did not attend

## 2021-11-20 NOTE — PLAN OF CARE
"  Problem: Suicide Risk  Goal: Absence of Self-Harm  Outcome: No Change   Psych Symptoms present: Pt says he feels \"antsy\" Pt endorses suicidal ideation, no plan , with intent,  \"Soon as I can figure out how\". and denies homicidal ideation. Contracts for safety. Anxiety 9/10, depression 9/10. Knee and lower back pain  8/10- prn given, within an hour pain 6/10. Pt wants a drug regime to include medications he had when in other hospital when he was in withdrawal. Pt says reading is what calms him. In afternoon pt requests prns for anxiety and agitation, says deep breathing and pacing are not helping- given Gabapenten and Zyprexa as he says that combination might help.  pt goes back to room, paces hallway.      "

## 2021-11-20 NOTE — PROGRESS NOTES
"At 0715,this writer overhead pt banging his head on the door, staff approached the pt, asking him if there is anything we can help.  Pt refused to let staff know why he was upset.  Security came in to intervene, but pt was still agitated.  Finally, the charge nurse called house MD who put in order for IM benadryl 50 mg and Haldol 5 mg.  Initially, pt refused to take saying, \" these two medications don't ever work for me \".  After a second approach, this writer was able to give   The medications at 0735, will continue to monitor.  "

## 2021-11-20 NOTE — PROGRESS NOTES
Addiction Medicine    Called by MIAN Orantes to consult on patient.   He is complaining about continued alcohol and opiate withdrawal .    However, he was in ED and hospital at [facility] since 11/12/21.  He did receive diazepam while there, but now CIWAs are low.    Also c/o of opiate withdrawal, and he is restless.  However, no other sx of opiate withdrawal.   He says he was on suboxone 8 mg tid, but  shows that last fill was 9/27 and got #90/ 30d.   Also know that he missed an appt. In the [facility] with me, so was not prescribed more.   Unless he was getting it illicitly, he likely was not receiving in recenty.   Therefore will start at low dose.  Give 2 mg today, then 4 mg daily.       Full consult to follow.    Ivette Mahoney MD  Addiction Medicine Service  [facility]   Page me (click here for Salena Mahoney)

## 2021-11-20 NOTE — PROGRESS NOTES
.Discharge    Patient discharged to [facility] via ambulance with [facility]   Care plan note completed     Listed belongings gathered and given to patient (including from security/pharmacy). Yes  Care Plan and Patient education resolved: Yes  Prescriptions if needed, hard copies sent with patient  Yes  Medication Bin checked and emptied on discharge Yes  SW/care coordinator/charge RN aware of discharge: Yes     Pt cooperative with discharge.   Report given to unit 9626

## 2021-11-20 NOTE — PROGRESS NOTES
Received page that patient had been accepted for transfer at [facility]. Day team had already completed med rec, I have placed final discharge order.

## 2021-11-20 NOTE — CONSULTS
Hospital medicine service consulted by psychiatry new patient, transfer from [facility]. Recent Overdose.       Per electronic chart review patient was admitted [facility] medicine service 11/13/2021 for acute alcohol withdrawal following suicide attempt by overdose (suboxone, seroquel and gallon of alcohol).  Medically managed and clinically observed inpatient medical unit.  Poison control was contacted.  11/19/21 Medically cleared and subsequently admitted inpatient psychiatry unit for further psychiatric stabilization.    Upon electronic chart review, Vital signs stable.  Acute phase alcohol withdrawal resolved and acute phase toxic encephalopathy due to alcohol withdrawal resolved.  Elevated liver enzymes which are improving and felt to be due to recent alcohol use prior to admission.  It was recommended check LFTs periodically.  Blood Pressure stable and on home medications for blood pressure. Refer to inpatient hospital medicine provider notes for further details.    Patient hemodynamically stable.  Prior to admission was clinically managed and observed inpatient medical unit and was cleared by poison control prior to admission.  Unless something new clinically arises, do not feel patient needs to be followed by medicine service.      Psychiatrist can call medicine service with any concerns or questions.      Non Billable Visit.       LATOYA Boone, CNP  [facility] Medicine Service  [facility]
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
## 2021-11-20 NOTE — PLAN OF CARE
"    Initial Psychosocial Assessment    Type of CM visit: Initial Assessment, Clinical Treatment Coordinator Role Introduction, Offer Discharge Planning    Information obtained from: [x]Patient   [x]Chart review  []Collateral Contacts  []Court Website    Hospitalization information:   Hollis Barclay is a 51 year old who was admitted to unit [facility] on 11/19/2021 due to being transferred from [facility]. Patient  was admitted  there 11/13/21 due to attempted suicide by overdose on Seroquel, Suboxone and alcohol and transferred here when medically cleared. Patient reported he began feeling suicidal a few weeks ago and isn't sure what prompted it.  He reported he has been drinking about a half gallon of alcohol a day for about two weeks and drank a gallon on the day he attempted suicide. He reported a history of SIB by burning and cutting himself with the last episode about three weeks ago. Patient reported he lives in a group home where he has been for four months. He doesn't like it there because other peers abuse substances and he got \"sucked back in\". Patient denies having a substance use problem reporting he drinks to  manage his mental health issues.  He declined a referral to [facility] treatment. He did report he would be open to seeing a therapist. Patient was irritable and cooperative during our conversation though towards the end his agitation began increasing.  Patient alternated between sitting on the bed rocking to pacing during our interview. He reported he is \"hurting all over\" and \"coming out of his skin.\" He reported he is willing to stay voluntary if he can get medications to help ease his current physical distress.     Patient Self-Assessment  Patient reported reason for admission: \"I just want to die\".     Patient reported symptoms of concern: [x]sadness    [x]anxiety     [x]anger    [x]poor sleep     [x]medications not working    [x]racing thoughts     [x]substance use     " [x]agitation     []hearing voices     []hopelessness   []Eating concerns    [x]Self-injury      [] Other   Comments:    Current suicidal ideation:  []No    []Yes, no plan     [x]Yes, with plan (describe):  OD        Comments:   Current homicidal ideation:  [x]No   [] Yes       Comments:     Legal Status at Admission: Voluntary/Patient has signed consent for treatment      History of Mental Health:  Describe current and past mental health symptoms present?     Historical diagnoses of TBI, depression, intermittent suicidality.        Do you understand your mental health diagnosis? YES [x]   NO []    History of psychiatric hospitalizations?  YES [x]     NO  []  Details:    If YES, within the last 30 days? YES [x]     NO  [] 11/13/21    History of commitment?  YES []     NO  [x]    Details:   History of ECT?  YES []     NO  [x]    Details:     History of Substance Use Disorder:  Have you used alcohol or substances in the past 12 months? YES [x]/ NO []              If Yes, Type alcohol  Frequency  About a half gallon of alcohol a day for about two weeks and drank a gallon on the day he attempted suicide.     Would you like a substance use disorder evaluation? YES [] / NO [x]    Previous Treatment? YES []/ NO [x]  Details:     Significant Life Events  (Illness, Death, Loss): unhappy at home, significant alcohol use      Is there a history of abuse or psychological trauma:    []Denies       []Yes, present (type):         []Yes, past (type):        []Patient declined to answer      writer did not ask    Identify current stressors:    [x]financial,    []legal issues,    []homelessness,    [x]housing,     []recent loss,    [x]relationships,    [x]substance use concerns,    [x]medical     []unemployment     []employment  concerns    []isolation,    []lack of resources,     []out of home placements,     []parenting issues     []domestic violence     []other:  Comments:       Living Situation:     []House/apt    [x]Group Home     []IRTS     []Homeless     []Assisted Living     []Nursing home    []Lives alone    []Lives with :                         []Other:          Family Composition: writer did not ask    Children, ages and current location if minor: adult      Relationship status  [x]Single     []     []     []       []Significant Other   []Other:     Educational Background:  []Less Than High School     []High School     []GED     []College   writer did not ask  Cognitive/learning concerns: TBI    Financial Status: [] Employed, status and location:  []Unemployment    []County Assistance     []SSI/SSDI      []Waivered services    [x]Other: none    Legal status(present):   [x]Voluntary, []72-hour hold, []Commitment, []Guardianship, []Revocation, []Stay of commitment,    Details:    Other legal issues identified:  [x]None, []Arrest,  []Probation/[place],  []Driving under influence,  []Incarceration,  []Sexual offense (level):   []Child Protective Services,      []Other:      Details:    Ethnic/Cultural considerations:  None reported    Spiritual considerations: none reported     Service History:  [x]No     []Yes: details:    Social Functioning (organization, interests) and strengths: writer did not ask    Current Treatment Providers Are:     NO Name, Agency, and phone   Psychiatrist  []    Psychotherapist  []    [facility] worker  []      []    Waivered Services  [x] Jessica/[facility]   [facility]  []    Day Treatment/PHP/[facility] trtmt  []    Group Home/[facility]  []      []    Other:  []            Social Service Assessment of identified patient needs and plan to meet those needs:     Patient might benefit from a period of stabilization to better determine the best course of action to treat his symptoms.  Patient might benefit from coordination of services with his community providers. Writer can assist in coordinations of services and provide support as needed.       Possible discharge plan:   TBD        Barriers: Medication Management, Symptom Stabilization, Coordination of Care, current withdrawal

## 2021-11-21 PROCEDURE — 250N000013 HC RX MED GY IP 250 OP 250 PS 637: Performed by: NURSE PRACTITIONER

## 2021-11-21 PROCEDURE — 128N000001 HC R&B CD/MH ADULT

## 2021-11-21 PROCEDURE — 250N000013 HC RX MED GY IP 250 OP 250 PS 637: Performed by: INTERNAL MEDICINE

## 2021-11-21 PROCEDURE — 250N000013 HC RX MED GY IP 250 OP 250 PS 637: Performed by: PSYCHIATRY & NEUROLOGY

## 2021-11-21 RX ADMIN — NICOTINE POLACRILEX 4 MG: 4 GUM, CHEWING ORAL at 13:40

## 2021-11-21 RX ADMIN — NICOTINE POLACRILEX 8 MG: 4 GUM, CHEWING ORAL at 20:29

## 2021-11-21 RX ADMIN — PANTOPRAZOLE SODIUM 40 MG: 20 TABLET, DELAYED RELEASE ORAL at 07:01

## 2021-11-21 RX ADMIN — PROPRANOLOL HYDROCHLORIDE 10 MG: 10 TABLET ORAL at 20:24

## 2021-11-21 RX ADMIN — NICOTINE POLACRILEX 4 MG: 4 GUM, CHEWING ORAL at 13:38

## 2021-11-21 RX ADMIN — ACETAMINOPHEN 650 MG: 325 TABLET ORAL at 10:58

## 2021-11-21 RX ADMIN — NICOTINE POLACRILEX 8 MG: 4 GUM, CHEWING ORAL at 08:25

## 2021-11-21 RX ADMIN — LEVOTHYROXINE SODIUM 75 MCG: 75 TABLET ORAL at 07:01

## 2021-11-21 RX ADMIN — ACETAMINOPHEN 650 MG: 325 TABLET ORAL at 20:29

## 2021-11-21 RX ADMIN — GABAPENTIN 300 MG: 300 CAPSULE ORAL at 13:46

## 2021-11-21 RX ADMIN — GABAPENTIN 300 MG: 300 CAPSULE ORAL at 20:31

## 2021-11-21 RX ADMIN — FOLIC ACID 1000 MCG: 1 TABLET ORAL at 07:39

## 2021-11-21 RX ADMIN — PROPRANOLOL HYDROCHLORIDE 10 MG: 10 TABLET ORAL at 09:41

## 2021-11-21 RX ADMIN — GABAPENTIN 300 MG: 300 CAPSULE ORAL at 07:39

## 2021-11-21 RX ADMIN — ARIPIPRAZOLE 5 MG: 5 TABLET ORAL at 07:39

## 2021-11-21 RX ADMIN — BUPRENORPHINE HYDROCHLORIDE 4 MG: 2 TABLET SUBLINGUAL at 07:38

## 2021-11-21 RX ADMIN — NICOTINE POLACRILEX 8 MG: 4 GUM, CHEWING ORAL at 15:37

## 2021-11-21 RX ADMIN — Medication 3 MG: at 20:29

## 2021-11-21 RX ADMIN — MULTIPLE VITAMINS W/ MINERALS TAB 1 TABLET: TAB at 07:40

## 2021-11-21 RX ADMIN — AMLODIPINE BESYLATE 5 MG: 5 TABLET ORAL at 09:41

## 2021-11-21 RX ADMIN — QUETIAPINE FUMARATE 300 MG: 300 TABLET ORAL at 20:24

## 2021-11-21 ASSESSMENT — ACTIVITIES OF DAILY LIVING (ADL)
HYGIENE/GROOMING: INDEPENDENT
ORAL_HYGIENE: INDEPENDENT
ORAL_HYGIENE: INDEPENDENT
LAUNDRY: UNABLE TO COMPLETE
HYGIENE/GROOMING: INDEPENDENT
DRESS: INDEPENDENT
DRESS: SCRUBS (BEHAVIORAL HEALTH);INDEPENDENT

## 2021-11-21 NOTE — PLAN OF CARE
"  Problem: Behavioral Health Plan of Care  Goal: Adheres to Safety Considerations for Self and Others  Outcome: No Change  Intervention: Develop and Maintain Individualized Safety Plan  Recent Flowsheet Documentation  Taken 11/21/2021 0900 by Jose A Newman RN  Safety Measures: safety rounds completed   Pt med compliant, claims generalized pain level 8.5- prn given, within an hour pain 8/10. Pt states anxiety 8/10, depression 9/10. Pt endorses SI \"Wish it was all over\" with plan to \"take a bottle of pills as soon as I am out of here'\" Pt endorses visual hallucinations of \"blobs and critters in front of my face,\" Pt was rude to behavioral tech when asked to stand for standing blood pressure pt became irritable and again when he spilled his fluids of food tray on the floor, complained of his wet feet,and became irritable. Pt then went to bed to rest. Pt moved to -side room [facility] at 11:50 this morning.  "

## 2021-11-21 NOTE — PROGRESS NOTES
Patient received from [facility] through transferred. Oriented to  unit and his  Room environments.. Pt observed grimacing (facial), complains of severe lower back pain, rated pain 9/10. Gave him tylenol 650mg, denies pains on reassess. Blood pressure 89/59, 14:00 dose propranolol held. Rechecked 108/56, after snacks.

## 2021-11-21 NOTE — PLAN OF CARE
Problem: Behavioral Health Plan of Care  Goal: Absence of New-Onset Illness or Injury  Outcome: Improving     Problem: Behavioral Health Plan of Care  Goal: Optimized Coping Skills in Response to Life Stressors  Outcome: Improving    Observed per routine. Safety--suicide precautions in place. No behavior issues this shift. Patient resting quietly with eyes closed, respirations noted even, and unlabored on all safety checks at [facility]. No indication to staff of being awake.  No signs of pain distress/discomfort.

## 2021-11-21 NOTE — PLAN OF CARE
Occupational Therapy   AIDET      Patient was introduced to the role of occupational therapy and oriented to the process of occupational therapy services on the unit, including function of groups. Patient was given the Occupational Therapy Questionnaire to complete. Patient in room on approach. Pt agreeable to review form. Pt asked for the rest of his books from his room on AB. Staff provided pt with the remainder of his books. Pt grateful. OT will follow up with assessment and address any questions, needs, or concerns.    11/21/2021

## 2021-11-21 NOTE — PROGRESS NOTES
Paged RN for blood pressure parameters for blood pressure medication.  Morning blood pressure systolic 99.  Asymptomatic.  Amlodipine 5 mg po daily  Propanolol 10 mg po TID     Plan:   Hold parameters for amlodipine and propanolol provided.  Note patient also on buprenorphine and Seroquel.  These medications known to have tendencies for hypotension.  Would monitor blood pressures and adjust medication accordingly.  Defer to psychiatry and addiction medicine.        LATOYA Boone, CNP  [facility] Medicine Service  [facility]

## 2021-11-21 NOTE — PROGRESS NOTES
11/21/21 1222   Engagement   Intervention Group   Topic Detail OT [employer]-Window cling coloring for creativity, healthy distraction, social engagement and symptom management   Attendance Did not attend   Reason for Not Attending Excused  (pt moved to C side during group time)   Patient Response Was respectful

## 2021-11-21 NOTE — PLAN OF CARE
Problem: Behavioral Health Plan of Care  Goal: Plan of Care Review  Outcome: No Change  Flowsheets (Taken 11/20/2021 0800 by Jose A Newman RN)  Plan of Care Reviewed With: patient  Patient Agreement with Plan of Care: agrees     Problem: Suicidal Behavior  Goal: Suicidal Behavior is Absent or Managed  Outcome: No Change     Problem: Suicide Risk  Goal: Absence of Self-Harm  Outcome: No Change   Patient is pleasant, isolative and noted to be restless. Rates high anxiety 8, depression 8, Knee and lower back pain  8/10. Patient denies HI/SI and all other psych symptoms. Contracts for safety and is med compliant. Patient noted to be patient and less demanding with med's requests. PRN's given respectfully.

## 2021-11-22 ENCOUNTER — HOSPITAL ENCOUNTER (INPATIENT)
Facility: HOSPITAL | Age: 51
LOS: 19 days | Discharge: ANOTHER HOSPITAL | End: 2021-12-11
Attending: HOSPITALIST | Admitting: FAMILY MEDICINE
Payer: MEDICAID

## 2021-11-22 VITALS
OXYGEN SATURATION: 95 % | RESPIRATION RATE: 18 BRPM | BODY MASS INDEX: 36.38 KG/M2 | HEART RATE: 71 BPM | TEMPERATURE: 97.5 F | SYSTOLIC BLOOD PRESSURE: 109 MMHG | WEIGHT: 298.8 LBS | HEIGHT: 76 IN | DIASTOLIC BLOOD PRESSURE: 60 MMHG

## 2021-11-22 DIAGNOSIS — G89.29 CHRONIC BILATERAL LOW BACK PAIN WITH SCIATICA, SCIATICA LATERALITY UNSPECIFIED: ICD-10-CM

## 2021-11-22 DIAGNOSIS — F10.939 ALCOHOL WITHDRAWAL SYNDROME WITH COMPLICATION, WITH UNSPECIFIED COMPLICATION (H): ICD-10-CM

## 2021-11-22 DIAGNOSIS — U07.1 2019 NOVEL CORONAVIRUS DISEASE (COVID-19): Primary | ICD-10-CM

## 2021-11-22 DIAGNOSIS — M54.40 CHRONIC BILATERAL LOW BACK PAIN WITH SCIATICA, SCIATICA LATERALITY UNSPECIFIED: ICD-10-CM

## 2021-11-22 DIAGNOSIS — F51.01 PRIMARY INSOMNIA: ICD-10-CM

## 2021-11-22 DIAGNOSIS — E83.42 HYPOMAGNESEMIA: ICD-10-CM

## 2021-11-22 DIAGNOSIS — F31.4 SEVERE BIPOLAR I DISORDER, CURRENT OR MOST RECENT EPISODE DEPRESSED, WITH MIXED FEATURES (H): Chronic | ICD-10-CM

## 2021-11-22 PROBLEM — J12.82 PNEUMONIA DUE TO COVID-19 VIRUS: Status: ACTIVE | Noted: 2021-11-22

## 2021-11-22 LAB
ABO/RH(D): NORMAL
ALBUMIN SERPL-MCNC: 2.9 G/DL (ref 3.5–5)
ALP SERPL-CCNC: 62 U/L (ref 45–120)
ALT SERPL W P-5'-P-CCNC: 54 U/L (ref 0–45)
ANION GAP SERPL CALCULATED.3IONS-SCNC: 10 MMOL/L (ref 5–18)
APTT PPP: 41 SECONDS (ref 22–38)
AST SERPL W P-5'-P-CCNC: 65 U/L (ref 0–40)
BASE EXCESS BLDV CALC-SCNC: 4 MMOL/L
BILIRUB DIRECT SERPL-MCNC: 0.3 MG/DL
BILIRUB SERPL-MCNC: 0.6 MG/DL (ref 0–1)
BNP SERPL-MCNC: 80 PG/ML (ref 0–42)
BUN SERPL-MCNC: 10 MG/DL (ref 8–22)
C REACTIVE PROTEIN LHE: 14.8 MG/DL (ref 0–0.8)
CALCIUM SERPL-MCNC: 8.6 MG/DL (ref 8.5–10.5)
CHLORIDE BLD-SCNC: 105 MMOL/L (ref 98–107)
CO2 SERPL-SCNC: 25 MMOL/L (ref 22–31)
CREAT SERPL-MCNC: 0.75 MG/DL (ref 0.7–1.3)
D DIMER PPP FEU-MCNC: 0.86 UG/ML FEU (ref 0–0.5)
ERYTHROCYTE [DISTWIDTH] IN BLOOD BY AUTOMATED COUNT: 14.1 % (ref 10–15)
FIBRINOGEN PPP-MCNC: 546 MG/DL (ref 170–490)
FLUAV AG SPEC QL IA: NEGATIVE
FLUBV AG SPEC QL IA: NEGATIVE
GFR SERPL CREATININE-BSD FRML MDRD: >90 ML/MIN/1.73M2
GLUCOSE BLD-MCNC: 115 MG/DL (ref 70–125)
HCO3 BLDV-SCNC: 27 MMOL/L (ref 24–30)
HCT VFR BLD AUTO: 35.5 % (ref 40–53)
HGB BLD-MCNC: 11.5 G/DL (ref 13.3–17.7)
INR PPP: 0.91 (ref 0.85–1.15)
LDH SERPL L TO P-CCNC: 142 U/L (ref 125–220)
MAGNESIUM SERPL-MCNC: 1.7 MG/DL (ref 1.8–2.6)
MCH RBC QN AUTO: 29.9 PG (ref 26.5–33)
MCHC RBC AUTO-ENTMCNC: 32.4 G/DL (ref 31.5–36.5)
MCV RBC AUTO: 92 FL (ref 78–100)
OXYHGB MFR BLDV: 72.8 % (ref 70–75)
PCO2 BLDV: 48 MM HG (ref 35–50)
PH BLDV: 7.39 [PH] (ref 7.35–7.45)
PLATELET # BLD AUTO: 153 10E3/UL (ref 150–450)
PO2 BLDV: 39 MM HG (ref 25–47)
POTASSIUM BLD-SCNC: 3.8 MMOL/L (ref 3.5–5)
PROT SERPL-MCNC: 6.7 G/DL (ref 6–8)
RBC # BLD AUTO: 3.84 10E6/UL (ref 4.4–5.9)
SAO2 % BLDV: 74.1 % (ref 70–75)
SARS-COV-2 RNA RESP QL NAA+PROBE: POSITIVE
SODIUM SERPL-SCNC: 140 MMOL/L (ref 136–145)
SPECIMEN EXPIRATION DATE: NORMAL
TROPONIN I SERPL-MCNC: <0.01 NG/ML (ref 0–0.29)
TSH SERPL DL<=0.005 MIU/L-ACNC: 2.2 UIU/ML (ref 0.3–5)
WBC # BLD AUTO: 3 10E3/UL (ref 4–11)

## 2021-11-22 PROCEDURE — 86900 BLOOD TYPING SEROLOGIC ABO: CPT | Performed by: NURSE PRACTITIONER

## 2021-11-22 PROCEDURE — 86141 C-REACTIVE PROTEIN HS: CPT | Performed by: NURSE PRACTITIONER

## 2021-11-22 PROCEDURE — 250N000013 HC RX MED GY IP 250 OP 250 PS 637: Performed by: NURSE PRACTITIONER

## 2021-11-22 PROCEDURE — 85379 FIBRIN DEGRADATION QUANT: CPT | Performed by: NURSE PRACTITIONER

## 2021-11-22 PROCEDURE — 83735 ASSAY OF MAGNESIUM: CPT | Performed by: NURSE PRACTITIONER

## 2021-11-22 PROCEDURE — 84443 ASSAY THYROID STIM HORMONE: CPT | Performed by: NURSE PRACTITIONER

## 2021-11-22 PROCEDURE — 99221 1ST HOSP IP/OBS SF/LOW 40: CPT | Performed by: NURSE PRACTITIONER

## 2021-11-22 PROCEDURE — 83615 LACTATE (LD) (LDH) ENZYME: CPT | Performed by: NURSE PRACTITIONER

## 2021-11-22 PROCEDURE — 250N000013 HC RX MED GY IP 250 OP 250 PS 637: Performed by: FAMILY MEDICINE

## 2021-11-22 PROCEDURE — 250N000011 HC RX IP 250 OP 636: Performed by: PSYCHIATRY & NEUROLOGY

## 2021-11-22 PROCEDURE — 99223 1ST HOSP IP/OBS HIGH 75: CPT | Performed by: FAMILY MEDICINE

## 2021-11-22 PROCEDURE — 120N000001 HC R&B MED SURG/OB

## 2021-11-22 PROCEDURE — 85384 FIBRINOGEN ACTIVITY: CPT | Performed by: NURSE PRACTITIONER

## 2021-11-22 PROCEDURE — 87635 SARS-COV-2 COVID-19 AMP PRB: CPT | Performed by: NURSE PRACTITIONER

## 2021-11-22 PROCEDURE — 250N000013 HC RX MED GY IP 250 OP 250 PS 637: Performed by: PSYCHIATRY & NEUROLOGY

## 2021-11-22 PROCEDURE — 36415 COLL VENOUS BLD VENIPUNCTURE: CPT | Performed by: NURSE PRACTITIONER

## 2021-11-22 PROCEDURE — 85027 COMPLETE CBC AUTOMATED: CPT | Performed by: NURSE PRACTITIONER

## 2021-11-22 PROCEDURE — 82805 BLOOD GASES W/O2 SATURATION: CPT | Performed by: NURSE PRACTITIONER

## 2021-11-22 PROCEDURE — 99207 PR CDG-CODE CATEGORY CHANGED: CPT | Performed by: NURSE PRACTITIONER

## 2021-11-22 PROCEDURE — 83880 ASSAY OF NATRIURETIC PEPTIDE: CPT | Performed by: NURSE PRACTITIONER

## 2021-11-22 PROCEDURE — 99223 1ST HOSP IP/OBS HIGH 75: CPT | Performed by: INTERNAL MEDICINE

## 2021-11-22 PROCEDURE — 250N000013 HC RX MED GY IP 250 OP 250 PS 637: Performed by: INTERNAL MEDICINE

## 2021-11-22 PROCEDURE — 85730 THROMBOPLASTIN TIME PARTIAL: CPT | Performed by: NURSE PRACTITIONER

## 2021-11-22 PROCEDURE — 250N000011 HC RX IP 250 OP 636: Performed by: NURSE PRACTITIONER

## 2021-11-22 PROCEDURE — 87804 INFLUENZA ASSAY W/OPTIC: CPT | Performed by: NURSE PRACTITIONER

## 2021-11-22 PROCEDURE — 82248 BILIRUBIN DIRECT: CPT | Performed by: NURSE PRACTITIONER

## 2021-11-22 PROCEDURE — 84484 ASSAY OF TROPONIN QUANT: CPT | Performed by: NURSE PRACTITIONER

## 2021-11-22 PROCEDURE — 99239 HOSP IP/OBS DSCHRG MGMT >30: CPT | Performed by: PSYCHIATRY & NEUROLOGY

## 2021-11-22 PROCEDURE — 85610 PROTHROMBIN TIME: CPT | Performed by: NURSE PRACTITIONER

## 2021-11-22 RX ORDER — AMOXICILLIN 250 MG
1 CAPSULE ORAL 2 TIMES DAILY PRN
Status: CANCELLED | OUTPATIENT
Start: 2021-11-22

## 2021-11-22 RX ORDER — GABAPENTIN 100 MG/1
100 CAPSULE ORAL EVERY 6 HOURS PRN
Status: DISCONTINUED | OUTPATIENT
Start: 2021-11-22 | End: 2021-12-11 | Stop reason: HOSPADM

## 2021-11-22 RX ORDER — LEVOTHYROXINE SODIUM 25 UG/1
75 TABLET ORAL
Status: DISCONTINUED | OUTPATIENT
Start: 2021-11-23 | End: 2021-12-11 | Stop reason: HOSPADM

## 2021-11-22 RX ORDER — ARIPIPRAZOLE 5 MG/1
5 TABLET ORAL DAILY
Status: CANCELLED | OUTPATIENT
Start: 2021-11-22

## 2021-11-22 RX ORDER — LIDOCAINE 40 MG/G
CREAM TOPICAL
Status: DISCONTINUED | OUTPATIENT
Start: 2021-11-22 | End: 2021-11-22 | Stop reason: HOSPADM

## 2021-11-22 RX ORDER — FOLIC ACID 1 MG/1
1000 TABLET ORAL DAILY
Status: DISCONTINUED | OUTPATIENT
Start: 2021-11-22 | End: 2021-12-11 | Stop reason: HOSPADM

## 2021-11-22 RX ORDER — ARIPIPRAZOLE 5 MG/1
5 TABLET ORAL DAILY
Status: DISCONTINUED | OUTPATIENT
Start: 2021-11-22 | End: 2021-12-11 | Stop reason: HOSPADM

## 2021-11-22 RX ORDER — NAPROXEN 250 MG/1
500 TABLET ORAL ONCE
Status: COMPLETED | OUTPATIENT
Start: 2021-11-22 | End: 2021-11-22

## 2021-11-22 RX ORDER — OLANZAPINE 10 MG/1
10 TABLET ORAL 3 TIMES DAILY PRN
Status: CANCELLED | OUTPATIENT
Start: 2021-11-22

## 2021-11-22 RX ORDER — LANOLIN ALCOHOL/MO/W.PET/CERES
3 CREAM (GRAM) TOPICAL
Status: CANCELLED | OUTPATIENT
Start: 2021-11-22

## 2021-11-22 RX ORDER — PANTOPRAZOLE SODIUM 40 MG/1
40 TABLET, DELAYED RELEASE ORAL
Status: CANCELLED | OUTPATIENT
Start: 2021-11-23

## 2021-11-22 RX ORDER — GABAPENTIN 100 MG/1
100 CAPSULE ORAL EVERY 6 HOURS PRN
Status: CANCELLED | OUTPATIENT
Start: 2021-11-22

## 2021-11-22 RX ORDER — MAGNESIUM OXIDE 400 MG/1
400 TABLET ORAL 2 TIMES DAILY
Status: DISCONTINUED | OUTPATIENT
Start: 2021-11-22 | End: 2021-12-11 | Stop reason: HOSPADM

## 2021-11-22 RX ORDER — OLANZAPINE 10 MG/1
10 TABLET ORAL 3 TIMES DAILY PRN
Status: DISCONTINUED | OUTPATIENT
Start: 2021-11-22 | End: 2021-12-11 | Stop reason: HOSPADM

## 2021-11-22 RX ORDER — OLANZAPINE 10 MG/2ML
10 INJECTION, POWDER, FOR SOLUTION INTRAMUSCULAR 3 TIMES DAILY PRN
Status: DISCONTINUED | OUTPATIENT
Start: 2021-11-22 | End: 2021-12-11 | Stop reason: HOSPADM

## 2021-11-22 RX ORDER — LANOLIN ALCOHOL/MO/W.PET/CERES
3 CREAM (GRAM) TOPICAL
Status: DISCONTINUED | OUTPATIENT
Start: 2021-11-22 | End: 2021-12-11 | Stop reason: HOSPADM

## 2021-11-22 RX ORDER — PREGABALIN 200 MG/1
200 CAPSULE ORAL 3 TIMES DAILY
Status: ON HOLD | COMMUNITY
End: 2021-12-23

## 2021-11-22 RX ORDER — MAGNESIUM HYDROXIDE/ALUMINUM HYDROXICE/SIMETHICONE 120; 1200; 1200 MG/30ML; MG/30ML; MG/30ML
30 SUSPENSION ORAL EVERY 4 HOURS PRN
Status: CANCELLED | OUTPATIENT
Start: 2021-11-22

## 2021-11-22 RX ORDER — ACETAMINOPHEN 325 MG/1
650 TABLET ORAL EVERY 4 HOURS PRN
Status: CANCELLED | OUTPATIENT
Start: 2021-11-22

## 2021-11-22 RX ORDER — PREGABALIN 100 MG/1
200 CAPSULE ORAL 3 TIMES DAILY
Status: DISCONTINUED | OUTPATIENT
Start: 2021-11-22 | End: 2021-11-22 | Stop reason: HOSPADM

## 2021-11-22 RX ORDER — AMLODIPINE BESYLATE 5 MG/1
5 TABLET ORAL DAILY
Status: DISCONTINUED | OUTPATIENT
Start: 2021-11-22 | End: 2021-12-11 | Stop reason: HOSPADM

## 2021-11-22 RX ORDER — PREGABALIN 100 MG/1
200 CAPSULE ORAL 3 TIMES DAILY
Status: CANCELLED | OUTPATIENT
Start: 2021-11-22

## 2021-11-22 RX ORDER — BUPRENORPHINE HYDROCHLORIDE AND NALOXONE HYDROCHLORIDE DIHYDRATE 2; .5 MG/1; MG/1
2 TABLET SUBLINGUAL
Status: DISCONTINUED | OUTPATIENT
Start: 2021-11-22 | End: 2021-11-23

## 2021-11-22 RX ORDER — MULTIPLE VITAMINS W/ MINERALS TAB 9MG-400MCG
1 TAB ORAL DAILY
Status: DISCONTINUED | OUTPATIENT
Start: 2021-11-22 | End: 2021-12-11 | Stop reason: HOSPADM

## 2021-11-22 RX ORDER — PREGABALIN 100 MG/1
200 CAPSULE ORAL 3 TIMES DAILY
Status: DISCONTINUED | OUTPATIENT
Start: 2021-11-22 | End: 2021-12-11 | Stop reason: HOSPADM

## 2021-11-22 RX ORDER — IOPAMIDOL 755 MG/ML
100 INJECTION, SOLUTION INTRAVASCULAR ONCE
Status: COMPLETED | OUTPATIENT
Start: 2021-11-22 | End: 2021-11-22

## 2021-11-22 RX ORDER — MULTIPLE VITAMINS W/ MINERALS TAB 9MG-400MCG
1 TAB ORAL DAILY
Status: CANCELLED | OUTPATIENT
Start: 2021-11-22

## 2021-11-22 RX ORDER — LEVOTHYROXINE SODIUM 75 UG/1
75 TABLET ORAL
Status: CANCELLED | OUTPATIENT
Start: 2021-11-23

## 2021-11-22 RX ORDER — DEXAMETHASONE SODIUM PHOSPHATE 4 MG/ML
6 INJECTION, SOLUTION INTRA-ARTICULAR; INTRALESIONAL; INTRAMUSCULAR; INTRAVENOUS; SOFT TISSUE ONCE
Status: DISCONTINUED | OUTPATIENT
Start: 2021-11-22 | End: 2021-11-22

## 2021-11-22 RX ORDER — MAGNESIUM OXIDE 400 MG/1
400 TABLET ORAL 2 TIMES DAILY
Status: DISCONTINUED | OUTPATIENT
Start: 2021-11-22 | End: 2021-11-22 | Stop reason: HOSPADM

## 2021-11-22 RX ORDER — DEXAMETHASONE SODIUM PHOSPHATE 4 MG/ML
6 INJECTION, SOLUTION INTRA-ARTICULAR; INTRALESIONAL; INTRAMUSCULAR; INTRAVENOUS; SOFT TISSUE ONCE
Status: COMPLETED | OUTPATIENT
Start: 2021-11-22 | End: 2021-11-22

## 2021-11-22 RX ORDER — LIDOCAINE 40 MG/G
CREAM TOPICAL
Status: DISCONTINUED | OUTPATIENT
Start: 2021-11-22 | End: 2021-12-11 | Stop reason: HOSPADM

## 2021-11-22 RX ORDER — MAGNESIUM HYDROXIDE/ALUMINUM HYDROXICE/SIMETHICONE 120; 1200; 1200 MG/30ML; MG/30ML; MG/30ML
30 SUSPENSION ORAL EVERY 4 HOURS PRN
Status: DISCONTINUED | OUTPATIENT
Start: 2021-11-22 | End: 2021-12-11 | Stop reason: HOSPADM

## 2021-11-22 RX ORDER — QUETIAPINE FUMARATE 300 MG/1
300 TABLET, FILM COATED ORAL AT BEDTIME
Status: DISCONTINUED | OUTPATIENT
Start: 2021-11-22 | End: 2021-12-11 | Stop reason: HOSPADM

## 2021-11-22 RX ORDER — PROPRANOLOL HYDROCHLORIDE 10 MG/1
10 TABLET ORAL 3 TIMES DAILY
Status: DISCONTINUED | OUTPATIENT
Start: 2021-11-22 | End: 2021-12-09

## 2021-11-22 RX ORDER — LIDOCAINE 40 MG/G
CREAM TOPICAL
Status: CANCELLED | OUTPATIENT
Start: 2021-11-22

## 2021-11-22 RX ORDER — AMLODIPINE BESYLATE 5 MG/1
5 TABLET ORAL DAILY
Status: CANCELLED | OUTPATIENT
Start: 2021-11-22

## 2021-11-22 RX ORDER — ACETAMINOPHEN 325 MG/1
650 TABLET ORAL EVERY 4 HOURS PRN
Status: DISCONTINUED | OUTPATIENT
Start: 2021-11-22 | End: 2021-12-08

## 2021-11-22 RX ORDER — PANTOPRAZOLE SODIUM 40 MG/1
40 TABLET, DELAYED RELEASE ORAL
Status: DISCONTINUED | OUTPATIENT
Start: 2021-11-23 | End: 2021-12-11 | Stop reason: HOSPADM

## 2021-11-22 RX ORDER — BUPRENORPHINE 2 MG/1
4 TABLET SUBLINGUAL 3 TIMES DAILY
Status: DISCONTINUED | OUTPATIENT
Start: 2021-11-22 | End: 2021-11-22 | Stop reason: HOSPADM

## 2021-11-22 RX ORDER — PROPRANOLOL HYDROCHLORIDE 10 MG/1
10 TABLET ORAL 3 TIMES DAILY
Status: CANCELLED | OUTPATIENT
Start: 2021-11-22

## 2021-11-22 RX ORDER — QUETIAPINE FUMARATE 300 MG/1
300 TABLET, FILM COATED ORAL AT BEDTIME
Status: CANCELLED | OUTPATIENT
Start: 2021-11-22

## 2021-11-22 RX ORDER — FOLIC ACID 1 MG/1
1000 TABLET ORAL DAILY
Status: CANCELLED | OUTPATIENT
Start: 2021-11-22

## 2021-11-22 RX ORDER — BUPRENORPHINE 2 MG/1
2 TABLET SUBLINGUAL ONCE
Status: COMPLETED | OUTPATIENT
Start: 2021-11-22 | End: 2021-11-22

## 2021-11-22 RX ORDER — BUPRENORPHINE AND NALOXONE 4; 1 MG/1; MG/1
1 FILM, SOLUBLE BUCCAL; SUBLINGUAL
Status: CANCELLED | OUTPATIENT
Start: 2021-11-22

## 2021-11-22 RX ORDER — BUPRENORPHINE AND NALOXONE 4; 1 MG/1; MG/1
1 FILM, SOLUBLE BUCCAL; SUBLINGUAL
Status: DISCONTINUED | OUTPATIENT
Start: 2021-11-22 | End: 2021-11-22 | Stop reason: CLARIF

## 2021-11-22 RX ORDER — OLANZAPINE 10 MG/2ML
10 INJECTION, POWDER, FOR SOLUTION INTRAMUSCULAR 3 TIMES DAILY PRN
Status: CANCELLED | OUTPATIENT
Start: 2021-11-22

## 2021-11-22 RX ORDER — AMOXICILLIN 250 MG
1 CAPSULE ORAL 2 TIMES DAILY PRN
Status: DISCONTINUED | OUTPATIENT
Start: 2021-11-22 | End: 2021-12-11 | Stop reason: HOSPADM

## 2021-11-22 RX ADMIN — PROPRANOLOL HYDROCHLORIDE 10 MG: 10 TABLET ORAL at 13:39

## 2021-11-22 RX ADMIN — BUPRENORPHINE HYDROCHLORIDE 2 MG: 2 TABLET SUBLINGUAL at 09:54

## 2021-11-22 RX ADMIN — MULTIPLE VITAMINS W/ MINERALS TAB 1 TABLET: TAB at 09:43

## 2021-11-22 RX ADMIN — PANTOPRAZOLE SODIUM 40 MG: 20 TABLET, DELAYED RELEASE ORAL at 09:42

## 2021-11-22 RX ADMIN — NICOTINE POLACRILEX 4 MG: 4 GUM, CHEWING ORAL at 11:04

## 2021-11-22 RX ADMIN — GABAPENTIN 100 MG: 100 CAPSULE ORAL at 00:31

## 2021-11-22 RX ADMIN — IOPAMIDOL 100 ML: 755 INJECTION, SOLUTION INTRAVENOUS at 10:48

## 2021-11-22 RX ADMIN — PREGABALIN 200 MG: 100 CAPSULE ORAL at 14:36

## 2021-11-22 RX ADMIN — BUPRENORPHINE HYDROCHLORIDE 4 MG: 2 TABLET SUBLINGUAL at 14:36

## 2021-11-22 RX ADMIN — AMLODIPINE BESYLATE 5 MG: 5 TABLET ORAL at 09:43

## 2021-11-22 RX ADMIN — LEVOTHYROXINE SODIUM 75 MCG: 75 TABLET ORAL at 13:39

## 2021-11-22 RX ADMIN — OLANZAPINE 10 MG: 10 TABLET, FILM COATED ORAL at 00:48

## 2021-11-22 RX ADMIN — QUETIAPINE FUMARATE 300 MG: 300 TABLET, FILM COATED ORAL at 21:14

## 2021-11-22 RX ADMIN — MULTIPLE VITAMINS W/ MINERALS TAB 1 TABLET: TAB at 21:14

## 2021-11-22 RX ADMIN — ACETAMINOPHEN 650 MG: 325 TABLET ORAL at 09:43

## 2021-11-22 RX ADMIN — PROPRANOLOL HYDROCHLORIDE 10 MG: 10 TABLET ORAL at 09:42

## 2021-11-22 RX ADMIN — BUPRENORPHINE HYDROCHLORIDE 4 MG: 2 TABLET SUBLINGUAL at 17:07

## 2021-11-22 RX ADMIN — NICOTINE POLACRILEX 4 MG: 4 GUM, CHEWING ORAL at 21:36

## 2021-11-22 RX ADMIN — AMLODIPINE BESYLATE 5 MG: 5 TABLET ORAL at 21:14

## 2021-11-22 RX ADMIN — NICOTINE POLACRILEX 4 MG: 4 GUM, CHEWING ORAL at 14:58

## 2021-11-22 RX ADMIN — ARIPIPRAZOLE 5 MG: 5 TABLET ORAL at 09:42

## 2021-11-22 RX ADMIN — NICOTINE POLACRILEX 8 MG: 4 GUM, CHEWING ORAL at 06:55

## 2021-11-22 RX ADMIN — DEXAMETHASONE SODIUM PHOSPHATE 6 MG: 4 INJECTION, SOLUTION INTRA-ARTICULAR; INTRALESIONAL; INTRAMUSCULAR; INTRAVENOUS; SOFT TISSUE at 13:45

## 2021-11-22 RX ADMIN — NICOTINE POLACRILEX 4 MG: 4 GUM, CHEWING ORAL at 09:43

## 2021-11-22 RX ADMIN — PREGABALIN 200 MG: 100 CAPSULE ORAL at 21:15

## 2021-11-22 RX ADMIN — FOLIC ACID 1000 MCG: 1 TABLET ORAL at 09:42

## 2021-11-22 RX ADMIN — NAPROXEN 500 MG: 250 TABLET ORAL at 17:07

## 2021-11-22 RX ADMIN — BUPRENORPHINE HYDROCHLORIDE AND NALOXONE HYDROCHLORIDE DIHYDRATE 2 TABLET: 2; .5 TABLET SUBLINGUAL at 21:15

## 2021-11-22 RX ADMIN — PROPRANOLOL HYDROCHLORIDE 10 MG: 10 TABLET ORAL at 21:14

## 2021-11-22 RX ADMIN — FOLIC ACID 1000 MCG: 1 TABLET ORAL at 21:14

## 2021-11-22 RX ADMIN — NICOTINE POLACRILEX 4 MG: 4 GUM, CHEWING ORAL at 13:39

## 2021-11-22 RX ADMIN — NICOTINE POLACRILEX 4 MG: 4 GUM, CHEWING ORAL at 12:20

## 2021-11-22 RX ADMIN — MAGNESIUM OXIDE TAB 400 MG (241.3 MG ELEMENTAL MG) 400 MG: 400 (241.3 MG) TAB at 12:20

## 2021-11-22 RX ADMIN — NICOTINE POLACRILEX 4 MG: 4 GUM, CHEWING ORAL at 16:20

## 2021-11-22 RX ADMIN — ARIPIPRAZOLE 5 MG: 5 TABLET ORAL at 21:14

## 2021-11-22 RX ADMIN — APIXABAN 2.5 MG: 2.5 TABLET, FILM COATED ORAL at 21:36

## 2021-11-22 RX ADMIN — MAGNESIUM OXIDE TAB 400 MG (241.3 MG ELEMENTAL MG) 400 MG: 400 (241.3 MG) TAB at 21:14

## 2021-11-22 ASSESSMENT — ACTIVITIES OF DAILY LIVING (ADL)
ADLS_ACUITY_SCORE: 14
ADLS_ACUITY_SCORE: 10
ADLS_ACUITY_SCORE: 10

## 2021-11-22 ASSESSMENT — MIFFLIN-ST. JEOR: SCORE: 2267.84

## 2021-11-22 NOTE — PROGRESS NOTES
Report given to evening shift, vitals rechecked. Awaiting transport to [facility]    Kathie Holden RN

## 2021-11-22 NOTE — PLAN OF CARE
Assessment/Intervention/Current Symtoms and Care Coordination    Writer spoke with unit staff who reported the patient has respitory symptoms with a suspicious chest xray and staff were awaiting the results of a COVID test. Patient reported to writer he is having problems breathing and is experiencing withdrawal symptoms and needs to be on a higher dose of Suboxone. Since our meeting patient was found to be COVID positive.     Discharge Plan or Goal    TBD    Barriers to Discharge     medical issues, withdrawal, symptom stabilization    Referral Status    none    Legal Status    voluntary      NORBERTO Sylvester, [facility], 11/22/2021, 2:16 PM

## 2021-11-22 NOTE — PROGRESS NOTES
CTA chest negative for pulmonary embolism.  Shows moderate patchy bilateral pulmonary infiltrates consistent with pneumonia.  Negative influenza A and B.  WBC 3.0.  Stable hemoglobin and platelet counts.  Stable electrolyte and renal function.  LFTs improving.  CRP 14.8.  Troponin < 0.01.     Revisited with patient.  He is more alert.  About to eat lunch.  Still feels short of breath and chest heaviness.  Patient reports on 11/11/21 he lost his balance in the shower and fell hitting his face on tile wall.  He has nasal congestion/sinus pressure.  Denies acute nasal pain.  No epistaxis on exam.    CT head negative for acute intracranial process.  Revealed age indeterminant mildly displaced right nasal fracture.  Curbside discussion with Dr. Smith ENT.  Surgical intervention unable to be performed in setting of Covid. Monitor for now.  Once Covid resolved and psychiatrically discharged, can follow-up with ENT outpatient.     Covid Positive.  Case discussed with Dr. Erick Cuba.  Dexamethasone 8 mg IV x1 STAT.  Initiate COVID medical order set.  Continue respiratory support via oxygen nasal cannula at 2 L.      Code Status: Full Code  I asked patient if I could call emergency contact listed who is his mother, Enriqueta Duron.  Patient declined this provider to call mother.  I informed him it would be important at least for his mom to know he is Covid positive in case he were to decompensate and that we are moving him to the medical floor for Covid medical management.   If receiving team can approach him may be tomorrow and see if he will allow family to be notified.    Discussed case with Dr. Gianna Cantu.  Hospitalist [facility] who accepted patient.  Dx: COVID-19 Pneumonia    Updated Dr. Negron and Dr. Leda Potter, APRN, CNP  [facility] Medicine Service  [facility]

## 2021-11-22 NOTE — PLAN OF CARE
Problem: Behavioral Health Plan of Care  Goal: Absence of New-Onset Illness or Injury  Outcome: Improving  Goal: Optimized Coping Skills in Response to Life Stressors  Outcome: Improving  Goal: Develops/Participates in Therapeutic [place] to Support Successful Transition  Outcome: Improving   Pt accepted help from this writer last night.  He was able to express his issues and took medication.  This resulted in a positive outcome.

## 2021-11-22 NOTE — PROGRESS NOTES
"Pt came out of his room and was sitting in the [facility] area.  He stated he was very anxious and couldn't sleep.  Writer gave him 100mg gabapentin PO PRN.  \"You people are trying to break me down.  I take (an impossibly large dose) of gabapentin at home and you think I'm going to calm down with 100mg!\"  \"You're all doing this on purpose to hurt me\"  Agitation was ramping up along with paranoia so writer gave 10mg Olanzapine PO.  Pt then went to his room and was seen sleeping after about 30 min.  "

## 2021-11-22 NOTE — PROGRESS NOTES
Pt slept well after the PRN olanzapine.  He got about 6 hrs of sleep in total.  He had no complaints during the night after the PRN.  Safety remains intact and behavior has been benign.

## 2021-11-22 NOTE — CONSULTS
"Addiction Medicine Inpatient Consultation      Hollis Barclay,  1970, MRN [id number]    [facility]  Mood disorder (H) [F39]    PCP: Jr Giron,  [phone]   Code status:  Full Code       Extended Emergency Contact Information  Primary Emergency Contact: Enriqueta Duron  Mobile Phone: [phone]  Relation: Mother  Secondary Emergency Contact: Claudine Gómez  Home Phone: [phone]  Mobile Phone: [phone]  Relation: Sister       Date of Admission: 21  Date of Consult: 2021      Reason for Consultation: \"On Suboxone\"         ASSESSMENT and RECOMMENDATIONS:   1.  Opioid Use Disorder, severe - Has been on Suboxone for about 8 mos (most recent prescriber = Dr. Ulises Perez,  [address] [place] [address] and says he has stayed abstinent from opiates during this time.  Apparently was taking only 2 doses of Suboxone 8/2 mg per day and that is why he has not run out, also was in hospital so did not use home supply during that time.   He is c/o opiate withdrawal now since he has only been getting 4 mg suboxone last two days.   However, patient has now developed COVID pneumonia and is mildl hypoxic, so am not going to give him his full dose right now.   Will give 4 mg bid today with plan to give 8 mg bid tomorrow, as long as his oxygenation and respiratory status are not worse.  Agitation, restlessness and irritability likely related to withdrawal.        2.  Alcohol use disorder, severe -   Drinking more than usual in last several weeks and did go through withdrawal, while at [facility].   Has not required any more BDZ since then, has questionable hx of seizure - unknown clear if this was while withdrawing   Should be watched for any seizure activity, as this can occur late, although more commonly on day 2 or 3.      3. MDD - with suicidal ideation and plan a few days ago.   Management by psychiatry.       Note:  Patient will be transferring to a Medical Bed at [facility] later today, due to his COVID pneumonia.   This Addiction Medicine team will continue to follow and adjust Suboxone as required.   He should also get a Psych consult tomorrow to follow his depression and Suicidal ideation.       Ivette Mahoney MD  Addiction Medicine Service  [facility]   Page me (click here for Salena Mahoney)              Admission Status:  Voluntary    Code Status:  Full    HPI:    Hollis Barclay is a 51 year old old male with a hx of bipolar disorder, borderline personality disorder, TBI,, and polysubstance use who presented to the ED at [facility] on 11/13/21 and was admitted to the Medical Service after an attempted suicide.   He reportedly took 4 doses of Suboxone 6/2 mg and 22 tabs of 300 mg seroquel, as well as drinking a gallon of alcohol.  Later reports are that he also took a few tabs of oxycodone 5 mg.   He developed alcohol withdrawal and was treated with diazepam.  Abilify was Dced and zyprexa 10 mg bid and seroquel was continued prn.   Gabapentin was decreased to 300 mg tid and pregabalin was discontinued.     He was also given Suboxone 8/2 mg tid, although in reviewing the , his last fill for this was 9/27/21 and was 90 film for 30 days.   Note that patient was scheduled to see me in OP clinic for continued suboxone, but No showed.        He was transferred to [facility] MH unit on 11/19/21 due to continued suicidal ideation with plan and intent.    He was started on Suboxon 4 mg q d due to his c/o of opiate withdrawal, but the question of whether he was actually on it PTA.   He received a dose on 11/20 and 11/21.   This morning he was noted to be hypoxic with O2 sat 86%.   Hospital medicine was consulted andhe was found to be COVID + .     His Suboxone was initially held, but patient was quite upset and threatened to become violent if he did not get it, so he was given 2 mg sl on 11/20 and 4 mg on 11/21.      Last night he noted SOB and this am he was hypoxic,  "but is COVID positive.  Because of concern that it was related to the Suboxone, I only gave him 2 mg this am.   It turned out that he is+ for COVID and that is the likely reason for hypoxia.  O2 sat corrected with 2 L/min NC.   Therefore, ordered 4 mg bid for today (in addition to the 2 mg already given).         ETOH:  Hard liquor 1/2 gal per day x last 2 wks.     Last use:  11/13.   Treated for withdrawal while at [facility]    Opioids:  Has been on Suboxone since Feb. And says it has reduced his cravings and he has not used opiates while on it.    8 mg tid - but sAYS he often \" forgot\" the 3rd dose of the day and that is why he still had them left even though prescription should have been gone by 10/27/21.  Got 8 mg tid from 11/14 - 11/18.     Stimulants/Cocaine/Amphetamines:  Methamphetamines by insufflation.  Last Use 11/13/21    Benzodiazepines:  Denies     Cannabis:  Denies    Tobacco:  Chewing tobacco - quit 6 mos ago, but now is having craving for tobacco and is getting gum.   Can't tolerate patches, due to skin reaction.    Treatment Hx:  Multiple treatments.   Can't remember when the last time was.     Past Medical History:    Hypertension  Chronic back pain  Elevated transaminases  GERD  Constipation  Hypokalemia - resolved  Sz x 1 July 2021 - ? If alcohol withdrawal  Post concussive encephalopathy    Psychiatric History:  Bipolar Disorder  Borderline Personalit Disorder  Social Anxiety disorder    Family History:   Substance Use History:  Brother  Mental illness: Unknown      Social History:  Single, 2 adult children.   Lives in group home.    PTA Meds:  Medications Prior to Admission   Medication Sig Dispense Refill Last Dose     amLODIPine (NORVASC) 5 MG tablet Take 1 tablet (5 mg) by mouth daily 30 tablet 1      buprenorphine HCl-naloxone HCl (SUBOXONE) 8-2 MG per film Place 1 Film under the tongue 3 times daily        diclofenac (VOLTAREN) 1 % topical gel Apply 2 g topically 4 times daily as needed " for moderate pain 150 g 1      docusate sodium (COLACE) 100 MG capsule Take 1 capsule (100 mg) by mouth 2 times daily 60 capsule 1      folic acid (FOLVITE) 1 MG tablet Take 1 tablet (1,000 mcg) by mouth daily 90 tablet 1      gabapentin (NEURONTIN) 300 MG capsule Take 1 capsule (300 mg) by mouth 3 times daily        levothyroxine (SYNTHROID/LEVOTHROID) 75 MCG tablet Take 1 tablet (75 mcg) by mouth every morning (before breakfast) 30 tablet 1      miconazole with skin protectant (SELENA ANTIFUNGAL) 2 % CREA cream Apply topically 2 times daily (Patient taking differently: Apply topically 2 times daily For athlete's foot) 30 g 1      multivitamin w/minerals (THERA-VIT-M) tablet Take 1 tablet by mouth daily 30 tablet 1      naproxen (NAPROSYN) 500 MG tablet Take 1 tablet (500 mg) by mouth 2 times daily as needed for moderate pain or headaches        nicotine polacrilex (NICORETTE) 4 MG gum Place 1-2 each (4-8 mg) inside cheek every hour as needed for other (nicotine withdrawal symptoms) 100 each 1      OLANZapine zydis (ZYPREXA) 10 MG ODT Take 1 tablet (10 mg) by mouth 2 times daily        OLANZapine zydis (ZYPREXA) 5 MG ODT Take 1 tablet (5 mg) by mouth every 6 hours as needed for agitation (anxiety)        ondansetron (ZOFRAN-ODT) 4 MG ODT tab Take 1 tablet (4 mg) by mouth every 6 hours as needed (Nausea and Vomiting)        pantoprazole (PROTONIX) 40 MG EC tablet Take 1 tablet (40 mg) by mouth every morning (before breakfast) 30 tablet 1      polyethylene glycol (MIRALAX) 17 GM/Dose powder Take 17 g by mouth daily 510 g       propranolol (INDERAL) 10 MG tablet Take 1 tablet (10 mg) by mouth 3 times daily 90 tablet 1      QUEtiapine (SEROQUEL) 300 MG tablet Take 1 tablet (300 mg) by mouth At Bedtime 30 tablet 1      sennosides (SENOKOT) 8.6 MG tablet Take 1-2 tablets by mouth 2 times daily as needed for constipation                  Allergies:  Allergies   Allergen Reactions     Hydroxyzine Hives     Previously  "unreported by patient, this is a new patient declaration as of 5/1/21.     Cucumber Extract      Cucumber the vegable     Nsaids      No problem with oral NSAIDs--Toradol injection itching only.     Trazodone Itching     Per Patient       [place] Prescription Monitoring Program:  pregabalin 200 tid  # 90/30d on 10/28/21 -  Two Rx lately.    Gabapentin 400 mg tid #90/ 30d on 10/24 - receiving monthly  Suboxone 8/2 mg #90/ 30 d filled 0/27, 8/23     Review of Systems:    Constitutional               Sweats and chills, but afebrile  Vision and Hearing:     Within normal limits   Respiratory:              Denies cough.  Feeling mildly shortness or breath since last night  Cardiovascular   No chest pain at rest or with exertion.      Gastrointestinal    No nausea, vomiting, diarrhea, or constipation.    Urologic   Denies dysuria or change in frequency.  Neurologic   Denies headache, tremor, or focal weakness.     Psychiatric   Still feeling suicidal but denies intent at this time.    Rheumatologic   Myalgias all over  Hematologic   Denies epistaxis or easy bruising.   Dermatalogic   Intermittent piloerection and diaphoresis.              Physical Exam:  /66 (BP Location: Left arm, Patient Position: Sitting)   Pulse 82   Temp 97.5  F (36.4  C) (Oral)   Resp 18   Ht 1.93 m (6' 4\")   Wt 135.5 kg (298 lb 12.8 oz)   SpO2 96%   BMI 36.37 kg/m         O2 sat was 87 on Rm air this am      .General appearance   Appears stated age   Dermatologic              No current piloerection or diaphoresis  HEENT   Pupils small (alaready got Bup today).  No nystagmus or scleral icterus.     Pulmonary   No wheezes, rales or rhonchi.  Cardiovascular   Regular rate and rhythm, no murmurs or extra sound    Abdomen   +BS, No tenderness or organomegaly  Extremities              No swelling or cyanosis  Back              No spine or CVA tenderness  Neurologic   Orientated to:person, place, time and situation   No Tremor              "  Light touch sensation intact   Psychiatric Mental Status Examination     Irritable, agitated     Mood:  Euthymic                Affect:  Congruent                Thought content: SI as above.   Denies hallucinations                Thought processes:  Linear,                 Speech:  Normal                Motor:  Very restless                Insight/judgement:  fair/  fair    Pertinent Labs, Radiology, and EKG:    Significant labs from today:  Mg 1.7, Albumin2.9,  BNP 80,  CRP 14.6    CXR:  Bilateral patchy infiltrates

## 2021-11-22 NOTE — PROGRESS NOTES
Awaiting bed placement at [facility], pt will be transported at approx 1515. Pt is compliant with oxygen and this writer encourages him on importance. Pt paces in room, but remains calm and has followed directives for his care. Vitals are stable.     Pt states that he does not have anyone that he wants contacted regarding his transfer to [facility].    Good fluid intake, pt voiding without problem. Pt intermittently lays on bed to read.     Kathie Holden RN

## 2021-11-22 NOTE — PLAN OF CARE
BEHAVIORAL TEAM DISCUSSION    Participants: Charge Nurse: GAVI, Psychiatrist: ET, Occupational Therapy: AM, Pharmacy: AS, Psychology: KS, Clinical Treatment Coordinator: BEATA  Progress: limited  Anticipated length of stay: TBD  Continued Stay Criteria/Rationale: withdrawal, medication management, symptom stabilization, coordination of services  Medical/Physical: addiction medicine consult 11/20; NP 11/21  Precautions:   Behavioral Orders   Procedures    1:1 Nursing    Aspiration precautions     In setting of sedation and hypoxia    Code 1 - Restrict to Unit    Fall precautions    NO Nebulized Medications    Routine Programming     As clinically indicated    Status 15     Every 15 minutes.    Suicide precautions     Patients on Suicide Precautions should have a Combination Diet ordered that includes a Diet selection(s) AND a Behavioral Tray selection for Safe Tray - with utensils, or Safe Tray - NO utensils       Plan: stabilize  Rationale for change in precautions or plan: new admission

## 2021-11-22 NOTE — PROGRESS NOTES
Pt reported feeling SOB before breakfast. Pt sounds as though he may have nasal congestion. O2 sats are 86-87% on RA. Other vitals within normal limits. O2 applied at 2L/NC, sats are now 96%. Pt demonstrates good appetite at breakfast, eating and drinking well. 1:1 with pt as he rests in bed to monitor as pt on O2.    MD was paged at 0807, as looks as though may need another consult for CNP, but will contact that hospitalist as well. AB charge nurse informed.     CHAPARRO Cerna, RN

## 2021-11-22 NOTE — PROGRESS NOTES
Venous blood gas stable.  Renal and electrolyte function stable.  No leukocytosis.  Leukopenia 3.0.  TSH 2.20. Chest x-ray moderate patchy bilateral pulmonary infiltrates most likely pneumonia.  Covid pneumonia in differential. Will treat as COVID PUI for now pending COVID screen. CTA chest pending.  Continue current medical management.     Discussed with RN.  Patient upset and agitated text because Subutex was discontinued.  RN discussed with Dr. Negron.  Patient received Subutex 2 mg this morning.  Continue to watch respiratory status and for increased sedation.      LATOYA Boone, CNP  Hospital Medicine Service  [facility]

## 2021-11-22 NOTE — CONSULTS
[facility]  Consult Note - Hospitalist Service     Date of Admission:  11/19/2021  Consult Requested by: Pramod Tompkins MD  Reason for Consult: Repeat consult for monitoring of Oxygen saturation    Assessment & Plan    Hollis Barclay is a 51-year-old male who was admitted [facility] medicine service 11/13/2021 for acute alcohol withdrawal following suicide attempt by overdose (suboxone, seroquel and gallon of alcohol).  Medically managed and clinically observed inpatient medical unit.  Poison control was contacted. LFTs improved. Medically cleared on 11/19/21 and subsequently admitted inpatient psychiatry unit for further psychiatric stabilization.    Hospital medicine service consulted by psychiatry for hypoxia.         Hypoxia, dyspnea: rapid response preferred for acute medical management and mobilization of resources in setting of inpatient mental health setting.        # Acute Respiratory failure with Hypoxia:  # Lethargy:  Differentials: Pulmonary infection, COVID, Influenza, Medication side effect, oversedation, obesity hypoventilation syndrome, pulmonary embolism, deep vein thrombosis, cardiac or neurologic etiology  Sudden onset shortness of breath patient reports started yesterday afternoon around 4 PM.  Patient states progressively worse overnight and this morning.  Found to have oxygen saturation 86% on room air placed on 2 L and now 96% on 2 L nasal cannula.  Oxygen 2-3 liters to keep oxygen saturation above 93% .  Continuous pulse oximetry.  Safety management per primary team.  Fingerstick blood glucose x 1  CTA chest PE rule out   Pre-admission Covid screen 11/13/21 was negative.  Repeat Covid screen today.  Also check influenza A and B  Venous Doppler ultrasound rule out DVT  Venous blood gas  CBC, BMP, hepatic panel, magnesium  BNP  EKG  Echocardiogram  Check TSH in setting of hypothyroidism  CT head non contrast  Discussed case with Dr. Negron.   Will hold Suboxone for now.  Gabapentin on hold.  Watch closely throughout the morning.  For persistent hypoxia or positive findings on labs/ imaging, will require medical transfer.      # Acute on Chronic Psychiatric Illness: Per Primary team.    CODE STATUS: FULL CODE    The patient's care was discussed with the Bedside Nurse, Patient and Dr. Negron.    [facility]  Securely message with the Vocera Web Console (learn more here)  Text page via [employer] Paging/Directory        Clinically Significant Risk Factors Present on Admission     ______________________________________________________________________        History of Present Illness   Per electronic chart review Hollis Barclay is a 51-year-old male who was admitted [facility] medicine service 11/13/2021 for acute alcohol withdrawal following suicide attempt by overdose (suboxone, seroquel and gallon of alcohol).  Medically managed and clinically observed inpatient medical unit.  Poison control was contacted. LFTs improved.  11/19/21 Medically cleared and subsequently admitted inpatient psychiatry unit for further psychiatric stabilization.    Hospital medicine service consulted by psychiatry for hypoxia.    Paged for room oxygen saturation 86 to 87%.  Complaining of shortness of breath and nasal congestion.  Oxygen via nasal cannula applied 96% on oxygen currently right now. Ate breakfast.     Found sitting in room.  Shortness of breath started yesterday around 4 PM.  Progressively worse overnight and this morning.  Denies chest pain.  Denies new leg swelling.  Denies prior pulmonary or cardiac history.  Social history chews tobacco.  On chronic Suboxone for chronic pain.        History is obtained from the patient and electronic health record.    Past Medical History    I have reviewed this patient's medical history.    Past Medical History:   Diagnosis Date     Alcoholism in remission  (H)      Bilateral ACL tear      Bipolar disorder (H)      Borderline personality disorder (H)      Chemical dependency (H)      Chronic back pain      Closed left arm fracture 1985     H/O shoulder surgery      Post concussive encephalopathy      Social anxiety disorder      Social anxiety disorder      TBI (traumatic brain injury) (H)        Past Surgical History     No past surgical history on file.    Social History   I have reviewed this patient's social history.     Social History     Tobacco Use     Smoking status: Former Smoker     Types: Dip, chew, snus or snuff     Smokeless tobacco: Former User     Types: Chew   Substance Use Topics     Alcohol use: Yes     Drug use: Not Currently       Family History   No significant family history    Medications   I have reviewed this patient's current medications    Allergies   Allergies   Allergen Reactions     Hydroxyzine Hives     Previously unreported by patient, this is a new patient declaration as of 5/1/21.     Cucumber Extract      Cucumber the vegable     Nsaids      No problem with oral NSAIDs--Toradol injection itching only.     Trazodone Itching     Per Patient       Physical Exam   Vital Signs: Temp: 97.5  F (36.4  C) Temp src: Oral BP: 114/66 Pulse: 82   Resp: 18 SpO2: 96 % O2 Device: Nasal cannula    Weight: 298 lbs 12.8 oz    General appearance: Sleepy, answers questions and follows commands appropriately, Obese  Lungs: clear to auscultation bilaterally, non labored breathing  Heart: regular rate and rhythm, S1, S2 normal, no murmur  Abdomen: soft, non-tender, active bowel sounds x 4 quads  Extremities:  atraumatic or cyanosis  Neurologic: moving all 4 extremities spontaneously, no focal weakness.

## 2021-11-22 NOTE — PROGRESS NOTES
11/22/21 1055   Engagement   Intervention Group   Topic Detail OT Creative Expressions group-tile boxes for social engagement, focus, following directions, creativity, symptom management and healthy distraction   Attendance Did not attend   Reason for Not Attending Refused  (declined personal invite)
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
## 2021-11-22 NOTE — DISCHARGE SUMMARY
[facility]  Hospitalist Discharge Summary      Date of Admission:  11/13/2021  Date of Discharge:  11/19/2021  Discharging Provider: Danelle Dotson MD      Discharge Diagnoses    1. Suicide attempt by ingestion overdose  2. Acute alcohol withdrawal  3. Acute toxic encephalopathy due to above, Resolved  4. Bipolar disorder type I  5. Borderline personality disorder  6. Social anxiety  7. History of TBI  8. Elevated transaminases  9. Polysubstance abuse  10. Tobacco use disorder  11. Chronic back pain  12. Essential hypertension  13. GERD  14. Constipation  15. Hypokalemia    Follow-ups Needed After Discharge   Follow-up Appointments     Follow Up and recommended labs and tests      Follow up with inpatient Psychiatry. Patient is medically-cleared, no new   labs or tests needed from a medical standpoint           Discharge Disposition   Discharged to home  Condition at discharge: Stable     Hospital Course   Hollis Barclay is a 51 year old male with hx of bipolar disorder, borderline personality disorder, and polysubstance abuse (tobacco, alcohol, cocaine, methamphetamine, opioids) on Suboxone maintenance who was admitted on 11/13/2021 for acute alcohol withdrawal following suicide attempt by overdose. He is now discharging to inpatient Psychiatry    # Acute alcohol withdrawal, Resolved  # Suicide attempt by ingestion overdose  # Acute toxic encephalopathy due to above, Resolved  # Bipolar disorder type I  # Borderline personality disorder  # Social anxiety  # History of TBI  * PTA: aripiprazole 2 mg daily, Seroquel 300 mg qhs, gabapentin 400 mg TID, propanolol 10 mg TID, Suboxone  * Attempted suicide with 4 Suboxone doses, 22 tablets of 300 mg Seroquel, and drinking 1 gallon of alcohol. He was monitored for 12 hours per Poison Control recommendations, and then developed acute alcohol withdrawal  * Treated with diazepam per CIWA protocol, thiamine/folate/MVI, and electrolyte  replacement protocol.   * Psychiatry was consulted on admission; medications were adjusted as noted below  - Aripiprazole was d/c'd in favor of Zyprexa 10 mg BID, 5 mg q6h prn  - He was initiated on Suboxone 8-2 mg TID   - PTA gabapentin was decreased to 300 mg TID  - Continues on PTA Seroquel and propanolol    Elevated transaminases, Improved  Due to alcoholic hepatitis. Improved with abstinence from alcohol    Polysubstance abuse  History of tobacco, alcohol, methamphetamine, cocaine, and opioid abuse. Had been on methadone then transitioned to Suboxone  - Suboxone was resumed per Psychiatry as noted above     Tobacco use disorder  Patient uses chewing tobacco, transitioning to nicotine gum.  - Continues on nicotine gum prn     Chronic back pain  [PTA: gabapentin 400 mg TID, pregabalin 200 mg TID]  - Gabapentin was decreased to 300 mg TID by Psychiatry  - Pregabalin was discontinued  - He will be discharged on naproxen BID prn     Essential hypertension  [PTA: amlodipine 5 mg daily, propanolol 10 mg TID]  - Continues on PTA meds    GERD  - Continues on PTA pantoprazole    Constipation, Improved  - On docusate 100 mg BID  - Senna #1-2 bid prn, Miralax daily prn ordered during this hospitalization    Hypokalemia, Resolved  Potassium was replaced per protocol during this hospitalization    Consultations This Hospital Stay   PSYCHIATRY IP CONSULT  CARE MANAGEMENT / SOCIAL WORK IP CONSULT    Code Status   Full Code    Time Spent on this Encounter   I, Danelle Dotson MD, personally saw the patient today and spent greater than 30 minutes discharging this patient.       Danelle Dotson MD  [place] [address] [facility]  [facility] [place] [address]  Phone: [phone]  ______________________________________________________________________    Physical Exam   Vital Signs:                    Weight: 284 lbs 6.29 oz    Constitutional: Restless, non-toxic  HEENT: Sclera white,  MMM  Respiratory: Breathing non-labored. Lungs CTAB - no wheezes, crackles, or rhonchi  Cardiovascular: Heart RRR, no m/r/g. No pedal edema  GI: +BS, abd soft/NT  Skin/Integument: No rash  Musculoskeletal: Normal muscle bulk and tone  Neuro: Alert and appropriate, DURAN  Psych: Somewhat restless, but cooperative    Primary Care Physician   Jr Giron    Discharge Orders      Follow Up and recommended labs and tests    Follow up with inpatient Psychiatry. Patient is medically-cleared, no new labs or tests needed from a medical standpoint     Reason for your hospital stay    Suicide attempt by polyubstance overdose. You were treated for alcohol withdrawal during this hospitalization     Activity - Up ad kathleen     Contact Isolation     Diet    Follow this diet upon discharge: Regular diet       Significant Results and Procedures   Most Recent 3 CBC's:Recent Labs   Lab Test 11/15/21  1109 11/13/21  1548 09/12/21  0843   WBC 3.7* 6.5 6.5   HGB 11.6* 13.6 13.5   MCV 93 88 92    387 293     Most Recent 3 BMP's:Recent Labs   Lab Test 11/18/21  0934 11/15/21  0929 11/14/21  2117 11/14/21  0832 11/13/21  1549    140  --   --  139   POTASSIUM 3.9 3.8 3.6   < > 3.3*   CHLORIDE 108 106  --   --  101   CO2 28 26  --   --  27   BUN 13 18  --   --  18   CR 0.69 0.53*  --   --  0.60*   ANIONGAP 5 8  --   --  11   KACY 8.1* 8.0*  --   --  8.7   * 97  --   --  102*    < > = values in this interval not displayed.     Most Recent 2 LFT's:Recent Labs   Lab Test 11/18/21  0934 11/15/21  0929   AST 84* 131*   ALT 93* 120*   ALKPHOS 59 68   BILITOTAL 0.3 0.4   ,   Results for orders placed or performed during the hospital encounter of 08/16/21   XR Hand Left G/E 3 Views    Narrative    XR LEFT HAND THREE OR MORE VIEWS   8/16/2021 4:26 PM     HISTORY:  Left hand injury. Pain.      Impression    IMPRESSION: There are 2 small adjacent metallic foreign bodies at the  dorsal aspect of the third distal phalanx base. No  acute fracture  identified.    PIOTR MORALEZ MD         SYSTEM ID:  [id number]   XR Knee Right 3 Views    Narrative    3 views right knee radiographs 8/18/2021 2:51 PM    History: Worsening knee pain and swelling    Comparison: 10/7/2003    Findings:    AP, lateral and patellofemoral views of the right knee were obtained.     No acute osseous abnormality. Small joint effusion.    Severe medial compartment joint space loss, new since 2003. Bony  proliferation at the medial femoral epicondyle, likely related to  prior trauma of the medial collateral ligament complex.    No patellar tilt or lateral subluxation.  Soft tissue is unremarkable.      Impression    Impression:  1. No acute osseous abnormality.  2. Severe medial compartment predominant osteoarthritis, new since  2003.    MAILE LE         SYSTEM ID:  [id number]       Discharge Medications   Discharge Medication List as of 11/19/2021  8:46 PM      START taking these medications    Details   buprenorphine HCl-naloxone HCl (SUBOXONE) 8-2 MG per film Place 1 Film under the tongue 3 times daily, Transitional      naproxen (NAPROSYN) 500 MG tablet Take 1 tablet (500 mg) by mouth 2 times daily as needed for moderate pain or headaches, Transitional      !! OLANZapine zydis (ZYPREXA) 10 MG ODT Take 1 tablet (10 mg) by mouth 2 times daily, Transitional      !! OLANZapine zydis (ZYPREXA) 5 MG ODT Take 1 tablet (5 mg) by mouth every 6 hours as needed for agitation (anxiety), Transitional      ondansetron (ZOFRAN-ODT) 4 MG ODT tab Take 1 tablet (4 mg) by mouth every 6 hours as needed (Nausea and Vomiting), Transitional      polyethylene glycol (MIRALAX) 17 GM/Dose powder Take 17 g by mouth daily, Disp-510 g, Transitional      sennosides (SENOKOT) 8.6 MG tablet Take 1-2 tablets by mouth 2 times daily as needed for constipation, Transitional       !! - Potential duplicate medications found. Please discuss with provider.      CONTINUE these medications which have CHANGED     Details   gabapentin (NEURONTIN) 300 MG capsule Take 1 capsule (300 mg) by mouth 3 times daily, Transitional         CONTINUE these medications which have NOT CHANGED    Details   amLODIPine (NORVASC) 5 MG tablet Take 1 tablet (5 mg) by mouth daily, Disp-30 tablet, R-1, E-Prescribe      diclofenac (VOLTAREN) 1 % topical gel Apply 2 g topically 4 times daily as needed for moderate pain, Disp-150 g, R-1, E-Prescribe      docusate sodium (COLACE) 100 MG capsule Take 1 capsule (100 mg) by mouth 2 times daily, Disp-60 capsule, R-1, E-Prescribe      folic acid (FOLVITE) 1 MG tablet Take 1 tablet (1,000 mcg) by mouth daily, Disp-90 tablet, R-1, E-Prescribe      levothyroxine (SYNTHROID/LEVOTHROID) 75 MCG tablet Take 1 tablet (75 mcg) by mouth every morning (before breakfast), Disp-30 tablet, R-1, E-Prescribe      miconazole with skin protectant (SELENA ANTIFUNGAL) 2 % CREA cream Apply topically 2 times dailyDisp-30 g, [id number]      multivitamin w/minerals (THERA-VIT-M) tablet Take 1 tablet by mouth daily, Disp-30 tablet, R-1, E-Prescribe      nicotine polacrilex (NICORETTE) 4 MG gum Place 1-2 each (4-8 mg) inside cheek every hour as needed for other (nicotine withdrawal symptoms), Disp-100 each, R-1, E-Prescribe      pantoprazole (PROTONIX) 40 MG EC tablet Take 1 tablet (40 mg) by mouth every morning (before breakfast), Disp-30 tablet, R-1, E-Prescribe      propranolol (INDERAL) 10 MG tablet Take 1 tablet (10 mg) by mouth 3 times daily, Disp-90 tablet, R-1, E-Prescribe      QUEtiapine (SEROQUEL) 300 MG tablet Take 1 tablet (300 mg) by mouth At Bedtime, Disp-30 tablet, R-1, E-Prescribe         STOP taking these medications       acamprosate (CAMPRAL) 333 MG EC tablet Comments:   Reason for Stopping:         ARIPiprazole (ABILIFY) 2 MG tablet Comments:   Reason for Stopping:         Pregabalin (LYRICA) 200 MG capsule Comments:   Reason for Stopping:             Allergies   Allergies   Allergen Reactions     Hydroxyzine  Hives     Previously unreported by patient, this is a new patient declaration as of 5/1/21.     Cucumber Extract      Cucumber the vegable     Nsaids      No problem with oral NSAIDs--Toradol injection itching only.     Trazodone Itching     Per Patient

## 2021-11-22 NOTE — PLAN OF CARE
Problem: Behavioral Health Plan of Care  Goal: Adheres to Safety Considerations for Self and Others  Outcome: Improving  Intervention: Develop and Maintain Individualized Safety Plan  Recent Flowsheet Documentation  Taken 11/21/2021 2024 by Kathy Warner RN  Safety Measures: environmental rounds completed     Problem: Suicidal Behavior  Goal: Suicidal Behavior is Absent or Managed  Outcome: No Change   Pt in his room most of the shift.  Pt out for dinner and hs snack.  Pt reported depression, anxiety, SI thoughts, denied HI and hallucinations.  Pt with flat affect and impatient and irritable.  Med compliant.  Knee and back pain at 9.  Tylenol admin PRN.  Melatonin PRN admin for sleep.

## 2021-11-22 NOTE — PLAN OF CARE
[facility]  Occupational Therapy    Patient Name:Hollis Barclay    OT assessment due today for patient, however pt is not ready for formal assessment at this time due to pt discharging to [facility] due to COVID positive test result . Plan of care not initiated this date.    Date: 11/22/2021

## 2021-11-23 LAB
ANION GAP SERPL CALCULATED.3IONS-SCNC: 9 MMOL/L (ref 5–18)
BUN SERPL-MCNC: 11 MG/DL (ref 8–22)
C REACTIVE PROTEIN LHE: 13.8 MG/DL (ref 0–0.8)
CALCIUM SERPL-MCNC: 8.8 MG/DL (ref 8.5–10.5)
CHLORIDE BLD-SCNC: 108 MMOL/L (ref 98–107)
CO2 SERPL-SCNC: 24 MMOL/L (ref 22–31)
CREAT SERPL-MCNC: 0.69 MG/DL (ref 0.7–1.3)
D DIMER PPP FEU-MCNC: 0.6 UG/ML FEU (ref 0–0.5)
ERYTHROCYTE [DISTWIDTH] IN BLOOD BY AUTOMATED COUNT: 13.8 % (ref 10–15)
FIBRINOGEN PPP-MCNC: 680 MG/DL (ref 170–490)
GFR SERPL CREATININE-BSD FRML MDRD: >90 ML/MIN/1.73M2
GLUCOSE BLD-MCNC: 170 MG/DL (ref 70–125)
GLUCOSE BLDC GLUCOMTR-MCNC: 184 MG/DL (ref 70–99)
HCT VFR BLD AUTO: 39 % (ref 40–53)
HGB BLD-MCNC: 12.3 G/DL (ref 13.3–17.7)
HOLD SPECIMEN: NORMAL
MCH RBC QN AUTO: 29.7 PG (ref 26.5–33)
MCHC RBC AUTO-ENTMCNC: 31.5 G/DL (ref 31.5–36.5)
MCV RBC AUTO: 94 FL (ref 78–100)
PLATELET # BLD AUTO: 197 10E3/UL (ref 150–450)
POTASSIUM BLD-SCNC: 4.2 MMOL/L (ref 3.5–5)
RBC # BLD AUTO: 4.14 10E6/UL (ref 4.4–5.9)
SODIUM SERPL-SCNC: 141 MMOL/L (ref 136–145)
WBC # BLD AUTO: 4.7 10E3/UL (ref 4–11)

## 2021-11-23 PROCEDURE — 250N000013 HC RX MED GY IP 250 OP 250 PS 637: Performed by: FAMILY MEDICINE

## 2021-11-23 PROCEDURE — 99223 1ST HOSP IP/OBS HIGH 75: CPT | Performed by: NURSE PRACTITIONER

## 2021-11-23 PROCEDURE — 85027 COMPLETE CBC AUTOMATED: CPT | Performed by: FAMILY MEDICINE

## 2021-11-23 PROCEDURE — 85379 FIBRIN DEGRADATION QUANT: CPT | Performed by: FAMILY MEDICINE

## 2021-11-23 PROCEDURE — 86141 C-REACTIVE PROTEIN HS: CPT | Performed by: FAMILY MEDICINE

## 2021-11-23 PROCEDURE — 120N000001 HC R&B MED SURG/OB

## 2021-11-23 PROCEDURE — 85384 FIBRINOGEN ACTIVITY: CPT | Performed by: FAMILY MEDICINE

## 2021-11-23 PROCEDURE — 258N000003 HC RX IP 258 OP 636: Performed by: HOSPITALIST

## 2021-11-23 PROCEDURE — 250N000012 HC RX MED GY IP 250 OP 636 PS 637: Performed by: FAMILY MEDICINE

## 2021-11-23 PROCEDURE — 99233 SBSQ HOSP IP/OBS HIGH 50: CPT | Performed by: HOSPITALIST

## 2021-11-23 PROCEDURE — 250N000013 HC RX MED GY IP 250 OP 250 PS 637: Performed by: INTERNAL MEDICINE

## 2021-11-23 PROCEDURE — XW033E5 INTRODUCTION OF REMDESIVIR ANTI-INFECTIVE INTO PERIPHERAL VEIN, PERCUTANEOUS APPROACH, NEW TECHNOLOGY GROUP 5: ICD-10-PCS | Performed by: HOSPITALIST

## 2021-11-23 PROCEDURE — 82310 ASSAY OF CALCIUM: CPT | Performed by: FAMILY MEDICINE

## 2021-11-23 PROCEDURE — 36415 COLL VENOUS BLD VENIPUNCTURE: CPT | Performed by: FAMILY MEDICINE

## 2021-11-23 PROCEDURE — 99232 SBSQ HOSP IP/OBS MODERATE 35: CPT | Mod: 95 | Performed by: INTERNAL MEDICINE

## 2021-11-23 PROCEDURE — 250N000009 HC RX 250: Performed by: HOSPITALIST

## 2021-11-23 RX ORDER — BUPRENORPHINE 2 MG/1
TABLET SUBLINGUAL
Status: CANCELLED | OUTPATIENT
Start: 2021-11-23

## 2021-11-23 RX ORDER — BUPRENORPHINE 2 MG/1
2 TABLET SUBLINGUAL ONCE
Status: CANCELLED | OUTPATIENT
Start: 2021-11-23

## 2021-11-23 RX ORDER — BUPRENORPHINE 2 MG/1
4 TABLET SUBLINGUAL 3 TIMES DAILY
Status: DISCONTINUED | OUTPATIENT
Start: 2021-11-23 | End: 2021-11-24

## 2021-11-23 RX ADMIN — GABAPENTIN 100 MG: 100 CAPSULE ORAL at 14:14

## 2021-11-23 RX ADMIN — PANTOPRAZOLE SODIUM 40 MG: 40 TABLET, DELAYED RELEASE ORAL at 06:48

## 2021-11-23 RX ADMIN — PROPRANOLOL HYDROCHLORIDE 10 MG: 10 TABLET ORAL at 08:15

## 2021-11-23 RX ADMIN — DEXAMETHASONE 6 MG: 2 TABLET ORAL at 08:15

## 2021-11-23 RX ADMIN — BUPRENORPHINE HYDROCHLORIDE AND NALOXONE HYDROCHLORIDE DIHYDRATE 2 TABLET: 2; .5 TABLET SUBLINGUAL at 12:40

## 2021-11-23 RX ADMIN — NICOTINE POLACRILEX 4 MG: 4 GUM, CHEWING ORAL at 09:55

## 2021-11-23 RX ADMIN — NICOTINE POLACRILEX 4 MG: 4 GUM, CHEWING ORAL at 20:00

## 2021-11-23 RX ADMIN — NICOTINE POLACRILEX 4 MG: 4 GUM, CHEWING ORAL at 23:06

## 2021-11-23 RX ADMIN — SODIUM CHLORIDE 50 ML: 9 INJECTION, SOLUTION INTRAVENOUS at 19:40

## 2021-11-23 RX ADMIN — AMLODIPINE BESYLATE 5 MG: 5 TABLET ORAL at 08:15

## 2021-11-23 RX ADMIN — REMDESIVIR 200 MG: 100 INJECTION, POWDER, LYOPHILIZED, FOR SOLUTION INTRAVENOUS at 19:29

## 2021-11-23 RX ADMIN — FOLIC ACID 1000 MCG: 1 TABLET ORAL at 08:15

## 2021-11-23 RX ADMIN — MULTIPLE VITAMINS W/ MINERALS TAB 1 TABLET: TAB at 08:14

## 2021-11-23 RX ADMIN — APIXABAN 2.5 MG: 2.5 TABLET, FILM COATED ORAL at 20:01

## 2021-11-23 RX ADMIN — PREGABALIN 200 MG: 100 CAPSULE ORAL at 08:15

## 2021-11-23 RX ADMIN — NICOTINE POLACRILEX 4 MG: 4 GUM, CHEWING ORAL at 08:16

## 2021-11-23 RX ADMIN — PREGABALIN 200 MG: 100 CAPSULE ORAL at 20:01

## 2021-11-23 RX ADMIN — BUPRENORPHINE 4 MG: 2 TABLET SUBLINGUAL at 22:42

## 2021-11-23 RX ADMIN — QUETIAPINE FUMARATE 300 MG: 300 TABLET, FILM COATED ORAL at 22:15

## 2021-11-23 RX ADMIN — APIXABAN 2.5 MG: 2.5 TABLET, FILM COATED ORAL at 08:14

## 2021-11-23 RX ADMIN — MAGNESIUM OXIDE TAB 400 MG (241.3 MG ELEMENTAL MG) 400 MG: 400 (241.3 MG) TAB at 08:15

## 2021-11-23 RX ADMIN — PROPRANOLOL HYDROCHLORIDE 10 MG: 10 TABLET ORAL at 20:00

## 2021-11-23 RX ADMIN — MAGNESIUM OXIDE TAB 400 MG (241.3 MG ELEMENTAL MG) 400 MG: 400 (241.3 MG) TAB at 20:01

## 2021-11-23 RX ADMIN — LEVOTHYROXINE SODIUM 75 MCG: 0.03 TABLET ORAL at 06:48

## 2021-11-23 RX ADMIN — PROPRANOLOL HYDROCHLORIDE 10 MG: 10 TABLET ORAL at 14:14

## 2021-11-23 RX ADMIN — NICOTINE POLACRILEX 4 MG: 4 GUM, CHEWING ORAL at 16:42

## 2021-11-23 RX ADMIN — BUPRENORPHINE HYDROCHLORIDE AND NALOXONE HYDROCHLORIDE DIHYDRATE 2 TABLET: 2; .5 TABLET SUBLINGUAL at 08:14

## 2021-11-23 RX ADMIN — NICOTINE POLACRILEX 4 MG: 4 GUM, CHEWING ORAL at 18:24

## 2021-11-23 RX ADMIN — NICOTINE POLACRILEX 4 MG: 4 GUM, CHEWING ORAL at 13:58

## 2021-11-23 RX ADMIN — ARIPIPRAZOLE 5 MG: 5 TABLET ORAL at 08:14

## 2021-11-23 RX ADMIN — PREGABALIN 200 MG: 100 CAPSULE ORAL at 14:14

## 2021-11-23 ASSESSMENT — ACTIVITIES OF DAILY LIVING (ADL)
ADLS_ACUITY_SCORE: 10

## 2021-11-23 NOTE — PROGRESS NOTES
[facility]    Medicine Progress Note - Hospitalist Service       Date of Admission:  11/22/2021    Assessment & Plan           Hollis Barclay is a 51 year old male admitted on 11/22/2021. He has history of bipolar disorder, borderline personality disorder, TBI, polysubstance abuse, alcoholism, group home resident presented to [facility] on 11/13 with suicide attempt, transferred to [facility] inpatient psych unit on 11/19, now transferred to [facility] on 11/22 for Covid hypoxia    COVID-19 Diagnosis Details:  Onset of COVID symptoms 11/21   Date of positive test results 11/22   Vaccinated? Fully Vaccinated     # Confirmed COVID-19 infection    # Acute Hypoxic Respiratory Failure secondary to COVID-19 infection  # Viral Pneumonia secondary to COVID-19 infection  - Initial chest CT with contrast showed moderate patchy bilateral pulmonary infiltrates. Oxygen needs have increased from 2 L to 4 L overnight.  May yet be early in disease course, continue monitoring closely.  CRP significantly elevated 13.8 but down from 14.8 yesterday.  - Continue supportive care with continuous pulse oximetry, COVID-19 special precautions, minimized lab draws, and care consolidation  - Oxygen: continue current support with nasal cannula at 4 L/min; titrate to keep SpO2 between 90-96%  - Labs: CBC, creatinine/BMP, CRP, LDH, D-dimer and fibrinogen daily or as appropriate until patient clinically stable.  - Imaging: no additional imaging needed at this time  - Breathing treatments: no inhalers needed; avoid nebulizers in favor of MDIs   - IV fluids: not indicated at this time  - Antibiotics: not indicated   - Treatments: Dexamethasone 6 mg x 10 days or until hospital discharge, started on 11/22.  Remdesivir not previously started due to concern about his liver enzymes but they are really not impressive and were downtrending on last check and less than 5 times upper limit of normal.  Starting remdesivir  -  Contact Counseling: N/A  - DVT Prophylaxis: At high risk of thrombotic complications due to COVID-19 disease (last DDimer 0.6).          - Eliquis 2.5mg BID  - Discharge Anticoagulation: At discharge consider one of the following for 30 days and until the patient has returned to normal mobility:        - Apixaban (Eliquis) 2.5 mg two times a day    #Bipolar disorder  #Borderline personality disorder  #Suicidal ideation with recent suicide attempt  #History of TBI  Patient was hospitalized at [facility] 11/13-11/19 after presenting with drug overdose and alcohol intoxication with intention to commit suicide secondary to worsening depression.  Patient overdosed on 5 tablets of oxycodone, 22x300 mg tablets of Seroquel and 4x8 mg tablets of Suboxone while drinking 1 gallon of vodka within a 24-hour period all in attempt to kill himself. Was transferred to [facility] Psych unit 11/19. Requires one-to-one for suicidality while here at [facility].  Psych following here. Patient states anxiety currently poorly controlled, defer any med changes to Psych.  Plan would be back to [facility] when medical issues resolved  Status is currently Voluntary  Currently on Abilify, Seroquel     #Alcoholism  Treated for withdrawal at [facility]    #Chronic pain syndrome  #History of polysubstance/drug abuse  Addiction medicine following  Currently on suboxone    #Alcoholic hepatitis  LFTs improving  Monitor on remdesivir    #Nicotine dependence  Gum    #HTN  Continue amlodipine, propranolol    #GERD, home PPI    #Constipation, senna as needed    #Nasal bone fracture, to see ENT as outpatient    #Hypothyroidism, home Synthroid       Diet: Regular Diet Adult    Barksdale Catheter: Not present  Central Lines: None  Code Status: Full Code      Disposition Plan   Disposition: back to Psych unit when COVID pneumonia better when ready, likely multiple days  Discharge barriers: hypoxia  Medically ready to discharge today: No  Estimated discharge date:  12/03/2021     The patient's care was discussed with the Patient.    Gianna Cantu MD  Hospitalist Service  [facility]  Text page via [employer] Paging/Directory      Clinically Significant Risk Factors Present on Admission                   ____________        Physical Exam   Vital Signs: Temp: 97.4  F (36.3  C) Temp src: Oral BP: 120/69 Pulse: 68   Resp: 16 SpO2: 91 % O2 Device: Nasal cannula Oxygen Delivery: 4 LPM  Weight: 290 lbs 3.2 oz  General: in no apparent distress, non-toxic and alert male lying in hospital bed oriented x3. Speech difficult to understand at times  HEENT: Head normocephalic atraumatic, oral mucosa moist. Sclerae anicteric  CV: Regular rhythm, normal rate, no murmurs  Resp: No wheezes, no rales or rhonchi, no focal consolidations  GI: Belly soft, nondistended, nontender, bowel sounds present  Skin: No rashes or lesions  Extremities: No peripheral edema  Psych: Normal affect, mood euthymic  Neuro: CNII-XII grossly intact, moving all 4 extremities. Speech is slightly pressured but he does not appear acutely anxious      Data   Recent Results (from the past 24 hour(s))   Glucose by meter    Collection Time: 11/22/21  9:18 PM   Result Value Ref Range    GLUCOSE BY METER POCT 184 (H) 70 - 99 mg/dL   Basic metabolic panel    Collection Time: 11/23/21  5:23 AM   Result Value Ref Range    Sodium 141 136 - 145 mmol/L    Potassium 4.2 3.5 - 5.0 mmol/L    Chloride 108 (H) 98 - 107 mmol/L    Carbon Dioxide (CO2) 24 22 - 31 mmol/L    Anion Gap 9 5 - 18 mmol/L    Urea Nitrogen 11 8 - 22 mg/dL    Creatinine 0.69 (L) 0.70 - 1.30 mg/dL    Calcium 8.8 8.5 - 10.5 mg/dL    Glucose 170 (H) 70 - 125 mg/dL    GFR Estimate >90 >60 mL/min/1.73m2   CBC with platelets    Collection Time: 11/23/21  5:23 AM   Result Value Ref Range    WBC Count 4.7 4.0 - 11.0 10e3/uL    RBC Count 4.14 (L) 4.40 - 5.90 10e6/uL    Hemoglobin 12.3 (L) 13.3 - 17.7 g/dL    Hematocrit 39.0 (L) 40.0 - 53.0 %    MCV 94 78 -  100 fL    MCH 29.7 26.5 - 33.0 pg    MCHC 31.5 31.5 - 36.5 g/dL    RDW 13.8 10.0 - 15.0 %    Platelet Count 197 150 - 450 10e3/uL   CRP inflammation    Collection Time: 11/23/21  5:23 AM   Result Value Ref Range    CRP 13.8 (H) 0.0-<0.8 mg/dL   D dimer quantitative    Collection Time: 11/23/21  5:23 AM   Result Value Ref Range    D-Dimer Quantitative 0.60 (H) 0.00 - 0.50 ug/mL FEU   Fibrinogen activity    Collection Time: 11/23/21  5:23 AM   Result Value Ref Range    Fibrinogen Activity 680 (H) 170 - 490 mg/dL   Extra Red Top Tube    Collection Time: 11/23/21  5:23 AM   Result Value Ref Range    Hold Specimen JIC      ____________  Interval History   Data reviewed today: I reviewed all medications, new labs and imaging results over the last 24 hours. I personally reviewed no images or EKG's today.  Patient's oxygen needs have increased to 4 L.  He complains of bad anxiety, feels bored because he cannot focus on the TV or any books that he brought.  Requesting benzodiazepines to control anxiety during this time when he is unsure how he will do with COVID-19.  Continue present cares.  Currently not on remdesivir due to concern for liver enzymes.

## 2021-11-23 NOTE — PHARMACY-ADMISSION MEDICATION HISTORY
The PTA medication list was completed by a pharmacist on 11/19/2021 at the [facility]. Please see below for detail.    Portia Sequeira, PharmD. 11/22/2021 7:17 PM    ______________________________________________________________________    Pharmacy Note - Admission Medication History    Pertinent Provider Information: Patient was discharged from [facility]. Obtained med history information from previous med history note on 11/14/21 and discharge summary.     ______________________________________________________________________    Prior To Admission (PTA) med list completed and updated in EMR.       PTA Med List   Medication Sig Last Dose     amLODIPine (NORVASC) 5 MG tablet Take 1 tablet (5 mg) by mouth daily      buprenorphine HCl-naloxone HCl (SUBOXONE) 8-2 MG per film Place 1 Film under the tongue 3 times daily      diclofenac (VOLTAREN) 1 % topical gel Apply 2 g topically 4 times daily as needed for moderate pain      docusate sodium (COLACE) 100 MG capsule Take 1 capsule (100 mg) by mouth 2 times daily      folic acid (FOLVITE) 1 MG tablet Take 1 tablet (1,000 mcg) by mouth daily      gabapentin (NEURONTIN) 300 MG capsule Take 1 capsule (300 mg) by mouth 3 times daily      levothyroxine (SYNTHROID/LEVOTHROID) 75 MCG tablet Take 1 tablet (75 mcg) by mouth every morning (before breakfast)      miconazole with skin protectant (SELENA ANTIFUNGAL) 2 % CREA cream Apply topically 2 times daily (Patient taking differently: Apply topically 2 times daily For athlete's foot)      multivitamin w/minerals (THERA-VIT-M) tablet Take 1 tablet by mouth daily      naproxen (NAPROSYN) 500 MG tablet Take 1 tablet (500 mg) by mouth 2 times daily as needed for moderate pain or headaches      OLANZapine zydis (ZYPREXA) 10 MG ODT Take 1 tablet (10 mg) by mouth 2 times daily      OLANZapine zydis (ZYPREXA) 5 MG ODT Take 1 tablet (5 mg) by mouth every 6 hours as needed for agitation (anxiety)      ondansetron  (ZOFRAN-ODT) 4 MG ODT tab Take 1 tablet (4 mg) by mouth every 6 hours as needed (Nausea and Vomiting)      pantoprazole (PROTONIX) 40 MG EC tablet Take 1 tablet (40 mg) by mouth every morning (before breakfast)      polyethylene glycol (MIRALAX) 17 GM/Dose powder Take 17 g by mouth daily      Pregabalin (LYRICA) 200 MG capsule Take 200 mg by mouth 3 times daily      propranolol (INDERAL) 10 MG tablet Take 1 tablet (10 mg) by mouth 3 times daily      QUEtiapine (SEROQUEL) 300 MG tablet Take 1 tablet (300 mg) by mouth At Bedtime      sennosides (SENOKOT) 8.6 MG tablet Take 1-2 tablets by mouth 2 times daily as needed for constipation        Information source(s): Hospital records  Method of interview communication: N/A    Summary of Changes to PTA Med List: N/A    Patient does not anticipate needing any multi-use medications during admission.    The information provided in this note is only as accurate as the sources available at the time of the update(s).    Thank you for the opportunity to participate in the care of this patient.    Deja Cochran [facility]  11/19/2021 7:16 PM

## 2021-11-23 NOTE — H&P
[facility]    History and Physical - Hospitalist Service       Date of Admission:  11/22/2021    Assessment & Plan      Patient is a 51-year-old male with a history of bipolar disorder, borderline personality disorder, depression, TBI and polysubstance abuse, hypertension, hypothyroidism that presented to the ED with a drug overdose and alcohol intoxication with intention to commit suicide's secondary to worsening depression.  Patient overdosed on 5 tablets of oxycodone, 22 300 mg tablets of Seroquel and four 8 mg tablets of Suboxone while drinking 1 gallon of vodka within a 24-hour all in attempt to kill himself.  Requires one-to-one for suicidality while here at [facility].  He was cleared by poison control.    Patient was found to be COVID-19 positive on 11/22 with associated fatigue, shortness of breath and aches.      Acute hypoxic respiratory failure secondary to COVID-19 pneumonia  -some SOB, mild hypoxia and fatigue symptoms  -Received 1 dose of Decadron at [facility] prior to transfer  -Decadron 6 mg daily x10 days until off oxygen  -Hold off on remdesivir due to mildly elevated liver enzymes  -Tylenol for pain and fever  -Supplemental oxygen as needed, currently 2 L nasal cannula  -Given suicide and drug history will anticoagulate with Eliquis instead of Lovenox or heparin.  -Encourage incentive spirometry  -Covid isolation precautions x10 days unless worsening disease    Bipolar, borderline personality, depression, suicidal, TBI and substance abuse  -Continue regimen of Abilify 5 mg daily, Lyrica 200 mg 3 times daily, Seroquel 300 mg at bedtime, propranolol 10 mg 3 times daily, Suboxone 2 tablets sublingual 3 times daily  -Psychiatry and addiction medicine following  -hopefully decadron does not exacebate mood  -1:1 sitter.     Nicotine dependence-continue as needed Nicorette  Hypertension-normotensive continue amlodipine  Hypothyroidism-continue  Synthroid  Hypomagnesemia-magnesium 1.7 replace per protocol  GERD-continue PPI     Patients desired CODE STATUS is DNR/DNI.      Diet: Regular Diet Adult    DVT Prophylaxis: DOAC  Barksdale Catheter: Not present  Central Lines: None  Code Status: Full Code        Disposition Plan   Expected discharge:anticipate 10 days prior to discharge back to inpatient psychiatry     The patient's care was discussed with the Patient.    Eliseo Witt MD  [facility]  Securely message with the Vocera Web Console (learn more here)  Text page via [email or web address] Paging/Directory        ______________________________________________________________________    Chief Complaint     Suicide attempt    History of Present Illness   Hollis Barclay is a 51-year-old male with a history of bipolar disorder, borderline personality disorder, depression, TBI and polysubstance abuse, hypertension, hypothyroidism that presented to the ED with a drug overdose and alcohol intoxication with intention to commit suicide's secondary to worsening depression.  Patient overdosed on 5 tablets of oxycodone, 22 300 mg tablets of Seroquel and four 8 mg tablets of Suboxone while drinking 1 gallon of vodka within a 24-hour all in attempt to kill himself.  Requires one-to-one for suicidality while here at [facility].  He was cleared by poison control.  Patient reports seeing random things in the room that he knows are not there.  No current suicidal ideation or homicidal ideation.  Patient is complaining that his nicotine gum is cut in half dose than what he is normally on.    Patient was found to be COVID-19 positive on 11/22 with associated fatigue, shortness of breath and aches.    Review of Systems    The 10 point Review of Systems is negative other than noted in the HPI or here.     Past Medical History    I have reviewed this patient's medical history and updated it with pertinent information if needed.   Past Medical History:    Diagnosis Date     Alcoholism in remission (H)      Bilateral ACL tear      Bipolar disorder (H)      Borderline personality disorder (H)      Chemical dependency (H)      Chronic back pain      Closed left arm fracture 1985     H/O shoulder surgery      Post concussive encephalopathy      Social anxiety disorder      Social anxiety disorder      TBI (traumatic brain injury) (H)        Past Surgical History   None reported    Social History   I have reviewed this patient's social history and updated it with pertinent information if needed.  Social History     Tobacco Use     Smoking status: Former Smoker     Types: Dip, chew, snus or snuff     Smokeless tobacco: Former User     Types: Chew   Substance Use Topics     Alcohol use: Yes     Drug use: Not Currently       Family History     Not clinically significant.    Prior to Admission Medications   Prior to Admission Medications   Prescriptions Last Dose Informant Patient Reported? Taking?   OLANZapine zydis (ZYPREXA) 10 MG ODT   No Yes   Sig: Take 1 tablet (10 mg) by mouth 2 times daily   OLANZapine zydis (ZYPREXA) 5 MG ODT   No Yes   Sig: Take 1 tablet (5 mg) by mouth every 6 hours as needed for agitation (anxiety)   Pregabalin (LYRICA) 200 MG capsule   Yes Yes   Sig: Take 200 mg by mouth 3 times daily   QUEtiapine (SEROQUEL) 300 MG tablet  Nursing Home No Yes   Sig: Take 1 tablet (300 mg) by mouth At Bedtime   amLODIPine (NORVASC) 5 MG tablet  Nursing Home No Yes   Sig: Take 1 tablet (5 mg) by mouth daily   buprenorphine HCl-naloxone HCl (SUBOXONE) 8-2 MG per film   No Yes   Sig: Place 1 Film under the tongue 3 times daily   diclofenac (VOLTAREN) 1 % topical gel  Nursing Home No Yes   Sig: Apply 2 g topically 4 times daily as needed for moderate pain   docusate sodium (COLACE) 100 MG capsule  Nursing Home No Yes   Sig: Take 1 capsule (100 mg) by mouth 2 times daily   folic acid (FOLVITE) 1 MG tablet  Nursing Home No Yes   Sig: Take 1 tablet (1,000 mcg) by mouth  daily   gabapentin (NEURONTIN) 300 MG capsule   No Yes   Sig: Take 1 capsule (300 mg) by mouth 3 times daily   levothyroxine (SYNTHROID/LEVOTHROID) 75 MCG tablet  Nursing Home No Yes   Sig: Take 1 tablet (75 mcg) by mouth every morning (before breakfast)   miconazole with skin protectant (SELENA ANTIFUNGAL) 2 % CREA cream  Nursing Home No Yes   Sig: Apply topically 2 times daily   Patient taking differently: Apply topically 2 times daily For athlete's foot   multivitamin w/minerals (THERA-VIT-M) tablet  Nursing Home No Yes   Sig: Take 1 tablet by mouth daily   naproxen (NAPROSYN) 500 MG tablet   No Yes   Sig: Take 1 tablet (500 mg) by mouth 2 times daily as needed for moderate pain or headaches   nicotine polacrilex (NICORETTE) 4 MG gum  Nursing Home No No   Sig: Place 1-2 each (4-8 mg) inside cheek every hour as needed for other (nicotine withdrawal symptoms)   ondansetron (ZOFRAN-ODT) 4 MG ODT tab   No Yes   Sig: Take 1 tablet (4 mg) by mouth every 6 hours as needed (Nausea and Vomiting)   pantoprazole (PROTONIX) 40 MG EC tablet  Nursing Home No Yes   Sig: Take 1 tablet (40 mg) by mouth every morning (before breakfast)   polyethylene glycol (MIRALAX) 17 GM/Dose powder   No Yes   Sig: Take 17 g by mouth daily   propranolol (INDERAL) 10 MG tablet  Nursing Home No Yes   Sig: Take 1 tablet (10 mg) by mouth 3 times daily   sennosides (SENOKOT) 8.6 MG tablet   No Yes   Sig: Take 1-2 tablets by mouth 2 times daily as needed for constipation      Facility-Administered Medications: None     Allergies   Allergies   Allergen Reactions     Hydroxyzine Hives     Previously unreported by patient, this is a new patient declaration as of 5/1/21.     Cucumber Extract      Cucumber the vegable     Nsaids      No problem with oral NSAIDs--Toradol injection itching only.     Trazodone Itching     Per Patient       Physical Exam   Vital Signs: Temp: 97.7  F (36.5  C) Temp src: Oral BP: 117/62 Pulse: 69   Resp: 18        Weight: 290 lbs  3.2 oz    Physical Examination:   General appearance - alert, well appearing, and in no distress and in a red jumpsuit.  Mental status - alert, oriented to person, place, and time, flat affect, no hallucinations at present, no SI or HI  HEENT - sclera anicteric, left eye normal, right eye normal, nares normal and patent, no erythema, discharge or polyps and sinuses normal and nontender, mucous membranes moist, pharynx normal without lesions, dental hygiene good and tongue normal  Respiratory -diminished breath sounds bilaterally  Cardiac - normal rate, regular rhythm, normal S1, S2, no murmurs,  Abdomen - soft, nontender, nondistended,   Neurological - alert, oriented, normal speech, no focal findings or movement disorder noted  Musculoskeletal - no joint tenderness, deformity or swelling, full range of motion without pain  Extremities - peripheral pulses normal, no pedal edema,  Skin - normal coloration and turgor, no rashes, no suspicious skin lesions noted      Data   Data reviewed today: I reviewed all medications, new labs and imaging results over the last 24 hours.     Recent Labs   Lab 11/22/21  1522 11/22/21  0843 11/18/21  0934   WBC  --  3.0*  --    HGB  --  11.5*  --    MCV  --  92  --    PLT  --  153  --    INR 0.91  --   --    NA  --  140 141   POTASSIUM  --  3.8 3.9   CHLORIDE  --  105 108   CO2  --  25 28   BUN  --  10 13   CR  --  0.75 0.69   ANIONGAP  --  10 5   KACY  --  8.6 8.1*   GLC  --  115 106*   ALBUMIN  --  2.9* 2.8*   PROTTOTAL  --  6.7 6.2*   BILITOTAL  --  0.6 0.3   ALKPHOS  --  62 59   ALT  --  54* 93*   AST  --  65* 84*     Recent Results (from the past 24 hour(s))   XR Chest 2 Views    Narrative    EXAM: XR CHEST 2 VW  LOCATION: [facility]  DATE/TIME: 11/22/2021 8:59 AM    INDICATION: dyspnea  COMPARISON: None.      Impression    IMPRESSION: Moderate patchy bilateral pulmonary infiltrates most likely pneumonia. COVID pneumonia could have this appearance.   CT  Head w/o Contrast    Narrative    EXAM: CT HEAD W/O CONTRAST  LOCATION: [facility]  DATE/TIME: 11/22/2021 10:46 AM    INDICATION: Altered mental status after suicide attempt.  COMPARISON: Head CT 3/8/2020.  TECHNIQUE: Routine CT Head without IV contrast. Multiplanar reformats. Dose reduction techniques were used.    FINDINGS:  INTRACRANIAL CONTENTS: No intracranial hemorrhage, extraaxial collection, or mass effect.  No CT evidence of acute infarct. Normal parenchymal attenuation. Normal ventricles and sulci. Again seen is atherosclerotic calcification of the intradural left   vertebral artery.    VISUALIZED ORBITS/SINUSES/MASTOIDS: No intraorbital abnormality. There is mild to moderate mucosal thickening in the left maxillary sinus, sphenoid sinus, inferior frontal sinus, and left ethmoid air cells. No middle ear or mastoid effusion.    BONES/SOFT TISSUES: There is a new age-indeterminate mildly displaced fracture of the right nasal bone.      Impression    IMPRESSION:  1.  New age-indeterminate mildly displaced right nasal bone fracture.  2.  No evidence of acute intracranial hemorrhage, acute large territorial infarction, or mass lesion.   CT Chest Pulmonary Embolism w Contrast    Narrative    EXAM: CT CHEST PULMONARY EMBOLISM W CONTRAST  LOCATION: [facility]  DATE/TIME: 11/22/2021 10:47 AM    INDICATION: Shortness of breath.  COMPARISON: None.  TECHNIQUE: CT chest pulmonary angiogram during arterial phase injection of IV contrast. Multiplanar reformats and MIP reconstructions were performed. Dose reduction techniques were used.   CONTRAST: 100ml isovue 370    FINDINGS:  ANGIOGRAM CHEST: Mild motion artifact. Pulmonary arteries are normal caliber and negative for pulmonary emboli. Thoracic aorta is negative for dissection. No CT evidence of right heart strain.    LUNGS AND PLEURA: Moderate patchy bilateral pulmonary infiltrates consistent with  pneumonia.    MEDIASTINUM/AXILLAE: Normal.    CORONARY ARTERY CALCIFICATION: Minimal    UPPER ABDOMEN: Normal.    MUSCULOSKELETAL: Normal.      Impression    IMPRESSION:  1.  Mild motion artifact.    2.  Negative for pulmonary emboli.    3.  Moderate patchy bilateral pulmonary infiltrates consistent with pneumonia.

## 2021-11-23 NOTE — PROGRESS NOTES
.  Addiction Medicine Follow Up    Tele-Visit Details    Type of service:  Video Visit    Time Service Began (time 1st connected with pt): 1608    Time Service Ended (time completely finished with pt): 1622    Originating Location (pt. Location): Patient Rm,     Distant Location (provider location): [facility]    Reason for Televisit: COVID 19    Mode of Communication:  Video Conference via [employer]    Physician has received verbal consent for a video visit from the patient? Yes        Active Problems:    Pneumonia due to COVID-19 virus      Subjective  Chief complaint: SOB    HPI: Patient was admitted to [facility] unit  11/19 due to suicide attempt with intent by drug overdose.     On  11/21, he started noting SOB and was found to be COVID + on morning of 11/22.  He has now been transferred to [facility].     Patient has opioid use disorder, and was prescribed suboxone 8 / 2 mg tid.   However, his last fill was on 9/27/21 for 1 mo., so it was unclear if he was actually still taking it.   Patient says that he did take it every day, but sometimes did forget the third dose, so that is why he still had some.   I started him on 2 mg and gradually increased to 2 mg tid.   Yesterday, he was complaining of opiate withdrawal, with restlessness, body aches, sweats and chills.    (of course he was also found to have COVID pneumonia, so unclear what symptoms were from.   I increased the buprenorphine to 4 mg tid, but did not want to go any higher because he was mildly hypoxic.      Today, he is complaining about his dose, because it is now back to 2 mg tid.       Patient is also requesting valium prn for his anxiety.   He got this for alcohol withdrawal treatment last week.      ROS:  Muscle aches and chills better.                No diarrhea             Otherwise, see HPI   Chews tobacco, but never smoked    Objective    /62 (BP Location: Left arm)    "Pulse 72   Temp 98.6  F (37  C) (Oral)   Resp 18   Ht 1.93 m (6' 4\")   Wt 131.6 kg (290 lb 3.2 oz)   SpO2 90%   BMI 35.32 kg/m      Physical Exam per hospitalist: Lungs have decreased BS bilaterally;  Heart - WNL;   Abd - WNL    Skin:   No diaphoresis observed  Neuro:  Alert, oriented x 4               No tremor   Psych:     Initially Cooperative, then became somewhat irritable     Mood:  irritable               Affect:  labile               Thought content:  Denies current SI, plan or intent               Thought processes:  Linear                Speech:  Normal                Motor:  Normal                Insight/judgement:  poor/   poor    Results  Reviewed      Assessment and Plan:  1.   Opioid Use Disorder, severe -  Reports that he has been abstinent from opioid since starting the Suboxone.   However, it is unclear if he was taking it consistently.   Wants to get back up to 8 mg tid, but due to him being hypoxic on room air right now, I don't want to take the chance of suppressing his respirations.   However, patient thinks he is still having some withdrawal.            Will increase buprenorphine to 4 mg tid.   Hold if O2 sat drops, RR decreases or patient appears sedated.           Check EKG to assess QTc.  Has hx of prolonged QT in past.     2.  Benzodiazepine abuse -  Pt again requests valium prn for anxiety.   I refused and that is when he became more irritated.  I explained that we could find other options for anxiety that are not addictive, but he is convinced that nothing else will work.   Will order hydroxyzine.  Will discuss with Ruthie Robison NP for other options      3.   Acute COVID infection with pneumonia B -   Treatment per hospitalists protocols.         Ivette Mahoney MD  Addiction Medicine Service  [facility]   Page me (click here for Salena Mahoney)           "

## 2021-11-23 NOTE — PLAN OF CARE
Problem: Adult Inpatient Plan of Care  Goal: Plan of Care Review  Outcome: Improving     Problem: Suicide Risk  Goal: Absence of Self-Harm  Outcome: Improving     Problem: Suicide Risk  Goal: Absence of Self-Harm  Outcome: Improving    Patient on 1:1 t/o the shift for risk for suicide. Pt has history of bipolar and personality disorder, TBI, depression and polysubstance abuse.   Tonight, patient has been calm, quiet and with flat affect. He is very drowsy but able to answer questions appropriately.   Remains on speacial precaution for COVID. Tested positive 11/22.  At the start of the shift, his O2 saturation is 88-89%. Bumped oxygen up to 3L nasal cannula and he has been sating in the low 90s since. Shortness of breathing noted with ambulation.  Gets up to the bathroom independently with steady gait. Continent of urine, no BM tonight.  0600Still resting quietly on bed.

## 2021-11-23 NOTE — PLAN OF CARE
Pt has been pleasant and cooperative.  He admits to anxiety and depression.  He admits to wanting to die, denies plan or action to perform suicide.  He reports some aches with breathing, dyspnea with activity and his O2 sats drop to 80s when he moves to use BR.  He is 1:1 at this time.  He is wearing slippersocks and has a steady gait to toilet.  Saw Dr. Gil and discussed COVID status and med changes for treatment, and suicide thoughts.  Plans to see psych.      Problem: Suicide Risk  Goal: Absence of Self-Harm  Outcome: Improving     Problem: Gas Exchange Impaired  Goal: Optimal Gas Exchange  Outcome: Improving  Intervention: Optimize Oxygenation and Ventilation  Recent Flowsheet Documentation  Taken 11/22/2021 1834 by Nettie Peoples RN  Head of Bed (HOB) Positioning: HOB at 30 degrees     Problem: Adult Inpatient Plan of Care  Goal: Absence of Hospital-Acquired Illness or Injury  Intervention: Identify and Manage Fall Risk  Recent Flowsheet Documentation  Taken 11/22/2021 1834 by Nettie Peoples RN  Safety Promotion/Fall Prevention:    nonskid shoes/slippers when out of bed    clutter free environment maintained  Intervention: Prevent Skin Injury  Recent Flowsheet Documentation  Taken 11/22/2021 1834 by Nettie Peoples, RN  Body Position: position changed independently  Goal: Optimal Comfort and Wellbeing  Intervention: Provide Person-Centered Care  Recent Flowsheet Documentation  Taken 11/22/2021 1834 by Nettie Peoples RN  Trust Relationship/Rapport: care explained

## 2021-11-23 NOTE — PLAN OF CARE
Pt denies pain, nausea, and shortness of breath. Napped much of shift. Ate well. Cooperative with care team, agitated at times but redirectable. Perseverating some regarding suboxone doses. 2-4L oxygen by nasal canula when awake, when sleeping breathes through mouth and needs 6L oxygen to remain above 90% SPO2.

## 2021-11-23 NOTE — CONSULTS
"    INITIAL PSYCHIATRIC CONSULTATION  This note was created with the help of Dragon dictation system. Grammatical and typing errors are not intentional.        REASON FOR REQUEST: transfer from [facility] psych unit, ongoing agitation and suicidal ideation concerns      ASSESSMENT/RECOMMENDATIONS/PLAN :     Bipolar I disorder, most recent episode depressed, severe  Mood changes in the context of COVID-19.  Sleep difficulties due to above.    Recommendations:  Abilify 5 mg daily.  Quetiapine 300 mg at bedtime.  Lyrica 200 mg 3 times a day.  Continue with one-to-one for suicide precaution.  At this time plan is to return to psychiatric inpatient unit when deemed COVID recovered.  Patient is not dariusz for safety.  We will continue to monitor.  Case was also discussed with addiction medicine who will be managing patient's Suboxone.        MENTAL STATUS EXAMINATION:   General Appearance: Not in acute distress, mildly anxious comfortably in bed.    Behavior: Good eye contact, no bizarre ideations  Speech: Coherent.  Thought Process: Linear, clear.  Thought content: No evidence of hallucinations, delusions or paranoia.    Thought Formation: Associations are connected  Judgment: Fair  Insight : Intact  Attention : Adequate  Memory: Sufficient  Fund Of Knowledge: Average  Affect: Neutral  Mood: Congruent  Alert : Awake  Suicidal ideation: Present, unable to contract for safety in hospital and in community  Homicidal ideation: Absent  Orientation: X 4  Comprehension: Sufficient pertaining to current medical needs  Generative thought content: Adequate.  Spontaneous conversation  Language: Intact  Gait and Ambulation: Gait and ambulation at baseline.    Musculoskeletal: No tonal abnormalities    /68 (BP Location: Left arm)   Pulse 68   Temp 98.4  F (36.9  C) (Oral)   Resp 20   Ht 1.93 m (6' 4\")   Wt 131.6 kg (290 lb 3.2 oz)   SpO2 90%   BMI 35.32 kg/m          HISTORY OF PRESENT ILLNESS:   Presenting history to " include: Per [facility]Specialists:       Hollis Barclay is a 51-year-old male with a history of bipolar disorder, borderline personality disorder, depression, TBI and polysubstance abuse, hypertension, hypothyroidism that presented to the ED with a drug overdose and alcohol intoxication with intention to commit suicide's secondary to worsening depression.  Patient overdosed on 5 tablets of oxycodone, 22 300 mg tablets of Seroquel and four 8 mg tablets of Suboxone while drinking 1 gallon of vodka within a 24-hour all in attempt to kill himself.  Requires one-to-one for suicidality while here at [facility].  He was cleared by poison control.  Patient reports seeing random things in the room that he knows are not there.  No current suicidal ideation or homicidal ideation.  Patient is complaining that his nicotine gum is cut in half dose than what he is normally on.   Patient was found to be COVID-19 positive on 11/22 with associated fatigue, shortness of breath and aches.    Per psychiatry HPI/assessment from [facility]  This is a 51 year old male with history notable for bipolar disorder, borderline personality disorder, depression, TBI  and polysubstance abuse who presented to the ED due to drug overdose and alcohol intoxication with the intention to commit suicide secondary to worsening depression. Current legal status is voluntary is Voluntary. Hollis Barclay is a 51 year old  male, single with two adult children, domiciled in a group home who was admitted to the inpatient psychiatric unit due to ongoing suicidal ideation and psychosis. Care coordination performed in details includes obtaining collateral information from from [facility] and care everywhere records. In brief, Patient reportedly overdosed on medications and abused alcohol by taking 5 tablets of oxycodone, 22 300 mg tablets of Seroquel, and 4 8 mg tablets of suboxone while drinking 1 gallon of vodka within 24 hour all in an attempt to kill  himself. Patient was sent to the ED by his group home staff for evaluation for suicidally. Patient was cleared by the poison control after being monitored for 12 hours per poison control rcommendation as he started to show symptom of acute alcohol withdrawal. Patient was started on CIWA  Protocol and treated with Diazepam in the ED.     Patient was seen and evaluated in the consult room by himself. Patient presented as fidgety, restless, and irritable throughout the assessment. Patient who continued to endorse passive suicidal thoughts stated he wanted to drink himself to death as he does not have anything to live for anymore. Patient reported feeling of hopelessness and helplessness, saying he always sad and depressed for no reason. Patient stated he would rather die than continue to live a purposeless life. Patient currently contracted for safety in the hospital, though mentioned he would go out and drink himself to death if his situation does not improve after this hospitalization. Patient denied auditory hallucination but endorsed visual hallucination, saying he continued to see some demonic creatures everywhere he goes. Patient appeared restless as he constantly requesting the resumption of his Suboxone which they had started in the ER. Patient was informed that the consult has been placed to the addiction medicine for the resumption of his Suboxone. Also, he stated the he is still going through withdrawal from alcohol after being treated with Valium for 6 days in the ED. I consulted with the addiction medicine doctor who is willing to start him on a low dose of Suboxone and Diazepam taper to help with the withdrawal pending the full addiction medicine assessment on Monday.    Patient has a long history of psychiatric hospitalizations related to alcohol and methamphetamine use disorders.with most recent at [facility] on 8/16/2021 related to alcohol withdrawal and intoxication. Patient's current medication  "includes Suboxone 8 mg TID, Abilify 2 mg daily, Seroquel 300 mg at bedtime, and gabapentin 300 mg TID. Patient was maintained on his home medication except Suboxone while the Abilify was switched to Zyprexa due to increased agitation. Patient declined Zyprexa due to intolerability related to restlessness and agitation. Patient was put back on his Abilify with an increased of 5 mg to continue targeting agitation and irritabilityOf note, patient was given one time haldol and benadryl early this morning due to increased agitation as he was hitting the wall in his room.     This is a 51 year old male with history notable for bipolar disorder, borderline personality disorder, depression, TBI  and polysubstance abuse who presented to the ED due to drug overdose and alcohol intoxication with the intension to commit suicide secondary to worsening depression. Patient continues to endorse passive SI but contracted for safety. Patient is on suboxone therapy, requesting resumption of suboxone. Consult placed to the addiction medicine regarding suboxone. Started on Abilify 5 mg daily with Gabapentin TID  and Seroquel 300 mg at bedtime.      Upon assessment, patient was noted to be laying in bed watching TV, mildly anxious and irritable.  He recalls circumstances leading to this hospitalization primarily due to COVID-19 diagnosis and a transfer from psychiatric inpatient unit at [facility].  He was admitted to psychiatric inpatient unit due to his suicidality and unable to contract for safety.  He continues to report of apathy, anhedonia, hopelessness, helplessness and \"life not worth living\".  He is also inquiring about his Suboxone\" getting 8 mg 3 times a day on psychiatric\".    He denied any hallucinations, delusions or paranoia.  He denied any katey or hypomania.  Continues to have suicidality, with recurring thoughts of dying.  He denies any katey or hypomania.  Denies any mood swings or mood fluctuations.  " Sleeping is poor.  Appetite is improving.  Reviewed current medications to include but not limited to Abilify 5 mg, Lyrica 200 mg 3 times a day, quetiapine 300 mg at bedtime.        Review of Systems:As per HPI. Remainders of 12 point review of systems negative.  Psychiatric ROS:  Change of Interest/Anhedonia:  No  Appetite/Weight Changes: No  Concentration Changes:No  Impaired Energy:No  Impaired Sleep:No  Anxiety/Panic: Yes  Tearfulness:No  Depression: Yes  Psychosis: No  Irritability:No  SI/VI/HI: Yes,No,No  Vero: No             Past psychiatric history  Psychiatrist: Yes, cannot recall  Therapist: No  Case Management: No  Hospitalizations: Yes, multiple, last at [facility] in August, 202    History of Commitment: No  Past Medications: Wellbutrin, seroquel, methadone, suboxone, abilify, gabapentin  ECT:  No  Suicide Attempts/Gun Access: Yes,drug overdose/ Denies gun access  Community Supports: Group home   Social History   Social History            Socioeconomic History     Marital status:        Spouse name: Not on file     Number of children: Not on file     Years of education: Not on file     Highest education level: Not on file   Occupational History     Not on file   Tobacco Use     Smoking status: Former Smoker       Types: Dip, chew, snus or snuff     Smokeless tobacco: Former User       Types: Chew   Substance and Sexual Activity     Alcohol use: Yes     Drug use: Not Currently     Sexual activity: Not on file   Other Topics Concern     Not on file   Social History Narrative     1/31/2020:  Jose M is now living in an apartment with one roommate in [place].  He is happy with his current living situation.           11/4/2019: He is living in a full house with 8 people, which has been stressful.  He had an interview for an apartment 3 weeks ago, and expects to hear from them soon.           10/11/2019     Jose M is , and has 2 living children who are adults.  He had a daughter, who passed away.  " He developed issues with drugs and alcohol in his late 30s.  He is now in [facility] residence with Nurse assistance. Previously was receiving treatment at [facility], and is also in a [facility]  He grew up in [place], [place] outside of [place].  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  He is currently receiving treatment at a [facility] program, and will then move to a half-way house in the next few months.      Social Determinants of Health      Financial Resource Strain: Not on file   Food Insecurity: Not on file   Transportation Needs: Not on file   Physical Activity: Not on file   Stress: Not on file   Social Connections: Not on file   Intimate Partner Violence: Not on file   Housing Stability: Not on file            Born and Raised: [place]  Occupation: SSDI  Marital Status:   Children: 2 adult children  Legal:  Voluntary  Living Situation: Living arrangements - the patient lives in a group home  ASSETS/STRENGTHS:  Boxing      PFSH reviewed  and not pertinent to chief complaint/reason for visit  /68 (BP Location: Left arm)   Pulse 68   Temp 98.4  F (36.9  C) (Oral)   Resp 20   Ht 1.93 m (6' 4\")   Wt 131.6 kg (290 lb 3.2 oz)   SpO2 90%   BMI 35.32 kg/m    Ethanol g/dL (g/dL)   Date Value   08/19/2020 0.18 (H)     Alcohol ethyl (g/dL)   Date Value   11/13/2021 0.23 (H)     @24HOURRESULTS@  Recent Results (from the past 72 hour(s))   Basic metabolic panel    Collection Time: 11/22/21  8:43 AM   Result Value Ref Range    Sodium 140 136 - 145 mmol/L    Potassium 3.8 3.5 - 5.0 mmol/L    Chloride 105 98 - 107 mmol/L    Carbon Dioxide (CO2) 25 22 - 31 mmol/L    Anion Gap 10 5 - 18 mmol/L    Urea Nitrogen 10 8 - 22 mg/dL    Creatinine 0.75 0.70 - 1.30 mg/dL    Calcium 8.6 8.5 - 10.5 mg/dL    Glucose 115 70 - 125 mg/dL    GFR Estimate >90 >60 mL/min/1.73m2   CBC with platelets    Collection Time: 11/22/21  8:43 AM   Result " Value Ref Range    WBC Count 3.0 (L) 4.0 - 11.0 10e3/uL    RBC Count 3.84 (L) 4.40 - 5.90 10e6/uL    Hemoglobin 11.5 (L) 13.3 - 17.7 g/dL    Hematocrit 35.5 (L) 40.0 - 53.0 %    MCV 92 78 - 100 fL    MCH 29.9 26.5 - 33.0 pg    MCHC 32.4 31.5 - 36.5 g/dL    RDW 14.1 10.0 - 15.0 %    Platelet Count 153 150 - 450 10e3/uL   Magnesium    Collection Time: 11/22/21  8:43 AM   Result Value Ref Range    Magnesium 1.7 (L) 1.8 - 2.6 mg/dL   Blood gas venous    Collection Time: 11/22/21  8:43 AM   Result Value Ref Range    pH Venous 7.39 7.35 - 7.45    pCO2 Venous 48 35 - 50 mm Hg    pO2 Venous 39 25 - 47 mm Hg    Bicarbonate Venous 27 24 - 30 mmol/L    Base Excess/Deficit (+/-) 4.0   mmol/L    Oxyhemoglobin Venous 72.8 70.0 - 75.0 %    O2 Sat, Venous 74.1 70.0 - 75.0 %   B-Type Natriuretic Peptide ([facility] Only)    Collection Time: 11/22/21  8:43 AM   Result Value Ref Range    BNP 80 (H) 0 - 42 pg/mL   Hepatic panel    Collection Time: 11/22/21  8:43 AM   Result Value Ref Range    Bilirubin Total 0.6 0.0 - 1.0 mg/dL    Bilirubin Direct 0.3 <=0.5 mg/dL    Protein Total 6.7 6.0 - 8.0 g/dL    Albumin 2.9 (L) 3.5 - 5.0 g/dL    Alkaline Phosphatase 62 45 - 120 U/L    AST 65 (H) 0 - 40 U/L    ALT 54 (H) 0 - 45 U/L   TSH    Collection Time: 11/22/21  8:43 AM   Result Value Ref Range    TSH 2.20 0.30 - 5.00 uIU/mL   Troponin I    Collection Time: 11/22/21  8:43 AM   Result Value Ref Range    Troponin I <0.01 0.00 - 0.29 ng/mL   CRP inflammation    Collection Time: 11/22/21  8:43 AM   Result Value Ref Range    CRP 14.8 (H) 0.0-<0.8 mg/dL   ABO and Rh    Collection Time: 11/22/21  8:43 AM   Result Value Ref Range    ABO/RH(D) A POS     SPECIMEN EXPIRATION DATE [id number]    Symptomatic COVID-19 Virus (Coronavirus) by PCR Other    Collection Time: 11/22/21 11:19 AM    Specimen: Other; Swab   Result Value Ref Range    SARS CoV2 PCR Positive (A) Negative   Influenza A & B Antigen    Collection Time: 11/22/21 11:20 AM    Specimen:  Nasopharyngeal; Swab   Result Value Ref Range    Influenza A antigen Negative Negative    Influenza B antigen Negative Negative   INR    Collection Time: 11/22/21  3:22 PM   Result Value Ref Range    INR 0.91 0.85 - 1.15   Partial thromboplastin time    Collection Time: 11/22/21  3:22 PM   Result Value Ref Range    aPTT 41 (H) 22 - 38 Seconds   Fibrinogen activity    Collection Time: 11/22/21  3:22 PM   Result Value Ref Range    Fibrinogen Activity 546 (H) 170 - 490 mg/dL   Lactate Dehydrogenase    Collection Time: 11/22/21  3:22 PM   Result Value Ref Range    Lactate Dehydrogenase 142 125 - 220 U/L   D dimer quantitative    Collection Time: 11/22/21  3:22 PM   Result Value Ref Range    D-Dimer Quantitative 0.86 (H) 0.00 - 0.50 ug/mL FEU   Glucose by meter    Collection Time: 11/22/21  9:18 PM   Result Value Ref Range    GLUCOSE BY METER POCT 184 (H) 70 - 99 mg/dL   Basic metabolic panel    Collection Time: 11/23/21  5:23 AM   Result Value Ref Range    Sodium 141 136 - 145 mmol/L    Potassium 4.2 3.5 - 5.0 mmol/L    Chloride 108 (H) 98 - 107 mmol/L    Carbon Dioxide (CO2) 24 22 - 31 mmol/L    Anion Gap 9 5 - 18 mmol/L    Urea Nitrogen 11 8 - 22 mg/dL    Creatinine 0.69 (L) 0.70 - 1.30 mg/dL    Calcium 8.8 8.5 - 10.5 mg/dL    Glucose 170 (H) 70 - 125 mg/dL    GFR Estimate >90 >60 mL/min/1.73m2   CBC with platelets    Collection Time: 11/23/21  5:23 AM   Result Value Ref Range    WBC Count 4.7 4.0 - 11.0 10e3/uL    RBC Count 4.14 (L) 4.40 - 5.90 10e6/uL    Hemoglobin 12.3 (L) 13.3 - 17.7 g/dL    Hematocrit 39.0 (L) 40.0 - 53.0 %    MCV 94 78 - 100 fL    MCH 29.7 26.5 - 33.0 pg    MCHC 31.5 31.5 - 36.5 g/dL    RDW 13.8 10.0 - 15.0 %    Platelet Count 197 150 - 450 10e3/uL   CRP inflammation    Collection Time: 11/23/21  5:23 AM   Result Value Ref Range    CRP 13.8 (H) 0.0-<0.8 mg/dL   D dimer quantitative    Collection Time: 11/23/21  5:23 AM   Result Value Ref Range    D-Dimer Quantitative 0.60 (H) 0.00 - 0.50 ug/mL  FEU   Fibrinogen activity    Collection Time: 11/23/21  5:23 AM   Result Value Ref Range    Fibrinogen Activity 680 (H) 170 - 490 mg/dL   Extra Red Top Tube    Collection Time: 11/23/21  5:23 AM   Result Value Ref Range    Hold Specimen JIC        PMH:   Past Medical History:   Diagnosis Date     Alcoholism in remission (H)      Bilateral ACL tear      Bipolar disorder (H)      Borderline personality disorder (H)      Chemical dependency (H)      Chronic back pain      Closed left arm fracture 1985     H/O shoulder surgery      Post concussive encephalopathy      Social anxiety disorder      Social anxiety disorder      TBI (traumatic brain injury) (H)            Current Medications:Scheduled Meds:    amLODIPine  5 mg Oral Daily     apixaban ANTICOAGULANT  2.5 mg Oral BID     ARIPiprazole  5 mg Oral Daily     buprenorphine-naloxone  2 tablet Sublingual TID     dexamethasone  6 mg Oral Daily     folic acid  1,000 mcg Oral Daily     levothyroxine  75 mcg Oral QAM AC     magnesium oxide  400 mg Oral BID     multivitamin w/minerals  1 tablet Oral Daily     pantoprazole  40 mg Oral QAM AC     Pregabalin  200 mg Oral TID     propranolol  10 mg Oral TID     QUEtiapine  300 mg Oral At Bedtime     sodium chloride (PF)  3 mL Intracatheter Q8H     Continuous Infusions:    - MEDICATION INSTRUCTIONS -       PRN Meds:.acetaminophen, alum & mag hydroxide-simethicone, gabapentin, lidocaine 4%, lidocaine (buffered or not buffered), - MEDICATION INSTRUCTIONS -, melatonin, nicotine, OLANZapine **OR** OLANZapine, senna-docusate, sodium chloride (PF)                Family History: PERSONALLY REVIEWED.No family history on file.  Pertinent Family hx not pertinent to Chief Complaint or reason for visit.     Social History:  PERSONALLY REVIEWED.  Social History     Socioeconomic History     Marital status:      Spouse name: Not on file     Number of children: Not on file     Years of education: Not on file     Highest education  "level: Not on file   Occupational History     Not on file   Tobacco Use     Smoking status: Former Smoker     Types: Dip, chew, snus or snuff     Smokeless tobacco: Former User     Types: Chew   Substance and Sexual Activity     Alcohol use: Yes     Drug use: Not Currently     Sexual activity: Not on file   Other Topics Concern     Not on file   Social History Narrative    1/31/2020:  Jose M is now living in an apartment with one roommate in [place].  He is happy with his current living situation.        11/4/2019: He is living in a full house with 8 people, which has been stressful.  He had an interview for an apartment 3 weeks ago, and expects to hear from them soon.        10/11/2019    Jose M is , and has 2 living children who are adults.  He had a daughter, who passed away.  He developed issues with drugs and alcohol in his late 30s.  He is now in [facility] residence with Nurse assistance. Previously was receiving treatment at [facility], and is also in a methadone program at [facility].  He grew [place] in [place], [place] of [place].  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  He is currently receiving [facility] at a [facility] program, [facility]     Social Determinants of Health     Financial Resource Strain: Not on file   Food Insecurity: Not on file   Transportation Needs: Not on file   Physical Activity: Not on file   Stress: Not on file   Social Connections: Not on file   Intimate Partner Violence: Not on file   Housing Stability: Not on file    not pertinent to Chief Complaint or reason for visit.             Allergies as of 06/01/2014 Reviewed     Review of Systems:As per HPI. Remainders of 12 point review of systems negative.    Review of Pertinent Laboratory:      PERSONALLY REVIEWED.    Physical Exam: Temp:  [97.5  F (36.4  C)-98.5  F (36.9  C)] 98.4  F (36.9  C)  Pulse:  [61-75] " 68  Resp:  [18-20] 20  BP: (109-132)/(56-68) 132/68  FiO2 (%):  [2 %] 2 %  SpO2:  [90 %-96 %] 90 %   Vitals: reviewed in chart     Physical exam as per medical team: reviewed in chart      diagnoses, risk and benefits of medications discussed with staff. Care coordination with care management team.   Thank you for this consultation.       Ruthie Robison; NP  Mental health & Addiction Services        This note was created with the help of Dragon dictation system. Grammatical and typing errors are not intentional.

## 2021-11-23 NOTE — PROGRESS NOTES
Pt was transferred to [facility] at about 1750 pm via EMS. Pt's Home medications were returned to him. Pt pocket knife which is with security will be sent to [facility] by security. Pt vitals were stable at the time he was leaving. His O2 sats was between 94% and 95% on 2 liters. Report was given to receiving nurse at [facility] by the morning nurse per her report.

## 2021-11-24 ENCOUNTER — APPOINTMENT (OUTPATIENT)
Dept: CARDIOLOGY | Facility: HOSPITAL | Age: 51
End: 2021-11-24
Attending: NURSE PRACTITIONER
Payer: MEDICAID

## 2021-11-24 LAB
ALBUMIN SERPL-MCNC: 3.1 G/DL (ref 3.5–5)
ALP SERPL-CCNC: 66 U/L (ref 45–120)
ALT SERPL W P-5'-P-CCNC: 48 U/L (ref 0–45)
ANION GAP SERPL CALCULATED.3IONS-SCNC: 8 MMOL/L (ref 5–18)
AST SERPL W P-5'-P-CCNC: 47 U/L (ref 0–40)
ATRIAL RATE - MUSE: 67 BPM
BILIRUB DIRECT SERPL-MCNC: 0.2 MG/DL
BILIRUB SERPL-MCNC: 0.4 MG/DL (ref 0–1)
BUN SERPL-MCNC: 15 MG/DL (ref 8–22)
C REACTIVE PROTEIN LHE: 6.4 MG/DL (ref 0–0.8)
CALCIUM SERPL-MCNC: 8.7 MG/DL (ref 8.5–10.5)
CHLORIDE BLD-SCNC: 108 MMOL/L (ref 98–107)
CO2 SERPL-SCNC: 25 MMOL/L (ref 22–31)
CREAT SERPL-MCNC: 0.69 MG/DL (ref 0.7–1.3)
D DIMER PPP FEU-MCNC: 0.3 UG/ML FEU (ref 0–0.5)
DIASTOLIC BLOOD PRESSURE - MUSE: NORMAL MMHG
ERYTHROCYTE [DISTWIDTH] IN BLOOD BY AUTOMATED COUNT: 14 % (ref 10–15)
FIBRINOGEN PPP-MCNC: 554 MG/DL (ref 170–490)
GFR SERPL CREATININE-BSD FRML MDRD: >90 ML/MIN/1.73M2
GLUCOSE BLD-MCNC: 195 MG/DL (ref 70–125)
HCT VFR BLD AUTO: 36.6 % (ref 40–53)
HGB BLD-MCNC: 11.6 G/DL (ref 13.3–17.7)
INTERPRETATION ECG - MUSE: NORMAL
LVEF ECHO: NORMAL
MCH RBC QN AUTO: 29.7 PG (ref 26.5–33)
MCHC RBC AUTO-ENTMCNC: 31.7 G/DL (ref 31.5–36.5)
MCV RBC AUTO: 94 FL (ref 78–100)
P AXIS - MUSE: 25 DEGREES
PLATELET # BLD AUTO: 249 10E3/UL (ref 150–450)
POTASSIUM BLD-SCNC: 4.3 MMOL/L (ref 3.5–5)
PR INTERVAL - MUSE: 206 MS
PROT SERPL-MCNC: 7.3 G/DL (ref 6–8)
QRS DURATION - MUSE: 100 MS
QT - MUSE: 432 MS
QTC - MUSE: 456 MS
R AXIS - MUSE: -18 DEGREES
RBC # BLD AUTO: 3.91 10E6/UL (ref 4.4–5.9)
SODIUM SERPL-SCNC: 141 MMOL/L (ref 136–145)
SYSTOLIC BLOOD PRESSURE - MUSE: NORMAL MMHG
T AXIS - MUSE: 18 DEGREES
VENTRICULAR RATE- MUSE: 67 BPM
WBC # BLD AUTO: 8.7 10E3/UL (ref 4–11)

## 2021-11-24 PROCEDURE — 85384 FIBRINOGEN ACTIVITY: CPT | Performed by: FAMILY MEDICINE

## 2021-11-24 PROCEDURE — 93010 ELECTROCARDIOGRAM REPORT: CPT | Performed by: GENERAL ACUTE CARE HOSPITAL

## 2021-11-24 PROCEDURE — 99233 SBSQ HOSP IP/OBS HIGH 50: CPT | Performed by: HOSPITALIST

## 2021-11-24 PROCEDURE — 85041 AUTOMATED RBC COUNT: CPT | Performed by: FAMILY MEDICINE

## 2021-11-24 PROCEDURE — 250N000013 HC RX MED GY IP 250 OP 250 PS 637: Performed by: FAMILY MEDICINE

## 2021-11-24 PROCEDURE — 250N000012 HC RX MED GY IP 250 OP 636 PS 637: Performed by: FAMILY MEDICINE

## 2021-11-24 PROCEDURE — 250N000009 HC RX 250: Performed by: HOSPITALIST

## 2021-11-24 PROCEDURE — 93005 ELECTROCARDIOGRAM TRACING: CPT

## 2021-11-24 PROCEDURE — 258N000003 HC RX IP 258 OP 636: Performed by: HOSPITALIST

## 2021-11-24 PROCEDURE — 93306 TTE W/DOPPLER COMPLETE: CPT | Mod: 26 | Performed by: INTERNAL MEDICINE

## 2021-11-24 PROCEDURE — 36415 COLL VENOUS BLD VENIPUNCTURE: CPT | Performed by: FAMILY MEDICINE

## 2021-11-24 PROCEDURE — 250N000013 HC RX MED GY IP 250 OP 250 PS 637: Performed by: INTERNAL MEDICINE

## 2021-11-24 PROCEDURE — 250N000013 HC RX MED GY IP 250 OP 250 PS 637: Performed by: HOSPITALIST

## 2021-11-24 PROCEDURE — 80053 COMPREHEN METABOLIC PANEL: CPT | Performed by: FAMILY MEDICINE

## 2021-11-24 PROCEDURE — 86141 C-REACTIVE PROTEIN HS: CPT | Performed by: FAMILY MEDICINE

## 2021-11-24 PROCEDURE — 82248 BILIRUBIN DIRECT: CPT | Performed by: HOSPITALIST

## 2021-11-24 PROCEDURE — 93306 TTE W/DOPPLER COMPLETE: CPT

## 2021-11-24 PROCEDURE — 120N000001 HC R&B MED SURG/OB

## 2021-11-24 PROCEDURE — 85014 HEMATOCRIT: CPT | Performed by: FAMILY MEDICINE

## 2021-11-24 PROCEDURE — 85379 FIBRIN DEGRADATION QUANT: CPT | Performed by: FAMILY MEDICINE

## 2021-11-24 RX ORDER — BUPRENORPHINE 2 MG/1
4 TABLET SUBLINGUAL 3 TIMES DAILY
Status: DISCONTINUED | OUTPATIENT
Start: 2021-11-24 | End: 2021-11-28

## 2021-11-24 RX ADMIN — PROPRANOLOL HYDROCHLORIDE 10 MG: 10 TABLET ORAL at 13:33

## 2021-11-24 RX ADMIN — MAGNESIUM OXIDE TAB 400 MG (241.3 MG ELEMENTAL MG) 400 MG: 400 (241.3 MG) TAB at 08:02

## 2021-11-24 RX ADMIN — PROPRANOLOL HYDROCHLORIDE 10 MG: 10 TABLET ORAL at 08:06

## 2021-11-24 RX ADMIN — NICOTINE POLACRILEX 4 MG: 4 GUM, CHEWING ORAL at 08:10

## 2021-11-24 RX ADMIN — PROPRANOLOL HYDROCHLORIDE 10 MG: 10 TABLET ORAL at 21:54

## 2021-11-24 RX ADMIN — AMLODIPINE BESYLATE 5 MG: 5 TABLET ORAL at 08:06

## 2021-11-24 RX ADMIN — ACETAMINOPHEN 650 MG: 325 TABLET ORAL at 16:09

## 2021-11-24 RX ADMIN — PREGABALIN 200 MG: 100 CAPSULE ORAL at 13:34

## 2021-11-24 RX ADMIN — PANTOPRAZOLE SODIUM 40 MG: 40 TABLET, DELAYED RELEASE ORAL at 07:15

## 2021-11-24 RX ADMIN — NICOTINE POLACRILEX 4 MG: 4 GUM, CHEWING ORAL at 04:02

## 2021-11-24 RX ADMIN — ACETAMINOPHEN 650 MG: 325 TABLET ORAL at 01:13

## 2021-11-24 RX ADMIN — QUETIAPINE FUMARATE 300 MG: 300 TABLET, FILM COATED ORAL at 21:54

## 2021-11-24 RX ADMIN — BUPRENORPHINE 4 MG: 2 TABLET SUBLINGUAL at 19:07

## 2021-11-24 RX ADMIN — NICOTINE POLACRILEX 4 MG: 4 GUM, CHEWING ORAL at 21:58

## 2021-11-24 RX ADMIN — BUPRENORPHINE 4 MG: 2 TABLET SUBLINGUAL at 08:02

## 2021-11-24 RX ADMIN — NICOTINE POLACRILEX 4 MG: 4 GUM, CHEWING ORAL at 18:50

## 2021-11-24 RX ADMIN — LEVOTHYROXINE SODIUM 75 MCG: 0.03 TABLET ORAL at 07:15

## 2021-11-24 RX ADMIN — NICOTINE POLACRILEX 4 MG: 4 GUM, CHEWING ORAL at 13:18

## 2021-11-24 RX ADMIN — PREGABALIN 200 MG: 100 CAPSULE ORAL at 21:54

## 2021-11-24 RX ADMIN — REMDESIVIR 100 MG: 100 INJECTION, POWDER, LYOPHILIZED, FOR SOLUTION INTRAVENOUS at 16:14

## 2021-11-24 RX ADMIN — BUPRENORPHINE 4 MG: 2 TABLET SUBLINGUAL at 13:34

## 2021-11-24 RX ADMIN — NICOTINE POLACRILEX 4 MG: 4 GUM, CHEWING ORAL at 10:14

## 2021-11-24 RX ADMIN — DEXAMETHASONE 6 MG: 2 TABLET ORAL at 08:02

## 2021-11-24 RX ADMIN — NICOTINE POLACRILEX 4 MG: 4 GUM, CHEWING ORAL at 01:13

## 2021-11-24 RX ADMIN — SODIUM CHLORIDE 50 ML: 9 INJECTION, SOLUTION INTRAVENOUS at 18:34

## 2021-11-24 RX ADMIN — MAGNESIUM OXIDE TAB 400 MG (241.3 MG ELEMENTAL MG) 400 MG: 400 (241.3 MG) TAB at 21:54

## 2021-11-24 RX ADMIN — APIXABAN 2.5 MG: 2.5 TABLET, FILM COATED ORAL at 21:54

## 2021-11-24 RX ADMIN — NICOTINE POLACRILEX 4 MG: 4 GUM, CHEWING ORAL at 16:12

## 2021-11-24 RX ADMIN — ACETAMINOPHEN 650 MG: 325 TABLET ORAL at 23:31

## 2021-11-24 RX ADMIN — FOLIC ACID 1000 MCG: 1 TABLET ORAL at 08:02

## 2021-11-24 RX ADMIN — PREGABALIN 200 MG: 100 CAPSULE ORAL at 08:02

## 2021-11-24 RX ADMIN — APIXABAN 2.5 MG: 2.5 TABLET, FILM COATED ORAL at 08:02

## 2021-11-24 RX ADMIN — MULTIPLE VITAMINS W/ MINERALS TAB 1 TABLET: TAB at 08:02

## 2021-11-24 RX ADMIN — ARIPIPRAZOLE 5 MG: 5 TABLET ORAL at 08:02

## 2021-11-24 ASSESSMENT — ACTIVITIES OF DAILY LIVING (ADL)
ADLS_ACUITY_SCORE: 10

## 2021-11-24 NOTE — PLAN OF CARE
Problem: Adult Inpatient Plan of Care  Goal: Optimal Comfort and Wellbeing  11/24/2021 0421 by Deja Coppola RN  Outcome: Improving  11/24/2021 0234 by Deja Coppola RN  Outcome: Improving  Intervention: Provide Person-Centered Care  Recent Flowsheet Documentation  Taken 11/24/2021 0113 by Deja Coppola RN  Trust Relationship/Rapport:    care explained    questions encouraged    thoughts/feelings acknowledged  Pt on 1:1 sitter for suicide precautions.  Pt does displays some anxious behaviors if staff get too close.    Slept between cares.  Nicotine gum given prn x2.     Problem: Suicide Risk  Goal: Absence of Self-Harm  11/24/2021 0421 by Deja Coppola RN  Outcome: Improving  11/24/2021 0234 by Deja Coppola RN  Outcome: Improving  Pt endorses intermittent suicidal thoughts.  No plan and reports he contract for safety.       Problem: Gas Exchange Impaired  Goal: Optimal Gas Exchange  11/24/2021 0421 by Deja Coppola RN  Outcome: Improving  11/24/2021 0234 by Deja Coppola RN  Outcome: Improving  Intervention: Optimize Oxygenation and Ventilation  Recent Flowsheet Documentation  Taken 11/24/2021 0113 by Deja Coppola RN  Head of Bed (HOB) Positioning: HOB at 20-30 degrees  Pt reports nonproductive cough though 1:1 sitter reports  no coughing.  Pt lung sounds diminished.  Denies shortness of breath.  Sats maintained in the low 90's on 4 liters oxygen.  Encouraged rest this night.

## 2021-11-24 NOTE — PLAN OF CARE
"  Problem: Suicide Risk  Goal: Absence of Self-Harm  Outcome: No Change  Patient on 1:1. Attempted to talk with patient about feeling of suicide. Stated \"I don't want to talk now my lungs hurt and I'm just tired of this all. I don't know why god won't let me die he took everything away from me my job my daughter my life\" Verbalized  his feelings offered to listen when he was ready to talk.  11:00 patient just finished have Heat US. Tolerated well. Is back to sleeping. Did remove 02 at times while lying on left side during test and dropped to 86, reminders to keep on. Patient had a quiet day today slept off and on while reading and or watching TV. Oxygen reins at 4 L NC  91-93%.  "

## 2021-11-24 NOTE — CONSULTS
Care Management Follow Up    Length of Stay (days): 2    Expected Discharge Date: 12/03/2021     Concerns to be Addressed: discharge planning     Patient plan of care discussed at interdisciplinary rounds: Yes    Anticipated Discharge Disposition: Inpatient Mental Health,Other (Comments) (transfer back to [facility] inpt psych)  Private pay costs discussed: Not applicable    Additional Information:  Chart review completed and discussed updates with MD. Patient admitted from [facility] Unit [facility].   SW spoke to Tarah at [facility] Intake line ([phone]) to inquire about when pt able to return to mental health unit given COVID 19+ status. Per Tarah, referral can be placed to intake line when patient is no longer symptomatic. Infection control is then contacted by intake team to determine if patient can be transferred back to mental health unit. CM following.     NORBERTO Jean

## 2021-11-24 NOTE — CONSULTS
Care Management Initial Consult    General Information  Assessment completed with: Patient, Ray   Type of CM/SW Visit: Initial Assessment    Primary Care Provider verified and updated as needed: No     Readmission within the last 30 days: current reason for admission unrelated to previous admission   Return Category: New Diagnosis  Reason for Consult: discharge planning    Advance Care Planning: Advance Care Planning Reviewed: other (comment) (pt does not have HCD and does not want info)          Communication Assessment  Patient's communication style: spoken language (English or Bilingual)    Hearing Difficulty or Deaf: no   Wear Glasses or Blind: yes    Cognitive  Cognitive/Neuro/Behavioral: WDL                      Living Environment:   People in home: other (see comments) (other residents in group home)     Current living Arrangements: group home      Able to return to prior arrangements: other (see comments) (pt to rtn to inpt psych and then to group home when stable)       Family/Social Support:  Care provided by: self,other (see comments) (retirement staff)  Provides care for:    Marital Status:              Description of Support System:           Current Resources:   Patient receiving home care services:       Community Resources:    Equipment currently used at home: none  Supplies currently used at home:      Employment/Financial:  Employment Status:          Financial Concerns:             Lifestyle & Psychosocial Needs:  Social Determinants of Health     Tobacco Use: Medium Risk     Smoking Tobacco Use: Former Smoker     Smokeless Tobacco Use: Former User   Alcohol Use: Not on file   Financial Resource Strain: Not on file   Food Insecurity: Not on file   Transportation Needs: Not on file   Physical Activity: Not on file   Stress: Not on file   Social Connections: Not on file   Intimate Partner Violence: Not on file   Depression: Not at risk     PHQ-2 Score: 2   Housing Stability: Not on file  "      Functional Status:  Prior to admission patient needed assistance:              Mental Health Status:  Mental Health Status: Current Concern  Mental Health Management: Psychiatrist,Medication,Other (see comment) (most recently [facility] inpt tx for suicide attempt)    Chemical Dependency Status:  Chemical Dependency Status: Current Concern  Chemical Dependency Management: Other (see comment) (suboxone )          Values/Beliefs:  Spiritual, Cultural Beliefs, Taoist Practices, Values that affect care:                 Additional Information:    SW reviewed pt's chart.  Pt has history of Bipolar Disorder, Borderline Personality Disorder, Depression, and TBI.  Pt lives in group home.  Pt has a history of chemical abuse (recent tox screens positive for benzodiazepines, amphetamines, and cocaine).  Pt also uses alcohol.  Pt had recent suicide attempt and was at [facility] from 11/13/21 to 11/19/21 for alcohol withdrawal, and then at [facility] from 11/19/21 to 11/22/21 for suicide attempt.  Pt transferred to [facility] after testing positive for Covid, and is here for management of Covid pneumonia.  Plan for pt to return to [facility] upon Covid recovery.  Pt on suboxone.  Pt on 1:1 for suicide watch.  SW called pt to complete assessment and discuss discharge planning.  Pt states that primary clinic is [facility] in [place] ([facility] affiliated).  Pt does not have current PCP.  Per chart, pt restricted to [facility].  Pt utilizes [employer] transportation when at group home.  Pt has a cane.  Pt started coughing and then stated that it was \"kind of hard on him right now to talk\" and so he would like  to call him back tomorrow.  SW let pt know that we would call back tomorrow.  HE Stretcher anticipated at discharge due to pt's mental health status, Covid, and inter hospital transfer.        MARQUEZ Simons, MIRYAMSW 11/23/21 6:31 PM            "

## 2021-11-24 NOTE — PROGRESS NOTES
Care Management Initial Consult    General Information  Assessment completed with: Patient, Ray   Type of CM/SW Visit: Initial Assessment    Primary Care Provider verified and updated as needed: No     Readmission within the last 30 days: current reason for admission unrelated to previous admission   Return Category: New Diagnosis  Reason for Consult: discharge planning    Advance Care Planning: Advance Care Planning Reviewed: other (comment) (pt does not have HCD and does not want info)          Communication Assessment  Patient's communication style: spoken language (English or Bilingual)    Hearing Difficulty or Deaf: no   Wear Glasses or Blind: yes    Cognitive  Cognitive/Neuro/Behavioral: WDL                      Living Environment:   People in home: other (see comments) (other residents in group home)     Current living Arrangements: group home      Able to return to prior arrangements: other (see comments) (pt to rtn to inpt psych and then to group home when stable)       Family/Social Support:  Care provided by: self,other (see comments) (assisted staff)  Provides care for:    Marital Status:              Description of Support System:           Current Resources:   Patient receiving home care services:       Community Resources:    Equipment currently used at home: none  Supplies currently used at home:      Employment/Financial:  Employment Status:          Financial Concerns:             Lifestyle & Psychosocial Needs:  Social Determinants of Health     Tobacco Use: Medium Risk     Smoking Tobacco Use: Former Smoker     Smokeless Tobacco Use: Former User   Alcohol Use: Not on file   Financial Resource Strain: Not on file   Food Insecurity: Not on file   Transportation Needs: Not on file   Physical Activity: Not on file   Stress: Not on file   Social Connections: Not on file   Intimate Partner Violence: Not on file   Depression: Not at risk     PHQ-2 Score: 2   Housing Stability: Not on file  "      Functional Status:  Prior to admission patient needed assistance:              Mental Health Status:  Mental Health Status: Current Concern  Mental Health Management: Psychiatrist,Medication,Other (see comment) (most recently [facility] inpt tx for suicide attempt)    Chemical Dependency Status:  Chemical Dependency Status: Current Concern  Chemical Dependency Management: Other (see comment) (suboxone )          Values/Beliefs:  Spiritual, Cultural Beliefs, Yarsani Practices, Values that affect care:                 Additional Information:    SW reviewed pt's chart.  Pt has history of Bipolar Disorder, Borderline Personality Disorder, Depression, and TBI.  Pt lives in group home.  Pt has a history of chemical abuse (recent tox screens positive for benzodiazepines, amphetamines, and cocaine).  Pt also uses alcohol.  Pt had recent suicide attempt and was at [facility] from 11/13/21 to 11/19/21 for alcohol withdrawal, and then at [facility] from 11/19/21 to 11/22/21 for suicide attempt.  Pt transferred to [facility] after testing positive for Covid, and is here for management of Covid pneumonia.  Plan for pt to return to [facility] upon Covid recovery.  Pt on suboxone.  Pt on 1:1 for suicide watch.  SW called pt to complete assessment and discuss discharge planning.  Pt states that primary clinic is [facility] in [place] ([facility] affiliated).  Pt does not have current PCP.  Per chart, pt restricted to [facility].  Pt utilizes [employer] transportation when at group home.  Pt has a cane.  Pt started coughing and then stated that it was \"kind of hard on him right now to talk\" and so he would like  to call him back tomorrow.  SW let pt know that we would call back tomorrow.  HE Stretcher anticipated at discharge due to pt's mental health status, Covid, and inter hospital transfer.        MARQUEZ Simons, MIRYAMSW 11/23/21 6:29 PM            "

## 2021-11-24 NOTE — PROGRESS NOTES
[facility]    Medicine Progress Note - Hospitalist Service       Date of Admission:  11/22/2021    Assessment & Plan           Hollis Barclay is a 51 year old male admitted on 11/22/2021. He has history of bipolar disorder, borderline personality disorder, TBI, polysubstance abuse, alcoholism, group home resident presented to [facility] on 11/13 with suicide attempt, transferred to [facility] inpatient psych unit on 11/19, then transferred to [facility] on 11/22 for Covid hypoxia    COVID-19 Diagnosis Details:  Onset of COVID symptoms 11/21   Date of positive test results 11/22   Vaccinated? Fully Vaccinated     # Confirmed COVID-19 infection    # Acute Hypoxic Respiratory Failure secondary to COVID-19 infection  # Viral Pneumonia secondary to COVID-19 infection  - Initial chest CT with contrast showed moderate patchy bilateral pulmonary infiltrates. Oxygen needs have been stable at 4 L for past 2 days.  May yet be early in disease course, continue monitoring closely.  CRP downtrending nicely.  - Continue supportive care with continuous pulse oximetry, COVID-19 special precautions, minimized lab draws, and care consolidation  - Oxygen: continue current support with nasal cannula at 4 L/min; titrate to keep SpO2 between 90-96%  - Labs: CBC, creatinine/BMP, CRP, LDH, D-dimer and fibrinogen daily or as appropriate until patient clinically stable.  - Imaging: no additional imaging needed at this time  - Breathing treatments: no inhalers needed; avoid nebulizers in favor of MDIs   - IV fluids: not indicated at this time  - Antibiotics: not indicated   - Treatments: Dexamethasone 6 mg x 10 days or until hospital discharge, started on 11/22 and Remdesivir x 5 days or until hospital discharge, started on 11/23.   - Contact Counseling: N/A  - DVT Prophylaxis: At high risk of thrombotic complications due to COVID-19 disease (last DDimer 0.6).          - Eliquis 2.5mg BID  - Discharge  Anticoagulation: At discharge consider one of the following for 30 days and until the patient has returned to normal mobility:        - Apixaban (Eliquis) 2.5 mg two times a day    #Bipolar disorder  #Borderline personality disorder  #Suicidal ideation with recent suicide attempt  #History of TBI  Patient was hospitalized at [facility] 11/13-11/19 after presenting with drug overdose and alcohol intoxication with intention to commit suicide secondary to worsening depression.  Patient overdosed on 5 tablets of oxycodone, 22x300 mg tablets of Seroquel and 4x8 mg tablets of Suboxone while drinking 1 gallon of vodka within a 24-hour period all in attempt to kill himself. Was transferred to [facility] Psych unit 11/19. Requires one-to-one for suicidality while here at [facility].  Psych following here.  Plan would be back to [facility] when medical issues resolved  Status is currently Voluntary  Currently on Abilify, Seroquel     #Alcoholism  Treated for withdrawal at [facility]    #Chronic pain syndrome  #History of polysubstance/drug abuse  Addiction medicine following  Currently on suboxone    #Alcoholic hepatitis  LFTs stable  Monitor on remdesivir    #Nicotine dependence  Gum    #HTN  Continue amlodipine, propranolol    #GERD, home PPI    #Constipation, senna as needed    #Nasal bone fracture, to see ENT as outpatient    #Hypothyroidism, home Synthroid    #Insomnia, steroid likely worsening this. Has melatonin ordered but did not receive last night, would encourage to take this.    #Dilated aortic root, noted on echo but not prominent on CTA chest. Would recommend annual surveillance CT.     Diet: Regular Diet Adult    Barksdale Catheter: Not present  Central Lines: None  Code Status: Full Code      Disposition Plan   Disposition: back to Psych unit when COVID pneumonia better when ready, likely multiple days  Discharge barriers: hypoxia  Medically ready to discharge today: No  Estimated discharge date: 12/03/2021     The  patient's care was discussed with the Patient.    Gianna Cantu MD  Hospitalist Service  [facility]  Text page via [employer] Paging/Directory      Clinically Significant Risk Factors Present on Admission                ____________        Physical Exam   Vital Signs: Temp: 97.5  F (36.4  C) Temp src: Oral BP: 130/69 Pulse: 66   Resp: 16 SpO2: 92 % O2 Device: Nasal cannula Oxygen Delivery: 4 LPM  Weight: 290 lbs 3.2 oz  General: in no apparent distress, non-toxic and alert male lying in hospital bed oriented x3. Speech difficult to understand at times  HEENT: Head normocephalic atraumatic, oral mucosa moist. Sclerae anicteric  Skin: No rashes or lesions  Extremities: No peripheral edema  Psych: Normal affect, mood euthymic  Neuro: CNII-XII grossly intact, moving all 4 extremities. Seems fairly calm today      Data   Recent Results (from the past 24 hour(s))   Basic metabolic panel    Collection Time: 11/24/21  6:58 AM   Result Value Ref Range    Sodium 141 136 - 145 mmol/L    Potassium 4.3 3.5 - 5.0 mmol/L    Chloride 108 (H) 98 - 107 mmol/L    Carbon Dioxide (CO2) 25 22 - 31 mmol/L    Anion Gap 8 5 - 18 mmol/L    Urea Nitrogen 15 8 - 22 mg/dL    Creatinine 0.69 (L) 0.70 - 1.30 mg/dL    Calcium 8.7 8.5 - 10.5 mg/dL    Glucose 195 (H) 70 - 125 mg/dL    GFR Estimate >90 >60 mL/min/1.73m2   CBC with platelets    Collection Time: 11/24/21  6:58 AM   Result Value Ref Range    WBC Count 8.7 4.0 - 11.0 10e3/uL    RBC Count 3.91 (L) 4.40 - 5.90 10e6/uL    Hemoglobin 11.6 (L) 13.3 - 17.7 g/dL    Hematocrit 36.6 (L) 40.0 - 53.0 %    MCV 94 78 - 100 fL    MCH 29.7 26.5 - 33.0 pg    MCHC 31.7 31.5 - 36.5 g/dL    RDW 14.0 10.0 - 15.0 %    Platelet Count 249 150 - 450 10e3/uL   CRP inflammation    Collection Time: 11/24/21  6:58 AM   Result Value Ref Range    CRP 6.4 (H) 0.0-<0.8 mg/dL   D dimer quantitative    Collection Time: 11/24/21  6:58 AM   Result Value Ref Range    D-Dimer Quantitative 0.30 0.00 - 0.50  ug/mL FEU   Fibrinogen activity    Collection Time: 11/24/21  6:58 AM   Result Value Ref Range    Fibrinogen Activity 554 (H) 170 - 490 mg/dL   Hepatic function panel    Collection Time: 11/24/21  6:58 AM   Result Value Ref Range    Bilirubin Total 0.4 0.0 - 1.0 mg/dL    Bilirubin Direct 0.2 <=0.5 mg/dL    Protein Total 7.3 6.0 - 8.0 g/dL    Albumin 3.1 (L) 3.5 - 5.0 g/dL    Alkaline Phosphatase 66 45 - 120 U/L    AST 47 (H) 0 - 40 U/L    ALT 48 (H) 0 - 45 U/L   ECG 12-LEAD WITH MUSE (LHE)    Collection Time: 11/24/21  8:42 AM   Result Value Ref Range    Systolic Blood Pressure  mmHg    Diastolic Blood Pressure  mmHg    Ventricular Rate 67 BPM    Atrial Rate 67 BPM    RI Interval 206 ms    QRS Duration 100 ms     ms    QTc 456 ms    P Axis 25 degrees    R AXIS -18 degrees    T Axis 18 degrees    Interpretation ECG       Sinus rhythm  Normal ECG  When compared with ECG of 15-NOV-2021 06:42,  No significant change was found     Echocardiogram Complete    Collection Time: 11/24/21 10:57 AM   Result Value Ref Range    LVEF  60-65%      ____________  Interval History   Data reviewed today: I reviewed all medications, new labs and imaging results over the last 24 hours. I personally reviewed no images or EKG's today.  patient states doing ok today. has not been sleeping well. endorses anxiety about prognosis. states he often thinks he would be better off if he  weren t here  anymore, ongoing passive suicidal ideation. worries he is going to die regardless and just taking a long time. I reassured him his covid seems to be doing ok, he is happy about this. eating ok. voiding and stooling ok.     Patient called to ask if we could re-time his suboxone to 7am, noon and 7pm and this is fine by me.

## 2021-11-25 LAB
ALBUMIN SERPL-MCNC: 3 G/DL (ref 3.5–5)
ALP SERPL-CCNC: 67 U/L (ref 45–120)
ALT SERPL W P-5'-P-CCNC: 92 U/L (ref 0–45)
ANION GAP SERPL CALCULATED.3IONS-SCNC: 11 MMOL/L (ref 5–18)
AST SERPL W P-5'-P-CCNC: 94 U/L (ref 0–40)
BILIRUB DIRECT SERPL-MCNC: 0.3 MG/DL
BILIRUB SERPL-MCNC: 0.5 MG/DL (ref 0–1)
BUN SERPL-MCNC: 16 MG/DL (ref 8–22)
C REACTIVE PROTEIN LHE: 3.5 MG/DL (ref 0–0.8)
CALCIUM SERPL-MCNC: 8.5 MG/DL (ref 8.5–10.5)
CHLORIDE BLD-SCNC: 106 MMOL/L (ref 98–107)
CO2 SERPL-SCNC: 25 MMOL/L (ref 22–31)
CREAT SERPL-MCNC: 0.74 MG/DL (ref 0.7–1.3)
D DIMER PPP FEU-MCNC: 0.44 UG/ML FEU (ref 0–0.5)
ERYTHROCYTE [DISTWIDTH] IN BLOOD BY AUTOMATED COUNT: 13.7 % (ref 10–15)
FIBRINOGEN PPP-MCNC: 532 MG/DL (ref 170–490)
GFR SERPL CREATININE-BSD FRML MDRD: >90 ML/MIN/1.73M2
GLUCOSE BLD-MCNC: 167 MG/DL (ref 70–125)
HCT VFR BLD AUTO: 36.8 % (ref 40–53)
HGB BLD-MCNC: 11.7 G/DL (ref 13.3–17.7)
MCH RBC QN AUTO: 29.7 PG (ref 26.5–33)
MCHC RBC AUTO-ENTMCNC: 31.8 G/DL (ref 31.5–36.5)
MCV RBC AUTO: 93 FL (ref 78–100)
PLATELET # BLD AUTO: 296 10E3/UL (ref 150–450)
POTASSIUM BLD-SCNC: 3.7 MMOL/L (ref 3.5–5)
PROT SERPL-MCNC: 6.9 G/DL (ref 6–8)
RBC # BLD AUTO: 3.94 10E6/UL (ref 4.4–5.9)
SODIUM SERPL-SCNC: 142 MMOL/L (ref 136–145)
WBC # BLD AUTO: 9.9 10E3/UL (ref 4–11)

## 2021-11-25 PROCEDURE — 82248 BILIRUBIN DIRECT: CPT | Performed by: HOSPITALIST

## 2021-11-25 PROCEDURE — 85379 FIBRIN DEGRADATION QUANT: CPT | Performed by: FAMILY MEDICINE

## 2021-11-25 PROCEDURE — 258N000003 HC RX IP 258 OP 636: Performed by: HOSPITALIST

## 2021-11-25 PROCEDURE — 86141 C-REACTIVE PROTEIN HS: CPT | Performed by: FAMILY MEDICINE

## 2021-11-25 PROCEDURE — 80053 COMPREHEN METABOLIC PANEL: CPT | Performed by: FAMILY MEDICINE

## 2021-11-25 PROCEDURE — 250N000013 HC RX MED GY IP 250 OP 250 PS 637: Performed by: FAMILY MEDICINE

## 2021-11-25 PROCEDURE — 99232 SBSQ HOSP IP/OBS MODERATE 35: CPT | Performed by: EMERGENCY MEDICINE

## 2021-11-25 PROCEDURE — 250N000009 HC RX 250: Performed by: HOSPITALIST

## 2021-11-25 PROCEDURE — 250N000013 HC RX MED GY IP 250 OP 250 PS 637: Performed by: HOSPITALIST

## 2021-11-25 PROCEDURE — 85014 HEMATOCRIT: CPT | Performed by: FAMILY MEDICINE

## 2021-11-25 PROCEDURE — 120N000001 HC R&B MED SURG/OB

## 2021-11-25 PROCEDURE — 250N000012 HC RX MED GY IP 250 OP 636 PS 637: Performed by: FAMILY MEDICINE

## 2021-11-25 PROCEDURE — 36415 COLL VENOUS BLD VENIPUNCTURE: CPT | Performed by: FAMILY MEDICINE

## 2021-11-25 PROCEDURE — 85384 FIBRINOGEN ACTIVITY: CPT | Performed by: FAMILY MEDICINE

## 2021-11-25 RX ADMIN — APIXABAN 2.5 MG: 2.5 TABLET, FILM COATED ORAL at 08:21

## 2021-11-25 RX ADMIN — BUPRENORPHINE 4 MG: 2 TABLET SUBLINGUAL at 18:16

## 2021-11-25 RX ADMIN — SODIUM CHLORIDE 50 ML: 9 INJECTION, SOLUTION INTRAVENOUS at 17:05

## 2021-11-25 RX ADMIN — PREGABALIN 200 MG: 100 CAPSULE ORAL at 08:21

## 2021-11-25 RX ADMIN — ACETAMINOPHEN 650 MG: 325 TABLET ORAL at 18:16

## 2021-11-25 RX ADMIN — DEXAMETHASONE 6 MG: 2 TABLET ORAL at 08:22

## 2021-11-25 RX ADMIN — NICOTINE POLACRILEX 4 MG: 4 GUM, CHEWING ORAL at 14:18

## 2021-11-25 RX ADMIN — REMDESIVIR 100 MG: 100 INJECTION, POWDER, LYOPHILIZED, FOR SOLUTION INTRAVENOUS at 17:03

## 2021-11-25 RX ADMIN — NICOTINE POLACRILEX 4 MG: 4 GUM, CHEWING ORAL at 20:23

## 2021-11-25 RX ADMIN — NICOTINE POLACRILEX 4 MG: 4 GUM, CHEWING ORAL at 11:13

## 2021-11-25 RX ADMIN — NICOTINE POLACRILEX 4 MG: 4 GUM, CHEWING ORAL at 06:35

## 2021-11-25 RX ADMIN — MAGNESIUM OXIDE TAB 400 MG (241.3 MG ELEMENTAL MG) 400 MG: 400 (241.3 MG) TAB at 08:21

## 2021-11-25 RX ADMIN — PROPRANOLOL HYDROCHLORIDE 10 MG: 10 TABLET ORAL at 20:22

## 2021-11-25 RX ADMIN — MULTIPLE VITAMINS W/ MINERALS TAB 1 TABLET: TAB at 08:22

## 2021-11-25 RX ADMIN — MAGNESIUM OXIDE TAB 400 MG (241.3 MG ELEMENTAL MG) 400 MG: 400 (241.3 MG) TAB at 20:21

## 2021-11-25 RX ADMIN — LEVOTHYROXINE SODIUM 75 MCG: 0.03 TABLET ORAL at 06:31

## 2021-11-25 RX ADMIN — ACETAMINOPHEN 650 MG: 325 TABLET ORAL at 14:16

## 2021-11-25 RX ADMIN — BUPRENORPHINE 4 MG: 2 TABLET SUBLINGUAL at 11:57

## 2021-11-25 RX ADMIN — AMLODIPINE BESYLATE 5 MG: 5 TABLET ORAL at 08:23

## 2021-11-25 RX ADMIN — APIXABAN 2.5 MG: 2.5 TABLET, FILM COATED ORAL at 20:21

## 2021-11-25 RX ADMIN — ACETAMINOPHEN 650 MG: 325 TABLET ORAL at 08:17

## 2021-11-25 RX ADMIN — PREGABALIN 200 MG: 100 CAPSULE ORAL at 14:16

## 2021-11-25 RX ADMIN — NICOTINE POLACRILEX 4 MG: 4 GUM, CHEWING ORAL at 08:02

## 2021-11-25 RX ADMIN — PROPRANOLOL HYDROCHLORIDE 10 MG: 10 TABLET ORAL at 14:23

## 2021-11-25 RX ADMIN — PREGABALIN 200 MG: 100 CAPSULE ORAL at 20:22

## 2021-11-25 RX ADMIN — PANTOPRAZOLE SODIUM 40 MG: 40 TABLET, DELAYED RELEASE ORAL at 06:31

## 2021-11-25 RX ADMIN — FOLIC ACID 1000 MCG: 1 TABLET ORAL at 08:21

## 2021-11-25 RX ADMIN — MELATONIN TAB 3 MG 3 MG: 3 TAB at 00:58

## 2021-11-25 RX ADMIN — QUETIAPINE FUMARATE 300 MG: 300 TABLET, FILM COATED ORAL at 21:10

## 2021-11-25 RX ADMIN — ARIPIPRAZOLE 5 MG: 5 TABLET ORAL at 08:22

## 2021-11-25 RX ADMIN — BUPRENORPHINE 4 MG: 2 TABLET SUBLINGUAL at 06:33

## 2021-11-25 RX ADMIN — PROPRANOLOL HYDROCHLORIDE 10 MG: 10 TABLET ORAL at 08:22

## 2021-11-25 RX ADMIN — NICOTINE POLACRILEX 4 MG: 4 GUM, CHEWING ORAL at 18:18

## 2021-11-25 ASSESSMENT — ACTIVITIES OF DAILY LIVING (ADL)
ADLS_ACUITY_SCORE: 10

## 2021-11-25 NOTE — PLAN OF CARE
Problem: Adult Inpatient Plan of Care  Goal: Plan of Care Review  Outcome: Improving     Problem: Suicide Risk  Goal: Absence of Self-Harm  Outcome: Improving     Problem: Gas Exchange Impaired  Goal: Optimal Gas Exchange  Outcome: Improving     Patient pleasant and cooperative.  Has suicidal thoughts but no plans.  Melatonin given for sleep.  1:1 for safety.   Continues to be on 4L, maintaining saturations.  Lung sounds diminished.  Reports productive cough.

## 2021-11-25 NOTE — PLAN OF CARE
Problem: Suicide Risk  Goal: Absence of Self-Harm  Outcome: No Change   Pt is on 1:1 sitter. Reports suicidal thoughts but no plan. Good appetite.   Problem: Gas Exchange Impaired  Goal: Optimal Gas Exchange  Outcome: Improving  Pt hs intermittent cough. Endorses shortness of breath. On oxygen at 4 LPM. 02 sats 91-94%. Pt encouraged to use Incentive spirometer.

## 2021-11-25 NOTE — PROGRESS NOTES
Daily Progress Note    Assessment/Plan:  Hollis Barclay is a 51 year old male admitted on 11/22/2021. He has history of bipolar disorder, borderline personality disorder, TBI, polysubstance abuse, alcoholism, group home resident presented to [facility] on 11/13 with suicide attempt, transferred to [facility] inpatient psych unit on 11/19, then transferred to [facility] on 11/22 for Covid hypoxia     COVID-19 Diagnosis Details:  Onset of COVID symptoms 11/21   Date of positive test results 11/22   Vaccinated? Fully Vaccinated      # Confirmed COVID-19 infection    # Acute Hypoxic Respiratory Failure secondary to COVID-19 infection  # Viral Pneumonia secondary to COVID-19 infection  - Initial chest CT with contrast showed moderate patchy bilateral pulmonary infiltrates. Oxygen needs have been stable at 4 L for past 2 days.  May yet be early in disease course, continue monitoring closely.  CRP downtrending nicely.  - Continue supportive care with continuous pulse oximetry, COVID-19 special precautions, minimized lab draws, and care consolidation  - Oxygen: continue current support with nasal cannula at 4 L/min; titrate to keep SpO2 between 90-96%  - Labs: CBC, creatinine/BMP, CRP, LDH, D-dimer and fibrinogen daily or as appropriate until patient clinically stable.  - Imaging: no additional imaging needed at this time  - Breathing treatments: no inhalers needed; avoid nebulizers in favor of MDIs   - IV fluids: not indicated at this time  - Antibiotics: not indicated   - Treatments: Dexamethasone 6 mg x 10 days or until hospital discharge, started on 11/22 and Remdesivir x 5 days or until hospital discharge, started on 11/23.   - Contact Counseling: N/A  - DVT Prophylaxis: At high risk of thrombotic complications due to COVID-19 disease (last DDimer 0.6).          - Eliquis 2.5mg BID  - Discharge Anticoagulation: At discharge consider one of the following for 30 days and until the patient has returned to normal  mobility:        - Apixaban (Eliquis) 2.5 mg two times a day     #Bipolar disorder  #Borderline personality disorder  #Suicidal ideation with recent suicide attempt  #History of TBI  Patient was hospitalized at [facility] 11/13-11/19 after presenting with drug overdose and alcohol intoxication with intention to commit suicide secondary to worsening depression.  Patient overdosed on 5 tablets of oxycodone, 22x300 mg tablets of Seroquel and 4x8 mg tablets of Suboxone while drinking 1 gallon of vodka within a 24-hour period all in attempt to kill himself. Was transferred to [facility] Psych unit 11/19. Requires one-to-one for suicidality while here at [facility].  Psych following here.  Plan would be back to [facility] when medical issues resolved  Status is currently Voluntary  Currently on Abilify, Seroquel     #Alcoholism  Treated for withdrawal at [facility]     #Chronic pain syndrome  #History of polysubstance/drug abuse  Addiction medicine following  Currently on suboxone     #Alcoholic hepatitis  LFTs stable  Monitor on remdesivir     #Nicotine dependence  Gum     #HTN  Continue amlodipine, propranolol     #GERD, home PPI     #Constipation, senna as needed     #Nasal bone fracture, to see ENT as outpatient     #Hypothyroidism, home Synthroid     #Insomnia, steroid likely worsening this. Has melatonin ordered but did not receive last night, would encourage to take this.     #Dilated aortic root, noted on echo but not prominent on CTA chest. Would recommend annual surveillance CT.  Diet: Regular Diet Adult    Barksdale Catheter: Not present  Central Lines: None  Code Status: Full Code       Disposition Plan   Disposition: back to Psych unit when COVID pneumonia better when ready, likely multiple days  Discharge barriers: hypoxia  Medically ready to discharge today: No         Code status:Full Code      Subjective:  Ray is new to me today, chart reviewed and discussed with staff.  He feels his breathing is about the same.   Still requiring 4 L of O2.  Has had some cough, no body aches, no fever today.  No chest pain.  Discussed with care management regarding the parameters required for him to go back to his inpatient psychiatric unit.        Current Medications Reviewed via EHR List    Objective:  Vital signs in last 24 hours:  [unfilled]  .prog  Weight:   @THISENCWEIGHTS(1)@  Weight change:   Body mass index is 35.32 kg/m .    Intake/Output last 3 shifts:  I/O last 3 completed shifts:  In: 720 [P.O.:720]  Out: -   Intake/Output this shift:  No intake/output data recorded.    Physical Exam:  General: No apparent distress, pleasant, comfortable.  CV: Rate rate and rhythm  Lungs: Clear to auscultation bilaterally  Neurologic: Nonfocal,        Imaging:  Personally Reviewed.  XR Chest 2 Views    Result Date: 2021  EXAM: XR CHEST 2 VW LOCATION: [facility] DATE/TIME: 2021 8:59 AM INDICATION: dyspnea COMPARISON: None.     IMPRESSION: Moderate patchy bilateral pulmonary infiltrates most likely pneumonia. COVID pneumonia could have this appearance.    Echocardiogram Complete    Result Date: 2021  [id number]^SHAYY^SOBEIDA^[place] [address]  Name: GRETA FERREIRA MRN: [id number] : 1970 Study Date: 2021 10:31 AM Age: 51 yrs Gender: Male Patient Location: [facility] Reason For Study: Dyspnea Ordering Physician: SOBEIDA LUCAS Referring Physician: SOBEIDA LUCAS Performed By: ALLYSON  BSA: 2.6 m2 Height: 76 in Weight: 290 lb HR: 68 BP: 127/77 mmHg ______________________________________________________________________________ Procedure Complete Portable Echo Adult. ______________________________________________________________________________ Interpretation Summary  Left ventricular size, wall motion and function are normal. The ejection fraction is 60-65%. The left atrium is moderate to severely dilated.  The right ventricle is moderately dilated. Mildly decreased right ventricular systolic function There is effacement of the sinotubular ridge.The aortic root is mildly dilated. The ascending aorta is Moderately dilated. Trivial pericardial effusion No hemodynamically significant valvular abnormalities on 2D or color flow imaging. ______________________________________________________________________________ Left Ventricle Left ventricular size, wall motion and function are normal. The ejection fraction is 60-65%. There is normal left ventricular wall thickness. Left ventricular diastolic function is normal. No regional wall motion abnormalities noted.  Right Ventricle The right ventricle is moderately dilated. TAPSE is normal, which is consistent with normal right ventricular systolic function. Mildly decreased right ventricular systolic function.  Atria The left atrium is moderate to severely dilated. Right atrial size is normal. There is no color Doppler evidence of an atrial shunt.  Mitral Valve Mitral valve leaflets appear normal. There is no evidence of mitral stenosis or clinically significant mitral regurgitation. There is mild (1+) mitral regurgitation.  Tricuspid Valve Tricuspid valve leaflets appear normal. Right ventricle systolic pressure estimate normal. There is mild to moderate (1-2+) tricuspid regurgitation.  Aortic Valve Aortic valve leaflets appear normal. There is no evidence of aortic stenosis or clinically significant aortic regurgitation.  Pulmonic Valve The pulmonic valve is not well seen, but is grossly normal.  Vessels There is effacement of the sinotubular ridge.The aortic root is mildly dilated. The ascending aorta is Moderately dilated. IVC diameter <2.1 cm collapsing >50% with sniff suggests a normal RA pressure of 3 mmHg.  Pericardium Trivial pericardial effusion.  ______________________________________________________________________________ MMode/2D Measurements & Calculations RVDd:  4.5 cm IVSd: 1.2 cm LVIDd: 5.4 cm LVIDs: 3.4 cm LVPWd: 1.1 cm FS: 37.4 % LV mass(C)d: 254.9 grams LV mass(C)dI: 98.2 grams/m2  Ao root diam: 4.1 cm LA dimension: 4.9 cm asc Aorta Diam: 4.4 cm LA/Ao: 1.2 LVOT diam: 2.2 cm LVOT area: 3.7 cm2 LA Volume Indexed (AL/bp): 34.3 ml/m2 RWT: 0.41  Time Measurements MM HR: 64.0 BPM  Doppler Measurements & Calculations MV E max vernon: 116.0 cm/sec MV A max vernon: 50.9 cm/sec MV E/A: 2.3 MV dec time: 0.20 sec LV V1 max P.1 mmHg LV V1 max: 112.6 cm/sec LV V1 VTI: 28.5 cm SV(LVOT): 106.4 ml SI(LVOT): 41.0 ml/m2 PA acc time: 0.19 sec TR max vernon: 251.9 cm/sec TR max P.4 mmHg E/E' av.9 Lateral E/e': 12.1 Medial E/e': 13.7  ______________________________________________________________________________ Report approved by: Noa Barraza 2021 12:26 PM       CT Chest Pulmonary Embolism w Contrast    Result Date: 2021  EXAM: CT CHEST PULMONARY EMBOLISM W CONTRAST LOCATION: [facility] DATE/TIME: 2021 10:47 AM INDICATION: Shortness of breath. COMPARISON: None. TECHNIQUE: CT chest pulmonary angiogram during arterial phase injection of IV contrast. Multiplanar reformats and MIP reconstructions were performed. Dose reduction techniques were used. CONTRAST: 100ml isovue 370 FINDINGS: ANGIOGRAM CHEST: Mild motion artifact. Pulmonary arteries are normal caliber and negative for pulmonary emboli. Thoracic aorta is negative for dissection. No CT evidence of right heart strain. LUNGS AND PLEURA: Moderate patchy bilateral pulmonary infiltrates consistent with pneumonia. MEDIASTINUM/AXILLAE: Normal. CORONARY ARTERY CALCIFICATION: Minimal UPPER ABDOMEN: Normal. MUSCULOSKELETAL: Normal.     IMPRESSION: 1.  Mild motion artifact. 2.  Negative for pulmonary emboli. 3.  Moderate patchy bilateral pulmonary infiltrates consistent with pneumonia.    CT Head w/o Contrast    Result Date: 2021  EXAM: CT HEAD W/O CONTRAST LOCATION: [facility] DATE/TIME: 11/22/2021 10:46 AM INDICATION: Altered mental status after suicide attempt. COMPARISON: Head CT 3/8/2020. TECHNIQUE: Routine CT Head without IV contrast. Multiplanar reformats. Dose reduction techniques were used. FINDINGS: INTRACRANIAL CONTENTS: No intracranial hemorrhage, extraaxial collection, or mass effect.  No CT evidence of acute infarct. Normal parenchymal attenuation. Normal ventricles and sulci. Again seen is atherosclerotic calcification of the intradural left vertebral artery. VISUALIZED ORBITS/SINUSES/MASTOIDS: No intraorbital abnormality. There is mild to moderate mucosal thickening in the left maxillary sinus, sphenoid sinus, inferior frontal sinus, and left ethmoid air cells. No middle ear or mastoid effusion. BONES/SOFT TISSUES: There is a new age-indeterminate mildly displaced fracture of the right nasal bone.     IMPRESSION: 1.  New age-indeterminate mildly displaced right nasal bone fracture. 2.  No evidence of acute intracranial hemorrhage, acute large territorial infarction, or mass lesion.      Lab Results:  Personally Reviewed.   Fingerstick Blood Glucose: @[id number](POCGLUFGR:10)@    Last Hbg A1C: No results found for: HGBA1C   Lab Results   Component Value Date    INR 0.91 11/22/2021    INR 0.98 03/14/2020    INR 0.93 02/21/2020     Recent Results (from the past 24 hour(s))   Basic metabolic panel    Collection Time: 11/25/21  7:42 AM   Result Value Ref Range    Sodium 142 136 - 145 mmol/L    Potassium 3.7 3.5 - 5.0 mmol/L    Chloride 106 98 - 107 mmol/L    Carbon Dioxide (CO2) 25 22 - 31 mmol/L    Anion Gap 11 5 - 18 mmol/L    Urea Nitrogen 16 8 - 22 mg/dL    Creatinine 0.74 0.70 - 1.30 mg/dL    Calcium 8.5 8.5 - 10.5 mg/dL    Glucose 167 (H) 70 - 125 mg/dL    GFR Estimate >90 >60 mL/min/1.73m2   CBC with platelets    Collection Time: 11/25/21  7:42 AM   Result Value Ref Range    WBC Count 9.9 4.0 - 11.0 10e3/uL    RBC Count 3.94 (L) 4.40 - 5.90  10e6/uL    Hemoglobin 11.7 (L) 13.3 - 17.7 g/dL    Hematocrit 36.8 (L) 40.0 - 53.0 %    MCV 93 78 - 100 fL    MCH 29.7 26.5 - 33.0 pg    MCHC 31.8 31.5 - 36.5 g/dL    RDW 13.7 10.0 - 15.0 %    Platelet Count 296 150 - 450 10e3/uL   CRP inflammation    Collection Time: 11/25/21  7:42 AM   Result Value Ref Range    CRP 3.5 (H) 0.0-<0.8 mg/dL   D dimer quantitative    Collection Time: 11/25/21  7:42 AM   Result Value Ref Range    D-Dimer Quantitative 0.44 0.00 - 0.50 ug/mL FEU   Fibrinogen activity    Collection Time: 11/25/21  7:42 AM   Result Value Ref Range    Fibrinogen Activity 532 (H) 170 - 490 mg/dL   Hepatic function panel    Collection Time: 11/25/21  7:42 AM   Result Value Ref Range    Bilirubin Total 0.5 0.0 - 1.0 mg/dL    Bilirubin Direct 0.3 <=0.5 mg/dL    Protein Total 6.9 6.0 - 8.0 g/dL    Albumin 3.0 (L) 3.5 - 5.0 g/dL    Alkaline Phosphatase 67 45 - 120 U/L    AST 94 (H) 0 - 40 U/L    ALT 92 (H) 0 - 45 U/L           Advanced Care Planning    Time > 25 minutes with greater than 50% of time spent in counseling and coordination of care.     Roby Marquez MD  Date: 11/25/2021  Time: 3:23 PM  [facility]

## 2021-11-25 NOTE — PROGRESS NOTES
"Care Management Follow Up    Length of Stay (days): 3    Expected Discharge Date: 12/03/2021     Concerns to be Addressed: discharge planning     Patient plan of care discussed at interdisciplinary rounds: Yes    Anticipated Discharge Disposition: Inpatient Mental Health,Other (Comments) (transfer back to [facility] inpt psych)     Anticipated Discharge Services:  Inpatient mental health       Private pay costs discussed: Not applicable    Additional Information:  SW spoke to [facility] Intake Line ([phone]). Per Intake representative, pt needs to be \"10 days free of COVID symptoms, 10 day count should start last day of any symptoms\" and cleared with infection prevention department.   Informed MD of this information. CM will continue to work with hospitalist and psychiatry to facilitate transfer and return to [facility] unit. ([phone]        Shayy Leigh, MARYCRUZ        "

## 2021-11-26 LAB
ALBUMIN SERPL-MCNC: 3 G/DL (ref 3.5–5)
ALP SERPL-CCNC: 67 U/L (ref 45–120)
ALT SERPL W P-5'-P-CCNC: 130 U/L (ref 0–45)
ANION GAP SERPL CALCULATED.3IONS-SCNC: 10 MMOL/L (ref 5–18)
AST SERPL W P-5'-P-CCNC: 81 U/L (ref 0–40)
BILIRUB DIRECT SERPL-MCNC: 0.2 MG/DL
BILIRUB SERPL-MCNC: 0.4 MG/DL (ref 0–1)
BUN SERPL-MCNC: 20 MG/DL (ref 8–22)
C REACTIVE PROTEIN LHE: 2 MG/DL (ref 0–0.8)
CALCIUM SERPL-MCNC: 8.5 MG/DL (ref 8.5–10.5)
CHLORIDE BLD-SCNC: 104 MMOL/L (ref 98–107)
CO2 SERPL-SCNC: 28 MMOL/L (ref 22–31)
CREAT SERPL-MCNC: 0.72 MG/DL (ref 0.7–1.3)
D DIMER PPP FEU-MCNC: 0.31 UG/ML FEU (ref 0–0.5)
ERYTHROCYTE [DISTWIDTH] IN BLOOD BY AUTOMATED COUNT: 13.6 % (ref 10–15)
FIBRINOGEN PPP-MCNC: 444 MG/DL (ref 170–490)
GFR SERPL CREATININE-BSD FRML MDRD: >90 ML/MIN/1.73M2
GLUCOSE BLD-MCNC: 154 MG/DL (ref 70–125)
HCT VFR BLD AUTO: 37 % (ref 40–53)
HGB BLD-MCNC: 11.8 G/DL (ref 13.3–17.7)
MCH RBC QN AUTO: 29.7 PG (ref 26.5–33)
MCHC RBC AUTO-ENTMCNC: 31.9 G/DL (ref 31.5–36.5)
MCV RBC AUTO: 93 FL (ref 78–100)
PLATELET # BLD AUTO: 320 10E3/UL (ref 150–450)
POTASSIUM BLD-SCNC: 3.6 MMOL/L (ref 3.5–5)
PROT SERPL-MCNC: 6.8 G/DL (ref 6–8)
RBC # BLD AUTO: 3.97 10E6/UL (ref 4.4–5.9)
SODIUM SERPL-SCNC: 142 MMOL/L (ref 136–145)
WBC # BLD AUTO: 10.3 10E3/UL (ref 4–11)

## 2021-11-26 PROCEDURE — 99232 SBSQ HOSP IP/OBS MODERATE 35: CPT | Mod: 95 | Performed by: INTERNAL MEDICINE

## 2021-11-26 PROCEDURE — 85014 HEMATOCRIT: CPT | Performed by: FAMILY MEDICINE

## 2021-11-26 PROCEDURE — 80053 COMPREHEN METABOLIC PANEL: CPT | Performed by: FAMILY MEDICINE

## 2021-11-26 PROCEDURE — 250N000013 HC RX MED GY IP 250 OP 250 PS 637: Performed by: FAMILY MEDICINE

## 2021-11-26 PROCEDURE — 99233 SBSQ HOSP IP/OBS HIGH 50: CPT | Performed by: INTERNAL MEDICINE

## 2021-11-26 PROCEDURE — 120N000001 HC R&B MED SURG/OB

## 2021-11-26 PROCEDURE — 36415 COLL VENOUS BLD VENIPUNCTURE: CPT | Performed by: FAMILY MEDICINE

## 2021-11-26 PROCEDURE — 250N000009 HC RX 250: Performed by: HOSPITALIST

## 2021-11-26 PROCEDURE — 250N000013 HC RX MED GY IP 250 OP 250 PS 637: Performed by: HOSPITALIST

## 2021-11-26 PROCEDURE — 86141 C-REACTIVE PROTEIN HS: CPT | Performed by: FAMILY MEDICINE

## 2021-11-26 PROCEDURE — 85384 FIBRINOGEN ACTIVITY: CPT | Performed by: FAMILY MEDICINE

## 2021-11-26 PROCEDURE — 250N000012 HC RX MED GY IP 250 OP 636 PS 637: Performed by: FAMILY MEDICINE

## 2021-11-26 PROCEDURE — 82248 BILIRUBIN DIRECT: CPT | Performed by: INTERNAL MEDICINE

## 2021-11-26 PROCEDURE — 258N000003 HC RX IP 258 OP 636: Performed by: HOSPITALIST

## 2021-11-26 PROCEDURE — 85379 FIBRIN DEGRADATION QUANT: CPT | Performed by: FAMILY MEDICINE

## 2021-11-26 RX ADMIN — BUPRENORPHINE 4 MG: 2 TABLET SUBLINGUAL at 06:32

## 2021-11-26 RX ADMIN — LEVOTHYROXINE SODIUM 75 MCG: 0.03 TABLET ORAL at 06:32

## 2021-11-26 RX ADMIN — MELATONIN TAB 3 MG 3 MG: 3 TAB at 00:01

## 2021-11-26 RX ADMIN — PREGABALIN 200 MG: 100 CAPSULE ORAL at 20:26

## 2021-11-26 RX ADMIN — REMDESIVIR 100 MG: 100 INJECTION, POWDER, LYOPHILIZED, FOR SOLUTION INTRAVENOUS at 16:19

## 2021-11-26 RX ADMIN — ACETAMINOPHEN 650 MG: 325 TABLET ORAL at 22:40

## 2021-11-26 RX ADMIN — APIXABAN 2.5 MG: 2.5 TABLET, FILM COATED ORAL at 20:27

## 2021-11-26 RX ADMIN — APIXABAN 2.5 MG: 2.5 TABLET, FILM COATED ORAL at 08:12

## 2021-11-26 RX ADMIN — FOLIC ACID 1000 MCG: 1 TABLET ORAL at 08:13

## 2021-11-26 RX ADMIN — PROPRANOLOL HYDROCHLORIDE 10 MG: 10 TABLET ORAL at 14:27

## 2021-11-26 RX ADMIN — QUETIAPINE FUMARATE 300 MG: 300 TABLET, FILM COATED ORAL at 20:26

## 2021-11-26 RX ADMIN — BUPRENORPHINE 4 MG: 2 TABLET SUBLINGUAL at 18:09

## 2021-11-26 RX ADMIN — MULTIPLE VITAMINS W/ MINERALS TAB 1 TABLET: TAB at 08:12

## 2021-11-26 RX ADMIN — NICOTINE POLACRILEX 4 MG: 4 GUM, CHEWING ORAL at 14:01

## 2021-11-26 RX ADMIN — NICOTINE POLACRILEX 4 MG: 4 GUM, CHEWING ORAL at 06:31

## 2021-11-26 RX ADMIN — PREGABALIN 200 MG: 100 CAPSULE ORAL at 14:27

## 2021-11-26 RX ADMIN — PROPRANOLOL HYDROCHLORIDE 10 MG: 10 TABLET ORAL at 08:13

## 2021-11-26 RX ADMIN — NICOTINE POLACRILEX 4 MG: 4 GUM, CHEWING ORAL at 11:57

## 2021-11-26 RX ADMIN — MAGNESIUM OXIDE TAB 400 MG (241.3 MG ELEMENTAL MG) 400 MG: 400 (241.3 MG) TAB at 08:13

## 2021-11-26 RX ADMIN — SODIUM CHLORIDE 50 ML: 9 INJECTION, SOLUTION INTRAVENOUS at 17:15

## 2021-11-26 RX ADMIN — PREGABALIN 200 MG: 100 CAPSULE ORAL at 08:12

## 2021-11-26 RX ADMIN — SENNOSIDES AND DOCUSATE SODIUM 1 TABLET: 50; 8.6 TABLET ORAL at 16:22

## 2021-11-26 RX ADMIN — PANTOPRAZOLE SODIUM 40 MG: 40 TABLET, DELAYED RELEASE ORAL at 06:32

## 2021-11-26 RX ADMIN — NICOTINE POLACRILEX 4 MG: 4 GUM, CHEWING ORAL at 18:23

## 2021-11-26 RX ADMIN — ACETAMINOPHEN 650 MG: 325 TABLET ORAL at 15:21

## 2021-11-26 RX ADMIN — NICOTINE POLACRILEX 4 MG: 4 GUM, CHEWING ORAL at 08:17

## 2021-11-26 RX ADMIN — ACETAMINOPHEN 650 MG: 325 TABLET ORAL at 00:00

## 2021-11-26 RX ADMIN — NICOTINE POLACRILEX 4 MG: 4 GUM, CHEWING ORAL at 17:16

## 2021-11-26 RX ADMIN — DEXAMETHASONE 6 MG: 2 TABLET ORAL at 08:13

## 2021-11-26 RX ADMIN — MAGNESIUM OXIDE TAB 400 MG (241.3 MG ELEMENTAL MG) 400 MG: 400 (241.3 MG) TAB at 20:26

## 2021-11-26 RX ADMIN — NICOTINE POLACRILEX 4 MG: 4 GUM, CHEWING ORAL at 10:34

## 2021-11-26 RX ADMIN — NICOTINE POLACRILEX 4 MG: 4 GUM, CHEWING ORAL at 00:02

## 2021-11-26 RX ADMIN — MELATONIN TAB 3 MG 3 MG: 3 TAB at 22:41

## 2021-11-26 RX ADMIN — BUPRENORPHINE 4 MG: 2 TABLET SUBLINGUAL at 11:57

## 2021-11-26 RX ADMIN — SENNOSIDES AND DOCUSATE SODIUM 1 TABLET: 50; 8.6 TABLET ORAL at 11:57

## 2021-11-26 RX ADMIN — PROPRANOLOL HYDROCHLORIDE 10 MG: 10 TABLET ORAL at 20:26

## 2021-11-26 RX ADMIN — ARIPIPRAZOLE 5 MG: 5 TABLET ORAL at 08:12

## 2021-11-26 RX ADMIN — SODIUM CHLORIDE 50 ML: 9 INJECTION, SOLUTION INTRAVENOUS at 18:06

## 2021-11-26 RX ADMIN — AMLODIPINE BESYLATE 5 MG: 5 TABLET ORAL at 08:13

## 2021-11-26 RX ADMIN — ACETAMINOPHEN 650 MG: 325 TABLET ORAL at 06:32

## 2021-11-26 ASSESSMENT — ACTIVITIES OF DAILY LIVING (ADL)
ADLS_ACUITY_SCORE: 10

## 2021-11-26 NOTE — PLAN OF CARE
Problem: Adult Inpatient Plan of Care  Goal: Optimal Comfort and Wellbeing  Outcome: Improving  Intervention: Provide Person-Centered Care  Recent Flowsheet Documentation  Taken 11/26/2021 0813 by Denise Hillman RN  Trust Relationship/Rapport:   care explained   emotional support provided   questions answered     Problem: Suicide Risk  Goal: Absence of Self-Harm  Outcome: Improving     Problem: Gas Exchange Impaired  Goal: Optimal Gas Exchange  Outcome: Improving  Intervention: Optimize Oxygenation and Ventilation  Recent Flowsheet Documentation  Taken 11/26/2021 0813 by Denise Hillman RN  Head of Bed (HOB) Positioning: HOB flat       Pt is pleasant, cooperative, and quiet speech.  Chronic low back pain rated 7/10 relieved with scheduled lyrica.  Continues on oxygen via nasal canula at 3L.  Sats in mid 90's.  C/o mild sob.  Will trial at 2L.  Pt endorses suicidal ideation however no current plan and contracts for safety.  Continues on 1:1 sitter for safety related to suicidal ideation.

## 2021-11-26 NOTE — PLAN OF CARE
Problem: Suicide Risk  Goal: Absence of Self-Harm  Outcome: No Change   Patient continues to have suicidal ideation with no plan for both the evening and the night shift.

## 2021-11-26 NOTE — PROGRESS NOTES
[facility]  Hospitalist Progress Note    Admit Date:  11/22/2021  Date of Service (when I saw the patient): 11/26/2021   Provider:  Angelic Holland, DO    Assessment & Plan   Hollis Barclay is a 51 year old male who was admitted on 11/22/2021. He has a [facility] history of bipolar disorder, borderline personality disorder, TBI, polysubstance abuse, alcoholism, group home resident presented to [facility] on 11/13 with suicide attempt, transferred to [facility] inpatient psych unit on 11/19, then transferred to [facility] on 11/22 for Covid hypoxia.    Problem List:  1.  Acute COVID Pneumonia      Acute hypoxic respiratory failure     COVID-19 Diagnosis Details:  Onset of COVID symptoms 11/21   Date of positive test results 11/22   Vaccinated? Fully Vaccinated      - Initial chest CT with contrast showed moderate patchy bilateral pulmonary infiltrates.   - Continue supportive care with continuous pulse oximetry, COVID-19 special precautions, minimized lab draws, and care consolidation  - Oxygen:   He has been able to be titrated down on supplemental oxygen per nasal cannula today - 2 L (was 3L overnight)   titrate to keep SpO2 between 90-96%    - Labs: CBC, creatinine/BMP, CRP, LDH, D-dimer and fibrinogen daily or as appropriate until patient clinically stable.  - Imaging: no additional imaging needed at this time  - Breathing treatments: no inhalers needed; avoid nebulizers in favor of MDIs   - IV fluids: not indicated at this time  - Antibiotics: not indicated   - Treatments: Dexamethasone 6 mg x 10 days or until hospital discharge, started on 11/22 and Remdesivir x 5 days or until hospital discharge, started on 11/23.   - Contact Counseling: N/A  - DVT Prophylaxis: At high risk of thrombotic complications due to COVID-19 disease (last DDimer 0.6).          - Eliquis 2.5mg BID  - Discharge Anticoagulation: At discharge consider one of the following for 30 days and until the patient  has returned to normal mobility:        - Apixaban (Eliquis) 2.5 mg two times a day     2. Bipolar disorder      Borderline personality disorder      Suicidal ideation with recent suicide attempt      History of TBI  Patient was hospitalized at [facility] 11/13-11/19 after presenting with drug overdose and alcohol intoxication with intention to commit suicide secondary to worsening depression.  Patient overdosed on 5 tablets of oxycodone, 22x300 mg tablets of Seroquel and 4x8 mg tablets of Suboxone while drinking 1 gallon of vodka within a 24-hour period all in attempt to kill himself. Was transferred to [facility] Psych unit 11/19. Requires one-to-one for suicidality while here at [facility].  Psych following here.  Plan would be back to [facility] when medical issues resolved  Status is currently Voluntary  Currently on Abilify, Seroquel     3. Alcoholism  Treated for withdrawal at [facility]     4. Chronic pain syndrome     History of polysubstance/drug abuse  Addiction medicine following  Currently on suboxone at decreased tid dose currently     5. Alcoholic hepatitis  LFTs slightly increased 11/25 from 11/24/21  Awaiting LFT's from today  Monitor while remdesivir     6. Nicotine dependence  Gum     7. HTN  Continue amlodipine, propranolol     8. GERD,  continue home PPI     9. Constipation  senna as needed     10. Nasal bone fracture  Plan to see ENT as outpatient     11. Hypothyroidism  Continue home Synthroid     12. Insomnia  steroid likely worsening this. Has melatonin ordered but did not receive last night, would encourage to take this.     13. Dilated aortic root  noted on echo but not prominent on CTA chest. Would recommend annual surveillance CT.  Diet: Regular Diet Adult    Barksdale Catheter: Not present  Central Lines: None  Code Status: Full Code       Disposition Plan   Disposition: back to Psych unit when COVID pneumonia better - possibly ready for discharge 11/27/21?  Discharge barriers: hypoxia    Diet:  "Regular Diet Adult    DVT Prophylaxis: DOAC  Barksdale Catheter: Not present  Code Status: Full Code      Entered: Angelic Holland DO 11/26/2021, 7:51 AM       The patient's care was discussed with the Patient.    Interval History    States that breathing is \"ok now\".   Harder to breathe this am while taking off oxygen to eat breakfast.  Not yet up out of bed this am - was lightheaded yesterday.  Mild headache.  Productive cough still intermittently.  Continues to complain of his chronic chest and back pain.  He is concerned that his suboxone has been decreased by pain specialists.  His appetite is ok - no N/V.    -Data reviewed today: I reviewed all new labs and imaging results over the last 24 hours. I personally reviewed no images or EKG's today.    Physical Exam   Temp: 97.6  F (36.4  C) Temp src: Oral BP: 131/74 Pulse: 51   Resp: 18 SpO2: 93 % O2 Device: None (Room air) (patient was eating too o2 off to eat ) Oxygen Delivery: 3 LPM  Vitals:    11/22/21 1834   Weight: 131.6 kg (290 lb 3.2 oz)     Vital Signs with Ranges  Temp:  [97.4  F (36.3  C)-97.9  F (36.6  C)] 97.6  F (36.4  C)  Pulse:  [51-72] 51  Resp:  [18-20] 18  BP: (120-143)/(74-82) 131/74  SpO2:  [93 %-94 %] 93 %  No intake/output data recorded.    GEN:  Alert, oriented x 3, comfortable, sitting up in bed, no overt respiratory distress.  No significant conversational dyspnea  HEENT:  Normocephalic/atraumatic, no scleral icterus, no nasal discharge, mouth covered with a mask, no oral ulcers or thrush noted.  CV:  Regular rate and rhythm, no murmur to ausc.  S1 + S2 noted, no S3 or S4.  LUNGS:  Clear to auscultation ant/post bilaterally.  No rales/rhonchi/wheezing auscultated bilaterally.  No costal retractions bilaterally.  Symmetric chest rise on inhalation noted.  EXT:  No obvious pretibial edema or cyanosis bilaterally. No joint synovitis noted.  No calf-tenderness or asymmetry noted.  SKIN:  Dry to touch, no rashes or jaundice noted.  PSYCH: "  Mood appropriate, Not tearful or depressed.  Maintains direct eye contact.  cooperative  NEURO:  No tremors at rest, speech is clear and appropriate    Data   Labs:  Recent Labs   Lab 11/26/21  0950 11/25/21  0742 11/24/21  0658    142 141   POTASSIUM 3.6 3.7 4.3   CHLORIDE 104 106 108*   CO2 28 25 25   ANIONGAP 10 11 8   * 167* 195*   BUN 20 16 15   CR 0.72 0.74 0.69*   GFRESTIMATED >90 >90 >90   KACY 8.5 8.5 8.7     Recent Labs   Lab 11/26/21  0950 11/25/21  0742 11/24/21  0658   WBC 10.3 9.9 8.7   HGB 11.8* 11.7* 11.6*   HCT 37.0* 36.8* 36.6*   MCV 93 93 94    296 249      Recent Imaging:   No results found for this or any previous visit (from the past 24 hour(s)).    Medications     - MEDICATION INSTRUCTIONS -         remdesivir  100 mg Intravenous Q24H    And     sodium chloride 0.9%  50 mL Intravenous Q24H     amLODIPine  5 mg Oral Daily     apixaban ANTICOAGULANT  2.5 mg Oral BID     ARIPiprazole  5 mg Oral Daily     buprenorphine  4 mg Sublingual TID     dexamethasone  6 mg Oral Daily     folic acid  1,000 mcg Oral Daily     levothyroxine  75 mcg Oral QAM AC     magnesium oxide  400 mg Oral BID     multivitamin w/minerals  1 tablet Oral Daily     pantoprazole  40 mg Oral QAM AC     Pregabalin  200 mg Oral TID     propranolol  10 mg Oral TID     QUEtiapine  300 mg Oral At Bedtime     sodium chloride (PF)  3 mL Intracatheter Q8H

## 2021-11-26 NOTE — PROGRESS NOTES
"Addiction Medicine Follow Up    Tele-Visit Details    Type of service:  Video Visit    Time Service Began (time 1st connected with pt): 1416    Time Service Ended (time completely finished with pt): 1426    Originating Location (pt. Location): Patient Rm, [facility]    Distant Location (provider location): [facility]    Reason for Televisit: COVID 19    Mode of Communication:  Video Conference via [employer]    Physician has received verbal consent for a video visit from the patient? Yes        Active Problems:    Pneumonia due to COVID-19 virus  Opioid Use Disorder  Alcohol Use Disorder  Major Depression, recurrent      Subjective  Chief complaint: Would like more buprenorphine    HPI: Jose M is a 50 yo male who was admitted to inpat.   Unit at [facility] on 11/19/21 after spending five days on Medical unit at [facility].   He had presented after a suicide attempt in which he took large quantity of quetiapine, 4 doses of Suboxone and drank alcohol.  He recovered, but continued to express suicidal thoughts, and he reports these continue now.   He then developed SOB and was found to have COVID pneumonia so has been transferred to a Medical Unit at [facility].     He claims that he was taking 8 mg Suboxone bid PTA.   However, this is questionable, given his last fill was on 9/27/21.  He was re-started on a low dose and now is on 4 mg tid.   He is requesting more.     His O2 sats dropped below 90% when off oxygen.   On 2 L/min, they are 93 - 94%.    ROS:   Resp: Feels short of breath with exertion   CV: No chest pains   GI: Some GI upset   Ext: No swelling   Psych: Admits he was irritable and angry at me for his lower buprenorphine dose yesterday.      Objective    /78 (BP Location: Left arm)   Pulse 70   Temp 97.8  F (36.6  C) (Oral)   Resp 18   Ht 1.93 m (6' 4\")   Wt 131.6 kg (290 lb 3.2 oz)   SpO2 94%   BMI 35.32 kg/m      Physical Exam per hospitalist: Lungs " are clear to auscultation.  No rales rhonchi or wheezing; heart regular without murmur or extra sound; abdomen soft and nontender     Skin: No diaphoresis              Neuro: Alert x4, no tremor   psych:     Cooperative     Mood:   Dysthymic               Affect:  Congruent               Thought content:   Having some suicidal ideation but denies plan or intent.  No HI or hallucinations               Thought processes:  Linear                Speech:  Normal                Motor:  Normal                Insight/judgement:   Fair/fair  Results  Lab today  WBC 10 point 3K; hemoglobin 11.8; CRP 2.0 which is down from 14.2          Electrolytes and renal function within normal limits     Assessment and Plan:  1.  Opioid use disorder, severe -patient would like to get back to his PTA dose which he says was 8 3 times daily.  His prescriber is Ulises Perez MD in [place].   Says he has not used opiates in 8 mos., since starting of Suboxone.   Discussed my concerns about the possibility of surpressing he respiratory function more, since it is already compromised.   He seemed to understand and accept this.   Once he able to be off oxygen, we will consider an increase in the dose.           Continue Buprenorphine 4 mg tid    2.   Alcohol use disorder, severe - admits to heavy use (1 gallon per day) for several weeks PTA.   Treated for withdrawal, which as now resolved.  Consider adding acamprosate before discharge.   Naltrexone would be contraindicated due to being on buprenorphine.      3.  Bipolar disorder, with depression - with recent suicide attempt by drug overdose.    on Abilify 5 mg q d, Quetiapine 300 mg q hs.   Note that patient was also on gabapentin       Ivette Mahoney MD  Addiction Medicine Service  [facility]   Page me (click here for Salena Mahoney)

## 2021-11-26 NOTE — PROGRESS NOTES
Pt continues to be pleasant and cooperative.  Reports feeling tired today.  Resting intermittently.  C/o constipation with last BM on 11/23/21.  PRN senna-S given.  No results as of this time.  Oxygen via nasal canula at 2L.  Sating in mid to low 90's and intermittently off oxygen to eat and use bathroom without concern.  Will continue to monitor.

## 2021-11-26 NOTE — PROVIDER NOTIFICATION
Provider paged to clarify order for exercise oximetry.  RT called and were unsure what to do with the order.

## 2021-11-26 NOTE — PLAN OF CARE
Problem: Gas Exchange Impaired  Goal: Optimal Gas Exchange  Outcome: Improving  Intervention: Optimize Oxygenation and Ventilation  Recent Flowsheet Documentation  Taken 11/26/2021 1524 by Angie Marquez RN  Head of Bed (HOB) Positioning: HOB at 20-30 degrees     Oxygen per nasal cannula at 1.5L per minute, oxygen saturation remains above 92% while in bed.  Infrequent productive cough after incentive spirometer.  Coughed out a small amount of brown thick phlegm with thinner tan phlegm.      Ambulated to the bathroom without oxygen and sats fell to 87-92%.  Quickly recovered when back in bed.  Patient states that he feels short of breath.      Quiet, but does answer direct questions.  TV on with patient changing channel often.  Slightly restless in the bed.  Ordered himself a large supper.  Requested a senna-docusate for constipation.    Angie aMrquez RN

## 2021-11-27 LAB
ANION GAP SERPL CALCULATED.3IONS-SCNC: 10 MMOL/L (ref 5–18)
BUN SERPL-MCNC: 20 MG/DL (ref 8–22)
CALCIUM SERPL-MCNC: 8.5 MG/DL (ref 8.5–10.5)
CHLORIDE BLD-SCNC: 103 MMOL/L (ref 98–107)
CO2 SERPL-SCNC: 28 MMOL/L (ref 22–31)
CREAT SERPL-MCNC: 0.68 MG/DL (ref 0.7–1.3)
GFR SERPL CREATININE-BSD FRML MDRD: >90 ML/MIN/1.73M2
GLUCOSE BLD-MCNC: 145 MG/DL (ref 70–125)
HOLD SPECIMEN: NORMAL
POTASSIUM BLD-SCNC: 3.4 MMOL/L (ref 3.5–5)
SODIUM SERPL-SCNC: 141 MMOL/L (ref 136–145)

## 2021-11-27 PROCEDURE — 250N000013 HC RX MED GY IP 250 OP 250 PS 637: Performed by: INTERNAL MEDICINE

## 2021-11-27 PROCEDURE — 36415 COLL VENOUS BLD VENIPUNCTURE: CPT | Performed by: INTERNAL MEDICINE

## 2021-11-27 PROCEDURE — 250N000009 HC RX 250: Performed by: HOSPITALIST

## 2021-11-27 PROCEDURE — 120N000001 HC R&B MED SURG/OB

## 2021-11-27 PROCEDURE — 250N000013 HC RX MED GY IP 250 OP 250 PS 637: Performed by: FAMILY MEDICINE

## 2021-11-27 PROCEDURE — 250N000012 HC RX MED GY IP 250 OP 636 PS 637: Performed by: FAMILY MEDICINE

## 2021-11-27 PROCEDURE — 99233 SBSQ HOSP IP/OBS HIGH 50: CPT | Performed by: INTERNAL MEDICINE

## 2021-11-27 PROCEDURE — 80048 BASIC METABOLIC PNL TOTAL CA: CPT | Performed by: INTERNAL MEDICINE

## 2021-11-27 PROCEDURE — 250N000013 HC RX MED GY IP 250 OP 250 PS 637: Performed by: HOSPITALIST

## 2021-11-27 PROCEDURE — 258N000003 HC RX IP 258 OP 636: Performed by: HOSPITALIST

## 2021-11-27 RX ORDER — POTASSIUM CHLORIDE 1500 MG/1
40 TABLET, EXTENDED RELEASE ORAL ONCE
Status: COMPLETED | OUTPATIENT
Start: 2021-11-27 | End: 2021-11-27

## 2021-11-27 RX ORDER — LORAZEPAM 1 MG/1
1 TABLET ORAL EVERY 4 HOURS PRN
Status: DISCONTINUED | OUTPATIENT
Start: 2021-11-27 | End: 2021-11-29

## 2021-11-27 RX ADMIN — APIXABAN 2.5 MG: 2.5 TABLET, FILM COATED ORAL at 20:55

## 2021-11-27 RX ADMIN — POTASSIUM CHLORIDE 40 MEQ: 1500 TABLET, EXTENDED RELEASE ORAL at 15:04

## 2021-11-27 RX ADMIN — BUPRENORPHINE 4 MG: 2 TABLET SUBLINGUAL at 06:41

## 2021-11-27 RX ADMIN — PREGABALIN 200 MG: 100 CAPSULE ORAL at 08:55

## 2021-11-27 RX ADMIN — GABAPENTIN 100 MG: 100 CAPSULE ORAL at 21:57

## 2021-11-27 RX ADMIN — ACETAMINOPHEN 650 MG: 325 TABLET ORAL at 19:13

## 2021-11-27 RX ADMIN — GABAPENTIN 100 MG: 100 CAPSULE ORAL at 09:47

## 2021-11-27 RX ADMIN — NICOTINE POLACRILEX 4 MG: 4 GUM, CHEWING ORAL at 06:41

## 2021-11-27 RX ADMIN — NICOTINE POLACRILEX 4 MG: 4 GUM, CHEWING ORAL at 09:16

## 2021-11-27 RX ADMIN — ARIPIPRAZOLE 5 MG: 5 TABLET ORAL at 08:54

## 2021-11-27 RX ADMIN — LORAZEPAM 1 MG: 1 TABLET ORAL at 10:14

## 2021-11-27 RX ADMIN — LORAZEPAM 1 MG: 1 TABLET ORAL at 19:13

## 2021-11-27 RX ADMIN — PANTOPRAZOLE SODIUM 40 MG: 40 TABLET, DELAYED RELEASE ORAL at 06:40

## 2021-11-27 RX ADMIN — MULTIPLE VITAMINS W/ MINERALS TAB 1 TABLET: TAB at 08:54

## 2021-11-27 RX ADMIN — PROPRANOLOL HYDROCHLORIDE 10 MG: 10 TABLET ORAL at 08:54

## 2021-11-27 RX ADMIN — PROPRANOLOL HYDROCHLORIDE 10 MG: 10 TABLET ORAL at 14:14

## 2021-11-27 RX ADMIN — ACETAMINOPHEN 650 MG: 325 TABLET ORAL at 10:10

## 2021-11-27 RX ADMIN — QUETIAPINE FUMARATE 300 MG: 300 TABLET, FILM COATED ORAL at 20:56

## 2021-11-27 RX ADMIN — MAGNESIUM OXIDE TAB 400 MG (241.3 MG ELEMENTAL MG) 400 MG: 400 (241.3 MG) TAB at 08:55

## 2021-11-27 RX ADMIN — NICOTINE POLACRILEX 4 MG: 4 GUM, CHEWING ORAL at 19:14

## 2021-11-27 RX ADMIN — PREGABALIN 200 MG: 100 CAPSULE ORAL at 21:02

## 2021-11-27 RX ADMIN — LORAZEPAM 1 MG: 1 TABLET ORAL at 23:15

## 2021-11-27 RX ADMIN — APIXABAN 2.5 MG: 2.5 TABLET, FILM COATED ORAL at 08:54

## 2021-11-27 RX ADMIN — NICOTINE POLACRILEX 4 MG: 4 GUM, CHEWING ORAL at 15:07

## 2021-11-27 RX ADMIN — REMDESIVIR 100 MG: 100 INJECTION, POWDER, LYOPHILIZED, FOR SOLUTION INTRAVENOUS at 17:27

## 2021-11-27 RX ADMIN — PREGABALIN 200 MG: 100 CAPSULE ORAL at 14:14

## 2021-11-27 RX ADMIN — BUPRENORPHINE 4 MG: 2 TABLET SUBLINGUAL at 19:13

## 2021-11-27 RX ADMIN — SODIUM CHLORIDE 50 ML: 9 INJECTION, SOLUTION INTRAVENOUS at 18:20

## 2021-11-27 RX ADMIN — MELATONIN TAB 3 MG 3 MG: 3 TAB at 20:56

## 2021-11-27 RX ADMIN — AMLODIPINE BESYLATE 5 MG: 5 TABLET ORAL at 08:55

## 2021-11-27 RX ADMIN — FOLIC ACID 1000 MCG: 1 TABLET ORAL at 08:55

## 2021-11-27 RX ADMIN — DEXAMETHASONE 6 MG: 2 TABLET ORAL at 08:55

## 2021-11-27 RX ADMIN — MAGNESIUM OXIDE TAB 400 MG (241.3 MG ELEMENTAL MG) 400 MG: 400 (241.3 MG) TAB at 21:02

## 2021-11-27 RX ADMIN — SENNOSIDES AND DOCUSATE SODIUM 1 TABLET: 50; 8.6 TABLET ORAL at 09:32

## 2021-11-27 RX ADMIN — NICOTINE POLACRILEX 4 MG: 4 GUM, CHEWING ORAL at 10:16

## 2021-11-27 RX ADMIN — LORAZEPAM 1 MG: 1 TABLET ORAL at 14:14

## 2021-11-27 RX ADMIN — NICOTINE POLACRILEX 4 MG: 4 GUM, CHEWING ORAL at 20:56

## 2021-11-27 RX ADMIN — NICOTINE POLACRILEX 4 MG: 4 GUM, CHEWING ORAL at 13:40

## 2021-11-27 RX ADMIN — PROPRANOLOL HYDROCHLORIDE 10 MG: 10 TABLET ORAL at 20:55

## 2021-11-27 RX ADMIN — BUPRENORPHINE 4 MG: 2 TABLET SUBLINGUAL at 12:59

## 2021-11-27 RX ADMIN — LEVOTHYROXINE SODIUM 75 MCG: 0.03 TABLET ORAL at 06:39

## 2021-11-27 ASSESSMENT — ACTIVITIES OF DAILY LIVING (ADL)
ADLS_ACUITY_SCORE: 10

## 2021-11-27 NOTE — PLAN OF CARE
Problem: Adult Inpatient Plan of Care  Goal: Optimal Comfort and Wellbeing  Outcome: Improving     Problem: Suicide Risk  Goal: Absence of Self-Harm  Outcome: Improving     Problem: Gas Exchange Impaired  Goal: Optimal Gas Exchange  Outcome: Improving  Pt. Is 1:1 sitter for  suicidal ideation. Slept well, was up to use a bathroom x 3, independent with mobility. Denies having pain. Pleasant and cooperatives with all cares. In the beginning of the shift O2 sat >91% in room air, and on sleeping have O2 1.5L/NC, O2> 92%. O2 88 to 90% with activity.  VS stable.

## 2021-11-27 NOTE — PROGRESS NOTES
Progress Note    Assessment/Plan  Hollis Barclay is a 51 year old male who was admitted on 11/22/2021. He has a [facility] history of bipolar disorder, borderline personality disorder, TBI, polysubstance abuse, alcoholism, group home resident presented to [facility] on 11/13 with suicide attempt, transferred to [facility] inpatient psych unit on 11/19, then transferred to [facility] on 11/22 for Covid hypoxia.     Problem List:  1.  Acute COVID Pneumonia      Acute hypoxic respiratory failure     COVID-19 Diagnosis Details:  Onset of COVID symptoms 11/21   Date of positive test results 11/22   Vaccinated? Fully Vaccinated   --Continues to require supplemental oxygen.  Continue to wean  --Currently on dexamethasone and remdesivir  --Continue incentive spirometry    2. Bipolar disorder      Borderline personality disorder      Suicidal ideation with recent suicide attempt      History of TBI  Patient was hospitalized at [facility] 11/13-11/19 after presenting with drug overdose and alcohol intoxication with intention to commit suicide secondary to worsening depression.  Patient overdosed on 5 tablets of oxycodone, 22x300 mg tablets of Seroquel and 4x8 mg tablets of Suboxone while drinking 1 gallon of vodka within a 24-hour period all in attempt to kill himself. Was transferred to [facility] Psych unit 11/19. Requires one-to-one for suicidality while here at [facility].  Psych following here.  --Had a panic attack on 11/27 and therefore started on milligram Ativan every 6 hours as needed for anxiety.  --May be able to increase Suboxone dose once patient is off supplemental oxygen  Plan would be back to [facility] when medical issues resolved  Status is currently Voluntary  Currently on Abilify, Seroquel  --Currently on one-to-one     3. Alcoholism  Treated for withdrawal at [facility]     4. Chronic pain syndrome     History of polysubstance/drug abuse  Addiction medicine following  Currently on suboxone at decreased tid dose  "currently.  May be able to increase Suboxone dose once patient is off supplemental oxygen     5. Alcoholic hepatitis  LFTs slightly increased 11/25 from 11/24/21  LFTs are stable  Monitor while remdesivir     6. Nicotine dependence  Gum     7. HTN  Continue amlodipine, propranolol     8. GERD,  continue home PPI     9. Constipation  senna as needed     10. Nasal bone fracture  Plan to see ENT as outpatient     11. Hypothyroidism  Continue home Synthroid     12. Insomnia and panic attack  steroid likely worsening this.  Ordered as needed Ativan     13. Dilated aortic root  noted on echo but not prominent on CTA chest. Would recommend annual surveillance CT.    Hypokalemia mild  --Order potassium replacement    Diet: Regular Diet Adult    Barksdale Catheter: Not present  Central Lines: None  Code Status: Full Code       Disposition Plan   Disposition: back to Psych unit when COVID pneumonia better -  Discharge barriers: hypoxia     Diet: Regular Diet Adult    DVT Prophylaxis: DOAC  Barksdale Catheter: Not present  Code Status: Full Code            Subjective  Patient new to me.  Chart reviewed.  Patient complained of severe anxiety that was not responsive to gabapentin.  Order Ativan 1 mg since patient wanted to leave A.  Patient is calmer after Ativan.  Continues to require oxygen at 1.5 L.  Encouraged to use incentive spirometry.  Noted from from addiction medicine about reducing Suboxone dose due to presence of hypoxia.  Potassium is low.  Ordered replacement.    Patient eating breakfast.  Denies any nausea vomiting diarrhea fever or chills  Objective    /75   Pulse 67   Temp 98  F (36.7  C) (Oral)   Resp 18   Ht 1.93 m (6' 4\")   Wt 131.6 kg (290 lb 3.2 oz)   SpO2 94%   BMI 35.32 kg/m    Weight:   Wt Readings from Last 5 Encounters:   11/22/21 131.6 kg (290 lb 3.2 oz)   11/19/21 135.5 kg (298 lb 12.8 oz)   11/14/21 129 kg (284 lb 6.3 oz)   09/23/21 124.7 kg (275 lb)   08/17/21 115.2 kg (254 lb)       I/O " last 3 completed shifts:  In: 1730 [P.O.:1680; IV Piggyback:50]  Out: -   I/O this shift:  In: 960 [P.O.:960]  Out: -           Physical Exam  Alert, oriented*3  Appears anxious  No pallor, icterus, clubbing, cyanosis  Body mass index is 35.32 kg/m .  Tremors of the outstretched hand  No sinus tenderness  Moist membranes  Neck supple  CVS: S1 S2-N, no murmurs, gallops, rubs  Resp: Left-sided basilar crackles  Abd: soft, No t/g/r  Neuro: no involuntary movements such as tremors  Vasc: no leg edema     Pertinent Labs  ----------------------  Recent Labs   Lab 11/27/21  0652 11/26/21  0950 11/25/21  0742 11/24/21  0658 11/22/21  2118 11/22/21  0843    142 142 141   < > 140   POTASSIUM 3.4* 3.6 3.7 4.3   < > 3.8   CO2 28 28 25 25   < > 25   BUN 20 20 16 15   < > 10   CR 0.68* 0.72 0.74 0.69*   < > 0.75   MAG  --   --   --   --   --  1.7*   * 154* 167* 195*   < > 115   ALBUMIN  --  3.0* 3.0* 3.1*  --  2.9*   BILITOTAL  --  0.4 0.5 0.4  --  0.6   ALKPHOS  --  67 67 66  --  62   ALT  --  130* 92* 48*  --  54*   AST  --  81* 94* 47*  --  65*    < > = values in this interval not displayed.     Recent Labs   Lab 11/26/21  0950 11/25/21  0742 11/24/21  0658   WBC 10.3 9.9 8.7   HGB 11.8* 11.7* 11.6*   HCT 37.0* 36.8* 36.6*    296 249     Recent Labs   Lab 11/22/21  1522   INR 0.91     Glucose Values Latest Ref Rng & Units 11/18/2021 11/22/2021 11/23/2021 11/24/2021 11/25/2021 11/26/2021 11/27/2021   Bedside Glucose (mg/dl )  - -- -- -- -- -- -- --   GLUCOSE 70 - 125 mg/dL 106(H) 115 170(H) 195(H) 167(H) 154(H) 145(H)   Some recent data might be hidden         Pertinent Radiology   Radiology Results: Personally reviewed impression/s  No results found.  EKG Results: not reviewed.

## 2021-11-28 LAB
GLUCOSE BLDC GLUCOMTR-MCNC: 119 MG/DL (ref 70–99)
POTASSIUM BLD-SCNC: 3.5 MMOL/L (ref 3.5–5)

## 2021-11-28 PROCEDURE — 120N000001 HC R&B MED SURG/OB

## 2021-11-28 PROCEDURE — 250N000012 HC RX MED GY IP 250 OP 636 PS 637: Performed by: FAMILY MEDICINE

## 2021-11-28 PROCEDURE — 250N000013 HC RX MED GY IP 250 OP 250 PS 637: Performed by: FAMILY MEDICINE

## 2021-11-28 PROCEDURE — 84132 ASSAY OF SERUM POTASSIUM: CPT | Performed by: INTERNAL MEDICINE

## 2021-11-28 PROCEDURE — 250N000013 HC RX MED GY IP 250 OP 250 PS 637: Performed by: INTERNAL MEDICINE

## 2021-11-28 PROCEDURE — 36415 COLL VENOUS BLD VENIPUNCTURE: CPT | Performed by: INTERNAL MEDICINE

## 2021-11-28 PROCEDURE — 250N000013 HC RX MED GY IP 250 OP 250 PS 637: Performed by: HOSPITALIST

## 2021-11-28 PROCEDURE — 99232 SBSQ HOSP IP/OBS MODERATE 35: CPT | Performed by: INTERNAL MEDICINE

## 2021-11-28 RX ORDER — BUPRENORPHINE 2 MG/1
4 TABLET SUBLINGUAL ONCE
Status: COMPLETED | OUTPATIENT
Start: 2021-11-28 | End: 2021-11-28

## 2021-11-28 RX ORDER — BUPRENORPHINE 2 MG/1
8 TABLET SUBLINGUAL 2 TIMES DAILY
Status: DISCONTINUED | OUTPATIENT
Start: 2021-11-28 | End: 2021-12-06

## 2021-11-28 RX ORDER — BUPRENORPHINE AND NALOXONE 4; 1 MG/1; MG/1
1 FILM, SOLUBLE BUCCAL; SUBLINGUAL ONCE
Status: DISCONTINUED | OUTPATIENT
Start: 2021-11-28 | End: 2021-11-28

## 2021-11-28 RX ADMIN — PREGABALIN 200 MG: 100 CAPSULE ORAL at 15:22

## 2021-11-28 RX ADMIN — LEVOTHYROXINE SODIUM 75 MCG: 0.03 TABLET ORAL at 06:37

## 2021-11-28 RX ADMIN — NICOTINE POLACRILEX 4 MG: 4 GUM, CHEWING ORAL at 11:19

## 2021-11-28 RX ADMIN — PREGABALIN 200 MG: 100 CAPSULE ORAL at 20:23

## 2021-11-28 RX ADMIN — PROPRANOLOL HYDROCHLORIDE 10 MG: 10 TABLET ORAL at 15:21

## 2021-11-28 RX ADMIN — APIXABAN 2.5 MG: 2.5 TABLET, FILM COATED ORAL at 20:22

## 2021-11-28 RX ADMIN — ACETAMINOPHEN 650 MG: 325 TABLET ORAL at 22:06

## 2021-11-28 RX ADMIN — QUETIAPINE FUMARATE 300 MG: 300 TABLET, FILM COATED ORAL at 20:22

## 2021-11-28 RX ADMIN — NICOTINE POLACRILEX 4 MG: 4 GUM, CHEWING ORAL at 13:51

## 2021-11-28 RX ADMIN — LORAZEPAM 1 MG: 1 TABLET ORAL at 11:39

## 2021-11-28 RX ADMIN — MAGNESIUM OXIDE TAB 400 MG (241.3 MG ELEMENTAL MG) 400 MG: 400 (241.3 MG) TAB at 10:07

## 2021-11-28 RX ADMIN — BUPRENORPHINE 4 MG: 2 TABLET SUBLINGUAL at 15:20

## 2021-11-28 RX ADMIN — NICOTINE POLACRILEX 4 MG: 4 GUM, CHEWING ORAL at 07:06

## 2021-11-28 RX ADMIN — NICOTINE POLACRILEX 4 MG: 4 GUM, CHEWING ORAL at 17:48

## 2021-11-28 RX ADMIN — PANTOPRAZOLE SODIUM 40 MG: 40 TABLET, DELAYED RELEASE ORAL at 06:37

## 2021-11-28 RX ADMIN — LORAZEPAM 1 MG: 1 TABLET ORAL at 20:23

## 2021-11-28 RX ADMIN — BUPRENORPHINE 8 MG: 2 TABLET SUBLINGUAL at 20:22

## 2021-11-28 RX ADMIN — FOLIC ACID 1000 MCG: 1 TABLET ORAL at 10:07

## 2021-11-28 RX ADMIN — ACETAMINOPHEN 650 MG: 325 TABLET ORAL at 10:07

## 2021-11-28 RX ADMIN — PREGABALIN 200 MG: 100 CAPSULE ORAL at 10:07

## 2021-11-28 RX ADMIN — NICOTINE POLACRILEX 4 MG: 4 GUM, CHEWING ORAL at 20:27

## 2021-11-28 RX ADMIN — ACETAMINOPHEN 650 MG: 325 TABLET ORAL at 03:32

## 2021-11-28 RX ADMIN — LORAZEPAM 1 MG: 1 TABLET ORAL at 03:32

## 2021-11-28 RX ADMIN — PROPRANOLOL HYDROCHLORIDE 10 MG: 10 TABLET ORAL at 10:07

## 2021-11-28 RX ADMIN — MAGNESIUM OXIDE TAB 400 MG (241.3 MG ELEMENTAL MG) 400 MG: 400 (241.3 MG) TAB at 20:23

## 2021-11-28 RX ADMIN — NICOTINE POLACRILEX 4 MG: 4 GUM, CHEWING ORAL at 03:32

## 2021-11-28 RX ADMIN — NICOTINE POLACRILEX 4 MG: 4 GUM, CHEWING ORAL at 10:06

## 2021-11-28 RX ADMIN — ARIPIPRAZOLE 5 MG: 5 TABLET ORAL at 10:07

## 2021-11-28 RX ADMIN — NICOTINE POLACRILEX 4 MG: 4 GUM, CHEWING ORAL at 16:50

## 2021-11-28 RX ADMIN — AMLODIPINE BESYLATE 5 MG: 5 TABLET ORAL at 10:06

## 2021-11-28 RX ADMIN — ACETAMINOPHEN 650 MG: 325 TABLET ORAL at 15:53

## 2021-11-28 RX ADMIN — BUPRENORPHINE 4 MG: 2 TABLET SUBLINGUAL at 06:37

## 2021-11-28 RX ADMIN — DEXAMETHASONE 6 MG: 2 TABLET ORAL at 10:07

## 2021-11-28 RX ADMIN — LORAZEPAM 1 MG: 1 TABLET ORAL at 15:53

## 2021-11-28 RX ADMIN — LORAZEPAM 1 MG: 1 TABLET ORAL at 07:35

## 2021-11-28 RX ADMIN — NICOTINE POLACRILEX 4 MG: 4 GUM, CHEWING ORAL at 15:27

## 2021-11-28 RX ADMIN — NICOTINE POLACRILEX 4 MG: 4 GUM, CHEWING ORAL at 19:22

## 2021-11-28 RX ADMIN — APIXABAN 2.5 MG: 2.5 TABLET, FILM COATED ORAL at 10:06

## 2021-11-28 RX ADMIN — PROPRANOLOL HYDROCHLORIDE 10 MG: 10 TABLET ORAL at 20:24

## 2021-11-28 RX ADMIN — NICOTINE POLACRILEX 4 MG: 4 GUM, CHEWING ORAL at 23:26

## 2021-11-28 RX ADMIN — MULTIPLE VITAMINS W/ MINERALS TAB 1 TABLET: TAB at 10:08

## 2021-11-28 RX ADMIN — SENNOSIDES AND DOCUSATE SODIUM 1 TABLET: 50; 8.6 TABLET ORAL at 16:18

## 2021-11-28 ASSESSMENT — ACTIVITIES OF DAILY LIVING (ADL)
ADLS_ACUITY_SCORE: 10

## 2021-11-28 NOTE — PLAN OF CARE
Pt continues to be on 1:1 watch for suicidal ideation. Pt's VSS, is on RA with sats above 92% this shift. Pt c/o lower back pain and rated it 6/10, PRN tylenol given x1 this shift.   Pt is pleasant and cooperative most times. Pt paces around the room and verbalized being anxious, PRN ativan was given once by NOC nurse at change of shift and once during the day. Pt also requested for nicotine gum few times as well.   Pt's subutex dose was increased to 8mg, pt did not want to take noon dose of subutex 4mg because he wanted to take the 8mg. MD discontinued 4mg dose but the 8mg dose was not to start until bedtime tonight. Pt was agitated and upset about this. MD paged to place one time order of 4mg subutex, eventually pt received medication but was irritated and agitated while he waited to have this medication.     Problem: Adult Inpatient Plan of Care  Goal: Absence of Hospital-Acquired Illness or Injury  Outcome: Improving  Intervention: Identify and Manage Fall Risk  Recent Flowsheet Documentation  Taken 11/28/2021 1007 by Rena Abraham RN  Safety Promotion/Fall Prevention:   sitter at bedside   activity supervised   assistive device/personal items within reach   clutter free environment maintained   fall prevention program maintained   lighting adjusted   nonskid shoes/slippers when out of bed   safety round/check completed  Intervention: Prevent and Manage VTE (Venous Thromboembolism) Risk  Recent Flowsheet Documentation  Taken 11/28/2021 1007 by Rena Abraham RN  VTE Prevention/Management: ambulation promoted  Intervention: Prevent Infection  Recent Flowsheet Documentation  Taken 11/28/2021 1007 by Rena Abraham RN  Infection Prevention:   personal protective equipment utilized   rest/sleep promoted   hand hygiene promoted  Goal: Optimal Comfort and Wellbeing  Outcome: Improving  Intervention: Provide Person-Centered Care  Recent Flowsheet Documentation  Taken 11/28/2021 1007 by Rena Abraham  JAIDEN RN  Trust Relationship/Rapport:   care explained   thoughts/feelings acknowledged   reassurance provided   emotional support provided   choices provided   questions answered   empathic listening provided     Problem: Suicide Risk  Goal: Absence of Self-Harm  Outcome: Improving     Problem: Gas Exchange Impaired  Goal: Optimal Gas Exchange  Outcome: Improving     Problem: Anxiety  Goal: Anxiety Reduction or Resolution  Outcome: Improving     Problem: Pain Acute  Goal: Acceptable Pain Control and Functional Ability  Outcome: Improving  Intervention: Develop Pain Management Plan  Recent Flowsheet Documentation  Taken 11/28/2021 1007 by Rena Abraham RN  Pain Management Interventions: medication (see MAR)  Intervention: Prevent or Manage Pain  Recent Flowsheet Documentation  Taken 11/28/2021 1007 by Rena Abraham, RN  Bowel Elimination Promotion:   ambulation promoted   adequate fluid intake promoted

## 2021-11-28 NOTE — PLAN OF CARE
[id number]    Problem: Suicide Risk  Goal: Absence of Self-Harm  Outcome: No Change     Problem: Anxiety  Goal: Anxiety Reduction or Resolution  Outcome: Improving     Problem: Pain Acute  Goal: Acceptable Pain Control and Functional Ability  Outcome: Improving  Intervention: Develop Pain Management Plan  Recent Flowsheet Documentation  Taken 11/28/2021 0332 by Alan Rush RN  Pain Management Interventions:   medication (see MAR)   emotional support     Pt was awake and started getting restless around 0315. He was moving from bed to chair and was getting restless. While the writer was trying to do assessment , he started getting agitated and refused the assessment. He requested tylenol, nicotine gum and ativan for his anxiety.  Gave ativan prn for anxiety and tylenol for pain. Also gave nicotine gum. Pt sleeping and resting at this time. Went to bathroom once and ate some snack. Pt's oxygen has been 91-93% on RA.

## 2021-11-28 NOTE — PLAN OF CARE
Sleeping well, oxygen saturation remains 90-92% on room air while sleeping soundly.  Lung sounds clear.    Angie Marquez RN

## 2021-11-28 NOTE — PROGRESS NOTES
Progress Note    Assessment/Plan  Hollis Barclay is a 51 year old male who was admitted on 11/22/2021. He has a [facility] history of bipolar disorder, borderline personality disorder, TBI, polysubstance abuse, alcoholism, group home resident presented to [facility] on 11/13 with suicide attempt, transferred to [facility] inpatient psych unit on 11/19, then transferred to [facility] on 11/22 for Covid hypoxia.     Problem List:  1.  Acute COVID Pneumonia      Acute hypoxic respiratory failure     COVID-19 Diagnosis Details:  Onset of COVID symptoms 11/21   Date of positive test results 11/22   Vaccinated? Fully Vaccinated   --Require supplemental oxygen at night  --Currently on dexamethasone and remdesivir.  Completed remdesivir.  DC dexamethasone since patient is off oxygen on 11/28.  --Continue incentive spirometry    2. Bipolar disorder      Borderline personality disorder      Suicidal ideation with recent suicide attempt      History of TBI  Patient was hospitalized at [facility] 11/13-11/19 after presenting with drug overdose and alcohol intoxication with intention to commit suicide secondary to worsening depression.  Patient overdosed on 5 tablets of oxycodone, 22x300 mg tablets of Seroquel and 4x8 mg tablets of Suboxone while drinking 1 gallon of vodka within a 24-hour period all in attempt to kill himself. Was transferred to [facility] Psych unit 11/19. Requires one-to-one for suicidality while here at [facility].  Psych following here.  --Had a panic attack on 11/27 and therefore started on milligram Ativan every 6 hours as needed for anxiety.  --Increase Suboxone dose to 8 mg twice daily since patient is only requiring oxygen at bedtime.  This would help with his anxiety.  Plan would be back to [facility] when medical issues resolved  Status is currently Voluntary  Currently on Abilify, Seroquel  --Currently on one-to-one     3. Alcoholism  Treated for withdrawal at [facility]     4. Chronic pain syndrome      "History of polysubstance/drug abuse  Addiction medicine following  Currently on suboxone at decreased tid dose currently.  Increase Suboxone to 8 mg twice daily dose since patient is off supplemental oxygen during daytime.     5. Alcoholic hepatitis  LFTs slightly increased 11/25 from 11/24/21  LFTs are stable  Off remdesevir.      6. Nicotine dependence  Gum     7. HTN  Continue amlodipine, propranolol     8. GERD,  continue home PPI     9. Constipation  senna as needed     10. Nasal bone fracture  Plan to see ENT as outpatient     11. Hypothyroidism  Continue home Synthroid     12. Insomnia and panic attack  steroid likely worsening this.  DC dexamethasone.  Continue as needed Ativan     13. Dilated aortic root  noted on echo but not prominent on CTA chest. Would recommend annual surveillance CT.    Hypokalemia mild  Resolved with replacement    Diet: Regular Diet Adult    Barksdale Catheter: Not present  Central Lines: None  Code Status: Full Code       Disposition Plan   Disposition: back to Psych unit when COVID pneumonia better likely on 11/29  Discharge barriers: Awaiting placement     Diet: Regular Diet Adult    DVT Prophylaxis: DOAC  Barksdale Catheter: Not present  Code Status: Full Code            Subjective  Patient is currently off oxygen but required 1 L of oxygen at night.  Had another episode of anxiety overnight that required as needed Ativan.  Patient is frustrated by low-dose of Suboxone and request higher dose.  He thinks this is attributed to his anxiety.    Denies any nausea vomiting diarrhea fever or chills  Objective    BP (!) 140/84 (BP Location: Left arm, Patient Position: Supine)   Pulse 58   Temp 97.2  F (36.2  C) (Axillary)   Resp 18   Ht 1.93 m (6' 4\")   Wt 131.6 kg (290 lb 3.2 oz)   SpO2 94%   BMI 35.32 kg/m    Weight:   Wt Readings from Last 5 Encounters:   11/22/21 131.6 kg (290 lb 3.2 oz)   11/19/21 135.5 kg (298 lb 12.8 oz)   11/14/21 129 kg (284 lb 6.3 oz)   09/23/21 124.7 kg (275 " lb)   08/17/21 115.2 kg (254 lb)       I/O last 3 completed shifts:  In: 1640 [P.O.:1640]  Out: -   I/O this shift:  In: 480 [P.O.:480]  Out: -           Physical Exam  Alert, oriented*3  Appears calmer than yesterday  No pallor, icterus, clubbing, cyanosis  Body mass index is 35.32 kg/m .  No tremors of the outstretched hand  No sinus tenderness  Moist membranes  Neck supple  CVS: S1 S2-N, no murmurs, gallops, rubs  Resp: No crackles heard  Abd: soft, No t/g/r    Vasc: no leg edema     Pertinent Labs  ----------------------  Recent Labs   Lab 11/28/21  0957 11/28/21  0727 11/27/21  0652 11/26/21  0950 11/25/21  0742 11/24/21  0658 11/22/21  2118 11/22/21  0843   NA  --   --  141 142 142 141   < > 140   POTASSIUM 3.5  --  3.4* 3.6 3.7 4.3   < > 3.8   CO2  --   --  28 28 25 25   < > 25   BUN  --   --  20 20 16 15   < > 10   CR  --   --  0.68* 0.72 0.74 0.69*   < > 0.75   MAG  --   --   --   --   --   --   --  1.7*   GLC  --  119* 145* 154* 167* 195*   < > 115   ALBUMIN  --   --   --  3.0* 3.0* 3.1*  --  2.9*   BILITOTAL  --   --   --  0.4 0.5 0.4  --  0.6   ALKPHOS  --   --   --  67 67 66  --  62   ALT  --   --   --  130* 92* 48*  --  54*   AST  --   --   --  81* 94* 47*  --  65*    < > = values in this interval not displayed.     Recent Labs   Lab 11/26/21  0950 11/25/21  0742 11/24/21  0658   WBC 10.3 9.9 8.7   HGB 11.8* 11.7* 11.6*   HCT 37.0* 36.8* 36.6*    296 249     Recent Labs   Lab 11/22/21  1522   INR 0.91     Glucose Values Latest Ref Rng & Units 11/18/2021 11/22/2021 11/23/2021 11/24/2021 11/25/2021 11/26/2021 11/27/2021   Bedside Glucose (mg/dl )  - -- -- -- -- -- -- --   GLUCOSE 70 - 125 mg/dL 106(H) 115 170(H) 195(H) 167(H) 154(H) 145(H)   Some recent data might be hidden         Pertinent Radiology   Radiology Results: Personally reviewed impression/s  No results found.  EKG Results: not reviewed.

## 2021-11-29 LAB
HOLD SPECIMEN: NORMAL
POTASSIUM BLD-SCNC: 4 MMOL/L (ref 3.5–5)

## 2021-11-29 PROCEDURE — 250N000013 HC RX MED GY IP 250 OP 250 PS 637: Performed by: INTERNAL MEDICINE

## 2021-11-29 PROCEDURE — 250N000013 HC RX MED GY IP 250 OP 250 PS 637: Performed by: FAMILY MEDICINE

## 2021-11-29 PROCEDURE — 99233 SBSQ HOSP IP/OBS HIGH 50: CPT | Performed by: NURSE PRACTITIONER

## 2021-11-29 PROCEDURE — 250N000009 HC RX 250: Performed by: INTERNAL MEDICINE

## 2021-11-29 PROCEDURE — 99232 SBSQ HOSP IP/OBS MODERATE 35: CPT | Performed by: INTERNAL MEDICINE

## 2021-11-29 PROCEDURE — 84132 ASSAY OF SERUM POTASSIUM: CPT | Performed by: INTERNAL MEDICINE

## 2021-11-29 PROCEDURE — 250N000013 HC RX MED GY IP 250 OP 250 PS 637: Performed by: NURSE PRACTITIONER

## 2021-11-29 PROCEDURE — 36415 COLL VENOUS BLD VENIPUNCTURE: CPT | Performed by: INTERNAL MEDICINE

## 2021-11-29 PROCEDURE — 99232 SBSQ HOSP IP/OBS MODERATE 35: CPT | Mod: 95 | Performed by: INTERNAL MEDICINE

## 2021-11-29 PROCEDURE — 120N000001 HC R&B MED SURG/OB

## 2021-11-29 RX ORDER — QUETIAPINE FUMARATE 25 MG/1
50 TABLET, FILM COATED ORAL 3 TIMES DAILY
Status: DISCONTINUED | OUTPATIENT
Start: 2021-11-29 | End: 2021-11-29

## 2021-11-29 RX ORDER — LIDOCAINE 50 MG/G
OINTMENT TOPICAL EVERY 4 HOURS PRN
Status: DISCONTINUED | OUTPATIENT
Start: 2021-11-29 | End: 2021-12-11 | Stop reason: HOSPADM

## 2021-11-29 RX ORDER — DIPHENHYDRAMINE HCL 50 MG
50 CAPSULE ORAL 3 TIMES DAILY
Status: COMPLETED | OUTPATIENT
Start: 2021-11-29 | End: 2021-11-30

## 2021-11-29 RX ADMIN — PREGABALIN 200 MG: 100 CAPSULE ORAL at 20:31

## 2021-11-29 RX ADMIN — LORAZEPAM 1 MG: 1 TABLET ORAL at 10:38

## 2021-11-29 RX ADMIN — APIXABAN 2.5 MG: 2.5 TABLET, FILM COATED ORAL at 08:16

## 2021-11-29 RX ADMIN — LIDOCAINE: 50 OINTMENT TOPICAL at 19:31

## 2021-11-29 RX ADMIN — DIPHENHYDRAMINE HCL 50 MG: 50 CAPSULE ORAL at 14:34

## 2021-11-29 RX ADMIN — NICOTINE POLACRILEX 4 MG: 4 GUM, CHEWING ORAL at 13:43

## 2021-11-29 RX ADMIN — MAGNESIUM OXIDE TAB 400 MG (241.3 MG ELEMENTAL MG) 400 MG: 400 (241.3 MG) TAB at 20:31

## 2021-11-29 RX ADMIN — GABAPENTIN 100 MG: 100 CAPSULE ORAL at 15:52

## 2021-11-29 RX ADMIN — AMLODIPINE BESYLATE 5 MG: 5 TABLET ORAL at 08:19

## 2021-11-29 RX ADMIN — APIXABAN 2.5 MG: 2.5 TABLET, FILM COATED ORAL at 20:31

## 2021-11-29 RX ADMIN — QUETIAPINE FUMARATE 300 MG: 300 TABLET, FILM COATED ORAL at 20:31

## 2021-11-29 RX ADMIN — PREGABALIN 200 MG: 100 CAPSULE ORAL at 14:33

## 2021-11-29 RX ADMIN — PREGABALIN 200 MG: 100 CAPSULE ORAL at 08:16

## 2021-11-29 RX ADMIN — PANTOPRAZOLE SODIUM 40 MG: 40 TABLET, DELAYED RELEASE ORAL at 06:15

## 2021-11-29 RX ADMIN — NICOTINE POLACRILEX 4 MG: 4 GUM, CHEWING ORAL at 10:30

## 2021-11-29 RX ADMIN — BUPRENORPHINE 8 MG: 2 TABLET SUBLINGUAL at 20:31

## 2021-11-29 RX ADMIN — ACETAMINOPHEN 650 MG: 325 TABLET ORAL at 15:51

## 2021-11-29 RX ADMIN — DIAZEPAM 1 MG: 2 TABLET ORAL at 13:43

## 2021-11-29 RX ADMIN — NICOTINE POLACRILEX 4 MG: 4 GUM, CHEWING ORAL at 00:53

## 2021-11-29 RX ADMIN — LORAZEPAM 1 MG: 1 TABLET ORAL at 00:52

## 2021-11-29 RX ADMIN — MULTIPLE VITAMINS W/ MINERALS TAB 1 TABLET: TAB at 08:18

## 2021-11-29 RX ADMIN — PROPRANOLOL HYDROCHLORIDE 10 MG: 10 TABLET ORAL at 08:18

## 2021-11-29 RX ADMIN — DIPHENHYDRAMINE HCL 50 MG: 50 CAPSULE ORAL at 10:30

## 2021-11-29 RX ADMIN — NICOTINE POLACRILEX 4 MG: 4 GUM, CHEWING ORAL at 20:52

## 2021-11-29 RX ADMIN — DIPHENHYDRAMINE HCL 50 MG: 50 CAPSULE ORAL at 20:31

## 2021-11-29 RX ADMIN — PROPRANOLOL HYDROCHLORIDE 10 MG: 10 TABLET ORAL at 20:31

## 2021-11-29 RX ADMIN — FOLIC ACID 1000 MCG: 1 TABLET ORAL at 08:18

## 2021-11-29 RX ADMIN — NICOTINE POLACRILEX 4 MG: 4 GUM, CHEWING ORAL at 06:37

## 2021-11-29 RX ADMIN — ACETAMINOPHEN 650 MG: 325 TABLET ORAL at 08:14

## 2021-11-29 RX ADMIN — SENNOSIDES AND DOCUSATE SODIUM 1 TABLET: 50; 8.6 TABLET ORAL at 14:34

## 2021-11-29 RX ADMIN — DIAZEPAM 1 MG: 2 TABLET ORAL at 15:52

## 2021-11-29 RX ADMIN — MAGNESIUM OXIDE TAB 400 MG (241.3 MG ELEMENTAL MG) 400 MG: 400 (241.3 MG) TAB at 08:17

## 2021-11-29 RX ADMIN — NICOTINE POLACRILEX 4 MG: 4 GUM, CHEWING ORAL at 07:52

## 2021-11-29 RX ADMIN — BUPRENORPHINE 8 MG: 2 TABLET SUBLINGUAL at 08:19

## 2021-11-29 RX ADMIN — TIZANIDINE 4 MG: 4 TABLET ORAL at 19:31

## 2021-11-29 RX ADMIN — PROPRANOLOL HYDROCHLORIDE 10 MG: 10 TABLET ORAL at 13:43

## 2021-11-29 RX ADMIN — NICOTINE POLACRILEX 4 MG: 4 GUM, CHEWING ORAL at 15:28

## 2021-11-29 RX ADMIN — ARIPIPRAZOLE 5 MG: 5 TABLET ORAL at 08:16

## 2021-11-29 RX ADMIN — LORAZEPAM 1 MG: 1 TABLET ORAL at 06:15

## 2021-11-29 RX ADMIN — LEVOTHYROXINE SODIUM 75 MCG: 0.03 TABLET ORAL at 06:15

## 2021-11-29 ASSESSMENT — ACTIVITIES OF DAILY LIVING (ADL)
ADLS_ACUITY_SCORE: 10

## 2021-11-29 NOTE — PROGRESS NOTES
"Psychiatric Progress Note  Bipolar I disorder, most recent episode depressed, severe: Not dariusz for safety in the community due to recurring suicidality and hopelessness  Mood changes in the context of COVID-19.  Sleep difficulties due to above.     Recommendations:  Abilify 5 mg daily.  Seroquel 50 mg 3 times a day x9 doses. Patient does not want to take this during daytime, so will discontinue it.   Benadryl 50 mg 3 times a day for 6 doses.  Valium 1 mg every 6 hours as needed while at [facility].   Quetiapine 300 mg at bedtime.  Lyrica 200 mg 3 times a day.  Continue with one-to-one for suicide precaution.  At this time plan is to return to psychiatric inpatient unit when deemed COVID recovered.  Patient is not dariusz for safety.  We will continue to monitor.  Case was also discussed with addiction medicine who will be managing patient's Suboxone.      SUBJECTIVE:  Patient was anxious, restless, moving from bed to chair constantly, poor eye contact, short of breath and expressing suicidality.  Anxiety is \"out of control\".  He is not sleeping well, reporting of racing thoughts and thoughts of dying.  Expressing hopelessness and helplessness.  He denies any hallucinations, delusions or paranoia.  He is becoming agitated as he is talking however not showing any violent behavior.  Easily redirectable.  He wants to return to psychiatric inpatient unit.    MENTAL STATUS EXAMINATION:   Anxious, restless, agitated.  Speech is clear.  Thought processes clear.  Thought content does not directable sessions, delusions or paranoia.  No loosening of associations.  Judgment, insight, memory, attention, comprehension, fund of knowledge are adequate.  Language is intact.  Gait and ambulation within normal range.  Affect is blunted mood anxious.  Awake, orientation x4.  Suicidality: Yes.  Dariusz for safety: No.  Vital signs in last 24 hours  Temp:  [97.2  F (36.2  C)-98.3  F (36.8  C)] 97.6  F (36.4 "  C)  Pulse:  [58-81] 79  Resp:  [18] 18  BP: (122-156)/(69-90) 156/90  SpO2:  [84 %-95 %] 93 %

## 2021-11-29 NOTE — PROGRESS NOTES
Progress Note    Assessment/Plan  Hollis Barclay is a 51 year old male who was admitted on 11/22/2021. He has a [facility] history of bipolar disorder, borderline personality disorder, TBI, polysubstance abuse, alcoholism, group home resident presented to [facility] on 11/13 with suicide attempt, transferred to [facility] inpatient psych unit on 11/19, then transferred to [facility] on 11/22 for Covid hypoxia.     Problem List:  1.  Acute COVID Pneumonia      Acute hypoxic respiratory failure     COVID-19 Diagnosis Details:  Onset of COVID symptoms 11/21   Date of positive test results 11/22   Vaccinated? Fully Vaccinated   --Does not require supplemental oxygen at rest.  -- completed remdesivir and dexamethasone.  11/28  --Continue incentive spirometry  --Patient needs to be in isolation until December 2, as per infection control guidelines.  Thereafter patient would be considered Covid recovered    2. Bipolar disorder      Borderline personality disorder      Suicidal ideation with recent suicide attempt      History of TBI  Patient was hospitalized at [facility] 11/13-11/19 after presenting with drug overdose and alcohol intoxication with intention to commit suicide secondary to worsening depression.  Patient overdosed on 5 tablets of oxycodone, 22x300 mg tablets of Seroquel and 4x8 mg tablets of Suboxone while drinking 1 gallon of vodka within a 24-hour period all in attempt to kill himself. Was transferred to [facility] Psych unit 11/19. Requires one-to-one for suicidality while here at [facility].  Psych following here.  --Had a panic attack on 11/27 and therefore started on milligram Ativan every 6 hours as needed for anxiety.  --Tolerating Subutex dose to 8 mg twice daily  This would help with his anxiety.  Plan would be back to [facility] when medical issues resolved  Status is currently Voluntary  Currently on Abilify, Seroquel, Benadryl, Valium, Lyrica as per psychiatry  --Currently on one-to-one     3.  "Alcoholism  Treated for withdrawal at [facility]     4. Chronic pain syndrome     History of polysubstance/drug abuse  Addiction medicine following  Tolerating Subutex to 8 mg twice daily dose.  Suboxone was increased once patient got off supplemental oxygen     5. Alcoholic hepatitis  LFTs slightly increased 11/25 from 11/24/21  LFTs are stable  Off remdesevir.      6. Nicotine dependence  Gum     7. HTN  Continue amlodipine, propranolol     8. GERD,  continue home PPI     9. Constipation  senna as needed     10. Nasal bone fracture  Plan to see ENT as outpatient     11. Hypothyroidism  Continue home Synthroid     12. Insomnia and panic attack  steroid likely worsening this.  DC dexamethasone.  Continue as needed Ativan     13. Dilated aortic root  noted on echo but not prominent on CTA chest. Would recommend annual surveillance CT.    Hypokalemia mild  Resolved with replacement    Diet: Regular Diet Adult    Barksdale Catheter: Not present  Central Lines: None  Code Status: Full Code       Disposition Plan   Disposition: back to Psych unit when COVID pneumonia better likely on 11/29  Discharge barriers: Awaiting placement     Diet: Regular Diet Adult    DVT Prophylaxis: DOAC  Barksdale Catheter: Not present  Code Status: Full Code        Subjective    Patient has been off oxygen at rest but does desaturate to 86% on ambulation.  Is able to do more than 5 L on incentive spirometry.  Refuses to take Seroquel during the daytime.  Psychiatry ordered Valium scheduled twice daily and Benadryl 3 times a day.      Denies any nausea vomiting diarrhea fever or chills    Objective    BP (!) 156/90 (BP Location: Left arm)   Pulse 79   Temp 97.6  F (36.4  C) (Oral)   Resp 18   Ht 1.93 m (6' 4\")   Wt 131.6 kg (290 lb 3.2 oz)   SpO2 93%   BMI 35.32 kg/m    Weight:   Wt Readings from Last 5 Encounters:   11/22/21 131.6 kg (290 lb 3.2 oz)   11/19/21 135.5 kg (298 lb 12.8 oz)   11/14/21 129 kg (284 lb 6.3 oz)   09/23/21 124.7 kg (275 " lb)   08/17/21 115.2 kg (254 lb)       I/O last 3 completed shifts:  In: 840 [P.O.:840]  Out: -   I/O this shift:  In: 250 [P.O.:250]  Out: -           Physical Exam  Alert, oriented*3  Appears calmer than yesterday  No pallor, icterus, clubbing, cyanosis  Body mass index is 35.32 kg/m .  Tremors of the outstretched hand   No sinus tenderness  Moist membranes  Neck supple  CVS: S1 S2-N, no murmurs, gallops, rubs  Resp: No crackles heard  Abd: soft, No t/g/r    Vasc: no leg edema     Pertinent Labs  ----------------------  Recent Labs   Lab 11/29/21  0540 11/28/21  0957 11/28/21  0727 11/27/21  0652 11/26/21  0950 11/25/21  0742 11/24/21  0658   NA  --   --   --  141 142 142 141   POTASSIUM 4.0 3.5  --  3.4* 3.6 3.7 4.3   CO2  --   --   --  28 28 25 25   BUN  --   --   --  20 20 16 15   CR  --   --   --  0.68* 0.72 0.74 0.69*   GLC  --   --  119* 145* 154* 167* 195*   ALBUMIN  --   --   --   --  3.0* 3.0* 3.1*   BILITOTAL  --   --   --   --  0.4 0.5 0.4   ALKPHOS  --   --   --   --  67 67 66   ALT  --   --   --   --  130* 92* 48*   AST  --   --   --   --  81* 94* 47*     Recent Labs   Lab 11/26/21  0950 11/25/21  0742 11/24/21  0658   WBC 10.3 9.9 8.7   HGB 11.8* 11.7* 11.6*   HCT 37.0* 36.8* 36.6*    296 249     Recent Labs   Lab 11/22/21  1522   INR 0.91     Glucose Values Latest Ref Rng & Units 11/18/2021 11/22/2021 11/23/2021 11/24/2021 11/25/2021 11/26/2021 11/27/2021   Bedside Glucose (mg/dl )  - -- -- -- -- -- -- --   GLUCOSE 70 - 125 mg/dL 106(H) 115 170(H) 195(H) 167(H) 154(H) 145(H)   Some recent data might be hidden         Pertinent Radiology   Radiology Results: Personally reviewed impression/s  No results found.  EKG Results: not reviewed.

## 2021-11-29 NOTE — PROGRESS NOTES
"Addiction Medicine Follow Up    Tele-Visit Details    Type of service:  Video Visit    Time Service Began (time 1st connected with pt): 1611      Time Service Ended (time completely finished with pt): 1627    Originating Location (pt. Location): Patient Rm,  [facility]    Distant Location (provider location): [facility]    Reason for Televisit: COVID 19    Mode of Communication:  Video Conference via [employer]    Physician has received verbal consent for a video visit from the patient? Yes      Active Problems:    Pneumonia due to COVID-19 virus  Opioid Use Disorder  Alcohol use disorder    Subjective  Chief complaint: Back pain    HPI: Patient is complaining of low back pain which is chronic.   Reports that it is bothering hm more than usual perhaps because has been in bed more.   It does not radiate.   He reports that neither heat or ice or Lidocaine patches have helped.   He is asking for a \"muscle relaxant\" and says that Soma has worked best for him in the past.       He remains anxious and reports he is still having some suicidal thoughts, but \"not as bad\" as in past.       He is also less SOB and his O2 sats have been staying in mid 90's off O2.    ROS:  Neg. Except as noted above.         amLODIPine  5 mg Oral Daily     apixaban ANTICOAGULANT  2.5 mg Oral BID     ARIPiprazole  5 mg Oral Daily     buprenorphine  8 mg Sublingual BID     diazepam  1 mg Oral BID     diphenhydrAMINE  50 mg Oral TID     folic acid  1,000 mcg Oral Daily     levothyroxine  75 mcg Oral QAM AC     magnesium oxide  400 mg Oral BID     multivitamin w/minerals  1 tablet Oral Daily     pantoprazole  40 mg Oral QAM AC     Pregabalin  200 mg Oral TID     propranolol  10 mg Oral TID     QUEtiapine  300 mg Oral At Bedtime     sodium chloride (PF)  3 mL Intracatheter Q8H       Objective    /62 (BP Location: Right arm, Patient Position: Prone)   Pulse 67   Temp 98.2  F (36.8  C) (Oral)   Resp " "16   Ht 1.93 m (6' 4\")   Wt 131.6 kg (290 lb 3.2 oz)   SpO2 93%   BMI 35.32 kg/m      Physical Exam per hospitalist:  Lungs:  No Crackles, Heart:  Reg without murmur, Abd: nontender..        Skin:   No diaphoresis  Neuro:  Alert, oriented x 4   Sl. Tremor reported.    No difficulty with gait   Psych:     Cooperative     Mood:  depressed and anxious               Affect:  guarded, blunted               Thought content:  reports some suicidal ideation, but he denies intent currently               Thought processes:  Linear, focused                Speech:  Normal                Motor:  IIncreased restlessness                Insight/judgement:  fair/ fair    Results  No new lab    Assessment and Plan:  1.  Opioid use disorder, severe -  hospitalist increased buprenorphine to 8 mg bid yesterday, and patient's respiratory status is stable.   Patient want to increase further,.  He insists he was on 8 mg tid, but doubt that since his last fill was on 9/27 for 30 days worth.   Would monitor at this dose for awhile.          Also note that patient has now been prescribed diazepam, both scheduled and prn for anxiety.   Note that the patient has been continually asking for diazepam since he arrived on  unit.   The combination of buprenorphine and Benzos can significantly increase the risk of respiratory compromise and death, so we try to avoid in patients on buprenorphine.    Furthermore, he is at high risk of developing abuse or addiction due to his other substance abuse issues.   I have discontinued the scheduled diazepam and will just continue the prn , with plan to get completely off as soon as possible.       2.  Alcohol Use Disorder, severe - Had severe withdrawal when at [facility] - he was on acamprosate PTA and thinks it was helpful.   Will plan to restart this before he discharges from  unit.      3.  BPAD I with recent depression, severe  - suicide attempt 2 wks ago.   Unable to contract for safety.   As per " psychiatry consult, plan is for him to transfer back to [facility] unit when deemed no longer contagious. On abilify and quetiapine.   ? Consider adding anti-depressant at this point. ? Consider additional medication for anxiety?    4.  Low back pain -  Patient says the only muscle relaxant that has helped is Soma, which converts to meprobamate, so should not be used.   He has never tried tizanidine, so will start that at medium dose.   Already on pregabalin.   Discussed non-pharmacologic therapies as well.      Patient demonstrating some staff splitting, and drug seeking.   Will coordinate care with Dr. Marks in am.           Ivette Mahoney MD  Addiction Medicine Service  [facility]   Page me (click here for Salena Mahoney)

## 2021-11-29 NOTE — PLAN OF CARE
Problem: Adult Inpatient Plan of Care  Goal: Plan of Care Review  Outcome: Improving     Problem: Anxiety  Goal: Anxiety Reduction or Resolution  Outcome: Improving     Problem: Gas Exchange Impaired  Goal: Optimal Gas Exchange  Outcome: Improving   Patient tolerated RA over night with sats in the low 90's. No complaints of pain. PRN Ativan and nicotine gum given for c/o anxiety.

## 2021-11-29 NOTE — PLAN OF CARE
"  Problem: Anxiety  Goal: Anxiety Reduction or Resolution  Outcome: Improving     Problem: Gas Exchange Impaired  Goal: Optimal Gas Exchange  Outcome: Improving     Ambulated in hallway with staff, \"I might feel less anxious if I walk.\"  PRN ativan and nicorette gum also given.  Upon returning from ambulation patient's oxygen saturation was 84% on room air.  After sitting for a short time his oxygen saturation returned to above 92%.  The walking in the ross did help with his restlessness.    Ate a large supper, showered and sitting relaxed in chair reading a book and watching TV.    Angie Marquez RN  "

## 2021-11-29 NOTE — PLAN OF CARE
"  Problem: Anxiety  Goal: Anxiety Reduction or Resolution  Outcome: No Change  Patient remains restless, pacing most of the shift. He is cooperative and follows commands, Ativan given x 1 with some relief noted, Scheduled Valium given this afternoon. Does c/o lower back discomfort, Tylenol given x 1 with only slight relief noted by patient  Problem: Suicide Risk  Goal: Absence of Self-Harm  Outcome: Improving  Patient continues to state that he feels suicidal off and on, \"I just want to die\". Denies having a plan, Remains with 1:1 sitter and suicide precautions all shift,     Problem: Gas Exchange Impaired  Goal: Optimal Gas Exchange  Outcome: Improving  O2 sat 93% on RA, using incentive spirometer well up to  1800, infrequent congested cough with yellow/white sputum, encouraged increased po fluids.            "

## 2021-11-30 ENCOUNTER — APPOINTMENT (OUTPATIENT)
Dept: RADIOLOGY | Facility: HOSPITAL | Age: 51
End: 2021-11-30
Attending: INTERNAL MEDICINE
Payer: MEDICAID

## 2021-11-30 LAB
HOLD SPECIMEN: NORMAL
HOLD SPECIMEN: NORMAL
POTASSIUM BLD-SCNC: 3.7 MMOL/L (ref 3.5–5)

## 2021-11-30 PROCEDURE — 250N000013 HC RX MED GY IP 250 OP 250 PS 637: Performed by: FAMILY MEDICINE

## 2021-11-30 PROCEDURE — 71101 X-RAY EXAM UNILAT RIBS/CHEST: CPT | Mod: LT

## 2021-11-30 PROCEDURE — 99232 SBSQ HOSP IP/OBS MODERATE 35: CPT | Performed by: NURSE PRACTITIONER

## 2021-11-30 PROCEDURE — 250N000013 HC RX MED GY IP 250 OP 250 PS 637: Performed by: INTERNAL MEDICINE

## 2021-11-30 PROCEDURE — 36415 COLL VENOUS BLD VENIPUNCTURE: CPT | Performed by: INTERNAL MEDICINE

## 2021-11-30 PROCEDURE — 250N000013 HC RX MED GY IP 250 OP 250 PS 637: Performed by: NURSE PRACTITIONER

## 2021-11-30 PROCEDURE — 120N000001 HC R&B MED SURG/OB

## 2021-11-30 PROCEDURE — 99232 SBSQ HOSP IP/OBS MODERATE 35: CPT | Performed by: INTERNAL MEDICINE

## 2021-11-30 PROCEDURE — 84132 ASSAY OF SERUM POTASSIUM: CPT | Performed by: INTERNAL MEDICINE

## 2021-11-30 RX ORDER — NAPROXEN 250 MG/1
250 TABLET ORAL 2 TIMES DAILY WITH MEALS
Status: DISCONTINUED | OUTPATIENT
Start: 2021-11-30 | End: 2021-12-01

## 2021-11-30 RX ADMIN — APIXABAN 2.5 MG: 2.5 TABLET, FILM COATED ORAL at 08:30

## 2021-11-30 RX ADMIN — PROPRANOLOL HYDROCHLORIDE 10 MG: 10 TABLET ORAL at 21:38

## 2021-11-30 RX ADMIN — NICOTINE POLACRILEX 4 MG: 4 GUM, CHEWING ORAL at 21:40

## 2021-11-30 RX ADMIN — ARIPIPRAZOLE 5 MG: 5 TABLET ORAL at 08:31

## 2021-11-30 RX ADMIN — PREGABALIN 200 MG: 100 CAPSULE ORAL at 21:38

## 2021-11-30 RX ADMIN — NICOTINE POLACRILEX 4 MG: 4 GUM, CHEWING ORAL at 07:58

## 2021-11-30 RX ADMIN — NICOTINE POLACRILEX 4 MG: 4 GUM, CHEWING ORAL at 18:47

## 2021-11-30 RX ADMIN — PROPRANOLOL HYDROCHLORIDE 10 MG: 10 TABLET ORAL at 13:27

## 2021-11-30 RX ADMIN — APIXABAN 2.5 MG: 2.5 TABLET, FILM COATED ORAL at 21:38

## 2021-11-30 RX ADMIN — NAPROXEN 250 MG: 250 TABLET ORAL at 13:27

## 2021-11-30 RX ADMIN — NICOTINE POLACRILEX 4 MG: 4 GUM, CHEWING ORAL at 02:27

## 2021-11-30 RX ADMIN — BUPRENORPHINE 4 MG: 2 TABLET SUBLINGUAL at 08:29

## 2021-11-30 RX ADMIN — PREGABALIN 200 MG: 100 CAPSULE ORAL at 13:27

## 2021-11-30 RX ADMIN — ACETAMINOPHEN 650 MG: 325 TABLET ORAL at 08:28

## 2021-11-30 RX ADMIN — PANTOPRAZOLE SODIUM 40 MG: 40 TABLET, DELAYED RELEASE ORAL at 07:03

## 2021-11-30 RX ADMIN — BUPRENORPHINE 4 MG: 2 TABLET SUBLINGUAL at 07:33

## 2021-11-30 RX ADMIN — TIZANIDINE 4 MG: 4 TABLET ORAL at 10:37

## 2021-11-30 RX ADMIN — OLANZAPINE 10 MG: 10 TABLET, FILM COATED ORAL at 17:14

## 2021-11-30 RX ADMIN — FOLIC ACID 1000 MCG: 1 TABLET ORAL at 08:31

## 2021-11-30 RX ADMIN — AMLODIPINE BESYLATE 5 MG: 5 TABLET ORAL at 08:31

## 2021-11-30 RX ADMIN — DIPHENHYDRAMINE HCL 50 MG: 50 CAPSULE ORAL at 13:27

## 2021-11-30 RX ADMIN — DIAZEPAM 1 MG: 2 TABLET ORAL at 07:58

## 2021-11-30 RX ADMIN — MAGNESIUM OXIDE TAB 400 MG (241.3 MG ELEMENTAL MG) 400 MG: 400 (241.3 MG) TAB at 08:34

## 2021-11-30 RX ADMIN — DIAZEPAM 1 MG: 2 TABLET ORAL at 17:16

## 2021-11-30 RX ADMIN — DIAZEPAM 1 MG: 2 TABLET ORAL at 21:38

## 2021-11-30 RX ADMIN — NICOTINE POLACRILEX 4 MG: 4 GUM, CHEWING ORAL at 16:42

## 2021-11-30 RX ADMIN — SENNOSIDES AND DOCUSATE SODIUM 1 TABLET: 50; 8.6 TABLET ORAL at 14:59

## 2021-11-30 RX ADMIN — NAPROXEN 250 MG: 250 TABLET ORAL at 21:41

## 2021-11-30 RX ADMIN — MULTIPLE VITAMINS W/ MINERALS TAB 1 TABLET: TAB at 08:31

## 2021-11-30 RX ADMIN — DIAZEPAM 1 MG: 2 TABLET ORAL at 15:00

## 2021-11-30 RX ADMIN — BUPRENORPHINE 8 MG: 2 TABLET SUBLINGUAL at 21:38

## 2021-11-30 RX ADMIN — NICOTINE POLACRILEX 4 MG: 4 GUM, CHEWING ORAL at 15:00

## 2021-11-30 RX ADMIN — ACETAMINOPHEN 650 MG: 325 TABLET ORAL at 16:25

## 2021-11-30 RX ADMIN — DIPHENHYDRAMINE HCL 50 MG: 50 CAPSULE ORAL at 08:30

## 2021-11-30 RX ADMIN — LEVOTHYROXINE SODIUM 75 MCG: 0.03 TABLET ORAL at 07:03

## 2021-11-30 RX ADMIN — PROPRANOLOL HYDROCHLORIDE 10 MG: 10 TABLET ORAL at 08:31

## 2021-11-30 RX ADMIN — QUETIAPINE FUMARATE 300 MG: 300 TABLET, FILM COATED ORAL at 21:38

## 2021-11-30 RX ADMIN — PREGABALIN 200 MG: 100 CAPSULE ORAL at 08:31

## 2021-11-30 RX ADMIN — DIPHENHYDRAMINE HCL 50 MG: 50 CAPSULE ORAL at 21:38

## 2021-11-30 RX ADMIN — MAGNESIUM OXIDE TAB 400 MG (241.3 MG ELEMENTAL MG) 400 MG: 400 (241.3 MG) TAB at 21:38

## 2021-11-30 ASSESSMENT — ACTIVITIES OF DAILY LIVING (ADL)
ADLS_ACUITY_SCORE: 10

## 2021-11-30 ASSESSMENT — MIFFLIN-ST. JEOR: SCORE: 2253.78

## 2021-11-30 NOTE — PLAN OF CARE
Problem: Adult Inpatient Plan of Care  Goal: Patient-Specific Goal (Individualized)  Outcome: No Change     Continues to be 1:1 for suicidal ideation. Periods of restlessness. Woke up during night, hungry. Hearty appetite. Ate mindy crackers w/peanut butter, turkey sandwich, muffin. Drank carton of milk. Very sleepy. Eyes closed while eating and dozed off at times. No pain or discomfort. Able to go back to sleep after eating.

## 2021-11-30 NOTE — PLAN OF CARE
Problem: Anxiety  Goal: Anxiety Reduction or Resolution  Outcome: No Change     Problem: Gas Exchange Impaired  Goal: Optimal Gas Exchange  Outcome: Improving     Problem: Suicide Risk  Goal: Absence of Self-Harm  Outcome: No Change       Patient restless early part of shift. Redirectable and compliant with meds and cares but restlessly moving around room. Patient verbalized lower back pain. Tylenol given to no effect. Order for tizanidine and lidocaine cream received and meds applied. Proved effective.   Patient ambulated in hallway with mask and 1 to 1 staff. Ambulation assisted in emotional comfort and bowel elimination. Patient  had one large bowel movement this shift.   Patient on K protocol. 4.0.  Recheck in morning.   Patient restlessness  lessened through out evening.   Patient resting in bed, lights of.   Patient Positive for covid, on airborne precautions.   Patient on 1 to 1 for suicidal ideation. Patient stated that he has no plan and that the thought of self harm are less and less.

## 2021-11-30 NOTE — PROGRESS NOTES
Care Management Follow Up    Length of Stay (days): 8    Expected Discharge Date: 12/03/2021     Concerns to be Addressed: discharge planning       Patient plan of care discussed at interdisciplinary rounds: Yes    Anticipated Discharge Disposition: Inpatient Mental Health,Other (Comments) (transfer back to [facility] inpt psych)     Anticipated Discharge Services:    Anticipated Discharge DME:      Patient/family educated on Medicare website which has current facility and service quality ratings:  N/A    Education Provided on the Discharge Plan:  Yes    Patient/Family in Agreement with the Plan: yes    Referrals Placed by CM/SW:  N/A    Private pay costs discussed: Not applicable    Additional Information: Per Dr Marks, pt would like to speak to MARYCRUZ and is stating that he will lose his benefits.  MARYCRUZ returned call to Vicente at [facility] (pt's group home).  His number is [phone] (ext [phone] or ext [phone]).  MARYCRUZ updated Vicente regarding pt's status and plan for pt to return to [facility] inpatient psych unit once quarantine for Covid up at the end of this week.  Pt's housing comes with services through [facility] waiver.  If pt out of group home for more than thirty days, his waiver will close and he may lose bed.  Vicente stated that residence is no longer called a group home - now called an [facility]?  Pt has resided there for about six months.  Pt would need reassessment and new service agreement to come back.  He is working with pt's CADI  from [facility] to coordinate continued services.  Her name is Jessica Lawson.  He will give Jessica hill MARYCRUZ's number and ask her to call MARYCRUZ.      MARYCRUZ called and spoke with pt per his request.  MARYCRUZ updated pt regarding conversation with Vicente from pt's group home.  Pt stated that he was not even sure if he wanted to go back there after he is done at [facility].  He reported that he relapsed there due to another resident bringing in alcohol and oxys.  He stated that he reported this to  group home staff but they blew him off and did nothing to address the issue.  They told him they did not have any proof.  Pt reported that he had been talking to his brother Ishaan, who offered pt a place to go up in [place], [place] (near [place]).  Pt stated that he had not yet communicated this to his CADI worker Jessica, although he plans to do so as soon as possible.  Pt would like to go there with brother - if possible from [facility], but more than likely would be from [facility] once his mental health is stabilized.  Pt agreed to return to [facility] inpatient psych unit upon discharge.  Discussed his level of anxiety as of late and today thus far.  Pt also shared that he was upset his wallet and ID were missing.  Pt reported that he had been on the phone today for an hour and a half with security at both [facility] and [facility].  Pt stated that they were lost somewhere between the two hospitals when he was transferred.  Pt seemed quite upset about this.  Pt stated that his social security card was also in the wallet and he is from the [facility] so will be quite difficult to request another social security card.  SW provided pt with her contact information so that pt can call back if he needs further assistance.      MARQUEZ Simons, NORBERTO 11/30/21 5:01 PM

## 2021-11-30 NOTE — PLAN OF CARE
Problem: Suicide Risk  Goal: Absence of Self-Harm  Outcome: No Change  Continues to verbalize suicidal thoughts, no plan. 1:1 sitter remains in place and suicidal precautions continue.  Problem: Anxiety  Goal: Anxiety Reduction or Resolution  Outcome: No Change  Agitated and restless this AM, Upset that this nurse was asking him questions while he was in the bathroom.  Valium given with some relief noted, Continues to pace, slightly unsteady when up, but insists on ambulating.   Problem: Pain Acute  Goal: Acceptable Pain Control and Functional Ability  Intervention: Develop Pain Management Plan  Recent Flowsheet Documentation  Taken 11/30/2021 0915 by Elle Elkins, RN  Pain Management Interventions: cold applied  Taken 11/30/2021 0828 by Elle Elkins, RN  Pain Management Interventions: medication (see MAR)  Patient c/o pain in lower back, headache, right knee pain. 7/10. Tylenol given with no significant relief noted, cool pack to lower back. Tizantdine given with increased relief noted, naproxyn given per schedule  Problem: Gas Exchange Impaired  Goal: Optimal Gas Exchange  Outcome: Improving Lung sounds clear, No shortness of breath, using the IS, continued to encourage deep breathing and the use of the IS

## 2021-11-30 NOTE — PLAN OF CARE
"Patient getting progressively agitated this shift. Pacing, stating \"I need to speak to the dr, if they don't give me something to put me down Im walking out of here\" \"I wish I had something sharp to cut my throat to end the pain\" Patient pounding on the wall, pacing and stating he was \"going to loose it\" complaining of right knee pain currently.  Patient initially refused zyprex stating it \"makes me feel weird\" but then later agreed to take it. Patient had already had valium at 1500, tylenol, ibuprofen and tizanidine in the am. Called dr chavez who differed to Psych. Updated Ruthie Quick will make changes to PRN meds.   "

## 2021-11-30 NOTE — PROGRESS NOTES
Progress Note    Assessment/Plan  Hollis Barclay is a 51 year old male who was admitted on 11/22/2021. He has a [facility] history of bipolar disorder, borderline personality disorder, TBI, polysubstance abuse, alcoholism, group home resident presented to [facility] on 11/13 with suicide attempt, transferred to [facility] inpatient psych unit on 11/19, then transferred to [facility] on 11/22 for Covid hypoxia.     Problem List:  1.  Acute COVID Pneumonia      Acute hypoxic respiratory failure     COVID-19 Diagnosis Details:  Onset of COVID symptoms 11/21   Date of positive test results 11/22   Vaccinated? Fully Vaccinated   --Does not require supplemental oxygen at rest.  -- completed remdesivir and dexamethasone.  11/28  --Continue incentive spirometry  --Patient needs to be in isolation until December 2, as per infection control guidelines.  Thereafter patient would be considered Covid recovered    2. Bipolar disorder      Borderline personality disorder      Suicidal ideation with recent suicide attempt      History of TBI  Patient was hospitalized at [facility] 11/13-11/19 after presenting with drug overdose and alcohol intoxication with intention to commit suicide secondary to worsening depression.  Patient overdosed on 5 tablets of oxycodone, 22x300 mg tablets of Seroquel and 4x8 mg tablets of Suboxone while drinking 1 gallon of vodka within a 24-hour period all in attempt to kill himself. Was transferred to [facility] Psych unit 11/19. Requires one-to-one for suicidality while here at [facility].  Psych following here.  --Had a panic attack on 11/27 and therefore started on milligram Ativan every 6 hours as needed for anxiety.  --Tolerating Subutex dose to 8 mg twice daily  This would help with his anxiety.  Plan would be back to [facility] when medical issues resolved  Status is currently Voluntary  Currently on Abilify, Seroquel, Benadryl, Valium, Lyrica as per psychiatry.  Addiction medicine change scheduled  diazepam to as needed for anxiety due to risk of respiratory depression due to concomitant use of buprenorphine.  --Currently on one-to-one     3. Alcoholism  Treated for withdrawal at [facility]     4. Chronic pain syndrome     History of polysubstance/drug abuse  Addiction medicine following  Tolerating Subutex to 8 mg twice daily dose.  Suboxone was increased once patient got off supplemental oxygen.  As per addiction medicine, avoid scheduled benzos.    5. Alcoholic hepatitis  LFTs slightly increased 11/25 from 11/24/21  LFTs are stable  Off remdesevir.      6. Nicotine dependence  Gum     7. HTN  Continue amlodipine, propranolol     8. GERD,  continue home PPI     9. Constipation  senna as needed     10. Nasal bone fracture  Plan to see ENT as outpatient     11. Hypothyroidism  Continue home Synthroid     12. Insomnia and panic attack  Now on PRN ativan    13. Dilated aortic root  noted on echo but not prominent on CTA chest. Would recommend annual surveillance CT.    Hypokalemia mild  Resolved with replacement  --DC protocol    Back pain acute on chronic  --Ordered topical lidocaine--does not work  --Addiction medicine specialist prescribed tizanidine which patient claims is not working  --Currently on pregabalin  --Was prescribed naproxen at [facility] and therefore will try the same for couple days.  --Order x-ray of the ribs to rule out fracture    Diet: Regular Diet Adult    Barksdale Catheter: Not present  Central Lines: None  Code Status: Full Code       Disposition Plan   Disposition: back to Psych unit when COVID pneumonia better likely on 11/29  Discharge barriers: Awaiting placement     Diet: Regular Diet Adult    DVT Prophylaxis: DOAC  Barksdale Catheter: Not present  Code Status: Full Code        Subjective  Patient has been off oxygen.  Complains of anxiety.  Has had chronic back pain which is worsened.  Addiction medicine specialist started the patient on tizanidine which patient claims he knows  "not working for him.  Review of records shows that patient has tolerated naproxen at [facility] and therefore will give a trial of naproxen twice daily.  Order x-ray of the left rib to rule out rib fractures.  Agree with addiction medicine regarding DC of scheduled benzos.      Denies any nausea vomiting diarrhea fever or chills    Objective    /79 (BP Location: Right arm)   Pulse 64   Temp 98.5  F (36.9  C) (Oral)   Resp 20   Ht 1.93 m (6' 4\")   Wt 130.2 kg (287 lb 1.6 oz)   SpO2 94%   BMI 34.95 kg/m    Weight:   Wt Readings from Last 5 Encounters:   11/30/21 130.2 kg (287 lb 1.6 oz)   11/19/21 135.5 kg (298 lb 12.8 oz)   11/14/21 129 kg (284 lb 6.3 oz)   09/23/21 124.7 kg (275 lb)   08/17/21 115.2 kg (254 lb)       I/O last 3 completed shifts:  In: 400 [P.O.:400]  Out: -   No intake/output data recorded.          Physical Exam  Alert, oriented*3  Appears agitated  No pallor, icterus, clubbing, cyanosis  Body mass index is 34.95 kg/m .  Tremors of the outstretched hand   No sinus tenderness  Mild tenderness in the paraspinal area just below the angle of the left scapula   moist membranes  Neck supple  CVS: S1 S2-N, no murmurs, gallops, rubs  Resp: No crackles heard  Abd: soft, No t/g/r    Vasc: no leg edema     Pertinent Labs  ----------------------  Recent Labs   Lab 11/30/21  0546 11/29/21  0540 11/28/21  0957 11/28/21  0727 11/27/21  0652 11/26/21  0950 11/25/21  0742 11/24/21  0658   NA  --   --   --   --  141 142 142 141   POTASSIUM 3.7 4.0 3.5  --  3.4* 3.6 3.7 4.3   CO2  --   --   --   --  28 28 25 25   BUN  --   --   --   --  20 20 16 15   CR  --   --   --   --  0.68* 0.72 0.74 0.69*   GLC  --   --   --  119* 145* 154* 167* 195*   ALBUMIN  --   --   --   --   --  3.0* 3.0* 3.1*   BILITOTAL  --   --   --   --   --  0.4 0.5 0.4   ALKPHOS  --   --   --   --   --  67 67 66   ALT  --   --   --   --   --  130* 92* 48*   AST  --   --   --   --   --  81* 94* 47*     Recent Labs   Lab " 11/26/21  0950 11/25/21  0742 11/24/21  0658   WBC 10.3 9.9 8.7   HGB 11.8* 11.7* 11.6*   HCT 37.0* 36.8* 36.6*    296 249     No results for input(s): INR in the last 168 hours.  Glucose Values Latest Ref Rng & Units 11/18/2021 11/22/2021 11/23/2021 11/24/2021 11/25/2021 11/26/2021 11/27/2021   Bedside Glucose (mg/dl )  - -- -- -- -- -- -- --   GLUCOSE 70 - 125 mg/dL 106(H) 115 170(H) 195(H) 167(H) 154(H) 145(H)   Some recent data might be hidden         Pertinent Radiology   Radiology Results: Personally reviewed impression/s  No results found.  EKG Results: not reviewed.

## 2021-12-01 ENCOUNTER — APPOINTMENT (OUTPATIENT)
Dept: RADIOLOGY | Facility: HOSPITAL | Age: 51
End: 2021-12-01
Attending: INTERNAL MEDICINE
Payer: MEDICAID

## 2021-12-01 PROCEDURE — 120N000001 HC R&B MED SURG/OB

## 2021-12-01 PROCEDURE — 250N000013 HC RX MED GY IP 250 OP 250 PS 637: Performed by: FAMILY MEDICINE

## 2021-12-01 PROCEDURE — 250N000013 HC RX MED GY IP 250 OP 250 PS 637: Performed by: INTERNAL MEDICINE

## 2021-12-01 PROCEDURE — 99232 SBSQ HOSP IP/OBS MODERATE 35: CPT | Performed by: INTERNAL MEDICINE

## 2021-12-01 PROCEDURE — 73562 X-RAY EXAM OF KNEE 3: CPT | Mod: RT

## 2021-12-01 PROCEDURE — 250N000013 HC RX MED GY IP 250 OP 250 PS 637: Performed by: NURSE PRACTITIONER

## 2021-12-01 PROCEDURE — 99232 SBSQ HOSP IP/OBS MODERATE 35: CPT | Performed by: NURSE PRACTITIONER

## 2021-12-01 RX ORDER — GABAPENTIN 100 MG/1
200 CAPSULE ORAL 3 TIMES DAILY
Status: DISCONTINUED | OUTPATIENT
Start: 2021-12-01 | End: 2021-12-02

## 2021-12-01 RX ORDER — NAPROXEN 250 MG/1
250 TABLET ORAL 2 TIMES DAILY WITH MEALS
Status: COMPLETED | OUTPATIENT
Start: 2021-12-01 | End: 2021-12-04

## 2021-12-01 RX ADMIN — PROPRANOLOL HYDROCHLORIDE 10 MG: 10 TABLET ORAL at 20:21

## 2021-12-01 RX ADMIN — TIZANIDINE 4 MG: 4 TABLET ORAL at 09:56

## 2021-12-01 RX ADMIN — TIZANIDINE 4 MG: 4 TABLET ORAL at 17:57

## 2021-12-01 RX ADMIN — LIDOCAINE: 50 OINTMENT TOPICAL at 07:57

## 2021-12-01 RX ADMIN — QUETIAPINE FUMARATE 300 MG: 300 TABLET, FILM COATED ORAL at 20:21

## 2021-12-01 RX ADMIN — PANTOPRAZOLE SODIUM 40 MG: 40 TABLET, DELAYED RELEASE ORAL at 06:30

## 2021-12-01 RX ADMIN — DIAZEPAM 1 MG: 2 TABLET ORAL at 17:14

## 2021-12-01 RX ADMIN — LEVOTHYROXINE SODIUM 75 MCG: 0.03 TABLET ORAL at 06:30

## 2021-12-01 RX ADMIN — MULTIPLE VITAMINS W/ MINERALS TAB 1 TABLET: TAB at 08:00

## 2021-12-01 RX ADMIN — OLANZAPINE 10 MG: 10 TABLET, FILM COATED ORAL at 10:04

## 2021-12-01 RX ADMIN — NICOTINE POLACRILEX 4 MG: 4 GUM, CHEWING ORAL at 22:42

## 2021-12-01 RX ADMIN — GABAPENTIN 200 MG: 100 CAPSULE ORAL at 20:53

## 2021-12-01 RX ADMIN — ACETAMINOPHEN 650 MG: 325 TABLET ORAL at 22:12

## 2021-12-01 RX ADMIN — PROPRANOLOL HYDROCHLORIDE 10 MG: 10 TABLET ORAL at 14:09

## 2021-12-01 RX ADMIN — PROPRANOLOL HYDROCHLORIDE 10 MG: 10 TABLET ORAL at 08:00

## 2021-12-01 RX ADMIN — NICOTINE POLACRILEX 4 MG: 4 GUM, CHEWING ORAL at 21:01

## 2021-12-01 RX ADMIN — NICOTINE POLACRILEX 4 MG: 4 GUM, CHEWING ORAL at 17:57

## 2021-12-01 RX ADMIN — NICOTINE POLACRILEX 4 MG: 4 GUM, CHEWING ORAL at 14:11

## 2021-12-01 RX ADMIN — MAGNESIUM OXIDE TAB 400 MG (241.3 MG ELEMENTAL MG) 400 MG: 400 (241.3 MG) TAB at 08:00

## 2021-12-01 RX ADMIN — AMLODIPINE BESYLATE 5 MG: 5 TABLET ORAL at 08:00

## 2021-12-01 RX ADMIN — GABAPENTIN 100 MG: 100 CAPSULE ORAL at 16:22

## 2021-12-01 RX ADMIN — DIAZEPAM 1 MG: 2 TABLET ORAL at 20:53

## 2021-12-01 RX ADMIN — DIAZEPAM 1 MG: 2 TABLET ORAL at 06:39

## 2021-12-01 RX ADMIN — APIXABAN 2.5 MG: 2.5 TABLET, FILM COATED ORAL at 08:00

## 2021-12-01 RX ADMIN — DIAZEPAM 1 MG: 2 TABLET ORAL at 14:04

## 2021-12-01 RX ADMIN — NAPROXEN 250 MG: 250 TABLET ORAL at 07:57

## 2021-12-01 RX ADMIN — BUPRENORPHINE 8 MG: 2 TABLET SUBLINGUAL at 22:06

## 2021-12-01 RX ADMIN — GABAPENTIN 100 MG: 100 CAPSULE ORAL at 10:21

## 2021-12-01 RX ADMIN — DICLOFENAC SODIUM 4 G: 10 GEL TOPICAL at 17:16

## 2021-12-01 RX ADMIN — NICOTINE POLACRILEX 4 MG: 4 GUM, CHEWING ORAL at 15:56

## 2021-12-01 RX ADMIN — ALUMINUM HYDROXIDE, MAGNESIUM HYDROXIDE, AND SIMETHICONE 30 ML: 200; 200; 20 SUSPENSION ORAL at 17:53

## 2021-12-01 RX ADMIN — ACETAMINOPHEN 650 MG: 325 TABLET ORAL at 18:10

## 2021-12-01 RX ADMIN — GABAPENTIN 200 MG: 100 CAPSULE ORAL at 14:04

## 2021-12-01 RX ADMIN — NAPROXEN 250 MG: 250 TABLET ORAL at 17:14

## 2021-12-01 RX ADMIN — NICOTINE POLACRILEX 4 MG: 4 GUM, CHEWING ORAL at 09:59

## 2021-12-01 RX ADMIN — NICOTINE POLACRILEX 4 MG: 4 GUM, CHEWING ORAL at 06:39

## 2021-12-01 RX ADMIN — LIDOCAINE: 50 OINTMENT TOPICAL at 14:05

## 2021-12-01 RX ADMIN — LIDOCAINE: 50 OINTMENT TOPICAL at 18:20

## 2021-12-01 RX ADMIN — ALUMINUM HYDROXIDE, MAGNESIUM HYDROXIDE, AND SIMETHICONE 30 ML: 200; 200; 20 SUSPENSION ORAL at 22:52

## 2021-12-01 RX ADMIN — OLANZAPINE 10 MG: 10 TABLET, FILM COATED ORAL at 16:33

## 2021-12-01 RX ADMIN — MAGNESIUM OXIDE TAB 400 MG (241.3 MG ELEMENTAL MG) 400 MG: 400 (241.3 MG) TAB at 20:53

## 2021-12-01 RX ADMIN — BUPRENORPHINE 8 MG: 2 TABLET SUBLINGUAL at 06:30

## 2021-12-01 RX ADMIN — DIAZEPAM 1 MG: 2 TABLET ORAL at 10:59

## 2021-12-01 RX ADMIN — PREGABALIN 200 MG: 100 CAPSULE ORAL at 08:00

## 2021-12-01 RX ADMIN — FOLIC ACID 1000 MCG: 1 TABLET ORAL at 08:00

## 2021-12-01 RX ADMIN — PREGABALIN 200 MG: 100 CAPSULE ORAL at 20:53

## 2021-12-01 RX ADMIN — ACETAMINOPHEN 650 MG: 325 TABLET ORAL at 09:57

## 2021-12-01 RX ADMIN — PREGABALIN 200 MG: 100 CAPSULE ORAL at 14:04

## 2021-12-01 RX ADMIN — DIAZEPAM 1 MG: 2 TABLET ORAL at 08:00

## 2021-12-01 RX ADMIN — APIXABAN 2.5 MG: 2.5 TABLET, FILM COATED ORAL at 20:21

## 2021-12-01 RX ADMIN — ACETAMINOPHEN 650 MG: 325 TABLET ORAL at 14:09

## 2021-12-01 RX ADMIN — NICOTINE POLACRILEX 4 MG: 4 GUM, CHEWING ORAL at 19:51

## 2021-12-01 RX ADMIN — ARIPIPRAZOLE 5 MG: 5 TABLET ORAL at 08:00

## 2021-12-01 RX ADMIN — GABAPENTIN 100 MG: 100 CAPSULE ORAL at 22:29

## 2021-12-01 ASSESSMENT — ACTIVITIES OF DAILY LIVING (ADL)
ADLS_ACUITY_SCORE: 12
ADLS_ACUITY_SCORE: 10
ADLS_ACUITY_SCORE: 12
ADLS_ACUITY_SCORE: 10
ADLS_ACUITY_SCORE: 12
ADLS_ACUITY_SCORE: 10
ADLS_ACUITY_SCORE: 12
ADLS_ACUITY_SCORE: 10
ADLS_ACUITY_SCORE: 12

## 2021-12-01 NOTE — PROGRESS NOTES
"CLINICAL NUTRITION SERVICES - ASSESSMENT NOTE     Nutrition Prescription    RECOMMENDATIONS FOR MDs/PROVIDERS TO ORDER:    Malnutrition Status:    Not noted    Recommendations already ordered by Registered Dietitian (RD):  none    Future/Additional Recommendations:  Follow PRN     EVALUATION OF THE PROGRESS TOWARD GOALS   Diet: Regular  Intake: Eating % at meals.  Pt ordering more than adequate meals.         Nutrition Hx:  Spoke with pt by phone.  Pt reports good appetite and stable wt.      ANTHROPOMETRICS  Height: 193 cm (6' 4\")  Admit wt 290 lb 3.2 oz 11/22/21  Most Recent Weight: 130.2 kg (287 lb 1.6 oz)    BMI: Obesity Grade I BMI 30-34.9  Weight History:   Wt Readings from Last 8 Encounters:   11/30/21 130.2 kg (287 lb 1.6 oz)   11/19/21 135.5 kg (298 lb 12.8 oz)   11/14/21 129 kg (284 lb 6.3 oz)   09/23/21 124.7 kg (275 lb)   08/17/21 115.2 kg (254 lb)   04/29/21 120.2 kg (265 lb)   04/28/21 126.1 kg (278 lb)   01/25/21 131.1 kg (289 lb)     Estimated NUTRITION NEEDS  Using IBW 91.8 kg  Energy Needs: 9391-0649 kcals/day (25 - 30 kcals/kg)  Justification: Maintenance  Protein Needs: 110-138 grams protein/day (1.2 - 1.5 grams of pro/kg)  Justification: Increased needs  Fluid Needs: 2025-8974 mL/day (1 mL/kcal)   Justification: Maintenance    PHYSICAL FINDINGS  See malnutrition section below.  Pt in COVID isolation precautions.    GI CONCERNS  LBM 11/30/21    LABS  fsbg 119-195  Reviewed    MEDICATIONS  MVI, magnesium, folic acid  Reviewed    MALNUTRITION:  % Weight Loss:  None noted  % Intake:  No decreased intake noted  Subcutaneous Fat Loss:  unable  Muscle Loss:  unable  Fluid Retention:  Trace noted per chart    Malnutrition Diagnosis: Patient does not meet two of the above criteria necessary for diagnosing malnutrition    CURRENT NUTRITION DIAGNOSIS  Altered nutrition-related laboratory values related to acute illness and as evidenced by variable BG.      INTERVENTIONS  Implementation  RD will " follow PRN.    Goals  Meet nutrition needs  Maintain wt    Monitoring/Evaluation  Progress toward goals will be monitored and evaluated per protocol.

## 2021-12-01 NOTE — PLAN OF CARE
Patient will be a candidate for COVID RECOVERED on 12/2 as 10 days will have passed.   If no fever and substantial improvement in COVID symptoms, he will be able to come out of isolation on Thursday Dec 2nd

## 2021-12-01 NOTE — PLAN OF CARE
Problem: Adult Inpatient Plan of Care  Goal: Optimal Comfort and Wellbeing  Outcome: No Change     Problem: Suicide Risk  Goal: Absence of Self-Harm  Outcome: No Change     1:1 sitter continued for safety due to suicidal ideation. Some anxiety noted at beginning of shift but able to manage  symptoms by walking around in room.  PRN Valium given this morning for anxiety. Mood flat but engaged well in conversation with writer. Endorses feeling suicidal but no specific plan. Sitting up in chair at present.

## 2021-12-01 NOTE — PLAN OF CARE
"  Problem: Adult Inpatient Plan of Care  Goal: Plan of Care Review  Outcome: Improving   Pt alert to self, place and situation, unsure of the date. Pt becoming very anxious, restless and agitated throughout the day. Making statements like \" I just cant take this, this sucks.\" I feel so anxious and restless.\" Pt constantly getting up from chair to bed and back again. Pt has gone on several walks, gait more unsteady as pt is drowsy from scheduled and prn medications but very restless. Psych following and changed frequency of prn valium to Q4H instead of Q6H.  Gabapentin scheduled. Emotional support and distraction offered. Pt continues on a 1:1 sitter for suicidal ideation. Pt c/o severe right knee pain and states he has bone on bone. Pt will have xray of knee today. Lidocaine applied, ice packs, massage, scheduled naproxen, prn tylenol and Zanaflex given with llitte relief. Will continue to monitor behaviors.  "

## 2021-12-01 NOTE — PROGRESS NOTES
Progress Note    Assessment/Plan  Hollis Barclay is a 51 year old male who was admitted on 11/22/2021. He has a [facility] history of bipolar disorder, borderline personality disorder, TBI, polysubstance abuse, alcoholism, group home resident presented to [facility] on 11/13 with suicide attempt, transferred to [facility] inpatient psych unit on 11/19, then transferred to [facility] on 11/22 for Covid hypoxia.     Problem List:  1.  Acute COVID Pneumonia      Acute hypoxic respiratory failure     COVID-19 Diagnosis Details:  Onset of COVID symptoms 11/21   Date of positive test results 11/22   Vaccinated? Fully Vaccinated   --Does not require supplemental oxygen at rest.  -- completed remdesivir and dexamethasone.  11/28  --Continue incentive spirometry  --He would be off isolation onl December 2, as per infection control guidelines.  Thereafter patient would be considered Covid recovered thereafter.    2. Bipolar disorder      Borderline personality disorder      Suicidal ideation with recent suicide attempt      History of TBI  Patient was hospitalized at [facility] 11/13-11/19 after presenting with drug overdose and alcohol intoxication with intention to commit suicide secondary to worsening depression.  Patient overdosed on 5 tablets of oxycodone, 22x300 mg tablets of Seroquel and 4x8 mg tablets of Suboxone while drinking 1 gallon of vodka within a 24-hour period all in attempt to kill himself. Was transferred to [facility] Psych unit 11/19. Requires one-to-one for suicidality while here at [facility].  Psych following here.  --Had a panic attack on 11/27 and therefore started on milligram Ativan every 6 hours as needed for anxiety.  --Tolerating Subutex dose to 8 mg twice daily  This would help with his anxiety.  Plan would be back to [facility] when medical issues resolved  Status is currently Voluntary  Currently on Abilify, Seroquel, Benadryl, Valium, Lyrica as per psychiatry.  Addiction medicine change scheduled  diazepam to as needed for anxiety due to risk of respiratory depression due to concomitant use of buprenorphine.  --Currently on one-to-one     3. Alcoholism  Treated for withdrawal at [facility]     4. Chronic pain syndrome     History of polysubstance/drug abuse  Addiction medicine following  Tolerating Subutex to 8 mg twice daily dose.  Suboxone was increased once patient got off supplemental oxygen.  As per addiction medicine, avoid scheduled benzos.    5. Alcoholic hepatitis  LFTs slightly increased 11/25 from 11/24/21  LFTs are stable  Off remdesevir.      6. Nicotine dependence  Gum     7. HTN  Continue amlodipine, propranolol     8. GERD,  continue home PPI     9. Constipation  senna as needed     10. Nasal bone fracture  Plan to see ENT as outpatient     11. Hypothyroidism  Continue home Synthroid     12. Insomnia and panic attack  Now on PRN ativan    13. Dilated aortic root  noted on echo but not prominent on CTA chest. Would recommend annual surveillance CT.    Hypokalemia mild  Resolved with replacement  --DC protocol    Back pain acute on chronic  --Ordered topical lidocaine--does not work  --Addiction medicine specialist prescribed tizanidine which patient claims is not working  --Currently on pregabalin  --Was prescribed naproxen at [facility] and therefore started on naproxen on 11/30.  Showing improvement  - x-ray of the ribs 11/30 does not show any fracture    Right knee pain likely chronic  --Patient claims that he has no cartilage  --Requested x-ray of the right knee.  Ordered the same to rule out fracture or dislocation but low suspicion of the same.  --Will avoid narcotics given abuse potential.  --currently on naproxen on   -order topical Diclofenac 4 times PRN    Diet: Regular Diet Adult    Barksdale Catheter: Not present  Central Lines: None  Code Status: Full Code       Disposition Plan   Disposition: back to Psych unit when COVID pneumonia better likely on 11/29  Discharge barriers:  "12/2     Diet: Regular Diet Adult    DVT Prophylaxis: DOAC  Barksdale Catheter: Not present  Code Status: Full Code        Subjective  Patient continues to be off oxygen.  Reviewed the x-ray of the ribs that does not show fracture.  Patient complaining of chronic right knee pain.  He has known that his right knee has \"bone-on-bone.\"  Continues to have anxiety.  Psych adjusting medications.  Agree with addiction medicine regarding DC of scheduled benzos.      Denies any nausea vomiting diarrhea fever or chills    Objective    /70   Pulse 70   Temp 98  F (36.7  C) (Oral)   Resp 22   Ht 1.93 m (6' 4\")   Wt 130.2 kg (287 lb 1.6 oz)   SpO2 94%   BMI 34.95 kg/m    Weight:   Wt Readings from Last 5 Encounters:   11/30/21 130.2 kg (287 lb 1.6 oz)   11/19/21 135.5 kg (298 lb 12.8 oz)   11/14/21 129 kg (284 lb 6.3 oz)   09/23/21 124.7 kg (275 lb)   08/17/21 115.2 kg (254 lb)       I/O last 3 completed shifts:  In: 1570 [P.O.:1570]  Out: -   I/O this shift:  In: 720 [P.O.:720]  Out: -           Physical Exam  Alert, oriented*3  Appears agitated and anxious.  Rocking back and forth in the chair  No pallor, icterus, clubbing, cyanosis  Body mass index is 34.95 kg/m .  Tremors of the outstretched hand   No sinus tenderness  No tenderness in the left rib area near the angle of the scapula.  Limited range of motion of the right knee but able to bear weight.  Note swelling or redness of the right knee  moist membranes  Neck supple  CVS: S1 S2-N, no murmurs, gallops, rubs  Resp: No crackles heard  Abd: soft, No t/g/r    Vasc: no leg edema     Pertinent Labs  ----------------------  Recent Labs   Lab 11/30/21  0546 11/29/21  0540 11/28/21  0957 11/28/21  0727 11/27/21  0652 11/26/21  0950 11/25/21  0742   NA  --   --   --   --  141 142 142   POTASSIUM 3.7 4.0 3.5  --  3.4* 3.6 3.7   CO2  --   --   --   --  28 28 25   BUN  --   --   --   --  20 20 16   CR  --   --   --   --  0.68* 0.72 0.74   GLC  --   --   --  119* 145* 154* " 167*   ALBUMIN  --   --   --   --   --  3.0* 3.0*   BILITOTAL  --   --   --   --   --  0.4 0.5   ALKPHOS  --   --   --   --   --  67 67   ALT  --   --   --   --   --  130* 92*   AST  --   --   --   --   --  81* 94*     Recent Labs   Lab 11/26/21  0950 11/25/21  0742   WBC 10.3 9.9   HGB 11.8* 11.7*   HCT 37.0* 36.8*    296     No results for input(s): INR in the last 168 hours.  Glucose Values Latest Ref Rng & Units 11/18/2021 11/22/2021 11/23/2021 11/24/2021 11/25/2021 11/26/2021 11/27/2021   Bedside Glucose (mg/dl )  - -- -- -- -- -- -- --   GLUCOSE 70 - 125 mg/dL 106(H) 115 170(H) 195(H) 167(H) 154(H) 145(H)   Some recent data might be hidden         Pertinent Radiology   Radiology Results: Personally reviewed impression/s  No results found.  EKG Results: not reviewed.

## 2021-12-01 NOTE — PROGRESS NOTES
"  Psychiatric Progress Note  Bipolar I disorder, most recent episode depressed, severe: Not dariusz for safety in the community due to recurring suicidality and hopelessness  Mood changes in the context of COVID-19.  Sleep difficulties due to above.     Recommendations:  Patient is requesting to return to psychiatric inpatient unit due to continued suicidality.  Abilify 5 mg daily.  Patient did not want to take daytime Seroquel Seroquel 50 mg 3 times a day x9 doses. Patient does not want to take this during daytime, so will discontinue it.   Benadryl 50 mg 3 times a day for 6 doses.  Valium 1 mg 3 times a day x3 days  Valium 1 mg every 6 hours as needed while at [facility].   Quetiapine 300 mg at bedtime.  Lyrica 200 mg 3 times a day.  Continue with one-to-one for suicide precaution.  At this time plan is to return to psychiatric inpatient unit when deemed COVID recovered.  Patient is not dariusz for safety.  We will continue to monitor.  Case was also discussed with addiction medicine who will be managing patient's Suboxone.      SUBJECTIVE:  Patient continues to be anxious, restless, constantly ambulating in the hallway, getting agitated, repeatedly making suicidal threats to \"slit my throat\".  At 1 point he had taken the dinner knife and was threatening to hurt himself.    MENTAL STATUS EXAMINATION:   Appears restless, anxious.  Thought process is linear .  No hallucinations, delusions or paranoia.  No loosening of associations.  Judgment, insight, memory, attention, comprehension, fund of knowledge are adequate.  Language is intact.  Gait and ambulation within normal range.  Affect is blunted mood anxious.  Awake, orientation x4.  Suicidality: Yes.  Dariusz for safety: No.  Vital signs in last 24 hours  /70   Pulse 70   Temp 98  F (36.7  C) (Oral)   Resp 22   Ht 1.93 m (6' 4\")   Wt 130.2 kg (287 lb 1.6 oz)   SpO2 94%   BMI 34.95 kg/m              "

## 2021-12-01 NOTE — PLAN OF CARE
"  Problem: Adult Inpatient Plan of Care  Goal: Absence of Hospital-Acquired Illness or Injury  Outcome: Improving  Intervention: Identify and Manage Fall Risk  Recent Flowsheet Documentation  Taken 11/30/2021 2141 by Nadia Ibarra, RN  Safety Promotion/Fall Prevention:   activity supervised   assistive device/personal items within reach   clutter free environment maintained   sitter at bedside  Goal: Optimal Comfort and Wellbeing  Outcome: Improving  Goal: Readiness for Transition of Care  Outcome: Improving     Problem: Suicide Risk  Goal: Absence of Self-Harm  Outcome: Improving     Problem: Gas Exchange Impaired  Goal: Optimal Gas Exchange  Outcome: Improving     Problem: Anxiety  Goal: Anxiety Reduction or Resolution  Outcome: Improving     Problem: Pain Acute  Goal: Acceptable Pain Control and Functional Ability  Outcome: Improving  Intervention: Develop Pain Management Plan  Recent Flowsheet Documentation  Taken 11/30/2021 2141 by Nadia Ibarra, RN  Pain Management Interventions: medication (see MAR)   Pt a/o x4, reports anxiety 8/10. Reports pain in back & right knee 8/10. Scheduled pain & anti-anxiety meds given. Pt drowsy--still reports high anxiety, dozing off. Up ambulating in room to bathroom, gait steady. Remains on 1/1 supervision. Pt reports has suicidal thought \"always\"; denied plan to hurt self when writer asked. Neto. Gum given. Reminded pt to seek staff for needs. Continue to monitor pt.   "

## 2021-12-01 NOTE — PROGRESS NOTES
"Psychiatric Progress Note  Bipolar I disorder, most recent episode depressed, severe: Not dariusz for safety in the community due to recurring suicidality and hopelessness  Mood changes in the context of COVID-19.  Sleep difficulties due to above.  Mood disorder due to medical condition, chronic pain  Drug-seeking tendency  Recommendations:  Abilify 5 mg daily.  Seroquel 50 mg 3 times a day x9 doses. Patient does not want to take this during daytime, so will discontinue it.   Benadryl 50 mg 3 times a day for 6 doses.  Gabapentin 200 mg 3 times a day while at [facility]  Valium 1 mg every 6 hours as needed while at [facility].   Quetiapine 300 mg at bedtime.  Lyrica 200 mg 3 times a day.  Continue with one-to-one for suicide precaution.  At this time plan is to return to psychiatric inpatient unit when deemed COVID recovered.  Patient is not dariusz for safety.  We will continue to monitor.  Case was also discussed with addiction medicine who will be managing patient's Suboxone.      SUBJECTIVE:  Patient was anxious, restless, moving from bed to chair constantly, poor eye contact, short of breath and expressing suicidality.  Anxiety is \"out of control\".  He is not sleeping well, reporting of racing thoughts and thoughts of dying.  Expressing hopelessness and helplessness.  He denies any hallucinations, delusions or paranoia.  He is becoming agitated as he is talking however not showing any violent behavior.  Easily redirectable.  He wants to return to psychiatric inpatient unit.    MENTAL STATUS EXAMINATION:   Anxious, restless, agitated.  Speech is clear.  Thought processes clear.  Thought content does not directable sessions, delusions or paranoia.  No loosening of associations.  Judgment, insight, memory, attention, comprehension, fund of knowledge are adequate.  Language is intact.  Gait and ambulation within normal range.  Affect is blunted mood anxious.  Awake, orientation x4.  " "Suicidality: Yes.  Charisma for safety: No.  Vital signs in last 24 hours  /70   Pulse 70   Temp 98  F (36.7  C) (Oral)   Resp 22   Ht 1.93 m (6' 4\")   Wt 130.2 kg (287 lb 1.6 oz)   SpO2 94%   BMI 34.95 kg/m              "

## 2021-12-02 PROCEDURE — 99232 SBSQ HOSP IP/OBS MODERATE 35: CPT | Mod: 95 | Performed by: INTERNAL MEDICINE

## 2021-12-02 PROCEDURE — 250N000013 HC RX MED GY IP 250 OP 250 PS 637: Performed by: INTERNAL MEDICINE

## 2021-12-02 PROCEDURE — 120N000001 HC R&B MED SURG/OB

## 2021-12-02 PROCEDURE — 99233 SBSQ HOSP IP/OBS HIGH 50: CPT | Performed by: HOSPITALIST

## 2021-12-02 PROCEDURE — 250N000013 HC RX MED GY IP 250 OP 250 PS 637: Performed by: FAMILY MEDICINE

## 2021-12-02 PROCEDURE — 250N000013 HC RX MED GY IP 250 OP 250 PS 637: Performed by: NURSE PRACTITIONER

## 2021-12-02 PROCEDURE — 99232 SBSQ HOSP IP/OBS MODERATE 35: CPT | Performed by: NURSE PRACTITIONER

## 2021-12-02 RX ORDER — LORAZEPAM 1 MG/1
1 TABLET ORAL EVERY 4 HOURS PRN
Status: DISCONTINUED | OUTPATIENT
Start: 2021-12-02 | End: 2021-12-05

## 2021-12-02 RX ORDER — DIPHENHYDRAMINE HCL 25 MG
25 TABLET ORAL 3 TIMES DAILY
Status: DISCONTINUED | OUTPATIENT
Start: 2021-12-02 | End: 2021-12-11 | Stop reason: HOSPADM

## 2021-12-02 RX ORDER — LORAZEPAM 2 MG/ML
0.5 INJECTION INTRAMUSCULAR ONCE
Status: DISCONTINUED | OUTPATIENT
Start: 2021-12-02 | End: 2021-12-02

## 2021-12-02 RX ORDER — OLANZAPINE 5 MG/1
5 TABLET, ORALLY DISINTEGRATING ORAL 3 TIMES DAILY
Status: DISCONTINUED | OUTPATIENT
Start: 2021-12-02 | End: 2021-12-11 | Stop reason: HOSPADM

## 2021-12-02 RX ORDER — LORAZEPAM 0.5 MG/1
0.25 TABLET ORAL 3 TIMES DAILY
Status: DISCONTINUED | OUTPATIENT
Start: 2021-12-02 | End: 2021-12-09

## 2021-12-02 RX ORDER — LORAZEPAM 2 MG/ML
0.5 INJECTION INTRAMUSCULAR EVERY 6 HOURS PRN
Status: DISCONTINUED | OUTPATIENT
Start: 2021-12-02 | End: 2021-12-02

## 2021-12-02 RX ADMIN — OLANZAPINE 5 MG: 5 TABLET, ORALLY DISINTEGRATING ORAL at 10:40

## 2021-12-02 RX ADMIN — NICOTINE POLACRILEX 4 MG: 4 GUM, CHEWING ORAL at 18:15

## 2021-12-02 RX ADMIN — BUPRENORPHINE 8 MG: 2 TABLET SUBLINGUAL at 06:30

## 2021-12-02 RX ADMIN — LORAZEPAM 0.25 MG: 0.5 TABLET ORAL at 13:01

## 2021-12-02 RX ADMIN — MULTIPLE VITAMINS W/ MINERALS TAB 1 TABLET: TAB at 08:07

## 2021-12-02 RX ADMIN — NICOTINE POLACRILEX 4 MG: 4 GUM, CHEWING ORAL at 15:33

## 2021-12-02 RX ADMIN — GABAPENTIN 200 MG: 100 CAPSULE ORAL at 08:08

## 2021-12-02 RX ADMIN — DICLOFENAC SODIUM 4 G: 10 GEL TOPICAL at 15:55

## 2021-12-02 RX ADMIN — DIAZEPAM 1 MG: 2 TABLET ORAL at 08:08

## 2021-12-02 RX ADMIN — APIXABAN 2.5 MG: 2.5 TABLET, FILM COATED ORAL at 20:03

## 2021-12-02 RX ADMIN — BUPRENORPHINE 8 MG: 2 TABLET SUBLINGUAL at 20:01

## 2021-12-02 RX ADMIN — NAPROXEN 250 MG: 250 TABLET ORAL at 08:08

## 2021-12-02 RX ADMIN — PROPRANOLOL HYDROCHLORIDE 10 MG: 10 TABLET ORAL at 08:08

## 2021-12-02 RX ADMIN — OLANZAPINE 5 MG: 5 TABLET, ORALLY DISINTEGRATING ORAL at 13:01

## 2021-12-02 RX ADMIN — ARIPIPRAZOLE 5 MG: 5 TABLET ORAL at 08:08

## 2021-12-02 RX ADMIN — MAGNESIUM OXIDE TAB 400 MG (241.3 MG ELEMENTAL MG) 400 MG: 400 (241.3 MG) TAB at 20:03

## 2021-12-02 RX ADMIN — PREGABALIN 200 MG: 100 CAPSULE ORAL at 08:08

## 2021-12-02 RX ADMIN — FOLIC ACID 1000 MCG: 1 TABLET ORAL at 08:08

## 2021-12-02 RX ADMIN — PROPRANOLOL HYDROCHLORIDE 10 MG: 10 TABLET ORAL at 20:03

## 2021-12-02 RX ADMIN — LEVOTHYROXINE SODIUM 75 MCG: 0.03 TABLET ORAL at 06:30

## 2021-12-02 RX ADMIN — PREGABALIN 200 MG: 100 CAPSULE ORAL at 20:01

## 2021-12-02 RX ADMIN — LORAZEPAM 1 MG: 1 TABLET ORAL at 16:38

## 2021-12-02 RX ADMIN — PANTOPRAZOLE SODIUM 40 MG: 40 TABLET, DELAYED RELEASE ORAL at 06:30

## 2021-12-02 RX ADMIN — OLANZAPINE 10 MG: 10 TABLET, FILM COATED ORAL at 08:42

## 2021-12-02 RX ADMIN — APIXABAN 2.5 MG: 2.5 TABLET, FILM COATED ORAL at 08:08

## 2021-12-02 RX ADMIN — AMLODIPINE BESYLATE 5 MG: 5 TABLET ORAL at 08:08

## 2021-12-02 RX ADMIN — ACETAMINOPHEN 650 MG: 325 TABLET ORAL at 04:38

## 2021-12-02 RX ADMIN — QUETIAPINE FUMARATE 300 MG: 300 TABLET, FILM COATED ORAL at 20:03

## 2021-12-02 RX ADMIN — DIPHENHYDRAMINE HCL 25 MG: 25 TABLET ORAL at 10:40

## 2021-12-02 RX ADMIN — DIAZEPAM 1 MG: 2 TABLET ORAL at 04:38

## 2021-12-02 RX ADMIN — NICOTINE POLACRILEX 4 MG: 4 GUM, CHEWING ORAL at 08:09

## 2021-12-02 RX ADMIN — GABAPENTIN 100 MG: 100 CAPSULE ORAL at 10:40

## 2021-12-02 RX ADMIN — NAPROXEN 250 MG: 250 TABLET ORAL at 16:37

## 2021-12-02 RX ADMIN — LORAZEPAM 0.25 MG: 0.5 TABLET ORAL at 20:02

## 2021-12-02 RX ADMIN — OLANZAPINE 10 MG: 10 TABLET, FILM COATED ORAL at 15:24

## 2021-12-02 RX ADMIN — PREGABALIN 200 MG: 100 CAPSULE ORAL at 13:01

## 2021-12-02 RX ADMIN — MAGNESIUM OXIDE TAB 400 MG (241.3 MG ELEMENTAL MG) 400 MG: 400 (241.3 MG) TAB at 08:08

## 2021-12-02 RX ADMIN — DIPHENHYDRAMINE HCL 25 MG: 25 TABLET ORAL at 20:02

## 2021-12-02 RX ADMIN — DIPHENHYDRAMINE HCL 25 MG: 25 TABLET ORAL at 13:01

## 2021-12-02 RX ADMIN — ACETAMINOPHEN 650 MG: 325 TABLET ORAL at 08:39

## 2021-12-02 RX ADMIN — LORAZEPAM 0.25 MG: 0.5 TABLET ORAL at 10:40

## 2021-12-02 RX ADMIN — TIZANIDINE 4 MG: 4 TABLET ORAL at 06:22

## 2021-12-02 RX ADMIN — OLANZAPINE 5 MG: 5 TABLET, ORALLY DISINTEGRATING ORAL at 20:02

## 2021-12-02 RX ADMIN — PROPRANOLOL HYDROCHLORIDE 10 MG: 10 TABLET ORAL at 13:01

## 2021-12-02 ASSESSMENT — ACTIVITIES OF DAILY LIVING (ADL)
ADLS_ACUITY_SCORE: 10
ADLS_ACUITY_SCORE: 12
ADLS_ACUITY_SCORE: 10
ADLS_ACUITY_SCORE: 10
ADLS_ACUITY_SCORE: 12
ADLS_ACUITY_SCORE: 10
ADLS_ACUITY_SCORE: 10
ADLS_ACUITY_SCORE: 12
ADLS_ACUITY_SCORE: 10
ADLS_ACUITY_SCORE: 10
ADLS_ACUITY_SCORE: 12
ADLS_ACUITY_SCORE: 10
ADLS_ACUITY_SCORE: 12
ADLS_ACUITY_SCORE: 10

## 2021-12-02 NOTE — PLAN OF CARE
Problem: Anxiety  Goal: Anxiety Reduction or Resolution  Outcome: No Change     Problem: Pain Acute  Goal: Acceptable Pain Control and Functional Ability  Outcome: No Change       Vitally stable, pt anxious, restless, pacing room, PRN's, essential oils, care channel provide little relief.  1:1 sitter at bedside for suicidal ideations.

## 2021-12-02 NOTE — PROGRESS NOTES
"Addiction Medicine Follow Up    Tele-Visit Details    Type of service:  Video Visit    Time Service Began (time 1st connected with pt): 1121    Time Service Ended (time completely finished with pt): 1130    Originating Location (pt. Location): Patient [place], [facility]    Distant Location (provider location): [facility]    Reason for Televisit: COVID 19    Mode of Communication:  Video Conference via [employer]    Physician has received verbal consent for a video visit from the patient? Yes        Active Problems:    Pneumonia due to COVID-19 virus   Bipolar Disorder, with recent depression and suicide attempt    Hx of TBI    Alcohol use disorder    Chronic pain syndrome    Subjective  Chief complaint: Low back pain    HPI: Patient continues to complain of low back pain.   Says it is continuous and present prior to admission.   Sometimes radiates down the front of R leg.   Was on chronic opiates in past and then on Suboxone, but as noted before, his last Rx for suboxone was 9/27/21.      Was been increased to 8 mg bid on 11/29/21.   He is not sure whether this made any difference in his pain.       Patient has been prescribed diazepam 1 mg tid since 11/29 because of his complaints of anxiety.   Was asking for this while at [facility] unit as well.   Switched to lorazepam 0.25 mg po tid    ROS:   Resp:  Says he has no SOB now, even with walking.     CV:  No chest pain   GI:  No constipation or diarrhea   Ext:  No swelling   Psych:  Has been c/o of severe anxiety, but not during this visit.    No current outpatient medications on file.       Objective    /61 (BP Location: Left arm, Patient Position: Sitting)   Pulse 69   Temp 98.2  F (36.8  C) (Oral)   Resp 18   Ht 1.93 m (6' 4\")   Wt 130.2 kg (287 lb 1.6 oz)   SpO2 92%   BMI 34.95 kg/m      Physical Exam per hospitalist:     Skin:  No sweating   Neuro: Slightly groggy                         Oriented x 4              "  Less restless   Psych:     Cooperative     Mood:  dysphoric, but not angry or agitated               Affect:  blunted               Thought content: Continues to c/o suicidal ideation, but denies plan.   Denies hallucinations               Thought processes:  Linear                Speech:  Slow and sl. slur                Motor:  Normal                Insight/judgement:  fair/  fair    Assessment and Plan:    1.  Opioid use disorder, severe - has been on buprenorphine 8 mg bid since 11/29/21.  Thought he was tolerating it well, but today appears groggy.  Most likely related to the olanzapine that he received about 30 mins. ago.     He has been switched to lorazepam from diazepam.          Would consider further increase in buprenorphine, but not today, since already appears sedated.          May want to back off a bit on the zyprexa.         Would taper lorazepam.     2.  Alcohol Use Disorder, severe - Had severe withdrawal when at [facility] - plan to start acamprosate prior to discontinue from  unit.   Consider CD treatment, although patient does not think he needs it.       3.  BPAD I with recent depression, severe  - suicide attempt 2 wks ago.   Unable to contract for safety.   As per psychiatry consult, plan is for him to transfer back to [facility] unit when he is deemed to be no longer contagious.   Whether this is after 10 days, or longer is to be determined.   On abilify and quetiapine 300 mg q hs and now zyrexa.   Less agitated and anxious now compared to last time I saw him.   However, sounds like he has been agitated and uncooperative with staff.       4.  Low back pain -  Patient had no complaint of back pain to me today.  No comment about the tizandine that was started a few days ago, but earlier told hospitalist that it had not been helpful.       Ivette Mahoney MD  Addiction Medicine Service  [facility]   Page me (click here for Salena Mahoney)

## 2021-12-02 NOTE — PLAN OF CARE
Problem: Adult Inpatient Plan of Care  Goal: Plan of Care Review  Outcome: Improving  Flowsheets (Taken 12/1/2021 2256)  Plan of Care Reviewed With: patient  Progress: improving  Goal: Patient-Specific Goal (Individualized)  Outcome: Improving  Goal: Absence of Hospital-Acquired Illness or Injury  Outcome: Improving  Intervention: Identify and Manage Fall Risk  Recent Flowsheet Documentation  Taken 12/1/2021 1550 by Nereida Barrera, RN  Safety Promotion/Fall Prevention:    activity supervised    bedside attendant    room organization consistent    safety round/check completed    fall prevention program maintained    clutter free environment maintained    increased rounding and observation    increase visualization of patient    nonskid shoes/slippers when out of bed    patient and family education    supervised activity    sitter at bedside    assistive device/personal items within reach  Intervention: Prevent Skin Injury  Recent Flowsheet Documentation  Taken 12/1/2021 1945 by Nereida Barrera RN  Body Position: position changed independently  Taken 12/1/2021 1550 by Nereida Barrera RN  Body Position: position changed independently  Taken 12/1/2021 1540 by Nereida Barrera RN  Body Position: position changed independently  Intervention: Prevent and Manage VTE (Venous Thromboembolism) Risk  Recent Flowsheet Documentation  Taken 12/1/2021 1550 by Nereida Barrera, RN  VTE Prevention/Management:    anticoagulant therapy maintained    fluids promoted    ambulation promoted  Intervention: Prevent Infection  Recent Flowsheet Documentation  Taken 12/1/2021 1550 by Nereida Barrera RN  Infection Prevention:    single patient room provided    rest/sleep promoted    personal protective equipment utilized    visitors restricted/screened    equipment surfaces disinfected    environmental surveillance performed  Goal: Optimal Comfort and Wellbeing  Outcome: Improving  Intervention: Provide Person-Centered Care  Recent  "Flowsheet Documentation  Taken 12/1/2021 1550 by Nereida Barrera, RN  Trust Relationship/Rapport:    care explained    reassurance provided    questions answered  Goal: Readiness for Transition of Care  Outcome: Improving  Flowsheets (Taken 12/1/2021 2256)  Barriers to Discharge: COVID precautions until 12/2/21     Problem: Suicide Risk  Goal: Absence of Self-Harm  Outcome: Improving     Problem: Gas Exchange Impaired  Goal: Optimal Gas Exchange  Outcome: Improving     Problem: Anxiety  Goal: Anxiety Reduction or Resolution  Outcome: Improving     Problem: Pain Acute  Goal: Acceptable Pain Control and Functional Ability  Outcome: Improving  Intervention: Develop Pain Management Plan  Recent Flowsheet Documentation  Taken 12/1/2021 1910 by Nereida Barrera, RN  Pain Management Interventions: (offered ice pack, refused)    declines    diversional activity provided    rest    repositioned  Taken 12/1/2021 1810 by Nereida Barrera, RN  Pain Management Interventions: medication (see MAR)  Taken 12/1/2021 1714 by Nereida Barrera, RN  Pain Management Interventions: medication (see MAR)  Intervention: Prevent or Manage Pain  Recent Flowsheet Documentation  Taken 12/1/2021 1550 by Nereida Barrera RN  Bowel Elimination Promotion:    adequate fluid intake promoted    ambulation promoted  Intervention: Optimize Psychosocial Wellbeing  Recent Flowsheet Documentation  Taken 12/1/2021 1550 by Nereida Barrera RN  Diversional Activities: television  Very restless almost the entire shift, 1:1 in place for suicidality. When asked, patient admitted to having suicidal thoughts & a plan. \"I plan on cutting my wrist with whatever I can get my hands on. The toilet seat is broken & it is sharp as a razor blade.\" Gave emotional support, although patient gave very short responses & did not seem to want to conversate much. Had maintenance replace toilet seat. Other sharp harmful items removed from room prior. Frequently pacing around " "room & sighing heavily. Less pacing & sighing as the evening went on. Preoccupied with taking meds whenever able to have PRN med. Had Right knee xray, negative for fracture. A&Ox4, however briefly somewhat drowsy after meds given. Walked in ross (mask on) few times on shift with staff, gave walker d/t being a bit unsteady after taking meds. Utilized Lidocaine/Voltaren alternating each once on shift for Right knee pain. States Lidocaine more effective than Voltaren but declined additional application at bedtime. Refused ice pack. Pain reduced somewhat with scheduled Aleve, Lyrica & Subutex, & PRN Tylenol/Tizanidine. Anxiety reduced somewhat with scheduled/PRN Gabapentin, & PRN Valium/Zyprexa. Refused shower. \"I already showered today.\" Refused to use incentive spirometer. \"I've already used that so many times today.\" Ate 100% dinner, later complained, \"I ate too much today.\" However later stated, \"I'm hungry\" & wanted snacks. Ate crackers/PB/jelly, applesauce & ice cream. Later wanted 2 turkey sandwiches but only ate 1. Gave PRN Maalox x2 on shift for indigestion. Currently sleeping.   "

## 2021-12-02 NOTE — PROGRESS NOTES
Qualifies as severe disease (bilateral infiltrates and hypoxia requiring oxygen supplementation) based on CDC guidelines and our own internal guidelines and therefore would require a 20day isolation not 10day.  Going to a congregate living situation and should be cautious.    Resume isolation precautions.      Heather Potts MD  Internal Medicine Hospitalist  12/2/2021

## 2021-12-02 NOTE — PROGRESS NOTES
Progress Note    Assessment/Plan  Hollis Barclay is a 51 year old male who was admitted on 11/22/2021. He has a [facility] history of bipolar disorder, borderline personality disorder, TBI, polysubstance abuse, alcoholism, group home resident presented to [facility] on 11/13 with suicide attempt, transferred to [facility] inpatient psych unit on 11/19, then transferred to [facility] on 11/22 for Covid hypoxia.     Problem List:  1.  Acute COVID Pneumonia      Acute hypoxic respiratory failure     COVID-19 Diagnosis Details:  Onset of COVID symptoms 11/21   Date of positive test results 11/22   Vaccinated? Fully Vaccinated   --Does not require supplemental oxygen at rest.  -- completed remdesivir and dexamethasone.  11/28  --Continue incentive spirometry  --Qualifies as severe disease (bilateral infiltrates and hypoxia requiring oxygen supplementation) based on CDC guidelines and our own internal guidelines and therefore would require a 20day isolation not 10day.  Going to a congregate living situation and should be cautious.  Could consider a test based strategy for removing isolation precautions if more a urgent psychiatric disposition is needed.  D/w Dr. Chery who will review.    2. Bipolar disorder      Borderline personality disorder      Suicidal ideation with recent suicide attempt      History of TBI  Patient was hospitalized at [facility] 11/13-11/19 after presenting with drug overdose and alcohol intoxication with intention to commit suicide secondary to worsening depression.  Patient overdosed on 5 tablets of oxycodone, 22x300 mg tablets of Seroquel and 4x8 mg tablets of Suboxone while drinking 1 gallon of vodka within a 24-hour period all in attempt to kill himself. Was transferred to [facility] Psych unit 11/19. Requires one-to-one for suicidality while here at [facility].  Psych following here.  --Had a panic attack on 11/27 and therefore started on milligram Ativan every 6 hours as needed for  anxiety.  --Tolerating Subutex dose to 8 mg twice daily  This would help with his anxiety.  Plan would be back to [facility] when medical issues resolved  Status is currently Voluntary  Currently on Abilify, Seroquel, Benadryl, Valium, Lyrica as per psychiatry.  Addiction medicine change scheduled diazepam to as needed for anxiety due to risk of respiratory depression due to concomitant use of buprenorphine.  --Currently on one-to-one     3. Alcoholism  Treated for withdrawal at [facility]     4. Chronic pain syndrome     History of polysubstance/drug abuse  Addiction medicine following  Tolerating Subutex to 8 mg twice daily dose.  Suboxone was increased once patient got off supplemental oxygen.  As per addiction medicine, avoid scheduled benzos.    5. Alcoholic hepatitis  LFTs slightly increased 11/25 from 11/24/21  LFTs are stable  Off remdesevir.      6. Nicotine dependence  Gum     7. HTN  Continue amlodipine, propranolol     8. GERD,  continue home PPI     9. Constipation  senna as needed     10. Nasal bone fracture  Plan to see ENT as outpatient     11. Hypothyroidism  Continue home Synthroid     12. Insomnia and panic attack  Now on PRN ativan    13. Dilated aortic root  noted on echo but not prominent on CTA chest. Would recommend annual surveillance CT.    Hypokalemia mild  Resolved with replacement  --DC protocol    Back pain acute on chronic  --Ordered topical lidocaine--does not work  --Addiction medicine specialist prescribed tizanidine which patient claims is not working  --Currently on pregabalin  --Was prescribed naproxen at [facility] and therefore started on naproxen on 11/30.  Showing improvement  - x-ray of the ribs 11/30 does not show any fracture    Right knee pain likely chronic  --Patient claims that he has no cartilage  --Requested x-ray of the right knee.  Ordered the same to rule out fracture or dislocation but low suspicion of the same.  --Will avoid narcotics given abuse  "potential.  --currently on naproxen on   -order topical Diclofenac 4 times PRN    Diet: Regular Diet Adult    Barksdale Catheter: Not present  Central Lines: None  Code Status: Full Code       Disposition Plan   Disposition: back to Psych unit when COVID pneumonia better likely on 11/29  Discharge barriers: 12/2     Diet: Regular Diet Adult    DVT Prophylaxis: DOAC  Barksdale Catheter: Not present  Code Status: Full Code        Subjective    Walking hallway without any dyspnea.  Off O2.  Anxious.      Objective    /62 (BP Location: Left arm, Patient Position: Sitting)   Pulse 76   Temp 98.2  F (36.8  C) (Oral)   Resp 18   Ht 1.93 m (6' 4\")   Wt 130.2 kg (287 lb 1.6 oz)   SpO2 95%   BMI 34.95 kg/m    Weight:   Wt Readings from Last 5 Encounters:   11/30/21 130.2 kg (287 lb 1.6 oz)   11/19/21 135.5 kg (298 lb 12.8 oz)   11/14/21 129 kg (284 lb 6.3 oz)   09/23/21 124.7 kg (275 lb)   08/17/21 115.2 kg (254 lb)       I/O last 3 completed shifts:  In: 1840 [P.O.:1840]  Out: -   No intake/output data recorded.          Physical Exam  General:  Alert, cooperative, no distress,  Appears stated age  Neurologic:  oriented, facialsymmetry preserved, fluent speech. Moves all 4 spontaneously  Psych: anxious earlier.  Resting in chair now.    HEENT:  Anicteric, MMM, unremarkable dentition  CV:  no edema  Lungs:  Easyrespirations    Pertinent Labs  ----------------------  Recent Labs   Lab 11/30/21  0546 11/29/21  0540 11/28/21  0957 11/28/21  0727 11/27/21  0652 11/26/21  0950   NA  --   --   --   --  141 142   POTASSIUM 3.7 4.0 3.5  --  3.4* 3.6   CO2  --   --   --   --  28 28   BUN  --   --   --   --  20 20   CR  --   --   --   --  0.68* 0.72   GLC  --   --   --  119* 145* 154*   ALBUMIN  --   --   --   --   --  3.0*   BILITOTAL  --   --   --   --   --  0.4   ALKPHOS  --   --   --   --   --  67   ALT  --   --   --   --   --  130*   AST  --   --   --   --   --  81*     Recent Labs   Lab 11/26/21  0950   WBC 10.3   HGB 11.8* "   HCT 37.0*        No results for input(s): INR in the last 168 hours.  Glucose Values Latest Ref Rng & Units 11/18/2021 11/22/2021 11/23/2021 11/24/2021 11/25/2021 11/26/2021 11/27/2021   Bedside Glucose (mg/dl )  - -- -- -- -- -- -- --   GLUCOSE 70 - 125 mg/dL 106(H) 115 170(H) 195(H) 167(H) 154(H) 145(H)   Some recent data might be hidden         Pertinent Radiology   Radiology Results: Personally reviewed impression/s  No results found.  EKG Results: not reviewed.       Total time 40 with > 50% spent on counseling or coordination of care including: isolation precautions and disposition planning with jennifer, RN, CM, infection prevention.      Heather Potts MD

## 2021-12-02 NOTE — PROGRESS NOTES
"Care Management Follow Up    Length of Stay (days): 9    Expected Discharge Date: 12/02/2021     Concerns to be Addressed: discharge planning       Patient plan of care discussed at interdisciplinary rounds: Yes    Anticipated Discharge Disposition: Inpatient Mental Health,Other (Comments) (transfer back to [facility] inpt psych)     Anticipated Discharge Services:    Anticipated Discharge DME:      Patient/family educated on Medicare website which has current facility and service quality ratings:  N/A    Education Provided on the Discharge Plan:  Yes    Patient/Family in Agreement with the Plan: yes    Referrals Placed by CM/SW:  N/A    Private pay costs discussed: N/A    Additional Information: MARYCRUZ spoke with Emma Rodriguez in infection prevention regarding pt.  She informed SW that pt would be considered \"Covid recovered\" as of tomorrow, 12/2/21, and at that time would be able to come out of isolation.  This is provided that he continues to have no fever and has had substantial improvement of Covid symptoms.  MARYCRUZ spoke with Eli at [facility] regarding that pt would be considered Covid recovered tomorrow as per direction from Emma in infection prevention and that plan was still for pt to return to [facility] inpatient psychiatric unit at discharge.  She stated that they would contact infection prevention tomorrow to verify pt change in status and then would follow up with MARYCRUZ about pt discharge and bed for pt.  HE Stretcher at discharge.  Needs PCS.         MARQUEZ Simons, NORBERTO 12/01/21 6:56 PM              "

## 2021-12-02 NOTE — PROGRESS NOTES
"  Psychiatric Progress Note:    Bipolar I disorder, most recent episode depressed, severe: Not dariusz for safety in the community due to recurring suicidality and hopelessness  Mood changes in the context of COVID-19.  Sleep difficulties due to above.     Recommendations:  Patient is requesting to return to psychiatric inpatient unit due to continued suicidality.    Pharmocological interventions are limiting in treating worsening mental health symptoms without the milieu intervention hence the presence of anxiety and panic behaviors. These are likely  contributing to worsening of  depressive symptoms. He needs milieu intervention,  with daily groups and therapy which is not possible on medical units.   Abilify 5 mg daily.  Patient did not want to take daytime Seroquel Seroquel 50 mg 3 times a day x9 doses. Patient does not want to take this during daytime, so will discontinue it.   Discontinue Valium.  Lorazepam 0.25 mg 3 times a day, olanzapine 5 mg 3 times a day and Benadryl 25 mg 3 times a day.  Discontinue Neurontin.  Quetiapine 300 mg at bedtime.  Lyrica 200 mg 3 times a day.  Continue with one-to-one for suicide precaution.  At this time plan is to return to psychiatric inpatient unit when deemed COVID recovered.  Patient is not dariusz for safety.  We will continue to monitor.  Case was also discussed with addiction medicine who will be managing patient's Suboxone.      SUBJECTIVE:  Patient is restless, anxious, \"panicking\", feels \"claustrophobic stuck in one room\".  Patient has been ambulating in the hallway for last 3-4 days.  He has not needed any oxygen since 11/26.  Oxygen saturation have been above good 90% without oxygen.  Patient does have severe anxiety and panic attacks often starting to hyperventilate, pacing, agitated and wanting to ambulate nonstaff in the hallway.  He does calm down after ambulation.  Patient is medication, and care compliant.  He is upset that he is not able to attend " "groups and talk to therapist as per psychiatric inpatient milieu.  He wants to go to psychiatric inpatient unit \"I am stuck in this room I going to loose it\". He is known to pace back ad fort in room and walks out in the hallway. He is unhappy with the thought of staying here for 10 more days per the hospitalist dr Potts. \" The doctor told me yesterday that I could go to psych after today\" referring to 12/2/2021.   I talked to him in regards to discontinuing Valium due to his elevated liver functions.  He keeps repeating that he is becoming more agitated and wants some medication to help with his anxiety and restlessness.  He is amenable to having scheduled olanzapine.  He is eager to return to psychiatric inpatient unit due to ongoing suicidality.     I reviewed dr Marks's note. See below part of the note:    Acute COVID Pneumonia    Acute hypoxic respiratory failure     COVID-19 Diagnosis Details:  Onset of COVID symptoms 11/21   Date of positive test results 11/22   Vaccinated? Fully Vaccinated   --Does not require supplemental oxygen at rest.  -- completed remdesivir and dexamethasone.  11/28  --Continue incentive spirometry  --He would be off isolation onl December 2, as per infection control guidelines.  Thereafter patient would be considered Covid recovered thereafter.      MENTAL STATUS EXAMINATION:   Patient is seated in chair, rocking back and forth, fluttering his eyes hyperventilating, anxious, restless.  Speech is clear.  Thought processes clear.  Thought content does not show hallucinations, delusions or paranoia.  No katey or hypomania.  Affect is blunted mood depressed.  Suicidality: Yes.  Suicidal plan: Yes.  Suicidal intent: Yes.  He keeps referring to killing himself either by slitting his throat or drinking into a point of dying.  Gait and ambulation within normal range.  Patient ambulates in hallway without any assistance.  Vital signs in last 24 hours  /62 (BP Location: Left arm, Patient " "Position: Sitting)   Pulse 76   Temp 98.2  F (36.8  C) (Oral)   Resp 18   Ht 1.93 m (6' 4\")   Wt 130.2 kg (287 lb 1.6 oz)   SpO2 95%   BMI 34.95 kg/m                "

## 2021-12-02 NOTE — PLAN OF CARE
Problem: Adult Inpatient Plan of Care  Goal: Plan of Care Review  Outcome: No Change   Pt continues on sitter precautions, now off covid isolation. Pt continues to have right knee pain, xray was negative. Pt very anxious, can not seem to sit still.

## 2021-12-03 ENCOUNTER — APPOINTMENT (OUTPATIENT)
Dept: RADIOLOGY | Facility: HOSPITAL | Age: 51
End: 2021-12-03
Attending: HOSPITALIST
Payer: MEDICAID

## 2021-12-03 LAB
CREAT SERPL-MCNC: 0.65 MG/DL (ref 0.7–1.3)
D DIMER PPP FEU-MCNC: 0.54 UG/ML FEU (ref 0–0.5)
ERYTHROCYTE [DISTWIDTH] IN BLOOD BY AUTOMATED COUNT: 14.8 % (ref 10–15)
GFR SERPL CREATININE-BSD FRML MDRD: >90 ML/MIN/1.73M2
HCT VFR BLD AUTO: 35.7 % (ref 40–53)
HGB BLD-MCNC: 11.4 G/DL (ref 13.3–17.7)
HOLD SPECIMEN: NORMAL
MCH RBC QN AUTO: 30.6 PG (ref 26.5–33)
MCHC RBC AUTO-ENTMCNC: 31.9 G/DL (ref 31.5–36.5)
MCV RBC AUTO: 96 FL (ref 78–100)
PLATELET # BLD AUTO: 303 10E3/UL (ref 150–450)
RBC # BLD AUTO: 3.73 10E6/UL (ref 4.4–5.9)
WBC # BLD AUTO: 11.9 10E3/UL (ref 4–11)

## 2021-12-03 PROCEDURE — 36415 COLL VENOUS BLD VENIPUNCTURE: CPT | Performed by: HOSPITALIST

## 2021-12-03 PROCEDURE — 250N000013 HC RX MED GY IP 250 OP 250 PS 637: Performed by: NURSE PRACTITIONER

## 2021-12-03 PROCEDURE — 85027 COMPLETE CBC AUTOMATED: CPT | Performed by: HOSPITALIST

## 2021-12-03 PROCEDURE — 120N000001 HC R&B MED SURG/OB

## 2021-12-03 PROCEDURE — 250N000013 HC RX MED GY IP 250 OP 250 PS 637: Performed by: FAMILY MEDICINE

## 2021-12-03 PROCEDURE — 99233 SBSQ HOSP IP/OBS HIGH 50: CPT | Performed by: HOSPITALIST

## 2021-12-03 PROCEDURE — 71045 X-RAY EXAM CHEST 1 VIEW: CPT

## 2021-12-03 PROCEDURE — 82565 ASSAY OF CREATININE: CPT | Performed by: INTERNAL MEDICINE

## 2021-12-03 PROCEDURE — 250N000013 HC RX MED GY IP 250 OP 250 PS 637: Performed by: INTERNAL MEDICINE

## 2021-12-03 PROCEDURE — 99223 1ST HOSP IP/OBS HIGH 75: CPT | Performed by: INTERNAL MEDICINE

## 2021-12-03 PROCEDURE — 36415 COLL VENOUS BLD VENIPUNCTURE: CPT | Performed by: INTERNAL MEDICINE

## 2021-12-03 PROCEDURE — 85379 FIBRIN DEGRADATION QUANT: CPT | Performed by: HOSPITALIST

## 2021-12-03 RX ADMIN — NAPROXEN 250 MG: 250 TABLET ORAL at 17:16

## 2021-12-03 RX ADMIN — DIPHENHYDRAMINE HCL 25 MG: 25 TABLET ORAL at 08:08

## 2021-12-03 RX ADMIN — LORAZEPAM 0.25 MG: 0.5 TABLET ORAL at 13:26

## 2021-12-03 RX ADMIN — DICLOFENAC SODIUM 4 G: 10 GEL TOPICAL at 13:26

## 2021-12-03 RX ADMIN — AMLODIPINE BESYLATE 5 MG: 5 TABLET ORAL at 08:08

## 2021-12-03 RX ADMIN — PROPRANOLOL HYDROCHLORIDE 10 MG: 10 TABLET ORAL at 08:09

## 2021-12-03 RX ADMIN — GABAPENTIN 100 MG: 100 CAPSULE ORAL at 17:16

## 2021-12-03 RX ADMIN — OLANZAPINE 5 MG: 5 TABLET, ORALLY DISINTEGRATING ORAL at 20:30

## 2021-12-03 RX ADMIN — FOLIC ACID 1000 MCG: 1 TABLET ORAL at 08:08

## 2021-12-03 RX ADMIN — LORAZEPAM 0.25 MG: 0.5 TABLET ORAL at 08:08

## 2021-12-03 RX ADMIN — LORAZEPAM 1 MG: 1 TABLET ORAL at 06:52

## 2021-12-03 RX ADMIN — PREGABALIN 200 MG: 100 CAPSULE ORAL at 20:32

## 2021-12-03 RX ADMIN — NICOTINE POLACRILEX 4 MG: 4 GUM, CHEWING ORAL at 20:40

## 2021-12-03 RX ADMIN — LORAZEPAM 1 MG: 1 TABLET ORAL at 18:36

## 2021-12-03 RX ADMIN — TIZANIDINE 4 MG: 4 TABLET ORAL at 09:52

## 2021-12-03 RX ADMIN — BUPRENORPHINE 8 MG: 2 TABLET SUBLINGUAL at 06:52

## 2021-12-03 RX ADMIN — NICOTINE POLACRILEX 4 MG: 4 GUM, CHEWING ORAL at 07:55

## 2021-12-03 RX ADMIN — MAGNESIUM OXIDE TAB 400 MG (241.3 MG ELEMENTAL MG) 400 MG: 400 (241.3 MG) TAB at 20:34

## 2021-12-03 RX ADMIN — PREGABALIN 200 MG: 100 CAPSULE ORAL at 08:09

## 2021-12-03 RX ADMIN — OLANZAPINE 5 MG: 5 TABLET, ORALLY DISINTEGRATING ORAL at 08:09

## 2021-12-03 RX ADMIN — APIXABAN 2.5 MG: 2.5 TABLET, FILM COATED ORAL at 08:08

## 2021-12-03 RX ADMIN — NICOTINE POLACRILEX 4 MG: 4 GUM, CHEWING ORAL at 18:37

## 2021-12-03 RX ADMIN — NAPROXEN 250 MG: 250 TABLET ORAL at 08:08

## 2021-12-03 RX ADMIN — MULTIPLE VITAMINS W/ MINERALS TAB 1 TABLET: TAB at 08:08

## 2021-12-03 RX ADMIN — OLANZAPINE 5 MG: 5 TABLET, ORALLY DISINTEGRATING ORAL at 13:26

## 2021-12-03 RX ADMIN — PREGABALIN 200 MG: 100 CAPSULE ORAL at 13:26

## 2021-12-03 RX ADMIN — BUPRENORPHINE 8 MG: 2 TABLET SUBLINGUAL at 20:32

## 2021-12-03 RX ADMIN — ALUMINUM HYDROXIDE, MAGNESIUM HYDROXIDE, AND SIMETHICONE 30 ML: 200; 200; 20 SUSPENSION ORAL at 21:14

## 2021-12-03 RX ADMIN — ACETAMINOPHEN 650 MG: 325 TABLET ORAL at 21:14

## 2021-12-03 RX ADMIN — LORAZEPAM 1 MG: 1 TABLET ORAL at 01:33

## 2021-12-03 RX ADMIN — DIPHENHYDRAMINE HCL 25 MG: 25 TABLET ORAL at 13:26

## 2021-12-03 RX ADMIN — PANTOPRAZOLE SODIUM 40 MG: 40 TABLET, DELAYED RELEASE ORAL at 06:52

## 2021-12-03 RX ADMIN — MELATONIN TAB 3 MG 3 MG: 3 TAB at 01:32

## 2021-12-03 RX ADMIN — DIPHENHYDRAMINE HCL 25 MG: 25 TABLET ORAL at 20:30

## 2021-12-03 RX ADMIN — PROPRANOLOL HYDROCHLORIDE 10 MG: 10 TABLET ORAL at 20:32

## 2021-12-03 RX ADMIN — APIXABAN 2.5 MG: 2.5 TABLET, FILM COATED ORAL at 20:32

## 2021-12-03 RX ADMIN — LORAZEPAM 0.25 MG: 0.5 TABLET ORAL at 20:34

## 2021-12-03 RX ADMIN — OLANZAPINE 10 MG: 10 TABLET, FILM COATED ORAL at 14:50

## 2021-12-03 RX ADMIN — MAGNESIUM OXIDE TAB 400 MG (241.3 MG ELEMENTAL MG) 400 MG: 400 (241.3 MG) TAB at 08:08

## 2021-12-03 RX ADMIN — NICOTINE POLACRILEX 4 MG: 4 GUM, CHEWING ORAL at 16:15

## 2021-12-03 RX ADMIN — ARIPIPRAZOLE 5 MG: 5 TABLET ORAL at 08:08

## 2021-12-03 RX ADMIN — QUETIAPINE FUMARATE 300 MG: 300 TABLET, FILM COATED ORAL at 20:32

## 2021-12-03 RX ADMIN — NICOTINE POLACRILEX 4 MG: 4 GUM, CHEWING ORAL at 09:52

## 2021-12-03 RX ADMIN — LORAZEPAM 1 MG: 1 TABLET ORAL at 14:28

## 2021-12-03 RX ADMIN — NICOTINE POLACRILEX 4 MG: 4 GUM, CHEWING ORAL at 19:38

## 2021-12-03 RX ADMIN — LEVOTHYROXINE SODIUM 75 MCG: 0.03 TABLET ORAL at 06:52

## 2021-12-03 RX ADMIN — TIZANIDINE 4 MG: 4 TABLET ORAL at 18:16

## 2021-12-03 ASSESSMENT — ACTIVITIES OF DAILY LIVING (ADL)
ADLS_ACUITY_SCORE: 10

## 2021-12-03 NOTE — CONSULTS
[facility]    Infectious Disease Consultation     Date of Admission:  11/22/2021  Date of Consult (When I saw the patient): 12/03/21    Assessment & Plan   1. Hollis Barclay is a 51 year old male who was admitted on 11/22/2021.   2. Severe Covid pneumonia, hypoxia  3. Treated with remdesivir and dexamethasone  4. Bilateral pulmonary infiltrates: See images below  5. CRP elevated at 14.8 on 11/22/2021  6. Obesity:  Body mass index is 34.95 kg/m .  7. Slowly improving, continues to have dyspnea on exertion and Covid symptoms have not resolved  8. Past medical history: Bipolar disorder, borderline personality disorder, TBI, polysubstance abuse, alcoholism, group home resident, suicidal ideation and attempt, transfer to [facility] inpatient psych on 11/19/2021, then transferred to [facility] 11/20/2022 for Covid pneumonia and hypoxia with hypoxic respiratory failure    CT chest:  bilateral pulmonary infiltrates consistent with pneumonia            Impression:  1. Detailed discussion with hospitalist, nursing, patient seen and evaluated  2. Discussed with infection prevention nurse, and indicated with [facility] infection prevention director, Dr. Shay Schwarz, who agreed that the patient meets severe definition (please note: Dr. Schwarz did not see the patient, and this recommendation is based on phone communication via texts and my evaluation of the patient).  3. Reviewing the criteria for severe disease: Bilateral infiltrates, hypoxic requiring oxygen supplementation, elevated CRP on admission, ongoing dyspnea on exertion and cough, based on [facility] guidance as well as CDC guidance this patient would require at least 20 days symptom-time criteria for precaution removal        Discussed with hospitalist, nurse, infection prevention nurse, infection prevention as director.  Printout of guidance given to the charge nurse on the unit    Total Time Spent 60 minutes with >50% of the time spent  in counseling, education and care coordination. Time spent in coordination of care, discussion with multiple team members: 10:38 am- 11:55 am.     Infectious disease will sign off.  Please call with additional questions    Bee Aguilar MD  [facility]  [phone]        Reason for Consult   Reason for consult: I was asked to evaluate this patient for questions regarding discontinuation of COVID-19 isolation    Primary Care Physician   Physician No Ref-Primary    Chief Complaint   Covid pneumonia    History is obtained from the patient and medical records    History of Present Illness   Hollis Barclay is a 51 year old male who presents with Covid pneumonia, hypoxia, continues to have dyspnea worse with exertion.  Discussed with patient's nurse and patient attempted ambulation associated with ongoing dyspnea.  Patient was hypoxic requiring supplemental oxygen for several days, due to bilateral pulmonary infiltrates.  Patient tested positive on 11/22/2021  Spent significant amount of time evaluating the patient, and discussing with nurse, primary team, infection prevention is, infection prevention director.      Past Medical History   I have reviewed this patient's medical history and updated it with pertinent information if needed.   Past Medical History:   Diagnosis Date     Alcoholism in remission (H)      Bilateral ACL tear      Bipolar disorder (H)      Borderline personality disorder (H)      Chemical dependency (H)      Chronic back pain      Closed left arm fracture 1985     H/O shoulder surgery      Post concussive encephalopathy      Social anxiety disorder      Social anxiety disorder      TBI (traumatic brain injury) (H)        Past Surgical History   I have reviewed this patient's surgical history and updated it with pertinent information if needed.      Prior to Admission Medications   Prior to Admission Medications   Prescriptions Last Dose Informant Patient Reported?  Taking?   OLANZapine zydis (ZYPREXA) 10 MG ODT   No Yes   Sig: Take 1 tablet (10 mg) by mouth 2 times daily   OLANZapine zydis (ZYPREXA) 5 MG ODT   No Yes   Sig: Take 1 tablet (5 mg) by mouth every 6 hours as needed for agitation (anxiety)   Pregabalin (LYRICA) 200 MG capsule   Yes Yes   Sig: Take 200 mg by mouth 3 times daily   QUEtiapine (SEROQUEL) 300 MG tablet  Nursing Home No Yes   Sig: Take 1 tablet (300 mg) by mouth At Bedtime   amLODIPine (NORVASC) 5 MG tablet  Nursing Home No Yes   Sig: Take 1 tablet (5 mg) by mouth daily   buprenorphine HCl-naloxone HCl (SUBOXONE) 8-2 MG per film   No Yes   Sig: Place 1 Film under the tongue 3 times daily   diclofenac (VOLTAREN) 1 % topical gel  Nursing Home No Yes   Sig: Apply 2 g topically 4 times daily as needed for moderate pain   docusate sodium (COLACE) 100 MG capsule  Nursing Home No Yes   Sig: Take 1 capsule (100 mg) by mouth 2 times daily   folic acid (FOLVITE) 1 MG tablet  Nursing Home No Yes   Sig: Take 1 tablet (1,000 mcg) by mouth daily   gabapentin (NEURONTIN) 300 MG capsule   No Yes   Sig: Take 1 capsule (300 mg) by mouth 3 times daily   levothyroxine (SYNTHROID/LEVOTHROID) 75 MCG tablet  Nursing Home No Yes   Sig: Take 1 tablet (75 mcg) by mouth every morning (before breakfast)   miconazole with skin protectant (SELENA ANTIFUNGAL) 2 % CREA cream  Nursing Home No Yes   Sig: Apply topically 2 times daily   Patient taking differently: Apply topically 2 times daily For athlete's foot   multivitamin w/minerals (THERA-VIT-M) tablet  Nursing Home No Yes   Sig: Take 1 tablet by mouth daily   naproxen (NAPROSYN) 500 MG tablet   No Yes   Sig: Take 1 tablet (500 mg) by mouth 2 times daily as needed for moderate pain or headaches   nicotine polacrilex (NICORETTE) 4 MG gum  Nursing Home No No   Sig: Place 1-2 each (4-8 mg) inside cheek every hour as needed for other (nicotine withdrawal symptoms)   ondansetron (ZOFRAN-ODT) 4 MG ODT tab   No Yes   Sig: Take 1 tablet (4 mg)  by mouth every 6 hours as needed (Nausea and Vomiting)   pantoprazole (PROTONIX) 40 MG EC tablet  Nursing Home No Yes   Sig: Take 1 tablet (40 mg) by mouth every morning (before breakfast)   polyethylene glycol (MIRALAX) 17 GM/Dose powder   No Yes   Sig: Take 17 g by mouth daily   propranolol (INDERAL) 10 MG tablet  Nursing Home No Yes   Sig: Take 1 tablet (10 mg) by mouth 3 times daily   sennosides (SENOKOT) 8.6 MG tablet   No Yes   Sig: Take 1-2 tablets by mouth 2 times daily as needed for constipation      Facility-Administered Medications: None     Allergies   Allergies   Allergen Reactions     Cucumber Extract Anaphylaxis     Cucumber the vegable     Hydroxyzine Hives     Previously unreported by patient, this is a new patient declaration as of 5/1/21.     Nsaids      No problem with oral NSAIDs--Toradol injection itching only.     Trazodone Itching     Per Patient       Immunization History   Immunization History   Administered Date(s) Administered     COVID-19,PF,Pfizer (12+ Yrs) 04/20/2021, 05/22/2021     FLU 6-35 months 10/02/2018     Flu, Unspecified 09/01/2018     Influenza (IIV3) PF 02/02/2018     Influenza Quad, Recombinant, pf(RIV4) (Flublok) 10/11/2019, 10/05/2020, 11/18/2021     Pneumococcal 23 valent 02/02/2018     TDAP Vaccine (Boostrix) 09/02/2017       Social History   I have reviewed this patient's social history and updated it with pertinent information if needed. Hollis Barclay  reports that he has quit smoking. His smoking use included dip, chew, snus or snuff. He has quit using smokeless tobacco.  His smokeless tobacco use included chew. He reports current alcohol use. He reports previous drug use.    Family History   I have reviewed this patient's family history and updated it with pertinent information if needed.   Patient had been on the Psych unit.    Review of Systems   The 10 point Review of Systems is negative other than noted in the HPI or here.     Physical Exam   Temp: 98  F  (36.7  C) Temp src: Oral BP: 103/63 Pulse: 86   Resp: 18 SpO2: 94 % O2 Device: None (Room air)    Vital Signs with Ranges  Temp:  [97.5  F (36.4  C)-98.2  F (36.8  C)] 98  F (36.7  C)  Pulse:  [83-86] 86  Resp:  [16-22] 18  BP: (103-141)/(63-82) 103/63  SpO2:  [94 %-95 %] 94 %  287 lbs 1.6 oz  Body mass index is 34.95 kg/m .    GENERAL APPEARANCE:  awake, mild distress at rest  EYES: Eyes grossly normal to inspection  HENT: Mouth breathing  NECK: Thick  RESP: Eating in chair, leaning forward, dyspnea at rest, tachypnea with exertion  Skin: Warm  ABDOMEN: Obesity  MS: Deconditioned, able to ambulate without assistance  SKIN: Diaphoretic, tattoos  Neuro awake, coherent able to answer questions and following commands    Data   Lab Results   Component Value Date    WBC 10.3 11/26/2021    WBC 9.9 11/25/2021    WBC 8.7 11/24/2021    WBC 4.7 11/23/2021    WBC 3.0 (L) 11/22/2021    HGB 11.8 (L) 11/26/2021    HGB 11.7 (L) 11/25/2021    HGB 11.6 (L) 11/24/2021    HGB 12.3 (L) 11/23/2021    HGB 11.5 (L) 11/22/2021    HCT 37.0 (L) 11/26/2021    HCT 36.8 (L) 11/25/2021    HCT 36.6 (L) 11/24/2021    HCT 39.0 (L) 11/23/2021    HCT 35.5 (L) 11/22/2021     11/26/2021     11/25/2021     11/24/2021     11/23/2021     11/22/2021     11/27/2021     11/26/2021     11/25/2021     11/24/2021     11/23/2021    POTASSIUM 3.7 11/30/2021    POTASSIUM 4.0 11/29/2021    POTASSIUM 3.5 11/28/2021    POTASSIUM 3.4 (L) 11/27/2021    POTASSIUM 3.6 11/26/2021    CHLORIDE 103 11/27/2021    CHLORIDE 104 11/26/2021    CHLORIDE 106 11/25/2021    CHLORIDE 108 (H) 11/24/2021    CHLORIDE 108 (H) 11/23/2021    CO2 28 11/27/2021    CO2 28 11/26/2021    CO2 25 11/25/2021    CO2 25 11/24/2021    CO2 24 11/23/2021    BUN 20 11/27/2021    BUN 20 11/26/2021    BUN 16 11/25/2021    BUN 15 11/24/2021    BUN 11 11/23/2021    CR 0.65 (L) 12/03/2021    CR 0.68 (L) 11/27/2021    CR 0.72 11/26/2021    CR  0.74 11/25/2021    CR 0.69 (L) 11/24/2021     (H) 11/28/2021     (H) 11/27/2021     (H) 11/26/2021     (H) 11/25/2021     (H) 11/24/2021    DD 0.31 11/26/2021    DD 0.44 11/25/2021    DD 0.30 11/24/2021    DD 0.60 (H) 11/23/2021    DD 0.86 (H) 11/22/2021    AST 81 (H) 11/26/2021    AST 94 (H) 11/25/2021    AST 47 (H) 11/24/2021    AST 65 (H) 11/22/2021    AST 84 (H) 11/18/2021     (H) 11/26/2021    ALT 92 (H) 11/25/2021    ALT 48 (H) 11/24/2021    ALT 54 (H) 11/22/2021    ALT 93 (H) 11/18/2021    GGT 97 (H) 09/13/2021     (H) 03/05/2020    ALKPHOS 67 11/26/2021    ALKPHOS 67 11/25/2021    ALKPHOS 66 11/24/2021    ALKPHOS 62 11/22/2021    ALKPHOS 59 11/18/2021    BILITOTAL 0.4 11/26/2021    BILITOTAL 0.5 11/25/2021    BILITOTAL 0.4 11/24/2021    BILITOTAL 0.6 11/22/2021    BILITOTAL 0.3 11/18/2021    INR 0.91 11/22/2021    INR 0.98 03/14/2020    INR 0.93 02/21/2020       RESULTS:    COVID-19 PCR Results    COVID-19 PCR Results 4/18/20 4/18/20 8/19/20 11/11/20 4/29/21 6/7/21 8/16/21 9/12/21 9/21/21 11/13/21 11/22/21    1405 1405            COVID-19 Virus PCR to [facility] - Result Test received-See reflex to [facility] test SARS CoV2 (COVID-19) Virus RT-PCR  Not Detected           COVID-19 Virus PCR to [facility] - Source Nasopharyngeal  Nasopharyngeal           COVID-19 Virus by PCR (External Result)    Not Detected  Negative        SARS-CoV-2 Virus Specimen Source  Nasopharyngeal   Nasopharyngeal         SARS-CoV-2 PCR Result  NEGATIVE   NEGATIVE         SARS CoV2 PCR       Negative Negative Negative Negative Positive (A)   (A) Abnormal value       Comments are available for some flowsheets but are not being displayed.           RADIOLOGY:    XR Chest 2 Views    Result Date: 11/22/2021  EXAM: XR CHEST 2 VW LOCATION: [facility] DATE/TIME: 11/22/2021 8:59 AM INDICATION: dyspnea COMPARISON: None.     IMPRESSION: Moderate patchy bilateral pulmonary  infiltrates most likely pneumonia. COVID pneumonia could have this appearance.    Echocardiogram Complete    Result Date: 2021  [id number]^SHAYY^SOBEIDA^[place] [address]  Name: GRETA FERREIRA MRN: [id number] : 1970 Study Date: 2021 10:31 AM Age: 51 yrs Gender: Male Patient Location: [facility] Reason For Study: Dyspnea Ordering Physician: SOBEIDA LUCAS Referring Physician: SOBEIDA LUCAS Performed By: ALLYSON  BSA: 2.6 m2 Height: 76 in Weight: 290 lb HR: 68 BP: 127/77 mmHg ______________________________________________________________________________ Procedure Complete Portable Echo Adult. ______________________________________________________________________________ Interpretation Summary  Left ventricular size, wall motion and function are normal. The ejection fraction is 60-65%. The left atrium is moderate to severely dilated. The right ventricle is moderately dilated. Mildly decreased right ventricular systolic function There is effacement of the sinotubular ridge.The aortic root is mildly dilated. The ascending aorta is Moderately dilated. Trivial pericardial effusion No hemodynamically significant valvular abnormalities on 2D or color flow imaging. ______________________________________________________________________________ Left Ventricle Left ventricular size, wall motion and function are normal. The ejection fraction is 60-65%. There is normal left ventricular wall thickness. Left ventricular diastolic function is normal. No regional wall motion abnormalities noted.  Right Ventricle The right ventricle is moderately dilated. TAPSE is normal, which is consistent with normal right ventricular systolic function. Mildly decreased right ventricular systolic function.  Atria The left atrium is moderate to severely dilated. Right atrial size is normal. There is no color Doppler evidence of an atrial shunt.   Mitral Valve Mitral valve leaflets appear normal. There is no evidence of mitral stenosis or clinically significant mitral regurgitation. There is mild (1+) mitral regurgitation.  Tricuspid Valve Tricuspid valve leaflets appear normal. Right ventricle systolic pressure estimate normal. There is mild to moderate (1-2+) tricuspid regurgitation.  Aortic Valve Aortic valve leaflets appear normal. There is no evidence of aortic stenosis or clinically significant aortic regurgitation.  Pulmonic Valve The pulmonic valve is not well seen, but is grossly normal.  Vessels There is effacement of the sinotubular ridge.The aortic root is mildly dilated. The ascending aorta is Moderately dilated. IVC diameter <2.1 cm collapsing >50% with sniff suggests a normal RA pressure of 3 mmHg.  Pericardium Trivial pericardial effusion.  ______________________________________________________________________________ MMode/2D Measurements & Calculations RVDd: 4.5 cm IVSd: 1.2 cm LVIDd: 5.4 cm LVIDs: 3.4 cm LVPWd: 1.1 cm FS: 37.4 % LV mass(C)d: 254.9 grams LV mass(C)dI: 98.2 grams/m2  Ao root diam: 4.1 cm LA dimension: 4.9 cm asc Aorta Diam: 4.4 cm LA/Ao: 1.2 LVOT diam: 2.2 cm LVOT area: 3.7 cm2 LA Volume Indexed (AL/bp): 34.3 ml/m2 RWT: 0.41  Time Measurements MM HR: 64.0 BPM  Doppler Measurements & Calculations MV E max vernon: 116.0 cm/sec MV A max vernon: 50.9 cm/sec MV E/A: 2.3 MV dec time: 0.20 sec LV V1 max P.1 mmHg LV V1 max: 112.6 cm/sec LV V1 VTI: 28.5 cm SV(LVOT): 106.4 ml SI(LVOT): 41.0 ml/m2 PA acc time: 0.19 sec TR max vernon: 251.9 cm/sec TR max P.4 mmHg E/E' av.9 Lateral E/e': 12.1 Medial E/e': 13.7  ______________________________________________________________________________ Report approved by: Noa Barraza 2021 12:26 PM       XR Ribs & Chest Left G/E 3 Views    Result Date: 2021  EXAM: XR RIBS and CHEST LT 3VW LOCATION: [facility] DATE/TIME: 2021 5:30 PM INDICATION:  back pain COMPARISON: 12/22/2021     IMPRESSION: The visualized heart and lungs are negative. No rib fractures.    CT Chest Pulmonary Embolism w Contrast    Result Date: 11/22/2021  EXAM: CT CHEST PULMONARY EMBOLISM W CONTRAST LOCATION: [facility] DATE/TIME: 11/22/2021 10:47 AM INDICATION: Shortness of breath. COMPARISON: None. TECHNIQUE: CT chest pulmonary angiogram during arterial phase injection of IV contrast. Multiplanar reformats and MIP reconstructions were performed. Dose reduction techniques were used. CONTRAST: 100ml isovue 370 FINDINGS: ANGIOGRAM CHEST: Mild motion artifact. Pulmonary arteries are normal caliber and negative for pulmonary emboli. Thoracic aorta is negative for dissection. No CT evidence of right heart strain. LUNGS AND PLEURA: Moderate patchy bilateral pulmonary infiltrates consistent with pneumonia. MEDIASTINUM/AXILLAE: Normal. CORONARY ARTERY CALCIFICATION: Minimal UPPER ABDOMEN: Normal. MUSCULOSKELETAL: Normal.     IMPRESSION: 1.  Mild motion artifact. 2.  Negative for pulmonary emboli. 3.  Moderate patchy bilateral pulmonary infiltrates consistent with pneumonia.    CT Head w/o Contrast    Result Date: 11/22/2021  EXAM: CT HEAD W/O CONTRAST LOCATION: [facility] DATE/TIME: 11/22/2021 10:46 AM INDICATION: Altered mental status after suicide attempt. COMPARISON: Head CT 3/8/2020. TECHNIQUE: Routine CT Head without IV contrast. Multiplanar reformats. Dose reduction techniques were used. FINDINGS: INTRACRANIAL CONTENTS: No intracranial hemorrhage, extraaxial collection, or mass effect.  No CT evidence of acute infarct. Normal parenchymal attenuation. Normal ventricles and sulci. Again seen is atherosclerotic calcification of the intradural left vertebral artery. VISUALIZED ORBITS/SINUSES/MASTOIDS: No intraorbital abnormality. There is mild to moderate mucosal thickening in the left maxillary sinus, sphenoid sinus, inferior frontal  sinus, and left ethmoid air cells. No middle ear or mastoid effusion. BONES/SOFT TISSUES: There is a new age-indeterminate mildly displaced fracture of the right nasal bone.     IMPRESSION: 1.  New age-indeterminate mildly displaced right nasal bone fracture. 2.  No evidence of acute intracranial hemorrhage, acute large territorial infarction, or mass lesion.    XR Knee Port Right 3 Views    Result Date: 12/1/2021  EXAM: XR KNEE PORT RIGHT 3 VIEWS LOCATION: [facility] DATE/TIME: 12/1/2021 6:00 PM INDICATION: Right knee pain--rule out fracture or dislocation COMPARISON: None.     IMPRESSION: Medial compartment moderate joint space narrowing and probable nonspecific joint effusion. No apparent fracture.

## 2021-12-03 NOTE — PROGRESS NOTES
Care Management Follow Up    Length of Stay (days): 10    Expected Discharge Date: 12/03/2021     Concerns to be Addressed: discharge planning       Patient plan of care discussed at interdisciplinary rounds: Yes    Anticipated Discharge Disposition: Inpatient Mental Health,Other (Comments) (transfer back to [facility] in psych)     Anticipated Discharge Services:    Anticipated Discharge DME:      Patient/family educated on Medicare website which has current facility and service quality ratings:  N/A    Education Provided on the Discharge Plan:  Yes    Patient/Family in Agreement with the Plan: yes    Referrals Placed by CM/SW:  N/A     Private pay costs discussed: N/A    Additional Information: MARYCRUZ met briefly with pt this morning as he was walking with his nurse Eric and off of isolation per Emma from infection prevention.  Pt appeared quite anxious.  Pt stated to SW that he felt like he wanted to leave and go buy a bottle of alcohol.  Pt asking to have something for his anxiety.  Pt's nurse Eric to follow up on this.      MARYCRUZ informed by charge nurse that hospitalist stating pt needs to be back on Covid precautions for an additional ten days.  Emma from infection prevention still stating that pt only needed to be on precautions for ten days and that time has passed.      MARYCRUZ spoke with Steven Jones, Supervisor of [facility], regarding situation.  Steven informed SW that he would put pt on the list for a bed, although at the moment there is not an available bed.  He will speak with hospitalist and Emma in infection prevention regarding the matter and try to see if he can help resolve the situation.  MARYCRUZ consulted with Patti DELACRUZ CM Manager, regarding the matter.    MARYCRUZ spoke with Steven again.  He updated MARYCRUZ and let MARYCRUZ know that it was decided that Infection Disease Specialist from ID would make final decision on whether pt will be out of isolation and precautions or need to stay on them for another  ten days.    MARYCRUZ spoke with Emma from infection prevention.  She stated that she continues to believe that pt had a mild to moderate level of Covid disease, which means pt would only need to be in isolation precautions for ten days.  Hospitalist is stating that he believes pt had severe level of disease and so needs to be in precautions for twenty days.      MARYCRUZ received return call from Steven at the end of the day.  He reported that he spoke to their infection prevention person at [place] named Nettie.  Nettie has spoken with Emma Rodriguez.  Now he is waiting to hear back from Emma (states left her a message).  Steven reported that he has not heard anything from ID specialist about the matter as of yet.  He will ask evening intake staff to see if they can get any more information.  MARYCRUZ to call and follow up with Steven in the morning to see where things are at.        MARQUEZ Simons, LGSW 12/02/21 6:27 PM

## 2021-12-03 NOTE — PLAN OF CARE
Problem: Suicide Risk  Goal: Absence of Self-Harm  Outcome: No Change   Pt on suicide precaution.   Problem: Anxiety  Goal: Anxiety Reduction or Resolution  Outcome: No Change   Continued to be restless. Unable to calm. Pacing, restless and agitated. Prn and scheduled medications did not help much.   Problem: Gas Exchange Impaired  Goal: Optimal Gas Exchange  Outcome: Improving   On COVID precaution. No Sob. RA. Sat 94-96%.

## 2021-12-03 NOTE — PLAN OF CARE
Problem: Adult Inpatient Plan of Care  Goal: Plan of Care Review  Outcome: No Change   Pt continues on isolation. Pt remains 1:1 for suicide precautions. Pt does get a little SOB with activity, but remains on room air and SATS 92-93%

## 2021-12-03 NOTE — PROGRESS NOTES
[facility]    Medicine Progress Note - Hospitalist Service       Date of Admission:  11/22/2021    Assessment & Plan           Hollis Barclay is a 51 year old male admitted on 11/22/2021. He has history of bipolar disorder, borderline personality disorder, TBI, polysubstance abuse, alcoholism, group home resident presented to [facility] on 11/13 with suicide attempt, transferred to [facility] inpatient psych unit on 11/19, then transferred to [facility] on 11/22 for Covid hypoxia    COVID-19 Diagnosis Details:  Onset of COVID symptoms 11/21   Date of positive test results 11/22   Vaccinated? Fully Vaccinated     # Confirmed COVID-19 infection    # Acute Hypoxic Respiratory Failure secondary to COVID-19 infection  # Viral Pneumonia secondary to COVID-19 infection  - Initial chest x-ray showed moderate patchy bilateral pulmonary infiltrates.  He needed up to 4 L of oxygen, but on room air consistently since 11/30.  Complains of dyspnea today, repeated chest x-ray showed ongoing infiltrates but only mild at this point and worse on the left.  Repeated D-dimer was normal when corrected for age.  We will add on CBC to rule out pneumonia.  Patient is no longer hypoxic and suspicion is that dyspnea is secondary to anxiety.  It is felt that he would likely do better in a inpatient psychiatric unit where he can do group therapy etc. however he is currently unable to return there secondary to Covid 19 special isolation precautions.  Did have ID consultant see the patient today to confirm, continuing to recommend 20 days of special isolation as he qualifies for severe disease. COVID isolation will end on 12/11.  - Continue supportive care with continuous pulse oximetry, COVID-19 special precautions, minimized lab draws, and care consolidation  - Oxygen: monitor, not currently needing  - Labs: as needed.  - Imaging: no additional imaging needed at this time  - Breathing treatments: no inhalers  needed; avoid nebulizers in favor of MDIs   - IV fluids: not indicated at this time  - Antibiotics: not indicated   - Treatments: Received 7 days of Decadron, stopped when hypoxia had resolved.  Completed 5 days of remdesivir.  - DVT Prophylaxis: At high risk of thrombotic complications due to COVID-19 disease (last DDimer 0.54).          - PROPHYLACTIC dosing: Eliquis 2.5mg BID      #Bipolar disorder      Borderline personality disorder      Suicidal ideation with recent suicide attempt      History of TBI  Patient was hospitalized at [facility] 11/13-11/19 after presenting with drug overdose and alcohol intoxication with intention to commit suicide secondary to worsening depression.  Patient overdosed on 5 tablets of oxycodone, 22x300 mg tablets of Seroquel and 4x8 mg tablets of Suboxone while drinking 1 gallon of vodka within a 24-hour period all in attempt to kill himself. Was transferred to [facility] Psych unit 11/19. Requires one-to-one for suicidality while here at [facility].  Psych following here.  --Had a panic attack on 11/27 and therefore started on milligram Ativan every 6 hours as needed for anxiety.  --Tolerating Subutex dose to 8 mg twice daily  This would help with his anxiety.  Plan would be back to [facility] when medical issues resolved  Status is currently Voluntary  Currently on Abilify, Zyprexa, Seroquel, Benadryl, Lyrica, Ativan, Propranolol as per psychiatry.  Seems kind of somnolent but ongoing anxiety.  Psychiatry do feel he needs to go back to inpatient psych as soon as possible for this to get better but unfortunately that is not advisable right now due to Covid isolation.  --Currently on one-to-one     #Alcoholism  Treated for withdrawal at [facility]     #Chronic pain syndrome     History of polysubstance/drug abuse  Addiction medicine following  Tolerating Subutex to 8 mg twice daily dose. Addiction medicine following.     #Alcoholic hepatitis  LFTs slightly increased 11/26 from  11/24/21  Monitor intermittently     #Nicotine dependence  Gum     #HTN  Continue amlodipine, propranolol     #GERD,  continue home PPI     #Constipation  senna as needed     #Nasal bone fracture  Plan to see ENT as outpatient     #Hypothyroidism  Continue home Synthroid     #Insomnia and panic attack  Now on scheduled and PRN ativan     #Dilated aortic root  noted on echo but not prominent on CTA chest. Would recommend annual surveillance CT.     #Hypokalemia mild  Resolved with replacement     #Back pain acute on chronic  --Ordered topical lidocaine--does not work  --Addiction medicine specialist prescribed tizanidine which patient claims is not working  --Currently on pregabalin  --Was prescribed naproxen at [facility] and therefore started on naproxen on 11/30.  Showing improvement  - x-ray of the ribs 11/30 does not show any fracture     #Right knee pain likely chronic  --Patient claims that he has no cartilage  --Requested x-ray of the right knee, unremarkable  --Will avoid narcotics given abuse potential.  --currently on naproxen  -continue topical Diclofenac 4 times PRN    #Ankle edema  Patient is worried about this but not impressive on exam and D-dimer is reassuring       Diet: Regular Diet Adult      Barksdale Catheter: Not present  Central Lines: None  Code Status: Full Code      Disposition Plan   Expected discharge: 12/11/2021   recommended to Inpatient Psych unit once COVID isolation completed.     The patient's care was discussed with the Patient.    Gianna Cantu MD  Hospitalist Service  [facility]  Text page via [id number] Paging/Directory      Clinically Significant Risk Factors Present on Admission                ____________        Physical Exam   Vital Signs: Temp: 98  F (36.7  C) Temp src: Oral BP: 100/56 Pulse: 86   Resp: 18 SpO2: 94 % O2 Device: None (Room air)    Weight: 287 lbs 1.6 oz  General: in no apparent distress, non-toxic and alert male sitting in bedside  chair oriented x3  HEENT: Head normocephalic atraumatic, oral mucosa moist. Sclerae anicteric  CV: Regular rhythm, normal rate, no murmurs  Resp: No wheezes, no rales or rhonchi, no focal consolidations  GI: Belly soft, nondistended, nontender, bowel sounds present  Skin: No rashes or lesions  Extremities: Trace ankle edema bilaterally  Psych: Normal affect, anxious mood  Neuro: CNII-XII grossly intact, moving all 4 extremities      Data   Recent Results (from the past 24 hour(s))   Extra Purple Top Tube    Collection Time: 12/03/21  5:41 AM   Result Value Ref Range    Hold Specimen JIC    Creatinine    Collection Time: 12/03/21  5:42 AM   Result Value Ref Range    Creatinine 0.65 (L) 0.70 - 1.30 mg/dL    GFR Estimate >90 >60 mL/min/1.73m2   D dimer quantitative    Collection Time: 12/03/21 12:06 PM   Result Value Ref Range    D-Dimer Quantitative 0.54 (H) 0.00 - 0.50 ug/mL FEU     ____________  Interval History   Data reviewed today: I reviewed all medications, new labs and imaging results over the last 24 hours. I personally reviewed no images or EKG's today.  patient complains he is short of breath and anxious today. getting IS up to 4L. lung fields clear. complains of BLE swelling R > L, we will check d dimer and CXR. clarifying isolation duration for covid with ID, appreciate assistance. psych managing meds, anxiety seems poorly controlled but this has been an ongoing complaint for him.

## 2021-12-03 NOTE — PLAN OF CARE
Pt continues to be restless/ anxious, paces room, ambulates hallway, repeatedly requesting food and drinks, easily agitated when needs not met immediately.  PRN's provide little relief.  Continues on 1:1.      Problem: Anxiety  Goal: Anxiety Reduction or Resolution  Outcome: No Change     Problem: Pain Acute  Goal: Acceptable Pain Control and Functional Ability  Outcome: No Change  Intervention: Develop Pain Management Plan  Recent Flowsheet Documentation  Taken 12/3/2021 0133 by Josefa Bhandari RN  Pain Management Interventions:    rest    pillow support provided     Problem: Gas Exchange Impaired  Goal: Optimal Gas Exchange  Outcome: Improving     Problem: Suicide Risk  Goal: Absence of Self-Harm  Outcome: No Change

## 2021-12-04 PROCEDURE — 250N000013 HC RX MED GY IP 250 OP 250 PS 637: Performed by: FAMILY MEDICINE

## 2021-12-04 PROCEDURE — 250N000013 HC RX MED GY IP 250 OP 250 PS 637: Performed by: INTERNAL MEDICINE

## 2021-12-04 PROCEDURE — 250N000013 HC RX MED GY IP 250 OP 250 PS 637: Performed by: NURSE PRACTITIONER

## 2021-12-04 PROCEDURE — 120N000001 HC R&B MED SURG/OB

## 2021-12-04 PROCEDURE — 99232 SBSQ HOSP IP/OBS MODERATE 35: CPT | Performed by: HOSPITALIST

## 2021-12-04 RX ADMIN — GABAPENTIN 100 MG: 100 CAPSULE ORAL at 13:20

## 2021-12-04 RX ADMIN — LORAZEPAM 0.25 MG: 0.5 TABLET ORAL at 08:13

## 2021-12-04 RX ADMIN — BUPRENORPHINE 8 MG: 2 TABLET SUBLINGUAL at 20:19

## 2021-12-04 RX ADMIN — PROPRANOLOL HYDROCHLORIDE 10 MG: 10 TABLET ORAL at 08:13

## 2021-12-04 RX ADMIN — OLANZAPINE 5 MG: 5 TABLET, ORALLY DISINTEGRATING ORAL at 20:18

## 2021-12-04 RX ADMIN — PREGABALIN 200 MG: 100 CAPSULE ORAL at 08:13

## 2021-12-04 RX ADMIN — PROPRANOLOL HYDROCHLORIDE 10 MG: 10 TABLET ORAL at 20:19

## 2021-12-04 RX ADMIN — OLANZAPINE 5 MG: 5 TABLET, ORALLY DISINTEGRATING ORAL at 08:13

## 2021-12-04 RX ADMIN — MULTIPLE VITAMINS W/ MINERALS TAB 1 TABLET: TAB at 08:13

## 2021-12-04 RX ADMIN — PANTOPRAZOLE SODIUM 40 MG: 40 TABLET, DELAYED RELEASE ORAL at 07:29

## 2021-12-04 RX ADMIN — ACETAMINOPHEN 650 MG: 325 TABLET ORAL at 19:31

## 2021-12-04 RX ADMIN — FOLIC ACID 1000 MCG: 1 TABLET ORAL at 08:13

## 2021-12-04 RX ADMIN — QUETIAPINE FUMARATE 300 MG: 300 TABLET, FILM COATED ORAL at 20:19

## 2021-12-04 RX ADMIN — AMLODIPINE BESYLATE 5 MG: 5 TABLET ORAL at 08:13

## 2021-12-04 RX ADMIN — APIXABAN 2.5 MG: 2.5 TABLET, FILM COATED ORAL at 08:13

## 2021-12-04 RX ADMIN — ALUMINUM HYDROXIDE, MAGNESIUM HYDROXIDE, AND SIMETHICONE 30 ML: 200; 200; 20 SUSPENSION ORAL at 20:17

## 2021-12-04 RX ADMIN — MAGNESIUM OXIDE TAB 400 MG (241.3 MG ELEMENTAL MG) 400 MG: 400 (241.3 MG) TAB at 20:18

## 2021-12-04 RX ADMIN — DIPHENHYDRAMINE HCL 25 MG: 25 TABLET ORAL at 20:19

## 2021-12-04 RX ADMIN — ARIPIPRAZOLE 5 MG: 5 TABLET ORAL at 08:13

## 2021-12-04 RX ADMIN — APIXABAN 2.5 MG: 2.5 TABLET, FILM COATED ORAL at 20:18

## 2021-12-04 RX ADMIN — NICOTINE POLACRILEX 4 MG: 4 GUM, CHEWING ORAL at 19:32

## 2021-12-04 RX ADMIN — NICOTINE POLACRILEX 4 MG: 4 GUM, CHEWING ORAL at 16:37

## 2021-12-04 RX ADMIN — OLANZAPINE 5 MG: 5 TABLET, ORALLY DISINTEGRATING ORAL at 13:20

## 2021-12-04 RX ADMIN — PREGABALIN 200 MG: 100 CAPSULE ORAL at 20:18

## 2021-12-04 RX ADMIN — BUPRENORPHINE 8 MG: 2 TABLET SUBLINGUAL at 07:29

## 2021-12-04 RX ADMIN — TIZANIDINE 4 MG: 4 TABLET ORAL at 06:15

## 2021-12-04 RX ADMIN — LORAZEPAM 1 MG: 1 TABLET ORAL at 06:14

## 2021-12-04 RX ADMIN — DIPHENHYDRAMINE HCL 25 MG: 25 TABLET ORAL at 13:20

## 2021-12-04 RX ADMIN — MAGNESIUM OXIDE TAB 400 MG (241.3 MG ELEMENTAL MG) 400 MG: 400 (241.3 MG) TAB at 08:13

## 2021-12-04 RX ADMIN — LEVOTHYROXINE SODIUM 75 MCG: 0.03 TABLET ORAL at 07:29

## 2021-12-04 RX ADMIN — TIZANIDINE 4 MG: 4 TABLET ORAL at 15:08

## 2021-12-04 RX ADMIN — LORAZEPAM 1 MG: 1 TABLET ORAL at 02:04

## 2021-12-04 RX ADMIN — PREGABALIN 200 MG: 100 CAPSULE ORAL at 13:20

## 2021-12-04 RX ADMIN — GABAPENTIN 100 MG: 100 CAPSULE ORAL at 19:31

## 2021-12-04 RX ADMIN — DIPHENHYDRAMINE HCL 25 MG: 25 TABLET ORAL at 08:13

## 2021-12-04 RX ADMIN — LORAZEPAM 1 MG: 1 TABLET ORAL at 16:35

## 2021-12-04 RX ADMIN — LORAZEPAM 0.25 MG: 0.5 TABLET ORAL at 20:19

## 2021-12-04 RX ADMIN — LORAZEPAM 0.25 MG: 0.5 TABLET ORAL at 13:20

## 2021-12-04 RX ADMIN — NAPROXEN 250 MG: 250 TABLET ORAL at 08:14

## 2021-12-04 ASSESSMENT — ACTIVITIES OF DAILY LIVING (ADL)
ADLS_ACUITY_SCORE: 10

## 2021-12-04 ASSESSMENT — MIFFLIN-ST. JEOR: SCORE: 2263.76

## 2021-12-04 NOTE — PLAN OF CARE
Problem: Suicide Risk  Goal: Absence of Self-Harm  Outcome: No Change     Problem: Adult Inpatient Plan of Care  Goal: Plan of Care Review  Outcome: Improving  Goal: Patient-Specific Goal (Individualized)  Outcome: Improving  Goal: Absence of Hospital-Acquired Illness or Injury  Outcome: Improving  Intervention: Identify and Manage Fall Risk  Recent Flowsheet Documentation  Taken 12/3/2021 1716 by Nereida Barrera RN  Safety Promotion/Fall Prevention:   bedside attendant   safety round/check completed   room organization consistent   patient and family education   nonskid shoes/slippers when out of bed   mobility aid in reach   fall prevention program maintained   clutter free environment maintained   increase visualization of patient   sitter at bedside   supervised activity   assistive device/personal items within reach   activity supervised  Intervention: Prevent Infection  Recent Flowsheet Documentation  Taken 12/3/2021 1716 by Nereida Barrera RN  Infection Prevention:   rest/sleep promoted   single patient room provided   visitors restricted/screened   personal protective equipment utilized   environmental surveillance performed  Goal: Optimal Comfort and Wellbeing  Outcome: Improving  Intervention: Provide Person-Centered Care  Recent Flowsheet Documentation  Taken 12/3/2021 1716 by Nereida Barrera RN  Trust Relationship/Rapport:   care explained   emotional support provided  Goal: Readiness for Transition of Care  Outcome: Improving  Flowsheets (Taken 12/4/2021 0033)  Concerns to be Addressed: discharge planning  Barriers to Discharge: COVID precautions 10 more days per report  Intervention: Mutually Develop Transition Plan  Recent Flowsheet Documentation  Taken 12/4/2021 0033 by Nereida Barrera RN  Concerns to be Addressed: discharge planning     Problem: Gas Exchange Impaired  Goal: Optimal Gas Exchange  Outcome: Improving     Problem: Anxiety  Goal: Anxiety Reduction or Resolution  Outcome:  "Improving     Problem: Pain Acute  Goal: Acceptable Pain Control and Functional Ability  Outcome: Improving  Intervention: Develop Pain Management Plan  Recent Flowsheet Documentation  Taken 12/3/2021 2114 by Nereida Barrera, RN  Pain Management Interventions: medication (see MAR)  Taken 12/3/2021 1716 by Nereida Barrera, RN  Pain Management Interventions: medication (see MAR)  Intervention: Prevent or Manage Pain  Recent Flowsheet Documentation  Taken 12/3/2021 1716 by Nereida Barrera, RN  Bowel Elimination Promotion:   adequate fluid intake promoted   ambulation promoted  Intervention: Optimize Psychosocial Wellbeing  Recent Flowsheet Documentation  Taken 12/3/2021 1716 by Nereida Barrera, RN  Diversional Activities: television  C/o feeling short of breath while walking around unit with aide. Encouraged patient to take rest breaks when feeling short of breath. O2 sats 93-95% even with exertion. C/o \"burning butthole\" after reporting a large formed BM. Requested suppository. Explained we give suppositories when patients are unable to have a BM. Left a sticky note for MD asking for cream to apply & gave PRN Tylenol.  Notified on-call hospitalist that WBC was 11.9.  "

## 2021-12-04 NOTE — PROGRESS NOTES
Care Management Follow Up    Length of Stay (days): 11    Expected Discharge Date: 12/11/2021     Concerns to be Addressed: discharge planning       Patient plan of care discussed at interdisciplinary rounds: Yes    Anticipated Discharge Disposition: Inpatient Mental Health,Other (Comments) (transfer back to [facility] inpt psych)     Anticipated Discharge Services:    Anticipated Discharge DME:      Patient/family educated on Medicare website which has current facility and service quality ratings:  N/A    Education Provided on the Discharge Plan:  Yes    Patient/Family in Agreement with the Plan: yes    Referrals Placed by CM/SW:  N/A    Private pay costs discussed: N/A    Additional Information: MARYCRUZ spoke with Steven, Supervisor of [facility]; Emma Rodriguez from infection prevention; Dr. Cantu, Hospitalist; Nettie Bhandari, P4 Clinical Manager; and Patti Saldana, CM Manager about trying to get clarification regarding whether pt will be on ten day track or twenty day track for isolation and Covid precautions.  Dr Aguilar from ID saw pt today and determined that pt meets criteria for severe disease and so will need to remain in isolation and on Covid precautions until twenty days post positive result. MARYCRUZ spoke with Steven again and informed him of ID doctor's decision.  He stated that he will put pt on their other list for those who are Covid positive and not yet Covid recovered.  When it gets closer to when pt will be off precautions, he will work with CM to get pt transferred back to [facility] inpatient psych unit.  MARYCRUZ updated CM Manager Patti Saldana as well.      MARQUEZ Simons, LGSW 12/03/21 7:03 PM

## 2021-12-04 NOTE — PLAN OF CARE
Problem: Anxiety  Goal: Anxiety Reduction or Resolution  Outcome: No Change   He has been up most of the night, constantly moving/fidgeting with things.  Prn ativan 1mg given at 02 & 06 with some improvement.  Problem: Pain Acute  Goal: Acceptable Pain Control and Functional Ability  Outcome: No Change   He said he needed something for his back pain this morning & had tizanadine at 0615.

## 2021-12-04 NOTE — PROGRESS NOTES
[facility]    Medicine Progress Note - Hospitalist Service       Date of Admission:  11/22/2021    Assessment & Plan           Hollis Barclay is a 51 year old male admitted on 11/22/2021. He has history of bipolar disorder, borderline personality disorder, TBI, polysubstance abuse, alcoholism, group home resident presented to [facility] on 11/13 with suicide attempt, transferred to [facility] inpatient psych unit on 11/19, then transferred to [facility] on 11/22 for Covid hypoxia    COVID-19 Diagnosis Details:  Onset of COVID symptoms 11/21   Date of positive test results 11/22   Vaccinated? Fully Vaccinated     # Confirmed COVID-19 infection    # Acute Hypoxic Respiratory Failure secondary to COVID-19 infection  # Viral Pneumonia secondary to COVID-19 infection  - Initial chest x-ray showed moderate patchy bilateral pulmonary infiltrates.  He needed up to 4 L of oxygen, required oxygen from 11/22-11/28.  Considered to have had severe disease given O2 requirements and duration of symptoms along with high CRP.  COVID isolation will end on 12/11.  Was complaining of dyspnea yesterday, chest x-ray showed improving infiltrates, D-dimer stable, WBC just mildly elevated 11.9.  Today dyspnea has resolved.  Believe dyspnea due to intermittent anxiety.  - Continue supportive care with continuous pulse oximetry, COVID-19 special precautions, minimized lab draws, and care consolidation  - Treatments: Received 7 days of Decadron, stopped when hypoxia had resolved.  Completed 5 days of remdesivir.  - DVT Prophylaxis: At high risk of thrombotic complications due to COVID-19 disease (last DDimer 0.54).          - PROPHYLACTIC dosing: Eliquis 2.5mg BID      #Bipolar disorder      Borderline personality disorder      Suicidal ideation with recent suicide attempt      History of TBI  Patient was hospitalized at [facility] 11/13-11/19 after presenting with drug overdose and alcohol intoxication with  intention to commit suicide secondary to worsening depression.  Patient overdosed on 5 tablets of oxycodone, 22x300 mg tablets of Seroquel and 4x8 mg tablets of Suboxone while drinking 1 gallon of vodka within a 24-hour period all in attempt to kill himself. Was transferred to [facility] Psych unit 11/19. Requires one-to-one for suicidality while here at [facility].  Psych following here.  --Had a panic attack on 11/27 and therefore started on milligram Ativan every 6 hours as needed for anxiety.  --Tolerating Subutex dose to 8 mg twice daily  This would help with his anxiety.  Plan would be back to [facility] when medical issues resolved  Status is currently Voluntary  Currently on Abilify, Zyprexa, Seroquel, Benadryl, Lyrica, Ativan, Propranolol as per psychiatry.  Seems kind of somnolent but ongoing anxiety.  Psychiatry do feel he needs to go back to inpatient psych as soon as possible for this to get better but unfortunately that is not advisable right now due to Covid isolation.  --Currently on one-to-one     #Alcoholism  Treated for withdrawal at [facility]     #Chronic pain syndrome     History of polysubstance/drug abuse  Addiction medicine following  Tolerating Subutex to 8 mg twice daily dose. Addiction medicine following.     #Alcoholic hepatitis  LFTs slightly increased 11/26 from 11/24/21  Monitor intermittently     #Nicotine dependence  Gum     #HTN  Continue amlodipine, propranolol     #GERD,  continue home PPI     #Constipation  senna as needed     #Nasal bone fracture  Plan to see ENT as outpatient     #Hypothyroidism  Continue home Synthroid     #Insomnia and panic attack  Now on scheduled and PRN ativan     #Dilated aortic root  noted on echo but not prominent on CTA chest. Would recommend annual surveillance CT.     #Hypokalemia mild  Resolved with replacement     #Back pain acute on chronic  --Addiction medicine specialist prescribed tizanidine which patient claims is not working  --Currently on  pregabalin  --Was prescribed naproxen at [facility] and therefore started on naproxen on 11/30.  Showing improvement  - x-ray of the ribs 11/30 does not show any fracture     #Right knee pain likely chronic  --Patient claims that he has no cartilage  --Requested x-ray of the right knee, unremarkable  --Will avoid narcotics given abuse potential.  --currently on naproxen  -continue topical Diclofenac 4 times PRN    #Ankle edema  Patient is worried about this but not impressive on exam and D-dimer is reassuring    #Hand pain  Sounds like overuse injury. Would reassess tomorrow, consider XR if persistent. Seems to have various pain complaints every day       Diet: Regular Diet Adult      Barksdale Catheter: Not present  Central Lines: None  Code Status: Full Code      Disposition Plan   Expected discharge: 12/11/2021 recommended to Inpatient Psych unit once COVID isolation completed.     The patient's care was discussed with the Patient.    Gianna Cantu MD  Hospitalist Service  [facility]  Text page via [employer] Paging/Directory      Clinically Significant Risk Factors Present on Admission                ____________        Physical Exam   Vital Signs: Temp: 97.9  F (36.6  C) Temp src: Oral BP: 108/66 Pulse: 75   Resp: 18 SpO2: 96 % O2 Device: None (Room air)    Weight: 287 lbs 1.6 oz  General: in no apparent distress, non-toxic and drowsy male sitting in bedside chair oriented x3  HEENT: Head normocephalic atraumatic, oral mucosa moist. Sclerae anicteric  Skin: No rashes or lesions  Extremities: Trace ankle and hand edema bilaterally.   Psych: Normal affect, anxious mood  Neuro: CNII-XII grossly intact, moving all 4 extremities      Data   No new labs today  ____________  Interval History   Data reviewed today: I reviewed all medications, new labs and imaging results over the last 24 hours. I personally reviewed no images or EKG's today.  patient is doing ok today. he complains of a severe  stabbing pain localized to a small area of his L hand at the mid 1st MCP area. denies significant dyspnea today. somnolent. continue present cares.

## 2021-12-04 NOTE — PLAN OF CARE
Problem: Adult Inpatient Plan of Care  Goal: Plan of Care Review  Outcome: No Change   Pt continues on isolation and sitter precautions. Pt has been walking halls. Pt continues to get a little SOB, although he says not as bad as yesterday.

## 2021-12-05 LAB
ALBUMIN SERPL-MCNC: 3.1 G/DL (ref 3.5–5)
ALP SERPL-CCNC: 70 U/L (ref 45–120)
ALT SERPL W P-5'-P-CCNC: 31 U/L (ref 0–45)
AST SERPL W P-5'-P-CCNC: 23 U/L (ref 0–40)
BILIRUB DIRECT SERPL-MCNC: 0.1 MG/DL
BILIRUB SERPL-MCNC: 0.3 MG/DL (ref 0–1)
ERYTHROCYTE [DISTWIDTH] IN BLOOD BY AUTOMATED COUNT: 14.6 % (ref 10–15)
HCT VFR BLD AUTO: 37.2 % (ref 40–53)
HGB BLD-MCNC: 11.7 G/DL (ref 13.3–17.7)
MCH RBC QN AUTO: 29.7 PG (ref 26.5–33)
MCHC RBC AUTO-ENTMCNC: 31.5 G/DL (ref 31.5–36.5)
MCV RBC AUTO: 94 FL (ref 78–100)
PLATELET # BLD AUTO: 288 10E3/UL (ref 150–450)
PROT SERPL-MCNC: 6.7 G/DL (ref 6–8)
RBC # BLD AUTO: 3.94 10E6/UL (ref 4.4–5.9)
WBC # BLD AUTO: 8.6 10E3/UL (ref 4–11)

## 2021-12-05 PROCEDURE — 99233 SBSQ HOSP IP/OBS HIGH 50: CPT | Performed by: HOSPITALIST

## 2021-12-05 PROCEDURE — 250N000009 HC RX 250: Performed by: INTERNAL MEDICINE

## 2021-12-05 PROCEDURE — 250N000013 HC RX MED GY IP 250 OP 250 PS 637: Performed by: FAMILY MEDICINE

## 2021-12-05 PROCEDURE — 250N000013 HC RX MED GY IP 250 OP 250 PS 637: Performed by: NURSE PRACTITIONER

## 2021-12-05 PROCEDURE — 85027 COMPLETE CBC AUTOMATED: CPT | Performed by: HOSPITALIST

## 2021-12-05 PROCEDURE — 36415 COLL VENOUS BLD VENIPUNCTURE: CPT | Performed by: HOSPITALIST

## 2021-12-05 PROCEDURE — 120N000001 HC R&B MED SURG/OB

## 2021-12-05 PROCEDURE — 250N000013 HC RX MED GY IP 250 OP 250 PS 637: Performed by: HOSPITALIST

## 2021-12-05 PROCEDURE — 250N000013 HC RX MED GY IP 250 OP 250 PS 637: Performed by: INTERNAL MEDICINE

## 2021-12-05 PROCEDURE — 80076 HEPATIC FUNCTION PANEL: CPT | Performed by: HOSPITALIST

## 2021-12-05 RX ADMIN — NICOTINE POLACRILEX 4 MG: 4 GUM, CHEWING ORAL at 12:31

## 2021-12-05 RX ADMIN — LEVOTHYROXINE SODIUM 75 MCG: 0.03 TABLET ORAL at 06:44

## 2021-12-05 RX ADMIN — ARIPIPRAZOLE 5 MG: 5 TABLET ORAL at 08:08

## 2021-12-05 RX ADMIN — DIPHENHYDRAMINE HCL 25 MG: 25 TABLET ORAL at 08:04

## 2021-12-05 RX ADMIN — FOLIC ACID 1000 MCG: 1 TABLET ORAL at 08:08

## 2021-12-05 RX ADMIN — NICOTINE POLACRILEX 4 MG: 4 GUM, CHEWING ORAL at 06:44

## 2021-12-05 RX ADMIN — TIZANIDINE 4 MG: 4 TABLET ORAL at 23:32

## 2021-12-05 RX ADMIN — NICOTINE POLACRILEX 4 MG: 4 GUM, CHEWING ORAL at 09:24

## 2021-12-05 RX ADMIN — ACETAMINOPHEN 650 MG: 325 TABLET ORAL at 20:17

## 2021-12-05 RX ADMIN — PANTOPRAZOLE SODIUM 40 MG: 40 TABLET, DELAYED RELEASE ORAL at 06:44

## 2021-12-05 RX ADMIN — NICOTINE POLACRILEX 4 MG: 4 GUM, CHEWING ORAL at 08:31

## 2021-12-05 RX ADMIN — LORAZEPAM 0.25 MG: 0.5 TABLET ORAL at 20:17

## 2021-12-05 RX ADMIN — LORAZEPAM 0.25 MG: 0.5 TABLET ORAL at 08:08

## 2021-12-05 RX ADMIN — APIXABAN 2.5 MG: 2.5 TABLET, FILM COATED ORAL at 08:08

## 2021-12-05 RX ADMIN — ANORECTAL OINTMENT: 15.7; .44; 24; 20.6 OINTMENT TOPICAL at 14:34

## 2021-12-05 RX ADMIN — MULTIPLE VITAMINS W/ MINERALS TAB 1 TABLET: TAB at 08:08

## 2021-12-05 RX ADMIN — NICOTINE POLACRILEX 4 MG: 4 GUM, CHEWING ORAL at 15:06

## 2021-12-05 RX ADMIN — DIPHENHYDRAMINE HCL 25 MG: 25 TABLET ORAL at 14:31

## 2021-12-05 RX ADMIN — LORAZEPAM 1.5 MG: 0.5 TABLET ORAL at 23:32

## 2021-12-05 RX ADMIN — GABAPENTIN 100 MG: 100 CAPSULE ORAL at 09:21

## 2021-12-05 RX ADMIN — QUETIAPINE FUMARATE 300 MG: 300 TABLET, FILM COATED ORAL at 20:17

## 2021-12-05 RX ADMIN — DIPHENHYDRAMINE HCL 25 MG: 25 TABLET ORAL at 20:17

## 2021-12-05 RX ADMIN — OLANZAPINE 5 MG: 5 TABLET, ORALLY DISINTEGRATING ORAL at 14:34

## 2021-12-05 RX ADMIN — BUPRENORPHINE 8 MG: 2 TABLET SUBLINGUAL at 06:44

## 2021-12-05 RX ADMIN — PROPRANOLOL HYDROCHLORIDE 10 MG: 10 TABLET ORAL at 20:17

## 2021-12-05 RX ADMIN — AMLODIPINE BESYLATE 5 MG: 5 TABLET ORAL at 08:09

## 2021-12-05 RX ADMIN — NICOTINE POLACRILEX 4 MG: 4 GUM, CHEWING ORAL at 17:10

## 2021-12-05 RX ADMIN — PREGABALIN 200 MG: 100 CAPSULE ORAL at 20:17

## 2021-12-05 RX ADMIN — PROPRANOLOL HYDROCHLORIDE 10 MG: 10 TABLET ORAL at 14:48

## 2021-12-05 RX ADMIN — LIDOCAINE: 50 OINTMENT TOPICAL at 10:26

## 2021-12-05 RX ADMIN — MAGNESIUM OXIDE TAB 400 MG (241.3 MG ELEMENTAL MG) 400 MG: 400 (241.3 MG) TAB at 20:19

## 2021-12-05 RX ADMIN — NICOTINE POLACRILEX 4 MG: 4 GUM, CHEWING ORAL at 19:08

## 2021-12-05 RX ADMIN — NICOTINE POLACRILEX 4 MG: 4 GUM, CHEWING ORAL at 04:28

## 2021-12-05 RX ADMIN — NICOTINE POLACRILEX 4 MG: 4 GUM, CHEWING ORAL at 20:33

## 2021-12-05 RX ADMIN — LORAZEPAM 0.25 MG: 0.5 TABLET ORAL at 14:31

## 2021-12-05 RX ADMIN — BUPRENORPHINE 8 MG: 2 TABLET SUBLINGUAL at 21:38

## 2021-12-05 RX ADMIN — NICOTINE POLACRILEX 4 MG: 4 GUM, CHEWING ORAL at 10:23

## 2021-12-05 RX ADMIN — PREGABALIN 200 MG: 100 CAPSULE ORAL at 14:31

## 2021-12-05 RX ADMIN — NICOTINE POLACRILEX 4 MG: 4 GUM, CHEWING ORAL at 23:32

## 2021-12-05 RX ADMIN — APIXABAN 2.5 MG: 2.5 TABLET, FILM COATED ORAL at 20:17

## 2021-12-05 RX ADMIN — LORAZEPAM 1.5 MG: 0.5 TABLET ORAL at 15:48

## 2021-12-05 RX ADMIN — LORAZEPAM 1.5 MG: 0.5 TABLET ORAL at 10:21

## 2021-12-05 RX ADMIN — MAGNESIUM OXIDE TAB 400 MG (241.3 MG ELEMENTAL MG) 400 MG: 400 (241.3 MG) TAB at 08:09

## 2021-12-05 RX ADMIN — TIZANIDINE 4 MG: 4 TABLET ORAL at 09:21

## 2021-12-05 RX ADMIN — OLANZAPINE 5 MG: 5 TABLET, ORALLY DISINTEGRATING ORAL at 20:17

## 2021-12-05 RX ADMIN — OLANZAPINE 5 MG: 5 TABLET, ORALLY DISINTEGRATING ORAL at 08:08

## 2021-12-05 RX ADMIN — PROPRANOLOL HYDROCHLORIDE 10 MG: 10 TABLET ORAL at 08:04

## 2021-12-05 RX ADMIN — PREGABALIN 200 MG: 100 CAPSULE ORAL at 08:08

## 2021-12-05 ASSESSMENT — ACTIVITIES OF DAILY LIVING (ADL)
ADLS_ACUITY_SCORE: 10
ADLS_ACUITY_SCORE: 12
ADLS_ACUITY_SCORE: 10
ADLS_ACUITY_SCORE: 10
ADLS_ACUITY_SCORE: 12
ADLS_ACUITY_SCORE: 10
ADLS_ACUITY_SCORE: 12
ADLS_ACUITY_SCORE: 10
ADLS_ACUITY_SCORE: 12
ADLS_ACUITY_SCORE: 10
ADLS_ACUITY_SCORE: 10
ADLS_ACUITY_SCORE: 12
ADLS_ACUITY_SCORE: 10
ADLS_ACUITY_SCORE: 12
ADLS_ACUITY_SCORE: 10

## 2021-12-05 NOTE — PLAN OF CARE
Problem: Adult Inpatient Plan of Care  Goal: Plan of Care Review  Outcome: Improving     Problem: Suicide Risk  Goal: Absence of Self-Harm  Outcome: Improving   Patient has not made any statements r/t suicide. Sleeping on and off throughout the night and sometimes up and ambulating in the ross.

## 2021-12-05 NOTE — PLAN OF CARE
Problem: Suicide Risk  Goal: Absence of Self-Harm  Outcome: No Change     Problem: Adult Inpatient Plan of Care  Goal: Plan of Care Review  Outcome: Improving  Goal: Patient-Specific Goal (Individualized)  Outcome: Improving  Goal: Absence of Hospital-Acquired Illness or Injury  Outcome: Improving  Intervention: Identify and Manage Fall Risk  Recent Flowsheet Documentation  Taken 12/4/2021 1635 by Nereida Barrera, RN  Safety Promotion/Fall Prevention:   activity supervised   assistive device/personal items within reach   bedside attendant   safety round/check completed   room organization consistent   fall prevention program maintained   clutter free environment maintained   supervised activity   sitter at bedside   patient and family education   nonskid shoes/slippers when out of bed   increase visualization of patient   increased rounding and observation   mobility aid in reach  Intervention: Prevent and Manage VTE (Venous Thromboembolism) Risk  Recent Flowsheet Documentation  Taken 12/4/2021 1635 by Nereida Barrera RN  VTE Prevention/Management:   ambulation promoted   fluids promoted   anticoagulant therapy maintained  Intervention: Prevent Infection  Recent Flowsheet Documentation  Taken 12/4/2021 1635 by Nereida Barrera RN  Infection Prevention:   visitors restricted/screened   single patient room provided   rest/sleep promoted   personal protective equipment utilized   environmental surveillance performed  Goal: Optimal Comfort and Wellbeing  Outcome: Improving  Intervention: Provide Person-Centered Care  Recent Flowsheet Documentation  Taken 12/4/2021 1635 by Nereida Barrera RN  Trust Relationship/Rapport:   care explained   questions answered   reassurance provided  Goal: Readiness for Transition of Care  Outcome: Improving  Flowsheets (Taken 12/5/2021 0032)  Barriers to Discharge: COVID precautions several more days     Problem: Gas Exchange Impaired  Goal: Optimal Gas Exchange  Outcome:  "Improving     Problem: Anxiety  Goal: Anxiety Reduction or Resolution  Outcome: Improving     Problem: Pain Acute  Goal: Acceptable Pain Control and Functional Ability  Outcome: Improving  Intervention: Develop Pain Management Plan  Recent Flowsheet Documentation  Taken 12/4/2021 1931 by Nereida Barrera RN  Pain Management Interventions: medication (see MAR)  Intervention: Prevent or Manage Pain  Recent Flowsheet Documentation  Taken 12/4/2021 1635 by Nereida Barrera, RN  Bowel Elimination Promotion:   adequate fluid intake promoted   ambulation promoted  Intervention: Optimize Psychosocial Wellbeing  Recent Flowsheet Documentation  Taken 12/4/2021 1635 by Nereida Barrera RN  Diversional Activities: (ambulation)   television   reading   other (see comments)  1:1 sitter for safety d/t suicidality. Did make one comment on shift when asked if patient wanted writer to bring anything for patient. Patient stated, \"Yeah a club.\" When asked why a club? Patient reported \"So I can bash my head with it.\" Reinforced that patient is safe here. Took PRN Ativan only once on shift. Was able to fall asleep for a little while at the end of PM shift.    "

## 2021-12-05 NOTE — PLAN OF CARE
Problem: Adult Inpatient Plan of Care  Goal: Patient-Specific Goal (Individualized)  Outcome: Improving     Problem: Adult Inpatient Plan of Care  Goal: Optimal Comfort and Wellbeing  Outcome: Improving     Problem: Anxiety  Goal: Anxiety Reduction or Resolution  Outcome: Improving    Alert and able to make his needs known. No suicidal ideation. Tizanidine given for back pain which was effective per patient. Was pacing and restless for most part of the morning shift. PRN Ativan dose increased from 1 mg to 1.5 mg which was helpful per patient. Nicotine gum given x4 which was helpful per patient.

## 2021-12-05 NOTE — PROGRESS NOTES
[facility]    Medicine Progress Note - Hospitalist Service       Date of Admission:  11/22/2021    Assessment & Plan           Hollis Barclay is a 51 year old male admitted on 11/22/2021. He has history of bipolar disorder, borderline personality disorder, TBI, polysubstance abuse, alcoholism, group home resident presented to [facility] on 11/13 with suicide attempt, transferred to [facility] inpatient psych unit on 11/19, then transferred to [facility] on 11/22 for Covid hypoxia    COVID-19 Diagnosis Details:  Onset of COVID symptoms 11/21   Date of positive test results 11/22   Vaccinated? Fully Vaccinated     # Confirmed COVID-19 infection    # Acute Hypoxic Respiratory Failure secondary to COVID-19 infection  # Viral Pneumonia secondary to COVID-19 infection  - Initial chest x-ray showed moderate patchy bilateral pulmonary infiltrates.  He needed up to 4 L of oxygen, required oxygen from 11/22-11/28.  Considered to have had severe disease given O2 requirements and duration of symptoms along with high CRP.  COVID isolation will end on 12/11.  Has complained of dyspnea at times, attributed to anxiety. Overall seems stable.  - Continue supportive care with continuous pulse oximetry, COVID-19 special precautions, minimized lab draws, and care consolidation  - Treatments: Received 7 days of Decadron, stopped when hypoxia had resolved.  Completed 5 days of remdesivir.  - DVT Prophylaxis: At high risk of thrombotic complications due to COVID-19 disease (last DDimer 0.54).          - PROPHYLACTIC dosing: Eliquis 2.5mg BID. Would continue this through 12/21 at least      #Bipolar disorder      Borderline personality disorder      Suicidal ideation with recent suicide attempt      History of TBI  Patient was hospitalized at [facility] 11/13-11/19 after presenting with drug overdose and alcohol intoxication with intention to commit suicide secondary to worsening depression.  Patient  overdosed on 5 tablets of oxycodone, 22x300 mg tablets of Seroquel and 4x8 mg tablets of Suboxone while drinking 1 gallon of vodka within a 24-hour period all in attempt to kill himself. Was transferred to [facility] Psych unit 11/19. Requires one-to-one for suicidality while here at [facility].  Psych following here.  --Had a panic attack on 11/27 and therefore started on milligram Ativan every 6 hours as needed for anxiety.  --Tolerating Subutex dose to 8 mg twice daily  This would help with his anxiety.  Plan would be back to [facility] when medical issues resolved  Status is currently Voluntary  Currently on Abilify, Zyprexa, Seroquel, Benadryl, Lyrica, Ativan, Propranolol as per psychiatry.  Seems kind of somnolent but ongoing anxiety.  Psychiatry do feel he needs to go back to inpatient psych as soon as possible for this to get better but unfortunately that is not advisable right now due to Covid isolation.  --Currently on one-to-one  --Acutely anxious again today. Wonder if due to copious nicotine use, but patient no open to trying to taper this right now, and indicates using less than he does at home. We will increase Ativan. Needs Psych input tomorrow     #Alcoholism  Treated for withdrawal at [facility]     #Chronic pain syndrome     History of polysubstance/drug abuse  Addiction medicine following  Tolerating Subutex to 8 mg twice daily dose. Addiction medicine following.     #Alcoholic hepatitis  Resolved, LFTs ok     #Nicotine dependence  Gum. Using 20 nicotine gum tabs per day but reports this is less than he takes at home. Allergic to adhesive of nicotine patch. Do wonder if overuse contributing to anxiety. Patient not open to tapering dose at this time.     #HTN  Continue amlodipine, propranolol     #GERD,  continue home PPI     #Constipation  senna as needed     #Nasal bone fracture  Plan to see ENT as outpatient     #Hypothyroidism  Continue home Synthroid     #Insomnia and panic attack  Now on scheduled  and PRN ativan     #Dilated aortic root  noted on echo but not prominent on CTA chest. Would recommend annual surveillance CT.     #Hypokalemia mild  Resolved with replacement     #Back pain acute on chronic  --Addiction medicine specialist prescribed tizanidine which patient claims is not working  --Currently on pregabalin  --Was prescribed naproxen at [facility] and therefore started on naproxen on 11/30.  Showing improvement  - x-ray of the ribs 11/30 does not show any fracture     #Right knee pain likely chronic  --Patient claims that he has no cartilage  --Requested x-ray of the right knee, unremarkable  --Will avoid narcotics given abuse potential.  --currently on naproxen  -continue topical Diclofenac 4 times PRN    #Hand pain  Not complaining of this today. Seems to have various pain complaints every day    #Anal burning with BM  Calmoseptine. This has been ordered but never given, reminded patient and staff of availability     Diet: Regular Diet Adult      Barksdale Catheter: Not present  Central Lines: None  Code Status: Full Code      Disposition Plan   Expected discharge: 12/11/2021 recommended to Inpatient Psych unit once COVID isolation completed.     The patient's care was discussed with the Patient.    Gianna Cantu MD  Hospitalist Service  [facility]  Text page via [facility] Paging/Directory      Clinically Significant Risk Factors Present on Admission                ____________        Physical Exam   Vital Signs: Temp: 98.3  F (36.8  C) Temp src: Oral BP: 125/78 Pulse: 98   Resp: 20 SpO2: 93 % O2 Device: None (Room air)    Weight: 289 lbs 4.8 oz  General: very anxious, restlessly pacing in the room without his shirt on, non-toxic and male sitting in bedside chair oriented x3  HEENT: Head normocephalic atraumatic, oral mucosa moist. Sclerae anicteric  Skin: No rashes or lesions  Extremities: Trace ankle and hand edema bilaterally.   Psych: Normal affect, very anxious  mood  Neuro: CNII-XII grossly intact, moving all 4 extremities      Data   Recent Results (from the past 24 hour(s))   CBC with platelets    Collection Time: 12/05/21  5:59 AM   Result Value Ref Range    WBC Count 8.6 4.0 - 11.0 10e3/uL    RBC Count 3.94 (L) 4.40 - 5.90 10e6/uL    Hemoglobin 11.7 (L) 13.3 - 17.7 g/dL    Hematocrit 37.2 (L) 40.0 - 53.0 %    MCV 94 78 - 100 fL    MCH 29.7 26.5 - 33.0 pg    MCHC 31.5 31.5 - 36.5 g/dL    RDW 14.6 10.0 - 15.0 %    Platelet Count 288 150 - 450 10e3/uL   Hepatic function panel    Collection Time: 12/05/21  5:59 AM   Result Value Ref Range    Bilirubin Total 0.3 0.0 - 1.0 mg/dL    Bilirubin Direct 0.1 <=0.5 mg/dL    Protein Total 6.7 6.0 - 8.0 g/dL    Albumin 3.1 (L) 3.5 - 5.0 g/dL    Alkaline Phosphatase 70 45 - 120 U/L    AST 23 0 - 40 U/L    ALT 31 0 - 45 U/L     ____________  Interval History   Data reviewed today: I reviewed all medications, new labs and imaging results over the last 24 hours. I personally reviewed no images or EKG's today.  Patient is acutely anxious, pacing continually in the room.  Does not feel current order of Ativan has been effective.  I am going to increase his Ativan to 1.5 mg to bridge the gap until he can be seen by psychiatry tomorrow.  He is having some burning sensation of his anus when he has a bowel movement, calmoseptine has been ordered but never given, reminded patient and nurse this can be given.  Patient otherwise no new complaints.  Respiratory status stable.  Less drowsy today.  Not ready for discharge back to psych unit, Covid isolation ends on 12/11.  Pharmacist noting patient using numerous nicotine gum tabs throughout the day, patient states this is what he does at home and actually he takes 2x4mg tabs every hour at home.  Patient does not feel he is able to decrease use of nicotine tab at this time.  He notes he is allergic to adhesive of nicotine patch.

## 2021-12-06 PROCEDURE — 250N000013 HC RX MED GY IP 250 OP 250 PS 637: Performed by: FAMILY MEDICINE

## 2021-12-06 PROCEDURE — 250N000013 HC RX MED GY IP 250 OP 250 PS 637: Performed by: NURSE PRACTITIONER

## 2021-12-06 PROCEDURE — 99232 SBSQ HOSP IP/OBS MODERATE 35: CPT | Mod: 95 | Performed by: INTERNAL MEDICINE

## 2021-12-06 PROCEDURE — 250N000009 HC RX 250: Performed by: INTERNAL MEDICINE

## 2021-12-06 PROCEDURE — 250N000013 HC RX MED GY IP 250 OP 250 PS 637: Performed by: INTERNAL MEDICINE

## 2021-12-06 PROCEDURE — 99232 SBSQ HOSP IP/OBS MODERATE 35: CPT | Performed by: HOSPITALIST

## 2021-12-06 PROCEDURE — 250N000013 HC RX MED GY IP 250 OP 250 PS 637

## 2021-12-06 PROCEDURE — 120N000001 HC R&B MED SURG/OB

## 2021-12-06 PROCEDURE — 250N000013 HC RX MED GY IP 250 OP 250 PS 637: Performed by: HOSPITALIST

## 2021-12-06 RX ORDER — SIMETHICONE 80 MG
80 TABLET,CHEWABLE ORAL EVERY 6 HOURS PRN
Status: DISCONTINUED | OUTPATIENT
Start: 2021-12-06 | End: 2021-12-11 | Stop reason: HOSPADM

## 2021-12-06 RX ORDER — BUPRENORPHINE 2 MG/1
8 TABLET SUBLINGUAL 3 TIMES DAILY
Status: DISCONTINUED | OUTPATIENT
Start: 2021-12-06 | End: 2021-12-11 | Stop reason: HOSPADM

## 2021-12-06 RX ADMIN — DIPHENHYDRAMINE HCL 25 MG: 25 TABLET ORAL at 14:03

## 2021-12-06 RX ADMIN — PROPRANOLOL HYDROCHLORIDE 10 MG: 10 TABLET ORAL at 20:58

## 2021-12-06 RX ADMIN — PROPRANOLOL HYDROCHLORIDE 10 MG: 10 TABLET ORAL at 14:04

## 2021-12-06 RX ADMIN — ACETAMINOPHEN 650 MG: 325 TABLET ORAL at 14:03

## 2021-12-06 RX ADMIN — BUPRENORPHINE 8 MG: 2 TABLET SUBLINGUAL at 21:52

## 2021-12-06 RX ADMIN — DIPHENHYDRAMINE HCL 25 MG: 25 TABLET ORAL at 20:58

## 2021-12-06 RX ADMIN — MAGNESIUM OXIDE TAB 400 MG (241.3 MG ELEMENTAL MG) 400 MG: 400 (241.3 MG) TAB at 07:37

## 2021-12-06 RX ADMIN — LORAZEPAM 0.25 MG: 0.5 TABLET ORAL at 14:03

## 2021-12-06 RX ADMIN — OLANZAPINE 5 MG: 5 TABLET, ORALLY DISINTEGRATING ORAL at 20:58

## 2021-12-06 RX ADMIN — LORAZEPAM 0.25 MG: 0.5 TABLET ORAL at 07:37

## 2021-12-06 RX ADMIN — PREGABALIN 200 MG: 100 CAPSULE ORAL at 07:40

## 2021-12-06 RX ADMIN — DICLOFENAC SODIUM 4 G: 10 GEL TOPICAL at 10:52

## 2021-12-06 RX ADMIN — NICOTINE POLACRILEX 4 MG: 4 GUM, CHEWING ORAL at 16:27

## 2021-12-06 RX ADMIN — LORAZEPAM 0.25 MG: 0.5 TABLET ORAL at 20:49

## 2021-12-06 RX ADMIN — PREGABALIN 200 MG: 100 CAPSULE ORAL at 20:50

## 2021-12-06 RX ADMIN — MULTIPLE VITAMINS W/ MINERALS TAB 1 TABLET: TAB at 07:41

## 2021-12-06 RX ADMIN — QUETIAPINE FUMARATE 300 MG: 300 TABLET, FILM COATED ORAL at 20:59

## 2021-12-06 RX ADMIN — AMLODIPINE BESYLATE 5 MG: 5 TABLET ORAL at 07:42

## 2021-12-06 RX ADMIN — NICOTINE POLACRILEX 2 MG: 4 GUM, CHEWING ORAL at 13:15

## 2021-12-06 RX ADMIN — APIXABAN 2.5 MG: 2.5 TABLET, FILM COATED ORAL at 20:50

## 2021-12-06 RX ADMIN — OLANZAPINE 5 MG: 5 TABLET, ORALLY DISINTEGRATING ORAL at 07:39

## 2021-12-06 RX ADMIN — FOLIC ACID 1000 MCG: 1 TABLET ORAL at 07:38

## 2021-12-06 RX ADMIN — ARIPIPRAZOLE 5 MG: 5 TABLET ORAL at 07:40

## 2021-12-06 RX ADMIN — LIDOCAINE: 50 OINTMENT TOPICAL at 14:16

## 2021-12-06 RX ADMIN — SIMETHICONE 80 MG: 80 TABLET, CHEWABLE ORAL at 21:22

## 2021-12-06 RX ADMIN — MAGNESIUM OXIDE TAB 400 MG (241.3 MG ELEMENTAL MG) 400 MG: 400 (241.3 MG) TAB at 20:50

## 2021-12-06 RX ADMIN — BUPRENORPHINE 8 MG: 2 TABLET SUBLINGUAL at 15:35

## 2021-12-06 RX ADMIN — NICOTINE POLACRILEX 4 MG: 4 GUM, CHEWING ORAL at 15:25

## 2021-12-06 RX ADMIN — PROPRANOLOL HYDROCHLORIDE 10 MG: 10 TABLET ORAL at 07:39

## 2021-12-06 RX ADMIN — NICOTINE POLACRILEX 4 MG: 4 GUM, CHEWING ORAL at 09:55

## 2021-12-06 RX ADMIN — NICOTINE POLACRILEX 4 MG: 4 GUM, CHEWING ORAL at 20:48

## 2021-12-06 RX ADMIN — PANTOPRAZOLE SODIUM 40 MG: 40 TABLET, DELAYED RELEASE ORAL at 07:41

## 2021-12-06 RX ADMIN — APIXABAN 2.5 MG: 2.5 TABLET, FILM COATED ORAL at 07:40

## 2021-12-06 RX ADMIN — LORAZEPAM 1.5 MG: 0.5 TABLET ORAL at 09:53

## 2021-12-06 RX ADMIN — LORAZEPAM 1.5 MG: 0.5 TABLET ORAL at 16:12

## 2021-12-06 RX ADMIN — PREGABALIN 200 MG: 100 CAPSULE ORAL at 14:03

## 2021-12-06 RX ADMIN — NICOTINE POLACRILEX 4 MG: 4 GUM, CHEWING ORAL at 07:50

## 2021-12-06 RX ADMIN — DIPHENHYDRAMINE HCL 25 MG: 25 TABLET ORAL at 07:38

## 2021-12-06 RX ADMIN — NICOTINE POLACRILEX 4 MG: 4 GUM, CHEWING ORAL at 18:36

## 2021-12-06 RX ADMIN — BUPRENORPHINE 8 MG: 2 TABLET SUBLINGUAL at 07:36

## 2021-12-06 RX ADMIN — OLANZAPINE 5 MG: 5 TABLET, ORALLY DISINTEGRATING ORAL at 14:04

## 2021-12-06 RX ADMIN — MELATONIN TAB 3 MG 3 MG: 3 TAB at 20:50

## 2021-12-06 RX ADMIN — LEVOTHYROXINE SODIUM 75 MCG: 0.03 TABLET ORAL at 07:38

## 2021-12-06 RX ADMIN — TIZANIDINE 4 MG: 4 TABLET ORAL at 10:56

## 2021-12-06 RX ADMIN — NICOTINE POLACRILEX 4 MG: 4 GUM, CHEWING ORAL at 14:03

## 2021-12-06 ASSESSMENT — ACTIVITIES OF DAILY LIVING (ADL)
ADLS_ACUITY_SCORE: 10
ADLS_ACUITY_SCORE: 12
ADLS_ACUITY_SCORE: 10
ADLS_ACUITY_SCORE: 12
ADLS_ACUITY_SCORE: 12
ADLS_ACUITY_SCORE: 10
ADLS_ACUITY_SCORE: 10
ADLS_ACUITY_SCORE: 12
ADLS_ACUITY_SCORE: 10
ADLS_ACUITY_SCORE: 12
ADLS_ACUITY_SCORE: 10
ADLS_ACUITY_SCORE: 12
ADLS_ACUITY_SCORE: 10
ADLS_ACUITY_SCORE: 10
ADLS_ACUITY_SCORE: 12
ADLS_ACUITY_SCORE: 10

## 2021-12-06 NOTE — PLAN OF CARE
Problem: Adult Inpatient Plan of Care  Goal: Absence of Hospital-Acquired Illness or Injury  Intervention: Identify and Manage Fall Risk  Recent Flowsheet Documentation  Taken 12/6/2021 0000 by Lu Grayson RN  Safety Promotion/Fall Prevention: activity supervised  Intervention: Prevent Skin Injury  Recent Flowsheet Documentation  Taken 12/6/2021 0000 by Lu Grayson RN  Body Position: position changed independently  Intervention: Prevent and Manage VTE (Venous Thromboembolism) Risk  Recent Flowsheet Documentation  Taken 12/6/2021 0000 by Lu Grayson RN  VTE Prevention/Management: ambulation promoted     Problem: Gas Exchange Impaired  Goal: Optimal Gas Exchange  Intervention: Optimize Oxygenation and Ventilation  Recent Flowsheet Documentation  Taken 12/6/2021 0000 by Lu Grayson RN  Head of Bed (HOB) Positioning: HOB at 30-45 degrees   Patient on 1;1 for suicidal precaution, gave prn ativan for sleep and anxiety with effective results.

## 2021-12-06 NOTE — PROGRESS NOTES
"Addiction Medicine Follow Up    Tele-Visit Details    Type of service:  Video Visit    Time Service Began (time 1st connected with pt): 1142    Time Service Ended (time completely finished with pt): 1156    Originating Location (pt. Location): Patient [place], [place]    Distant Location (provider location): [facility]    Reason for Televisit: COVID 19    Mode of Communication:  Video Conference via [employer]    Physician has received verbal consent for a video visit from the patient? Yes        Active Problems:    Pneumonia due to COVID-19 virus      Subjective  Chief complaint: Pain in low back and knee.    HPI: Jose M is recovering from COVID pnuemonia.  Sx much improved but has to stay quarantined until 12/11.  However, reports that he got SOB again over the weekend.  CXR showed improvement in infiltrates, and his D-dimer was borderline.  Dyspnea resolved within 1-2 days.      Still c/o back pain- in lumbar area and in R knee.   Does not radiate.  Says buprenorphine helps, but \"not enough\"    ROS:   Resp:  Denies cough or SOB today.     CV:  No anterior chest pain   GI:  No constipation   Ext: R knee painful and swollen at times   Psych:  Frustrated, depressed that has to stay on Medical Unit longer.      No current outpatient medications on file.       Objective    BP (!) 146/77 (BP Location: Right arm, Patient Position: Sitting)   Pulse 88   Temp 98.7  F (37.1  C) (Oral)   Resp 22   Ht 1.93 m (6' 4\")   Wt 131.2 kg (289 lb 4.8 oz)   SpO2 94%   BMI 35.21 kg/m      Physical Exam per resident yesterday:  Anxious, restless. Trace ankle and hand edema      Skin:  No sweating  Neuro:    Psych:     Cooperative     Mood:  Euthymic               Affect:  Congruent               Thought content:  No SI, HI or hallucinations               Thought processes:  Linear                Speech:  Normal                Motor:  Normal                Insight/judgement:  good    Results  Lab " Results   Component Value Date    WBC 8.6 12/05/2021    HGB 11.7 (L) 12/05/2021    HCT 37.2 (L) 12/05/2021    MCV 94 12/05/2021     12/05/2021     Lab Results   Component Value Date    CR 0.65 (L) 12/03/2021     Lab Results   Component Value Date     11/27/2021    POTASSIUM 3.7 11/30/2021    CHLORIDE 103 11/27/2021    CO2 28 11/27/2021     (H) 11/28/2021     Lab Results   Component Value Date     (H) 11/28/2021     Lab Results   Component Value Date    AST 23 12/05/2021    ALT 31 12/05/2021    GGT 97 (H) 09/13/2021    ALKPHOS 70 12/05/2021    BILITOTAL 0.3 12/05/2021         Assessment and Plan:     1.  Opioid use disorder, severe - has been on buprenorphine 8 mg bid since 11/29/21.  seems to be tolerating this well.    Continues to c/o pain  and was  On 8 mg tid in the past.   Do not want him relapse to opiates.   Therefore will increase to 8 mg tid.   This is max dose I will give him.      2.  Alcohol Use Disorder, severe - Had severe withdrawal when at [facility] - will start acamprosate.  Continue to discuss CD treatment, but  patient does not think he needs it.       3.  BPAD I with recent depression, severe  - suicide attempt 2 wks ago.   Unable to contract for safety.   As per psychiatry consult, plan is for him to transfer back to [facility] unit when he is deemed to be no longer contagious.   Whether this is after 10 days, or longer is to be determined.   On abilify and quetiapine 300 mg q hs and now zyrexa.   Less agitated and anxious now compared to last time I saw him.   However, sounds like he has been agitated and uncooperative with staff.        4.  Low back pain -  Patient had no complaint of back pain to me today.  No comment about the tizandine that was started a few days ago, but earlier told hospitalist that it had not been helpful.   Hopefully increase in uprenorphine will help.   Encourage patient to be up and moving.   He will need to do all his own ADLs on   unit.       Still planning to have patient return to [facility] unit after 12/11.          Ivette Mahoney MD  Addiction Medicine Service  [facility]   Page me (click here for Salena Mahoney)

## 2021-12-06 NOTE — PROGRESS NOTES
[facility]    Medicine Progress Note - Hospitalist Service       Date of Admission:  11/22/2021    Assessment & Plan           Hollis Barclay is a 51 year old male admitted on 11/22/2021. He has history of bipolar disorder, borderline personality disorder, TBI, polysubstance abuse, alcoholism, group home resident presented to [facility] on 11/13 with suicide attempt, transferred to [facility] inpatient psych unit on 11/19, then transferred to [facility] on 11/22 for Covid hypoxia    COVID-19 Diagnosis Details:  Onset of COVID symptoms 11/21   Date of positive test results 11/22   Vaccinated? Fully Vaccinated     # Confirmed COVID-19 infection    # Acute Hypoxic Respiratory Failure secondary to COVID-19 infection, resolved  # Viral Pneumonia secondary to COVID-19 infection  - Initial chest x-ray showed moderate patchy bilateral pulmonary infiltrates.  He needed up to 4 L of oxygen, required oxygen from 11/22-11/28.  Considered to have had severe disease given O2 requirements and duration of symptoms along with high CRP.  COVID isolation will end on 12/11.  Has complained of dyspnea at times, attributed to anxiety. Overall seems stable.  - Continue supportive care, COVID-19 special precautions, minimized lab draws, and care consolidation.  Does not need continuous oximetry  - Treatments: Received 7 days of Decadron, stopped when hypoxia had resolved.  Completed 5 days of remdesivir.  - DVT Prophylaxis: At high risk of thrombotic complications due to COVID-19 disease (last DDimer 0.54).          - PROPHYLACTIC dosing: Eliquis 2.5mg BID. Would continue this through 12/21 at least (30 days from onset)      #Bipolar disorder      Borderline personality disorder      Suicidal ideation with recent suicide attempt      History of TBI  Patient was hospitalized at [facility] 11/13-11/19 after presenting with drug overdose and alcohol intoxication with intention to commit suicide secondary to  worsening depression.  Patient overdosed on 5 tablets of oxycodone, 22x300 mg tablets of Seroquel and 4x8 mg tablets of Suboxone while drinking 1 gallon of vodka within a 24-hour period all in attempt to kill himself. Was transferred to [facility] Psych unit 11/19. Requires one-to-one for suicidality while here at [facility].  Psych following here, not seeing today but aware of issues.  --Had a panic attack on 11/27 and therefore started on milligram Ativan every 4 hours as needed for anxiety. Was acutely anxious over the weekend and Ativan dose increased to 1.5 mg, doing much better on this dose. Seems kind of somnolent at times, but not today or yesterday.  --Tolerating Subutex dose to 8 mg twice daily  This would help with his anxiety.  Plan would be back to [facility] when medical issues resolved  Status is currently Voluntary  Currently on Abilify, Zyprexa, Seroquel, Benadryl, Lyrica, Ativan, Propranolol as per psychiatry.   Psychiatry do feel he needs to go back to inpatient psych as soon as possible for this to get better but unfortunately that is not advisable right now due to Covid isolation.  --Currently on one-to-one     #Alcoholism  Treated for withdrawal at [facility]     #Chronic pain syndrome     History of polysubstance/drug abuse  Addiction medicine following  Tolerating Subutex to 8 mg twice daily dose.     #Alcoholic hepatitis  Resolved, LFTs ok     #Nicotine dependence  Gum. Using 20 nicotine gum tabs per day but reports this is less than he takes at home. Allergic to adhesive of nicotine patch. Do wonder if overuse contributing to anxiety. Patient not open to tapering dose at this time.     #HTN  Continue amlodipine, propranolol     #GERD,  continue home PPI     #Constipation  senna as needed     #Nasal bone fracture  Plan to see ENT as outpatient     #Hypothyroidism  Continue home Synthroid     #Insomnia and panic attack  Now on scheduled and PRN ativan     #Dilated aortic root  noted on echo but not  prominent on CTA chest. Would recommend annual surveillance CT.     #Hypokalemia mild  Resolved with replacement     #Back pain acute on chronic  --Addiction medicine specialist prescribed tizanidine which patient claims is not working  --Currently on pregabalin  --Was prescribed naproxen at [facility] and therefore started on naproxen on 11/30.  Showing improvement  - x-ray of the ribs 11/30 does not show any fracture     #Right knee pain likely chronic  --Patient claims that he has no cartilage  --x-ray of the right knee unremarkable  --Will avoid narcotics given abuse potential.  --currently on naproxen  -continue topical Diclofenac 4 times PRN    #Hand pain  Not complaining of this today. Seems to have varying pain complaints from day to day    #Anal burning with BM  Calmoseptine has been helpful    #Leukocytosis  Probably spurious elevation  Resolved       Diet: Regular Diet Adult      Barksdale Catheter: Not present  Central Lines: None  Code Status: Full Code      Disposition Plan   Expected discharge: 12/11/2021 recommended to Inpatient Psych unit once COVID isolation completed.     The patient's care was discussed with the Patient.    Gianna Cantu MD  Hospitalist Service  [facility]  Text page via [employer] Paging/Directory      Clinically Significant Risk Factors Present on Admission                ____________        Physical Exam   Vital Signs: Temp: 98.1  F (36.7  C) Temp src: Oral BP: 117/73 Pulse: 87   Resp: 20 SpO2: 96 % O2 Device: None (Room air)    Weight: 289 lbs 4.8 oz  General: Well-appearing adult male resting in bed, initially sleeping when I walk in but rouses easily and then alert and oriented x3, does not appear acutely anxious, appears calm and pleasant  HEENT: Head normocephalic atraumatic, oral mucosa moist. Sclerae anicteric  Skin: No rashes or lesions  Extremities: Trace ankle and hand edema bilaterally.   Psych: Normal affect, mood euthymic  Neuro: CNII-XII  grossly intact, moving all 4 extremities      Data   No results found for this or any previous visit (from the past 24 hour(s)).  ____________  Interval History   Data reviewed today: I reviewed all medications, new labs and imaging results over the last 24 hours. I personally reviewed no images or EKG's today.  Patient is doing much better today on higher dose of Ativan.  Reports much better sleep, more calm and able to cope with hospitalization better.  Denies any trouble eating or drinking.  Bowel movements more comfortable since addition of calmoseptine cream.  No new acute issues.  Awaiting end of Covid isolation to return to psych unit.

## 2021-12-06 NOTE — PLAN OF CARE
"  Problem: Pain Acute  Goal: Acceptable Pain Control and Functional Ability  Outcome: No Change  Intervention: Develop Pain Management Plan  Recent Flowsheet Documentation  Taken 12/6/2021 1104 by Madelyn Ruiz RN  Pain Management Interventions: (Voltaren and muscle relaxer) medication (see MAR)  Intervention: Optimize Psychosocial Wellbeing  Recent Flowsheet Documentation  Taken 12/6/2021 0755 by Madelyn Ruiz RN  Diversional Activities: television   Patient complains of right knee pain, states \"it feels like it could give out\". Patient would like something more done other than an Xray. Tizanidine given and topical ointments apple to knee.    Problem: Anxiety  Goal: Anxiety Reduction or Resolution  Outcome: No Change   Patient anxious, pacing in room, easily agitated. Scheduled and prn dose Ativan given, with little relief. Patient sleeps for short periods, awakens and paced around room. Patient ambulated in hallway despite isolation precautions.     Problem: Pain Acute  Goal: Acceptable Pain Control and Functional Ability  Intervention: Optimize Psychosocial Wellbeing  Recent Flowsheet Documentation  Taken 12/6/2021 0249 by Madelyn Ruiz RN  Diversional Activities: television   Patient continues to endorse suicide ideation, states he has a plan, but would not state. Patient stated, \"he wishes he was dead\", and \"wishes he was not here.\" Patient remains with 1:1 sitter for safety.    Problem: Adult Inpatient Plan of Care  Goal: Plan of Care Review  Outcome: No Change   Vitals are stable, 02 saturation 94-96% on room air. Patient has an occasional dry cough, mild sob with exertion.  "

## 2021-12-07 PROCEDURE — 99207 PR CDG-CORRECTLY CODED, REVIEWED AND AGREE: CPT | Performed by: INTERNAL MEDICINE

## 2021-12-07 PROCEDURE — 120N000001 HC R&B MED SURG/OB

## 2021-12-07 PROCEDURE — 250N000013 HC RX MED GY IP 250 OP 250 PS 637: Performed by: FAMILY MEDICINE

## 2021-12-07 PROCEDURE — 250N000013 HC RX MED GY IP 250 OP 250 PS 637: Performed by: INTERNAL MEDICINE

## 2021-12-07 PROCEDURE — 99232 SBSQ HOSP IP/OBS MODERATE 35: CPT | Performed by: INTERNAL MEDICINE

## 2021-12-07 PROCEDURE — 250N000013 HC RX MED GY IP 250 OP 250 PS 637: Performed by: HOSPITALIST

## 2021-12-07 PROCEDURE — 250N000013 HC RX MED GY IP 250 OP 250 PS 637: Performed by: NURSE PRACTITIONER

## 2021-12-07 RX ADMIN — MELATONIN TAB 3 MG 3 MG: 3 TAB at 22:35

## 2021-12-07 RX ADMIN — PROPRANOLOL HYDROCHLORIDE 10 MG: 10 TABLET ORAL at 08:13

## 2021-12-07 RX ADMIN — LORAZEPAM 0.25 MG: 0.5 TABLET ORAL at 20:20

## 2021-12-07 RX ADMIN — PROPRANOLOL HYDROCHLORIDE 10 MG: 10 TABLET ORAL at 20:19

## 2021-12-07 RX ADMIN — NICOTINE POLACRILEX 4 MG: 4 GUM, CHEWING ORAL at 17:56

## 2021-12-07 RX ADMIN — ACETAMINOPHEN 650 MG: 325 TABLET ORAL at 12:25

## 2021-12-07 RX ADMIN — LORAZEPAM 1.5 MG: 0.5 TABLET ORAL at 06:22

## 2021-12-07 RX ADMIN — NICOTINE POLACRILEX 4 MG: 4 GUM, CHEWING ORAL at 12:28

## 2021-12-07 RX ADMIN — OLANZAPINE 5 MG: 5 TABLET, ORALLY DISINTEGRATING ORAL at 13:49

## 2021-12-07 RX ADMIN — LORAZEPAM 0.25 MG: 0.5 TABLET ORAL at 13:50

## 2021-12-07 RX ADMIN — OLANZAPINE 5 MG: 5 TABLET, ORALLY DISINTEGRATING ORAL at 08:12

## 2021-12-07 RX ADMIN — NICOTINE POLACRILEX 4 MG: 4 GUM, CHEWING ORAL at 07:56

## 2021-12-07 RX ADMIN — MAGNESIUM OXIDE TAB 400 MG (241.3 MG ELEMENTAL MG) 400 MG: 400 (241.3 MG) TAB at 08:13

## 2021-12-07 RX ADMIN — LORAZEPAM 1.5 MG: 0.5 TABLET ORAL at 00:06

## 2021-12-07 RX ADMIN — BUPRENORPHINE 8 MG: 2 TABLET SUBLINGUAL at 06:22

## 2021-12-07 RX ADMIN — LORAZEPAM 1.5 MG: 0.5 TABLET ORAL at 15:41

## 2021-12-07 RX ADMIN — QUETIAPINE FUMARATE 300 MG: 300 TABLET, FILM COATED ORAL at 20:20

## 2021-12-07 RX ADMIN — MAGNESIUM OXIDE TAB 400 MG (241.3 MG ELEMENTAL MG) 400 MG: 400 (241.3 MG) TAB at 20:19

## 2021-12-07 RX ADMIN — ACETAMINOPHEN 650 MG: 325 TABLET ORAL at 17:09

## 2021-12-07 RX ADMIN — LORAZEPAM 0.25 MG: 0.5 TABLET ORAL at 07:45

## 2021-12-07 RX ADMIN — LEVOTHYROXINE SODIUM 75 MCG: 0.03 TABLET ORAL at 06:22

## 2021-12-07 RX ADMIN — PREGABALIN 200 MG: 100 CAPSULE ORAL at 20:20

## 2021-12-07 RX ADMIN — PANTOPRAZOLE SODIUM 40 MG: 40 TABLET, DELAYED RELEASE ORAL at 06:23

## 2021-12-07 RX ADMIN — AMLODIPINE BESYLATE 5 MG: 5 TABLET ORAL at 08:12

## 2021-12-07 RX ADMIN — OLANZAPINE 5 MG: 5 TABLET, ORALLY DISINTEGRATING ORAL at 20:20

## 2021-12-07 RX ADMIN — FOLIC ACID 1000 MCG: 1 TABLET ORAL at 08:12

## 2021-12-07 RX ADMIN — NICOTINE POLACRILEX 4 MG: 4 GUM, CHEWING ORAL at 11:02

## 2021-12-07 RX ADMIN — PREGABALIN 200 MG: 100 CAPSULE ORAL at 13:49

## 2021-12-07 RX ADMIN — PREGABALIN 200 MG: 100 CAPSULE ORAL at 08:13

## 2021-12-07 RX ADMIN — ARIPIPRAZOLE 5 MG: 5 TABLET ORAL at 08:12

## 2021-12-07 RX ADMIN — APIXABAN 2.5 MG: 2.5 TABLET, FILM COATED ORAL at 08:12

## 2021-12-07 RX ADMIN — LORAZEPAM 1.5 MG: 0.5 TABLET ORAL at 10:58

## 2021-12-07 RX ADMIN — TIZANIDINE 4 MG: 4 TABLET ORAL at 00:05

## 2021-12-07 RX ADMIN — DIPHENHYDRAMINE HCL 25 MG: 25 TABLET ORAL at 08:12

## 2021-12-07 RX ADMIN — NICOTINE POLACRILEX 4 MG: 4 GUM, CHEWING ORAL at 15:38

## 2021-12-07 RX ADMIN — NICOTINE POLACRILEX 4 MG: 4 GUM, CHEWING ORAL at 23:19

## 2021-12-07 RX ADMIN — NICOTINE POLACRILEX 4 MG: 4 GUM, CHEWING ORAL at 00:09

## 2021-12-07 RX ADMIN — LORAZEPAM 1.5 MG: 0.5 TABLET ORAL at 22:35

## 2021-12-07 RX ADMIN — DIPHENHYDRAMINE HCL 25 MG: 25 TABLET ORAL at 20:19

## 2021-12-07 RX ADMIN — MULTIPLE VITAMINS W/ MINERALS TAB 1 TABLET: TAB at 08:12

## 2021-12-07 RX ADMIN — BUPRENORPHINE 8 MG: 2 TABLET SUBLINGUAL at 12:25

## 2021-12-07 RX ADMIN — APIXABAN 2.5 MG: 2.5 TABLET, FILM COATED ORAL at 20:20

## 2021-12-07 RX ADMIN — NICOTINE POLACRILEX 4 MG: 4 GUM, CHEWING ORAL at 20:32

## 2021-12-07 RX ADMIN — DIPHENHYDRAMINE HCL 25 MG: 25 TABLET ORAL at 15:15

## 2021-12-07 RX ADMIN — NICOTINE POLACRILEX 4 MG: 4 GUM, CHEWING ORAL at 06:25

## 2021-12-07 RX ADMIN — BUPRENORPHINE 8 MG: 2 TABLET SUBLINGUAL at 20:20

## 2021-12-07 RX ADMIN — NICOTINE POLACRILEX 4 MG: 4 GUM, CHEWING ORAL at 10:04

## 2021-12-07 ASSESSMENT — ACTIVITIES OF DAILY LIVING (ADL)
ADLS_ACUITY_SCORE: 10

## 2021-12-07 NOTE — PLAN OF CARE
Problem: Pain Acute  Goal: Acceptable Pain Control and Functional Ability  Intervention: Develop Pain Management Plan  Recent Flowsheet Documentation  Taken 12/7/2021 0010 by Judie Frank, RN  Pain Management Interventions:   medication (see MAR)   food   emotional support   distraction   PRN tizanidine given for right knee discomfort.  Pt anxious, restless sitting at edge of bed, rocking back and forth.  PRN ativan and nicorette gum given.    1:1 for pt safety.      Judie Frank, RN

## 2021-12-07 NOTE — PLAN OF CARE
Pt started shift anxious regarding medication & has made occasional comments that he didn't feel he was getting all his meds

## 2021-12-07 NOTE — PLAN OF CARE
Problem: Adult Inpatient Plan of Care  Goal: Absence of Hospital-Acquired Illness or Injury  Intervention: Identify and Manage Fall Risk  Recent Flowsheet Documentation  Taken 12/6/2021 1515 by Stacey Stevenson RN  Safety Promotion/Fall Prevention:   activity supervised   assistive device/personal items within reach   fall prevention program maintained   mobility aid in reach   nonskid shoes/slippers when out of bed     Problem: Adult Inpatient Plan of Care  Goal: Optimal Comfort and Wellbeing  Outcome: Improving     Problem: Suicide Risk  Goal: Absence of Self-Harm  Outcome: Improving     Problem: Gas Exchange Impaired  Goal: Optimal Gas Exchange  Outcome: Improving   Patient restless and pacing off and on this shift,otherwise calm. Patient was without any mention of suicide ideation. Patient complained of knee pain. Though it did not impede patient's movement and mobility .Patient with mild dyspnea when up and about; no respiratory distress noted.

## 2021-12-07 NOTE — PROGRESS NOTES
[facility]    Medicine Progress Note - Hospitalist Service       Date of Admission:  11/22/2021    Assessment & Plan           Hollis Barclay is a 51 year old male admitted on 11/22/2021. He has history of bipolar disorder, borderline personality disorder, TBI, polysubstance abuse, alcoholism, group home resident presented to [facility] on 11/13 with suicide attempt, transferred to [facility] inpatient psych unit on 11/19, then transferred to [facility] on 11/22 for Covid hypoxia      12/7 :       Clinically stable  Patient would be out of Covid isolation on 12/11 and likely transfer back inpatient psych.    No other issues noted today      A/p :       COVID-19 Diagnosis Details:  Onset of COVID symptoms 11/21   Date of positive test results 11/22   Vaccinated? Fully Vaccinated     # Confirmed COVID-19 infection    # Acute Hypoxic Respiratory Failure secondary to COVID-19 infection, resolved  # Viral Pneumonia secondary to COVID-19 infection  - Initial chest x-ray showed moderate patchy bilateral pulmonary infiltrates.  He needed up to 4 L of oxygen, required oxygen from 11/22-11/28.  Considered to have had severe disease given O2 requirements and duration of symptoms along with high CRP.  COVID isolation will end on 12/11.  Has complained of dyspnea at times, attributed to anxiety. Overall seems stable.  - Continue supportive care, COVID-19 special precautions, minimized lab draws, and care consolidation.  Does not need continuous oximetry  - Treatments: Received 7 days of Decadron, stopped when hypoxia had resolved.  Completed 5 days of remdesivir.  - DVT Prophylaxis: At high risk of thrombotic complications due to COVID-19 disease (last DDimer 0.54).          - PROPHYLACTIC dosing: Eliquis 2.5mg BID. Would continue this through 12/21 at least (30 days from onset)      #Bipolar disorder      Borderline personality disorder      Suicidal ideation with recent suicide  attempt      History of TBI  Patient was hospitalized at [facility] 11/13-11/19 after presenting with drug overdose and alcohol intoxication with intention to commit suicide secondary to worsening depression.  Patient overdosed on 5 tablets of oxycodone, 22x300 mg tablets of Seroquel and 4x8 mg tablets of Suboxone while drinking 1 gallon of vodka within a 24-hour period all in attempt to kill himself. Was transferred to [facility] Psych unit 11/19. Requires one-to-one for suicidality while here at [facility].  Psych following here, not seeing today but aware of issues.  --Had a panic attack on 11/27 and therefore started on milligram Ativan every 4 hours as needed for anxiety. Was acutely anxious over the weekend and Ativan dose increased to 1.5 mg, doing much better on this dose. Seems kind of somnolent at times, but not today or yesterday.  --Tolerating Subutex dose to 8 mg twice daily  This would help with his anxiety.  Plan would be back to [facility] when medical issues resolved  Status is currently Voluntary  Currently on Abilify, Zyprexa, Seroquel, Benadryl, Lyrica, Ativan, Propranolol as per psychiatry.   Psychiatry do feel he needs to go back to inpatient psych as soon as possible for this to get better but unfortunately that is not advisable right now due to Covid isolation.  --Currently on one-to-one     #Alcoholism  Treated for withdrawal at [facility]     #Chronic pain syndrome     History of polysubstance/drug abuse  Addiction medicine following  Tolerating Subutex to 8 mg twice daily dose.     #Alcoholic hepatitis  Resolved, LFTs ok     #Nicotine dependence  Gum. Using 20 nicotine gum tabs per day but reports this is less than he takes at home. Allergic to adhesive of nicotine patch. Do wonder if overuse contributing to anxiety. Patient not open to tapering dose at this time.     #HTN  Continue amlodipine, propranolol     #GERD,  continue home PPI     #Constipation  senna as needed     #Nasal bone  fracture  Plan to see ENT as outpatient     #Hypothyroidism  Continue home Synthroid     #Insomnia and panic attack  Now on scheduled and PRN ativan     #Dilated aortic root  noted on echo but not prominent on CTA chest. Would recommend annual surveillance CT.     #Hypokalemia mild  Resolved with replacement     #Back pain acute on chronic  --Addiction medicine specialist prescribed tizanidine which patient claims is not working  --Currently on pregabalin  --Was prescribed naproxen at [facility] and therefore started on naproxen on 11/30.  Showing improvement  - x-ray of the ribs 11/30 does not show any fracture     #Right knee pain likely chronic  --Patient claims that he has no cartilage  --x-ray of the right knee unremarkable  --Will avoid narcotics given abuse potential.  --currently on naproxen  -continue topical Diclofenac 4 times PRN    #Hand pain  Not complaining of this today. Seems to have varying pain complaints from day to day    #Anal burning with BM  Calmoseptine has been helpful    #Leukocytosis  Probably spurious elevation  Resolved       Diet: Regular Diet Adult      Barksdale Catheter: Not present  Central Lines: None  Code Status: Full Code      Disposition Plan   Expected discharge: 12/11/2021 recommended to Inpatient Psych unit once COVID isolation completed.     The patient's care was discussed with the Patient.    Terrell Whitley MD  Hospitalist Service  [facility]  Text page via [employer] Paging/Directory      Clinically Significant Risk Factors Present on Admission                ____________        Physical Exam   Vital Signs: Temp: 98.3  F (36.8  C) Temp src: Oral BP: 107/72 Pulse: 82   Resp: 20 SpO2: 94 % O2 Device: None (Room air)    Weight: 289 lbs 4.8 oz  General: Well-appearing adult male resting in bed, initially sleeping when I walk in but rouses easily and then alert and oriented x3, does not appear acutely anxious, appears calm and pleasant  HEENT: Head  normocephalic atraumatic, oral mucosa moist. Sclerae anicteric  Skin: No rashes or lesions  Extremities: Trace ankle and hand edema bilaterally.   Psych: Normal affect, mood euthymic  Neuro: CNII-XII grossly intact, moving all 4 extremities      Data   No results found for this or any previous visit (from the past 24 hour(s)).  ____________

## 2021-12-08 PROCEDURE — 250N000013 HC RX MED GY IP 250 OP 250 PS 637: Performed by: HOSPITALIST

## 2021-12-08 PROCEDURE — 120N000001 HC R&B MED SURG/OB

## 2021-12-08 PROCEDURE — 99232 SBSQ HOSP IP/OBS MODERATE 35: CPT | Performed by: INTERNAL MEDICINE

## 2021-12-08 PROCEDURE — 250N000013 HC RX MED GY IP 250 OP 250 PS 637: Performed by: INTERNAL MEDICINE

## 2021-12-08 PROCEDURE — 250N000013 HC RX MED GY IP 250 OP 250 PS 637: Performed by: NURSE PRACTITIONER

## 2021-12-08 PROCEDURE — 250N000013 HC RX MED GY IP 250 OP 250 PS 637

## 2021-12-08 PROCEDURE — 250N000013 HC RX MED GY IP 250 OP 250 PS 637: Performed by: FAMILY MEDICINE

## 2021-12-08 RX ORDER — ACETAMINOPHEN 325 MG/1
975 TABLET ORAL EVERY 4 HOURS PRN
Status: DISCONTINUED | OUTPATIENT
Start: 2021-12-08 | End: 2021-12-11 | Stop reason: HOSPADM

## 2021-12-08 RX ADMIN — LORAZEPAM 0.25 MG: 0.5 TABLET ORAL at 14:19

## 2021-12-08 RX ADMIN — QUETIAPINE FUMARATE 300 MG: 300 TABLET, FILM COATED ORAL at 20:42

## 2021-12-08 RX ADMIN — TIZANIDINE 4 MG: 4 TABLET ORAL at 08:48

## 2021-12-08 RX ADMIN — ACETAMINOPHEN 650 MG: 325 TABLET ORAL at 16:20

## 2021-12-08 RX ADMIN — NICOTINE POLACRILEX 4 MG: 4 GUM, CHEWING ORAL at 16:24

## 2021-12-08 RX ADMIN — NICOTINE POLACRILEX 4 MG: 4 GUM, CHEWING ORAL at 08:46

## 2021-12-08 RX ADMIN — NICOTINE POLACRILEX 4 MG: 4 GUM, CHEWING ORAL at 07:29

## 2021-12-08 RX ADMIN — APIXABAN 2.5 MG: 2.5 TABLET, FILM COATED ORAL at 20:42

## 2021-12-08 RX ADMIN — LORAZEPAM 1.5 MG: 0.5 TABLET ORAL at 19:52

## 2021-12-08 RX ADMIN — DIPHENHYDRAMINE HCL 25 MG: 25 TABLET ORAL at 08:45

## 2021-12-08 RX ADMIN — BUPRENORPHINE 8 MG: 2 TABLET SUBLINGUAL at 06:30

## 2021-12-08 RX ADMIN — ACETAMINOPHEN 975 MG: 325 TABLET ORAL at 19:52

## 2021-12-08 RX ADMIN — NICOTINE POLACRILEX 4 MG: 4 GUM, CHEWING ORAL at 17:48

## 2021-12-08 RX ADMIN — OLANZAPINE 10 MG: 10 TABLET, FILM COATED ORAL at 23:03

## 2021-12-08 RX ADMIN — NICOTINE POLACRILEX 4 MG: 4 GUM, CHEWING ORAL at 23:01

## 2021-12-08 RX ADMIN — MAGNESIUM OXIDE TAB 400 MG (241.3 MG ELEMENTAL MG) 400 MG: 400 (241.3 MG) TAB at 20:42

## 2021-12-08 RX ADMIN — PROPRANOLOL HYDROCHLORIDE 10 MG: 10 TABLET ORAL at 08:46

## 2021-12-08 RX ADMIN — BUPRENORPHINE 8 MG: 2 TABLET SUBLINGUAL at 19:51

## 2021-12-08 RX ADMIN — FOLIC ACID 1000 MCG: 1 TABLET ORAL at 08:45

## 2021-12-08 RX ADMIN — OLANZAPINE 5 MG: 5 TABLET, ORALLY DISINTEGRATING ORAL at 08:48

## 2021-12-08 RX ADMIN — AMLODIPINE BESYLATE 5 MG: 5 TABLET ORAL at 08:47

## 2021-12-08 RX ADMIN — LORAZEPAM 0.25 MG: 0.5 TABLET ORAL at 08:45

## 2021-12-08 RX ADMIN — DICLOFENAC SODIUM 4 G: 10 GEL TOPICAL at 19:31

## 2021-12-08 RX ADMIN — BUPRENORPHINE 8 MG: 2 TABLET SUBLINGUAL at 12:35

## 2021-12-08 RX ADMIN — PREGABALIN 200 MG: 100 CAPSULE ORAL at 08:47

## 2021-12-08 RX ADMIN — NICOTINE POLACRILEX 4 MG: 4 GUM, CHEWING ORAL at 20:40

## 2021-12-08 RX ADMIN — PROPRANOLOL HYDROCHLORIDE 10 MG: 10 TABLET ORAL at 14:20

## 2021-12-08 RX ADMIN — LORAZEPAM 1.5 MG: 0.5 TABLET ORAL at 23:53

## 2021-12-08 RX ADMIN — LORAZEPAM 0.25 MG: 0.5 TABLET ORAL at 20:42

## 2021-12-08 RX ADMIN — MAGNESIUM OXIDE TAB 400 MG (241.3 MG ELEMENTAL MG) 400 MG: 400 (241.3 MG) TAB at 08:47

## 2021-12-08 RX ADMIN — DIPHENHYDRAMINE HCL 25 MG: 25 TABLET ORAL at 20:42

## 2021-12-08 RX ADMIN — MULTIPLE VITAMINS W/ MINERALS TAB 1 TABLET: TAB at 08:47

## 2021-12-08 RX ADMIN — PANTOPRAZOLE SODIUM 40 MG: 40 TABLET, DELAYED RELEASE ORAL at 06:30

## 2021-12-08 RX ADMIN — OLANZAPINE 5 MG: 5 TABLET, ORALLY DISINTEGRATING ORAL at 20:42

## 2021-12-08 RX ADMIN — OLANZAPINE 5 MG: 5 TABLET, ORALLY DISINTEGRATING ORAL at 14:20

## 2021-12-08 RX ADMIN — LIDOCAINE: 50 OINTMENT TOPICAL at 12:34

## 2021-12-08 RX ADMIN — PREGABALIN 200 MG: 100 CAPSULE ORAL at 14:20

## 2021-12-08 RX ADMIN — NICOTINE POLACRILEX 4 MG: 4 GUM, CHEWING ORAL at 12:35

## 2021-12-08 RX ADMIN — PREGABALIN 200 MG: 100 CAPSULE ORAL at 20:42

## 2021-12-08 RX ADMIN — ACETAMINOPHEN 650 MG: 325 TABLET ORAL at 12:35

## 2021-12-08 RX ADMIN — LORAZEPAM 1.5 MG: 0.5 TABLET ORAL at 12:34

## 2021-12-08 RX ADMIN — TIZANIDINE 4 MG: 4 TABLET ORAL at 16:20

## 2021-12-08 RX ADMIN — LEVOTHYROXINE SODIUM 75 MCG: 0.03 TABLET ORAL at 06:30

## 2021-12-08 RX ADMIN — SALINE NASAL SPRAY 1 SPRAY: 1.5 SOLUTION NASAL at 23:35

## 2021-12-08 RX ADMIN — APIXABAN 2.5 MG: 2.5 TABLET, FILM COATED ORAL at 08:46

## 2021-12-08 RX ADMIN — ARIPIPRAZOLE 5 MG: 5 TABLET ORAL at 08:47

## 2021-12-08 RX ADMIN — DIPHENHYDRAMINE HCL 25 MG: 25 TABLET ORAL at 14:20

## 2021-12-08 RX ADMIN — SIMETHICONE 80 MG: 80 TABLET, CHEWABLE ORAL at 12:35

## 2021-12-08 RX ADMIN — LIDOCAINE: 50 OINTMENT TOPICAL at 16:29

## 2021-12-08 RX ADMIN — LORAZEPAM 1.5 MG: 0.5 TABLET ORAL at 16:20

## 2021-12-08 RX ADMIN — PROPRANOLOL HYDROCHLORIDE 10 MG: 10 TABLET ORAL at 20:42

## 2021-12-08 RX ADMIN — MELATONIN TAB 3 MG 3 MG: 3 TAB at 23:35

## 2021-12-08 ASSESSMENT — ACTIVITIES OF DAILY LIVING (ADL)
ADLS_ACUITY_SCORE: 10

## 2021-12-08 NOTE — PLAN OF CARE
Problem: Adult Inpatient Plan of Care  Goal: Plan of Care Review  Outcome: No Change  Flowsheets (Taken 12/8/2021 1523)  Plan of Care Reviewed With: patient     Problem: Pain Acute  Goal: Acceptable Pain Control and Functional Ability  Outcome: Improving   No new concern this shift, slept through the night. Waiting for isolation to be over so patient can go back to inpatient Psych unit. Here 1;1 for suicidal behavior.

## 2021-12-08 NOTE — PLAN OF CARE
Problem: Adult Inpatient Plan of Care  Goal: Readiness for Transition of Care  Outcome: Improving   Pt here on quaranteen through 12/11  Problem: Suicide Risk  Goal: Absence of Self-Harm  Outcome: Improving   No reports of self harm by patient, patient has been calm cooperative and pleasant  Problem: Gas Exchange Impaired  Goal: Optimal Gas Exchange  Outcome: Improving  Intervention: Optimize Oxygenation and Ventilation  Recent Flowsheet Documentation  Taken 12/8/2021 0900 by Sheri Hunter RN  Head of Bed (HOB) Positioning: HOB at 30-45 degrees   Denied SOB, reports occasional dry cough, LS clear/ diminished ion bases, sats low to mid 90's on RA  Problem: Anxiety  Goal: Anxiety Reduction or Resolution  Outcome: Improving   Requested prn ativan x1 in addition to scheduled ativan, this has been effective  Problem: Pain Acute  Goal: Acceptable Pain Control and Functional Ability  Outcome: Improving  Intervention: Develop Pain Management Plan  Recent Flowsheet Documentation  Taken 12/8/2021 0900 by Sheri Hunter RN  Pain Management Interventions:   medication (see MAR)   distraction   emotional support   relaxation techniques promoted   repositioned  Pt reported chronic back and pilo shoulder pain. received prn tizanidine and tylenol x1, lidocaine cream applied   Intervention: Optimize Psychosocial Wellbeing  Recent Flowsheet Documentation  Taken 12/8/2021 0900 by Sheri Hunter RN  Diversional Activities: television     Problem: Adult Inpatient Plan of Care  Goal: Absence of Hospital-Acquired Illness or Injury  Intervention: Identify and Manage Fall Risk  Recent Flowsheet Documentation  Taken 12/8/2021 0900 by Sheri Hunter RN  Safety Promotion/Fall Prevention: activity supervised  Intervention: Prevent Skin Injury  Recent Flowsheet Documentation  Taken 12/8/2021 0900 by Sheri Hunter RN  Body Position: position changed independently     Problem: Adult Inpatient Plan of Care  Goal: Optimal Comfort and  Wellbeing  Intervention: Provide Person-Centered Care  Recent Flowsheet Documentation  Taken 12/8/2021 0900 by Sheri Hunter RN  Trust Relationship/Rapport:   care explained   questions encouraged   questions answered   emotional support provided   empathic listening provided   choices provided   thoughts/feelings acknowledged

## 2021-12-08 NOTE — PLAN OF CARE
Patient was alert and oriented x 4. He had complaints of anxiety and was restless at the beginning of the shift. PRN Ativan was given and was helpful. HS medications given and patient is now sleeping. Patient ambulated in the hallway x 3 with mask on. Nettie Gallo RN     Problem: Suicide Risk  Goal: Absence of Self-Harm  Outcome: No Change     Problem: Anxiety  Goal: Anxiety Reduction or Resolution  Outcome: No Change     Problem: Gas Exchange Impaired  Goal: Optimal Gas Exchange  Outcome: Improving     Problem: Pain Acute  Goal: Acceptable Pain Control and Functional Ability  Outcome: Improving     Problem: Adult Inpatient Plan of Care  Goal: Absence of Hospital-Acquired Illness or Injury  Intervention: Identify and Manage Fall Risk  Recent Flowsheet Documentation  Taken 12/7/2021 1600 by Nettie Gallo RN  Safety Promotion/Fall Prevention:   activity supervised   sitter at bedside  Intervention: Prevent Infection  Recent Flowsheet Documentation  Taken 12/7/2021 1600 by Nettie Gallo RN  Infection Prevention:   personal protective equipment utilized   hand hygiene promoted   single patient room provided

## 2021-12-08 NOTE — PROGRESS NOTES
[facility]    Medicine Progress Note - Hospitalist Service       Date of Admission:  11/22/2021    Assessment & Plan           Hollis Barclay is a 51 year old male admitted on 11/22/2021. He has history of bipolar disorder, borderline personality disorder, TBI, polysubstance abuse, alcoholism, group home resident presented to [facility] on 11/13 with suicide attempt, transferred to [facility] inpatient psych unit on 11/19, then transferred to [facility] on 11/22 for Covid hypoxia      12/8 :       Clinically stable  Chronic backache complaints, on tylenol - dose increased  No other issues noted today    Patient would be out of Covid isolation on 12/11 and likely transfer back inpatient psych.        A/p :       COVID-19 Diagnosis Details:  Onset of COVID symptoms 11/21   Date of positive test results 11/22   Vaccinated? Fully Vaccinated     # Confirmed COVID-19 infection    # Acute Hypoxic Respiratory Failure secondary to COVID-19 infection, resolved  # Viral Pneumonia secondary to COVID-19 infection  - Initial chest x-ray showed moderate patchy bilateral pulmonary infiltrates.  He needed up to 4 L of oxygen, required oxygen from 11/22-11/28.  Considered to have had severe disease given O2 requirements and duration of symptoms along with high CRP.  COVID isolation will end on 12/11.  Has complained of dyspnea at times, attributed to anxiety. Overall seems stable.  - Continue supportive care, COVID-19 special precautions, minimized lab draws, and care consolidation.  Does not need continuous oximetry  - Treatments: Received 7 days of Decadron, stopped when hypoxia had resolved.  Completed 5 days of remdesivir.  - DVT Prophylaxis: At high risk of thrombotic complications due to COVID-19 disease (last DDimer 0.54).          - PROPHYLACTIC dosing: Eliquis 2.5mg BID. Would continue this through 12/21 at least (30 days from onset)      #Bipolar disorder      Borderline personality  disorder      Suicidal ideation with recent suicide attempt      History of TBI  Patient was hospitalized at [facility] 11/13-11/19 after presenting with drug overdose and alcohol intoxication with intention to commit suicide secondary to worsening depression.  Patient overdosed on 5 tablets of oxycodone, 22x300 mg tablets of Seroquel and 4x8 mg tablets of Suboxone while drinking 1 gallon of vodka within a 24-hour period all in attempt to kill himself. Was transferred to [facility] Psych unit 11/19. Requires one-to-one for suicidality while here at [facility].  Psych following here, not seeing today but aware of issues.  --Had a panic attack on 11/27 and therefore started on milligram Ativan every 4 hours as needed for anxiety. Was acutely anxious over the weekend and Ativan dose increased to 1.5 mg, doing much better on this dose. Seems kind of somnolent at times, but not today or yesterday.  --Tolerating Subutex dose to 8 mg twice daily  This would help with his anxiety.  Plan would be back to [facility] when medical issues resolved  Status is currently Voluntary  Currently on Abilify, Zyprexa, Seroquel, Benadryl, Lyrica, Ativan, Propranolol as per psychiatry.   Psychiatry do feel he needs to go back to inpatient psych as soon as possible for this to get better but unfortunately that is not advisable right now due to Covid isolation.  --Currently on one-to-one     #Alcoholism  Treated for withdrawal at [facility]     #Chronic pain syndrome     History of polysubstance/drug abuse  Addiction medicine following  Tolerating Subutex to 8 mg twice daily dose.     #Alcoholic hepatitis  Resolved, LFTs ok     #Nicotine dependence  Gum. Using 20 nicotine gum tabs per day but reports this is less than he takes at home. Allergic to adhesive of nicotine patch. Do wonder if overuse contributing to anxiety. Patient not open to tapering dose at this time.     #HTN  Continue amlodipine, propranolol     #GERD,  continue home  PPI     #Constipation  senna as needed     #Nasal bone fracture  Plan to see ENT as outpatient     #Hypothyroidism  Continue home Synthroid     #Insomnia and panic attack  Now on scheduled and PRN ativan     #Dilated aortic root  noted on echo but not prominent on CTA chest. Would recommend annual surveillance CT.     #Hypokalemia mild  Resolved with replacement     #Back pain acute on chronic  --Addiction medicine specialist prescribed tizanidine which patient claims is not working  --Currently on pregabalin  --Was prescribed naproxen at [facility] and therefore started on naproxen on 11/30.  Showing improvement  - x-ray of the ribs 11/30 does not show any fracture     #Right knee pain likely chronic  --Patient claims that he has no cartilage  --x-ray of the right knee unremarkable  --Will avoid narcotics given abuse potential.  --currently on naproxen  -continue topical Diclofenac 4 times PRN    #Hand pain  Not complaining of this today. Seems to have varying pain complaints from day to day    #Anal burning with BM  Calmoseptine has been helpful    #Leukocytosis  Probably spurious elevation  Resolved       Diet: Regular Diet Adult      Barksdale Catheter: Not present  Central Lines: None  Code Status: Full Code      Disposition Plan   Expected discharge: 12/11/2021 recommended to Inpatient Psych unit once COVID isolation completed.     The patient's care was discussed with the Patient.    Terrell Whitley MD  Hospitalist Service  [facility]  Text page via [employer] Paging/Directory      Clinically Significant Risk Factors Present on Admission                ____________        Physical Exam   Vital Signs: Temp: 98.1  F (36.7  C) Temp src: Oral BP: 108/63 Pulse: 82   Resp: 18 SpO2: 93 % O2 Device: None (Room air)    Weight: 289 lbs 4.8 oz  General: Well-appearing adult male resting in bed, initially sleeping when I walk in but rouses easily and then alert and oriented x3, does not appear  acutely anxious, appears calm and pleasant  HEENT: Head normocephalic atraumatic, oral mucosa moist. Sclerae anicteric  Skin: No rashes or lesions  Extremities: Trace ankle and hand edema bilaterally.   Psych: Normal affect, mood euthymic  Neuro: CNII-XII grossly intact, moving all 4 extremities      Data   No results found for this or any previous visit (from the past 24 hour(s)).  ____________

## 2021-12-09 PROCEDURE — 250N000013 HC RX MED GY IP 250 OP 250 PS 637: Performed by: INTERNAL MEDICINE

## 2021-12-09 PROCEDURE — 250N000013 HC RX MED GY IP 250 OP 250 PS 637: Performed by: HOSPITALIST

## 2021-12-09 PROCEDURE — 99232 SBSQ HOSP IP/OBS MODERATE 35: CPT | Performed by: NURSE PRACTITIONER

## 2021-12-09 PROCEDURE — 99232 SBSQ HOSP IP/OBS MODERATE 35: CPT | Performed by: INTERNAL MEDICINE

## 2021-12-09 PROCEDURE — 250N000013 HC RX MED GY IP 250 OP 250 PS 637: Performed by: FAMILY MEDICINE

## 2021-12-09 PROCEDURE — 250N000013 HC RX MED GY IP 250 OP 250 PS 637: Performed by: NURSE PRACTITIONER

## 2021-12-09 PROCEDURE — 99232 SBSQ HOSP IP/OBS MODERATE 35: CPT | Mod: 95 | Performed by: INTERNAL MEDICINE

## 2021-12-09 PROCEDURE — 120N000001 HC R&B MED SURG/OB

## 2021-12-09 RX ORDER — PRAZOSIN HYDROCHLORIDE 1 MG/1
1 CAPSULE ORAL AT BEDTIME
Status: DISCONTINUED | OUTPATIENT
Start: 2021-12-11 | End: 2021-12-11 | Stop reason: HOSPADM

## 2021-12-09 RX ORDER — PROPRANOLOL HYDROCHLORIDE 10 MG/1
10 TABLET ORAL ONCE
Status: COMPLETED | OUTPATIENT
Start: 2021-12-11 | End: 2021-12-11

## 2021-12-09 RX ORDER — LORAZEPAM 0.5 MG/1
0.5 TABLET ORAL ONCE
Status: DISCONTINUED | OUTPATIENT
Start: 2021-12-13 | End: 2021-12-11 | Stop reason: HOSPADM

## 2021-12-09 RX ORDER — LORAZEPAM 1 MG/1
1 TABLET ORAL 3 TIMES DAILY
Status: COMPLETED | OUTPATIENT
Start: 2021-12-10 | End: 2021-12-10

## 2021-12-09 RX ORDER — ACAMPROSATE CALCIUM 333 MG/1
666 TABLET, DELAYED RELEASE ORAL 3 TIMES DAILY
Status: DISCONTINUED | OUTPATIENT
Start: 2021-12-09 | End: 2021-12-11 | Stop reason: HOSPADM

## 2021-12-09 RX ORDER — LORAZEPAM 1 MG/1
1 TABLET ORAL 3 TIMES DAILY
Status: DISCONTINUED | OUTPATIENT
Start: 2021-12-10 | End: 2021-12-09

## 2021-12-09 RX ORDER — LORAZEPAM 0.5 MG/1
0.5 TABLET ORAL
Status: DISCONTINUED | OUTPATIENT
Start: 2021-12-11 | End: 2021-12-11 | Stop reason: HOSPADM

## 2021-12-09 RX ORDER — PROPRANOLOL HYDROCHLORIDE 10 MG/1
10 TABLET ORAL 3 TIMES DAILY
Status: DISCONTINUED | OUTPATIENT
Start: 2021-12-09 | End: 2021-12-11

## 2021-12-09 RX ORDER — LORAZEPAM 0.5 MG/1
0.5 TABLET ORAL 2 TIMES DAILY
Status: DISCONTINUED | OUTPATIENT
Start: 2021-12-12 | End: 2021-12-11 | Stop reason: HOSPADM

## 2021-12-09 RX ADMIN — BUPRENORPHINE 8 MG: 2 TABLET SUBLINGUAL at 19:17

## 2021-12-09 RX ADMIN — APIXABAN 2.5 MG: 2.5 TABLET, FILM COATED ORAL at 21:27

## 2021-12-09 RX ADMIN — LORAZEPAM 1.5 MG: 0.5 TABLET ORAL at 11:32

## 2021-12-09 RX ADMIN — PROPRANOLOL HYDROCHLORIDE 10 MG: 10 TABLET ORAL at 18:48

## 2021-12-09 RX ADMIN — MELATONIN TAB 3 MG 3 MG: 3 TAB at 23:43

## 2021-12-09 RX ADMIN — NICOTINE POLACRILEX 4 MG: 4 GUM, CHEWING ORAL at 06:39

## 2021-12-09 RX ADMIN — ACAMPROSATE CALCIUM 666 MG: 333 TABLET, DELAYED RELEASE ORAL at 18:48

## 2021-12-09 RX ADMIN — ACAMPROSATE CALCIUM 666 MG: 333 TABLET, DELAYED RELEASE ORAL at 20:46

## 2021-12-09 RX ADMIN — PREGABALIN 200 MG: 100 CAPSULE ORAL at 09:08

## 2021-12-09 RX ADMIN — TIZANIDINE 4 MG: 4 TABLET ORAL at 11:31

## 2021-12-09 RX ADMIN — APIXABAN 2.5 MG: 2.5 TABLET, FILM COATED ORAL at 09:06

## 2021-12-09 RX ADMIN — ARIPIPRAZOLE 5 MG: 5 TABLET ORAL at 09:07

## 2021-12-09 RX ADMIN — DIPHENHYDRAMINE HCL 25 MG: 25 TABLET ORAL at 09:06

## 2021-12-09 RX ADMIN — NICOTINE POLACRILEX 4 MG: 4 GUM, CHEWING ORAL at 09:10

## 2021-12-09 RX ADMIN — LORAZEPAM 0.25 MG: 0.5 TABLET ORAL at 09:07

## 2021-12-09 RX ADMIN — NICOTINE POLACRILEX 4 MG: 4 GUM, CHEWING ORAL at 16:50

## 2021-12-09 RX ADMIN — NICOTINE POLACRILEX 4 MG: 4 GUM, CHEWING ORAL at 11:34

## 2021-12-09 RX ADMIN — OLANZAPINE 5 MG: 5 TABLET, ORALLY DISINTEGRATING ORAL at 14:30

## 2021-12-09 RX ADMIN — MAGNESIUM OXIDE TAB 400 MG (241.3 MG ELEMENTAL MG) 400 MG: 400 (241.3 MG) TAB at 20:47

## 2021-12-09 RX ADMIN — OLANZAPINE 5 MG: 5 TABLET, ORALLY DISINTEGRATING ORAL at 21:27

## 2021-12-09 RX ADMIN — PROPRANOLOL HYDROCHLORIDE 10 MG: 10 TABLET ORAL at 14:33

## 2021-12-09 RX ADMIN — PREGABALIN 200 MG: 100 CAPSULE ORAL at 20:47

## 2021-12-09 RX ADMIN — DIPHENHYDRAMINE HCL 25 MG: 25 TABLET ORAL at 20:46

## 2021-12-09 RX ADMIN — PROPRANOLOL HYDROCHLORIDE 10 MG: 10 TABLET ORAL at 09:07

## 2021-12-09 RX ADMIN — MULTIPLE VITAMINS W/ MINERALS TAB 1 TABLET: TAB at 09:07

## 2021-12-09 RX ADMIN — FOLIC ACID 1000 MCG: 1 TABLET ORAL at 09:07

## 2021-12-09 RX ADMIN — PREGABALIN 200 MG: 100 CAPSULE ORAL at 14:30

## 2021-12-09 RX ADMIN — BUPRENORPHINE 8 MG: 2 TABLET SUBLINGUAL at 06:41

## 2021-12-09 RX ADMIN — DIPHENHYDRAMINE HCL 25 MG: 25 TABLET ORAL at 14:30

## 2021-12-09 RX ADMIN — GABAPENTIN 100 MG: 100 CAPSULE ORAL at 16:50

## 2021-12-09 RX ADMIN — LORAZEPAM 1.5 MG: 0.5 TABLET ORAL at 21:05

## 2021-12-09 RX ADMIN — QUETIAPINE FUMARATE 300 MG: 300 TABLET, FILM COATED ORAL at 21:26

## 2021-12-09 RX ADMIN — NICOTINE POLACRILEX 4 MG: 4 GUM, CHEWING ORAL at 18:47

## 2021-12-09 RX ADMIN — MAGNESIUM OXIDE TAB 400 MG (241.3 MG ELEMENTAL MG) 400 MG: 400 (241.3 MG) TAB at 09:05

## 2021-12-09 RX ADMIN — LORAZEPAM 0.25 MG: 0.5 TABLET ORAL at 14:30

## 2021-12-09 RX ADMIN — BUPRENORPHINE 8 MG: 2 TABLET SUBLINGUAL at 14:29

## 2021-12-09 RX ADMIN — PANTOPRAZOLE SODIUM 40 MG: 40 TABLET, DELAYED RELEASE ORAL at 06:41

## 2021-12-09 RX ADMIN — OLANZAPINE 5 MG: 5 TABLET, ORALLY DISINTEGRATING ORAL at 09:06

## 2021-12-09 RX ADMIN — TIZANIDINE 4 MG: 4 TABLET ORAL at 03:17

## 2021-12-09 RX ADMIN — ACETAMINOPHEN 975 MG: 325 TABLET ORAL at 14:30

## 2021-12-09 RX ADMIN — NICOTINE POLACRILEX 4 MG: 4 GUM, CHEWING ORAL at 21:06

## 2021-12-09 RX ADMIN — LORAZEPAM 1.5 MG: 0.5 TABLET ORAL at 16:49

## 2021-12-09 RX ADMIN — LORAZEPAM 0.25 MG: 0.5 TABLET ORAL at 20:48

## 2021-12-09 RX ADMIN — LEVOTHYROXINE SODIUM 75 MCG: 0.03 TABLET ORAL at 06:41

## 2021-12-09 RX ADMIN — AMLODIPINE BESYLATE 5 MG: 5 TABLET ORAL at 09:08

## 2021-12-09 RX ADMIN — PROPRANOLOL HYDROCHLORIDE 10 MG: 10 TABLET ORAL at 20:47

## 2021-12-09 ASSESSMENT — ACTIVITIES OF DAILY LIVING (ADL)
ADLS_ACUITY_SCORE: 10

## 2021-12-09 NOTE — PLAN OF CARE
Problem: Adult Inpatient Plan of Care  Goal: Optimal Comfort and Wellbeing  Outcome: Improving  Intervention: Provide Person-Centered Care  Recent Flowsheet Documentation  Taken 12/9/2021 0900 by Sheri Hunter RN  Trust Relationship/Rapport:   care explained   choices provided   emotional support provided   questions encouraged   questions answered   empathic listening provided   thoughts/feelings acknowledged  Problem: Adult Inpatient Plan of Care  Goal: Readiness for Transition of Care  Outcome: Improving  Possible discharge 12/11  Problem: Suicide Risk  Goal: Absence of Self-Harm  Outcome: Improving   No reports of self harm. Pt cooperative with cares, slightly sedated today, MD aware  Problem: Gas Exchange Impaired  Goal: Optimal Gas Exchange  Outcome: Improving  Intervention: Optimize Oxygenation and Ventilation  Recent Flowsheet Documentation  Taken 12/9/2021 0900 by Sheri Hunter RN  Head of Bed (HOB) Positioning: HOB at 20-30 degrees  Reports mild SOB with activity, sats low to mid 90's on RA, occasional dry cough   LS clear/diminished in bases  Problem: Anxiety  Goal: Anxiety Reduction or Resolution  Outcome: Improving   Receiving scheduled and prn ativan which appears to be affective, mild sedation, MD aware  Problem: Pain Acute  Goal: Acceptable Pain Control and Functional Ability  Outcome: Improving  Intervention: Develop Pain Management Plan  Recent Flowsheet Documentation  Taken 12/9/2021 0908 by Sheri Hunter RN  Pain Management Interventions:   emotional support   distraction   rest   repositioned   relaxation techniques promoted   medication (see MAR)   Scheduled meds and, prn tylenol and tizanidine given  Problem: Adult Inpatient Plan of Care  Goal: Absence of Hospital-Acquired Illness or Injury  Intervention: Identify and Manage Fall Risk  Recent Flowsheet Documentation  Taken 12/9/2021 0900 by Sheri Hunter RN  Safety Promotion/Fall Prevention:   activity supervised   nonskid  shoes/slippers when out of bed   patient and family education  Intervention: Prevent Skin Injury  Recent Flowsheet Documentation  Taken 12/9/2021 0900 by Sheri Hunter, RN  Body Position: position changed independently

## 2021-12-09 NOTE — PROGRESS NOTES
[facility]    Medicine Progress Note - Hospitalist Service       Date of Admission:  11/22/2021    Assessment & Plan           Hollis Barclay is a 51 year old male admitted on 11/22/2021. He has history of bipolar disorder, borderline personality disorder, TBI, polysubstance abuse, alcoholism, group home resident presented to [facility] on 11/13 with suicide attempt, transferred to [facility] inpatient psych unit on 11/19, then transferred to [facility] on 11/22 for Covid hypoxia      12/9 :       Clinically stable  Chronic backache complaints, on tylenol - dose increased  No other issues noted today    Patient would be out of Covid isolation on 12/11 and  transfer back inpatient psych - due to continued suicidality      A/p :       COVID-19 Diagnosis Details:  Onset of COVID symptoms 11/21   Date of positive test results 11/22   Vaccinated? Fully Vaccinated     # Confirmed COVID-19 infection    # Acute Hypoxic Respiratory Failure secondary to COVID-19 infection, resolved  # Viral Pneumonia secondary to COVID-19 infection  - Initial chest x-ray showed moderate patchy bilateral pulmonary infiltrates.  He needed up to 4 L of oxygen, required oxygen from 11/22-11/28.  Considered to have had severe disease given O2 requirements and duration of symptoms along with high CRP.  COVID isolation will end on 12/11.  Has complained of dyspnea at times, attributed to anxiety. Overall seems stable.  - Continue supportive care, COVID-19 special precautions, minimized lab draws, and care consolidation.  Does not need continuous oximetry  - Treatments: Received 7 days of Decadron, stopped when hypoxia had resolved.  Completed 5 days of remdesivir.  - DVT Prophylaxis: At high risk of thrombotic complications due to COVID-19 disease (last DDimer 0.54).          - PROPHYLACTIC dosing: Eliquis 2.5mg BID. Would continue this through 12/21 at least (30 days from onset)      #Bipolar  disorder      Borderline personality disorder      Suicidal ideation with recent suicide attempt      History of TBI  Patient was hospitalized at [facility] 11/13-11/19 after presenting with drug overdose and alcohol intoxication with intention to commit suicide secondary to worsening depression.  Patient overdosed on 5 tablets of oxycodone, 22x300 mg tablets of Seroquel and 4x8 mg tablets of Suboxone while drinking 1 gallon of vodka within a 24-hour period all in attempt to kill himself. Was transferred to [facility] Psych unit 11/19. Requires one-to-one for suicidality while here at [facility].  Psych following here, not seeing today but aware of issues.  --Had a panic attack on 11/27 and therefore started on milligram Ativan every 4 hours as needed for anxiety. Was acutely anxious over the weekend and Ativan dose increased to 1.5 mg, doing much better on this dose. Seems kind of somnolent at times, but not today or yesterday.  --Tolerating Subutex dose to 8 mg twice daily  This would help with his anxiety.  Plan would be back to [facility] when medical issues resolved  Status is currently Voluntary  Currently on Abilify, Zyprexa, Seroquel, Benadryl, Lyrica, Ativan, Propranolol as per psychiatry.   Psychiatry do feel he needs to go back to inpatient psych as soon as possible for this to get better but unfortunately that is not advisable right now due to Covid isolation.  --Currently on one-to-one     #Alcoholism  Treated for withdrawal at [facility]     #Chronic pain syndrome     History of polysubstance/drug abuse  Addiction medicine following  Tolerating Subutex to 8 mg twice daily dose.     #Alcoholic hepatitis  Resolved, LFTs ok     #Nicotine dependence  Gum. Using 20 nicotine gum tabs per day but reports this is less than he takes at home. Allergic to adhesive of nicotine patch. Do wonder if overuse contributing to anxiety. Patient not open to tapering dose at this time.     #HTN  Continue amlodipine,  propranolol     #GERD,  continue home PPI     #Constipation  senna as needed     #Nasal bone fracture  Plan to see ENT as outpatient     #Hypothyroidism  Continue home Synthroid     #Insomnia and panic attack  Now on scheduled and PRN ativan     #Dilated aortic root  noted on echo but not prominent on CTA chest. Would recommend annual surveillance CT.     #Hypokalemia mild  Resolved with replacement     #Back pain acute on chronic  --Addiction medicine specialist prescribed tizanidine which patient claims is not working  --Currently on pregabalin  --Was prescribed naproxen at [facility] and therefore started on naproxen on 11/30.  Showing improvement  - x-ray of the ribs 11/30 does not show any fracture     #Right knee pain likely chronic  --Patient claims that he has no cartilage  --x-ray of the right knee unremarkable  --Will avoid narcotics given abuse potential.  --currently on naproxen  -continue topical Diclofenac 4 times PRN    #Hand pain  Not complaining of this today. Seems to have varying pain complaints from day to day    #Anal burning with BM  Calmoseptine has been helpful    #Leukocytosis  Probably spurious elevation  Resolved       Diet: Regular Diet Adult      Barksdale Catheter: Not present  Central Lines: None  Code Status: Full Code      Disposition Plan   Expected discharge: 12/11/2021 recommended to Inpatient Psych unit once COVID isolation completed.     The patient's care was discussed with the Patient.    Terrell Whitley MD  Hospitalist Service  [facility]  Text page via [employer] Paging/Directory      Clinically Significant Risk Factors Present on Admission                ____________        Physical Exam   Vital Signs: Temp: 97.6  F (36.4  C) Temp src: Oral BP: 111/55 Pulse: 86   Resp: 22 SpO2: 93 % O2 Device: None (Room air)    Weight: 289 lbs 4.8 oz  General: Well-appearing adult male resting in bed, initially sleeping when I walk in but rouses easily and then alert  and oriented x3, does not appear acutely anxious, appears calm and pleasant  HEENT: Head normocephalic atraumatic, oral mucosa moist. Sclerae anicteric  Skin: No rashes or lesions  Extremities: Trace ankle and hand edema bilaterally.   Psych: Normal affect, mood euthymic  Neuro: CNII-XII grossly intact, moving all 4 extremities      Data   No results found for this or any previous visit (from the past 24 hour(s)).  ____________

## 2021-12-09 NOTE — PLAN OF CARE
Problem: Suicide Risk  Goal: Absence of Self-Harm  Outcome: No Change   Patient rermains on suicide precautions for suicidal ideation.

## 2021-12-09 NOTE — PLAN OF CARE
Problem: Suicide Risk  Goal: Absence of Self-Harm  Outcome: Improving   Pt continues to be on 1:1.   Problem: Gas Exchange Impaired  Goal: Optimal Gas Exchange  Outcome: Improving   Pt denies SOB. Oxygen at RA. Infrequent dry nonproductive cough.  Problem: Anxiety  Goal: Anxiety Reduction or Resolution  Outcome: Improving   Prn ativan given x2.  Problem: Pain Acute  Goal: Acceptable Pain Control and Functional Ability  Outcome: Improving  Intervention: Optimize Psychosocial Wellbeing  Recent Flowsheet Documentation  Taken 12/8/2021 [id number] by Sanford Alanis RN  Diversional Activities: television   Pain manageable with prn tylenol, Zanaflex, lidocaine and diclofenac ointment.
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
## 2021-12-10 ENCOUNTER — APPOINTMENT (OUTPATIENT)
Dept: PHYSICAL THERAPY | Facility: HOSPITAL | Age: 51
End: 2021-12-10
Attending: INTERNAL MEDICINE
Payer: MEDICAID

## 2021-12-10 LAB
CREAT SERPL-MCNC: 0.72 MG/DL (ref 0.7–1.3)
GFR SERPL CREATININE-BSD FRML MDRD: >90 ML/MIN/1.73M2

## 2021-12-10 PROCEDURE — 250N000013 HC RX MED GY IP 250 OP 250 PS 637: Performed by: INTERNAL MEDICINE

## 2021-12-10 PROCEDURE — 97116 GAIT TRAINING THERAPY: CPT | Mod: GP

## 2021-12-10 PROCEDURE — 97162 PT EVAL MOD COMPLEX 30 MIN: CPT | Mod: GP

## 2021-12-10 PROCEDURE — 120N000001 HC R&B MED SURG/OB

## 2021-12-10 PROCEDURE — 99232 SBSQ HOSP IP/OBS MODERATE 35: CPT | Performed by: INTERNAL MEDICINE

## 2021-12-10 PROCEDURE — 250N000013 HC RX MED GY IP 250 OP 250 PS 637: Performed by: NURSE PRACTITIONER

## 2021-12-10 PROCEDURE — 36415 COLL VENOUS BLD VENIPUNCTURE: CPT | Performed by: INTERNAL MEDICINE

## 2021-12-10 PROCEDURE — 82565 ASSAY OF CREATININE: CPT | Performed by: INTERNAL MEDICINE

## 2021-12-10 PROCEDURE — 250N000013 HC RX MED GY IP 250 OP 250 PS 637: Performed by: FAMILY MEDICINE

## 2021-12-10 PROCEDURE — 99207 PR CDG-CORRECTLY CODED, REVIEWED AND AGREE: CPT | Performed by: INTERNAL MEDICINE

## 2021-12-10 RX ADMIN — OLANZAPINE 10 MG: 10 TABLET, FILM COATED ORAL at 03:19

## 2021-12-10 RX ADMIN — BUPRENORPHINE 8 MG: 2 TABLET SUBLINGUAL at 14:35

## 2021-12-10 RX ADMIN — OLANZAPINE 5 MG: 5 TABLET, ORALLY DISINTEGRATING ORAL at 20:00

## 2021-12-10 RX ADMIN — PREGABALIN 200 MG: 100 CAPSULE ORAL at 10:56

## 2021-12-10 RX ADMIN — ACETAMINOPHEN 975 MG: 325 TABLET ORAL at 17:21

## 2021-12-10 RX ADMIN — NICOTINE POLACRILEX 4 MG: 4 GUM, CHEWING ORAL at 21:31

## 2021-12-10 RX ADMIN — AMLODIPINE BESYLATE 5 MG: 5 TABLET ORAL at 10:50

## 2021-12-10 RX ADMIN — PROPRANOLOL HYDROCHLORIDE 10 MG: 10 TABLET ORAL at 20:04

## 2021-12-10 RX ADMIN — FOLIC ACID 1000 MCG: 1 TABLET ORAL at 10:50

## 2021-12-10 RX ADMIN — OLANZAPINE 10 MG: 10 TABLET, FILM COATED ORAL at 17:22

## 2021-12-10 RX ADMIN — BUPRENORPHINE 8 MG: 2 TABLET SUBLINGUAL at 19:59

## 2021-12-10 RX ADMIN — PREGABALIN 200 MG: 100 CAPSULE ORAL at 20:00

## 2021-12-10 RX ADMIN — DIPHENHYDRAMINE HCL 25 MG: 25 TABLET ORAL at 22:04

## 2021-12-10 RX ADMIN — GABAPENTIN 100 MG: 100 CAPSULE ORAL at 17:21

## 2021-12-10 RX ADMIN — TIZANIDINE 4 MG: 4 TABLET ORAL at 03:19

## 2021-12-10 RX ADMIN — DIPHENHYDRAMINE HCL 25 MG: 25 TABLET ORAL at 14:35

## 2021-12-10 RX ADMIN — OLANZAPINE 5 MG: 5 TABLET, ORALLY DISINTEGRATING ORAL at 14:36

## 2021-12-10 RX ADMIN — ACETAMINOPHEN 975 MG: 325 TABLET ORAL at 11:09

## 2021-12-10 RX ADMIN — OLANZAPINE 5 MG: 5 TABLET, ORALLY DISINTEGRATING ORAL at 10:50

## 2021-12-10 RX ADMIN — LORAZEPAM 1 MG: 1 TABLET ORAL at 14:35

## 2021-12-10 RX ADMIN — PROPRANOLOL HYDROCHLORIDE 10 MG: 10 TABLET ORAL at 10:57

## 2021-12-10 RX ADMIN — ACAMPROSATE CALCIUM 666 MG: 333 TABLET, DELAYED RELEASE ORAL at 22:04

## 2021-12-10 RX ADMIN — MAGNESIUM OXIDE TAB 400 MG (241.3 MG ELEMENTAL MG) 400 MG: 400 (241.3 MG) TAB at 10:56

## 2021-12-10 RX ADMIN — BUPRENORPHINE 8 MG: 2 TABLET SUBLINGUAL at 10:49

## 2021-12-10 RX ADMIN — QUETIAPINE FUMARATE 300 MG: 300 TABLET, FILM COATED ORAL at 22:04

## 2021-12-10 RX ADMIN — MAGNESIUM OXIDE TAB 400 MG (241.3 MG ELEMENTAL MG) 400 MG: 400 (241.3 MG) TAB at 20:00

## 2021-12-10 RX ADMIN — DIPHENHYDRAMINE HCL 25 MG: 25 TABLET ORAL at 10:58

## 2021-12-10 RX ADMIN — PREGABALIN 200 MG: 100 CAPSULE ORAL at 14:35

## 2021-12-10 RX ADMIN — NICOTINE POLACRILEX 4 MG: 4 GUM, CHEWING ORAL at 18:17

## 2021-12-10 RX ADMIN — PROPRANOLOL HYDROCHLORIDE 10 MG: 10 TABLET ORAL at 14:36

## 2021-12-10 RX ADMIN — NICOTINE POLACRILEX 4 MG: 4 GUM, CHEWING ORAL at 03:19

## 2021-12-10 RX ADMIN — PANTOPRAZOLE SODIUM 40 MG: 40 TABLET, DELAYED RELEASE ORAL at 10:57

## 2021-12-10 RX ADMIN — LEVOTHYROXINE SODIUM 75 MCG: 0.03 TABLET ORAL at 10:57

## 2021-12-10 RX ADMIN — MULTIPLE VITAMINS W/ MINERALS TAB 1 TABLET: TAB at 10:56

## 2021-12-10 RX ADMIN — ARIPIPRAZOLE 5 MG: 5 TABLET ORAL at 10:56

## 2021-12-10 RX ADMIN — LORAZEPAM 1 MG: 1 TABLET ORAL at 10:58

## 2021-12-10 RX ADMIN — APIXABAN 2.5 MG: 2.5 TABLET, FILM COATED ORAL at 10:58

## 2021-12-10 RX ADMIN — ACAMPROSATE CALCIUM 666 MG: 333 TABLET, DELAYED RELEASE ORAL at 14:35

## 2021-12-10 RX ADMIN — LORAZEPAM 1 MG: 1 TABLET ORAL at 20:00

## 2021-12-10 RX ADMIN — APIXABAN 2.5 MG: 2.5 TABLET, FILM COATED ORAL at 20:01

## 2021-12-10 RX ADMIN — ACAMPROSATE CALCIUM 666 MG: 333 TABLET, DELAYED RELEASE ORAL at 10:58

## 2021-12-10 ASSESSMENT — ACTIVITIES OF DAILY LIVING (ADL)
ADLS_ACUITY_SCORE: 10

## 2021-12-10 NOTE — PROGRESS NOTES
[facility]    Medicine Progress Note - Hospitalist Service       Date of Admission:  11/22/2021    Assessment & Plan           Hollis Barclay is a 51 year old male admitted on 11/22/2021. He has history of bipolar disorder, borderline personality disorder, TBI, polysubstance abuse, alcoholism, group home resident presented to [facility] on 11/13 with suicide attempt, transferred to [facility] inpatient psych unit on 11/19, then transferred to [facility] on 11/22 for Covid hypoxia    12/10 :     Patient is new to me.  Chart reviewed.  Patient seen and examined.  Does not present any complaints.  He transferred himself from the bed to the chair, and steady gait noted.  Chronic backache complaints, on tylenol - dose increased.  No other issues noted today  Patient would be out of Covid isolation on 12/11 and  transfer back inpatient psych - due to continued suicidality.  D/W psychiatry.  No change in mental health medications.    A/p :       COVID-19 Diagnosis Details:  Onset of COVID symptoms 11/21   Date of positive test results 11/22   Vaccinated? Fully Vaccinated     # Confirmed COVID-19 infection    # Acute Hypoxic Respiratory Failure secondary to COVID-19 infection, resolved  # Viral Pneumonia secondary to COVID-19 infection  - Initial chest x-ray showed moderate patchy bilateral pulmonary infiltrates.  He needed up to 4 L of oxygen, required oxygen from 11/22-11/28.  Considered to have had severe disease given O2 requirements and duration of symptoms along with high CRP.  COVID isolation will end on 12/11.  Has complained of dyspnea at times, attributed to anxiety. Overall seems stable.  - Continue supportive care, COVID-19 special precautions, minimized lab draws, and care consolidation.  Does not need continuous oximetry  - Treatments: Received 7 days of Decadron, stopped when hypoxia had resolved.  Completed 5 days of remdesivir.  - DVT Prophylaxis: At high risk of thrombotic  complications due to COVID-19 disease (last DDimer 0.54).          - PROPHYLACTIC dosing: Eliquis 2.5mg BID. Would continue this through 12/21 at least (30 days from onset)      #Bipolar disorder      Borderline personality disorder      Suicidal ideation with recent suicide attempt      History of TBI  Patient was hospitalized at [facility] 11/13-11/19 after presenting with drug overdose and alcohol intoxication with intention to commit suicide secondary to worsening depression.  Patient overdosed on 5 tablets of oxycodone, 22x300 mg tablets of Seroquel and 4x8 mg tablets of Suboxone while drinking 1 gallon of vodka within a 24-hour period all in attempt to kill himself. Was transferred to [facility] Psych unit 11/19. Requires one-to-one for suicidality while here at [facility].  Psych following here, not seeing today but aware of issues.  --Had a panic attack on 11/27 and therefore started on milligram Ativan every 4 hours as needed for anxiety. Was acutely anxious over the weekend and Ativan dose increased to 1.5 mg, doing much better on this dose. Seems kind of somnolent at times, but not today or yesterday.  --Tolerating Subutex dose to 8 mg twice daily  This would help with his anxiety.  Plan would be back to [facility] when medical issues resolved  Status is currently Voluntary  Currently on Abilify, Zyprexa, Seroquel, Benadryl, Lyrica, Ativan, Propranolol as per psychiatry.   Psychiatry do feel he needs to go back to inpatient psych as soon as possible for this to get better but unfortunately that is not advisable right now due to Covid isolation.  --Currently on one-to-one     #Alcoholism  Treated for withdrawal at [facility]     #Chronic pain syndrome     History of polysubstance/drug abuse  Addiction medicine following  Tolerating Subutex to 8 mg twice daily dose.     #Alcoholic hepatitis  Resolved, LFTs ok     #Nicotine dependence  Gum. Using 20 nicotine gum tabs per day but reports this is less than he takes at  home. Allergic to adhesive of nicotine patch. Do wonder if overuse contributing to anxiety. Patient not open to tapering dose at this time.     #HTN  Continue amlodipine, propranolol     #GERD,  continue home PPI     #Constipation  senna as needed     #Nasal bone fracture  Plan to see ENT as outpatient     #Hypothyroidism  Continue home Synthroid     #Insomnia and panic attack  Now on scheduled and PRN ativan     #Dilated aortic root  noted on echo but not prominent on CTA chest. Would recommend annual surveillance CT.     #Hypokalemia mild  Resolved with replacement     #Back pain acute on chronic  --Addiction medicine specialist prescribed tizanidine which patient claims is not working  --Currently on pregabalin  --Was prescribed naproxen at [facility] and therefore started on naproxen on 11/30.  Showing improvement  - x-ray of the ribs 11/30 does not show any fracture     #Right knee pain likely chronic  --Patient claims that he has no cartilage  --x-ray of the right knee unremarkable  --Will avoid narcotics given abuse potential.  --currently on naproxen  -continue topical Diclofenac 4 times PRN    #Hand pain  Not complaining of this today. Seems to have varying pain complaints from day to day    #Anal burning with BM  Calmoseptine has been helpful    #Leukocytosis  Probably spurious elevation  Resolved       Diet: Regular Diet Adult      Barksdale Catheter: Not present  Central Lines: None  Code Status: Full Code      Disposition Plan   Expected discharge: 12/11/2021 recommended to Inpatient Psych unit once COVID isolation completed.     The patient's care was discussed with the Patient.    Crystal Michel MD  Hospitalist Service  [facility]  Text page via [employer] Paging/Directory    Clinically Significant Risk Factors Present on Admission                Physical Exam   Vital Signs: Temp: 98.3  F (36.8  C) Temp src: Oral BP: 115/62 Pulse: 81   Resp: 18 SpO2: 93 % O2 Device: None  (Room air)    Weight: 289 lbs 4.8 oz  General: Well-appearing adult male resting in bed, initially sleeping when I walk in but rouses easily and then alert and oriented x3, does not appear acutely anxious, appears calm and pleasant  HEENT: Head normocephalic atraumatic, oral mucosa moist. Sclerae anicteric  Skin: No rashes or lesions  Extremities: Trace ankle and hand edema bilaterally.   Psych: Normal affect, mood euthymic  Neuro: CNII-XII grossly intact, moving all 4 extremities    Data   Recent Results (from the past 24 hour(s))   Creatinine    Collection Time: 12/10/21 10:53 AM   Result Value Ref Range    Creatinine 0.72 0.70 - 1.30 mg/dL    GFR Estimate >90 >60 mL/min/1.73m2     Crystal Michel MD

## 2021-12-10 NOTE — CONSULTS
Care Management Follow Up    Length of Stay (days): 17    Expected Discharge Date: 12/11/2021     Concerns to be Addressed: discharge planning       Patient plan of care discussed at interdisciplinary rounds: Yes    Anticipated Discharge Disposition: Inpatient Mental Health,Other (Comments) (transfer back to [facility] inpt psych)     Anticipated Discharge Services:  Inpatient mental health services  Anticipated Discharge DME:  N/A    Patient/family educated on Medicare website which has current facility and service quality ratings:  N/A    Education Provided on the Discharge Plan:  Yes    Patient/Family in Agreement with the Plan: yes    Referrals Placed by CM/SW:  N/A    Private pay costs discussed: N/A    Additional Information: MARYCRUZ spoke with Steven, Supervisor at [facility] Int[facility], regarding plan for pt to return to [facility] on Saturday, 12/11/21.  Steven stated that SW needs to document in note tomorrrow regarding how pt is doing now and after twenty days of isolation does he still need inpatient psych and why.  Infection prevention will also need to weigh in.  He indicated that someone is on call in infection prevention on weekends.  SW should have CM on P4 on Saturday call the main intake line ([phone]) around 8 AM and ask for either Rich or Jeremías.  They will assist CM with getting pt transferred back to [facility] inpatient mental health unit.      MARQUEZ Simons, NORBERTO 12/09/21 6:11 PM

## 2021-12-10 NOTE — PROGRESS NOTES
"Addiction Medicine Follow Up    Tele-Visit Details    Type of service:  Video Visit    Time Service Began (time 1st connected with pt): 1515    Time Service Ended (time completely finished with pt): 1522    Originating Location (pt. Location): Patient Rm, [facility]     Distant Location (provider location): [facility]    Reason for Televisit: COVID 19    Mode of Communication:  Video Conference via [employer]    Physician has received verbal consent for a video visit from the patient? Yes        Active Problems:    Pneumonia due to COVID-19 virus      Subjective  Chief complaint: Less complaint of pain    HPI: Ray is recovering from COVID19 pneumonia.   Was originally admitted to [facility] for alcohol withdrawal and intentional medeication overdose.   He was then transferred to [facility] unit due to continued suicidal ideation and intent.   Then found to have acute COVID so transferred to [facility].   His symptoms have abatted but has to remain in quarantine until 12/11.        Has alcohol use disorder, severe and has found acamprosate to be helpful in past.   Also has opioid Use disorder and is on Suboxone.   This has gradually been increased to 8 mg tid due to continued cravings, irritability and chronic pain.      ROS:   Gen:  No sweats or chills,  Having trouble sleeping.   Says he wakes frequently with nightmares      Resp: No cough or SOB   GI:  No constipation on stool softeners   MS:  Low back pain, which is chronic   Psych:  Less anxious    Objective    /57 (BP Location: Right arm)   Pulse 82   Temp 97.7  F (36.5  C) (Oral)   Resp 20   Ht 1.93 m (6' 4\")   Wt 131.2 kg (289 lb 4.8 oz)   SpO2 93%   BMI 35.21 kg/m       Skin:  No sweataing  Neuro:  Groggy, dosing during interview.   Psych:     Cooperative     Mood:  less anxious and irritable               Affect:  Congruent               Thought content:  reports suicidal thoughts with plan if he would go " home               Thought processes:  Linear                Speech:  sl. garbled                Motor:  Normal                Insight/judgement:  fair/ fair    Results      Assessment and Plan:  1.  Opioid Use Disorder, severe -  On buprenorphine 8 mg tid since 12/\6 - may be causing sedation, although patient has been on this dose before.  May need a few more days to equilibrate.       2.  Alcohol Use Disorder, severe - was previously on acamprosate and thought it was helpful.   May also have some benefit for anxiety.   Renal function normal.   Will restart acamprosate 666 mg tid.     3.  Prescription BDZ  - Currently on lorazepam o.25 mg q 8 hrs , but also getting 1.5 mg q 4hrs prn.  Total amount received yesterday was 6.75 mg.   Patient has been insisting he needs to stay on benzos, and is not happy about the taper.   However, it is noted that he was not on any BDZ prior to this admission.  He was given them for alcohol withdrawal.  Therefore, recommend tapering off quickly.  Will discontinue the prn doses, give 1 mg tid scheduled and then taper the scheduled doses, so that he is completely off in the next 4 days.       Note: patient is on lyrica 200 mg tid, as well as zyprexa 5 mg tid and quetiapine 300 mg q hs. These all may be contributing to his sedation.  Would recommend psychiatry evaluate these meds.   Would also like to add prazosin to help with possible PTSD, but patient is on propranolol.   Will taper the latter and start prazosin after he is off.     Ivette Mahoney MD  Addiction Medicine Service  [facility]   Page me (click here for Salena Mahoney)
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
## 2021-12-10 NOTE — PROGRESS NOTES
12/10/21 1500   Quick Adds   Type of Visit Initial PT Evaluation   Living Environment   Current Living Arrangements group home  (2 level with steps outside and inside. )   Self-Care   Activity/Exercise/Self-Care Comment Per the pt, he ambulates indep and will use the cane outside on occ and ambulates inside indep without AD,    (bed mobility and transfers indep per the pt. )   Disability/Function   Hearing Difficulty or Deaf no   Fall history within last six months no   Change in Functional Status Since Onset of Current Illness/Injury yes   General Information   Onset of Illness/Injury or Date of Surgery 11/22/21   Referring Physician Crystal Michel   Patient/Family Therapy Goals Statement (PT) to go to [facility]   Pertinent History of Current Problem (include personal factors and/or comorbidities that impact the POC) Pt was admitted with severe bipolar 1  disorder, COVID 19    Existing Precautions/Restrictions   (1:1 suicidal )   Weight-Bearing Status - LLE weight-bearing as tolerated   Weight-Bearing Status - RLE weight-bearing as tolerated   Cognition   Orientation Status (Cognition) person;place   Pain Assessment   Patient Currently in Pain   (Pt does c/o back pain. )   Posture    Posture Comments good posture.    Range of Motion (ROM)   ROM Quick Adds ROM WNL   Strength   Strength Comments Able to WB for mobility.    Bed Mobility   Comment (Bed Mobility) indep supien<>sit   Transfers   Transfer Safety Comments indep bed<>chair x 2.    Gait/Stairs (Locomotion)   [place] Level (Gait)   (SBA without LOB)   Assistive Device (Gait) other (see comments)  (none)   Distance in Feet (Required for LE Total Joints) 300   Pattern (Gait) step-through   Comment (Gait/Stairs) Pt walked slowly and had no LOB.    Balance   Balance Comments No LOB with gait or transfers.     Sensory Examination   Sensory Perception WNL   Clinical Impression   Criteria for Skilled Therapeutic Intervention yes, treatment indicated   PT  Diagnosis (PT) impaired mobility    Influenced by the following impairments gait, steps    Functional limitations due to impairments dec bal. some back pain   Clinical Presentation Evolving/Changing   Clinical Presentation Rationale Pt presents medically diagnosed.    Clinical Decision Making (Complexity) moderate complexity   Therapy Frequency (PT) Daily   Predicted Duration of Therapy Intervention (days/wks) 3   Planned Therapy Interventions (PT) balance training;stair training;gait training   Anticipated Equipment Needs at Discharge (PT) other (see comments)   Risk & Benefits of therapy have been explained evaluation/treatment results reviewed;care plan/treatment goals reviewed;risks/benefits reviewed;patient;participants voiced agreement with care plan   PT Discharge Planning    PT Discharge Recommendation (DC Rec)   (Pt to go to [facility]. )   PT Rationale for DC Rec Pt is ambulating farily well  No LOB. PT to continue with gait skills, bal and steps.      Total Evaluation Time   Total Evaluation Time (Minutes) 10

## 2021-12-10 NOTE — PLAN OF CARE
Problem: Adult Inpatient Plan of Care  Goal: Optimal Comfort and Wellbeing  Outcome: Improving     Problem: Suicide Risk  Goal: Absence of Self-Harm  Outcome: Improving     Patient slept for most of the shift with occasional pacing and restlessness. PRN tylenol given for back pain which was effective per patient. VSS. Walked with staff twice in the hallway. Sitter 1:1 continued. No suicidal ideation expressed.

## 2021-12-10 NOTE — PLAN OF CARE
Problem: Anxiety  Goal: Anxiety Reduction or Resolution  Outcome: Declining   Patient appears more anxious tonight than last night. Patient out walking in halls x3 so far this shift. Patient has received Melatonin, Olanzapine and Zanaflex so far this shift. Patient remains on 1:1 for suicidal ideation.

## 2021-12-10 NOTE — PLAN OF CARE
Problem: Suicide Risk  Goal: Absence of Self-Harm  Outcome: No Change     Problem: Anxiety  Goal: Anxiety Reduction or Resolution  Outcome: No Change     Problem: Pain Acute  Goal: Acceptable Pain Control and Functional Ability  Outcome: No Change     Patient on airborne precautions for Covid. Patient on 1 to 1 for suicidal ideation. Patient verbalized no thoughts of suicide today or plans. Patient verbalized constant pain of 7. Ativan and gabapentin given for anxiety, tylenol given for pain. No improvement in pain.  Patient refused lidocaine for knee and back. Patient walks with staff frequently for anxiety and exercise. Proves effective.

## 2021-12-11 ENCOUNTER — TELEPHONE (OUTPATIENT)
Dept: BEHAVIORAL HEALTH | Facility: CLINIC | Age: 51
End: 2021-12-11
Payer: MEDICAID

## 2021-12-11 ENCOUNTER — HOSPITAL ENCOUNTER (INPATIENT)
Facility: HOSPITAL | Age: 51
LOS: 2 days | Discharge: ANOTHER HOSPITAL | End: 2021-12-13
Attending: EMERGENCY MEDICINE | Admitting: FAMILY MEDICINE
Payer: MEDICAID

## 2021-12-11 ENCOUNTER — HOSPITAL ENCOUNTER (INPATIENT)
Age: 51
End: 2021-12-11
Payer: MEDICAID

## 2021-12-11 VITALS
BODY MASS INDEX: 35.23 KG/M2 | HEIGHT: 76 IN | RESPIRATION RATE: 16 BRPM | DIASTOLIC BLOOD PRESSURE: 69 MMHG | TEMPERATURE: 97.8 F | SYSTOLIC BLOOD PRESSURE: 127 MMHG | OXYGEN SATURATION: 92 % | HEART RATE: 73 BPM | WEIGHT: 289.3 LBS

## 2021-12-11 DIAGNOSIS — F31.9 BIPOLAR AFFECTIVE DISORDER, REMISSION STATUS UNSPECIFIED (H): ICD-10-CM

## 2021-12-11 LAB
ALBUMIN SERPL-MCNC: 3.4 G/DL (ref 3.5–5)
ALP SERPL-CCNC: 69 U/L (ref 45–120)
ALT SERPL W P-5'-P-CCNC: 119 U/L (ref 0–45)
ANION GAP SERPL CALCULATED.3IONS-SCNC: 7 MMOL/L (ref 5–18)
AST SERPL W P-5'-P-CCNC: 88 U/L (ref 0–40)
BASOPHILS # BLD AUTO: 0 10E3/UL (ref 0–0.2)
BASOPHILS NFR BLD AUTO: 1 %
BILIRUB SERPL-MCNC: 0.5 MG/DL (ref 0–1)
BUN SERPL-MCNC: 22 MG/DL (ref 8–22)
CALCIUM SERPL-MCNC: 8.8 MG/DL (ref 8.5–10.5)
CHLORIDE BLD-SCNC: 106 MMOL/L (ref 98–107)
CO2 SERPL-SCNC: 27 MMOL/L (ref 22–31)
CREAT SERPL-MCNC: 0.74 MG/DL (ref 0.7–1.3)
EOSINOPHIL # BLD AUTO: 0.3 10E3/UL (ref 0–0.7)
EOSINOPHIL NFR BLD AUTO: 5 %
ERYTHROCYTE [DISTWIDTH] IN BLOOD BY AUTOMATED COUNT: 15.1 % (ref 10–15)
GFR SERPL CREATININE-BSD FRML MDRD: >90 ML/MIN/1.73M2
GLUCOSE BLD-MCNC: 100 MG/DL (ref 70–125)
HCT VFR BLD AUTO: 38 % (ref 40–53)
HGB BLD-MCNC: 12 G/DL (ref 13.3–17.7)
IMM GRANULOCYTES # BLD: 0 10E3/UL
IMM GRANULOCYTES NFR BLD: 0 %
LYMPHOCYTES # BLD AUTO: 2 10E3/UL (ref 0.8–5.3)
LYMPHOCYTES NFR BLD AUTO: 36 %
MCH RBC QN AUTO: 29.9 PG (ref 26.5–33)
MCHC RBC AUTO-ENTMCNC: 31.6 G/DL (ref 31.5–36.5)
MCV RBC AUTO: 95 FL (ref 78–100)
MONOCYTES # BLD AUTO: 0.5 10E3/UL (ref 0–1.3)
MONOCYTES NFR BLD AUTO: 10 %
MRSA DNA SPEC QL NAA+PROBE: POSITIVE
NEUTROPHILS # BLD AUTO: 2.7 10E3/UL (ref 1.6–8.3)
NEUTROPHILS NFR BLD AUTO: 48 %
NRBC # BLD AUTO: 0 10E3/UL
NRBC BLD AUTO-RTO: 0 /100
PLATELET # BLD AUTO: 265 10E3/UL (ref 150–450)
POTASSIUM BLD-SCNC: 4.2 MMOL/L (ref 3.5–5)
PROT SERPL-MCNC: 7.3 G/DL (ref 6–8)
RBC # BLD AUTO: 4.02 10E6/UL (ref 4.4–5.9)
SA TARGET DNA: POSITIVE
SODIUM SERPL-SCNC: 140 MMOL/L (ref 136–145)
WBC # BLD AUTO: 5.5 10E3/UL (ref 4–11)

## 2021-12-11 PROCEDURE — 250N000013 HC RX MED GY IP 250 OP 250 PS 637: Performed by: FAMILY MEDICINE

## 2021-12-11 PROCEDURE — 99239 HOSP IP/OBS DSCHRG MGMT >30: CPT | Performed by: INTERNAL MEDICINE

## 2021-12-11 PROCEDURE — 120N000001 HC R&B MED SURG/OB

## 2021-12-11 PROCEDURE — 80053 COMPREHEN METABOLIC PANEL: CPT | Performed by: EMERGENCY MEDICINE

## 2021-12-11 PROCEDURE — 36415 COLL VENOUS BLD VENIPUNCTURE: CPT | Performed by: EMERGENCY MEDICINE

## 2021-12-11 PROCEDURE — 250N000013 HC RX MED GY IP 250 OP 250 PS 637: Performed by: NURSE PRACTITIONER

## 2021-12-11 PROCEDURE — 99207 PR NO CHARGE LOS: CPT | Performed by: FAMILY MEDICINE

## 2021-12-11 PROCEDURE — 87641 MR-STAPH DNA AMP PROBE: CPT | Performed by: INTERNAL MEDICINE

## 2021-12-11 PROCEDURE — 250N000013 HC RX MED GY IP 250 OP 250 PS 637: Performed by: EMERGENCY MEDICINE

## 2021-12-11 PROCEDURE — 250N000013 HC RX MED GY IP 250 OP 250 PS 637: Performed by: INTERNAL MEDICINE

## 2021-12-11 PROCEDURE — 99285 EMERGENCY DEPT VISIT HI MDM: CPT | Mod: 25

## 2021-12-11 PROCEDURE — 87081 CULTURE SCREEN ONLY: CPT | Performed by: INTERNAL MEDICINE

## 2021-12-11 PROCEDURE — 85025 COMPLETE CBC W/AUTO DIFF WBC: CPT | Performed by: EMERGENCY MEDICINE

## 2021-12-11 RX ORDER — LORAZEPAM 0.5 MG/1
0.5 TABLET ORAL ONCE
Status: COMPLETED | OUTPATIENT
Start: 2021-12-13 | End: 2021-12-13

## 2021-12-11 RX ORDER — PREGABALIN 100 MG/1
200 CAPSULE ORAL 3 TIMES DAILY
Status: DISCONTINUED | OUTPATIENT
Start: 2021-12-11 | End: 2021-12-13 | Stop reason: HOSPADM

## 2021-12-11 RX ORDER — ACETAMINOPHEN 325 MG/1
975 TABLET ORAL EVERY 6 HOURS PRN
Refills: 0 | Status: ON HOLD
Start: 2021-12-11 | End: 2021-12-23

## 2021-12-11 RX ORDER — QUETIAPINE FUMARATE 300 MG/1
300 TABLET, FILM COATED ORAL AT BEDTIME
Status: DISCONTINUED | OUTPATIENT
Start: 2021-12-11 | End: 2021-12-13 | Stop reason: HOSPADM

## 2021-12-11 RX ORDER — DIPHENHYDRAMINE HCL 25 MG
25 TABLET ORAL 3 TIMES DAILY
Qty: 30 TABLET | Refills: 0 | Status: ON HOLD
Start: 2021-12-11 | End: 2021-12-23

## 2021-12-11 RX ORDER — ACAMPROSATE CALCIUM 333 MG/1
666 TABLET, DELAYED RELEASE ORAL 3 TIMES DAILY
Qty: 180 TABLET | Refills: 0 | Status: ON HOLD
Start: 2021-12-11 | End: 2021-12-23

## 2021-12-11 RX ORDER — GABAPENTIN 100 MG/1
100 CAPSULE ORAL EVERY 6 HOURS PRN
Qty: 60 CAPSULE | Refills: 0 | Status: ON HOLD
Start: 2021-12-11 | End: 2021-12-23

## 2021-12-11 RX ORDER — LORAZEPAM 0.5 MG/1
0.5 TABLET ORAL 2 TIMES DAILY
Qty: 2 TABLET | Refills: 0 | Status: ON HOLD
Start: 2021-12-12 | End: 2021-12-23

## 2021-12-11 RX ORDER — GABAPENTIN 100 MG/1
100 CAPSULE ORAL EVERY 6 HOURS PRN
Status: DISCONTINUED | OUTPATIENT
Start: 2021-12-11 | End: 2021-12-13 | Stop reason: HOSPADM

## 2021-12-11 RX ORDER — POLYETHYLENE GLYCOL 3350 17 G/17G
17 POWDER, FOR SOLUTION ORAL DAILY
Status: DISCONTINUED | OUTPATIENT
Start: 2021-12-12 | End: 2021-12-13 | Stop reason: HOSPADM

## 2021-12-11 RX ORDER — OLANZAPINE 5 MG/1
5 TABLET, ORALLY DISINTEGRATING ORAL EVERY 6 HOURS PRN
Status: DISCONTINUED | OUTPATIENT
Start: 2021-12-11 | End: 2021-12-13 | Stop reason: HOSPADM

## 2021-12-11 RX ORDER — LORAZEPAM 0.5 MG/1
1 TABLET ORAL ONCE
Status: COMPLETED | OUTPATIENT
Start: 2021-12-11 | End: 2021-12-11

## 2021-12-11 RX ORDER — LIDOCAINE 40 MG/G
CREAM TOPICAL
Status: DISCONTINUED | OUTPATIENT
Start: 2021-12-11 | End: 2021-12-13 | Stop reason: HOSPADM

## 2021-12-11 RX ORDER — ACAMPROSATE CALCIUM 333 MG/1
666 TABLET, DELAYED RELEASE ORAL 3 TIMES DAILY
Status: DISCONTINUED | OUTPATIENT
Start: 2021-12-11 | End: 2021-12-13 | Stop reason: HOSPADM

## 2021-12-11 RX ORDER — PRAZOSIN HYDROCHLORIDE 1 MG/1
1 CAPSULE ORAL AT BEDTIME
Qty: 30 CAPSULE | Refills: 0 | Status: ON HOLD
Start: 2021-12-11 | End: 2021-12-23

## 2021-12-11 RX ORDER — LEVOTHYROXINE SODIUM 25 UG/1
75 TABLET ORAL
Status: DISCONTINUED | OUTPATIENT
Start: 2021-12-12 | End: 2021-12-13 | Stop reason: HOSPADM

## 2021-12-11 RX ORDER — DOCUSATE SODIUM 100 MG/1
100 CAPSULE, LIQUID FILLED ORAL 2 TIMES DAILY
Status: DISCONTINUED | OUTPATIENT
Start: 2021-12-11 | End: 2021-12-12

## 2021-12-11 RX ORDER — BUPRENORPHINE HYDROCHLORIDE AND NALOXONE HYDROCHLORIDE DIHYDRATE 2; .5 MG/1; MG/1
4 TABLET SUBLINGUAL 3 TIMES DAILY
Status: DISCONTINUED | OUTPATIENT
Start: 2021-12-11 | End: 2021-12-12

## 2021-12-11 RX ORDER — PANTOPRAZOLE SODIUM 40 MG/1
40 TABLET, DELAYED RELEASE ORAL
Status: DISCONTINUED | OUTPATIENT
Start: 2021-12-12 | End: 2021-12-13 | Stop reason: HOSPADM

## 2021-12-11 RX ORDER — PRAZOSIN HYDROCHLORIDE 1 MG/1
1 CAPSULE ORAL AT BEDTIME
Status: DISCONTINUED | OUTPATIENT
Start: 2021-12-11 | End: 2021-12-13 | Stop reason: HOSPADM

## 2021-12-11 RX ORDER — MAGNESIUM OXIDE 400 MG/1
400 TABLET ORAL 2 TIMES DAILY
Status: DISCONTINUED | OUTPATIENT
Start: 2021-12-11 | End: 2021-12-13 | Stop reason: HOSPADM

## 2021-12-11 RX ORDER — MAGNESIUM OXIDE 400 MG/1
400 TABLET ORAL 2 TIMES DAILY
Qty: 20 TABLET | Refills: 0 | Status: ON HOLD
Start: 2021-12-11 | End: 2021-12-23

## 2021-12-11 RX ORDER — MULTIPLE VITAMINS W/ MINERALS TAB 9MG-400MCG
1 TAB ORAL DAILY
Status: DISCONTINUED | OUTPATIENT
Start: 2021-12-12 | End: 2021-12-13 | Stop reason: HOSPADM

## 2021-12-11 RX ORDER — ACETAMINOPHEN 325 MG/1
975 TABLET ORAL EVERY 6 HOURS PRN
Status: DISCONTINUED | OUTPATIENT
Start: 2021-12-11 | End: 2021-12-13 | Stop reason: HOSPADM

## 2021-12-11 RX ORDER — LORAZEPAM 0.5 MG/1
0.5 TABLET ORAL ONCE
Qty: 1 TABLET | Refills: 0 | Status: ON HOLD
Start: 2021-12-13 | End: 2021-12-23

## 2021-12-11 RX ORDER — MICONAZOLE NITRATE 20 MG/G
CREAM TOPICAL 2 TIMES DAILY
Status: DISCONTINUED | OUTPATIENT
Start: 2021-12-11 | End: 2021-12-13 | Stop reason: HOSPADM

## 2021-12-11 RX ORDER — LORAZEPAM 0.5 MG/1
0.5 TABLET ORAL 2 TIMES DAILY
Status: COMPLETED | OUTPATIENT
Start: 2021-12-12 | End: 2021-12-12

## 2021-12-11 RX ORDER — LORAZEPAM 0.5 MG/1
0.5 TABLET ORAL
Qty: 3 TABLET | Refills: 0 | Status: ON HOLD
Start: 2021-12-11 | End: 2021-12-23

## 2021-12-11 RX ORDER — LORAZEPAM 0.5 MG/1
0.5 TABLET ORAL 2 TIMES DAILY
Status: DISCONTINUED | OUTPATIENT
Start: 2021-12-12 | End: 2021-12-11

## 2021-12-11 RX ORDER — ARIPIPRAZOLE 5 MG/1
5 TABLET ORAL DAILY
Status: DISCONTINUED | OUTPATIENT
Start: 2021-12-12 | End: 2021-12-13 | Stop reason: HOSPADM

## 2021-12-11 RX ORDER — LANOLIN ALCOHOL/MO/W.PET/CERES
3 CREAM (GRAM) TOPICAL
Refills: 0
Start: 2021-12-11 | End: 2021-12-13

## 2021-12-11 RX ORDER — AMLODIPINE BESYLATE 5 MG/1
5 TABLET ORAL DAILY
Status: DISCONTINUED | OUTPATIENT
Start: 2021-12-12 | End: 2021-12-13 | Stop reason: HOSPADM

## 2021-12-11 RX ORDER — SENNOSIDES 8.6 MG
1-2 TABLET ORAL 2 TIMES DAILY PRN
Status: DISCONTINUED | OUTPATIENT
Start: 2021-12-11 | End: 2021-12-13 | Stop reason: HOSPADM

## 2021-12-11 RX ORDER — FOLIC ACID 1 MG/1
1000 TABLET ORAL DAILY
Status: DISCONTINUED | OUTPATIENT
Start: 2021-12-12 | End: 2021-12-13 | Stop reason: HOSPADM

## 2021-12-11 RX ORDER — ONDANSETRON 4 MG/1
4 TABLET, ORALLY DISINTEGRATING ORAL EVERY 6 HOURS PRN
Status: DISCONTINUED | OUTPATIENT
Start: 2021-12-11 | End: 2021-12-13 | Stop reason: HOSPADM

## 2021-12-11 RX ORDER — ARIPIPRAZOLE 5 MG/1
5 TABLET ORAL DAILY
Qty: 30 TABLET | Refills: 0 | Status: ON HOLD
Start: 2021-12-12 | End: 2021-12-23

## 2021-12-11 RX ORDER — TIZANIDINE 2 MG/1
4 TABLET ORAL EVERY 8 HOURS PRN
Status: DISCONTINUED | OUTPATIENT
Start: 2021-12-11 | End: 2021-12-13 | Stop reason: HOSPADM

## 2021-12-11 RX ORDER — LORAZEPAM 0.5 MG/1
0.5 TABLET ORAL 3 TIMES DAILY
Status: COMPLETED | OUTPATIENT
Start: 2021-12-11 | End: 2021-12-11

## 2021-12-11 RX ORDER — DIPHENHYDRAMINE HCL 25 MG
25 TABLET ORAL 3 TIMES DAILY
Status: DISCONTINUED | OUTPATIENT
Start: 2021-12-11 | End: 2021-12-13 | Stop reason: HOSPADM

## 2021-12-11 RX ADMIN — MULTIPLE VITAMINS W/ MINERALS TAB 1 TABLET: TAB at 08:07

## 2021-12-11 RX ADMIN — TIZANIDINE 4 MG: 4 TABLET ORAL at 10:07

## 2021-12-11 RX ADMIN — MAGNESIUM OXIDE TAB 400 MG (241.3 MG ELEMENTAL MG) 400 MG: 400 (241.3 MG) TAB at 21:55

## 2021-12-11 RX ADMIN — PREGABALIN 200 MG: 100 CAPSULE ORAL at 08:06

## 2021-12-11 RX ADMIN — ACETAMINOPHEN 975 MG: 325 TABLET ORAL at 10:07

## 2021-12-11 RX ADMIN — MELATONIN TAB 3 MG 3 MG: 3 TAB at 00:04

## 2021-12-11 RX ADMIN — LORAZEPAM 1 MG: 0.5 TABLET ORAL at 18:30

## 2021-12-11 RX ADMIN — ARIPIPRAZOLE 5 MG: 5 TABLET ORAL at 08:07

## 2021-12-11 RX ADMIN — BUPRENORPHINE HYDROCHLORIDE AND NALOXONE HYDROCHLORIDE DIHYDRATE 4 TABLET: 2; .5 TABLET SUBLINGUAL at 21:56

## 2021-12-11 RX ADMIN — QUETIAPINE FUMARATE 300 MG: 300 TABLET, FILM COATED ORAL at 21:37

## 2021-12-11 RX ADMIN — APIXABAN 2.5 MG: 2.5 TABLET, FILM COATED ORAL at 21:37

## 2021-12-11 RX ADMIN — TIZANIDINE 4 MG: 4 TABLET ORAL at 23:57

## 2021-12-11 RX ADMIN — BUPRENORPHINE 8 MG: 2 TABLET SUBLINGUAL at 12:20

## 2021-12-11 RX ADMIN — LORAZEPAM 0.5 MG: 0.5 TABLET ORAL at 12:20

## 2021-12-11 RX ADMIN — DIPHENHYDRAMINE HCL 25 MG: 25 TABLET ORAL at 21:55

## 2021-12-11 RX ADMIN — LORAZEPAM 0.5 MG: 0.5 TABLET ORAL at 21:46

## 2021-12-11 RX ADMIN — NICOTINE POLACRILEX 4 MG: 4 GUM, CHEWING ORAL at 23:00

## 2021-12-11 RX ADMIN — APIXABAN 2.5 MG: 2.5 TABLET, FILM COATED ORAL at 08:06

## 2021-12-11 RX ADMIN — ACAMPROSATE CALCIUM 666 MG: 333 TABLET, DELAYED RELEASE ORAL at 21:37

## 2021-12-11 RX ADMIN — NICOTINE POLACRILEX 4 MG: 4 GUM, CHEWING ORAL at 15:25

## 2021-12-11 RX ADMIN — OLANZAPINE 10 MG: 10 TABLET, FILM COATED ORAL at 10:07

## 2021-12-11 RX ADMIN — LEVOTHYROXINE SODIUM 75 MCG: 0.03 TABLET ORAL at 06:54

## 2021-12-11 RX ADMIN — PROPRANOLOL HYDROCHLORIDE 10 MG: 10 TABLET ORAL at 08:28

## 2021-12-11 RX ADMIN — PRAZOSIN HYDROCHLORIDE 1 MG: 1 CAPSULE ORAL at 21:46

## 2021-12-11 RX ADMIN — LORAZEPAM 0.5 MG: 0.5 TABLET ORAL at 08:04

## 2021-12-11 RX ADMIN — MAGNESIUM OXIDE TAB 400 MG (241.3 MG ELEMENTAL MG) 400 MG: 400 (241.3 MG) TAB at 08:03

## 2021-12-11 RX ADMIN — DIPHENHYDRAMINE HCL 25 MG: 25 TABLET ORAL at 08:06

## 2021-12-11 RX ADMIN — PREGABALIN 200 MG: 100 CAPSULE ORAL at 14:04

## 2021-12-11 RX ADMIN — TIZANIDINE 4 MG: 4 TABLET ORAL at 15:25

## 2021-12-11 RX ADMIN — NICOTINE POLACRILEX 4 MG: 4 GUM, CHEWING ORAL at 13:13

## 2021-12-11 RX ADMIN — OLANZAPINE 5 MG: 5 TABLET, ORALLY DISINTEGRATING ORAL at 14:05

## 2021-12-11 RX ADMIN — DIPHENHYDRAMINE HCL 25 MG: 25 TABLET ORAL at 14:04

## 2021-12-11 RX ADMIN — ACAMPROSATE CALCIUM 666 MG: 333 TABLET, DELAYED RELEASE ORAL at 14:05

## 2021-12-11 RX ADMIN — DOCUSATE SODIUM 100 MG: 100 CAPSULE, LIQUID FILLED ORAL at 21:35

## 2021-12-11 RX ADMIN — NICOTINE POLACRILEX 4 MG: 4 GUM, CHEWING ORAL at 11:48

## 2021-12-11 RX ADMIN — PANTOPRAZOLE SODIUM 40 MG: 40 TABLET, DELAYED RELEASE ORAL at 06:54

## 2021-12-11 RX ADMIN — ACETAMINOPHEN 975 MG: 325 TABLET ORAL at 21:34

## 2021-12-11 RX ADMIN — DICLOFENAC SODIUM 4 G: 10 GEL TOPICAL at 10:09

## 2021-12-11 RX ADMIN — GABAPENTIN 100 MG: 100 CAPSULE ORAL at 00:04

## 2021-12-11 RX ADMIN — ACAMPROSATE CALCIUM 666 MG: 333 TABLET, DELAYED RELEASE ORAL at 08:05

## 2021-12-11 RX ADMIN — OLANZAPINE 5 MG: 5 TABLET, ORALLY DISINTEGRATING ORAL at 08:07

## 2021-12-11 RX ADMIN — AMLODIPINE BESYLATE 5 MG: 5 TABLET ORAL at 08:07

## 2021-12-11 RX ADMIN — FOLIC ACID 1000 MCG: 1 TABLET ORAL at 08:06

## 2021-12-11 RX ADMIN — PREGABALIN 200 MG: 100 CAPSULE ORAL at 21:33

## 2021-12-11 RX ADMIN — BUPRENORPHINE 8 MG: 2 TABLET SUBLINGUAL at 06:54

## 2021-12-11 RX ADMIN — NICOTINE POLACRILEX 4 MG: 4 GUM, CHEWING ORAL at 21:41

## 2021-12-11 ASSESSMENT — ACTIVITIES OF DAILY LIVING (ADL)
ADLS_ACUITY_SCORE: 10
ADLS_ACUITY_SCORE: 14
ADLS_ACUITY_SCORE: 10
ADLS_ACUITY_SCORE: 7
ADLS_ACUITY_SCORE: 10
ADLS_ACUITY_SCORE: 7
ADLS_ACUITY_SCORE: 10
ADLS_ACUITY_SCORE: 14
ADLS_ACUITY_SCORE: 10
ADLS_ACUITY_SCORE: 14

## 2021-12-11 ASSESSMENT — MIFFLIN-ST. JEOR: SCORE: 2262.4

## 2021-12-11 NOTE — PROGRESS NOTES
Care Management Follow Up    Length of Stay (days): 19    Expected Discharge Date: 12/11/2021     Concerns to be Addressed: discharge planning     Patient plan of care discussed at interdisciplinary rounds: Yes    Anticipated Discharge Disposition: Inpatient Mental Health,Other (Comments) [facility]     Anticipated Discharge Services:    Anticipated Discharge DME:      Patient/family educated on Medicare website which has current facility and service quality ratings:    Education Provided on the Discharge Plan:    Patient/Family in Agreement with the Plan: yes    Referrals Placed by CM/SW:    Private pay costs discussed:     Additional Information:  Chart review.  Called [place] intake line to see if they could accept patient today.  They are holding a bed for patient.  They need to talk with infection prevention first.  SWCM to continue to follow.     Patient will be D/Cing to [facility] at 1500 by stretcher. Unit set up transport time.  Patient will be on transport hold.  Spoke with patient about going there, at first he was upset about going there.  That is why a transport/health officer hold was place.  When worker was talking to patient about the hold he understood why he was placed on it.  Patient is missing his wallet.  Called [facility] to see if it was left there from last time he was there.  They were going to look and see if it was still there.  Also called transport to see if they any lost wallets.    EFRAIN Menjivar

## 2021-12-11 NOTE — ED PROVIDER NOTES
EMERGENCY DEPARTMENT NOTE     Name: Hollis Barclay    Age/Sex: 51 year old male   MRN: [id number]   Evaluation Date & Time:  12/11/2021  5:47 PM    PCP:    No Ref-Primary, Physician   ED Provider: Eric Gomez D.O.       CHIEF COMPLAINT    Mental Health Problem       DIAGNOSIS & DISPOSITION     1. Bipolar affective disorder, remission status unspecified (H)      DISPOSITION: Admit to [facility]    At the conclusion of the encounter I discussed the results of all of the tests and the disposition. The questions were answered. The patient or family acknowledged understanding and was agreeable with the care plan.    TOTAL CRITICAL CARE TIME (EXCLUDING PROCEDURES): Not applicable    PROCEDURES:   None    EMERGENCY DEPARTMENT COURSE/MEDICAL DECISION MAKING   5:57 PM I met with the patient to gather history and to perform my initial exam.  We discussed treatment options and the plan for care while in the Emergency Department.  6:02 PM Spoke with Dr. Hollins, hospitalist.    Hollis Barclay is a 51 year old male with relevant past history of bipolar disorder, borderline personality disorder, depression, TBI and polysubstance abuse, hypertension, group home resident, who presents to the emergency department for evaluation of psych eval. Patient was arranged for in-patient transfer from [facility] to [facility]. When patient arrived at [facility] psychiatric services at [facility] said there was a miscommunication about transfer and that the patient had not been accepted. Patient was sent back to [facility] ED to be readmitted to this facility pending inpatient psychiatric placement patient reports feeling but otherwise feels well. He denies any other complaints at this time.     Triage note reviewed:Pt was discharged from  today and sent to [facility] for psych admission. Doctor there had not accepted pt and sent pt back to ED for readmission onto . Arraignments made by Dr. Gomez and admitting MD. Johnson  "signs:  BP (!) 147/97 (BP Location: Left arm, Patient Position: Sitting)   Pulse 85   Temp 97.8  F (36.6  C) (Oral)   Resp 20   Ht 1.93 m (6' 4\")   Wt 131.1 kg (289 lb)   SpO2 93%   BMI 35.18 kg/m    Pertinent physical exam findings:   Alert and oriented X3. Slight slurring of speech. Otherwise normal physical exam.   Diagnostic studies:  Imaging: None.  Lab:  Labs Ordered and Resulted from Time of ED Arrival to Time of ED Departure   COMPREHENSIVE METABOLIC PANEL - Abnormal       Result Value    Sodium 140      Potassium 4.2      Chloride 106      Carbon Dioxide (CO2) 27      Anion Gap 7      Urea Nitrogen 22      Creatinine 0.74      Calcium 8.8      Glucose 100      Alkaline Phosphatase 69      AST 88 (*)      (*)     Protein Total 7.3      Albumin 3.4 (*)     Bilirubin Total 0.5      GFR Estimate >90     CBC WITH PLATELETS AND DIFFERENTIAL - Abnormal    WBC Count 5.5      RBC Count 4.02 (*)     Hemoglobin 12.0 (*)     Hematocrit 38.0 (*)     MCV 95      MCH 29.9      MCHC 31.6      RDW 15.1 (*)     Platelet Count 265      % Neutrophils 48      % Lymphocytes 36      % Monocytes 10      % Eosinophils 5      % Basophils 1      % Immature Granulocytes 0      NRBCs per 100 WBC 0      Absolute Neutrophils 2.7      Absolute Lymphocytes 2.0      Absolute Monocytes 0.5      Absolute Eosinophils 0.3      Absolute Basophils 0.0      Absolute Immature Granulocytes 0.0      Absolute NRBCs 0.0       Interventions: Oral lorazepam  Medical decision making:  Discussed case with Dr. Naranjo for the hospitalist service and patient will be readmitted to [facility] on a one-to-one with suicide precautions  ED INTERVENTIONS     Medications   acamprosate (CAMPRAL) EC tablet 666 mg (666 mg Oral Given 12/12/21 1331)   acetaminophen (TYLENOL) tablet 975 mg (975 mg Oral Given 12/12/21 1042)   amLODIPine (NORVASC) tablet 5 mg (5 mg Oral Given 12/12/21 0839)   apixaban ANTICOAGULANT (ELIQUIS) tablet 2.5 mg (2.5 mg Oral Given " 12/12/21 0839)   ARIPiprazole (ABILIFY) tablet 5 mg (5 mg Oral Given 12/12/21 0839)   buprenorphine-naloxone (SUBOXONE) 2-0.5 MG sublingual tablet 4 tablet (4 tablets Sublingual Given 12/12/21 1515)   diclofenac (VOLTAREN) 1 % topical gel 2 g (has no administration in time range)   diphenhydrAMINE (BENADRYL) tablet 25 mg (25 mg Oral Given 12/12/21 1331)   folic acid (FOLVITE) tablet 1,000 mcg (1,000 mcg Oral Given 12/12/21 0839)   gabapentin (NEURONTIN) capsule 100 mg (100 mg Oral Given 12/12/21 1043)   levothyroxine (SYNTHROID/LEVOTHROID) tablet 75 mcg (75 mcg Oral Given 12/12/21 0609)   LORazepam (ATIVAN) tablet 0.5 mg (has no administration in time range)   magnesium oxide (MAG-OX) tablet 400 mg (400 mg Oral Given 12/12/21 0839)   miconazole (MICATIN) 2 % cream ( Topical Given 12/12/21 0935)   multivitamin w/minerals (THERA-VIT-M) tablet 1 tablet (1 tablet Oral Given 12/12/21 0838)   nicotine polacrilex (NICORETTE) gum 4-8 mg (8 mg Buccal Given 12/12/21 1718)   OLANZapine zydis (zyPREXA) ODT tab 5 mg (has no administration in time range)   ondansetron (ZOFRAN-ODT) ODT tab 4 mg (has no administration in time range)   pantoprazole (PROTONIX) EC tablet 40 mg (40 mg Oral Given 12/12/21 0610)   polyethylene glycol (MIRALAX) powder 17 g (17 g Oral Given 12/12/21 0935)   prazosin (MINIPRESS) capsule 1 mg (1 mg Oral Given 12/11/21 2146)   pregabalin (LYRICA) capsule 200 mg (200 mg Oral Given 12/12/21 1331)   QUEtiapine (SEROquel) tablet 300 mg (300 mg Oral Given 12/11/21 2137)   sennosides (SENOKOT) tablet 1-2 tablet (has no administration in time range)   sodium chloride (OCEAN) 0.65 % nasal spray 1 spray (has no administration in time range)   tiZANidine (ZANAFLEX) tablet 4 mg (4 mg Oral Given 12/12/21 1737)   lidocaine 1 % 0.1-1 mL (has no administration in time range)   lidocaine (LMX4) cream (has no administration in time range)   sodium chloride (PF) 0.9% PF flush 3 mL (3 mLs Intracatheter Given 12/12/21 1718)    sodium chloride (PF) 0.9% PF flush 3 mL (has no administration in time range)   melatonin tablet 1 mg (has no administration in time range)   Patient is already receiving mechanical prophylaxis (has no administration in time range)   LORazepam (ATIVAN) tablet 0.5 mg (0.5 mg Oral Given 12/12/21 0841)   senna-docusate (SENOKOT-S/PERICOLACE) 8.6-50 MG per tablet 2 tablet (has no administration in time range)   LORazepam (ATIVAN) tablet 1 mg (1 mg Oral Given 12/11/21 1830)   LORazepam (ATIVAN) tablet 0.5 mg (0.5 mg Oral Given 12/11/21 2146)   LORazepam (ATIVAN) injection 1 mg (1 mg Intravenous Given 12/12/21 1324)   gadobutrol (GADAVIST) injection 13 mL (13 mLs Intravenous Given 12/12/21 1422)       DISCHARGE MEDICATIONS        Review of your medicines      UNREVIEWED medicines. Ask your doctor about these medicines      Dose / Directions   acamprosate 333 MG EC tablet  Commonly known as: CAMPRAL  Indication: Abuse or Misuse of Alcohol  Used for: Alcohol withdrawal syndrome with complication, with unspecified complication (H)      Dose: 666 mg  Take 2 tablets (666 mg) by mouth 3 times daily  Quantity: 180 tablet  Refills: 0     acetaminophen 325 MG tablet  Commonly known as: TYLENOL  Used for: Chronic bilateral low back pain with sciatica, sciatica laterality unspecified      Dose: 975 mg  Take 3 tablets (975 mg) by mouth every 6 hours as needed for mild pain, fever or headaches (to moderate pain)  Refills: 0     amLODIPine 5 MG tablet  Commonly known as: NORVASC  Used for: Essential hypertension      Dose: 5 mg  Take 1 tablet (5 mg) by mouth daily  Quantity: 30 tablet  Refills: 1     apixaban ANTICOAGULANT 2.5 MG tablet  Commonly known as: ELIQUIS  Indication: DVT-PE Prophylaxis  Used for: 2019 novel coronavirus disease (COVID-19)      Dose: 2.5 mg  Take 1 tablet (2.5 mg) by mouth 2 times daily for 10 days  Quantity: 21 tablet  Refills: 0     ARIPiprazole 5 MG tablet  Commonly known as: ABILIFY  Used for: Severe  bipolar I disorder, current or most recent episode depressed, with mixed features (H)      Dose: 5 mg  Take 1 tablet (5 mg) by mouth daily  Quantity: 30 tablet  Refills: 0     buprenorphine HCl-naloxone HCl 8-2 MG per film  Commonly known as: SUBOXONE  Used for: Suicidal ideation, Intentional drug overdose, initial encounter (H), Alcohol withdrawal syndrome with complication (H)      Dose: 1 Film  Place 1 Film under the tongue 3 times daily  Refills: 0     diclofenac 1 % topical gel  Commonly known as: VOLTAREN  Used for: Alcohol dependence with intoxication with complication (H)      Dose: 2 g  Apply 2 g topically 4 times daily as needed for moderate pain  Quantity: 150 g  Refills: 1     diphenhydrAMINE 25 MG tablet  Commonly known as: BENADRYL  Used for: Chronic bilateral low back pain with sciatica, sciatica laterality unspecified      Dose: 25 mg  Take 1 tablet (25 mg) by mouth 3 times daily for 10 days  Quantity: 30 tablet  Refills: 0     docusate sodium 100 MG capsule  Commonly known as: COLACE  Used for: Constipation, unspecified constipation type      Dose: 100 mg  Take 1 capsule (100 mg) by mouth 2 times daily  Quantity: 60 capsule  Refills: 1     folic acid 1 MG tablet  Commonly known as: FOLVITE  Used for: Alcoholism in remission (H)      Dose: 1,000 mcg  Take 1 tablet (1,000 mcg) by mouth daily  Quantity: 90 tablet  Refills: 1     gabapentin 100 MG capsule  Commonly known as: NEURONTIN  Used for: Chronic bilateral low back pain with sciatica, sciatica laterality unspecified      Dose: 100 mg  Take 1 capsule (100 mg) by mouth every 6 hours as needed (anxiety)  Quantity: 60 capsule  Refills: 0     levothyroxine 75 MCG tablet  Commonly known as: SYNTHROID/LEVOTHROID  Used for: Hypothyroidism, unspecified type      Dose: 75 mcg  Take 1 tablet (75 mcg) by mouth every morning (before breakfast)  Quantity: 30 tablet  Refills: 1     * LORazepam 0.5 MG tablet  Commonly known as: ATIVAN  Used for: Severe bipolar I  disorder, current or most recent episode depressed, with mixed features (H)      Dose: 0.5 mg  Take 1 tablet (0.5 mg) by mouth 3 times daily for 3 doses  Quantity: 3 tablet  Refills: 0     * LORazepam 0.5 MG tablet  Commonly known as: ATIVAN  Used for: Severe bipolar I disorder, current or most recent episode depressed, with mixed features (H)      Dose: 0.5 mg  Take 1 tablet (0.5 mg) by mouth 2 times daily for 2 doses  Quantity: 2 tablet  Refills: 0     * LORazepam 0.5 MG tablet  Commonly known as: ATIVAN  Used for: Severe bipolar I disorder, current or most recent episode depressed, with mixed features (H)      Dose: 0.5 mg  Start taking on: December 13, 2021  Take 1 tablet (0.5 mg) by mouth once for 1 dose  Quantity: 1 tablet  Refills: 0     magnesium oxide 400 MG tablet  Commonly known as: MAG-OX  Used for: Hypomagnesemia      Dose: 400 mg  Take 1 tablet (400 mg) by mouth 2 times daily for 10 days  Quantity: 20 tablet  Refills: 0     miconazole with skin protectant 2 % Crea cream  Used for: Fungal infection      Apply topically 2 times daily  Quantity: 30 g  Refills: 1     multivitamin w/minerals tablet  Used for: Alcohol dependence with intoxication with complication (H)      Dose: 1 tablet  Take 1 tablet by mouth daily  Quantity: 30 tablet  Refills: 1     nicotine polacrilex 4 MG gum  Commonly known as: NICORETTE  Used for: Alcohol dependence with intoxication with complication (H)      Dose: 4-8 mg  Place 1-2 each (4-8 mg) inside cheek every hour as needed for other (nicotine withdrawal symptoms)  Quantity: 100 each  Refills: 1     OLANZapine zydis 5 MG ODT  Commonly known as: zyPREXA  Used for: Suicidal ideation, Intentional drug overdose, initial encounter (H), Alcohol withdrawal syndrome with complication (H)      Dose: 5 mg  Take 1 tablet (5 mg) by mouth every 6 hours as needed for agitation (anxiety)  Refills: 0     ondansetron 4 MG ODT tab  Commonly known as: ZOFRAN-ODT  Used for: Suicidal ideation,  Intentional drug overdose, initial encounter (H), Alcohol withdrawal syndrome with complication (H)      Dose: 4 mg  Take 1 tablet (4 mg) by mouth every 6 hours as needed (Nausea and Vomiting)  Refills: 0     pantoprazole 40 MG EC tablet  Commonly known as: PROTONIX  Used for: Gastroesophageal reflux disease with esophagitis, unspecified whether hemorrhage      Dose: 40 mg  Take 1 tablet (40 mg) by mouth every morning (before breakfast)  Quantity: 30 tablet  Refills: 1     polyethylene glycol 17 GM/Dose powder  Commonly known as: MIRALAX  Used for: Constipation, unspecified constipation type      Dose: 17 g  Take 17 g by mouth daily  Quantity: 510 g  Refills: 0     prazosin 1 MG capsule  Commonly known as: MINIPRESS  Used for: Alcohol withdrawal syndrome with complication, with unspecified complication (H)      Dose: 1 mg  Take 1 capsule (1 mg) by mouth At Bedtime  Quantity: 30 capsule  Refills: 0     Pregabalin 200 MG capsule  Commonly known as: LYRICA      Dose: 200 mg  Take 200 mg by mouth 3 times daily  Refills: 0     QUEtiapine 300 MG tablet  Commonly known as: SEROquel  Used for: Alcohol dependence with intoxication with complication (H)      Dose: 300 mg  Take 1 tablet (300 mg) by mouth At Bedtime  Quantity: 30 tablet  Refills: 1     sennosides 8.6 MG tablet  Commonly known as: SENOKOT  Used for: Constipation, unspecified constipation type      Dose: 1-2 tablet  Take 1-2 tablets by mouth 2 times daily as needed for constipation  Refills: 0     sodium chloride 0.65 % nasal spray  Commonly known as: OCEAN  Used for: 2019 novel coronavirus disease (COVID-19)      Dose: 1 spray  Spray 1 spray into both nostrils every hour as needed for congestion  Quantity: 88 mL  Refills: 0     tiZANidine 4 MG tablet  Commonly known as: ZANAFLEX  Used for: Chronic bilateral low back pain with sciatica, sciatica laterality unspecified      Dose: 4 mg  Take 1 tablet (4 mg) by mouth every 8 hours as needed for other (chronic back  pain)  Refills: 0         * This list has 3 medication(s) that are the same as other medications prescribed for you. Read the directions carefully, and ask your doctor or other care provider to review them with you.            CONTINUE these medicines which have NOT CHANGED      Dose / Directions   melatonin 3 MG tablet  Used for: Primary insomnia      Dose: 3 mg  Take 1 tablet (3 mg) by mouth nightly as needed for sleep  Refills: 0              INFORMATION SOURCE AND LIMITATIONS    History/Exam limitations: NA  Patient information was obtained from: Patient  Use of : N/A    HISTORY OF PRESENT ILLNESS   Hollis Barclay male with a relevant past history of bipolar disorder, borderline personality disorder, depression, TBI and polysubstance abuse, hypertension, group home resident, who presents to this ED EMS for evaluation of psych evaluation.    Per chart review, patient presented to [facility] on 11/13 with suicidal attempt, transfer to [facility] inpatient psychiatry on 11/19/2021, then transferred to [facility] in 11/22/2021 with Covid pneumonia and respiratory failure with hypoxia. Initially required 4 LPM O2, has been weaned off oxygen on 11/28.  Completed 7 days of Decadron, stopped with hypoxia resolved.  Completed 5 days of remdesivir.  Anticoagulated with Eliquis, for DVT prophylaxis, and should continue for 30 days till 12/21/2021.    Patient was arranged for in-patient transfer from [facility] to [facility]. When patient arrived at [facility], hospitalist at [facility] said there was a miscommunication about transfer and that the patient had not been accepted. Patient was sent back to [facility] ED to have placement from ED arranged.     Patient reports feeling but otherwise feels well. He denies any other complaints at this time.       REVIEW OF SYSTEMS:   Constitutional: Negative for  fever.   HENT: Negative for URI symptoms or sore throat.    Cardiac: Negative for  chest  pain,palpitations, near syncope or syncope  Respiratory: Negative for cough and shortness of breath.    Gastrointestinal: Negative for abdominal pain, nausea, vomiting, constipation, diarrhea, rectal bleeding or melena.  Genitourinary: Negative for dysuria, flank pain and hematuria.   Musculoskeletal: Negative for back pain.   Skin: Negative for  rash  Neurological: Negative for dizziness, headache, syncope, speech difficulty, unilateral weakness or imbalance with walking.   Hematological: Negative for adenopathy. Does not bruise/bleed easily.   Psychiatric/Behavioral: Positive anxious feeling. Negative for confusion.       PATIENT HISTORY     Past Medical History:   Diagnosis Date     Alcoholism in remission (H)      Bilateral ACL tear      Bipolar disorder (H)      Borderline personality disorder (H)      Chemical dependency (H)      Chronic back pain      Closed left arm fracture 1985     H/O shoulder surgery      Post concussive encephalopathy      Social anxiety disorder      Social anxiety disorder      TBI (traumatic brain injury) (H)      Patient Active Problem List   Diagnosis     Post concussive encephalopathy     H/O shoulder surgery     Alcoholism in remission (H)     Bilateral ACL tear     Chronic back pain     Social anxiety disorder     Alcohol withdrawal syndrome with complication, with unspecified complication (H)     Rib fracture     Alcohol withdrawal (H)     Alcohol dependence (H)     LFT elevation     Laceration of left hand without foreign body, initial encounter     Suicidal ideation     Intentional drug overdose, initial encounter (H)     Alcoholic intoxication with complication (H)     Severe bipolar I disorder, current or most recent episode depressed, with mixed features (H)     Medication overdose, intentional self-harm, subsequent encounter     Opioid dependence on agonist therapy (H)     2019 novel coronavirus disease (COVID-19)     Pneumonia due to COVID-19 virus     Bipolar disorder  (H)     History reviewed. No pertinent surgical history.  Social Histrory  Smoking:  Alcohol Use:  Allergies   Allergen Reactions     Cucumber Extract Anaphylaxis     Cucumber the vegable     Hydroxyzine Hives     Previously unreported by patient, this is a new patient declaration as of 5/1/21.     Nsaids      No problem with oral NSAIDs--Toradol injection itching only.     Trazodone Itching     Per Patient         OUTPATIENT MEDICATIONS     Current Discharge Medication List         Vitals:    12/11/21 2300 12/12/21 0604 12/12/21 0744 12/12/21 1500   BP: 120/71 128/64 109/66 (!) 147/97   BP Location: Left arm Left arm Left arm Left arm   Patient Position:   Sitting Sitting   Pulse: 92 85 92 85   Resp: 20 20 20 20   Temp: 98  F (36.7  C) 98  F (36.7  C) 98.5  F (36.9  C) 97.8  F (36.6  C)   TempSrc: Oral Oral Oral Oral   SpO2: 93% 90% 91% 93%   Weight:       Height:           Physical Exam   Constitutional: Oriented to person, place, and time. Appears well-developed and well-nourished.   HEENT:    Head: Atraumatic.   Neck: Normal range of motion. Neck supple.   Cardiovascular: Normal rate, regular rhythm and normal heart sounds.    Pulmonary/Chest: Normal effort  and breath sounds normal.   Abdominal: Soft. Bowel sounds are normal.   Musculoskeletal: Normal range of motion.   Neurological: Alert and oriented to person, place, and time. Normal strength.No sensory deficit. No cranial nerve deficit. Slight slurring of speech. Skin: Skin is warm and dry.   Psychiatric: Normal mood and affect. Behavior is normal. Thought content normal.     DIAGNOSTICS    LABORATORY FINDINGS (REVIEWED AND INTERPRETED):  Labs Ordered and Resulted from Time of ED Arrival to Time of ED Departure   COMPREHENSIVE METABOLIC PANEL - Abnormal       Result Value    Sodium 140      Potassium 4.2      Chloride 106      Carbon Dioxide (CO2) 27      Anion Gap 7      Urea Nitrogen 22      Creatinine 0.74      Calcium 8.8      Glucose 100      Alkaline  Phosphatase 69      AST 88 (*)      (*)     Protein Total 7.3      Albumin 3.4 (*)     Bilirubin Total 0.5      GFR Estimate >90     CBC WITH PLATELETS AND DIFFERENTIAL - Abnormal    WBC Count 5.5      RBC Count 4.02 (*)     Hemoglobin 12.0 (*)     Hematocrit 38.0 (*)     MCV 95      MCH 29.9      MCHC 31.6      RDW 15.1 (*)     Platelet Count 265      % Neutrophils 48      % Lymphocytes 36      % Monocytes 10      % Eosinophils 5      % Basophils 1      % Immature Granulocytes 0      NRBCs per 100 WBC 0      Absolute Neutrophils 2.7      Absolute Lymphocytes 2.0      Absolute Monocytes 0.5      Absolute Eosinophils 0.3      Absolute Basophils 0.0      Absolute Immature Granulocytes 0.0      Absolute NRBCs 0.0             I, Lam Parker, am serving as a scribe to document services personally performed by Eric Gomez D.O., based on my observation and the provider s statements to me.    I, Eric Gomez D.O., attest that Lam Parker is acting in a scribe capacity, has observed my performance of the services and has documented them in accordance with my direction.    Eric Gomez D.O.  EMERGENCY MEDICINE   12/11/21  [facility] EMERGENCY DEPARTMENT  [address] [place] [id number] [phone]  Dept: [phone]       Eric Gomez,   12/12/21 0158       Eric Gomez,   12/12/21 1746

## 2021-12-11 NOTE — PROGRESS NOTES
Security returned large pocket knife-it was handed over to EMS transport along with med returned from pharmacy-4 packets of suboxone.2mg.

## 2021-12-11 NOTE — PROGRESS NOTES
Unable to find patient belongings-they are not with [facility] security. They are not at [facility] and they are not t [facility]. Only belongings here today are meds brought from home-glasses and 2 bags of books. Missing are clothing,shoes ID card social security  card VISA card.per pt recollection.Patient has cancelled his VISA card.Patient appears very upset about loss of belongings-will continue to try to resolve.

## 2021-12-11 NOTE — ED TRIAGE NOTES
Pt was discharged from [facility] today and sent to [facility] for psych admission. Doctor there had not accepted pt and sent pt back to ED for readmission onto [facility]. Arraignments made by Dr. Gomez and admitting MD.

## 2021-12-11 NOTE — DISCHARGE SUMMARY
[facility]  Hospitalist Discharge Summary      Date of Admission:  11/22/2021  Date of Discharge:  12/11/2021  Discharging Provider: Crystal Michel MD    Discharge Diagnoses   Severe COVID-19 pneumonia  Acute hypoxic respiratory failure due to Covid pneumonia  Obesity due to excessive calories, BMI 34  Bipolar 1 disorder with depressed mood  Mood changes in the context of COVID-19  Borderline personality disorder  Suicidal ideations  Primary insomnia  Severe opioid use disorder  Severe alcohol use disorder  Chronic pain syndrome  Alcohol hepatitis  Nicotine dependence  Essential hypertension  Hypothyroidism  Hypokalemia  Dilated aortic root    Follow-ups Needed After Discharge   Follow-up Appointments     Follow Up and recommended labs and tests      Hospitalist consult at [facility] for medical co -management             Unresulted Labs Ordered in the Past 30 Days of this Admission     Date and Time Order Name Status Description    12/11/2021  9:55 AM MRSA MSSA PCR, Nasal Swab In process           Discharge Disposition   Transferred to inpatient psychiatry unit at [facility].  Condition at discharge: Stable    Hospital Course    Patient is a 51-year-old male with a history of bipolar disorder, borderline personality disorder, depression, TBI and polysubstance abuse, hypertension, group home resident, who presented to [facility] on 11/13 with suicidal attempt, transfer to [facility] inpatient psychiatry on 11/19/2021, then transferred to [facility] in 11/22/2021 with Covid pneumonia and respiratory failure with hypoxia.     Severe COVID-19 pneumonia, with acute hypoxic respiratory failure, bilateral dense pulmonary infiltrates,tachycardia, tachypnea.  Initially required 4 LPM O2, has been weaned off oxygen on 11/28.  Completed 7 days of Decadron, stopped with hypoxia resolved.  Completed 5 days of remdesivir.  Anticoagulated with Eliquis, for DVT prophylaxis, and  should continue for 30 days till 12/21/2021.    Bipolar disorder  Borderline personality  Major depressive disorder with suicidal attempt  History of traumatic brain injury  History of polysubstance abuse  History of alcohol abuse  Presented to [facility] ED with suicidal attempt with overdose by 5 tablets of oxycodone, 22 of 300 mg tablets of Seroquel and 4X 8 mg tablets of Suboxone while drinking 1 gallon of vodka within a 24-hour.  He was cleared by poison control.  Required one-to-one sitter for ongoing suicidality.  He was seen by psychiatry and addiction medicine.  He had a panic attack on 11/27/2021, was started on Ativan, with a taper.  On Subutex 8 mg twice a day.  Currently on Abilify, Zyprexa, Seroquel, Benadryl, Lyrica, Ativan, Propranolol as per psychiatry.    Alcohol hepatitis, resolved.  Initial elevated LFTs, normalized.  Tobacco use disorder, on replacement with nicotine gum.  Essential hypertension, stable with amlodipine and propranolol.  GERD-on PPI.  Slow transit constipation.  On senna.  Nasal bone fracture-plan to see ENT as outpatient.  Hypothyroidism-on levothyroxine.  Dilated aortic root, noted on echo but not prominent on CTA chest. Would recommend annual surveillance CT.  Mild hyperkalemia, due to poor p.o.  Resolved with replacement.  Acute on chronic back pain.  No rib fractures and chest x-ray.  Seen by pain specialist, who prescribed tizanidine.  Continued Lyrica.  Anal burning with bowel movements.  Calmoseptine has been helpful.      Consultations This Hospital Stay   ADDICTION MEDICINE INPATIENT CONSULT  ENT IP CONSULT  PSYCHIATRY IP CONSULT  SOCIAL WORK IP CONSULT  INFECTIOUS DISEASES IP CONSULT  SOCIAL WORK IP CONSULT  PHYSICAL THERAPY ADULT IP CONSULT  OCCUPATIONAL THERAPY ADULT IP CONSULT    Code Status   Full Code    Time Spent on this Encounter   Crystal MAZA MD, personally saw the patient today and spent greater than 30 minutes discharging this patient.       Crystal  MD Anand  [address] [address]  Phone: [phone]  Fax: [phone]  ______________________________________________________________________    Physical Exam   Vital Signs: Temp: 97.8  F (36.6  C) Temp src: Oral BP: 127/69 Pulse: 73   Resp: 16 SpO2: 92 % O2 Device: None (Room air)    Weight: 289 lbs 4.8 oz  General: Well-appearing adult male resting in bed, initially sleeping when I walk in but rouses easily and then alert and oriented x3, does not appear acutely anxious, appears calm and pleasant  HEENT: Head normocephalic atraumatic, oral mucosa moist. Sclerae anicteric  Skin: No rashes or lesions  Extremities: Trace ankle and hand edema bilaterally.   Psych: Normal affect, mood euthymic  Neuro: CNII-XII grossly intact, moving all 4 extremities       Primary Care Physician   Physician No Ref-Primary    Discharge Orders      General info for SNF    Pt transferring back to psychiatry unit at [facility]     Follow Up and recommended labs and tests    Hospitalist consult at [facility] for medical co -management     Reason for your hospital stay    COVID 19 pneumonia     Activity - Up ad kathleen     Full Code     Diet    Follow this diet upon discharge: Orders Placed This Encounter      Regular Diet Adult     Significant Results and Procedures   Results for orders placed or performed during the hospital encounter of 11/22/21   XR Ribs & Chest Left G/E 3 Views    Narrative    EXAM: XR RIBS and CHEST LT 3VW  LOCATION: [facility]  DATE/TIME: 11/30/2021 5:30 PM    INDICATION: back pain  COMPARISON: 12/22/2021       Impression    IMPRESSION: The visualized heart and lungs are negative. No rib fractures.   XR Knee Port Right 3 Views    Narrative    EXAM: XR KNEE PORT RIGHT 3 VIEWS  LOCATION: [facility]  DATE/TIME: 12/1/2021 6:00 PM    INDICATION: Right knee pain--rule out fracture or  dislocation  COMPARISON: None.      Impression    IMPRESSION: Medial compartment moderate joint space narrowing and probable nonspecific joint effusion. No apparent fracture.   XR Chest Port 1 View    Narrative    EXAM: XR CHEST PORT 1 VIEW  LOCATION: [facility]  DATE/TIME: 12/3/2021 12:46 PM    INDICATION: Dyspnea, covid  COMPARISON: None.      Impression    IMPRESSION: Mild irregular opacities in both lower lungs, more so on the left, consistent with pneumonia. Chest otherwise negative.       Discharge Medications   Current Discharge Medication List      START taking these medications    Details   acamprosate (CAMPRAL) 333 MG EC tablet Take 2 tablets (666 mg) by mouth 3 times daily  Qty: 180 tablet, Refills: 0    Associated Diagnoses: Alcohol withdrawal syndrome with complication, with unspecified complication (H)      acetaminophen (TYLENOL) 325 MG tablet Take 3 tablets (975 mg) by mouth every 6 hours as needed for mild pain, fever or headaches (to moderate pain)  Refills: 0    Associated Diagnoses: Chronic bilateral low back pain with sciatica, sciatica laterality unspecified      apixaban ANTICOAGULANT (ELIQUIS) 2.5 MG tablet Take 1 tablet (2.5 mg) by mouth 2 times daily for 10 days  Qty: 21 tablet, Refills: 0    Associated Diagnoses: 2019 novel coronavirus disease (COVID-19)      ARIPiprazole (ABILIFY) 5 MG tablet Take 1 tablet (5 mg) by mouth daily  Qty: 30 tablet, Refills: 0    Associated Diagnoses: Severe bipolar I disorder, current or most recent episode depressed, with mixed features (H)      diphenhydrAMINE (BENADRYL) 25 MG tablet Take 1 tablet (25 mg) by mouth 3 times daily for 10 days  Qty: 30 tablet, Refills: 0    Associated Diagnoses: Chronic bilateral low back pain with sciatica, sciatica laterality unspecified      !! LORazepam (ATIVAN) 0.5 MG tablet Take 1 tablet (0.5 mg) by mouth 3 times daily for 3 doses  Qty: 3 tablet, Refills: 0    Associated Diagnoses: Severe bipolar  I disorder, current or most recent episode depressed, with mixed features (H)      !! LORazepam (ATIVAN) 0.5 MG tablet Take 1 tablet (0.5 mg) by mouth 2 times daily for 2 doses  Qty: 2 tablet, Refills: 0    Associated Diagnoses: Severe bipolar I disorder, current or most recent episode depressed, with mixed features (H)      !! LORazepam (ATIVAN) 0.5 MG tablet Take 1 tablet (0.5 mg) by mouth once for 1 dose  Qty: 1 tablet, Refills: 0    Associated Diagnoses: Severe bipolar I disorder, current or most recent episode depressed, with mixed features (H)      magnesium oxide (MAG-OX) 400 MG tablet Take 1 tablet (400 mg) by mouth 2 times daily for 10 days  Qty: 20 tablet, Refills: 0    Associated Diagnoses: Hypomagnesemia      prazosin (MINIPRESS) 1 MG capsule Take 1 capsule (1 mg) by mouth At Bedtime  Qty: 30 capsule, Refills: 0    Associated Diagnoses: Alcohol withdrawal syndrome with complication, with unspecified complication (H)      sodium chloride (OCEAN) 0.65 % nasal spray Spray 1 spray into both nostrils every hour as needed for congestion  Qty: 88 mL    Associated Diagnoses: 2019 novel coronavirus disease (COVID-19)      tiZANidine (ZANAFLEX) 4 MG tablet Take 1 tablet (4 mg) by mouth every 8 hours as needed for other (chronic back pain)  Refills: 0    Associated Diagnoses: Chronic bilateral low back pain with sciatica, sciatica laterality unspecified       !! - Potential duplicate medications found. Please discuss with provider.      CONTINUE these medications which have CHANGED    Details   gabapentin (NEURONTIN) 100 MG capsule Take 1 capsule (100 mg) by mouth every 6 hours as needed (anxiety)  Qty: 60 capsule, Refills: 0    Associated Diagnoses: Chronic bilateral low back pain with sciatica, sciatica laterality unspecified         CONTINUE these medications which have NOT CHANGED    Details   amLODIPine (NORVASC) 5 MG tablet Take 1 tablet (5 mg) by mouth daily  Qty: 30 tablet, Refills: 1    Associated  Diagnoses: Essential hypertension      buprenorphine HCl-naloxone HCl (SUBOXONE) 8-2 MG per film Place 1 Film under the tongue 3 times daily    Associated Diagnoses: Suicidal ideation; Intentional drug overdose, initial encounter (H); Alcohol withdrawal syndrome with complication (H)      diclofenac (VOLTAREN) 1 % topical gel Apply 2 g topically 4 times daily as needed for moderate pain  Qty: 150 g, Refills: 1    Associated Diagnoses: Alcohol dependence with intoxication with complication (H)      docusate sodium (COLACE) 100 MG capsule Take 1 capsule (100 mg) by mouth 2 times daily  Qty: 60 capsule, Refills: 1    Associated Diagnoses: Constipation, unspecified constipation type      folic acid (FOLVITE) 1 MG tablet Take 1 tablet (1,000 mcg) by mouth daily  Qty: 90 tablet, Refills: 1    Associated Diagnoses: Alcoholism in remission (H)      levothyroxine (SYNTHROID/LEVOTHROID) 75 MCG tablet Take 1 tablet (75 mcg) by mouth every morning (before breakfast)  Qty: 30 tablet, Refills: 1    Associated Diagnoses: Hypothyroidism, unspecified type      melatonin 3 MG tablet Take 1 tablet (3 mg) by mouth nightly as needed for sleep  Refills: 0    Associated Diagnoses: Primary insomnia      miconazole with skin protectant (SELENA ANTIFUNGAL) 2 % CREA cream Apply topically 2 times daily  Qty: 30 g, Refills: 1    Associated Diagnoses: Fungal infection      multivitamin w/minerals (THERA-VIT-M) tablet Take 1 tablet by mouth daily  Qty: 30 tablet, Refills: 1    Associated Diagnoses: Alcohol dependence with intoxication with complication (H)      OLANZapine zydis (ZYPREXA) 5 MG ODT Take 1 tablet (5 mg) by mouth every 6 hours as needed for agitation (anxiety)    Associated Diagnoses: Suicidal ideation; Intentional drug overdose, initial encounter (H); Alcohol withdrawal syndrome with complication (H)      ondansetron (ZOFRAN-ODT) 4 MG ODT tab Take 1 tablet (4 mg) by mouth every 6 hours as needed (Nausea and Vomiting)    Associated  Diagnoses: Suicidal ideation; Intentional drug overdose, initial encounter (H); Alcohol withdrawal syndrome with complication (H)      pantoprazole (PROTONIX) 40 MG EC tablet Take 1 tablet (40 mg) by mouth every morning (before breakfast)  Qty: 30 tablet, Refills: 1    Associated Diagnoses: Gastroesophageal reflux disease with esophagitis, unspecified whether hemorrhage      polyethylene glycol (MIRALAX) 17 GM/Dose powder Take 17 g by mouth daily  Qty: 510 g    Associated Diagnoses: Constipation, unspecified constipation type      Pregabalin (LYRICA) 200 MG capsule Take 200 mg by mouth 3 times daily      QUEtiapine (SEROQUEL) 300 MG tablet Take 1 tablet (300 mg) by mouth At Bedtime  Qty: 30 tablet, Refills: 1    Associated Diagnoses: Alcohol dependence with intoxication with complication (H)      sennosides (SENOKOT) 8.6 MG tablet Take 1-2 tablets by mouth 2 times daily as needed for constipation    Associated Diagnoses: Constipation, unspecified constipation type      nicotine polacrilex (NICORETTE) 4 MG gum Place 1-2 each (4-8 mg) inside cheek every hour as needed for other (nicotine withdrawal symptoms)  Qty: 100 each, Refills: 1    Associated Diagnoses: Alcohol dependence with intoxication with complication (H)         STOP taking these medications       naproxen (NAPROSYN) 500 MG tablet Comments:   Reason for Stopping:         propranolol (INDERAL) 10 MG tablet Comments:   Reason for Stopping:             Allergies   Allergies   Allergen Reactions     Cucumber Extract Anaphylaxis     Cucumber the vegable     Hydroxyzine Hives     Previously unreported by patient, this is a new patient declaration as of 5/1/21.     Nsaids      No problem with oral NSAIDs--Toradol injection itching only.     Trazodone Itching     Per Patient

## 2021-12-11 NOTE — PLAN OF CARE
Physical Therapy Discharge Summary    Reason for therapy discharge:    Discharged to inpatient psych at [facility].    Progress towards therapy goal(s). See goals on Care Plan in [facility] electronic health record for goal details.  Goals partially met.  Barriers to achieving goals:   discharge from facility.

## 2021-12-11 NOTE — PLAN OF CARE
Problem: Adult Inpatient Plan of Care  Goal: Plan of Care Review  Outcome: Adequate for Discharge   Patient ready for discharge. He is afebrile-lung sounds are clear-able to use IS to 5000cc. O2 sats 92. Occ dry cough noted. Good appetite noted. Ate 100% of large breakfast, Up to bathroom independently-had BM.Did state he gets dizzy on longer walks-has SBA of 1 for longer distances. Became restless and sl agitated at 930-pacing-medicated for pain with tylenol and zanaflex, and for anxiety with zypreza 10mg. Currently appears sleeping in bed. Nares swab sent to lab for MRSA/MSSA.

## 2021-12-11 NOTE — TELEPHONE ENCOUNTER
R: [facility] charge RN calling to see why Pt has shown up to [facility] for admission after being declined by nursing management for being too medically complex for [facility] units. Intake informed charge RN that our notes show that Pt was declined for [facility] by nursing, and that Intake did not facilitate transportation to unit or facilitate nurse to nurse report. Pt remains on waitlist, is to be admitted to somewhere besides [facility]

## 2021-12-11 NOTE — TELEPHONE ENCOUNTER
Patient cleared and ready for behavioral bed placement: Yes     S Pt is a 51 year old male at  p4. [phone]    B  Hollis Barclay is a 51 year old male admitted on 11/22/2021. He has history of bipolar disorder, borderline personality disorder, TBI, polysubstance abuse, alcoholism, group home resident presented to [facility] on 11/13 with suicide attempt, transferred to [facility] inpatient psych unit on 11/19, then transferred to [facility] on 11/22 for Covid hypoxia. Pt is still currently suicidal with a previous plan to cut. Pt is off all Ivs, off oxygen, and ambulating independently on unit.     A Vol    R Placed on adult worklist    - MD Michel   - 858am paged IP  - [phone] Deja MOYER   -934am pt is medically cleared from IP standpoint and is officially covid19 recovered and would like a MRSA culture collected unit updated -940am MD Warner accepts   -1020am unit and ed aware of admit repot 12pm  - per charge rn on unit nurse manager declined pt as pt is medically to complex due to SBA, pt low O2, and dizzyness when walking at times 221pm  -  team ph number [phone]

## 2021-12-12 ENCOUNTER — TELEPHONE (OUTPATIENT)
Dept: BEHAVIORAL HEALTH | Facility: CLINIC | Age: 51
End: 2021-12-12
Payer: MEDICAID

## 2021-12-12 ENCOUNTER — APPOINTMENT (OUTPATIENT)
Dept: MRI IMAGING | Facility: HOSPITAL | Age: 51
End: 2021-12-12
Attending: INTERNAL MEDICINE
Payer: MEDICAID

## 2021-12-12 PROCEDURE — 250N000013 HC RX MED GY IP 250 OP 250 PS 637: Performed by: FAMILY MEDICINE

## 2021-12-12 PROCEDURE — 250N000011 HC RX IP 250 OP 636: Performed by: INTERNAL MEDICINE

## 2021-12-12 PROCEDURE — 250N000009 HC RX 250

## 2021-12-12 PROCEDURE — 250N000011 HC RX IP 250 OP 636: Performed by: PHYSICIAN ASSISTANT

## 2021-12-12 PROCEDURE — 120N000001 HC R&B MED SURG/OB

## 2021-12-12 PROCEDURE — 73723 MRI JOINT LWR EXTR W/O&W/DYE: CPT | Mod: RT

## 2021-12-12 PROCEDURE — A9585 GADOBUTROL INJECTION: HCPCS | Performed by: INTERNAL MEDICINE

## 2021-12-12 PROCEDURE — 99233 SBSQ HOSP IP/OBS HIGH 50: CPT | Performed by: INTERNAL MEDICINE

## 2021-12-12 PROCEDURE — 250N000013 HC RX MED GY IP 250 OP 250 PS 637: Performed by: INTERNAL MEDICINE

## 2021-12-12 PROCEDURE — 255N000002 HC RX 255 OP 636: Performed by: INTERNAL MEDICINE

## 2021-12-12 PROCEDURE — 3E0U33Z INTRODUCTION OF ANTI-INFLAMMATORY INTO JOINTS, PERCUTANEOUS APPROACH: ICD-10-PCS | Performed by: PHYSICIAN ASSISTANT

## 2021-12-12 RX ORDER — LIDOCAINE HYDROCHLORIDE 10 MG/ML
INJECTION, SOLUTION EPIDURAL; INFILTRATION; INTRACAUDAL; PERINEURAL
Status: COMPLETED
Start: 2021-12-12 | End: 2021-12-12

## 2021-12-12 RX ORDER — METHYLPREDNISOLONE ACETATE 80 MG/ML
80 INJECTION, SUSPENSION INTRA-ARTICULAR; INTRALESIONAL; INTRAMUSCULAR; SOFT TISSUE ONCE
Status: COMPLETED | OUTPATIENT
Start: 2021-12-12 | End: 2021-12-12

## 2021-12-12 RX ORDER — AMOXICILLIN 250 MG
2 CAPSULE ORAL AT BEDTIME
Status: DISCONTINUED | OUTPATIENT
Start: 2021-12-12 | End: 2021-12-13 | Stop reason: HOSPADM

## 2021-12-12 RX ORDER — LORAZEPAM 2 MG/ML
1 INJECTION INTRAMUSCULAR ONCE
Status: COMPLETED | OUTPATIENT
Start: 2021-12-12 | End: 2021-12-12

## 2021-12-12 RX ORDER — LIDOCAINE HYDROCHLORIDE 10 MG/ML
10 INJECTION, SOLUTION EPIDURAL; INFILTRATION; INTRACAUDAL; PERINEURAL ONCE
Status: COMPLETED | OUTPATIENT
Start: 2021-12-12 | End: 2021-12-12

## 2021-12-12 RX ORDER — BUPRENORPHINE 2 MG/1
8 TABLET SUBLINGUAL 3 TIMES DAILY
Status: DISCONTINUED | OUTPATIENT
Start: 2021-12-13 | End: 2021-12-13 | Stop reason: HOSPADM

## 2021-12-12 RX ORDER — GADOBUTROL 604.72 MG/ML
13 INJECTION INTRAVENOUS ONCE
Status: COMPLETED | OUTPATIENT
Start: 2021-12-12 | End: 2021-12-12

## 2021-12-12 RX ADMIN — METHYLPREDNISOLONE ACETATE 80 MG: 80 INJECTION, SUSPENSION INTRA-ARTICULAR; INTRALESIONAL; INTRAMUSCULAR; SOFT TISSUE at 20:16

## 2021-12-12 RX ADMIN — MICONAZOLE NITRATE: 20 CREAM TOPICAL at 09:35

## 2021-12-12 RX ADMIN — NICOTINE POLACRILEX 2 MG: 4 GUM, CHEWING ORAL at 10:43

## 2021-12-12 RX ADMIN — QUETIAPINE FUMARATE 300 MG: 300 TABLET, FILM COATED ORAL at 20:54

## 2021-12-12 RX ADMIN — TIZANIDINE 4 MG: 4 TABLET ORAL at 17:37

## 2021-12-12 RX ADMIN — PANTOPRAZOLE SODIUM 40 MG: 40 TABLET, DELAYED RELEASE ORAL at 06:10

## 2021-12-12 RX ADMIN — BUPRENORPHINE HYDROCHLORIDE AND NALOXONE HYDROCHLORIDE DIHYDRATE 4 TABLET: 2; .5 TABLET SUBLINGUAL at 20:53

## 2021-12-12 RX ADMIN — MAGNESIUM OXIDE TAB 400 MG (241.3 MG ELEMENTAL MG) 400 MG: 400 (241.3 MG) TAB at 20:53

## 2021-12-12 RX ADMIN — MULTIPLE VITAMINS W/ MINERALS TAB 1 TABLET: TAB at 08:38

## 2021-12-12 RX ADMIN — FOLIC ACID 1000 MCG: 1 TABLET ORAL at 08:39

## 2021-12-12 RX ADMIN — MICONAZOLE NITRATE: 20 CREAM TOPICAL at 20:54

## 2021-12-12 RX ADMIN — LORAZEPAM 1 MG: 2 INJECTION INTRAMUSCULAR; INTRAVENOUS at 13:24

## 2021-12-12 RX ADMIN — DOCUSATE SODIUM 100 MG: 100 CAPSULE, LIQUID FILLED ORAL at 08:39

## 2021-12-12 RX ADMIN — LIDOCAINE HYDROCHLORIDE 10 ML: 10 INJECTION, SOLUTION EPIDURAL; INFILTRATION; INTRACAUDAL; PERINEURAL at 20:15

## 2021-12-12 RX ADMIN — DIPHENHYDRAMINE HCL 25 MG: 25 TABLET ORAL at 13:31

## 2021-12-12 RX ADMIN — BUPRENORPHINE HYDROCHLORIDE AND NALOXONE HYDROCHLORIDE DIHYDRATE 4 TABLET: 2; .5 TABLET SUBLINGUAL at 15:15

## 2021-12-12 RX ADMIN — DOCUSATE SODIUM 50 MG AND SENNOSIDES 8.6 MG 2 TABLET: 8.6; 5 TABLET, FILM COATED ORAL at 20:53

## 2021-12-12 RX ADMIN — ARIPIPRAZOLE 5 MG: 5 TABLET ORAL at 08:39

## 2021-12-12 RX ADMIN — ACAMPROSATE CALCIUM 666 MG: 333 TABLET, DELAYED RELEASE ORAL at 13:31

## 2021-12-12 RX ADMIN — NICOTINE POLACRILEX 8 MG: 4 GUM, CHEWING ORAL at 20:12

## 2021-12-12 RX ADMIN — ACAMPROSATE CALCIUM 666 MG: 333 TABLET, DELAYED RELEASE ORAL at 08:39

## 2021-12-12 RX ADMIN — DIPHENHYDRAMINE HCL 25 MG: 25 TABLET ORAL at 08:39

## 2021-12-12 RX ADMIN — PREGABALIN 200 MG: 100 CAPSULE ORAL at 20:53

## 2021-12-12 RX ADMIN — ACAMPROSATE CALCIUM 333 MG: 333 TABLET, DELAYED RELEASE ORAL at 21:02

## 2021-12-12 RX ADMIN — DIPHENHYDRAMINE HCL 25 MG: 25 TABLET ORAL at 20:53

## 2021-12-12 RX ADMIN — NICOTINE POLACRILEX 8 MG: 4 GUM, CHEWING ORAL at 17:18

## 2021-12-12 RX ADMIN — LEVOTHYROXINE SODIUM 75 MCG: 0.03 TABLET ORAL at 06:09

## 2021-12-12 RX ADMIN — LORAZEPAM 0.5 MG: 0.5 TABLET ORAL at 08:41

## 2021-12-12 RX ADMIN — NICOTINE POLACRILEX 8 MG: 4 GUM, CHEWING ORAL at 12:57

## 2021-12-12 RX ADMIN — APIXABAN 2.5 MG: 2.5 TABLET, FILM COATED ORAL at 08:39

## 2021-12-12 RX ADMIN — ACETAMINOPHEN 975 MG: 325 TABLET ORAL at 10:42

## 2021-12-12 RX ADMIN — APIXABAN 2.5 MG: 2.5 TABLET, FILM COATED ORAL at 20:53

## 2021-12-12 RX ADMIN — BUPRENORPHINE HYDROCHLORIDE AND NALOXONE HYDROCHLORIDE DIHYDRATE 4 TABLET: 2; .5 TABLET SUBLINGUAL at 08:41

## 2021-12-12 RX ADMIN — GABAPENTIN 100 MG: 100 CAPSULE ORAL at 10:43

## 2021-12-12 RX ADMIN — PREGABALIN 200 MG: 100 CAPSULE ORAL at 08:41

## 2021-12-12 RX ADMIN — POLYETHYLENE GLYCOL 3350 17 G: 17 POWDER, FOR SOLUTION ORAL at 09:35

## 2021-12-12 RX ADMIN — MAGNESIUM OXIDE TAB 400 MG (241.3 MG ELEMENTAL MG) 400 MG: 400 (241.3 MG) TAB at 08:39

## 2021-12-12 RX ADMIN — PREGABALIN 200 MG: 100 CAPSULE ORAL at 13:31

## 2021-12-12 RX ADMIN — PRAZOSIN HYDROCHLORIDE 1 MG: 1 CAPSULE ORAL at 20:57

## 2021-12-12 RX ADMIN — LORAZEPAM 0.5 MG: 0.5 TABLET ORAL at 17:53

## 2021-12-12 RX ADMIN — GADOBUTROL 13 ML: 604.72 INJECTION INTRAVENOUS at 14:22

## 2021-12-12 RX ADMIN — NICOTINE POLACRILEX 2 MG: 4 GUM, CHEWING ORAL at 09:31

## 2021-12-12 RX ADMIN — AMLODIPINE BESYLATE 5 MG: 5 TABLET ORAL at 08:39

## 2021-12-12 RX ADMIN — NICOTINE POLACRILEX 4 MG: 4 GUM, CHEWING ORAL at 06:14

## 2021-12-12 ASSESSMENT — ACTIVITIES OF DAILY LIVING (ADL)
ADLS_ACUITY_SCORE: 7

## 2021-12-12 NOTE — PLAN OF CARE
Physical Therapy: Orders received. Chart reviewed and discussed with care team.? Physical Therapy not indicated due to pt up independently in room.? Will complete orders.

## 2021-12-12 NOTE — PHARMACY-ADMISSION MEDICATION HISTORY
Pharmacy Note - Admission Medication History    Pertinent Provider Information: Propranolol discontinued at discharge today. Patient had been receiving during admission, including 2 doses today. Unsure if this should be continued. List updated based on discharge summary and medications administered today     ______________________________________________________________________    Prior To Admission (PTA) med list completed and updated in EMR.       PTA Med List   Medication Sig Note Last Dose     acamprosate (CAMPRAL) 333 MG EC tablet Take 2 tablets (666 mg) by mouth 3 times daily  12/11/2021 at x2     acetaminophen (TYLENOL) 325 MG tablet Take 3 tablets (975 mg) by mouth every 6 hours as needed for mild pain, fever or headaches (to moderate pain)  Past Month at Unknown time     amLODIPine (NORVASC) 5 MG tablet Take 1 tablet (5 mg) by mouth daily  12/11/2021 at AM     apixaban ANTICOAGULANT (ELIQUIS) 2.5 MG tablet Take 1 tablet (2.5 mg) by mouth 2 times daily for 10 days  12/11/2021 at AM     [START ON 12/12/2021] ARIPiprazole (ABILIFY) 5 MG tablet Take 1 tablet (5 mg) by mouth daily  12/11/2021 at Unknown time     buprenorphine HCl-naloxone HCl (SUBOXONE) 8-2 MG per film Place 1 Film under the tongue 3 times daily  12/11/2021 at x2     diclofenac (VOLTAREN) 1 % topical gel Apply 2 g topically 4 times daily as needed for moderate pain       diphenhydrAMINE (BENADRYL) 25 MG tablet Take 1 tablet (25 mg) by mouth 3 times daily for 10 days  12/11/2021 at x2     docusate sodium (COLACE) 100 MG capsule Take 1 capsule (100 mg) by mouth 2 times daily       folic acid (FOLVITE) 1 MG tablet Take 1 tablet (1,000 mcg) by mouth daily  12/11/2021 at Unknown time     gabapentin (NEURONTIN) 100 MG capsule Take 1 capsule (100 mg) by mouth every 6 hours as needed (anxiety)       levothyroxine (SYNTHROID/LEVOTHROID) 75 MCG tablet Take 1 tablet (75 mcg) by mouth every morning (before breakfast)  12/11/2021 at Unknown time      LORazepam (ATIVAN) 0.5 MG tablet Take 1 tablet (0.5 mg) by mouth 3 times daily for 3 doses  12/11/2021 at x2     [START ON 12/12/2021] LORazepam (ATIVAN) 0.5 MG tablet Take 1 tablet (0.5 mg) by mouth 2 times daily for 2 doses 12/11/2021: To start 12/12      [START ON 12/13/2021] LORazepam (ATIVAN) 0.5 MG tablet Take 1 tablet (0.5 mg) by mouth once for 1 dose 12/11/2021: To start 12/13      magnesium oxide (MAG-OX) 400 MG tablet Take 1 tablet (400 mg) by mouth 2 times daily for 10 days  12/11/2021 at AM     melatonin 3 MG tablet Take 1 tablet (3 mg) by mouth nightly as needed for sleep       miconazole with skin protectant (SELENA ANTIFUNGAL) 2 % CREA cream Apply topically 2 times daily (Patient taking differently: Apply topically 2 times daily For athlete's foot)       multivitamin w/minerals (THERA-VIT-M) tablet Take 1 tablet by mouth daily  12/11/2021 at Unknown time     nicotine polacrilex (NICORETTE) 4 MG gum Place 1-2 each (4-8 mg) inside cheek every hour as needed for other (nicotine withdrawal symptoms)       OLANZapine zydis (ZYPREXA) 5 MG ODT Take 1 tablet (5 mg) by mouth every 6 hours as needed for agitation (anxiety)  12/11/2021 at x2     ondansetron (ZOFRAN-ODT) 4 MG ODT tab Take 1 tablet (4 mg) by mouth every 6 hours as needed (Nausea and Vomiting)       pantoprazole (PROTONIX) 40 MG EC tablet Take 1 tablet (40 mg) by mouth every morning (before breakfast)  12/11/2021 at Unknown time     polyethylene glycol (MIRALAX) 17 GM/Dose powder Take 17 g by mouth daily       prazosin (MINIPRESS) 1 MG capsule Take 1 capsule (1 mg) by mouth At Bedtime       Pregabalin (LYRICA) 200 MG capsule Take 200 mg by mouth 3 times daily  12/11/2021 at x2     QUEtiapine (SEROQUEL) 300 MG tablet Take 1 tablet (300 mg) by mouth At Bedtime  12/10/2021 at Unknown time     sennosides (SENOKOT) 8.6 MG tablet Take 1-2 tablets by mouth 2 times daily as needed for constipation       sodium chloride (OCEAN) 0.65 % nasal spray Spray 1  spray into both nostrils every hour as needed for congestion       tiZANidine (ZANAFLEX) 4 MG tablet Take 1 tablet (4 mg) by mouth every 8 hours as needed for other (chronic back pain)  12/11/2021 at x2       Information source(s): Hospital records  Method of interview communication: N/A    Summary of Changes to PTA Med List  New: none  Discontinued: none  Changed: none    Patient was asked about OTC/herbal products specifically.  PTA med list reflects this.    In the past week, patient estimated taking medication this percent of the time:  greater than 90%.    Allergies were reviewed, assessed, and updated with the patient.      Patient did not bring any medications to the hospital and can't retrieve from home. No multi-dose medications are available for use during hospital stay.     The information provided in this note is only as accurate as the sources available at the time of the update(s).    Thank you for the opportunity to participate in the care of this patient.    Lauren Duran [facility]  12/11/2021 6:21 PM

## 2021-12-12 NOTE — PROGRESS NOTES
Was informed this morning patient came back due to medically comp,  asking what happen.  Jeremías said that patient was too medically complex for [facility].  Worker asked why was that not told to us prior to him going there?  Jeremías was not aware that patient went there and came back.  He said that it was told when the did the nurse to nurse report.  Worker said, from their understanding the nurse at [facility] told our nurse that there might not have staff to accept patient.  Both charge nurse and worker, and unit coordinator never recieved a call saying that they would not accept patient.  Worker made sure that Behavioral Health had her phone number yesterday.  Jeremías did say he never  about the change.  He said he thought he talked to someone on the unit about it.  Behavioral Health intake is currently looking at trying to get patient at [facility] due to medically complex.  They are not sure if they will have a bed today.  MARYCRUZ GUERRERO to continue to follow.    EFRAIN Lipscomb    Addendum:   Received voice mail from Jeremías about [facility] is able to accept patient today.  Called Jeremías back.  [facility] can accept patient today but they do not have staff yet. They asked that floor nurse do a nurse to nurse report after 4 pm and then after that to set up transport.  Dr. Douglas will be following there.  Charge nurse and floor nurse at [facility] are aware of possible discharge today.  Text message MD about it also.  Floor will need to set up transport.  Worker told patient about possible plans for discharge there today.  Also told patient that a transport hold will be placed to make sure he gets there.  Patient was accepting of this.  He is also aware that he might not go also.    EFRAIN Lipscomb

## 2021-12-12 NOTE — TELEPHONE ENCOUNTER
R 3bw/[place]  - 12/12 1037am pt being reviewed by RN for roommate appropriateness on 3bw, pt had a + MRSA test on 12/11 infection prevention paged 1038am, 1135am to discuss if pt can have roommate  - 1142am per infection prevention pt cannot have roommate due to + MRSA infection. Pt can only be admitted to a private room (Pt with MRSA pt can be roomed with another MRSA pt)   -1149am IP called back and discussed with intake that pt can actually have a roommate and pt can have a roommate pt cannot have any uncontained wounds or incontinence. Pt appears to be appropriate for a roommate. Only concern is pt infrequent cough and wants intake to ask if cough is due to a infection to MD if not due to infection pt   -  1158am Pt had no coughing on unit with MD and cough does not appear to be due to infection per MD Michel. Pt is walking independently on unit.   - 1201pm intake spoke with RN on P4 and pt is suicidal but no specific plan and pt is walking independently  -1206pm paged provider for review  -1230pm unit and ed aware of admission 4pm report due to staffing

## 2021-12-12 NOTE — PROGRESS NOTES
"At approximately 1615, this writer received a phone call from the charge nurse over at [facility] psych unit saying that the patient was brought to them via [facility] (patient left P4 via stretcher at about 1543) but that per his manager, day charge nurse notes and intake person, the patient was never accepted to be transported today because he was not \"medically stable\". He said there was no accepting MD for the patient for admission to the psych unit and that the patient would need to come back to [facility]. He also said that there were notes written with this communication. When I went into the patient chart, I could only see notes written from P4 primary RN,  and primary MD. There were no notes saying patient should not be transferred to [facility] or that he was not accepted because he was \"not medically stable\", and that I could indeed see the discharge order from the hospitalist was present. Per that charge nurse, the patient is still in the care of the transport drivers at this time.   At this point, I called our nursing supervisor and notified him of the conversation I had with the [facility] charge nurse. The nursing supervisor said he would call over to [facility] nursing supervisor to follow up with the situation. At this point, I sent a page and spoke with Dr. Michel so that she was aware of the situation.   After several updates back and forth from the nursing supervisor, it was determined that the patient would be transferred back to [facility] via the emergency room because he was now c/o back pain.  I again sent a page and spoke on the phone with Dr. Michel notifying her of this situation.      "

## 2021-12-12 NOTE — ED NOTES
Belongings include:    12 books  1 magazine  2 book sleeves (w/o the book)  Toothbrush/mouthwash  Underwear  Pack of blue wipes  2 hospital menus  A handful of peanut butter and syrup containers

## 2021-12-12 NOTE — PLAN OF CARE
Problem: Adult Inpatient Plan of Care  Goal: Plan of Care Review  Outcome: No Change     Problem: Adult Inpatient Plan of Care  Goal: Absence of Hospital-Acquired Illness or Injury  Outcome: No Change     Problem: Adult Inpatient Plan of Care  Goal: Readiness for Transition of Care  Outcome: No Change     Problem: Cognitive Impairment (Anxiety Signs/Symptoms)  Goal: Optimized Cognitive Function (Anxiety Signs/Symptoms)  Outcome: No Change     Problem: Mood Impairment (Anxiety Signs/Symptoms)  Goal: Improved Mood Symptoms (Anxiety Signs/Symptoms)  Outcome: No Change    Pt alert and oriented. Answers questions some of the time. Received PRN zanaflex at hs and slept well throuhgout the night. Pt sleeping this am and vital signs not obtained at 0400. Pt was restless and agitated prior to sleep last night. No s/s of distress or discomfort. Pt remains on 1:1 attendent for suicide precautions. Eating and drinking well. Waiting for placement at psych facility. Flat affect with pacing and agitation noted. Call light within reach. No further complaints.

## 2021-12-12 NOTE — PLAN OF CARE
Problem: Suicide Risk  Goal: Absence of Self-Harm  Outcome: Improving   Pt. On 1:1 for suicide precautions. Pt. States he has thoughts of harming himself when thinking of his daughter, no plan. Pt. Calm and cooperative. No agitation. Scheduled ativan given and as needed gabapentin given.       Problem: Pain Chronic (Persistent)  Goal: Acceptable Pain Control and Functional Ability  Outcome: Improving  Intervention: Develop Pain Management Plan  Recent Flowsheet Documentation  Taken 12/12/2021 1100 by Zuleyka Seymour RN  Pain Management Interventions: (ace wrap) other (see comments)  Taken 12/12/2021 1042 by Zuleyka Seymour, RN  Pain Management Interventions:   medication (see MAR)   emotional support   distraction  Taken 12/12/2021 0744 by Zuleyka Seymour, RN  Pain Management Interventions: emotional support  Scheduled suboxone given. As needed tylenol given for back pain and right knee pain.   MRI done of right knee. Ace wrap applied to right knee as needed. Pt. Up independently and uses a walker for long distances.     Zuleyka Seymour, RN

## 2021-12-12 NOTE — PROGRESS NOTES
[facility]    Medicine Progress Note - Hospitalist Service       Date of Admission:  12/11/2021    Assessment & Plan           Severe COVID-19 pneumonia, recovered as on 12/11/21  Acute hypoxic respiratory failure due to Covid pneumonia  Obesity due to excessive calories, BMI 34  Bipolar 1 disorder with depressed mood  Mood changes in the context of COVID-19  Borderline personality disorder  Suicidal ideations  Primary insomnia  Severe opioid use disorder  Severe alcohol use disorder  Chronic pain syndrome  Alcohol hepatitis  Nicotine dependence  Essential hypertension  Hypothyroidism  Hypokalemia  Dilated aortic root     Patient is a 51-year-old male with a history of bipolar disorder, borderline personality disorder, depression, TBI and polysubstance abuse, hypertension, group home resident, who presented to [facility] on 11/13 with suicidal attempt, transfer to [facility] inpatient psychiatry on 11/19/2021, then transferred to [facility] in 11/22/2021 with Covid pneumonia and respiratory failure with hypoxia.     Severe COVID-19 pneumonia, with acute hypoxic respiratory failure, bilateral dense pulmonary infiltrates,tachycardia, tachypnea.  Initially required 4 LPM O2, has been weaned off oxygen on 11/28.  Completed 7 days of Decadron, stopped with hypoxia resolved.  Completed 5 days of remdesivir.  Anticoagulated with Eliquis, for DVT prophylaxis, and should continue for 30 days till 12/21/2021.    Covid recovered as of 12/11/2021.     Bipolar disorder  Borderline personality  Major depressive disorder with suicidal attempt  History of traumatic brain injury  History of polysubstance abuse  History of alcohol abuse  Presented to [facility] ED with suicidal attempt with overdose by 5 tablets of oxycodone, 22 of 300 mg tablets of Seroquel and 4X 8 mg tablets of Suboxone while drinking 1 gallon of vodka within a 24-hour.  He was cleared by poison control.  Required one-to-one sitter  for ongoing suicidality.  He was seen by psychiatry and addiction medicine.  He had a panic attack on 11/27/2021, was started on Ativan, with a taper.  On Subutex 8 mg twice a day.  Currently on Abilify, Zyprexa, Seroquel, Benadryl, Lyrica, Ativan, Propranolol as per psychiatry.  -MD to MD report to Dr. Bernard Pearce on 12/12/21.     Alcohol hepatitis, resolved.  Initial elevated LFTs, normalized.  Tobacco use disorder, on replacement with nicotine gum.  Essential hypertension, stable with amlodipine and propranolol.  GERD-on PPI.  Slow transit constipation.  On senna.  Nasal bone fracture-plan to see ENT as outpatient.  Hypothyroidism-on levothyroxine.  Dilated aortic root, noted on echo but not prominent on CTA chest. Would recommend annual surveillance CT.  Mild hyperkalemia, due to poor p.o.  Resolved with replacement.  Acute on chronic back pain.  No rib fractures and chest x-ray.  Seen by pain specialist, who prescribed tizanidine.  Continued Lyrica.  Anal burning with bowel movements.  Calmoseptine has been helpful.        Diet: Combination Diet Regular Diet Adult    DVT Prophylaxis: Pneumatic Compression Devices and Ambulate every shift  [place] Catheter: Not present  Central Lines: None  Code Status: Full Code      Disposition Plan   Expected Discharge: 12/12/2021     Anticipated discharge location:  Awaiting care coordination huddle  Delays:          The patient's care was discussed with the Bedside Nurse, Care Coordinator/ and Patient.    Crystal Michel MD  Hospitalist Service  [facility]  Securely message with the Vocera Web Console (learn more here)  Text page via [facility] Paging/Directory        Clinically Significant Risk Factors Present on Admission             # Coagulation Defect: home medication list includes an anticoagulant medication     _____________________________________________________________________    Interval History   Unsuccessful attempt to  "transfer to psych unit [facility]. Patient was brought back as \"he is too medically complex for that unit\".  He was accepted to inpatient psychiatry at [facility], but apparently no staff availability to take care of patient tonight.  He is pleasant, cooperative, ambulating around nursing unit with standby assist of nurse, intermittently using walker. Walking around helps with his anxiety.  Complaining of right knee pain. States history of chronic pain, with intra-articular injections, which elevated pain.  No chest pain, cough, dyspnea, abdominal pain, nausea.    Data reviewed today: I reviewed all medications, new labs and imaging results over the last 24 hours. I personally reviewed    Physical Exam   Vital Signs: Temp: 97.8  F (36.6  C) Temp src: Oral BP: (!) 147/97 Pulse: 85   Resp: 20 SpO2: 93 % O2 Device: None (Room air)    Weight: 289 lbs 0 oz  General: Alert and oriented x 3. Not in obvious distress.  HEENT: NC, AT. Neck- supple, No JVP elevation, lymphadenopathy or thyromegaly. Trachea-central.  Chest: Clear to auscultation bilaterally.  Heart: S1S2 regular. No M/R/G.  Abdomen: Soft. NT, ND. No organomegaly. Bowel sounds- active.  Back: No spine tenderness. No CVA tenderness.  Extremities: No leg swelling. Peripheral pulses 2+ bilaterally.  Neuro: Cranial nerves 1-12 grossly normal. No focal neurological deficit    Data   Recent Labs   Lab 12/11/21  1830 12/10/21  1053   WBC 5.5  --    HGB 12.0*  --    MCV 95  --      --      --    POTASSIUM 4.2  --    CHLORIDE 106  --    CO2 27  --    BUN 22  --    CR 0.74 0.72   ANIONGAP 7  --    KACY 8.8  --      --    ALBUMIN 3.4*  --    PROTTOTAL 7.3  --    BILITOTAL 0.5  --    ALKPHOS 69  --    *  --    AST 88*  --      Recent Results (from the past 24 hour(s))   MR Knee Right w/o & w Contrast    Narrative    EXAM: MR KNEE RIGHT W/O AND W CONTRAST  LOCATION: [facility]  DATE/TIME: 12/12/2021 1:53 " PM    INDICATION: Chronic right knee pain.  COMPARISON: Radiographs from 12/1/2021.  TECHNIQUE: Routine. Additional postgadolinium T1 sequences were obtained.  IV CONTRAST: 13 mL Gadavist.    FINDINGS:    MEDIAL COMPARTMENT:   -Meniscus: Complex tear in the body and posterior horn of the medial meniscus. Complete medial extrusion of the body of the medial meniscus from the knee joint indicating loss of hoop tension.  -Cartilage: Extensive full-thickness cartilage loss in the medial femoral condyle weightbearing surface and medial tibial plateau. Marked underlying marrow edema but no clear evidence of a fracture.    LATERAL COMPARTMENT:  -Meniscus: Complex tear in the anterior horn and root. There is also probably a vertical longitudinal tear in the peripheral-most aspect (red zone) of the posterior horn, and horizontal tearing extends into the anterior body from the anterior horn.   -Cartilage: Moderate amount of grade II and III chondromalacia in the lateral femoral condyle weightbearing surface. Small amount of grade II chondromalacia in the lateral tibial plateau.    PATELLOFEMORAL COMPARTMENT:   -Alignment: Mild lateral subluxation and tilt of the patella.   -Cartilage: There is grade II chondromalacia of the majority of the patella and there is a small amount of full-thickness fissuring. There is a moderate amount of grade II chondromalacia of the trochlea.    CRUCIATE LIGAMENTS:   -ACL: Normal.  -PCL: Normal variant, with a slip of PCL extending from the femoral origin to attach to the periphery of the posterior horn of the lateral meniscus. No PCL tear.    COLLATERAL LIGAMENTS:   -Medial collateral ligament: Superficial and deep fibers are normal.  -Lateral collateral ligament: Normal.    POSTEROMEDIAL CORNER:  -Distal semimembranosus tendon is normal.   -Pes anserine tendons are normal. Posteromedial corner complex ligaments are intact.    POSTEROLATERAL CORNER:   -Popliteal tendon is intact. No  tendinopathy.  -Biceps femoris tendon and posterolateral corner complex ligaments are intact.    EXTENSOR MECHANISM:   -Quadriceps tendon: Normal.  -Patellar tendon: Normal.  -Patellofemoral ligaments and retinacula: Intact.    JOINT:   -Moderately large effusion. Diffuse synovitis. No evidence of an intraarticular body.    BONES:  -No fracture or concerning marrow replacing lesion.    SOFT TISSUES:   -Moderate-sized popliteal cyst.       Impression    IMPRESSION:  1.  Complex medial meniscal tear with loss of hoop tension. Extensive full-thickness cartilage loss in the medial femoral condyle and medial tibial plateau. Marked underlying marrow edema, likely reactive or stress-related. There is no clear evidence of   stress fracture.  2.  Complex lateral meniscal tear. Mild to moderate lateral compartment chondromalacia.  3.  Mild lateral subluxation and tilt of the patella. Mild patellofemoral chondromalacia with a small amount of full-thickness fissuring in the patella.  4.  Normal variant PCL anatomy. No cruciate ligament tear or collateral ligament tear.  5.  Moderately large knee joint effusion with diffuse synovitis and a moderate-sized popliteal cyst.

## 2021-12-12 NOTE — ED NOTES
"[facility] ED Handoff Report    ED Chief Complaint: Readmit for Mental Health     ED Diagnosis:  No diagnosis found.     PMH:    Past Medical History:   Diagnosis Date     Alcoholism in remission (H)      Bilateral ACL tear      Bipolar disorder (H)      Borderline personality disorder (H)      Chemical dependency (H)      Chronic back pain      Closed left arm fracture 1985     H/O shoulder surgery      Post concussive encephalopathy      Social anxiety disorder      Social anxiety disorder      TBI (traumatic brain injury) (H)         Code Status:  Prior     Falls Risk: Yes Band: Applied    Current Living Situation/Residence: lives alone     Elimination Status: Continent: Yes     Activity Level: SBA    Patients Preferred Language:  English     Needed: No    Vital Signs:  /69   Pulse 75   Temp 97.8  F (36.6  C) (Oral)   Resp 22   Ht 1.93 m (6' 4\")   Wt 131.1 kg (289 lb)   SpO2 94%   BMI 35.18 kg/m       Cardiac Rhythm: NA    Pain Score: 0/10    Is the Patient Confused:  No    Last Food or Drink: 12/11/21     Focused Assessment:  Patient was discharged from  today and sent to [facility]. He was sent back by the provider because he had not been accepted at [facility]. Pt was admitted for Covid pneumonia but is now considered covid recovered. Patient is currently on 1:1 for suicidal ideation.     Tests Performed: Done: Labs    Treatments Provided:  None     Family Dynamics/Concerns: No    Family Updated On Visitor Policy: No    Plan of Care Communicated to Family: No    Who Was Updated about Plan of Care: patient reports that he does not want anyone to be updated.     Belongings Checklist Done and Signed by Patient: Yes    Covid Recovered        RN: Nadia Nava   12/11/2021 7:35 PM       "

## 2021-12-12 NOTE — H&P
[facility]    History and Physical - Hospitalist Service       Date of Admission:  12/11/2021    Assessment & Plan      Hollis Barclay is a 51 year old male admitted on 12/11/2021 after an unsuccessful attempt at transfer to inpatient psychiatry.  Actively suicidal.  Recent Covid infection and Covid recovered    Suicide attempt with ongoing suicidality   ---bipolar disorder, borderline personality disorder, TBI, previous polysubstance abuse and alcoholism  --- Admit on one-to-one  --- Repost psychiatry  ----Continue as needed Ativan for anxiety and panic attack; attempting to wean quickly as recommended by addiction medicine 12 9 note  --- Continue Abilify, Seroquel, Lyrica.  --- Note; psychiatry discontinue Neurontin but I am concerned that he is not going to like this Ativan taper so I will keep Neurontin in as a as needed for now  --- Social work to continue to assess for inpatient psychiatry unit    Recent Covid infection; Covid recovered 12/  COVID-19 Diagnosis Details:  Onset of COVID symptoms 11/21   Date of positive test results 11/22   Vaccinated? Fully Vaccinated   --- Covid pneumonia and acute hypoxic respiratory failure secondary to Covid all resolved  ----Treated with 7 days of Decadron and 5 days of remdesivir  --- Continue Eliquis for DVT prophylaxis through 12/21    Chronic pain syndrome with history of polysubstance and drug abuse  --- Addiction medicine last saw patient 12/9  --- Appreciate note.  They are recommending taper off of benzodiazepine quickly.  I will reinstitute the taper tonight but will allow 1 as needed dose as patient very anxious about the failed transfer.  --- Continue acamprosate restarted 12/  --- Continue buprenorphine 8 mg 3 times daily  --- Continue to request addiction medicine support while here  --- Started on prazosin while here.  Continue    Alcoholic hepatitis  --- Resolved with normal LFTs    Nicotine dependence  --- Continue nicotine gum  while here    Hypertension  --- Continue amlodipine and propranolol with holding parameters.    GERD  --- Continue home PPI    Polyarthritis  --- Since initiation of hospitalization in November patient has intermittently complained of back pain, right knee pain, hand pain, and today he complained of sensation difficulty in his right foot  --- Attempting to avoid narcotics.  --- Was treated with naproxen at some time but will attempt to avoid  --- Monitor for now.       Diet: Regular Diet Adult    DVT Prophylaxis: DOAC  [place] Catheter: Not present  Central Lines: None  Code Status:  full code                     Disposition Plan   Expected Discharge: 12/13/2021*  Anticipated discharge location:  inpatient psych  Delays: accepting facility          The patient's care was discussed with the Bedside Nurse and Patient.    Lima Hollins MD  [facility]  Securely message with the Vocera Web Console (learn more here)  Text page via [employer] Paging/Directory        ______________________________________________________________________    Chief Complaint   suicidality    History is obtained from the patient    History of Present Illness   Hollis Barclay is a 51 year old male with a history of bipolar disorder, borderline personality disorder, TBI polysubstance abuse alcoholism who usually resides in a group home who initially presented to [facility] on November 13 with a suicide attempt.  Transferred to [facility] inpatient psych on November 19.  Transferred to [facility] medicine on November 22 for Covid hypoxia.  Patient has been off of oxygen since November 28 and considered Covid recovered as of December 11.    Attempted to transfer patient to inpatient psych at [facility] today.  Was discharged from the hospital but [facility] unable to accept patient.  He was sent back to our emergency room.    No acute complaints.  He is mainly worried that all of his medications he  "was on during this admission are appropriately ordered.  He tells me his bowels have been fine.  He is feeling anxious and would like his as needed Ativan ordered.  He has been actively suicidal all the time he was here and on a one-to-one    Review of Systems    The 10 point Review of Systems is negative other than noted in the HPI.  He does note that he feels like bilateral feet have been somewhat numb, right greater than left.  He wonders if it is from \"all the drugs I have done.  On    Past Medical History    I have reviewed this patient's medical history and updated it with pertinent information if needed.   Past Medical History:   Diagnosis Date     Alcoholism in remission (H)      Bilateral ACL tear      Bipolar disorder (H)      Borderline personality disorder (H)      Chemical dependency (H)      Chronic back pain      Closed left arm fracture 1985     H/O shoulder surgery      Post concussive encephalopathy      Social anxiety disorder      Social anxiety disorder      TBI (traumatic brain injury) (H)        Past Surgical History   I have reviewed this patient's surgical history and updated it with pertinent information if needed.  History reviewed. No pertinent surgical history.    Social History   I have reviewed this patient's social history and updated it with pertinent information if needed.  Social History     Tobacco Use     Smoking status: Former Smoker     Types: Dip, chew, snus or snuff     Smokeless tobacco: Former User     Types: Chew   Substance Use Topics     Alcohol use: Not Currently     Drug use: Not Currently       Family History     No significant family history.    Prior to Admission Medications   Prior to Admission Medications   Prescriptions Last Dose Informant Patient Reported? Taking?   ARIPiprazole (ABILIFY) 5 MG tablet 12/11/2021 at Unknown time  No Yes   Sig: Take 1 tablet (5 mg) by mouth daily   LORazepam (ATIVAN) 0.5 MG tablet 12/11/2021 at x2  No Yes   Sig: Take 1 tablet (0.5 " mg) by mouth 3 times daily for 3 doses   LORazepam (ATIVAN) 0.5 MG tablet   No Yes   Sig: Take 1 tablet (0.5 mg) by mouth 2 times daily for 2 doses   LORazepam (ATIVAN) 0.5 MG tablet   No Yes   Sig: Take 1 tablet (0.5 mg) by mouth once for 1 dose   OLANZapine zydis (ZYPREXA) 5 MG ODT 12/11/2021 at x2  No Yes   Sig: Take 1 tablet (5 mg) by mouth every 6 hours as needed for agitation (anxiety)   Pregabalin (LYRICA) 200 MG capsule 12/11/2021 at x2  Yes Yes   Sig: Take 200 mg by mouth 3 times daily   QUEtiapine (SEROQUEL) 300 MG tablet 12/10/2021 at Unknown time [facility] No Yes   Sig: Take 1 tablet (300 mg) by mouth At Bedtime   acamprosate (CAMPRAL) 333 MG EC tablet 12/11/2021 at x2  No Yes   Sig: Take 2 tablets (666 mg) by mouth 3 times daily   acetaminophen (TYLENOL) 325 MG tablet Past Month at Unknown time  No Yes   Sig: Take 3 tablets (975 mg) by mouth every 6 hours as needed for mild pain, fever or headaches (to moderate pain)   amLODIPine (NORVASC) 5 MG tablet 12/11/2021 at AM Nursing Home No Yes   Sig: Take 1 tablet (5 mg) by mouth daily   apixaban ANTICOAGULANT (ELIQUIS) 2.5 MG tablet 12/11/2021 at AM  No Yes   Sig: Take 1 tablet (2.5 mg) by mouth 2 times daily for 10 days   buprenorphine HCl-naloxone HCl (SUBOXONE) 8-2 MG per film 12/11/2021 at x2  No Yes   Sig: Place 1 Film under the tongue 3 times daily   diclofenac (VOLTAREN) 1 % topical gel  Nursing Home No Yes   Sig: Apply 2 g topically 4 times daily as needed for moderate pain   diphenhydrAMINE (BENADRYL) 25 MG tablet 12/11/2021 at x2  No Yes   Sig: Take 1 tablet (25 mg) by mouth 3 times daily for 10 days   docusate sodium (COLACE) 100 MG capsule  Nursing Home No Yes   Sig: Take 1 capsule (100 mg) by mouth 2 times daily   folic acid (FOLVITE) 1 MG tablet 12/11/2021 at Unknown time [facility] No Yes   Sig: Take 1 tablet (1,000 mcg) by mouth daily   gabapentin (NEURONTIN) 100 MG capsule   No Yes   Sig: Take 1 capsule (100 mg) by mouth every 6 hours  as needed (anxiety)   levothyroxine (SYNTHROID/LEVOTHROID) 75 MCG tablet 12/11/2021 at Unknown time [facility] No Yes   Sig: Take 1 tablet (75 mcg) by mouth every morning (before breakfast)   magnesium oxide (MAG-OX) 400 MG tablet 12/11/2021 at AM  No Yes   Sig: Take 1 tablet (400 mg) by mouth 2 times daily for 10 days   melatonin 3 MG tablet   No Yes   Sig: Take 1 tablet (3 mg) by mouth nightly as needed for sleep   miconazole with skin protectant (SELENA ANTIFUNGAL) 2 % CREA cream  Nursing Home No Yes   Sig: Apply topically 2 times daily   Patient taking differently: Apply topically 2 times daily For athlete's foot   multivitamin w/minerals (THERA-VIT-M) tablet 12/11/2021 at Unknown time [facility] No Yes   Sig: Take 1 tablet by mouth daily   nicotine polacrilex (NICORETTE) 4 MG gum  Nursing Home No Yes   Sig: Place 1-2 each (4-8 mg) inside cheek every hour as needed for other (nicotine withdrawal symptoms)   ondansetron (ZOFRAN-ODT) 4 MG ODT tab   No Yes   Sig: Take 1 tablet (4 mg) by mouth every 6 hours as needed (Nausea and Vomiting)   pantoprazole (PROTONIX) 40 MG EC tablet 12/11/2021 at Unknown time [facility] No Yes   Sig: Take 1 tablet (40 mg) by mouth every morning (before breakfast)   polyethylene glycol (MIRALAX) 17 GM/Dose powder   No Yes   Sig: Take 17 g by mouth daily   prazosin (MINIPRESS) 1 MG capsule   No Yes   Sig: Take 1 capsule (1 mg) by mouth At Bedtime   sennosides (SENOKOT) 8.6 MG tablet   No Yes   Sig: Take 1-2 tablets by mouth 2 times daily as needed for constipation   sodium chloride (OCEAN) 0.65 % nasal spray   No Yes   Sig: Spray 1 spray into both nostrils every hour as needed for congestion   tiZANidine (ZANAFLEX) 4 MG tablet 12/11/2021 at x2  No Yes   Sig: Take 1 tablet (4 mg) by mouth every 8 hours as needed for other (chronic back pain)      Facility-Administered Medications: None     Allergies   Allergies   Allergen Reactions     Cucumber Extract Anaphylaxis     Cucumber the  vegable     Hydroxyzine Hives     Previously unreported by patient, this is a new patient declaration as of 5/1/21.     Nsaids      No problem with oral NSAIDs--Toradol injection itching only.     Trazodone Itching     Per Patient       Physical Exam   Vital Signs: Temp: 97.8  F (36.6  C) Temp src: Oral BP: 114/69 Pulse: 75   Resp: 22 SpO2: 94 % O2 Device: None (Room air)    Weight: 289 lbs 0 oz    General Appearance: pleasant. Somewhat busy walking around room, speech somewhat hard to understand  Eyes: EOMI, sclera non-icteric  HEENT: oropharynx moist  Respiratory: CTA-B  Cardiovascular: RRR  GI: Soft, nontender an dno masses  Skin: no significant edema or obcious rashes  Musculoskeletal: good rom in feet  Neurologic: did not test sensation exactly - moving feet well and no focal deficits appreciated  Psychiatric: Walking around but not clearly agitated.  Speech not pressured.  Somewhat slurred but awake and cooperative.  Requesting Ativan for what he calls anxiety and he lifts up his hands to show me that he is nervous but no obvious tremoring.    Data   Data reviewed today: I reviewed all medications, new labs and imaging results over the last 24 hours.     No results found for this or any previous visit (from the past 24 hour(s)).     Ordering CBC and BMP

## 2021-12-13 ENCOUNTER — HOSPITAL ENCOUNTER (INPATIENT)
Facility: CLINIC | Age: 51
LOS: 11 days | Discharge: NURSING, REHAB OR LONG-TERM CARE | End: 2021-12-24
Attending: PSYCHIATRY & NEUROLOGY | Admitting: PSYCHIATRY & NEUROLOGY
Payer: MEDICAID

## 2021-12-13 VITALS
OXYGEN SATURATION: 92 % | RESPIRATION RATE: 18 BRPM | DIASTOLIC BLOOD PRESSURE: 69 MMHG | SYSTOLIC BLOOD PRESSURE: 130 MMHG | WEIGHT: 289 LBS | HEIGHT: 76 IN | HEART RATE: 79 BPM | BODY MASS INDEX: 35.19 KG/M2 | TEMPERATURE: 98.5 F

## 2021-12-13 DIAGNOSIS — M54.40 CHRONIC BILATERAL LOW BACK PAIN WITH SCIATICA, SCIATICA LATERALITY UNSPECIFIED: ICD-10-CM

## 2021-12-13 DIAGNOSIS — Z98.890 H/O SHOULDER SURGERY: ICD-10-CM

## 2021-12-13 DIAGNOSIS — E03.9 HYPOTHYROIDISM, UNSPECIFIED TYPE: ICD-10-CM

## 2021-12-13 DIAGNOSIS — I10 ESSENTIAL HYPERTENSION: ICD-10-CM

## 2021-12-13 DIAGNOSIS — K21.00 GASTROESOPHAGEAL REFLUX DISEASE WITH ESOPHAGITIS, UNSPECIFIED WHETHER HEMORRHAGE: ICD-10-CM

## 2021-12-13 DIAGNOSIS — F10.229 ALCOHOL DEPENDENCE WITH INTOXICATION WITH COMPLICATION (H): ICD-10-CM

## 2021-12-13 DIAGNOSIS — G89.29 CHRONIC BILATERAL LOW BACK PAIN WITH SCIATICA, SCIATICA LATERALITY UNSPECIFIED: ICD-10-CM

## 2021-12-13 DIAGNOSIS — M17.11 PRIMARY OSTEOARTHRITIS OF RIGHT KNEE: ICD-10-CM

## 2021-12-13 DIAGNOSIS — F10.21 ALCOHOLISM IN REMISSION (H): ICD-10-CM

## 2021-12-13 DIAGNOSIS — F31.4 BIPOLAR DISORDER, CURRENT EPISODE DEPRESSED, SEVERE, WITHOUT PSYCHOTIC FEATURES (H): Primary | ICD-10-CM

## 2021-12-13 DIAGNOSIS — B19.20 HEPATITIS C VIRUS INFECTION WITHOUT HEPATIC COMA, UNSPECIFIED CHRONICITY: ICD-10-CM

## 2021-12-13 DIAGNOSIS — K59.00 CONSTIPATION, UNSPECIFIED CONSTIPATION TYPE: ICD-10-CM

## 2021-12-13 DIAGNOSIS — F10.939 ALCOHOL WITHDRAWAL SYNDROME WITH COMPLICATION, WITH UNSPECIFIED COMPLICATION (H): ICD-10-CM

## 2021-12-13 DIAGNOSIS — J12.82 PNEUMONIA DUE TO COVID-19 VIRUS: ICD-10-CM

## 2021-12-13 DIAGNOSIS — B49 FUNGAL INFECTION: ICD-10-CM

## 2021-12-13 DIAGNOSIS — E83.42 HYPOMAGNESEMIA: ICD-10-CM

## 2021-12-13 DIAGNOSIS — U07.1 PNEUMONIA DUE TO COVID-19 VIRUS: ICD-10-CM

## 2021-12-13 LAB — BACTERIA SPEC CULT: ABNORMAL

## 2021-12-13 PROCEDURE — 250N000013 HC RX MED GY IP 250 OP 250 PS 637: Performed by: PSYCHIATRY & NEUROLOGY

## 2021-12-13 PROCEDURE — 99238 HOSP IP/OBS DSCHRG MGMT 30/<: CPT | Performed by: INTERNAL MEDICINE

## 2021-12-13 PROCEDURE — 250N000013 HC RX MED GY IP 250 OP 250 PS 637: Performed by: INTERNAL MEDICINE

## 2021-12-13 PROCEDURE — 250N000013 HC RX MED GY IP 250 OP 250 PS 637: Performed by: FAMILY MEDICINE

## 2021-12-13 PROCEDURE — 124N000003 HC R&B MH SENIOR/ADOLESCENT

## 2021-12-13 RX ORDER — QUETIAPINE FUMARATE 300 MG/1
300 TABLET, FILM COATED ORAL AT BEDTIME
Status: CANCELLED | OUTPATIENT
Start: 2021-12-13

## 2021-12-13 RX ORDER — MULTIPLE VITAMINS W/ MINERALS TAB 9MG-400MCG
1 TAB ORAL DAILY
Status: CANCELLED | OUTPATIENT
Start: 2021-12-13

## 2021-12-13 RX ORDER — ONDANSETRON 4 MG/1
4 TABLET, ORALLY DISINTEGRATING ORAL EVERY 6 HOURS PRN
Status: DISCONTINUED | OUTPATIENT
Start: 2021-12-13 | End: 2021-12-24 | Stop reason: HOSPADM

## 2021-12-13 RX ORDER — POLYETHYLENE GLYCOL 3350 17 G/17G
17 POWDER, FOR SOLUTION ORAL DAILY
Status: DISCONTINUED | OUTPATIENT
Start: 2021-12-14 | End: 2021-12-24 | Stop reason: HOSPADM

## 2021-12-13 RX ORDER — PREGABALIN 100 MG/1
200 CAPSULE ORAL 3 TIMES DAILY
Status: CANCELLED | OUTPATIENT
Start: 2021-12-13

## 2021-12-13 RX ORDER — LIDOCAINE 40 MG/G
CREAM TOPICAL
Status: CANCELLED | OUTPATIENT
Start: 2021-12-13

## 2021-12-13 RX ORDER — FOLIC ACID 1 MG/1
1000 TABLET ORAL DAILY
Status: CANCELLED | OUTPATIENT
Start: 2021-12-13

## 2021-12-13 RX ORDER — OLANZAPINE 5 MG/1
5 TABLET, ORALLY DISINTEGRATING ORAL EVERY 6 HOURS PRN
Status: DISCONTINUED | OUTPATIENT
Start: 2021-12-13 | End: 2021-12-24 | Stop reason: HOSPADM

## 2021-12-13 RX ORDER — LEVOTHYROXINE SODIUM 75 UG/1
75 TABLET ORAL
Status: DISCONTINUED | OUTPATIENT
Start: 2021-12-14 | End: 2021-12-24 | Stop reason: HOSPADM

## 2021-12-13 RX ORDER — MICONAZOLE NITRATE 20 MG/G
CREAM TOPICAL 2 TIMES DAILY
Status: DISCONTINUED | OUTPATIENT
Start: 2021-12-13 | End: 2021-12-24 | Stop reason: HOSPADM

## 2021-12-13 RX ORDER — ONDANSETRON 4 MG/1
4 TABLET, ORALLY DISINTEGRATING ORAL EVERY 6 HOURS PRN
Status: CANCELLED | OUTPATIENT
Start: 2021-12-13

## 2021-12-13 RX ORDER — DIPHENHYDRAMINE HCL 25 MG
25 TABLET ORAL 3 TIMES DAILY
Status: CANCELLED | OUTPATIENT
Start: 2021-12-13

## 2021-12-13 RX ORDER — AMOXICILLIN 250 MG
2 CAPSULE ORAL AT BEDTIME
Status: CANCELLED | OUTPATIENT
Start: 2021-12-13

## 2021-12-13 RX ORDER — ACETAMINOPHEN 325 MG/1
975 TABLET ORAL EVERY 6 HOURS PRN
Status: DISCONTINUED | OUTPATIENT
Start: 2021-12-13 | End: 2021-12-14

## 2021-12-13 RX ORDER — ARIPIPRAZOLE 5 MG/1
5 TABLET ORAL DAILY
Status: CANCELLED | OUTPATIENT
Start: 2021-12-13

## 2021-12-13 RX ORDER — TIZANIDINE 2 MG/1
4 TABLET ORAL EVERY 8 HOURS PRN
Status: DISCONTINUED | OUTPATIENT
Start: 2021-12-13 | End: 2021-12-24 | Stop reason: HOSPADM

## 2021-12-13 RX ORDER — MULTIPLE VITAMINS W/ MINERALS TAB 9MG-400MCG
1 TAB ORAL DAILY
Status: DISCONTINUED | OUTPATIENT
Start: 2021-12-14 | End: 2021-12-24 | Stop reason: HOSPADM

## 2021-12-13 RX ORDER — ACETAMINOPHEN 325 MG/1
975 TABLET ORAL EVERY 6 HOURS PRN
Status: CANCELLED | OUTPATIENT
Start: 2021-12-13

## 2021-12-13 RX ORDER — PANTOPRAZOLE SODIUM 40 MG/1
40 TABLET, DELAYED RELEASE ORAL
Status: DISCONTINUED | OUTPATIENT
Start: 2021-12-14 | End: 2021-12-24 | Stop reason: HOSPADM

## 2021-12-13 RX ORDER — DIPHENHYDRAMINE HCL 25 MG
25 TABLET ORAL 3 TIMES DAILY
Status: DISCONTINUED | OUTPATIENT
Start: 2021-12-13 | End: 2021-12-24 | Stop reason: HOSPADM

## 2021-12-13 RX ORDER — LORAZEPAM 0.5 MG/1
0.5 TABLET ORAL ONCE
Status: COMPLETED | OUTPATIENT
Start: 2021-12-13 | End: 2021-12-13

## 2021-12-13 RX ORDER — ACAMPROSATE CALCIUM 333 MG/1
666 TABLET, DELAYED RELEASE ORAL 3 TIMES DAILY
Status: DISCONTINUED | OUTPATIENT
Start: 2021-12-13 | End: 2021-12-24 | Stop reason: HOSPADM

## 2021-12-13 RX ORDER — FOLIC ACID 1 MG/1
1000 TABLET ORAL DAILY
Status: DISCONTINUED | OUTPATIENT
Start: 2021-12-14 | End: 2021-12-24 | Stop reason: HOSPADM

## 2021-12-13 RX ORDER — MICONAZOLE NITRATE 20 MG/G
CREAM TOPICAL 2 TIMES DAILY
Status: CANCELLED | OUTPATIENT
Start: 2021-12-13

## 2021-12-13 RX ORDER — OLANZAPINE 5 MG/1
5 TABLET, ORALLY DISINTEGRATING ORAL EVERY 6 HOURS PRN
Status: CANCELLED | OUTPATIENT
Start: 2021-12-13

## 2021-12-13 RX ORDER — ARIPIPRAZOLE 5 MG/1
5 TABLET ORAL DAILY
Status: DISCONTINUED | OUTPATIENT
Start: 2021-12-14 | End: 2021-12-15

## 2021-12-13 RX ORDER — LEVOTHYROXINE SODIUM 25 UG/1
75 TABLET ORAL
Status: CANCELLED | OUTPATIENT
Start: 2021-12-14

## 2021-12-13 RX ORDER — MAGNESIUM HYDROXIDE/ALUMINUM HYDROXICE/SIMETHICONE 120; 1200; 1200 MG/30ML; MG/30ML; MG/30ML
30 SUSPENSION ORAL EVERY 4 HOURS PRN
Status: DISCONTINUED | OUTPATIENT
Start: 2021-12-13 | End: 2021-12-24 | Stop reason: HOSPADM

## 2021-12-13 RX ORDER — SENNOSIDES 8.6 MG
1-2 TABLET ORAL 2 TIMES DAILY PRN
Status: CANCELLED | OUTPATIENT
Start: 2021-12-13

## 2021-12-13 RX ORDER — PRAZOSIN HYDROCHLORIDE 1 MG/1
1 CAPSULE ORAL AT BEDTIME
Status: CANCELLED | OUTPATIENT
Start: 2021-12-13

## 2021-12-13 RX ORDER — PRAZOSIN HYDROCHLORIDE 1 MG/1
1 CAPSULE ORAL AT BEDTIME
Status: DISCONTINUED | OUTPATIENT
Start: 2021-12-13 | End: 2021-12-24 | Stop reason: HOSPADM

## 2021-12-13 RX ORDER — MAGNESIUM OXIDE 400 MG/1
400 TABLET ORAL 2 TIMES DAILY
Status: CANCELLED | OUTPATIENT
Start: 2021-12-13

## 2021-12-13 RX ORDER — AMLODIPINE BESYLATE 5 MG/1
5 TABLET ORAL DAILY
Status: CANCELLED | OUTPATIENT
Start: 2021-12-13

## 2021-12-13 RX ORDER — BUPRENORPHINE AND NALOXONE 8; 2 MG/1; MG/1
1 FILM, SOLUBLE BUCCAL; SUBLINGUAL
Status: DISCONTINUED | OUTPATIENT
Start: 2021-12-13 | End: 2021-12-20

## 2021-12-13 RX ORDER — GABAPENTIN 100 MG/1
100 CAPSULE ORAL EVERY 6 HOURS PRN
Status: DISCONTINUED | OUTPATIENT
Start: 2021-12-13 | End: 2021-12-15

## 2021-12-13 RX ORDER — BUPRENORPHINE 2 MG/1
8 TABLET SUBLINGUAL 3 TIMES DAILY
Status: CANCELLED | OUTPATIENT
Start: 2021-12-13

## 2021-12-13 RX ORDER — AMLODIPINE BESYLATE 5 MG/1
5 TABLET ORAL DAILY
Status: DISCONTINUED | OUTPATIENT
Start: 2021-12-14 | End: 2021-12-24 | Stop reason: HOSPADM

## 2021-12-13 RX ORDER — SENNOSIDES 8.6 MG
1-2 TABLET ORAL 2 TIMES DAILY PRN
Status: DISCONTINUED | OUTPATIENT
Start: 2021-12-13 | End: 2021-12-24 | Stop reason: HOSPADM

## 2021-12-13 RX ORDER — POLYETHYLENE GLYCOL 3350 17 G/17G
17 POWDER, FOR SOLUTION ORAL DAILY
Status: CANCELLED | OUTPATIENT
Start: 2021-12-13

## 2021-12-13 RX ORDER — GABAPENTIN 100 MG/1
100 CAPSULE ORAL EVERY 6 HOURS PRN
Status: CANCELLED | OUTPATIENT
Start: 2021-12-13

## 2021-12-13 RX ORDER — MAGNESIUM OXIDE 400 MG/1
400 TABLET ORAL 2 TIMES DAILY
Status: DISCONTINUED | OUTPATIENT
Start: 2021-12-13 | End: 2021-12-24 | Stop reason: HOSPADM

## 2021-12-13 RX ORDER — PREGABALIN 100 MG/1
200 CAPSULE ORAL 3 TIMES DAILY
Status: DISCONTINUED | OUTPATIENT
Start: 2021-12-13 | End: 2021-12-15

## 2021-12-13 RX ORDER — BUPRENORPHINE AND NALOXONE 8; 2 MG/1; MG/1
1 FILM, SOLUBLE BUCCAL; SUBLINGUAL
Status: DISCONTINUED | OUTPATIENT
Start: 2021-12-13 | End: 2021-12-13

## 2021-12-13 RX ORDER — PANTOPRAZOLE SODIUM 40 MG/1
40 TABLET, DELAYED RELEASE ORAL
Status: CANCELLED | OUTPATIENT
Start: 2021-12-14

## 2021-12-13 RX ORDER — ACAMPROSATE CALCIUM 333 MG/1
666 TABLET, DELAYED RELEASE ORAL 3 TIMES DAILY
Status: CANCELLED | OUTPATIENT
Start: 2021-12-13

## 2021-12-13 RX ORDER — QUETIAPINE FUMARATE 300 MG/1
300 TABLET, FILM COATED ORAL AT BEDTIME
Status: DISCONTINUED | OUTPATIENT
Start: 2021-12-13 | End: 2021-12-15

## 2021-12-13 RX ADMIN — MICONAZOLE NITRATE: 20 CREAM TOPICAL at 08:43

## 2021-12-13 RX ADMIN — POLYETHYLENE GLYCOL 3350 17 G: 17 POWDER, FOR SOLUTION ORAL at 08:43

## 2021-12-13 RX ADMIN — ACAMPROSATE CALCIUM 666 MG: 333 TABLET, DELAYED RELEASE ORAL at 08:43

## 2021-12-13 RX ADMIN — PRAZOSIN HYDROCHLORIDE 1 MG: 1 CAPSULE ORAL at 21:03

## 2021-12-13 RX ADMIN — PANTOPRAZOLE SODIUM 40 MG: 40 TABLET, DELAYED RELEASE ORAL at 07:02

## 2021-12-13 RX ADMIN — LORAZEPAM 0.5 MG: 0.5 TABLET ORAL at 15:26

## 2021-12-13 RX ADMIN — NICOTINE POLACRILEX 8 MG: 2 GUM, CHEWING BUCCAL at 21:04

## 2021-12-13 RX ADMIN — AMLODIPINE BESYLATE 5 MG: 5 TABLET ORAL at 08:36

## 2021-12-13 RX ADMIN — APIXABAN 2.5 MG: 2.5 TABLET, FILM COATED ORAL at 08:44

## 2021-12-13 RX ADMIN — DIPHENHYDRAMINE HCL 25 MG: 25 TABLET ORAL at 13:24

## 2021-12-13 RX ADMIN — TIZANIDINE 4 MG: 4 TABLET ORAL at 08:44

## 2021-12-13 RX ADMIN — TIZANIDINE 4 MG: 4 TABLET ORAL at 14:51

## 2021-12-13 RX ADMIN — GABAPENTIN 100 MG: 100 CAPSULE ORAL at 17:52

## 2021-12-13 RX ADMIN — GABAPENTIN 100 MG: 100 CAPSULE ORAL at 08:36

## 2021-12-13 RX ADMIN — APIXABAN 2.5 MG: 2.5 TABLET, FILM COATED ORAL at 21:02

## 2021-12-13 RX ADMIN — MAGNESIUM OXIDE TAB 400 MG (241.3 MG ELEMENTAL MG) 400 MG: 400 (241.3 MG) TAB at 08:36

## 2021-12-13 RX ADMIN — ACAMPROSATE CALCIUM 666 MG: 333 TABLET, DELAYED RELEASE ORAL at 21:00

## 2021-12-13 RX ADMIN — LORAZEPAM 0.5 MG: 0.5 TABLET ORAL at 08:50

## 2021-12-13 RX ADMIN — Medication 400 MG: at 21:03

## 2021-12-13 RX ADMIN — Medication 25 MG: at 21:06

## 2021-12-13 RX ADMIN — FOLIC ACID 1000 MCG: 1 TABLET ORAL at 08:36

## 2021-12-13 RX ADMIN — LEVOTHYROXINE SODIUM 75 MCG: 0.03 TABLET ORAL at 07:02

## 2021-12-13 RX ADMIN — ACAMPROSATE CALCIUM 666 MG: 333 TABLET, DELAYED RELEASE ORAL at 13:25

## 2021-12-13 RX ADMIN — DIPHENHYDRAMINE HCL 25 MG: 25 TABLET ORAL at 08:36

## 2021-12-13 RX ADMIN — PREGABALIN 200 MG: 100 CAPSULE ORAL at 21:02

## 2021-12-13 RX ADMIN — ACETAMINOPHEN 975 MG: 325 TABLET ORAL at 00:00

## 2021-12-13 RX ADMIN — NICOTINE POLACRILEX 8 MG: 2 GUM, CHEWING BUCCAL at 17:43

## 2021-12-13 RX ADMIN — BUPRENORPHINE AND NALOXONE 1 FILM: 8; 2 FILM BUCCAL; SUBLINGUAL at 21:04

## 2021-12-13 RX ADMIN — PREGABALIN 200 MG: 100 CAPSULE ORAL at 13:25

## 2021-12-13 RX ADMIN — BUPRENORPHINE 8 MG: 2 TABLET SUBLINGUAL at 13:25

## 2021-12-13 RX ADMIN — PREGABALIN 200 MG: 100 CAPSULE ORAL at 08:36

## 2021-12-13 RX ADMIN — QUETIAPINE 300 MG: 300 TABLET, FILM COATED ORAL at 21:02

## 2021-12-13 RX ADMIN — Medication 1 MG: at 01:26

## 2021-12-13 RX ADMIN — BUPRENORPHINE 8 MG: 2 TABLET SUBLINGUAL at 08:36

## 2021-12-13 RX ADMIN — ACETAMINOPHEN 975 MG: 325 TABLET, FILM COATED ORAL at 19:38

## 2021-12-13 RX ADMIN — NICOTINE POLACRILEX 8 MG: 4 GUM, CHEWING ORAL at 01:27

## 2021-12-13 RX ADMIN — ARIPIPRAZOLE 5 MG: 5 TABLET ORAL at 08:44

## 2021-12-13 RX ADMIN — TIZANIDINE 4 MG: 4 TABLET ORAL at 01:52

## 2021-12-13 RX ADMIN — MULTIPLE VITAMINS W/ MINERALS TAB 1 TABLET: TAB at 08:36

## 2021-12-13 RX ADMIN — GABAPENTIN 100 MG: 100 CAPSULE ORAL at 00:00

## 2021-12-13 RX ADMIN — ACETAMINOPHEN 975 MG: 325 TABLET ORAL at 13:37

## 2021-12-13 ASSESSMENT — ACTIVITIES OF DAILY LIVING (ADL)
ADLS_ACUITY_SCORE: 7
HYGIENE/GROOMING: INDEPENDENT
ADLS_ACUITY_SCORE: 7
DRESS: SCRUBS (BEHAVIORAL HEALTH);INDEPENDENT
ADLS_ACUITY_SCORE: 7
ORAL_HYGIENE: INDEPENDENT
ADLS_ACUITY_SCORE: 7

## 2021-12-13 NOTE — PROGRESS NOTES
12/13/21 6649   Patient Belongings   Patient Belongings remains with patient;locker   Patient Belongings Remaining with Patient none   Patient Belongings Put in Hospital Secure Location (Security or Locker, etc.) none   Belongings Search Yes   Clothing Search Yes   Second Staff Derik TSAI and Shay TSAI     With pt: reading glasses x 2, one pair of boxers, 14 books    Locker: misc toiletries from previous hospital    *pt did not come in with any other belongings, stated that [facility] lost all of his belongings and are working on finding them    A               Admission:  I am responsible for any personal items that are not sent to the safe or pharmacy.  [facility] is not responsible for loss, theft or damage of any property in my possession.    Signature:  _________________________________ Date: _______  Time: _____                                              Staff Signature:  ____________________________ Date: ________  Time: _____      2nd Staff person, if patient is unable/unwilling to sign:    Signature: ________________________________ Date: ________  Time: _____     Discharge:  [facility] has returned all of my personal belongings:    Signature: _________________________________ Date: ________  Time: _____                                          Staff Signature:  ____________________________ Date: ________  Time: _____

## 2021-12-13 NOTE — PLAN OF CARE
Problem: Pain Chronic (Persistent)  Goal: Acceptable Pain Control and Functional Ability  12/12/2021 2242 by Zuleyka Seymour RN  Outcome: Improving  12/12/2021 1617 by Zuleyka Seymour RN  Outcome: Improving  Intervention: Develop Pain Management Plan  Recent Flowsheet Documentation  Taken 12/12/2021 1515 by Zuleyka Seymour RN  Pain Management Interventions:   distraction   ambulation/increased activity   emotional support   diversional activity provided  Taken 12/12/2021 1100 by Zuleyka Seymour RN  Pain Management Interventions: (ace wrap) other (see comments)  Taken 12/12/2021 1042 by Zuleyka Seymour RN  Pain Management Interventions:   medication (see MAR)   emotional support   distraction  Orthopedics consulted and saw pt. Regarding right knee. Injection done, see note. Pt. States right knee is feeling better. Pt. Receiving suboxone for chronic back pain. Pt. Ambulates in hallway frequently to change position and to help with anxiety.       Problem: Mood Impairment (Anxiety Signs/Symptoms)  Goal: Improved Mood Symptoms (Anxiety Signs/Symptoms)  12/12/2021 2242 by Zuleyka Seymour RN  Outcome: Improving  12/12/2021 1617 by Zuleyka Seymour RN  Outcome: Improving  Scheduled ativan given early per MD and pt. Request. Pt. Would like to receive 2nd dose of ativan earlier than 2100. As needed gabapentin available also. Pt. Cooperative with all cares.     Zuleyka Seymour RN

## 2021-12-13 NOTE — TELEPHONE ENCOUNTER
R: 5;47PM- 3B RN Charge called to notify intake that the last time Pt was on the unit he had an order for a private bed d/t his TBI behavior.  At this time they don't have a private and so will not be able to admit him this shift.   suggested to inform [facility].      Intake called Ulises MOORE RN to update that Pt will need a private bed so not able to take this shift.  [facility] is working on staffing and room.  MARYCRUZK may call to follow up in the AM if Pt doesn't go tonight.     ANS notified via phone.  She will discuss w/ 3B.

## 2021-12-13 NOTE — PROGRESS NOTES
Patient discharged to [facility] in psych unit at this time transported  [facility] transportation via stretcher. Patient alert and stable. VSS  2 bags belongings sent along with patient.  Katy Gee RN

## 2021-12-13 NOTE — DISCHARGE SUMMARY
[facility]  Hospitalist Discharge Summary      Date of Admission:  12/11/2021  Date of Discharge:  12/13/2021  Discharging Provider: Sharon Camarena MD      Discharge Diagnoses   Principal Problem:    Bipolar disorder (H)  Active Problems:    Post concussive encephalopathy    H/O shoulder surgery    Alcoholism in remission (H)    Chronic back pain    Medication overdose, intentional self-harm, subsequent encounter    Opioid dependence on agonist therapy (H)      Follow-ups Needed After Discharge   Follow-up Appointments     Follow Up Care      Follow-up a sports medicine team at [facility] for any ongoing   right knee pain. Call our scheduling line at [phone] to make an   appointment if you do not already have one scheduled.             Unresulted Labs Ordered in the Past 30 Days of this Admission     Date and Time Order Name Status Description    12/11/2021  4:42 PM MRSA Culture Reflex Preliminary           Discharge Disposition   Transferred to [facility] inpatient psychiatry   Condition at discharge: Stable      Hospital Course   Patient is a 51-year-old male with a history of bipolar disorder, borderline personality disorder, depression, TBI and polysubstance abuse, hypertension, group home resident, who presented to [facility] on 11/13 with suicidal attempt, transfer to [facility] inpatient psychiatry on 11/19/2021, then transferred to [facility] in 11/22/2021 with Covid pneumonia and respiratory failure with hypoxia.     Severe COVID-19 pneumonia, with acute hypoxic respiratory failure, bilateral dense pulmonary infiltrates,tachycardia, tachypnea.  Initially required 4 LPM O2, has been weaned off oxygen on 11/28.    Completed 7 days of Decadron, stopped with hypoxia resolved.  Completed 5 days of remdesivir.  Anticoagulated with Eliquis, for DVT prophylaxis, and should continue for 30 days till 12/21/2021.     Covid recovered as of  12/11/2021.     Bipolar disorder  Borderline personality  Major depressive disorder with suicidal attempt  History of traumatic brain injury  History of polysubstance abuse  History of alcohol abuse  Presented to [facility] ED with suicidal attempt with overdose by 5 tablets of oxycodone, 22 of 300 mg tablets of Seroquel and 4X 8 mg tablets of Suboxone while drinking 1 gallon of vodka within a 24-hour.  He was cleared by poison control.  one-to-one sitter for ongoing suicidality.  He was seen by psychiatry and addiction medicine.  He had a panic attack on 11/27/2021, was started on Ativan, with a taper.  On Subutex 8 mg twice a day.  Currently on Abilify, Zyprexa, Seroquel, Benadryl, Lyrica, Ativan, Propranolol as per psychiatry.  -MD to MD report to Dr. Bernard Pearce on 12/12/21 accepted at [facility]    given ativan prior to transferring     Alcohol hepatitis, resolved.  Initial elevated LFTs, normalized.  Tobacco use disorder, on replacement with nicotine gum.  Essential hypertension, stable with amlodipine and propranolol.  GERD-on PPI.  Slow transit constipation.  On senna.  Nasal bone fracture-plan to see ENT as outpatient.  Hypothyroidism-on levothyroxine.  Dilated aortic root, noted on echo but not prominent on CTA chest. Would recommend annual surveillance CT.  Mild hyperkalemia, due to poor p.o.  Resolved with replacement.  Acute on chronic back pain.  No rib fractures and chest x-ray.  Seen by pain specialist, who prescribed tizanidine.  Continued Lyrica.  Anal burning with bowel movements.  Calmoseptine has been helpful    Consultations This Hospital Stay   PSYCHIATRY IP CONSULT  ADDICTION MEDICINE INPATIENT CONSULT  PHYSICAL THERAPY ADULT IP CONSULT  CARE MANAGEMENT / SOCIAL WORK IP CONSULT  ORTHOPEDIC SURGERY IP CONSULT  PSYCHIATRY IP CONSULT  ADDICTION MEDICINE INPATIENT CONSULT  CARE MANAGEMENT / SOCIAL WORK IP CONSULT    Code Status   Full Code    Time Spent on this Encounter   I, Sharon Camarena MD,  personally saw the patient today and spent less than or equal to 30 minutes discharging this patient.       Sharon Camarena MD  [address] [address]  Phone: [phone]  Fax: [phone]  ______________________________________________________________________    Physical Exam   Vital Signs: Temp: 98.5  F (36.9  C) Temp src: Oral BP: 130/69 Pulse: 79   Resp: 18 SpO2: 92 % O2 Device: None (Room air)    Weight: 289 lbs 0 oz  Physical Exam  Constitutional:       Appearance: Normal appearance.   Cardiovascular:      Pulses: Normal pulses.      Heart sounds: Normal heart sounds.   Pulmonary:      Effort: Pulmonary effort is normal.      Breath sounds: Normal breath sounds.   Abdominal:      General: Bowel sounds are normal.      Palpations: Abdomen is soft.   Musculoskeletal:         General: Normal range of motion.   Skin:     General: Skin is warm and dry.   Neurological:      Mental Status: He is alert.   Psychiatric:         Attention and Perception: Attention normal.         Mood and Affect: Mood and affect normal.         Speech: Speech normal.         Behavior: Behavior is cooperative.         Thought Content: Thought content normal.            Primary Care Physician   Physician No Ref-Primary    Discharge Orders      Follow Up Care    Follow-up a sports medicine team at [facility] for any ongoing right knee pain. Call our scheduling line at [phone] to make an appointment if you do not already have one scheduled.       Significant Results and Procedures   Most Recent 3 CBC's:Recent Labs   Lab Test 12/11/21  1830 12/05/21  0559 12/03/21  0541   WBC 5.5 8.6 11.9*   HGB 12.0* 11.7* 11.4*   MCV 95 94 96    288 303     Most Recent 3 BMP's:Recent Labs   Lab Test 12/11/21  1830 12/10/21  1053 12/03/21  0542 11/30/21  0546 11/29/21  0540 11/28/21  0957 11/28/21  0727 11/27/21  0652 11/26/21  0950     --   --   --   --   --   --   141 142   POTASSIUM 4.2  --   --  3.7 4.0   < >  --  3.4* 3.6   CHLORIDE 106  --   --   --   --   --   --  103 104   CO2 27  --   --   --   --   --   --  28 28   BUN 22  --   --   --   --   --   --  20 20   CR 0.74 0.72 0.65*  --   --   --   --  0.68* 0.72   ANIONGAP 7  --   --   --   --   --   --  10 10   KACY 8.8  --   --   --   --   --   --  8.5 8.5     --   --   --   --   --  119* 145* 154*    < > = values in this interval not displayed.   ,   Results for orders placed or performed during the hospital encounter of 12/11/21   MR Knee Right w/o & w Contrast    Narrative    EXAM: MR KNEE RIGHT W/O AND W CONTRAST  LOCATION: [facility]  DATE/TIME: 12/12/2021 1:53 PM    INDICATION: Chronic right knee pain.  COMPARISON: Radiographs from 12/1/2021.  TECHNIQUE: Routine. Additional postgadolinium T1 sequences were obtained.  IV CONTRAST: 13 mL Gadavist.    FINDINGS:    MEDIAL COMPARTMENT:   -Meniscus: Complex tear in the body and posterior horn of the medial meniscus. Complete medial extrusion of the body of the medial meniscus from the knee joint indicating loss of hoop tension.  -Cartilage: Extensive full-thickness cartilage loss in the medial femoral condyle weightbearing surface and medial tibial plateau. Marked underlying marrow edema but no clear evidence of a fracture.    LATERAL COMPARTMENT:  -Meniscus: Complex tear in the anterior horn and root. There is also probably a vertical longitudinal tear in the peripheral-most aspect (red zone) of the posterior horn, and horizontal tearing extends into the anterior body from the anterior horn.   -Cartilage: Moderate amount of grade II and III chondromalacia in the lateral femoral condyle weightbearing surface. Small amount of grade II chondromalacia in the lateral tibial plateau.    PATELLOFEMORAL COMPARTMENT:   -Alignment: Mild lateral subluxation and tilt of the patella.   -Cartilage: There is grade II chondromalacia of the majority of  the patella and there is a small amount of full-thickness fissuring. There is a moderate amount of grade II chondromalacia of the trochlea.    CRUCIATE LIGAMENTS:   -ACL: Normal.  -PCL: Normal variant, with a slip of PCL extending from the femoral origin to attach to the periphery of the posterior horn of the lateral meniscus. No PCL tear.    COLLATERAL LIGAMENTS:   -Medial collateral ligament: Superficial and deep fibers are normal.  -Lateral collateral ligament: Normal.    POSTEROMEDIAL CORNER:  -Distal semimembranosus tendon is normal.   -Pes anserine tendons are normal. Posteromedial corner complex ligaments are intact.    POSTEROLATERAL CORNER:   -Popliteal tendon is intact. No tendinopathy.  -Biceps femoris tendon and posterolateral corner complex ligaments are intact.    EXTENSOR MECHANISM:   -Quadriceps tendon: Normal.  -Patellar tendon: Normal.  -Patellofemoral ligaments and retinacula: Intact.    JOINT:   -Moderately large effusion. Diffuse synovitis. No evidence of an intraarticular body.    BONES:  -No fracture or concerning marrow replacing lesion.    SOFT TISSUES:   -Moderate-sized popliteal cyst.       Impression    IMPRESSION:  1.  Complex medial meniscal tear with loss of hoop tension. Extensive full-thickness cartilage loss in the medial femoral condyle and medial tibial plateau. Marked underlying marrow edema, likely reactive or stress-related. There is no clear evidence of   stress fracture.  2.  Complex lateral meniscal tear. Mild to moderate lateral compartment chondromalacia.  3.  Mild lateral subluxation and tilt of the patella. Mild patellofemoral chondromalacia with a small amount of full-thickness fissuring in the patella.  4.  Normal variant PCL anatomy. No cruciate ligament tear or collateral ligament tear.  5.  Moderately large knee joint effusion with diffuse synovitis and a moderate-sized popliteal cyst.       Discharge Medications   Current Discharge Medication List      CONTINUE  these medications which have NOT CHANGED    Details   acamprosate (CAMPRAL) 333 MG EC tablet Take 2 tablets (666 mg) by mouth 3 times daily  Qty: 180 tablet, Refills: 0    Associated Diagnoses: Alcohol withdrawal syndrome with complication, with unspecified complication (H)      acetaminophen (TYLENOL) 325 MG tablet Take 3 tablets (975 mg) by mouth every 6 hours as needed for mild pain, fever or headaches (to moderate pain)  Refills: 0    Associated Diagnoses: Chronic bilateral low back pain with sciatica, sciatica laterality unspecified      amLODIPine (NORVASC) 5 MG tablet Take 1 tablet (5 mg) by mouth daily  Qty: 30 tablet, Refills: 1    Associated Diagnoses: Essential hypertension      apixaban ANTICOAGULANT (ELIQUIS) 2.5 MG tablet Take 1 tablet (2.5 mg) by mouth 2 times daily for 10 days  Qty: 21 tablet, Refills: 0    Associated Diagnoses: 2019 novel coronavirus disease (COVID-19)      ARIPiprazole (ABILIFY) 5 MG tablet Take 1 tablet (5 mg) by mouth daily  Qty: 30 tablet, Refills: 0    Associated Diagnoses: Severe bipolar I disorder, current or most recent episode depressed, with mixed features (H)      buprenorphine HCl-naloxone HCl (SUBOXONE) 8-2 MG per film Place 1 Film under the tongue 3 times daily    Associated Diagnoses: Suicidal ideation; Intentional drug overdose, initial encounter (H); Alcohol withdrawal syndrome with complication (H)      diclofenac (VOLTAREN) 1 % topical gel Apply 2 g topically 4 times daily as needed for moderate pain  Qty: 150 g, Refills: 1    Associated Diagnoses: Alcohol dependence with intoxication with complication (H)      diphenhydrAMINE (BENADRYL) 25 MG tablet Take 1 tablet (25 mg) by mouth 3 times daily for 10 days  Qty: 30 tablet, Refills: 0    Associated Diagnoses: Chronic bilateral low back pain with sciatica, sciatica laterality unspecified      docusate sodium (COLACE) 100 MG capsule Take 1 capsule (100 mg) by mouth 2 times daily  Qty: 60 capsule, Refills: 1     Associated Diagnoses: Constipation, unspecified constipation type      folic acid (FOLVITE) 1 MG tablet Take 1 tablet (1,000 mcg) by mouth daily  Qty: 90 tablet, Refills: 1    Associated Diagnoses: Alcoholism in remission (H)      gabapentin (NEURONTIN) 100 MG capsule Take 1 capsule (100 mg) by mouth every 6 hours as needed (anxiety)  Qty: 60 capsule, Refills: 0    Associated Diagnoses: Chronic bilateral low back pain with sciatica, sciatica laterality unspecified      levothyroxine (SYNTHROID/LEVOTHROID) 75 MCG tablet Take 1 tablet (75 mcg) by mouth every morning (before breakfast)  Qty: 30 tablet, Refills: 1    Associated Diagnoses: Hypothyroidism, unspecified type      !! LORazepam (ATIVAN) 0.5 MG tablet Take 1 tablet (0.5 mg) by mouth 2 times daily for 2 doses  Qty: 2 tablet, Refills: 0    Associated Diagnoses: Severe bipolar I disorder, current or most recent episode depressed, with mixed features (H)      !! LORazepam (ATIVAN) 0.5 MG tablet Take 1 tablet (0.5 mg) by mouth once for 1 dose  Qty: 1 tablet, Refills: 0    Associated Diagnoses: Severe bipolar I disorder, current or most recent episode depressed, with mixed features (H)      magnesium oxide (MAG-OX) 400 MG tablet Take 1 tablet (400 mg) by mouth 2 times daily for 10 days  Qty: 20 tablet, Refills: 0    Associated Diagnoses: Hypomagnesemia      melatonin 3 MG tablet Take 1 tablet (3 mg) by mouth nightly as needed for sleep  Refills: 0    Associated Diagnoses: Primary insomnia      miconazole with skin protectant (SELENA ANTIFUNGAL) 2 % CREA cream Apply topically 2 times daily  Qty: 30 g, Refills: 1    Associated Diagnoses: Fungal infection      multivitamin w/minerals (THERA-VIT-M) tablet Take 1 tablet by mouth daily  Qty: 30 tablet, Refills: 1    Associated Diagnoses: Alcohol dependence with intoxication with complication (H)      nicotine polacrilex (NICORETTE) 4 MG gum Place 1-2 each (4-8 mg) inside cheek every hour as needed for other (nicotine  withdrawal symptoms)  Qty: 100 each, Refills: 1    Associated Diagnoses: Alcohol dependence with intoxication with complication (H)      OLANZapine zydis (ZYPREXA) 5 MG ODT Take 1 tablet (5 mg) by mouth every 6 hours as needed for agitation (anxiety)    Associated Diagnoses: Suicidal ideation; Intentional drug overdose, initial encounter (H); Alcohol withdrawal syndrome with complication (H)      ondansetron (ZOFRAN-ODT) 4 MG ODT tab Take 1 tablet (4 mg) by mouth every 6 hours as needed (Nausea and Vomiting)    Associated Diagnoses: Suicidal ideation; Intentional drug overdose, initial encounter (H); Alcohol withdrawal syndrome with complication (H)      pantoprazole (PROTONIX) 40 MG EC tablet Take 1 tablet (40 mg) by mouth every morning (before breakfast)  Qty: 30 tablet, Refills: 1    Associated Diagnoses: Gastroesophageal reflux disease with esophagitis, unspecified whether hemorrhage      polyethylene glycol (MIRALAX) 17 GM/Dose powder Take 17 g by mouth daily  Qty: 510 g    Associated Diagnoses: Constipation, unspecified constipation type      prazosin (MINIPRESS) 1 MG capsule Take 1 capsule (1 mg) by mouth At Bedtime  Qty: 30 capsule, Refills: 0    Associated Diagnoses: Alcohol withdrawal syndrome with complication, with unspecified complication (H)      Pregabalin (LYRICA) 200 MG capsule Take 200 mg by mouth 3 times daily      QUEtiapine (SEROQUEL) 300 MG tablet Take 1 tablet (300 mg) by mouth At Bedtime  Qty: 30 tablet, Refills: 1    Associated Diagnoses: Alcohol dependence with intoxication with complication (H)      sennosides (SENOKOT) 8.6 MG tablet Take 1-2 tablets by mouth 2 times daily as needed for constipation    Associated Diagnoses: Constipation, unspecified constipation type      sodium chloride (OCEAN) 0.65 % nasal spray Spray 1 spray into both nostrils every hour as needed for congestion  Qty: 88 mL    Associated Diagnoses: 2019 novel coronavirus disease (COVID-19)      tiZANidine (ZANAFLEX) 4  MG tablet Take 1 tablet (4 mg) by mouth every 8 hours as needed for other (chronic back pain)  Refills: 0    Associated Diagnoses: Chronic bilateral low back pain with sciatica, sciatica laterality unspecified       !! - Potential duplicate medications found. Please discuss with provider.        Allergies   Allergies   Allergen Reactions     Cucumber Extract Anaphylaxis     Cucumber the vegable     Hydroxyzine Hives     Previously unreported by patient, this is a new patient declaration as of 5/1/21.     Nsaids      No problem with oral NSAIDs--Toradol injection itching only.     Trazodone Itching     Per Patient

## 2021-12-13 NOTE — PROGRESS NOTES
"Addiction Medicine    Pt. Was supposed to transfer back to [facility] for further MH treatment once he recovered from the COVID pneumoia.  He was transferred there last evening but was sent back because he was \"too medically complex.\"   The patient was at [facility] prior to transfer to [facility] for COVID 19.     He is on buprenorphine 8 mg tid sl and this should be continued.   He was to have followup in the [facility] after discharge for management of the Suboxone      He has been on benzodiazepines - which were started for alcohol withdrawal originally.  He has continued them to request diazepam, even though his withdrawal is well past.   Most recently, he has been on a lorazepam taper.  Got 0.5 mg bid today, and scheduled to get once in AM and then should be done.      Will follow up tomorrow to see which  MH unit he transfers to .       Ivette Mahoney MD  Addiction Medicine Service  [facility]   Page me (click here for Salena Mahoney)           "

## 2021-12-13 NOTE — PROGRESS NOTES
Care Management Follow Up    Length of Stay (days): 2    Expected Discharge Date: 12/13/2021     Concerns to be Addressed:       Patient plan of care discussed at interdisciplinary rounds: Yes    Anticipated Discharge Disposition:       Anticipated Discharge Services:    Anticipated Discharge DME:      Patient/family educated on Medicare website which has current facility and service quality ratings:    Education Provided on the Discharge Plan:    Patient/Family in Agreement with the Plan:      Referrals Placed by CM/SW:    Private pay costs discussed:   Additional Information:  Chart review.  Patient was to discharge yesterday to [facility] in [facility] [place].  Called behavior health line re: bed avail.  For today.  They had one avail last night but was defer discharge by on call MD for primary team to work on.  Behavior health referral line will call unit or worker today if a bed is avail.  CM to continue to work on.       Deja Victoria, [facility]    Addendum:  Floor nurse told worker that they have accepted patient on 3bw at [facility].  Called Behavior health referral line re: if ride could be set up.  They said yes.  [facility] will transport patient by stretcher at 4 pm, patient on  hold to get to [facility], and is aware of this.  Called 3[facility] and informed them of transport time. Called MD to write orders.

## 2021-12-13 NOTE — SIGNIFICANT EVENT
Significant Event Note    Time of event: 11:44 PM December 12, 2021    Description of event:  Received a page at 4730 regarding transferring this patient to another psych unit as bed is available.  Patient is on one-to-one for suicidal precautions but no acute agitation or distress at this point.  He was readmitted yesterday as he was medically complex to manage at psych unit.   Defer discharge to primary team in the a.m.      Discussed with: bedside nurse    Lam Sarah MD

## 2021-12-13 NOTE — CONSULTS
ORTHOPEDIC CONSULTATION    Consultation  Hollis Barclay,  1970, MRN [id number]    [unfilled]  Bipolar disorder (H) [F31.9]    PCP: No Ref-Primary, Physician, None   Code status:  Full Code       Extended Emergency Contact Information  Primary Emergency Contact: Dannielle Peña Phone: [phone]  Relation: None         CHIEF COMPLAINT: Right knee pain      HISTORY OF PRESENT ILLNESS:  The patient is seen in orthopedic consultation at the request of Dr Crystal Michel for right knee pain. Chronic right knee pain, denies history of trauma or previous surgery. Previous cortisone injections while living in [place].  Last injection given 6+ months ago.  Generalized ache and stiffness about the right knee.  Tightness at end range of flexion. Pain is exacerbated with weightbearing and extended walking      PAST MEDICAL HISTORY:  [unfilled]  [unfilled]      ALLERGIES:   Review of patient's allergies indicates   Allergies   Allergen Reactions     Cucumber Extract Anaphylaxis     Cucumber the vegable     Hydroxyzine Hives     Previously unreported by patient, this is a new patient declaration as of 21.     Nsaids      No problem with oral NSAIDs--Toradol injection itching only.     Trazodone Itching     Per Patient         MEDICATIONS UPON ADMISSION:  Medications were reviewed.  They include:   Medications Prior to Admission   Medication Sig Dispense Refill Last Dose     acamprosate (CAMPRAL) 333 MG EC tablet Take 2 tablets (666 mg) by mouth 3 times daily 180 tablet 0 2021 at x2     acetaminophen (TYLENOL) 325 MG tablet Take 3 tablets (975 mg) by mouth every 6 hours as needed for mild pain, fever or headaches (to moderate pain)  0 Past Month at Unknown time     amLODIPine (NORVASC) 5 MG tablet Take 1 tablet (5 mg) by mouth daily 30 tablet 1 2021 at AM     apixaban ANTICOAGULANT (ELIQUIS) 2.5 MG tablet Take 1 tablet (2.5 mg) by mouth 2 times daily for 10 days 21 tablet 0 2021 at AM      ARIPiprazole (ABILIFY) 5 MG tablet Take 1 tablet (5 mg) by mouth daily 30 tablet 0 12/11/2021 at Unknown time     buprenorphine HCl-naloxone HCl (SUBOXONE) 8-2 MG per film Place 1 Film under the tongue 3 times daily   12/11/2021 at x2     diclofenac (VOLTAREN) 1 % topical gel Apply 2 g topically 4 times daily as needed for moderate pain 150 g 1      diphenhydrAMINE (BENADRYL) 25 MG tablet Take 1 tablet (25 mg) by mouth 3 times daily for 10 days 30 tablet 0 12/11/2021 at x2     docusate sodium (COLACE) 100 MG capsule Take 1 capsule (100 mg) by mouth 2 times daily 60 capsule 1      folic acid (FOLVITE) 1 MG tablet Take 1 tablet (1,000 mcg) by mouth daily 90 tablet 1 12/11/2021 at Unknown time     gabapentin (NEURONTIN) 100 MG capsule Take 1 capsule (100 mg) by mouth every 6 hours as needed (anxiety) 60 capsule 0      levothyroxine (SYNTHROID/LEVOTHROID) 75 MCG tablet Take 1 tablet (75 mcg) by mouth every morning (before breakfast) 30 tablet 1 12/11/2021 at Unknown time     LORazepam (ATIVAN) 0.5 MG tablet Take 1 tablet (0.5 mg) by mouth 3 times daily for 3 doses 3 tablet 0 12/11/2021 at x2     LORazepam (ATIVAN) 0.5 MG tablet Take 1 tablet (0.5 mg) by mouth 2 times daily for 2 doses 2 tablet 0      [START ON 12/13/2021] LORazepam (ATIVAN) 0.5 MG tablet Take 1 tablet (0.5 mg) by mouth once for 1 dose 1 tablet 0      magnesium oxide (MAG-OX) 400 MG tablet Take 1 tablet (400 mg) by mouth 2 times daily for 10 days 20 tablet 0 12/11/2021 at AM     melatonin 3 MG tablet Take 1 tablet (3 mg) by mouth nightly as needed for sleep  0      miconazole with skin protectant (SELENA ANTIFUNGAL) 2 % CREA cream Apply topically 2 times daily (Patient taking differently: Apply topically 2 times daily For athlete's foot) 30 g 1      multivitamin w/minerals (THERA-VIT-M) tablet Take 1 tablet by mouth daily 30 tablet 1 12/11/2021 at Unknown time     nicotine polacrilex (NICORETTE) 4 MG gum Place 1-2 each (4-8 mg) inside cheek every hour as  needed for other (nicotine withdrawal symptoms) 100 each 1      OLANZapine zydis (ZYPREXA) 5 MG ODT Take 1 tablet (5 mg) by mouth every 6 hours as needed for agitation (anxiety)   12/11/2021 at x2     ondansetron (ZOFRAN-ODT) 4 MG ODT tab Take 1 tablet (4 mg) by mouth every 6 hours as needed (Nausea and Vomiting)        pantoprazole (PROTONIX) 40 MG EC tablet Take 1 tablet (40 mg) by mouth every morning (before breakfast) 30 tablet 1 12/11/2021 at Unknown time     polyethylene glycol (MIRALAX) 17 GM/Dose powder Take 17 g by mouth daily 510 g       prazosin (MINIPRESS) 1 MG capsule Take 1 capsule (1 mg) by mouth At Bedtime 30 capsule 0      Pregabalin (LYRICA) 200 MG capsule Take 200 mg by mouth 3 times daily   12/11/2021 at x2     QUEtiapine (SEROQUEL) 300 MG tablet Take 1 tablet (300 mg) by mouth At Bedtime 30 tablet 1 12/10/2021 at Unknown time     sennosides (SENOKOT) 8.6 MG tablet Take 1-2 tablets by mouth 2 times daily as needed for constipation        sodium chloride (OCEAN) 0.65 % nasal spray Spray 1 spray into both nostrils every hour as needed for congestion 88 mL       tiZANidine (ZANAFLEX) 4 MG tablet Take 1 tablet (4 mg) by mouth every 8 hours as needed for other (chronic back pain)  0 12/11/2021 at x2         SOCIAL HISTORY:   he  reports that he has quit smoking. His smoking use included dip, chew, snus or snuff. He has quit using smokeless tobacco.  His smokeless tobacco use included chew. He reports previous alcohol use. He reports previous drug use.      FAMILY HISTORY:  family history is not on file.      REVIEW OF SYSTEMS:   See HPI, otherwise negative       PHYSICAL EXAMINATION:  Vitals: Temp:  [97.8  F (36.6  C)-98.5  F (36.9  C)] 97.8  F (36.6  C)  Pulse:  [85-92] 85  Resp:  [20] 20  BP: (109-147)/(64-97) 147/97  SpO2:  [90 %-93 %] 93 %  General: On examination, the patient is calm, resting comfortably, NAD, awake and alert and oriented to person, place, time, and and general circumstances    SKIN: There is no evidence of erythema, ecchymosis, abrasions, or and lacerations   Pulses:  dorsalis pedis  Sensation: present and intact and equal bilaterally  Tenderness: global tenderness about the right knee. Palpable popliteal cyst  ROM: 0-90 with tightness noted at end range of flexion  Motor: 5/5  Contralateral side= Full range of motion, Negative joint instability findings, 5/5 motor groups about the joint, Non-tender.       RADIOGRAPHIC EVALUATION:    EXAM: XR KNEE PORT RIGHT 3 VIEWS  LOCATION: [facility]  DATE/TIME: 12/1/2021 6:00 PM     INDICATION: Right knee pain--rule out fracture or dislocation  COMPARISON: None.                                                                      IMPRESSION: Medial compartment moderate joint space narrowing and probable nonspecific joint effusion. No apparent fracture.    MRI right knee dated 12/12/21  IMPRESSION:  1.  Complex medial meniscal tear with loss of hoop tension. Extensive full-thickness cartilage loss in the medial femoral condyle and medial tibial plateau. Marked underlying marrow edema, likely reactive or stress-related. There is no clear evidence of   stress fracture.  2.  Complex lateral meniscal tear. Mild to moderate lateral compartment chondromalacia.  3.  Mild lateral subluxation and tilt of the patella. Mild patellofemoral chondromalacia with a small amount of full-thickness fissuring in the patella.  4.  Normal variant PCL anatomy. No cruciate ligament tear or collateral ligament tear.  5.  Moderately large knee joint effusion with diffuse synovitis and           Pertinent Labs  Lab Results: personally reviewed.   No visits with results within 2 Month(s) from this visit.   Latest known visit with results is:   Results Only on 08/19/2020   Component Date Value     Lipase 08/19/2020 46*       IMPRESSION:  Right knee complex medial and lateral meniscus tears with advanced medial and moderate lateral and patellofemoral  compartment osteoarthritis     PLAN:  This patient was discussed with Dr Lam Marquez, on-call surgeon for [facility] and they are in agreement with the following plan.       Right knee aspiration with cortisone injection  WBAT right LE  Follow up as outpatient depending upon symptom course    After verbal and written consent was obtained the right knee was sterile prepped followed by injection of 3cc of 1% plain lidocaine to anesthetize the skin and deeper structures.  50cc of normally appearing non purulent ent fluid was aspirated followed by injection of 1cc of 80mg/ml depomedrol into the right knee using a lateratl subpatellar approach at the bedside.  Pateint tolerated the procedure well      Thank you for including [facility] in the care of Hollis Barclay. It has been a pleasure participating in their care.    Darrell Anderson PA-C      CC1:   Crystal Michel MD    CC2:   No Ref-Primary, Physician

## 2021-12-13 NOTE — TELEPHONE ENCOUNTER
R:  12-13-21 8 AM  Patient in  work Q awaiting review from  NM.  Patient's RN and LSW on P4 updated.    R:  Call to [facility] p4, everything all set for patient to transfer to  at 4 PM.

## 2021-12-13 NOTE — PROGRESS NOTES
"Orthopedic Progress Note      Assessment:    Right knee pain. History of medial meniscus tear.  Right knee aspiration and cortisone injection on 12/12/21.    Plan:   - Patient doing very well after aspiration and cortisone injection. States he has no knee pain and has been ambulating without any problem.   - WBAT on RLE  - Patient may follow up with [facility] as an outpatient for any other concerns with his right knee or for repeat cortisone injection in 3+ months. Ortho will sign off at this point. May reconsult with any further issues.    Subjective:  Pain: None  Nausea, Vomiting:  No  Lightheadedness, Dizziness:  No  Neuro:  Patient denies new onset numbness or paresthesias    Patient doing very well. Happy with the aspiration and cortisone injection. Has been up walking down the halls without pain. No numbness or tingling. No skin changes around the right knee.    Objective:  /69 (BP Location: Left arm)   Pulse 79   Temp 98.5  F (36.9  C) (Oral)   Resp 18   Ht 1.93 m (6' 4\")   Wt 131.1 kg (289 lb)   SpO2 92%   BMI 35.18 kg/m    The patient is A&Ox3, sitting up in his chair.  No significant skin changes around the right knee and injection site.  Sensation is intact in BLE.  Right knee has full ROM without pain.  Calves are soft and non-tender.         Pertinent Labs   Lab Results: personally reviewed.   Lab Results   Component Value Date    INR 0.91 11/22/2021    INR 0.98 03/14/2020    INR 0.93 02/21/2020     Lab Results   Component Value Date    WBC 5.5 12/11/2021    HGB 12.0 (L) 12/11/2021    HCT 38.0 (L) 12/11/2021    MCV 95 12/11/2021     12/11/2021     Lab Results   Component Value Date     12/11/2021    CO2 27 12/11/2021         Report completed by:  Jeannette Alex PA-C  Date: 12/13/2021  Time: 11:19 AM    "

## 2021-12-13 NOTE — PLAN OF CARE
Pt cooperative and calm. Ate a large breakfast. Ambulate ross with staff. Took all medication appropriately.

## 2021-12-13 NOTE — PLAN OF CARE
Problem: Adult Inpatient Plan of Care  Goal: Optimal Comfort and Wellbeing  Outcome: Improving     Problem: Suicide Risk  Goal: Absence of Self-Harm  Outcome: Improving     Problem: Pain Chronic (Persistent)  Goal: Acceptable Pain Control and Functional Ability  Outcome: Improving  Intervention: Develop Pain Management Plan  Recent Flowsheet Documentation  Taken 12/13/2021 0045 by Kelly Thompson, RN  Pain Management Interventions:   medication (see MAR)   cold applied     Pt pacing, anxious and demanding from midnight to 0200-- received multiple PRN's over the two hours for right knee pain and to help sleep. Finally fell asleep and stayed asleep rest of shift. Otherwise pleasant and cooperative with cares. 1:1 continued for safety and suicide risk. Plan is to transfer to [facility] to psych unit, waiting for open bed. Day team to call and verify bed availability this am.

## 2021-12-13 NOTE — PHARMACY-ADMISSION MEDICATION HISTORY
Please see Admission Medication History note completed on 12/11/21 under previous encounter at [facility] for information regarding prior to admission medications.    Nicollette McMann, PharmD  [facility] [place]  Emergency Department: Ascom *[phone]

## 2021-12-14 ENCOUNTER — APPOINTMENT (OUTPATIENT)
Dept: GENERAL RADIOLOGY | Facility: CLINIC | Age: 51
End: 2021-12-14
Attending: PHYSICIAN ASSISTANT
Payer: MEDICAID

## 2021-12-14 ENCOUNTER — APPOINTMENT (OUTPATIENT)
Dept: ULTRASOUND IMAGING | Facility: CLINIC | Age: 51
End: 2021-12-14
Attending: PHYSICIAN ASSISTANT
Payer: MEDICAID

## 2021-12-14 LAB
ALBUMIN SERPL-MCNC: 3 G/DL (ref 3.4–5)
ALP SERPL-CCNC: 94 U/L (ref 40–150)
ALT SERPL W P-5'-P-CCNC: 106 U/L (ref 0–70)
ANION GAP SERPL CALCULATED.3IONS-SCNC: 7 MMOL/L (ref 3–14)
AST SERPL W P-5'-P-CCNC: 57 U/L (ref 0–45)
BASOPHILS # BLD AUTO: 0 10E3/UL (ref 0–0.2)
BASOPHILS NFR BLD AUTO: 0 %
BILIRUB SERPL-MCNC: 0.4 MG/DL (ref 0.2–1.3)
BUN SERPL-MCNC: 19 MG/DL (ref 7–30)
CALCIUM SERPL-MCNC: 8.7 MG/DL (ref 8.5–10.1)
CHLORIDE BLD-SCNC: 108 MMOL/L (ref 94–109)
CO2 SERPL-SCNC: 26 MMOL/L (ref 20–32)
CREAT SERPL-MCNC: 0.54 MG/DL (ref 0.66–1.25)
EOSINOPHIL # BLD AUTO: 0.3 10E3/UL (ref 0–0.7)
EOSINOPHIL NFR BLD AUTO: 7 %
ERYTHROCYTE [DISTWIDTH] IN BLOOD BY AUTOMATED COUNT: 15.1 % (ref 10–15)
GFR SERPL CREATININE-BSD FRML MDRD: >90 ML/MIN/1.73M2
GLUCOSE BLD-MCNC: 128 MG/DL (ref 70–99)
HCT VFR BLD AUTO: 40.8 % (ref 40–53)
HGB BLD-MCNC: 13.4 G/DL (ref 13.3–17.7)
IMM GRANULOCYTES # BLD: 0 10E3/UL
IMM GRANULOCYTES NFR BLD: 0 %
LYMPHOCYTES # BLD AUTO: 1.9 10E3/UL (ref 0.8–5.3)
LYMPHOCYTES NFR BLD AUTO: 38 %
MAGNESIUM SERPL-MCNC: 2.1 MG/DL (ref 1.6–2.3)
MCH RBC QN AUTO: 30 PG (ref 26.5–33)
MCHC RBC AUTO-ENTMCNC: 32.8 G/DL (ref 31.5–36.5)
MCV RBC AUTO: 92 FL (ref 78–100)
MONOCYTES # BLD AUTO: 0.4 10E3/UL (ref 0–1.3)
MONOCYTES NFR BLD AUTO: 8 %
NEUTROPHILS # BLD AUTO: 2.4 10E3/UL (ref 1.6–8.3)
NEUTROPHILS NFR BLD AUTO: 47 %
NRBC # BLD AUTO: 0 10E3/UL
NRBC BLD AUTO-RTO: 0 /100
PLATELET # BLD AUTO: 265 10E3/UL (ref 150–450)
POTASSIUM BLD-SCNC: 3.6 MMOL/L (ref 3.4–5.3)
PROT SERPL-MCNC: 7.7 G/DL (ref 6.8–8.8)
RBC # BLD AUTO: 4.46 10E6/UL (ref 4.4–5.9)
SODIUM SERPL-SCNC: 141 MMOL/L (ref 133–144)
TSH SERPL DL<=0.005 MIU/L-ACNC: 3.04 MU/L (ref 0.4–4)
WBC # BLD AUTO: 5.1 10E3/UL (ref 4–11)

## 2021-12-14 PROCEDURE — 250N000013 HC RX MED GY IP 250 OP 250 PS 637: Performed by: PSYCHIATRY & NEUROLOGY

## 2021-12-14 PROCEDURE — 93971 EXTREMITY STUDY: CPT | Mod: RT

## 2021-12-14 PROCEDURE — 36415 COLL VENOUS BLD VENIPUNCTURE: CPT | Performed by: PHYSICIAN ASSISTANT

## 2021-12-14 PROCEDURE — 85025 COMPLETE CBC W/AUTO DIFF WBC: CPT | Performed by: PSYCHIATRY & NEUROLOGY

## 2021-12-14 PROCEDURE — 86706 HEP B SURFACE ANTIBODY: CPT | Performed by: PHYSICIAN ASSISTANT

## 2021-12-14 PROCEDURE — 250N000013 HC RX MED GY IP 250 OP 250 PS 637: Performed by: PHYSICIAN ASSISTANT

## 2021-12-14 PROCEDURE — 83735 ASSAY OF MAGNESIUM: CPT | Performed by: PHYSICIAN ASSISTANT

## 2021-12-14 PROCEDURE — 99207 PR CONSULT E&M CHANGED TO INITIAL LEVEL: CPT | Performed by: PHYSICIAN ASSISTANT

## 2021-12-14 PROCEDURE — 36415 COLL VENOUS BLD VENIPUNCTURE: CPT | Performed by: PSYCHIATRY & NEUROLOGY

## 2021-12-14 PROCEDURE — 80053 COMPREHEN METABOLIC PANEL: CPT | Performed by: PSYCHIATRY & NEUROLOGY

## 2021-12-14 PROCEDURE — 73560 X-RAY EXAM OF KNEE 1 OR 2: CPT | Mod: RT

## 2021-12-14 PROCEDURE — 87902 NFCT AGT GNTYP ALYS HEP C: CPT | Performed by: PHYSICIAN ASSISTANT

## 2021-12-14 PROCEDURE — 99223 1ST HOSP IP/OBS HIGH 75: CPT | Mod: AI | Performed by: PSYCHIATRY & NEUROLOGY

## 2021-12-14 PROCEDURE — 84443 ASSAY THYROID STIM HORMONE: CPT | Performed by: PHYSICIAN ASSISTANT

## 2021-12-14 PROCEDURE — 99222 1ST HOSP IP/OBS MODERATE 55: CPT | Performed by: PHYSICIAN ASSISTANT

## 2021-12-14 PROCEDURE — G0177 OPPS/PHP; TRAIN & EDUC SERV: HCPCS

## 2021-12-14 PROCEDURE — 86803 HEPATITIS C AB TEST: CPT | Performed by: PHYSICIAN ASSISTANT

## 2021-12-14 PROCEDURE — 87340 HEPATITIS B SURFACE AG IA: CPT | Performed by: PHYSICIAN ASSISTANT

## 2021-12-14 PROCEDURE — 93971 EXTREMITY STUDY: CPT | Mod: 26 | Performed by: RADIOLOGY

## 2021-12-14 PROCEDURE — 124N000003 HC R&B MH SENIOR/ADOLESCENT

## 2021-12-14 RX ORDER — LANOLIN ALCOHOL/MO/W.PET/CERES
3 CREAM (GRAM) TOPICAL
Status: DISCONTINUED | OUTPATIENT
Start: 2021-12-14 | End: 2021-12-24 | Stop reason: HOSPADM

## 2021-12-14 RX ORDER — ALBUTEROL SULFATE 90 UG/1
2 AEROSOL, METERED RESPIRATORY (INHALATION)
Status: COMPLETED | OUTPATIENT
Start: 2021-12-14 | End: 2021-12-16

## 2021-12-14 RX ORDER — ACETAMINOPHEN 325 MG/1
975 TABLET ORAL EVERY 8 HOURS
Status: DISCONTINUED | OUTPATIENT
Start: 2021-12-15 | End: 2021-12-24 | Stop reason: HOSPADM

## 2021-12-14 RX ORDER — AMOXICILLIN 250 MG
1 CAPSULE ORAL AT BEDTIME
Status: DISCONTINUED | OUTPATIENT
Start: 2021-12-14 | End: 2021-12-24 | Stop reason: HOSPADM

## 2021-12-14 RX ORDER — ACETAMINOPHEN 325 MG/1
975 TABLET ORAL ONCE
Status: COMPLETED | OUTPATIENT
Start: 2021-12-14 | End: 2021-12-14

## 2021-12-14 RX ORDER — LIDOCAINE 4 G/G
1 PATCH TOPICAL
Status: DISCONTINUED | OUTPATIENT
Start: 2021-12-14 | End: 2021-12-24 | Stop reason: HOSPADM

## 2021-12-14 RX ORDER — GUAIFENESIN 600 MG/1
600 TABLET, EXTENDED RELEASE ORAL 2 TIMES DAILY
Status: COMPLETED | OUTPATIENT
Start: 2021-12-14 | End: 2021-12-16

## 2021-12-14 RX ADMIN — ACAMPROSATE CALCIUM 666 MG: 333 TABLET, DELAYED RELEASE ORAL at 13:54

## 2021-12-14 RX ADMIN — ALBUTEROL SULFATE 2 PUFF: 90 AEROSOL, METERED RESPIRATORY (INHALATION) at 13:54

## 2021-12-14 RX ADMIN — PREGABALIN 200 MG: 100 CAPSULE ORAL at 13:54

## 2021-12-14 RX ADMIN — TIZANIDINE 4 MG: 2 TABLET ORAL at 00:49

## 2021-12-14 RX ADMIN — Medication 25 MG: at 08:47

## 2021-12-14 RX ADMIN — QUETIAPINE 300 MG: 300 TABLET, FILM COATED ORAL at 21:29

## 2021-12-14 RX ADMIN — POLYETHYLENE GLYCOL 3350 17 G: 17 POWDER, FOR SOLUTION ORAL at 08:48

## 2021-12-14 RX ADMIN — OLANZAPINE 5 MG: 5 TABLET, ORALLY DISINTEGRATING ORAL at 17:21

## 2021-12-14 RX ADMIN — FOLIC ACID 1000 MCG: 1 TABLET ORAL at 08:47

## 2021-12-14 RX ADMIN — BUPRENORPHINE AND NALOXONE 1 FILM: 8; 2 FILM BUCCAL; SUBLINGUAL at 11:19

## 2021-12-14 RX ADMIN — PREGABALIN 200 MG: 100 CAPSULE ORAL at 08:47

## 2021-12-14 RX ADMIN — NICOTINE POLACRILEX 8 MG: 4 GUM, CHEWING ORAL at 22:29

## 2021-12-14 RX ADMIN — Medication 25 MG: at 13:54

## 2021-12-14 RX ADMIN — ARIPIPRAZOLE 5 MG: 5 TABLET ORAL at 08:47

## 2021-12-14 RX ADMIN — NICOTINE POLACRILEX 8 MG: 4 GUM, CHEWING ORAL at 16:43

## 2021-12-14 RX ADMIN — ALBUTEROL SULFATE 2 PUFF: 90 AEROSOL, METERED RESPIRATORY (INHALATION) at 18:19

## 2021-12-14 RX ADMIN — GABAPENTIN 100 MG: 100 CAPSULE ORAL at 18:22

## 2021-12-14 RX ADMIN — GABAPENTIN 100 MG: 100 CAPSULE ORAL at 00:50

## 2021-12-14 RX ADMIN — AMLODIPINE BESYLATE 5 MG: 5 TABLET ORAL at 08:47

## 2021-12-14 RX ADMIN — MULTIPLE VITAMINS W/ MINERALS TAB 1 TABLET: TAB at 08:47

## 2021-12-14 RX ADMIN — PANTOPRAZOLE SODIUM 40 MG: 40 TABLET, DELAYED RELEASE ORAL at 07:01

## 2021-12-14 RX ADMIN — MICONAZOLE NITRATE: 20 CREAM TOPICAL at 22:16

## 2021-12-14 RX ADMIN — DOCUSATE SODIUM AND SENNOSIDES 1 TABLET: 8.6; 5 TABLET, FILM COATED ORAL at 19:27

## 2021-12-14 RX ADMIN — NICOTINE POLACRILEX 8 MG: 2 GUM, CHEWING BUCCAL at 08:51

## 2021-12-14 RX ADMIN — ALBUTEROL SULFATE 2 PUFF: 90 AEROSOL, METERED RESPIRATORY (INHALATION) at 21:30

## 2021-12-14 RX ADMIN — LEVOTHYROXINE SODIUM 75 MCG: 75 TABLET ORAL at 07:01

## 2021-12-14 RX ADMIN — ACAMPROSATE CALCIUM 666 MG: 333 TABLET, DELAYED RELEASE ORAL at 19:21

## 2021-12-14 RX ADMIN — Medication 25 MG: at 19:22

## 2021-12-14 RX ADMIN — BUPRENORPHINE AND NALOXONE 1 FILM: 8; 2 FILM BUCCAL; SUBLINGUAL at 19:25

## 2021-12-14 RX ADMIN — APIXABAN 2.5 MG: 2.5 TABLET, FILM COATED ORAL at 08:48

## 2021-12-14 RX ADMIN — ACETAMINOPHEN 975 MG: 325 TABLET, FILM COATED ORAL at 10:02

## 2021-12-14 RX ADMIN — NICOTINE POLACRILEX 8 MG: 4 GUM, CHEWING ORAL at 11:19

## 2021-12-14 RX ADMIN — NICOTINE POLACRILEX 8 MG: 4 GUM, CHEWING ORAL at 20:24

## 2021-12-14 RX ADMIN — GUAIFENESIN 600 MG: 600 TABLET, EXTENDED RELEASE ORAL at 19:22

## 2021-12-14 RX ADMIN — PREGABALIN 200 MG: 100 CAPSULE ORAL at 19:22

## 2021-12-14 RX ADMIN — LIDOCAINE PATCH 4% 1 PATCH: 40 PATCH TOPICAL at 22:16

## 2021-12-14 RX ADMIN — Medication 400 MG: at 19:23

## 2021-12-14 RX ADMIN — NICOTINE POLACRILEX 8 MG: 4 GUM, CHEWING ORAL at 18:25

## 2021-12-14 RX ADMIN — NICOTINE POLACRILEX 8 MG: 4 GUM, CHEWING ORAL at 13:00

## 2021-12-14 RX ADMIN — NICOTINE POLACRILEX 8 MG: 4 GUM, CHEWING ORAL at 21:29

## 2021-12-14 RX ADMIN — ACETAMINOPHEN 975 MG: 325 TABLET, FILM COATED ORAL at 19:00

## 2021-12-14 RX ADMIN — PRAZOSIN HYDROCHLORIDE 1 MG: 1 CAPSULE ORAL at 19:26

## 2021-12-14 RX ADMIN — Medication 400 MG: at 08:47

## 2021-12-14 RX ADMIN — APIXABAN 2.5 MG: 2.5 TABLET, FILM COATED ORAL at 19:22

## 2021-12-14 RX ADMIN — ACAMPROSATE CALCIUM 666 MG: 333 TABLET, DELAYED RELEASE ORAL at 08:47

## 2021-12-14 RX ADMIN — TIZANIDINE 4 MG: 2 TABLET ORAL at 11:19

## 2021-12-14 RX ADMIN — BUPRENORPHINE AND NALOXONE 1 FILM: 8; 2 FILM BUCCAL; SUBLINGUAL at 08:48

## 2021-12-14 ASSESSMENT — ACTIVITIES OF DAILY LIVING (ADL)
ORAL_HYGIENE: INDEPENDENT
HYGIENE/GROOMING: INDEPENDENT
HYGIENE/GROOMING: INDEPENDENT
LAUNDRY: UNABLE TO COMPLETE
DRESS: SCRUBS (BEHAVIORAL HEALTH)
ORAL_HYGIENE: INDEPENDENT
DRESS: SCRUBS (BEHAVIORAL HEALTH)

## 2021-12-14 NOTE — PLAN OF CARE
Problem: Sleep Disturbance (Depressive Signs/Symptoms)  Goal: Improved Sleep (Depressive Signs/Symptoms)  Outcome: No Change     Patient has been awake since the start of the shift despite of the sleeping medication he has taken earlier in the evening. Gabapentin 100 mg. and Tizanidine 4 mg. given @  0050 per patient's request. He stayed in the lounge for a while and ate some crackers and had a drink of water.      Slept a total of 5.5 hours.

## 2021-12-14 NOTE — PLAN OF CARE
"Problem: General Plan of Care (Inpatient Behavioral)  Goal: Individualization/Patient Specific Goal (IP Behavioral)  Description: The patient and/or their representative will achieve their patient-specific goals related to the plan of care.    The patient-specific goals include:  Outcome: No Change  Flowsheets (Taken 12/14/2021 )  Areas of Vulnerability: SI thoughts  Patient Strengths:    Utilized support systems    Adherent to medication regime  Cooperative with treatment recomendations   Reasons you are in the hospital:  1. Depression     Goals:   1. \"stop feeling hopeless.\"   2. \"get help for  depression     "

## 2021-12-14 NOTE — CONSULTS
[facility]  Consult Note - Hospitalist Service     Date of Admission:  12/13/2021  Consult Requested by: Primary Psychiatry Team  Reason for Consult: Home Medication Management    Assessment & Plan   Hollis Barclay is a 51 year old male group home resident admitted on 12/13/2021. He has a past medical history significant for alcohol use disorder with h/o alcoholic hepatitis, opiate use disorder, bipolar 1 disorder, borderline personality disorder, chronic back pain, post concussive encephalopathy, TBI, social anxiety disorder, hypertension, hypothyroidism, hypokalemia and suicidal ideations who was recently admitted to [facility] 11/13 with suicide attempt, transfer to [facility] inpatient psychiatry on 11/19/2021, then transferred to [facility] in 11/22/2021 with Covid pneumonia and respiratory failure with hypoxia. He is subsequently transferred to [facility] for continued inpatient management.    Recovered COVID19 Pneumonia  Admitted to [facility] 11/22. Hypoxic up to 4L O2, subsequently weaned back to room air. Completed 7 days of Decadron, stopped when hypoxia resolved.  Completed 5 days of remdesivir.  Anticoagulated with Eliquis, for DVT prophylaxis, and should continue for 30 days till 12/21/2021 per discharge summary recs. Pt reporting continued shortness of breath particularly with exertion that is slow to improve post-COVID. Has soft expiratory wheezing particularly throughout the left lung. He is more than 10 days post-symptom onset.   - Discussed with the patient and we will try some scheduled albuterol and Mucinex for now for a few days to see if this helps. If persistent or worsening shortness of breath, especially if febrile, or new sputum production, will obtain Chest XR and consider antibiotic for possible post-COVID bacterial pneumonia. Seems unlikely to be influenza at this point given no fevers, chills, body aches.   -  Continue Eliquis 2.5mg BID for prophylaxis stop date 12/21/21    Bipolar Disorder  Borderline Personality  Major Depressive Disorder with Suicidal Attempt  History of Polysubstance Abuse  History of Alcohol Abuse  Presented to [facility] ED with suicidal attempt with overdose by 5 tablets of oxycodone, 22 of 300 mg tablets of Seroquel and 4X 8 mg tablets of Suboxone while drinking 1 gallon of vodka within a 24-hour. He was cleared by poison control and required one-to-one sitter for ongoing suicidality initially. Currently, denies any suicidal ideation or intent.   - Management per primary psychiatry team   - Current Regimen: Camprel 666mg TID, Abilify 5mg daily, Suboxone 8-2mg TID, Benadryl 25mg TID, Prazosin 1mg bedtime, Seroquel 300mg bedtime     Right Knee Pain  MRI done 12/12/21 revealed:   1. Complex medial meniscal tear with loss of hoop tension. Extensive full-thickness cartilage loss in the medial femoral condyle and medial tibial plateau. Marked underlying marrow edema, likely reactive or stress-related. There is no clear evidence of   stress fracture.   2.  Complex lateral meniscal tear. Mild to moderate lateral compartment chondromalacia.   3.  Mild lateral subluxation and tilt of the patella. Mild patellofemoral chondromalacia with a small amount of full-thickness fissuring in the patella.   4.  Normal variant PCL anatomy. No cruciate ligament tear or collateral ligament tear.   5.  Moderately large knee joint effusion with diffuse synovitis and a moderate-sized popliteal cyst.  Patient was seen by Orthopedics 12/12/21 and underwent right knee aspiration and depomedrol injection. He reports pain relief at first (lidocaine) but otherwise no relief. He does not want any narcotics or stronger pain medications due to his history but reports pain keeps him up at night.   - Tylenol 975mg Q8H, Lidocaine Patch, Diclofenac Gel, Lyrica 200mg TID, Tizanidine 4mg Q8H prn   - If no improvement, can discuss with  Orthopedics possibility of Hyaluronic acid injection.    Alcoholic Hepatitis  LFTs intermittently elevated in typical pattern consistent with etoh use only sometimes. Today, AST 57 and .   - Hep B and C screening pending   - Consider outpatient RUQ US to address possible cirrhosis given pt history.    - Check INR and other MELD labs in am.    Tobacco Use   - Nicorette 4-8mg Q1H prn for cravings    Hypertension   - Continue PTA Norvasc and Prazosin    GERD   - Continue PTA Protonix    Nasal Bone Fracture  CT Head done 11/22/21 revealed new age-indeterminate mildly displaced right nasal bone fracture.   - Needs to follow up with ENT as an outpatient    Hypothyroidism  No recent TSH and previous have been high.   - Check TSH and continue PTA Synthroid at 75mcg daily    Slow Transit Constipation   - Senna daily    Dilated Aortic Root  Noted on Echo 11/24/21 but not mentioned on CTA chest 11/22/21. Would recommend annual surveillance CT.       The patient's care was discussed with the Bedside Nurse and Patient.    Bhaskar Thomas PA-C  [facility]  Securely message with the Vocera Web Console (learn more here)  Text page via [facility] Paging/Directory    ______________________________________________________________________    Chief Complaint   Medication Management, Post-COVID Status    History is obtained from the patient and EMR.    History of Present Illness   Hollis Barclay is a 51 year old male group home resident admitted on 12/13/2021. He has a past medical history significant for alcohol use disorder with h/o alcoholic hepatitis, opiate use disorder, bipolar 1 disorder, borderline personality disorder, chronic back pain, post concussive encephalopathy, TBI, social anxiety disorder, hypertension, hypothyroidism, hypokalemia and suicidal ideations who was recently admitted to [facility] 11/13 with suicide attempt, transfer to [facility] inpatient psychiatry on  "11/19/2021, then transferred to [facility] in 11/22/2021 with Covid pneumonia and respiratory failure with hypoxia. He is subsequently transferred to [facility] for continued inpatient management.    He reports no current suicidal ideation or intent. Reports he is \"70%\" improved from COVID but continues to have shortness of breath, particularly with exertion, and wheezing. No fevers or chills. No chest pain/pressure, nausea, vomiting. Reports his knee continues to hurt and is wondering of there are any pain medication adjustments we can make without using narcotics.    Review of Systems   The 10 point Review of Systems is negative other than noted in the HPI or here.    Past Medical History    I have reviewed this patient's medical history and updated it with pertinent information if needed.   Past Medical History:   Diagnosis Date     Alcoholism in remission (H)      Bilateral ACL tear      Bipolar disorder (H)      Borderline personality disorder (H)      Chemical dependency (H)      Chronic back pain      Closed left arm fracture 1985     H/O shoulder surgery      Post concussive encephalopathy      Social anxiety disorder      Social anxiety disorder      TBI (traumatic brain injury) (H)        Past Surgical History   I have reviewed this patient's surgical history and updated it with pertinent information if needed.  No past surgical history on file.    Social History   I have reviewed this patient's social history and updated it with pertinent information if needed.  Social History     Tobacco Use     Smoking status: Former Smoker     Types: Dip, chew, snus or snuff     Smokeless tobacco: Former User     Types: Chew   Substance Use Topics     Alcohol use: Not Currently     Drug use: Not Currently       Family History   No significant family history.     Medications   Current Facility-Administered Medications   Medication     acamprosate (CAMPRAL) EC tablet 666 mg     [START ON " 12/15/2021] acetaminophen (TYLENOL) tablet 975 mg     acetaminophen (TYLENOL) tablet 975 mg     albuterol (PROVENTIL HFA/VENTOLIN HFA) inhaler     alum & mag hydroxide-simethicone (MAALOX) suspension 30 mL     amLODIPine (NORVASC) tablet 5 mg     apixaban ANTICOAGULANT (ELIQUIS) tablet 2.5 mg     ARIPiprazole (ABILIFY) tablet 5 mg     buprenorphine HCl-naloxone HCl (SUBOXONE) 8-2 MG per film 1 Film     diclofenac (VOLTAREN) 1 % topical gel 2 g     diphenhydrAMINE (BENADRYL) half-tab 25 mg     folic acid (FOLVITE) tablet 1,000 mcg     gabapentin (NEURONTIN) capsule 100 mg     guaiFENesin (MUCINEX) 12 hr tablet 600 mg     levothyroxine (SYNTHROID/LEVOTHROID) tablet 75 mcg     Lidocaine (LIDOCARE) 4 % Patch 1 patch    And     lidocaine patch in PLACE     magnesium oxide (MAG-OX) tablet 400 mg     melatonin tablet 3 mg     menthol-zinc oxide (CALMOSEPTINE) 0.44-20.6 % ointment OINT     miconazole with skin protectant (SELENA ANTIFUNGAL) 2 % cream     multivitamin w/minerals (THERA-VIT-M) tablet 1 tablet     nicotine (NICORETTE) gum 4-8 mg     OLANZapine zydis (zyPREXA) ODT tab 5 mg     ondansetron (ZOFRAN-ODT) ODT tab 4 mg     pantoprazole (PROTONIX) EC tablet 40 mg     polyethylene glycol (MIRALAX) Packet 17 g     prazosin (MINIPRESS) capsule 1 mg     pregabalin (LYRICA) capsule 200 mg     QUEtiapine (SEROquel) tablet 300 mg     senna-docusate (SENOKOT-S/PERICOLACE) 8.6-50 MG per tablet 1 tablet     sennosides (SENOKOT) tablet 1-2 tablet     tiZANidine (ZANAFLEX) tablet 4 mg       Allergies   Allergies   Allergen Reactions     Cucumber Extract Anaphylaxis     Cucumber the vegable     Hydroxyzine Hives     Previously unreported by patient, this is a new patient declaration as of 5/1/21.     Nsaids      No problem with oral NSAIDs--Toradol injection itching only.     Trazodone Itching     Per Patient       Physical Exam   Vital Signs: Temp: 96.9  F (36.1  C) Temp src: Temporal BP: 134/82 Pulse: 96     SpO2: 96 % O2 Device:  None (Room air)    Weight: 303 lbs 0 oz    GENERAL: Alert and oriented x 3. Well nourished, well developed.  No acute distress.    HEENT: Normocephalic, atraumatic. Anicteric sclera. Mucous membranes moist.   CV: RRR. S1, S2. No murmurs appreciated.   RESPIRATORY: Effort normal on room.  Soft expiratory wheezing throughout, worse on the left.  GI: Abdomen soft and non distended, bowel sounds present x all 4 quadrants. No tenderness, rebound, or guarding.   NEUROLOGICAL: No focal deficits. Follows commands.   MUSCULOSKELETAL: No joint swelling or tenderness. Moves all extremities.   EXTREMITIES: No gross deformities. No peripheral edema.   SKIN: Grossly warm, dry, and intact. No jaundice. No rashes.       Data   Results for orders placed or performed during the hospital encounter of 12/13/21 (from the past 24 hour(s))   Comprehensive metabolic panel   Result Value Ref Range    Sodium 141 133 - 144 mmol/L    Potassium 3.6 3.4 - 5.3 mmol/L    Chloride 108 94 - 109 mmol/L    Carbon Dioxide (CO2) 26 20 - 32 mmol/L    Anion Gap 7 3 - 14 mmol/L    Urea Nitrogen 19 7 - 30 mg/dL    Creatinine 0.54 (L) 0.66 - 1.25 mg/dL    Calcium 8.7 8.5 - 10.1 mg/dL    Glucose 128 (H) 70 - 99 mg/dL    Alkaline Phosphatase 94 40 - 150 U/L    AST 57 (H) 0 - 45 U/L     (H) 0 - 70 U/L    Protein Total 7.7 6.8 - 8.8 g/dL    Albumin 3.0 (L) 3.4 - 5.0 g/dL    Bilirubin Total 0.4 0.2 - 1.3 mg/dL    GFR Estimate >90 >60 mL/min/1.73m2   CBC with Platelets & Differential    Narrative    The following orders were created for panel order CBC with Platelets & Differential.  Procedure                               Abnormality         Status                     ---------                               -----------         ------                     CBC with platelets and d...[[id number]]  Abnormal            Final result                 Please view results for these tests on the individual orders.   CBC with platelets and differential   Result Value  Ref Range    WBC Count 5.1 4.0 - 11.0 10e3/uL    RBC Count 4.46 4.40 - 5.90 10e6/uL    Hemoglobin 13.4 13.3 - 17.7 g/dL    Hematocrit 40.8 40.0 - 53.0 %    MCV 92 78 - 100 fL    MCH 30.0 26.5 - 33.0 pg    MCHC 32.8 31.5 - 36.5 g/dL    RDW 15.1 (H) 10.0 - 15.0 %    Platelet Count 265 150 - 450 10e3/uL    % Neutrophils 47 %    % Lymphocytes 38 %    % Monocytes 8 %    % Eosinophils 7 %    % Basophils 0 %    % Immature Granulocytes 0 %    NRBCs per 100 WBC 0 <1 /100    Absolute Neutrophils 2.4 1.6 - 8.3 10e3/uL    Absolute Lymphocytes 1.9 0.8 - 5.3 10e3/uL    Absolute Monocytes 0.4 0.0 - 1.3 10e3/uL    Absolute Eosinophils 0.3 0.0 - 0.7 10e3/uL    Absolute Basophils 0.0 0.0 - 0.2 10e3/uL    Absolute Immature Granulocytes 0.0 <=0.4 10e3/uL    Absolute NRBCs 0.0 10e3/uL   Magnesium   Result Value Ref Range    Magnesium 2.1 1.6 - 2.3 mg/dL
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
## 2021-12-14 NOTE — PLAN OF CARE
Initial Psychosocial Assessment     I have reviewed the chart, met with the patient, and developed Care Plan.  Information for assessment was obtained from: Chart review, patient interview.     Presenting Problem:  This patient was admitted from [facility] following concerns for his safety. Per record, the Pt went to [facility] ED 11/13 following a suicide attempt by medication overdose..  Pt transferred to [facility] 11/19 for inpatient psych; Pt transferred to [facility] on 11/22 with Covid pneumonia, respiratory failure with hypoxia.  Pt is now Covid Recovered.     History of Mental Health and Chemical Dependency:  This patient was admitted to [facility] 11/13 with suicide attempt, transfer to [facility] inpatient psychiatry on 11/19/2021, then transferred to [facility] in 11/22/2021. He has also had hospitalizations on this unit, this year.  Pt has a history of the following diagnoses:  Bipolar Disorder  Borderline Personality  Major Depressive Disorder with Suicidal Attempt  History of Polysubstance Abuse  History of Alcohol Abuse     Family Description (Constellation, Family Psychiatric History):        Significant Life Events (Illness, Abuse, Trauma, Death):     This pt reported he has had multiple injuries in the past and a current knee problem that is causing him a lot of pain.  Living Situation:  Pt reports he does not have a place to live, currently     Educational Background:  Unkown      Occupational History:   Unemployed    Financial Status:   Unemployed   Legal Issues:       Ethnic/Cultural Issues:   Declined     Spiritual Orientation:     Declined      Service History:   Unknown     Social Functioning (organization, interests):  Pt reports he loves socializing with people.     Current Treatment Providers are:  Pt has an outpatient psychiatrist   CADI waiver services.      Social Service Assessment/Plan:      Patient will have psychiatric assessment and medication management by the  psychiatrist. Medications will be reviewed and adjusted per MD as indicated. The treatment team will continue to assess and stabilize the patient's mental health symptoms with the use of medications and therapeutic programming. Hospital staff will provide a safe environment and a therapeutic milieu. Staff will continue to assess patient as needed. Patient will participate in unit groups and activities. Patient will receive individual and group support on the unit.      CTC will do individual inpatient treatment planning and after care planning. CTC will discuss options for increasing community supports with the patient. CTC will coordinate with outpatient providers and will place referrals

## 2021-12-14 NOTE — PLAN OF CARE
"Pt admitted to [address] from [facility].  Pt went to [facility] ED 11/13 following a suicide attempt by medication overdose..  Pt transferred to [facility] 11/19 for inpatient psych; Pt transferred to [facility] on 11/22 with Covid pneumonia, respiratory failure with hypoxia.  Pt is now Covid Recovered.    On the unit, pt rates depression 6/10, anxiety 8/10 (10 worst).  Pt reports \"thoughts to kill myself\" outside the hospital - he is vague in stating what these thoughts entail.  Pt denies any urge or desire to act upon these thoughts on the unit and contracts for safety.  Pt is calm and cooperative.  Ambulation is steady.  Pt cooperative with admission process.    Pt nicorette order revised to PRN q 2 hours (from 1 hour).  Writer explained the rationale to pt - pt accepting.    No roommate order due to hx of aggression and current MRSA + in nares.    Double portion meals ordered.    PRN Gabapentin 100 mg at 1752 for anxiety  PRN Tylenol 975 mg at 1938 for R knee pain  PRN Nicorette 8 mg x 2      "

## 2021-12-14 NOTE — PROGRESS NOTES
"   12/14/21 1500   General Information   Date Initially Attended OT 12/14/21   Clinical Impression   Affect Flat;Appropriate to situation   Orientation Oriented to person, place and time   Appearance and ADLs General cleanliness observed in most areas   Attention to Internal Stimuli No observed signs   Interaction Skills Initiates appropriately with staff;Initiates appropriately with peers   Ability to Communicate Needs Independent   Verbal Content Clear;Appropriate to topic   Ability to Maintain Boundaries Maintains appropriate physical boundaries;Maintains appropriate verbal boundaries   Participation Initiates participation   Concentration Concentrates 50 minutes   Ability to Concentrate Without difficulty;Needs further assessment   Follows and Comprehends Directions Independently follows 1 step verbal directions;Needs further assessment   Memory Delayed and immediate recall intact;Needs further assessment   Organization Independently organizes simple tasks;Needs further assessment   Decision Making Independent   Planning and Problem Solving Needs further assessment   Ability to Apply and Learn Concepts Applies within group structure   Frustrations / Stress Tolerance Independently identifies sources of frustration/stress   Level of Insight Insightful into needs, issues, goals   Self Esteem Can identify positives   Social Supports Unable to identify any supports   General Observation/Plan   General Observations/Plan See Comments   Attended 1 of 2 OT groups. He was pleasant with others and on approach, elaborating on ideas and answers. He began work on a familiar step task, planned to work with detail of supplies and started process. He filled out the OT goal form, was oriented to the purpose of OT, which he stated remembering from last admission. He stated reason for admission as \"to be safe\". A personal strength he identified was \"desire to succeed\" he chose the goals to focus on to include improve concentration, " organization, assertiveness, and accept minor imperfections..  Plan: Will Provide structure, support, and encouragement. Offer education on coping strategies and life management skills. Assist pt to increase self awareness regarding mental health issues and expand network of support resources. Provide success oriented opportunities to build concentration and organization. Encourage asking assistance as needed to practice assertiveness. Assess further.

## 2021-12-14 NOTE — PLAN OF CARE
Problem: Suicidal Behavior  Goal: Suicidal Behavior is Absent or Managed  Outcome: No Change     Problem: Activity and Energy Impairment (Depressive Signs/Symptoms)  Goal: Optimized Energy Level (Depressive Signs/Symptoms)  Outcome: No Change  Intervention: Optimize Energy Level  Recent Flowsheet Documentation  Taken 12/14/2021 1100 by Bernard Mancera RN  Diversional Activity: (groups)    television    other (see comments)     Problem: Mood Impairment (Depressive Signs/Symptoms)  Goal: Improved Mood Symptoms (Depressive Signs/Symptoms)  Outcome: No Change  Intervention: Promote Mood Improvement  Recent Flowsheet Documentation  Taken 12/14/2021 1100 by Bernard Mancera RN  Diversional Activity: (groups)    television    other (see comments)     Problem: Sleep Disturbance (Depressive Signs/Symptoms)  Goal: Improved Sleep (Depressive Signs/Symptoms)  Outcome: No Change     Problem: Social, Occupational or Functional Impairment (Depressive Signs/Symptoms)  Goal: Enhanced Social, Occupational or Functional Skills (Depressive Signs/Symptoms)  Outcome: No Change  Intervention: Promote Social, Occupational and Functional Ability  Recent Flowsheet Documentation  Taken 12/14/2021 1100 by Bernard Mancera RN  Trust Relationship/Rapport:    care explained    choices provided    questions answered    questions encouraged    thoughts/feelings acknowledged       Patient Alert and oriented  x4  calm and cooperative, medication compliant. Affect blunted. Patient endorses anxiety 7/10 and depression 6/10. Denies SI, SIB, HI, AH, VH. Attended 1 group this shift, patient is social with staff but has not been observed being social with peers. Patient reported right knee pain, prn tylenol given, later requested Zanaflex for right leg tightness. Psychiatry updated nicotine gum to every hour prn. Patient is being followed by medical and was examined/interviewed today. Medical ordered Mucinex, Albuterol inhaler as patient lungs were  wheezing and reported coughing up phlegm. Lidocaine patches also ordered for right knee pain. There is a prn ointment now for irritation to rectum d/t diarrhea but patient reports he is no longer having diarrhea. Rambona added at bed time, writer encouraged patient to inform nursing if loose stools comeback. Medical also ordered labs including hepatitis antigen screening, CMP, CBC, etc please see orders and Bhaskar Thomas PA-C note more detail.

## 2021-12-15 LAB
ALBUMIN SERPL-MCNC: 3.2 G/DL (ref 3.4–5)
ALP SERPL-CCNC: 79 U/L (ref 40–150)
ALT SERPL W P-5'-P-CCNC: 101 U/L (ref 0–70)
ANION GAP SERPL CALCULATED.3IONS-SCNC: 6 MMOL/L (ref 3–14)
AST SERPL W P-5'-P-CCNC: 54 U/L (ref 0–45)
BILIRUB SERPL-MCNC: 0.3 MG/DL (ref 0.2–1.3)
BUN SERPL-MCNC: 21 MG/DL (ref 7–30)
CALCIUM SERPL-MCNC: 8.8 MG/DL (ref 8.5–10.1)
CHLORIDE BLD-SCNC: 108 MMOL/L (ref 94–109)
CO2 SERPL-SCNC: 27 MMOL/L (ref 20–32)
CREAT SERPL-MCNC: 0.62 MG/DL (ref 0.66–1.25)
ERYTHROCYTE [DISTWIDTH] IN BLOOD BY AUTOMATED COUNT: 15.3 % (ref 10–15)
GFR SERPL CREATININE-BSD FRML MDRD: >90 ML/MIN/1.73M2
GLUCOSE BLD-MCNC: 153 MG/DL (ref 70–99)
HBV SURFACE AB SERPL IA-ACNC: 7.01 M[IU]/ML
HBV SURFACE AG SERPL QL IA: NONREACTIVE
HCT VFR BLD AUTO: 39.2 % (ref 40–53)
HCV AB SERPL QL IA: REACTIVE
HGB BLD-MCNC: 12.5 G/DL (ref 13.3–17.7)
INR PPP: 0.89 (ref 0.86–1.14)
MCH RBC QN AUTO: 29.3 PG (ref 26.5–33)
MCHC RBC AUTO-ENTMCNC: 31.9 G/DL (ref 31.5–36.5)
MCV RBC AUTO: 92 FL (ref 78–100)
PLATELET # BLD AUTO: 268 10E3/UL (ref 150–450)
POTASSIUM BLD-SCNC: 3.6 MMOL/L (ref 3.4–5.3)
PROT SERPL-MCNC: 7.4 G/DL (ref 6.8–8.8)
RBC # BLD AUTO: 4.26 10E6/UL (ref 4.4–5.9)
SODIUM SERPL-SCNC: 141 MMOL/L (ref 133–144)
WBC # BLD AUTO: 5.1 10E3/UL (ref 4–11)

## 2021-12-15 PROCEDURE — 85027 COMPLETE CBC AUTOMATED: CPT | Performed by: PHYSICIAN ASSISTANT

## 2021-12-15 PROCEDURE — 124N000003 HC R&B MH SENIOR/ADOLESCENT

## 2021-12-15 PROCEDURE — 85610 PROTHROMBIN TIME: CPT | Performed by: PHYSICIAN ASSISTANT

## 2021-12-15 PROCEDURE — 250N000013 HC RX MED GY IP 250 OP 250 PS 637: Performed by: PSYCHIATRY & NEUROLOGY

## 2021-12-15 PROCEDURE — 250N000013 HC RX MED GY IP 250 OP 250 PS 637: Performed by: PHYSICIAN ASSISTANT

## 2021-12-15 PROCEDURE — 36415 COLL VENOUS BLD VENIPUNCTURE: CPT | Performed by: PHYSICIAN ASSISTANT

## 2021-12-15 PROCEDURE — G0177 OPPS/PHP; TRAIN & EDUC SERV: HCPCS

## 2021-12-15 PROCEDURE — 80053 COMPREHEN METABOLIC PANEL: CPT | Performed by: PHYSICIAN ASSISTANT

## 2021-12-15 RX ORDER — TOPIRAMATE 50 MG/1
50 TABLET, FILM COATED ORAL 2 TIMES DAILY
Status: DISCONTINUED | OUTPATIENT
Start: 2021-12-15 | End: 2021-12-24 | Stop reason: HOSPADM

## 2021-12-15 RX ORDER — TRAMADOL HYDROCHLORIDE 50 MG/1
50 TABLET ORAL EVERY 6 HOURS PRN
Status: DISCONTINUED | OUTPATIENT
Start: 2021-12-15 | End: 2021-12-17

## 2021-12-15 RX ORDER — QUETIAPINE FUMARATE 400 MG/1
400 TABLET, FILM COATED ORAL AT BEDTIME
Status: DISCONTINUED | OUTPATIENT
Start: 2021-12-15 | End: 2021-12-15

## 2021-12-15 RX ORDER — QUETIAPINE FUMARATE 300 MG/1
600 TABLET, FILM COATED ORAL AT BEDTIME
Status: DISCONTINUED | OUTPATIENT
Start: 2021-12-15 | End: 2021-12-24 | Stop reason: HOSPADM

## 2021-12-15 RX ADMIN — PREGABALIN 200 MG: 100 CAPSULE ORAL at 08:23

## 2021-12-15 RX ADMIN — ALBUTEROL SULFATE 2 PUFF: 90 AEROSOL, METERED RESPIRATORY (INHALATION) at 17:23

## 2021-12-15 RX ADMIN — NICOTINE POLACRILEX 8 MG: 4 GUM, CHEWING ORAL at 13:22

## 2021-12-15 RX ADMIN — ALBUTEROL SULFATE 2 PUFF: 90 AEROSOL, METERED RESPIRATORY (INHALATION) at 20:53

## 2021-12-15 RX ADMIN — DICLOFENAC SODIUM 2 G: 10 GEL TOPICAL at 15:55

## 2021-12-15 RX ADMIN — NICOTINE POLACRILEX 8 MG: 4 GUM, CHEWING ORAL at 14:33

## 2021-12-15 RX ADMIN — Medication 400 MG: at 19:42

## 2021-12-15 RX ADMIN — GABAPENTIN 100 MG: 100 CAPSULE ORAL at 15:55

## 2021-12-15 RX ADMIN — PRAZOSIN HYDROCHLORIDE 1 MG: 1 CAPSULE ORAL at 19:40

## 2021-12-15 RX ADMIN — ACETAMINOPHEN 975 MG: 325 TABLET, FILM COATED ORAL at 07:03

## 2021-12-15 RX ADMIN — MELATONIN TAB 3 MG 3 MG: 3 TAB at 00:58

## 2021-12-15 RX ADMIN — LIDOCAINE PATCH 4% 1 PATCH: 40 PATCH TOPICAL at 19:16

## 2021-12-15 RX ADMIN — TIZANIDINE 4 MG: 2 TABLET ORAL at 10:02

## 2021-12-15 RX ADMIN — Medication 25 MG: at 19:41

## 2021-12-15 RX ADMIN — LEVOTHYROXINE SODIUM 75 MCG: 75 TABLET ORAL at 07:04

## 2021-12-15 RX ADMIN — QUETIAPINE 600 MG: 300 TABLET, FILM COATED ORAL at 20:52

## 2021-12-15 RX ADMIN — Medication 25 MG: at 08:22

## 2021-12-15 RX ADMIN — NICOTINE POLACRILEX 8 MG: 4 GUM, CHEWING ORAL at 19:17

## 2021-12-15 RX ADMIN — ACAMPROSATE CALCIUM 666 MG: 333 TABLET, DELAYED RELEASE ORAL at 19:39

## 2021-12-15 RX ADMIN — DICLOFENAC SODIUM 2 G: 10 GEL TOPICAL at 11:15

## 2021-12-15 RX ADMIN — NICOTINE POLACRILEX 8 MG: 4 GUM, CHEWING ORAL at 12:17

## 2021-12-15 RX ADMIN — OLANZAPINE 5 MG: 5 TABLET, ORALLY DISINTEGRATING ORAL at 14:33

## 2021-12-15 RX ADMIN — ALBUTEROL SULFATE 2 PUFF: 90 AEROSOL, METERED RESPIRATORY (INHALATION) at 07:04

## 2021-12-15 RX ADMIN — NICOTINE POLACRILEX 8 MG: 4 GUM, CHEWING ORAL at 17:21

## 2021-12-15 RX ADMIN — TOPIRAMATE 50 MG: 50 TABLET ORAL at 19:41

## 2021-12-15 RX ADMIN — BUPRENORPHINE AND NALOXONE 1 FILM: 8; 2 FILM BUCCAL; SUBLINGUAL at 11:15

## 2021-12-15 RX ADMIN — GABAPENTIN 100 MG: 100 CAPSULE ORAL at 00:59

## 2021-12-15 RX ADMIN — TIZANIDINE 4 MG: 2 TABLET ORAL at 00:59

## 2021-12-15 RX ADMIN — BUPRENORPHINE AND NALOXONE 1 FILM: 8; 2 FILM BUCCAL; SUBLINGUAL at 19:39

## 2021-12-15 RX ADMIN — FOLIC ACID 1000 MCG: 1 TABLET ORAL at 08:21

## 2021-12-15 RX ADMIN — NICOTINE POLACRILEX 8 MG: 4 GUM, CHEWING ORAL at 15:53

## 2021-12-15 RX ADMIN — GUAIFENESIN 600 MG: 600 TABLET, EXTENDED RELEASE ORAL at 08:22

## 2021-12-15 RX ADMIN — NICOTINE POLACRILEX 8 MG: 4 GUM, CHEWING ORAL at 20:55

## 2021-12-15 RX ADMIN — APIXABAN 2.5 MG: 2.5 TABLET, FILM COATED ORAL at 08:22

## 2021-12-15 RX ADMIN — ALBUTEROL SULFATE 2 PUFF: 90 AEROSOL, METERED RESPIRATORY (INHALATION) at 13:21

## 2021-12-15 RX ADMIN — NICOTINE POLACRILEX 8 MG: 4 GUM, CHEWING ORAL at 07:16

## 2021-12-15 RX ADMIN — ACAMPROSATE CALCIUM 666 MG: 333 TABLET, DELAYED RELEASE ORAL at 13:21

## 2021-12-15 RX ADMIN — ACAMPROSATE CALCIUM 666 MG: 333 TABLET, DELAYED RELEASE ORAL at 08:21

## 2021-12-15 RX ADMIN — APIXABAN 2.5 MG: 2.5 TABLET, FILM COATED ORAL at 19:42

## 2021-12-15 RX ADMIN — PANTOPRAZOLE SODIUM 40 MG: 40 TABLET, DELAYED RELEASE ORAL at 07:04

## 2021-12-15 RX ADMIN — NICOTINE POLACRILEX 8 MG: 4 GUM, CHEWING ORAL at 09:51

## 2021-12-15 RX ADMIN — TIZANIDINE 4 MG: 2 TABLET ORAL at 17:57

## 2021-12-15 RX ADMIN — ARIPIPRAZOLE 5 MG: 5 TABLET ORAL at 08:22

## 2021-12-15 RX ADMIN — POLYETHYLENE GLYCOL 3350 17 G: 17 POWDER, FOR SOLUTION ORAL at 08:21

## 2021-12-15 RX ADMIN — BUPRENORPHINE AND NALOXONE 1 FILM: 8; 2 FILM BUCCAL; SUBLINGUAL at 08:21

## 2021-12-15 RX ADMIN — Medication 25 MG: at 13:21

## 2021-12-15 RX ADMIN — MULTIPLE VITAMINS W/ MINERALS TAB 1 TABLET: TAB at 08:22

## 2021-12-15 RX ADMIN — ALBUTEROL SULFATE 2 PUFF: 90 AEROSOL, METERED RESPIRATORY (INHALATION) at 10:00

## 2021-12-15 RX ADMIN — PREGABALIN 200 MG: 100 CAPSULE ORAL at 13:21

## 2021-12-15 RX ADMIN — TRAMADOL HYDROCHLORIDE 50 MG: 50 TABLET, FILM COATED ORAL at 17:21

## 2021-12-15 RX ADMIN — Medication 400 MG: at 08:22

## 2021-12-15 RX ADMIN — DOCUSATE SODIUM AND SENNOSIDES 1 TABLET: 8.6; 5 TABLET, FILM COATED ORAL at 19:42

## 2021-12-15 RX ADMIN — PREGABALIN 225 MG: 25 CAPSULE ORAL at 19:40

## 2021-12-15 RX ADMIN — GUAIFENESIN 600 MG: 600 TABLET, EXTENDED RELEASE ORAL at 19:42

## 2021-12-15 RX ADMIN — ACETAMINOPHEN 975 MG: 325 TABLET, FILM COATED ORAL at 20:58

## 2021-12-15 RX ADMIN — ACETAMINOPHEN 975 MG: 325 TABLET, FILM COATED ORAL at 13:21

## 2021-12-15 RX ADMIN — NICOTINE POLACRILEX 8 MG: 4 GUM, CHEWING ORAL at 08:24

## 2021-12-15 ASSESSMENT — ACTIVITIES OF DAILY LIVING (ADL)
HYGIENE/GROOMING: INDEPENDENT
DRESS: SCRUBS (BEHAVIORAL HEALTH)
LAUNDRY: UNABLE TO COMPLETE
ORAL_HYGIENE: INDEPENDENT
ORAL_HYGIENE: INDEPENDENT
DRESS: SCRUBS (BEHAVIORAL HEALTH)
HYGIENE/GROOMING: INDEPENDENT

## 2021-12-15 NOTE — PLAN OF CARE
Problem: Coping with Symptoms  Goal: Practice Coping Skills  Description: Practice using coping strategies to manage stress and reduce symptoms  Note: Attended 1 of 2 OT groups. He participated for 50 minutes with 7 pts in a goal oriented task group. He worked independently on a less complex task though added details he designed. He also started an activity unfamiliar to him, learned the steps and followed through on plans. He did not stay for the 2nd, leaving to work with his RN on his knee concerns. He was social and pleasant with others.

## 2021-12-15 NOTE — PROGRESS NOTES
Brief Medicine Note:    RN notified cross cover that patient with hx of recent R knee injection c/o posterior knee pain which is worse when he stands on it. Reviewed consult note by IM from 12/14. Patient seen on 3B. Reports worsening pain in the back of his knee, worse with standing and walking. Has appreciable knee effusion and palpable swelling of the posterior knee. He is wondering about pain control but has hx of addiction and on suboxone.    On exam: Right knee appears swollen compared to left. Appreciable swelling to posterior knee and patient reports tenderness. No palpable nodules.     A/P:  Suspect based on exam this may be a popliteal cyst. In review of his MRI, it did note a moderate popliteal cyst and moderate-large joint effusion. Ortho performed aspiration and injection on 12/12. Given his history of recent covid infection, could consider possible DVT as well though I feel this is less likely given he has been on eliquis as ppx. Nonetheless, will obtain imaging given his pain is worsening. Discussed using tylenol and ice PRN overnight and trial of lidocaine patch. Pending imaging may need orthopedics consult in AM.    - XR right knee  - US of RLE to r/o DVT and evaluate for cyst  - Consider Orthopedics consult in AM    Neris Luke PA-C  Internal Medicine [facility] Hospitalist  [facility]  Pager: [phone]

## 2021-12-15 NOTE — PROGRESS NOTES
PSYCHIATRIC PROGRESS NOTE    Admission Date: 12/13/2021  Date of Service: 12/15/2021    The patient was seen, his chart reviewed, his case discussed with staff at the Team Meeting.  His main concern remains to be a significant R knee pain. His depression is dissipating but he has been feeling anxious about his mother's pancreatic cancer and inability to be with her. He has been having problems sleeping.  He has been visible in the milieu and medication compliant.    This is a 51 year old single white male with history of bipolar disorder, TBI, substance abuse and chronic pain who was transferred to this hospital from the medical unit of [facility] where he was treated for COVID pneumonia. He was admitted to [facility] and was seen by Dr. Eaton for initial assessment.                 Initially he presented to [facility] ED on 11/13/21 after a suicide attempt. Reportedly he overdosed  on 5 Oxycodone tablets, 22 300 mg Seroquel tablets and 4 suboxone 8-2 films while drinking 1 gallon of vodka. He was medically cleared and admitted to [facility] from 11/19-11/22 and then transferred to medical unit at [facility] for Covid pneumonia and respiratory failure with hypoxia. Apparently, he received an Ativan taper at [facility] due to ongoing anxiety despite completing detox from alcohol earlier in admissions. PTA medications have been resumed.                         I saw the patient today for the first time and he appeared to be more hypomanic than depressed with reduced need for sleep and pain issues as the main concern.    MEDICATIONS, psychotropic  Seroquel 300 mg at bedtime  Abilify 10 mg QAM  Suboxone 8-2 mg per film, 1 film TID  Lyrica 200 mg TID  Prazosin 1 mg at bedtime  Gabapentin 100 mg TID PRN  Melatonin 3 mg HS PRN  Zyprexa 5 mg Q6H PRN    VS: Pulse - 86, BP - 117/79, T - 98.6, Resp - 16, SpO2 - 94%    LABS: CBC was remarkable for decreased Hb of 12.5, decreased Hematocrit of 39.2, RBC  of 4.26 and elevated RDW of 15.3. His Blood Chmistry was notable for low Creatinine of 0.62, low Albumin of 32 and elevated liver enzymes: , AST 54. His blood Glucose was 153.    MENTAL STATUS EXAMINATION  Revealed a normally built and normally developed, tall 51-year-old white male appearing his stated age. He was alert and oriented X 3. His speech was coherent, goal related and somewhat pressured. He appeared to be somewhat anxious. His mood was elated. He denied SI or HI. No overt psychotic features were noted. He had only marginal insight into his problems and his judgement was questionable.    DIAGNOSTIC IMPRESSION  Bipolar Disorder, last episode depressed  Anxiety Disorder NOS  BPD by history  Alcohol Use Disorder  Polysubstance Abuse by history    PLAN: I will increase his Seroquel to 600 mg at bedtime, discontinue Abilify and Gabapentin, increase Lyrica to 225 mg TID and start him on Topamax 50 mg BID. I will order Tramadol 50 mg Q6H PRN for pain.    Bernard Bustos MD

## 2021-12-15 NOTE — PLAN OF CARE
Assessment/Intervention/Current Symtoms and Care Coordination:    Chart Review    Reviewed pt care in team    Met with pt and reviewed his care. Pt reported he is doing better but is also in pain. Discussed getting connected to his CADI  regarding housing. Pt is agreeable to this.     Discharge Plan or Goal:  Pending stabilization & development of a safe discharge plan.  Considerations include: Return to group home with outpatient services     Barriers to Discharge:  Patient requires further psychiatric stabilization due to current symptomology and medication management with possible adjustments     Referral Status:  No new referrals made     Legal Status:  Patient is voluntary

## 2021-12-15 NOTE — PROVIDER NOTIFICATION
Pt reports increased pain in R knee (post R knee aspiration & depomedrol injection 12/12/21) that is worse when standing.  Pt reports the pain is increased posteriorly.  House officer notified.

## 2021-12-15 NOTE — PLAN OF CARE
"Pt has flat affect and is somewhat tense. Pt is calm and cooperative.  Pt rates depression 7/10 and anxiety 9/10.  Pt denies SI/SIB on the unit and contracts for safety.  Pt visible in the milieu, mostly social with staff.  Pt somewhat restless, moving between the milieu and his room. Appetite is good.      Pt reported increased R knee pain (s/p R knee aspiration & depomedrol injection 12/12/21),  House officer notified.  Pt had R knee xray and R lower extremity ultrasound.      PRN Zyprexa 5 mg x 1 for pt report of \"high anxiety - I feel really tense\" - pt reported as somewhat effective  PRN Gabapentin 100 mg for anxiety - pt reported as \"not very\" effective  PRN Nicotine gum 8 mg x 5  "

## 2021-12-15 NOTE — PLAN OF CARE
"  Problem: Suicidal Behavior  Goal: Suicidal Behavior is Absent or Managed  Outcome: No Change     Problem: Activity and Energy Impairment (Depressive Signs/Symptoms)  Goal: Optimized Energy Level (Depressive Signs/Symptoms)  Outcome: No Change     Problem: Mood Impairment (Depressive Signs/Symptoms)  Goal: Improved Mood Symptoms (Depressive Signs/Symptoms)  Outcome: No Change     Problem: Sleep Disturbance (Depressive Signs/Symptoms)  Goal: Improved Sleep (Depressive Signs/Symptoms)  Outcome: No Change     Patient calm and cooperative, affect can be tense and mood irritable. Patient frustrated with right knee pain. Ultrasound showed Baker's Cyst in the back of Right knee, spoke with medical and they do not plan to have invasive intervention. Prn Voltaren gel applied scheduled tylenol given. Patient also request prn Zanaflex.   Patient reports chronic SI, but contracts for safety. Patient reported anxiety was high today d/t finding out his CADI waiver lapsed and he may be homeless, patient stated \"I rather put a gun in my mouth and pull the trigger than be homeless, I can't be out on the streets\". Patient reported increased depression related to cancer diagnosis of mother that has metastasized to other areas. Patient is attending groups but can be restless and seen pacing ross between milieu and bedroom. BP low today 105/66, held amlodipine.    Labs drawn today, see results for details.   Requested nicotine gum 5 times from writer.  Prn Zyprexa given at 1445 d/t increased anxiousness and agitation relating to Right knee pain, paged Bhaskar Thomas PA-C, orthopedics consult placed. Writer will also update Dr. Bustos and accept any recommendation.  Hepatitis C test resulted reactive, paged DA following patient. Per medical waiting for reflex.  Dr. Bustos gave verbal for Ibuprofen, writer did not place order. Patient has NSAIDS allergy, provider paged.  "

## 2021-12-15 NOTE — PLAN OF CARE
Assessment/Intervention/Current Symtoms and Care Coordination:    Chart Review    Reviewed pt care in team    Met with pt and reviewed his care. Pt reported he is doing better but is also in pain. Discussed getting connected to his CADI  regarding housing. Pt is agreeable to this.    Obtained AKIL for pt's group home and left a message for the  at [phone]. Requested him to call back so as to discuss pt's living condition/situation to ensure he has a place to go back to when he is stabilized and ready for discharge.    Left another message at 2:42 pm requesting a call back.    Called and left a message for pt's CADI  - Jessica Renny ([phone]; or [phone]; supervisor: [phone]). Informed Jessica that writer has spoken with pt's assisted care facility staff and they are able to keep his housing open till end of this week/Monday. Requested Jessica to call back so as to ensure pt's CADI is not put in a situation where this patient will have to start afresh to look for housing and become homeless.     Discharge Plan or Goal:  Pending stabilization & development of a safe discharge plan.  Considerations include: Return to group home with outpatient services     Barriers to Discharge:  Patient requires further psychiatric stabilization due to current symptomology and medication management with possible adjustments     Referral Status:  No new referrals made     Legal Status:  Patient is voluntary

## 2021-12-15 NOTE — PROVIDER NOTIFICATION
"Paged by Bhaskar Thomas PA-C regarding patient's acute on chronic right knee pain.    Patient has recently had several in hospital stays.  On 12/12/2021 at [facility], patient was evaluated by orthopedic surgery for his right knee pain.  During that evaluation, patient underwent a right knee aspiration, and subsequent cortisone injection.    Per discussion with PEEWEE Thomas PA-C, patient noted initial relief following cortisone injection however, that has fully dissipated.  Patient states significant pain with weightbearing, and describes the sensation of feeling \"his bones rubbed together\".    Discussed that it is not entirely surprising that Hollis did not have sustained relief following his cortisone injection due to the severity of his knee arthritis.  Review of patient's imaging including MRI, and XR revealed a complex medial meniscus tear with associated extensive full-thickness cartilage loss of both the medial femoral condyle, and medial tibial plateau, complex lateral meniscus tear with moderate lateral compartment chondromalacia, mild patellofemoral chondromalacia, and a moderate knee joint effusion with diffuse synovitis, and moderate-sized popliteal cyst.      His imaging and symptomatic description is consistent with advanced knee arthritis.  Labs obtained on 12/15/2020 1 AM demonstrated a WBC = 5.1.  Based upon current labs, vital signs, and patient symptomatic description, low likelihood for septic joint.  With current presentation, would not recommend repeat aspiration at this time.    Unfortunately, given that patient recently received a cortisone injection he is not a candidate for repeat injection nor surgical intervention.  Typically wait 3 months between injections, and necessitate 3 months following cortisone injection prior to surgical intervention.    Given patient's history of narcotic abuse, and the chronic nature of his pain do not recommend utilization of narcotics.  Patient is on " an appropriate multimodal pain regimen at this time.    To assist with pain control, and improve patient's confidence in utilization of his right lower extremity, recommend patient obtain a medial off  brace.  Brace to be ordered by medicine per our conversation.     Discussed with Medicine that ultimately patient would be a candidate for total knee arthroplasty.  Will continue to follow patient peripherally during his hospitalization, and attempt to expedite his ability to obtain a total knee arthroplasty.    Bonita Butler MD  Orthopaedic Surgery, PGY-4

## 2021-12-15 NOTE — PLAN OF CARE
Problem: Sleep Disturbance (Depressive Signs/Symptoms)  Goal: Improved Sleep (Depressive Signs/Symptoms)  Outcome: No Change     Patient came to the nurses' station and complained of inability to stay asleep due to  muscle spasms and anxiety. He is unable to tell what is causing his anxiety. Rated the anxiety @ 7/10. Tizanidine 4 mg., Gabapentin 100 mg. and Melatonin 3 mg. given @ 0059 PRN for muscle spasms, anxiety and sleep respectively. Went back to sleep half an hour later. No further complaints made.     Slept a total of 5.5 hours.

## 2021-12-16 LAB
HCV RNA SERPL NAA+PROBE-ACNC: ABNORMAL IU/ML
HCV RNA SERPL NAA+PROBE-LOG IU: 6.2 {LOG_IU}/ML

## 2021-12-16 PROCEDURE — 250N000013 HC RX MED GY IP 250 OP 250 PS 637: Performed by: PHYSICIAN ASSISTANT

## 2021-12-16 PROCEDURE — 99232 SBSQ HOSP IP/OBS MODERATE 35: CPT | Performed by: PHYSICIAN ASSISTANT

## 2021-12-16 PROCEDURE — 250N000013 HC RX MED GY IP 250 OP 250 PS 637: Performed by: PSYCHIATRY & NEUROLOGY

## 2021-12-16 PROCEDURE — 124N000003 HC R&B MH SENIOR/ADOLESCENT

## 2021-12-16 RX ORDER — ALBUTEROL SULFATE 90 UG/1
2 AEROSOL, METERED RESPIRATORY (INHALATION) EVERY 4 HOURS PRN
Status: DISCONTINUED | OUTPATIENT
Start: 2021-12-16 | End: 2021-12-24 | Stop reason: HOSPADM

## 2021-12-16 RX ADMIN — BUPRENORPHINE AND NALOXONE 1 FILM: 8; 2 FILM BUCCAL; SUBLINGUAL at 11:04

## 2021-12-16 RX ADMIN — AMLODIPINE BESYLATE 5 MG: 5 TABLET ORAL at 07:44

## 2021-12-16 RX ADMIN — NICOTINE POLACRILEX 8 MG: 4 GUM, CHEWING ORAL at 05:49

## 2021-12-16 RX ADMIN — PREGABALIN 225 MG: 25 CAPSULE ORAL at 07:44

## 2021-12-16 RX ADMIN — QUETIAPINE 600 MG: 300 TABLET, FILM COATED ORAL at 21:25

## 2021-12-16 RX ADMIN — TOPIRAMATE 50 MG: 50 TABLET ORAL at 07:44

## 2021-12-16 RX ADMIN — NICOTINE POLACRILEX 8 MG: 4 GUM, CHEWING ORAL at 09:53

## 2021-12-16 RX ADMIN — ACETAMINOPHEN 975 MG: 325 TABLET, FILM COATED ORAL at 14:25

## 2021-12-16 RX ADMIN — GUAIFENESIN 600 MG: 600 TABLET, EXTENDED RELEASE ORAL at 07:44

## 2021-12-16 RX ADMIN — FOLIC ACID 1000 MCG: 1 TABLET ORAL at 07:45

## 2021-12-16 RX ADMIN — OLANZAPINE 5 MG: 5 TABLET, ORALLY DISINTEGRATING ORAL at 11:57

## 2021-12-16 RX ADMIN — NICOTINE POLACRILEX 8 MG: 4 GUM, CHEWING ORAL at 07:41

## 2021-12-16 RX ADMIN — ACAMPROSATE CALCIUM 666 MG: 333 TABLET, DELAYED RELEASE ORAL at 07:44

## 2021-12-16 RX ADMIN — NICOTINE POLACRILEX 8 MG: 4 GUM, CHEWING ORAL at 21:25

## 2021-12-16 RX ADMIN — TIZANIDINE 4 MG: 2 TABLET ORAL at 11:56

## 2021-12-16 RX ADMIN — DOCUSATE SODIUM AND SENNOSIDES 1 TABLET: 8.6; 5 TABLET, FILM COATED ORAL at 19:16

## 2021-12-16 RX ADMIN — ALBUTEROL SULFATE 2 PUFF: 90 AEROSOL, METERED RESPIRATORY (INHALATION) at 09:46

## 2021-12-16 RX ADMIN — MELATONIN TAB 3 MG 3 MG: 3 TAB at 21:25

## 2021-12-16 RX ADMIN — TOPIRAMATE 50 MG: 50 TABLET ORAL at 19:16

## 2021-12-16 RX ADMIN — Medication 25 MG: at 14:25

## 2021-12-16 RX ADMIN — NICOTINE POLACRILEX 8 MG: 4 GUM, CHEWING ORAL at 11:05

## 2021-12-16 RX ADMIN — APIXABAN 2.5 MG: 2.5 TABLET, FILM COATED ORAL at 07:44

## 2021-12-16 RX ADMIN — TRAMADOL HYDROCHLORIDE 50 MG: 50 TABLET, FILM COATED ORAL at 09:55

## 2021-12-16 RX ADMIN — PREGABALIN 225 MG: 25 CAPSULE ORAL at 19:16

## 2021-12-16 RX ADMIN — ALBUTEROL SULFATE 2 PUFF: 90 AEROSOL, METERED RESPIRATORY (INHALATION) at 05:50

## 2021-12-16 RX ADMIN — Medication 25 MG: at 07:44

## 2021-12-16 RX ADMIN — ACAMPROSATE CALCIUM 666 MG: 333 TABLET, DELAYED RELEASE ORAL at 14:25

## 2021-12-16 RX ADMIN — TRAMADOL HYDROCHLORIDE 50 MG: 50 TABLET, FILM COATED ORAL at 16:23

## 2021-12-16 RX ADMIN — MICONAZOLE NITRATE: 20 CREAM TOPICAL at 19:17

## 2021-12-16 RX ADMIN — Medication 400 MG: at 07:44

## 2021-12-16 RX ADMIN — NICOTINE POLACRILEX 8 MG: 4 GUM, CHEWING ORAL at 20:07

## 2021-12-16 RX ADMIN — NICOTINE POLACRILEX 8 MG: 4 GUM, CHEWING ORAL at 18:31

## 2021-12-16 RX ADMIN — LEVOTHYROXINE SODIUM 75 MCG: 75 TABLET ORAL at 05:50

## 2021-12-16 RX ADMIN — BUPRENORPHINE AND NALOXONE 1 FILM: 8; 2 FILM BUCCAL; SUBLINGUAL at 19:16

## 2021-12-16 RX ADMIN — ACETAMINOPHEN 975 MG: 325 TABLET, FILM COATED ORAL at 21:25

## 2021-12-16 RX ADMIN — PREGABALIN 225 MG: 25 CAPSULE ORAL at 14:25

## 2021-12-16 RX ADMIN — MULTIPLE VITAMINS W/ MINERALS TAB 1 TABLET: TAB at 07:44

## 2021-12-16 RX ADMIN — APIXABAN 2.5 MG: 2.5 TABLET, FILM COATED ORAL at 19:16

## 2021-12-16 RX ADMIN — PANTOPRAZOLE SODIUM 40 MG: 40 TABLET, DELAYED RELEASE ORAL at 05:49

## 2021-12-16 RX ADMIN — Medication 25 MG: at 19:16

## 2021-12-16 RX ADMIN — NICOTINE POLACRILEX 8 MG: 4 GUM, CHEWING ORAL at 16:25

## 2021-12-16 RX ADMIN — OLANZAPINE 5 MG: 5 TABLET, ORALLY DISINTEGRATING ORAL at 21:25

## 2021-12-16 RX ADMIN — Medication 400 MG: at 19:16

## 2021-12-16 RX ADMIN — ACAMPROSATE CALCIUM 666 MG: 333 TABLET, DELAYED RELEASE ORAL at 19:16

## 2021-12-16 RX ADMIN — ACETAMINOPHEN 975 MG: 325 TABLET, FILM COATED ORAL at 05:49

## 2021-12-16 RX ADMIN — PRAZOSIN HYDROCHLORIDE 1 MG: 1 CAPSULE ORAL at 19:16

## 2021-12-16 RX ADMIN — TIZANIDINE 4 MG: 2 TABLET ORAL at 21:25

## 2021-12-16 RX ADMIN — BUPRENORPHINE AND NALOXONE 1 FILM: 8; 2 FILM BUCCAL; SUBLINGUAL at 07:44

## 2021-12-16 RX ADMIN — NICOTINE POLACRILEX 8 MG: 4 GUM, CHEWING ORAL at 14:25

## 2021-12-16 ASSESSMENT — ACTIVITIES OF DAILY LIVING (ADL)
DRESS: SCRUBS (BEHAVIORAL HEALTH);INDEPENDENT
ORAL_HYGIENE: INDEPENDENT
HYGIENE/GROOMING: INDEPENDENT
LAUNDRY: WITH SUPERVISION
DRESS: INDEPENDENT
LAUNDRY: UNABLE TO COMPLETE
HYGIENE/GROOMING: INDEPENDENT
ORAL_HYGIENE: INDEPENDENT

## 2021-12-16 NOTE — PLAN OF CARE
Problem: Coping with Symptoms  Goal: Practice Coping Skills  Description: Practice using coping strategies to manage stress and reduce symptoms  Note: Attended a partial of 2 OT groups, at no charge provided.  Did some effective problem-solving on task and when seeming frustrated took time to work through and communicate his ideas.  He expressed having a great deal of knee pain.  He left group to work with about the pain and returned at the end of the session to explain reason for missing the rest of the group and a clear explanation.

## 2021-12-16 NOTE — PLAN OF CARE
"  Problem: Activity and Energy Impairment (Depressive Signs/Symptoms)  Goal: Optimized Energy Level (Depressive Signs/Symptoms)  Outcome: Declining     Problem: Mood Impairment (Depressive Signs/Symptoms)  Goal: Improved Mood Symptoms (Depressive Signs/Symptoms)  Outcome: Declining    Pt presents with flat / tense affect.  Pt endorses increased depression and hopelessness due to worsening R knee pain.  Pt rates anxiety and depression both as \"really high.\"  Pt endorses increase in chronic passive suicidal thoughts; he contracts for safety on the unit.  Pt states, \"If I can't get help with this knee problem, I think there's no point in living.\"  Pt states his R knee pain is now worse than it was before the aspiration / injection procedure on 12/12.  Pt visible in the milieu, minimally social with a select peer.  Pt did not attend groups.  Appetite is good.  Pt reports poor sleep due to pain.  Pt offered hot / cold packs for pain - pt declined. Medication changes reviewed with pt.    PRN Voltaren cream x 1, PRN Zanaflex 4 mg x 1, PRN Tramadol 50 mg x 1 - for R knee pain; pt reports as not effective.  PRN Nicorette gum 8 mg x 4.    Pt would like an update from medicine re: orthopedic consult tomorrow.         "

## 2021-12-16 NOTE — PLAN OF CARE
Problem: Sleep Disturbance (Depressive Signs/Symptoms)  Goal: Improved Sleep (Depressive Signs/Symptoms)  Outcome: No Change     Patient slept a total of 5.5 hours. Complained of right knee pain. Verbalized that the Lidocaine patch does not help much. Requested for his pain medication and other medications scheduled @ 0600 and 0730. Nicorette 8 mg.  gum (4) was also requested and given to the patient.

## 2021-12-16 NOTE — PROGRESS NOTES
[facility]    Medicine Progress Note - Hospitalist Service       Date of Admission:  12/13/2021    Assessment & Plan         Hollis Barclay is a 51 year old male group home resident admitted on 12/13/2021. He has a past medical history significant for alcohol use disorder with h/o alcoholic hepatitis, opiate use disorder, bipolar 1 disorder, borderline personality disorder, chronic back pain, post concussive encephalopathy, TBI, social anxiety disorder, hypertension, hypothyroidism, hypokalemia and suicidal ideations who was recently admitted to [facility] 11/13 with suicide attempt, transfer to [facility] inpatient psychiatry on 11/19/2021, then transferred to [facility] in 11/22/2021 with Covid pneumonia and respiratory failure with hypoxia. He is subsequently transferred to [facility] for continued inpatient management.     Recovered COVID19 Pneumonia  Admitted to [facility] 11/22. Hypoxic up to 4L O2, subsequently weaned back to room air. Completed 7 days of Decadron, stopped when hypoxia resolved.  Completed 5 days of remdesivir.  Anticoagulated with Eliquis, for DVT prophylaxis, and should continue for 30 days till 12/21/2021 per discharge summary recs. Pt reporting continued shortness of breath particularly with exertion that is slow to improve post-COVID. Had soft expiratory wheezing particularly throughout the left lung at admission. He is more than 10 days post-symptom onset.   - Scheduled Albuterol and Mucinex were effective. He reports improvement in overall breathing. Albuterol changed to prn and Mucinex stopped at this point.   - Continue Eliquis 2.5mg BID for prophylaxis stop date 12/21/21     Bipolar Disorder  Borderline Personality  Major Depressive Disorder with Suicidal Attempt  History of Polysubstance Abuse  History of Alcohol Abuse  Presented to [facility] ED with suicidal attempt with overdose by 5 tablets of oxycodone, 22  of 300 mg tablets of Seroquel and 4X 8 mg tablets of Suboxone while drinking 1 gallon of vodka within a 24-hour. He was cleared by poison control and required one-to-one sitter for ongoing suicidality initially. Currently, denies any suicidal ideation or intent.   - Management per primary psychiatry team   - Current Regimen: Camprel 666mg TID, Suboxone 8-2mg TID, Benadryl 25mg TID, Prazosin 1mg bedtime, Seroquel 600mg bedtime, Topamax 50mg BID     Right Knee Pain  Right Knee Osteoarthritis  MRI done 12/12/21 revealed:               1. Complex medial meniscal tear with loss of hoop tension. Extensive full-thickness cartilage loss in the medial femoral condyle and medial tibial plateau. Marked underlying marrow edema, likely reactive or stress-related. There is no clear evidence of   stress fracture.               2.  Complex lateral meniscal tear. Mild to moderate lateral compartment chondromalacia.               3.  Mild lateral subluxation and tilt of the patella. Mild patellofemoral chondromalacia with a small amount of full-thickness fissuring in the patella.               4.  Normal variant PCL anatomy. No cruciate ligament tear or collateral ligament tear.               5.  Moderately large knee joint effusion with diffuse synovitis and a moderate-sized popliteal cyst.  *Patient was seen by Orthopedics 12/12/21 and underwent right knee aspiration and depomedrol injection.  *Discussed with Orthopedics again 12/15/21 (see their note) due to persistent pain. They reported he ultimately is maxxed out on medical therapy and needs a RTKA. This cannot be done until 3 months after steroid injection. We will trial a knee brace per their recs.   - He had RLE Venous US done 12/14 that was negative for DVT and R Knee XR with moderate osteoarthritis, no fracture or dislocations.   - Tylenol 975mg Q8H, Lidocaine Patch, Diclofenac Gel, Lyrica 200mg TID, Tizanidine 4mg Q8H prn. Ketamine/Gabapentin cream ordered. Knee brace  ordered. He cannot have NSAIDs due to concurrent Eliquis use.   - Will defer to psychiatry if escalation of suboxone or other narcotics is indicated. Consideration could be given to pain clinic consultation.   - No clinical or laboratory evidence of septic arthritis post-aspiration/injection at this point.     Alcoholic Hepatitis  LFTs intermittently elevated in typical pattern consistent with etoh use only sometimes. Today, AST 57 and .   - Hep B negative, Hep C preliminarily positive with reflex pending.   - Consider outpatient RUQ US to address possible cirrhosis given pt history.    - MELD-Na score: 6 at 12/15/2021  9:04 AM  MELD score: 6 at 12/15/2021  9:04 AM  Calculated from:  Serum Creatinine: 0.62 mg/dL (Using min of 1 mg/dL) at 12/15/2021  9:04 AM  Serum Sodium: 141 mmol/L (Using max of 137 mmol/L) at 12/15/2021  9:04 AM  Total Bilirubin: 0.3 mg/dL (Using min of 1 mg/dL) at 12/15/2021  9:04 AM  INR(ratio): 0.89 (Using min of 1) at 12/15/2021  9:04 AM  Age: 51 years    Tobacco Use   - Nicorette 4-8mg Q1H prn for cravings     Hypertension   - Continue PTA Norvasc and Prazosin     GERD   - Continue PTA Protonix     Nasal Bone Fracture  CT Head done 11/22/21 revealed new age-indeterminate mildly displaced right nasal bone fracture.   - Needs to follow up with ENT as an outpatient     Hypothyroidism  TSH 3.04 12/14/21.   - Continue PTA Synthroid at 75mcg daily     Slow Transit Constipation   - Senna daily     Dilated Aortic Root  Noted on Echo 11/24/21 but not mentioned on CTA chest 11/22/21. Would recommend annual surveillance CT.        Diet: Regular Diet Adult    DVT Prophylaxis: Low Risk/Ambulatory with no VTE prophylaxis indicated  [place]dale Catheter: Not present  Central Lines: None  Code Status: Full Code    Bhaskar Thomas PA-C  Hospitalist Service  [facility]  Securely message with the Vocera Web Console (learn more here)  Text page via [employer]  Taylor[place]/Directory  ______________________________________________________________________    Interval History   Patient reports his shortness of breath is improved. He is ambulating in the halls without assistance, no cane or walker needed.  He has a limp due to right knee pain. He reports continued severe knee pain and is frustrated that nothing seems to be helping. He was grateful that we contacted orthopedics and that they feel he would be a candidate for TKA in the future. He denies fevers or chills. No erythema at the knee or rashes. His knee range of motion is unchanged.    Data reviewed today: I reviewed all medications, new labs and imaging results over the last 24 hours.    Physical Exam   Vital Signs: Temp: 97.5  F (36.4  C) Temp src: Oral BP: 127/84 Pulse: 103   Resp: 16 SpO2: 100 % O2 Device: None (Room air)    Weight: 304 lbs 3.2 oz    GENERAL: Alert and oriented x 3. Well nourished, well developed.  No acute distress.    HEENT: Normocephalic, atraumatic. Anicteric sclera. Mucous membranes moist.   CV: RRR. S1, S2. No murmurs appreciated.   RESPIRATORY: Effort normal on room. Lungs clear.  GI: Abdomen soft and non distended, bowel sounds present x all 4 quadrants. No tenderness, rebound, or guarding.   NEUROLOGICAL: No focal deficits. Follows commands.   MUSCULOSKELETAL: No joint swelling or tenderness. Moves all extremities. Walks with a limp due to right knee pain. There is no overt right knee swelling and no erythema on exam. Pedal pulses intact bilateral lower extremities.  EXTREMITIES: No gross deformities. No peripheral edema.   SKIN: Grossly warm, dry, and intact. No jaundice. No rashes.     Data   Recent Labs   Lab 12/15/21  0904 12/14/21  0917 12/11/21  1830   WBC 5.1 5.1 5.5   HGB 12.5* 13.4 12.0*   MCV 92 92 95    265 265   INR 0.89  --   --     141 140   POTASSIUM 3.6 3.6 4.2   CHLORIDE 108 108 106   CO2 27 26 27   BUN 21 19 22   CR 0.62* 0.54* 0.74   ANIONGAP 6 7 7   KACY 8.8 8.7  8.8   * 128* 100   ALBUMIN 3.2* 3.0* 3.4*   PROTTOTAL 7.4 7.7 7.3   BILITOTAL 0.3 0.4 0.5   ALKPHOS 79 94 69   * 106* 119*   AST 54* 57* 88*     No results found for this or any previous visit (from the past 24 hour(s)).  Medications       acamprosate  666 mg Oral TID     acetaminophen  975 mg Oral Q8H     amLODIPine  5 mg Oral Daily     apixaban ANTICOAGULANT  2.5 mg Oral BID     buprenorphine HCl-naloxone HCl  1 Film Sublingual TID     diphenhydrAMINE  25 mg Oral TID     folic acid  1,000 mcg Oral Daily     levothyroxine  75 mcg Oral QAM AC     lidocaine  1 patch Transdermal Q24h    And     lidocaine   Transdermal Q8H     magnesium oxide  400 mg Oral BID     miconazole with skin protectant   Topical BID     multivitamin w/minerals  1 tablet Oral Daily     pantoprazole  40 mg Oral QAM AC     polyethylene glycol  17 g Oral Daily     prazosin  1 mg Oral At Bedtime     Pregabalin  225 mg Oral TID     QUEtiapine  600 mg Oral At Bedtime     senna-docusate  1 tablet Oral At Bedtime     topiramate  50 mg Oral BID

## 2021-12-16 NOTE — PLAN OF CARE
"  Problem: Suicidal Behavior  Goal: Suicidal Behavior is Absent or Managed  Outcome: No Change     Problem: Activity and Energy Impairment (Depressive Signs/Symptoms)  Goal: Optimized Energy Level (Depressive Signs/Symptoms)  Outcome: No Change  Intervention: Optimize Energy Level  Recent Flowsheet Documentation  Taken 12/16/2021 1121 by Annabella Palacio RN  Patient Performed Hygiene: (by patient) teeth brushed  Diversional Activity: television  Activity (Behavioral Health): up ad kathleen    Patient is visible in milieu majority of the shift. He is social to select peers. He is calm but with flat and blunt affect. Patient denies SI/SIB nor hallucinations. Patient states that he is \"miserable and sore\". His goal today is \"to make it through the day\". Patient feels that \"I feel mistreated\". Writer clarified what he meant by that statement and patient said that \"I am in pain but it is not treated well\". Patient wishes himself to be dead but has no plans of hurting himself. He contracted for safety. He said \"I can't do it here\" but doesn't have a plan. Patient reports having racing thoughts at night but able to sleep well. He complained of pain on his right knee. Tramadol 50 mgs given at 0955 as a prn for moderate pain. Zanaflex 4 mgs along with Zyprexa ODT 5 mgs given at 1156 as prn for muscle spasm and anxiety respectively per patient's request. Patient attended only 1 group activity this shift. His appetite is good both breakfast and lunch. Gabapentin with Ketamine cream was ordered for pain but will be available tomorrow yet per pharmacy. Writer made the patient aware of this.  His /84; P-103.     "

## 2021-12-16 NOTE — PLAN OF CARE
Assessment/Intervention/Current Symtoms and Care Coordination:    Chart Review    Reviewed pt care in team    Called and spoke with pt's CADI . She reported that the patient's CADI was about to run out, but she could hold off on the CADI expiring if she knows when the patient will be discharging. Informed her that writer had spoken to the assisted living and they are able to hold his spot for early next week so he does not loose his housing and become homeless; however, it will depend on pt's stability and the assessment and determination of the provider.     Plan: Writer will consult with the provider to know a possible date for tentative discharge.     Discharge Plan or Goal:  Pending stabilization & development of a safe discharge plan.  Considerations include: Return to assisted living with outpatient services     Barriers to Discharge:  Patient requires further psychiatric stabilization due to current symptomology and medication management with possible adjustments     Referral Status:  No new referrals made     Legal Status:  Patient is voluntary

## 2021-12-16 NOTE — PROGRESS NOTES
"SPIRITUAL HEALTH SERVICES  SPIRITUAL ASSESSMENT Progress Note  [facility] ([facility] 3BW     REFERRAL SOURCE: self; pt known to writer from previous admissions.    Check-in with Jose M who was resting in pt room; Jose M declined support at this time noting he is in a lot of pain and \"need to really focus in.\" Jose M stated he preferred to \"work through it\" on his own and did not need additional support right now.    Plan: I will follow up next week.    Liss Kerns MDiv  Associate   Pager [phone]  Office [phone]  [facility] remains available 24/7 for emergent requests/referrals, either by having the switchboard page the on-call  or by entering an ASAP/STAT consult in Epic (this will also page the on-call ). Routine Epic consults receive an initial response within 24 hours.    "

## 2021-12-17 LAB
ALBUMIN SERPL-MCNC: 3.1 G/DL (ref 3.4–5)
ALP SERPL-CCNC: 76 U/L (ref 40–150)
ALT SERPL W P-5'-P-CCNC: 99 U/L (ref 0–70)
ANION GAP SERPL CALCULATED.3IONS-SCNC: 5 MMOL/L (ref 3–14)
AST SERPL W P-5'-P-CCNC: 62 U/L (ref 0–45)
BILIRUB SERPL-MCNC: 0.3 MG/DL (ref 0.2–1.3)
BUN SERPL-MCNC: 28 MG/DL (ref 7–30)
CALCIUM SERPL-MCNC: 8.9 MG/DL (ref 8.5–10.1)
CHLORIDE BLD-SCNC: 110 MMOL/L (ref 94–109)
CO2 SERPL-SCNC: 27 MMOL/L (ref 20–32)
CREAT SERPL-MCNC: 0.65 MG/DL (ref 0.66–1.25)
ERYTHROCYTE [DISTWIDTH] IN BLOOD BY AUTOMATED COUNT: 15.4 % (ref 10–15)
GFR SERPL CREATININE-BSD FRML MDRD: >90 ML/MIN/1.73M2
GLUCOSE BLD-MCNC: 108 MG/DL (ref 70–99)
HCT VFR BLD AUTO: 39.1 % (ref 40–53)
HGB BLD-MCNC: 12.5 G/DL (ref 13.3–17.7)
MCH RBC QN AUTO: 29.6 PG (ref 26.5–33)
MCHC RBC AUTO-ENTMCNC: 32 G/DL (ref 31.5–36.5)
MCV RBC AUTO: 92 FL (ref 78–100)
PLATELET # BLD AUTO: 310 10E3/UL (ref 150–450)
POTASSIUM BLD-SCNC: 4.1 MMOL/L (ref 3.4–5.3)
PROT SERPL-MCNC: 7.3 G/DL (ref 6.8–8.8)
RBC # BLD AUTO: 4.23 10E6/UL (ref 4.4–5.9)
SODIUM SERPL-SCNC: 142 MMOL/L (ref 133–144)
WBC # BLD AUTO: 4.5 10E3/UL (ref 4–11)

## 2021-12-17 PROCEDURE — 250N000013 HC RX MED GY IP 250 OP 250 PS 637: Performed by: PHYSICIAN ASSISTANT

## 2021-12-17 PROCEDURE — 36415 COLL VENOUS BLD VENIPUNCTURE: CPT | Performed by: PSYCHIATRY & NEUROLOGY

## 2021-12-17 PROCEDURE — 80053 COMPREHEN METABOLIC PANEL: CPT | Performed by: PSYCHIATRY & NEUROLOGY

## 2021-12-17 PROCEDURE — 85014 HEMATOCRIT: CPT | Performed by: PSYCHIATRY & NEUROLOGY

## 2021-12-17 PROCEDURE — 99232 SBSQ HOSP IP/OBS MODERATE 35: CPT | Performed by: PHYSICIAN ASSISTANT

## 2021-12-17 PROCEDURE — 250N000013 HC RX MED GY IP 250 OP 250 PS 637: Performed by: PSYCHIATRY & NEUROLOGY

## 2021-12-17 PROCEDURE — 124N000003 HC R&B MH SENIOR/ADOLESCENT

## 2021-12-17 RX ORDER — TRAMADOL HYDROCHLORIDE 50 MG/1
100 TABLET ORAL EVERY 6 HOURS PRN
Status: DISCONTINUED | OUTPATIENT
Start: 2021-12-17 | End: 2021-12-20

## 2021-12-17 RX ORDER — PREGABALIN 100 MG/1
300 CAPSULE ORAL 3 TIMES DAILY
Status: DISCONTINUED | OUTPATIENT
Start: 2021-12-17 | End: 2021-12-24 | Stop reason: HOSPADM

## 2021-12-17 RX ADMIN — ACAMPROSATE CALCIUM 666 MG: 333 TABLET, DELAYED RELEASE ORAL at 19:22

## 2021-12-17 RX ADMIN — TIZANIDINE 4 MG: 2 TABLET ORAL at 17:44

## 2021-12-17 RX ADMIN — QUETIAPINE 600 MG: 300 TABLET, FILM COATED ORAL at 21:13

## 2021-12-17 RX ADMIN — TRAMADOL HYDROCHLORIDE 50 MG: 50 TABLET, FILM COATED ORAL at 08:32

## 2021-12-17 RX ADMIN — TOPIRAMATE 50 MG: 50 TABLET ORAL at 08:31

## 2021-12-17 RX ADMIN — DOCUSATE SODIUM AND SENNOSIDES 1 TABLET: 8.6; 5 TABLET, FILM COATED ORAL at 19:23

## 2021-12-17 RX ADMIN — NICOTINE POLACRILEX 8 MG: 4 GUM, CHEWING ORAL at 14:19

## 2021-12-17 RX ADMIN — Medication 25 MG: at 14:18

## 2021-12-17 RX ADMIN — OLANZAPINE 5 MG: 5 TABLET, ORALLY DISINTEGRATING ORAL at 21:13

## 2021-12-17 RX ADMIN — NICOTINE POLACRILEX 8 MG: 4 GUM, CHEWING ORAL at 08:29

## 2021-12-17 RX ADMIN — NICOTINE POLACRILEX 8 MG: 4 GUM, CHEWING ORAL at 09:51

## 2021-12-17 RX ADMIN — PANTOPRAZOLE SODIUM 40 MG: 40 TABLET, DELAYED RELEASE ORAL at 06:47

## 2021-12-17 RX ADMIN — OLANZAPINE 5 MG: 5 TABLET, ORALLY DISINTEGRATING ORAL at 12:45

## 2021-12-17 RX ADMIN — PRAZOSIN HYDROCHLORIDE 1 MG: 1 CAPSULE ORAL at 19:23

## 2021-12-17 RX ADMIN — ACAMPROSATE CALCIUM 666 MG: 333 TABLET, DELAYED RELEASE ORAL at 08:30

## 2021-12-17 RX ADMIN — Medication 25 MG: at 19:22

## 2021-12-17 RX ADMIN — MELATONIN TAB 3 MG 3 MG: 3 TAB at 21:13

## 2021-12-17 RX ADMIN — PREGABALIN 300 MG: 100 CAPSULE ORAL at 19:22

## 2021-12-17 RX ADMIN — ACETAMINOPHEN 975 MG: 325 TABLET, FILM COATED ORAL at 06:47

## 2021-12-17 RX ADMIN — APIXABAN 2.5 MG: 2.5 TABLET, FILM COATED ORAL at 19:23

## 2021-12-17 RX ADMIN — BUPRENORPHINE AND NALOXONE 1 FILM: 8; 2 FILM BUCCAL; SUBLINGUAL at 19:22

## 2021-12-17 RX ADMIN — LEVOTHYROXINE SODIUM 75 MCG: 75 TABLET ORAL at 06:47

## 2021-12-17 RX ADMIN — NICOTINE POLACRILEX 8 MG: 4 GUM, CHEWING ORAL at 21:14

## 2021-12-17 RX ADMIN — LIDOCAINE PATCH 4% 1 PATCH: 40 PATCH TOPICAL at 19:22

## 2021-12-17 RX ADMIN — PREGABALIN 225 MG: 25 CAPSULE ORAL at 08:30

## 2021-12-17 RX ADMIN — TRAMADOL HYDROCHLORIDE 100 MG: 50 TABLET, FILM COATED ORAL at 20:37

## 2021-12-17 RX ADMIN — NICOTINE POLACRILEX 8 MG: 4 GUM, CHEWING ORAL at 16:25

## 2021-12-17 RX ADMIN — POLYETHYLENE GLYCOL 3350 17 G: 17 POWDER, FOR SOLUTION ORAL at 08:30

## 2021-12-17 RX ADMIN — NICOTINE POLACRILEX 8 MG: 4 GUM, CHEWING ORAL at 19:47

## 2021-12-17 RX ADMIN — TRAMADOL HYDROCHLORIDE 100 MG: 50 TABLET, FILM COATED ORAL at 14:19

## 2021-12-17 RX ADMIN — ACETAMINOPHEN 975 MG: 325 TABLET, FILM COATED ORAL at 21:13

## 2021-12-17 RX ADMIN — NICOTINE POLACRILEX 8 MG: 4 GUM, CHEWING ORAL at 12:45

## 2021-12-17 RX ADMIN — Medication 25 MG: at 08:30

## 2021-12-17 RX ADMIN — TOPIRAMATE 50 MG: 50 TABLET ORAL at 19:23

## 2021-12-17 RX ADMIN — NICOTINE POLACRILEX 8 MG: 4 GUM, CHEWING ORAL at 18:21

## 2021-12-17 RX ADMIN — FOLIC ACID 1000 MCG: 1 TABLET ORAL at 08:32

## 2021-12-17 RX ADMIN — ACAMPROSATE CALCIUM 666 MG: 333 TABLET, DELAYED RELEASE ORAL at 14:19

## 2021-12-17 RX ADMIN — PREGABALIN 300 MG: 100 CAPSULE ORAL at 14:18

## 2021-12-17 RX ADMIN — Medication 400 MG: at 08:31

## 2021-12-17 RX ADMIN — APIXABAN 2.5 MG: 2.5 TABLET, FILM COATED ORAL at 08:31

## 2021-12-17 RX ADMIN — NICOTINE POLACRILEX 8 MG: 4 GUM, CHEWING ORAL at 11:02

## 2021-12-17 RX ADMIN — ACETAMINOPHEN 975 MG: 325 TABLET, FILM COATED ORAL at 14:18

## 2021-12-17 RX ADMIN — BUPRENORPHINE AND NALOXONE 1 FILM: 8; 2 FILM BUCCAL; SUBLINGUAL at 11:02

## 2021-12-17 RX ADMIN — Medication 400 MG: at 19:23

## 2021-12-17 RX ADMIN — BUPRENORPHINE AND NALOXONE 1 FILM: 8; 2 FILM BUCCAL; SUBLINGUAL at 08:30

## 2021-12-17 RX ADMIN — MULTIPLE VITAMINS W/ MINERALS TAB 1 TABLET: TAB at 08:30

## 2021-12-17 ASSESSMENT — ACTIVITIES OF DAILY LIVING (ADL)
ORAL_HYGIENE: INDEPENDENT
DRESS: INDEPENDENT
LAUNDRY: UNABLE TO COMPLETE
DRESS: INDEPENDENT
HYGIENE/GROOMING: INDEPENDENT
LAUNDRY: UNABLE TO COMPLETE
HYGIENE/GROOMING: INDEPENDENT
ORAL_HYGIENE: INDEPENDENT

## 2021-12-17 NOTE — PLAN OF CARE
Problem: Sleep Disturbance (Depressive Signs/Symptoms)  Goal: Improved Sleep (Depressive Signs/Symptoms)  Outcome: No Change     Patient was asleep at the start of the shift and slept a total of 7.25 hours.     He is scheduled for CMP and CBC today.

## 2021-12-17 NOTE — PLAN OF CARE
Problem: Suicidal Behavior  Goal: Suicidal Behavior is Absent or Managed  Outcome: No Change     Problem: Activity and Energy Impairment (Depressive Signs/Symptoms)  Goal: Optimized Energy Level (Depressive Signs/Symptoms)  Outcome: No Change  Intervention: Optimize Energy Level  Recent Flowsheet Documentation  Taken 12/17/2021 1000 by Bernard Mancera, RN  Activity (Behavioral Health): up ad kathleen     Problem: Mood Impairment (Depressive Signs/Symptoms)  Goal: Improved Mood Symptoms (Depressive Signs/Symptoms)  Outcome: No Change     Problem: Pain Chronic (Persistent)  Goal: Acceptable Pain Control and Functional Ability  Outcome: No Change  Intervention: Develop Pain Management Plan  Recent Flowsheet Documentation  Taken 12/17/2021 0900 by Bernard Mancera, RN  Pain Management Interventions: medication (see MAR)     Patient is Alert and Oriented, calm and cooperative. Endorses anxiety and depression. Prn Zyprexa given this shift. Patient reports Chronic SI, but no plans to harm self while here and contracts for safety. Ongoing right knee pain managed with tramadol and heat. Rx Note sent for Gabapentin and Ketamine cream. Held amlodipine this morning, /76 did not meet parameters.  Patient requested Max dose of nicorette gum x4 this shift. Patient attended groups, social with peers and staff. Tramadol and Lyrica doses increased this shift, see eMAR for details.

## 2021-12-17 NOTE — PLAN OF CARE
"  Problem: Behavioral Health Plan of Care  Goal: Adheres to Safety Considerations for Self and Others  Outcome: Improving  Note: Patient stated that he feels worst than this morning. Said the pain is not controlled and it makes him suffer. He related his depression and anxiety to his pain. Rated pain anxiety and depression as very high. At the same time, he declined to have scheduled Lidocaine patch and refused to use any topical ointments for his knee pain. Stated: \"they don't work\". Patient was offered a walker, in order to decrease the weight bearing on his right knee, but he declined. Took PRN Tramadol and is planning to take PRN Zanaflex as well. Patient took medications as prescribed. He took PRN Nicotine gum 8 mg every 1 hrs. Requested PRN medications for sleep. Given Melatonin and Zyprexa per request.   Behavior was controlled. Patient spent most of the shift out on the unit, watching TV. Attended community meeting. Keeps self clean and well groomed. Communicated needs well. Appetite is good, he ate 100% of supper.   Thoughts are focused on his pain, he is unhappy with pain control. Speech was clear and organized. Mood was flat, tense and irritable at times. Affect was blunted. Judgement is not improved. Memory appeared intact.   Intervention: Develop and Maintain Individualized Safety Plan  Recent Flowsheet Documentation  Taken 12/16/2021 1808 by Medina Baron RN  Safety Measures:    environmental rounds completed    safety rounds completed    safety plan reviewed     "

## 2021-12-17 NOTE — PLAN OF CARE
Assessment/Intervention/Current Symtoms and Care Coordination:    Chart Review    Reviewed pt care in team    Met with pt today and reviewed his care. He reported he was having severe knee pain and will like to have surgery.     Discharge Plan or Goal:  Pending stabilization & development of a safe discharge plan.  Considerations include: Return to assisted living with outpatient services     Barriers to Discharge:  Patient requires further psychiatric stabilization due to current symptomology and medication management with possible adjustments     Referral Status:  No new referrals made     Legal Status:  Patient is voluntary

## 2021-12-17 NOTE — PROGRESS NOTES
Patient seen, chart reviewed, care discussed with staff.  Blood pressure 112/76, pulse 98, temperature 97.2  F (36.2  C), temperature source Temporal, resp. rate 16, weight 138 kg (304 lb 3.2 oz), SpO2 94 %.  He reports high levels of knee pain and notes he would use if not here  General appearance: fair  Alert.   Affect: fair  Mood: fair    Speech:  normal.   Eye contact:  good.    Psychomotor behavior: normal  Gait: favors knee that needs replacing  Abnormal movements: none  Delusions: none  Hallucinations:  none  Thoughts: logical  Associations: intact  Judgement: poor  Insight: fair  Cognitions: intact in conversation  Memory:  intact in conversation  Orientation: normal    Suicidal thoughts, safe while here    Plan: Increase Lyrica and increase Tramadol    Imp: Bipolar illness, and Chemical dependency  Recent serious overdose    Current Facility-Administered Medications   Medication     acamprosate (CAMPRAL) EC tablet 666 mg     acetaminophen (TYLENOL) tablet 975 mg     albuterol (PROVENTIL HFA/VENTOLIN HFA) inhaler     alum & mag hydroxide-simethicone (MAALOX) suspension 30 mL     amLODIPine (NORVASC) tablet 5 mg     apixaban ANTICOAGULANT (ELIQUIS) tablet 2.5 mg     buprenorphine HCl-naloxone HCl (SUBOXONE) 8-2 MG per film 1 Film     diclofenac (VOLTAREN) 1 % topical gel 2 g     diphenhydrAMINE (BENADRYL) half-tab 25 mg     folic acid (FOLVITE) tablet 1,000 mcg     ketamine 5%-gabapentin 8% in PLO cream 0.25 g     levothyroxine (SYNTHROID/LEVOTHROID) tablet 75 mcg     Lidocaine (LIDOCARE) 4 % Patch 1 patch    And     lidocaine patch in PLACE     magnesium oxide (MAG-OX) tablet 400 mg     melatonin tablet 3 mg     menthol-zinc oxide (CALMOSEPTINE) 0.44-20.6 % ointment OINT     miconazole with skin protectant (SELENA ANTIFUNGAL) 2 % cream     multivitamin w/minerals (THERA-VIT-M) tablet 1 tablet     nicotine (NICORETTE) gum 4-8 mg     OLANZapine zydis (zyPREXA) ODT tab 5 mg     ondansetron (ZOFRAN-ODT) ODT tab 4  mg     pantoprazole (PROTONIX) EC tablet 40 mg     polyethylene glycol (MIRALAX) Packet 17 g     prazosin (MINIPRESS) capsule 1 mg     pregabalin (LYRICA) capsule 300 mg     QUEtiapine (SEROquel) tablet 600 mg     senna-docusate (SENOKOT-S/PERICOLACE) 8.6-50 MG per tablet 1 tablet     sennosides (SENOKOT) tablet 1-2 tablet     tiZANidine (ZANAFLEX) tablet 4 mg     topiramate (TOPAMAX) tablet 50 mg     traMADol (ULTRAM) tablet 100 mg     Recent Results (from the past 168 hour(s))   MRSA MSSA PCR, Nasal Swab    Collection Time: 12/11/21 10:20 AM    Specimen: Nares, Bilateral; Swab   Result Value Ref Range    MRSA Target DNA Positive (A) Negative    SA Target DNA Positive    MRSA Culture Reflex    Collection Time: 12/11/21 10:20 AM    Specimen: Nares, Bilateral; Swab   Result Value Ref Range    Culture Staphylococcus aureus MRSA (A)        Susceptibility    Staphylococcus aureus MRSA - ARNULFO*     Oxacillin >=4.0 Resistant ug/mL     Gentamicin <=0.5 Susceptible ug/mL     Erythromycin <=0.25 Susceptible ug/mL     Clindamycin <=0.25 Susceptible ug/mL     Linezolid 2.0 Susceptible ug/mL     Vancomycin <=0.5 Susceptible ug/mL     Tetracycline <=1.0 Susceptible ug/mL     Trimethoprim/Sulfamethoxazole <=0.5/9.5 Susceptible ug/mL     * MRSA requires contact precautions.   Comprehensive metabolic panel    Collection Time: 12/11/21  6:30 PM   Result Value Ref Range    Sodium 140 136 - 145 mmol/L    Potassium 4.2 3.5 - 5.0 mmol/L    Chloride 106 98 - 107 mmol/L    Carbon Dioxide (CO2) 27 22 - 31 mmol/L    Anion Gap 7 5 - 18 mmol/L    Urea Nitrogen 22 8 - 22 mg/dL    Creatinine 0.74 0.70 - 1.30 mg/dL    Calcium 8.8 8.5 - 10.5 mg/dL    Glucose 100 70 - 125 mg/dL    Alkaline Phosphatase 69 45 - 120 U/L    AST 88 (H) 0 - 40 U/L     (H) 0 - 45 U/L    Protein Total 7.3 6.0 - 8.0 g/dL    Albumin 3.4 (L) 3.5 - 5.0 g/dL    Bilirubin Total 0.5 0.0 - 1.0 mg/dL    GFR Estimate >90 >60 mL/min/1.73m2   CBC with platelets and differential     Collection Time: 12/11/21  6:30 PM   Result Value Ref Range    WBC Count 5.5 4.0 - 11.0 10e3/uL    RBC Count 4.02 (L) 4.40 - 5.90 10e6/uL    Hemoglobin 12.0 (L) 13.3 - 17.7 g/dL    Hematocrit 38.0 (L) 40.0 - 53.0 %    MCV 95 78 - 100 fL    MCH 29.9 26.5 - 33.0 pg    MCHC 31.6 31.5 - 36.5 g/dL    RDW 15.1 (H) 10.0 - 15.0 %    Platelet Count 265 150 - 450 10e3/uL    % Neutrophils 48 %    % Lymphocytes 36 %    % Monocytes 10 %    % Eosinophils 5 %    % Basophils 1 %    % Immature Granulocytes 0 %    NRBCs per 100 WBC 0 <1 /100    Absolute Neutrophils 2.7 1.6 - 8.3 10e3/uL    Absolute Lymphocytes 2.0 0.8 - 5.3 10e3/uL    Absolute Monocytes 0.5 0.0 - 1.3 10e3/uL    Absolute Eosinophils 0.3 0.0 - 0.7 10e3/uL    Absolute Basophils 0.0 0.0 - 0.2 10e3/uL    Absolute Immature Granulocytes 0.0 <=0.4 10e3/uL    Absolute NRBCs 0.0 10e3/uL   Comprehensive metabolic panel    Collection Time: 12/14/21  9:17 AM   Result Value Ref Range    Sodium 141 133 - 144 mmol/L    Potassium 3.6 3.4 - 5.3 mmol/L    Chloride 108 94 - 109 mmol/L    Carbon Dioxide (CO2) 26 20 - 32 mmol/L    Anion Gap 7 3 - 14 mmol/L    Urea Nitrogen 19 7 - 30 mg/dL    Creatinine 0.54 (L) 0.66 - 1.25 mg/dL    Calcium 8.7 8.5 - 10.1 mg/dL    Glucose 128 (H) 70 - 99 mg/dL    Alkaline Phosphatase 94 40 - 150 U/L    AST 57 (H) 0 - 45 U/L     (H) 0 - 70 U/L    Protein Total 7.7 6.8 - 8.8 g/dL    Albumin 3.0 (L) 3.4 - 5.0 g/dL    Bilirubin Total 0.4 0.2 - 1.3 mg/dL    GFR Estimate >90 >60 mL/min/1.73m2   CBC with platelets and differential    Collection Time: 12/14/21  9:17 AM   Result Value Ref Range    WBC Count 5.1 4.0 - 11.0 10e3/uL    RBC Count 4.46 4.40 - 5.90 10e6/uL    Hemoglobin 13.4 13.3 - 17.7 g/dL    Hematocrit 40.8 40.0 - 53.0 %    MCV 92 78 - 100 fL    MCH 30.0 26.5 - 33.0 pg    MCHC 32.8 31.5 - 36.5 g/dL    RDW 15.1 (H) 10.0 - 15.0 %    Platelet Count 265 150 - 450 10e3/uL    % Neutrophils 47 %    % Lymphocytes 38 %    % Monocytes 8 %    %  Eosinophils 7 %    % Basophils 0 %    % Immature Granulocytes 0 %    NRBCs per 100 WBC 0 <1 /100    Absolute Neutrophils 2.4 1.6 - 8.3 10e3/uL    Absolute Lymphocytes 1.9 0.8 - 5.3 10e3/uL    Absolute Monocytes 0.4 0.0 - 1.3 10e3/uL    Absolute Eosinophils 0.3 0.0 - 0.7 10e3/uL    Absolute Basophils 0.0 0.0 - 0.2 10e3/uL    Absolute Immature Granulocytes 0.0 <=0.4 10e3/uL    Absolute NRBCs 0.0 10e3/uL   Magnesium    Collection Time: 12/14/21  9:17 AM   Result Value Ref Range    Magnesium 2.1 1.6 - 2.3 mg/dL   TSH with free T4 reflex    Collection Time: 12/14/21  9:17 AM   Result Value Ref Range    TSH 3.04 0.40 - 4.00 mU/L   Hepatitis B surface antigen    Collection Time: 12/14/21  6:41 PM   Result Value Ref Range    Hepatitis B Surface Antigen Nonreactive Nonreactive   Hepatitis B Surface Antibody    Collection Time: 12/14/21  6:41 PM   Result Value Ref Range    Hepatitis B Surface Antibody 7.01 <8.00 m[IU]/mL   Hepatitis C Screen Reflex to HCV RNA Quant and Genotype    Collection Time: 12/14/21  6:41 PM   Result Value Ref Range    Hepatitis C Antibody Reactive (A) Nonreactive   Hepatitis C RNA, Quantitative by PCR with Confirmatory Reflex to Genotyping    Collection Time: 12/14/21  6:41 PM   Result Value Ref Range    Hepatitis C log 6.2     Hepatitis C RNA IU/mL, Instrument 1,755,607 (H) <1 IU/mL   INR    Collection Time: 12/15/21  9:04 AM   Result Value Ref Range    INR 0.89 0.86 - 1.14   Comprehensive metabolic panel    Collection Time: 12/15/21  9:04 AM   Result Value Ref Range    Sodium 141 133 - 144 mmol/L    Potassium 3.6 3.4 - 5.3 mmol/L    Chloride 108 94 - 109 mmol/L    Carbon Dioxide (CO2) 27 20 - 32 mmol/L    Anion Gap 6 3 - 14 mmol/L    Urea Nitrogen 21 7 - 30 mg/dL    Creatinine 0.62 (L) 0.66 - 1.25 mg/dL    Calcium 8.8 8.5 - 10.1 mg/dL    Glucose 153 (H) 70 - 99 mg/dL    Alkaline Phosphatase 79 40 - 150 U/L    AST 54 (H) 0 - 45 U/L     (H) 0 - 70 U/L    Protein Total 7.4 6.8 - 8.8 g/dL     Albumin 3.2 (L) 3.4 - 5.0 g/dL    Bilirubin Total 0.3 0.2 - 1.3 mg/dL    GFR Estimate >90 >60 mL/min/1.73m2   CBC with platelets    Collection Time: 12/15/21  9:04 AM   Result Value Ref Range    WBC Count 5.1 4.0 - 11.0 10e3/uL    RBC Count 4.26 (L) 4.40 - 5.90 10e6/uL    Hemoglobin 12.5 (L) 13.3 - 17.7 g/dL    Hematocrit 39.2 (L) 40.0 - 53.0 %    MCV 92 78 - 100 fL    MCH 29.3 26.5 - 33.0 pg    MCHC 31.9 31.5 - 36.5 g/dL    RDW 15.3 (H) 10.0 - 15.0 %    Platelet Count 268 150 - 450 10e3/uL   CBC with platelets    Collection Time: 12/17/21  8:01 AM   Result Value Ref Range    WBC Count 4.5 4.0 - 11.0 10e3/uL    RBC Count 4.23 (L) 4.40 - 5.90 10e6/uL    Hemoglobin 12.5 (L) 13.3 - 17.7 g/dL    Hematocrit 39.1 (L) 40.0 - 53.0 %    MCV 92 78 - 100 fL    MCH 29.6 26.5 - 33.0 pg    MCHC 32.0 31.5 - 36.5 g/dL    RDW 15.4 (H) 10.0 - 15.0 %    Platelet Count 310 150 - 450 10e3/uL   Comprehensive metabolic panel    Collection Time: 12/17/21  8:01 AM   Result Value Ref Range    Sodium 142 133 - 144 mmol/L    Potassium 4.1 3.4 - 5.3 mmol/L    Chloride 110 (H) 94 - 109 mmol/L    Carbon Dioxide (CO2) 27 20 - 32 mmol/L    Anion Gap 5 3 - 14 mmol/L    Urea Nitrogen 28 7 - 30 mg/dL    Creatinine 0.65 (L) 0.66 - 1.25 mg/dL    Calcium 8.9 8.5 - 10.1 mg/dL    Glucose 108 (H) 70 - 99 mg/dL    Alkaline Phosphatase 76 40 - 150 U/L    AST 62 (H) 0 - 45 U/L    ALT 99 (H) 0 - 70 U/L    Protein Total 7.3 6.8 - 8.8 g/dL    Albumin 3.1 (L) 3.4 - 5.0 g/dL    Bilirubin Total 0.3 0.2 - 1.3 mg/dL    GFR Estimate >90 >60 mL/min/1.73m2

## 2021-12-17 NOTE — PROGRESS NOTES
Brief Medicine Progress Note:    Interval History:  I discussed with patient that his Hepatitis C reflex came back positive. He is not surprised by this. I recommend close interval follow up with his PCP to address- normally patients need to be sober for 6 months prior to treatment. He reports no abdominal pain, nausea, vomiting.    In regards to his knee pain, it is still severe. No erythema and he continues to be ambulatory. He is very frustrated by his pain (understandibly) and reports Tramadol 50mg did not help. He is still waiting for his gabapentin/ketamine cream and knee brace to be delivered. No fevers or erythema. He reports that his pain is so severe that if he is discharged, he feels certain he will attempt to use illicit narcotics again (including heroine) to self medicate.    A 5 point AKIL was done and negative aside from what is listed above.      Exam:   GENERAL: Alert and oriented x 3. Well nourished, well developed.  No acute distress.    HEENT: Normocephalic, atraumatic. Anicteric sclera. Mucous membranes moist.   CV: RRR. S1, S2. No murmurs appreciated.   RESPIRATORY: Effort normal on room. Lungs soft expiratory wheezing upper lobes bilaterally but overall much improved from admission. No crackle.  GI: Abdomen soft and non distended, bowel sounds present x all 4 quadrants. No tenderness, rebound, or guarding.   NEUROLOGICAL: No focal deficits. Follows commands.  MUSCULOSKELETAL: No joint swelling or tenderness. Moves all extremities. Walks with a limp due to right knee pain. There is no overt right knee swelling and no erythema on exam. Pedal pulses intact bilateral lower extremities.  EXTREMITIES: No gross deformities. No peripheral edema.   SKIN: Grossly warm, dry, and intact. No jaundice. No rashes.     Assessment/Plan:  Hollis Barclay is a 51 year old male group home resident admitted on 12/13/2021. He has a past medical history significant for alcohol use disorder with h/o alcoholic  hepatitis, opiate use disorder, bipolar 1 disorder, borderline personality disorder, chronic back pain, post concussive encephalopathy, TBI, social anxiety disorder, hypertension, hypothyroidism, hypokalemia and suicidal ideations who was recently admitted to [facility] 11/13 with suicide attempt, transfer to [facility] inpatient psychiatry on 11/19/2021, then transferred to [facility] in 11/22/2021 with Covid pneumonia and respiratory failure with hypoxia. He is subsequently transferred to [facility] for continued inpatient management.    Daily Update:  -Discussed positive Hep C status.  -Discussed with Ortho who indicated no TKA until he is 3 months out from steroid injection.  -No evidence of evolving septic arthritis at this point    Right Knee Pain  Right Knee Osteoarthritis  MRI done 12/12/21 revealed:               1. Complex medial meniscal tear with loss of hoop tension. Extensive full-thickness cartilage loss in the medial femoral condyle and medial tibial plateau. Marked underlying marrow edema, likely reactive or stress-related. There is no clear evidence of   stress fracture.               2.  Complex lateral meniscal tear. Mild to moderate lateral compartment chondromalacia.               3.  Mild lateral subluxation and tilt of the patella. Mild patellofemoral chondromalacia with a small amount of full-thickness fissuring in the patella.               4.  Normal variant PCL anatomy. No cruciate ligament tear or collateral ligament tear.               5.  Moderately large knee joint effusion with diffuse synovitis and a moderate-sized popliteal cyst.  *Patient was seen by Orthopedics 12/12/21 and underwent right knee aspiration and depomedrol injection.  *Discussed with Orthopedics again 12/15/21 (see their note) due to persistent pain. They reported he ultimately is maxxed out on medical therapy and needs a RTKA. This cannot be done until 3 months after steroid injection. We  will trial a knee brace per their recs.  *Discussed with Orthopedics 12/17/21 who indicated that RTKA was unlikely to be an option while inpatient due to recent steroid injection. They recommend optimize medical management.   - He had RLE Venous US done 12/14 that was negative for DVT and R Knee XR with moderate osteoarthritis, no fracture or dislocations.   - Tylenol 975mg Q8H, Lidocaine Patch, Diclofenac Gel, Lyrica 200mg-->300mg TID per psychiatry, Tizanidine 4mg Q8H prn. Ketamine/Gabapentin cream ordered. Knee brace ordered. He cannot have NSAIDs due to concurrent Eliquis use.   - Discussed with psychiatry who is intending to escalate his pain control regimen from 50mg Tramadol to 100mg, possible additional changes. Appreciate their assistance. Could consider anesthesia/pain consultation as well.   - No clinical or laboratory evidence of septic arthritis post-aspiration/injection at this point.    Recovered COVID19 Pneumonia  Admitted to [facility] 11/22. Hypoxic up to 4L O2, subsequently weaned back to room air. Completed 7 days of Decadron, stopped when hypoxia resolved.  Completed 5 days of remdesivir.  Anticoagulated with Eliquis, for DVT prophylaxis, and should continue for 30 days till 12/21/2021 per discharge summary recs. Pt reporting continued shortness of breath particularly with exertion that is slow to improve post-COVID. Had soft expiratory wheezing particularly throughout the left lung at admission. He is more than 10 days post-symptom onset.   - Scheduled Albuterol and Mucinex were effective. He reports improvement in overall breathing. Albuterol changed to prn and Mucinex stopped at this point.   - Continue Eliquis 2.5mg BID for prophylaxis stop date 12/21/21     Bipolar Disorder  Borderline Personality  Major Depressive Disorder with Suicidal Attempt  History of Polysubstance Abuse  History of Alcohol Abuse  Presented to [facility] ED with suicidal attempt with overdose by 5 tablets of oxycodone,  22 of 300 mg tablets of Seroquel and 4X 8 mg tablets of Suboxone while drinking 1 gallon of vodka within a 24-hour. He was cleared by poison control and required one-to-one sitter for ongoing suicidality initially. Currently, denies any suicidal ideation or intent.   - Management per primary psychiatry team   - Current Regimen: Camprel 666mg TID, Suboxone 8-2mg TID, Benadryl 25mg TID, Prazosin 1mg bedtime, Seroquel 600mg bedtime, Topamax 50mg BID     Alcoholic Hepatitis  Hepatitis C Positive  LFTs intermittently elevated in typical pattern consistent with etoh use only sometimes. Today, AST 57 and .   - Hep B negative, Hep C with positive reflex   - Consider outpatient RUQ US to address possible cirrhosis given pt history.    - He will need outpatient PCP follow for Hep C, he will likely need to be sober for at least 6 months prior to treatment.   - MELD-Na score: 6 at 12/15/2021  9:04 AM  MELD score: 6 at 12/15/2021  9:04 AM  Calculated from:  Serum Creatinine: 0.62 mg/dL (Using min of 1 mg/dL) at 12/15/2021  9:04 AM  Serum Sodium: 141 mmol/L (Using max of 137 mmol/L) at 12/15/2021  9:04 AM  Total Bilirubin: 0.3 mg/dL (Using min of 1 mg/dL) at 12/15/2021  9:04 AM  INR(ratio): 0.89 (Using min of 1) at 12/15/2021  9:04 AM  Age: 51 years     Tobacco Use   - Nicorette 4-8mg Q1H prn for cravings     Hypertension   - Continue PTA Norvasc and Prazosin     GERD   - Continue PTA Protonix     Nasal Bone Fracture  CT Head done 11/22/21 revealed new age-indeterminate mildly displaced right nasal bone fracture.   - Needs to follow up with ENT as an outpatient     Hypothyroidism  TSH 3.04 12/14/21.   - Continue PTA Synthroid at 75mcg daily     Slow Transit Constipation   - Senna daily     Dilated Aortic Root  Noted on Echo 11/24/21 but not mentioned on CTA chest 11/22/21. Would recommend annual surveillance CT.    Thank you for the consult. Medicine team will continue to follow in the periphery.  Bhaskar Thomas PA-C on  12/17/2021 at 12:22 PM

## 2021-12-17 NOTE — PLAN OF CARE
Number of patients attending the group:  7  Group Length: 0.5 Hour     Group Therapy      Summary of Group / Topics Discussed:     The  Psychotherapy group goal is to promote insight to positive choice and change. Group processing is within a supportive and safe environment. Patients will process emotions using verbal group and expressive psychotherapy interventions.     Assessment: Group discussed the importance of sleep in mental health maintenance. Group members reported on specific sleep enhancing practices they are used to.  Reviewed ways to self enhance sleep; strategies for falling and staying asleep. Reviewed muscle relaxing techniques to aid with sleep.  Reviewed specific strategies for self assessment for sleep effectiveness following behavioral modifications.      Patient Response: Pt was quiet in group. Reported she is feeling like having not much to do. Pt took time to report his steps at practicing muscle relaxation tips and their help with his sleep. He reported his main concern is the pain he is going through. He said he would rather have a surgery as soon as possible.

## 2021-12-18 PROCEDURE — 250N000013 HC RX MED GY IP 250 OP 250 PS 637: Performed by: PHYSICIAN ASSISTANT

## 2021-12-18 PROCEDURE — 250N000013 HC RX MED GY IP 250 OP 250 PS 637: Performed by: PSYCHIATRY & NEUROLOGY

## 2021-12-18 PROCEDURE — H2032 ACTIVITY THERAPY, PER 15 MIN: HCPCS

## 2021-12-18 PROCEDURE — 124N000003 HC R&B MH SENIOR/ADOLESCENT

## 2021-12-18 RX ADMIN — BUPRENORPHINE AND NALOXONE 1 FILM: 8; 2 FILM BUCCAL; SUBLINGUAL at 08:30

## 2021-12-18 RX ADMIN — TRAMADOL HYDROCHLORIDE 100 MG: 50 TABLET, FILM COATED ORAL at 08:36

## 2021-12-18 RX ADMIN — Medication 400 MG: at 19:06

## 2021-12-18 RX ADMIN — ACETAMINOPHEN 975 MG: 325 TABLET, FILM COATED ORAL at 06:30

## 2021-12-18 RX ADMIN — TOPIRAMATE 50 MG: 50 TABLET ORAL at 08:30

## 2021-12-18 RX ADMIN — APIXABAN 2.5 MG: 2.5 TABLET, FILM COATED ORAL at 08:30

## 2021-12-18 RX ADMIN — NICOTINE POLACRILEX 8 MG: 4 GUM, CHEWING ORAL at 11:41

## 2021-12-18 RX ADMIN — BUPRENORPHINE AND NALOXONE 1 FILM: 8; 2 FILM BUCCAL; SUBLINGUAL at 19:06

## 2021-12-18 RX ADMIN — Medication 25 MG: at 14:15

## 2021-12-18 RX ADMIN — NICOTINE POLACRILEX 8 MG: 4 GUM, CHEWING ORAL at 17:13

## 2021-12-18 RX ADMIN — PRAZOSIN HYDROCHLORIDE 1 MG: 1 CAPSULE ORAL at 19:06

## 2021-12-18 RX ADMIN — LEVOTHYROXINE SODIUM 75 MCG: 75 TABLET ORAL at 06:30

## 2021-12-18 RX ADMIN — TRAMADOL HYDROCHLORIDE 100 MG: 50 TABLET, FILM COATED ORAL at 15:57

## 2021-12-18 RX ADMIN — ACAMPROSATE CALCIUM 666 MG: 333 TABLET, DELAYED RELEASE ORAL at 19:05

## 2021-12-18 RX ADMIN — ACETAMINOPHEN 975 MG: 325 TABLET, FILM COATED ORAL at 21:13

## 2021-12-18 RX ADMIN — OLANZAPINE 5 MG: 5 TABLET, ORALLY DISINTEGRATING ORAL at 21:13

## 2021-12-18 RX ADMIN — Medication 400 MG: at 08:30

## 2021-12-18 RX ADMIN — BUPRENORPHINE AND NALOXONE 1 FILM: 8; 2 FILM BUCCAL; SUBLINGUAL at 11:59

## 2021-12-18 RX ADMIN — APIXABAN 2.5 MG: 2.5 TABLET, FILM COATED ORAL at 19:06

## 2021-12-18 RX ADMIN — PREGABALIN 300 MG: 100 CAPSULE ORAL at 08:30

## 2021-12-18 RX ADMIN — NICOTINE POLACRILEX 8 MG: 4 GUM, CHEWING ORAL at 21:13

## 2021-12-18 RX ADMIN — TOPIRAMATE 50 MG: 50 TABLET ORAL at 19:05

## 2021-12-18 RX ADMIN — ACAMPROSATE CALCIUM 666 MG: 333 TABLET, DELAYED RELEASE ORAL at 08:30

## 2021-12-18 RX ADMIN — ACAMPROSATE CALCIUM 666 MG: 333 TABLET, DELAYED RELEASE ORAL at 14:15

## 2021-12-18 RX ADMIN — FOLIC ACID 1000 MCG: 1 TABLET ORAL at 08:31

## 2021-12-18 RX ADMIN — NICOTINE POLACRILEX 8 MG: 4 GUM, CHEWING ORAL at 14:16

## 2021-12-18 RX ADMIN — NICOTINE POLACRILEX 8 MG: 4 GUM, CHEWING ORAL at 09:55

## 2021-12-18 RX ADMIN — QUETIAPINE 600 MG: 300 TABLET, FILM COATED ORAL at 21:13

## 2021-12-18 RX ADMIN — PANTOPRAZOLE SODIUM 40 MG: 40 TABLET, DELAYED RELEASE ORAL at 06:31

## 2021-12-18 RX ADMIN — MELATONIN TAB 3 MG 3 MG: 3 TAB at 21:13

## 2021-12-18 RX ADMIN — PREGABALIN 300 MG: 100 CAPSULE ORAL at 19:06

## 2021-12-18 RX ADMIN — ACETAMINOPHEN 975 MG: 325 TABLET, FILM COATED ORAL at 14:15

## 2021-12-18 RX ADMIN — TIZANIDINE 4 MG: 2 TABLET ORAL at 15:57

## 2021-12-18 RX ADMIN — NICOTINE POLACRILEX 8 MG: 4 GUM, CHEWING ORAL at 08:30

## 2021-12-18 RX ADMIN — DOCUSATE SODIUM AND SENNOSIDES 1 TABLET: 8.6; 5 TABLET, FILM COATED ORAL at 19:06

## 2021-12-18 RX ADMIN — MULTIPLE VITAMINS W/ MINERALS TAB 1 TABLET: TAB at 08:31

## 2021-12-18 RX ADMIN — NICOTINE POLACRILEX 8 MG: 4 GUM, CHEWING ORAL at 19:06

## 2021-12-18 RX ADMIN — Medication 25 MG: at 08:29

## 2021-12-18 RX ADMIN — POLYETHYLENE GLYCOL 3350 17 G: 17 POWDER, FOR SOLUTION ORAL at 08:30

## 2021-12-18 RX ADMIN — PREGABALIN 300 MG: 100 CAPSULE ORAL at 14:15

## 2021-12-18 RX ADMIN — NICOTINE POLACRILEX 8 MG: 4 GUM, CHEWING ORAL at 15:57

## 2021-12-18 ASSESSMENT — ACTIVITIES OF DAILY LIVING (ADL)
LAUNDRY: WITH SUPERVISION
HYGIENE/GROOMING: INDEPENDENT
DRESS: INDEPENDENT
ORAL_HYGIENE: INDEPENDENT
HYGIENE/GROOMING: INDEPENDENT
LAUNDRY: WITH SUPERVISION
ORAL_HYGIENE: INDEPENDENT
DRESS: INDEPENDENT

## 2021-12-18 NOTE — PLAN OF CARE
Problem: Sleep Disturbance (Depressive Signs/Symptoms)  Goal: Improved Sleep (Depressive Signs/Symptoms)  Outcome: No Change      Patient slept a total of 8 hours without any complaints made the whole shift.

## 2021-12-18 NOTE — PLAN OF CARE
"  Problem: Behavioral Health Plan of Care  Goal: Adheres to Safety Considerations for Self and Others  Note: Patient stated: \"I feel sad, lonely, hopeless, and in pain\". \"I had to be seen by ortho yesterday and today, I supposed to have a knee brace, I still don't have the ketamine with gabapentin cream\". Patient stated: \"I feel suicidal\", \"No I won't hurt myself here\", \"I contract for safety\". Rated depression at 7/10, anxiety at 9/10. Denied HI, hallucination, delusions, or paranoia.   Behavior is controlled. He remained out in the unit, watching TV, socializing with another patient. Appetite is very good, he ate 100% of his supper. Kept self clean and well groomed.   Thoughts are still focused on the poor management of his right knee pain. Speech is clear and organized. Mood is irritable at times. Affect is flat and blunted. Judgement is improving. Memory appeared intact.   Pain: right knee, chronic pain, rated at 8-9/10. PRN Tizanidine was given per request, patient reported very little effect. Tramadol 100 mg PO PRN given 6 hrs from the previous administration. Patient accepted to have the Lidocaine patch. He did not report relief of his pain with scheduled or PRN Medications.   Patient requests Nicotine 8 mg PO every 1-2 hrs.   He requested and took PRN Zyprexa and Melatonin at HS.   Patient was educated on the increased dosed of Lyrica and Tramadol, he said he knew that.     Patient verbalized a concern: said his right leg feels numb below the knee. On observation, the leg appeared to have normal color, good capillary refill, cool to touch. Patient reported decreaseed sensation to touch. He was observed to be ambulating with occasional limping. Will continue to monitor.      "

## 2021-12-18 NOTE — PLAN OF CARE
Patient appears tense and restless this shift. He reports anxiety and depression related to being in pain. He complained of chronic right knee pain, requested and was given PRN Tramadol without reported relief. He endorses suicidal thoughts that comes and goes, states he feels safe here and is able to contract for safety. Encouraged participation in groups and developing healthy coping skills.

## 2021-12-18 NOTE — PLAN OF CARE
"  Problem: Mood Impairment (Depressive Signs/Symptoms)  Goal: Improved Mood Symptoms (Depressive Signs/Symptoms)  Outcome: No Change  Note: Patient stated that he needs help with his knee pain, said he has a \"pinched nerve\", and that no one is paying attention to him, or helping him. Said, he is willing to take the risk for infection, and have a surgery ASAP. Said he does not get \"much infections\". Said he has to be locked up here until his surgery, as he is not safe to be out of the hospital. Reported having suicidal thoughts, but is safe here, not out of the hospital. Rated depression and anxiety as \"very bad\". Rated pain as :very bad\".   Given PRN Tramadol 100 mg and PRN Tizanidine 4 mg PO, along with Nicotine gum. Patient reported that PRNs were effective. He declined to apply scheduled Lidocaine patch this shift.   Took medications as prescribed with PRN Melatonin 3 mg for sleep and Zydis 5 mg per request for feeling agitated.   No changes in behavior. Ni SE reported or assessed.       "

## 2021-12-19 PROCEDURE — 250N000013 HC RX MED GY IP 250 OP 250 PS 637: Performed by: PSYCHIATRY & NEUROLOGY

## 2021-12-19 PROCEDURE — 250N000013 HC RX MED GY IP 250 OP 250 PS 637: Performed by: PHYSICIAN ASSISTANT

## 2021-12-19 PROCEDURE — 124N000003 HC R&B MH SENIOR/ADOLESCENT

## 2021-12-19 RX ADMIN — OLANZAPINE 5 MG: 5 TABLET, ORALLY DISINTEGRATING ORAL at 20:50

## 2021-12-19 RX ADMIN — TOPIRAMATE 50 MG: 50 TABLET ORAL at 19:14

## 2021-12-19 RX ADMIN — TOPIRAMATE 50 MG: 50 TABLET ORAL at 08:02

## 2021-12-19 RX ADMIN — PREGABALIN 300 MG: 100 CAPSULE ORAL at 13:05

## 2021-12-19 RX ADMIN — ACETAMINOPHEN 975 MG: 325 TABLET, FILM COATED ORAL at 20:50

## 2021-12-19 RX ADMIN — PANTOPRAZOLE SODIUM 40 MG: 40 TABLET, DELAYED RELEASE ORAL at 06:31

## 2021-12-19 RX ADMIN — Medication 25 MG: at 13:05

## 2021-12-19 RX ADMIN — ACETAMINOPHEN 975 MG: 325 TABLET, FILM COATED ORAL at 06:31

## 2021-12-19 RX ADMIN — DOCUSATE SODIUM AND SENNOSIDES 1 TABLET: 8.6; 5 TABLET, FILM COATED ORAL at 19:14

## 2021-12-19 RX ADMIN — BUPRENORPHINE AND NALOXONE 1 FILM: 8; 2 FILM BUCCAL; SUBLINGUAL at 08:02

## 2021-12-19 RX ADMIN — BUPRENORPHINE AND NALOXONE 1 FILM: 8; 2 FILM BUCCAL; SUBLINGUAL at 12:55

## 2021-12-19 RX ADMIN — MULTIPLE VITAMINS W/ MINERALS TAB 1 TABLET: TAB at 08:02

## 2021-12-19 RX ADMIN — NICOTINE POLACRILEX 8 MG: 4 GUM, CHEWING ORAL at 20:51

## 2021-12-19 RX ADMIN — Medication 25 MG: at 08:03

## 2021-12-19 RX ADMIN — NICOTINE POLACRILEX 8 MG: 4 GUM, CHEWING ORAL at 15:27

## 2021-12-19 RX ADMIN — TRAMADOL HYDROCHLORIDE 100 MG: 50 TABLET, FILM COATED ORAL at 16:23

## 2021-12-19 RX ADMIN — APIXABAN 2.5 MG: 2.5 TABLET, FILM COATED ORAL at 19:14

## 2021-12-19 RX ADMIN — FOLIC ACID 1000 MCG: 1 TABLET ORAL at 08:04

## 2021-12-19 RX ADMIN — QUETIAPINE 600 MG: 300 TABLET, FILM COATED ORAL at 20:50

## 2021-12-19 RX ADMIN — TIZANIDINE 4 MG: 2 TABLET ORAL at 16:23

## 2021-12-19 RX ADMIN — TRAMADOL HYDROCHLORIDE 100 MG: 50 TABLET, FILM COATED ORAL at 08:02

## 2021-12-19 RX ADMIN — LIDOCAINE PATCH 4% 1 PATCH: 40 PATCH TOPICAL at 19:44

## 2021-12-19 RX ADMIN — NICOTINE POLACRILEX 8 MG: 4 GUM, CHEWING ORAL at 14:05

## 2021-12-19 RX ADMIN — NICOTINE POLACRILEX 8 MG: 4 GUM, CHEWING ORAL at 19:42

## 2021-12-19 RX ADMIN — LEVOTHYROXINE SODIUM 75 MCG: 75 TABLET ORAL at 06:31

## 2021-12-19 RX ADMIN — BUPRENORPHINE AND NALOXONE 1 FILM: 8; 2 FILM BUCCAL; SUBLINGUAL at 19:14

## 2021-12-19 RX ADMIN — Medication 400 MG: at 19:14

## 2021-12-19 RX ADMIN — Medication 400 MG: at 08:02

## 2021-12-19 RX ADMIN — ALBUTEROL SULFATE 2 PUFF: 90 AEROSOL, METERED RESPIRATORY (INHALATION) at 14:37

## 2021-12-19 RX ADMIN — NICOTINE POLACRILEX 8 MG: 4 GUM, CHEWING ORAL at 09:12

## 2021-12-19 RX ADMIN — APIXABAN 2.5 MG: 2.5 TABLET, FILM COATED ORAL at 08:02

## 2021-12-19 RX ADMIN — PRAZOSIN HYDROCHLORIDE 1 MG: 1 CAPSULE ORAL at 19:14

## 2021-12-19 RX ADMIN — ACAMPROSATE CALCIUM 666 MG: 333 TABLET, DELAYED RELEASE ORAL at 13:05

## 2021-12-19 RX ADMIN — NICOTINE POLACRILEX 8 MG: 4 GUM, CHEWING ORAL at 17:49

## 2021-12-19 RX ADMIN — Medication 0.25 G: at 19:15

## 2021-12-19 RX ADMIN — Medication 25 MG: at 19:14

## 2021-12-19 RX ADMIN — PREGABALIN 300 MG: 100 CAPSULE ORAL at 19:14

## 2021-12-19 RX ADMIN — NICOTINE POLACRILEX 8 MG: 4 GUM, CHEWING ORAL at 12:55

## 2021-12-19 RX ADMIN — MELATONIN TAB 3 MG 3 MG: 3 TAB at 20:50

## 2021-12-19 RX ADMIN — NICOTINE POLACRILEX 8 MG: 4 GUM, CHEWING ORAL at 08:02

## 2021-12-19 RX ADMIN — PREGABALIN 300 MG: 100 CAPSULE ORAL at 08:03

## 2021-12-19 RX ADMIN — ACAMPROSATE CALCIUM 666 MG: 333 TABLET, DELAYED RELEASE ORAL at 19:14

## 2021-12-19 RX ADMIN — Medication 0.25 G: at 12:54

## 2021-12-19 RX ADMIN — ACAMPROSATE CALCIUM 666 MG: 333 TABLET, DELAYED RELEASE ORAL at 08:03

## 2021-12-19 RX ADMIN — ACETAMINOPHEN 975 MG: 325 TABLET, FILM COATED ORAL at 13:05

## 2021-12-19 ASSESSMENT — ACTIVITIES OF DAILY LIVING (ADL)
ORAL_HYGIENE: INDEPENDENT
ORAL_HYGIENE: INDEPENDENT
DRESS: INDEPENDENT
LAUNDRY: UNABLE TO COMPLETE
DRESS: INDEPENDENT
LAUNDRY: WITH SUPERVISION
HYGIENE/GROOMING: INDEPENDENT
HYGIENE/GROOMING: INDEPENDENT

## 2021-12-19 NOTE — PLAN OF CARE
"  Problem: General Rehab Plan of Care  Goal: Therapeutic Recreation/Music Therapy Goal  Description: The patient and/or their representative will achieve their patient-specific goals related to the plan of care.  The patient-specific goals include:  Outcome: No Change     Jose M attended art therapy group for about 30 minutes (7 patients total). He arrived to group about half way through. He participated in the art activity to make a drawing depicting the emotional, thinking, physical, and spiritual parts of self. He appeared calm and focused on the activity and was able to complete the activity independently. He shared his art with the group and spoke about it very negatively. He voiced many negative thoughts about hopelessness and pain. Writer attempted to redirect him to identify what he needs and how he wants to take care of himself. He wasn't receptive to this and continued to bring it back to the negative stating \"I need to stop destroying my life...\" and then back to negative thoughts and events. With a lot of encouragement he was able to identify his spirituality as his strength. He presented as irritable and depressed.   "

## 2021-12-19 NOTE — PLAN OF CARE
Patient appeared calmer and less anxious this shift. He endorses high anxiety and depression related to chronic knee pain. He requested and was given PRN Tramadol and cream for pain with little improvement. He reports feeling sad, hopeless and discouraged due to being in so much pain. He spent most of the shift out in the milieu socializing with others and attending unit activities. He denies SI/SIB.

## 2021-12-19 NOTE — PLAN OF CARE
Problem: Sleep Disturbance (Depressive Signs/Symptoms)  Goal: Improved Sleep (Depressive Signs/Symptoms)  Outcome: No Change     Patient slept a total of 7.5 hours.  Got up and came out of his room once. He just asked the staff what time it was and went back to his room and slept. No complaints made the whole shift.

## 2021-12-20 PROCEDURE — G0177 OPPS/PHP; TRAIN & EDUC SERV: HCPCS

## 2021-12-20 PROCEDURE — 250N000013 HC RX MED GY IP 250 OP 250 PS 637: Performed by: PSYCHIATRY & NEUROLOGY

## 2021-12-20 PROCEDURE — 250N000013 HC RX MED GY IP 250 OP 250 PS 637: Performed by: PHYSICIAN ASSISTANT

## 2021-12-20 PROCEDURE — 124N000003 HC R&B MH SENIOR/ADOLESCENT

## 2021-12-20 RX ORDER — OXYCODONE HYDROCHLORIDE 5 MG/1
10 TABLET ORAL ONCE
Status: COMPLETED | OUTPATIENT
Start: 2021-12-21 | End: 2021-12-21

## 2021-12-20 RX ORDER — NALOXONE HYDROCHLORIDE 0.4 MG/ML
0.2 INJECTION, SOLUTION INTRAMUSCULAR; INTRAVENOUS; SUBCUTANEOUS
Status: DISCONTINUED | OUTPATIENT
Start: 2021-12-20 | End: 2021-12-24 | Stop reason: HOSPADM

## 2021-12-20 RX ORDER — OXYCODONE HYDROCHLORIDE 5 MG/1
10 TABLET ORAL ONCE
Status: COMPLETED | OUTPATIENT
Start: 2021-12-21 | End: 2021-12-20

## 2021-12-20 RX ORDER — NALOXONE HYDROCHLORIDE 0.4 MG/ML
0.4 INJECTION, SOLUTION INTRAMUSCULAR; INTRAVENOUS; SUBCUTANEOUS
Status: DISCONTINUED | OUTPATIENT
Start: 2021-12-20 | End: 2021-12-24 | Stop reason: HOSPADM

## 2021-12-20 RX ORDER — BUPRENORPHINE AND NALOXONE 4; 1 MG/1; MG/1
1 FILM, SOLUBLE BUCCAL; SUBLINGUAL 3 TIMES DAILY
Status: DISCONTINUED | OUTPATIENT
Start: 2021-12-20 | End: 2021-12-21

## 2021-12-20 RX ORDER — OXYCODONE HYDROCHLORIDE 5 MG/1
10 TABLET ORAL 4 TIMES DAILY
Status: DISCONTINUED | OUTPATIENT
Start: 2021-12-20 | End: 2021-12-21

## 2021-12-20 RX ADMIN — MELATONIN TAB 3 MG 3 MG: 3 TAB at 21:46

## 2021-12-20 RX ADMIN — Medication 0.25 G: at 03:47

## 2021-12-20 RX ADMIN — OXYCODONE HYDROCHLORIDE 10 MG: 5 TABLET ORAL at 23:57

## 2021-12-20 RX ADMIN — MULTIPLE VITAMINS W/ MINERALS TAB 1 TABLET: TAB at 08:31

## 2021-12-20 RX ADMIN — NICOTINE POLACRILEX 8 MG: 4 GUM, CHEWING ORAL at 14:11

## 2021-12-20 RX ADMIN — PREGABALIN 300 MG: 100 CAPSULE ORAL at 19:15

## 2021-12-20 RX ADMIN — Medication 0.25 G: at 08:32

## 2021-12-20 RX ADMIN — AMLODIPINE BESYLATE 5 MG: 5 TABLET ORAL at 08:31

## 2021-12-20 RX ADMIN — Medication 400 MG: at 19:14

## 2021-12-20 RX ADMIN — ACAMPROSATE CALCIUM 666 MG: 333 TABLET, DELAYED RELEASE ORAL at 08:30

## 2021-12-20 RX ADMIN — TOPIRAMATE 50 MG: 50 TABLET ORAL at 19:14

## 2021-12-20 RX ADMIN — NICOTINE POLACRILEX 8 MG: 4 GUM, CHEWING ORAL at 19:42

## 2021-12-20 RX ADMIN — QUETIAPINE 600 MG: 300 TABLET, FILM COATED ORAL at 21:46

## 2021-12-20 RX ADMIN — TOPIRAMATE 50 MG: 50 TABLET ORAL at 08:31

## 2021-12-20 RX ADMIN — NICOTINE POLACRILEX 8 MG: 4 GUM, CHEWING ORAL at 21:46

## 2021-12-20 RX ADMIN — PREGABALIN 300 MG: 100 CAPSULE ORAL at 13:03

## 2021-12-20 RX ADMIN — PREGABALIN 300 MG: 100 CAPSULE ORAL at 08:30

## 2021-12-20 RX ADMIN — NICOTINE POLACRILEX 8 MG: 4 GUM, CHEWING ORAL at 17:36

## 2021-12-20 RX ADMIN — FOLIC ACID 1000 MCG: 1 TABLET ORAL at 08:31

## 2021-12-20 RX ADMIN — APIXABAN 2.5 MG: 2.5 TABLET, FILM COATED ORAL at 08:31

## 2021-12-20 RX ADMIN — NICOTINE POLACRILEX 8 MG: 4 GUM, CHEWING ORAL at 09:49

## 2021-12-20 RX ADMIN — NICOTINE POLACRILEX 8 MG: 4 GUM, CHEWING ORAL at 12:35

## 2021-12-20 RX ADMIN — LEVOTHYROXINE SODIUM 75 MCG: 75 TABLET ORAL at 07:07

## 2021-12-20 RX ADMIN — PANTOPRAZOLE SODIUM 40 MG: 40 TABLET, DELAYED RELEASE ORAL at 07:07

## 2021-12-20 RX ADMIN — Medication 400 MG: at 08:31

## 2021-12-20 RX ADMIN — NICOTINE POLACRILEX 8 MG: 4 GUM, CHEWING ORAL at 11:38

## 2021-12-20 RX ADMIN — ACETAMINOPHEN 975 MG: 325 TABLET, FILM COATED ORAL at 05:53

## 2021-12-20 RX ADMIN — OXYCODONE HYDROCHLORIDE 10 MG: 5 TABLET ORAL at 19:14

## 2021-12-20 RX ADMIN — TIZANIDINE 4 MG: 2 TABLET ORAL at 15:54

## 2021-12-20 RX ADMIN — ACETAMINOPHEN 975 MG: 325 TABLET, FILM COATED ORAL at 21:46

## 2021-12-20 RX ADMIN — NICOTINE POLACRILEX 8 MG: 4 GUM, CHEWING ORAL at 18:42

## 2021-12-20 RX ADMIN — PRAZOSIN HYDROCHLORIDE 1 MG: 1 CAPSULE ORAL at 19:15

## 2021-12-20 RX ADMIN — Medication 0.25 G: at 19:34

## 2021-12-20 RX ADMIN — DOCUSATE SODIUM AND SENNOSIDES 1 TABLET: 8.6; 5 TABLET, FILM COATED ORAL at 19:15

## 2021-12-20 RX ADMIN — APIXABAN 2.5 MG: 2.5 TABLET, FILM COATED ORAL at 19:14

## 2021-12-20 RX ADMIN — ACETAMINOPHEN 975 MG: 325 TABLET, FILM COATED ORAL at 13:03

## 2021-12-20 RX ADMIN — TRAMADOL HYDROCHLORIDE 100 MG: 50 TABLET, FILM COATED ORAL at 08:30

## 2021-12-20 RX ADMIN — BUPRENORPHINE AND NALOXONE 1 FILM: 8; 2 FILM BUCCAL; SUBLINGUAL at 08:30

## 2021-12-20 RX ADMIN — OXYCODONE HYDROCHLORIDE 10 MG: 5 TABLET ORAL at 12:34

## 2021-12-20 RX ADMIN — ACAMPROSATE CALCIUM 666 MG: 333 TABLET, DELAYED RELEASE ORAL at 19:14

## 2021-12-20 RX ADMIN — OXYCODONE HYDROCHLORIDE 10 MG: 5 TABLET ORAL at 15:47

## 2021-12-20 RX ADMIN — NICOTINE POLACRILEX 8 MG: 4 GUM, CHEWING ORAL at 15:48

## 2021-12-20 RX ADMIN — ACAMPROSATE CALCIUM 666 MG: 333 TABLET, DELAYED RELEASE ORAL at 13:03

## 2021-12-20 ASSESSMENT — ACTIVITIES OF DAILY LIVING (ADL)
LAUNDRY: UNABLE TO COMPLETE
HYGIENE/GROOMING: INDEPENDENT
DRESS: INDEPENDENT
ORAL_HYGIENE: INDEPENDENT
HYGIENE/GROOMING: INDEPENDENT
ORAL_HYGIENE: INDEPENDENT
DRESS: INDEPENDENT
LAUNDRY: WITH SUPERVISION

## 2021-12-20 NOTE — PROGRESS NOTES
"SPIRITUAL HEALTH SERVICES   Spiritual Assessment Progress Note (Behavioral Health/CD Focus)  [facility] [place] [place]    REFERRAL SOURCE: follow up    On this visit, I met with Jose M in the lounge for 30 minutes. Provided emotional support, played card game as a means to \"help me take my mind off the [knee] pain.\"    EXPERIENCE OF ILLNESS/HOSPITALIZATION:  Jose M shared about his knee pain, his sense of frustration with not knowing when a new brace will arrive (clarifying that he knows staff here have done everything they should to make arrangements).    Coping/Spiritual Practices: Jose M identified a number of techniques he uses to distract from the physical pain:  Deep breaths  \"I imagine that the pain is like a ball that I roll up in my mind and squish it smaller until I can just blow it away\"  Thinking about something else  Playing games    PLAN: I will follow up 1-2x weekly.                                                                                                                                             Liss Kerns MDiv  Associate   Pager [phone]  Office [phone]  [facility] remains available 24/7 for emergent requests/referrals, either by having the switchboard page the on-call  or by entering an ASAP/STAT consult in Epic (this will also page the on-call ). Routine Epic consults receive an initial response within 24 hours.    "

## 2021-12-20 NOTE — PLAN OF CARE
"Flat affect, mood is calm. Pt social with staff and peers. Pt eating and taking fluids freely.     PRN Zanaflex given for right knee pain with no relief reported. Pt using a walker at times to help relieve his right knee pain.     Pt declined to take scheduled buprenorphine (pt wants oxy to work) and diphenhydramine.     Pt asked if staff would wake him tonight to give him the scheduled oxycodone, pt informed oxy was ordered for 4 times a day, pt appeared upset, pt held his head in his hands and verbalized \"he said he'd order it every 4 hours. Something needs to be done, I can't do it. If that is the case let me go so I can get a bottle. I am serious.\" Dr. Bustos ordered 2 doses for tonight, please have Dr. Perez clarify oxycodone dose tomorrow.   "

## 2021-12-20 NOTE — PLAN OF CARE
Patient appeared flat and withdrawn this morning. He reported being in a lot of pain from right knee.he reported high anxiety, depression, hopelessness and suicidal ideations due to being in so much pain. Patient was given PRN Tramadol and Ketamine cream without reported relief. MD was notified, pt started oxycodone 10 mg every 4 hours, first dose given, pt reported decrease in pain. He declined to take scheduled benadryl and 1400 buprenorphine dose.

## 2021-12-20 NOTE — PLAN OF CARE
Assessment/Intervention/Current Symtoms and Care Coordination:    Chart Review    Reviewed pt care in team    Met with pt today and reviewed his care. He reported he was having severe knee pain and will like to have surgery. He reported he is doing everything possible to avoid crying due to his pains. Writer also updated him on the fact that, even though his group home was called and asked to keep his spot, there is no assurance that his housing will be kept till he returns. Also reminded him that writer did reach out and informed his [facility]  that he is in the hospital. The  had reported she will halt pt's discharge from [facility] hoping he discharges earlier this week. When speaking with the patient today, he reported he reported his pain is his main concern. He does think he needs to be in the hospital; at the same time, he does not want to lose his housing. Pt was engaged and provided with behavioral and cognitive strategies to help with his reported needs. Recommended pt listens to the recommendations of the doctor and the treatment team.     Discharge Plan or Goal:  Pending stabilization & development of a safe discharge plan.  Considerations include: Return to assisted living with outpatient services     Barriers to Discharge:  Patient requires further psychiatric stabilization due to current symptomology and medication management with possible adjustments     Referral Status:  No new referrals made     Legal Status:  Patient is voluntary

## 2021-12-20 NOTE — PROGRESS NOTES
Patient seen, chart reviewed, care discussed with staff.    Blood pressure 124/80, pulse 84, temperature 97.3  F (36.3  C), temperature source Temporal, resp. rate 16, weight 137 kg (302 lb), SpO2 94 %.    Knee brace has not arrived yet.  Knee pain continues.    Slept ok.    General appearance: pained  Alert.   Affect: fair  Mood: fair    Speech:  normal.   Eye contact:  good.    Psychomotor behavior: normal  Gait: steady, limps  Abnormal movements: none  Delusions: none  Hallucinations:  None  Thoughts: logical  Associations: intact  Judgement: good  Insight: good  Cognitions: intact in conversation  Memory:  intact in conversation  Orientation: normal    Not suicidal, here.  He would relapse or suicide if pain not controlled.    Imp:  Bipolar and CD    Plan:  1.  Decrease Suboxone  2.  Opoid Oxycodone 10mg 4 times daily    Current Facility-Administered Medications   Medication     acamprosate (CAMPRAL) EC tablet 666 mg     acetaminophen (TYLENOL) tablet 975 mg     albuterol (PROVENTIL HFA/VENTOLIN HFA) inhaler     alum & mag hydroxide-simethicone (MAALOX) suspension 30 mL     amLODIPine (NORVASC) tablet 5 mg     apixaban ANTICOAGULANT (ELIQUIS) tablet 2.5 mg     buprenorphine HCl-naloxone HCl (SUBOXONE) 4-1 MG per film 1 Film     diclofenac (VOLTAREN) 1 % topical gel 2 g     diphenhydrAMINE (BENADRYL) half-tab 25 mg     folic acid (FOLVITE) tablet 1,000 mcg     ketamine 5%-gabapentin 8% in PLO cream 0.25 g     levothyroxine (SYNTHROID/LEVOTHROID) tablet 75 mcg     Lidocaine (LIDOCARE) 4 % Patch 1 patch    And     lidocaine patch in PLACE     magnesium oxide (MAG-OX) tablet 400 mg     melatonin tablet 3 mg     menthol-zinc oxide (CALMOSEPTINE) 0.44-20.6 % ointment OINT     miconazole with skin protectant (SELENA ANTIFUNGAL) 2 % cream     multivitamin w/minerals (THERA-VIT-M) tablet 1 tablet     nicotine polacrilex (NICORETTE) gum 4-8 mg     OLANZapine zydis (zyPREXA) ODT tab 5 mg     ondansetron (ZOFRAN-ODT) ODT tab  4 mg     oxyCODONE (ROXICODONE) tablet 10 mg     pantoprazole (PROTONIX) EC tablet 40 mg     polyethylene glycol (MIRALAX) Packet 17 g     prazosin (MINIPRESS) capsule 1 mg     pregabalin (LYRICA) capsule 300 mg     QUEtiapine (SEROquel) tablet 600 mg     senna-docusate (SENOKOT-S/PERICOLACE) 8.6-50 MG per tablet 1 tablet     sennosides (SENOKOT) tablet 1-2 tablet     tiZANidine (ZANAFLEX) tablet 4 mg     topiramate (TOPAMAX) tablet 50 mg     Recent Results (from the past 168 hour(s))   Comprehensive metabolic panel    Collection Time: 12/14/21  9:17 AM   Result Value Ref Range    Sodium 141 133 - 144 mmol/L    Potassium 3.6 3.4 - 5.3 mmol/L    Chloride 108 94 - 109 mmol/L    Carbon Dioxide (CO2) 26 20 - 32 mmol/L    Anion Gap 7 3 - 14 mmol/L    Urea Nitrogen 19 7 - 30 mg/dL    Creatinine 0.54 (L) 0.66 - 1.25 mg/dL    Calcium 8.7 8.5 - 10.1 mg/dL    Glucose 128 (H) 70 - 99 mg/dL    Alkaline Phosphatase 94 40 - 150 U/L    AST 57 (H) 0 - 45 U/L     (H) 0 - 70 U/L    Protein Total 7.7 6.8 - 8.8 g/dL    Albumin 3.0 (L) 3.4 - 5.0 g/dL    Bilirubin Total 0.4 0.2 - 1.3 mg/dL    GFR Estimate >90 >60 mL/min/1.73m2   CBC with platelets and differential    Collection Time: 12/14/21  9:17 AM   Result Value Ref Range    WBC Count 5.1 4.0 - 11.0 10e3/uL    RBC Count 4.46 4.40 - 5.90 10e6/uL    Hemoglobin 13.4 13.3 - 17.7 g/dL    Hematocrit 40.8 40.0 - 53.0 %    MCV 92 78 - 100 fL    MCH 30.0 26.5 - 33.0 pg    MCHC 32.8 31.5 - 36.5 g/dL    RDW 15.1 (H) 10.0 - 15.0 %    Platelet Count 265 150 - 450 10e3/uL    % Neutrophils 47 %    % Lymphocytes 38 %    % Monocytes 8 %    % Eosinophils 7 %    % Basophils 0 %    % Immature Granulocytes 0 %    NRBCs per 100 WBC 0 <1 /100    Absolute Neutrophils 2.4 1.6 - 8.3 10e3/uL    Absolute Lymphocytes 1.9 0.8 - 5.3 10e3/uL    Absolute Monocytes 0.4 0.0 - 1.3 10e3/uL    Absolute Eosinophils 0.3 0.0 - 0.7 10e3/uL    Absolute Basophils 0.0 0.0 - 0.2 10e3/uL    Absolute Immature Granulocytes  0.0 <=0.4 10e3/uL    Absolute NRBCs 0.0 10e3/uL   Magnesium    Collection Time: 12/14/21  9:17 AM   Result Value Ref Range    Magnesium 2.1 1.6 - 2.3 mg/dL   TSH with free T4 reflex    Collection Time: 12/14/21  9:17 AM   Result Value Ref Range    TSH 3.04 0.40 - 4.00 mU/L   Hepatitis B surface antigen    Collection Time: 12/14/21  6:41 PM   Result Value Ref Range    Hepatitis B Surface Antigen Nonreactive Nonreactive   Hepatitis B Surface Antibody    Collection Time: 12/14/21  6:41 PM   Result Value Ref Range    Hepatitis B Surface Antibody 7.01 <8.00 m[IU]/mL   Hepatitis C Screen Reflex to HCV RNA Quant and Genotype    Collection Time: 12/14/21  6:41 PM   Result Value Ref Range    Hepatitis C Antibody Reactive (A) Nonreactive   Hepatitis C RNA, Quantitative by PCR with Confirmatory Reflex to Genotyping    Collection Time: 12/14/21  6:41 PM   Result Value Ref Range    Hepatitis C log 6.2     Hepatitis C RNA IU/mL, Instrument 1,005,607 (H) <1 IU/mL   INR    Collection Time: 12/15/21  9:04 AM   Result Value Ref Range    INR 0.89 0.86 - 1.14   Comprehensive metabolic panel    Collection Time: 12/15/21  9:04 AM   Result Value Ref Range    Sodium 141 133 - 144 mmol/L    Potassium 3.6 3.4 - 5.3 mmol/L    Chloride 108 94 - 109 mmol/L    Carbon Dioxide (CO2) 27 20 - 32 mmol/L    Anion Gap 6 3 - 14 mmol/L    Urea Nitrogen 21 7 - 30 mg/dL    Creatinine 0.62 (L) 0.66 - 1.25 mg/dL    Calcium 8.8 8.5 - 10.1 mg/dL    Glucose 153 (H) 70 - 99 mg/dL    Alkaline Phosphatase 79 40 - 150 U/L    AST 54 (H) 0 - 45 U/L     (H) 0 - 70 U/L    Protein Total 7.4 6.8 - 8.8 g/dL    Albumin 3.2 (L) 3.4 - 5.0 g/dL    Bilirubin Total 0.3 0.2 - 1.3 mg/dL    GFR Estimate >90 >60 mL/min/1.73m2   CBC with platelets    Collection Time: 12/15/21  9:04 AM   Result Value Ref Range    WBC Count 5.1 4.0 - 11.0 10e3/uL    RBC Count 4.26 (L) 4.40 - 5.90 10e6/uL    Hemoglobin 12.5 (L) 13.3 - 17.7 g/dL    Hematocrit 39.2 (L) 40.0 - 53.0 %    MCV 92 78 -  100 fL    MCH 29.3 26.5 - 33.0 pg    MCHC 31.9 31.5 - 36.5 g/dL    RDW 15.3 (H) 10.0 - 15.0 %    Platelet Count 268 150 - 450 10e3/uL   CBC with platelets    Collection Time: 12/17/21  8:01 AM   Result Value Ref Range    WBC Count 4.5 4.0 - 11.0 10e3/uL    RBC Count 4.23 (L) 4.40 - 5.90 10e6/uL    Hemoglobin 12.5 (L) 13.3 - 17.7 g/dL    Hematocrit 39.1 (L) 40.0 - 53.0 %    MCV 92 78 - 100 fL    MCH 29.6 26.5 - 33.0 pg    MCHC 32.0 31.5 - 36.5 g/dL    RDW 15.4 (H) 10.0 - 15.0 %    Platelet Count 310 150 - 450 10e3/uL   Comprehensive metabolic panel    Collection Time: 12/17/21  8:01 AM   Result Value Ref Range    Sodium 142 133 - 144 mmol/L    Potassium 4.1 3.4 - 5.3 mmol/L    Chloride 110 (H) 94 - 109 mmol/L    Carbon Dioxide (CO2) 27 20 - 32 mmol/L    Anion Gap 5 3 - 14 mmol/L    Urea Nitrogen 28 7 - 30 mg/dL    Creatinine 0.65 (L) 0.66 - 1.25 mg/dL    Calcium 8.9 8.5 - 10.1 mg/dL    Glucose 108 (H) 70 - 99 mg/dL    Alkaline Phosphatase 76 40 - 150 U/L    AST 62 (H) 0 - 45 U/L    ALT 99 (H) 0 - 70 U/L    Protein Total 7.3 6.8 - 8.8 g/dL    Albumin 3.1 (L) 3.4 - 5.0 g/dL    Bilirubin Total 0.3 0.2 - 1.3 mg/dL    GFR Estimate >90 >60 mL/min/1.73m2

## 2021-12-20 NOTE — PLAN OF CARE
"  Problem: Behavioral Health Plan of Care  Goal: Adheres to Safety Considerations for Self and Others  Outcome: Improving  Note:   Patient's attention wand thought focus was again his right knee pain. Continued to say that he has a \"pinched nerve\", and that no one is paying attention to him, or helping him. Described the pain as \"very bad\" and \"trobbing\". Given PRN Tramadol 100 mg and PRN Tizanidine 4 mg PO with no reported relief. He demanded to have the Ketamine cream at 16:30, and was unhappy that he had to wait, as he was given this medication less than 3 hrs ago. He used the Ketamine cream at 19:15, but report it as ineffective too. Lidocaine was applied this shift with reported \"not helping\". Patient used a hot pack per request with no effect on his pain.     He continued to report suicidal ideations, contracted for safety on the unit. Rated depression and anxiety as \"very bad\". Denied hallucinations, HI, or paranoia.     Took medications as prescribed with PRN Melatonin 3 mg for sleep and Zydis 5 mg per request for feeling agitated. PRN Nicotine gum 8 mg given every 1-2 hrs per pt's request.  No changes in behavior. No SE reported or assessed.    "

## 2021-12-21 PROCEDURE — G0177 OPPS/PHP; TRAIN & EDUC SERV: HCPCS

## 2021-12-21 PROCEDURE — 250N000013 HC RX MED GY IP 250 OP 250 PS 637: Performed by: PHYSICIAN ASSISTANT

## 2021-12-21 PROCEDURE — 250N000013 HC RX MED GY IP 250 OP 250 PS 637: Performed by: PSYCHIATRY & NEUROLOGY

## 2021-12-21 PROCEDURE — 124N000003 HC R&B MH SENIOR/ADOLESCENT

## 2021-12-21 PROCEDURE — H2032 ACTIVITY THERAPY, PER 15 MIN: HCPCS

## 2021-12-21 RX ORDER — OXYCODONE HYDROCHLORIDE 5 MG/1
10 TABLET ORAL DAILY PRN
Status: DISCONTINUED | OUTPATIENT
Start: 2021-12-21 | End: 2021-12-24 | Stop reason: HOSPADM

## 2021-12-21 RX ORDER — OXYCODONE HYDROCHLORIDE 10 MG/1
20 TABLET ORAL
Status: DISCONTINUED | OUTPATIENT
Start: 2021-12-21 | End: 2021-12-24 | Stop reason: HOSPADM

## 2021-12-21 RX ADMIN — OXYCODONE HYDROCHLORIDE 20 MG: 5 TABLET ORAL at 17:47

## 2021-12-21 RX ADMIN — ACAMPROSATE CALCIUM 666 MG: 333 TABLET, DELAYED RELEASE ORAL at 14:36

## 2021-12-21 RX ADMIN — QUETIAPINE 600 MG: 300 TABLET, FILM COATED ORAL at 22:21

## 2021-12-21 RX ADMIN — NICOTINE POLACRILEX 8 MG: 4 GUM, CHEWING ORAL at 16:02

## 2021-12-21 RX ADMIN — OXYCODONE HYDROCHLORIDE 20 MG: 5 TABLET ORAL at 22:21

## 2021-12-21 RX ADMIN — MULTIPLE VITAMINS W/ MINERALS TAB 1 TABLET: TAB at 08:53

## 2021-12-21 RX ADMIN — OXYCODONE HYDROCHLORIDE 10 MG: 5 TABLET ORAL at 08:53

## 2021-12-21 RX ADMIN — TOPIRAMATE 50 MG: 50 TABLET ORAL at 19:07

## 2021-12-21 RX ADMIN — NICOTINE POLACRILEX 8 MG: 4 GUM, CHEWING ORAL at 17:48

## 2021-12-21 RX ADMIN — NICOTINE POLACRILEX 8 MG: 4 GUM, CHEWING ORAL at 08:53

## 2021-12-21 RX ADMIN — NICOTINE POLACRILEX 8 MG: 4 GUM, CHEWING ORAL at 22:21

## 2021-12-21 RX ADMIN — NICOTINE POLACRILEX 8 MG: 4 GUM, CHEWING ORAL at 20:05

## 2021-12-21 RX ADMIN — ACAMPROSATE CALCIUM 666 MG: 333 TABLET, DELAYED RELEASE ORAL at 08:53

## 2021-12-21 RX ADMIN — NICOTINE POLACRILEX 8 MG: 4 GUM, CHEWING ORAL at 10:29

## 2021-12-21 RX ADMIN — Medication 400 MG: at 08:53

## 2021-12-21 RX ADMIN — PREGABALIN 300 MG: 100 CAPSULE ORAL at 19:07

## 2021-12-21 RX ADMIN — Medication 0.25 G: at 14:36

## 2021-12-21 RX ADMIN — AMLODIPINE BESYLATE 5 MG: 5 TABLET ORAL at 08:54

## 2021-12-21 RX ADMIN — NICOTINE POLACRILEX 8 MG: 4 GUM, CHEWING ORAL at 11:34

## 2021-12-21 RX ADMIN — MELATONIN TAB 3 MG 3 MG: 3 TAB at 22:23

## 2021-12-21 RX ADMIN — FOLIC ACID 1000 MCG: 1 TABLET ORAL at 08:53

## 2021-12-21 RX ADMIN — TOPIRAMATE 50 MG: 50 TABLET ORAL at 08:53

## 2021-12-21 RX ADMIN — DOCUSATE SODIUM AND SENNOSIDES 1 TABLET: 8.6; 5 TABLET, FILM COATED ORAL at 19:07

## 2021-12-21 RX ADMIN — PREGABALIN 300 MG: 100 CAPSULE ORAL at 14:36

## 2021-12-21 RX ADMIN — PRAZOSIN HYDROCHLORIDE 1 MG: 1 CAPSULE ORAL at 19:07

## 2021-12-21 RX ADMIN — NICOTINE POLACRILEX 8 MG: 4 GUM, CHEWING ORAL at 13:02

## 2021-12-21 RX ADMIN — ACETAMINOPHEN 975 MG: 325 TABLET, FILM COATED ORAL at 07:00

## 2021-12-21 RX ADMIN — OXYCODONE HYDROCHLORIDE 10 MG: 5 TABLET ORAL at 04:01

## 2021-12-21 RX ADMIN — NICOTINE POLACRILEX 8 MG: 4 GUM, CHEWING ORAL at 21:09

## 2021-12-21 RX ADMIN — APIXABAN 2.5 MG: 2.5 TABLET, FILM COATED ORAL at 08:54

## 2021-12-21 RX ADMIN — ACAMPROSATE CALCIUM 666 MG: 333 TABLET, DELAYED RELEASE ORAL at 19:07

## 2021-12-21 RX ADMIN — ACETAMINOPHEN 975 MG: 325 TABLET, FILM COATED ORAL at 22:21

## 2021-12-21 RX ADMIN — OXYCODONE HYDROCHLORIDE 20 MG: 5 TABLET ORAL at 13:10

## 2021-12-21 RX ADMIN — APIXABAN 2.5 MG: 2.5 TABLET, FILM COATED ORAL at 19:07

## 2021-12-21 RX ADMIN — PANTOPRAZOLE SODIUM 40 MG: 40 TABLET, DELAYED RELEASE ORAL at 07:00

## 2021-12-21 RX ADMIN — LEVOTHYROXINE SODIUM 75 MCG: 75 TABLET ORAL at 07:00

## 2021-12-21 RX ADMIN — ACETAMINOPHEN 975 MG: 325 TABLET, FILM COATED ORAL at 14:36

## 2021-12-21 RX ADMIN — PREGABALIN 300 MG: 100 CAPSULE ORAL at 08:54

## 2021-12-21 RX ADMIN — NICOTINE POLACRILEX 8 MG: 4 GUM, CHEWING ORAL at 14:41

## 2021-12-21 RX ADMIN — Medication 400 MG: at 19:07

## 2021-12-21 ASSESSMENT — ACTIVITIES OF DAILY LIVING (ADL)
ORAL_HYGIENE: INDEPENDENT
DRESS: INDEPENDENT
HYGIENE/GROOMING: INDEPENDENT
ORAL_HYGIENE: INDEPENDENT
LAUNDRY: WITH SUPERVISION
HYGIENE/GROOMING: INDEPENDENT
DRESS: INDEPENDENT

## 2021-12-21 NOTE — CONSULTS
"Pain Service Consultation Note  [facility]      Patient Name: Hollis Barclay  MRN: [id number]   Age: 51 year old  Sex: male  Date: December 21, 2021                                      Reviewed: Yes    Referring Provider:  DEISY Perez MD                                             Referring Service:  Psychiatry  Reason for Consultation: \"knee pain\"    Assessment/Recommendations:  51 year old old male with bipolar 1 disorder, borderline personality, major depressive disorder, chronic back pain, post concussive encephalopathy, TBI, social anxiety disorder, history of opiate use disorder, alcohol use disorder with alcoholic hepatitis, suicidal ideation admitted to [facility] 11/3 with suicide attempt, then transferred to [facility] 11/19, and transferred to [facility] 11/22 with Covid pneumonia respiratory failure, hypoxia, on Elliquis until 12/21 for DVT prophylaxis. Persistent right knee pain/swelling with w/u at [facility]. MRI done 12/12 right knee complex medial and lateral meniscus tear with advanced medial and moderate lateral and patellofemoral compartment osteoarthritis, seen by Ortho and is s/p right knee aspiration with cortisone injection. Subsequently transferred to [facility] 12/13 for continued inpatient management.     Persistent right knee pain     Plan:   1. Suboxone per addiction medicine   2. Continue scheduled Tylenol and PRN zanaflex  3. Commonly recommended dose of Lyrica total daily dose is 150 to 600 mg for neuropathic pain.    4. Topical lidocaine patch, ketamine/gabapentin cream, and diclofenac cream as ordered for right knee pain  5. Knee brace as recommended, and if allowed on unit.   6. Would not recommend oxycodone as an outpatient unless the patient is discharged to a controlled setting with medication doled out and witnessed swallow  7. Patient is a candidate for outpatient genicular RFA (radiofrequency ablation) for right " "knee pain.     Referral should be made [facility] [phone] to schedule an appointment for the procedure.       Patient would need to be discharged from the hospital to go to the appointment.     This was discussed with the patient and he stated he would discuss with Dr Perez (psychiatry) and look for more information about the block on a computer to see if it is an option he is willing to pursue.     Thank you for the opportunity to participate in the care of Hollis Barclay  Pain Service will sign off.    Discussed with attending anesthesiologist, Dr SADIE Miller  Primary Service Contacted with Recommendations? Yes    Thank you for the opportunity to participate in the care of Hollis Barclay    Contact information:  Mon - Friday 8 AM - 3 PM: [phone]  After Hours, Weekends and Holidays: [phone]    Debora Carter, LATOYA CNP  12/21/2021      Chief Complaint:  Right knee pain      History of Present Illness:  Hollis Barclay is a 51 year old old male with bipolar 1 disorder, borderline personality, major depressive disorder, chronic back pain, post concussive encephalopathy, TBI, social anxiety disorder, history of opiate use disorder, alcohol use disorder with alcoholic hepatitis, suicidal ideation admitted to [facility] 11/3 with suicide attempt, then transferred to [facility] 11/19, and transferred to [facility] 11/22 with Covid pneumonia respiratory failure, hypoxia, on Elliquis for DVT prophylaxis until 12/21.    Jose M states his right knee pain started a couple months ago, describes as constant throbbing, and \"every time I step it feels like I'm being stabbed with a knife.  The pain intensity is 6/10 and unchanged since onset, temporary relief following 12/12 cortisone injection.  Used alsohol to treat pain at home, continued on suboxone and also used heat/ice/elevation without benefit. A brace has been ordered, but patient has not received it yet.  He is currently using a walker with " "ambulation and finds that beneficial.  Reports some benefit from topical treatments including ketamine/gabapentin cream, no benefit with diclofenac cream, lidocaine patches. Per psychiatry, patient was started oxycodone yesterday. Jose M reports no change in right knee pain intensity, and therefore he refuses to take the suboxone even though the dose was reduced because he wants it to get it out of his system so the oxycodone will work better.  He reports although pain is unchanged, he had a better night sleep.  When we discussed optimization of nonopioid multimodal analgesia to reduce severity of pain and in the outpatient setting utilizing RFA, patient is amenable to the plans. But when we talked about getting off oxycodone and staying on Suboxone for analgesia in the setting of recent suicide attempt, and to prevent risk of relapse given his history opiate use disorder, he stated \"I'll get suicidal again; I can't live with the pain, I'll want to die.\"  When we reviewed pain recommendations/RFA as an outpatient procedure Ray indicated that he recalls having that type of procedure on his spine for back pain with about one week of pain relief, but he hadn't heard of it for knee pain so he wants to discuss option with Dr Perez, and look up more information on the computer before he decides what he wants to do.      Per [place]  review pulled from system on 12/21/21. Suboxone 8-2mg TID    Past Medical History:  Past Medical History:   Diagnosis Date     Alcoholism in remission (H)      Bilateral ACL tear      Bipolar disorder (H)      Borderline personality disorder (H)      Chemical dependency (H)      Chronic back pain      Closed left arm fracture 1985     H/O shoulder surgery      Post concussive encephalopathy      Social anxiety disorder      Social anxiety disorder      TBI (traumatic brain injury) (H)          Family History:    No family history on file.    Social History:  Social History     Tobacco Use     " "Smoking status: Former Smoker     Types: Dip, chew, snus or snuff     Smokeless tobacco: Former User     Types: Chew   Substance Use Topics     Alcohol use: Not Currently         Tobacco:  Former  ETOH Abuse:  Yes  H/O Substance Abuse:  Yes      Review of Systems:  Complete ROS reviewed. Unless otherwise noted, all other systems found to be negative.        Laboratory Results:  Recent Labs   Lab Test 12/17/21  0801 12/15/21  0904 11/23/21  0523 11/22/21  1522   INR  --  0.89  --  0.91    268   < >  --    PTT  --   --   --  41*   BUN 28 21   < >  --     < > = values in this interval not displayed.          Allergies:  Allergies   Allergen Reactions     Cucumber Extract Anaphylaxis     Cucumber the vegable     Hydroxyzine Hives     Previously unreported by patient, this is a new patient declaration as of 5/1/21.     Nsaids      No problem with oral NSAIDs--Toradol injection itching only.     Trazodone Itching     Per Patient         Current Pain Related Medications:  Medications related to Pain Management (From now, onward)    Start     Dose/Rate Route Frequency Ordered Stop    12/20/21 1400  buprenorphine HCl-naloxone HCl (SUBOXONE) 4-1 MG per film 1 Film         1 Film Sublingual 3 TIMES DAILY 12/20/21 1156      12/20/21 1200  oxyCODONE (ROXICODONE) tablet 10 mg         10 mg Oral 4 TIMES DAILY 12/20/21 1156      12/17/21 1400  pregabalin (LYRICA) capsule 300 mg         300 mg Oral 3 TIMES DAILY 12/17/21 1041      12/16/21 1139  ketamine 5%-gabapentin 8% in PLO cream 0.25 g         0.25 g Topical 3 TIMES DAILY PRN 12/16/21 1140      12/15/21 2000  topiramate (TOPAMAX) tablet 50 mg         50 mg Oral 2 TIMES DAILY 12/15/21 1647      12/15/21 0600  acetaminophen (TYLENOL) tablet 975 mg         975 mg Oral EVERY 8 HOURS 12/14/21 1259      12/14/21 2000  Lidocaine (LIDOCARE) 4 % Patch 1 patch        \"And\" Linked Group Details    1 patch  over 12 Hours Transdermal EVERY 24 HOURS 2000 12/14/21 1300      12/14/21 " "2000  lidocaine patch in PLACE        \"And\" Linked Group Details     Transdermal EVERY 8 HOURS 12/14/21 1300      12/14/21 2000  senna-docusate (SENOKOT-S/PERICOLACE) 8.6-50 MG per tablet 1 tablet         1 tablet Oral AT BEDTIME 12/14/21 1317      12/14/21 0800  polyethylene glycol (MIRALAX) Packet 17 g         17 g Oral DAILY 12/13/21 1656 12/13/21 2000  diphenhydrAMINE (BENADRYL) half-tab 25 mg         25 mg Oral 3 TIMES DAILY 12/13/21 1656 12/13/21 1656  diclofenac (VOLTAREN) 1 % topical gel 2 g         2 g Topical 4 TIMES DAILY PRN 12/13/21 1656 12/13/21 1656  sennosides (SENOKOT) tablet 1-2 tablet         1-2 tablet Oral 2 TIMES DAILY PRN 12/13/21 1656 12/13/21 1656  tiZANidine (ZANAFLEX) tablet 4 mg         4 mg Oral EVERY 8 HOURS PRN 12/13/21 1656              Physical Exam:  Vitals: /68   Pulse 94   Temp 97.2  F (36.2  C) (Temporal)   Resp 16   Wt 138.3 kg (305 lb)   SpO2 93%   BMI 37.13 kg/m      Physical Exam: Limited due to video interview  CONSTITUTIONAL/GENERAL APPEARANCE:  NAD  EYES: EOMI, sclera anicteric, PERRLA  ENT/NECK: atraumatic, lips and oral mucous membranes dry  RESPIRATORY: non-labored breathing. No cough, wheeze    Further exam findings per Medicine Service consult note on 12/14/21   SKIN: There is no evidence of erythema, ecchymosis, abrasions, or and lacerations   Pulses:  dorsal\"is pedis  Sensation: present and intact and equal bilaterally  Tenderness: global tenderness about the right knee. Palpable popliteal cyst  ROM: 0-90 with tightness noted at end range of flexion  Motor: 5/5  Contralateral side= Full range of motion, Negative joint instability findings, 5/5 motor groups about the joint, Non-tender.          Total time spent 45 minutes with greater than 50% in consultation, education and coordination of care.   "

## 2021-12-21 NOTE — PLAN OF CARE
Assessment/Intervention/Current Symtoms and Care Coordination:    Chart Review    Reviewed pt care in team    Met with pt today and reviewed his care. He reported he is missing his ID, a pair of shoes, wears, T shirt and a hoodie; faded baseball cap. He reported these got lost during his admission process. He reported he wants the items ready before his discharge date.     Discharge Plan or Goal:  Pending stabilization & development of a safe discharge plan.  Considerations include: Return to assisted living with outpatient services     Barriers to Discharge:  Patient requires further psychiatric stabilization due to current symptomology and medication management with possible adjustments     Referral Status:  No new referrals made     Legal Status:  Patient is voluntary

## 2021-12-21 NOTE — PLAN OF CARE
"  Problem: Mood Impairment (Depressive Signs/Symptoms)  Goal: Improved Mood Symptoms (Depressive Signs/Symptoms)  Outcome: Improving     Problem: Pain Chronic (Persistent)  Goal: Acceptable Pain Control and Functional Ability  Outcome: No Change    Slept on and off tonight, continued to endorse pain on his right knee. Rated the pain 8/10. Given scheduled Oxycodone 10 mg at midnight and 0400. When assessed if medication helped ease his pain , pt reported \" The same\". Pt stated he wanted the medication round the clock so his pain won't get worst.  Slept for 5.5 hours         "

## 2021-12-21 NOTE — PROGRESS NOTES
"Brief Medicine Note    Contacted by nursing regarding patient still does not have the knee brace that was recommended per ortho and medicine and unclear who to contact to obtain the brace.     Today's vital signs, medications, and nursing notes were reviewed.     /68   Pulse 94   Temp 97.2  F (36.2  C) (Temporal)   Resp 16   Wt 138.3 kg (305 lb)   SpO2 93%   BMI 37.13 kg/m    General: A&O. NAD. Ambulatory with a rolling walker      A/P:  Acute on chronic right knee pain  Primary OA of the right knee   Per ortho notes \"MRI, and XR revealed a complex medial meniscus tear with associated extensive full-thickness cartilage loss of both the medial femoral condyle, and medial tibial plateau, complex lateral meniscus tear with moderate lateral compartment chondromalacia, mild patellofemoral chondromalacia, and a moderate knee joint effusion with diffuse synovitis, and moderate-sized popliteal cyst. \"  - received cortisone and is not a candidate for further cortisone x 3 months  - will likely need total knee arthroplasty outpatient  - knee immobilizer ordered, appreciate ortho assistance in obtaining device, will need brace consult (placed)  - appreciate pain mng input, pain regiment per pain team/primary psychiatry team    Medicine will sign off please call with questions or concerns    Jennifer Gómez PA-C  [facility]  Contact information available via [facility] Paging/Directory      "

## 2021-12-21 NOTE — PROGRESS NOTES
Patient seen, chart reviewed, care discussed with staff.  Blood pressure 116/68, pulse 94, temperature 97.2  F (36.2  C), temperature source Temporal, resp. rate 16, weight 138.3 kg (305 lb), SpO2 93 %.    Pain and medicine consult appreciated, will discuss more with Mr. Barclay tomorrow.      Slept well.  General appearance: fair  Alert.   Affect: fair  Mood: fair    Speech:  normal.   Eye contact:  good.    Psychomotor behavior: normal  Gait: steady with walker  Abnormal movements: none  Delusions: none  Hallucinations:  none  Thoughts: logical  Associations: intact  Judgement: fair  Insight: good  Cognitions: intact in conversation  Memory:  intact in conversation  Orientation: normal    Not suicidal as long as pain is managed    Imp: Bipolar illness  Chemical dependency  Pain  And:  Patient Active Problem List   Diagnosis     Post concussive encephalopathy     H/O shoulder surgery     Alcoholism in remission (H)     Bilateral ACL tear     Chronic back pain     Social anxiety disorder     Alcohol withdrawal syndrome with complication, with unspecified complication (H)     Rib fracture     Alcohol withdrawal (H)     Alcohol dependence (H)     LFT elevation     Laceration of left hand without foreign body, initial encounter     Suicidal ideation     Intentional drug overdose, initial encounter (H)     Alcoholic intoxication with complication (H)     Severe bipolar I disorder, current or most recent episode depressed, with mixed features (H)     Medication overdose, intentional self-harm, subsequent encounter     Opioid dependence on agonist therapy (H)     2019 novel coronavirus disease (COVID-19)     Pneumonia due to COVID-19 virus     Bipolar disorder (H)     Plan:  Increase Oxycodone  2.  Recommend he consider the ablation after discharge  3.  Suboxone order stopped    Current Facility-Administered Medications   Medication     acamprosate (CAMPRAL) EC tablet 666 mg     acetaminophen (TYLENOL) tablet 975 mg      albuterol (PROVENTIL HFA/VENTOLIN HFA) inhaler     alum & mag hydroxide-simethicone (MAALOX) suspension 30 mL     amLODIPine (NORVASC) tablet 5 mg     apixaban ANTICOAGULANT (ELIQUIS) tablet 2.5 mg     diclofenac (VOLTAREN) 1 % topical gel 2 g     diphenhydrAMINE (BENADRYL) half-tab 25 mg     folic acid (FOLVITE) tablet 1,000 mcg     ketamine 5%-gabapentin 8% in PLO cream 0.25 g     levothyroxine (SYNTHROID/LEVOTHROID) tablet 75 mcg     Lidocaine (LIDOCARE) 4 % Patch 1 patch    And     lidocaine patch in PLACE     magnesium oxide (MAG-OX) tablet 400 mg     melatonin tablet 3 mg     menthol-zinc oxide (CALMOSEPTINE) 0.44-20.6 % ointment OINT     miconazole with skin protectant (SELENA ANTIFUNGAL) 2 % cream     multivitamin w/minerals (THERA-VIT-M) tablet 1 tablet     naloxone (NARCAN) injection 0.2 mg    Or     naloxone (NARCAN) injection 0.4 mg    Or     naloxone (NARCAN) injection 0.2 mg    Or     naloxone (NARCAN) injection 0.4 mg     nicotine polacrilex (NICORETTE) gum 4-8 mg     OLANZapine zydis (zyPREXA) ODT tab 5 mg     ondansetron (ZOFRAN-ODT) ODT tab 4 mg     oxyCODONE (ROXICODONE) tablet 10 mg     oxyCODONE (ROXICODONE) tablet 20 mg     pantoprazole (PROTONIX) EC tablet 40 mg     polyethylene glycol (MIRALAX) Packet 17 g     prazosin (MINIPRESS) capsule 1 mg     pregabalin (LYRICA) capsule 300 mg     QUEtiapine (SEROquel) tablet 600 mg     senna-docusate (SENOKOT-S/PERICOLACE) 8.6-50 MG per tablet 1 tablet     sennosides (SENOKOT) tablet 1-2 tablet     tiZANidine (ZANAFLEX) tablet 4 mg     topiramate (TOPAMAX) tablet 50 mg     Recent Results (from the past 168 hour(s))   Hepatitis B surface antigen    Collection Time: 12/14/21  6:41 PM   Result Value Ref Range    Hepatitis B Surface Antigen Nonreactive Nonreactive   Hepatitis B Surface Antibody    Collection Time: 12/14/21  6:41 PM   Result Value Ref Range    Hepatitis B Surface Antibody 7.01 <8.00 m[IU]/mL   Hepatitis C Screen Reflex to HCV RNA Quant and  Genotype    Collection Time: 12/14/21  6:41 PM   Result Value Ref Range    Hepatitis C Antibody Reactive (A) Nonreactive   Hepatitis C RNA, Quantitative by PCR with Confirmatory Reflex to Genotyping    Collection Time: 12/14/21  6:41 PM   Result Value Ref Range    Hepatitis C log 6.2     Hepatitis C RNA IU/mL, Instrument 1,145,607 (H) <1 IU/mL   INR    Collection Time: 12/15/21  9:04 AM   Result Value Ref Range    INR 0.89 0.86 - 1.14   Comprehensive metabolic panel    Collection Time: 12/15/21  9:04 AM   Result Value Ref Range    Sodium 141 133 - 144 mmol/L    Potassium 3.6 3.4 - 5.3 mmol/L    Chloride 108 94 - 109 mmol/L    Carbon Dioxide (CO2) 27 20 - 32 mmol/L    Anion Gap 6 3 - 14 mmol/L    Urea Nitrogen 21 7 - 30 mg/dL    Creatinine 0.62 (L) 0.66 - 1.25 mg/dL    Calcium 8.8 8.5 - 10.1 mg/dL    Glucose 153 (H) 70 - 99 mg/dL    Alkaline Phosphatase 79 40 - 150 U/L    AST 54 (H) 0 - 45 U/L     (H) 0 - 70 U/L    Protein Total 7.4 6.8 - 8.8 g/dL    Albumin 3.2 (L) 3.4 - 5.0 g/dL    Bilirubin Total 0.3 0.2 - 1.3 mg/dL    GFR Estimate >90 >60 mL/min/1.73m2   CBC with platelets    Collection Time: 12/15/21  9:04 AM   Result Value Ref Range    WBC Count 5.1 4.0 - 11.0 10e3/uL    RBC Count 4.26 (L) 4.40 - 5.90 10e6/uL    Hemoglobin 12.5 (L) 13.3 - 17.7 g/dL    Hematocrit 39.2 (L) 40.0 - 53.0 %    MCV 92 78 - 100 fL    MCH 29.3 26.5 - 33.0 pg    MCHC 31.9 31.5 - 36.5 g/dL    RDW 15.3 (H) 10.0 - 15.0 %    Platelet Count 268 150 - 450 10e3/uL   CBC with platelets    Collection Time: 12/17/21  8:01 AM   Result Value Ref Range    WBC Count 4.5 4.0 - 11.0 10e3/uL    RBC Count 4.23 (L) 4.40 - 5.90 10e6/uL    Hemoglobin 12.5 (L) 13.3 - 17.7 g/dL    Hematocrit 39.1 (L) 40.0 - 53.0 %    MCV 92 78 - 100 fL    MCH 29.6 26.5 - 33.0 pg    MCHC 32.0 31.5 - 36.5 g/dL    RDW 15.4 (H) 10.0 - 15.0 %    Platelet Count 310 150 - 450 10e3/uL   Comprehensive metabolic panel    Collection Time: 12/17/21  8:01 AM   Result Value Ref Range     Sodium 142 133 - 144 mmol/L    Potassium 4.1 3.4 - 5.3 mmol/L    Chloride 110 (H) 94 - 109 mmol/L    Carbon Dioxide (CO2) 27 20 - 32 mmol/L    Anion Gap 5 3 - 14 mmol/L    Urea Nitrogen 28 7 - 30 mg/dL    Creatinine 0.65 (L) 0.66 - 1.25 mg/dL    Calcium 8.9 8.5 - 10.1 mg/dL    Glucose 108 (H) 70 - 99 mg/dL    Alkaline Phosphatase 76 40 - 150 U/L    AST 62 (H) 0 - 45 U/L    ALT 99 (H) 0 - 70 U/L    Protein Total 7.3 6.8 - 8.8 g/dL    Albumin 3.1 (L) 3.4 - 5.0 g/dL    Bilirubin Total 0.3 0.2 - 1.3 mg/dL    GFR Estimate >90 >60 mL/min/1.73m2

## 2021-12-21 NOTE — PROGRESS NOTES
"Pt joined late to dance/movement therapy (D/MT) using creative self-expression to be \"in the same place\" with their body and mind. The group goal was to practice moving with the events of life rather than being a \"passive listener\" or a \"passive participant.\"  Themes of hope and letting go were interspersed with the complexities of life.  For example, one therapeutic story we danced was how we can get ourselves into challenging life situations and then get stuck, having a hard time finding our way out again.  We certainly can't go back to the way things were before our significant traumas and life losses.      Moving through grief and loss was another important theme noting that pain- both emotional and physical- is experienced in the body, and therefore moving in the body gets us in touch with that pain in order to heal it. He used self-chosen movements to support his physical pain but also noted emotional pain can be \"heart-wrenching\" and equally hard to get through.  He realized how important even small breaks can be to \"catch his breath.\"  He wanted to express himself in ways that actually expressed more vigorous motions for deeper emotions.       12/21/21 1111   Dance Movement Therapy   Type of Intervention structured groups   Response participates with encouragement   Hours 0.5     "

## 2021-12-21 NOTE — PLAN OF CARE
Problem: Coping with Symptoms  Goal: Practice Coping Skills  Description: Practice using coping strategies to manage stress and reduce symptoms  Note: Attended 1 of 1 OT groups, participating for 50 minutes with 5 pts in a goal oriented task group. He was attentive to details and problem solved with success on more difficult familiar steps on the IPAD. He was social, offered suggestions on a group task using problem solving skills. He offered support and was social with others.

## 2021-12-21 NOTE — PLAN OF CARE
"  Problem: Coping with Symptoms  Goal: Practice Coping Skills  Description: Practice using coping strategies to manage stress and reduce symptoms  Note: Attended 2 of 2 OT groups.  In the a.m. session he participated for 50 minutes with 8 patients focusing on an activity on the iPad which he successfully problem solved with.  He was social and pleasant on approach. At times appeared to be when seen as he was holding his knee with discomfort but offered no complaints.  He stated that an important benefit of group is his ability to focus on something else aside from his pain.  In the afternoon group he participated for 50 minutes with 8 patients focused on positive perspectives and thinking.  He talked about his mother and her illness and pain that she experiences.  Comments were kind in nature and offered with gratitude for her in his life.  When a peer in group accused everyone of lying he spoke up and clearly expressed feeling insulted and with clarity and calm this was assertive with how this comment was perceived.  He talked about the frustration of this peer in her disrespectful comments.  He offered insightful comments and supported others in the group and a thoughtful and kind manner.  At one point he was trying to remember something and stated oh \"old TBI \"and proceeded to call me to take time and recalled the information that he needed to express.     "

## 2021-12-22 PROCEDURE — G0177 OPPS/PHP; TRAIN & EDUC SERV: HCPCS

## 2021-12-22 PROCEDURE — 250N000013 HC RX MED GY IP 250 OP 250 PS 637: Performed by: PSYCHIATRY & NEUROLOGY

## 2021-12-22 PROCEDURE — L1833 KO ADJ JNT POS R SUP PRE OTS: HCPCS

## 2021-12-22 PROCEDURE — 250N000013 HC RX MED GY IP 250 OP 250 PS 637: Performed by: PHYSICIAN ASSISTANT

## 2021-12-22 PROCEDURE — 124N000003 HC R&B MH SENIOR/ADOLESCENT

## 2021-12-22 RX ADMIN — ACAMPROSATE CALCIUM 666 MG: 333 TABLET, DELAYED RELEASE ORAL at 19:31

## 2021-12-22 RX ADMIN — ACETAMINOPHEN 975 MG: 325 TABLET, FILM COATED ORAL at 14:06

## 2021-12-22 RX ADMIN — NICOTINE POLACRILEX 8 MG: 4 GUM, CHEWING ORAL at 19:32

## 2021-12-22 RX ADMIN — NICOTINE POLACRILEX 8 MG: 4 GUM, CHEWING ORAL at 08:40

## 2021-12-22 RX ADMIN — PRAZOSIN HYDROCHLORIDE 1 MG: 1 CAPSULE ORAL at 19:31

## 2021-12-22 RX ADMIN — ACAMPROSATE CALCIUM 666 MG: 333 TABLET, DELAYED RELEASE ORAL at 14:06

## 2021-12-22 RX ADMIN — NICOTINE POLACRILEX 8 MG: 4 GUM, CHEWING ORAL at 18:23

## 2021-12-22 RX ADMIN — FOLIC ACID 1000 MCG: 1 TABLET ORAL at 08:40

## 2021-12-22 RX ADMIN — OXYCODONE HYDROCHLORIDE 20 MG: 5 TABLET ORAL at 22:14

## 2021-12-22 RX ADMIN — OXYCODONE HYDROCHLORIDE 20 MG: 5 TABLET ORAL at 10:17

## 2021-12-22 RX ADMIN — NICOTINE POLACRILEX 8 MG: 4 GUM, CHEWING ORAL at 12:40

## 2021-12-22 RX ADMIN — MULTIPLE VITAMINS W/ MINERALS TAB 1 TABLET: TAB at 08:40

## 2021-12-22 RX ADMIN — OXYCODONE HYDROCHLORIDE 20 MG: 5 TABLET ORAL at 13:35

## 2021-12-22 RX ADMIN — PREGABALIN 300 MG: 100 CAPSULE ORAL at 08:40

## 2021-12-22 RX ADMIN — Medication 400 MG: at 08:40

## 2021-12-22 RX ADMIN — NICOTINE POLACRILEX 8 MG: 4 GUM, CHEWING ORAL at 09:30

## 2021-12-22 RX ADMIN — QUETIAPINE 600 MG: 300 TABLET, FILM COATED ORAL at 21:29

## 2021-12-22 RX ADMIN — ACETAMINOPHEN 975 MG: 325 TABLET, FILM COATED ORAL at 21:29

## 2021-12-22 RX ADMIN — NICOTINE POLACRILEX 8 MG: 4 GUM, CHEWING ORAL at 17:23

## 2021-12-22 RX ADMIN — AMLODIPINE BESYLATE 5 MG: 5 TABLET ORAL at 08:40

## 2021-12-22 RX ADMIN — PREGABALIN 300 MG: 100 CAPSULE ORAL at 19:31

## 2021-12-22 RX ADMIN — NICOTINE POLACRILEX 8 MG: 4 GUM, CHEWING ORAL at 14:06

## 2021-12-22 RX ADMIN — LEVOTHYROXINE SODIUM 75 MCG: 75 TABLET ORAL at 05:03

## 2021-12-22 RX ADMIN — DOCUSATE SODIUM AND SENNOSIDES 1 TABLET: 8.6; 5 TABLET, FILM COATED ORAL at 19:31

## 2021-12-22 RX ADMIN — PREGABALIN 300 MG: 100 CAPSULE ORAL at 14:06

## 2021-12-22 RX ADMIN — NICOTINE POLACRILEX 8 MG: 4 GUM, CHEWING ORAL at 10:33

## 2021-12-22 RX ADMIN — NICOTINE POLACRILEX 8 MG: 4 GUM, CHEWING ORAL at 21:27

## 2021-12-22 RX ADMIN — ACAMPROSATE CALCIUM 666 MG: 333 TABLET, DELAYED RELEASE ORAL at 08:39

## 2021-12-22 RX ADMIN — TOPIRAMATE 50 MG: 50 TABLET ORAL at 08:40

## 2021-12-22 RX ADMIN — NICOTINE POLACRILEX 8 MG: 4 GUM, CHEWING ORAL at 05:05

## 2021-12-22 RX ADMIN — NICOTINE POLACRILEX 8 MG: 4 GUM, CHEWING ORAL at 16:10

## 2021-12-22 RX ADMIN — OXYCODONE HYDROCHLORIDE 20 MG: 5 TABLET ORAL at 05:02

## 2021-12-22 RX ADMIN — PANTOPRAZOLE SODIUM 40 MG: 40 TABLET, DELAYED RELEASE ORAL at 05:03

## 2021-12-22 RX ADMIN — TIZANIDINE 4 MG: 2 TABLET ORAL at 11:28

## 2021-12-22 RX ADMIN — ACETAMINOPHEN 975 MG: 325 TABLET, FILM COATED ORAL at 05:03

## 2021-12-22 RX ADMIN — OXYCODONE HYDROCHLORIDE 20 MG: 5 TABLET ORAL at 18:22

## 2021-12-22 RX ADMIN — Medication 400 MG: at 19:32

## 2021-12-22 RX ADMIN — TOPIRAMATE 50 MG: 50 TABLET ORAL at 19:31

## 2021-12-22 ASSESSMENT — ACTIVITIES OF DAILY LIVING (ADL)
ORAL_HYGIENE: INDEPENDENT
DRESS: INDEPENDENT
LAUNDRY: WITH SUPERVISION
HYGIENE/GROOMING: INDEPENDENT
ORAL_HYGIENE: INDEPENDENT
DRESS: INDEPENDENT
HYGIENE/GROOMING: INDEPENDENT

## 2021-12-22 NOTE — PROGRESS NOTES
S: order received to see patient at [facility] 3 STP for a ROM knee brace as ordered.  O/G: support and stabilize the right  knee and prevent unwanted motion  A: patient seen for fitting/delivery of post-op hinged style ROM knee brace.  Joint was set to allow 0-90 degrees.  Straps were trimmed to correct circumferences and upright lengths were adjusted to customize the fit of the knee brace to this patient. Patient instructed on donning, doffing, use and care.  Patient provided with instruction booklet that came with the brace.   P: contact orthotics if any issues arise.  Shukri ECKERT,LO.     Juanito T-Scope Knee Brace Directions   Red X (Sepsis)

## 2021-12-22 NOTE — PLAN OF CARE
Patient presented with calm mood and full range affect. He reports better pain control with scheduled Oxycodone, does not appear sedated. He was seen attending and participating in unit activities and socializing with peers. He later appeared sad, reported he is worried about discharging due to not having a safe place to return. He states his living situation is full of people who use drugs and would place him at risk for relapse. He is hoping to get into a crisis bed until he finds a more stable living situation. He endorses some anxiety an depression and fleeting thoughts of SI without a plan to harm self.

## 2021-12-22 NOTE — PLAN OF CARE
Problem: Coping with Symptoms  Goal: Practice Coping Skills  Description: Practice using coping strategies to manage stress and reduce symptoms    Pt actively participated in occupational therapy clinic with 5 patients total x45 minutes. Pt was able to ask for assistance as needed, and independently initiate a familiar, goal-directed, cognitive task on the iPad. Pt demonstrated good attention to task for the full duration of group. Briefly initiated interactions with peers and writer upon arrival to group, then spent the remainder of group quietly engaged in his task. Calm and cooperative.

## 2021-12-22 NOTE — PLAN OF CARE
"  Pt pleasant, cooperative, calm. Visible in milieu and engaged with peers at times. Mostly watches TV. Reports being too focused on \"the reasons I don't want to live rather than the reasons I do.\" Reports trying to stay positive. Reports intermittent passive SI but denies currently. No active plan or intent. Reports multiple factors contributing to his depressed mood including mother's terminal cancer diagnosis, ongoing knee pain, problems with housing/landlord. Gait steady with wheeled walker. Med-compliant but declined lidocaine patch, diphenhydramine, anti-fungal cream. Continue with current treatment plan and recommendations. Continue to monitor and reassess symptoms. Monitor response to medications. Monitor progress towards treatment goals. Encourage groups and participation.   "

## 2021-12-22 NOTE — PLAN OF CARE
Problem: Mood Impairment (Depressive Signs/Symptoms)  Goal: Improved Mood Symptoms (Depressive Signs/Symptoms)  Outcome: Improving     Problem: Pain Chronic (Persistent)  Goal: Acceptable Pain Control and Functional Ability   Outcome: Improving    Slept for 6.5 hours, woke up complaining of right knee pain, received scheduled medications at 0500.  0505 Nicotine gum 8 mg given per request  Went back to bed

## 2021-12-22 NOTE — PROGRESS NOTES
Patient seen, chart reviewed, care discussed with staff.  MN  reviewed  Blood pressure 122/79, pulse 94, temperature 97.3  F (36.3  C), temperature source Oral, resp. rate 15, weight 138.3 kg (305 lb), SpO2 96 %.    Doing well with less pain, knee brace helps.  Tolerating opiate well    General appearance: good  Alert.   Affect: fair  Mood: fair    Speech:  normal.   Eye contact:  good.    Psychomotor behavior: normal  Gait: steady with walker or knee brace  Abnormal movements: none  Delusions: none  Hallucinations:  none  Thoughts: logical  Associations: intact  Judgement: fair  Insight: fair  Cognitions: intact in conversation  Memory:  intact in conversation  Orientation: normal    Not suicidal.    Imp: Bipolar depressed, Chemical dependency, pain  And:   Patient Active Problem List   Diagnosis     Post concussive encephalopathy     H/O shoulder surgery     Alcoholism in remission (H)     Bilateral ACL tear     Chronic back pain     Social anxiety disorder     Alcohol withdrawal syndrome with complication, with unspecified complication (H)     Rib fracture     Alcohol withdrawal (H)     Alcohol dependence (H)     LFT elevation     Laceration of left hand without foreign body, initial encounter     Suicidal ideation     Intentional drug overdose, initial encounter (H)     Alcoholic intoxication with complication (H)     Severe bipolar I disorder, current or most recent episode depressed, with mixed features (H)     Medication overdose, intentional self-harm, subsequent encounter     Opioid dependence on agonist therapy (H)     2019 novel coronavirus disease (COVID-19)     Pneumonia due to COVID-19 virus     Bipolar disorder (H)         Plan:  Discharge when ready with Opiate therapy until it is safe joss knee surgery    Current Facility-Administered Medications   Medication     acamprosate (CAMPRAL) EC tablet 666 mg     acetaminophen (TYLENOL) tablet 975 mg     albuterol (PROVENTIL HFA/VENTOLIN HFA) inhaler      alum & mag hydroxide-simethicone (MAALOX) suspension 30 mL     amLODIPine (NORVASC) tablet 5 mg     diclofenac (VOLTAREN) 1 % topical gel 2 g     diphenhydrAMINE (BENADRYL) half-tab 25 mg     folic acid (FOLVITE) tablet 1,000 mcg     ketamine 5%-gabapentin 8% in PLO cream 0.25 g     levothyroxine (SYNTHROID/LEVOTHROID) tablet 75 mcg     Lidocaine (LIDOCARE) 4 % Patch 1 patch    And     lidocaine patch in PLACE     magnesium oxide (MAG-OX) tablet 400 mg     melatonin tablet 3 mg     menthol-zinc oxide (CALMOSEPTINE) 0.44-20.6 % ointment OINT     miconazole with skin protectant (SELENA ANTIFUNGAL) 2 % cream     multivitamin w/minerals (THERA-VIT-M) tablet 1 tablet     naloxone (NARCAN) injection 0.2 mg    Or     naloxone (NARCAN) injection 0.4 mg    Or     naloxone (NARCAN) injection 0.2 mg    Or     naloxone (NARCAN) injection 0.4 mg     nicotine polacrilex (NICORETTE) gum 4-8 mg     OLANZapine zydis (zyPREXA) ODT tab 5 mg     ondansetron (ZOFRAN-ODT) ODT tab 4 mg     oxyCODONE (ROXICODONE) tablet 10 mg     oxyCODONE (ROXICODONE) tablet 20 mg     pantoprazole (PROTONIX) EC tablet 40 mg     polyethylene glycol (MIRALAX) Packet 17 g     prazosin (MINIPRESS) capsule 1 mg     pregabalin (LYRICA) capsule 300 mg     QUEtiapine (SEROquel) tablet 600 mg     senna-docusate (SENOKOT-S/PERICOLACE) 8.6-50 MG per tablet 1 tablet     sennosides (SENOKOT) tablet 1-2 tablet     tiZANidine (ZANAFLEX) tablet 4 mg     topiramate (TOPAMAX) tablet 50 mg     Recent Results (from the past 168 hour(s))   CBC with platelets    Collection Time: 12/17/21  8:01 AM   Result Value Ref Range    WBC Count 4.5 4.0 - 11.0 10e3/uL    RBC Count 4.23 (L) 4.40 - 5.90 10e6/uL    Hemoglobin 12.5 (L) 13.3 - 17.7 g/dL    Hematocrit 39.1 (L) 40.0 - 53.0 %    MCV 92 78 - 100 fL    MCH 29.6 26.5 - 33.0 pg    MCHC 32.0 31.5 - 36.5 g/dL    RDW 15.4 (H) 10.0 - 15.0 %    Platelet Count 310 150 - 450 10e3/uL   Comprehensive metabolic panel    Collection Time: 12/17/21   8:01 AM   Result Value Ref Range    Sodium 142 133 - 144 mmol/L    Potassium 4.1 3.4 - 5.3 mmol/L    Chloride 110 (H) 94 - 109 mmol/L    Carbon Dioxide (CO2) 27 20 - 32 mmol/L    Anion Gap 5 3 - 14 mmol/L    Urea Nitrogen 28 7 - 30 mg/dL    Creatinine 0.65 (L) 0.66 - 1.25 mg/dL    Calcium 8.9 8.5 - 10.1 mg/dL    Glucose 108 (H) 70 - 99 mg/dL    Alkaline Phosphatase 76 40 - 150 U/L    AST 62 (H) 0 - 45 U/L    ALT 99 (H) 0 - 70 U/L    Protein Total 7.3 6.8 - 8.8 g/dL    Albumin 3.1 (L) 3.4 - 5.0 g/dL    Bilirubin Total 0.3 0.2 - 1.3 mg/dL    GFR Estimate >90 >60 mL/min/1.73m2

## 2021-12-22 NOTE — PLAN OF CARE
Assessment/Intervention/Current Symtoms and Care Coordination:    Chart Review    Reviewed pt care in team    Met with pt today. He reported he would love to discharge to a crisis bed tomorrow. He reported he wants to get to [facility]. Writer informed him that the calls to crisis beds are made on a day to day basis, so a call will be placed tomorrow's morning. Pt also reported that he is missing his belongings and will need writer's help to reach patient relations so as to ensure he has his belongings before he discharges. Writer assisted and placed a call to Patient Relations and spoke with staff who confirmed that another staff, Emma has been working on the case, and will call the patient through the unit number for an update.    Called Crisis line ([facility]) and left a referral message for this patient.     Discharge Plan or Goal:  Pending stabilization & development of a safe discharge plan.  Considerations include: Return to assisted living with outpatient services     Barriers to Discharge:  Patient requires further psychiatric stabilization due to current symptomology and medication management with possible adjustments     Referral Status:  No new referrals made     Legal Status:  Patient is voluntary

## 2021-12-23 LAB
HCV GENTYP SERPL NAA+PROBE: NORMAL
SARS-COV-2 RNA RESP QL NAA+PROBE: POSITIVE

## 2021-12-23 PROCEDURE — 250N000013 HC RX MED GY IP 250 OP 250 PS 637: Performed by: PSYCHIATRY & NEUROLOGY

## 2021-12-23 PROCEDURE — 90853 GROUP PSYCHOTHERAPY: CPT

## 2021-12-23 PROCEDURE — U0003 INFECTIOUS AGENT DETECTION BY NUCLEIC ACID (DNA OR RNA); SEVERE ACUTE RESPIRATORY SYNDROME CORONAVIRUS 2 (SARS-COV-2) (CORONAVIRUS DISEASE [COVID-19]), AMPLIFIED PROBE TECHNIQUE, MAKING USE OF HIGH THROUGHPUT TECHNOLOGIES AS DESCRIBED BY CMS-2020-01-R: HCPCS | Performed by: PSYCHIATRY & NEUROLOGY

## 2021-12-23 PROCEDURE — 124N000003 HC R&B MH SENIOR/ADOLESCENT

## 2021-12-23 PROCEDURE — 250N000013 HC RX MED GY IP 250 OP 250 PS 637: Performed by: PHYSICIAN ASSISTANT

## 2021-12-23 RX ORDER — LEVOTHYROXINE SODIUM 75 UG/1
75 TABLET ORAL
Qty: 30 TABLET | Refills: 1 | Status: ON HOLD | OUTPATIENT
Start: 2021-12-23 | End: 2022-02-02

## 2021-12-23 RX ORDER — LIDOCAINE 4 G/G
1 PATCH TOPICAL EVERY 24 HOURS
Qty: 30 PATCH | Refills: 1 | Status: ON HOLD | OUTPATIENT
Start: 2021-12-23 | End: 2022-02-02

## 2021-12-23 RX ORDER — AMLODIPINE BESYLATE 5 MG/1
5 TABLET ORAL DAILY
Qty: 30 TABLET | Refills: 1 | Status: ON HOLD | OUTPATIENT
Start: 2021-12-23 | End: 2022-02-02

## 2021-12-23 RX ORDER — QUETIAPINE FUMARATE 300 MG/1
600 TABLET, FILM COATED ORAL AT BEDTIME
Qty: 60 TABLET | Refills: 1 | Status: ON HOLD | OUTPATIENT
Start: 2021-12-23 | End: 2022-02-02

## 2021-12-23 RX ORDER — TOPIRAMATE 50 MG/1
50 TABLET, FILM COATED ORAL 2 TIMES DAILY
Qty: 60 TABLET | Refills: 1 | Status: ON HOLD | OUTPATIENT
Start: 2021-12-23 | End: 2022-02-02

## 2021-12-23 RX ORDER — ACAMPROSATE CALCIUM 333 MG/1
666 TABLET, DELAYED RELEASE ORAL 3 TIMES DAILY
Qty: 180 TABLET | Refills: 1 | Status: ON HOLD | OUTPATIENT
Start: 2021-12-23 | End: 2022-02-02

## 2021-12-23 RX ORDER — POLYETHYLENE GLYCOL 3350 17 G/17G
17 POWDER, FOR SOLUTION ORAL DAILY
Qty: 510 G | Refills: 1 | Status: ON HOLD | OUTPATIENT
Start: 2021-12-23 | End: 2022-01-10

## 2021-12-23 RX ORDER — PANTOPRAZOLE SODIUM 40 MG/1
40 TABLET, DELAYED RELEASE ORAL
Qty: 30 TABLET | Refills: 1 | Status: ON HOLD | OUTPATIENT
Start: 2021-12-23 | End: 2022-02-02

## 2021-12-23 RX ORDER — ACETAMINOPHEN 325 MG/1
975 TABLET ORAL EVERY 6 HOURS PRN
Qty: 100 TABLET | Refills: 3 | Status: ON HOLD | OUTPATIENT
Start: 2021-12-23 | End: 2022-02-02

## 2021-12-23 RX ORDER — AMOXICILLIN 250 MG
1 CAPSULE ORAL AT BEDTIME
Qty: 30 TABLET | Refills: 1 | Status: ON HOLD | OUTPATIENT
Start: 2021-12-23 | End: 2022-02-02

## 2021-12-23 RX ORDER — MULTIPLE VITAMINS W/ MINERALS TAB 9MG-400MCG
1 TAB ORAL DAILY
Qty: 30 TABLET | Refills: 1 | Status: ON HOLD | OUTPATIENT
Start: 2021-12-23 | End: 2022-02-02

## 2021-12-23 RX ORDER — SENNOSIDES 8.6 MG
1-2 TABLET ORAL 2 TIMES DAILY PRN
Qty: 60 TABLET | Refills: 1 | Status: ON HOLD | OUTPATIENT
Start: 2021-12-23 | End: 2022-02-02

## 2021-12-23 RX ORDER — FOLIC ACID 1 MG/1
1000 TABLET ORAL DAILY
Qty: 90 TABLET | Refills: 1 | Status: ON HOLD | OUTPATIENT
Start: 2021-12-23 | End: 2022-02-02

## 2021-12-23 RX ORDER — OXYCODONE HYDROCHLORIDE 20 MG/1
20 TABLET ORAL
Qty: 150 TABLET | Refills: 0 | Status: ON HOLD | OUTPATIENT
Start: 2021-12-23 | End: 2022-01-14

## 2021-12-23 RX ORDER — ALBUTEROL SULFATE 90 UG/1
2 AEROSOL, METERED RESPIRATORY (INHALATION) EVERY 4 HOURS PRN
Qty: 18 G | Refills: 1 | Status: ON HOLD | OUTPATIENT
Start: 2021-12-23 | End: 2022-02-02

## 2021-12-23 RX ORDER — PREGABALIN 300 MG/1
300 CAPSULE ORAL 3 TIMES DAILY
Qty: 90 CAPSULE | Refills: 0 | Status: ON HOLD | OUTPATIENT
Start: 2021-12-23 | End: 2022-02-02

## 2021-12-23 RX ORDER — PRAZOSIN HYDROCHLORIDE 1 MG/1
1 CAPSULE ORAL AT BEDTIME
Qty: 30 CAPSULE | Refills: 0 | Status: ON HOLD | OUTPATIENT
Start: 2021-12-23 | End: 2022-02-02

## 2021-12-23 RX ADMIN — NICOTINE POLACRILEX 8 MG: 4 GUM, CHEWING ORAL at 16:06

## 2021-12-23 RX ADMIN — NICOTINE POLACRILEX 8 MG: 4 GUM, CHEWING ORAL at 19:14

## 2021-12-23 RX ADMIN — DOCUSATE SODIUM AND SENNOSIDES 1 TABLET: 8.6; 5 TABLET, FILM COATED ORAL at 19:59

## 2021-12-23 RX ADMIN — PANTOPRAZOLE SODIUM 40 MG: 40 TABLET, DELAYED RELEASE ORAL at 06:11

## 2021-12-23 RX ADMIN — NICOTINE POLACRILEX 8 MG: 4 GUM, CHEWING ORAL at 09:38

## 2021-12-23 RX ADMIN — FOLIC ACID 1000 MCG: 1 TABLET ORAL at 08:41

## 2021-12-23 RX ADMIN — NICOTINE POLACRILEX 8 MG: 4 GUM, CHEWING ORAL at 21:37

## 2021-12-23 RX ADMIN — NICOTINE POLACRILEX 8 MG: 4 GUM, CHEWING ORAL at 11:44

## 2021-12-23 RX ADMIN — MULTIPLE VITAMINS W/ MINERALS TAB 1 TABLET: TAB at 08:41

## 2021-12-23 RX ADMIN — NICOTINE POLACRILEX 8 MG: 4 GUM, CHEWING ORAL at 08:40

## 2021-12-23 RX ADMIN — MELATONIN TAB 3 MG 3 MG: 3 TAB at 21:04

## 2021-12-23 RX ADMIN — Medication 25 MG: at 13:59

## 2021-12-23 RX ADMIN — LEVOTHYROXINE SODIUM 75 MCG: 75 TABLET ORAL at 06:11

## 2021-12-23 RX ADMIN — ACAMPROSATE CALCIUM 666 MG: 333 TABLET, DELAYED RELEASE ORAL at 19:58

## 2021-12-23 RX ADMIN — OXYCODONE HYDROCHLORIDE 20 MG: 5 TABLET ORAL at 14:35

## 2021-12-23 RX ADMIN — NICOTINE POLACRILEX 8 MG: 4 GUM, CHEWING ORAL at 13:28

## 2021-12-23 RX ADMIN — OXYCODONE HYDROCHLORIDE 20 MG: 5 TABLET ORAL at 06:11

## 2021-12-23 RX ADMIN — PRAZOSIN HYDROCHLORIDE 1 MG: 1 CAPSULE ORAL at 19:59

## 2021-12-23 RX ADMIN — Medication 400 MG: at 19:59

## 2021-12-23 RX ADMIN — TOPIRAMATE 50 MG: 50 TABLET ORAL at 08:40

## 2021-12-23 RX ADMIN — PREGABALIN 300 MG: 100 CAPSULE ORAL at 19:58

## 2021-12-23 RX ADMIN — OXYCODONE HYDROCHLORIDE 20 MG: 5 TABLET ORAL at 21:05

## 2021-12-23 RX ADMIN — QUETIAPINE 600 MG: 300 TABLET, FILM COATED ORAL at 21:04

## 2021-12-23 RX ADMIN — ACETAMINOPHEN 975 MG: 325 TABLET, FILM COATED ORAL at 21:04

## 2021-12-23 RX ADMIN — ACAMPROSATE CALCIUM 666 MG: 333 TABLET, DELAYED RELEASE ORAL at 08:40

## 2021-12-23 RX ADMIN — Medication 400 MG: at 08:40

## 2021-12-23 RX ADMIN — Medication 25 MG: at 19:58

## 2021-12-23 RX ADMIN — PREGABALIN 300 MG: 100 CAPSULE ORAL at 08:40

## 2021-12-23 RX ADMIN — AMLODIPINE BESYLATE 5 MG: 5 TABLET ORAL at 08:41

## 2021-12-23 RX ADMIN — NICOTINE POLACRILEX 8 MG: 4 GUM, CHEWING ORAL at 20:33

## 2021-12-23 RX ADMIN — NICOTINE POLACRILEX 8 MG: 4 GUM, CHEWING ORAL at 22:41

## 2021-12-23 RX ADMIN — ACETAMINOPHEN 975 MG: 325 TABLET, FILM COATED ORAL at 13:59

## 2021-12-23 RX ADMIN — ACETAMINOPHEN 975 MG: 325 TABLET, FILM COATED ORAL at 06:11

## 2021-12-23 RX ADMIN — OXYCODONE HYDROCHLORIDE 20 MG: 5 TABLET ORAL at 09:40

## 2021-12-23 RX ADMIN — PREGABALIN 300 MG: 100 CAPSULE ORAL at 13:59

## 2021-12-23 RX ADMIN — TOPIRAMATE 50 MG: 50 TABLET ORAL at 19:58

## 2021-12-23 RX ADMIN — NICOTINE POLACRILEX 8 MG: 4 GUM, CHEWING ORAL at 06:30

## 2021-12-23 RX ADMIN — NICOTINE POLACRILEX 8 MG: 4 GUM, CHEWING ORAL at 14:37

## 2021-12-23 RX ADMIN — OXYCODONE HYDROCHLORIDE 20 MG: 5 TABLET ORAL at 17:49

## 2021-12-23 RX ADMIN — ALUMINUM HYDROXIDE, MAGNESIUM HYDROXIDE, AND SIMETHICONE 30 ML: 200; 200; 20 SUSPENSION ORAL at 08:48

## 2021-12-23 RX ADMIN — NICOTINE POLACRILEX 8 MG: 4 GUM, CHEWING ORAL at 10:40

## 2021-12-23 RX ADMIN — ACAMPROSATE CALCIUM 666 MG: 333 TABLET, DELAYED RELEASE ORAL at 13:59

## 2021-12-23 ASSESSMENT — ACTIVITIES OF DAILY LIVING (ADL)
ORAL_HYGIENE: INDEPENDENT
HYGIENE/GROOMING: INDEPENDENT
LAUNDRY: WITH SUPERVISION
DRESS: SCRUBS (BEHAVIORAL HEALTH);INDEPENDENT

## 2021-12-23 NOTE — PLAN OF CARE
"  Pt visible in milieu. Appears in good mood, jovial, cheerful, joking with staff at times. Social with peers, playing games, engaged in milieu. Behavior appropriate. No c/o pain, no sedation observed from oxycodone 20 mg 5x per day. Reports satisfaction with knee orthotic and that \"now I don't need that walker anymore.\" Continues to report depressive symptoms and anxiety whose severity is \"up and down\", and intermittent fleeting SI. Denies active plan or intent. Med-compliant except refuses lidocaine patch, miconazole topical ointment, Benadryl. Continue with current treatment plan and recommendations. Continue to monitor and reassess symptoms. Monitor response to medications. Monitor progress towards treatment goals. Encourage groups and participation.    ADDENDUM  Received call from Nicole Haynes with pt relations this evening re: pt's missing belongings. Pt's sweatpants, shirt, socks and shoes were found at [facility]. They are still looking for pt's wallet and will contact us tomorrow to follow up.  "

## 2021-12-23 NOTE — PLAN OF CARE
Assessment/Intervention/Current Symtoms and Care Coordination:    Chart Review    Reviewed pt care in team    Met with pt today. Pt indicates he wants to discharge to a crisis bed. Contacted [facility] and there is a bed. The agency requested the patient shows a negative covid result done within the last seven days. Requested writer faxes pt's H&P, Med list to the agency. Stated they can admit the patient today at 4:00pm, if everything goes well and they are able to get the COVID result back.    Plan: Await physician order for COVID test. Report result to [facility].  Update: Information faxed to [facility]. Awaiting pt's COVID result.    Met with pt and got ROIs for [facility] and [facility]      Update: Called [facility] to report that this pt's medications are still being awaited, and he will be on his way when his medications come up. Was informed by [facility] staff that the program spoke to someone who had told them the patient had problems ambulating and needs support navigating some areas. Program staff said because of that they had referred him out to another program that manages patients that need assistance with ambulation. Writer informed staff that this pt is able to ambulate by himself and only has a knee brace on. Staff reported the patient has already been referred out and provided me information on the new referral.  Writer called the new referral ([phone]) and was informed the patient could come to the facility any time between the time and 10pm. When writer called back to get a full address and fax number for the facility, staff reported they are unable to accept the patient because he is not on a straight MA. Writer requested the program staff to give the information to [facility], so they can know what to do with this patient, given the clarification that this patient does not need help ambulating.     Discharge Plan or Goal:  Pending stabilization  & development of a safe discharge plan.  Considerations include: Return to assisted living with outpatient services     Barriers to Discharge:  Patient requires further psychiatric stabilization due to current symptomology and medication management with possible adjustments     Referral Status:  No new referrals made     Legal Status:  Patient is voluntary

## 2021-12-23 NOTE — PLAN OF CARE
Patient has been calm and cooperative, his affect is flat but brighten on approach. Social with staff and peers. Participated in groupStated his right knee pain is well controlled with scheduled oxycodone. Patient doesn't appear sedated. Played card in the milieu with peers. Appetite and sleep is good. Rated his anxiety 9/10 and depression 6/10. Denies SI, SIB and hallucination. Covid test sent to lab, result still pending. Planning to discharge to crisis bed this afternoon. Sticky note left to physician to get order to use  his knee brace. Continue to monitor patient as plan of care.

## 2021-12-23 NOTE — DISCHARGE INSTRUCTIONS
Behavioral Discharge Planning and Instructions    Summary: You were admitted on 12/13/2021  due to Depression.  You were treated by Ulises Perez MD and discharged on 12/24/2021 from [facility] to [facility]    Main Diagnosis:  Bipolar depressed, Chemical dependency, pain    Health Care Follow-up:    PCP/Medication Management Follow-Up:  Jr Giron MD  Address: [address] [place] [id number]  Phone: [phone]  Appointment Date/Time: Monday January 3, 2021 at 11:05am  You are on a restricted provider list. According to you, you will only get referred after you see Dr. Giron. Dr. Giron will refer you to the following providers when you see him on January 3rd. Psychiatry and Psychotherapy, from [facility]: Please ensure to see Dr. Giron as seeing him will unlock the restrictions, and you are highly recommended to begin seeing a psychiatrist and a psychotherapist.    [facility]  Address: [address] [place] [address] [address] [address]  Phone: [phone]    Crisis Residence:   [facility]  Phone: [phone]  Fax: [phone]  Appointment Date/Time: Was to discharge Thursday, December 23 2021 at 4:00pm. This agency reported they are not accepting due to current COVID status.    [facility]  Phone: [phone]  Address:  [facility] [place] [address] [place] [address]    [facility]  Contact: Vicente: [phone] ext [phone].  Appointment Date/Time: This facility reported that they are waiting to hear from your CADI  to clarify funding before they can have you back.  We tried to reach this  again and their offices are closed. Will re-open on Monday.       CADI : Jessica Lawson ([phone]; or [phone]; supervisor: [phone]).   Patient Relations Phone Number for follow up  regarding your lost items: [phone].    Attend all scheduled appointments with your outpatient providers. Call at least 24 hours in advance if you need to reschedule an appointment to ensure continued access to your outpatient providers.     Major Treatments, Procedures and Findings:  You were provided with: a psychiatric assessment, assessed for medical stability, medication evaluation and/or management, group therapy, milieu management and medical interventions    Symptoms to Report: feeling more aggressive, increased confusion, losing more sleep, mood getting worse or thoughts of suicide    Early warning signs can include: increased depression or anxiety sleep disturbances increased thoughts or behaviors of suicide or self-harm  increased unusual thinking, such as paranoia or hearing voices    Safety and Wellness:  Take all medicines as directed.  Make no changes unless your doctor suggests them.      Follow treatment recommendations.  Refrain from alcohol and non-prescribed drugs.  If there is a concern for safety, call 911.    Resources:   Crisis Intervention: 403.936.8204 or 038-704-8845 (TTY: 799.550.7172).  Call anytime for help.  National [place] on Mental Illness (www.[place].ari.org): 663.768.1050 or 146-705-0445.  Alcoholics Anonymous (www.alcoholics-anonymous.org): Check your phone book for your local chapter.  Suicide Awareness Voices of Education (SAVE) (www.save.org): 638-946-ETKE (9318)  National Suicide Prevention Line (www.mentalhealthmn.org): 294-265-TOKV (9005)  Mental Health Consumer/Survivor Network of [place] (www.mhcsn.net): 829.825.8913 or 515-749-3107  Mental Health Association of [place] (www.mentalhealth.org): 905.454.8521 or 092-186-3815  Self- Management and Recovery Training., SMART-- Toll free: 984.543.9725  www.MiTÃº.org  [facility] Crisis Response 025 355-4167  [facility] Crisis Response 221-906-8484  [facility] Crisis Response 212-534-0710  [facility] Crisis  "(COPE) Response - Adult 841 190-2112  [facility] Crisis Response - Adult 762 146-8049  [facility] Rapid Response 025-611-9079  Text 4 Life: txt \"LIFE\" to 71977 for immediate support and crisis intervention  Crisis text line: Text \"[place]\" to 807236. Free, confidential, 24/7.  Crisis Intervention: 798.972.4109 or 876-014-6398. Call anytime for help.   [facility] Mental Health Crisis Team - Child: [phone]  [facility] Children's Mental Health Crisis Response Team - Child: [phone]  [facility] Mental Health Crisis: 1-518.840.5828   [facility] Mental [facility] Crisis Team:  [phone]  [facility] Crisis Response Team (CRT):  [phone] or [phone]     General Medication Instructions:   See your medication sheet(s) for instructions.   Take all medicines as directed.  Make no changes unless your doctor suggests them.   Go to all your doctor visits.  Be sure to have all your required lab tests. This way, your medicines can be refilled on time.  Do not use any drugs not prescribed by your doctor.  Avoid alcohol.    Advance Directives:   Scanned document on file with [place]? No scanned doc  Is document scanned? Pt states no documents  Honoring Choices Your Rights Handout: Informed and given  Was more information offered? Pt declined    The Treatment team has appreciated the opportunity to work with you. If you have any questions or concerns about your recent admission, you can contact the unit which can receive your call 24 hours a day, 7 days a week. They will be able to get in touch with a Provider if needed. The unit number is [phone].        "

## 2021-12-23 NOTE — PLAN OF CARE
Problem: Mood Impairment (Depressive Signs/Symptoms)  Goal: Improved Mood Symptoms (Depressive Signs/Symptoms)  Outcome: Improving      Slept well for 7 hours. Continues to complain about right knee pain. Received scheduled medications.  0630 Nicorette Gum 8 mg given per request  Pt stated that he is discharging today. Did not wear the knee brace when he went to the [facility].

## 2021-12-23 NOTE — PROGRESS NOTES
"SPIRITUAL HEALTH SERVICES  SPIRITUAL ASSESSMENT Progress Note  [facility]     REFERRAL SOURCE: follow up.     Jose M engaged as writer entered the unit, excited to show me the knee brace and how much he appreciated Dr. Perez \"taking the lead\" to make sure it arrived. Jose M stated it is really helping his pain and ability to walk.  Played card game at pt request; facilitated reflective conversation about pt discharge later today, stating he feels \"a bit of trepidation.\"     Plan: No plans for follow up due to pt discharge.    Liss Kerns MDiv  Associate   Pager [phone]  Office [phone]  [facility] remains available 24/7 for emergent requests/referrals, either by having the switchboard page the on-call  or by entering an ASAP/STAT consult in Epic (this will also page the on-call ). Routine Epic consults receive an initial response within 24 hours.    "

## 2021-12-24 ENCOUNTER — DOCUMENTATION ONLY (OUTPATIENT)
Dept: BEHAVIORAL HEALTH | Facility: CLINIC | Age: 51
End: 2021-12-24
Payer: MEDICAID

## 2021-12-24 VITALS
WEIGHT: 305 LBS | TEMPERATURE: 96.6 F | HEART RATE: 84 BPM | SYSTOLIC BLOOD PRESSURE: 104 MMHG | DIASTOLIC BLOOD PRESSURE: 74 MMHG | BODY MASS INDEX: 37.13 KG/M2 | OXYGEN SATURATION: 91 % | RESPIRATION RATE: 16 BRPM

## 2021-12-24 PROCEDURE — 90853 GROUP PSYCHOTHERAPY: CPT

## 2021-12-24 PROCEDURE — 250N000013 HC RX MED GY IP 250 OP 250 PS 637: Performed by: PSYCHIATRY & NEUROLOGY

## 2021-12-24 PROCEDURE — 250N000013 HC RX MED GY IP 250 OP 250 PS 637: Performed by: PHYSICIAN ASSISTANT

## 2021-12-24 RX ADMIN — OXYCODONE HYDROCHLORIDE 20 MG: 5 TABLET ORAL at 13:46

## 2021-12-24 RX ADMIN — NICOTINE POLACRILEX 8 MG: 4 GUM, CHEWING ORAL at 06:46

## 2021-12-24 RX ADMIN — POLYETHYLENE GLYCOL 3350 17 G: 17 POWDER, FOR SOLUTION ORAL at 08:41

## 2021-12-24 RX ADMIN — NICOTINE POLACRILEX 8 MG: 4 GUM, CHEWING ORAL at 13:20

## 2021-12-24 RX ADMIN — LEVOTHYROXINE SODIUM 75 MCG: 75 TABLET ORAL at 06:46

## 2021-12-24 RX ADMIN — ACAMPROSATE CALCIUM 666 MG: 333 TABLET, DELAYED RELEASE ORAL at 08:36

## 2021-12-24 RX ADMIN — TOPIRAMATE 50 MG: 50 TABLET ORAL at 08:36

## 2021-12-24 RX ADMIN — PREGABALIN 300 MG: 100 CAPSULE ORAL at 13:46

## 2021-12-24 RX ADMIN — Medication 25 MG: at 08:35

## 2021-12-24 RX ADMIN — PREGABALIN 300 MG: 100 CAPSULE ORAL at 08:35

## 2021-12-24 RX ADMIN — ACAMPROSATE CALCIUM 666 MG: 333 TABLET, DELAYED RELEASE ORAL at 13:46

## 2021-12-24 RX ADMIN — PANTOPRAZOLE SODIUM 40 MG: 40 TABLET, DELAYED RELEASE ORAL at 06:47

## 2021-12-24 RX ADMIN — NICOTINE POLACRILEX 8 MG: 4 GUM, CHEWING ORAL at 12:00

## 2021-12-24 RX ADMIN — OXYCODONE HYDROCHLORIDE 20 MG: 5 TABLET ORAL at 09:38

## 2021-12-24 RX ADMIN — Medication 25 MG: at 13:46

## 2021-12-24 RX ADMIN — FOLIC ACID 1000 MCG: 1 TABLET ORAL at 08:36

## 2021-12-24 RX ADMIN — Medication 400 MG: at 08:36

## 2021-12-24 RX ADMIN — NICOTINE POLACRILEX 8 MG: 4 GUM, CHEWING ORAL at 09:37

## 2021-12-24 RX ADMIN — MULTIPLE VITAMINS W/ MINERALS TAB 1 TABLET: TAB at 08:36

## 2021-12-24 RX ADMIN — ACETAMINOPHEN 975 MG: 325 TABLET, FILM COATED ORAL at 06:47

## 2021-12-24 RX ADMIN — OXYCODONE HYDROCHLORIDE 20 MG: 5 TABLET ORAL at 06:46

## 2021-12-24 RX ADMIN — NICOTINE POLACRILEX 8 MG: 4 GUM, CHEWING ORAL at 08:36

## 2021-12-24 RX ADMIN — ACETAMINOPHEN 975 MG: 325 TABLET, FILM COATED ORAL at 13:46

## 2021-12-24 RX ADMIN — TIZANIDINE 4 MG: 2 TABLET ORAL at 12:01

## 2021-12-24 ASSESSMENT — ACTIVITIES OF DAILY LIVING (ADL)
DRESS: INDEPENDENT
LAUNDRY: UNABLE TO COMPLETE
HYGIENE/GROOMING: INDEPENDENT
ORAL_HYGIENE: INDEPENDENT

## 2021-12-24 NOTE — PROGRESS NOTES
PSYCHIATRIC FOLLOW-UP    Admission Date: 12/13/2021  Date of Service: 12/24/2021    The patient was seen, his chart reviewed, his case discussed with staff. He continues with improvement. His mood is more stable. His night sleep improved. He has been feeling better subjectively. He was seen by Dr. Perez yesterday and was prepared for discharge today    This is a 51 year old single white male with history of bipolar disorder, TBI, substance abuse and chronic pain who was transferred to this hospital from the medical unit of [facility] where he was treated for COVID pneumonia. He was admitted to [facility] and was seen by Dr. Eaton for initial assessment. Initially he presented to [facility] ED on 11/13/21 after a suicide attempt. Reportedly he overdosed on 5 Oxycodone tablets, 22 300 mg Seroquel tablets and 4 suboxone 8-2 films while drinking 1 gallon of vodka. He was medically cleared and admitted to [facility] from 11/19-11/22 and then transferred to medical unit at [facility] for Covid pneumonia and respiratory failure with hypoxia. Apparently, he received an Ativan taper at [facility] due to ongoing anxiety despite completing detox from alcohol earlier in admissions. PTA medications have been resumed. I saw the patient for the first time on 12/15/21. He appeared to be more hypomanic than depressed with reduced need for sleep and pain issues as the main concern. I increased his Seroquel and started him on Topamax.    MEDICATIONS, psychotropic   Seroquel 600 mg at bedtime   Suboxone 8-2 mg per film, 1 film TID   Lyrica 200 mg TID  Topamax 50 mg BID   Prazosin 1 mg at bedtime   Gabapentin 100 mg TID PRN   Melatonin 3 mg HS PRN   Zyprexa 5 mg Q6H PRN     LABS: his SARS CoV2 PCR was positive yesterday    VS: Pulse - 84, BP - 104/74, T - 96.6, Resp - 16, SpO2 - 91%    MENTAL STATUS EXAMINATION   Revealed a normally built and normally developed, tall 51-year-old white male appearing his stated  age. He was alert and oriented X 3. His speech was coherent, goal related and somewhat pressured. He appeared to be less anxious. His affect was nearly euthymic. He denied SI or HI. No overt psychotic features were noted. He had some insight into his problems and his judgement was improving.     DIAGNOSTIC IMPRESSION   Bipolar Disorder, last episode depressed   Anxiety Disorder NOS   BPD by history   Alcohol Use Disorder   Polysubstance Abuse by history     PLAN: Will discharge the patient today on current medications. His Topamax should be further increased by his outpatient psychiatrist to 100 mg BID    Bernard Bustos MD

## 2021-12-24 NOTE — PROGRESS NOTES
" 12/23/21 1900   Groups   Details Psychotherapy   Number of patients attending the group:  7 participants for 2 groups  Group Length:  2 Hours attended about 30 min    Group Therapy Type: Psychotherapy    Summary of Group / Topics Discussed:      The  Psychotherapy group goal is to promote insight to positive choice and change. Group processing is within a supportive and safe environment. Patients will process emotions using verbal group and expressive psychotherapy interventions including visual art/writing interventions.    Group interventions support patients by: fostering self awareness, creative self expression and communication/social skills and supports    Modalities to reach these goals include: Narrative psychology and Expressive Arts Therapies    Subjective -patient report of mood today- trepidacious    Objective/ Intervention- Goal of group and Therapeutic modality utilized- Verbal processing, writing \" I am from\" poems    Group Response- a couple fully engaged and a lot of restlessness in the group,in and out of group    Patient Response-Pt talked about it being a 50/50 chance he could transition out of the hospital and he said that the hospital lost his clothes and wallet etc. He focused for a partial group hour.      Carlos Walter, YADIRAFT, ATR-BC            "

## 2021-12-24 NOTE — PLAN OF CARE
"  Problem: Activity and Energy Impairment (Depressive Signs/Symptoms)  Goal: Optimized Energy Level (Depressive Signs/Symptoms)  Outcome: Improving     Problem: Suicidal Behavior  Goal: Suicidal Behavior is Absent or Managed  Outcome: Improving  Pt was out in the milieu, social with staff and select peers. Pt endorsed anxiety and rated at 8/10, depression 6/10. Pt endorsed being worried about discharge plan. Pt denies SI/SIB/HI or wishing to be death at this time. Pt reports being hopeful, stating \"Now that I know I will be getting a surgery that might help reduce my pain, at least I have that goal to hold onto\".   COVID swap came back positive. IM notified and per IM since Pt was treated for COVID  Last month and is asymptomatic, it will be treated as COVID recovered.   Discharge still pending (See 's note)  "

## 2021-12-24 NOTE — DISCHARGE SUMMARY
51 year man admitted for suicidal actions attributed to pain.  Right knee joint is in need of replacement but delay is needed due to recent COVID and steroids.    From the H&P:    Date of Admission:                  12/13/2021          Assessment:   This patient is a 51 year old male with history of depression, BPD, TBI, substance use and chronic pain who presented as transfer from medical unit in context of presenting to [facility] ED for suicide attempt via overdose 11/13 on 5 tablets oxycodone, 22 quetiapine 300mg tabs and 4 suboxone 8-2 films while drinking 1 gallon of vodka. He was medically cleared and admitted to [facility] psychiatry from 11/19-11/22 and then transferred to medical unit at [place] for Covid pneumonia and respiratory failure with hypoxia. He is now Covid recovered and transferred to  as voluntary patient for ongoing psychiatric stabilization due to continued SI and depression, it appears he was receiving Ativan taper at [place] due to ongoing anxiety despite completing detox from alcohol earlier in admissions. PTA medications have been continued, Internal medicine consult placed for medical co-management including abnormal labs. Patient continues to endorse SI, denies plan or intent on unit and reports feeling safe on unit. He notes his chronic pain is contributing factor to his suicidality.      Inpatient psychiatric hospitalization is warranted at this time for safety, stabilization, and possible adjustment in medications.     Hospital course:  Knee brace was ordered and applied.  Suboxone was stopped and Oxycodone was started.   Seroquel for bipolar was increased.He was stabilized to continue opiate pain relief until surgery.     Patient Active Problem List   Diagnosis     Post concussive encephalopathy     H/O shoulder surgery     Alcoholism in remission (H)     Bilateral ACL tear     Chronic back pain     Social anxiety disorder     Alcohol withdrawal syndrome with complication, with  unspecified complication (H)     Rib fracture     Alcohol withdrawal (H)     Alcohol dependence (H)     LFT elevation     Laceration of left hand without foreign body, initial encounter     Suicidal ideation     Intentional drug overdose, initial encounter (H)     Alcoholic intoxication with complication (H)     Severe bipolar I disorder, current or most recent episode depressed, with mixed features (H)     Medication overdose, intentional self-harm, subsequent encounter     Opioid dependence on agonist therapy (H)     2019 novel coronavirus disease (COVID-19)     Pneumonia due to COVID-19 virus     Bipolar disorder (H)     Imp:  Bipolar 2 illness, depressed, severe, not psychotic    Plan:  Continue Opiates until surgery       Review of your medicines      START taking      Dose / Directions   albuterol 108 (90 Base) MCG/ACT inhaler  Commonly known as: PROAIR HFA/PROVENTIL HFA/VENTOLIN HFA  Used for: Pneumonia due to COVID-19 virus      Dose: 2 puff  Inhale 2 puffs into the lungs every 4 hours as needed for wheezing or shortness of breath / dyspnea  Quantity: 18 g  Refills: 1     Lidocaine 4 % Patch  Commonly known as: LIDOCARE  Used for: Primary osteoarthritis of right knee      Dose: 1 patch  Place 1 patch onto the skin every 24 hours To prevent lidocaine toxicity, patient should be patch free for 12 hrs daily.  Quantity: 30 patch  Refills: 1     oxyCODONE HCl 20 MG Tabs immediate release tablet  Commonly known as: ROXICODONE  Used for: Primary osteoarthritis of right knee      Dose: 20 mg  Take 20 mg by mouth 5 times daily  Quantity: 150 tablet  Refills: 0     senna-docusate 8.6-50 MG tablet  Commonly known as: SENOKOT-S/PERICOLACE  Used for: Constipation, unspecified constipation type      Dose: 1 tablet  Take 1 tablet by mouth At Bedtime  Quantity: 30 tablet  Refills: 1     topiramate 50 MG tablet  Commonly known as: TOPAMAX  Used for: Bipolar disorder, current episode depressed, severe, without psychotic  features (H), Chronic bilateral low back pain with sciatica, sciatica laterality unspecified      Dose: 50 mg  Take 1 tablet (50 mg) by mouth 2 times daily  Quantity: 60 tablet  Refills: 1        CONTINUE these medicines which may have CHANGED, or have new prescriptions. If we are uncertain of the size of tablets/capsules you have at home, strength may be listed as something that might have changed.      Dose / Directions   pregabalin 300 MG capsule  Commonly known as: LYRICA  This may have changed:     medication strength    how much to take  Used for: H/O shoulder surgery      Dose: 300 mg  Take 1 capsule (300 mg) by mouth 3 times daily  Quantity: 90 capsule  Refills: 0     QUEtiapine 300 MG tablet  Commonly known as: SEROquel  This may have changed: how much to take  Used for: Bipolar disorder, current episode depressed, severe, without psychotic features (H)      Dose: 600 mg  Take 2 tablets (600 mg) by mouth At Bedtime  Quantity: 60 tablet  Refills: 1        CONTINUE these medicines which have NOT CHANGED      Dose / Directions   acamprosate 333 MG EC tablet  Commonly known as: CAMPRAL  Indication: Abuse or Misuse of Alcohol  Used for: Alcohol withdrawal syndrome with complication, with unspecified complication (H)      Dose: 666 mg  Take 2 tablets (666 mg) by mouth 3 times daily  Quantity: 180 tablet  Refills: 1     acetaminophen 325 MG tablet  Commonly known as: TYLENOL  Used for: Chronic bilateral low back pain with sciatica, sciatica laterality unspecified      Dose: 975 mg  Take 3 tablets (975 mg) by mouth every 6 hours as needed for mild pain, fever or headaches (to moderate pain)  Quantity: 100 tablet  Refills: 3     amLODIPine 5 MG tablet  Commonly known as: NORVASC  Used for: Essential hypertension      Dose: 5 mg  Take 1 tablet (5 mg) by mouth daily  Quantity: 30 tablet  Refills: 1     diclofenac 1 % topical gel  Commonly known as: VOLTAREN  Used for: Alcohol dependence with intoxication with  complication (H)      Dose: 2 g  Apply 2 g topically 4 times daily as needed for moderate pain  Quantity: 150 g  Refills: 1     folic acid 1 MG tablet  Commonly known as: FOLVITE  Used for: Alcoholism in remission (H)      Dose: 1,000 mcg  Take 1 tablet (1,000 mcg) by mouth daily  Quantity: 90 tablet  Refills: 1     levothyroxine 75 MCG tablet  Commonly known as: SYNTHROID/LEVOTHROID  Used for: Hypothyroidism, unspecified type      Dose: 75 mcg  Take 1 tablet (75 mcg) by mouth every morning (before breakfast)  Quantity: 30 tablet  Refills: 1     melatonin 1 MG Tabs tablet      Dose: 1 mg  Take 1 mg by mouth nightly as needed for sleep  Refills: 0     multivitamin w/minerals tablet  Used for: Alcohol dependence with intoxication with complication (H)      Dose: 1 tablet  Take 1 tablet by mouth daily  Quantity: 30 tablet  Refills: 1     nicotine polacrilex 4 MG gum  Commonly known as: NICORETTE  Used for: Alcohol dependence with intoxication with complication (H)      Dose: 4-8 mg  Place 1-2 each (4-8 mg) inside cheek every hour as needed for other (nicotine withdrawal symptoms)  Quantity: 100 each  Refills: 1     pantoprazole 40 MG EC tablet  Commonly known as: PROTONIX  Used for: Gastroesophageal reflux disease with esophagitis, unspecified whether hemorrhage      Dose: 40 mg  Take 1 tablet (40 mg) by mouth every morning (before breakfast)  Quantity: 30 tablet  Refills: 1     polyethylene glycol 17 GM/Dose powder  Commonly known as: MIRALAX  Used for: Constipation, unspecified constipation type      Dose: 17 g  Take 17 g by mouth daily  Quantity: 510 g  Refills: 1     prazosin 1 MG capsule  Commonly known as: MINIPRESS  Used for: Alcohol withdrawal syndrome with complication, with unspecified complication (H)      Dose: 1 mg  Take 1 capsule (1 mg) by mouth At Bedtime  Quantity: 30 capsule  Refills: 0     sennosides 8.6 MG tablet  Commonly known as: SENOKOT  Used for: Constipation, unspecified constipation type       Dose: 1-2 tablet  Take 1-2 tablets by mouth 2 times daily as needed for constipation  Quantity: 60 tablet  Refills: 1     tiZANidine 4 MG tablet  Commonly known as: ZANAFLEX  Used for: Chronic bilateral low back pain with sciatica, sciatica laterality unspecified      Dose: 4 mg  Take 1 tablet (4 mg) by mouth every 8 hours as needed for other (chronic back pain)  Quantity: 40 tablet  Refills: 1        STOP taking    apixaban ANTICOAGULANT 2.5 MG tablet  Commonly known as: ELIQUIS        ARIPiprazole 5 MG tablet  Commonly known as: ABILIFY        buprenorphine HCl-naloxone HCl 8-2 MG per film  Commonly known as: SUBOXONE        diphenhydrAMINE 25 MG tablet  Commonly known as: BENADRYL        gabapentin 100 MG capsule  Commonly known as: NEURONTIN        LORazepam 0.5 MG tablet  Commonly known as: ATIVAN        magnesium oxide 400 MG tablet  Commonly known as: MAG-OX        miconazole with skin protectant 2 % Crea cream        OLANZapine zydis 5 MG ODT  Commonly known as: zyPREXA        ondansetron 4 MG ODT tab  Commonly known as: ZOFRAN-ODT              Where to get your medicines      These medications were sent to [facility] [place], [place] - [address]  [address] [address]    Phone: [phone]     acamprosate 333 MG EC tablet    acetaminophen 325 MG tablet    albuterol 108 (90 Base) MCG/ACT inhaler    amLODIPine 5 MG tablet    diclofenac 1 % topical gel    folic acid 1 MG tablet    levothyroxine 75 MCG tablet    Lidocaine 4 % Patch    multivitamin w/minerals tablet    nicotine polacrilex 4 MG gum    pantoprazole 40 MG EC tablet    polyethylene glycol 17 GM/Dose powder    prazosin 1 MG capsule    QUEtiapine 300 MG tablet    senna-docusate 8.6-50 MG tablet    sennosides 8.6 MG tablet    tiZANidine 4 MG tablet    topiramate 50 MG tablet     Some of these will need a paper prescription and others can be bought over the counter. Ask your nurse if you have questions.     Bring a paper prescription for each of these medications    oxyCODONE HCl 20 MG Tabs immediate release tablet    pregabalin 300 MG capsule       12/23: General appearance: good  Alert.   Affect: good  Mood: good  Speech:  normal.   Eye contact:  good.    Psychomotor behavior: normal  Gait: normal.    Abnormal movements: none  Delusions: none  Hallucinations:  none  Thoughts: logical  Associations: intact  Judgement: good  Insight: good  Cognitions: intact in conversation  Memory:  intact in conversation  Orientation: normal    Not suicidal.    Recent Results (from the past 336 hour(s))   Creatinine    Collection Time: 12/10/21 10:53 AM   Result Value Ref Range    Creatinine 0.72 0.70 - 1.30 mg/dL    GFR Estimate >90 >60 mL/min/1.73m2   MRSA MSSA PCR, Nasal Swab    Collection Time: 12/11/21 10:20 AM    Specimen: Nares, Bilateral; Swab   Result Value Ref Range    MRSA Target DNA Positive (A) Negative    SA Target DNA Positive    MRSA Culture Reflex    Collection Time: 12/11/21 10:20 AM    Specimen: Nares, Bilateral; Swab   Result Value Ref Range    Culture Staphylococcus aureus MRSA (A)        Susceptibility    Staphylococcus aureus MRSA - ARNULFO*     Oxacillin >=4.0 Resistant ug/mL     Gentamicin <=0.5 Susceptible ug/mL     Erythromycin <=0.25 Susceptible ug/mL     Clindamycin <=0.25 Susceptible ug/mL     Linezolid 2.0 Susceptible ug/mL     Vancomycin <=0.5 Susceptible ug/mL     Tetracycline <=1.0 Susceptible ug/mL     Trimethoprim/Sulfamethoxazole <=0.5/9.5 Susceptible ug/mL     * MRSA requires contact precautions.   Comprehensive metabolic panel    Collection Time: 12/11/21  6:30 PM   Result Value Ref Range    Sodium 140 136 - 145 mmol/L    Potassium 4.2 3.5 - 5.0 mmol/L    Chloride 106 98 - 107 mmol/L    Carbon Dioxide (CO2) 27 22 - 31 mmol/L    Anion Gap 7 5 - 18 mmol/L    Urea Nitrogen 22 8 - 22 mg/dL    Creatinine 0.74 0.70 - 1.30 mg/dL    Calcium 8.8 8.5 - 10.5 mg/dL    Glucose 100 70 - 125 mg/dL    Alkaline  Phosphatase 69 45 - 120 U/L    AST 88 (H) 0 - 40 U/L     (H) 0 - 45 U/L    Protein Total 7.3 6.0 - 8.0 g/dL    Albumin 3.4 (L) 3.5 - 5.0 g/dL    Bilirubin Total 0.5 0.0 - 1.0 mg/dL    GFR Estimate >90 >60 mL/min/1.73m2   CBC with platelets and differential    Collection Time: 12/11/21  6:30 PM   Result Value Ref Range    WBC Count 5.5 4.0 - 11.0 10e3/uL    RBC Count 4.02 (L) 4.40 - 5.90 10e6/uL    Hemoglobin 12.0 (L) 13.3 - 17.7 g/dL    Hematocrit 38.0 (L) 40.0 - 53.0 %    MCV 95 78 - 100 fL    MCH 29.9 26.5 - 33.0 pg    MCHC 31.6 31.5 - 36.5 g/dL    RDW 15.1 (H) 10.0 - 15.0 %    Platelet Count 265 150 - 450 10e3/uL    % Neutrophils 48 %    % Lymphocytes 36 %    % Monocytes 10 %    % Eosinophils 5 %    % Basophils 1 %    % Immature Granulocytes 0 %    NRBCs per 100 WBC 0 <1 /100    Absolute Neutrophils 2.7 1.6 - 8.3 10e3/uL    Absolute Lymphocytes 2.0 0.8 - 5.3 10e3/uL    Absolute Monocytes 0.5 0.0 - 1.3 10e3/uL    Absolute Eosinophils 0.3 0.0 - 0.7 10e3/uL    Absolute Basophils 0.0 0.0 - 0.2 10e3/uL    Absolute Immature Granulocytes 0.0 <=0.4 10e3/uL    Absolute NRBCs 0.0 10e3/uL   Comprehensive metabolic panel    Collection Time: 12/14/21  9:17 AM   Result Value Ref Range    Sodium 141 133 - 144 mmol/L    Potassium 3.6 3.4 - 5.3 mmol/L    Chloride 108 94 - 109 mmol/L    Carbon Dioxide (CO2) 26 20 - 32 mmol/L    Anion Gap 7 3 - 14 mmol/L    Urea Nitrogen 19 7 - 30 mg/dL    Creatinine 0.54 (L) 0.66 - 1.25 mg/dL    Calcium 8.7 8.5 - 10.1 mg/dL    Glucose 128 (H) 70 - 99 mg/dL    Alkaline Phosphatase 94 40 - 150 U/L    AST 57 (H) 0 - 45 U/L     (H) 0 - 70 U/L    Protein Total 7.7 6.8 - 8.8 g/dL    Albumin 3.0 (L) 3.4 - 5.0 g/dL    Bilirubin Total 0.4 0.2 - 1.3 mg/dL    GFR Estimate >90 >60 mL/min/1.73m2   CBC with platelets and differential    Collection Time: 12/14/21  9:17 AM   Result Value Ref Range    WBC Count 5.1 4.0 - 11.0 10e3/uL    RBC Count 4.46 4.40 - 5.90 10e6/uL    Hemoglobin 13.4 13.3 -  17.7 g/dL    Hematocrit 40.8 40.0 - 53.0 %    MCV 92 78 - 100 fL    MCH 30.0 26.5 - 33.0 pg    MCHC 32.8 31.5 - 36.5 g/dL    RDW 15.1 (H) 10.0 - 15.0 %    Platelet Count 265 150 - 450 10e3/uL    % Neutrophils 47 %    % Lymphocytes 38 %    % Monocytes 8 %    % Eosinophils 7 %    % Basophils 0 %    % Immature Granulocytes 0 %    NRBCs per 100 WBC 0 <1 /100    Absolute Neutrophils 2.4 1.6 - 8.3 10e3/uL    Absolute Lymphocytes 1.9 0.8 - 5.3 10e3/uL    Absolute Monocytes 0.4 0.0 - 1.3 10e3/uL    Absolute Eosinophils 0.3 0.0 - 0.7 10e3/uL    Absolute Basophils 0.0 0.0 - 0.2 10e3/uL    Absolute Immature Granulocytes 0.0 <=0.4 10e3/uL    Absolute NRBCs 0.0 10e3/uL   Magnesium    Collection Time: 12/14/21  9:17 AM   Result Value Ref Range    Magnesium 2.1 1.6 - 2.3 mg/dL   TSH with free T4 reflex    Collection Time: 12/14/21  9:17 AM   Result Value Ref Range    TSH 3.04 0.40 - 4.00 mU/L   Hepatitis B surface antigen    Collection Time: 12/14/21  6:41 PM   Result Value Ref Range    Hepatitis B Surface Antigen Nonreactive Nonreactive   Hepatitis B Surface Antibody    Collection Time: 12/14/21  6:41 PM   Result Value Ref Range    Hepatitis B Surface Antibody 7.01 <8.00 m[IU]/mL   Hepatitis C Screen Reflex to HCV RNA Quant and Genotype    Collection Time: 12/14/21  6:41 PM   Result Value Ref Range    Hepatitis C Antibody Reactive (A) Nonreactive   Hepatitis C RNA, Quantitative by PCR with Confirmatory Reflex to Genotyping    Collection Time: 12/14/21  6:41 PM   Result Value Ref Range    Hepatitis C log 6.2     Hepatitis C RNA IU/mL, Instrument 1,755,607 (H) <1 IU/mL   Hepatitis C High Resolution Genotype    Collection Time: 12/14/21  6:41 PM   Result Value Ref Range    Hepatitis C High Resolution 1a    INR    Collection Time: 12/15/21  9:04 AM   Result Value Ref Range    INR 0.89 0.86 - 1.14   Comprehensive metabolic panel    Collection Time: 12/15/21  9:04 AM   Result Value Ref Range    Sodium 141 133 - 144 mmol/L    Potassium  3.6 3.4 - 5.3 mmol/L    Chloride 108 94 - 109 mmol/L    Carbon Dioxide (CO2) 27 20 - 32 mmol/L    Anion Gap 6 3 - 14 mmol/L    Urea Nitrogen 21 7 - 30 mg/dL    Creatinine 0.62 (L) 0.66 - 1.25 mg/dL    Calcium 8.8 8.5 - 10.1 mg/dL    Glucose 153 (H) 70 - 99 mg/dL    Alkaline Phosphatase 79 40 - 150 U/L    AST 54 (H) 0 - 45 U/L     (H) 0 - 70 U/L    Protein Total 7.4 6.8 - 8.8 g/dL    Albumin 3.2 (L) 3.4 - 5.0 g/dL    Bilirubin Total 0.3 0.2 - 1.3 mg/dL    GFR Estimate >90 >60 mL/min/1.73m2   CBC with platelets    Collection Time: 12/15/21  9:04 AM   Result Value Ref Range    WBC Count 5.1 4.0 - 11.0 10e3/uL    RBC Count 4.26 (L) 4.40 - 5.90 10e6/uL    Hemoglobin 12.5 (L) 13.3 - 17.7 g/dL    Hematocrit 39.2 (L) 40.0 - 53.0 %    MCV 92 78 - 100 fL    MCH 29.3 26.5 - 33.0 pg    MCHC 31.9 31.5 - 36.5 g/dL    RDW 15.3 (H) 10.0 - 15.0 %    Platelet Count 268 150 - 450 10e3/uL   CBC with platelets    Collection Time: 12/17/21  8:01 AM   Result Value Ref Range    WBC Count 4.5 4.0 - 11.0 10e3/uL    RBC Count 4.23 (L) 4.40 - 5.90 10e6/uL    Hemoglobin 12.5 (L) 13.3 - 17.7 g/dL    Hematocrit 39.1 (L) 40.0 - 53.0 %    MCV 92 78 - 100 fL    MCH 29.6 26.5 - 33.0 pg    MCHC 32.0 31.5 - 36.5 g/dL    RDW 15.4 (H) 10.0 - 15.0 %    Platelet Count 310 150 - 450 10e3/uL   Comprehensive metabolic panel    Collection Time: 12/17/21  8:01 AM   Result Value Ref Range    Sodium 142 133 - 144 mmol/L    Potassium 4.1 3.4 - 5.3 mmol/L    Chloride 110 (H) 94 - 109 mmol/L    Carbon Dioxide (CO2) 27 20 - 32 mmol/L    Anion Gap 5 3 - 14 mmol/L    Urea Nitrogen 28 7 - 30 mg/dL    Creatinine 0.65 (L) 0.66 - 1.25 mg/dL    Calcium 8.9 8.5 - 10.1 mg/dL    Glucose 108 (H) 70 - 99 mg/dL    Alkaline Phosphatase 76 40 - 150 U/L    AST 62 (H) 0 - 45 U/L    ALT 99 (H) 0 - 70 U/L    Protein Total 7.3 6.8 - 8.8 g/dL    Albumin 3.1 (L) 3.4 - 5.0 g/dL    Bilirubin Total 0.3 0.2 - 1.3 mg/dL    GFR Estimate >90 >60 mL/min/1.73m2   Asymptomatic COVID-19 Virus  (Coronavirus) by PCR Nasopharyngeal    Collection Time: 12/23/21 10:36 AM    Specimen: Nasopharyngeal; Swab   Result Value Ref Range    SARS CoV2 PCR Positive (A) Negative, Testing sent to reference lab. Results will be returned via unsolicited result

## 2021-12-24 NOTE — PROGRESS NOTES
Prior Authorization **INITIATED**    Medication: Oxycodone 20mg tabs  Insurance Company: [place] Medicaid ([facility]) - Phone [phone] Fax [phone]  Pharmacy Filling the Rx: [facility] - [place], [place] - [address]  Filling Pharmacy Phone: [phone]  Filling Pharmacy Fax: [phone]  Start Date: 12/24/2021  Reference #: CoverMyMeds Key: [id number]  Comments:  High dose opioids require PA through plan. On Suboxone 8-2mg SL films TID PTA & during admission.      Chinyere Galloway CPhT  [place] Discharge Pharmacy Liaison  Pronouns: She/Her/Hers    [facility]  [address]  [address], [place] [address]   [email or web address]  [email or web address]   Phone: [phone]  Pager: [phone]  Fax: [phone]

## 2021-12-24 NOTE — PLAN OF CARE
Pt was discharge to shelter via  taxi after being treated for suicide attempt under care of Dr. Perez.    Denies SI, SIB, HI, and hallucinations.   Mood is calm, problem solving   Future plans are  clothes and go to shelter.     Reviewed AVS.   Notified of folllow up appointments. Pt is aware he needs to see his PCP to get a referral to therapy.   Medication schedule and 30 supply of meds sent with, except 7 day supply of oxycodone.    Pt is aware they need to see their prescribing provider within 30 days to have prescriptions refilled.   Medication education complete.  All questions encouraged and answered.       Pt reports his clothes and wallet never made if from the transferring hospital. He was given extra clothes and shoes.            Concerns addressed at time of discharge include: pharmacist concern about him being discharged as self administering meds on oxycodone against Pain Management recommendations. Pt called Dr Perez. Dr Simon called pharmacist to approve medication. Dr Perez spoke with writer and stated he opposed Pain Management's recommendation and he approved pt discharging with a 7 day supply of oxycodone to a shelter.     Survey given: Sent Home to Complete

## 2021-12-24 NOTE — PLAN OF CARE
CTC: Spoke with a nurse at Dr. Giron office and she reported that this patient does not need his restrictions lifted since he is in the hospital. Staff reported she will look into this and follow up.    Update: RN verified and found this pt's medications are on the unit already.      Update: Met with this patient. He reported he wants to discharge. Informed him that places being contacted are rejecting his referral because of his positive COVID test. He reported he wants to be discharged to a shelter.

## 2021-12-25 NOTE — PROGRESS NOTES
" 12/24/21 1900   Groups   Details Psychotherapy   Number of patients attending the group:  7 throughout the day  Group Length:  3 Hours- pt attended 2 group hours     Group Therapy Type: Psychotherapy     Summary of Group / Topics Discussed:        The  Psychotherapy group goal is to promote insight to positive choice and change. Group processing is within a supportive and safe environment. Patients will process emotions using verbal group and expressive psychotherapy interventions including visual art/writing interventions.     Group interventions support patients by: creative self expression, communication/social skills and supports, hope/optimism and mental health management     Modalities to reach these goals include: positive/solution focused psychology , Narrative psychology, Expressive Arts Therapies and Mindfulness practices     Subjective -patient report of mood today- \"leaving today to a shelter, I've never been to a shelter.\"     Objective/ Intervention- Goal of group and Therapeutic modality utilized- Verbal Processing and holiday activities and memories, watercolor snowflakes- metaphor uniqueness of each individual     Group Response- engaged through the day, some members in and out and discharging on the holiday     Patient Response-Pt was engaged, pleasant and cooperative. He was quite social and shared his story about his mom having cancer and having lost a daughter. He was supportive to peers when they were speaking also.     Carlos Walter, HENRY, ATR-BC             "

## 2021-12-28 NOTE — PROGRESS NOTES
Prior Authorization **APPROVED**    Authorization Effective Date: 12/23/2021  Authorization Expiration Date: 5/29/2022  Medication: Oxycodone 20mg tabs **APPROVED**  Approved Dose/Quantity: Up to 5 tablets daily  Reference #: Lance Key: [id number], HID#: [id number], PA#: [id number] via NPI [id number] (NDC: [address]-11)  Insurance Company: [place] Medicaid ([facility]) - Phone [phone] Fax [phone]  Expected CoPay: $0.00     CoPay Card Available: No    Foundation Assistance Needed: n/a  Which Pharmacy is filling the prescription (Not needed for infusion/clinic administered): [facility] [place], [place] - [address]  Pharmacy Notified: Yes  Patient Notified: Yes  Comments:  High dose opioids require PA through plan. On Suboxone 8-2mg SL films TID PTA & during admission.    Note: This Prior Authorization is pharmacy-specific and NDC-specific. Current PA is approved through [facility]. If dispensing pharmacy or NDC changes from this initial approval, dispensing pharmacy can call [facility] at [phone] to update this information.        Chinyere Galloway [facility] Liaison  Pronouns: She/Her/Hers    [facility]  [address]  [address], [place] [address]   [email or web address]  [email or web address]   Phone: [phone]  Pager: [phone]  Fax: [phone]

## 2022-01-07 ENCOUNTER — TELEPHONE (OUTPATIENT)
Dept: BEHAVIORAL HEALTH | Facility: CLINIC | Age: 52
End: 2022-01-07

## 2022-01-07 ENCOUNTER — HOSPITAL ENCOUNTER (EMERGENCY)
Facility: CLINIC | Age: 52
Discharge: PSYCHIATRIC OR SUBSTANCE USE FACILITY | End: 2022-01-08
Attending: EMERGENCY MEDICINE | Admitting: EMERGENCY MEDICINE
Payer: MEDICAID

## 2022-01-07 DIAGNOSIS — R45.851 SUICIDAL IDEATION: ICD-10-CM

## 2022-01-07 DIAGNOSIS — F41.1 GAD (GENERALIZED ANXIETY DISORDER): ICD-10-CM

## 2022-01-07 DIAGNOSIS — F10.930 ALCOHOL WITHDRAWAL SYNDROME WITHOUT COMPLICATION (H): ICD-10-CM

## 2022-01-07 DIAGNOSIS — G89.29 OTHER CHRONIC PAIN: ICD-10-CM

## 2022-01-07 DIAGNOSIS — F10.20 ALCOHOLISM (H): ICD-10-CM

## 2022-01-07 LAB
ALBUMIN SERPL-MCNC: 4.3 G/DL (ref 3.4–5)
ALP SERPL-CCNC: 72 U/L (ref 40–150)
ALT SERPL W P-5'-P-CCNC: 59 U/L (ref 0–70)
ANION GAP SERPL CALCULATED.3IONS-SCNC: 5 MMOL/L (ref 3–14)
AST SERPL W P-5'-P-CCNC: 32 U/L (ref 0–45)
BASOPHILS # BLD AUTO: 0 10E3/UL (ref 0–0.2)
BASOPHILS NFR BLD AUTO: 1 %
BILIRUB SERPL-MCNC: 0.4 MG/DL (ref 0.2–1.3)
BUN SERPL-MCNC: 10 MG/DL (ref 7–30)
CALCIUM SERPL-MCNC: 9.1 MG/DL (ref 8.5–10.1)
CHLORIDE BLD-SCNC: 109 MMOL/L (ref 94–109)
CO2 SERPL-SCNC: 29 MMOL/L (ref 20–32)
CREAT SERPL-MCNC: 0.61 MG/DL (ref 0.66–1.25)
EOSINOPHIL # BLD AUTO: 0 10E3/UL (ref 0–0.7)
EOSINOPHIL NFR BLD AUTO: 0 %
ERYTHROCYTE [DISTWIDTH] IN BLOOD BY AUTOMATED COUNT: 14.8 % (ref 10–15)
ETHANOL SERPL-MCNC: 0.18 G/DL
GFR SERPL CREATININE-BSD FRML MDRD: >90 ML/MIN/1.73M2
GLUCOSE BLD-MCNC: 93 MG/DL (ref 70–99)
HCT VFR BLD AUTO: 45.8 % (ref 40–53)
HGB BLD-MCNC: 15.2 G/DL (ref 13.3–17.7)
HOLD SPECIMEN: NORMAL
HOLD SPECIMEN: NORMAL
IMM GRANULOCYTES # BLD: 0 10E3/UL
IMM GRANULOCYTES NFR BLD: 0 %
LYMPHOCYTES # BLD AUTO: 2.9 10E3/UL (ref 0.8–5.3)
LYMPHOCYTES NFR BLD AUTO: 37 %
MAGNESIUM SERPL-MCNC: 2.3 MG/DL (ref 1.6–2.3)
MCH RBC QN AUTO: 29.5 PG (ref 26.5–33)
MCHC RBC AUTO-ENTMCNC: 33.2 G/DL (ref 31.5–36.5)
MCV RBC AUTO: 89 FL (ref 78–100)
MONOCYTES # BLD AUTO: 0.4 10E3/UL (ref 0–1.3)
MONOCYTES NFR BLD AUTO: 5 %
NEUTROPHILS # BLD AUTO: 4.4 10E3/UL (ref 1.6–8.3)
NEUTROPHILS NFR BLD AUTO: 57 %
NRBC # BLD AUTO: 0 10E3/UL
NRBC BLD AUTO-RTO: 0 /100
PHOSPHATE SERPL-MCNC: 2.9 MG/DL (ref 2.5–4.5)
PLATELET # BLD AUTO: 410 10E3/UL (ref 150–450)
POTASSIUM BLD-SCNC: 3.3 MMOL/L (ref 3.4–5.3)
PROT SERPL-MCNC: 8.9 G/DL (ref 6.8–8.8)
RBC # BLD AUTO: 5.15 10E6/UL (ref 4.4–5.9)
SARS-COV-2 RNA RESP QL NAA+PROBE: NEGATIVE
SODIUM SERPL-SCNC: 143 MMOL/L (ref 133–144)
WBC # BLD AUTO: 7.7 10E3/UL (ref 4–11)

## 2022-01-07 PROCEDURE — 85014 HEMATOCRIT: CPT | Performed by: EMERGENCY MEDICINE

## 2022-01-07 PROCEDURE — 82077 ASSAY SPEC XCP UR&BREATH IA: CPT | Performed by: EMERGENCY MEDICINE

## 2022-01-07 PROCEDURE — 36415 COLL VENOUS BLD VENIPUNCTURE: CPT | Performed by: EMERGENCY MEDICINE

## 2022-01-07 PROCEDURE — 90791 PSYCH DIAGNOSTIC EVALUATION: CPT

## 2022-01-07 PROCEDURE — 250N000013 HC RX MED GY IP 250 OP 250 PS 637: Performed by: PSYCHIATRY & NEUROLOGY

## 2022-01-07 PROCEDURE — 99215 OFFICE O/P EST HI 40 MIN: CPT | Performed by: PSYCHIATRY & NEUROLOGY

## 2022-01-07 PROCEDURE — 250N000009 HC RX 250: Performed by: PSYCHIATRY & NEUROLOGY

## 2022-01-07 PROCEDURE — C9803 HOPD COVID-19 SPEC COLLECT: HCPCS

## 2022-01-07 PROCEDURE — 87635 SARS-COV-2 COVID-19 AMP PRB: CPT | Performed by: EMERGENCY MEDICINE

## 2022-01-07 PROCEDURE — 250N000013 HC RX MED GY IP 250 OP 250 PS 637: Performed by: EMERGENCY MEDICINE

## 2022-01-07 PROCEDURE — 99285 EMERGENCY DEPT VISIT HI MDM: CPT | Mod: 25

## 2022-01-07 PROCEDURE — 83735 ASSAY OF MAGNESIUM: CPT | Performed by: EMERGENCY MEDICINE

## 2022-01-07 PROCEDURE — 80053 COMPREHEN METABOLIC PANEL: CPT | Performed by: EMERGENCY MEDICINE

## 2022-01-07 PROCEDURE — 84100 ASSAY OF PHOSPHORUS: CPT | Performed by: EMERGENCY MEDICINE

## 2022-01-07 RX ORDER — CYCLOBENZAPRINE HCL 10 MG
10 TABLET ORAL 3 TIMES DAILY PRN
Status: DISCONTINUED | OUTPATIENT
Start: 2022-01-07 | End: 2022-01-08 | Stop reason: HOSPADM

## 2022-01-07 RX ORDER — IBUPROFEN 600 MG/1
600 TABLET, FILM COATED ORAL EVERY 6 HOURS PRN
Status: DISCONTINUED | OUTPATIENT
Start: 2022-01-07 | End: 2022-01-07

## 2022-01-07 RX ORDER — PREGABALIN 150 MG/1
300 CAPSULE ORAL 3 TIMES DAILY
Status: DISCONTINUED | OUTPATIENT
Start: 2022-01-07 | End: 2022-01-08 | Stop reason: HOSPADM

## 2022-01-07 RX ORDER — NAPROXEN 500 MG/1
500 TABLET ORAL 2 TIMES DAILY WITH MEALS
Status: ON HOLD | COMMUNITY
End: 2022-02-02

## 2022-01-07 RX ORDER — BUPROPION HYDROCHLORIDE 100 MG/1
200 TABLET ORAL 2 TIMES DAILY
Status: ON HOLD | COMMUNITY
End: 2022-01-10

## 2022-01-07 RX ORDER — NAPROXEN 250 MG/1
500 TABLET ORAL 2 TIMES DAILY WITH MEALS
Status: DISCONTINUED | OUTPATIENT
Start: 2022-01-08 | End: 2022-01-08 | Stop reason: HOSPADM

## 2022-01-07 RX ORDER — ACAMPROSATE CALCIUM 333 MG/1
666 TABLET, DELAYED RELEASE ORAL 3 TIMES DAILY
Status: DISCONTINUED | OUTPATIENT
Start: 2022-01-07 | End: 2022-01-08 | Stop reason: HOSPADM

## 2022-01-07 RX ORDER — PRAZOSIN HYDROCHLORIDE 1 MG/1
1 CAPSULE ORAL AT BEDTIME
Status: DISCONTINUED | OUTPATIENT
Start: 2022-01-07 | End: 2022-01-08 | Stop reason: HOSPADM

## 2022-01-07 RX ORDER — GABAPENTIN 300 MG/1
300 CAPSULE ORAL 3 TIMES DAILY
Status: DISCONTINUED | OUTPATIENT
Start: 2022-01-07 | End: 2022-01-08 | Stop reason: HOSPADM

## 2022-01-07 RX ORDER — LEVOTHYROXINE SODIUM 75 UG/1
75 TABLET ORAL
Status: DISCONTINUED | OUTPATIENT
Start: 2022-01-08 | End: 2022-01-08 | Stop reason: HOSPADM

## 2022-01-07 RX ORDER — DIAZEPAM 10 MG/2ML
5-10 INJECTION, SOLUTION INTRAMUSCULAR; INTRAVENOUS EVERY 30 MIN PRN
Status: DISCONTINUED | OUTPATIENT
Start: 2022-01-07 | End: 2022-01-08 | Stop reason: HOSPADM

## 2022-01-07 RX ORDER — TOPIRAMATE 25 MG/1
50 TABLET, FILM COATED ORAL 2 TIMES DAILY
Status: DISCONTINUED | OUTPATIENT
Start: 2022-01-07 | End: 2022-01-08 | Stop reason: HOSPADM

## 2022-01-07 RX ORDER — OXYCODONE HYDROCHLORIDE 5 MG/1
20 TABLET ORAL ONCE
Status: COMPLETED | OUTPATIENT
Start: 2022-01-07 | End: 2022-01-07

## 2022-01-07 RX ORDER — GABAPENTIN 300 MG/1
300 CAPSULE ORAL 3 TIMES DAILY
Status: ON HOLD | COMMUNITY
End: 2022-01-10

## 2022-01-07 RX ORDER — BUPROPION HYDROCHLORIDE 100 MG/1
200 TABLET ORAL 2 TIMES DAILY
Status: DISCONTINUED | OUTPATIENT
Start: 2022-01-07 | End: 2022-01-08 | Stop reason: HOSPADM

## 2022-01-07 RX ORDER — CYCLOBENZAPRINE HCL 10 MG
10 TABLET ORAL 3 TIMES DAILY PRN
Status: ON HOLD | COMMUNITY
End: 2022-01-10

## 2022-01-07 RX ORDER — QUETIAPINE FUMARATE 200 MG/1
600 TABLET, FILM COATED ORAL AT BEDTIME
Status: DISCONTINUED | OUTPATIENT
Start: 2022-01-07 | End: 2022-01-08 | Stop reason: HOSPADM

## 2022-01-07 RX ORDER — FLUMAZENIL 0.1 MG/ML
0.2 INJECTION, SOLUTION INTRAVENOUS
Status: DISCONTINUED | OUTPATIENT
Start: 2022-01-07 | End: 2022-01-08 | Stop reason: HOSPADM

## 2022-01-07 RX ORDER — FOLIC ACID 1 MG/1
1 TABLET ORAL DAILY
Status: DISCONTINUED | OUTPATIENT
Start: 2022-01-07 | End: 2022-01-08 | Stop reason: HOSPADM

## 2022-01-07 RX ORDER — IBUPROFEN 800 MG/1
800 TABLET, FILM COATED ORAL EVERY 8 HOURS PRN
Status: ON HOLD | COMMUNITY
End: 2022-02-02

## 2022-01-07 RX ORDER — OXYCODONE HYDROCHLORIDE 20 MG/1
20 TABLET ORAL
Status: DISCONTINUED | OUTPATIENT
Start: 2022-01-07 | End: 2022-01-07

## 2022-01-07 RX ORDER — ACETAMINOPHEN 325 MG/1
975 TABLET ORAL EVERY 6 HOURS PRN
Status: DISCONTINUED | OUTPATIENT
Start: 2022-01-07 | End: 2022-01-08 | Stop reason: HOSPADM

## 2022-01-07 RX ORDER — PANTOPRAZOLE SODIUM 40 MG/1
40 TABLET, DELAYED RELEASE ORAL
Status: DISCONTINUED | OUTPATIENT
Start: 2022-01-08 | End: 2022-01-08 | Stop reason: HOSPADM

## 2022-01-07 RX ORDER — PROPRANOLOL HYDROCHLORIDE 20 MG/1
20 TABLET ORAL 2 TIMES DAILY
Status: ON HOLD | COMMUNITY
End: 2022-02-02

## 2022-01-07 RX ORDER — DIAZEPAM 5 MG
10 TABLET ORAL EVERY 30 MIN PRN
Status: DISCONTINUED | OUTPATIENT
Start: 2022-01-07 | End: 2022-01-08 | Stop reason: HOSPADM

## 2022-01-07 RX ORDER — MULTIPLE VITAMINS W/ MINERALS TAB 9MG-400MCG
1 TAB ORAL DAILY
Status: DISCONTINUED | OUTPATIENT
Start: 2022-01-07 | End: 2022-01-08 | Stop reason: HOSPADM

## 2022-01-07 RX ORDER — AMLODIPINE BESYLATE 5 MG/1
5 TABLET ORAL DAILY
Status: DISCONTINUED | OUTPATIENT
Start: 2022-01-07 | End: 2022-01-08 | Stop reason: HOSPADM

## 2022-01-07 RX ORDER — ALBUTEROL SULFATE 90 UG/1
2 AEROSOL, METERED RESPIRATORY (INHALATION) EVERY 4 HOURS PRN
Status: DISCONTINUED | OUTPATIENT
Start: 2022-01-07 | End: 2022-01-08 | Stop reason: HOSPADM

## 2022-01-07 RX ORDER — OLANZAPINE 10 MG/1
10 TABLET, ORALLY DISINTEGRATING ORAL ONCE
Status: DISCONTINUED | OUTPATIENT
Start: 2022-01-07 | End: 2022-01-07

## 2022-01-07 RX ORDER — PROPRANOLOL HYDROCHLORIDE 10 MG/1
20 TABLET ORAL 2 TIMES DAILY
Status: DISCONTINUED | OUTPATIENT
Start: 2022-01-07 | End: 2022-01-08 | Stop reason: HOSPADM

## 2022-01-07 RX ADMIN — PREGABALIN 300 MG: 150 CAPSULE ORAL at 21:00

## 2022-01-07 RX ADMIN — AMLODIPINE BESYLATE 5 MG: 5 TABLET ORAL at 20:58

## 2022-01-07 RX ADMIN — PRAZOSIN HYDROCHLORIDE 1 MG: 1 CAPSULE ORAL at 22:56

## 2022-01-07 RX ADMIN — OXYCODONE HYDROCHLORIDE 20 MG: 5 TABLET ORAL at 17:27

## 2022-01-07 RX ADMIN — THIAMINE HCL TAB 100 MG 100 MG: 100 TAB at 17:28

## 2022-01-07 RX ADMIN — NICOTINE POLACRILEX 2 MG: 2 GUM, CHEWING ORAL at 17:39

## 2022-01-07 RX ADMIN — ACAMPROSATE CALCIUM 666 MG: 333 TABLET, DELAYED RELEASE ORAL at 21:00

## 2022-01-07 RX ADMIN — ACETAMINOPHEN 975 MG: 325 TABLET, FILM COATED ORAL at 21:00

## 2022-01-07 RX ADMIN — NICOTINE POLACRILEX 2 MG: 2 GUM, CHEWING ORAL at 18:37

## 2022-01-07 RX ADMIN — DIAZEPAM 10 MG: 5 TABLET ORAL at 21:11

## 2022-01-07 RX ADMIN — NICOTINE POLACRILEX 2 MG: 2 GUM, CHEWING ORAL at 21:20

## 2022-01-07 RX ADMIN — NICOTINE POLACRILEX 2 MG: 2 GUM, CHEWING ORAL at 22:56

## 2022-01-07 RX ADMIN — TOPIRAMATE 50 MG: 25 TABLET, FILM COATED ORAL at 20:59

## 2022-01-07 RX ADMIN — BUPROPION HYDROCHLORIDE 200 MG: 100 TABLET, FILM COATED ORAL at 21:01

## 2022-01-07 RX ADMIN — OXYCODONE HYDROCHLORIDE 20 MG: 15 TABLET ORAL at 22:57

## 2022-01-07 RX ADMIN — PROPRANOLOL HYDROCHLORIDE 20 MG: 10 TABLET ORAL at 20:59

## 2022-01-07 RX ADMIN — DIAZEPAM 10 MG: 5 TABLET ORAL at 23:11

## 2022-01-07 RX ADMIN — FOLIC ACID 1 MG: 1 TABLET ORAL at 17:28

## 2022-01-07 RX ADMIN — GABAPENTIN 300 MG: 300 CAPSULE ORAL at 21:00

## 2022-01-07 RX ADMIN — NICOTINE POLACRILEX 2 MG: 2 GUM, CHEWING ORAL at 19:35

## 2022-01-07 RX ADMIN — MULTIPLE VITAMINS W/ MINERALS TAB 1 TABLET: TAB at 17:28

## 2022-01-07 ASSESSMENT — ENCOUNTER SYMPTOMS
FEVER: 0
CHILLS: 1

## 2022-01-07 ASSESSMENT — ACTIVITIES OF DAILY LIVING (ADL)
ADLS_ACUITY_SCORE: 7
HYGIENE/GROOMING: INDEPENDENT
DRESS: SCRUBS (BEHAVIORAL HEALTH);STREET CLOTHES

## 2022-01-07 ASSESSMENT — MIFFLIN-ST. JEOR: SCORE: 2324.99

## 2022-01-07 NOTE — ED PROVIDER NOTES
History   Chief Complaint:  Suicidal       HPI   Hollis Barclay is a 51 year old male with history of alcoholism, bipolar disorder, borderline personality disorder, social anxiety disorder, suicidal ideation, depression, and generalized anxiety disorder who presents with suicidal. The patient reports he has been depressed for the past couple of weeks due to his mother being sick. Today, the patient tried to attempt suicide by use of a gun and states he does own a gun. He takes Seroquel daily and has had some chills but otherwise denies any fevers. Patient lives in a group home currently and states he has had previous suicide attempts. He also states he has started drinking since his last hospital visit approximately 1 month ago but stopped approximately 12 hours ago and feels like he his going through withdrawal.     Review of Systems   Constitutional: Positive for chills. Negative for fever.   Psychiatric/Behavioral: Positive for suicidal ideas.   All other systems reviewed and are negative.      Allergies:  Cucumber Extract  Hydroxyzine  Nsaids  Trazodone    Medications:  Campral   Albuterol  Norvasc  Folvite  Levothyroxine   Oxycodone   Protonix   Miralax  Minipress  Lyrica  Seroquel  Senna-docusate  Senokot  Zanaflex  Topamax     Past Medical History:     Alcoholism in remission  Bilateral ACL tear  Bipolar disorder  Borderline personality disorder  Chemical dependency  Chronic back pain  Post concussive encephalopathy  Social anxiety disorder  Traumatic brain injury  Alcohol withdrawal  Suicidal ideation  Intentional drug overdose   Opioid dependence on agonist therapy   Non-traumatic rhabdomyolysis  Alcohol use disorder, severe, dependence  Depression  PEDRO LUIS  Bipolar affective disorder     Family History:    Right shoulder replacement    Social History:  The patient presents alone via EMS.     Physical Exam     Patient Vitals for the past 24 hrs:   BP Temp Temp src Pulse Resp SpO2 Height Weight   01/07/22  "1603 (!) 173/98 98.4  F (36.9  C) Oral 86 16 96 % 1.951 m (6' 4.8\") 136.1 kg (300 lb)       Physical Exam  Eyes:  The pupils are equal and round    Conjunctivae and sclerae are normal  ENT:    The nose is normal    Pinnae are normal  CV:  Regular rate and rhythm     No edema  Resp:  Lungs are clear    Non-labored    No rales    No wheezing   GI:  Abdomen is soft, there is no rigidity    No distension    No rebound tenderness   MS:  Normal muscular tone    No asymmetric leg swelling  Skin:  No rash or acute skin lesions noted  Neuro:   Awake, alert.      Speech is normal and fluent.    Face is symmetric.     Moves all extremities  Psych:  Endorses suicidal thoughts with plans to stab self. Restless appearing    Emergency Department Course   Laboratory:  Labs Ordered and Resulted from Time of ED Arrival to Time of ED Departure   COMPREHENSIVE METABOLIC PANEL - Abnormal       Result Value    Sodium 143      Potassium 3.3 (*)     Chloride 109      Carbon Dioxide (CO2) 29      Anion Gap 5      Urea Nitrogen 10      Creatinine 0.61 (*)     Calcium 9.1      Glucose 93      Alkaline Phosphatase 72      AST 32      ALT 59      Protein Total 8.9 (*)     Albumin 4.3      Bilirubin Total 0.4      GFR Estimate >90     ETHYL ALCOHOL LEVEL - Abnormal    Alcohol ethyl 0.18 (*)    MAGNESIUM - Normal    Magnesium 2.3     PHOSPHORUS - Normal    Phosphorus 2.9     CBC WITH PLATELETS AND DIFFERENTIAL    WBC Count 7.7      RBC Count 5.15      Hemoglobin 15.2      Hematocrit 45.8      MCV 89      MCH 29.5      MCHC 33.2      RDW 14.8      Platelet Count 410      % Neutrophils 57      % Lymphocytes 37      % Monocytes 5      % Eosinophils 0      % Basophils 1      % Immature Granulocytes 0      NRBCs per 100 WBC 0      Absolute Neutrophils 4.4      Absolute Lymphocytes 2.9      Absolute Monocytes 0.4      Absolute Eosinophils 0.0      Absolute Basophils 0.0      Absolute Immature Granulocytes 0.0      Absolute NRBCs 0.0     COVID-19 VIRUS " (CORONAVIRUS) BY PCR      Emergency Department Course:  Reviewed:  I reviewed nursing notes, vitals, past medical history and Care Everywhere    Assessments:  1614 I obtained history and examined the patient as noted above.   1715 I rechecked the patient and explained findings.     Interventions:  1727 Oxycodone 20 mg PO  1728 B-1 100 mg PO  1728 Folvite 1 mg PO  1728 Thera-Vit-M  1739 Nicorette 2 mg Buccal     Disposition:  The patient will transferred to [facility].     Impression & Plan       Medical Decision Making:  Hollis Barclay is a 51 year old male who presents to the emergency department with concerns about suicidal ideation.  Today he reports he stuck a gun in his mouth and was very stressed by the taste of the oil in his mouth as well as the metal.  He also has been thinking about stabbing himself.  He currently lives in a group home.  He has been drinking alcohol.  He denies any fevers.  He has had some chills, but recently recovered from COVID-19.  He did request getting his home pain medication.  On review of the chart he does use 20 mg of oxycodone 5 times daily.  This was ordered.  Patient was observed here and was actually quite calm.  He did not have signs of alcohol withdrawal.  Plan for transfer to [facility] when COVID testing is negative.    Diagnosis:    ICD-10-CM    1. Suicidal ideation  R45.851        Discharge Medications:  New Prescriptions    No medications on file       Scribe Disclosure:  I, Kori Wyman, am serving as a scribe at 4:14 PM on 1/7/2022 to document services personally performed by Perez Baltazar MD based on my observations and the provider's statements to me.            Perez Baltazar MD  01/07/22 2849

## 2022-01-07 NOTE — ED TRIAGE NOTES
"Pt comes from group home with hx of TBI comes in with SI. Per EMS \"wants to shove something in his neck.\"  "

## 2022-01-07 NOTE — ED NOTES
Bed: [facility]  Expected date:   Expected time:   Means of arrival:   Comments:  [facility] here now

## 2022-01-08 ENCOUNTER — HOSPITAL ENCOUNTER (INPATIENT)
Facility: CLINIC | Age: 52
LOS: 75 days | Discharge: NURSING, REHAB OR LONG-TERM CARE | End: 2022-03-24
Attending: PSYCHIATRY & NEUROLOGY | Admitting: PSYCHIATRY & NEUROLOGY
Payer: MEDICAID

## 2022-01-08 VITALS
TEMPERATURE: 98.5 F | BODY MASS INDEX: 35.42 KG/M2 | DIASTOLIC BLOOD PRESSURE: 78 MMHG | SYSTOLIC BLOOD PRESSURE: 122 MMHG | RESPIRATION RATE: 18 BRPM | OXYGEN SATURATION: 94 % | HEART RATE: 70 BPM | HEIGHT: 77 IN | WEIGHT: 300 LBS

## 2022-01-08 DIAGNOSIS — M17.11 PRIMARY OSTEOARTHRITIS OF RIGHT KNEE: ICD-10-CM

## 2022-01-08 DIAGNOSIS — Z98.890 H/O SHOULDER SURGERY: ICD-10-CM

## 2022-01-08 DIAGNOSIS — J12.82 PNEUMONIA DUE TO COVID-19 VIRUS: ICD-10-CM

## 2022-01-08 DIAGNOSIS — I10 ESSENTIAL HYPERTENSION: ICD-10-CM

## 2022-01-08 DIAGNOSIS — G89.29 CHRONIC BILATERAL LOW BACK PAIN WITH SCIATICA, SCIATICA LATERALITY UNSPECIFIED: ICD-10-CM

## 2022-01-08 DIAGNOSIS — K59.00 CONSTIPATION, UNSPECIFIED CONSTIPATION TYPE: ICD-10-CM

## 2022-01-08 DIAGNOSIS — F43.10 PTSD (POST-TRAUMATIC STRESS DISORDER): ICD-10-CM

## 2022-01-08 DIAGNOSIS — R21 RASH: ICD-10-CM

## 2022-01-08 DIAGNOSIS — R21 RASH AND NONSPECIFIC SKIN ERUPTION: ICD-10-CM

## 2022-01-08 DIAGNOSIS — S83.511D RUPTURE OF ANTERIOR CRUCIATE LIGAMENT OF BOTH KNEES, SUBSEQUENT ENCOUNTER: ICD-10-CM

## 2022-01-08 DIAGNOSIS — U07.1 PNEUMONIA DUE TO COVID-19 VIRUS: ICD-10-CM

## 2022-01-08 DIAGNOSIS — E03.9 HYPOTHYROIDISM, UNSPECIFIED TYPE: ICD-10-CM

## 2022-01-08 DIAGNOSIS — M54.40 CHRONIC BILATERAL LOW BACK PAIN WITH SCIATICA, SCIATICA LATERALITY UNSPECIFIED: ICD-10-CM

## 2022-01-08 DIAGNOSIS — F10.21 ALCOHOLISM IN REMISSION (H): ICD-10-CM

## 2022-01-08 DIAGNOSIS — F31.4 SEVERE BIPOLAR I DISORDER, CURRENT OR MOST RECENT EPISODE DEPRESSED, WITH MIXED FEATURES (H): Chronic | ICD-10-CM

## 2022-01-08 DIAGNOSIS — R68.2 DRY MOUTH NOT DUE TO SICCA SYNDROME: ICD-10-CM

## 2022-01-08 DIAGNOSIS — K21.00 GASTROESOPHAGEAL REFLUX DISEASE WITH ESOPHAGITIS, UNSPECIFIED WHETHER HEMORRHAGE: ICD-10-CM

## 2022-01-08 DIAGNOSIS — F10.229 ALCOHOL DEPENDENCE WITH INTOXICATION WITH COMPLICATION (H): ICD-10-CM

## 2022-01-08 DIAGNOSIS — F11.20 OPIOID DEPENDENCE ON AGONIST THERAPY (H): Chronic | ICD-10-CM

## 2022-01-08 DIAGNOSIS — S83.512D RUPTURE OF ANTERIOR CRUCIATE LIGAMENT OF BOTH KNEES, SUBSEQUENT ENCOUNTER: ICD-10-CM

## 2022-01-08 DIAGNOSIS — F31.4 BIPOLAR DISORDER, CURRENT EPISODE DEPRESSED, SEVERE, WITHOUT PSYCHOTIC FEATURES (H): Primary | ICD-10-CM

## 2022-01-08 DIAGNOSIS — E55.9 VITAMIN D DEFICIENCY: ICD-10-CM

## 2022-01-08 DIAGNOSIS — F10.939 ALCOHOL WITHDRAWAL SYNDROME WITH COMPLICATION, WITH UNSPECIFIED COMPLICATION (H): ICD-10-CM

## 2022-01-08 DIAGNOSIS — F10.929 ALCOHOLIC INTOXICATION WITH COMPLICATION (H): ICD-10-CM

## 2022-01-08 DIAGNOSIS — F40.10 SOCIAL ANXIETY DISORDER: ICD-10-CM

## 2022-01-08 LAB
AMPHETAMINES UR QL SCN: ABNORMAL
BARBITURATES UR QL: ABNORMAL
BENZODIAZ UR QL: ABNORMAL
CANNABINOIDS UR QL SCN: ABNORMAL
COCAINE UR QL: ABNORMAL
OPIATES UR QL SCN: ABNORMAL
PCP UR QL SCN: ABNORMAL

## 2022-01-08 PROCEDURE — 250N000009 HC RX 250: Performed by: PSYCHIATRY & NEUROLOGY

## 2022-01-08 PROCEDURE — 250N000013 HC RX MED GY IP 250 OP 250 PS 637: Performed by: EMERGENCY MEDICINE

## 2022-01-08 PROCEDURE — 80307 DRUG TEST PRSMV CHEM ANLYZR: CPT | Performed by: PSYCHIATRY & NEUROLOGY

## 2022-01-08 PROCEDURE — 250N000013 HC RX MED GY IP 250 OP 250 PS 637: Performed by: PSYCHIATRY & NEUROLOGY

## 2022-01-08 PROCEDURE — 124N000002 HC R&B MH UMMC

## 2022-01-08 PROCEDURE — 99215 OFFICE O/P EST HI 40 MIN: CPT | Performed by: NURSE PRACTITIONER

## 2022-01-08 RX ORDER — PREGABALIN 100 MG/1
300 CAPSULE ORAL 3 TIMES DAILY
Status: DISCONTINUED | OUTPATIENT
Start: 2022-01-09 | End: 2022-03-24 | Stop reason: HOSPADM

## 2022-01-08 RX ORDER — IBUPROFEN 600 MG/1
600 TABLET, FILM COATED ORAL EVERY 6 HOURS PRN
Status: DISCONTINUED | OUTPATIENT
Start: 2022-01-08 | End: 2022-01-10

## 2022-01-08 RX ORDER — LANOLIN ALCOHOL/MO/W.PET/CERES
3 CREAM (GRAM) TOPICAL
Status: DISCONTINUED | OUTPATIENT
Start: 2022-01-08 | End: 2022-03-24 | Stop reason: HOSPADM

## 2022-01-08 RX ORDER — LEVOTHYROXINE SODIUM 75 UG/1
75 TABLET ORAL
Status: DISCONTINUED | OUTPATIENT
Start: 2022-01-09 | End: 2022-03-24 | Stop reason: HOSPADM

## 2022-01-08 RX ORDER — TOPIRAMATE 50 MG/1
50 TABLET, FILM COATED ORAL 2 TIMES DAILY
Status: DISCONTINUED | OUTPATIENT
Start: 2022-01-08 | End: 2022-01-09

## 2022-01-08 RX ORDER — OLANZAPINE 10 MG/2ML
10 INJECTION, POWDER, FOR SOLUTION INTRAMUSCULAR 3 TIMES DAILY PRN
Status: DISCONTINUED | OUTPATIENT
Start: 2022-01-08 | End: 2022-03-11

## 2022-01-08 RX ORDER — FOLIC ACID 1 MG/1
1 TABLET ORAL DAILY
Status: DISCONTINUED | OUTPATIENT
Start: 2022-01-09 | End: 2022-03-14

## 2022-01-08 RX ORDER — PRAZOSIN HYDROCHLORIDE 1 MG/1
1 CAPSULE ORAL AT BEDTIME
Status: DISCONTINUED | OUTPATIENT
Start: 2022-01-08 | End: 2022-02-28

## 2022-01-08 RX ORDER — CYCLOBENZAPRINE HCL 10 MG
10 TABLET ORAL 3 TIMES DAILY PRN
Status: DISCONTINUED | OUTPATIENT
Start: 2022-01-08 | End: 2022-01-10

## 2022-01-08 RX ORDER — LIDOCAINE 4 G/G
1 PATCH TOPICAL EVERY 24 HOURS
Status: DISCONTINUED | OUTPATIENT
Start: 2022-01-08 | End: 2022-01-10

## 2022-01-08 RX ORDER — NALOXONE HYDROCHLORIDE 0.4 MG/ML
0.2 INJECTION, SOLUTION INTRAMUSCULAR; INTRAVENOUS; SUBCUTANEOUS
Status: DISCONTINUED | OUTPATIENT
Start: 2022-01-08 | End: 2022-03-24 | Stop reason: HOSPADM

## 2022-01-08 RX ORDER — TIZANIDINE 2 MG/1
4 TABLET ORAL EVERY 8 HOURS PRN
Status: DISCONTINUED | OUTPATIENT
Start: 2022-01-08 | End: 2022-03-24 | Stop reason: HOSPADM

## 2022-01-08 RX ORDER — DIAZEPAM 5 MG
5-20 TABLET ORAL EVERY 30 MIN PRN
Status: DISCONTINUED | OUTPATIENT
Start: 2022-01-08 | End: 2022-01-14

## 2022-01-08 RX ORDER — NAPROXEN 500 MG/1
500 TABLET ORAL 2 TIMES DAILY WITH MEALS
Status: DISCONTINUED | OUTPATIENT
Start: 2022-01-09 | End: 2022-03-24 | Stop reason: HOSPADM

## 2022-01-08 RX ORDER — NALOXONE HYDROCHLORIDE 0.4 MG/ML
0.4 INJECTION, SOLUTION INTRAMUSCULAR; INTRAVENOUS; SUBCUTANEOUS
Status: DISCONTINUED | OUTPATIENT
Start: 2022-01-08 | End: 2022-03-24 | Stop reason: HOSPADM

## 2022-01-08 RX ORDER — PROPRANOLOL HYDROCHLORIDE 20 MG/1
20 TABLET ORAL 2 TIMES DAILY
Status: DISCONTINUED | OUTPATIENT
Start: 2022-01-09 | End: 2022-03-14

## 2022-01-08 RX ORDER — GABAPENTIN 300 MG/1
300 CAPSULE ORAL 3 TIMES DAILY
Status: DISCONTINUED | OUTPATIENT
Start: 2022-01-09 | End: 2022-01-09

## 2022-01-08 RX ORDER — BUPROPION HYDROCHLORIDE 100 MG/1
200 TABLET ORAL 2 TIMES DAILY
Status: DISCONTINUED | OUTPATIENT
Start: 2022-01-09 | End: 2022-01-09

## 2022-01-08 RX ORDER — OLANZAPINE 10 MG/1
10 TABLET ORAL 3 TIMES DAILY PRN
Status: DISCONTINUED | OUTPATIENT
Start: 2022-01-08 | End: 2022-03-11

## 2022-01-08 RX ORDER — MAGNESIUM HYDROXIDE/ALUMINUM HYDROXICE/SIMETHICONE 120; 1200; 1200 MG/30ML; MG/30ML; MG/30ML
30 SUSPENSION ORAL EVERY 4 HOURS PRN
Status: DISCONTINUED | OUTPATIENT
Start: 2022-01-08 | End: 2022-03-24 | Stop reason: HOSPADM

## 2022-01-08 RX ORDER — MULTIPLE VITAMINS W/ MINERALS TAB 9MG-400MCG
1 TAB ORAL DAILY
Status: DISCONTINUED | OUTPATIENT
Start: 2022-01-09 | End: 2022-03-24 | Stop reason: HOSPADM

## 2022-01-08 RX ORDER — POLYETHYLENE GLYCOL 3350 17 G/17G
17 POWDER, FOR SOLUTION ORAL DAILY
Status: DISCONTINUED | OUTPATIENT
Start: 2022-01-09 | End: 2022-01-10

## 2022-01-08 RX ORDER — AMOXICILLIN 250 MG
1 CAPSULE ORAL AT BEDTIME
Status: DISCONTINUED | OUTPATIENT
Start: 2022-01-08 | End: 2022-01-10

## 2022-01-08 RX ORDER — ALBUTEROL SULFATE 90 UG/1
2 AEROSOL, METERED RESPIRATORY (INHALATION) EVERY 4 HOURS PRN
Status: DISCONTINUED | OUTPATIENT
Start: 2022-01-08 | End: 2022-03-24 | Stop reason: HOSPADM

## 2022-01-08 RX ORDER — ATENOLOL 50 MG/1
50 TABLET ORAL DAILY PRN
Status: DISCONTINUED | OUTPATIENT
Start: 2022-01-08 | End: 2022-01-11

## 2022-01-08 RX ORDER — ACETAMINOPHEN 325 MG/1
975 TABLET ORAL EVERY 6 HOURS PRN
Status: DISCONTINUED | OUTPATIENT
Start: 2022-01-08 | End: 2022-03-08

## 2022-01-08 RX ORDER — QUETIAPINE FUMARATE 300 MG/1
600 TABLET, FILM COATED ORAL AT BEDTIME
Status: DISCONTINUED | OUTPATIENT
Start: 2022-01-08 | End: 2022-01-10

## 2022-01-08 RX ORDER — PANTOPRAZOLE SODIUM 40 MG/1
40 TABLET, DELAYED RELEASE ORAL
Status: DISCONTINUED | OUTPATIENT
Start: 2022-01-09 | End: 2022-03-18

## 2022-01-08 RX ORDER — OXYCODONE HYDROCHLORIDE 10 MG/1
20 TABLET ORAL
Status: DISCONTINUED | OUTPATIENT
Start: 2022-01-08 | End: 2022-01-10

## 2022-01-08 RX ORDER — ACAMPROSATE CALCIUM 333 MG/1
666 TABLET, DELAYED RELEASE ORAL 3 TIMES DAILY
Status: DISCONTINUED | OUTPATIENT
Start: 2022-01-09 | End: 2022-03-21

## 2022-01-08 RX ORDER — AMLODIPINE BESYLATE 5 MG/1
5 TABLET ORAL DAILY
Status: DISCONTINUED | OUTPATIENT
Start: 2022-01-09 | End: 2022-03-13

## 2022-01-08 RX ORDER — SENNOSIDES 8.6 MG
1-2 TABLET ORAL 2 TIMES DAILY PRN
Status: DISCONTINUED | OUTPATIENT
Start: 2022-01-08 | End: 2022-03-24 | Stop reason: HOSPADM

## 2022-01-08 RX ADMIN — ACETAMINOPHEN 975 MG: 325 TABLET, FILM COATED ORAL at 15:58

## 2022-01-08 RX ADMIN — PREGABALIN 300 MG: 150 CAPSULE ORAL at 08:05

## 2022-01-08 RX ADMIN — OXYCODONE HYDROCHLORIDE 20 MG: 15 TABLET ORAL at 03:51

## 2022-01-08 RX ADMIN — DIAZEPAM 10 MG: 5 TABLET ORAL at 15:48

## 2022-01-08 RX ADMIN — PREGABALIN 300 MG: 150 CAPSULE ORAL at 13:43

## 2022-01-08 RX ADMIN — NICOTINE POLACRILEX 2 MG: 2 GUM, CHEWING ORAL at 03:12

## 2022-01-08 RX ADMIN — THIAMINE HCL TAB 100 MG 100 MG: 100 TAB at 08:06

## 2022-01-08 RX ADMIN — PROPRANOLOL HYDROCHLORIDE 20 MG: 10 TABLET ORAL at 19:33

## 2022-01-08 RX ADMIN — OXYCODONE HYDROCHLORIDE 20 MG: 15 TABLET ORAL at 13:44

## 2022-01-08 RX ADMIN — QUETIAPINE 600 MG: 300 TABLET, FILM COATED ORAL at 21:54

## 2022-01-08 RX ADMIN — TIZANIDINE 4 MG: 4 TABLET ORAL at 08:08

## 2022-01-08 RX ADMIN — ACAMPROSATE CALCIUM 666 MG: 333 TABLET, DELAYED RELEASE ORAL at 08:09

## 2022-01-08 RX ADMIN — PRAZOSIN HYDROCHLORIDE 1 MG: 1 CAPSULE ORAL at 21:54

## 2022-01-08 RX ADMIN — TOPIRAMATE 50 MG: 50 TABLET ORAL at 21:55

## 2022-01-08 RX ADMIN — NICOTINE POLACRILEX 2 MG: 2 GUM, CHEWING ORAL at 17:48

## 2022-01-08 RX ADMIN — DOCUSATE SODIUM 50 MG AND SENNOSIDES 8.6 MG 1 TABLET: 8.6; 5 TABLET, FILM COATED ORAL at 21:55

## 2022-01-08 RX ADMIN — PANTOPRAZOLE SODIUM 40 MG: 40 TABLET, DELAYED RELEASE ORAL at 08:06

## 2022-01-08 RX ADMIN — TOPIRAMATE 50 MG: 25 TABLET, FILM COATED ORAL at 08:05

## 2022-01-08 RX ADMIN — GABAPENTIN 300 MG: 300 CAPSULE ORAL at 19:32

## 2022-01-08 RX ADMIN — NICOTINE POLACRILEX 2 MG: 2 GUM, CHEWING ORAL at 15:24

## 2022-01-08 RX ADMIN — DIAZEPAM 5 MG: 5 TABLET ORAL at 22:06

## 2022-01-08 RX ADMIN — CYCLOBENZAPRINE HYDROCHLORIDE 10 MG: 10 TABLET, FILM COATED ORAL at 12:40

## 2022-01-08 RX ADMIN — LEVOTHYROXINE SODIUM 75 MCG: 75 TABLET ORAL at 08:05

## 2022-01-08 RX ADMIN — NICOTINE POLACRILEX 2 MG: 2 GUM, CHEWING ORAL at 19:33

## 2022-01-08 RX ADMIN — GABAPENTIN 300 MG: 300 CAPSULE ORAL at 08:06

## 2022-01-08 RX ADMIN — DIAZEPAM 10 MG: 5 TABLET ORAL at 19:53

## 2022-01-08 RX ADMIN — AMLODIPINE BESYLATE 5 MG: 5 TABLET ORAL at 08:05

## 2022-01-08 RX ADMIN — GABAPENTIN 300 MG: 300 CAPSULE ORAL at 13:18

## 2022-01-08 RX ADMIN — PROPRANOLOL HYDROCHLORIDE 20 MG: 10 TABLET ORAL at 08:05

## 2022-01-08 RX ADMIN — DIAZEPAM 10 MG: 5 TABLET ORAL at 08:34

## 2022-01-08 RX ADMIN — NICOTINE POLACRILEX 2 MG: 2 GUM, CHEWING ORAL at 10:04

## 2022-01-08 RX ADMIN — DIAZEPAM 10 MG: 5 TABLET ORAL at 06:03

## 2022-01-08 RX ADMIN — NAPROXEN 500 MG: 250 TABLET ORAL at 08:06

## 2022-01-08 RX ADMIN — OXYCODONE HYDROCHLORIDE 20 MG: 15 TABLET ORAL at 18:05

## 2022-01-08 RX ADMIN — BUPROPION HYDROCHLORIDE 200 MG: 100 TABLET, FILM COATED ORAL at 08:09

## 2022-01-08 RX ADMIN — ACAMPROSATE CALCIUM 666 MG: 333 TABLET, DELAYED RELEASE ORAL at 13:44

## 2022-01-08 RX ADMIN — PREGABALIN 300 MG: 150 CAPSULE ORAL at 19:54

## 2022-01-08 RX ADMIN — BUPROPION HYDROCHLORIDE 200 MG: 100 TABLET, FILM COATED ORAL at 19:53

## 2022-01-08 RX ADMIN — ACAMPROSATE CALCIUM 666 MG: 333 TABLET, DELAYED RELEASE ORAL at 19:53

## 2022-01-08 RX ADMIN — OXYCODONE HYDROCHLORIDE 20 MG: 10 TABLET ORAL at 21:54

## 2022-01-08 RX ADMIN — NICOTINE POLACRILEX 2 MG: 2 GUM, CHEWING ORAL at 06:03

## 2022-01-08 RX ADMIN — NICOTINE POLACRILEX 2 MG: 2 GUM, CHEWING ORAL at 08:10

## 2022-01-08 RX ADMIN — FOLIC ACID 1 MG: 1 TABLET ORAL at 08:06

## 2022-01-08 RX ADMIN — NICOTINE POLACRILEX 2 MG: 2 GUM, CHEWING ORAL at 04:45

## 2022-01-08 RX ADMIN — NICOTINE POLACRILEX 2 MG: 2 GUM, CHEWING ORAL at 12:21

## 2022-01-08 RX ADMIN — NAPROXEN 500 MG: 250 TABLET ORAL at 17:16

## 2022-01-08 RX ADMIN — TOPIRAMATE 50 MG: 25 TABLET, FILM COATED ORAL at 19:32

## 2022-01-08 RX ADMIN — DIAZEPAM 10 MG: 5 TABLET ORAL at 13:18

## 2022-01-08 RX ADMIN — NICOTINE POLACRILEX 8 MG: 4 GUM, CHEWING ORAL at 21:54

## 2022-01-08 RX ADMIN — OXYCODONE HYDROCHLORIDE 20 MG: 15 TABLET ORAL at 10:03

## 2022-01-08 RX ADMIN — DIAZEPAM 10 MG: 5 TABLET ORAL at 01:25

## 2022-01-08 RX ADMIN — MULTIPLE VITAMINS W/ MINERALS TAB 1 TABLET: TAB at 08:05

## 2022-01-08 ASSESSMENT — ACTIVITIES OF DAILY LIVING (ADL)
ADLS_ACUITY_SCORE: 7
HYGIENE/GROOMING: INDEPENDENT
ADLS_ACUITY_SCORE: 7
DRESS: STREET CLOTHES;INDEPENDENT
ADLS_ACUITY_SCORE: 7
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
## 2022-01-08 NOTE — TELEPHONE ENCOUNTER
R: Patient accepted to [facility]    4:00pm: Intake informed unit charge nurse of patient placement and report time. Intake informed [facility] nurse of patient placement and report time.

## 2022-01-08 NOTE — TELEPHONE ENCOUNTER
0237 Bed Search Update ([place]):    No beds available tonight.  Pt remains on wait list pending bed availability.

## 2022-01-08 NOTE — PROGRESS NOTES
Pt currently prescribed 20mg oxycodone 5 times daily for knee pain. Pt CIWA score 17 at 2107. Provider and pharmacy consulted on respiratory effects of giving Valium and Oxycodone together. Pharmacy confirmed safe to give the medications together.  Will continue to monitor patient for respiratory depression.

## 2022-01-08 NOTE — ED NOTES
Pt accepted to [facility]. RN report called to Lynsey MAYFIELD. Transport requested. EMS eta 2100 tonight.
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
## 2022-01-08 NOTE — ED NOTES
Patient awaiting inpatient placement. Medication given per CIWA protocol (see MAR). Pt resting in recliner watching TV. Endorses SI but contracts for safety here. Will continue to monitor.

## 2022-01-08 NOTE — ED PROVIDER NOTES
EmPATH Unit - Psychiatric Consultation  [facility] Emergency Department    Hollis Barclay MRN: [id number]   Age: 51 year old YOB: 1970     History     Chief Complaint   Patient presents with     Suicidal     HPI  Hollis Barclay is a 51 year old male with history notable for alcoholism, bipolar disorder, borderline personality disorder, social anxiety disorder, suicidal attempts, depression, TBI, and generalized anxiety disorder who presents with suicidal ideation.  He lives in a group home.  Last month he overdosed on his own medications in the group home.  He was admitted to medicine and psych at that time.  He held a gun in his mouth today with suicidal thoughts.  He wrote letters and sent goodbye messages.  His mom is dying out of state.  He is tired from life.  He has chronic pain and is using a half gallon of alcohol a day.  He walks with a cane.  He hasn't been on his psych meds for about a week.  He is agreeable to inpatient.     He has pain in his lower back and has pain in his right knee.  He is supposed to have surgery in a couple months.  He states his insurance company wants him to PT. He is just sick of it.    His last drink was about 10 hours ago. He is starting to feel tremulous.  He states that he hasn't had any benzos yet for his withdrawal.  We discussed the [facility] protocol.    Past Medical History  Past Medical History:   Diagnosis Date     Alcoholism in remission (H)      Bilateral ACL tear      Bipolar disorder (H)      Borderline personality disorder (H)      Chemical dependency (H)      Chronic back pain      Closed left arm fracture 1985     H/O shoulder surgery      Post concussive encephalopathy      Social anxiety disorder      Social anxiety disorder      TBI (traumatic brain injury) (H)      No past surgical history on file.  acamprosate (CAMPRAL) 333 MG EC tablet  acetaminophen (TYLENOL) 325 MG tablet  albuterol (PROAIR HFA/PROVENTIL HFA/VENTOLIN HFA) 108 (90 Base)  MCG/ACT inhaler  amLODIPine (NORVASC) 5 MG tablet  buPROPion (WELLBUTRIN) 100 MG tablet  cyclobenzaprine (FLEXERIL) 10 MG tablet  diclofenac (VOLTAREN) 1 % topical gel  folic acid (FOLVITE) 1 MG tablet  gabapentin (NEURONTIN) 300 MG capsule  ibuprofen (ADVIL/MOTRIN) 600 MG tablet  levothyroxine (SYNTHROID/LEVOTHROID) 75 MCG tablet  melatonin 1 MG TABS tablet  multivitamin w/minerals (THERA-VIT-M) tablet  naproxen (NAPROSYN) 500 MG tablet  nicotine polacrilex (NICORETTE) 4 MG gum  oxyCODONE IR (ROXICODONE) 20 MG TABS immediate release tablet  pantoprazole (PROTONIX) 40 MG EC tablet  prazosin (MINIPRESS) 1 MG capsule  pregabalin (LYRICA) 300 MG capsule  propranolol (INDERAL) 20 MG tablet  QUEtiapine (SEROQUEL) 300 MG tablet  tiZANidine (ZANAFLEX) 4 MG tablet  topiramate (TOPAMAX) 50 MG tablet  Lidocaine (LIDOCARE) 4 % Patch  polyethylene glycol (MIRALAX) 17 GM/Dose powder  senna-docusate (SENOKOT-S/PERICOLACE) 8.6-50 MG tablet  sennosides (SENOKOT) 8.6 MG tablet      Allergies   Allergen Reactions     Cucumber Extract Anaphylaxis     Cucumber the vegable     Hydroxyzine Hives     Previously unreported by patient, this is a new patient declaration as of 5/1/21.     Nsaids      No problem with oral NSAIDs--Toradol injection itching only.     Trazodone Itching     Per Patient     Family History  No family history on file.  Social History   Social History     Tobacco Use     Smoking status: Former Smoker     Types: Dip, chew, snus or snuff     Smokeless tobacco: Former User     Types: Chew   Substance Use Topics     Alcohol use: Not Currently     Drug use: Not Currently      Past medical history, past surgical history, medications, allergies, family history, and social history were reviewed with the patient. No additional pertinent items.       Review of Systems  A complete review of systems was performed with pertinent positives and negatives noted in the HPI, and all other systems negative.    Physical Examination   BP:  "(!) 173/98  Pulse: 86  Temp: 98.4  F (36.9  C)  Resp: 16  Height: 195.1 cm (6' 4.8\")  Weight: 136.1 kg (300 lb)  SpO2: 96 %    Physical Exam  General: Appears stated age.   Neuro: Alert and fully oriented. Uses walker, knee brace on right knee, tremors present bilaterally  Integumentary/Skin: no rash visualized, normal color    Psychiatric Examination   Appearance: awake, alert, adequately groomed and appeared as age stated  Attitude:  cooperative  Eye Contact:  fair  Mood:  \"ok\"  Affect:  appropriate and in normal range, mood congruent, intensity is normal and full range  Speech:  clear, coherent  Psychomotor Behavior:  no evidence of tardive dyskinesia, dystonia, or tics  Thought Process:  logical, linear and goal oriented  Associations:  no loose associations  Thought Content:  Reports suicidal ideation, denies homicidal ideation and no evidence of psychotic thought  Insight:  fair  Judgement:  fair  Oriented to:  time, person, and place  Attention Span and Concentration:  intact  Recent and Remote Memory:  intact  Language: able to name/identify objects without impairment  Fund of Knowledge: intact with awareness of current and past events    ED Course        Labs Ordered and Resulted from Time of ED Arrival to Time of ED Departure   COMPREHENSIVE METABOLIC PANEL - Abnormal       Result Value    Sodium 143      Potassium 3.3 (*)     Chloride 109      Carbon Dioxide (CO2) 29      Anion Gap 5      Urea Nitrogen 10      Creatinine 0.61 (*)     Calcium 9.1      Glucose 93      Alkaline Phosphatase 72      AST 32      ALT 59      Protein Total 8.9 (*)     Albumin 4.3      Bilirubin Total 0.4      GFR Estimate >90     ETHYL ALCOHOL LEVEL - Abnormal    Alcohol ethyl 0.18 (*)    MAGNESIUM - Normal    Magnesium 2.3     PHOSPHORUS - Normal    Phosphorus 2.9     COVID-19 VIRUS (CORONAVIRUS) BY PCR - Normal    SARS CoV2 PCR Negative     CBC WITH PLATELETS AND DIFFERENTIAL    WBC Count 7.7      RBC Count 5.15      Hemoglobin " 15.2      Hematocrit 45.8      MCV 89      MCH 29.5      MCHC 33.2      RDW 14.8      Platelet Count 410      % Neutrophils 57      % Lymphocytes 37      % Monocytes 5      % Eosinophils 0      % Basophils 1      % Immature Granulocytes 0      NRBCs per 100 WBC 0      Absolute Neutrophils 4.4      Absolute Lymphocytes 2.9      Absolute Monocytes 0.4      Absolute Eosinophils 0.0      Absolute Basophils 0.0      Absolute Immature Granulocytes 0.0      Absolute NRBCs 0.0         Assessments & Plan (with Medical Decision Making)   Patient presenting with suicidal ideation with a plan to overdose in the context of chronic pain and heavy alcohol use.  He reports hopelessness and feeling like life isn't worth living anymore.  We will continue his PTA meds while on the empath unit waiting for a bed. Nursing notes reviewed noting no acute issues.     I have reviewed the assessment completed by the [facility].     The patient was found to have a psychiatric condition that would benefit from an inpatient stay for further psychiatric stabilization and/or coordination of a safe disposition. The plan includes serial assessments of psychiatric condition, potential administration of medications if indicated, further disposition pending the patient's psychiatric course during the monitoring period.         Preliminary diagnosis:    ICD-10-CM    1. Suicidal ideation  R45.851         Treatment Plan:  -Recommend admission to inpatient  -while waiting, will continue PTA medications  -will continue patient on [facility] protocol  --  Judie Calvin MD   [facility] EMERGENCY DEPT  EmPATH Unit  1/7/2022
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
## 2022-01-08 NOTE — ED PROVIDER NOTES
"[facility] Unit - Psychiatric Observation Discharge Summary  [facility] Emergency Department  Discharge Date: 1/8/2022    Hollis Barclay MRN: [id number]   Age: 51 year old YOB: 1970     Brief HPI & Initial ED Course     Chief Complaint   Patient presents with     Suicidal     HPI  Hollis Barclay is a 51 year old male with history notable for alcoholism, bipolar with depression, anxiety disorder, TBI, and past suicide attempts who presents to the ED with suicidal ideation.  Patient was medically cleared in the ED, transferred to [facility] for psychiatric assessment where he was placed on observation status and assessed by Dr. Calvin who recommended inpatient treatment.  He apparently had a gun in his mouth yesterday verbalizing suicidal thoughts and wrote goodbye messages.  He has several significant stressors negatively impacting his ability to function.  He indicates his mother is dying, he has chronic knee pain and is in need of surgery, and he also is unhappy with his current living situation as he indicates there are too many drugs and substances which make it challenging for him to maintain his own sobriety.  On re-examination today, patient states he struggled with sleep last night.  He is experiencing signs and symptoms of alcohol withdrawal such as tremors, nausea, headache, and feeling as if his skin is crawling.  Nursing has been managing his withdrawal with Valium per CIWA protocol. He is agreeable to transferring to inpatient today as he states he is finding it difficult to want to live feeling how depressed he is along with suffering in pain. He anticipates if he can get manage his depression and alcohol consumption, he would be in a better place to tolerate the other stressors in his life.        Physical Examination   BP: (!) 153/84  Pulse: 82  Temp: 98.5  F (36.9  C)  Resp: 18  Height: 195.1 cm (6' 4.8\")  Weight: 136.1 kg (300 lb)  SpO2: 95 %    Physical Exam  General: Appears stated age. "   Neuro: Alert and fully oriented. Extremities appear to demonstrate normal strength on visual inspection.   Integumentary/Skin: no rash visualized, normal color    Psychiatric Examination   Appearance: awake, alert  Attitude:  cooperative  Eye Contact:  good  Mood:  better  Affect:  appropriate and in normal range  Speech:  clear, coherent  Psychomotor Behavior:  no evidence of tardive dyskinesia, dystonia, or tics and tremor observed   Thought Process:  logical, linear and goal oriented  Associations:  no loose associations  Thought Content:  no evidence of psychotic thought and passive suicidal ideation present  Insight:  good  Judgement:  fair  Oriented to:  time, person, and place  Attention Span and Concentration:  fair  Recent and Remote Memory:  fair  Language: able to name/identify objects without impairment  Fund of Knowledge: intact with awareness of current and past events    Results        Labs Ordered and Resulted from Time of ED Arrival to Time of ED Departure   COMPREHENSIVE METABOLIC PANEL - Abnormal       Result Value    Sodium 143      Potassium 3.3 (*)     Chloride 109      Carbon Dioxide (CO2) 29      Anion Gap 5      Urea Nitrogen 10      Creatinine 0.61 (*)     Calcium 9.1      Glucose 93      Alkaline Phosphatase 72      AST 32      ALT 59      Protein Total 8.9 (*)     Albumin 4.3      Bilirubin Total 0.4      GFR Estimate >90     ETHYL ALCOHOL LEVEL - Abnormal    Alcohol ethyl 0.18 (*)    MAGNESIUM - Normal    Magnesium 2.3     PHOSPHORUS - Normal    Phosphorus 2.9     COVID-19 VIRUS (CORONAVIRUS) BY PCR - Normal    SARS CoV2 PCR Negative     CBC WITH PLATELETS AND DIFFERENTIAL    WBC Count 7.7      RBC Count 5.15      Hemoglobin 15.2      Hematocrit 45.8      MCV 89      MCH 29.5      MCHC 33.2      RDW 14.8      Platelet Count 410      % Neutrophils 57      % Lymphocytes 37      % Monocytes 5      % Eosinophils 0      % Basophils 1      % Immature Granulocytes 0      NRBCs per 100 WBC 0       Absolute Neutrophils 4.4      Absolute Lymphocytes 2.9      Absolute Monocytes 0.4      Absolute Eosinophils 0.0      Absolute Basophils 0.0      Absolute Immature Granulocytes 0.0      Absolute NRBCs 0.0         Observation Course   The patient was found to have a psychiatric condition that would benefit from an observation stay in the emergency department for further psychiatric stabilization and/or coordination of a safe disposition. The plan upon observation admission included serial assessments of psychiatric condition, potential administration of medications if indicated, further disposition pending the patient's psychiatric course during the monitoring period.     Serial assessments of the patient's psychiatric condition were performed. Nursing notes were reviewed. During the observation period, the patient did not require medications for agitation, and did not require restraints/seclusion for patient and/or provider safety.       Discharge Diagnoses:   Final diagnoses:   Suicidal ideation   PEDRO LUIS (generalized anxiety disorder)   Alcoholism (H)   Alcohol withdrawal syndrome without complication (H)   Other chronic pain       Treatment Plan:  -Transfer to inpatient: 66  -Continue to manage alcohol withdrawal with CIWA protocol.  -Continue PTA medications:    Prior to Admission medications    Medication Sig Last Dose Taking? Auth Provider   acamprosate (CAMPRAL) 333 MG EC tablet Take 2 tablets (666 mg) by mouth 3 times daily Past Week at Unknown time Yes Ulises Perez MD   acetaminophen (TYLENOL) 325 MG tablet Take 3 tablets (975 mg) by mouth every 6 hours as needed for mild pain, fever or headaches (to moderate pain) Past Week at Unknown time Yes Ulises Perez MD   albuterol (PROAIR HFA/PROVENTIL HFA/VENTOLIN HFA) 108 (90 Base) MCG/ACT inhaler Inhale 2 puffs into the lungs every 4 hours as needed for wheezing or shortness of breath / dyspnea Past Week at Unknown time Yes Ulises Perez MD   amLODIPine  (NORVASC) 5 MG tablet Take 1 tablet (5 mg) by mouth daily Past Week at Unknown time Yes Ulises Perez MD   buPROPion (WELLBUTRIN) 100 MG tablet Take 200 mg by mouth 2 times daily Past Week at Unknown time Yes Reported, Patient   cyclobenzaprine (FLEXERIL) 10 MG tablet Take 10 mg by mouth 3 times daily as needed for muscle spasms Past Week at Unknown time Yes Reported, Patient   diclofenac (VOLTAREN) 1 % topical gel Apply 2 g topically 4 times daily as needed for moderate pain Past Week at Unknown time Yes Ulises Perez MD   folic acid (FOLVITE) 1 MG tablet Take 1 tablet (1,000 mcg) by mouth daily Past Week at Unknown time Yes Ulises Perez MD   gabapentin (NEURONTIN) 300 MG capsule Take 300 mg by mouth 3 times daily Past Week at Unknown time Yes Reported, Patient   ibuprofen (ADVIL/MOTRIN) 600 MG tablet Take 600 mg by mouth every 6 hours as needed for moderate pain Past Week at Unknown time Yes Reported, Patient   levothyroxine (SYNTHROID/LEVOTHROID) 75 MCG tablet Take 1 tablet (75 mcg) by mouth every morning (before breakfast) Past Week at Unknown time Yes Ulises Perez MD   melatonin 1 MG TABS tablet Take 1 mg by mouth nightly as needed for sleep Past Week at Unknown time Yes Reported, Patient   multivitamin w/minerals (THERA-VIT-M) tablet Take 1 tablet by mouth daily Past Week at Unknown time Yes Ulises Perez MD   naproxen (NAPROSYN) 500 MG tablet Take 500 mg by mouth 2 times daily (with meals) Past Week at Unknown time Yes Reported, Patient   nicotine polacrilex (NICORETTE) 4 MG gum Place 1-2 each (4-8 mg) inside cheek every hour as needed for other (nicotine withdrawal symptoms) 1/7/2022 at Unknown time Yes Ulises Perez MD   oxyCODONE IR (ROXICODONE) 20 MG TABS immediate release tablet Take 20 mg by mouth 5 times daily 1/7/2022 at Unknown time Yes Ulises Perez MD   pantoprazole (PROTONIX) 40 MG EC tablet Take 1 tablet (40 mg) by mouth every morning (before breakfast) Past Week at Unknown time Yes  Ulises Perez MD   prazosin (MINIPRESS) 1 MG capsule Take 1 capsule (1 mg) by mouth At Bedtime Past Week at Unknown time Yes Ulises Peerz MD   pregabalin (LYRICA) 300 MG capsule Take 1 capsule (300 mg) by mouth 3 times daily Past Week at Unknown time Yes Ulises Perez MD   propranolol (INDERAL) 20 MG tablet Take 20 mg by mouth 2 times daily Past Week at Unknown time Yes Reported, Patient   QUEtiapine (SEROQUEL) 300 MG tablet Take 2 tablets (600 mg) by mouth At Bedtime Past Week at Unknown time Yes Ulises Perez MD   tiZANidine (ZANAFLEX) 4 MG tablet Take 1 tablet (4 mg) by mouth every 8 hours as needed for other (chronic back pain) Past Week at Unknown time Yes Ulises Perez MD   topiramate (TOPAMAX) 50 MG tablet Take 1 tablet (50 mg) by mouth 2 times daily Past Week at Unknown time Yes Ulises Perez MD   Lidocaine (LIDOCARE) 4 % Patch Place 1 patch onto the skin every 24 hours To prevent lidocaine toxicity, patient should be patch free for 12 hrs daily.   Ulises Perez MD   polyethylene glycol (MIRALAX) 17 GM/Dose powder Take 17 g by mouth daily   Ulises Perez MD   senna-docusate (SENOKOT-S/PERICOLACE) 8.6-50 MG tablet Take 1 tablet by mouth At Bedtime   Ulises Perez MD   sennosides (SENOKOT) 8.6 MG tablet Take 1-2 tablets by mouth 2 times daily as needed for constipation   Ulises Perez MD       -Do not anticipate patient to be a safety concern regarding suicidal thoughts, he is voluntary for inpatient treatment, states he feels safe in a structured environment.      --  LATOYA Mendoza CNP  [facility] EMERGENCY DEPT  EmPATH Unit  1/8/2022      Chinyere Morales APRN CNP  01/08/22 1246       Chinyere Morales APRN CNP  01/08/22 1547

## 2022-01-08 NOTE — SAFE
Hollis Barclay  January 7, 2022  SAFE Note    Critical Safety Issues: suicide attempt, suicidal ideation      Current Suicidal Ideation/Self-Injurious Concerns/Methods: Cutting      Current or Historical Inappropriate Sexual Behavior: No      Current or Historical Aggression/Homicidal Ideation: Access to Weapons      Triggers: addiction, pain    Updated care team: Yes:     For additional details see full LMHP assessment.       Meg Hillman LPCC

## 2022-01-08 NOTE — ED NOTES
Patient states he can't go back to [facility] where he lives. States people are doing cocaine there and drinking and he is at risk. He states he had a friend come and get the gun he had in his room. Also states that the director there says people can do what they want in their room. Patient states he has had alcohol withdrawal seizures in the past. Feels if he would have pulled trigger it would have been very upsetting to his mother and sister who live in [place]. Motivated to change.
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
## 2022-01-09 PROCEDURE — 124N000002 HC R&B MH UMMC

## 2022-01-09 PROCEDURE — 250N000013 HC RX MED GY IP 250 OP 250 PS 637: Performed by: PSYCHIATRY & NEUROLOGY

## 2022-01-09 RX ORDER — BUPROPION HYDROCHLORIDE 300 MG/1
300 TABLET ORAL DAILY
Status: DISCONTINUED | OUTPATIENT
Start: 2022-01-10 | End: 2022-01-10

## 2022-01-09 RX ORDER — TOPIRAMATE 100 MG/1
100 TABLET, FILM COATED ORAL 2 TIMES DAILY
Status: DISCONTINUED | OUTPATIENT
Start: 2022-01-09 | End: 2022-01-14

## 2022-01-09 RX ADMIN — MULTIPLE VITAMINS W/ MINERALS TAB 1 TABLET: TAB at 08:19

## 2022-01-09 RX ADMIN — OXYCODONE HYDROCHLORIDE 20 MG: 10 TABLET ORAL at 11:36

## 2022-01-09 RX ADMIN — DIAZEPAM 5 MG: 5 TABLET ORAL at 14:45

## 2022-01-09 RX ADMIN — ACAMPROSATE CALCIUM 666 MG: 333 TABLET, DELAYED RELEASE ORAL at 20:52

## 2022-01-09 RX ADMIN — OXYCODONE HYDROCHLORIDE 20 MG: 10 TABLET ORAL at 21:25

## 2022-01-09 RX ADMIN — ACAMPROSATE CALCIUM 666 MG: 333 TABLET, DELAYED RELEASE ORAL at 08:18

## 2022-01-09 RX ADMIN — AMLODIPINE BESYLATE 5 MG: 5 TABLET ORAL at 08:19

## 2022-01-09 RX ADMIN — FOLIC ACID 1 MG: 1 TABLET ORAL at 08:18

## 2022-01-09 RX ADMIN — NICOTINE POLACRILEX 8 MG: 4 GUM, CHEWING ORAL at 13:34

## 2022-01-09 RX ADMIN — LEVOTHYROXINE SODIUM 75 MCG: 75 TABLET ORAL at 08:19

## 2022-01-09 RX ADMIN — BUPROPION HYDROCHLORIDE 200 MG: 100 TABLET, FILM COATED ORAL at 08:18

## 2022-01-09 RX ADMIN — OXYCODONE HYDROCHLORIDE 20 MG: 10 TABLET ORAL at 17:02

## 2022-01-09 RX ADMIN — THIAMINE HCL TAB 100 MG 100 MG: 100 TAB at 08:19

## 2022-01-09 RX ADMIN — NICOTINE POLACRILEX 8 MG: 4 GUM, CHEWING ORAL at 23:15

## 2022-01-09 RX ADMIN — DOCUSATE SODIUM 50 MG AND SENNOSIDES 8.6 MG 1 TABLET: 8.6; 5 TABLET, FILM COATED ORAL at 21:25

## 2022-01-09 RX ADMIN — TOPIRAMATE 50 MG: 50 TABLET ORAL at 08:18

## 2022-01-09 RX ADMIN — PANTOPRAZOLE SODIUM 40 MG: 40 TABLET, DELAYED RELEASE ORAL at 08:19

## 2022-01-09 RX ADMIN — PROPRANOLOL HYDROCHLORIDE 20 MG: 20 TABLET ORAL at 08:18

## 2022-01-09 RX ADMIN — NICOTINE POLACRILEX 8 MG: 4 GUM, CHEWING ORAL at 21:35

## 2022-01-09 RX ADMIN — PRAZOSIN HYDROCHLORIDE 1 MG: 1 CAPSULE ORAL at 21:25

## 2022-01-09 RX ADMIN — OXYCODONE HYDROCHLORIDE 20 MG: 10 TABLET ORAL at 08:18

## 2022-01-09 RX ADMIN — ACAMPROSATE CALCIUM 666 MG: 333 TABLET, DELAYED RELEASE ORAL at 14:45

## 2022-01-09 RX ADMIN — MELATONIN TAB 3 MG 3 MG: 3 TAB at 23:15

## 2022-01-09 RX ADMIN — NICOTINE POLACRILEX 8 MG: 4 GUM, CHEWING ORAL at 17:04

## 2022-01-09 RX ADMIN — NICOTINE POLACRILEX 8 MG: 4 GUM, CHEWING ORAL at 07:43

## 2022-01-09 RX ADMIN — PREGABALIN 300 MG: 100 CAPSULE ORAL at 08:52

## 2022-01-09 RX ADMIN — NAPROXEN 500 MG: 500 TABLET ORAL at 17:02

## 2022-01-09 RX ADMIN — DIAZEPAM 5 MG: 5 TABLET ORAL at 12:13

## 2022-01-09 RX ADMIN — OXYCODONE HYDROCHLORIDE 20 MG: 10 TABLET ORAL at 14:45

## 2022-01-09 RX ADMIN — PREGABALIN 300 MG: 100 CAPSULE ORAL at 20:52

## 2022-01-09 RX ADMIN — NAPROXEN 500 MG: 500 TABLET ORAL at 08:19

## 2022-01-09 RX ADMIN — NICOTINE POLACRILEX 8 MG: 4 GUM, CHEWING ORAL at 10:13

## 2022-01-09 RX ADMIN — TOPIRAMATE 100 MG: 50 TABLET ORAL at 20:52

## 2022-01-09 RX ADMIN — GABAPENTIN 300 MG: 300 CAPSULE ORAL at 08:19

## 2022-01-09 RX ADMIN — NICOTINE POLACRILEX 8 MG: 4 GUM, CHEWING ORAL at 08:52

## 2022-01-09 RX ADMIN — DIAZEPAM 5 MG: 5 TABLET ORAL at 16:59

## 2022-01-09 RX ADMIN — PREGABALIN 300 MG: 100 CAPSULE ORAL at 14:45

## 2022-01-09 RX ADMIN — QUETIAPINE 600 MG: 300 TABLET, FILM COATED ORAL at 21:25

## 2022-01-09 RX ADMIN — NICOTINE POLACRILEX 8 MG: 4 GUM, CHEWING ORAL at 14:45

## 2022-01-09 RX ADMIN — DIAZEPAM 5 MG: 5 TABLET ORAL at 08:19

## 2022-01-09 RX ADMIN — DIAZEPAM 5 MG: 5 TABLET ORAL at 20:52

## 2022-01-09 RX ADMIN — PROPRANOLOL HYDROCHLORIDE 20 MG: 20 TABLET ORAL at 20:52

## 2022-01-09 ASSESSMENT — ACTIVITIES OF DAILY LIVING (ADL)
DRESS: INDEPENDENT
LAUNDRY: WITH SUPERVISION
HYGIENE/GROOMING: INDEPENDENT
DRESS: INDEPENDENT
ORAL_HYGIENE: INDEPENDENT
ORAL_HYGIENE: INDEPENDENT
LAUNDRY: WITH SUPERVISION
HYGIENE/GROOMING: INDEPENDENT
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
## 2022-01-09 NOTE — H&P
Admitted: 01/08/2022    HISTORY OF PRESENT ILLNESS:  This is one of several [facility] psychiatric admissions for this 51-year-old  white male with a long history of bipolar disorder, social anxiety disorder, generalized anxiety disorder, polysubstance abuse and severe alcohol use disorder, who checked into [facility] complaining of being increasingly more depressed in the context of excessive drinking.  It should be noted that he was just discharged from this hospital, station 3B on 12/24/2021 and started drinking immediately after the discharge.  He presented with alcohol withdrawal symptoms and suicidal ideation.  It was felt that he needed to be admitted to the psychiatric unit and he was transferred to this hospital and placed on station 32 where he was seen by the undersigned.    HISTORY OF PRESENT ILLNESS:  The patient has a long history of bipolar disorder, manifested by both periods of depression usually aggravated by alcohol abuse and periods of hypomania.  He was hospitalized psychiatrically on numerous occasions and has been treated in the past with various antidepressants and mood stabilizers.  He has been on lithium, Seroquel, Zyprexa, Zoloft, Cymbalta, Remeron and Effexor at different stages of his life.  He was last discharged from the hospital on 12/24/2021 on the following medications:  Seroquel 600 mg at bedtime, Suboxone 8-2 mg per film 1 film t.i.d., Lyrica 200 mg t.i.d., Topamax 50 mg b.i.d., prazosin 1 mg at bedtime, gabapentin 100 mg t.i.d. p.r.n., melatonin 3 mg at bedtime p.r.n. and Zyprexa 5 mg q. 6 hours p.r.n.  He has been taking his medications sporadically after the discharge, mainly because he started drinking almost immediately.    It should be noted that he was also recently seen by a psychiatrist, Dr. Tompkins, in mid November of this year after a suicide attempt via overdose on oxycodone, Seroquel and Suboxone while drinking about a gallon  of vodka.  This time too he has been feeling suicidal and was thinking about shooting himself with a gun.  When I saw the patient today, he told me that he felt there was no purpose to live and he might as well kill himself.  However, he had no particular plan in mind.    CHEMICAL USE HISTORY:  Quite extensive.  He started drinking at the age of 17, but his drinking became problematic in  after the divorce.  He has been drinking large volumes of alcohol on a daily basis with very little sobriety time.  He does have a history of DWIs, DTs, withdrawal seizures and had multiple chemical dependency admissions and detox admissions.  He was on 3A on 3 separate occasions just in the course of , particularly in April, August and 2021.  Additionally, the patient has been abusing methamphetamines and cocaine.  He started using opioids in  after a back injury.  He was then started on Percocet, switched to OxyContin and eventually switched to methadone.  Most recently, he has been on Suboxone maintenance.    FAMILY HISTORY:  Remarkable for polysubstance abuse in one of his daughters who had apparently  following an overdose.    PAST MEDICAL HISTORY:  Remarkable for TBI with postconcussive encephalopathy, chronic back pain, nontraumatic rhabdomyolysis and right shoulder replacement.    ALLERGIES:  HE IS ALLERGIC TO HYDROXYZINE, NSAIDS AND TRAZODONE AS WELL AS CUCUMBER EXTRACT, TYPE OF REACTIONS UNKNOWN.    BRIEF SOCIAL HISTORY:  The patient was born and raised in [place].  He graduated from high school.  His work history is rather sketchy.  He was  and  and this marriage had produced 2 daughters, one of whom had  of an overdose.  The patient has been on social security disability for quite some time.  Most recently, he has been living in a group home.    MENTAL STATUS EXAMINATION:  Revealed a normally built and normally developed 51-year-old white male, appearing about his stated  age.  He was alert and oriented x 3.  His speech was coherent and goal directed.  He appeared to be depressed, hopeless and helpless with fleeting suicidal thoughts.  He denied hallucinations and no prominent delusions could be noted or elicited.  He does present with some alcohol withdrawal symptoms.  He has marginal insight into his problems and his judgment is impaired.    DIAGNOSTIC IMPRESSION:  AXIS I:   1.  Bipolar disorder, depressed.  2.  Anxiety disorder, not otherwise specified.  3.  Alcohol use disorder, severe.  4.  Alcohol withdrawal syndrome.  5.  Opiate abuse, on Suboxone maintenance.  6.  Polysubstance abuse by history.    AXIS II:  Borderline personality disorder by history.    PLAN:  Will observe the patient closely in the milieu of the locked psychiatric unit with suicide precautions and status 15.  I will continue [facility] protocol with Valium.  I will resume his [id number] medications except for Suboxone, which I will temporarily hold.  I will also discontinue Wellbutrin immediate release and replace it with Wellbutrin- mg q.a.m.  I will increase his Topamax to 100 mg b.i.d. as it was planned during his last psychiatric admission.    The patient's care will be assumed by Jena Grimm, LATOYA, CNP, on Monday.    Bernard Bustos MD        D: 2022   T: 2022   MT: JIGAR    Name:     GRETA FERREIRA  MRN:      [id number]        Account:     [id number]   :      1970           Admitted:    2022       Document: [id number]

## 2022-01-09 NOTE — H&P
PSYCHIATRIC ADMISSION SUMMARY    Admission Date: 01/08/2022  Date of Service: 01/09/2022    The patient was seen, his chart reviewed, his case discussed with staff, full report to follow.    DIAGNOSTIC IMPRESSION   Bipolar Disorder, depressed   Anxiety Disorder NOS   Alcohol Use Disorder   Alcohol Withdrawal Syndrome  Polysubstance Abuse by history   BPD by history     PLAN: Close observation. Suicide precautions. Status 15. Continue MSSA protocol with Valium. The patient is on Wellbutrin Immediate release which may be contributing to his anxiety. I will replace it with Wellbutrin  mg QAM and  increase his Topamax to 100 mg BID. I will continue Seroquel, Prazosin and Lyrica in the same doses. Gabapentin will be discontinued.  LATOYA Mg CNP will assume his care on Monday.    Bernard Bustos MD
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
## 2022-01-09 NOTE — PLAN OF CARE
Received patient sleeping in his room when shift started. Breathing easy and non labored.  At 0030 vitals assessed and were WNL. MSSA was 3, no intervention needed. MSSA at 0430 was 3. Patient barely awake but able to answer assessment questions, no tremors or sweating observed. Patient slept for about 6.75 hours.

## 2022-01-09 NOTE — PROGRESS NOTES
Patient agreeable to transfer plan. Transfer process explained and questions answered. Patient continues to endorse 7/10 pain in right knee as well as chronic back pain. CIWA score of 14 at 1946; medication administered (see MAR). CIWA score of 6 at 2043. Pt endorses passive SI but contracts for safety here in hospital. Pt conversational and pleasant with staff this evening. All belongings that were brought into the hospital have been returned to patient. Escorted off the unit at 2044 accompanied by EMS. Transferred to [place] [id number] via EMS.

## 2022-01-09 NOTE — PLAN OF CARE
"At 0430, MSSA score was 3. Patient presented as sleeping very soundly, breathing was even and unlabored. When assessment questions were asked patient responded \"I'm trying to sleep!\". No additional interventions at this time. Nursing will continue to monitor.  "

## 2022-01-10 PROCEDURE — 124N000002 HC R&B MH UMMC

## 2022-01-10 PROCEDURE — 99233 SBSQ HOSP IP/OBS HIGH 50: CPT | Performed by: NURSE PRACTITIONER

## 2022-01-10 PROCEDURE — 250N000013 HC RX MED GY IP 250 OP 250 PS 637: Performed by: PSYCHIATRY & NEUROLOGY

## 2022-01-10 PROCEDURE — 250N000013 HC RX MED GY IP 250 OP 250 PS 637: Performed by: NURSE PRACTITIONER

## 2022-01-10 RX ORDER — QUETIAPINE FUMARATE 400 MG/1
400 TABLET, FILM COATED ORAL AT BEDTIME
Status: DISCONTINUED | OUTPATIENT
Start: 2022-01-10 | End: 2022-01-10

## 2022-01-10 RX ORDER — OXYCODONE HYDROCHLORIDE 10 MG/1
20 TABLET ORAL 4 TIMES DAILY
Status: DISCONTINUED | OUTPATIENT
Start: 2022-01-10 | End: 2022-01-10

## 2022-01-10 RX ORDER — QUETIAPINE FUMARATE 300 MG/1
600 TABLET, FILM COATED ORAL AT BEDTIME
Status: DISCONTINUED | OUTPATIENT
Start: 2022-01-10 | End: 2022-02-08

## 2022-01-10 RX ORDER — DOCUSATE SODIUM 100 MG/1
100 CAPSULE, LIQUID FILLED ORAL 2 TIMES DAILY
Status: DISCONTINUED | OUTPATIENT
Start: 2022-01-10 | End: 2022-03-18

## 2022-01-10 RX ORDER — DOCUSATE SODIUM 100 MG/1
100 CAPSULE, LIQUID FILLED ORAL 2 TIMES DAILY
Status: ON HOLD | COMMUNITY
End: 2022-02-02

## 2022-01-10 RX ORDER — OXYCODONE HYDROCHLORIDE 10 MG/1
10 TABLET ORAL 4 TIMES DAILY
Status: DISCONTINUED | OUTPATIENT
Start: 2022-01-10 | End: 2022-01-12

## 2022-01-10 RX ADMIN — NICOTINE POLACRILEX 8 MG: 4 GUM, CHEWING ORAL at 19:12

## 2022-01-10 RX ADMIN — ACAMPROSATE CALCIUM 666 MG: 333 TABLET, DELAYED RELEASE ORAL at 09:28

## 2022-01-10 RX ADMIN — TOPIRAMATE 100 MG: 50 TABLET ORAL at 21:24

## 2022-01-10 RX ADMIN — QUETIAPINE 600 MG: 300 TABLET, FILM COATED ORAL at 21:23

## 2022-01-10 RX ADMIN — PROPRANOLOL HYDROCHLORIDE 20 MG: 20 TABLET ORAL at 20:07

## 2022-01-10 RX ADMIN — LEVOTHYROXINE SODIUM 75 MCG: 75 TABLET ORAL at 09:28

## 2022-01-10 RX ADMIN — OXYCODONE HYDROCHLORIDE 10 MG: 10 TABLET ORAL at 16:20

## 2022-01-10 RX ADMIN — TIZANIDINE 4 MG: 4 TABLET ORAL at 17:28

## 2022-01-10 RX ADMIN — NICOTINE POLACRILEX 8 MG: 4 GUM, CHEWING ORAL at 11:50

## 2022-01-10 RX ADMIN — PROPRANOLOL HYDROCHLORIDE 20 MG: 20 TABLET ORAL at 09:28

## 2022-01-10 RX ADMIN — PREGABALIN 300 MG: 100 CAPSULE ORAL at 14:25

## 2022-01-10 RX ADMIN — AMLODIPINE BESYLATE 5 MG: 5 TABLET ORAL at 09:29

## 2022-01-10 RX ADMIN — NAPROXEN 500 MG: 500 TABLET ORAL at 17:27

## 2022-01-10 RX ADMIN — THIAMINE HCL TAB 100 MG 100 MG: 100 TAB at 09:28

## 2022-01-10 RX ADMIN — OXYCODONE HYDROCHLORIDE 20 MG: 10 TABLET ORAL at 09:28

## 2022-01-10 RX ADMIN — ACAMPROSATE CALCIUM 666 MG: 333 TABLET, DELAYED RELEASE ORAL at 14:26

## 2022-01-10 RX ADMIN — NICOTINE POLACRILEX 8 MG: 4 GUM, CHEWING ORAL at 17:27

## 2022-01-10 RX ADMIN — POLYETHYLENE GLYCOL 3350 17 G: 17 POWDER, FOR SOLUTION ORAL at 09:32

## 2022-01-10 RX ADMIN — NICOTINE POLACRILEX 8 MG: 4 GUM, CHEWING ORAL at 16:27

## 2022-01-10 RX ADMIN — OXYCODONE HYDROCHLORIDE 10 MG: 10 TABLET ORAL at 20:07

## 2022-01-10 RX ADMIN — NAPROXEN 500 MG: 500 TABLET ORAL at 09:29

## 2022-01-10 RX ADMIN — NICOTINE POLACRILEX 8 MG: 4 GUM, CHEWING ORAL at 13:04

## 2022-01-10 RX ADMIN — PRAZOSIN HYDROCHLORIDE 1 MG: 1 CAPSULE ORAL at 21:23

## 2022-01-10 RX ADMIN — NICOTINE POLACRILEX 8 MG: 4 GUM, CHEWING ORAL at 21:30

## 2022-01-10 RX ADMIN — MULTIPLE VITAMINS W/ MINERALS TAB 1 TABLET: TAB at 09:29

## 2022-01-10 RX ADMIN — DIAZEPAM 5 MG: 5 TABLET ORAL at 21:42

## 2022-01-10 RX ADMIN — NICOTINE POLACRILEX 8 MG: 4 GUM, CHEWING ORAL at 22:59

## 2022-01-10 RX ADMIN — PREGABALIN 300 MG: 100 CAPSULE ORAL at 09:29

## 2022-01-10 RX ADMIN — DOCUSATE SODIUM 100 MG: 100 CAPSULE, LIQUID FILLED ORAL at 20:07

## 2022-01-10 RX ADMIN — TOPIRAMATE 100 MG: 50 TABLET ORAL at 09:27

## 2022-01-10 RX ADMIN — FOLIC ACID 1 MG: 1 TABLET ORAL at 09:29

## 2022-01-10 RX ADMIN — OXYCODONE HYDROCHLORIDE 20 MG: 10 TABLET ORAL at 13:11

## 2022-01-10 RX ADMIN — NICOTINE POLACRILEX 8 MG: 4 GUM, CHEWING ORAL at 10:40

## 2022-01-10 RX ADMIN — PANTOPRAZOLE SODIUM 40 MG: 40 TABLET, DELAYED RELEASE ORAL at 09:28

## 2022-01-10 RX ADMIN — DIAZEPAM 5 MG: 5 TABLET ORAL at 00:13

## 2022-01-10 RX ADMIN — PREGABALIN 300 MG: 100 CAPSULE ORAL at 20:07

## 2022-01-10 ASSESSMENT — ACTIVITIES OF DAILY LIVING (ADL)
HYGIENE/GROOMING: INDEPENDENT
LAUNDRY: WITH SUPERVISION
DRESS: INDEPENDENT
ORAL_HYGIENE: INDEPENDENT
ORAL_HYGIENE: INDEPENDENT
LAUNDRY: WITH SUPERVISION
DRESS: INDEPENDENT
HYGIENE/GROOMING: INDEPENDENT

## 2022-01-10 NOTE — PLAN OF CARE
"  Problem: Alcohol Withdrawal  Goal: Alcohol Withdrawal Symptom Control  Outcome: No Change   0047 Pt inquired about medications at the nurse station. When the MSSA procedure was used to test him, he reported experiencing visual pictures (\" Blobs of color.\"). Pt was unable to keep his eyes open and was disoriented in time and space. His MSSA score was nine, and he was given five milligrams of Valium. When the writer offered to help him with his food, he became irritated, stating, \"I can fucking do it myself.\" When ambulating, he was unsteady on his feet. The writer reminded the pt to use his immobilizer. At 0330, pt appeared to be sleeping comfortably. MSSA score 3. Pt woke up disoriented in search of the bathroom. He went to the unit entrance door but he was redirected without a problem. Pt slept for 7 hrs. No respiratory distress noted.He is on the fall, seizure, suicide and withdrawal Protocal with no related occurrence.  "

## 2022-01-10 NOTE — PROGRESS NOTES
"Patient spent the shift in the lounge. Patient noted to be sedated and nodding off frequently througout the evening. He is aroused by voice and remains A/O x4.     Patient endorses chronic SI thoughts. Contracts for safety while on the unit and agrees to inform staff if this changes. Pt report seeing \"color blobs\" visual hallucinations. Denies AH.     Patient rate anxiety and depression 8/10.     Patient uses walker to ambulate. He has a right knee brace he wears.     MSSA scores this shift as follows:   At 16:43 10 given 5 mg valium   At 2046 9 given 5 mg valium     Patient questioning why he is only getting 5mg of valium \"I was getting 10 at the other place\" Writer informed patient it all depended on his MSSA score. Patient responded \"I have the creepy crawlies, my stomach aches, my blood pressure is good.... I usually run low my normal is like 90/60-70s.... that 5 mg only gives relief for a little then goes away\"      Vital signs:  T: 97.8 oral O2: 96% room air  Sitting /71  P 85   Standing /67 P 76       "

## 2022-01-10 NOTE — PHARMACY-ADMISSION MEDICATION HISTORY
Admission Medication History Completed by Pharmacy    See [facility] Admission Navigator for prior to admission medications.     Medication History Sources:     Jose M VIDES [facility] Discharge on 12/24/21    Dispense report    Changes made to PTA medication list (reason):    Added: docusate, Narcan    Deleted:  Wellburin, cyclobenzaprine, gabapentin, Miralax    Changed: ibuprofen -> 800 mg PO Q8 hours PRN    Additional Information:    Jose M verified his home medications.    He reported the diclofenac gel and lidocaine patches are not helpful and requested ketamine/gabapenin in PLO gel.    Jose M confirmed his most recent dose of Seroquel is 600 mg/day even though a fill for Seroquel 400 mg/day was filled since his discharge from the hospital.    He reported taking both Aleve and ibuprofen at home.    Jose M confirmed he is taking oxycodone 20 mg five times per day and reported taking is last on Sunday at home. Most recent fill located for oxycodone was on 12/23/2021 for quantity 35 for 7 day supply.    Jose M has not filled propranolol 20 mg tablets since 10/24/21 for quantity 45 for 30 day supply but he reported he takes 20 mg PO BID.    Dispense report:  o Lyrica 300 mg last filled 12/23/21 for quantity 30 for 10 day supply    Prior to Admission medications    Medication Sig Last Dose Taking? Auth Provider   acamprosate (CAMPRAL) 333 MG EC tablet Take 2 tablets (666 mg) by mouth 3 times daily Past Month at Unknown time Yes Ulises Perez MD   acetaminophen (TYLENOL) 325 MG tablet Take 3 tablets (975 mg) by mouth every 6 hours as needed for mild pain, fever or headaches (to moderate pain)  Yes Ulises Perez MD   albuterol (PROAIR HFA/PROVENTIL HFA/VENTOLIN HFA) 108 (90 Base) MCG/ACT inhaler Inhale 2 puffs into the lungs every 4 hours as needed for wheezing or shortness of breath / dyspnea  Yes Ulises Perez MD   amLODIPine (NORVASC) 5 MG tablet Take 1 tablet (5 mg) by mouth daily Past Month at Unknown time Yes Ulises Perez  MD   diclofenac (VOLTAREN) 1 % topical gel Apply 2 g topically 4 times daily as needed for moderate pain Patient reported not effective Yes Ulises Perez MD   docusate sodium (COLACE) 100 MG capsule Take 100 mg by mouth 2 times daily  Yes Unknown, Entered By History   folic acid (FOLVITE) 1 MG tablet Take 1 tablet (1,000 mcg) by mouth daily Past Month at Unknown time Yes Ulises Perez MD   ibuprofen (ADVIL/MOTRIN) 800 MG tablet Take 800 mg by mouth every 8 hours as needed for moderate pain   Yes Reported, Patient   levothyroxine (SYNTHROID/LEVOTHROID) 75 MCG tablet Take 1 tablet (75 mcg) by mouth every morning (before breakfast) Past Month at Unknown time Yes Ulises Perez MD   Lidocaine (LIDOCARE) 4 % Patch Place 1 patch onto the skin every 24 hours To prevent lidocaine toxicity, patient should be patch free for 12 hrs daily. Patient reported not effective Yes Ulises Perez MD   melatonin 1 MG TABS tablet Take 1 mg by mouth nightly as needed for sleep  Yes Reported, Patient   multivitamin w/minerals (THERA-VIT-M) tablet Take 1 tablet by mouth daily Past Month at Unknown time Yes Ulises Perez MD   naloxone (NARCAN) 4 MG/0.1ML nasal spray Spray 4 mg into one nostril alternating nostrils once as needed for opioid reversal every 2-3 minutes until assistance arrives  Yes Unknown, Entered By History   naproxen (NAPROSYN) 500 MG tablet Take 500 mg by mouth 2 times daily (with meals) Past Month at Unknown time Yes Reported, Patient   nicotine polacrilex (NICORETTE) 4 MG gum Place 1-2 each (4-8 mg) inside cheek every hour as needed for other (nicotine withdrawal symptoms)  Yes Ulises Perez MD   oxyCODONE IR (ROXICODONE) 20 MG TABS immediate release tablet Take 20 mg by mouth 5 times daily Past Week at Unknown time Yes Ulises Perez MD   pantoprazole (PROTONIX) 40 MG EC tablet Take 1 tablet (40 mg) by mouth every morning (before breakfast) Past Month at Unknown time Yes Ulises Perez MD   prazosin (MINIPRESS) 1 MG  capsule Take 1 capsule (1 mg) by mouth At Bedtime Past Month at Unknown time Yes Ulises Perez MD   pregabalin (LYRICA) 300 MG capsule Take 1 capsule (300 mg) by mouth 3 times daily Past Month at Unknown time Yes Ulises Perez MD   propranolol (INDERAL) 20 MG tablet Take 20 mg by mouth 2 times daily unknown Yes Reported, Patient   QUEtiapine (SEROQUEL) 300 MG tablet Take 2 tablets (600 mg) by mouth At Bedtime Past Month at Unknown time Yes Ulises Perez MD   senna-docusate (SENOKOT-S/PERICOLACE) 8.6-50 MG tablet Take 1 tablet by mouth At Bedtime Past Month at Unknown time Yes Ulises Perez MD   sennosides (SENOKOT) 8.6 MG tablet Take 1-2 tablets by mouth 2 times daily as needed for constipation  Yes Ulises Perez MD   tiZANidine (ZANAFLEX) 4 MG tablet Take 1 tablet (4 mg) by mouth every 8 hours as needed for other (chronic back pain)  Yes Ulises Perez MD   topiramate (TOPAMAX) 50 MG tablet Take 1 tablet (50 mg) by mouth 2 times daily Past Month at Unknown time Yes Ulises Perez MD      The information provided in this note is only as accurate as the sources available at the time of the update(s).    Date completed: 01/10/22    Medication history completed by: Deja Hernandez, PharmD

## 2022-01-10 NOTE — PROGRESS NOTES
"[facility],  Psychiatric Progress Note      Impression:     Hollis \"Jose M\" Ning is a 51-year-old male admitted to [facility] on 1/8/2022.  He was admitted as a voluntary patient through the [facility] unit due to depressive symptoms and suicidal ideation.  A month prior to admission he overdosed on medications.  Just prior to admission he acquired a firearm illegally, held the loaded gun in his mouth, wrote letters, and sent goodbye messages.  He then called a friend who took the gun and called 911.  His mother is dying from cancer.  He has chronic low back pain and right knee pain.  He will need a knee replacement surgery but his COVID-19 infection and steroids given in 12/2021 have delayed this.  He lives in a group home and reports that other residents use drugs, rendering it difficult for him to maintain sobriety.  He had been consuming between a fifth and a half gallon of vodka daily and had been using cocaine a few times per month.  UTOX was positive for cocaine, benzodiazepines and opiates.  BAL was 0.18.  He had stopped taking medications with the exception of Lyrica, Seroquel and Oxycodone about 10 days prior to admission.  He reports that he relapsed because he was running low on Oxycodone due to insurance authorization issues.  Since admission, Lyrica, Seroquel, Propranolol, Prazosin and Campral were continued.  Topamax was increased.  Wellbutrin XL is being held until he is out of detox.  PRNs of Melatonin and Zyprexa were initiated.  He is tapering off Oxycodone with a plan to restart Suboxone.  [facility]  He continues to endorse suicidal ideation and contracts for safety on the unit.  Mood has been depressed, anxious and irritable.           Diagnoses:     Bipolar disorder, depressed  Borderline personality disorder  Anxiety disorder, not otherwise specified  Alcohol use disorder, " severe  Alcohol withdrawal  History of alcohol withdrawal seizures  Polysubstance abuse (opiates, cocaine, methamphetamine)  Nicotine use disorder  History of TBI  Chronic back pain  Right knee pain  Essential hypertension  GERD  Slow transit constipation  Hypothyroidism  Dilated aortic root         Plan:     Medications:  Continue Campral 666 mg TID.  Continue Prazosin 1 mg at HS.  Continue Topamax 100 mg BID.  Continue Seroquel 600 mg at HS.  Continue Lyrica 300 mg TID.  Continue Propranolol 20 mg BID.  Continue PRNs of Melatonin and Zyprexa.  Reduce Oxycodone to 10 mg QID, plan to quickly taper down to 30 mg daily and then transition to Suboxone.      Continue MSSA with Valium.      PT consult ordered.    He requests transfer to  under the care of Dr. Perez.  Dr. Perez has accepted pending bed availability.     He is requesting discharge to a different group home as residents in his current group home use drugs.   He has a PCP, outpatient psychiatry and a CADI .       Attestation:  Patient has been seen and evaluated by me, LATOYA Hoffmann CNP  The patient was counseled on nature of illness and treatment plan/options  Care was coordinated with treatment team       Clinical Global Impressions  First:  Considering your total clinical experience with this particular patient population, how severe are the patient's symptoms at this time?: 6 (01/10/22 0509)  Compared to the patient's condition at the START of treatment, this patient's condition is: 4 (01/10/22 0509)  Most recent:  Considering your total clinical experience with this particular patient population, how severe are the patient's symptoms at this time?: 6 (01/10/22 0509)  Compared to the patient's condition at the START of treatment, this patient's condition is: 4 (01/10/22 0509)            Interim History:     The patient's care was discussed with the treatment team and chart notes were reviewed.  Pt was documented as sleeping  "6.75 and 7 hours during the weekend overnight shifts.  He received 25 mg of Valium yesterday for MSSA scores > 8.  Continues to report nausea, \"skin crawling\" and tremor.  He also reports seeing \"blobs of color\" which he reports is typical when he is withdrawing.  Pt reports that knee and back pain are both 8/10.  He reports that suicidal ideation is \"the same\" with a variety of plans, and contracts for safety on the unit.  His mood is depressed, anxious and irritable.  States his sleep is \"broken\" due to nightmares.  States he will not return to his group home because all three residents use drugs, and he was obtaining drugs from them.    Provider spoke with Dr. Perez who was this patient's provider on 3[facility] last month and provided him with Oxycodone up on discharge.  Dr. Perez indicated that since the pt relapsed on alcohol and cocaine, he would not be in support of continuing Oxycodone.  He recommended a quick taper down to 30 mg daily and then transitioning back to Suboxone.  Provider apprised the patient of this.  He stated that he trusts Dr. Perez's judgment and wanted provider to tell him that he self-tapered and then relapsed because of withdrawal symptoms, as insurance would not authorize refills.  He also inquired about transfer to .  Dr. Perez notified of this request and approved, pending bed availability.    ADDENDUM:  PharmD reports he was discharged with a 1-week supply of Oxycodone and did not receive refills.  Will reduce dose to 10 mg QID, taper to 30 mg daily, and then switch to Suboxone.           Medications:     Current Facility-Administered Medications   Medication     acamprosate (CAMPRAL) EC tablet 666 mg     acetaminophen (TYLENOL) tablet 975 mg     albuterol (PROVENTIL HFA/VENTOLIN HFA) inhaler     alum & mag hydroxide-simethicone (MAALOX) suspension 30 mL     amLODIPine (NORVASC) tablet 5 mg     atenolol (TENORMIN) tablet 50 mg     cyclobenzaprine (FLEXERIL) tablet 10 mg     diazepam " (VALIUM) tablet 5-20 mg     diclofenac (VOLTAREN) 1 % topical gel 2 g     folic acid (FOLVITE) tablet 1 mg     ibuprofen (ADVIL/MOTRIN) tablet 600 mg     levothyroxine (SYNTHROID/LEVOTHROID) tablet 75 mcg     Lidocaine (LIDOCARE) 4 % Patch 1 patch     lidocaine patch in PLACE     melatonin tablet 3 mg     multivitamin w/minerals (THERA-VIT-M) tablet 1 tablet     naloxone (NARCAN) injection 0.2 mg    Or     naloxone (NARCAN) injection 0.4 mg    Or     naloxone (NARCAN) injection 0.2 mg    Or     naloxone (NARCAN) injection 0.4 mg     naproxen (NAPROSYN) tablet 500 mg     nicotine polacrilex (NICORETTE) gum 4-8 mg     OLANZapine (zyPREXA) tablet 10 mg    Or     OLANZapine (zyPREXA) injection 10 mg     oxyCODONE IR (ROXICODONE) tablet 20 mg     pantoprazole (PROTONIX) EC tablet 40 mg     polyethylene glycol (MIRALAX) Packet 17 g     prazosin (MINIPRESS) capsule 1 mg     pregabalin (LYRICA) capsule 300 mg     propranolol (INDERAL) tablet 20 mg     QUEtiapine (SEROquel) tablet 600 mg     senna-docusate (SENOKOT-S/PERICOLACE) 8.6-50 MG per tablet 1 tablet     sennosides (SENOKOT) tablet 1-2 tablet     thiamine (B-1) tablet 100 mg     tiZANidine (ZANAFLEX) tablet 4 mg     topiramate (TOPAMAX) tablet 100 mg             Allergies:     Allergies   Allergen Reactions     Cucumber Extract Anaphylaxis     Cucumber the vegable     Hydroxyzine Hives     Previously unreported by patient, this is a new patient declaration as of 5/1/21.     Nsaids      No problem with oral NSAIDs--Toradol injection itching only.     Trazodone Itching     Per Patient            Psychiatric Examination:     /82 (BP Location: Left arm, Patient Position: Supine)   Pulse 68   Temp 97.5  F (36.4  C) (Oral)   Resp 16   Wt 133.8 kg (295 lb)   SpO2 95%   BMI 35.16 kg/m      Appearance:  awake, alert, fair grooming and dressed in hospital scrubs  Attitude:  evasive  Eye Contact:  fair  Mood:  anxious, depressed and irritable  Affect:  mood  "congruent  Speech:  clear, coherent  Psychomotor Behavior:  no evidence of tardive dyskinesia, dystonia, or tics  Thought Process:  linear and goal oriented  Associations:  no loose associations  Thought Content:  Reports visual hallucinations of \"blobs of color,\" no other evidence of psychosis, endorses suicidal ideation with a variety of plans and contracts for safety on the unit, denies homicidal ideation  Insight:  partial  Judgment:  limited  Oriented to:  date, time, person, and place  Attention Span and Concentration:  some impairment  Recent and Remote Memory:  some impairment  Language:  intact, fluent English  Fund of Knowledge:  appropriate  Muscle Strength and Tone:  normal  Gait and Station:  somewhat unsteady gait, ambulates with walker, wearing brace on right leg         Labs:     No results found for this or any previous visit (from the past 24 hour(s)).  "

## 2022-01-10 NOTE — PLAN OF CARE
"    Tasks Complete:      [facility] met with patient to complete AKIL's and personal plan of care. His mood appeared irritable and made indirect eye contact when speaking. His speech is coherent and he was observed rocking back and forth while speaking.     Patient disclosed that he wants to move from his group home because everyone at his group home uses. He reports that he has a CADI CM but she has yet to find placement. He declined signing an AKIL for his group home ( [facility] #[phone]) and states \" I dont want them stealing my shit or knowing where I am. Last time I was here they stole from me. \" [facility] discussed additional outpatient services that would be beneficial for patient . Patient agreed to work with a mental health . Patient signed AKIL for the following  : therapy, psychiatry, pcp, cadi cm and [facility]. Patient completed personal plan of care.    Patient is restricted to [facility] left voicemail for CADI : Jessica Lawson with [employer]  management ([phone])Jessica's voicemail is full- unable to leave a voicemail.     [facility] left voicemail with Jessica's supervisor, Arlin- # [phone]. Arlin's voicemail mentioned his supervisors contact information # [phone]    [facility] called and left [facility]'s /admin [phone]      Post round meeting with team @9:30 am updates: Discussed discharge planning and medication management.     Current Symptoms include the following: Per  RN note, The pt. Appears confused overall and seems at times to be unsteady on his feet.  The pt. Is using his walker.  The pt. States that he is safe in the hospital, not suicidal.  The pt. denies auditory and visual hallucinations.  The pt. Does state that if he was discharged he can not contract for safety.  The pt. States that he continues to have thoughts of putting a gun in his mouth and killing himself.  The pt. does appear calm on the unit; he is eating well and reading a " book    Addressed patient needs/concerns: See above intervention with patient    Discharge Plan or Goal CADI Waiver will close 02/08/2022  Plan  He is requesting discharge to a different group home as residents in his current group home use drugs. In addition, follow up with outpatient health care follow ups  Commitment    Health Care Follow up Appointment:    Will need psych, therapy, pcp, and  mental michael cm  Medication Management Plan:  See providers notes  Housing:  [facility] #[phone]  Financial Follow ups:  He reports he has GA   SMRTed for SSDI benefits.  Care Team     Jr Giron MD  -[facility] #[phone]    CAD[facility] CM[facility] #[phone]    Arlin- supervisor for cadi cm [phone]    - Joseph Oliveros's supervisor # [phone]    Admin #[phone]       Barriers to Discharge   Ongoing stabilization  Group home placement     Referral Status  Will need referral for MH CM, psych and therapy     Legal Status  Voluntary

## 2022-01-10 NOTE — PLAN OF CARE
BEHAVIORAL TEAM DISCUSSION    Participants: Provider: Ashleigh Grimm NP    CTC: Dolores Collins [facility]   Nurse: Darlene Bran, RN  OT:  Diane Vidal    Progress: Patient admitted to [facility] on 1/8/22 and is requesting transfer to  for continuance of care with Dr. Perez. Patient requires ongoing stabilization and states he needs a new group home due to the use of drugs within the home.     Anticipated length of stay: 3-4 weeks    Continued Stay Criteria/Rationale: Ongoing stabilization    Medical/Physical: Per notes, Remarkable for TBI with postconcussive encephalopathy, chronic back pain, nontraumatic rhabdomyolysis and right shoulder replacement.    Precautions:   Behavioral Orders   Procedures     Code 2     Fall precautions     Routine Programming     As clinically indicated     Seizure precautions     Status 15     Every 15 minutes.     Suicide precautions     Patients on Suicide Precautions should have a Combination Diet ordered that includes a Diet selection(s) AND a Behavioral Tray selection for Safe Tray - with utensils, or Safe Tray - NO utensils       Withdrawal precautions     Plan: Discharge to group home setting with outpatient services and providers     Rationale for change in precautions or plan: No change

## 2022-01-10 NOTE — PLAN OF CARE
"Has been reading a book in the [facility] area. Appears to focus well as he spends a good amount of time reading.  Irritable upon approach with complaints that his alcohol withdrawal is not being treated. Patient guarded and not accepting of education and reassurance that was given. Reports his anxiety is severe as is his depression. Endorses thoughts of suicide with a plan to stick a gun in his mouth and shoot himself. Reports he feels safe in the hospital and has no plans or intentions of hurting himself while in the hospital.  Irritable with pressured speech when complaining about staff and the care he is receving. Also said, \"I got lied to too come over here you fucker's lied to me.\" When the patient was approached for his scheduled oxycodone  he raised his voice and complained that it was the wrong dose and insistent that the dose is suppose to be double of what it is currently. Then said, \"yeah, you better get someone on me\" and proceeded to walk away. Provider was notified of the patient statements and complaints about his oxycodone dosing. No new orders were obtained. The patient later returned to the [facility] area and started reading his book again and appeared comfortable. No signs of sedation after dose was given. Has a good appetite ate 100% in the dinning room. Refused hs campral and compliant with the other scheduled medications.    Prn's this shift: nicotine gum, tizanidine & valium x1  Continues on fall, seizure, withdrawal and suicide precautions  MSSA scores '4',  '3' & '8'  "

## 2022-01-10 NOTE — PLAN OF CARE
Pt.'s vital signs 116/75 blood pressure, 85 pulse.  The pt. Appears to exhibit some cognitive delay and confusion.  For example, pt. Given all his morning medications and then asked staff again for his morning medications.  The pt. States that he could not remember receiving them even though the pt. Had not moved from the chair he was sitting in.  The pt. later asked and was given nicotine gum;  the pt. stood facing staff holding on to the gum.  This staff had to direct the pt. That he had nicotine gum in his hand.  The pt. Appears confused overall and seems at times to be unsteady on his feet.  The pt. Is using his walker.  The pt. States that he is safe in the hospital, not suicidal.  The pt. denies auditory and visual hallucinations.  The pt. Does state that if he was discharged he can not contract for safety.  The pt. States that he continues to have thoughts of putting a gun in his mouth and killing himself.  The pt. does appear calm on the unit; he is eating well and reading a book.

## 2022-01-10 NOTE — PLAN OF CARE
At 0545, patient came out of his room presenting as very disoriented and confused. He began walking towards the doors to the unit with his eyes closed and his gait was observed to be extremely unsteady, at one point he leaned on to the wall and held the hand rail to steady himself. While he was walking, writer said his name several times but he continued to walk until he reached the unit doors. After he pushed on the unit doors (and they didn't open), he turned around and said he needed to use the restroom. Writer redirected him to his room and to his bathroom. Attempts to assist patient with ambulation were rejected/refused by patient and he appeared irritated (e.g., he pulled/moved away from writer abruptly). Patient's gait remained unsteady when he walked from his bathroom to his bed, but he laid back down without incident. Patient currently sleeping. Nursing will continue to monitor.

## 2022-01-10 NOTE — PLAN OF CARE
"Pt has not attended OT (long enough to bill for a session) since admit.  He was in and out of groups throughout the day. Has many complaints about \"the system\" and the way it \"has wronged him.\"    Will continue to encourage participation and completion of  self-assessment as able. OT staff will explain the purpose of being involved with treatment plan and provide options to meet current needs and goals.     "

## 2022-01-10 NOTE — PLAN OF CARE
"  Problem: Behavioral Health Plan of Care  Goal: Patient-Specific Goal (Individualization)  Flowsheets (Taken 1/10/2022 0107)  Patient Vulnerabilities:   family/relationship conflict   housing insecurity   limited support system   occupational insecurity   poor physical health   substance abuse/addiction  Patient Personal Strengths:   expressive of needs   self-reliant  Note:      Reason for hospitalization   \" im suicidal. I want to put a gun to my head and end it all\"    Goals   \" keep myself from blowing my brains out\"  \" I need  a new group home\"   "

## 2022-01-11 ENCOUNTER — APPOINTMENT (OUTPATIENT)
Dept: PHYSICAL THERAPY | Facility: CLINIC | Age: 52
End: 2022-01-11
Attending: NURSE PRACTITIONER
Payer: MEDICAID

## 2022-01-11 PROCEDURE — 250N000013 HC RX MED GY IP 250 OP 250 PS 637: Performed by: PSYCHIATRY & NEUROLOGY

## 2022-01-11 PROCEDURE — 250N000013 HC RX MED GY IP 250 OP 250 PS 637: Performed by: NURSE PRACTITIONER

## 2022-01-11 PROCEDURE — 97110 THERAPEUTIC EXERCISES: CPT | Mod: GP

## 2022-01-11 PROCEDURE — 99232 SBSQ HOSP IP/OBS MODERATE 35: CPT | Performed by: NURSE PRACTITIONER

## 2022-01-11 PROCEDURE — 124N000003 HC R&B MH SENIOR/ADOLESCENT

## 2022-01-11 PROCEDURE — 97161 PT EVAL LOW COMPLEX 20 MIN: CPT | Mod: GP

## 2022-01-11 RX ORDER — LOPERAMIDE HCL 2 MG
2 CAPSULE ORAL 4 TIMES DAILY PRN
Status: DISCONTINUED | OUTPATIENT
Start: 2022-01-11 | End: 2022-03-24 | Stop reason: HOSPADM

## 2022-01-11 RX ADMIN — FOLIC ACID 1 MG: 1 TABLET ORAL at 07:55

## 2022-01-11 RX ADMIN — NICOTINE POLACRILEX 8 MG: 4 GUM, CHEWING ORAL at 07:04

## 2022-01-11 RX ADMIN — DIAZEPAM 5 MG: 5 TABLET ORAL at 16:41

## 2022-01-11 RX ADMIN — OXYCODONE HYDROCHLORIDE 10 MG: 10 TABLET ORAL at 12:23

## 2022-01-11 RX ADMIN — DOCUSATE SODIUM 100 MG: 100 CAPSULE, LIQUID FILLED ORAL at 20:18

## 2022-01-11 RX ADMIN — PRAZOSIN HYDROCHLORIDE 1 MG: 1 CAPSULE ORAL at 21:19

## 2022-01-11 RX ADMIN — NAPROXEN 500 MG: 500 TABLET ORAL at 17:54

## 2022-01-11 RX ADMIN — OXYCODONE HYDROCHLORIDE 10 MG: 10 TABLET ORAL at 16:06

## 2022-01-11 RX ADMIN — NICOTINE POLACRILEX 8 MG: 4 GUM, CHEWING ORAL at 12:23

## 2022-01-11 RX ADMIN — DIAZEPAM 10 MG: 5 TABLET ORAL at 14:26

## 2022-01-11 RX ADMIN — ACAMPROSATE CALCIUM 666 MG: 333 TABLET, DELAYED RELEASE ORAL at 07:55

## 2022-01-11 RX ADMIN — ACETAMINOPHEN 975 MG: 325 TABLET, FILM COATED ORAL at 07:01

## 2022-01-11 RX ADMIN — ALBUTEROL SULFATE 2 PUFF: 90 AEROSOL, METERED RESPIRATORY (INHALATION) at 06:57

## 2022-01-11 RX ADMIN — ACAMPROSATE CALCIUM 666 MG: 333 TABLET, DELAYED RELEASE ORAL at 20:18

## 2022-01-11 RX ADMIN — THIAMINE HCL TAB 100 MG 100 MG: 100 TAB at 07:55

## 2022-01-11 RX ADMIN — QUETIAPINE 600 MG: 300 TABLET, FILM COATED ORAL at 21:19

## 2022-01-11 RX ADMIN — MULTIPLE VITAMINS W/ MINERALS TAB 1 TABLET: TAB at 07:55

## 2022-01-11 RX ADMIN — NICOTINE POLACRILEX 8 MG: 4 GUM, CHEWING ORAL at 16:14

## 2022-01-11 RX ADMIN — NICOTINE POLACRILEX 8 MG: 4 GUM, CHEWING ORAL at 15:07

## 2022-01-11 RX ADMIN — PANTOPRAZOLE SODIUM 40 MG: 40 TABLET, DELAYED RELEASE ORAL at 07:01

## 2022-01-11 RX ADMIN — PREGABALIN 300 MG: 100 CAPSULE ORAL at 14:09

## 2022-01-11 RX ADMIN — OXYCODONE HYDROCHLORIDE 10 MG: 10 TABLET ORAL at 07:55

## 2022-01-11 RX ADMIN — DIAZEPAM 5 MG: 5 TABLET ORAL at 02:58

## 2022-01-11 RX ADMIN — NICOTINE POLACRILEX 8 MG: 4 GUM, CHEWING ORAL at 14:09

## 2022-01-11 RX ADMIN — PROPRANOLOL HYDROCHLORIDE 20 MG: 20 TABLET ORAL at 07:55

## 2022-01-11 RX ADMIN — OXYCODONE HYDROCHLORIDE 10 MG: 10 TABLET ORAL at 20:18

## 2022-01-11 RX ADMIN — AMLODIPINE BESYLATE 5 MG: 5 TABLET ORAL at 07:55

## 2022-01-11 RX ADMIN — PREGABALIN 300 MG: 100 CAPSULE ORAL at 20:18

## 2022-01-11 RX ADMIN — TOPIRAMATE 100 MG: 50 TABLET ORAL at 07:55

## 2022-01-11 RX ADMIN — LEVOTHYROXINE SODIUM 75 MCG: 75 TABLET ORAL at 07:01

## 2022-01-11 RX ADMIN — ACAMPROSATE CALCIUM 666 MG: 333 TABLET, DELAYED RELEASE ORAL at 14:09

## 2022-01-11 RX ADMIN — NICOTINE POLACRILEX 8 MG: 4 GUM, CHEWING ORAL at 20:43

## 2022-01-11 RX ADMIN — NICOTINE POLACRILEX 8 MG: 4 GUM, CHEWING ORAL at 18:42

## 2022-01-11 RX ADMIN — NICOTINE POLACRILEX 8 MG: 4 GUM, CHEWING ORAL at 08:32

## 2022-01-11 RX ADMIN — TOPIRAMATE 100 MG: 50 TABLET ORAL at 21:19

## 2022-01-11 RX ADMIN — DOCUSATE SODIUM 100 MG: 100 CAPSULE, LIQUID FILLED ORAL at 07:55

## 2022-01-11 RX ADMIN — PREGABALIN 300 MG: 100 CAPSULE ORAL at 07:55

## 2022-01-11 RX ADMIN — NAPROXEN 500 MG: 500 TABLET ORAL at 07:55

## 2022-01-11 RX ADMIN — DIAZEPAM 10 MG: 5 TABLET ORAL at 08:04

## 2022-01-11 RX ADMIN — PROPRANOLOL HYDROCHLORIDE 20 MG: 20 TABLET ORAL at 20:18

## 2022-01-11 NOTE — PLAN OF CARE
Problem: Alcohol Withdrawal  Goal: Alcohol Withdrawal Symptom Control  Outcome: No Change         Pt in bed at beginning of shift, breathing quiet and unlabored. Patient was observed becoming increasingly restless and unable to sleep and when he sleeps, last little bit. Patient didn't approach staff seeking help, but staff noted. The writer assessed patient for MSSA and patient scored 9 around 3 in the morning and valium 5 mg was given. Patient denies pain. Patient went back to bed and remains asleep. Pt slept through shift. Pt slept 5.25 hours.     MSSA scores 9 and then 4     No pt complaints or concerns at this time.      No PRNs given. Will continue to monitor.

## 2022-01-11 NOTE — PROGRESS NOTES
"[facility],  Psychiatric Progress Note      Impression:     Hollis \"Jose M\" Ning is a 51-year-old male admitted to [facility] on 1/8/2022.  He was admitted as a voluntary patient through the [facility] unit due to depressive symptoms and suicidal ideation.  A month prior to admission he overdosed on medications.  Just prior to admission he acquired a firearm illegally, held the loaded gun in his mouth, wrote letters, and sent goodbye messages.  He then called a friend who took the gun and called 911.  His mother is dying from cancer.  He has chronic low back pain and right knee pain.  He will need a knee replacement surgery but his COVID-19 infection and steroids given in 12/2021 have delayed this.  He lives in a group home and reports that other residents use drugs, rendering it difficult for him to maintain sobriety.  He had been consuming between a fifth and a half gallon of vodka daily and had been using cocaine a few times per month.  UTOX was positive for cocaine, benzodiazepines and opiates.  BAL was 0.18.  He had stopped taking medications with the exception of Lyrica, Seroquel and Oxycodone about 10 days prior to admission.  He reports that he relapsed because he was running low on Oxycodone due to insurance authorization issues.  Since admission, Lyrica, Seroquel, Propranolol, Prazosin and Campral were continued.  Topamax was increased.  Wellbutrin XL is being held until he is out of detox.  PRNs of Melatonin and Zyprexa were initiated.  He is tapering off Oxycodone with a plan to restart Suboxone.  Opiate withdrawal scale has been implemented.  [facility]  He continues to endorse suicidal ideation and contracts for safety on the unit.  Mood has been depressed, anxious and irritable.           Diagnoses:     Bipolar disorder, depressed  Borderline personality disorder  Anxiety disorder, not " otherwise specified  Alcohol use disorder, severe  Alcohol withdrawal  History of alcohol withdrawal seizures  Polysubstance abuse (opiates, cocaine, methamphetamine)  Nicotine use disorder  History of TBI  Chronic back pain  Right knee pain  Essential hypertension  GERD  Slow transit constipation  Hypothyroidism  Dilated aortic root         Plan:     Medications:  Continue Campral 666 mg TID.  Continue Prazosin 1 mg at HS.  Continue Topamax 100 mg BID.  Continue Seroquel 600 mg at HS.  Continue Lyrica 300 mg TID.  Continue Propranolol 20 mg BID.  Continue PRNs of Melatonin and Zyprexa.  Continue Oxycodone to 10 mg QID, plan to quickly taper down to 30 mg daily and then transition to Suboxone.      Continue MSSA with Valium.      Opiate withdrawal scale.    PT consult ordered.    He requests transfer to  under the care of Dr. Perez.  Dr. Perez has accepted pending bed availability.     He is requesting discharge to a different group home as residents in his current group home use drugs.   He has a PCP, outpatient psychiatry and a CADI .       Attestation:  Patient has been seen and evaluated by me, LATOYA Hoffmann CNP  The patient was counseled on nature of illness and treatment plan/options  Care was coordinated with treatment team       Clinical Global Impressions  First:  Considering your total clinical experience with this particular patient population, how severe are the patient's symptoms at this time?: 6 (01/10/22 0509)  Compared to the patient's condition at the START of treatment, this patient's condition is: 4 (01/10/22 0509)  Most recent:  Considering your total clinical experience with this particular patient population, how severe are the patient's symptoms at this time?: 6 (01/10/22 0509)  Compared to the patient's condition at the START of treatment, this patient's condition is: 4 (01/10/22 0509)            Interim History:     The patient's care was discussed with the  "treatment team and chart notes were reviewed.  Pt did not attend groups yesterday.  He spent time reading and in his room.  He did not attend groups.  He was irritable during interactions with staff.  Yesterday he received Valium 5 mg x 2 for MSSA scores > 8, plus an additional 5 mg during the overnight shift.  He also took PRN Zanaflex x 1.  Pt agreed to meet in the interview room but was extremely irritable.  He said, \"I'm miserable.  I don't want to exist.  Life sucks.  Some lady came in and changed my Oxycodone to 10 mg four times a day.\"   He evidently did not recall yesterday's conversation with provider about the plan to reduce Oxycodone and transition to Suboxone, as he did not identify this provider as the individual who changed the medications.  Provider attempted to engage him in a conversation about this, but he interrupted and continued talking about his dissatisfaction with this change.  He reports having severe right knee pain and withdrawal symptoms including diarrhea.  Opiate withdrawal scale ordered.  He said, \"I'm done playing games with you people.  I don't want to talk to nobody.\"  He then exited the interview room.  He remains on the transfer list for 3B.         Medications:     Current Facility-Administered Medications   Medication     acamprosate (CAMPRAL) EC tablet 666 mg     acetaminophen (TYLENOL) tablet 975 mg     albuterol (PROVENTIL HFA/VENTOLIN HFA) inhaler     alum & mag hydroxide-simethicone (MAALOX) suspension 30 mL     amLODIPine (NORVASC) tablet 5 mg     atenolol (TENORMIN) tablet 50 mg     diazepam (VALIUM) tablet 5-20 mg     docusate sodium (COLACE) capsule 100 mg     folic acid (FOLVITE) tablet 1 mg     ketamine 5%-gabapentin 8% in PLO cream 0.25 g     levothyroxine (SYNTHROID/LEVOTHROID) tablet 75 mcg     loperamide (IMODIUM) capsule 2 mg     melatonin tablet 3 mg     multivitamin w/minerals (THERA-VIT-M) tablet 1 tablet     naloxone (NARCAN) injection 0.2 mg    Or     " naloxone (NARCAN) injection 0.4 mg    Or     naloxone (NARCAN) injection 0.2 mg    Or     naloxone (NARCAN) injection 0.4 mg     naproxen (NAPROSYN) tablet 500 mg     nicotine polacrilex (NICORETTE) gum 4-8 mg     OLANZapine (zyPREXA) tablet 10 mg    Or     OLANZapine (zyPREXA) injection 10 mg     oxyCODONE IR (ROXICODONE) tablet 10 mg     pantoprazole (PROTONIX) EC tablet 40 mg     prazosin (MINIPRESS) capsule 1 mg     pregabalin (LYRICA) capsule 300 mg     propranolol (INDERAL) tablet 20 mg     QUEtiapine (SEROquel) tablet 600 mg     sennosides (SENOKOT) tablet 1-2 tablet     thiamine (B-1) tablet 100 mg     tiZANidine (ZANAFLEX) tablet 4 mg     topiramate (TOPAMAX) tablet 100 mg             Allergies:     Allergies   Allergen Reactions     Cucumber Extract Anaphylaxis     Cucumber the vegable     Hydroxyzine Hives     Previously unreported by patient, this is a new patient declaration as of 5/1/21.     Nsaids      No problem with oral NSAIDs--Toradol injection itching only.     Trazodone Itching     Per Patient            Psychiatric Examination:     /77 (BP Location: Left arm, Patient Position: Sitting)   Pulse 83   Temp 98.1  F (36.7  C) (Oral)   Resp 17   Wt 133.8 kg (295 lb)   SpO2 99%   BMI 35.16 kg/m      Appearance:  awake, alert, fair grooming and dressed in hospital scrubs  Attitude:  evasive  Eye Contact:  minimal  Mood:  anxious, depressed and irritable  Affect:  increased intensity, angry  Speech:  clear, coherent  Psychomotor Behavior:  no evidence of tardive dyskinesia, dystonia, or tics  Thought Process:  linear and goal oriented but illogical  Associations:  no loose associations  Thought Content:  endorses suicidal ideation with a variety of plans and contracts for safety on the unit, did not make any statements indicative of psychosis  Insight:  partial  Judgment:  limited  Oriented to:  date, time, person, and place  Attention Span and Concentration:  some impairment  Recent and  Remote Memory:  some impairment  Language:  intact, fluent English  Fund of Knowledge:  appropriate  Muscle Strength and Tone:  normal  Gait and Station:  somewhat unsteady gait, ambulates with walker, wearing brace on right leg         Labs:     No results found for this or any previous visit (from the past 24 hour(s)).

## 2022-01-11 NOTE — PLAN OF CARE
Tasks Complete:   Patient is pending transfer to 3B  Updated AVS    CTC left voicemail for CADI : Jessica Lawson with [employer]  management ([phone]) regarding collateral information.    SCOT left voicemail with Jessica's supervisor, Arlin- # [phone]. regarding collateral information.    SCOT received call from [employer]'s administrative assistance returning CTC vm- She provided CADI CM email.     CTC followed with email to OSCAR GUERRERO inquiring collateral- OSCAR GUERRERO stated that she will call the group home to get updates and then call CTC back.OSCAR GUERRERO stated that the patient on occasions would request new placement and that concerns were not brought to her attention. She stated that she can start looking for a CL that is mostly harm reduction but it will take awhile to find placement    Post round meeting with team @9:30 am updates: Team discussed discharge planning and medication management. Patient is requesting to move to .    Current Symptoms include the following: SCOT left voicemail with Jessica's supervisor, Arlin- # [phone].     Addressed patient needs/concerns:   Irritable upon approach with complaints that his alcohol withdrawal is not being treated. Patient guarded and not accepting of education and reassurance that was given. Reports his anxiety is severe as is his depression. Endorses thoughts of suicide with a plan to stick a gun in his mouth and shoot himself. Reports he feels safe in the hospital and has no plans or intentions of hurting himself while in the hospital.  Irritable with pressured speech when complaining about staff and the care he is receving.     Discharge Plan or Goal   Plan  He is requesting discharge to a different group home as residents in his current group home use drugs. In addition, follow up with outpatient health care follow ups  Commitment  NA  Health Care Follow up Appointment:    Will need psych, therapy, pcp, and  mental  michael cm  Medication Management Plan:  See providers notes  Housing:  [place] [facility] #[phone]  Financial Follow ups:  He reports he has GA   SMRTed for SSDI benefits.  Care Team     Jr Girno MD  -[facility] #[phone]    CADI Waiver CM: Jessica Lawson- [facility] #[phone]  ? Arlin- supervisor for cadi cm [phone]    - Joseph Oliveros's supervisor # [phone]  ? Admin #[phone]        Barriers to Discharge   Ongoing stabilization  Group home placement     Referral Status  Will need referral for MH CM, psych and therapy     Legal Status  Voluntary

## 2022-01-11 NOTE — PROGRESS NOTES
01/11/22 1400   Quick Adds   Type of Visit Initial PT Evaluation   Living Environment   Current Living Arrangements group home   Self-Care   Usual Activity Tolerance good   Current Activity Tolerance poor   Equipment Currently Used at Home cane, straight   Activity/Exercise/Self-Care Comment PLOF: ambulated independently with cane until about 3 weeks ago when knee pain worsened.   Disability/Function   Hearing Difficulty or Deaf no   Wear Glasses or Blind yes   Vision Management Reading glasses   Concentrating, Remembering or Making Decisions Difficulty no   Difficulty Communicating no   Difficulty Eating/Swallowing no   Walking or Climbing Stairs Difficulty yes   Walking or Climbing Stairs ambulation difficulty, requires equipment;other (see comments)  (wearing knee immobilizer brace currently)   Mobility Management Knee brace   Dressing/Bathing Difficulty no   Toileting issues no   Doing Errands Independently Difficulty (such as shopping) no   Fall history within last six months no   Change in Functional Status Since Onset of Current Illness/Injury yes   General Information   Onset of Illness/Injury or Date of Surgery 12/21/21   Referring Physician Ulises Perez MD   Patient/Family Therapy Goals Statement (PT) decrease knee pain, walk normally   Pertinent History of Current Problem (include personal factors and/or comorbidities that impact the POC) Admitted for mental health concerns, hx of R knee pain, awaiting TKA   Existing Precautions/Restrictions no known precautions/restrictions   Cognition   Orientation Status (Cognition) oriented x 4   Affect/Mental Status (Cognition) WNL   Follows Commands (Cognition) WNL   Pain Assessment   Patient Currently in Pain Yes, see Vital Sign flowsheet   Range of Motion (ROM)   ROM Comment limited to ~45 deg knee flexion due to pain on  R knee   Strength   Strength Comments NT formally 2/2 pain, good SLR R LE   Bed Mobility   Bed Mobility No deficits identified   Transfers    Transfers No deficits identified   Gait/Stairs (Locomotion)   Pike Level (Gait) supervision   Assistive Device (Gait) walker, front-wheeled   Distance in Feet (Required for LE Total Joints) 20   Pattern (Gait) step-to   Deviations/Abnormal Patterns (Gait) antalgic;weight shifting decreased;stride length decreased  (minimal WB on R LE d/t pain)   Comment (Gait/Stairs) Pt only ambulated in room during session. Demonstrated minimal WB on RLE and significant offloading with UEs on walker. After session was over, pt ambulated to [facility]    Clinical Impression   Criteria for Skilled Therapeutic Intervention yes, treatment indicated   PT Diagnosis (PT) R knee pain, mobility deficits   Influenced by the following impairments pain, limited ROM of R knee   Functional limitations due to impairments walking, stairs   Clinical Presentation Stable/Uncomplicated   Clinical Presentation Rationale clinical judgement   Clinical Decision Making (Complexity) low complexity   Therapy Frequency (PT) 2x/week   Predicted Duration of Therapy Intervention (days/wks) 3-4 sessions   Planned Therapy Interventions (PT) gait training;home exercise program;strengthening;stair training;progressive activity/exercise   Anticipated Equipment Needs at Discharge (PT) walker, standard  (has cane, may need walker if mobility doesn't improve)   Risk & Benefits of therapy have been explained evaluation/treatment results reviewed;care plan/treatment goals reviewed;risks/benefits reviewed;current/potential barriers reviewed;participants voiced agreement with care plan;participants included;patient   PT Discharge Planning    PT Discharge Recommendation (DC Rec)   (Pt lives in group home)   PT Rationale for DC Rec Pt has limited mobility, but has been managing pain at home.Would benefit from low frequency IP PT to set up HEP and progress gait to baseline SEC. Pt's severe pain is not likely to be fully improved with PT and per ortho note is  significantly in need of TKA.   PT Brief overview of current status  Comfort FWW and knee brace for ambulation, walked 20' with therapy today   Total Evaluation Time   Total Evaluation Time (Minutes) 10

## 2022-01-11 NOTE — PLAN OF CARE
"  Problem: Cognitive Impairment (Depressive Signs/Symptoms)  Goal: Optimized Cognitive Function (Depressive Signs/Symptoms)  Outcome: No Change   Since the beginning of the shift, Pt has been in the lounge in front of the TV but  occasionally dozing on and off. He is socializing with his peers while watching TV. The mood is anxious and depressed, although the pt appeared calm. Pt is currently agitated/upset because he has not yet left the unit for station 3B, according to him \"You're all playing around. When I arrive, Dr. Perez will have left.\" Pt slightly shaky/mild tremors, and he endorses VH, anxiety, and depression.Pt denies SI/HI/SIB, as well as difficulty with eating, sleeping, and constipation. Pt did not have a BM today, and he refused prn bowel medication. After discharge, he intended to put a gun to his mouth. While in the hospital, he enters into a contract for his safety. Pt took Oxycodone as prescribed for  right knee discomfort that is eight out of ten and stabbing. MSSA score was at  eight, and the patient was given 5 mg of Valium. After supper tonight, the pt will be transported to station 3B. Pt was medication compliant and he denied medication side effects. Pt was escorted by staff to station 3B at 1930. Pt took his belongings with him.  "

## 2022-01-11 NOTE — PLAN OF CARE
Problem: Suicidal Behavior  Goal: Suicidal Behavior is Absent or Managed  Outcome: No Change     Pt admitted to continued thoughts of SI with plan to shoot himself in the mouth after discharge.  Pt denies hearing any voices.  Pt is is noticeably sweating, agitated, and has noticeable tremors. Pt scored 14 on MSSA and received valium.  Pt states later valium helped.  Pt states he still is seeing black blobs but feels much better after receiving valium.  Pt asked to discuss oxycodone schedule with provider Dr. Perez and feels he is being tapered too quickly.  Pt states he wants to get of oxycodone but at a slower rate so he is more comfortable.  Pt scored 3 on 2nd MSSA and denied any alcohol withdrawal symptoms.  Pt later scored 12 on MSSA and 10 of valium was given with relief.  Pt social on unit.

## 2022-01-12 LAB — SARS-COV-2 RNA RESP QL NAA+PROBE: NEGATIVE

## 2022-01-12 PROCEDURE — U0003 INFECTIOUS AGENT DETECTION BY NUCLEIC ACID (DNA OR RNA); SEVERE ACUTE RESPIRATORY SYNDROME CORONAVIRUS 2 (SARS-COV-2) (CORONAVIRUS DISEASE [COVID-19]), AMPLIFIED PROBE TECHNIQUE, MAKING USE OF HIGH THROUGHPUT TECHNOLOGIES AS DESCRIBED BY CMS-2020-01-R: HCPCS | Performed by: PSYCHIATRY & NEUROLOGY

## 2022-01-12 PROCEDURE — 250N000013 HC RX MED GY IP 250 OP 250 PS 637: Performed by: PSYCHIATRY & NEUROLOGY

## 2022-01-12 PROCEDURE — 124N000003 HC R&B MH SENIOR/ADOLESCENT

## 2022-01-12 PROCEDURE — 250N000013 HC RX MED GY IP 250 OP 250 PS 637: Performed by: NURSE PRACTITIONER

## 2022-01-12 PROCEDURE — 99207 PR NO BILLABLE SERVICE THIS VISIT: CPT | Performed by: PHYSICIAN ASSISTANT

## 2022-01-12 RX ORDER — OXYCODONE HYDROCHLORIDE 10 MG/1
20 TABLET ORAL 4 TIMES DAILY
Status: DISCONTINUED | OUTPATIENT
Start: 2022-01-12 | End: 2022-01-14

## 2022-01-12 RX ADMIN — DOCUSATE SODIUM 100 MG: 100 CAPSULE, LIQUID FILLED ORAL at 19:55

## 2022-01-12 RX ADMIN — PANTOPRAZOLE SODIUM 40 MG: 40 TABLET, DELAYED RELEASE ORAL at 06:57

## 2022-01-12 RX ADMIN — THIAMINE HCL TAB 100 MG 100 MG: 100 TAB at 08:49

## 2022-01-12 RX ADMIN — ACAMPROSATE CALCIUM 666 MG: 333 TABLET, DELAYED RELEASE ORAL at 13:20

## 2022-01-12 RX ADMIN — PRAZOSIN HYDROCHLORIDE 1 MG: 1 CAPSULE ORAL at 21:17

## 2022-01-12 RX ADMIN — AMLODIPINE BESYLATE 5 MG: 5 TABLET ORAL at 08:49

## 2022-01-12 RX ADMIN — NICOTINE POLACRILEX 8 MG: 4 GUM, CHEWING ORAL at 18:20

## 2022-01-12 RX ADMIN — LEVOTHYROXINE SODIUM 75 MCG: 75 TABLET ORAL at 06:57

## 2022-01-12 RX ADMIN — ACAMPROSATE CALCIUM 666 MG: 333 TABLET, DELAYED RELEASE ORAL at 19:54

## 2022-01-12 RX ADMIN — TOPIRAMATE 100 MG: 50 TABLET ORAL at 10:07

## 2022-01-12 RX ADMIN — NAPROXEN 500 MG: 500 TABLET ORAL at 08:49

## 2022-01-12 RX ADMIN — OXYCODONE HYDROCHLORIDE 20 MG: 10 TABLET ORAL at 12:01

## 2022-01-12 RX ADMIN — NICOTINE POLACRILEX 8 MG: 4 GUM, CHEWING ORAL at 09:35

## 2022-01-12 RX ADMIN — NICOTINE POLACRILEX 8 MG: 4 GUM, CHEWING ORAL at 11:15

## 2022-01-12 RX ADMIN — PREGABALIN 300 MG: 100 CAPSULE ORAL at 19:54

## 2022-01-12 RX ADMIN — NICOTINE POLACRILEX 8 MG: 4 GUM, CHEWING ORAL at 14:12

## 2022-01-12 RX ADMIN — DOCUSATE SODIUM 100 MG: 100 CAPSULE, LIQUID FILLED ORAL at 08:48

## 2022-01-12 RX ADMIN — MULTIPLE VITAMINS W/ MINERALS TAB 1 TABLET: TAB at 08:49

## 2022-01-12 RX ADMIN — NICOTINE POLACRILEX 8 MG: 4 GUM, CHEWING ORAL at 10:07

## 2022-01-12 RX ADMIN — NAPROXEN 500 MG: 500 TABLET ORAL at 17:30

## 2022-01-12 RX ADMIN — QUETIAPINE 600 MG: 300 TABLET, FILM COATED ORAL at 21:17

## 2022-01-12 RX ADMIN — TOPIRAMATE 100 MG: 50 TABLET ORAL at 19:54

## 2022-01-12 RX ADMIN — NICOTINE POLACRILEX 8 MG: 4 GUM, CHEWING ORAL at 19:56

## 2022-01-12 RX ADMIN — PREGABALIN 300 MG: 100 CAPSULE ORAL at 08:48

## 2022-01-12 RX ADMIN — ACAMPROSATE CALCIUM 666 MG: 333 TABLET, DELAYED RELEASE ORAL at 08:48

## 2022-01-12 RX ADMIN — NICOTINE POLACRILEX 8 MG: 4 GUM, CHEWING ORAL at 16:17

## 2022-01-12 RX ADMIN — PROPRANOLOL HYDROCHLORIDE 20 MG: 20 TABLET ORAL at 19:53

## 2022-01-12 RX ADMIN — FOLIC ACID 1 MG: 1 TABLET ORAL at 08:49

## 2022-01-12 RX ADMIN — PROPRANOLOL HYDROCHLORIDE 20 MG: 20 TABLET ORAL at 08:48

## 2022-01-12 RX ADMIN — OXYCODONE HYDROCHLORIDE 10 MG: 10 TABLET ORAL at 08:48

## 2022-01-12 RX ADMIN — PREGABALIN 300 MG: 100 CAPSULE ORAL at 13:20

## 2022-01-12 RX ADMIN — NICOTINE POLACRILEX 8 MG: 4 GUM, CHEWING ORAL at 22:05

## 2022-01-12 RX ADMIN — OXYCODONE HYDROCHLORIDE 20 MG: 10 TABLET ORAL at 16:03

## 2022-01-12 RX ADMIN — ACETAMINOPHEN 975 MG: 325 TABLET, FILM COATED ORAL at 17:01

## 2022-01-12 RX ADMIN — OXYCODONE HYDROCHLORIDE 20 MG: 10 TABLET ORAL at 21:17

## 2022-01-12 ASSESSMENT — ACTIVITIES OF DAILY LIVING (ADL)
LAUNDRY: WITH SUPERVISION
HYGIENE/GROOMING: INDEPENDENT
DRESS: INDEPENDENT
ORAL_HYGIENE: INDEPENDENT

## 2022-01-12 NOTE — PLAN OF CARE
INITIAL OT NOTE  Problem: Additional Goals  Goal: BEH OT Goal 1  Description: Will attend OT groups and participate actively in all OT opportunities. Will assess and set goals.    Pt actively participated in occupational therapy clinic with 2-4 patients total x30 minutes (no charge). Pt was able to ask for assistance as needed, and independently initiate a familiar creative expression task. Pt demonstrated good focus, planning, and attention to detail. When completing the final steps of his task, he accidentally dropped task materials. Demonstrated good frustration tolerance by staying calm, and appropriately chose to resume his task tomorrow. Appeared comfortable interacting with peers and staff, and shared with staff that he was having difficulty in his group home environment due to other people using. Calm and cooperative. Will continue to assess. Pt will be given self-assessment form, and OT staff will explain the purpose of including them in their treatment plan and offer options for meeting their needs. Initial assessment to be completed upon additional group participation.

## 2022-01-12 NOTE — PROGRESS NOTES
Patient seen, chart reviewed, care discussed with staff.  Blood pressure 106/72, pulse 84, temperature 98  F (36.7  C), temperature source Temporal, resp. rate 16, weight 133.8 kg (295 lb), SpO2 97 %.    Ortho consulted, they will not assess for surgery or schedule surgery (knee replacement, right knee) only in clinic after discharge.  He did not relapse until opiate was not renewed, needing prior authorization which was not done.    Suboxone is not analgesic enough for him.      General appearance: fair  Alert.   Affect: fair  Mood: fair    Speech:  normal.   Eye contact:  good.    Psychomotor behavior: normal  Gait: steady with walker   Abnormal movements: none  Delusions: none  Hallucinations:  none  Thoughts: logical  Associations: intact  Judgement: fair  Insight: fair  Cognitions: intact in conversation  Memory:  intact in conversation  Orientation: normal    Not suicidal here.  Pain is the trigger for suicidal thinking    Imp:  Bipolar disorder.  Alcohol relapse after he ran out of opiates    Plan: increase Opiate to stable pain control level  2.  Explore if opiates can be continued until surgery (ie, if insurance will cover and OP provider will rx  3.  Continue other meds    Current Facility-Administered Medications   Medication     acamprosate (CAMPRAL) EC tablet 666 mg     acetaminophen (TYLENOL) tablet 975 mg     albuterol (PROVENTIL HFA/VENTOLIN HFA) inhaler     alum & mag hydroxide-simethicone (MAALOX) suspension 30 mL     amLODIPine (NORVASC) tablet 5 mg     diazepam (VALIUM) tablet 5-20 mg     docusate sodium (COLACE) capsule 100 mg     folic acid (FOLVITE) tablet 1 mg     ketamine 5%-gabapentin 8% in PLO cream 0.25 g     levothyroxine (SYNTHROID/LEVOTHROID) tablet 75 mcg     loperamide (IMODIUM) capsule 2 mg     melatonin tablet 3 mg     multivitamin w/minerals (THERA-VIT-M) tablet 1 tablet     naloxone (NARCAN) injection 0.2 mg    Or     naloxone (NARCAN) injection 0.4 mg    Or     naloxone (NARCAN)  injection 0.2 mg    Or     naloxone (NARCAN) injection 0.4 mg     naproxen (NAPROSYN) tablet 500 mg     nicotine polacrilex (NICORETTE) gum 4-8 mg     OLANZapine (zyPREXA) tablet 10 mg    Or     OLANZapine (zyPREXA) injection 10 mg     oxyCODONE IR (ROXICODONE) tablet 20 mg     pantoprazole (PROTONIX) EC tablet 40 mg     prazosin (MINIPRESS) capsule 1 mg     pregabalin (LYRICA) capsule 300 mg     propranolol (INDERAL) tablet 20 mg     QUEtiapine (SEROquel) tablet 600 mg     sennosides (SENOKOT) tablet 1-2 tablet     thiamine (B-1) tablet 100 mg     tiZANidine (ZANAFLEX) tablet 4 mg     topiramate (TOPAMAX) tablet 100 mg     Recent Results (from the past 168 hour(s))   Magnesium    Collection Time: 01/07/22  4:46 PM   Result Value Ref Range    Magnesium 2.3 1.6 - 2.3 mg/dL   Phosphorus    Collection Time: 01/07/22  4:46 PM   Result Value Ref Range    Phosphorus 2.9 2.5 - 4.5 mg/dL   Comprehensive metabolic panel    Collection Time: 01/07/22  4:46 PM   Result Value Ref Range    Sodium 143 133 - 144 mmol/L    Potassium 3.3 (L) 3.4 - 5.3 mmol/L    Chloride 109 94 - 109 mmol/L    Carbon Dioxide (CO2) 29 20 - 32 mmol/L    Anion Gap 5 3 - 14 mmol/L    Urea Nitrogen 10 7 - 30 mg/dL    Creatinine 0.61 (L) 0.66 - 1.25 mg/dL    Calcium 9.1 8.5 - 10.1 mg/dL    Glucose 93 70 - 99 mg/dL    Alkaline Phosphatase 72 40 - 150 U/L    AST 32 0 - 45 U/L    ALT 59 0 - 70 U/L    Protein Total 8.9 (H) 6.8 - 8.8 g/dL    Albumin 4.3 3.4 - 5.0 g/dL    Bilirubin Total 0.4 0.2 - 1.3 mg/dL    GFR Estimate >90 >60 mL/min/1.73m2   Ethyl Alcohol Level    Collection Time: 01/07/22  4:46 PM   Result Value Ref Range    Alcohol ethyl 0.18 (H) <=0.01 g/dL   CBC with platelets and differential    Collection Time: 01/07/22  4:46 PM   Result Value Ref Range    WBC Count 7.7 4.0 - 11.0 10e3/uL    RBC Count 5.15 4.40 - 5.90 10e6/uL    Hemoglobin 15.2 13.3 - 17.7 g/dL    Hematocrit 45.8 40.0 - 53.0 %    MCV 89 78 - 100 fL    MCH 29.5 26.5 - 33.0 pg    MCHC  33.2 31.5 - 36.5 g/dL    RDW 14.8 10.0 - 15.0 %    Platelet Count 410 150 - 450 10e3/uL    % Neutrophils 57 %    % Lymphocytes 37 %    % Monocytes 5 %    % Eosinophils 0 %    % Basophils 1 %    % Immature Granulocytes 0 %    NRBCs per 100 WBC 0 <1 /100    Absolute Neutrophils 4.4 1.6 - 8.3 10e3/uL    Absolute Lymphocytes 2.9 0.8 - 5.3 10e3/uL    Absolute Monocytes 0.4 0.0 - 1.3 10e3/uL    Absolute Eosinophils 0.0 0.0 - 0.7 10e3/uL    Absolute Basophils 0.0 0.0 - 0.2 10e3/uL    Absolute Immature Granulocytes 0.0 <=0.4 10e3/uL    Absolute NRBCs 0.0 10e3/uL   Extra Blue Top Tube    Collection Time: 01/07/22  4:47 PM   Result Value Ref Range    Hold Specimen JIC    Extra Red Top Tube    Collection Time: 01/07/22  4:47 PM   Result Value Ref Range    Hold Specimen JIC    Asymptomatic COVID-19 Virus (Coronavirus) by PCR Nasopharyngeal    Collection Time: 01/07/22  5:19 PM    Specimen: Nasopharyngeal; Swab   Result Value Ref Range    SARS CoV2 PCR Negative Negative   Drug abuse screen 77 urine (FL, RH, SH)    Collection Time: 01/08/22  1:50 PM   Result Value Ref Range    Amphetamines Urine Screen Negative Screen Negative    Barbiturates Urine Screen Negative Screen Negative    Benzodiazepines Urine Screen Positive (A) Screen Negative    Cannabinoids Urine Screen Negative Screen Negative    Cocaine Urine Screen Positive (A) Screen Negative    Opiates Urine Screen Positive (A) Screen Negative    PCP Urine Screen Negative Screen Negative

## 2022-01-12 NOTE — CONSULTS
Brief Orthopedic Surgery Note    Orthopedics was regarding patient's chronic right knee pain.  Patient provider requested more information about surgical timing for total knee arthroplasty.    Upon chart review, it appears this patient has not established with any of our surgical orthopedic providers.  Patient was seen by a orthopedic provider at [facility] during prior hospitalization on 12/12/2021.  During that evaluation patient underwent a right knee aspiration and subsequent cortisone injection.  Patient also had x-rays and MRI at that time which revealed a complex medial meniscus tear with associated extensive full-thickness cartilage loss of both the medial femoral condyle, and medial tibial plateau, complex lateral meniscus tear with moderate lateral compartment chondromalacia, mild patellofemoral chondromalacia, and a moderate knee joint effusion with diffuse synovitis, and moderate-sized popliteal cyst.     A member of our orthopedic team was contacted on 12/15/2021 after admission to our facility.  At that time recommendation was made to continue with current pain control plan, recommended a medial off  brace and follow-up outpatient.  Patient is not a candidate for repeat corticosteroid injection due to recent injection on 12/12/2021.     Discussed at length that the patient will need to have an in person consult outpatient with one of our Arthroplasty surgeons for surgical consult. Patient will need medical optimization prior to surgical consideration.  Requested Ortho Referral to be placed by primary team. Message sent to our clinical team to assist in scheduling an appointment prior to patient's discharge.     Of note, this patient was not evaluated by this provider.      Please page Orthopedic Surgery if further assistance is needed.     TED ORTIZ PA-C  1/12/2022 1:26 PM  Orthopaedic Surgery     Thank you for allowing me to participate in this patient's care. Please page me directly any  questions/concerns.   Securely message with the Vocera Web Console (learn more here)  Text page via [employer] Paging/Directory    If there is no response, if it is a weekend, or if it is during evening hours, please page the orthopaedic surgery resident on call via [facility] Paging/Directory

## 2022-01-12 NOTE — PROGRESS NOTES
01/11/22 2000   Patient Belongings   Did you bring any home meds/supplements to the hospital?  No   Patient Belongings locker   Patient Belongings Put in Hospital Secure Location (Security or Locker, etc.) none   Belongings Search Yes   Clothing Search No   Second Staff Nikki   A               Admission:  I am responsible for any personal items that are not sent to the safe or pharmacy.  [facility] is not responsible for loss, theft or damage of any property in my possession.    Signature:  _________________________________ Date: _______  Time: _____                                              Staff Signature:  ____________________________ Date: ________  Time: _____      2nd Staff person, if patient is unable/unwilling to sign:    Signature: ________________________________ Date: ________  Time: _____     Discharge:  [facility] has returned all of my personal belongings:    Signature: _________________________________ Date: ________  Time: _____                                          Staff Signature:  ____________________________ Date: ________  Time: _____       IN LOCKER:  Pair of sweatpants with strings x1, tshirt x1, bracelet x1

## 2022-01-12 NOTE — PLAN OF CARE
"  Pt received in transfer from [facility] [address] and appears angry. Verbalizing frustration that \"How can a PA override the orders of a doctor? Dr. Perez had me on 40 mg of oxycodone, and the fucking PA over there cut me down to half!\" Pt redirected from using profanity. Informed patient that the current plan will not change tonight but reassured him that he will be able to discuss plan with Dr. Perez tomorrow. Pt verbalized understanding. Vitals stable on arrival (see flowsheet). MSSA score: 7. Opiate withdrawal score: 7. Med-compliant.  "

## 2022-01-12 NOTE — PLAN OF CARE
Problem: Sleep Disturbance (Depressive Signs/Symptoms)  Goal: Improved Sleep (Depressive Signs/Symptoms)  Outcome: No Change     0100: MSSA-6, Opiate W/drawal Score-2. Patient in deep sleep, did not even wake up when BP was being taken. No irritability, restlessness, profuse sweating, anxiety and hallucination noted. Mild tremors and sweating noticeable.     0500: MSSA- 4. Opiate Withdrawal Score-3    Woke up in a good mood. No anxiety nor agitation noted Felt hungry and ate a banana. Went back to his room and slept.     Slept a total of 7  hours.

## 2022-01-12 NOTE — PLAN OF CARE
"  Problem: Suicidal Behavior  Goal: Suicidal Behavior is Absent or Managed  Outcome: No Change     Problem: Activity and Energy Impairment (Depressive Signs/Symptoms)  Goal: Optimized Energy Level (Depressive Signs/Symptoms)  Outcome: No Change     Problem: Decreased Participation and Engagement (Depressive Signs/Symptoms)  Goal: Increased Participation and Engagement (Depressive Signs/Symptoms)  Outcome: No Change     Problem: Feelings of Worthlessness, Hopelessness or Excessive Guilt (Depressive Signs/Symptoms)  Goal: Enhanced Self-Esteem and Confidence (Depressive Signs/Symptoms)  Outcome: No Change     Patient is irritable and tense, using profanity before breakfast. Patient reported pain in right knee and was very upset that the oxycodone dose he was on was modified prior to arriving on unit. After team meeting patient was able to speak to psychiatrist and oxycodone has been reinstated to previous dosage. Patient reported SI stating \"The first chance I get when I am out of here I am going to blow my brains out\". Patient continued to report his pain as a contributing factor. Patient contracts for safety here but did mention to staff he would like to have \"that PA who changed my medications in my sights\" as patient is making gestures with hand to emulate a gun. PA notified of comments by patient. Patient reported anxiety 7 and depression 8.  Patients MSSA scores were 7 and 6 no valium given. COWS score was 5 and 4. Patient does report hallucinating seeing \"black blobs\" in his vision, will continue to monitor.   "

## 2022-01-13 PROCEDURE — 250N000013 HC RX MED GY IP 250 OP 250 PS 637: Performed by: PSYCHIATRY & NEUROLOGY

## 2022-01-13 PROCEDURE — 250N000013 HC RX MED GY IP 250 OP 250 PS 637: Performed by: NURSE PRACTITIONER

## 2022-01-13 PROCEDURE — 124N000003 HC R&B MH SENIOR/ADOLESCENT

## 2022-01-13 RX ADMIN — OXYCODONE HYDROCHLORIDE 20 MG: 10 TABLET ORAL at 10:09

## 2022-01-13 RX ADMIN — NICOTINE POLACRILEX 8 MG: 4 GUM, CHEWING ORAL at 10:09

## 2022-01-13 RX ADMIN — PREGABALIN 300 MG: 100 CAPSULE ORAL at 20:20

## 2022-01-13 RX ADMIN — ACAMPROSATE CALCIUM 666 MG: 333 TABLET, DELAYED RELEASE ORAL at 08:24

## 2022-01-13 RX ADMIN — NAPROXEN 500 MG: 500 TABLET ORAL at 17:40

## 2022-01-13 RX ADMIN — TOPIRAMATE 100 MG: 50 TABLET ORAL at 08:24

## 2022-01-13 RX ADMIN — TOPIRAMATE 100 MG: 50 TABLET ORAL at 20:20

## 2022-01-13 RX ADMIN — NICOTINE POLACRILEX 8 MG: 4 GUM, CHEWING ORAL at 17:40

## 2022-01-13 RX ADMIN — NICOTINE POLACRILEX 8 MG: 4 GUM, CHEWING ORAL at 14:31

## 2022-01-13 RX ADMIN — OXYCODONE HYDROCHLORIDE 20 MG: 10 TABLET ORAL at 06:11

## 2022-01-13 RX ADMIN — PREGABALIN 300 MG: 100 CAPSULE ORAL at 14:31

## 2022-01-13 RX ADMIN — PROPRANOLOL HYDROCHLORIDE 20 MG: 20 TABLET ORAL at 20:20

## 2022-01-13 RX ADMIN — DOCUSATE SODIUM 100 MG: 100 CAPSULE, LIQUID FILLED ORAL at 20:20

## 2022-01-13 RX ADMIN — OXYCODONE HYDROCHLORIDE 20 MG: 10 TABLET ORAL at 15:15

## 2022-01-13 RX ADMIN — FOLIC ACID 1 MG: 1 TABLET ORAL at 08:24

## 2022-01-13 RX ADMIN — ACETAMINOPHEN 975 MG: 325 TABLET, FILM COATED ORAL at 08:24

## 2022-01-13 RX ADMIN — MULTIPLE VITAMINS W/ MINERALS TAB 1 TABLET: TAB at 08:24

## 2022-01-13 RX ADMIN — THIAMINE HCL TAB 100 MG 100 MG: 100 TAB at 08:24

## 2022-01-13 RX ADMIN — NAPROXEN 500 MG: 500 TABLET ORAL at 08:24

## 2022-01-13 RX ADMIN — PRAZOSIN HYDROCHLORIDE 1 MG: 1 CAPSULE ORAL at 20:21

## 2022-01-13 RX ADMIN — DOCUSATE SODIUM 100 MG: 100 CAPSULE, LIQUID FILLED ORAL at 08:24

## 2022-01-13 RX ADMIN — NICOTINE POLACRILEX 8 MG: 4 GUM, CHEWING ORAL at 19:30

## 2022-01-13 RX ADMIN — LEVOTHYROXINE SODIUM 75 MCG: 75 TABLET ORAL at 07:09

## 2022-01-13 RX ADMIN — OXYCODONE HYDROCHLORIDE 20 MG: 10 TABLET ORAL at 19:27

## 2022-01-13 RX ADMIN — PREGABALIN 300 MG: 100 CAPSULE ORAL at 08:24

## 2022-01-13 RX ADMIN — ACAMPROSATE CALCIUM 666 MG: 333 TABLET, DELAYED RELEASE ORAL at 14:31

## 2022-01-13 RX ADMIN — NICOTINE POLACRILEX 8 MG: 4 GUM, CHEWING ORAL at 16:13

## 2022-01-13 RX ADMIN — ACAMPROSATE CALCIUM 666 MG: 333 TABLET, DELAYED RELEASE ORAL at 20:20

## 2022-01-13 RX ADMIN — NICOTINE POLACRILEX 8 MG: 4 GUM, CHEWING ORAL at 08:24

## 2022-01-13 RX ADMIN — NICOTINE POLACRILEX 8 MG: 4 GUM, CHEWING ORAL at 11:52

## 2022-01-13 RX ADMIN — PANTOPRAZOLE SODIUM 40 MG: 40 TABLET, DELAYED RELEASE ORAL at 07:09

## 2022-01-13 RX ADMIN — QUETIAPINE 600 MG: 300 TABLET, FILM COATED ORAL at 21:07

## 2022-01-13 ASSESSMENT — ACTIVITIES OF DAILY LIVING (ADL)
ORAL_HYGIENE: INDEPENDENT
LAUNDRY: WITH SUPERVISION
HYGIENE/GROOMING: INDEPENDENT
DRESS: INDEPENDENT

## 2022-01-13 NOTE — PLAN OF CARE
"  Problem: Sleep Disturbance (Depressive Signs/Symptoms)  Goal: Improved Sleep (Depressive Signs/Symptoms)  Outcome: No Change     Patient slept well the whole night, a total of 7 hours.    MSSA @ 0500- 3. BP-98/68. Patient encouraged to drink a glass of water.  No withdrawal symptoms noted the whole shift.     Upon waking up @ 0600, patient complained of pain on his right knee and requested for Oxycodone. Tylenol was offered as a PRN med but patient got upset and agitated  and demanded for the scheduled Oxycodone @ 0800. He said, \"Tylenol does not help me at all. I want my Oxycodone.\" Oxycodone 10 mg. which was regularly scheduled @ 0800, was given @ 0611. Patient went back to sleep a few minutes after.   "
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
## 2022-01-13 NOTE — TELEPHONE ENCOUNTER
RECORDS RECEIVED FROM: knee pain, consults to discuss TKA per Staff message from Nadia TSAI   DATE RECEIVED: Jan 27, 2022     NOTES STATUS DETAILS   OFFICE NOTE from referring provider Internal Nadia Gamez PA-C   DISCHARGE SUMMARY from hospital Internal    DISCHARGE REPORT from the ER Internal    MEDICATION LIST Internal    MRI Internal    XRAYS (IMAGES & REPORTS) Internal

## 2022-01-13 NOTE — PLAN OF CARE
Problem: Mood Impairment (Depressive Signs/Symptoms)  Goal: Improved Mood Symptoms (Depressive Signs/Symptoms)  Outcome: No Change    Pt has been pleasant with writer. Pt's affect remains flat and he rated anxiety at 7/10 this AM and depression at 8.5/10. Pt denied active SI/SIB but admits that due to his pain he does not know if he wants to go on living. Pt's eye contact is inconsistent. Pt has been eating well and takes snacks.   Pt is scoring low on MSSA and COWS. Pt rated pain at 7/10 this AM.  Pt requested 1200 dose of Roxicodone early at 1009 as he had received his 0800 dose at 0611. Pt was reminded that if he takes his medications in the early part of the day that he would not have anything available at bedtime. Pt acknowledged this.  Pt requested/received 1600 dose at 1515.     AM doses of Norvasc and propanolol were held as he did not meet blood pressure parameter (sitting 117/71 with pulse of 79 and standing 109/63 with pulse of 82).

## 2022-01-13 NOTE — PLAN OF CARE
Pt continues to present as irritable, angry, frustrated. Continues to report pain in R knee. Received scheduled oxycodone, Tylenol PRN, scheduled naproxyen. Continues to report depressed mood and plan to shoot self at first opportunity. Verbally contracts for safety while in hospital however. MSSA score: 6. Med-compliant. Continue with current treatment plan and recommendations. Continue to monitor and reassess symptoms. Monitor response to medications. Monitor progress towards treatment goals. Encourage groups and participation.

## 2022-01-13 NOTE — PLAN OF CARE
Activities :  Pt was admitted due to suicidal ideation. Addressed patient needs/concerns:   Pt reported he is feeling irritable due to pains but is able to cope with it. Reported his medications are helping. Pt reported it feels like there is an animal biting his knees when he is sitting down and has not had his medications. Reported current depressive symptoms.       Discharge Plan or Goal   Plan  Pt will be discharging to a new group home, per his request, and considering what was reported to have happened there pertaining to his rehospitalization.  Commitment    Health Care Follow up Appointment:    Will need psych, therapy, pcp, and  mental michael cm  Medication Management Plan:  See providers notes  Housing:  [facility] #[phone]  Financial Follow ups:  He reports he has GA   SMRTed for SSDI benefits.  Care Team     Jr Giron MD  -[facility] #[phone]    CADI Waiver CM: Jessica Lawson- [facility] #[phone]  ? Arlin- supervisor for cadi cm [phone]    - Joseph Oliveros's supervisor # [phone]  ? Admin #[phone]        Barriers to Discharge   Ongoing stabilization  Group home placement     Referral Status  Will need referral for  CM, psych and therapy     Legal Status  Voluntary

## 2022-01-14 ENCOUNTER — APPOINTMENT (OUTPATIENT)
Dept: PHYSICAL THERAPY | Facility: CLINIC | Age: 52
End: 2022-01-14
Attending: PSYCHIATRY & NEUROLOGY
Payer: MEDICAID

## 2022-01-14 PROCEDURE — 250N000013 HC RX MED GY IP 250 OP 250 PS 637: Performed by: PSYCHIATRY & NEUROLOGY

## 2022-01-14 PROCEDURE — 250N000013 HC RX MED GY IP 250 OP 250 PS 637: Performed by: NURSE PRACTITIONER

## 2022-01-14 PROCEDURE — 97140 MANUAL THERAPY 1/> REGIONS: CPT | Mod: GP | Performed by: PHYSICAL THERAPIST

## 2022-01-14 PROCEDURE — 97110 THERAPEUTIC EXERCISES: CPT | Mod: GP | Performed by: PHYSICAL THERAPIST

## 2022-01-14 PROCEDURE — 90853 GROUP PSYCHOTHERAPY: CPT

## 2022-01-14 PROCEDURE — 124N000003 HC R&B MH SENIOR/ADOLESCENT

## 2022-01-14 RX ORDER — OXYCODONE HYDROCHLORIDE 20 MG/1
20 TABLET ORAL
Qty: 35 TABLET | Refills: 0 | Status: SHIPPED | OUTPATIENT
Start: 2022-01-14 | End: 2022-03-17

## 2022-01-14 RX ORDER — OXYCODONE HYDROCHLORIDE 10 MG/1
20 TABLET ORAL
Status: DISCONTINUED | OUTPATIENT
Start: 2022-01-14 | End: 2022-01-15

## 2022-01-14 RX ADMIN — PANTOPRAZOLE SODIUM 40 MG: 40 TABLET, DELAYED RELEASE ORAL at 06:03

## 2022-01-14 RX ADMIN — OXYCODONE HYDROCHLORIDE 20 MG: 10 TABLET ORAL at 20:28

## 2022-01-14 RX ADMIN — NICOTINE POLACRILEX 8 MG: 4 GUM, CHEWING ORAL at 08:23

## 2022-01-14 RX ADMIN — PROPRANOLOL HYDROCHLORIDE 20 MG: 20 TABLET ORAL at 20:28

## 2022-01-14 RX ADMIN — OXYCODONE HYDROCHLORIDE 20 MG: 10 TABLET ORAL at 06:04

## 2022-01-14 RX ADMIN — NICOTINE POLACRILEX 8 MG: 4 GUM, CHEWING ORAL at 09:29

## 2022-01-14 RX ADMIN — NICOTINE POLACRILEX 8 MG: 4 GUM, CHEWING ORAL at 14:32

## 2022-01-14 RX ADMIN — PREGABALIN 300 MG: 100 CAPSULE ORAL at 12:52

## 2022-01-14 RX ADMIN — NICOTINE POLACRILEX 8 MG: 4 GUM, CHEWING ORAL at 20:35

## 2022-01-14 RX ADMIN — PREGABALIN 300 MG: 100 CAPSULE ORAL at 08:22

## 2022-01-14 RX ADMIN — NICOTINE POLACRILEX 8 MG: 4 GUM, CHEWING ORAL at 12:52

## 2022-01-14 RX ADMIN — NAPROXEN 500 MG: 500 TABLET ORAL at 17:00

## 2022-01-14 RX ADMIN — TOPIRAMATE 150 MG: 50 TABLET ORAL at 20:27

## 2022-01-14 RX ADMIN — OXYCODONE HYDROCHLORIDE 20 MG: 10 TABLET ORAL at 17:00

## 2022-01-14 RX ADMIN — PROPRANOLOL HYDROCHLORIDE 20 MG: 20 TABLET ORAL at 08:22

## 2022-01-14 RX ADMIN — TOPIRAMATE 100 MG: 50 TABLET ORAL at 08:22

## 2022-01-14 RX ADMIN — DOCUSATE SODIUM 100 MG: 100 CAPSULE, LIQUID FILLED ORAL at 20:28

## 2022-01-14 RX ADMIN — NICOTINE POLACRILEX 8 MG: 4 GUM, CHEWING ORAL at 06:03

## 2022-01-14 RX ADMIN — OXYCODONE HYDROCHLORIDE 20 MG: 10 TABLET ORAL at 11:16

## 2022-01-14 RX ADMIN — PRAZOSIN HYDROCHLORIDE 1 MG: 1 CAPSULE ORAL at 20:27

## 2022-01-14 RX ADMIN — ACAMPROSATE CALCIUM 666 MG: 333 TABLET, DELAYED RELEASE ORAL at 20:27

## 2022-01-14 RX ADMIN — PREGABALIN 300 MG: 100 CAPSULE ORAL at 20:28

## 2022-01-14 RX ADMIN — NICOTINE POLACRILEX 8 MG: 4 GUM, CHEWING ORAL at 22:54

## 2022-01-14 RX ADMIN — NICOTINE POLACRILEX 8 MG: 4 GUM, CHEWING ORAL at 07:09

## 2022-01-14 RX ADMIN — NICOTINE POLACRILEX 8 MG: 4 GUM, CHEWING ORAL at 17:06

## 2022-01-14 RX ADMIN — QUETIAPINE 600 MG: 300 TABLET, FILM COATED ORAL at 22:54

## 2022-01-14 RX ADMIN — OXYCODONE HYDROCHLORIDE 20 MG: 10 TABLET ORAL at 12:52

## 2022-01-14 RX ADMIN — ACAMPROSATE CALCIUM 666 MG: 333 TABLET, DELAYED RELEASE ORAL at 12:52

## 2022-01-14 RX ADMIN — ACAMPROSATE CALCIUM 666 MG: 333 TABLET, DELAYED RELEASE ORAL at 08:22

## 2022-01-14 RX ADMIN — Medication 0.25 G: at 10:53

## 2022-01-14 RX ADMIN — LEVOTHYROXINE SODIUM 75 MCG: 75 TABLET ORAL at 06:02

## 2022-01-14 RX ADMIN — NICOTINE POLACRILEX 8 MG: 4 GUM, CHEWING ORAL at 10:39

## 2022-01-14 RX ADMIN — ACETAMINOPHEN 975 MG: 325 TABLET, FILM COATED ORAL at 10:40

## 2022-01-14 RX ADMIN — NICOTINE POLACRILEX 8 MG: 4 GUM, CHEWING ORAL at 18:55

## 2022-01-14 RX ADMIN — THIAMINE HCL TAB 100 MG 100 MG: 100 TAB at 08:23

## 2022-01-14 RX ADMIN — DOCUSATE SODIUM 100 MG: 100 CAPSULE, LIQUID FILLED ORAL at 08:23

## 2022-01-14 RX ADMIN — FOLIC ACID 1 MG: 1 TABLET ORAL at 08:23

## 2022-01-14 RX ADMIN — NAPROXEN 500 MG: 500 TABLET ORAL at 08:23

## 2022-01-14 RX ADMIN — AMLODIPINE BESYLATE 5 MG: 5 TABLET ORAL at 08:23

## 2022-01-14 RX ADMIN — MULTIPLE VITAMINS W/ MINERALS TAB 1 TABLET: TAB at 08:23

## 2022-01-14 NOTE — PROGRESS NOTES
discharge pharmacy called unit and informed staff that some of patients medications were refilled to soon and will need to do a override to attempt to get all patients medications prior to discharge.
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
## 2022-01-14 NOTE — PLAN OF CARE
"  Problem: Activity and Energy Impairment (Depressive Signs/Symptoms)  Goal: Optimized Energy Level (Depressive Signs/Symptoms)  Outcome: No Change     Problem: Feelings of Worthlessness, Hopelessness or Excessive Guilt (Depressive Signs/Symptoms)  Goal: Enhanced Self-Esteem and Confidence (Depressive Signs/Symptoms)  Outcome: No Change     Problem: Alcohol Withdrawal  Goal: Alcohol Withdrawal Symptom Control  Outcome: Improving  Pt presents with a flat/blunted affect and calm/depressed mood. Pt is visible in the milieu but withdrawn. Pt endorsed anxiety as \"high\" due to pain and depression \"8/10\". Pt reports major stressors are him dealing with pain \"24/7\" and mother on hospice \"dying\" due to pancreatic cancer.   Pt declines to try other non-pharm interventions at this time, stating \"they don't do nothing'.   Pt endorsed wishing to be death, stating \" there is no point living like this, I might as well put a bullet through my head\". Pt was able to contract for safety. Writer encouraged Pt to stay positive and provided re-assurance.  Pt ate 100% of his meal, medication compliant.   Scored 4 for MSSA and 4 for COWS    "
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
## 2022-01-14 NOTE — PLAN OF CARE
Problem: Sleep Disturbance (Depressive Signs/Symptoms)  Goal: Improved Sleep (Depressive Signs/Symptoms)  Outcome: No Change     MSSA @ 0500-2, COWS-0. No withdrawal symptoms noted the whole night.     Upon waking up, patient complained of bilateral knee pain. Rated the pain @ 10/10 and requested for Oxycodone. Oxycodone 20 mg. given. He also requested for his Nicorette gum and the other medications scheduled @ 0730 (Synthroid and Protonix) so he need not be awakened by 0730 as he wants to go back to sleep.     Reported partial bilateral knee pain relief @ 0638.      Patient slept a total of 8.25 hours.

## 2022-01-14 NOTE — PLAN OF CARE
Problem: Suicidal Behavior  Goal: Suicidal Behavior is Absent or Managed  Outcome: No Change     Problem: Activity and Energy Impairment (Depressive Signs/Symptoms)  Goal: Optimized Energy Level (Depressive Signs/Symptoms)  Outcome: No Change     Problem: Decreased Participation and Engagement (Depressive Signs/Symptoms)  Goal: Increased Participation and Engagement (Depressive Signs/Symptoms)  Outcome: Improving   Patent is calm and cooperative, medication compliant. Reports anxiety 7 and depression 8. Patient attended groups today, but still remains withdrawn. Patient was more upfront and open to talking about his mental health with staff. Patient endorses intermittent SI but is able to contract for safety here. Denies SIB, HI, AH, VH. Right knee pain managed with scheduled medications and prns, patient oxycodone dose increased to 5x a day from QID. Patient also received prn tylenol and gabapentin cream. Patient COW score was 2/0 and MSSA 3/2.
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
## 2022-01-15 PROCEDURE — 250N000013 HC RX MED GY IP 250 OP 250 PS 637: Performed by: PSYCHIATRY & NEUROLOGY

## 2022-01-15 PROCEDURE — 99232 SBSQ HOSP IP/OBS MODERATE 35: CPT | Performed by: PSYCHIATRY & NEUROLOGY

## 2022-01-15 PROCEDURE — H2032 ACTIVITY THERAPY, PER 15 MIN: HCPCS

## 2022-01-15 PROCEDURE — 250N000013 HC RX MED GY IP 250 OP 250 PS 637: Performed by: NURSE PRACTITIONER

## 2022-01-15 PROCEDURE — 124N000003 HC R&B MH SENIOR/ADOLESCENT

## 2022-01-15 RX ORDER — OXYCODONE HYDROCHLORIDE 10 MG/1
20 TABLET ORAL
Status: DISCONTINUED | OUTPATIENT
Start: 2022-01-15 | End: 2022-02-21

## 2022-01-15 RX ORDER — LIOTHYRONINE SODIUM 25 UG/1
25 TABLET ORAL DAILY
Status: DISCONTINUED | OUTPATIENT
Start: 2022-01-15 | End: 2022-03-11

## 2022-01-15 RX ADMIN — TOPIRAMATE 150 MG: 50 TABLET ORAL at 08:19

## 2022-01-15 RX ADMIN — ACAMPROSATE CALCIUM 666 MG: 333 TABLET, DELAYED RELEASE ORAL at 21:03

## 2022-01-15 RX ADMIN — Medication 0.25 G: at 08:30

## 2022-01-15 RX ADMIN — OXYCODONE HYDROCHLORIDE 20 MG: 10 TABLET ORAL at 07:13

## 2022-01-15 RX ADMIN — NICOTINE POLACRILEX 8 MG: 4 GUM, CHEWING ORAL at 14:22

## 2022-01-15 RX ADMIN — PROPRANOLOL HYDROCHLORIDE 20 MG: 20 TABLET ORAL at 21:07

## 2022-01-15 RX ADMIN — QUETIAPINE 600 MG: 300 TABLET, FILM COATED ORAL at 21:57

## 2022-01-15 RX ADMIN — ACAMPROSATE CALCIUM 666 MG: 333 TABLET, DELAYED RELEASE ORAL at 08:18

## 2022-01-15 RX ADMIN — MULTIPLE VITAMINS W/ MINERALS TAB 1 TABLET: TAB at 08:19

## 2022-01-15 RX ADMIN — THIAMINE HCL TAB 100 MG 100 MG: 100 TAB at 08:18

## 2022-01-15 RX ADMIN — PREGABALIN 300 MG: 100 CAPSULE ORAL at 08:19

## 2022-01-15 RX ADMIN — PANTOPRAZOLE SODIUM 40 MG: 40 TABLET, DELAYED RELEASE ORAL at 07:04

## 2022-01-15 RX ADMIN — LIOTHYRONINE SODIUM 25 MCG: 25 TABLET ORAL at 10:33

## 2022-01-15 RX ADMIN — TOPIRAMATE 150 MG: 50 TABLET ORAL at 21:04

## 2022-01-15 RX ADMIN — OXYCODONE HYDROCHLORIDE 20 MG: 10 TABLET ORAL at 14:22

## 2022-01-15 RX ADMIN — FOLIC ACID 1 MG: 1 TABLET ORAL at 08:18

## 2022-01-15 RX ADMIN — OXYCODONE HYDROCHLORIDE 20 MG: 10 TABLET ORAL at 17:18

## 2022-01-15 RX ADMIN — NICOTINE POLACRILEX 8 MG: 4 GUM, CHEWING ORAL at 08:30

## 2022-01-15 RX ADMIN — NICOTINE POLACRILEX 8 MG: 4 GUM, CHEWING ORAL at 10:33

## 2022-01-15 RX ADMIN — NAPROXEN 500 MG: 500 TABLET ORAL at 17:18

## 2022-01-15 RX ADMIN — NICOTINE POLACRILEX 8 MG: 4 GUM, CHEWING ORAL at 18:25

## 2022-01-15 RX ADMIN — OXYCODONE HYDROCHLORIDE 20 MG: 10 TABLET ORAL at 21:57

## 2022-01-15 RX ADMIN — PROPRANOLOL HYDROCHLORIDE 20 MG: 20 TABLET ORAL at 08:19

## 2022-01-15 RX ADMIN — ACAMPROSATE CALCIUM 666 MG: 333 TABLET, DELAYED RELEASE ORAL at 14:22

## 2022-01-15 RX ADMIN — PRAZOSIN HYDROCHLORIDE 1 MG: 1 CAPSULE ORAL at 21:06

## 2022-01-15 RX ADMIN — OXYCODONE HYDROCHLORIDE 20 MG: 10 TABLET ORAL at 10:33

## 2022-01-15 RX ADMIN — NICOTINE POLACRILEX 8 MG: 4 GUM, CHEWING ORAL at 17:19

## 2022-01-15 RX ADMIN — NICOTINE POLACRILEX 8 MG: 4 GUM, CHEWING ORAL at 21:08

## 2022-01-15 RX ADMIN — LEVOTHYROXINE SODIUM 75 MCG: 75 TABLET ORAL at 07:04

## 2022-01-15 RX ADMIN — DOCUSATE SODIUM 100 MG: 100 CAPSULE, LIQUID FILLED ORAL at 08:19

## 2022-01-15 RX ADMIN — NICOTINE POLACRILEX 8 MG: 4 GUM, CHEWING ORAL at 07:11

## 2022-01-15 RX ADMIN — NICOTINE POLACRILEX 8 MG: 4 GUM, CHEWING ORAL at 12:42

## 2022-01-15 RX ADMIN — PREGABALIN 300 MG: 100 CAPSULE ORAL at 14:43

## 2022-01-15 RX ADMIN — NICOTINE POLACRILEX 8 MG: 4 GUM, CHEWING ORAL at 22:27

## 2022-01-15 RX ADMIN — DOCUSATE SODIUM 100 MG: 100 CAPSULE, LIQUID FILLED ORAL at 21:04

## 2022-01-15 RX ADMIN — NAPROXEN 500 MG: 500 TABLET ORAL at 08:18

## 2022-01-15 RX ADMIN — AMLODIPINE BESYLATE 5 MG: 5 TABLET ORAL at 08:18

## 2022-01-15 RX ADMIN — PREGABALIN 300 MG: 100 CAPSULE ORAL at 21:03

## 2022-01-15 ASSESSMENT — ACTIVITIES OF DAILY LIVING (ADL)
ORAL_HYGIENE: INDEPENDENT
HYGIENE/GROOMING: INDEPENDENT
HYGIENE/GROOMING: INDEPENDENT
DRESS: INDEPENDENT;SCRUBS (BEHAVIORAL HEALTH)
LAUNDRY: WITH SUPERVISION
DRESS: SCRUBS (BEHAVIORAL HEALTH);INDEPENDENT
LAUNDRY: WITH SUPERVISION
ORAL_HYGIENE: INDEPENDENT

## 2022-01-15 NOTE — PLAN OF CARE
"Assessment:  Jose M was up ad kathleen, spent 75% of the shift resting in his  room. Pt was minimally social with peers. Jose M reported having suicidal thoughts, though no intent to act on them \"I kind of always have those thoughts, I just don't think things are gonna get better\" Jose M said he generally feels depressed and anxious.     Jose M's appetite and  fluid intake were very good. Pt's Oxycodone am scheduled was modified by his MD so his first scheduled dose is daily at 06:00 am. Jose M consistently described his right knee and low back  pain before taking his Oxycodone \"an 8 or a 9 out of 10\" then, one hour after oxycodone administration he said the pain had  decreased \"It's about a 6 out of 10\"  Jose M has appeared drowsy at times throughout the shift though responds alertly when approached. Jose M was med  adherent, cooperative.       "

## 2022-01-15 NOTE — PLAN OF CARE
"  Problem: Suicidal Behavior  Goal: Suicidal Behavior is Absent or Managed  Outcome: Improving     Problem: Activity and Energy Impairment (Depressive Signs/Symptoms)  Goal: Optimized Energy Level (Depressive Signs/Symptoms)  Outcome: No Change  Pt was visible in the milieu intermittently, social with staff only.  Pt reports pain continues to be \"high\", \"10/10 when walking\". Pt states the pain is making him \"dive deeper into depression\" and is \"causing high anxiety. Pt rated depression and anxiety 7-8/10 for both and states \"it sometimes goes up to 9/10\".  Pt was noted reading in his room, came out watched TV which he states helps as distractions from the pain and depression \"for a bit\". Endorsed intermittent passive SI and wish to be dead. Pt contracts for safety. Denies SIB/HI or any forms of hallucinations    Pt would want pain medication (Oxycodone), to be scheduled starting at 6 am q 4 hours.   Pt is eating, drinking and eating adequately.   MSSA and COWs were discontinued this shift.      "

## 2022-01-15 NOTE — PROGRESS NOTES
"Patient seen, chart reviewed, care discussed with staff.  Blood pressure 124/78, pulse 72, temperature (!) 96.6  F (35.9  C), temperature source Temporal, resp. rate 14, weight 133.8 kg (295 lb), SpO2 98 %.    Relapse and suicide prevention require for Mr. Barclay a new place to live and pain control until he can have knee surgery.  He has remorse and \"still tastes the gun metal taste\" from his suicidal plans before admit.    Seen to adjust med times to his schedule.    General appearance: fair  Alert.   Affect: fair  Mood: fair    Speech:  normal.   Eye contact:  good.    Psychomotor behavior: normal  Gait: steady with walker  Abnormal movements:  none  Delusions: none  Hallucinations:  none  Thoughts: logical  Associations: intact  Judgement: good  Insight: good  Cognitions: intact in conversation  Memory:  intact in conversation  Orientation: normal    Not suicidal.here    Imp:  Bipolar depressed, recurrent, now moderate, not psychotic  See if we can get him a loaner metal brace    Current Facility-Administered Medications   Medication     acamprosate (CAMPRAL) EC tablet 666 mg     acetaminophen (TYLENOL) tablet 975 mg     albuterol (PROVENTIL HFA/VENTOLIN HFA) inhaler     alum & mag hydroxide-simethicone (MAALOX) suspension 30 mL     amLODIPine (NORVASC) tablet 5 mg     docusate sodium (COLACE) capsule 100 mg     folic acid (FOLVITE) tablet 1 mg     ketamine 5%-gabapentin 8% in PLO cream 0.25 g     levothyroxine (SYNTHROID/LEVOTHROID) tablet 75 mcg     loperamide (IMODIUM) capsule 2 mg     melatonin tablet 3 mg     multivitamin w/minerals (THERA-VIT-M) tablet 1 tablet     naloxone (NARCAN) injection 0.2 mg    Or     naloxone (NARCAN) injection 0.4 mg    Or     naloxone (NARCAN) injection 0.2 mg    Or     naloxone (NARCAN) injection 0.4 mg     naproxen (NAPROSYN) tablet 500 mg     nicotine polacrilex (NICORETTE) gum 4-8 mg     OLANZapine (zyPREXA) tablet 10 mg    Or     OLANZapine (zyPREXA) injection 10 mg     " oxyCODONE IR (ROXICODONE) tablet 20 mg     pantoprazole (PROTONIX) EC tablet 40 mg     prazosin (MINIPRESS) capsule 1 mg     pregabalin (LYRICA) capsule 300 mg     propranolol (INDERAL) tablet 20 mg     QUEtiapine (SEROquel) tablet 600 mg     sennosides (SENOKOT) tablet 1-2 tablet     thiamine (B-1) tablet 100 mg     tiZANidine (ZANAFLEX) tablet 4 mg     topiramate (TOPAMAX) tablet 150 mg     Recent Results (from the past 168 hour(s))   Drug abuse screen 77 urine ([place], [place], [place])    Collection Time: 01/08/22  1:50 PM   Result Value Ref Range    Amphetamines Urine Screen Negative Screen Negative    Barbiturates Urine Screen Negative Screen Negative    Benzodiazepines Urine Screen Positive (A) Screen Negative    Cannabinoids Urine Screen Negative Screen Negative    Cocaine Urine Screen Positive (A) Screen Negative    Opiates Urine Screen Positive (A) Screen Negative    PCP Urine Screen Negative Screen Negative   Asymptomatic COVID-19 Virus (Coronavirus) by PCR Nasopharyngeal    Collection Time: 01/12/22  6:12 PM    Specimen: Nasopharyngeal; Swab   Result Value Ref Range    SARS CoV2 PCR Negative Negative

## 2022-01-15 NOTE — PLAN OF CARE
Number of patients attending the group:  3  Group Length:  1 Hours    Group Therapy     Summary of Group / Topics Discussed:      The  Psychotherapy group goal is to promote insight to positive choice and change. Group processing is within a supportive and safe environment. Patients will process emotions using verbal group and expressive psychotherapy interventions.        Assessment: Group did random questions which included questions about the person with the greatest impact on their life, spirituality concerning death, how they exhibit kindness, something they are proud of, what motivates them          Patient Response-Pt described his dad as the biggest influence on his life, described dad as 'programming' him up until dad's death. Pt also reported that his mom is the reason he is still alive and that still being alive is his biggest accomplishment this year. Pt tended to give short answers with little elaboration even when encouraged.

## 2022-01-15 NOTE — PLAN OF CARE
Problem: Sleep Disturbance (Depressive Signs/Symptoms)  Goal: Improved Sleep (Depressive Signs/Symptoms)  Outcome: No Change     Patient woke up @ 0100, came out to the lounge and just took a glass of water and went back to his room. No complaints of pain noted at this time. Stayed awake in bed and finally went back to sleep @ 0230 and slept till the end of the shift.     Slept a total of 5.5 hours.

## 2022-01-16 PROCEDURE — 250N000013 HC RX MED GY IP 250 OP 250 PS 637: Performed by: PHYSICIAN ASSISTANT

## 2022-01-16 PROCEDURE — 250N000013 HC RX MED GY IP 250 OP 250 PS 637: Performed by: NURSE PRACTITIONER

## 2022-01-16 PROCEDURE — 250N000013 HC RX MED GY IP 250 OP 250 PS 637: Performed by: PSYCHIATRY & NEUROLOGY

## 2022-01-16 PROCEDURE — 124N000003 HC R&B MH SENIOR/ADOLESCENT

## 2022-01-16 RX ORDER — BENZOCAINE/MENTHOL 6 MG-10 MG
LOZENGE MUCOUS MEMBRANE 2 TIMES DAILY
Status: DISCONTINUED | OUTPATIENT
Start: 2022-01-16 | End: 2022-01-28

## 2022-01-16 RX ADMIN — NICOTINE POLACRILEX 8 MG: 4 GUM, CHEWING ORAL at 06:37

## 2022-01-16 RX ADMIN — PROPRANOLOL HYDROCHLORIDE 20 MG: 20 TABLET ORAL at 20:31

## 2022-01-16 RX ADMIN — NICOTINE POLACRILEX 8 MG: 4 GUM, CHEWING ORAL at 12:11

## 2022-01-16 RX ADMIN — PREGABALIN 300 MG: 100 CAPSULE ORAL at 20:30

## 2022-01-16 RX ADMIN — NICOTINE POLACRILEX 8 MG: 4 GUM, CHEWING ORAL at 13:05

## 2022-01-16 RX ADMIN — PRAZOSIN HYDROCHLORIDE 1 MG: 1 CAPSULE ORAL at 20:33

## 2022-01-16 RX ADMIN — DOCUSATE SODIUM 100 MG: 100 CAPSULE, LIQUID FILLED ORAL at 20:30

## 2022-01-16 RX ADMIN — NICOTINE POLACRILEX 8 MG: 4 GUM, CHEWING ORAL at 21:34

## 2022-01-16 RX ADMIN — HYDROCORTISONE: 1 CREAM TOPICAL at 20:30

## 2022-01-16 RX ADMIN — PREGABALIN 300 MG: 100 CAPSULE ORAL at 08:28

## 2022-01-16 RX ADMIN — LIOTHYRONINE SODIUM 25 MCG: 25 TABLET ORAL at 08:28

## 2022-01-16 RX ADMIN — NICOTINE POLACRILEX 8 MG: 4 GUM, CHEWING ORAL at 10:33

## 2022-01-16 RX ADMIN — PREGABALIN 300 MG: 100 CAPSULE ORAL at 13:03

## 2022-01-16 RX ADMIN — NAPROXEN 500 MG: 500 TABLET ORAL at 08:28

## 2022-01-16 RX ADMIN — NAPROXEN 500 MG: 500 TABLET ORAL at 17:32

## 2022-01-16 RX ADMIN — FOLIC ACID 1 MG: 1 TABLET ORAL at 08:29

## 2022-01-16 RX ADMIN — THIAMINE HCL TAB 100 MG 100 MG: 100 TAB at 08:28

## 2022-01-16 RX ADMIN — NICOTINE POLACRILEX 8 MG: 4 GUM, CHEWING ORAL at 15:59

## 2022-01-16 RX ADMIN — OXYCODONE HYDROCHLORIDE 20 MG: 10 TABLET ORAL at 09:14

## 2022-01-16 RX ADMIN — ACAMPROSATE CALCIUM 666 MG: 333 TABLET, DELAYED RELEASE ORAL at 13:03

## 2022-01-16 RX ADMIN — LEVOTHYROXINE SODIUM 75 MCG: 75 TABLET ORAL at 06:35

## 2022-01-16 RX ADMIN — HYDROCORTISONE: 1 CREAM TOPICAL at 13:35

## 2022-01-16 RX ADMIN — MULTIPLE VITAMINS W/ MINERALS TAB 1 TABLET: TAB at 08:28

## 2022-01-16 RX ADMIN — NICOTINE POLACRILEX 8 MG: 4 GUM, CHEWING ORAL at 14:52

## 2022-01-16 RX ADMIN — TOPIRAMATE 150 MG: 50 TABLET ORAL at 20:31

## 2022-01-16 RX ADMIN — OXYCODONE HYDROCHLORIDE 20 MG: 10 TABLET ORAL at 05:26

## 2022-01-16 RX ADMIN — DOCUSATE SODIUM 100 MG: 100 CAPSULE, LIQUID FILLED ORAL at 08:27

## 2022-01-16 RX ADMIN — OXYCODONE HYDROCHLORIDE 20 MG: 10 TABLET ORAL at 13:03

## 2022-01-16 RX ADMIN — PANTOPRAZOLE SODIUM 40 MG: 40 TABLET, DELAYED RELEASE ORAL at 06:35

## 2022-01-16 RX ADMIN — NICOTINE POLACRILEX 8 MG: 4 GUM, CHEWING ORAL at 08:51

## 2022-01-16 RX ADMIN — TOPIRAMATE 150 MG: 50 TABLET ORAL at 08:27

## 2022-01-16 RX ADMIN — ACAMPROSATE CALCIUM 666 MG: 333 TABLET, DELAYED RELEASE ORAL at 08:28

## 2022-01-16 RX ADMIN — NICOTINE POLACRILEX 8 MG: 4 GUM, CHEWING ORAL at 17:31

## 2022-01-16 RX ADMIN — OXYCODONE HYDROCHLORIDE 20 MG: 10 TABLET ORAL at 21:34

## 2022-01-16 RX ADMIN — QUETIAPINE 600 MG: 300 TABLET, FILM COATED ORAL at 21:55

## 2022-01-16 RX ADMIN — OXYCODONE HYDROCHLORIDE 20 MG: 10 TABLET ORAL at 17:28

## 2022-01-16 RX ADMIN — NICOTINE POLACRILEX 8 MG: 4 GUM, CHEWING ORAL at 05:26

## 2022-01-16 RX ADMIN — ACAMPROSATE CALCIUM 666 MG: 333 TABLET, DELAYED RELEASE ORAL at 20:30

## 2022-01-16 RX ADMIN — NICOTINE POLACRILEX 8 MG: 4 GUM, CHEWING ORAL at 20:30

## 2022-01-16 ASSESSMENT — ACTIVITIES OF DAILY LIVING (ADL)
HYGIENE/GROOMING: INDEPENDENT
LAUNDRY: WITH SUPERVISION
ORAL_HYGIENE: INDEPENDENT
DRESS: SCRUBS (BEHAVIORAL HEALTH)

## 2022-01-16 NOTE — PLAN OF CARE
"  Problem: Suicidal Behavior  Goal: Suicidal Behavior is Absent or Managed  Outcome: No Change     Problem: Activity and Energy Impairment (Depressive Signs/Symptoms)  Goal: Optimized Energy Level (Depressive Signs/Symptoms)  Outcome: No Change     Problem: Feelings of Worthlessness, Hopelessness or Excessive Guilt (Depressive Signs/Symptoms)  Goal: Enhanced Self-Esteem and Confidence (Depressive Signs/Symptoms)  Outcome: No Change     Problem: Mood Impairment (Depressive Signs/Symptoms)  Goal: Improved Mood Symptoms (Depressive Signs/Symptoms)  Outcome: No Change     Patient is calm and cooperative, affect flat and bunted. Appears withdrawn, reports depression and anxiety are high, attributes this to right knee pain, and the terminal illness his mother is dealing with. Patient endorses SI but contracts for safety, denies SIB,HI,Hallucinations. Patient reports poor sleep last night, and appeared groggy this morning, patient encouraged to nap and slept for a couple hours. Abrasion to patients face from yesterday d/t \"Razor Burn\" appears to be reddened patient has been applying sween cream and doesn't report pain at site, Writer notified Inpatient medical and hydrocortisone cream ordered BID.  Held amlodipine and propranolol this morning, /66 standing, provider aware. Patient has excellent appetite, ate 100% of meals. Patient ongoing right knee pain managed with scheduled medications.  "

## 2022-01-16 NOTE — PLAN OF CARE
"Pt rates anxiety and depression as \"fluctuating\" throughout the day.  Pt denies SI/SIB at this time and states he is safe on the unit.  Pt is visible in the lounge and social with staff.  Pt attended evening group activity.  Pt does not appear to be sedated this evening.  Pt ambulating using walker and wearing leg brace.  Pt reports pain relief with scheduled pain medications maintained at 6/10.    At approximately 2100, pt shaved off his beard using unit razor.  Afterwards, pt had large red patch of skin on R side of his face that he called a \"razor burn\" due to the razor being dull and pt applying strong pressure.  Pt identified the razor he used and it was removed from use.  Pt is applying sween cream and reports some relief.  Internal medicine notified. Nno action at this time,  to be addressed in the morning.  "

## 2022-01-16 NOTE — PLAN OF CARE
Problem: Mood Impairment (Depressive Signs/Symptoms)  Goal: Improved Mood Symptoms (Depressive Signs/Symptoms)  Outcome: No Change     Problem: Sleep Disturbance (Depressive Signs/Symptoms)  Goal: Improved Sleep (Depressive Signs/Symptoms)  Outcome: No Change     Slept on and off tonight for 5.75 hours  Sat in the lounge on 3 occasions for snacks and was falling asleep in the chair.Encouraged to lay down in bed. Denied feeling sedated.  Continued to endorse lower back pain that radiates to his right leg specifically to the right knee and behind his right knee. Scheduled Oxycodone given.  Ambulated using Knee brace   Nicorette gum 8 mg given twice this shift  Talking to himself while in the lounge but denied having any forms of hallucinations.

## 2022-01-16 NOTE — PLAN OF CARE
"  Problem: General Rehab Plan of Care  Goal: Therapeutic Recreation/Music Therapy Goal  Description: The patient and/or their representative will achieve their patient-specific goals related to the plan of care.  The patient-specific goals include:  Outcome: No Change     Jose M attended art therapy group for 1 hour (3 patients total). He presented with a flat affect at the beginning of group, but escalated to anger and agitation at times during group. He participated in the art activity to make a drawing depicting what you want to hold on to and what you need to let go of to support your recovery. He shared his drawing depicting his \"stabbing pain\" on one side and the activities he used to enjoy on the other side. He  started talking about suicidal thoughts and became agitated. Writer redirected him to not talk about ways to hurt himself. Writer asked him to identify ways to help himself and gave some examples such as deep breathing. He was unwilling to try any of the skills he knows. After expressing his frustration, he started to calm down and apologized for his outburst. He took an interest in what other peers were making and gave peers compliments on their art. He interacted approprietly with peers at times, and then at other times would monopolize the conversation and talk over peers.   "

## 2022-01-17 PROCEDURE — 124N000003 HC R&B MH SENIOR/ADOLESCENT

## 2022-01-17 PROCEDURE — L1833 KO ADJ JNT POS R SUP PRE OTS: HCPCS

## 2022-01-17 PROCEDURE — 250N000013 HC RX MED GY IP 250 OP 250 PS 637: Performed by: PSYCHIATRY & NEUROLOGY

## 2022-01-17 PROCEDURE — G0177 OPPS/PHP; TRAIN & EDUC SERV: HCPCS

## 2022-01-17 PROCEDURE — 250N000013 HC RX MED GY IP 250 OP 250 PS 637: Performed by: NURSE PRACTITIONER

## 2022-01-17 RX ORDER — TOPIRAMATE 100 MG/1
100 TABLET, FILM COATED ORAL 2 TIMES DAILY
Status: DISCONTINUED | OUTPATIENT
Start: 2022-01-17 | End: 2022-03-11

## 2022-01-17 RX ADMIN — ACAMPROSATE CALCIUM 666 MG: 333 TABLET, DELAYED RELEASE ORAL at 20:00

## 2022-01-17 RX ADMIN — NICOTINE POLACRILEX 8 MG: 4 GUM, CHEWING ORAL at 08:55

## 2022-01-17 RX ADMIN — LEVOTHYROXINE SODIUM 75 MCG: 75 TABLET ORAL at 06:45

## 2022-01-17 RX ADMIN — OXYCODONE HYDROCHLORIDE 20 MG: 10 TABLET ORAL at 20:00

## 2022-01-17 RX ADMIN — ACAMPROSATE CALCIUM 666 MG: 333 TABLET, DELAYED RELEASE ORAL at 08:22

## 2022-01-17 RX ADMIN — NICOTINE POLACRILEX 8 MG: 4 GUM, CHEWING ORAL at 14:54

## 2022-01-17 RX ADMIN — PREGABALIN 300 MG: 100 CAPSULE ORAL at 20:00

## 2022-01-17 RX ADMIN — NAPROXEN 500 MG: 500 TABLET ORAL at 17:27

## 2022-01-17 RX ADMIN — MULTIPLE VITAMINS W/ MINERALS TAB 1 TABLET: TAB at 08:23

## 2022-01-17 RX ADMIN — NICOTINE POLACRILEX 8 MG: 4 GUM, CHEWING ORAL at 12:51

## 2022-01-17 RX ADMIN — NICOTINE POLACRILEX 8 MG: 4 GUM, CHEWING ORAL at 18:29

## 2022-01-17 RX ADMIN — PRAZOSIN HYDROCHLORIDE 1 MG: 1 CAPSULE ORAL at 21:17

## 2022-01-17 RX ADMIN — NICOTINE POLACRILEX 8 MG: 4 GUM, CHEWING ORAL at 06:56

## 2022-01-17 RX ADMIN — NAPROXEN 500 MG: 500 TABLET ORAL at 08:23

## 2022-01-17 RX ADMIN — NICOTINE POLACRILEX 8 MG: 4 GUM, CHEWING ORAL at 10:10

## 2022-01-17 RX ADMIN — HYDROCORTISONE: 1 CREAM TOPICAL at 08:23

## 2022-01-17 RX ADMIN — PREGABALIN 300 MG: 100 CAPSULE ORAL at 14:53

## 2022-01-17 RX ADMIN — DOCUSATE SODIUM 100 MG: 100 CAPSULE, LIQUID FILLED ORAL at 20:00

## 2022-01-17 RX ADMIN — AMLODIPINE BESYLATE 5 MG: 5 TABLET ORAL at 08:23

## 2022-01-17 RX ADMIN — FOLIC ACID 1 MG: 1 TABLET ORAL at 08:22

## 2022-01-17 RX ADMIN — NICOTINE POLACRILEX 8 MG: 4 GUM, CHEWING ORAL at 13:58

## 2022-01-17 RX ADMIN — OXYCODONE HYDROCHLORIDE 20 MG: 10 TABLET ORAL at 06:45

## 2022-01-17 RX ADMIN — DOCUSATE SODIUM 100 MG: 100 CAPSULE, LIQUID FILLED ORAL at 08:22

## 2022-01-17 RX ADMIN — NICOTINE POLACRILEX 8 MG: 4 GUM, CHEWING ORAL at 21:20

## 2022-01-17 RX ADMIN — NICOTINE POLACRILEX 8 MG: 4 GUM, CHEWING ORAL at 20:01

## 2022-01-17 RX ADMIN — OXYCODONE HYDROCHLORIDE 20 MG: 10 TABLET ORAL at 16:08

## 2022-01-17 RX ADMIN — PREGABALIN 300 MG: 100 CAPSULE ORAL at 08:22

## 2022-01-17 RX ADMIN — THIAMINE HCL TAB 100 MG 100 MG: 100 TAB at 08:22

## 2022-01-17 RX ADMIN — NICOTINE POLACRILEX 8 MG: 4 GUM, CHEWING ORAL at 17:14

## 2022-01-17 RX ADMIN — QUETIAPINE 600 MG: 300 TABLET, FILM COATED ORAL at 21:17

## 2022-01-17 RX ADMIN — LIOTHYRONINE SODIUM 25 MCG: 25 TABLET ORAL at 08:22

## 2022-01-17 RX ADMIN — PROPRANOLOL HYDROCHLORIDE 20 MG: 20 TABLET ORAL at 08:22

## 2022-01-17 RX ADMIN — OXYCODONE HYDROCHLORIDE 20 MG: 10 TABLET ORAL at 13:56

## 2022-01-17 RX ADMIN — PANTOPRAZOLE SODIUM 40 MG: 40 TABLET, DELAYED RELEASE ORAL at 06:45

## 2022-01-17 RX ADMIN — PROPRANOLOL HYDROCHLORIDE 20 MG: 20 TABLET ORAL at 20:00

## 2022-01-17 RX ADMIN — TOPIRAMATE 100 MG: 100 TABLET, FILM COATED ORAL at 20:01

## 2022-01-17 RX ADMIN — OXYCODONE HYDROCHLORIDE 20 MG: 10 TABLET ORAL at 10:01

## 2022-01-17 RX ADMIN — ACAMPROSATE CALCIUM 666 MG: 333 TABLET, DELAYED RELEASE ORAL at 13:56

## 2022-01-17 RX ADMIN — NICOTINE POLACRILEX 8 MG: 4 GUM, CHEWING ORAL at 16:08

## 2022-01-17 NOTE — PROGRESS NOTES
S: Pt. seen at [facility] F[facility]. Male. Wade CNP. RX: Need new knee brace right. DX: Knee pain right.  O:A: Pt. was presently wearing a knee immobilizer right. Today I F/D a Breg post op Tscope Knee orthosis. Pt. was very happy with the KO. Pt. has had a POKO in the past but broke it. Donning doffing wear and care instructions reviewed.   P: Pt. to be seen as needed.    Nishant ECKERT,LO

## 2022-01-17 NOTE — PROGRESS NOTES
Patient seen, chart reviewed, care discussed with staff.  Blood pressure 123/73, pulse 86, temperature 98  F (36.7  C), temperature source Skin, resp. rate 16, weight 140.3 kg (309 lb 6.4 oz), SpO2 96 %.  By report, oversedated this am  He notes need for knee support in bed, and pain.  General appearance: good  Alert.   Affect: fair  Mood: fair    Speech:  normal.   Eye contact:  good.    Psychomotor behavior: normal  Gait: steady with walker  Abnormal movements:  none  Delusions: none  Hallucinations:  none  Thoughts: logical  Associations: intact  Judgement: good  Insight: good  Cognitions: intact in conversation  Memory:  intact in conversation  Orientation: normal    Not suicidal.  Patient Active Problem List   Diagnosis     Post concussive encephalopathy     H/O shoulder surgery     Alcoholism in remission (H)     Bilateral ACL tear     Chronic back pain     Social anxiety disorder     Alcohol withdrawal syndrome with complication, with unspecified complication (H)     Rib fracture     Alcohol withdrawal (H)     Alcohol dependence (H)     LFT elevation     Laceration of left hand without foreign body, initial encounter     Suicidal ideation     Intentional drug overdose, initial encounter (H)     Alcoholic intoxication with complication (H)     Severe bipolar I disorder, current or most recent episode depressed, with mixed features (H)     Medication overdose, intentional self-harm, subsequent encounter     Opioid dependence on agonist therapy (H)     2019 novel coronavirus disease (COVID-19)     Pneumonia due to COVID-19 virus     Bipolar disorder (H)     Imp:  Bipolar depressed, severe, without psychosis  2.  Oversedated from Topamax increase   3.  Pain from Knee., soft brace does not fit well, his previous metal brace broke in a fall    Plan:  1.  Consult Orthotics  2.  Hospital bed  3.  Decrease Topamax back to 100mg BID    Current Facility-Administered Medications   Medication     acamprosate (CAMPRAL) EC  tablet 666 mg     acetaminophen (TYLENOL) tablet 975 mg     albuterol (PROVENTIL HFA/VENTOLIN HFA) inhaler     alum & mag hydroxide-simethicone (MAALOX) suspension 30 mL     amLODIPine (NORVASC) tablet 5 mg     docusate sodium (COLACE) capsule 100 mg     folic acid (FOLVITE) tablet 1 mg     hydrocortisone (CORTAID) 1 % cream     ketamine 5%-gabapentin 8% in PLO cream 0.25 g     levothyroxine (SYNTHROID/LEVOTHROID) tablet 75 mcg     liothyronine (CYTOMEL) tablet 25 mcg     loperamide (IMODIUM) capsule 2 mg     melatonin tablet 3 mg     multivitamin w/minerals (THERA-VIT-M) tablet 1 tablet     naloxone (NARCAN) injection 0.2 mg    Or     naloxone (NARCAN) injection 0.4 mg    Or     naloxone (NARCAN) injection 0.2 mg    Or     naloxone (NARCAN) injection 0.4 mg     naproxen (NAPROSYN) tablet 500 mg     nicotine polacrilex (NICORETTE) gum 4-8 mg     OLANZapine (zyPREXA) tablet 10 mg    Or     OLANZapine (zyPREXA) injection 10 mg     oxyCODONE IR (ROXICODONE) tablet 20 mg     pantoprazole (PROTONIX) EC tablet 40 mg     prazosin (MINIPRESS) capsule 1 mg     pregabalin (LYRICA) capsule 300 mg     propranolol (INDERAL) tablet 20 mg     QUEtiapine (SEROquel) tablet 600 mg     sennosides (SENOKOT) tablet 1-2 tablet     thiamine (B-1) tablet 100 mg     tiZANidine (ZANAFLEX) tablet 4 mg     topiramate (TOPAMAX) tablet 100 mg     Recent Results (from the past 168 hour(s))   Asymptomatic COVID-19 Virus (Coronavirus) by PCR Nasopharyngeal    Collection Time: 01/12/22  6:12 PM    Specimen: Nasopharyngeal; Swab   Result Value Ref Range    SARS CoV2 PCR Negative Negative

## 2022-01-17 NOTE — PLAN OF CARE
"  Problem: Social, Occupational or Functional Impairment (Depressive Signs/Symptoms)  Goal: Enhanced Social, Occupational or Functional Skills (Depressive Signs/Symptoms)  Outcome: No Change  Intervention: Promote Social, Occupational and Functional Ability  Recent Flowsheet Documentation  Taken 1/17/2022 0929 by Estrellita Toney RN  Trust Relationship/Rapport: care explained    Behavioral  Pt slept 7.5 hours overnight; Pt oriented x 4; Pt irritable upon approach;  pt compliant with medications and cares;  Pt rates appetite as good ate 50% of breakfast and 100 % of lunch; hydrating adequately; Pt isolative and withdrawn to room/self; attended groups; speech slurred and often hard to understand; Pt denied SI, SIB, HI, and hallucinations; Pt affect flat and blunted. Mood irritable;    Pt repeatedly asked for his scheduled narcotic medications early. Pt educated on the benefits of taking medication as prescribed. Pt became irritable and demanded oxycodone early,stating \"everyone else gives it to me early.\" While pt was stating this he had difficulty keeping his eyes open and was drooling.     Medical  Pt somnolent and sedated during breakfast. Pt was nodding off during breakfast and slumping down into his chair. Pt fell asleep into his pancakes. Vital signs were WNL and pt easily responded to prompting;  Provider was notified. Provider indicated to hold Topamax and give other scheduled medications.     Pt moved to room 362 to have access to a medical bed.    Plan  Continue to monitor.    "

## 2022-01-17 NOTE — PLAN OF CARE
Problem: Sleep Disturbance (Depressive Signs/Symptoms)  Goal: Improved Sleep (Depressive Signs/Symptoms)  Outcome: No Change     Patient slept a total of 8.5 hours without any concerns raised the whole shift.

## 2022-01-17 NOTE — PROVIDER NOTIFICATION
Pt appears sedated. Pt slumped over and drooling on himself. Pt able to respond an engage with writer with minimal prompting, however falls asleep soon after. Held AM medications and notified psychiatric provider and medicine.

## 2022-01-17 NOTE — PLAN OF CARE
Problem: Additional Goals  Goal: BEH OT Goal 1  Description: Will attend OT groups and participate actively in all OT opportunities. Will assess and set goals.    Intervention: Pt attended 2 of 3 OT groups: OT Clinic with 2 peers.     Patient Response: Pt elected to work on a beading project, requesting to make a necklace for himself. Pt IND selected beads and laid them out on planning board prior to stringing. Pt requested specific tools from writer to assist in making necklace, appearing knowledgeable about what materials were available to them. Pt had some difficulty with task completion at end of group time when they were close to finishing project, however was receptive to writers cues to finish it next time. Pt was invited to afternoon topic group, however declined after asking what the activity would be.     Duration of attendance: duration of  groups - 75 min.     Cognition: Goal directed  Sequences task   Attends to detail      Mood/Affect: Pleasant       Plan: Patient encouraged to maintain attendance for continued ongoing support in working towards occupational therapy goals to support overall treatment/care.

## 2022-01-18 LAB — SARS-COV-2 RNA RESP QL NAA+PROBE: NEGATIVE

## 2022-01-18 PROCEDURE — 250N000013 HC RX MED GY IP 250 OP 250 PS 637: Performed by: NURSE PRACTITIONER

## 2022-01-18 PROCEDURE — 124N000003 HC R&B MH SENIOR/ADOLESCENT

## 2022-01-18 PROCEDURE — 250N000013 HC RX MED GY IP 250 OP 250 PS 637: Performed by: PSYCHIATRY & NEUROLOGY

## 2022-01-18 PROCEDURE — G0177 OPPS/PHP; TRAIN & EDUC SERV: HCPCS

## 2022-01-18 PROCEDURE — U0005 INFEC AGEN DETEC AMPLI PROBE: HCPCS | Performed by: PSYCHIATRY & NEUROLOGY

## 2022-01-18 PROCEDURE — H2032 ACTIVITY THERAPY, PER 15 MIN: HCPCS

## 2022-01-18 RX ADMIN — NICOTINE POLACRILEX 8 MG: 4 GUM, CHEWING ORAL at 17:51

## 2022-01-18 RX ADMIN — DOCUSATE SODIUM 100 MG: 100 CAPSULE, LIQUID FILLED ORAL at 08:40

## 2022-01-18 RX ADMIN — LIOTHYRONINE SODIUM 25 MCG: 25 TABLET ORAL at 08:40

## 2022-01-18 RX ADMIN — MULTIPLE VITAMINS W/ MINERALS TAB 1 TABLET: TAB at 08:41

## 2022-01-18 RX ADMIN — PANTOPRAZOLE SODIUM 40 MG: 40 TABLET, DELAYED RELEASE ORAL at 06:45

## 2022-01-18 RX ADMIN — OXYCODONE HYDROCHLORIDE 20 MG: 10 TABLET ORAL at 06:45

## 2022-01-18 RX ADMIN — OXYCODONE HYDROCHLORIDE 20 MG: 10 TABLET ORAL at 10:11

## 2022-01-18 RX ADMIN — NICOTINE POLACRILEX 8 MG: 4 GUM, CHEWING ORAL at 11:21

## 2022-01-18 RX ADMIN — QUETIAPINE 600 MG: 300 TABLET, FILM COATED ORAL at 22:15

## 2022-01-18 RX ADMIN — NICOTINE POLACRILEX 8 MG: 4 GUM, CHEWING ORAL at 22:15

## 2022-01-18 RX ADMIN — PREGABALIN 300 MG: 100 CAPSULE ORAL at 08:40

## 2022-01-18 RX ADMIN — NICOTINE POLACRILEX 8 MG: 4 GUM, CHEWING ORAL at 10:09

## 2022-01-18 RX ADMIN — NAPROXEN 500 MG: 500 TABLET ORAL at 17:35

## 2022-01-18 RX ADMIN — NICOTINE POLACRILEX 8 MG: 4 GUM, CHEWING ORAL at 16:47

## 2022-01-18 RX ADMIN — PROPRANOLOL HYDROCHLORIDE 20 MG: 20 TABLET ORAL at 19:43

## 2022-01-18 RX ADMIN — NICOTINE POLACRILEX 8 MG: 4 GUM, CHEWING ORAL at 13:58

## 2022-01-18 RX ADMIN — ACAMPROSATE CALCIUM 666 MG: 333 TABLET, DELAYED RELEASE ORAL at 19:42

## 2022-01-18 RX ADMIN — PREGABALIN 300 MG: 100 CAPSULE ORAL at 19:42

## 2022-01-18 RX ADMIN — LEVOTHYROXINE SODIUM 75 MCG: 75 TABLET ORAL at 06:45

## 2022-01-18 RX ADMIN — PREGABALIN 300 MG: 100 CAPSULE ORAL at 13:43

## 2022-01-18 RX ADMIN — NICOTINE POLACRILEX 8 MG: 4 GUM, CHEWING ORAL at 21:11

## 2022-01-18 RX ADMIN — THIAMINE HCL TAB 100 MG 100 MG: 100 TAB at 08:41

## 2022-01-18 RX ADMIN — NAPROXEN 500 MG: 500 TABLET ORAL at 08:40

## 2022-01-18 RX ADMIN — NICOTINE POLACRILEX 4 MG: 4 GUM, CHEWING ORAL at 07:08

## 2022-01-18 RX ADMIN — DOCUSATE SODIUM 100 MG: 100 CAPSULE, LIQUID FILLED ORAL at 19:43

## 2022-01-18 RX ADMIN — TOPIRAMATE 100 MG: 100 TABLET, FILM COATED ORAL at 19:42

## 2022-01-18 RX ADMIN — OXYCODONE HYDROCHLORIDE 20 MG: 10 TABLET ORAL at 13:44

## 2022-01-18 RX ADMIN — NICOTINE POLACRILEX 8 MG: 4 GUM, CHEWING ORAL at 19:40

## 2022-01-18 RX ADMIN — NICOTINE POLACRILEX 8 MG: 4 GUM, CHEWING ORAL at 08:40

## 2022-01-18 RX ADMIN — PRAZOSIN HYDROCHLORIDE 1 MG: 1 CAPSULE ORAL at 22:15

## 2022-01-18 RX ADMIN — NICOTINE POLACRILEX 8 MG: 4 GUM, CHEWING ORAL at 15:12

## 2022-01-18 RX ADMIN — FOLIC ACID 1 MG: 1 TABLET ORAL at 08:41

## 2022-01-18 RX ADMIN — OXYCODONE HYDROCHLORIDE 20 MG: 10 TABLET ORAL at 19:42

## 2022-01-18 RX ADMIN — ACAMPROSATE CALCIUM 666 MG: 333 TABLET, DELAYED RELEASE ORAL at 13:43

## 2022-01-18 RX ADMIN — NICOTINE POLACRILEX 8 MG: 4 GUM, CHEWING ORAL at 12:49

## 2022-01-18 RX ADMIN — ACAMPROSATE CALCIUM 666 MG: 333 TABLET, DELAYED RELEASE ORAL at 08:41

## 2022-01-18 RX ADMIN — TOPIRAMATE 100 MG: 100 TABLET, FILM COATED ORAL at 08:41

## 2022-01-18 RX ADMIN — OXYCODONE HYDROCHLORIDE 20 MG: 10 TABLET ORAL at 16:47

## 2022-01-18 ASSESSMENT — ACTIVITIES OF DAILY LIVING (ADL)
LAUNDRY: WITH SUPERVISION
HYGIENE/GROOMING: INDEPENDENT
ORAL_HYGIENE: INDEPENDENT
LAUNDRY: WITH SUPERVISION
ORAL_HYGIENE: INDEPENDENT
DRESS: SCRUBS (BEHAVIORAL HEALTH)
DRESS: STREET CLOTHES
HYGIENE/GROOMING: INDEPENDENT

## 2022-01-18 NOTE — PLAN OF CARE
Problem: Cognitive Impairment (Depressive Signs/Symptoms)  Goal: Optimized Cognitive Function (Depressive Signs/Symptoms)  Outcome: Improving     Problem: Behavioral Health Plan of Care  Goal: Adheres to Safety Considerations for Self and Others  Intervention: Develop and Maintain Individualized Safety Plan  Recent Flowsheet Documentation  Taken 1/17/2022 1608 by Juan C Hollingsworth RN  Safety Measures:    safety rounds completed    safety plan reviewed     Problem: Behavioral Health Plan of Care  Goal: Absence of New-Onset Illness or Injury  Intervention: Identify and Manage Fall Risk  Recent Flowsheet Documentation  Taken 1/17/2022 1608 by Juan C Hollingsworth RN  Safety Measures:    safety rounds completed    safety plan reviewed     Patient had flat and blunted affect. Patient spent majority of the sitting in the lounge by self watching TV or reading newspaper. Patient described mood as anxious and depressed. Patient stated that he was anxious and depressed because his knee pain does not warrant him to hospitalized but would have preferred to see orthopedic surgeon. Patient rated right knee pain 8/10, anxiety 8/10 and depression 8/10. Patient denied SI/HI/SIB/AH/VH and covid 19 symptoms. Patient was given oxycodone 20 mg, Naprosyn 500 mg and other scheduled medications. Patient was given Nicotine 8 mg almost every hour per request. Patient had good appetite and was medication compliant. Will continue to monitor.

## 2022-01-18 NOTE — PLAN OF CARE
BEHAVIORAL TEAM DISCUSSION     Participants: Provider: Ulises Perez MD; Sivan Ewing, CHAPARRO; [facility] Leal.  Progress: Patient admitted to [facility] on 1/8/22 and is requesting transfer to  for continuance of care with Dr. Perez. Patient requires ongoing stabilization and states he needs a new group home due to the use of drugs within the home. Pt reports pain, depression. Still sleepy at times, but overall better.  Mood still depressed/discourages.  Pain better with hospital bed and new knee brace.     Anticipated length of stay: 3-4 weeks     Continued Stay Criteria/Rationale: Ongoing stabilization     Medical/Physical: Per notes, Remarkable for TBI with postconcussive encephalopathy, chronic back pain, nontraumatic rhabdomyolysis and right shoulder replacement.     Precautions:        Behavioral Orders   Procedures     Code 2     Fall precautions     Routine Programming       As clinically indicated     Seizure precautions     Status 15       Every 15 minutes.     Suicide precautions       Patients on Suicide Precautions should have a Combination Diet ordered that includes a Diet selection(s) AND a Behavioral Tray selection for Safe Tray - with utensils, or Safe Tray - NO utensils        Withdrawal precautions      Improved: Bipolar depressed, severe, not psychotic  2.  Pain     Plan: continue opiates until knee surgery  Medication     acamprosate (CAMPRAL) EC tablet 666 mg     acetaminophen (TYLENOL) tablet 975 mg     albuterol (PROVENTIL HFA/VENTOLIN HFA) inhaler     alum & mag hydroxide-simethicone (MAALOX) suspension 30 mL     amLODIPine (NORVASC) tablet 5 mg     docusate sodium (COLACE) capsule 100 mg     folic acid (FOLVITE) tablet 1 mg     hydrocortisone (CORTAID) 1 % cream     ketamine 5%-gabapentin 8% in PLO cream 0.25 g     levothyroxine (SYNTHROID/LEVOTHROID) tablet 75 mcg     liothyronine (CYTOMEL) tablet 25 mcg     loperamide (IMODIUM) capsule 2 mg     melatonin tablet 3 mg     multivitamin  w/minerals (THERA-VIT-M) tablet 1 tablet     naloxone (NARCAN) injection 0.2 mg     Or     naloxone (NARCAN) injection 0.4 mg     Or     naloxone (NARCAN) injection 0.2 mg     Or     naloxone (NARCAN) injection 0.4 mg     naproxen (NAPROSYN) tablet 500 mg     nicotine polacrilex (NICORETTE) gum 4-8 mg     OLANZapine (zyPREXA) tablet 10 mg     Or     OLANZapine (zyPREXA) injection 10 mg     oxyCODONE IR (ROXICODONE) tablet 20 mg     pantoprazole (PROTONIX) EC tablet 40 mg     prazosin (MINIPRESS) capsule 1 mg     pregabalin (LYRICA) capsule 300 mg     propranolol (INDERAL) tablet 20 mg     QUEtiapine (SEROquel) tablet 600 mg     sennosides (SENOKOT) tablet 1-2 tablet     thiamine (B-1) tablet 100 mg     tiZANidine (ZANAFLEX) tablet 4 mg     topiramate (TOPAMAX) tablet 100 mg     Plan:  Pt will continue to be engaged in the therapeutic milieu and groups where she will learn and practice posivite coping skills. Pt will continue to be stabilized using medication and other therapeutic/programing approaches. Discharge to group home setting with outpatient services and providers      Rationale for change in precautions or plan: No change

## 2022-01-18 NOTE — CONSULTS
[facility], Orthopaedic Surgery Consultation    Hollis Barclay MRN# [id number]   Age: 51 year old YOB: 1970     Date of Admission:  1/8/2022    Reason for consult: Left elbow olecranon bursitis                 Assessment and Plan:   Assessment and Plan:   51 year old male w/ R knee OA and L olecranon septic bursitis 2-3 years ago which underwent I&D in [place] admitted to [facility] for SI on whom ortho is consulted for L elbow olecranon bursitis.    Exam reassuring.  Most likely diagnosis is aseptic olecranon bursitis.  It is possible that this is developing septic olecranon bursitis, however if that is the case it will declare itself as it worsens, becomes more swollen and red, and may begin draining.  I feel the likelihood of this is low; however, if this occurs, please reconsult Ortho for splint, evaluation, and following for nonoperative management; please also initiate antibiotics at that time.    Will treat as follows; nonoperative modalities for pain and swelling including Tylenol, ibuprofen, heat, ice, elevation, ACE wrap.  If getting worse or is still bothersome to the patient, can add a posterior slab elbow splint x 1-7 days to limit elbow range of motion and provide soft tissue rest, however I do not think this would be necessary given the clinical appearance.    Could also add oral antibiotics given his history of septic olecranon bursitis, though I feel the benefit for this is low.  Defer to primary team whether to add antibiotics.    Activity: Up with assist until independent  Weight bearing status: WBAT all extremities  Bracing/Splinting: Hinged knee brace PRN R knee. May obtain L elbow splint or brace PRN if patient desires.  Imaging: none needed  Antibiotics: none needed from ortho standpoint, defer to primary  Dressings: ACE wrap PRN  Diet: ADAT    Pain management: For knee pain, elbow pain, and elbow inflammation, recommend tylenol, ibuprofen, robaxin; recommend avoiding narcotics if  possible for his chronic musculoskeletal complaints; defer to primary.    Labs: None further needed from ortho perspective; if elbow clinically worsens would obtain ESR, CRP, WBC  Physical Therapy/Occupational Therapy: would recommend PT for R knee if logistically feasible; would do knee ROM and strengthening of dynamic stabilizers  Follow-up: PRN for olecranon bursitis. He may follow-up outpatient for eval for R total knee replacement  Disposition: Per primary    Orthopaedic Surgery will continue to follow peripherally while patient is in-hospital. We are readily available should issues arise.  Please feel free to contact us for questions, concerns, or other issues with which we may be of assistance. Thank you for the opportunity to care for this patient.    Wes Garcia MD  Orthopaedic Surgery, PGY-1  Pager: [phone]    Signed:  This consultation will be discussed with Dr. Santillan, Attending Physician.    Wes Garcia MD  Orthopaedic Surgery, PGY-4  Pager: [phone]            History of Present Illness:   History obtained from chart review and patient.    51 year old male w/ R knee OA and L olecranon septic bursitis 2-3 years ago which underwent I&D in [place] admitted to Psych for SI on whom ortho is consulted for L elbow olecranon bursitis.     Reports 4 days of worsening mild swelling of the left elbow.  Notes history of surgery and infection of the left olecranon bursa as above.    Takes oxycodone for pain for his knee.          Past Medical History:     Past Medical History:   Diagnosis Date     Alcoholism in remission (H)      Bilateral ACL tear      Bipolar disorder (H)      Borderline personality disorder (H)      Chemical dependency (H)      Chronic back pain      Closed left arm fracture 1985     H/O shoulder surgery      Post concussive encephalopathy      Social anxiety disorder      Social anxiety disorder      TBI (traumatic brain injury) (H)              Past Surgical History:   No past surgical  "history on file.          Social History:     Social History     Socioeconomic History     Marital status:      Spouse name: Not on file     Number of children: Not on file     Years of education: Not on file     Highest education level: Not on file   Occupational History     Not on file   Tobacco Use     Smoking status: Former Smoker     Types: Dip, chew, snus or snuff     Smokeless tobacco: Former User     Types: Chew   Substance and Sexual Activity     Alcohol use: Not Currently     Drug use: Not Currently     Sexual activity: Not on file   Other Topics Concern     Not on file   Social History Narrative    1/31/2020:  Jose M is now living in an apartment with one roommate in [place].  He is happy with his current living situation.        11/4/2019: He is living in a full house with 8 people, which has been stressful.  He had an interview for an apartment 3 weeks ago, and expects to hear from them soon.        10/11/2019    Jose M is , and has 2 living children who are adults.  He had a daughter, who passed away.  He developed issues with drugs and alcohol in his late 30s.  He is now in [facility] residence with Nurse assistance. Previously was receiving treatment at [facility], and is also in a methadone program at [facility].  He grew up in [place], [place] outside of [place].  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  He is currently receiving treatment at a [facility] program, and [facility] then [facility] the next few months.     Social Determinants of Health     Financial Resource Strain: Not on file   Food Insecurity: Not on file   Transportation Needs: Not on file   Physical Activity: Not on file   Stress: Not on file   Social Connections: Not on file   Intimate Partner Violence: Not on file   Housing Stability: Not on file             Family History:   No family history on file.           Medications:     Current " Facility-Administered Medications   Medication     acamprosate (CAMPRAL) EC tablet 666 mg     acetaminophen (TYLENOL) tablet 975 mg     albuterol (PROVENTIL HFA/VENTOLIN HFA) inhaler     alum & mag hydroxide-simethicone (MAALOX) suspension 30 mL     amLODIPine (NORVASC) tablet 5 mg     docusate sodium (COLACE) capsule 100 mg     folic acid (FOLVITE) tablet 1 mg     hydrocortisone (CORTAID) 1 % cream     ketamine 5%-gabapentin 8% in PLO cream 0.25 g     levothyroxine (SYNTHROID/LEVOTHROID) tablet 75 mcg     liothyronine (CYTOMEL) tablet 25 mcg     loperamide (IMODIUM) capsule 2 mg     melatonin tablet 3 mg     multivitamin w/minerals (THERA-VIT-M) tablet 1 tablet     naloxone (NARCAN) injection 0.2 mg    Or     naloxone (NARCAN) injection 0.4 mg    Or     naloxone (NARCAN) injection 0.2 mg    Or     naloxone (NARCAN) injection 0.4 mg     naproxen (NAPROSYN) tablet 500 mg     nicotine polacrilex (NICORETTE) gum 4-8 mg     OLANZapine (zyPREXA) tablet 10 mg    Or     OLANZapine (zyPREXA) injection 10 mg     oxyCODONE IR (ROXICODONE) tablet 20 mg     pantoprazole (PROTONIX) EC tablet 40 mg     prazosin (MINIPRESS) capsule 1 mg     pregabalin (LYRICA) capsule 300 mg     propranolol (INDERAL) tablet 20 mg     QUEtiapine (SEROquel) tablet 600 mg     sennosides (SENOKOT) tablet 1-2 tablet     thiamine (B-1) tablet 100 mg     tiZANidine (ZANAFLEX) tablet 4 mg     topiramate (TOPAMAX) tablet 100 mg             Allergies:      Allergies   Allergen Reactions     Cucumber Extract Anaphylaxis     Cucumber the vegable     Hydroxyzine Hives     Previously unreported by patient, this is a new patient declaration as of 5/1/21.     Nsaids      No problem with oral NSAIDs--Toradol injection itching only.     Trazodone Itching     Per Patient            Review of Systems:   Per HPI          Physical Exam:   VITAL SIGNS: /73 (BP Location: Right arm, Patient Position: Supine)   Pulse 81   Temp 98  F (36.7  C) (Oral)   Resp 16    Wt 137 kg (302 lb)   SpO2 97%   BMI 36.00 kg/m      Gen: Awake, appropriate, following commands, NAD. Ambulating w/ walker. R knee brace in place.  Resp: Non-labored breathing  CV: Extr wwp, no peripheral cyanosis    MSK:  Focused exam of L elbow demonstrates no gross deformity, skin intact. Healed incisions from prior surgery. Mild swelling of the olecranon bursa consistent with olecranon bursitis. No erythema. Mild TTP. Full symmetric elbow ROM without pain.          Data:   All pertinent laboratory data reviewed  All pertinent imaging studies reviewed by me.    Recent Labs   Lab Test 01/07/22  1646 12/17/21  0801 12/15/21  0904 11/27/21  0652 11/26/21  0950 11/25/21  0742 11/24/21  0658 11/22/21  2118 11/22/21  1522 03/16/20  0806 03/14/20  1615   HGB 15.2 12.5* 12.5*   < > 11.8* 11.7* 11.6*   < >  --    < >  --    CRP  --   --   --   --  2.0* 3.5* 6.4*   < >  --    < >  --    WBC 7.7 4.5 5.1   < > 10.3 9.9 8.7   < >  --    < >  --    INR  --   --  0.89  --   --   --   --   --  0.91  --  0.98    310 268   < > 320 296 249   < >  --    < >  --    CR 0.61* 0.65* 0.62*   < > 0.72 0.74 0.69*   < >  --    < >  --    GLC 93 108* 153*   < > 154* 167* 195*   < >  --    < >  --     < > = values in this interval not displayed.       Imaging:  None needed

## 2022-01-18 NOTE — PLAN OF CARE
Problem: Mood Impairment (Depressive Signs/Symptoms)  Goal: Improved Mood Symptoms (Depressive Signs/Symptoms)  Outcome: Improving     Behavioral  Pt was visible in the milieu and was observed watching TV. Pt also attended group activities. Pt rates anxiety at 7/10 and depression at 8/10 and says both are related to pain. He continues to have scheduled pain medications and has not requested any outside the planned doses. Pt also has a swollen R elbow, and there is an order for an IP consult. Pt denies SI, AVH but has hx of endorsing SI thoughts in the past. Pt's mood was neutral, and he presented with flat blunted affect. He reports a good appetite. Pt also uses a brace for his R R knee and uses a walker to ambulate. He has been getting Nicolet gum every hour.   Medical Alerts  None  Plan  Continue to monitor

## 2022-01-18 NOTE — PROGRESS NOTES
Pt initially wanted to watch a movie, but then became socially-engaged in the group and participated in dance/movement therapy (D/MT) using music to explore emotional themes and movement to regulate. Passive movements were used to open and allow release. He used verbal prompts to engage getting to know peers in the session and also shared his own memories of his dad.  He expressed appreciation for peers as well as the ways music and this session opened him up to emotions.         01/18/22 7525   Dance Movement Therapy   Type of Intervention structured groups   Response participates with encouragement   Hours 1

## 2022-01-18 NOTE — PLAN OF CARE
Activities :  -Rounded with team and reviewed pt's chart  -Met with pt and reviewed his progress. He reported he is in pains, feels depressed.   -Met with the patient today. He requested the phone number for the . He reported he had left several messages and wanted to contact her again.   -Writer called the  and left her a message, this morning.     Addressed patient needs/concerns: Reviewed possible options for disposition, when he is stabilized. Pt reported he does not want to go back to the group home from which he came to the hospital.     Discharge Plan or Goal   Plan  Pt will be discharging to a new group home, per his request, and considering what was reported to have happened there pertaining to his rehospitalization.  Commitment  NA  Health Care Follow up Appointment:    Will need psych, therapy, pcp, and  mental michael cm  Medication Management Plan:  See providers notes  Housing:  [facility] #[phone]  Financial Follow ups:  He reports he has GA   SMRTed for SSDI benefits.  Care Team     Jr Giron MD  -[facility] #[phone]    CADI Waiver CM: Jessica Lawson- [facility] #[phone]  ? Arlin- supervisor for cadi cm [phone]    - Joseph Oliveros's supervisor # [phone]  ? Admin #[phone]        Barriers to Discharge   Ongoing stabilization  Group home placement     Referral Status  Will need referral for MH CM, psych and therapy     Legal Status  Voluntary

## 2022-01-18 NOTE — PLAN OF CARE
Problem: Additional Goals  Goal: BEH OT Goal 1  Description: Will attend OT groups and participate actively in all OT opportunities. Will assess and set goals.    Intervention: Pt attended 1 of 1 OT groups: OT Clinic with 2 peers.     Patient Response: Pt elected to continue working on beading project, requesting needed supplies from writer. Pt was social with peers and writer throughout duration of group, encouraging them to attend  and offering assistance and suggestions to peer on their project. Pt stated they enjoy coming to group as a distraction from what is going on and the situation they are in and enjoying actively working on projects.     Duration of attendance: duration of group - 50 min.     Mood/Affect: Pleasant       Plan: Patient encouraged to maintain attendance for continued ongoing support in working towards occupational therapy goals to support overall treatment/care.

## 2022-01-18 NOTE — PROGRESS NOTES
Patient seen, chart reviewed, care discussed with staff.  Blood pressure 96/67, pulse 80, temperature 97  F (36.1  C), temperature source Temporal, resp. rate 18, weight 137 kg (302 lb), SpO2 92 %.    Still sleepy at times, but overall better.  Mood still depressed/discourages.  Pain better with hospital bed and new knee brace.    General appearance: good  Alert.   Affect: fair  Mood: fair , some depression  Speech:  normal.   Eye contact:  good.    Psychomotor behavior: normal  Gait: steady   Abnormal movements:  none  Delusions: none  Hallucinations:  none  Thoughts: logical  Associations: intact  Judgement: good  Insight: good  Cognitions: intact in conversation  Memory:  intact in conversation  Orientation: normal    Not suicidal.    He needs a new place to live upon discharge to decrease risk of relapse.    .  Patient Active Problem List   Diagnosis     Post concussive encephalopathy     H/O shoulder surgery     Alcoholism in remission (H)     Bilateral ACL tear     Chronic back pain     Social anxiety disorder     Alcohol withdrawal syndrome with complication, with unspecified complication (H)     Rib fracture     Alcohol withdrawal (H)     Alcohol dependence (H)     LFT elevation     Laceration of left hand without foreign body, initial encounter     Suicidal ideation     Intentional drug overdose, initial encounter (H)     Alcoholic intoxication with complication (H)     Severe bipolar I disorder, current or most recent episode depressed, with mixed features (H)     Medication overdose, intentional self-harm, subsequent encounter     Opioid dependence on agonist therapy (H)     2019 novel coronavirus disease (COVID-19)     Pneumonia due to COVID-19 virus     Bipolar disorder (H)     Current Facility-Administered Medications   Medication     acamprosate (CAMPRAL) EC tablet 666 mg     acetaminophen (TYLENOL) tablet 975 mg     albuterol (PROVENTIL HFA/VENTOLIN HFA) inhaler     alum & mag hydroxide-simethicone  (MAALOX) suspension 30 mL     amLODIPine (NORVASC) tablet 5 mg     docusate sodium (COLACE) capsule 100 mg     folic acid (FOLVITE) tablet 1 mg     hydrocortisone (CORTAID) 1 % cream     ketamine 5%-gabapentin 8% in PLO cream 0.25 g     levothyroxine (SYNTHROID/LEVOTHROID) tablet 75 mcg     liothyronine (CYTOMEL) tablet 25 mcg     loperamide (IMODIUM) capsule 2 mg     melatonin tablet 3 mg     multivitamin w/minerals (THERA-VIT-M) tablet 1 tablet     naloxone (NARCAN) injection 0.2 mg    Or     naloxone (NARCAN) injection 0.4 mg    Or     naloxone (NARCAN) injection 0.2 mg    Or     naloxone (NARCAN) injection 0.4 mg     naproxen (NAPROSYN) tablet 500 mg     nicotine polacrilex (NICORETTE) gum 4-8 mg     OLANZapine (zyPREXA) tablet 10 mg    Or     OLANZapine (zyPREXA) injection 10 mg     oxyCODONE IR (ROXICODONE) tablet 20 mg     pantoprazole (PROTONIX) EC tablet 40 mg     prazosin (MINIPRESS) capsule 1 mg     pregabalin (LYRICA) capsule 300 mg     propranolol (INDERAL) tablet 20 mg     QUEtiapine (SEROquel) tablet 600 mg     sennosides (SENOKOT) tablet 1-2 tablet     thiamine (B-1) tablet 100 mg     tiZANidine (ZANAFLEX) tablet 4 mg     topiramate (TOPAMAX) tablet 100 mg     Recent Results (from the past 168 hour(s))   Asymptomatic COVID-19 Virus (Coronavirus) by PCR Nasopharyngeal    Collection Time: 01/12/22  6:12 PM    Specimen: Nasopharyngeal; Swab   Result Value Ref Range    SARS CoV2 PCR Negative Negative     Improved: Bipolar depressed, severe, not psychotic  2.  Pain    Plan: continue opiates until knee surgery  Same meds

## 2022-01-18 NOTE — PLAN OF CARE
Activities :  -Rounded with team and reviewed pt's chart  -Met with pt and reviewed his progress. He reported he is in pains, feels depressed.   Left a message for pt's  requesting to connect regarding pt's housing, following his hospitalization.     Addressed patient needs/concerns: Reviewed possible options for disposition, when he is stabilized. Pt reported he does not want to go back to the group home from which he came to the hospital.     Discharge Plan or Goal   Plan  Pt will be discharging to a new group home, per his request, and considering what was reported to have happened there pertaining to his rehospitalization.  Commitment  NA  Health Care Follow up Appointment:    Will need psych, therapy, pcp, and  mental michael cm  Medication Management Plan:  See providers notes  Housing:  [facility] #[phone]  Financial Follow ups:  He reports he has GA   SMRTed for SSDI benefits.  Care Team     Jr Giron MD  -[facility] #[phone]    CADI Waiver CM: Jessica Lawson- [facility] #[phone]  ? Arlin- supervisor for cadi cm [phone]    - Joseph Oliveros's supervisor # [phone]  ? Admin #[phone]        Barriers to Discharge   Ongoing stabilization  Group home placement     Referral Status  Will need referral for  CM, psych and therapy     Legal Status  Voluntary

## 2022-01-18 NOTE — PROGRESS NOTES
Pt requested writer to look at his left elbow. Elbow appears swollen and patient reports pain. Pt is also reporting history of septic bursitis.  Shayy SAL psych NP notified and Eleonora/ lora TSAI was web paged.    Eleonora medical PA requested that Orthopedics be consulted. Shayy SAL notified.

## 2022-01-19 ENCOUNTER — APPOINTMENT (OUTPATIENT)
Dept: PHYSICAL THERAPY | Facility: CLINIC | Age: 52
End: 2022-01-19
Attending: PSYCHIATRY & NEUROLOGY
Payer: MEDICAID

## 2022-01-19 LAB
BASOPHILS # BLD MANUAL: 0 10E3/UL (ref 0–0.2)
BASOPHILS NFR BLD MANUAL: 0 %
EOSINOPHIL # BLD MANUAL: 0 10E3/UL (ref 0–0.7)
EOSINOPHIL NFR BLD MANUAL: 1 %
ERYTHROCYTE [DISTWIDTH] IN BLOOD BY AUTOMATED COUNT: 14.6 % (ref 10–15)
HCT VFR BLD AUTO: 39.3 % (ref 40–53)
HGB BLD-MCNC: 12.7 G/DL (ref 13.3–17.7)
LYMPHOCYTES # BLD MANUAL: 2.1 10E3/UL (ref 0.8–5.3)
LYMPHOCYTES NFR BLD MANUAL: 42 %
MCH RBC QN AUTO: 29.1 PG (ref 26.5–33)
MCHC RBC AUTO-ENTMCNC: 32.3 G/DL (ref 31.5–36.5)
MCV RBC AUTO: 90 FL (ref 78–100)
MONOCYTES # BLD MANUAL: 0.4 10E3/UL (ref 0–1.3)
MONOCYTES NFR BLD MANUAL: 8 %
NEUTROPHILS # BLD MANUAL: 2.4 10E3/UL (ref 1.6–8.3)
NEUTROPHILS NFR BLD MANUAL: 49 %
NRBC # BLD AUTO: 0 10E3/UL
NRBC BLD MANUAL-RTO: 1 %
PLAT MORPH BLD: ABNORMAL
PLATELET # BLD AUTO: 280 10E3/UL (ref 150–450)
RBC # BLD AUTO: 4.36 10E6/UL (ref 4.4–5.9)
RBC MORPH BLD: ABNORMAL
WBC # BLD AUTO: 4.9 10E3/UL (ref 4–11)

## 2022-01-19 PROCEDURE — 250N000013 HC RX MED GY IP 250 OP 250 PS 637: Performed by: NURSE PRACTITIONER

## 2022-01-19 PROCEDURE — 97530 THERAPEUTIC ACTIVITIES: CPT | Mod: GP | Performed by: PHYSICAL THERAPIST

## 2022-01-19 PROCEDURE — 250N000013 HC RX MED GY IP 250 OP 250 PS 637: Performed by: PSYCHIATRY & NEUROLOGY

## 2022-01-19 PROCEDURE — 250N000013 HC RX MED GY IP 250 OP 250 PS 637

## 2022-01-19 PROCEDURE — 85027 COMPLETE CBC AUTOMATED: CPT

## 2022-01-19 PROCEDURE — 124N000003 HC R&B MH SENIOR/ADOLESCENT

## 2022-01-19 PROCEDURE — 36415 COLL VENOUS BLD VENIPUNCTURE: CPT

## 2022-01-19 PROCEDURE — 97110 THERAPEUTIC EXERCISES: CPT | Mod: GP | Performed by: PHYSICAL THERAPIST

## 2022-01-19 RX ORDER — LAMOTRIGINE 25 MG/1
25 TABLET ORAL DAILY
Status: DISCONTINUED | OUTPATIENT
Start: 2022-01-19 | End: 2022-02-01

## 2022-01-19 RX ADMIN — NICOTINE POLACRILEX 8 MG: 4 GUM, CHEWING ORAL at 09:53

## 2022-01-19 RX ADMIN — NICOTINE POLACRILEX 8 MG: 4 GUM, CHEWING ORAL at 15:16

## 2022-01-19 RX ADMIN — THIAMINE HCL TAB 100 MG 100 MG: 100 TAB at 08:44

## 2022-01-19 RX ADMIN — HYDROCORTISONE: 1 CREAM TOPICAL at 18:51

## 2022-01-19 RX ADMIN — ACAMPROSATE CALCIUM 666 MG: 333 TABLET, DELAYED RELEASE ORAL at 13:30

## 2022-01-19 RX ADMIN — ACAMPROSATE CALCIUM 666 MG: 333 TABLET, DELAYED RELEASE ORAL at 20:22

## 2022-01-19 RX ADMIN — MULTIPLE VITAMINS W/ MINERALS TAB 1 TABLET: TAB at 08:44

## 2022-01-19 RX ADMIN — NICOTINE POLACRILEX 8 MG: 4 GUM, CHEWING ORAL at 16:59

## 2022-01-19 RX ADMIN — NAPROXEN 500 MG: 500 TABLET ORAL at 08:44

## 2022-01-19 RX ADMIN — NICOTINE POLACRILEX 8 MG: 4 GUM, CHEWING ORAL at 11:36

## 2022-01-19 RX ADMIN — PREGABALIN 300 MG: 100 CAPSULE ORAL at 08:44

## 2022-01-19 RX ADMIN — NAPROXEN 500 MG: 500 TABLET ORAL at 18:50

## 2022-01-19 RX ADMIN — LIOTHYRONINE SODIUM 25 MCG: 25 TABLET ORAL at 08:43

## 2022-01-19 RX ADMIN — OXYCODONE HYDROCHLORIDE 20 MG: 10 TABLET ORAL at 20:58

## 2022-01-19 RX ADMIN — PREGABALIN 300 MG: 100 CAPSULE ORAL at 20:57

## 2022-01-19 RX ADMIN — NICOTINE POLACRILEX 8 MG: 4 GUM, CHEWING ORAL at 13:31

## 2022-01-19 RX ADMIN — PREGABALIN 300 MG: 100 CAPSULE ORAL at 13:31

## 2022-01-19 RX ADMIN — FOLIC ACID 1 MG: 1 TABLET ORAL at 08:43

## 2022-01-19 RX ADMIN — QUETIAPINE 600 MG: 300 TABLET, FILM COATED ORAL at 22:55

## 2022-01-19 RX ADMIN — NICOTINE POLACRILEX 8 MG: 4 GUM, CHEWING ORAL at 21:43

## 2022-01-19 RX ADMIN — DOCUSATE SODIUM 100 MG: 100 CAPSULE, LIQUID FILLED ORAL at 08:43

## 2022-01-19 RX ADMIN — TOPIRAMATE 100 MG: 100 TABLET, FILM COATED ORAL at 08:43

## 2022-01-19 RX ADMIN — OXYCODONE HYDROCHLORIDE 20 MG: 10 TABLET ORAL at 05:53

## 2022-01-19 RX ADMIN — PANTOPRAZOLE SODIUM 40 MG: 40 TABLET, DELAYED RELEASE ORAL at 05:53

## 2022-01-19 RX ADMIN — OXYCODONE HYDROCHLORIDE 20 MG: 10 TABLET ORAL at 09:53

## 2022-01-19 RX ADMIN — LAMOTRIGINE 25 MG: 25 TABLET ORAL at 11:36

## 2022-01-19 RX ADMIN — DOCUSATE SODIUM 100 MG: 100 CAPSULE, LIQUID FILLED ORAL at 20:22

## 2022-01-19 RX ADMIN — PRAZOSIN HYDROCHLORIDE 1 MG: 1 CAPSULE ORAL at 22:55

## 2022-01-19 RX ADMIN — PROPRANOLOL HYDROCHLORIDE 20 MG: 20 TABLET ORAL at 08:44

## 2022-01-19 RX ADMIN — NICOTINE POLACRILEX 8 MG: 4 GUM, CHEWING ORAL at 20:22

## 2022-01-19 RX ADMIN — NICOTINE POLACRILEX 8 MG: 4 GUM, CHEWING ORAL at 18:50

## 2022-01-19 RX ADMIN — ACAMPROSATE CALCIUM 666 MG: 333 TABLET, DELAYED RELEASE ORAL at 08:43

## 2022-01-19 RX ADMIN — NICOTINE POLACRILEX 8 MG: 4 GUM, CHEWING ORAL at 05:53

## 2022-01-19 RX ADMIN — NICOTINE POLACRILEX 8 MG: 4 GUM, CHEWING ORAL at 07:00

## 2022-01-19 RX ADMIN — AMLODIPINE BESYLATE 5 MG: 5 TABLET ORAL at 08:43

## 2022-01-19 RX ADMIN — OXYCODONE HYDROCHLORIDE 20 MG: 10 TABLET ORAL at 16:59

## 2022-01-19 RX ADMIN — PROPRANOLOL HYDROCHLORIDE 20 MG: 20 TABLET ORAL at 20:22

## 2022-01-19 RX ADMIN — NICOTINE POLACRILEX 8 MG: 4 GUM, CHEWING ORAL at 08:44

## 2022-01-19 RX ADMIN — TOPIRAMATE 100 MG: 100 TABLET, FILM COATED ORAL at 20:22

## 2022-01-19 RX ADMIN — LEVOTHYROXINE SODIUM 75 MCG: 75 TABLET ORAL at 05:54

## 2022-01-19 RX ADMIN — OXYCODONE HYDROCHLORIDE 20 MG: 10 TABLET ORAL at 13:31

## 2022-01-19 RX ADMIN — NICOTINE POLACRILEX 8 MG: 4 GUM, CHEWING ORAL at 22:55

## 2022-01-19 ASSESSMENT — ACTIVITIES OF DAILY LIVING (ADL)
ORAL_HYGIENE: INDEPENDENT
HYGIENE/GROOMING: INDEPENDENT
HYGIENE/GROOMING: INDEPENDENT
LAUNDRY: WITH SUPERVISION
ORAL_HYGIENE: INDEPENDENT
LAUNDRY: WITH SUPERVISION
DRESS: SCRUBS (BEHAVIORAL HEALTH)
DRESS: SCRUBS (BEHAVIORAL HEALTH)

## 2022-01-19 NOTE — PLAN OF CARE
Problem: Suicidal Behavior  Goal: Suicidal Behavior is Absent or Managed  Outcome: No Change     Problem: Activity and Energy Impairment (Depressive Signs/Symptoms)  Goal: Optimized Energy Level (Depressive Signs/Symptoms)  Outcome: No Change     Problem: Feelings of Worthlessness, Hopelessness or Excessive Guilt (Depressive Signs/Symptoms)  Goal: Enhanced Self-Esteem and Confidence (Depressive Signs/Symptoms)  Outcome: No Change     Patient is calm cooperative, affect flat and blunted. Denies SI,SIB, and hallucinations. But reports SI is intermittent. Endorses anxiety and depression 8/10. Has been isolative and withdrawn, not social with peers. Reports not feeling any better since admission, started on Lamictal today. Appearance is untidy, encouraged to perform ADLs.   Right knee and left elbow pain managed with scheduled medications. Patient has ace wrap to LUE, and brace to RLE. Good appetite, ate 100% of meals.

## 2022-01-19 NOTE — PLAN OF CARE
"  Problem: Suicidal Behavior  Goal: Suicidal Behavior is Absent or Managed  Outcome: Improving     Problem: Activity and Energy Impairment (Depressive Signs/Symptoms)  Goal: Optimized Energy Level (Depressive Signs/Symptoms)  Outcome: No Change  Intervention: Optimize Energy Level  Recent Flowsheet Documentation  Taken 1/18/2022 1815 by Jammie Vail RN  Patient Performed Hygiene: patient refused  Diversional Activity: reading  Activity (Behavioral Health): activity adjusted per tolerance     Pt presents with blunted, flat affect and calm mood. Denies SI/SIB and hallucinations this evening. Reports depression and anxiety 8/10. Pt overall calm but does become irritable at times. Pt remained largely isolated to his room most of the shift. Somewhat social with peers when he was in the lounge. Pt reports that his knee and elbow are really bothering him this evening - rates the pain 7/10 and is \"adequately\" managed with scheduled medications. Pt is A&Ox4. Gait is steady and balanced with and without wheeled walker. Pt did spend a lot of time sleeping this evening. He was seen by ortho today r/t swollen L elbow - ACE wrap was recommended and patient care order was provided by Dr. Perez so that pt may have this. Ortho provider stated pt can leave this on 24/7 and just remove for showers. Pt reports that he can contract for safety on the unit and will let staff know if that changes at any time. Also adjusted pt wheeled walker as he was noted to be very hunched over when using it. Abrasions that pt sustained from using unit razor the other night have improved (no redness noted) and pt refuses the use of scheduled hydrocortisone cream for this. VSS. Appetite and fluid intake adequate. Refused shower this evening. Medication compliant. Nicotine gum provided as requested by pt. No other concerns or complaints noted.   "

## 2022-01-19 NOTE — PROGRESS NOTES
"Patient seen, chart reviewed, care discussed with staff.  Blood pressure 113/69, pulse 76, temperature 97.5  F (36.4  C), temperature source Oral, resp. rate 16, weight 137 kg (302 lb), SpO2 96 %.    Continues depressed, participating in programming.  Described as \"flat and irritable\".    General appearance: fair  Alert.   Affect: sad  Mood: fair    Speech:  normal.   Eye contact:  good.    Psychomotor behavior: normal  Gait: steady with walker  Abnormal movements:  none  Delusions: none  Hallucinations:  none  Thoughts: logical  Associations: intact  Judgement: good  Insight: good  Cognitions: intact in conversation  Memory:  intact in conversation  Orientation: normal    Not suicidal.    Patient Active Problem List   Diagnosis     Post concussive encephalopathy     H/O shoulder surgery     Alcoholism in remission (H)     Bilateral ACL tear     Chronic back pain     Social anxiety disorder     Alcohol withdrawal syndrome with complication, with unspecified complication (H)     Rib fracture     Alcohol withdrawal (H)     Alcohol dependence (H)     LFT elevation     Laceration of left hand without foreign body, initial encounter     Suicidal ideation     Intentional drug overdose, initial encounter (H)     Alcoholic intoxication with complication (H)     Severe bipolar I disorder, current or most recent episode depressed, with mixed features (H)     Medication overdose, intentional self-harm, subsequent encounter     Opioid dependence on agonist therapy (H)     2019 novel coronavirus disease (COVID-19)     Pneumonia due to COVID-19 virus     Bipolar disorder (H)     Imp: bipolar depressed recurrent severe, not psychotic  Pain  S/P TBIs  Opiate rx prior authed for after discharge  Plan: check CBC to see if WBC is elevated in view of L elbow  2.  Add Lamictal.  SJS and risk discussed    Current Facility-Administered Medications   Medication     acamprosate (CAMPRAL) EC tablet 666 mg     acetaminophen (TYLENOL) tablet " 975 mg     albuterol (PROVENTIL HFA/VENTOLIN HFA) inhaler     alum & mag hydroxide-simethicone (MAALOX) suspension 30 mL     amLODIPine (NORVASC) tablet 5 mg     docusate sodium (COLACE) capsule 100 mg     folic acid (FOLVITE) tablet 1 mg     hydrocortisone (CORTAID) 1 % cream     ketamine 5%-gabapentin 8% in PLO cream 0.25 g     lamoTRIgine (LaMICtal) tablet 25 mg     levothyroxine (SYNTHROID/LEVOTHROID) tablet 75 mcg     liothyronine (CYTOMEL) tablet 25 mcg     loperamide (IMODIUM) capsule 2 mg     melatonin tablet 3 mg     multivitamin w/minerals (THERA-VIT-M) tablet 1 tablet     naloxone (NARCAN) injection 0.2 mg    Or     naloxone (NARCAN) injection 0.4 mg    Or     naloxone (NARCAN) injection 0.2 mg    Or     naloxone (NARCAN) injection 0.4 mg     naproxen (NAPROSYN) tablet 500 mg     nicotine polacrilex (NICORETTE) gum 4-8 mg     OLANZapine (zyPREXA) tablet 10 mg    Or     OLANZapine (zyPREXA) injection 10 mg     oxyCODONE IR (ROXICODONE) tablet 20 mg     pantoprazole (PROTONIX) EC tablet 40 mg     prazosin (MINIPRESS) capsule 1 mg     pregabalin (LYRICA) capsule 300 mg     propranolol (INDERAL) tablet 20 mg     QUEtiapine (SEROquel) tablet 600 mg     sennosides (SENOKOT) tablet 1-2 tablet     thiamine (B-1) tablet 100 mg     tiZANidine (ZANAFLEX) tablet 4 mg     topiramate (TOPAMAX) tablet 100 mg     Recent Results (from the past 168 hour(s))   Asymptomatic COVID-19 Virus (Coronavirus) by PCR Nasopharyngeal    Collection Time: 01/12/22  6:12 PM    Specimen: Nasopharyngeal; Swab   Result Value Ref Range    SARS CoV2 PCR Negative Negative   Asymptomatic COVID-19 Virus (Coronavirus) by PCR Nose    Collection Time: 01/18/22  9:47 AM    Specimen: Nose; Swab   Result Value Ref Range    SARS CoV2 PCR Negative Negative   CBC with platelets and differential    Collection Time: 01/19/22 11:32 AM   Result Value Ref Range    WBC Count 4.9 4.0 - 11.0 10e3/uL    RBC Count 4.36 (L) 4.40 - 5.90 10e6/uL    Hemoglobin 12.7  (L) 13.3 - 17.7 g/dL    Hematocrit 39.3 (L) 40.0 - 53.0 %    MCV 90 78 - 100 fL    MCH 29.1 26.5 - 33.0 pg    MCHC 32.3 31.5 - 36.5 g/dL    RDW 14.6 10.0 - 15.0 %    Platelet Count 280 150 - 450 10e3/uL   Manual Differential    Collection Time: 01/19/22 11:32 AM   Result Value Ref Range    % Neutrophils 49 %    % Lymphocytes 42 %    % Monocytes 8 %    % Eosinophils 1 %    % Basophils 0 %    NRBCs per 100 WBC 1 (H) <=0 %    Absolute Neutrophils 2.4 1.6 - 8.3 10e3/uL    Absolute Lymphocytes 2.1 0.8 - 5.3 10e3/uL    Absolute Monocytes 0.4 0.0 - 1.3 10e3/uL    Absolute Eosinophils 0.0 0.0 - 0.7 10e3/uL    Absolute Basophils 0.0 0.0 - 0.2 10e3/uL    Absolute NRBCs 0.0 <=0.0 10e3/uL    RBC Morphology Confirmed RBC Indices     Platelet Assessment  Automated Count Confirmed. Platelet morphology is normal.     Automated Count Confirmed. Platelet morphology is normal.

## 2022-01-19 NOTE — PLAN OF CARE
Problem: Mood Impairment (Depressive Signs/Symptoms)  Goal: Improved Mood Symptoms (Depressive Signs/Symptoms)  Outcome: No Change     Problem: Psychomotor Impairment (Depressive Signs/Symptoms)  Goal: Improved Psychomotor Symptoms (Depressive Signs/Symptoms)  Outcome: No Change     Problem: Sleep Disturbance (Depressive Signs/Symptoms)  Goal: Improved Sleep (Depressive Signs/Symptoms)  Outcome: No Change       Received awake at the start of the shift reading, did not request for sleep medication.  Slept for 7 hours  Continued to report right knee pain, scheduled pain medication given with Nicorette gum 8 mg x 2  ACE wraps in place at left arm.Affect flat and blunted

## 2022-01-19 NOTE — PLAN OF CARE
Activities :  -Met with pt and discussed his care  -Called and left a message to pt's  regarding referrals       Addressed patient needs/concerns: Reviewed possible options for disposition, when he is stabilized. Pt reported he does not want to go back to the group home from which he came to the hospital.     Discharge Plan or Goal   Plan  Pt will be discharging to a new group home, per his request, and considering what was reported to have happened there pertaining to his rehospitalization.  Commitment  NA  Health Care Follow up Appointment:    Will need psych, therapy, pcp, and  mental michael cm  Medication Management Plan:  See providers notes  Housing:  [facility] #[phone]  Financial Follow ups:  He reports he has GA   SMRTed for SSDI benefits.  Care Team     Jr Giron MD  -[facility] #[phone]    CADI Waiver CM: Jessica Lawson- [facility] #[phone]  ? Arlin- supervisor for cadi cm [phone]    - Joseph Oliveros's supervisor # [phone]  ? Admin #[phone]        Barriers to Discharge   Ongoing stabilization  Group home placement     Referral Status  Will need referral for MH CM, psych and therapy     Legal Status  Voluntary

## 2022-01-19 NOTE — PLAN OF CARE
CTC: Called and left a message for pt's  ([phone]). Requested her to call back for care coordination.

## 2022-01-20 PROCEDURE — 250N000013 HC RX MED GY IP 250 OP 250 PS 637: Performed by: PSYCHIATRY & NEUROLOGY

## 2022-01-20 PROCEDURE — 250N000013 HC RX MED GY IP 250 OP 250 PS 637: Performed by: NURSE PRACTITIONER

## 2022-01-20 PROCEDURE — 124N000003 HC R&B MH SENIOR/ADOLESCENT

## 2022-01-20 PROCEDURE — 99207 PR CDG-MDM COMPONENT: MEETS MODERATE - UP CODED: CPT

## 2022-01-20 PROCEDURE — 250N000013 HC RX MED GY IP 250 OP 250 PS 637

## 2022-01-20 PROCEDURE — 99232 SBSQ HOSP IP/OBS MODERATE 35: CPT

## 2022-01-20 RX ADMIN — ACAMPROSATE CALCIUM 666 MG: 333 TABLET, DELAYED RELEASE ORAL at 13:05

## 2022-01-20 RX ADMIN — THIAMINE HCL TAB 100 MG 100 MG: 100 TAB at 09:11

## 2022-01-20 RX ADMIN — LEVOTHYROXINE SODIUM 75 MCG: 75 TABLET ORAL at 06:39

## 2022-01-20 RX ADMIN — OXYCODONE HYDROCHLORIDE 20 MG: 10 TABLET ORAL at 13:05

## 2022-01-20 RX ADMIN — NAPROXEN 500 MG: 500 TABLET ORAL at 09:11

## 2022-01-20 RX ADMIN — LIOTHYRONINE SODIUM 25 MCG: 25 TABLET ORAL at 09:11

## 2022-01-20 RX ADMIN — MULTIPLE VITAMINS W/ MINERALS TAB 1 TABLET: TAB at 09:11

## 2022-01-20 RX ADMIN — PREGABALIN 300 MG: 100 CAPSULE ORAL at 13:05

## 2022-01-20 RX ADMIN — OXYCODONE HYDROCHLORIDE 20 MG: 10 TABLET ORAL at 06:38

## 2022-01-20 RX ADMIN — PROPRANOLOL HYDROCHLORIDE 20 MG: 20 TABLET ORAL at 09:11

## 2022-01-20 RX ADMIN — NICOTINE POLACRILEX 8 MG: 4 GUM, CHEWING ORAL at 10:49

## 2022-01-20 RX ADMIN — TOPIRAMATE 100 MG: 100 TABLET, FILM COATED ORAL at 19:49

## 2022-01-20 RX ADMIN — DOCUSATE SODIUM 100 MG: 100 CAPSULE, LIQUID FILLED ORAL at 19:49

## 2022-01-20 RX ADMIN — ACAMPROSATE CALCIUM 666 MG: 333 TABLET, DELAYED RELEASE ORAL at 09:10

## 2022-01-20 RX ADMIN — QUETIAPINE 600 MG: 300 TABLET, FILM COATED ORAL at 22:02

## 2022-01-20 RX ADMIN — PROPRANOLOL HYDROCHLORIDE 20 MG: 20 TABLET ORAL at 19:49

## 2022-01-20 RX ADMIN — NICOTINE POLACRILEX 8 MG: 4 GUM, CHEWING ORAL at 18:59

## 2022-01-20 RX ADMIN — NICOTINE POLACRILEX 8 MG: 4 GUM, CHEWING ORAL at 22:02

## 2022-01-20 RX ADMIN — ACAMPROSATE CALCIUM 666 MG: 333 TABLET, DELAYED RELEASE ORAL at 19:49

## 2022-01-20 RX ADMIN — OXYCODONE HYDROCHLORIDE 20 MG: 10 TABLET ORAL at 16:08

## 2022-01-20 RX ADMIN — NAPROXEN 500 MG: 500 TABLET ORAL at 17:29

## 2022-01-20 RX ADMIN — PREGABALIN 300 MG: 100 CAPSULE ORAL at 09:11

## 2022-01-20 RX ADMIN — DOCUSATE SODIUM 100 MG: 100 CAPSULE, LIQUID FILLED ORAL at 09:11

## 2022-01-20 RX ADMIN — NICOTINE POLACRILEX 8 MG: 4 GUM, CHEWING ORAL at 09:10

## 2022-01-20 RX ADMIN — NICOTINE POLACRILEX 8 MG: 4 GUM, CHEWING ORAL at 20:55

## 2022-01-20 RX ADMIN — PANTOPRAZOLE SODIUM 40 MG: 40 TABLET, DELAYED RELEASE ORAL at 06:39

## 2022-01-20 RX ADMIN — NICOTINE POLACRILEX 8 MG: 4 GUM, CHEWING ORAL at 06:39

## 2022-01-20 RX ADMIN — OXYCODONE HYDROCHLORIDE 20 MG: 10 TABLET ORAL at 09:11

## 2022-01-20 RX ADMIN — NICOTINE POLACRILEX 8 MG: 4 GUM, CHEWING ORAL at 17:29

## 2022-01-20 RX ADMIN — TOPIRAMATE 100 MG: 100 TABLET, FILM COATED ORAL at 09:11

## 2022-01-20 RX ADMIN — FOLIC ACID 1 MG: 1 TABLET ORAL at 09:11

## 2022-01-20 RX ADMIN — LAMOTRIGINE 25 MG: 25 TABLET ORAL at 09:11

## 2022-01-20 RX ADMIN — NICOTINE POLACRILEX 8 MG: 4 GUM, CHEWING ORAL at 16:08

## 2022-01-20 RX ADMIN — AMLODIPINE BESYLATE 5 MG: 5 TABLET ORAL at 09:11

## 2022-01-20 RX ADMIN — NICOTINE POLACRILEX 8 MG: 4 GUM, CHEWING ORAL at 13:05

## 2022-01-20 RX ADMIN — OXYCODONE HYDROCHLORIDE 20 MG: 10 TABLET ORAL at 20:56

## 2022-01-20 RX ADMIN — PREGABALIN 300 MG: 100 CAPSULE ORAL at 19:49

## 2022-01-20 RX ADMIN — NICOTINE POLACRILEX 8 MG: 4 GUM, CHEWING ORAL at 19:47

## 2022-01-20 RX ADMIN — PRAZOSIN HYDROCHLORIDE 1 MG: 1 CAPSULE ORAL at 22:02

## 2022-01-20 RX ADMIN — NICOTINE POLACRILEX 8 MG: 4 GUM, CHEWING ORAL at 11:46

## 2022-01-20 ASSESSMENT — ACTIVITIES OF DAILY LIVING (ADL)
DRESS: SCRUBS (BEHAVIORAL HEALTH)
ORAL_HYGIENE: INDEPENDENT
LAUNDRY: WITH SUPERVISION
DRESS: SCRUBS (BEHAVIORAL HEALTH)
LAUNDRY: WITH SUPERVISION
HYGIENE/GROOMING: INDEPENDENT
HYGIENE/GROOMING: INDEPENDENT
ORAL_HYGIENE: INDEPENDENT

## 2022-01-20 NOTE — PLAN OF CARE
Problem: Suicidal Behavior  Goal: Suicidal Behavior is Absent or Managed  Outcome: No Change     Problem: Decreased Participation and Engagement (Depressive Signs/Symptoms)  Goal: Increased Participation and Engagement (Depressive Signs/Symptoms)  Outcome: No Change     Problem: Mood Impairment (Depressive Signs/Symptoms)  Goal: Improved Mood Symptoms (Depressive Signs/Symptoms)  Outcome: No Change     Patient is calm and cooperative, medication compliant. Intermittent passive SI, denies SIB, Hallucinations. Reports anxiety 7/10 and depression 8/10. Patient affect flat, blunted, and withdrawn. Right knee pain managed with scheduled medications.  Patient became vocal and angry about oxycodone medication administration. Patient received scheduled oxycodone prior to requesting it and later requested a dose not realizing he had already received the dose. Patient began yelling in the lounge, writer able to calm patient down with no further incident. Patient spent most of the day sleeping or reading the bible in his room, patient did come out for meals.

## 2022-01-20 NOTE — PLAN OF CARE
"Pt has flat affect, occasionally brightens during interactions.  Visible in the lounge, social with peers. Social with peers.  Depression rated 7/10, anxiety 8/10.  Pt endorses ongoing passive SI and states he is safe on the unit.Pt reports feeling somewhat tired today - he believes it is the Lamictal he started.    Abrasion on R cheek is almost healed.    Pt reports L arm pain is \"not necessarily managed by the Oxy.\"  Pt declined interventions.    Ongoing R knee pain rated at 7 with scheduled pain medication.    "

## 2022-01-20 NOTE — PLAN OF CARE
Activities :  -Met with pt and discussed his care  -Called and left a message to pt's  regarding referrals  -New referrals made to group homes        Addressed patient needs/concerns: Reviewed possible options for disposition, when he is stabilized.      Discharge Plan or Goal   Plan  New referrals made  Commitment    Health Care Follow up Appointment:    Will need psych, therapy, pcp, and  mental michael cm  Medication Management Plan:  See providers notes  Housing:  [facility] #[phone]  Financial Follow ups:  He reports he has GA   SMRTed for SSDI benefits.  Care Team     Jr Giron MD  -[facility] #[phone]    CADI Waiver CM: Jessica Lawson- [facility] #[phone]  ? Arlin- supervisor for cadi cm [phone]    - Joseph Oliveros's supervisor # [phone]  ? Admin #[phone]        Barriers to Discharge   Ongoing stabilization  Group home placement     Referral Status  Will need referral for MH CM, psych and therapy     Legal Status  Voluntary

## 2022-01-20 NOTE — PLAN OF CARE
Problem: Nutrition Imbalance (Depressive Signs/Symptoms)  Goal: Optimized Nutrition Intake (Depressive Signs/Symptoms)  Outcome: No Change     Spent the 1st few hours of the shift reading, did not request for prn sleep medication  Slept for 7 hours  Continues to endorse right knee pain, scheduled medications given  Affect flat and blunted

## 2022-01-20 NOTE — PROGRESS NOTES
"[facility], [place]   Psychiatric Progress Note        Interim History:   The patient's care was discussed with the treatment team during the daily team meeting and staff's chart notes were reviewed. Labs were reviewed, WBC WNL, no concern for infection of left elbow bursitis as of now. Patient seen by LATOYA Bennett CNP.    Staff report patient slept 7 hours last night and continues to endorse right knee pain. Staff reports group participation has decreased. Patient stated after his first dose of Lamictal yesterday morning he was more tired and dozed off a few times during the day and evening. States his mood is down and endorses fleeting suicidal ideation thinking \"I'm tired of keeping going.\" He states his mother still being alive is a motivation to strive to get better. Contracts for safety in the hospital, denies plan for suicide. Denies HI/AVH.          Diagnoses:   1. Bipolar, current episode depressed, without psychotic features  2. Chronic pain  3. S/p multiple TBIs            Plan:     1. Continue with Lamictal 25mg once a day. May consider dosing at bedtime instead of morning if pt continues to note tiredness. Consider increasing to 50mg next week.     2. Continue to encourage patient to participate in groups and monitor suicidal ideation.       Disposition Plan   Reason for ongoing admission: poses an imminent risk to self  Discharge location: group home  Legal Status: voluntary         Medications:       acamprosate  666 mg Oral TID     amLODIPine  5 mg Oral Daily     docusate sodium  100 mg Oral BID     folic acid  1 mg Oral Daily     hydrocortisone   Topical BID     lamoTRIgine  25 mg Oral Daily     levothyroxine  75 mcg Oral QAM AC     liothyronine  25 mcg Oral Daily     multivitamin w/minerals  1 tablet Oral Daily     naproxen  500 mg Oral BID w/meals     oxyCODONE IR  20 mg Oral 5x Daily     pantoprazole  40 mg Oral QAM AC     prazosin  1 mg Oral At Bedtime     " pregabalin  300 mg Oral TID     propranolol  20 mg Oral BID     QUEtiapine  600 mg Oral At Bedtime     thiamine  100 mg Oral Daily     topiramate  100 mg Oral BID            Psychiatric Examination:     /73   Pulse 76   Temp 98.2  F (36.8  C)   Resp 16   Wt 137 kg (302 lb)   SpO2 97%   BMI 36.00 kg/m    Weight is 302 lbs 0 oz  Body mass index is 36 kg/m .  Orthostatic Vitals  Report      Most Recent      Sitting Orthostatic /69 01/19 0814    Sitting Orthostatic Pulse (bpm) 76 01/19 0814            Appearance: awake, alert, adequately groomed and dressed in hospital scrubs  Attitude:  cooperative  Eye Contact:  good  Mood:  depressed  Affect:  mood congruent and intensity is blunted  Speech:  Slightly mumbled  Psychomotor Behavior:  no evidence of tardive dyskinesia, dystonia, or tics  Thought Process:  logical, linear and goal oriented  Associations:  no loose associations  Thought Content:  Denies auditory/visual hallucination, denies homicidal ideation, endorses fleeting thoughts of being better off not here  Insight:  fair  Judgement:  fair  Oriented to:  time, person, and place  Attention Span and Concentration:  Unchanged, intact on interview  Recent and Remote Memory:  Unchanged, intact on interview           Allergies:     Allergies   Allergen Reactions     Cucumber Extract Anaphylaxis     Cucumber the vegable     Hydroxyzine Hives     Previously unreported by patient, this is a new patient declaration as of 5/1/21.     Nsaids      No problem with oral NSAIDs--Toradol injection itching only.     Trazodone Itching     Per Patient          Labs:     Recent Results (from the past 24 hour(s))   CBC with platelets and differential    Collection Time: 01/19/22 11:32 AM   Result Value Ref Range    WBC Count 4.9 4.0 - 11.0 10e3/uL    RBC Count 4.36 (L) 4.40 - 5.90 10e6/uL    Hemoglobin 12.7 (L) 13.3 - 17.7 g/dL    Hematocrit 39.3 (L) 40.0 - 53.0 %    MCV 90 78 - 100 fL    MCH 29.1 26.5 - 33.0 pg     MCHC 32.3 31.5 - 36.5 g/dL    RDW 14.6 10.0 - 15.0 %    Platelet Count 280 150 - 450 10e3/uL   Manual Differential    Collection Time: 01/19/22 11:32 AM   Result Value Ref Range    % Neutrophils 49 %    % Lymphocytes 42 %    % Monocytes 8 %    % Eosinophils 1 %    % Basophils 0 %    NRBCs per 100 WBC 1 (H) <=0 %    Absolute Neutrophils 2.4 1.6 - 8.3 10e3/uL    Absolute Lymphocytes 2.1 0.8 - 5.3 10e3/uL    Absolute Monocytes 0.4 0.0 - 1.3 10e3/uL    Absolute Eosinophils 0.0 0.0 - 0.7 10e3/uL    Absolute Basophils 0.0 0.0 - 0.2 10e3/uL    Absolute NRBCs 0.0 <=0.0 10e3/uL    RBC Morphology Confirmed RBC Indices     Platelet Assessment  Automated Count Confirmed. Platelet morphology is normal.     Automated Count Confirmed. Platelet morphology is normal.            Precautions:     Behavioral Orders   Procedures     Code 2     Fall precautions     Routine Programming     As clinically indicated     Seizure precautions     Status 15     Every 15 minutes.     Suicide precautions     Patients on Suicide Precautions should have a Combination Diet ordered that includes a Diet selection(s) AND a Behavioral Tray selection for Safe Tray - with utensils, or Safe Tray - NO utensils       Withdrawal precautions       Entered by: Shayy Olvera on January 20, 2022 at 10:36 AM

## 2022-01-20 NOTE — PROGRESS NOTES
"SPIRITUAL HEALTH SERVICES  SPIRITUAL ASSESSMENT Progress Note  [facility] ([facility]) 3BW     REFERRAL SOURCE: follow up    Check-in visit with Jose M. Jose M declined for today noting that he has been \"sleeping off the pain [knee]\" and reading this afternoon.    Pt welcomed a follow up on Saturday.    Liss Kerns MDiv  Associate   Pager [phone]  Office [phone]  [facility] remains available 24/7 for emergent requests/referrals, either by having the switchboard page the on-call  or by entering an ASAP/STAT consult in Epic (this will also page the on-call ). Routine Epic consults receive an initial response within 24 hours.    "

## 2022-01-21 PROCEDURE — H2032 ACTIVITY THERAPY, PER 15 MIN: HCPCS

## 2022-01-21 PROCEDURE — 250N000013 HC RX MED GY IP 250 OP 250 PS 637: Performed by: PSYCHIATRY & NEUROLOGY

## 2022-01-21 PROCEDURE — 124N000003 HC R&B MH SENIOR/ADOLESCENT

## 2022-01-21 PROCEDURE — 250N000013 HC RX MED GY IP 250 OP 250 PS 637: Performed by: NURSE PRACTITIONER

## 2022-01-21 PROCEDURE — 99231 SBSQ HOSP IP/OBS SF/LOW 25: CPT

## 2022-01-21 PROCEDURE — 250N000013 HC RX MED GY IP 250 OP 250 PS 637

## 2022-01-21 RX ADMIN — MULTIPLE VITAMINS W/ MINERALS TAB 1 TABLET: TAB at 08:41

## 2022-01-21 RX ADMIN — THIAMINE HCL TAB 100 MG 100 MG: 100 TAB at 08:41

## 2022-01-21 RX ADMIN — ACAMPROSATE CALCIUM 666 MG: 333 TABLET, DELAYED RELEASE ORAL at 08:38

## 2022-01-21 RX ADMIN — NICOTINE POLACRILEX 8 MG: 4 GUM, CHEWING ORAL at 16:18

## 2022-01-21 RX ADMIN — ACAMPROSATE CALCIUM 666 MG: 333 TABLET, DELAYED RELEASE ORAL at 20:09

## 2022-01-21 RX ADMIN — FOLIC ACID 1 MG: 1 TABLET ORAL at 08:40

## 2022-01-21 RX ADMIN — QUETIAPINE 600 MG: 300 TABLET, FILM COATED ORAL at 22:00

## 2022-01-21 RX ADMIN — NICOTINE POLACRILEX 8 MG: 4 GUM, CHEWING ORAL at 14:46

## 2022-01-21 RX ADMIN — TOPIRAMATE 100 MG: 100 TABLET, FILM COATED ORAL at 08:42

## 2022-01-21 RX ADMIN — DOCUSATE SODIUM 100 MG: 100 CAPSULE, LIQUID FILLED ORAL at 20:09

## 2022-01-21 RX ADMIN — PREGABALIN 300 MG: 100 CAPSULE ORAL at 20:10

## 2022-01-21 RX ADMIN — LIOTHYRONINE SODIUM 25 MCG: 25 TABLET ORAL at 08:40

## 2022-01-21 RX ADMIN — LEVOTHYROXINE SODIUM 75 MCG: 75 TABLET ORAL at 07:07

## 2022-01-21 RX ADMIN — LAMOTRIGINE 25 MG: 25 TABLET ORAL at 08:40

## 2022-01-21 RX ADMIN — NICOTINE POLACRILEX 8 MG: 4 GUM, CHEWING ORAL at 20:09

## 2022-01-21 RX ADMIN — NICOTINE POLACRILEX 8 MG: 4 GUM, CHEWING ORAL at 12:19

## 2022-01-21 RX ADMIN — NAPROXEN 500 MG: 500 TABLET ORAL at 17:32

## 2022-01-21 RX ADMIN — NICOTINE POLACRILEX 8 MG: 4 GUM, CHEWING ORAL at 08:42

## 2022-01-21 RX ADMIN — DOCUSATE SODIUM 100 MG: 100 CAPSULE, LIQUID FILLED ORAL at 08:39

## 2022-01-21 RX ADMIN — OXYCODONE HYDROCHLORIDE 20 MG: 10 TABLET ORAL at 16:18

## 2022-01-21 RX ADMIN — TOPIRAMATE 100 MG: 100 TABLET, FILM COATED ORAL at 20:09

## 2022-01-21 RX ADMIN — NICOTINE POLACRILEX 8 MG: 4 GUM, CHEWING ORAL at 21:59

## 2022-01-21 RX ADMIN — OXYCODONE HYDROCHLORIDE 20 MG: 10 TABLET ORAL at 10:26

## 2022-01-21 RX ADMIN — NICOTINE POLACRILEX 8 MG: 4 GUM, CHEWING ORAL at 13:41

## 2022-01-21 RX ADMIN — AMLODIPINE BESYLATE 5 MG: 5 TABLET ORAL at 08:39

## 2022-01-21 RX ADMIN — PROPRANOLOL HYDROCHLORIDE 20 MG: 20 TABLET ORAL at 08:41

## 2022-01-21 RX ADMIN — PREGABALIN 300 MG: 100 CAPSULE ORAL at 08:41

## 2022-01-21 RX ADMIN — ACAMPROSATE CALCIUM 666 MG: 333 TABLET, DELAYED RELEASE ORAL at 13:40

## 2022-01-21 RX ADMIN — PREGABALIN 300 MG: 100 CAPSULE ORAL at 13:40

## 2022-01-21 RX ADMIN — OXYCODONE HYDROCHLORIDE 20 MG: 10 TABLET ORAL at 07:07

## 2022-01-21 RX ADMIN — OXYCODONE HYDROCHLORIDE 20 MG: 10 TABLET ORAL at 13:41

## 2022-01-21 RX ADMIN — NICOTINE POLACRILEX 8 MG: 4 GUM, CHEWING ORAL at 10:27

## 2022-01-21 RX ADMIN — NAPROXEN 500 MG: 500 TABLET ORAL at 08:41

## 2022-01-21 RX ADMIN — NICOTINE POLACRILEX 8 MG: 4 GUM, CHEWING ORAL at 17:32

## 2022-01-21 RX ADMIN — NICOTINE POLACRILEX 8 MG: 4 GUM, CHEWING ORAL at 07:07

## 2022-01-21 RX ADMIN — PANTOPRAZOLE SODIUM 40 MG: 40 TABLET, DELAYED RELEASE ORAL at 07:07

## 2022-01-21 RX ADMIN — PRAZOSIN HYDROCHLORIDE 1 MG: 1 CAPSULE ORAL at 22:00

## 2022-01-21 RX ADMIN — OXYCODONE HYDROCHLORIDE 20 MG: 10 TABLET ORAL at 21:12

## 2022-01-21 ASSESSMENT — ACTIVITIES OF DAILY LIVING (ADL)
ORAL_HYGIENE: INDEPENDENT
HYGIENE/GROOMING: INDEPENDENT
LAUNDRY: WITH SUPERVISION
DRESS: SCRUBS (BEHAVIORAL HEALTH)

## 2022-01-21 NOTE — PROGRESS NOTES
"[facility], [place]   Psychiatric Progress Note        Interim History:   The patient's care was discussed with the treatment team during the daily team meeting and/or staff's chart notes were reviewed. Patient seen by LATOYA Bennett CNP.    Staff report patient is doing \"alright\" but struggling with pain control. Stated sleep was poor last night due to pain and his group participation has been decreased and is spending more time in his room. Endorses fleeting thoughts of giving up, however denies suicidal intent, plan, and states his mother is a protective factor. Denies HI/AVH. No evidence of katey or psychosis.          Diagnoses:   1. Bipolar, current episode depressed, without psychotic features  2. Chronic pain  3. S/p multiple TBIs         Plan:     1. Continue with Lamictal 25mg once a day. Consider increasing to 50mg next week.      2. Continue to encourage patient to participate in groups and monitor suicidal ideation.       Disposition Plan   Discharge location: group home, pending placement   Follow-up Appointments: Will need appointment for knee within days of discharge   Legal Status: voluntary         Medications:       acamprosate  666 mg Oral TID     amLODIPine  5 mg Oral Daily     docusate sodium  100 mg Oral BID     folic acid  1 mg Oral Daily     hydrocortisone   Topical BID     lamoTRIgine  25 mg Oral Daily     levothyroxine  75 mcg Oral QAM AC     liothyronine  25 mcg Oral Daily     multivitamin w/minerals  1 tablet Oral Daily     naproxen  500 mg Oral BID w/meals     oxyCODONE IR  20 mg Oral 5x Daily     pantoprazole  40 mg Oral QAM AC     prazosin  1 mg Oral At Bedtime     pregabalin  300 mg Oral TID     propranolol  20 mg Oral BID     QUEtiapine  600 mg Oral At Bedtime     thiamine  100 mg Oral Daily     topiramate  100 mg Oral BID            Psychiatric Examination:     /65 (BP Location: Left arm, Patient Position: Sitting)   Pulse 83   Temp 97.8 " " F (36.6  C) (Temporal)   Resp 16   Wt 139.7 kg (307 lb 14.4 oz)   SpO2 96%   BMI 36.70 kg/m    Weight is 307 lbs 14.4 oz  Body mass index is 36.7 kg/m .  Orthostatic Vitals  Report      Most Recent      Sitting Orthostatic /65 01/21 0815    Sitting Orthostatic Pulse (bpm) 83 01/21 0815    Standing Orthostatic /71 01/20 0911    Standing Orthostatic Pulse (bpm) 81 01/20 0911            Appearance: awake, alert, adequately groomed and dressed in hospital scrubs  Attitude:  Slightly guarded   Eye Contact:  fair  Mood:  \"bad\"   Affect:  appropriate and in normal range and mood congruent  Speech:  clear, coherent, slightly decreased production   Psychomotor Behavior:  no evidence of tardive dyskinesia, dystonia, or tics  Thought Process:  logical, linear and goal oriented  Associations:  no loose associations  Thought Content:  no evidence of suicidal ideation or homicidal ideation and no evidence of psychotic thought  Insight:  fair  Judgement:  fair  Oriented to:  time, person, and place  Attention Span and Concentration:  Intact in conversation, unchanged   Recent and Remote Memory:  Intact in conversation, unchanged            Allergies:     Allergies   Allergen Reactions     Cucumber Extract Anaphylaxis     Cucumber the vegable     Hydroxyzine Hives     Previously unreported by patient, this is a new patient declaration as of 5/1/21.     Nsaids      No problem with oral NSAIDs--Toradol injection itching only.     Trazodone Itching     Per Patient          Labs:   No results found for this or any previous visit (from the past 24 hour(s)).         Precautions:     Behavioral Orders   Procedures     Code 2     Fall precautions     Routine Programming     As clinically indicated     Seizure precautions     Status 15     Every 15 minutes.     Suicide precautions     Patients on Suicide Precautions should have a Combination Diet ordered that includes a Diet selection(s) AND a Behavioral Tray selection for " Safe Tray - with utensils, or Safe Tray - NO utensils       Withdrawal precautions       Entered by: Shayy Olvera on January 21, 2022 at 11:57 AM

## 2022-01-21 NOTE — PLAN OF CARE
Problem: Sleep Disturbance (Depressive Signs/Symptoms)  Goal: Improved Sleep (Depressive Signs/Symptoms)  Outcome: No Change     Received in bed reading a book, pt does not any prn sleep medication but has no problem falling asleep.  Continued to endorse right knee pain but controlled with scheduled Oxycodone  Came out for snacks , fell asleep in bed with a bowl of food on his chest  Slept for 5.5 hours  Medication compliant

## 2022-01-21 NOTE — PLAN OF CARE
"  Problem: Suicidal Behavior  Goal: Suicidal Behavior is Absent or Managed  Outcome: No Change  Flowsheets (Taken 1/20/2022 1800)  Mutually Determined Action Steps (Suicidal Behavior Absent/Managed):    identifies protective factors    shares suicidal thoughts     Problem: Behavioral Health Plan of Care  Goal: Plan of Care Review  Outcome: No Change  Flowsheets (Taken 1/20/2022 1753)  Plan of Care Reviewed With: patient  Patient Agreement with Plan of Care: agrees     Problem: Behavioral Health Plan of Care  Goal: Adheres to Safety Considerations for Self and Others  Intervention: Develop and Maintain Individualized Safety Plan  Recent Flowsheet Documentation  Taken 1/20/2022 1753 by Jammie Vail RN  Safety Measures:    environmental rounds completed    suicide assessment completed      Pt presents with blunted, flat affect and calm mood. Reports SI thoughts due to pain and situation, \"I just don't wanna do this anymore\" but no specific plan. Pt can contract for safety in the hospital. Denies SIB/hallucinations. Rates depression 9/10 and anxiety 6/10. During check in pt stated that he was doing alright, just struggling with the pain. Reports that he wonders if this is hell on earth. Pt describes using alcohol as a coping mechanism for his pain. Talked about how upset he is for being where he is at in life - \"I had it all, now I have nothing!\" He reports that he talked to his mother today and she reported having a good day so that was good for him. States he cannot go back to the group home he was at, too many people used drugs and alcohol there, so it is not a safe place for him as he will relapse again. He reported that he refuses to be homeless and he will kill himself irregardless of his mother (he reports that is what keeps him currently from suicide). Continues to endorse pain 9/10 in his R knee that is \"managable\" with his scheduled pain medications. Appetite and fluid intake adequate. Pt is happy that " another male patient was admitted to the unit that is similar in age and he plays cards - this has given him someone to connect with which makes his time here easier. Medication compliant, PRN nicotine gum given per pt request multiple times. Pt did shower this evening. Pt does have a hand tremor noted, he reports that this is nothing new. No other concerns or complaints noted this evening.

## 2022-01-21 NOTE — PLAN OF CARE
Problem: Additional Goals  Goal: BEH OT Goal 1  Description: Will attend OT groups and participate actively in all OT opportunities. Will assess and set goals.    Intervention: Pt attended 1 of 3 OT groups: General Health and Coping Group with 4-5 peers. (No charge)     Patient Response: Pt entered group for the final 20 minutes. Group was focused on wellness through gentle movement and stretching. Pt actively participated in stretches and offered comments on past experiences with stretching and working out in the past when they participated in boxing.     Mood/Affect: Pleasant      Plan: Patient encouraged to maintain attendance for continued ongoing support in working towards occupational therapy goals to support overall treatment/care.

## 2022-01-21 NOTE — PLAN OF CARE
Activities :  -Met with pt and discussed his care  -Called and spoke with pt's , Jessica. She agreed for assisted living as a plan. Writer updated her on referral status. Obtained Jessica's email so writer can include her in the referrals.  -New referrals made to group homes  -Spoke with Emma at [facility]. She is also looking at places. Discussed possible challenges  Including pt's age, and mh.   Faxed information to Attn:Yanelis Wilson  [phone]      Addressed patient needs/concerns: Reviewed possible options for disposition, when he is stabilized.      Discharge Plan or Goal   Plan  New referrals made  Commitment    Health Care Follow up Appointment:    Will need psych, therapy, pcp, and  mental michael cm  Medication Management Plan:  See providers notes  Housing:  [facility] #[phone]  Financial Follow ups:  He reports he has GA   SMRTed for SSDI benefits.  Care Team     Jr Giron MD  -[facility] #[phone]    CADI Waiver CM: Jessica Lawson- [facility] #[phone]  ? Arlin- supervisor for cadi cm [phone]    Joseph Oliveros's supervisor # [phone]  ? Admin #[phone]        Barriers to Discharge   Ongoing stabilization  Group home placement     Referral Status  Will need referral for MH CM, psych and therapy     Legal Status  Voluntary

## 2022-01-21 NOTE — PLAN OF CARE
"Patient pleasant and cooperative, visible in milieu, tense and irritable at times.  Flat affect, denies SI/SIB/HI.  Rated depression 8/10, anxiety 7/10, pain 8/10.  Patient received scheduled pain medications with no problem.  Good appetite, medication compliant.  Patient answered \"of course I'm not going to kill myself\" to wish to be dead question.  Patient took nap after lunch; currently in the lounge interacting and watching T.V. with a peer.  No aggressive behavior noted, will continue to monitor closely.  "

## 2022-01-22 PROCEDURE — 250N000013 HC RX MED GY IP 250 OP 250 PS 637: Performed by: PSYCHIATRY & NEUROLOGY

## 2022-01-22 PROCEDURE — 250N000013 HC RX MED GY IP 250 OP 250 PS 637: Performed by: STUDENT IN AN ORGANIZED HEALTH CARE EDUCATION/TRAINING PROGRAM

## 2022-01-22 PROCEDURE — 250N000013 HC RX MED GY IP 250 OP 250 PS 637: Performed by: NURSE PRACTITIONER

## 2022-01-22 PROCEDURE — 250N000013 HC RX MED GY IP 250 OP 250 PS 637

## 2022-01-22 PROCEDURE — 124N000003 HC R&B MH SENIOR/ADOLESCENT

## 2022-01-22 RX ORDER — CEPHALEXIN 500 MG/1
500 CAPSULE ORAL EVERY 6 HOURS SCHEDULED
Status: DISPENSED | OUTPATIENT
Start: 2022-01-22 | End: 2022-02-01

## 2022-01-22 RX ADMIN — CEPHALEXIN 500 MG: 500 CAPSULE ORAL at 17:47

## 2022-01-22 RX ADMIN — NICOTINE POLACRILEX 8 MG: 4 GUM, CHEWING ORAL at 06:45

## 2022-01-22 RX ADMIN — NICOTINE POLACRILEX 8 MG: 4 GUM, CHEWING ORAL at 21:03

## 2022-01-22 RX ADMIN — NICOTINE POLACRILEX 8 MG: 4 GUM, CHEWING ORAL at 07:51

## 2022-01-22 RX ADMIN — NICOTINE POLACRILEX 8 MG: 4 GUM, CHEWING ORAL at 13:03

## 2022-01-22 RX ADMIN — PROPRANOLOL HYDROCHLORIDE 20 MG: 20 TABLET ORAL at 20:17

## 2022-01-22 RX ADMIN — TOPIRAMATE 100 MG: 100 TABLET, FILM COATED ORAL at 20:17

## 2022-01-22 RX ADMIN — PREGABALIN 300 MG: 100 CAPSULE ORAL at 07:55

## 2022-01-22 RX ADMIN — NICOTINE POLACRILEX 8 MG: 4 GUM, CHEWING ORAL at 16:12

## 2022-01-22 RX ADMIN — LEVOTHYROXINE SODIUM 75 MCG: 75 TABLET ORAL at 06:45

## 2022-01-22 RX ADMIN — NICOTINE POLACRILEX 8 MG: 4 GUM, CHEWING ORAL at 15:21

## 2022-01-22 RX ADMIN — NICOTINE POLACRILEX 8 MG: 4 GUM, CHEWING ORAL at 14:01

## 2022-01-22 RX ADMIN — OXYCODONE HYDROCHLORIDE 20 MG: 10 TABLET ORAL at 13:03

## 2022-01-22 RX ADMIN — OXYCODONE HYDROCHLORIDE 20 MG: 10 TABLET ORAL at 09:24

## 2022-01-22 RX ADMIN — PANTOPRAZOLE SODIUM 40 MG: 40 TABLET, DELAYED RELEASE ORAL at 06:45

## 2022-01-22 RX ADMIN — PREGABALIN 300 MG: 100 CAPSULE ORAL at 14:01

## 2022-01-22 RX ADMIN — DOCUSATE SODIUM 100 MG: 100 CAPSULE, LIQUID FILLED ORAL at 07:55

## 2022-01-22 RX ADMIN — OXYCODONE HYDROCHLORIDE 20 MG: 10 TABLET ORAL at 06:45

## 2022-01-22 RX ADMIN — FOLIC ACID 1 MG: 1 TABLET ORAL at 07:55

## 2022-01-22 RX ADMIN — CEPHALEXIN 500 MG: 500 CAPSULE ORAL at 14:01

## 2022-01-22 RX ADMIN — NICOTINE POLACRILEX 8 MG: 4 GUM, CHEWING ORAL at 10:28

## 2022-01-22 RX ADMIN — NICOTINE POLACRILEX 8 MG: 4 GUM, CHEWING ORAL at 20:17

## 2022-01-22 RX ADMIN — NICOTINE POLACRILEX 8 MG: 4 GUM, CHEWING ORAL at 11:46

## 2022-01-22 RX ADMIN — TOPIRAMATE 100 MG: 100 TABLET, FILM COATED ORAL at 07:55

## 2022-01-22 RX ADMIN — NICOTINE POLACRILEX 8 MG: 4 GUM, CHEWING ORAL at 09:23

## 2022-01-22 RX ADMIN — PRAZOSIN HYDROCHLORIDE 1 MG: 1 CAPSULE ORAL at 22:05

## 2022-01-22 RX ADMIN — NICOTINE POLACRILEX 8 MG: 4 GUM, CHEWING ORAL at 17:46

## 2022-01-22 RX ADMIN — THIAMINE HCL TAB 100 MG 100 MG: 100 TAB at 07:55

## 2022-01-22 RX ADMIN — NAPROXEN 500 MG: 500 TABLET ORAL at 07:55

## 2022-01-22 RX ADMIN — MULTIPLE VITAMINS W/ MINERALS TAB 1 TABLET: TAB at 07:55

## 2022-01-22 RX ADMIN — PREGABALIN 300 MG: 100 CAPSULE ORAL at 20:17

## 2022-01-22 RX ADMIN — ACETAMINOPHEN 975 MG: 325 TABLET, FILM COATED ORAL at 22:05

## 2022-01-22 RX ADMIN — LIOTHYRONINE SODIUM 25 MCG: 25 TABLET ORAL at 07:55

## 2022-01-22 RX ADMIN — OXYCODONE HYDROCHLORIDE 20 MG: 10 TABLET ORAL at 21:03

## 2022-01-22 RX ADMIN — QUETIAPINE 600 MG: 300 TABLET, FILM COATED ORAL at 22:05

## 2022-01-22 RX ADMIN — DOCUSATE SODIUM 100 MG: 100 CAPSULE, LIQUID FILLED ORAL at 20:17

## 2022-01-22 RX ADMIN — ACAMPROSATE CALCIUM 666 MG: 333 TABLET, DELAYED RELEASE ORAL at 20:17

## 2022-01-22 RX ADMIN — ACAMPROSATE CALCIUM 666 MG: 333 TABLET, DELAYED RELEASE ORAL at 14:01

## 2022-01-22 RX ADMIN — ACAMPROSATE CALCIUM 666 MG: 333 TABLET, DELAYED RELEASE ORAL at 07:55

## 2022-01-22 RX ADMIN — OXYCODONE HYDROCHLORIDE 20 MG: 10 TABLET ORAL at 16:12

## 2022-01-22 RX ADMIN — LAMOTRIGINE 25 MG: 25 TABLET ORAL at 07:55

## 2022-01-22 RX ADMIN — NICOTINE POLACRILEX 8 MG: 4 GUM, CHEWING ORAL at 22:05

## 2022-01-22 RX ADMIN — NAPROXEN 500 MG: 500 TABLET ORAL at 17:46

## 2022-01-22 ASSESSMENT — ACTIVITIES OF DAILY LIVING (ADL)
HYGIENE/GROOMING: INDEPENDENT
ORAL_HYGIENE: INDEPENDENT
HYGIENE/GROOMING: INDEPENDENT
DRESS: INDEPENDENT
DRESS: SCRUBS (BEHAVIORAL HEALTH)
LAUNDRY: WITH SUPERVISION
ORAL_HYGIENE: INDEPENDENT

## 2022-01-22 NOTE — PLAN OF CARE
Music Therapy Group note    Clinical Hours in session: 1.0    Number of patients in group: 8    Scope of service: psychodynamic     Intervention: Evening Relaxation     Goal of group: anxiety reduction     Patient response/reaction to treatment intervention(s): Presented as slightly irritable/edgy tonight, but overall, ultimately cooperatively engaged in Evening Music Relaxation group designed to decrease anxiety.  Calm affect, appropriately engaged in session, responding well to the music.      Shawanda Mora MT-BC  Board-Certified Music Therapist

## 2022-01-22 NOTE — PLAN OF CARE
"  Problem: Behavioral Health Plan of Care  Goal: Plan of Care Review  Outcome: No Change  Flowsheets  Taken 1/22/2022 1235  Plan of Care Reviewed With: patient  Taken 1/22/2022 0953  Plan of Care Reviewed With: patient  Patient Agreement with Plan of Care: agrees     Pt presents with blunted, flat affect and calm mood. Denies SI/SIB and hallucinations. Continues to report anxiety and depression but would not rate them today. Pt present in the lounge and social with select peers. Continues to have pain to L elbow and R knee which he rates 9/10 and is \"somewhat\" controlled with scheduled pain medications. Ortho was contacted about L elbow as pt reports that it is more swollen and painful. Ortho provider stated that pt should limit the amount of motion as this will aggravate the pain and he will Rx antibiotics. Pt wanted a podiatry consult for his feet because they are extremely dry and cracked, paged on-call to get this for pt and order was placed. Pt was noted to have a drop in BP this morning with standing - he denied dizziness with standing and pt is noted to drink plenty of fluids. Held BP medications this AM because of this. Recheck later in the AM still showed a drop but pt remains stable and denies dizziness, educated pt on changing positions slowly and to alert staff if he has any changes. Medication compliant. PRN nicotine given as requested. Appetite and fluid intake adequate. Showered and had linens changed. No other concerns or complaints noted.     Decarmenngozi phoned unit multiple times regarding paperwork for group home. She reports that she did not receive Samir's fax - refaxed MAR and last two provider notes. She reported that she needed something different, advised her that she would need to call back on Monday and discuss this with the [facility].     Podiatry is not in the hospital on the weekends and only is present in hospital on Thursdays (per ortho on-call resident). Pt notified of this and reports " understanding.

## 2022-01-22 NOTE — PLAN OF CARE
Problem: Sleep Disturbance (Depressive Signs/Symptoms)  Goal: Improved Sleep (Depressive Signs/Symptoms)  Outcome: Declining     Slept poorly tonight for 4.5 hours  Stayed in his room , read a book and snacking.  Continued to endorse right knee pain

## 2022-01-22 NOTE — PLAN OF CARE
Pt visible in milieu. Social with select peer. Played cards, attended therapeutic group. Continues to report moderate-high symptoms of depression, anxiety, pain. Denies SI. Calm, pleasant, cooperative. Med-compliant. Medical bed and wheeled walker for impaired mobility. Propanolol at  held due to BP not meeting parameters (see flowsheet). Continue with current treatment plan and recommendations. Continue to monitor and reassess symptoms. Monitor response to medications. Monitor progress towards treatment goals. Encourage groups and participation.

## 2022-01-23 PROCEDURE — 250N000013 HC RX MED GY IP 250 OP 250 PS 637: Performed by: NURSE PRACTITIONER

## 2022-01-23 PROCEDURE — 250N000013 HC RX MED GY IP 250 OP 250 PS 637: Performed by: PSYCHIATRY & NEUROLOGY

## 2022-01-23 PROCEDURE — 124N000003 HC R&B MH SENIOR/ADOLESCENT

## 2022-01-23 PROCEDURE — 250N000013 HC RX MED GY IP 250 OP 250 PS 637: Performed by: STUDENT IN AN ORGANIZED HEALTH CARE EDUCATION/TRAINING PROGRAM

## 2022-01-23 PROCEDURE — 250N000013 HC RX MED GY IP 250 OP 250 PS 637

## 2022-01-23 RX ADMIN — NAPROXEN 500 MG: 500 TABLET ORAL at 08:00

## 2022-01-23 RX ADMIN — NICOTINE POLACRILEX 8 MG: 4 GUM, CHEWING ORAL at 18:44

## 2022-01-23 RX ADMIN — DOCUSATE SODIUM 100 MG: 100 CAPSULE, LIQUID FILLED ORAL at 08:01

## 2022-01-23 RX ADMIN — OXYCODONE HYDROCHLORIDE 20 MG: 10 TABLET ORAL at 06:51

## 2022-01-23 RX ADMIN — QUETIAPINE 600 MG: 300 TABLET, FILM COATED ORAL at 22:25

## 2022-01-23 RX ADMIN — THIAMINE HCL TAB 100 MG 100 MG: 100 TAB at 08:01

## 2022-01-23 RX ADMIN — PREGABALIN 300 MG: 100 CAPSULE ORAL at 13:51

## 2022-01-23 RX ADMIN — MULTIPLE VITAMINS W/ MINERALS TAB 1 TABLET: TAB at 08:00

## 2022-01-23 RX ADMIN — OXYCODONE HYDROCHLORIDE 20 MG: 10 TABLET ORAL at 16:27

## 2022-01-23 RX ADMIN — PANTOPRAZOLE SODIUM 40 MG: 40 TABLET, DELAYED RELEASE ORAL at 06:51

## 2022-01-23 RX ADMIN — ACAMPROSATE CALCIUM 666 MG: 333 TABLET, DELAYED RELEASE ORAL at 13:47

## 2022-01-23 RX ADMIN — LAMOTRIGINE 25 MG: 25 TABLET ORAL at 08:00

## 2022-01-23 RX ADMIN — CEPHALEXIN 500 MG: 500 CAPSULE ORAL at 00:01

## 2022-01-23 RX ADMIN — CEPHALEXIN 500 MG: 500 CAPSULE ORAL at 17:30

## 2022-01-23 RX ADMIN — NICOTINE POLACRILEX 8 MG: 4 GUM, CHEWING ORAL at 20:26

## 2022-01-23 RX ADMIN — NICOTINE POLACRILEX 8 MG: 4 GUM, CHEWING ORAL at 14:31

## 2022-01-23 RX ADMIN — ACETAMINOPHEN 975 MG: 325 TABLET, FILM COATED ORAL at 10:23

## 2022-01-23 RX ADMIN — PROPRANOLOL HYDROCHLORIDE 20 MG: 20 TABLET ORAL at 20:26

## 2022-01-23 RX ADMIN — LEVOTHYROXINE SODIUM 75 MCG: 75 TABLET ORAL at 06:51

## 2022-01-23 RX ADMIN — NICOTINE POLACRILEX 8 MG: 4 GUM, CHEWING ORAL at 16:27

## 2022-01-23 RX ADMIN — NAPROXEN 500 MG: 500 TABLET ORAL at 17:30

## 2022-01-23 RX ADMIN — ACAMPROSATE CALCIUM 666 MG: 333 TABLET, DELAYED RELEASE ORAL at 20:26

## 2022-01-23 RX ADMIN — OXYCODONE HYDROCHLORIDE 20 MG: 10 TABLET ORAL at 20:26

## 2022-01-23 RX ADMIN — NICOTINE POLACRILEX 8 MG: 4 GUM, CHEWING ORAL at 12:30

## 2022-01-23 RX ADMIN — CEPHALEXIN 500 MG: 500 CAPSULE ORAL at 06:51

## 2022-01-23 RX ADMIN — TOPIRAMATE 100 MG: 100 TABLET, FILM COATED ORAL at 08:01

## 2022-01-23 RX ADMIN — OXYCODONE HYDROCHLORIDE 20 MG: 10 TABLET ORAL at 10:03

## 2022-01-23 RX ADMIN — PRAZOSIN HYDROCHLORIDE 1 MG: 1 CAPSULE ORAL at 22:25

## 2022-01-23 RX ADMIN — CEPHALEXIN 500 MG: 500 CAPSULE ORAL at 11:35

## 2022-01-23 RX ADMIN — LIOTHYRONINE SODIUM 25 MCG: 25 TABLET ORAL at 08:01

## 2022-01-23 RX ADMIN — DOCUSATE SODIUM 100 MG: 100 CAPSULE, LIQUID FILLED ORAL at 20:26

## 2022-01-23 RX ADMIN — NICOTINE POLACRILEX 8 MG: 4 GUM, CHEWING ORAL at 22:25

## 2022-01-23 RX ADMIN — OXYCODONE HYDROCHLORIDE 20 MG: 10 TABLET ORAL at 13:51

## 2022-01-23 RX ADMIN — NICOTINE POLACRILEX 8 MG: 4 GUM, CHEWING ORAL at 11:35

## 2022-01-23 RX ADMIN — NICOTINE POLACRILEX 8 MG: 4 GUM, CHEWING ORAL at 08:01

## 2022-01-23 RX ADMIN — FOLIC ACID 1 MG: 1 TABLET ORAL at 08:01

## 2022-01-23 RX ADMIN — NICOTINE POLACRILEX 8 MG: 4 GUM, CHEWING ORAL at 13:48

## 2022-01-23 RX ADMIN — TOPIRAMATE 100 MG: 100 TABLET, FILM COATED ORAL at 20:26

## 2022-01-23 RX ADMIN — ACAMPROSATE CALCIUM 666 MG: 333 TABLET, DELAYED RELEASE ORAL at 08:00

## 2022-01-23 RX ADMIN — NICOTINE POLACRILEX 8 MG: 4 GUM, CHEWING ORAL at 06:51

## 2022-01-23 RX ADMIN — PREGABALIN 300 MG: 100 CAPSULE ORAL at 08:01

## 2022-01-23 RX ADMIN — PREGABALIN 300 MG: 100 CAPSULE ORAL at 20:26

## 2022-01-23 RX ADMIN — NICOTINE POLACRILEX 8 MG: 4 GUM, CHEWING ORAL at 10:03

## 2022-01-23 ASSESSMENT — ACTIVITIES OF DAILY LIVING (ADL)
ORAL_HYGIENE: INDEPENDENT
DRESS: SCRUBS (BEHAVIORAL HEALTH);INDEPENDENT
ORAL_HYGIENE: INDEPENDENT
DRESS: INDEPENDENT
HYGIENE/GROOMING: INDEPENDENT
HYGIENE/GROOMING: INDEPENDENT
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
## 2022-01-23 NOTE — PLAN OF CARE
"  Pt visible in milieu. Social with select peer. Watched television and played games in [employer]. Continues to report moderate-high symptoms of depression, anxiety, pain. Denies SI. Calm, cooperative. Med-compliant. Medical bed and wheeled walker for impaired mobility. However pt observed walking frequently without using any assistive device except for knee brace; gait steady, no non-verbal indicators of pain observed. Declined to use ACE wrap to L arm stating \"that makes it hurt more\". Continue to monitor and reassess symptoms. Monitor progress towards treatment goals. Encourage groups and participation.   "

## 2022-01-23 NOTE — PLAN OF CARE
Problem: Sleep Disturbance (Depressive Signs/Symptoms)  Goal: Improved Sleep (Depressive Signs/Symptoms)  Outcome: Improving     Slept better tonight for 6.5 hours  Compliant with his scheduled medications  Nicorette Gum 8 mg given with morning meds.  Continued to endorse right knee pain, scheduled Oxycodone given

## 2022-01-24 PROCEDURE — 250N000013 HC RX MED GY IP 250 OP 250 PS 637: Performed by: NURSE PRACTITIONER

## 2022-01-24 PROCEDURE — 124N000003 HC R&B MH SENIOR/ADOLESCENT

## 2022-01-24 PROCEDURE — 250N000013 HC RX MED GY IP 250 OP 250 PS 637: Performed by: PSYCHIATRY & NEUROLOGY

## 2022-01-24 PROCEDURE — 250N000013 HC RX MED GY IP 250 OP 250 PS 637: Performed by: STUDENT IN AN ORGANIZED HEALTH CARE EDUCATION/TRAINING PROGRAM

## 2022-01-24 PROCEDURE — 250N000013 HC RX MED GY IP 250 OP 250 PS 637

## 2022-01-24 RX ADMIN — ACAMPROSATE CALCIUM 666 MG: 333 TABLET, DELAYED RELEASE ORAL at 20:07

## 2022-01-24 RX ADMIN — OXYCODONE HYDROCHLORIDE 20 MG: 10 TABLET ORAL at 06:23

## 2022-01-24 RX ADMIN — LEVOTHYROXINE SODIUM 75 MCG: 75 TABLET ORAL at 06:23

## 2022-01-24 RX ADMIN — NICOTINE POLACRILEX 8 MG: 4 GUM, CHEWING ORAL at 21:15

## 2022-01-24 RX ADMIN — NICOTINE POLACRILEX 8 MG: 4 GUM, CHEWING ORAL at 09:34

## 2022-01-24 RX ADMIN — MULTIPLE VITAMINS W/ MINERALS TAB 1 TABLET: TAB at 08:16

## 2022-01-24 RX ADMIN — PREGABALIN 300 MG: 100 CAPSULE ORAL at 20:06

## 2022-01-24 RX ADMIN — NICOTINE POLACRILEX 8 MG: 4 GUM, CHEWING ORAL at 17:45

## 2022-01-24 RX ADMIN — PROPRANOLOL HYDROCHLORIDE 20 MG: 20 TABLET ORAL at 20:07

## 2022-01-24 RX ADMIN — PREGABALIN 300 MG: 100 CAPSULE ORAL at 14:24

## 2022-01-24 RX ADMIN — DOCUSATE SODIUM 100 MG: 100 CAPSULE, LIQUID FILLED ORAL at 20:07

## 2022-01-24 RX ADMIN — NICOTINE POLACRILEX 8 MG: 4 GUM, CHEWING ORAL at 22:16

## 2022-01-24 RX ADMIN — NICOTINE POLACRILEX 8 MG: 4 GUM, CHEWING ORAL at 20:08

## 2022-01-24 RX ADMIN — CEPHALEXIN 500 MG: 500 CAPSULE ORAL at 17:45

## 2022-01-24 RX ADMIN — NICOTINE POLACRILEX 8 MG: 4 GUM, CHEWING ORAL at 08:24

## 2022-01-24 RX ADMIN — FOLIC ACID 1 MG: 1 TABLET ORAL at 08:15

## 2022-01-24 RX ADMIN — THIAMINE HCL TAB 100 MG 100 MG: 100 TAB at 08:16

## 2022-01-24 RX ADMIN — CEPHALEXIN 500 MG: 500 CAPSULE ORAL at 06:23

## 2022-01-24 RX ADMIN — NICOTINE POLACRILEX 8 MG: 4 GUM, CHEWING ORAL at 06:23

## 2022-01-24 RX ADMIN — DOCUSATE SODIUM 100 MG: 100 CAPSULE, LIQUID FILLED ORAL at 08:16

## 2022-01-24 RX ADMIN — CEPHALEXIN 500 MG: 500 CAPSULE ORAL at 23:53

## 2022-01-24 RX ADMIN — ACAMPROSATE CALCIUM 666 MG: 333 TABLET, DELAYED RELEASE ORAL at 08:15

## 2022-01-24 RX ADMIN — NAPROXEN 500 MG: 500 TABLET ORAL at 17:45

## 2022-01-24 RX ADMIN — CEPHALEXIN 500 MG: 500 CAPSULE ORAL at 00:15

## 2022-01-24 RX ADMIN — NICOTINE POLACRILEX 8 MG: 4 GUM, CHEWING ORAL at 16:05

## 2022-01-24 RX ADMIN — OXYCODONE HYDROCHLORIDE 20 MG: 10 TABLET ORAL at 13:00

## 2022-01-24 RX ADMIN — ACETAMINOPHEN 975 MG: 325 TABLET, FILM COATED ORAL at 20:08

## 2022-01-24 RX ADMIN — NICOTINE POLACRILEX 8 MG: 4 GUM, CHEWING ORAL at 11:23

## 2022-01-24 RX ADMIN — NICOTINE POLACRILEX 8 MG: 4 GUM, CHEWING ORAL at 18:57

## 2022-01-24 RX ADMIN — CEPHALEXIN 500 MG: 500 CAPSULE ORAL at 11:24

## 2022-01-24 RX ADMIN — NICOTINE POLACRILEX 8 MG: 4 GUM, CHEWING ORAL at 13:00

## 2022-01-24 RX ADMIN — PREGABALIN 300 MG: 100 CAPSULE ORAL at 08:15

## 2022-01-24 RX ADMIN — TOPIRAMATE 100 MG: 100 TABLET, FILM COATED ORAL at 08:16

## 2022-01-24 RX ADMIN — OXYCODONE HYDROCHLORIDE 20 MG: 10 TABLET ORAL at 21:15

## 2022-01-24 RX ADMIN — QUETIAPINE 600 MG: 300 TABLET, FILM COATED ORAL at 22:08

## 2022-01-24 RX ADMIN — OXYCODONE HYDROCHLORIDE 20 MG: 10 TABLET ORAL at 16:05

## 2022-01-24 RX ADMIN — NICOTINE POLACRILEX 8 MG: 4 GUM, CHEWING ORAL at 14:24

## 2022-01-24 RX ADMIN — TOPIRAMATE 100 MG: 100 TABLET, FILM COATED ORAL at 20:06

## 2022-01-24 RX ADMIN — LIOTHYRONINE SODIUM 25 MCG: 25 TABLET ORAL at 08:15

## 2022-01-24 RX ADMIN — NAPROXEN 500 MG: 500 TABLET ORAL at 08:16

## 2022-01-24 RX ADMIN — OXYCODONE HYDROCHLORIDE 20 MG: 10 TABLET ORAL at 09:34

## 2022-01-24 RX ADMIN — PANTOPRAZOLE SODIUM 40 MG: 40 TABLET, DELAYED RELEASE ORAL at 06:23

## 2022-01-24 RX ADMIN — PRAZOSIN HYDROCHLORIDE 1 MG: 1 CAPSULE ORAL at 22:08

## 2022-01-24 RX ADMIN — LAMOTRIGINE 25 MG: 25 TABLET ORAL at 08:16

## 2022-01-24 RX ADMIN — ACAMPROSATE CALCIUM 666 MG: 333 TABLET, DELAYED RELEASE ORAL at 14:24

## 2022-01-24 ASSESSMENT — ACTIVITIES OF DAILY LIVING (ADL)
LAUNDRY: WITH SUPERVISION
DRESS: SCRUBS (BEHAVIORAL HEALTH)
DRESS: SCRUBS (BEHAVIORAL HEALTH)
ORAL_HYGIENE: INDEPENDENT
HYGIENE/GROOMING: INDEPENDENT
ORAL_HYGIENE: WITH SUPERVISION
HYGIENE/GROOMING: INDEPENDENT
LAUNDRY: WITH SUPERVISION

## 2022-01-24 NOTE — PROGRESS NOTES
"SPIRITUAL HEALTH SERVICES   Spiritual Assessment Progress Note (Behavioral Health/CD Focus)  [facility] ([facility]) 3BW    REFERRAL SOURCE: follow up    On this visit, I sat with Jose M in the lounge while he waited for a housing interview.  Provided emotional support, engaged pt in reflective conversation around themes of coping and hope.  Visit ended with arrival of Jose M's appointment.    EXPERIENCE OF ILLNESS/HOSPITALIZATION:  Jose M checked in as feeling \"irritated, in pain\" and spoke about his frustration with this meeting being 30 minutes late, having his elbow wrapped for a few days before being given antibiotics.  \"I'll be waiting to see what fate brings\" in reference to the housing interview.    MEANING-MAKING:  Jose M spoke about a different pt on the unit, listing a number of things they stated they have to live for. \"All I have is my mom. If it wasn't for leaving her on her own with the illness [cancer] I would have been gone a long time ago.\"    SPIRITUALITY/VALUES/Judaism:      COPING/SPIRITUAL PRACTICES: Jose M was appreciative of pt BRIDGETT.SRadha playing cribbage for 2-3 hours each day over the weekend, \"helps to pass the time.\"    SUPPORT SYSTEMS: his mom    PLAN: I will follow up with Jose M 1-2x weekly.                                                                                                                                             Liss Kerns MDiv  Associate   Pager [phone]  Office [phone]  [facility] remains available 24/7 for emergent requests/referrals, either by having the switchboard page the on-call  or by entering an ASAP/STAT consult in Epic (this will also page the on-call ). Routine Epic consults receive an initial response within 24 hours.    "

## 2022-01-24 NOTE — PLAN OF CARE
"Problem: Suicidal Behavior  Goal: Suicidal Behavior is Absent or Managed  Outcome: No Change  Flowsheets (Taken 1/24/2022 1232)  Mutually Determined Action Steps (Suicidal Behavior Absent/Managed):    identifies protective factors    shares suicidal thoughts    Pt presents with blunted, flat affect and calm mood. Continues to report passive SI thoughts, \"I just don't want to do this anymore.\" Pt has no plan and can continue to contract for safety in the hospital. Denies SIB and hallucinations. Rates depression and anxiety 7/10. Pt A&Ox4. Pt present in the lounge for meals but otherwise remained isolated to his room most of the shift sleeping. Social with select peers. Pt did not attend groups. He reports continued struggle with knee pain which he rates 7/10 and it is adequately controlled with scheduled oxycodone. VSS - although BP medication was held this AM as standing BP dropped below 100. Pt is otherwise medication compliant. PRN nicotine gum given as requested. Pt allowed writer to wrap his L arm with ACE wrap in attempts to help with the swelling r/t bursitis. Pt continues to utilize walker, gait is steady and balanced. Appetite and fluid intake adequate. Refused shower during this shift. No other concerns or complaints noted.     Pt continues to require the use of a medical bed r/t impaired mobility and requiring the use of elevation to help with his R knee pain.   "

## 2022-01-24 NOTE — PROGRESS NOTES
"Patient seen, chart reviewed, care discussed with staff.  Blood pressure 111/72, pulse 83, temperature 97.4  F (36.3  C), temperature source Temporal, resp. rate 16, weight 139.3 kg (307 lb), SpO2 98 %.    Mood rated as \"7 out of 10\".  No benefit, but no side effects of starting dose of Lamictal.  Slept 4.5 hours last night.    General appearance: fair.  Left arm swollen, under treatment with antibiotics  Alert.   Affect: fair  Mood: fair    Speech:  normal.   Eye contact:  good.    Psychomotor behavior: normal  Gait: steady with walker  Abnormal movements:  none  Delusions: none  Hallucinations:  none  Thoughts: logical  Associations: intact  Judgement: good  Insight: good  Cognitions: intact in conversation  Memory:  intact in conversation  Orientation: normal    Not suicidal.    He needs safe setting from relapse to prevent relapse of alcohol and mood with return to suicidal thinking.    Imp:  Bipolar depressed  2.  Alcohol dependence in early remission  3. Knee pain in need of replacement when more time after steroids and COVID has occurred.    Plan: same meds    Current Facility-Administered Medications   Medication     acamprosate (CAMPRAL) EC tablet 666 mg     acetaminophen (TYLENOL) tablet 975 mg     albuterol (PROVENTIL HFA/VENTOLIN HFA) inhaler     alum & mag hydroxide-simethicone (MAALOX) suspension 30 mL     amLODIPine (NORVASC) tablet 5 mg     cephALEXin (KEFLEX) capsule 500 mg     docusate sodium (COLACE) capsule 100 mg     folic acid (FOLVITE) tablet 1 mg     hydrocortisone (CORTAID) 1 % cream     ketamine 5%-gabapentin 8% in PLO cream 0.25 g     lamoTRIgine (LaMICtal) tablet 25 mg     levothyroxine (SYNTHROID/LEVOTHROID) tablet 75 mcg     liothyronine (CYTOMEL) tablet 25 mcg     loperamide (IMODIUM) capsule 2 mg     melatonin tablet 3 mg     multivitamin w/minerals (THERA-VIT-M) tablet 1 tablet     naloxone (NARCAN) injection 0.2 mg    Or     naloxone (NARCAN) injection 0.4 mg    Or     naloxone " (NARCAN) injection 0.2 mg    Or     naloxone (NARCAN) injection 0.4 mg     naproxen (NAPROSYN) tablet 500 mg     nicotine polacrilex (NICORETTE) gum 4-8 mg     OLANZapine (zyPREXA) tablet 10 mg    Or     OLANZapine (zyPREXA) injection 10 mg     oxyCODONE IR (ROXICODONE) tablet 20 mg     pantoprazole (PROTONIX) EC tablet 40 mg     prazosin (MINIPRESS) capsule 1 mg     pregabalin (LYRICA) capsule 300 mg     propranolol (INDERAL) tablet 20 mg     QUEtiapine (SEROquel) tablet 600 mg     sennosides (SENOKOT) tablet 1-2 tablet     thiamine (B-1) tablet 100 mg     tiZANidine (ZANAFLEX) tablet 4 mg     topiramate (TOPAMAX) tablet 100 mg     Recent Results (from the past 168 hour(s))   Asymptomatic COVID-19 Virus (Coronavirus) by PCR Nose    Collection Time: 01/18/22  9:47 AM    Specimen: Nose; Swab   Result Value Ref Range    SARS CoV2 PCR Negative Negative   CBC with platelets and differential    Collection Time: 01/19/22 11:32 AM   Result Value Ref Range    WBC Count 4.9 4.0 - 11.0 10e3/uL    RBC Count 4.36 (L) 4.40 - 5.90 10e6/uL    Hemoglobin 12.7 (L) 13.3 - 17.7 g/dL    Hematocrit 39.3 (L) 40.0 - 53.0 %    MCV 90 78 - 100 fL    MCH 29.1 26.5 - 33.0 pg    MCHC 32.3 31.5 - 36.5 g/dL    RDW 14.6 10.0 - 15.0 %    Platelet Count 280 150 - 450 10e3/uL   Manual Differential    Collection Time: 01/19/22 11:32 AM   Result Value Ref Range    % Neutrophils 49 %    % Lymphocytes 42 %    % Monocytes 8 %    % Eosinophils 1 %    % Basophils 0 %    NRBCs per 100 WBC 1 (H) <=0 %    Absolute Neutrophils 2.4 1.6 - 8.3 10e3/uL    Absolute Lymphocytes 2.1 0.8 - 5.3 10e3/uL    Absolute Monocytes 0.4 0.0 - 1.3 10e3/uL    Absolute Eosinophils 0.0 0.0 - 0.7 10e3/uL    Absolute Basophils 0.0 0.0 - 0.2 10e3/uL    Absolute NRBCs 0.0 <=0.0 10e3/uL    RBC Morphology Confirmed RBC Indices     Platelet Assessment  Automated Count Confirmed. Platelet morphology is normal.     Automated Count Confirmed. Platelet morphology is normal.

## 2022-01-24 NOTE — PLAN OF CARE
Activities :  -Met with pt and discussed his care   -Spoke with  and nurse. They will be coming to interview this patient at 1:00pm    Faxed information to Attn:Yanelis Wilson  [phone]        Addressed patient needs/concerns: Reviewed possible options for disposition, when he is stabilized.      Discharge Plan or Goal   Plan  New referrals made to Group Home  Commitment    Health Care Follow up Appointment:    Will need psych, therapy, pcp, and  mental michael cm  Medication Management Plan:  See providers notes  Housing:  [facility] #[phone]  Financial Follow ups:  He reports he has GA   SMRTed for SSDI benefits.  Care Team     Jr Giron MD  -[facility] #[phone]    CADI Waiver CM: Jessica Lawson- [facility] #[phone]  ? Arlin- supervisor for cadi cm [phone]    Joseph Oliveros's supervisor # [phone]  ? Admin #[phone]        Barriers to Discharge   Ongoing stabilization  Group home placement     Referral Status  Will need referral for MH CM, psych and therapy     Legal Status  Voluntary

## 2022-01-24 NOTE — PLAN OF CARE
Problem: Mood Impairment (Depressive Signs/Symptoms)  Goal: Improved Mood Symptoms (Depressive Signs/Symptoms)  Outcome: Improving     Received reading an book and having snacks in bed.  Compliant with scheduled medications  Affect flat but brightens on approach.  Continues to endorse right knee pain, stating that pain is constant but Oxycodone helps decrease the pain so he can do better in his ADL's  Only slept for 4.5 hours.
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
## 2022-01-24 NOTE — PLAN OF CARE
Pt visible in milieu. Social with select peer. Watched television and played games in lounge. Affect brighter, more spontaneous. Eye contact improved. Continues to report moderate-high symptoms of depression, anxiety, pain. Passive SI thoughts at times. Calm, cooperative. Med-compliant. Medical bed and wheeled walker for impaired mobility. Continues to decline to use ACE wrap to L arm. Continue to monitor and reassess symptoms. Monitor progress towards treatment goals. Encourage groups and participation.

## 2022-01-25 PROCEDURE — H2032 ACTIVITY THERAPY, PER 15 MIN: HCPCS

## 2022-01-25 PROCEDURE — 250N000013 HC RX MED GY IP 250 OP 250 PS 637: Performed by: PSYCHIATRY & NEUROLOGY

## 2022-01-25 PROCEDURE — 250N000013 HC RX MED GY IP 250 OP 250 PS 637: Performed by: STUDENT IN AN ORGANIZED HEALTH CARE EDUCATION/TRAINING PROGRAM

## 2022-01-25 PROCEDURE — 250N000013 HC RX MED GY IP 250 OP 250 PS 637: Performed by: NURSE PRACTITIONER

## 2022-01-25 PROCEDURE — 250N000013 HC RX MED GY IP 250 OP 250 PS 637

## 2022-01-25 PROCEDURE — 124N000003 HC R&B MH SENIOR/ADOLESCENT

## 2022-01-25 PROCEDURE — G0177 OPPS/PHP; TRAIN & EDUC SERV: HCPCS

## 2022-01-25 RX ADMIN — NICOTINE POLACRILEX 8 MG: 4 GUM, CHEWING ORAL at 08:50

## 2022-01-25 RX ADMIN — NICOTINE POLACRILEX 8 MG: 4 GUM, CHEWING ORAL at 21:14

## 2022-01-25 RX ADMIN — TOPIRAMATE 100 MG: 100 TABLET, FILM COATED ORAL at 20:17

## 2022-01-25 RX ADMIN — AMLODIPINE BESYLATE 5 MG: 5 TABLET ORAL at 08:47

## 2022-01-25 RX ADMIN — CEPHALEXIN 500 MG: 500 CAPSULE ORAL at 12:41

## 2022-01-25 RX ADMIN — NAPROXEN 500 MG: 500 TABLET ORAL at 17:35

## 2022-01-25 RX ADMIN — NICOTINE POLACRILEX 8 MG: 4 GUM, CHEWING ORAL at 06:10

## 2022-01-25 RX ADMIN — PREGABALIN 300 MG: 100 CAPSULE ORAL at 08:46

## 2022-01-25 RX ADMIN — PREGABALIN 300 MG: 100 CAPSULE ORAL at 20:17

## 2022-01-25 RX ADMIN — OXYCODONE HYDROCHLORIDE 20 MG: 10 TABLET ORAL at 06:10

## 2022-01-25 RX ADMIN — PRAZOSIN HYDROCHLORIDE 1 MG: 1 CAPSULE ORAL at 21:58

## 2022-01-25 RX ADMIN — THIAMINE HCL TAB 100 MG 100 MG: 100 TAB at 08:46

## 2022-01-25 RX ADMIN — NAPROXEN 500 MG: 500 TABLET ORAL at 08:46

## 2022-01-25 RX ADMIN — FOLIC ACID 1 MG: 1 TABLET ORAL at 08:46

## 2022-01-25 RX ADMIN — CEPHALEXIN 500 MG: 500 CAPSULE ORAL at 17:35

## 2022-01-25 RX ADMIN — PROPRANOLOL HYDROCHLORIDE 20 MG: 20 TABLET ORAL at 20:17

## 2022-01-25 RX ADMIN — OXYCODONE HYDROCHLORIDE 20 MG: 10 TABLET ORAL at 09:02

## 2022-01-25 RX ADMIN — NICOTINE POLACRILEX 8 MG: 4 GUM, CHEWING ORAL at 20:14

## 2022-01-25 RX ADMIN — PROPRANOLOL HYDROCHLORIDE 20 MG: 20 TABLET ORAL at 08:46

## 2022-01-25 RX ADMIN — TOPIRAMATE 100 MG: 100 TABLET, FILM COATED ORAL at 08:47

## 2022-01-25 RX ADMIN — CEPHALEXIN 500 MG: 500 CAPSULE ORAL at 06:10

## 2022-01-25 RX ADMIN — NICOTINE POLACRILEX 8 MG: 4 GUM, CHEWING ORAL at 16:05

## 2022-01-25 RX ADMIN — NICOTINE POLACRILEX 8 MG: 4 GUM, CHEWING ORAL at 22:20

## 2022-01-25 RX ADMIN — ACAMPROSATE CALCIUM 666 MG: 333 TABLET, DELAYED RELEASE ORAL at 20:17

## 2022-01-25 RX ADMIN — NICOTINE POLACRILEX 8 MG: 4 GUM, CHEWING ORAL at 11:16

## 2022-01-25 RX ADMIN — LIOTHYRONINE SODIUM 25 MCG: 25 TABLET ORAL at 08:46

## 2022-01-25 RX ADMIN — ACAMPROSATE CALCIUM 666 MG: 333 TABLET, DELAYED RELEASE ORAL at 13:04

## 2022-01-25 RX ADMIN — ACAMPROSATE CALCIUM 666 MG: 333 TABLET, DELAYED RELEASE ORAL at 08:46

## 2022-01-25 RX ADMIN — NICOTINE POLACRILEX 8 MG: 4 GUM, CHEWING ORAL at 14:23

## 2022-01-25 RX ADMIN — PREGABALIN 300 MG: 100 CAPSULE ORAL at 13:04

## 2022-01-25 RX ADMIN — OXYCODONE HYDROCHLORIDE 20 MG: 10 TABLET ORAL at 16:05

## 2022-01-25 RX ADMIN — NICOTINE POLACRILEX 8 MG: 4 GUM, CHEWING ORAL at 19:05

## 2022-01-25 RX ADMIN — QUETIAPINE 600 MG: 300 TABLET, FILM COATED ORAL at 21:58

## 2022-01-25 RX ADMIN — NICOTINE POLACRILEX 8 MG: 4 GUM, CHEWING ORAL at 12:41

## 2022-01-25 RX ADMIN — LEVOTHYROXINE SODIUM 75 MCG: 75 TABLET ORAL at 06:10

## 2022-01-25 RX ADMIN — OXYCODONE HYDROCHLORIDE 20 MG: 10 TABLET ORAL at 13:04

## 2022-01-25 RX ADMIN — OXYCODONE HYDROCHLORIDE 20 MG: 10 TABLET ORAL at 21:13

## 2022-01-25 RX ADMIN — NICOTINE POLACRILEX 8 MG: 4 GUM, CHEWING ORAL at 17:35

## 2022-01-25 RX ADMIN — PANTOPRAZOLE SODIUM 40 MG: 40 TABLET, DELAYED RELEASE ORAL at 06:10

## 2022-01-25 RX ADMIN — DOCUSATE SODIUM 100 MG: 100 CAPSULE, LIQUID FILLED ORAL at 08:46

## 2022-01-25 RX ADMIN — LAMOTRIGINE 25 MG: 25 TABLET ORAL at 08:46

## 2022-01-25 RX ADMIN — MULTIPLE VITAMINS W/ MINERALS TAB 1 TABLET: TAB at 08:46

## 2022-01-25 ASSESSMENT — ACTIVITIES OF DAILY LIVING (ADL)
LAUNDRY: WITH SUPERVISION
ORAL_HYGIENE: INDEPENDENT
LAUNDRY: WITH SUPERVISION
HYGIENE/GROOMING: INDEPENDENT
DRESS: SCRUBS (BEHAVIORAL HEALTH)
DRESS: SCRUBS (BEHAVIORAL HEALTH)
HYGIENE/GROOMING: INDEPENDENT
ORAL_HYGIENE: INDEPENDENT

## 2022-01-25 NOTE — PLAN OF CARE
Problem: Suicidal Behavior  Goal: Suicidal Behavior is Absent or Managed  Outcome: Improving  Flowsheets (Taken 1/25/2022 9947)  Mutually Determined Action Steps (Suicidal Behavior Absent/Managed):    identifies protective factors    sets future-oriented goal    verbalizes safety check rationale    Pt presents with full range affect and calm mood. Denies SI/SIB and hallucinations today. Rates depression 5/10 and anxiety 8/10. Pt reports that he has had a good day today. He appears brighter. Reports that he had a good visit with the provider. He is hopeful about his new group home as they are very serious about no alcohol which was not the case with his old group home and is why he relapsed. Pt was present in the lounge and social with select peers. Played cards this evening. Continues to c/o R knee pain 7/10 which he reports is adequately managed with his scheduled pain medications. Appetite and fluid intake adequate. VSS. Overall medication compliant, refused scheduled Colace due to loose stools and states he no longer needs the hydrocortisone cream for his face. PRN nicotine gum given as requested. Continues to require the use of a medical bed due to impaired mobility and to elevate lower extremities. Pt removed ACE wrap from L arm at the beginning of the shift and it was placed in the locker in his room. Pt showered this evening. Attended groups. No other concerns or complaints noted this shift.

## 2022-01-25 NOTE — PLAN OF CARE
Problem: Sleep Disturbance (Depressive Signs/Symptoms)  Goal: Improved Sleep (Depressive Signs/Symptoms)  Outcome: No Change     Received awake the 1st 2.5 hours of the shift  Declined to take prn sleep medication  Calm and appreciative  Slept for 4.5 hours  Continued to endorse constant right knee pain, scheduled pain medication given.  Prn Nicorette gum 8 mg given per request with AM medications.
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
## 2022-01-25 NOTE — PROGRESS NOTES
Patient seen, chart reviewed, care discussed with staff.  Blood pressure 123/74, pulse 77, temperature 97.6  F (36.4  C), temperature source Oral, resp. rate 16, weight 140.4 kg (309 lb 8 oz), SpO2 96 %.    By report, anxiety 9/10, depression 5/10, slept 4.5 hours    He notes anxiety, poor sleep, worry  General appearance: anxious, stressed  Alert.   Affect: fair, anxious  Mood: fair    Speech:  normal.   Eye contact:  good.    Psychomotor behavior: jittery, increased  Gait: steady   Abnormal movements:  none  Delusions: none  Hallucinations:   none  Thoughts: logical  Associations: intact  Judgement: good  Insight: good  Cognitions: intact in conversation  Memory:  intact in conversation  Orientation: normal    Not suicidal    He has been on the starting dose of Lamictal since 1/19.    Patient Active Problem List   Diagnosis     Post concussive encephalopathy     H/O shoulder surgery     Alcoholism in remission (H)     Bilateral ACL tear     Chronic back pain     Social anxiety disorder     Alcohol withdrawal syndrome with complication, with unspecified complication (H)     Rib fracture     Alcohol withdrawal (H)     Alcohol dependence (H)     LFT elevation     Laceration of left hand without foreign body, initial encounter     Suicidal ideation     Intentional drug overdose, initial encounter (H)     Alcoholic intoxication with complication (H)     Severe bipolar I disorder, current or most recent episode depressed, with mixed features (H)     Medication overdose, intentional self-harm, subsequent encounter     Opioid dependence on agonist therapy (H)     2019 novel coronavirus disease (COVID-19)     Pneumonia due to COVID-19 virus     Bipolar disorder (H)     Imp: bipolar 1 depressed, severe, without psychosis  2.  PTSD  3.  TBI  4.  A;cohol in early remission  5.  Pain    Plan: consider increased Prazosin    Current Facility-Administered Medications   Medication     acamprosate (CAMPRAL) EC tablet 666 mg      acetaminophen (TYLENOL) tablet 975 mg     albuterol (PROVENTIL HFA/VENTOLIN HFA) inhaler     alum & mag hydroxide-simethicone (MAALOX) suspension 30 mL     amLODIPine (NORVASC) tablet 5 mg     cephALEXin (KEFLEX) capsule 500 mg     docusate sodium (COLACE) capsule 100 mg     folic acid (FOLVITE) tablet 1 mg     hydrocortisone (CORTAID) 1 % cream     ketamine 5%-gabapentin 8% in PLO cream 0.25 g     lamoTRIgine (LaMICtal) tablet 25 mg     levothyroxine (SYNTHROID/LEVOTHROID) tablet 75 mcg     liothyronine (CYTOMEL) tablet 25 mcg     loperamide (IMODIUM) capsule 2 mg     melatonin tablet 3 mg     multivitamin w/minerals (THERA-VIT-M) tablet 1 tablet     naloxone (NARCAN) injection 0.2 mg    Or     naloxone (NARCAN) injection 0.4 mg    Or     naloxone (NARCAN) injection 0.2 mg    Or     naloxone (NARCAN) injection 0.4 mg     naproxen (NAPROSYN) tablet 500 mg     nicotine polacrilex (NICORETTE) gum 4-8 mg     OLANZapine (zyPREXA) tablet 10 mg    Or     OLANZapine (zyPREXA) injection 10 mg     oxyCODONE IR (ROXICODONE) tablet 20 mg     pantoprazole (PROTONIX) EC tablet 40 mg     prazosin (MINIPRESS) capsule 1 mg     pregabalin (LYRICA) capsule 300 mg     propranolol (INDERAL) tablet 20 mg     QUEtiapine (SEROquel) tablet 600 mg     sennosides (SENOKOT) tablet 1-2 tablet     thiamine (B-1) tablet 100 mg     tiZANidine (ZANAFLEX) tablet 4 mg     topiramate (TOPAMAX) tablet 100 mg     Recent Results (from the past 168 hour(s))   CBC with platelets and differential    Collection Time: 01/19/22 11:32 AM   Result Value Ref Range    WBC Count 4.9 4.0 - 11.0 10e3/uL    RBC Count 4.36 (L) 4.40 - 5.90 10e6/uL    Hemoglobin 12.7 (L) 13.3 - 17.7 g/dL    Hematocrit 39.3 (L) 40.0 - 53.0 %    MCV 90 78 - 100 fL    MCH 29.1 26.5 - 33.0 pg    MCHC 32.3 31.5 - 36.5 g/dL    RDW 14.6 10.0 - 15.0 %    Platelet Count 280 150 - 450 10e3/uL   Manual Differential    Collection Time: 01/19/22 11:32 AM   Result Value Ref Range    % Neutrophils 49  %    % Lymphocytes 42 %    % Monocytes 8 %    % Eosinophils 1 %    % Basophils 0 %    NRBCs per 100 WBC 1 (H) <=0 %    Absolute Neutrophils 2.4 1.6 - 8.3 10e3/uL    Absolute Lymphocytes 2.1 0.8 - 5.3 10e3/uL    Absolute Monocytes 0.4 0.0 - 1.3 10e3/uL    Absolute Eosinophils 0.0 0.0 - 0.7 10e3/uL    Absolute Basophils 0.0 0.0 - 0.2 10e3/uL    Absolute NRBCs 0.0 <=0.0 10e3/uL    RBC Morphology Confirmed RBC Indices     Platelet Assessment  Automated Count Confirmed. Platelet morphology is normal.     Automated Count Confirmed. Platelet morphology is normal.

## 2022-01-25 NOTE — PLAN OF CARE
"  Problem: Suicidal Behavior  Goal: Suicidal Behavior is Absent or Managed  Outcome: No Change     Problem: Decreased Participation and Engagement (Depressive Signs/Symptoms)  Goal: Increased Participation and Engagement (Depressive Signs/Symptoms)  Outcome: No Change     Problem: Mood Impairment (Depressive Signs/Symptoms)  Goal: Improved Mood Symptoms (Depressive Signs/Symptoms)  Outcome: No Change     Patient is calm and cooperative, medication compliant. Affect flat and blunted. Reports anxiety 9/10 related to changes to living situation with the pursuit of new group home. Patient also reports depression is decreasing 5/10. Currently denies SI but reports passive thoughts at times. Denies, SIB, Hallucinations. Ongoing knee pain managed with scheduled medications, patient states \"the oxycodone takes the edge off\". Patient also on Keflex d/t bursitis in left elbow, currently has ace bandage on. Attended at least 1 group. Did not observe patient engaging with peers much in the milieu, was found dozing off around lunch time with tray in front of him, ate only 25% of meal. Spent a good amount of time in room this shift.  "

## 2022-01-25 NOTE — PROGRESS NOTES
"Behavioral Health  Note    Behavioral Health  Spirituality Group Note    UNIT 3BW    Name: Hollis Barclay YOB: 1970   MRN: [id number] Age: 51 year old      Patient attended -led group, which included discussion of spirituality, coping with illness and practicing peace.    Patient attended group for 1.0 hrs.    The patient actively participated in group discussion and patient demonstrated an appreciation of topic's application for their personal circumstances. Jose M identified being busy at work, \"at calm in the eye of the storm\" as times when he most is at peace.    Liss Kerns MDiv  Associate   Pager [phone]  Office [phone]    "

## 2022-01-25 NOTE — PLAN OF CARE
"Pt has full range affect.  He is visible in the lounge and social with peers.  Pt did not attend group this evening.  Pt rates depression at 6.5 / 10 and anxiety 7/10.  Pt endorses ongoing passive SI that pt states has \"been with me for a long time - it's normal for me.\"  Pt expressed feeling hopeful about a group home placement after an interview today.      /92 at start of shift.  /74 in the evening.    Pt provided with education handout on Lamictal per his request.  Pt reports having sinus congestion / drainage the past few days and believes it is due to Lamictal.  Pt will discuss with his provider tomorrow.    Pt continues on Keflex for L arm bursitis / inflammation.  Pt is wearing wrap on L arm.  Pt states that he does not notice any improvement in the pain / tenderness since starting ABX.  PRN Tylenol 975 mg x 1 for L arm pain.  Pt reports as minimally effective.   "

## 2022-01-26 PROCEDURE — 250N000013 HC RX MED GY IP 250 OP 250 PS 637

## 2022-01-26 PROCEDURE — G0177 OPPS/PHP; TRAIN & EDUC SERV: HCPCS

## 2022-01-26 PROCEDURE — 250N000013 HC RX MED GY IP 250 OP 250 PS 637: Performed by: STUDENT IN AN ORGANIZED HEALTH CARE EDUCATION/TRAINING PROGRAM

## 2022-01-26 PROCEDURE — 250N000013 HC RX MED GY IP 250 OP 250 PS 637: Performed by: PSYCHIATRY & NEUROLOGY

## 2022-01-26 PROCEDURE — 124N000003 HC R&B MH SENIOR/ADOLESCENT

## 2022-01-26 PROCEDURE — 250N000013 HC RX MED GY IP 250 OP 250 PS 637: Performed by: NURSE PRACTITIONER

## 2022-01-26 PROCEDURE — H2032 ACTIVITY THERAPY, PER 15 MIN: HCPCS

## 2022-01-26 RX ADMIN — NICOTINE POLACRILEX 8 MG: 4 GUM, CHEWING ORAL at 20:13

## 2022-01-26 RX ADMIN — THIAMINE HCL TAB 100 MG 100 MG: 100 TAB at 08:32

## 2022-01-26 RX ADMIN — ACAMPROSATE CALCIUM 666 MG: 333 TABLET, DELAYED RELEASE ORAL at 14:29

## 2022-01-26 RX ADMIN — OXYCODONE HYDROCHLORIDE 20 MG: 10 TABLET ORAL at 06:27

## 2022-01-26 RX ADMIN — NAPROXEN 500 MG: 500 TABLET ORAL at 17:54

## 2022-01-26 RX ADMIN — NAPROXEN 500 MG: 500 TABLET ORAL at 08:32

## 2022-01-26 RX ADMIN — LIOTHYRONINE SODIUM 25 MCG: 25 TABLET ORAL at 08:32

## 2022-01-26 RX ADMIN — NICOTINE POLACRILEX 8 MG: 4 GUM, CHEWING ORAL at 22:19

## 2022-01-26 RX ADMIN — CEPHALEXIN 500 MG: 500 CAPSULE ORAL at 17:54

## 2022-01-26 RX ADMIN — OXYCODONE HYDROCHLORIDE 20 MG: 10 TABLET ORAL at 13:13

## 2022-01-26 RX ADMIN — LAMOTRIGINE 25 MG: 25 TABLET ORAL at 08:32

## 2022-01-26 RX ADMIN — NICOTINE POLACRILEX 8 MG: 4 GUM, CHEWING ORAL at 17:54

## 2022-01-26 RX ADMIN — PREGABALIN 300 MG: 100 CAPSULE ORAL at 14:29

## 2022-01-26 RX ADMIN — NICOTINE POLACRILEX 8 MG: 4 GUM, CHEWING ORAL at 08:33

## 2022-01-26 RX ADMIN — CEPHALEXIN 500 MG: 500 CAPSULE ORAL at 00:04

## 2022-01-26 RX ADMIN — TOPIRAMATE 100 MG: 100 TABLET, FILM COATED ORAL at 20:11

## 2022-01-26 RX ADMIN — PANTOPRAZOLE SODIUM 40 MG: 40 TABLET, DELAYED RELEASE ORAL at 06:27

## 2022-01-26 RX ADMIN — CEPHALEXIN 500 MG: 500 CAPSULE ORAL at 12:44

## 2022-01-26 RX ADMIN — OXYCODONE HYDROCHLORIDE 20 MG: 10 TABLET ORAL at 09:00

## 2022-01-26 RX ADMIN — NICOTINE POLACRILEX 8 MG: 4 GUM, CHEWING ORAL at 10:19

## 2022-01-26 RX ADMIN — QUETIAPINE 600 MG: 300 TABLET, FILM COATED ORAL at 22:19

## 2022-01-26 RX ADMIN — ACAMPROSATE CALCIUM 666 MG: 333 TABLET, DELAYED RELEASE ORAL at 20:11

## 2022-01-26 RX ADMIN — PRAZOSIN HYDROCHLORIDE 1 MG: 1 CAPSULE ORAL at 22:19

## 2022-01-26 RX ADMIN — NICOTINE POLACRILEX 8 MG: 4 GUM, CHEWING ORAL at 21:16

## 2022-01-26 RX ADMIN — NICOTINE POLACRILEX 8 MG: 4 GUM, CHEWING ORAL at 14:29

## 2022-01-26 RX ADMIN — LEVOTHYROXINE SODIUM 75 MCG: 75 TABLET ORAL at 06:29

## 2022-01-26 RX ADMIN — NICOTINE POLACRILEX 8 MG: 4 GUM, CHEWING ORAL at 12:45

## 2022-01-26 RX ADMIN — NICOTINE POLACRILEX 8 MG: 4 GUM, CHEWING ORAL at 19:03

## 2022-01-26 RX ADMIN — MULTIPLE VITAMINS W/ MINERALS TAB 1 TABLET: TAB at 08:32

## 2022-01-26 RX ADMIN — OXYCODONE HYDROCHLORIDE 20 MG: 10 TABLET ORAL at 16:31

## 2022-01-26 RX ADMIN — CEPHALEXIN 500 MG: 500 CAPSULE ORAL at 06:27

## 2022-01-26 RX ADMIN — NICOTINE POLACRILEX 8 MG: 4 GUM, CHEWING ORAL at 16:31

## 2022-01-26 RX ADMIN — PREGABALIN 300 MG: 100 CAPSULE ORAL at 08:32

## 2022-01-26 RX ADMIN — PROPRANOLOL HYDROCHLORIDE 20 MG: 20 TABLET ORAL at 20:11

## 2022-01-26 RX ADMIN — FOLIC ACID 1 MG: 1 TABLET ORAL at 08:33

## 2022-01-26 RX ADMIN — PREGABALIN 300 MG: 100 CAPSULE ORAL at 22:19

## 2022-01-26 RX ADMIN — NICOTINE POLACRILEX 8 MG: 4 GUM, CHEWING ORAL at 06:27

## 2022-01-26 RX ADMIN — ACAMPROSATE CALCIUM 666 MG: 333 TABLET, DELAYED RELEASE ORAL at 08:31

## 2022-01-26 RX ADMIN — TOPIRAMATE 100 MG: 100 TABLET, FILM COATED ORAL at 08:32

## 2022-01-26 RX ADMIN — OXYCODONE HYDROCHLORIDE 20 MG: 10 TABLET ORAL at 21:16

## 2022-01-26 ASSESSMENT — ACTIVITIES OF DAILY LIVING (ADL)
HYGIENE/GROOMING: INDEPENDENT
LAUNDRY: WITH SUPERVISION
DRESS: SCRUBS (BEHAVIORAL HEALTH)
ORAL_HYGIENE: INDEPENDENT

## 2022-01-26 NOTE — PLAN OF CARE
"INITIAL OT ASSESSMENT  Problem: Additional Goals  Goal: BEH OT Goal 1  Description: Will attend OT groups improve concentration and motivation by engaging in more challenging and success oriented options while also improving insight with comments/answers made about mental health needs.       01/12/22 1200   General Information   Date Initially Attended OT 01/12/22   Clinical Impression   Affect Appropriate to situation   Orientation Oriented to person, place and time   Appearance and ADLs General cleanliness observed in most areas   Attention to Internal Stimuli No observed signs   Interaction Skills Initiates appropriately with staff;Initiates appropriately with peers   Ability to Communicate Needs Independent   Verbal Content Articulate;Clear;Appropriate to topic   Ability to Maintain Boundaries Maintains appropriate physical boundaries;Maintains appropriate verbal boundaries   Participation Initiates participation   Concentration Concentrates 50 minutes   Ability to Concentrate Without difficulty   Follows and Comprehends Directions Independently follows multi-step directions   Memory Needs further assessment  (endorses memory impairment from TBI)   Organization Independently organizes all tasks   Decision Making Independent   Planning and Problem Solving Independently plans ahead   Ability to Apply and Learn Concepts Applies within group structure   Frustrations / Stress Tolerance Independently identifies sources of frustration/stress;Independently identifies skills    Level of Insight Insightful into needs, issues, goals   Self Esteem Can identify positives;Takes risks with support and encouragement;Accepts positive feedback   Social Supports Has knowledge of support systems  (\"family\")     Pt attended 3 out of 3 OT groups offered. Pt actively participated in occupational therapy clinic with 6 patients total x110 minutes. Pt was able to ask for assistance as needed, and independently return to a creative " "expression task task. Pt demonstrated excellent focus, planning, problem solving, and attention to detail. Organized in his task approach. Social with peers and writer, and he spent a significant amount of time in group politely assisting a peer who was working on a similar task. Pt actively participated in a structured occupational therapy group of 4 patients total x45 minutes with a focus on identifying and prioritizing personal values (continued the topic from group yesterday). Pt contributed to a group discussion that facilitated self-reflection and application of personal values. He shared that he has had few friends throughout his life because he believes friendships require trust, which takes a lot to earn. He identified his late twenties/early thirties as a significant period of growth due to becoming a dad and entering his career. Identified his family as protective factors. Expressed gratitude for his psychiatrist on the unit. Calm, pleasant, cooperative, and engaged throughout all groups.    Pt was given and completed a written self-assessment form. OT staff reviewed with pt and explained the value of having them involved in their treatment plan, and provided options to meet current needs/self-identified goals. Selected goals including improve focus, accept minor imperfections, ask for help/support when needed, and work more cooperatively with others. Identified \"own residence\" as something he would like to be different upon discharge. PLAN: Will provide structure, support, and encouragement. Offer education on coping strategies and life management skills. Assist pt to increase self awareness regarding mental health issues and expand ideas. Will assess further in the areas of organization, problem solving, and concentration.         "

## 2022-01-26 NOTE — PLAN OF CARE
Problem: Mood Impairment (Depressive Signs/Symptoms)  Goal: Improved Mood Symptoms (Depressive Signs/Symptoms)  Outcome: Improving       Received awake reading a book, affect brightened on approach, pleasant and appreciative.  Less swelling noted on his left arm  Slept for 6 hours  Nicorette gum 8mg given with morning medications

## 2022-01-26 NOTE — PLAN OF CARE
Problem: Suicidal Behavior  Goal: Suicidal Behavior is Absent or Managed  Outcome: No Change     Problem: Activity and Energy Impairment (Depressive Signs/Symptoms)  Goal: Optimized Energy Level (Depressive Signs/Symptoms)  Outcome: Improving  Intervention: Optimize Energy Level  Recent Flowsheet Documentation  Taken 1/26/2022 0900 by Bernard Mancera, RN  Activity (Behavioral Health): up ad kathleen     Problem: Decreased Participation and Engagement (Depressive Signs/Symptoms)  Goal: Increased Participation and Engagement (Depressive Signs/Symptoms)  Outcome: Improving     Patient is calm and cooperative, affect full range. Reports anxiety 8/10 and depression 5/10. Currently denies SI but admits intermittent thoughts. Attended groups, social with peers, playing cards seems to be really therapeutic for patient. Good appetite, ate 100% of meals. Right knee pain managed with scheduled oxycodone. Held propranolol and amlodipine systolic BP low 100s.     PM:   Calm cooperative, attended community meeting. Reports feeling good and positive. Patient was present in the milieu and also spent time in room reading [employer] [place] [employer] book. Anxiety and depression on going, but reports depression improving. Denies SI, SIB, HI, Hallucinations. Right knee pain managed with scheduled medications. Good appetite, ate 100% of meal. Patient declined colace this shift, reported loose stool x1. Patient also declined hydrocortisone cream, reports no longer using.

## 2022-01-26 NOTE — PROGRESS NOTES
Patient seen, chart reviewed, care discussed with staff.  Blood pressure 104/71, pulse 79, temperature 97.2  F (36.2  C), temperature source Temporal, resp. rate 16, weight 140.4 kg (309 lb 8 oz), SpO2 96 %.  Low systolic noted.  Slept 6 hours.  He is worried about discharge to new setting.  Anxiety 8/10, depression 5/10.  Sleep reported as restless.  General appearance: good  Alert.   Affect: fair, anxious  Mood: fair    Speech:  normal.   Eye contact:  good.    Psychomotor behavior: normal  Gait: steady   Abnormal movements:  none  Delusions: none  Hallucinations:  none  Thoughts: logical  Associations: intact  Judgement: good  Insight: good  Cognitions: intact in conversation  Memory:  intact in conversation  Orientation: normal    Not suicidal.    Imp: Bipolar depressed, PTSD, TBI, pain    Plan:  Same meds    Current Facility-Administered Medications   Medication     acamprosate (CAMPRAL) EC tablet 666 mg     acetaminophen (TYLENOL) tablet 975 mg     albuterol (PROVENTIL HFA/VENTOLIN HFA) inhaler     alum & mag hydroxide-simethicone (MAALOX) suspension 30 mL     amLODIPine (NORVASC) tablet 5 mg     cephALEXin (KEFLEX) capsule 500 mg     docusate sodium (COLACE) capsule 100 mg     folic acid (FOLVITE) tablet 1 mg     hydrocortisone (CORTAID) 1 % cream     ketamine 5%-gabapentin 8% in PLO cream 0.25 g     lamoTRIgine (LaMICtal) tablet 25 mg     levothyroxine (SYNTHROID/LEVOTHROID) tablet 75 mcg     liothyronine (CYTOMEL) tablet 25 mcg     loperamide (IMODIUM) capsule 2 mg     melatonin tablet 3 mg     multivitamin w/minerals (THERA-VIT-M) tablet 1 tablet     naloxone (NARCAN) injection 0.2 mg    Or     naloxone (NARCAN) injection 0.4 mg    Or     naloxone (NARCAN) injection 0.2 mg    Or     naloxone (NARCAN) injection 0.4 mg     naproxen (NAPROSYN) tablet 500 mg     nicotine polacrilex (NICORETTE) gum 4-8 mg     OLANZapine (zyPREXA) tablet 10 mg    Or     OLANZapine (zyPREXA) injection 10 mg     oxyCODONE IR  (ROXICODONE) tablet 20 mg     pantoprazole (PROTONIX) EC tablet 40 mg     prazosin (MINIPRESS) capsule 1 mg     pregabalin (LYRICA) capsule 300 mg     propranolol (INDERAL) tablet 20 mg     QUEtiapine (SEROquel) tablet 600 mg     sennosides (SENOKOT) tablet 1-2 tablet     thiamine (B-1) tablet 100 mg     tiZANidine (ZANAFLEX) tablet 4 mg     topiramate (TOPAMAX) tablet 100 mg     No results found for this or any previous visit (from the past 168 hour(s)).

## 2022-01-27 ENCOUNTER — PRE VISIT (OUTPATIENT)
Dept: ORTHOPEDICS | Facility: CLINIC | Age: 52
End: 2022-01-27

## 2022-01-27 PROCEDURE — 250N000013 HC RX MED GY IP 250 OP 250 PS 637: Performed by: PSYCHIATRY & NEUROLOGY

## 2022-01-27 PROCEDURE — 250N000013 HC RX MED GY IP 250 OP 250 PS 637: Performed by: NURSE PRACTITIONER

## 2022-01-27 PROCEDURE — 250N000013 HC RX MED GY IP 250 OP 250 PS 637: Performed by: STUDENT IN AN ORGANIZED HEALTH CARE EDUCATION/TRAINING PROGRAM

## 2022-01-27 PROCEDURE — 250N000013 HC RX MED GY IP 250 OP 250 PS 637

## 2022-01-27 PROCEDURE — G0177 OPPS/PHP; TRAIN & EDUC SERV: HCPCS

## 2022-01-27 PROCEDURE — 124N000003 HC R&B MH SENIOR/ADOLESCENT

## 2022-01-27 RX ADMIN — NAPROXEN 500 MG: 500 TABLET ORAL at 08:17

## 2022-01-27 RX ADMIN — ACAMPROSATE CALCIUM 666 MG: 333 TABLET, DELAYED RELEASE ORAL at 08:16

## 2022-01-27 RX ADMIN — NICOTINE POLACRILEX 8 MG: 4 GUM, CHEWING ORAL at 17:32

## 2022-01-27 RX ADMIN — PREGABALIN 300 MG: 100 CAPSULE ORAL at 08:16

## 2022-01-27 RX ADMIN — PANTOPRAZOLE SODIUM 40 MG: 40 TABLET, DELAYED RELEASE ORAL at 06:04

## 2022-01-27 RX ADMIN — AMLODIPINE BESYLATE 5 MG: 5 TABLET ORAL at 08:16

## 2022-01-27 RX ADMIN — TOPIRAMATE 100 MG: 100 TABLET, FILM COATED ORAL at 08:17

## 2022-01-27 RX ADMIN — ACETAMINOPHEN 975 MG: 325 TABLET, FILM COATED ORAL at 22:14

## 2022-01-27 RX ADMIN — NICOTINE POLACRILEX 8 MG: 4 GUM, CHEWING ORAL at 14:27

## 2022-01-27 RX ADMIN — QUETIAPINE 600 MG: 300 TABLET, FILM COATED ORAL at 22:15

## 2022-01-27 RX ADMIN — NICOTINE POLACRILEX 8 MG: 4 GUM, CHEWING ORAL at 20:37

## 2022-01-27 RX ADMIN — PROPRANOLOL HYDROCHLORIDE 20 MG: 20 TABLET ORAL at 08:16

## 2022-01-27 RX ADMIN — PREGABALIN 300 MG: 100 CAPSULE ORAL at 20:36

## 2022-01-27 RX ADMIN — LAMOTRIGINE 25 MG: 25 TABLET ORAL at 08:16

## 2022-01-27 RX ADMIN — CEPHALEXIN 500 MG: 500 CAPSULE ORAL at 08:18

## 2022-01-27 RX ADMIN — PRAZOSIN HYDROCHLORIDE 1 MG: 1 CAPSULE ORAL at 22:15

## 2022-01-27 RX ADMIN — OXYCODONE HYDROCHLORIDE 20 MG: 10 TABLET ORAL at 16:17

## 2022-01-27 RX ADMIN — OXYCODONE HYDROCHLORIDE 20 MG: 10 TABLET ORAL at 09:45

## 2022-01-27 RX ADMIN — CEPHALEXIN 500 MG: 500 CAPSULE ORAL at 17:32

## 2022-01-27 RX ADMIN — MULTIPLE VITAMINS W/ MINERALS TAB 1 TABLET: TAB at 08:16

## 2022-01-27 RX ADMIN — ACAMPROSATE CALCIUM 666 MG: 333 TABLET, DELAYED RELEASE ORAL at 14:25

## 2022-01-27 RX ADMIN — NICOTINE POLACRILEX 8 MG: 4 GUM, CHEWING ORAL at 16:18

## 2022-01-27 RX ADMIN — CEPHALEXIN 500 MG: 500 CAPSULE ORAL at 00:54

## 2022-01-27 RX ADMIN — FOLIC ACID 1 MG: 1 TABLET ORAL at 08:15

## 2022-01-27 RX ADMIN — OXYCODONE HYDROCHLORIDE 20 MG: 10 TABLET ORAL at 06:04

## 2022-01-27 RX ADMIN — LEVOTHYROXINE SODIUM 75 MCG: 75 TABLET ORAL at 06:03

## 2022-01-27 RX ADMIN — NICOTINE POLACRILEX 8 MG: 4 GUM, CHEWING ORAL at 13:13

## 2022-01-27 RX ADMIN — PROPRANOLOL HYDROCHLORIDE 20 MG: 20 TABLET ORAL at 20:36

## 2022-01-27 RX ADMIN — LIOTHYRONINE SODIUM 25 MCG: 25 TABLET ORAL at 08:16

## 2022-01-27 RX ADMIN — PREGABALIN 300 MG: 100 CAPSULE ORAL at 14:25

## 2022-01-27 RX ADMIN — THIAMINE HCL TAB 100 MG 100 MG: 100 TAB at 08:16

## 2022-01-27 RX ADMIN — NICOTINE POLACRILEX 8 MG: 4 GUM, CHEWING ORAL at 09:45

## 2022-01-27 RX ADMIN — OXYCODONE HYDROCHLORIDE 20 MG: 10 TABLET ORAL at 20:37

## 2022-01-27 RX ADMIN — OXYCODONE HYDROCHLORIDE 20 MG: 10 TABLET ORAL at 13:13

## 2022-01-27 RX ADMIN — CEPHALEXIN 500 MG: 500 CAPSULE ORAL at 14:25

## 2022-01-27 RX ADMIN — NICOTINE POLACRILEX 8 MG: 4 GUM, CHEWING ORAL at 19:05

## 2022-01-27 RX ADMIN — ACAMPROSATE CALCIUM 666 MG: 333 TABLET, DELAYED RELEASE ORAL at 20:37

## 2022-01-27 RX ADMIN — TOPIRAMATE 100 MG: 100 TABLET, FILM COATED ORAL at 20:36

## 2022-01-27 RX ADMIN — NICOTINE POLACRILEX 8 MG: 4 GUM, CHEWING ORAL at 11:34

## 2022-01-27 RX ADMIN — DOCUSATE SODIUM 100 MG: 100 CAPSULE, LIQUID FILLED ORAL at 08:16

## 2022-01-27 RX ADMIN — DOCUSATE SODIUM 100 MG: 100 CAPSULE, LIQUID FILLED ORAL at 20:37

## 2022-01-27 RX ADMIN — NAPROXEN 500 MG: 500 TABLET ORAL at 17:32

## 2022-01-27 RX ADMIN — NICOTINE POLACRILEX 8 MG: 4 GUM, CHEWING ORAL at 06:04

## 2022-01-27 RX ADMIN — NICOTINE POLACRILEX 8 MG: 4 GUM, CHEWING ORAL at 08:17

## 2022-01-27 ASSESSMENT — ACTIVITIES OF DAILY LIVING (ADL)
LAUNDRY: WITH SUPERVISION
HYGIENE/GROOMING: INDEPENDENT
ORAL_HYGIENE: INDEPENDENT
DRESS: INDEPENDENT
HYGIENE/GROOMING: INDEPENDENT
ORAL_HYGIENE: INDEPENDENT
DRESS: INDEPENDENT
LAUNDRY: WITH SUPERVISION

## 2022-01-27 NOTE — PLAN OF CARE
Problem: Sleep Disturbance (Depressive Signs/Symptoms)  Goal: Improved Sleep (Depressive Signs/Symptoms)  Outcome: No Change     Patient slept early tonight. He was awakened for his Keflex @ 12mn. He reminded this writer that he will not be taking his next dose of Keflex @ 6am as he gets an upset stomach in the morning every time he takes it with his other medications. He said he talked with his doctor about it yesterday.     Slept a total of 6.25 hours. No other concerns raised the whole shift.
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
## 2022-01-27 NOTE — PLAN OF CARE
Problem: Psychomotor Impairment (Depressive Signs/Symptoms)  Goal: Improved Psychomotor Symptoms (Depressive Signs/Symptoms)  Outcome: Improving     Problem: Behavioral Health Plan of Care  Goal: Adheres to Safety Considerations for Self and Others  Intervention: Develop and Maintain Individualized Safety Plan  Recent Flowsheet Documentation  Taken 1/27/2022 0944 by Juan C Hollingsworth RN  Safety Measures:    safety plan reviewed    safety rounds completed  Goal: Absence of New-Onset Illness or Injury  Intervention: Identify and Manage Fall Risk  Recent Flowsheet Documentation  Taken 1/27/2022 0944 by Juan C Hollingsworth RN  Safety Measures:    safety plan reviewed    safety rounds completed     Patient had flat and blunted affect. Patient was out in the milieu and cooperative with cares. Patient had minimal socialization with peers and did participate in group activities. Patient complained of right knee pain 8/10, anxiety 9/10, and depression 9/10. Patient denied SI/HI/SIB/AH/VH and covid 19 symptoms. Was given Nicotine gum 8 mg, scheduled oxycodone 20 mg and other scheduled medications. Had good appetite. Intervention was helpful. Will continue to monitor.

## 2022-01-27 NOTE — PLAN OF CARE
"  Problem: General Rehab Plan of Care  Goal: Therapeutic Recreation/Music Therapy Goal  Description: The patient and/or their representative will achieve their patient-specific goals related to the plan of care.  The patient-specific goals include:  Outcome: Yeyo Salguero attended art therapy group for 45 minutes (2 patients total). He reported feeling physically \"hurting\" and mentally \"good.\" He presented with a bright affect. He participated in the art directive to make worry stone fidgets out of mony, and painting kind words into the mony. He appeared calm and focused while manipulating and painting the mony. He made friendly conversation with writer and peer. He shared his two pieces with writer and talked about mixing in both positive and negative words into the mony because he believes in \"balance.\" He appeared pleased with his pieces and thanked writer for group.   "

## 2022-01-27 NOTE — PLAN OF CARE
"Problem: Additional Goals  Goal: BEH OT Goal 1  Description: Will attend OT groups improve concentration and motivation by engaging in more challenging and success oriented options while also improving insight with comments/answers made about mental health needs.    Pt attended 2 out of 3 OT groups offered. Pt actively participated in occupational therapy clinic with 6-7 patients total x60 minutes. Pt was able to ask for assistance as needed, and independently returned to a creative expression task. Pt demonstrated good focus, planning, problem solving, and attention to detail. Organized in his task approach. Social with peers and writer. Pt actively participated in a structured occupational therapy group of 5-6 patients total x60 minutes with a discussion focused on various mental health topics, including mental health management, social situations, healthy support systems, healthy mind/body, and activities of daily living. Pt responded to prompts thoughtfully and insightfully. Demonstrated active listening when peers were sharing, often elaborating on their responses. He shared that he received bad news regarding a delay in his discharge planning, and stated \"it took my depression from like a 5 to an 8, but being around everyone here helps.\" Pleasant, cooperative, and engaged throughout groups.    "

## 2022-01-28 PROCEDURE — 250N000013 HC RX MED GY IP 250 OP 250 PS 637: Performed by: PSYCHIATRY & NEUROLOGY

## 2022-01-28 PROCEDURE — 250N000013 HC RX MED GY IP 250 OP 250 PS 637

## 2022-01-28 PROCEDURE — G0177 OPPS/PHP; TRAIN & EDUC SERV: HCPCS

## 2022-01-28 PROCEDURE — 124N000003 HC R&B MH SENIOR/ADOLESCENT

## 2022-01-28 PROCEDURE — 250N000013 HC RX MED GY IP 250 OP 250 PS 637: Performed by: NURSE PRACTITIONER

## 2022-01-28 PROCEDURE — 250N000013 HC RX MED GY IP 250 OP 250 PS 637: Performed by: STUDENT IN AN ORGANIZED HEALTH CARE EDUCATION/TRAINING PROGRAM

## 2022-01-28 RX ADMIN — ACAMPROSATE CALCIUM 666 MG: 333 TABLET, DELAYED RELEASE ORAL at 08:28

## 2022-01-28 RX ADMIN — LAMOTRIGINE 25 MG: 25 TABLET ORAL at 08:28

## 2022-01-28 RX ADMIN — LEVOTHYROXINE SODIUM 75 MCG: 75 TABLET ORAL at 06:03

## 2022-01-28 RX ADMIN — CEPHALEXIN 500 MG: 500 CAPSULE ORAL at 12:24

## 2022-01-28 RX ADMIN — NICOTINE POLACRILEX 8 MG: 4 GUM, CHEWING ORAL at 08:28

## 2022-01-28 RX ADMIN — PRAZOSIN HYDROCHLORIDE 1 MG: 1 CAPSULE ORAL at 21:53

## 2022-01-28 RX ADMIN — LIOTHYRONINE SODIUM 25 MCG: 25 TABLET ORAL at 08:28

## 2022-01-28 RX ADMIN — NICOTINE POLACRILEX 8 MG: 4 GUM, CHEWING ORAL at 10:19

## 2022-01-28 RX ADMIN — THIAMINE HCL TAB 100 MG 100 MG: 100 TAB at 08:28

## 2022-01-28 RX ADMIN — PREGABALIN 300 MG: 100 CAPSULE ORAL at 20:03

## 2022-01-28 RX ADMIN — CEPHALEXIN 500 MG: 500 CAPSULE ORAL at 00:21

## 2022-01-28 RX ADMIN — NICOTINE POLACRILEX 8 MG: 4 GUM, CHEWING ORAL at 20:03

## 2022-01-28 RX ADMIN — QUETIAPINE 600 MG: 300 TABLET, FILM COATED ORAL at 21:53

## 2022-01-28 RX ADMIN — MULTIPLE VITAMINS W/ MINERALS TAB 1 TABLET: TAB at 08:27

## 2022-01-28 RX ADMIN — ACAMPROSATE CALCIUM 666 MG: 333 TABLET, DELAYED RELEASE ORAL at 20:02

## 2022-01-28 RX ADMIN — ACAMPROSATE CALCIUM 666 MG: 333 TABLET, DELAYED RELEASE ORAL at 14:22

## 2022-01-28 RX ADMIN — TOPIRAMATE 100 MG: 100 TABLET, FILM COATED ORAL at 20:03

## 2022-01-28 RX ADMIN — NICOTINE POLACRILEX 8 MG: 4 GUM, CHEWING ORAL at 17:37

## 2022-01-28 RX ADMIN — PREGABALIN 300 MG: 100 CAPSULE ORAL at 14:22

## 2022-01-28 RX ADMIN — OXYCODONE HYDROCHLORIDE 20 MG: 10 TABLET ORAL at 21:01

## 2022-01-28 RX ADMIN — PREGABALIN 300 MG: 100 CAPSULE ORAL at 09:09

## 2022-01-28 RX ADMIN — NICOTINE POLACRILEX 8 MG: 4 GUM, CHEWING ORAL at 14:22

## 2022-01-28 RX ADMIN — NICOTINE POLACRILEX 8 MG: 4 GUM, CHEWING ORAL at 16:11

## 2022-01-28 RX ADMIN — PROPRANOLOL HYDROCHLORIDE 20 MG: 20 TABLET ORAL at 08:28

## 2022-01-28 RX ADMIN — OXYCODONE HYDROCHLORIDE 20 MG: 10 TABLET ORAL at 13:10

## 2022-01-28 RX ADMIN — PANTOPRAZOLE SODIUM 40 MG: 40 TABLET, DELAYED RELEASE ORAL at 06:03

## 2022-01-28 RX ADMIN — NICOTINE POLACRILEX 8 MG: 4 GUM, CHEWING ORAL at 21:53

## 2022-01-28 RX ADMIN — NAPROXEN 500 MG: 500 TABLET ORAL at 17:36

## 2022-01-28 RX ADMIN — NICOTINE POLACRILEX 8 MG: 4 GUM, CHEWING ORAL at 06:03

## 2022-01-28 RX ADMIN — FOLIC ACID 1 MG: 1 TABLET ORAL at 08:28

## 2022-01-28 RX ADMIN — CEPHALEXIN 500 MG: 500 CAPSULE ORAL at 17:36

## 2022-01-28 RX ADMIN — OXYCODONE HYDROCHLORIDE 20 MG: 10 TABLET ORAL at 06:03

## 2022-01-28 RX ADMIN — OXYCODONE HYDROCHLORIDE 20 MG: 10 TABLET ORAL at 09:09

## 2022-01-28 RX ADMIN — NICOTINE POLACRILEX 8 MG: 4 GUM, CHEWING ORAL at 12:25

## 2022-01-28 RX ADMIN — NICOTINE POLACRILEX 8 MG: 4 GUM, CHEWING ORAL at 19:11

## 2022-01-28 RX ADMIN — OXYCODONE HYDROCHLORIDE 20 MG: 10 TABLET ORAL at 16:10

## 2022-01-28 RX ADMIN — ACETAMINOPHEN 975 MG: 325 TABLET, FILM COATED ORAL at 14:22

## 2022-01-28 RX ADMIN — NAPROXEN 500 MG: 500 TABLET ORAL at 08:28

## 2022-01-28 RX ADMIN — AMLODIPINE BESYLATE 5 MG: 5 TABLET ORAL at 08:28

## 2022-01-28 RX ADMIN — PROPRANOLOL HYDROCHLORIDE 20 MG: 20 TABLET ORAL at 20:03

## 2022-01-28 RX ADMIN — TOPIRAMATE 100 MG: 100 TABLET, FILM COATED ORAL at 08:28

## 2022-01-28 ASSESSMENT — ACTIVITIES OF DAILY LIVING (ADL)
ORAL_HYGIENE: INDEPENDENT
HYGIENE/GROOMING: INDEPENDENT
DRESS: SCRUBS (BEHAVIORAL HEALTH)
LAUNDRY: WITH SUPERVISION
HYGIENE/GROOMING: INDEPENDENT
ORAL_HYGIENE: INDEPENDENT
DRESS: INDEPENDENT
LAUNDRY: WITH SUPERVISION

## 2022-01-28 NOTE — PROGRESS NOTES
Patient seen, chart reviewed, care discussed with staff.  Blood pressure 120/77, pulse 75, temperature 97.5  F (36.4  C), temperature source Oral, resp. rate 16, weight 137.8 kg (303 lb 12.8 oz), SpO2 99 %.    Dissapointed he was not accepted to new residence based on what I feel is out-of date or inaccurate information.    Anxiety 8/10, depression 9/10.  Lamictal started 1/19.      General appearance: fair  Alert.   Affect: sad  Mood: depressed  Speech:  normal.   Eye contact:  good.    Psychomotor behavior: normal  Gait: steady with walker  Abnormal movements:  none  Delusions: none  Hallucinations:  none  Thoughts: logical  Associations: intact  Judgement: good  Insight: good  Cognitions: intact in conversation  Memory:  intact in conversation  Orientation: normal    Not suicidal.    Patient Active Problem List   Diagnosis     Post concussive encephalopathy     H/O shoulder surgery     Alcoholism in remission (H)     Bilateral ACL tear     Chronic back pain     Social anxiety disorder     Alcohol withdrawal syndrome with complication, with unspecified complication (H)     Rib fracture     Alcohol withdrawal (H)     Alcohol dependence (H)     LFT elevation     Laceration of left hand without foreign body, initial encounter     Suicidal ideation     Intentional drug overdose, initial encounter (H)     Alcoholic intoxication with complication (H)     Severe bipolar I disorder, current or most recent episode depressed, with mixed features (H)     Medication overdose, intentional self-harm, subsequent encounter     Opioid dependence on agonist therapy (H)     2019 novel coronavirus disease (COVID-19)     Pneumonia due to COVID-19 virus     Bipolar disorder (H)     .Imp:  Bipolar depressed, severe, recurrent  2.  PTSD  Plan:  Lamictal may increase 2/2.  Same meds for now    Current Facility-Administered Medications   Medication     acamprosate (CAMPRAL) EC tablet 666 mg     acetaminophen (TYLENOL) tablet 975 mg      albuterol (PROVENTIL HFA/VENTOLIN HFA) inhaler     alum & mag hydroxide-simethicone (MAALOX) suspension 30 mL     amLODIPine (NORVASC) tablet 5 mg     cephALEXin (KEFLEX) capsule 500 mg     docusate sodium (COLACE) capsule 100 mg     folic acid (FOLVITE) tablet 1 mg     hydrocortisone (CORTAID) 1 % cream     ketamine 5%-gabapentin 8% in PLO cream 0.25 g     lamoTRIgine (LaMICtal) tablet 25 mg     levothyroxine (SYNTHROID/LEVOTHROID) tablet 75 mcg     liothyronine (CYTOMEL) tablet 25 mcg     loperamide (IMODIUM) capsule 2 mg     melatonin tablet 3 mg     multivitamin w/minerals (THERA-VIT-M) tablet 1 tablet     naloxone (NARCAN) injection 0.2 mg    Or     naloxone (NARCAN) injection 0.4 mg    Or     naloxone (NARCAN) injection 0.2 mg    Or     naloxone (NARCAN) injection 0.4 mg     naproxen (NAPROSYN) tablet 500 mg     nicotine polacrilex (NICORETTE) gum 4-8 mg     OLANZapine (zyPREXA) tablet 10 mg    Or     OLANZapine (zyPREXA) injection 10 mg     oxyCODONE IR (ROXICODONE) tablet 20 mg     pantoprazole (PROTONIX) EC tablet 40 mg     prazosin (MINIPRESS) capsule 1 mg     pregabalin (LYRICA) capsule 300 mg     propranolol (INDERAL) tablet 20 mg     QUEtiapine (SEROquel) tablet 600 mg     sennosides (SENOKOT) tablet 1-2 tablet     thiamine (B-1) tablet 100 mg     tiZANidine (ZANAFLEX) tablet 4 mg     topiramate (TOPAMAX) tablet 100 mg     No results found for this or any previous visit (from the past 168 hour(s)).

## 2022-01-28 NOTE — PROGRESS NOTES
SPIRITUAL HEALTH SERVICES  SPIRITUAL ASSESSMENT Progress Note  [facility] [place] [place] [facility]     REFERRAL SOURCE: follow up    Brief check-in in the lounge. Informed Ray that unit staff is aware of the need for new decks of cards. Provided two sci-fi fantasy books for pt to read.    Plan: I will follow up next week.    Liss Kerns MDiv  Associate   Pager [phone]  Office [phone]  [facility] remains available 24/7 for emergent requests/referrals, either by having the switchboard page the on-call  or by entering an ASAP/STAT consult in Epic (this will also page the on-call ). Routine Epic consults receive an initial response within 24 hours.

## 2022-01-28 NOTE — PLAN OF CARE
BEHAVIORAL TEAM DISCUSSION  Participants: Provider: Ulises Perez MD; Sivan Ewing, CHAPARRO; SCOT Leal; SCOT Harrison.  Progress: Patient admitted to [facility] on 1/8/22 and is requesting transfer to  for continuance of care with Dr. Perez. Patient requires ongoing stabilization and states he needs a new group home due to the use of drugs within the home. Ongoing coordination is occurring with group home. Pt reports pain, depression. Still sleepy at times, but overall better.  Mood still depressed/discourages.  Pain better with hospital bed and new knee brace.     Anticipated length of stay: 1-2 weeks     Continued Stay Criteria/Rationale: Ongoing stabilization     Medical/Physical: Per notes, Remarkable for TBI with postconcussive encephalopathy, chronic back pain, nontraumatic rhabdomyolysis and right shoulder replacement.     Precautions:           Behavioral Orders   Procedures     Code 2     Fall precautions     Routine Programming       As clinically indicated     Seizure precautions     Status 15       Every 15 minutes.     Suicide precautions       Patients on Suicide Precautions should have a Combination Diet ordered that includes a Diet selection(s) AND a Behavioral Tray selection for Safe Tray - with utensils, or Safe Tray - NO utensils        Withdrawal precautions      Imp: Bipolar depressed, PTSD, TBI, pain     Plan:  Same meds         Current Facility-Administered Medications   Medication     acamprosate (CAMPRAL) EC tablet 666 mg     acetaminophen (TYLENOL) tablet 975 mg     albuterol (PROVENTIL HFA/VENTOLIN HFA) inhaler     alum & mag hydroxide-simethicone (MAALOX) suspension 30 mL     amLODIPine (NORVASC) tablet 5 mg     cephALEXin (KEFLEX) capsule 500 mg     docusate sodium (COLACE) capsule 100 mg     folic acid (FOLVITE) tablet 1 mg     hydrocortisone (CORTAID) 1 % cream     ketamine 5%-gabapentin 8% in PLO cream 0.25 g     lamoTRIgine (LaMICtal) tablet 25 mg     levothyroxine  (SYNTHROID/LEVOTHROID) tablet 75 mcg     liothyronine (CYTOMEL) tablet 25 mcg     loperamide (IMODIUM) capsule 2 mg     melatonin tablet 3 mg     multivitamin w/minerals (THERA-VIT-M) tablet 1 tablet     naloxone (NARCAN) injection 0.2 mg     Or     naloxone (NARCAN) injection 0.4 mg     Or     naloxone (NARCAN) injection 0.2 mg     Or     naloxone (NARCAN) injection 0.4 mg     naproxen (NAPROSYN) tablet 500 mg     nicotine polacrilex (NICORETTE) gum 4-8 mg     OLANZapine (zyPREXA) tablet 10 mg     Or     OLANZapine (zyPREXA) injection 10 mg     oxyCODONE IR (ROXICODONE) tablet 20 mg     pantoprazole (PROTONIX) EC tablet 40 mg     prazosin (MINIPRESS) capsule 1 mg     pregabalin (LYRICA) capsule 300 mg     propranolol (INDERAL) tablet 20 mg     QUEtiapine (SEROquel) tablet 600 mg     sennosides (SENOKOT) tablet 1-2 tablet     thiamine (B-1) tablet 100 mg     tiZANidine (ZANAFLEX) tablet 4 mg     topiramate (TOPAMAX) tablet 100 mg     Plan:  Pt will continue to be engaged in the therapeutic milieu and groups where she will learn and practice posivite coping skills. Pt will continue to be stabilized using medication and other therapeutic/programing approaches. Discharge to group home setting with outpatient services and providers      Rationale for change in precautions or plan: No change

## 2022-01-28 NOTE — PLAN OF CARE
Problem: Additional Goals  Goal: BEH OT Goal 1  Description: Will attend OT groups improve concentration and motivation by engaging in more challenging and success oriented options while also improving insight with comments/answers made about mental health needs.    Pt attended 3 out of 3 OT groups offered. Pt actively participated in occupational therapy clinic with 5-6 patients total x110 minutes. Pt was able to ask for assistance as needed, and independently returned to a multi-step, creative expression task. Pt demonstrated excellent focus, sequencing, planning, problem solving, and attention to detail. Very organized in his task approach. Social with peers and staff, and he was quick to politely offer assistance to peers when needed. Pt actively participated in a structured occupational therapy group of 5-6 patients total x45 minutes involving a self-reflecting, group leisure task. Pt appeared comfortable sharing positive past experiences and personal information with peers, and was respectful in listening and responding to peers. Demonstrated active listening when peers were sharing, often asking them follow-up questions based on their responses. Shared that both he and his sister are quick learners, and that science was always his favorite subject in school. He continues to be calm, pleasant, cooperative, and engaged throughout all groups.

## 2022-01-28 NOTE — PLAN OF CARE
"  Problem: Suicidal Behavior  Goal: Suicidal Behavior is Absent or Managed  Outcome: Improving  Patient has been visible on the unit, was engaged with peers and staff, attended group and participated. Patient rated knee and back pain 7/10, has schedule oxycodone, lyrical and naproxen which are helping with pain control, had prn tylenol around 2214. Uses medical bed which help with positioning and pain control, uses walker during ambulation and wears brace to right knee. No fall or seizure activities noted this shift. Around 1730 was observe in bed reading a book.   Continues to endorsed anxiety 8/10 and depression 9/10, frequent request for nicotine gum \" the gum help calm my nerves\". Denies suicidal ideation, no visual or auditory hallucination. Has blunted/flat affect and mood anxious/depressed. No SIB and HI.    /78 (Patient Position: Sitting)   Pulse 81   Temp 98.6  F (37  C) (Oral)   Resp 16   Wt 137.8 kg (303 lb 12.8 oz)   SpO2 98%   BMI 36.21 kg/m             "

## 2022-01-28 NOTE — PLAN OF CARE
Problem: Sleep Disturbance (Depressive Signs/Symptoms)  Goal: Improved Sleep (Depressive Signs/Symptoms)  Outcome: Improving      Patient slept a total of 5.75 hours. No complaints noted the whole night.    Patient refused to take his Keflex scheduled @0600. He said he usually gets an upset stomach when he takes this medication before breakfast.

## 2022-01-28 NOTE — PLAN OF CARE
Problem: Suicidal Behavior  Goal: Suicidal Behavior is Absent or Managed  Outcome: No Change     Problem: Activity and Energy Impairment (Depressive Signs/Symptoms)  Goal: Optimized Energy Level (Depressive Signs/Symptoms)  Outcome: Improving  Intervention: Optimize Energy Level  Recent Flowsheet Documentation  Taken 1/28/2022 0900 by Bernard Mancera, RN  Activity (Behavioral Health): up ad kathleen     Problem: Decreased Participation and Engagement (Depressive Signs/Symptoms)  Goal: Increased Participation and Engagement (Depressive Signs/Symptoms)  Outcome: Improving     Patient is calm and cooperative, medication compliant. Reports anxiety 8/10 and depression 9/10 related to housing concerns. Patient attends groups, social with staff and peers. Good appetite ate 100% of meals. Per team meeting Hydrocortisone cream will be discontinued and a probiotic will be added d/t loose stools and GI upset related to keflex. Right knee pain managed with scheduled oxycodone, nicorette gum given per request, no other prns this shift. No other concerns with patient.

## 2022-01-29 PROCEDURE — 250N000013 HC RX MED GY IP 250 OP 250 PS 637: Performed by: STUDENT IN AN ORGANIZED HEALTH CARE EDUCATION/TRAINING PROGRAM

## 2022-01-29 PROCEDURE — 250N000013 HC RX MED GY IP 250 OP 250 PS 637: Performed by: PSYCHIATRY & NEUROLOGY

## 2022-01-29 PROCEDURE — 250N000013 HC RX MED GY IP 250 OP 250 PS 637: Performed by: NURSE PRACTITIONER

## 2022-01-29 PROCEDURE — 250N000013 HC RX MED GY IP 250 OP 250 PS 637

## 2022-01-29 PROCEDURE — 124N000003 HC R&B MH SENIOR/ADOLESCENT

## 2022-01-29 RX ADMIN — AMLODIPINE BESYLATE 5 MG: 5 TABLET ORAL at 08:11

## 2022-01-29 RX ADMIN — CEPHALEXIN 500 MG: 500 CAPSULE ORAL at 17:39

## 2022-01-29 RX ADMIN — NICOTINE POLACRILEX 8 MG: 4 GUM, CHEWING ORAL at 16:00

## 2022-01-29 RX ADMIN — ACAMPROSATE CALCIUM 666 MG: 333 TABLET, DELAYED RELEASE ORAL at 08:11

## 2022-01-29 RX ADMIN — PREGABALIN 300 MG: 100 CAPSULE ORAL at 20:07

## 2022-01-29 RX ADMIN — MULTIPLE VITAMINS W/ MINERALS TAB 1 TABLET: TAB at 08:11

## 2022-01-29 RX ADMIN — OXYCODONE HYDROCHLORIDE 20 MG: 10 TABLET ORAL at 06:17

## 2022-01-29 RX ADMIN — NICOTINE POLACRILEX 8 MG: 4 GUM, CHEWING ORAL at 08:12

## 2022-01-29 RX ADMIN — CEPHALEXIN 500 MG: 500 CAPSULE ORAL at 06:17

## 2022-01-29 RX ADMIN — NICOTINE POLACRILEX 8 MG: 4 GUM, CHEWING ORAL at 13:49

## 2022-01-29 RX ADMIN — NICOTINE POLACRILEX 8 MG: 4 GUM, CHEWING ORAL at 21:01

## 2022-01-29 RX ADMIN — LAMOTRIGINE 25 MG: 25 TABLET ORAL at 08:11

## 2022-01-29 RX ADMIN — LIOTHYRONINE SODIUM 25 MCG: 25 TABLET ORAL at 08:11

## 2022-01-29 RX ADMIN — FOLIC ACID 1 MG: 1 TABLET ORAL at 08:11

## 2022-01-29 RX ADMIN — PREGABALIN 300 MG: 100 CAPSULE ORAL at 14:29

## 2022-01-29 RX ADMIN — PROPRANOLOL HYDROCHLORIDE 20 MG: 20 TABLET ORAL at 20:06

## 2022-01-29 RX ADMIN — NICOTINE POLACRILEX 8 MG: 4 GUM, CHEWING ORAL at 20:07

## 2022-01-29 RX ADMIN — ACETAMINOPHEN 975 MG: 325 TABLET, FILM COATED ORAL at 21:56

## 2022-01-29 RX ADMIN — CEPHALEXIN 500 MG: 500 CAPSULE ORAL at 00:28

## 2022-01-29 RX ADMIN — NAPROXEN 500 MG: 500 TABLET ORAL at 08:11

## 2022-01-29 RX ADMIN — PREGABALIN 300 MG: 100 CAPSULE ORAL at 08:11

## 2022-01-29 RX ADMIN — ACETAMINOPHEN 975 MG: 325 TABLET, FILM COATED ORAL at 14:30

## 2022-01-29 RX ADMIN — OXYCODONE HYDROCHLORIDE 20 MG: 10 TABLET ORAL at 13:49

## 2022-01-29 RX ADMIN — TOPIRAMATE 100 MG: 100 TABLET, FILM COATED ORAL at 20:07

## 2022-01-29 RX ADMIN — PRAZOSIN HYDROCHLORIDE 1 MG: 1 CAPSULE ORAL at 21:56

## 2022-01-29 RX ADMIN — CEPHALEXIN 500 MG: 500 CAPSULE ORAL at 12:04

## 2022-01-29 RX ADMIN — PANTOPRAZOLE SODIUM 40 MG: 40 TABLET, DELAYED RELEASE ORAL at 06:17

## 2022-01-29 RX ADMIN — TOPIRAMATE 100 MG: 100 TABLET, FILM COATED ORAL at 08:11

## 2022-01-29 RX ADMIN — OXYCODONE HYDROCHLORIDE 20 MG: 10 TABLET ORAL at 21:01

## 2022-01-29 RX ADMIN — QUETIAPINE 600 MG: 300 TABLET, FILM COATED ORAL at 21:56

## 2022-01-29 RX ADMIN — LEVOTHYROXINE SODIUM 75 MCG: 75 TABLET ORAL at 06:18

## 2022-01-29 RX ADMIN — NICOTINE POLACRILEX 8 MG: 4 GUM, CHEWING ORAL at 14:47

## 2022-01-29 RX ADMIN — PROPRANOLOL HYDROCHLORIDE 20 MG: 20 TABLET ORAL at 08:11

## 2022-01-29 RX ADMIN — NICOTINE POLACRILEX 8 MG: 4 GUM, CHEWING ORAL at 21:56

## 2022-01-29 RX ADMIN — NICOTINE POLACRILEX 8 MG: 4 GUM, CHEWING ORAL at 18:29

## 2022-01-29 RX ADMIN — NICOTINE POLACRILEX 8 MG: 4 GUM, CHEWING ORAL at 06:17

## 2022-01-29 RX ADMIN — OXYCODONE HYDROCHLORIDE 20 MG: 10 TABLET ORAL at 16:00

## 2022-01-29 RX ADMIN — NAPROXEN 500 MG: 500 TABLET ORAL at 17:39

## 2022-01-29 RX ADMIN — ACAMPROSATE CALCIUM 666 MG: 333 TABLET, DELAYED RELEASE ORAL at 20:06

## 2022-01-29 RX ADMIN — NICOTINE POLACRILEX 8 MG: 4 GUM, CHEWING ORAL at 17:39

## 2022-01-29 RX ADMIN — NICOTINE POLACRILEX 8 MG: 4 GUM, CHEWING ORAL at 11:16

## 2022-01-29 RX ADMIN — ACAMPROSATE CALCIUM 666 MG: 333 TABLET, DELAYED RELEASE ORAL at 14:29

## 2022-01-29 RX ADMIN — OXYCODONE HYDROCHLORIDE 20 MG: 10 TABLET ORAL at 09:26

## 2022-01-29 RX ADMIN — THIAMINE HCL TAB 100 MG 100 MG: 100 TAB at 08:11

## 2022-01-29 RX ADMIN — NICOTINE POLACRILEX 8 MG: 4 GUM, CHEWING ORAL at 12:40

## 2022-01-29 ASSESSMENT — ACTIVITIES OF DAILY LIVING (ADL)
LAUNDRY: WITH SUPERVISION
HYGIENE/GROOMING: INDEPENDENT
LAUNDRY: UNABLE TO COMPLETE
ORAL_HYGIENE: INDEPENDENT
ORAL_HYGIENE: INDEPENDENT
DRESS: INDEPENDENT
DRESS: SCRUBS (BEHAVIORAL HEALTH)
HYGIENE/GROOMING: INDEPENDENT

## 2022-01-29 NOTE — PLAN OF CARE
Problem: Sleep Disturbance (Depressive Signs/Symptoms)  Goal: Improved Sleep (Depressive Signs/Symptoms)  Outcome: No Change    Patient went to sleep @ 12mn and informed this writer that he is willing to take his Keflex that is scheduled @ 6am.     Slept a total of 6.5 hours. No complaints made the whole night.

## 2022-01-29 NOTE — PLAN OF CARE
Problem: Suicidal Behavior  Goal: Suicidal Behavior is Absent or Managed  Outcome: Improving     Problem: Decreased Participation and Engagement (Depressive Signs/Symptoms)  Goal: Increased Participation and Engagement (Depressive Signs/Symptoms)  Outcome: Improving     Patient is pleasant and cooperative this shift.  Patient is up in milieu intermittently throughout the day, he engages with select staff and peers.  Patient endorses high anxiety related to figuring out his housing.  He denies SI, HI, SIB, and hallucinations.  He endorses right knee pain which is partially managed with scheduled medications.  Patient received PRN acetaminophen 975 mg x1.  Patient refused colace this morning but is otherwise medication compliant.  Patient remains safe on unit.  Will continue to monitor.  Tatianna Mares RN

## 2022-01-29 NOTE — PLAN OF CARE
Problem: Suicidal Behavior  Goal: Suicidal Behavior is Absent or Managed  Outcome: Improving  Flowsheets (Taken 1/28/2022 1839)  Mutually Determined Action Steps (Suicidal Behavior Absent/Managed):    identifies home safety strategy    sets future-oriented goal    verbalizes safety check rationale   Problem: Activity and Energy Impairment (Depressive Signs/Symptoms)  Goal: Optimized Energy Level (Depressive Signs/Symptoms)  Outcome: Improving  Flowsheets (Taken 1/28/2022 1839)  Mutually Determined Action Steps (Optimized Energy Level): grooms self without prompting  Intervention: Optimize Energy Level  Recent Flowsheet Documentation  Taken 1/28/2022 1834 by Jammie Vail RN  Diversional Activity: reading  Activity (Behavioral Health): up ad kathleen      Pt presents with blunted, flat affect and calm mood. Denies SI/SIB and hallucinations. Rates depression and anxiety 5/10. Pt was intermittently present in the lounge and social with select peers. Spent a lot of the evening reading in his room. Pt reported being upset about not being able to go to the group home - said that the man from his previous group home sent the new group home false notes which sabotaged his placement. Pt did report being hopeful and trusting of the [facility] to find him different placement. He understands that it is out of his hands and he is safe here. Pt continues to c/o chronic R knee pain 7/10 which is constant, aching, and stabbing. He reports that it is adequately managed with his scheduled pain medications. Pt did not attend groups. VSS. Appetite and fluid intake adequate. Medication compliant, PRN nicotine gum provided as requested. Aquaphor given for pt to place on both of his feet. No other concerns or complaints noted.

## 2022-01-30 PROCEDURE — 124N000003 HC R&B MH SENIOR/ADOLESCENT

## 2022-01-30 PROCEDURE — 250N000013 HC RX MED GY IP 250 OP 250 PS 637

## 2022-01-30 PROCEDURE — 250N000013 HC RX MED GY IP 250 OP 250 PS 637: Performed by: PSYCHIATRY & NEUROLOGY

## 2022-01-30 PROCEDURE — 250N000013 HC RX MED GY IP 250 OP 250 PS 637: Performed by: STUDENT IN AN ORGANIZED HEALTH CARE EDUCATION/TRAINING PROGRAM

## 2022-01-30 PROCEDURE — G0177 OPPS/PHP; TRAIN & EDUC SERV: HCPCS

## 2022-01-30 PROCEDURE — 250N000013 HC RX MED GY IP 250 OP 250 PS 637: Performed by: NURSE PRACTITIONER

## 2022-01-30 RX ADMIN — OXYCODONE HYDROCHLORIDE 20 MG: 10 TABLET ORAL at 16:05

## 2022-01-30 RX ADMIN — NAPROXEN 500 MG: 500 TABLET ORAL at 08:21

## 2022-01-30 RX ADMIN — PREGABALIN 300 MG: 100 CAPSULE ORAL at 08:20

## 2022-01-30 RX ADMIN — NICOTINE POLACRILEX 8 MG: 4 GUM, CHEWING ORAL at 06:18

## 2022-01-30 RX ADMIN — MULTIPLE VITAMINS W/ MINERALS TAB 1 TABLET: TAB at 08:20

## 2022-01-30 RX ADMIN — ACAMPROSATE CALCIUM 666 MG: 333 TABLET, DELAYED RELEASE ORAL at 08:20

## 2022-01-30 RX ADMIN — PROPRANOLOL HYDROCHLORIDE 20 MG: 20 TABLET ORAL at 19:58

## 2022-01-30 RX ADMIN — TOPIRAMATE 100 MG: 100 TABLET, FILM COATED ORAL at 08:21

## 2022-01-30 RX ADMIN — ACETAMINOPHEN 975 MG: 325 TABLET, FILM COATED ORAL at 11:37

## 2022-01-30 RX ADMIN — NICOTINE POLACRILEX 8 MG: 4 GUM, CHEWING ORAL at 19:58

## 2022-01-30 RX ADMIN — NICOTINE POLACRILEX 8 MG: 4 GUM, CHEWING ORAL at 15:26

## 2022-01-30 RX ADMIN — NICOTINE POLACRILEX 8 MG: 4 GUM, CHEWING ORAL at 22:03

## 2022-01-30 RX ADMIN — AMLODIPINE BESYLATE 5 MG: 5 TABLET ORAL at 08:21

## 2022-01-30 RX ADMIN — PREGABALIN 300 MG: 100 CAPSULE ORAL at 19:58

## 2022-01-30 RX ADMIN — NICOTINE POLACRILEX 8 MG: 4 GUM, CHEWING ORAL at 17:19

## 2022-01-30 RX ADMIN — CEPHALEXIN 500 MG: 500 CAPSULE ORAL at 08:20

## 2022-01-30 RX ADMIN — ACAMPROSATE CALCIUM 666 MG: 333 TABLET, DELAYED RELEASE ORAL at 19:58

## 2022-01-30 RX ADMIN — ACAMPROSATE CALCIUM 666 MG: 333 TABLET, DELAYED RELEASE ORAL at 14:11

## 2022-01-30 RX ADMIN — PROPRANOLOL HYDROCHLORIDE 20 MG: 20 TABLET ORAL at 08:21

## 2022-01-30 RX ADMIN — CEPHALEXIN 500 MG: 500 CAPSULE ORAL at 19:58

## 2022-01-30 RX ADMIN — OXYCODONE HYDROCHLORIDE 20 MG: 10 TABLET ORAL at 21:02

## 2022-01-30 RX ADMIN — QUETIAPINE 600 MG: 300 TABLET, FILM COATED ORAL at 22:03

## 2022-01-30 RX ADMIN — OXYCODONE HYDROCHLORIDE 20 MG: 10 TABLET ORAL at 13:22

## 2022-01-30 RX ADMIN — NICOTINE POLACRILEX 8 MG: 4 GUM, CHEWING ORAL at 08:20

## 2022-01-30 RX ADMIN — PANTOPRAZOLE SODIUM 40 MG: 40 TABLET, DELAYED RELEASE ORAL at 06:18

## 2022-01-30 RX ADMIN — OXYCODONE HYDROCHLORIDE 20 MG: 10 TABLET ORAL at 09:00

## 2022-01-30 RX ADMIN — THIAMINE HCL TAB 100 MG 100 MG: 100 TAB at 08:21

## 2022-01-30 RX ADMIN — LIOTHYRONINE SODIUM 25 MCG: 25 TABLET ORAL at 08:21

## 2022-01-30 RX ADMIN — PRAZOSIN HYDROCHLORIDE 1 MG: 1 CAPSULE ORAL at 22:03

## 2022-01-30 RX ADMIN — NAPROXEN 500 MG: 500 TABLET ORAL at 17:19

## 2022-01-30 RX ADMIN — TOPIRAMATE 100 MG: 100 TABLET, FILM COATED ORAL at 19:58

## 2022-01-30 RX ADMIN — NICOTINE POLACRILEX 8 MG: 4 GUM, CHEWING ORAL at 14:12

## 2022-01-30 RX ADMIN — NICOTINE POLACRILEX 8 MG: 4 GUM, CHEWING ORAL at 21:02

## 2022-01-30 RX ADMIN — NICOTINE POLACRILEX 8 MG: 4 GUM, CHEWING ORAL at 12:39

## 2022-01-30 RX ADMIN — CEPHALEXIN 500 MG: 500 CAPSULE ORAL at 02:03

## 2022-01-30 RX ADMIN — FOLIC ACID 1 MG: 1 TABLET ORAL at 08:21

## 2022-01-30 RX ADMIN — CEPHALEXIN 500 MG: 500 CAPSULE ORAL at 14:12

## 2022-01-30 RX ADMIN — LAMOTRIGINE 25 MG: 25 TABLET ORAL at 08:21

## 2022-01-30 RX ADMIN — NICOTINE POLACRILEX 8 MG: 4 GUM, CHEWING ORAL at 11:37

## 2022-01-30 RX ADMIN — OXYCODONE HYDROCHLORIDE 20 MG: 10 TABLET ORAL at 06:18

## 2022-01-30 RX ADMIN — PREGABALIN 300 MG: 100 CAPSULE ORAL at 14:12

## 2022-01-30 RX ADMIN — LEVOTHYROXINE SODIUM 75 MCG: 75 TABLET ORAL at 06:18

## 2022-01-30 RX ADMIN — NICOTINE POLACRILEX 8 MG: 4 GUM, CHEWING ORAL at 18:43

## 2022-01-30 ASSESSMENT — ACTIVITIES OF DAILY LIVING (ADL)
HYGIENE/GROOMING: INDEPENDENT
HYGIENE/GROOMING: INDEPENDENT
DRESS: SCRUBS (BEHAVIORAL HEALTH)
LAUNDRY: UNABLE TO COMPLETE
DRESS: SCRUBS (BEHAVIORAL HEALTH);INDEPENDENT
ORAL_HYGIENE: INDEPENDENT
LAUNDRY: WITH SUPERVISION
ORAL_HYGIENE: INDEPENDENT

## 2022-01-30 NOTE — PLAN OF CARE
Problem: Suicidal Behavior  Goal: Suicidal Behavior is Absent or Managed  Outcome: Improving  Flowsheets (Taken 1/29/2022 1925)  Mutually Determined Action Steps (Suicidal Behavior Absent/Managed):    identifies protective factors    sets future-oriented goal    verbalizes safety check rationale     Problem: Behavioral Health Plan of Care  Goal: Plan of Care Review  Outcome: Improving  Flowsheets  Taken 1/29/2022 1925  Plan of Care Reviewed With: patient  Progress: improving  Taken 1/29/2022 1917  Plan of Care Reviewed With: patient  Patient Agreement with Plan of Care: agrees     Pt presents with blunted, flat affect and calm mood. Denies SI/SIB and hallucinations. Depression and anxiety remain 5/10. Pt was present in the lounge and social with peers. Did not attend groups. Continues to c/o R knee pain 7/10 and is adequately managed with scheduled pain medications. VSS. Medication compliant, PRN nicotine gum given as requested. Appetite and fluid intake adequate. Pt continues to require the use of medical bed due to impaired mobility, needing the side rails to assist in getting up independently, and elevation of knee. No other concerns or complaints noted.

## 2022-01-30 NOTE — PLAN OF CARE
Problem: Sleep Disturbance (Depressive Signs/Symptoms)  Goal: Improved Sleep (Depressive Signs/Symptoms)  Outcome: No Change     Slept a total of 7 hours without any complaints made the whole night.

## 2022-01-30 NOTE — PLAN OF CARE
"Problem: Behavioral Health Plan of Care  Goal: Adheres to Safety Considerations for Self and Others  Outcome: No Change  Goal: Optimized Coping Skills in Response to Life Stressors  Outcome: No Change     Patient is pleasant and cooperative today.  Patient endorses moderate anxiety and states, \"I'm trying not to worry about things I have no control over.\"  He denies SI, HI, SIB, and hallucinations.  Up in milieu and engages with staff and peers.  Patient endorses right knee pain that radiates down his leg.  Pain is partially managed with scheduled medication.  PRN acetaminophen 975 mg administered x1.  Patient is medication compliant and remains safe on unit.  Will continue to monitor.  Tatianna Mares RN   "

## 2022-01-30 NOTE — PLAN OF CARE
Jose M  attended a Life Skills group this a.m. that involved sharing reflections according to question prompts. Discussed his plan to have surgery, get off pain meds, get a job, and find housing. Talkative and interactive.      01/30/22 1300   Occupational Therapy   Type of Intervention structured groups   Response Initiates, socially acceptable   Hours 1

## 2022-01-31 PROCEDURE — 250N000013 HC RX MED GY IP 250 OP 250 PS 637: Performed by: NURSE PRACTITIONER

## 2022-01-31 PROCEDURE — 250N000013 HC RX MED GY IP 250 OP 250 PS 637: Performed by: PSYCHIATRY & NEUROLOGY

## 2022-01-31 PROCEDURE — 250N000013 HC RX MED GY IP 250 OP 250 PS 637: Performed by: STUDENT IN AN ORGANIZED HEALTH CARE EDUCATION/TRAINING PROGRAM

## 2022-01-31 PROCEDURE — G0177 OPPS/PHP; TRAIN & EDUC SERV: HCPCS

## 2022-01-31 PROCEDURE — 124N000003 HC R&B MH SENIOR/ADOLESCENT

## 2022-01-31 PROCEDURE — 250N000013 HC RX MED GY IP 250 OP 250 PS 637

## 2022-01-31 RX ADMIN — NICOTINE POLACRILEX 8 MG: 4 GUM, CHEWING ORAL at 20:05

## 2022-01-31 RX ADMIN — NICOTINE POLACRILEX 8 MG: 4 GUM, CHEWING ORAL at 14:10

## 2022-01-31 RX ADMIN — ACAMPROSATE CALCIUM 666 MG: 333 TABLET, DELAYED RELEASE ORAL at 07:42

## 2022-01-31 RX ADMIN — OXYCODONE HYDROCHLORIDE 20 MG: 10 TABLET ORAL at 21:03

## 2022-01-31 RX ADMIN — PRAZOSIN HYDROCHLORIDE 1 MG: 1 CAPSULE ORAL at 22:07

## 2022-01-31 RX ADMIN — NICOTINE POLACRILEX 8 MG: 4 GUM, CHEWING ORAL at 21:00

## 2022-01-31 RX ADMIN — OXYCODONE HYDROCHLORIDE 20 MG: 10 TABLET ORAL at 06:43

## 2022-01-31 RX ADMIN — OXYCODONE HYDROCHLORIDE 20 MG: 10 TABLET ORAL at 13:05

## 2022-01-31 RX ADMIN — NAPROXEN 500 MG: 500 TABLET ORAL at 07:42

## 2022-01-31 RX ADMIN — PANTOPRAZOLE SODIUM 40 MG: 40 TABLET, DELAYED RELEASE ORAL at 06:42

## 2022-01-31 RX ADMIN — LEVOTHYROXINE SODIUM 75 MCG: 75 TABLET ORAL at 06:42

## 2022-01-31 RX ADMIN — PREGABALIN 300 MG: 100 CAPSULE ORAL at 14:10

## 2022-01-31 RX ADMIN — QUETIAPINE 600 MG: 300 TABLET, FILM COATED ORAL at 22:07

## 2022-01-31 RX ADMIN — CEPHALEXIN 500 MG: 500 CAPSULE ORAL at 20:05

## 2022-01-31 RX ADMIN — CEPHALEXIN 500 MG: 500 CAPSULE ORAL at 07:42

## 2022-01-31 RX ADMIN — OXYCODONE HYDROCHLORIDE 20 MG: 10 TABLET ORAL at 02:04

## 2022-01-31 RX ADMIN — OXYCODONE HYDROCHLORIDE 20 MG: 10 TABLET ORAL at 16:09

## 2022-01-31 RX ADMIN — PREGABALIN 300 MG: 100 CAPSULE ORAL at 07:42

## 2022-01-31 RX ADMIN — PREGABALIN 300 MG: 100 CAPSULE ORAL at 20:05

## 2022-01-31 RX ADMIN — NICOTINE POLACRILEX 8 MG: 4 GUM, CHEWING ORAL at 22:07

## 2022-01-31 RX ADMIN — ACAMPROSATE CALCIUM 666 MG: 333 TABLET, DELAYED RELEASE ORAL at 14:10

## 2022-01-31 RX ADMIN — TOPIRAMATE 100 MG: 100 TABLET, FILM COATED ORAL at 07:42

## 2022-01-31 RX ADMIN — NICOTINE POLACRILEX 8 MG: 4 GUM, CHEWING ORAL at 19:07

## 2022-01-31 RX ADMIN — NICOTINE POLACRILEX 8 MG: 4 GUM, CHEWING ORAL at 16:09

## 2022-01-31 RX ADMIN — MULTIPLE VITAMINS W/ MINERALS TAB 1 TABLET: TAB at 07:42

## 2022-01-31 RX ADMIN — CEPHALEXIN 500 MG: 500 CAPSULE ORAL at 14:10

## 2022-01-31 RX ADMIN — LIOTHYRONINE SODIUM 25 MCG: 25 TABLET ORAL at 07:42

## 2022-01-31 RX ADMIN — FOLIC ACID 1 MG: 1 TABLET ORAL at 07:42

## 2022-01-31 RX ADMIN — CEPHALEXIN 500 MG: 500 CAPSULE ORAL at 01:48

## 2022-01-31 RX ADMIN — NICOTINE POLACRILEX 8 MG: 4 GUM, CHEWING ORAL at 08:52

## 2022-01-31 RX ADMIN — NICOTINE POLACRILEX 8 MG: 4 GUM, CHEWING ORAL at 08:10

## 2022-01-31 RX ADMIN — OXYCODONE HYDROCHLORIDE 20 MG: 10 TABLET ORAL at 09:32

## 2022-01-31 RX ADMIN — ACAMPROSATE CALCIUM 666 MG: 333 TABLET, DELAYED RELEASE ORAL at 20:05

## 2022-01-31 RX ADMIN — TOPIRAMATE 100 MG: 100 TABLET, FILM COATED ORAL at 20:05

## 2022-01-31 RX ADMIN — THIAMINE HCL TAB 100 MG 100 MG: 100 TAB at 07:42

## 2022-01-31 RX ADMIN — LAMOTRIGINE 25 MG: 25 TABLET ORAL at 07:42

## 2022-01-31 RX ADMIN — NAPROXEN 500 MG: 500 TABLET ORAL at 17:20

## 2022-01-31 RX ADMIN — NICOTINE POLACRILEX 8 MG: 4 GUM, CHEWING ORAL at 12:15

## 2022-01-31 RX ADMIN — NICOTINE POLACRILEX 8 MG: 4 GUM, CHEWING ORAL at 17:20

## 2022-01-31 RX ADMIN — PROPRANOLOL HYDROCHLORIDE 20 MG: 20 TABLET ORAL at 20:05

## 2022-01-31 RX ADMIN — NICOTINE POLACRILEX 8 MG: 4 GUM, CHEWING ORAL at 06:43

## 2022-01-31 ASSESSMENT — ACTIVITIES OF DAILY LIVING (ADL)
LAUNDRY: WITH SUPERVISION
ORAL_HYGIENE: INDEPENDENT
LAUNDRY: WITH SUPERVISION
ORAL_HYGIENE: INDEPENDENT
HYGIENE/GROOMING: INDEPENDENT
DRESS: SCRUBS (BEHAVIORAL HEALTH)
HYGIENE/GROOMING: INDEPENDENT
DRESS: SCRUBS (BEHAVIORAL HEALTH)

## 2022-01-31 NOTE — PLAN OF CARE
Problem: Sleep Disturbance (Depressive Signs/Symptoms)  Goal: Improved Sleep (Depressive Signs/Symptoms)  Outcome: No Change    Patient complained of bilateral knee pain and requested for Oxycodone. Oxycodone 20 mg. given @ 0204 (earlier than the regular schedule  which starts @ 0600 - to be given 5X daily). Patient had complete pain relief and went to sleep @ 0215.     Slept a total of 6 hours.

## 2022-01-31 NOTE — PLAN OF CARE
Problem: Suicidal Behavior  Goal: Suicidal Behavior is Absent or Managed  Outcome: Improving  Flowsheets (Taken 1/30/2022 1928)  Mutually Determined Action Steps (Suicidal Behavior Absent/Managed):    sets future-oriented goal    verbalizes safety check rationale   Problem: Activity and Energy Impairment (Depressive Signs/Symptoms)  Goal: Optimized Energy Level (Depressive Signs/Symptoms)  Intervention: Optimize Energy Level  Recent Flowsheet Documentation  Taken 1/30/2022 1811 by Jammie Vail RN  Diversional Activity: television  Activity (Behavioral Health): up ad kathleen    Pt presents with full range affect and calm mood. Denies SI/SIB and hallucinations. Rates depression and anxiety 5/10 - reports concerns over housing, but he is taking it day by day. Pt present in the lounge and social with peers. Did not attend groups. Continues to c/o R knee pain 7/10 which is adequately controlled with scheduled pain medications. VSS. Medication compliant, PRN nicotine gum given as requested. Appetite and fluid intake adequate. Pt was provided with Aquaphor for feet tonight as requested. No other concerns or complaints noted.

## 2022-01-31 NOTE — PLAN OF CARE
Activities :  -Met with pt and discussed his care   -Rounded with team  -Reviewed with pt his discharge plan. Informed him that additional facilities are having difficulty accepting him. Pt reported he is patient and will continue to be. Reported wiliingness to speak with agencies to reassure them of his safety.        Addressed patient needs/concerns: Reviewed possible options for disposition, when he is stabilized.      Discharge Plan or Goal   Plan  New referrals made to Group Home  Commitment    Health Care Follow up Appointment:    Will need psych, therapy, pcp, and  mental michael cm  Medication Management Plan:  See providers notes  Housing:  [facility] #[phone]  Financial Follow ups:  He reports he has GA   SMRTed for SSDI benefits.  Care Team     Jr Giron MD  -[facility] #[phone]    CADI Waiver CM: Jessica Lawson- [facility] #[phone]  ? Arlin- supervisor for cadi cm [phone]    Joseph Oliveros's supervisor # [phone]  ? Admin #[phone]        Barriers to Discharge   Ongoing stabilization  Group home placement     Referral Status  Will need referral for MH CM, psych and therapy     Legal Status  Voluntary

## 2022-01-31 NOTE — PLAN OF CARE
Problem: Mood Impairment (Depressive Signs/Symptoms)  Goal: Improved Mood Symptoms (Depressive Signs/Symptoms)  Outcome: Improving  Flowsheets (Taken 1/31/2022 1337)  Mutually Determined Action Steps (Improved Mood Symptoms):    acknowledges progress    verbalizes increased insight  Intervention: Promote Mood Improvement  Recent Flowsheet Documentation  Taken 1/31/2022 1251 by Jammie Vail RN  Diversional Activity: television     Pt presents with blunted, flat affect and calm mood. Denies SI/SIB and hallucinations. Continues to rate depression and anxiety 5/10. Pt intermittently present in the lounge and social with peers. Attended some groups. Continues to c/o R knee pain which is constant, aching, and stabbing, 7/10 that is adequately controlled with scheduled pain medications. Appetite and fluid intake adequate. VSS. AM BP medications held as he had a systolic drop below 110. VS rechecked at noon and were WNL. Otherwise, medication compliant and PRN nicotine gum provided as requested. Pt continues to require the usage of a medical bed due to mobility issues, needing side rails to independently get self out of bed, and to elevate the R knee. Gait steady and balanced with the use of walker. Pt continues to use knee immobilize to R knee. No other concerns or complaints noted this shift.     Pt had received an additional dose of oxycodone 20 mg at 0204 this AM along with his scheduled 0600 dose. Per MD Perez, mayra to continue with pt scheduled doses during the day.

## 2022-01-31 NOTE — PROGRESS NOTES
"SPIRITUAL HEALTH SERVICES   Spiritual Assessment Progress Note (Behavioral Health/CD Focus)  [facility] ([facility] [place] 3BW    REFERRAL SOURCE: follow up    On this visit, I met with Jose M in pt room for 20 minutes. Pt returned one borrowed book.  Provided emotional/spiritual support, facilitated reflective conversation around hope, goals, leena, and pt experience.     EXPERIENCE OF ILLNESS/HOSPITALIZATION:  Jose M talked about \"being in limbo\" until there is home placement, reflected on his journey of loss and grief over the past 5 years or so, and his hopes for the future: \"the key is getting knee surgery. Once I recover from that I can get a part time, then full time job and get a place of my own.\"    MEANING-MAKING:  Jose M spoke about the hardships he has been through as \"the bottom fell out on life. Not a curve ball, but a ball quickly spiraling down to a crash.\" We talked about hope; Jose M spoke of how important a sober living home is for his success: \"I just can't be around that [any amount of drinking]. I need to get past this pain [knee] and don't want that to drag me back down again.\"    SPIRITUALITY/VALUES/Buddhist:  Uatsdin    COPING/SPIRITUAL PRACTICES: Jose M mentioned prayers to \"the big juan\" and spoke of daily coping practices on the unit:  Attending groups  Being out in the lounge, playing cards  Reading  Staying ahead of pain medication for his knee    SUPPORT SYSTEMS: Jose M spoke of his respect for Dr. Perez and the unit staff    PLAN: I will follow up 1-2x weekly.                                                                                                                                             Liss Kerns MDiv  Associate   Pager [phone]  Office [phone]  [facility] remains available 24/7 for emergent requests/referrals, either by having the switchboard page the on-call  or by entering an ASAP/STAT consult in Epic (this will also page the on-call ). Routine Epic consults receive an " initial response within 24 hours.

## 2022-01-31 NOTE — PROGRESS NOTES
Patient seen, chart reviewed, care discussed with staff.  Blood pressure 121/68, pulse 78, temperature 98  F (36.7  C), temperature source Temporal, resp. rate 16, weight 138.6 kg (305 lb 8 oz), SpO2 92 %.    By report, depressed and anxious.    Slept with dionicio night akakenning.  He received an extra dose of OxyContin last night, I am ok with dosing as scheduled today    General appearance: good  Alert.   Affect: fair  Mood: fair    Speech:  normal.   Eye contact:  good.    Psychomotor behavior: normal  Gait: steady with walker  Abnormal movements:  none  Delusions: none  Hallucinations:  none  Thoughts: logical  Associations: intact  Judgement: good  Insight: good  Cognitions: intact in conversation  Memory:  intact in conversation  Orientation: normal    Not suicidal.    Patient Active Problem List   Diagnosis     Post concussive encephalopathy     H/O shoulder surgery     Alcoholism in remission (H)     Bilateral ACL tear     Chronic back pain     Social anxiety disorder     Alcohol withdrawal syndrome with complication, with unspecified complication (H)     Rib fracture     Alcohol withdrawal (H)     Alcohol dependence (H)     LFT elevation     Laceration of left hand without foreign body, initial encounter     Suicidal ideation     Intentional drug overdose, initial encounter (H)     Alcoholic intoxication with complication (H)     Severe bipolar I disorder, current or most recent episode depressed, with mixed features (H)     Medication overdose, intentional self-harm, subsequent encounter     Opioid dependence on agonist therapy (H)     2019 novel coronavirus disease (COVID-19)     Pneumonia due to COVID-19 virus     Bipolar disorder (H)     Imp:  Bipolar depressed, severe, without psychosis  2.  PTSD    Plan: continue current treatment plan    Current Facility-Administered Medications   Medication     acamprosate (CAMPRAL) EC tablet 666 mg     acetaminophen (TYLENOL) tablet 975 mg     albuterol (PROVENTIL  HFA/VENTOLIN HFA) inhaler     alum & mag hydroxide-simethicone (MAALOX) suspension 30 mL     amLODIPine (NORVASC) tablet 5 mg     cephALEXin (KEFLEX) capsule 500 mg     docusate sodium (COLACE) capsule 100 mg     folic acid (FOLVITE) tablet 1 mg     ketamine 5%-gabapentin 8% in PLO cream 0.25 g     lamoTRIgine (LaMICtal) tablet 25 mg     levothyroxine (SYNTHROID/LEVOTHROID) tablet 75 mcg     liothyronine (CYTOMEL) tablet 25 mcg     loperamide (IMODIUM) capsule 2 mg     melatonin tablet 3 mg     multivitamin w/minerals (THERA-VIT-M) tablet 1 tablet     naloxone (NARCAN) injection 0.2 mg    Or     naloxone (NARCAN) injection 0.4 mg    Or     naloxone (NARCAN) injection 0.2 mg    Or     naloxone (NARCAN) injection 0.4 mg     naproxen (NAPROSYN) tablet 500 mg     nicotine polacrilex (NICORETTE) gum 4-8 mg     OLANZapine (zyPREXA) tablet 10 mg    Or     OLANZapine (zyPREXA) injection 10 mg     oxyCODONE IR (ROXICODONE) tablet 20 mg     pantoprazole (PROTONIX) EC tablet 40 mg     prazosin (MINIPRESS) capsule 1 mg     pregabalin (LYRICA) capsule 300 mg     propranolol (INDERAL) tablet 20 mg     QUEtiapine (SEROquel) tablet 600 mg     sennosides (SENOKOT) tablet 1-2 tablet     thiamine (B-1) tablet 100 mg     tiZANidine (ZANAFLEX) tablet 4 mg     topiramate (TOPAMAX) tablet 100 mg     No results found for this or any previous visit (from the past 168 hour(s)).

## 2022-01-31 NOTE — PLAN OF CARE
"Problem: Additional Goals  Goal: BEH OT Goal 1  Description: Will attend OT groups improve concentration and motivation by engaging in more challenging and success oriented options while also improving insight with comments/answers made about mental health needs.    Pt attended 2 out of 3 OT groups offered. Pt actively participated in occupational therapy clinic with 5 patients total x100 minutes. Pt was able to ask for assistance as needed, and independently return to a multi-step, creative expression task. Pt demonstrated excellent focus, planning, sequencing, problem solving, and attention to detail. Very organized in his task approach. Social with peers and writer throughout. He spoke about his mother's health deteriorating, and how he feels helpless since she is in [place]. Pt actively participated in a structured occupational therapy group of 6-7 patients total x15 minutes (no charge) with a focus on connecting song titles to life events and personal feelings. Using a list of song titles, pt identified Just Like Starting Over, You Can't Always Get What You Want, All By Myself, Big Shot, and Flynn of Broken dreams as song titles that relate to his life, and explained that arranging those song titles in a specific order depicts a timeline of his life. Pt identified Praying as a song title that indicates something about his future. Pt also independently identified \"Long Black Veil\" as a song that he personally relates to. He politely informed writer that he planned to go lay down and rest his leg after sharing his selections and did not return to group. Calm, pleasant, cooperative, and engaged.        "

## 2022-02-01 PROCEDURE — G0177 OPPS/PHP; TRAIN & EDUC SERV: HCPCS

## 2022-02-01 PROCEDURE — 250N000013 HC RX MED GY IP 250 OP 250 PS 637: Performed by: STUDENT IN AN ORGANIZED HEALTH CARE EDUCATION/TRAINING PROGRAM

## 2022-02-01 PROCEDURE — 250N000013 HC RX MED GY IP 250 OP 250 PS 637: Performed by: PSYCHIATRY & NEUROLOGY

## 2022-02-01 PROCEDURE — 124N000003 HC R&B MH SENIOR/ADOLESCENT

## 2022-02-01 PROCEDURE — 250N000013 HC RX MED GY IP 250 OP 250 PS 637: Performed by: NURSE PRACTITIONER

## 2022-02-01 PROCEDURE — 250N000013 HC RX MED GY IP 250 OP 250 PS 637

## 2022-02-01 RX ORDER — LAMOTRIGINE 25 MG/1
50 TABLET ORAL DAILY
Status: DISCONTINUED | OUTPATIENT
Start: 2022-02-02 | End: 2022-02-15

## 2022-02-01 RX ADMIN — NAPROXEN 500 MG: 500 TABLET ORAL at 07:47

## 2022-02-01 RX ADMIN — PRAZOSIN HYDROCHLORIDE 1 MG: 1 CAPSULE ORAL at 21:57

## 2022-02-01 RX ADMIN — ACAMPROSATE CALCIUM 666 MG: 333 TABLET, DELAYED RELEASE ORAL at 07:47

## 2022-02-01 RX ADMIN — DOCUSATE SODIUM 100 MG: 100 CAPSULE, LIQUID FILLED ORAL at 07:47

## 2022-02-01 RX ADMIN — TOPIRAMATE 100 MG: 100 TABLET, FILM COATED ORAL at 07:46

## 2022-02-01 RX ADMIN — OXYCODONE HYDROCHLORIDE 20 MG: 10 TABLET ORAL at 16:07

## 2022-02-01 RX ADMIN — NICOTINE POLACRILEX 8 MG: 4 GUM, CHEWING ORAL at 10:16

## 2022-02-01 RX ADMIN — NICOTINE POLACRILEX 8 MG: 4 GUM, CHEWING ORAL at 20:08

## 2022-02-01 RX ADMIN — LEVOTHYROXINE SODIUM 75 MCG: 75 TABLET ORAL at 06:07

## 2022-02-01 RX ADMIN — LIOTHYRONINE SODIUM 25 MCG: 25 TABLET ORAL at 07:47

## 2022-02-01 RX ADMIN — PREGABALIN 300 MG: 100 CAPSULE ORAL at 20:08

## 2022-02-01 RX ADMIN — CEPHALEXIN 500 MG: 500 CAPSULE ORAL at 01:03

## 2022-02-01 RX ADMIN — FOLIC ACID 1 MG: 1 TABLET ORAL at 07:47

## 2022-02-01 RX ADMIN — ACAMPROSATE CALCIUM 666 MG: 333 TABLET, DELAYED RELEASE ORAL at 20:08

## 2022-02-01 RX ADMIN — NICOTINE POLACRILEX 8 MG: 4 GUM, CHEWING ORAL at 14:26

## 2022-02-01 RX ADMIN — PANTOPRAZOLE SODIUM 40 MG: 40 TABLET, DELAYED RELEASE ORAL at 06:07

## 2022-02-01 RX ADMIN — MULTIPLE VITAMINS W/ MINERALS TAB 1 TABLET: TAB at 07:47

## 2022-02-01 RX ADMIN — NICOTINE POLACRILEX 8 MG: 4 GUM, CHEWING ORAL at 07:47

## 2022-02-01 RX ADMIN — NICOTINE POLACRILEX 8 MG: 4 GUM, CHEWING ORAL at 21:57

## 2022-02-01 RX ADMIN — TOPIRAMATE 100 MG: 100 TABLET, FILM COATED ORAL at 20:08

## 2022-02-01 RX ADMIN — PROPRANOLOL HYDROCHLORIDE 20 MG: 20 TABLET ORAL at 20:08

## 2022-02-01 RX ADMIN — NICOTINE POLACRILEX 8 MG: 4 GUM, CHEWING ORAL at 13:16

## 2022-02-01 RX ADMIN — OXYCODONE HYDROCHLORIDE 20 MG: 10 TABLET ORAL at 09:33

## 2022-02-01 RX ADMIN — NICOTINE POLACRILEX 8 MG: 4 GUM, CHEWING ORAL at 17:29

## 2022-02-01 RX ADMIN — NICOTINE POLACRILEX 8 MG: 4 GUM, CHEWING ORAL at 06:06

## 2022-02-01 RX ADMIN — NICOTINE POLACRILEX 8 MG: 4 GUM, CHEWING ORAL at 16:07

## 2022-02-01 RX ADMIN — NICOTINE POLACRILEX 8 MG: 4 GUM, CHEWING ORAL at 20:58

## 2022-02-01 RX ADMIN — NICOTINE POLACRILEX 8 MG: 4 GUM, CHEWING ORAL at 12:02

## 2022-02-01 RX ADMIN — NAPROXEN 500 MG: 500 TABLET ORAL at 17:29

## 2022-02-01 RX ADMIN — NICOTINE POLACRILEX 8 MG: 4 GUM, CHEWING ORAL at 08:56

## 2022-02-01 RX ADMIN — QUETIAPINE 600 MG: 300 TABLET, FILM COATED ORAL at 21:57

## 2022-02-01 RX ADMIN — LAMOTRIGINE 25 MG: 25 TABLET ORAL at 07:47

## 2022-02-01 RX ADMIN — ACAMPROSATE CALCIUM 666 MG: 333 TABLET, DELAYED RELEASE ORAL at 14:08

## 2022-02-01 RX ADMIN — PREGABALIN 300 MG: 100 CAPSULE ORAL at 14:08

## 2022-02-01 RX ADMIN — OXYCODONE HYDROCHLORIDE 20 MG: 10 TABLET ORAL at 06:07

## 2022-02-01 RX ADMIN — CEPHALEXIN 500 MG: 500 CAPSULE ORAL at 07:47

## 2022-02-01 RX ADMIN — ACETAMINOPHEN 975 MG: 325 TABLET, FILM COATED ORAL at 08:27

## 2022-02-01 RX ADMIN — OXYCODONE HYDROCHLORIDE 20 MG: 10 TABLET ORAL at 20:58

## 2022-02-01 RX ADMIN — OXYCODONE HYDROCHLORIDE 20 MG: 10 TABLET ORAL at 13:16

## 2022-02-01 RX ADMIN — PREGABALIN 300 MG: 100 CAPSULE ORAL at 07:47

## 2022-02-01 RX ADMIN — THIAMINE HCL TAB 100 MG 100 MG: 100 TAB at 07:47

## 2022-02-01 ASSESSMENT — ACTIVITIES OF DAILY LIVING (ADL)
LAUNDRY: WITH SUPERVISION
HYGIENE/GROOMING: INDEPENDENT
HYGIENE/GROOMING: INDEPENDENT
ORAL_HYGIENE: INDEPENDENT
DRESS: SCRUBS (BEHAVIORAL HEALTH)
ORAL_HYGIENE: INDEPENDENT
DRESS: INDEPENDENT;SCRUBS (BEHAVIORAL HEALTH)

## 2022-02-01 NOTE — PLAN OF CARE
Problem: Suicidal Behavior  Goal: Suicidal Behavior is Absent or Managed  Outcome: Improving  Flowsheets (Taken 1/31/2022 2106)  Mutually Determined Action Steps (Suicidal Behavior Absent/Managed):   identifies protective factors   sets future-oriented goal   verbalizes safety check rationale   Problem: Decreased Participation and Engagement (Depressive Signs/Symptoms)  Goal: Increased Participation and Engagement (Depressive Signs/Symptoms)  Outcome: Improving  Flowsheets (Taken 1/31/2022 2106)  Mutually Determined Action Steps (Increased Participation and Engagement):   initiates interaction with others   participates in one or more activity   voluntarily attends group therapy  Intervention: Facilitate Participation and Engagement  Recent Flowsheet Documentation  Taken 1/31/2022 1729 by Jammie Vail RN  Diversional Activity: television  Taken 1/31/2022 1251 by Jammie Vail RN  Diversional Activity: television     Pt presents with blunted, flat affect and calm mood. Denies SI/SIB and hallucinations. Rates depression and anxiety 5/10. Pt was more isolative this evening compared to day shift. Reports continued worries regarding housing situation. Did not attend groups. Was out in the lounge for dinner and social with select peers. Continues to c/o R knee pain, 7/10, that is adequately controlled with scheduled pain medications. Pt is medication compliant, PRN nicotine gum given as requested. VSS. Appetite and fluid intake adequate. Spent a lot of time reading in his room. He continues to require the use of a medical day due to mobility issues, needing the side rails to independently get himself out of bed, and the elevate his R knee. No other concerns or complaints noted this shift.

## 2022-02-01 NOTE — PLAN OF CARE
Problem: Nutrition Imbalance (Depressive Signs/Symptoms)  Goal: Optimized Nutrition Intake (Depressive Signs/Symptoms)  Outcome: Improving     Problem: Sleep Disturbance (Depressive Signs/Symptoms)  Goal: Improved Sleep (Depressive Signs/Symptoms)  Outcome: Improving     Slept well for 6.5 hours  Continued to endorse right knee pain, scheduled medication given.  Medication compliant  Nicorette 8 mg gum given with AM medications.  Appreciative to staff.

## 2022-02-01 NOTE — PLAN OF CARE
Problem: Additional Goals  Goal: BEH OT Goal 1  Description: Will attend OT groups improve concentration and motivation by engaging in more challenging and success oriented options while also improving insight with comments/answers made about mental health needs.    Pt attended 2 out of 2 OT groups offered. Pt actively participated in occupational therapy clinic with 6 patients total x55 minutes. Pt was able to ask for assistance as needed, and independently return to a multi-step, goal-directed task. Pt demonstrated excellent focus, sequencing, planning, problem solving, and attention to detail. Very organized in his task approach. Social with peers and writer. Pt actively participated in a structured occupational therapy group of 5-6 patients total x45 minutes with a focus on facilitating self-awareness, self-esteem, and social engagement. Pt appeared comfortable sharing positive personal information, and was respectful in listening and responding to peers. Pt identified the birth of his children as happy events in his life. He offered genuine compliments to peers. Appears to enjoy taking on a leadership role in groups, and politely assisted writer with cleaning up task materials at the end. Calm, pleasant, cooperative, and engaged.

## 2022-02-01 NOTE — PROGRESS NOTES
Patient seen, chart reviewed, care discussed with staff.  Blood pressure 107/70, pulse 82, temperature 97.4  F (36.3  C), temperature source Temporal, resp. rate 16, weight 137.9 kg (304 lb 1.6 oz), SpO2 94 %.    By report, continues high anxiety levels,  Slept 6.5 hours.    General appearance: good  Alert.   Affect: fair  Mood: fair    Speech:  normal.   Eye contact:  good.    Psychomotor behavior: normal  Gait: steady with walker  Abnormal movements:  none  Delusions: none  Hallucinations:  none  Thoughts: logical  Associations: intact  Judgement: good  Insight: good  Cognitions: intact in conversation  Memory:  intact in conversation  Orientation: normal    Not suicidal.    Patient Active Problem List   Diagnosis     Post concussive encephalopathy     H/O shoulder surgery     Alcoholism in remission (H)     Bilateral ACL tear     Chronic back pain     Social anxiety disorder     Alcohol withdrawal syndrome with complication, with unspecified complication (H)     Rib fracture     Alcohol withdrawal (H)     Alcohol dependence (H)     LFT elevation     Laceration of left hand without foreign body, initial encounter     Suicidal ideation     Intentional drug overdose, initial encounter (H)     Alcoholic intoxication with complication (H)     Severe bipolar I disorder, current or most recent episode depressed, with mixed features (H)     Medication overdose, intentional self-harm, subsequent encounter     Opioid dependence on agonist therapy (H)     2019 novel coronavirus disease (COVID-19)     Pneumonia due to COVID-19 virus     Bipolar disorder (H)     Imp:  Bipolar depressed, severe, without psychotic features  2.  PTSD  3.  TBI with poor short term memory    Plan: increase Lamictal    Current Facility-Administered Medications   Medication     acamprosate (CAMPRAL) EC tablet 666 mg     acetaminophen (TYLENOL) tablet 975 mg     albuterol (PROVENTIL HFA/VENTOLIN HFA) inhaler     alum & mag hydroxide-simethicone  (MAALOX) suspension 30 mL     amLODIPine (NORVASC) tablet 5 mg     cephALEXin (KEFLEX) capsule 500 mg     docusate sodium (COLACE) capsule 100 mg     folic acid (FOLVITE) tablet 1 mg     ketamine 5%-gabapentin 8% in PLO cream 0.25 g     [START ON 2/2/2022] lamoTRIgine (LaMICtal) tablet 50 mg     levothyroxine (SYNTHROID/LEVOTHROID) tablet 75 mcg     liothyronine (CYTOMEL) tablet 25 mcg     loperamide (IMODIUM) capsule 2 mg     melatonin tablet 3 mg     multivitamin w/minerals (THERA-VIT-M) tablet 1 tablet     naloxone (NARCAN) injection 0.2 mg    Or     naloxone (NARCAN) injection 0.4 mg    Or     naloxone (NARCAN) injection 0.2 mg    Or     naloxone (NARCAN) injection 0.4 mg     naproxen (NAPROSYN) tablet 500 mg     nicotine polacrilex (NICORETTE) gum 4-8 mg     OLANZapine (zyPREXA) tablet 10 mg    Or     OLANZapine (zyPREXA) injection 10 mg     oxyCODONE IR (ROXICODONE) tablet 20 mg     pantoprazole (PROTONIX) EC tablet 40 mg     prazosin (MINIPRESS) capsule 1 mg     pregabalin (LYRICA) capsule 300 mg     propranolol (INDERAL) tablet 20 mg     QUEtiapine (SEROquel) tablet 600 mg     sennosides (SENOKOT) tablet 1-2 tablet     thiamine (B-1) tablet 100 mg     tiZANidine (ZANAFLEX) tablet 4 mg     topiramate (TOPAMAX) tablet 100 mg     No results found for this or any previous visit (from the past 168 hour(s)).

## 2022-02-01 NOTE — PROGRESS NOTES
"Behavioral Health  Note    Behavioral Health  Spirituality Group Note    UNIT 3BW    Name: Hollis Barclay YOB: 1970   MRN: [id number] Age: 51 year old      Patient attended -led group, which included discussion of spirituality, coping with illness and perseverance.    Patient attended group for 1.0 hrs.    The patient actively participated in group discussion and patient demonstrated an appreciation of topic's application for their personal circumstances. Jose M identified \"roadblocks\" to perseverance, named his sister, and the choice to \"say every day that I am still here\" as inspiration to keep going.    Liss Kerns MDiv  Associate   Pager [phone]  Office [phone]    "

## 2022-02-01 NOTE — PLAN OF CARE
"  Problem: Activity and Energy Impairment (Depressive Signs/Symptoms)  Goal: Optimized Energy Level (Depressive Signs/Symptoms)  Outcome: No Change  Flowsheets (Taken 1/28/2022 1839 by Jammie Vail RN)  Mutually Determined Action Steps (Optimized Energy Level): grooms self without prompting     Problem: Psychomotor Impairment (Depressive Signs/Symptoms)  Goal: Improved Psychomotor Symptoms (Depressive Signs/Symptoms)  Outcome: No Change  Flowsheets (Taken 2/1/2022 0804)  Mutually Determined Action Steps (Improved Psychomotor Symptoms): adheres to medication regimen   Pt rated anxiety at 6/10 and depression at 7/10. Pt reports that his anxiety increased when he meets with [facility] due to not having future plans in place. Pt denies active SI/SIB but does admit to having occasional thoughts of not \"wanting to be here\". \"99.9% of the time I'm a rock\". Pt's affect is flat with appropriate eye contact.   AM doses of Norvasc and inderal were held as his blood pressure did not meet parameters (sitting 107/70 with a pulse of 82 and standing 102/67 with a pulse of 83). AM dose of colace was also held at patient's request.   0827: Pt requested/recieved prn tylenol 975 for knee and lower back pain (6.5/10 and a headache at 8/10).   Pt continues to use a medical bed due to chronic back and knee pain.    "

## 2022-02-02 PROCEDURE — 250N000013 HC RX MED GY IP 250 OP 250 PS 637: Performed by: NURSE PRACTITIONER

## 2022-02-02 PROCEDURE — 250N000013 HC RX MED GY IP 250 OP 250 PS 637: Performed by: PSYCHIATRY & NEUROLOGY

## 2022-02-02 PROCEDURE — 124N000003 HC R&B MH SENIOR/ADOLESCENT

## 2022-02-02 PROCEDURE — G0177 OPPS/PHP; TRAIN & EDUC SERV: HCPCS

## 2022-02-02 RX ORDER — LEVOTHYROXINE SODIUM 75 UG/1
75 TABLET ORAL
Qty: 30 TABLET | Refills: 3 | Status: SHIPPED | OUTPATIENT
Start: 2022-02-02

## 2022-02-02 RX ORDER — AMLODIPINE BESYLATE 5 MG/1
5 TABLET ORAL DAILY
Qty: 30 TABLET | Refills: 3 | Status: SHIPPED | OUTPATIENT
Start: 2022-02-02 | End: 2022-03-24

## 2022-02-02 RX ORDER — DOCUSATE SODIUM 100 MG/1
100 CAPSULE, LIQUID FILLED ORAL 2 TIMES DAILY
Qty: 60 CAPSULE | Refills: 3 | Status: SHIPPED | OUTPATIENT
Start: 2022-02-02 | End: 2022-03-24

## 2022-02-02 RX ORDER — PANTOPRAZOLE SODIUM 40 MG/1
40 TABLET, DELAYED RELEASE ORAL
Qty: 30 TABLET | Refills: 3 | Status: SHIPPED | OUTPATIENT
Start: 2022-02-02 | End: 2022-03-17

## 2022-02-02 RX ORDER — ACETAMINOPHEN 325 MG/1
975 TABLET ORAL EVERY 6 HOURS PRN
Qty: 100 TABLET | Refills: 3 | Status: SHIPPED | OUTPATIENT
Start: 2022-02-02

## 2022-02-02 RX ORDER — FOLIC ACID 1 MG/1
1000 TABLET ORAL DAILY
Qty: 90 TABLET | Refills: 1 | Status: SHIPPED | OUTPATIENT
Start: 2022-02-02 | End: 2022-03-17

## 2022-02-02 RX ORDER — TOPIRAMATE 100 MG/1
100 TABLET, FILM COATED ORAL 2 TIMES DAILY
Qty: 60 TABLET | Refills: 3 | Status: SHIPPED | OUTPATIENT
Start: 2022-02-02 | End: 2022-03-24

## 2022-02-02 RX ORDER — PRAZOSIN HYDROCHLORIDE 1 MG/1
1 CAPSULE ORAL AT BEDTIME
Qty: 30 CAPSULE | Refills: 3 | Status: SHIPPED | OUTPATIENT
Start: 2022-02-02 | End: 2022-03-17

## 2022-02-02 RX ORDER — LIOTHYRONINE SODIUM 25 UG/1
25 TABLET ORAL DAILY
Qty: 30 TABLET | Refills: 3 | Status: SHIPPED | OUTPATIENT
Start: 2022-02-03 | End: 2022-03-17

## 2022-02-02 RX ORDER — ACAMPROSATE CALCIUM 333 MG/1
666 TABLET, DELAYED RELEASE ORAL 3 TIMES DAILY
Qty: 180 TABLET | Refills: 3 | Status: SHIPPED | OUTPATIENT
Start: 2022-02-02 | End: 2022-03-21

## 2022-02-02 RX ORDER — SENNOSIDES 8.6 MG
1-2 TABLET ORAL 2 TIMES DAILY PRN
Qty: 60 TABLET | Refills: 1 | Status: SHIPPED | OUTPATIENT
Start: 2022-02-02 | End: 2022-03-24

## 2022-02-02 RX ORDER — PROPRANOLOL HYDROCHLORIDE 20 MG/1
20 TABLET ORAL 2 TIMES DAILY
Qty: 60 TABLET | Refills: 3 | Status: SHIPPED | OUTPATIENT
Start: 2022-02-02 | End: 2022-03-17

## 2022-02-02 RX ORDER — LANOLIN ALCOHOL/MO/W.PET/CERES
100 CREAM (GRAM) TOPICAL DAILY
Qty: 90 TABLET | Refills: 3 | Status: SHIPPED | OUTPATIENT
Start: 2022-02-03 | End: 2022-03-17

## 2022-02-02 RX ORDER — QUETIAPINE FUMARATE 300 MG/1
600 TABLET, FILM COATED ORAL AT BEDTIME
Qty: 60 TABLET | Refills: 3 | Status: SHIPPED | OUTPATIENT
Start: 2022-02-02 | End: 2022-03-01

## 2022-02-02 RX ORDER — NAPROXEN 500 MG/1
500 TABLET ORAL 2 TIMES DAILY WITH MEALS
Qty: 60 TABLET | Refills: 3 | Status: SHIPPED | OUTPATIENT
Start: 2022-02-02

## 2022-02-02 RX ORDER — LAMOTRIGINE 25 MG/1
50 TABLET ORAL DAILY
Qty: 60 TABLET | Refills: 3 | Status: SHIPPED | OUTPATIENT
Start: 2022-02-03 | End: 2022-03-01

## 2022-02-02 RX ORDER — MULTIPLE VITAMINS W/ MINERALS TAB 9MG-400MCG
1 TAB ORAL DAILY
Qty: 90 TABLET | Refills: 1 | Status: SHIPPED | OUTPATIENT
Start: 2022-02-02

## 2022-02-02 RX ORDER — ALBUTEROL SULFATE 90 UG/1
2 AEROSOL, METERED RESPIRATORY (INHALATION) EVERY 4 HOURS PRN
Qty: 18 G | Refills: 3 | Status: SHIPPED | OUTPATIENT
Start: 2022-02-02

## 2022-02-02 RX ORDER — PREGABALIN 300 MG/1
300 CAPSULE ORAL 3 TIMES DAILY
Qty: 90 CAPSULE | Refills: 0 | Status: SHIPPED | OUTPATIENT
Start: 2022-02-02

## 2022-02-02 RX ADMIN — OXYCODONE HYDROCHLORIDE 20 MG: 10 TABLET ORAL at 21:04

## 2022-02-02 RX ADMIN — OXYCODONE HYDROCHLORIDE 20 MG: 10 TABLET ORAL at 09:44

## 2022-02-02 RX ADMIN — LIOTHYRONINE SODIUM 25 MCG: 25 TABLET ORAL at 08:12

## 2022-02-02 RX ADMIN — OXYCODONE HYDROCHLORIDE 20 MG: 10 TABLET ORAL at 13:17

## 2022-02-02 RX ADMIN — FOLIC ACID 1 MG: 1 TABLET ORAL at 08:12

## 2022-02-02 RX ADMIN — NICOTINE POLACRILEX 8 MG: 4 GUM, CHEWING ORAL at 21:04

## 2022-02-02 RX ADMIN — TOPIRAMATE 100 MG: 100 TABLET, FILM COATED ORAL at 08:12

## 2022-02-02 RX ADMIN — PREGABALIN 300 MG: 100 CAPSULE ORAL at 08:12

## 2022-02-02 RX ADMIN — NICOTINE POLACRILEX 8 MG: 4 GUM, CHEWING ORAL at 14:11

## 2022-02-02 RX ADMIN — OXYCODONE HYDROCHLORIDE 20 MG: 10 TABLET ORAL at 16:02

## 2022-02-02 RX ADMIN — NICOTINE POLACRILEX 8 MG: 4 GUM, CHEWING ORAL at 16:02

## 2022-02-02 RX ADMIN — MULTIPLE VITAMINS W/ MINERALS TAB 1 TABLET: TAB at 08:12

## 2022-02-02 RX ADMIN — PREGABALIN 300 MG: 100 CAPSULE ORAL at 14:11

## 2022-02-02 RX ADMIN — NICOTINE POLACRILEX 8 MG: 4 GUM, CHEWING ORAL at 20:00

## 2022-02-02 RX ADMIN — NAPROXEN 500 MG: 500 TABLET ORAL at 08:12

## 2022-02-02 RX ADMIN — PROPRANOLOL HYDROCHLORIDE 20 MG: 20 TABLET ORAL at 20:00

## 2022-02-02 RX ADMIN — NICOTINE POLACRILEX 8 MG: 4 GUM, CHEWING ORAL at 21:59

## 2022-02-02 RX ADMIN — NAPROXEN 500 MG: 500 TABLET ORAL at 17:20

## 2022-02-02 RX ADMIN — PANTOPRAZOLE SODIUM 40 MG: 40 TABLET, DELAYED RELEASE ORAL at 06:31

## 2022-02-02 RX ADMIN — THIAMINE HCL TAB 100 MG 100 MG: 100 TAB at 08:12

## 2022-02-02 RX ADMIN — PREGABALIN 300 MG: 100 CAPSULE ORAL at 20:00

## 2022-02-02 RX ADMIN — LAMOTRIGINE 50 MG: 25 TABLET ORAL at 08:12

## 2022-02-02 RX ADMIN — ACAMPROSATE CALCIUM 666 MG: 333 TABLET, DELAYED RELEASE ORAL at 20:00

## 2022-02-02 RX ADMIN — ACAMPROSATE CALCIUM 666 MG: 333 TABLET, DELAYED RELEASE ORAL at 14:12

## 2022-02-02 RX ADMIN — TOPIRAMATE 100 MG: 100 TABLET, FILM COATED ORAL at 20:00

## 2022-02-02 RX ADMIN — NICOTINE POLACRILEX 8 MG: 4 GUM, CHEWING ORAL at 06:31

## 2022-02-02 RX ADMIN — QUETIAPINE 600 MG: 300 TABLET, FILM COATED ORAL at 21:59

## 2022-02-02 RX ADMIN — NICOTINE POLACRILEX 8 MG: 4 GUM, CHEWING ORAL at 17:20

## 2022-02-02 RX ADMIN — OXYCODONE HYDROCHLORIDE 20 MG: 10 TABLET ORAL at 06:31

## 2022-02-02 RX ADMIN — ACAMPROSATE CALCIUM 666 MG: 333 TABLET, DELAYED RELEASE ORAL at 08:11

## 2022-02-02 RX ADMIN — LEVOTHYROXINE SODIUM 75 MCG: 75 TABLET ORAL at 06:31

## 2022-02-02 RX ADMIN — NICOTINE POLACRILEX 8 MG: 4 GUM, CHEWING ORAL at 12:30

## 2022-02-02 RX ADMIN — NICOTINE POLACRILEX 8 MG: 4 GUM, CHEWING ORAL at 09:44

## 2022-02-02 RX ADMIN — NICOTINE POLACRILEX 8 MG: 4 GUM, CHEWING ORAL at 08:12

## 2022-02-02 RX ADMIN — PRAZOSIN HYDROCHLORIDE 1 MG: 1 CAPSULE ORAL at 22:00

## 2022-02-02 ASSESSMENT — ACTIVITIES OF DAILY LIVING (ADL)
ORAL_HYGIENE: INDEPENDENT
LAUNDRY: WITH SUPERVISION
ORAL_HYGIENE: INDEPENDENT
DRESS: SCRUBS (BEHAVIORAL HEALTH)
HYGIENE/GROOMING: INDEPENDENT
HYGIENE/GROOMING: INDEPENDENT
LAUNDRY: WITH SUPERVISION
DRESS: SCRUBS (BEHAVIORAL HEALTH)

## 2022-02-02 NOTE — PLAN OF CARE
Problem: Mood Impairment (Depressive Signs/Symptoms)  Goal: Improved Mood Symptoms (Depressive Signs/Symptoms)  Outcome: Improving  Flowsheets (Taken 2/1/2022 1904)  Mutually Determined Action Steps (Improved Mood Symptoms):    verbalizes increased insight    identifies thought distortion  Intervention: Promote Mood Improvement  Recent Flowsheet Documentation  Taken 2/1/2022 1845 by Jmamie Vail RN  Diversional Activity: television     Pt presents with blunted, flat affect and calm mood. Denies SI/SIB and hallucinations. Rates depression 5/10 and anxiety 7/10. Pt spent a lot of the shift in his room reading. Pt reports that his day is somewhat better than yesterday, he is just worried about his housing situation. Did come out for meals. Pt continues to complain of R knee pain which is constant, aching, and stabbing - rates it 8/10 and is adequately controlled with scheduled pain medications. VSS. Medication compliant, PRN nicotine given as requested. Appetite and fluid intake adequate. Pt was noted to have used the string from face masks tied together to make a necklace for his glasses, this was removed. No other concerns or complaints noted.

## 2022-02-02 NOTE — PLAN OF CARE
Problem: Mood Impairment (Depressive Signs/Symptoms)  Goal: Improved Mood Symptoms (Depressive Signs/Symptoms)  Outcome: Improving     Problem: Sleep Disturbance (Depressive Signs/Symptoms)  Goal: Improved Sleep (Depressive Signs/Symptoms)  Outcome: Improving     Slept well tonight for 6.5 hours  Using a medical bed to keep foot of bed elevated, helps ease leg pain.  Continued to report right knee pain, scheduled Oxycodone given and 8 mg of Nicorette gum given w/ his morning medications.  Calm and appreciative to staff.

## 2022-02-02 NOTE — PROGRESS NOTES
SPIRITUAL HEALTH SERVICES  SPIRITUAL ASSESSMENT Progress Note  [facility] ([facility] 3[facility]     REFERRAL SOURCE: follow up  Provided sci-fi fantasy book for Jose M to read/borrow. Facilitated reflective conversation while playing cards.  Jose M is upbeat today and stated appreciation for the book, spoke again about a new option being explored for housing, and that he is purposeful about coming out to the lounge and interacting with others.    Liss Kerns MDiv  Associate   Pager [phone]  Office [phone]  [facility] remains available 24/7 for emergent requests/referrals, either by having the switchboard page the on-call  or by entering an ASAP/STAT consult in Epic (this will also page the on-call ). Routine Epic consults receive an initial response within 24 hours.

## 2022-02-02 NOTE — PROGRESS NOTES
Patient seen, chart reviewed, care discussed with staff.  Blood pressure 104/67, pulse 76, temperature 97.6  F (36.4  C), temperature source Temporal, resp. rate 16, weight 137.9 kg (304 lb 1.6 oz), SpO2 98 %.    Rates anxiety and depression as 8/10 and 7/10 this am.  Slept 6.25 hours.    General appearance: good  Alert.   Affect: fair  Mood: fair    Speech:  normal.   Eye contact:  good.    Psychomotor behavior: normal  Gait: steady with walker  Abnormal movements:  none  Delusions: none  Hallucinations:  none  Thoughts: logical  Associations: intact  Judgement: good  Insight: good  Cognitions: intact in conversation  Memory:  intact in conversation  Orientation: normal    Not suicidal.    Pursuing options for safe discharge.  Opiate use until surgery discussed.    Imp:  Bipolar depressed, severe, without psychosis  2.  TBI  3.  PTSD  4.  Knee pain  Plus:  Patient Active Problem List   Diagnosis     Post concussive encephalopathy     H/O shoulder surgery     Alcoholism in remission (H)     Bilateral ACL tear     Chronic back pain     Social anxiety disorder     Alcohol withdrawal syndrome with complication, with unspecified complication (H)     Rib fracture     Alcohol withdrawal (H)     Alcohol dependence (H)     LFT elevation     Laceration of left hand without foreign body, initial encounter     Suicidal ideation     Intentional drug overdose, initial encounter (H)     Alcoholic intoxication with complication (H)     Severe bipolar I disorder, current or most recent episode depressed, with mixed features (H)     Medication overdose, intentional self-harm, subsequent encounter     Opioid dependence on agonist therapy (H)     2019 novel coronavirus disease (COVID-19)     Pneumonia due to COVID-19 virus     Bipolar disorder (H)     Plan:  1.  Lamictal increase today to 50mg    Current Facility-Administered Medications   Medication     acamprosate (CAMPRAL) EC tablet 666 mg     acetaminophen (TYLENOL) tablet 975 mg      albuterol (PROVENTIL HFA/VENTOLIN HFA) inhaler     alum & mag hydroxide-simethicone (MAALOX) suspension 30 mL     amLODIPine (NORVASC) tablet 5 mg     docusate sodium (COLACE) capsule 100 mg     folic acid (FOLVITE) tablet 1 mg     ketamine 5%-gabapentin 8% in PLO cream 0.25 g     lamoTRIgine (LaMICtal) tablet 50 mg     levothyroxine (SYNTHROID/LEVOTHROID) tablet 75 mcg     liothyronine (CYTOMEL) tablet 25 mcg     loperamide (IMODIUM) capsule 2 mg     melatonin tablet 3 mg     multivitamin w/minerals (THERA-VIT-M) tablet 1 tablet     naloxone (NARCAN) injection 0.2 mg    Or     naloxone (NARCAN) injection 0.4 mg    Or     naloxone (NARCAN) injection 0.2 mg    Or     naloxone (NARCAN) injection 0.4 mg     naproxen (NAPROSYN) tablet 500 mg     nicotine polacrilex (NICORETTE) gum 4-8 mg     OLANZapine (zyPREXA) tablet 10 mg    Or     OLANZapine (zyPREXA) injection 10 mg     oxyCODONE IR (ROXICODONE) tablet 20 mg     pantoprazole (PROTONIX) EC tablet 40 mg     prazosin (MINIPRESS) capsule 1 mg     pregabalin (LYRICA) capsule 300 mg     propranolol (INDERAL) tablet 20 mg     QUEtiapine (SEROquel) tablet 600 mg     sennosides (SENOKOT) tablet 1-2 tablet     thiamine (B-1) tablet 100 mg     tiZANidine (ZANAFLEX) tablet 4 mg     topiramate (TOPAMAX) tablet 100 mg     No results found for this or any previous visit (from the past 168 hour(s)).

## 2022-02-02 NOTE — PLAN OF CARE
Problem: Additional Goals  Goal: BEH OT Goal 1  Description: Will attend OT groups improve concentration and motivation by engaging in more challenging and success oriented options while also improving insight with comments/answers made about mental health needs.    Pt attended 2 out of 2 OT groups offered. Pt actively participated in occupational therapy clinic with 5-6 patients total x110 minutes. Pt was able to ask for assistance as needed, and independently return to a multi-step, goal directed task. Pt demonstrated excellent focus, sequencing, planning, problem solving, and attention to detail. Very organized in his task approach. Social with peers and writer. He continues to be calm, pleasant, cooperative, and engaged throughout all groups. Expressed appreciation for OT clinic groups.

## 2022-02-02 NOTE — PLAN OF CARE
Problem: Activity and Energy Impairment (Depressive Signs/Symptoms)  Goal: Optimized Energy Level (Depressive Signs/Symptoms)  Outcome: No Change  Flowsheets (Taken 2/2/2022 1014)  Mutually Determined Action Steps (Optimized Energy Level):   grooms self without prompting   dresses/ready for morning activity  Intervention: Optimize Energy Level  Recent Flowsheet Documentation  Taken 2/2/2022 1008 by Sivan Ewing RN  Activity (Behavioral Health): up ad kathleen     Problem: Mood Impairment (Depressive Signs/Symptoms)  Goal: Improved Mood Symptoms (Depressive Signs/Symptoms)  Outcome: No Change  Flowsheets (Taken 2/2/2022 1014)  Mutually Determined Action Steps (Improved Mood Symptoms): engages in physical activity     Problem: Psychomotor Impairment (Depressive Signs/Symptoms)  Goal: Improved Psychomotor Symptoms (Depressive Signs/Symptoms)  Outcome: No Change  Flowsheets (Taken 2/2/2022 1014)  Mutually Determined Action Steps (Improved Psychomotor Symptoms):   adheres to medication regimen   exhibits decrease in agitation  Intervention: Manage Psychomotor Movement  Recent Flowsheet Documentation  Taken 2/2/2022 1008 by Sivan Ewing RN  Activity (Behavioral Health): up ad kathleen   Pt pleasant and appreciative with cares. Pt rated depression at 7/10 and anxiety at 8/10 this AM. Pt denied SI/SIB. Pt's affect is flat with appropriate eye contact. Pt speech is clear and coherent but he does tend to ramble at times. Pt reported that he did not sleep as well last night as he was thinking about his conversation with [facility] about the possibility of trying to move into a AL. Pt is not opposed to this but is concerned about his ability to come and go as he wants, being able to cook his own meals, having friends over etc.   Pt's inderal and Norvasc were held again this AM as his blood pressure did not meet parameters (104/67 with a pulse of 88 sitting and standing 98/60 with a pulse of 89). Pt declined colace.   Pt continues to  use a medical bed due to chronic back, knee/leg pain.

## 2022-02-02 NOTE — PLAN OF CARE
Activities :  -Met with pt and discussed his care   -Rounded with team  -Sent additional paperwork to assisted living facility.   -Called and left a message for pt's CADI  [phone]. Updated her on pt's status and requested her to call back for additional coordination of care.     Addressed patient needs/concerns: Reviewed possible options for disposition, when he is stabilized.      Discharge Plan or Goal   Plan  New referrals made to assisted living facility.  Commitment  NA  Health Care Follow up Appointment:    Will need psych, therapy, pcp, and  mental michael cm  Medication Management Plan:  See providers notes  Housing:  [facility] #[phone]  Financial Follow ups:  He reports he has GA   SMRTed for SSDI benefits.  Care Team     Jr Giron MD  -[facility] #[phone]    CADI Waiver CM: [facility][phone]  ? Arlin- supervisor for cadi cm [phone]    Joseph Oliveros's supervisor # [phone]  ? Admin #[phone]

## 2022-02-03 PROCEDURE — 250N000013 HC RX MED GY IP 250 OP 250 PS 637: Performed by: NURSE PRACTITIONER

## 2022-02-03 PROCEDURE — 250N000013 HC RX MED GY IP 250 OP 250 PS 637: Performed by: PSYCHIATRY & NEUROLOGY

## 2022-02-03 PROCEDURE — G0177 OPPS/PHP; TRAIN & EDUC SERV: HCPCS

## 2022-02-03 PROCEDURE — 124N000003 HC R&B MH SENIOR/ADOLESCENT

## 2022-02-03 RX ADMIN — OXYCODONE HYDROCHLORIDE 20 MG: 10 TABLET ORAL at 16:17

## 2022-02-03 RX ADMIN — NICOTINE POLACRILEX 8 MG: 4 GUM, CHEWING ORAL at 08:12

## 2022-02-03 RX ADMIN — ACETAMINOPHEN 975 MG: 325 TABLET, FILM COATED ORAL at 14:14

## 2022-02-03 RX ADMIN — PANTOPRAZOLE SODIUM 40 MG: 40 TABLET, DELAYED RELEASE ORAL at 06:00

## 2022-02-03 RX ADMIN — ACETAMINOPHEN 975 MG: 325 TABLET, FILM COATED ORAL at 22:22

## 2022-02-03 RX ADMIN — NICOTINE POLACRILEX 8 MG: 4 GUM, CHEWING ORAL at 11:23

## 2022-02-03 RX ADMIN — MULTIPLE VITAMINS W/ MINERALS TAB 1 TABLET: TAB at 08:13

## 2022-02-03 RX ADMIN — LIOTHYRONINE SODIUM 25 MCG: 25 TABLET ORAL at 08:13

## 2022-02-03 RX ADMIN — ACAMPROSATE CALCIUM 666 MG: 333 TABLET, DELAYED RELEASE ORAL at 14:14

## 2022-02-03 RX ADMIN — ACAMPROSATE CALCIUM 666 MG: 333 TABLET, DELAYED RELEASE ORAL at 08:12

## 2022-02-03 RX ADMIN — QUETIAPINE 600 MG: 300 TABLET, FILM COATED ORAL at 22:21

## 2022-02-03 RX ADMIN — DOCUSATE SODIUM 100 MG: 100 CAPSULE, LIQUID FILLED ORAL at 14:14

## 2022-02-03 RX ADMIN — FOLIC ACID 1 MG: 1 TABLET ORAL at 08:14

## 2022-02-03 RX ADMIN — NICOTINE POLACRILEX 8 MG: 4 GUM, CHEWING ORAL at 13:12

## 2022-02-03 RX ADMIN — NICOTINE POLACRILEX 8 MG: 4 GUM, CHEWING ORAL at 19:56

## 2022-02-03 RX ADMIN — PROPRANOLOL HYDROCHLORIDE 20 MG: 20 TABLET ORAL at 19:57

## 2022-02-03 RX ADMIN — NICOTINE POLACRILEX 8 MG: 4 GUM, CHEWING ORAL at 06:00

## 2022-02-03 RX ADMIN — OXYCODONE HYDROCHLORIDE 20 MG: 10 TABLET ORAL at 19:57

## 2022-02-03 RX ADMIN — LEVOTHYROXINE SODIUM 75 MCG: 75 TABLET ORAL at 06:00

## 2022-02-03 RX ADMIN — NAPROXEN 500 MG: 500 TABLET ORAL at 08:14

## 2022-02-03 RX ADMIN — NAPROXEN 500 MG: 500 TABLET ORAL at 17:32

## 2022-02-03 RX ADMIN — OXYCODONE HYDROCHLORIDE 20 MG: 10 TABLET ORAL at 06:00

## 2022-02-03 RX ADMIN — AMLODIPINE BESYLATE 5 MG: 5 TABLET ORAL at 08:13

## 2022-02-03 RX ADMIN — NICOTINE POLACRILEX 8 MG: 4 GUM, CHEWING ORAL at 14:15

## 2022-02-03 RX ADMIN — PREGABALIN 300 MG: 100 CAPSULE ORAL at 08:13

## 2022-02-03 RX ADMIN — DOCUSATE SODIUM 100 MG: 100 CAPSULE, LIQUID FILLED ORAL at 19:57

## 2022-02-03 RX ADMIN — THIAMINE HCL TAB 100 MG 100 MG: 100 TAB at 08:13

## 2022-02-03 RX ADMIN — ACAMPROSATE CALCIUM 666 MG: 333 TABLET, DELAYED RELEASE ORAL at 19:59

## 2022-02-03 RX ADMIN — NICOTINE POLACRILEX 8 MG: 4 GUM, CHEWING ORAL at 16:48

## 2022-02-03 RX ADMIN — NICOTINE POLACRILEX 8 MG: 4 GUM, CHEWING ORAL at 21:22

## 2022-02-03 RX ADMIN — PREGABALIN 300 MG: 100 CAPSULE ORAL at 14:14

## 2022-02-03 RX ADMIN — NICOTINE POLACRILEX 8 MG: 4 GUM, CHEWING ORAL at 22:21

## 2022-02-03 RX ADMIN — OXYCODONE HYDROCHLORIDE 20 MG: 10 TABLET ORAL at 09:01

## 2022-02-03 RX ADMIN — PREGABALIN 300 MG: 100 CAPSULE ORAL at 19:57

## 2022-02-03 RX ADMIN — LAMOTRIGINE 50 MG: 25 TABLET ORAL at 08:13

## 2022-02-03 RX ADMIN — OXYCODONE HYDROCHLORIDE 20 MG: 10 TABLET ORAL at 13:14

## 2022-02-03 RX ADMIN — TOPIRAMATE 100 MG: 100 TABLET, FILM COATED ORAL at 08:13

## 2022-02-03 RX ADMIN — PRAZOSIN HYDROCHLORIDE 1 MG: 1 CAPSULE ORAL at 22:21

## 2022-02-03 RX ADMIN — NICOTINE POLACRILEX 8 MG: 4 GUM, CHEWING ORAL at 09:01

## 2022-02-03 RX ADMIN — TOPIRAMATE 100 MG: 100 TABLET, FILM COATED ORAL at 19:57

## 2022-02-03 ASSESSMENT — ACTIVITIES OF DAILY LIVING (ADL)
LAUNDRY: UNABLE TO COMPLETE
ORAL_HYGIENE: INDEPENDENT
DRESS: INDEPENDENT
ORAL_HYGIENE: INDEPENDENT
HYGIENE/GROOMING: INDEPENDENT
LAUNDRY: UNABLE TO COMPLETE
DRESS: INDEPENDENT
HYGIENE/GROOMING: INDEPENDENT

## 2022-02-03 NOTE — PLAN OF CARE
SI, SIB, HI: denies     Current Mental Health Symptoms: Pt states his depression and anxiety are situational to the stress of not knowing where he will live. Pt rates depression 6, anxiety 7. He attends groups, has good hygiene, excellent appetite. He reports trying to eat more protein and less carbs. He has gained 8 lbs since admission on 1.8.22. cooperative with cares.     Substance & Withdrawal: completed alcohol withdrawal     Interventions:   Utilizing knee immobilizer.     PRNs: nicotine gum for effective withdrawal   1400 tylenol 650 mg po for pain- slightly effective    Self Care: independent         Hygiene- adequate   Appetite- good   Sleep- up all shift    Bowels- no c/o- colace given   Social- appropriate    Ambulation- steady with his walker and knee immobilizer     Medical Issues: BP 96/58- Held amlodipine and am inderal.     Consults: c/o antibx did not help with bursitis on L elbow. Request for another ortho consult.     Medical Bed: Due to chronic pain in L elbow and R knee and need to use side rails for bed mobility.     Precautions: fall, suicide, and seizure (related to hx withdrawal).    Follow Up Recommendations:      Problem: Mood Impairment (Depressive Signs/Symptoms)  Goal: Improved Mood Symptoms (Depressive Signs/Symptoms)  Outcome: Improving

## 2022-02-03 NOTE — PLAN OF CARE
Problem: Sleep Disturbance (Depressive Signs/Symptoms)  Goal: Improved Sleep (Depressive Signs/Symptoms)  Outcome: No Change     Slept well for 7.75 hours  Continued to endorse right knee pain, scheduled Oxycodone 10 mg administered.  Up and about at 0615 wearing his knee brace and ambulated with a wheeled walker.  Calm and pleasant.

## 2022-02-03 NOTE — PROGRESS NOTES
Orthopaedic Surgery Progress Note    Patient seen at bedside to evaluate left elbow swelling.     Per chart review, patient is a 51 year old male with right knee osteoarthritis and a history of a left elbow I&D in [place] 2-3 years ago for septic olecranon bursitis, who is currently admitted to Psychiatry for severe bipolar depression. The patient was seen by Ortho on 1/18/22 for left olecranon bursitis. At that time, exam was reassuring and concern for septic bursitis was low. Ice, compression, elevation, and anti-inflammatories were recommended. The primary team initiated the patient on keflex on 1/22/22 due to concern for septic olecranon bursitis, and this was continued until 2/1/22. Per the primary team, the patient has not improved on the keflex.    The patient currently reports swelling in the left elbow. He also states it is mildly painful. He states he has been wearing a compressive ace wrap over the left elbow.    On exam of the left upper extremity, the skin is intact without significant erythema or induration. There is a fluctuant region over the olecranon that is mildly tender to palpation. Elbow AROM: 10 degrees shy of full extension to 130 degrees of flexion without significant pain. Able to fire EPL, FPL, intrinsics. SILT to median/radial/ulnar nerves. Digits warm and well perfused.    Clinical examination this time is consistent with nonseptic olecranon bursitis. We discussed that nonseptic olecranon bursitis can take several weeks to even months to resolve. Recommend continued compression of the left elbow with an ace wrap, avoiding direct pressure on the left elbow (consider elbow pads following discharge), ice, elevation, anti-inflammatories. We discussed that we could place the patient into a long arm splint if desired to allow for soft tissue rest. The patient is not interested in undergoing splinting at this time. Do not feel there is any indication for antibiotics at this time. Patient may  follow up with PCP.    Ashleigh Foster MD  Orthopaedic Surgery PGY-4

## 2022-02-03 NOTE — PLAN OF CARE
Problem: Decreased Participation and Engagement (Depressive Signs/Symptoms)  Goal: Increased Participation and Engagement (Depressive Signs/Symptoms)  Outcome: Improving  Intervention: Facilitate Participation and Engagement  Recent Flowsheet Documentation  Taken 2/2/2022 1703 by Jammie Vail RN  Diversional Activity: reading       Pt presents with blunted, flat affect and calm mood. Denies SI/SIB and hallucinations. Rates depression 6/10 and anxiety 7/10. Continues to endorse high anxiety/stress around housing and continued hospitalization. Present in the lounge and social with peers but did spend a lot of time reading this evening. Pt reports enjoying reading as it is a distraction from negative thoughts and makes the time go by. He continues to endorse chronic R knee pain which he rates 7/10 and describes as constant, aching, and stabbing. This is adequately controlled with pt scheduled pain medications. Appetite and fluid intake adequate. VSS. Medication compliant, PRN nicotine gum given as requested. No other concerns or complaints noted this shift.

## 2022-02-03 NOTE — PLAN OF CARE
Activities :  -Met with pt and discussed his care   -Rounded with team  -Sent additional paperwork to assisted living facility - a new one, today.   -Still awaiting a return [phone]      Addressed patient needs/concerns: Reviewed possible options for disposition, when he is stabilized.      Discharge Plan or Goal   Plan  New referrals made to assisted living facility. The new assisted living facility is currently reviewing the pt's paperwork.  Commitment  NA  Health Care Follow up Appointment:    Will need psych, therapy, pcp, and  mental michael cm  Medication Management Plan:  See providers notes  Housing:  [facility] #[phone]  Financial Follow ups:  He reports he has GA   SMRTed for SSDI benefits.  Care Team     Jr Girno MD  -[facility] #[phone]    CADI Waiver CM: [facility][phone]  ? Arlin- supervisor for cadi cm [phone]    Joseph Oliveros's supervisor # [phone]  ? Admin #[phone]

## 2022-02-03 NOTE — PLAN OF CARE
Problem: Additional Goals  Goal: BEH OT Goal 1  Description: Will attend OT groups improve concentration and motivation by engaging in more challenging and success oriented options while also improving insight with comments/answers made about mental health needs.    Pt attended 2 out of 3 OT groups offered. Pt actively participated in occupational therapy clinic with 5-7 patients total x50 minutes. Pt was able to ask for assistance as needed, and independently return to a multi-step, goal-directed task. Pt demonstrated excellent focus, sequencing, planning, problem solving, and attention to detail. Very organized in his task approach. Social with peers and writer, and politely assisted peers at times. Pt actively participated in a structured occupational therapy group of 6 patients total x50 minutes with a focus on facilitating self-esteem via self-reflection questions. Pt shared thoughtful responses regarding past achievements, positive social supports, and hobbies/skills. He expressed pride in his strong problem solving skills, and was appreciative of feedback regarding his willingness to help others. He shared that he has limited support, as his mom used to be his primary social support, but now that she has cancer, he doesn't want to add any stress to her life by sharing his challenges. Calm, pleasant, cooperative, and engaged throughout all groups.

## 2022-02-04 PROCEDURE — 250N000013 HC RX MED GY IP 250 OP 250 PS 637: Performed by: NURSE PRACTITIONER

## 2022-02-04 PROCEDURE — 250N000013 HC RX MED GY IP 250 OP 250 PS 637: Performed by: PSYCHIATRY & NEUROLOGY

## 2022-02-04 PROCEDURE — 124N000003 HC R&B MH SENIOR/ADOLESCENT

## 2022-02-04 RX ADMIN — DOCUSATE SODIUM 100 MG: 100 CAPSULE, LIQUID FILLED ORAL at 08:32

## 2022-02-04 RX ADMIN — PROPRANOLOL HYDROCHLORIDE 20 MG: 20 TABLET ORAL at 19:45

## 2022-02-04 RX ADMIN — MULTIPLE VITAMINS W/ MINERALS TAB 1 TABLET: TAB at 08:31

## 2022-02-04 RX ADMIN — NICOTINE POLACRILEX 8 MG: 4 GUM, CHEWING ORAL at 17:47

## 2022-02-04 RX ADMIN — QUETIAPINE 600 MG: 300 TABLET, FILM COATED ORAL at 22:23

## 2022-02-04 RX ADMIN — PRAZOSIN HYDROCHLORIDE 1 MG: 1 CAPSULE ORAL at 22:23

## 2022-02-04 RX ADMIN — OXYCODONE HYDROCHLORIDE 20 MG: 10 TABLET ORAL at 13:08

## 2022-02-04 RX ADMIN — NICOTINE POLACRILEX 8 MG: 4 GUM, CHEWING ORAL at 06:03

## 2022-02-04 RX ADMIN — OXYCODONE HYDROCHLORIDE 20 MG: 10 TABLET ORAL at 06:03

## 2022-02-04 RX ADMIN — FOLIC ACID 1 MG: 1 TABLET ORAL at 08:32

## 2022-02-04 RX ADMIN — NICOTINE POLACRILEX 8 MG: 4 GUM, CHEWING ORAL at 08:32

## 2022-02-04 RX ADMIN — OXYCODONE HYDROCHLORIDE 20 MG: 10 TABLET ORAL at 09:09

## 2022-02-04 RX ADMIN — ACAMPROSATE CALCIUM 666 MG: 333 TABLET, DELAYED RELEASE ORAL at 08:31

## 2022-02-04 RX ADMIN — TOPIRAMATE 100 MG: 100 TABLET, FILM COATED ORAL at 19:45

## 2022-02-04 RX ADMIN — DOCUSATE SODIUM 100 MG: 100 CAPSULE, LIQUID FILLED ORAL at 19:45

## 2022-02-04 RX ADMIN — THIAMINE HCL TAB 100 MG 100 MG: 100 TAB at 08:32

## 2022-02-04 RX ADMIN — NAPROXEN 500 MG: 500 TABLET ORAL at 08:32

## 2022-02-04 RX ADMIN — OXYCODONE HYDROCHLORIDE 20 MG: 10 TABLET ORAL at 20:54

## 2022-02-04 RX ADMIN — LIOTHYRONINE SODIUM 25 MCG: 25 TABLET ORAL at 08:32

## 2022-02-04 RX ADMIN — PREGABALIN 300 MG: 100 CAPSULE ORAL at 08:32

## 2022-02-04 RX ADMIN — LAMOTRIGINE 50 MG: 25 TABLET ORAL at 08:32

## 2022-02-04 RX ADMIN — AMLODIPINE BESYLATE 5 MG: 5 TABLET ORAL at 08:32

## 2022-02-04 RX ADMIN — NICOTINE POLACRILEX 8 MG: 4 GUM, CHEWING ORAL at 20:54

## 2022-02-04 RX ADMIN — NICOTINE POLACRILEX 8 MG: 4 GUM, CHEWING ORAL at 16:21

## 2022-02-04 RX ADMIN — PREGABALIN 300 MG: 100 CAPSULE ORAL at 19:43

## 2022-02-04 RX ADMIN — PROPRANOLOL HYDROCHLORIDE 20 MG: 20 TABLET ORAL at 08:31

## 2022-02-04 RX ADMIN — NICOTINE POLACRILEX 8 MG: 4 GUM, CHEWING ORAL at 13:08

## 2022-02-04 RX ADMIN — NICOTINE POLACRILEX 8 MG: 4 GUM, CHEWING ORAL at 19:43

## 2022-02-04 RX ADMIN — TOPIRAMATE 100 MG: 100 TABLET, FILM COATED ORAL at 08:31

## 2022-02-04 RX ADMIN — ACAMPROSATE CALCIUM 666 MG: 333 TABLET, DELAYED RELEASE ORAL at 19:43

## 2022-02-04 RX ADMIN — NAPROXEN 500 MG: 500 TABLET ORAL at 17:50

## 2022-02-04 RX ADMIN — NICOTINE POLACRILEX 8 MG: 4 GUM, CHEWING ORAL at 22:25

## 2022-02-04 RX ADMIN — OXYCODONE HYDROCHLORIDE 20 MG: 10 TABLET ORAL at 16:21

## 2022-02-04 RX ADMIN — LEVOTHYROXINE SODIUM 75 MCG: 75 TABLET ORAL at 06:03

## 2022-02-04 RX ADMIN — PANTOPRAZOLE SODIUM 40 MG: 40 TABLET, DELAYED RELEASE ORAL at 06:03

## 2022-02-04 ASSESSMENT — ACTIVITIES OF DAILY LIVING (ADL)
DRESS: INDEPENDENT
HYGIENE/GROOMING: INDEPENDENT
DRESS: INDEPENDENT
LAUNDRY: UNABLE TO COMPLETE
ORAL_HYGIENE: INDEPENDENT
ORAL_HYGIENE: INDEPENDENT
HYGIENE/GROOMING: INDEPENDENT

## 2022-02-04 NOTE — PLAN OF CARE
Problem: Mood Impairment (Depressive Signs/Symptoms)  Goal: Improved Mood Symptoms (Depressive Signs/Symptoms)  Outcome: No Change     Slept well for 6.5 hours  [place] for his morning medications, med compliant.  ACE wrap was loose, offered to reapply it but pt became irritable and did not want it done.  Continued to endorse right knee pain, scheduled medication given.

## 2022-02-04 NOTE — PLAN OF CARE
Patient had been napping and lyrica and campral doses missed at 1400.  Admits to chronic fleeting depression, high anxiety (7-8/10), denies SI or the wish to be dead at this time.   Calm, pleasant, cooperative.  Refuses to wear ace wrap on elbow today.  No other concerns or complaints.

## 2022-02-04 NOTE — PLAN OF CARE
Problem: Social, Occupational or Functional Impairment (Depressive Signs/Symptoms)  Goal: Enhanced Social, Occupational or Functional Skills (Depressive Signs/Symptoms)  Intervention: Promote Social, Occupational and Functional Ability  Recent Flowsheet Documentation  Taken 2/3/2022 2100 by Preston Bustamante RN  Trust Relationship/Rapport:   care explained   choices provided   emotional support provided   empathic listening provided   questions answered   questions encouraged   reassurance provided   thoughts/feelings acknowledged    The patient was visible on the unit and interacted appropriately with peers and staff. He had a bright affect and was calm and pleasant. The patient denied all mental health symptoms, was medication and meals compliant, read a book, and watched movies with peers.

## 2022-02-04 NOTE — PLAN OF CARE
Activities :  -Met with pt and discussed his care   -Rounded with team  -Verified paperwork was received by [facility] assisted living. Pt's paperwork is currently being reviewed at this time.  -Still awaiting a return call [facility] pt[facility] CAD[phone].      Addressed patient needs/concerns: Reviewed possible options for disposition, when he is stabilized.      Discharge Plan or Goal   Plan  New referrals made to assisted living facility. The new assisted living facility is currently reviewing the pt's paperwork.  Commitment  NA  Health Care Follow up Appointment:    Will need psych, therapy, pcp, and  mental michael cm  Medication Management Plan:  See providers notes  Housing:  [facility] #[phone]  Financial Follow ups:  He reports he has GA   SMRTed for SSDI benefits.  Care Team     Jr Giron MD  -[facility] #[phone]    CADI Waiver CM: [facility] #[phone]  ? Arlin- supervisor for cadi cm [phone]    Joseph Oliveros's supervisor # [phone]  ? Admin #[phone]

## 2022-02-04 NOTE — PROGRESS NOTES
Patient seen, chart reviewed, care discussed with staff.  Blood pressure 129/80, pulse 69, temperature 97.3  F (36.3  C), temperature source Oral, resp. rate 16, weight 137.3 kg (302 lb 12.8 oz), SpO2 100 %.    Rates depression 6/10, anxiety 7/10.  Slept well.    General appearance: good  Alert.   Affect: good  Mood: good  Speech:  normal.   Eye contact:  good.    Psychomotor behavior: normal  Gait: steady with walker  Abnormal movements:  none  Delusions: none  Hallucinations:  none  Thoughts: logical  Associations: intact  Judgement: good  Insight: good  Cognitions: intact in conversation, has had them affected by TBI  Memory:  intact in conversation, affected by TBI  Orientation: normal    Not suicidal.    Patient Active Problem List   Diagnosis     Post concussive encephalopathy     H/O shoulder surgery     Alcoholism in remission (H)     Bilateral ACL tear     Chronic back pain     Social anxiety disorder     Alcohol withdrawal syndrome with complication, with unspecified complication (H)     Rib fracture     Alcohol withdrawal (H)     Alcohol dependence (H)     LFT elevation     Laceration of left hand without foreign body, initial encounter     Suicidal ideation     Intentional drug overdose, initial encounter (H)     Alcoholic intoxication with complication (H)     Severe bipolar I disorder, current or most recent episode depressed, with mixed features (H)     Medication overdose, intentional self-harm, subsequent encounter     Opioid dependence on agonist therapy (H)     2019 novel coronavirus disease (COVID-19)     Pneumonia due to COVID-19 virus     Bipolar disorder (H)     Improved:  Bipolar depressed  2.  PTSD  3.  S/P TBI    Plan:  Same meds, needs stable OP setting to reduce risk of chemical and mood illness relapse    Current Facility-Administered Medications   Medication     acamprosate (CAMPRAL) EC tablet 666 mg     acetaminophen (TYLENOL) tablet 975 mg     albuterol (PROVENTIL HFA/VENTOLIN HFA)  inhaler     alum & mag hydroxide-simethicone (MAALOX) suspension 30 mL     amLODIPine (NORVASC) tablet 5 mg     docusate sodium (COLACE) capsule 100 mg     folic acid (FOLVITE) tablet 1 mg     ketamine 5%-gabapentin 8% in PLO cream 0.25 g     lamoTRIgine (LaMICtal) tablet 50 mg     levothyroxine (SYNTHROID/LEVOTHROID) tablet 75 mcg     liothyronine (CYTOMEL) tablet 25 mcg     loperamide (IMODIUM) capsule 2 mg     melatonin tablet 3 mg     multivitamin w/minerals (THERA-VIT-M) tablet 1 tablet     naloxone (NARCAN) injection 0.2 mg    Or     naloxone (NARCAN) injection 0.4 mg    Or     naloxone (NARCAN) injection 0.2 mg    Or     naloxone (NARCAN) injection 0.4 mg     naproxen (NAPROSYN) tablet 500 mg     nicotine polacrilex (NICORETTE) gum 4-8 mg     OLANZapine (zyPREXA) tablet 10 mg    Or     OLANZapine (zyPREXA) injection 10 mg     oxyCODONE IR (ROXICODONE) tablet 20 mg     pantoprazole (PROTONIX) EC tablet 40 mg     prazosin (MINIPRESS) capsule 1 mg     pregabalin (LYRICA) capsule 300 mg     propranolol (INDERAL) tablet 20 mg     QUEtiapine (SEROquel) tablet 600 mg     sennosides (SENOKOT) tablet 1-2 tablet     thiamine (B-1) tablet 100 mg     tiZANidine (ZANAFLEX) tablet 4 mg     topiramate (TOPAMAX) tablet 100 mg     No results found for this or any previous visit (from the past 168 hour(s)).

## 2022-02-04 NOTE — PLAN OF CARE
Current Mental Health Symptoms Pt remains unchanged and appears to be at baseline. Pt denies SI. He reports his depression and anxiety are situational and controled. He continues to experience a moderate about of pain, but does believe his meds are helping. Pt plans on remaining medication compliant after discharge. He is absent of any outburst.     Interventions:   Utilizing Right knee immobilizer.   Ace wrap to left arm   Takes oxycodone without other meds, he states it interferers with Campral. Writer cannot find any evidence of such.     PRNs: nicotine gum for effective withdrawal     Medical Bed: Due to chronic pain in L elbow and R knee and need to use side rails for bed mobility.     Problem: Suicidal Behavior  Goal: Suicidal Behavior is Absent or Managed  Outcome: Improving     Problem: Mood Impairment (Depressive Signs/Symptoms)  Goal: Improved Mood Symptoms (Depressive Signs/Symptoms)  Outcome: Improving

## 2022-02-05 PROCEDURE — 250N000013 HC RX MED GY IP 250 OP 250 PS 637: Performed by: PSYCHIATRY & NEUROLOGY

## 2022-02-05 PROCEDURE — 250N000013 HC RX MED GY IP 250 OP 250 PS 637: Performed by: NURSE PRACTITIONER

## 2022-02-05 PROCEDURE — 124N000003 HC R&B MH SENIOR/ADOLESCENT

## 2022-02-05 RX ADMIN — ACAMPROSATE CALCIUM 666 MG: 333 TABLET, DELAYED RELEASE ORAL at 14:17

## 2022-02-05 RX ADMIN — NICOTINE POLACRILEX 8 MG: 4 GUM, CHEWING ORAL at 19:46

## 2022-02-05 RX ADMIN — FOLIC ACID 1 MG: 1 TABLET ORAL at 08:09

## 2022-02-05 RX ADMIN — NAPROXEN 500 MG: 500 TABLET ORAL at 17:24

## 2022-02-05 RX ADMIN — PRAZOSIN HYDROCHLORIDE 1 MG: 1 CAPSULE ORAL at 21:58

## 2022-02-05 RX ADMIN — NAPROXEN 500 MG: 500 TABLET ORAL at 08:08

## 2022-02-05 RX ADMIN — NICOTINE POLACRILEX 8 MG: 4 GUM, CHEWING ORAL at 17:24

## 2022-02-05 RX ADMIN — NICOTINE POLACRILEX 8 MG: 4 GUM, CHEWING ORAL at 13:04

## 2022-02-05 RX ADMIN — ACETAMINOPHEN 975 MG: 325 TABLET, FILM COATED ORAL at 14:17

## 2022-02-05 RX ADMIN — PANTOPRAZOLE SODIUM 40 MG: 40 TABLET, DELAYED RELEASE ORAL at 06:08

## 2022-02-05 RX ADMIN — THIAMINE HCL TAB 100 MG 100 MG: 100 TAB at 08:09

## 2022-02-05 RX ADMIN — NICOTINE POLACRILEX 8 MG: 4 GUM, CHEWING ORAL at 14:18

## 2022-02-05 RX ADMIN — TOPIRAMATE 100 MG: 100 TABLET, FILM COATED ORAL at 08:09

## 2022-02-05 RX ADMIN — NICOTINE POLACRILEX 8 MG: 4 GUM, CHEWING ORAL at 20:59

## 2022-02-05 RX ADMIN — OXYCODONE HYDROCHLORIDE 20 MG: 10 TABLET ORAL at 06:08

## 2022-02-05 RX ADMIN — NICOTINE POLACRILEX 8 MG: 4 GUM, CHEWING ORAL at 08:09

## 2022-02-05 RX ADMIN — PROPRANOLOL HYDROCHLORIDE 20 MG: 20 TABLET ORAL at 08:08

## 2022-02-05 RX ADMIN — QUETIAPINE 600 MG: 300 TABLET, FILM COATED ORAL at 21:58

## 2022-02-05 RX ADMIN — PROPRANOLOL HYDROCHLORIDE 20 MG: 20 TABLET ORAL at 19:48

## 2022-02-05 RX ADMIN — MULTIPLE VITAMINS W/ MINERALS TAB 1 TABLET: TAB at 08:08

## 2022-02-05 RX ADMIN — DOCUSATE SODIUM 100 MG: 100 CAPSULE, LIQUID FILLED ORAL at 08:09

## 2022-02-05 RX ADMIN — NICOTINE POLACRILEX 8 MG: 4 GUM, CHEWING ORAL at 16:19

## 2022-02-05 RX ADMIN — DOCUSATE SODIUM 100 MG: 100 CAPSULE, LIQUID FILLED ORAL at 19:47

## 2022-02-05 RX ADMIN — LAMOTRIGINE 50 MG: 25 TABLET ORAL at 08:09

## 2022-02-05 RX ADMIN — OXYCODONE HYDROCHLORIDE 20 MG: 10 TABLET ORAL at 09:24

## 2022-02-05 RX ADMIN — PREGABALIN 300 MG: 100 CAPSULE ORAL at 14:18

## 2022-02-05 RX ADMIN — TOPIRAMATE 100 MG: 100 TABLET, FILM COATED ORAL at 19:47

## 2022-02-05 RX ADMIN — OXYCODONE HYDROCHLORIDE 20 MG: 10 TABLET ORAL at 13:04

## 2022-02-05 RX ADMIN — ACAMPROSATE CALCIUM 666 MG: 333 TABLET, DELAYED RELEASE ORAL at 19:47

## 2022-02-05 RX ADMIN — PREGABALIN 300 MG: 100 CAPSULE ORAL at 08:08

## 2022-02-05 RX ADMIN — NICOTINE POLACRILEX 8 MG: 4 GUM, CHEWING ORAL at 21:58

## 2022-02-05 RX ADMIN — OXYCODONE HYDROCHLORIDE 20 MG: 10 TABLET ORAL at 20:58

## 2022-02-05 RX ADMIN — ACAMPROSATE CALCIUM 666 MG: 333 TABLET, DELAYED RELEASE ORAL at 08:09

## 2022-02-05 RX ADMIN — AMLODIPINE BESYLATE 5 MG: 5 TABLET ORAL at 08:09

## 2022-02-05 RX ADMIN — LIOTHYRONINE SODIUM 25 MCG: 25 TABLET ORAL at 08:09

## 2022-02-05 RX ADMIN — OXYCODONE HYDROCHLORIDE 20 MG: 10 TABLET ORAL at 16:18

## 2022-02-05 RX ADMIN — NICOTINE POLACRILEX 8 MG: 4 GUM, CHEWING ORAL at 09:24

## 2022-02-05 RX ADMIN — PREGABALIN 300 MG: 100 CAPSULE ORAL at 19:49

## 2022-02-05 RX ADMIN — LEVOTHYROXINE SODIUM 75 MCG: 75 TABLET ORAL at 06:08

## 2022-02-05 ASSESSMENT — ACTIVITIES OF DAILY LIVING (ADL)
DRESS: INDEPENDENT
HYGIENE/GROOMING: INDEPENDENT
HYGIENE/GROOMING: INDEPENDENT
LAUNDRY: UNABLE TO COMPLETE
ORAL_HYGIENE: INDEPENDENT
DRESS: INDEPENDENT
ORAL_HYGIENE: INDEPENDENT

## 2022-02-05 NOTE — PLAN OF CARE
Observations/ Assessment   Pt is blunted with a slight slur in the morning that does improve after breakfast. Today he has remained blunted and isolative. He tends to go to groups, but has remained in bed instead today. His rating of depression and anxiety remains consistent at a moderate level. He denies feeling suicidal. Pain is increased today which he thinks is due to how he slept. Offered Voltaren gel    Interventions:   Utilizing Right knee immobilizer.   Refuses Ace wrap to left arm- states his arm is feeling better  Takes oxycodone on his own schedule, but is within the hour of scheduled time.      PRNs: nicotine gum for effective withdrawal      Medical Bed: Due to chronic pain in L elbow and R knee and need to use side rails for bed mobility.     Problem: Mood Impairment (Depressive Signs/Symptoms)  Goal: Improved Mood Symptoms (Depressive Signs/Symptoms)  Outcome: Improving

## 2022-02-05 NOTE — PLAN OF CARE
Problem: Sleep Disturbance (Depressive Signs/Symptoms)  Goal: Improved Sleep (Depressive Signs/Symptoms)  Outcome: No Change     Patient slept on and off for a total of 4.5 hours. Lower extremities kept elevated while lying in bed. Was awakened at 0600 for his 6am and 7am meds. Patient verbalized that his knee pain still persists and would still need his regularly scheduled Oxycodone.

## 2022-02-06 PROCEDURE — 250N000013 HC RX MED GY IP 250 OP 250 PS 637: Performed by: NURSE PRACTITIONER

## 2022-02-06 PROCEDURE — 250N000013 HC RX MED GY IP 250 OP 250 PS 637: Performed by: PSYCHIATRY & NEUROLOGY

## 2022-02-06 PROCEDURE — 124N000003 HC R&B MH SENIOR/ADOLESCENT

## 2022-02-06 RX ADMIN — OXYCODONE HYDROCHLORIDE 20 MG: 10 TABLET ORAL at 13:07

## 2022-02-06 RX ADMIN — FOLIC ACID 1 MG: 1 TABLET ORAL at 08:15

## 2022-02-06 RX ADMIN — LAMOTRIGINE 50 MG: 25 TABLET ORAL at 08:16

## 2022-02-06 RX ADMIN — NICOTINE POLACRILEX 8 MG: 4 GUM, CHEWING ORAL at 17:31

## 2022-02-06 RX ADMIN — DOCUSATE SODIUM 100 MG: 100 CAPSULE, LIQUID FILLED ORAL at 08:15

## 2022-02-06 RX ADMIN — OXYCODONE HYDROCHLORIDE 20 MG: 10 TABLET ORAL at 16:21

## 2022-02-06 RX ADMIN — TOPIRAMATE 100 MG: 100 TABLET, FILM COATED ORAL at 20:08

## 2022-02-06 RX ADMIN — LIOTHYRONINE SODIUM 25 MCG: 25 TABLET ORAL at 08:15

## 2022-02-06 RX ADMIN — OXYCODONE HYDROCHLORIDE 20 MG: 10 TABLET ORAL at 20:09

## 2022-02-06 RX ADMIN — ACETAMINOPHEN 975 MG: 325 TABLET, FILM COATED ORAL at 14:22

## 2022-02-06 RX ADMIN — NICOTINE POLACRILEX 8 MG: 4 GUM, CHEWING ORAL at 13:08

## 2022-02-06 RX ADMIN — NICOTINE POLACRILEX 8 MG: 4 GUM, CHEWING ORAL at 10:22

## 2022-02-06 RX ADMIN — NICOTINE POLACRILEX 8 MG: 4 GUM, CHEWING ORAL at 11:26

## 2022-02-06 RX ADMIN — NICOTINE POLACRILEX 8 MG: 4 GUM, CHEWING ORAL at 22:18

## 2022-02-06 RX ADMIN — PRAZOSIN HYDROCHLORIDE 1 MG: 1 CAPSULE ORAL at 22:18

## 2022-02-06 RX ADMIN — OXYCODONE HYDROCHLORIDE 20 MG: 10 TABLET ORAL at 09:19

## 2022-02-06 RX ADMIN — QUETIAPINE 600 MG: 300 TABLET, FILM COATED ORAL at 22:18

## 2022-02-06 RX ADMIN — OXYCODONE HYDROCHLORIDE 20 MG: 10 TABLET ORAL at 06:23

## 2022-02-06 RX ADMIN — TOPIRAMATE 100 MG: 100 TABLET, FILM COATED ORAL at 08:15

## 2022-02-06 RX ADMIN — NICOTINE POLACRILEX 8 MG: 4 GUM, CHEWING ORAL at 08:15

## 2022-02-06 RX ADMIN — ACAMPROSATE CALCIUM 666 MG: 333 TABLET, DELAYED RELEASE ORAL at 14:20

## 2022-02-06 RX ADMIN — PROPRANOLOL HYDROCHLORIDE 20 MG: 20 TABLET ORAL at 08:18

## 2022-02-06 RX ADMIN — DOCUSATE SODIUM 100 MG: 100 CAPSULE, LIQUID FILLED ORAL at 20:08

## 2022-02-06 RX ADMIN — PANTOPRAZOLE SODIUM 40 MG: 40 TABLET, DELAYED RELEASE ORAL at 06:23

## 2022-02-06 RX ADMIN — THIAMINE HCL TAB 100 MG 100 MG: 100 TAB at 08:15

## 2022-02-06 RX ADMIN — ACAMPROSATE CALCIUM 666 MG: 333 TABLET, DELAYED RELEASE ORAL at 08:15

## 2022-02-06 RX ADMIN — PREGABALIN 300 MG: 100 CAPSULE ORAL at 08:15

## 2022-02-06 RX ADMIN — MULTIPLE VITAMINS W/ MINERALS TAB 1 TABLET: TAB at 08:15

## 2022-02-06 RX ADMIN — NICOTINE POLACRILEX 8 MG: 4 GUM, CHEWING ORAL at 14:21

## 2022-02-06 RX ADMIN — NAPROXEN 500 MG: 500 TABLET ORAL at 08:16

## 2022-02-06 RX ADMIN — LEVOTHYROXINE SODIUM 75 MCG: 75 TABLET ORAL at 06:23

## 2022-02-06 RX ADMIN — NAPROXEN 500 MG: 500 TABLET ORAL at 17:31

## 2022-02-06 RX ADMIN — PREGABALIN 300 MG: 100 CAPSULE ORAL at 20:07

## 2022-02-06 RX ADMIN — NICOTINE POLACRILEX 8 MG: 4 GUM, CHEWING ORAL at 09:19

## 2022-02-06 RX ADMIN — PREGABALIN 300 MG: 100 CAPSULE ORAL at 14:20

## 2022-02-06 RX ADMIN — NICOTINE POLACRILEX 8 MG: 4 GUM, CHEWING ORAL at 06:23

## 2022-02-06 RX ADMIN — AMLODIPINE BESYLATE 5 MG: 5 TABLET ORAL at 08:18

## 2022-02-06 RX ADMIN — PROPRANOLOL HYDROCHLORIDE 20 MG: 20 TABLET ORAL at 20:08

## 2022-02-06 RX ADMIN — ACAMPROSATE CALCIUM 666 MG: 333 TABLET, DELAYED RELEASE ORAL at 20:07

## 2022-02-06 RX ADMIN — NICOTINE POLACRILEX 8 MG: 4 GUM, CHEWING ORAL at 16:22

## 2022-02-06 ASSESSMENT — ACTIVITIES OF DAILY LIVING (ADL)
ORAL_HYGIENE: INDEPENDENT
HYGIENE/GROOMING: INDEPENDENT
ORAL_HYGIENE: INDEPENDENT
DRESS: INDEPENDENT
HYGIENE/GROOMING: INDEPENDENT
LAUNDRY: UNABLE TO COMPLETE
DRESS: INDEPENDENT

## 2022-02-06 NOTE — PLAN OF CARE
Calm, pleasant, quiet, cooperative.  Isolates to his room, withdrawn from peers but open with staff.  Patient states he has no new concerns or complaints today.  P:  continue same plan of care.

## 2022-02-06 NOTE — PLAN OF CARE
Patient states he has nothing new to report in terms of mental or physical health. Seems to isolate himself most of the time .  Calm, quiet, pleasant, cooperative.  Comes out mosst evenings to watch the movie.  No further concerns.

## 2022-02-06 NOTE — PLAN OF CARE
Problem: Mood Impairment (Depressive Signs/Symptoms)  Goal: Improved Mood Symptoms (Depressive Signs/Symptoms)  Outcome: Improving     Slept well for 6 hours, spent the 1st hour of the shift reading.  No concerns raised.  Medication compliant but continues to endorse chronic pain which is controlled by the scheduled Oxycodone.

## 2022-02-06 NOTE — PLAN OF CARE
Observations/ Assessment   Pt is blunted and more withdrawn. He has socialized a little in the lounge, but mostly keeps to himself. He denies any significant mental health. Denies SI. He has spent his time going to groups, reading in his room, and watching TV.      PRNs: nicotine gum for effective withdrawal      Medical Bed: Due to chronic pain in L elbow and R knee and need to use side rails for bed mobility.      Problem: Mood Impairment (Depressive Signs/Symptoms)  Goal: Improved Mood Symptoms (Depressive Signs/Symptoms)  Outcome: Improving

## 2022-02-07 PROCEDURE — 250N000013 HC RX MED GY IP 250 OP 250 PS 637: Performed by: PSYCHIATRY & NEUROLOGY

## 2022-02-07 PROCEDURE — 250N000013 HC RX MED GY IP 250 OP 250 PS 637: Performed by: NURSE PRACTITIONER

## 2022-02-07 PROCEDURE — 124N000003 HC R&B MH SENIOR/ADOLESCENT

## 2022-02-07 PROCEDURE — G0177 OPPS/PHP; TRAIN & EDUC SERV: HCPCS

## 2022-02-07 RX ORDER — VITAMIN B COMPLEX
25 TABLET ORAL DAILY
Status: DISCONTINUED | OUTPATIENT
Start: 2022-02-07 | End: 2022-03-18

## 2022-02-07 RX ADMIN — PANTOPRAZOLE SODIUM 40 MG: 40 TABLET, DELAYED RELEASE ORAL at 06:18

## 2022-02-07 RX ADMIN — NICOTINE POLACRILEX 8 MG: 4 GUM, CHEWING ORAL at 15:16

## 2022-02-07 RX ADMIN — NICOTINE POLACRILEX 8 MG: 4 GUM, CHEWING ORAL at 14:13

## 2022-02-07 RX ADMIN — NAPROXEN 500 MG: 500 TABLET ORAL at 07:59

## 2022-02-07 RX ADMIN — PROPRANOLOL HYDROCHLORIDE 20 MG: 20 TABLET ORAL at 19:58

## 2022-02-07 RX ADMIN — OXYCODONE HYDROCHLORIDE 20 MG: 10 TABLET ORAL at 06:18

## 2022-02-07 RX ADMIN — THIAMINE HCL TAB 100 MG 100 MG: 100 TAB at 07:59

## 2022-02-07 RX ADMIN — TOPIRAMATE 100 MG: 100 TABLET, FILM COATED ORAL at 08:00

## 2022-02-07 RX ADMIN — OXYCODONE HYDROCHLORIDE 20 MG: 10 TABLET ORAL at 20:56

## 2022-02-07 RX ADMIN — NICOTINE POLACRILEX 8 MG: 4 GUM, CHEWING ORAL at 17:27

## 2022-02-07 RX ADMIN — NICOTINE POLACRILEX 8 MG: 4 GUM, CHEWING ORAL at 22:03

## 2022-02-07 RX ADMIN — ACETAMINOPHEN 975 MG: 325 TABLET, FILM COATED ORAL at 18:55

## 2022-02-07 RX ADMIN — FOLIC ACID 1 MG: 1 TABLET ORAL at 07:59

## 2022-02-07 RX ADMIN — PREGABALIN 300 MG: 100 CAPSULE ORAL at 07:59

## 2022-02-07 RX ADMIN — NICOTINE POLACRILEX 8 MG: 4 GUM, CHEWING ORAL at 20:56

## 2022-02-07 RX ADMIN — PREGABALIN 300 MG: 100 CAPSULE ORAL at 19:59

## 2022-02-07 RX ADMIN — MULTIPLE VITAMINS W/ MINERALS TAB 1 TABLET: TAB at 07:59

## 2022-02-07 RX ADMIN — NICOTINE POLACRILEX 8 MG: 4 GUM, CHEWING ORAL at 12:11

## 2022-02-07 RX ADMIN — NICOTINE POLACRILEX 8 MG: 4 GUM, CHEWING ORAL at 07:59

## 2022-02-07 RX ADMIN — PRAZOSIN HYDROCHLORIDE 1 MG: 1 CAPSULE ORAL at 22:03

## 2022-02-07 RX ADMIN — QUETIAPINE 600 MG: 300 TABLET, FILM COATED ORAL at 22:03

## 2022-02-07 RX ADMIN — NAPROXEN 500 MG: 500 TABLET ORAL at 17:27

## 2022-02-07 RX ADMIN — DOCUSATE SODIUM 100 MG: 100 CAPSULE, LIQUID FILLED ORAL at 07:59

## 2022-02-07 RX ADMIN — ACAMPROSATE CALCIUM 666 MG: 333 TABLET, DELAYED RELEASE ORAL at 07:59

## 2022-02-07 RX ADMIN — LEVOTHYROXINE SODIUM 75 MCG: 75 TABLET ORAL at 06:18

## 2022-02-07 RX ADMIN — OXYCODONE HYDROCHLORIDE 20 MG: 10 TABLET ORAL at 13:21

## 2022-02-07 RX ADMIN — LAMOTRIGINE 50 MG: 25 TABLET ORAL at 07:59

## 2022-02-07 RX ADMIN — NICOTINE POLACRILEX 8 MG: 4 GUM, CHEWING ORAL at 16:21

## 2022-02-07 RX ADMIN — OXYCODONE HYDROCHLORIDE 20 MG: 10 TABLET ORAL at 16:00

## 2022-02-07 RX ADMIN — NICOTINE POLACRILEX 8 MG: 4 GUM, CHEWING ORAL at 13:21

## 2022-02-07 RX ADMIN — NICOTINE POLACRILEX 8 MG: 4 GUM, CHEWING ORAL at 09:34

## 2022-02-07 RX ADMIN — TOPIRAMATE 100 MG: 100 TABLET, FILM COATED ORAL at 19:58

## 2022-02-07 RX ADMIN — PREGABALIN 300 MG: 100 CAPSULE ORAL at 14:13

## 2022-02-07 RX ADMIN — NICOTINE POLACRILEX 8 MG: 4 GUM, CHEWING ORAL at 18:56

## 2022-02-07 RX ADMIN — LIOTHYRONINE SODIUM 25 MCG: 25 TABLET ORAL at 07:59

## 2022-02-07 RX ADMIN — NICOTINE POLACRILEX 8 MG: 4 GUM, CHEWING ORAL at 19:59

## 2022-02-07 RX ADMIN — DOCUSATE SODIUM 100 MG: 100 CAPSULE, LIQUID FILLED ORAL at 19:58

## 2022-02-07 RX ADMIN — ACAMPROSATE CALCIUM 666 MG: 333 TABLET, DELAYED RELEASE ORAL at 14:13

## 2022-02-07 RX ADMIN — OXYCODONE HYDROCHLORIDE 20 MG: 10 TABLET ORAL at 09:34

## 2022-02-07 RX ADMIN — CHOLECALCIFEROL TAB 25 MCG (1000 UNIT) 25 MCG: 25 TAB at 14:13

## 2022-02-07 RX ADMIN — ACAMPROSATE CALCIUM 666 MG: 333 TABLET, DELAYED RELEASE ORAL at 19:58

## 2022-02-07 ASSESSMENT — ACTIVITIES OF DAILY LIVING (ADL)
DRESS: INDEPENDENT;SCRUBS (BEHAVIORAL HEALTH)
ORAL_HYGIENE: INDEPENDENT
DRESS: INDEPENDENT
ORAL_HYGIENE: INDEPENDENT
LAUNDRY: UNABLE TO COMPLETE
HYGIENE/GROOMING: INDEPENDENT
HYGIENE/GROOMING: INDEPENDENT

## 2022-02-07 NOTE — PLAN OF CARE
Problem: Additional Goals  Goal: BEH OT Goal 1  Description: Will attend OT groups improve concentration and motivation by engaging in more challenging and success oriented options while also improving insight with comments/answers made about mental health needs.    Pt attended 3 out of 3 OT groups offered. Pt actively participated in occupational therapy clinic with 6 patients total x100 minutes. Pt was able to ask for assistance as needed, and independently return to a multi-step, goal-directed task. Pt demonstrated excellent focus, planning, sequencing, problem solving, and attention to detail. Very organized in his task approach. Social with peers and writer. Calmly expressed frustration regarding his discharge plans falling through again, explaining that he was declined due to being under the age of 54. Thanked writer for allowing him to vent about the situation. Politely assisted writer in cleaning up group materials. Pt actively participated in a structured occupational therapy group with 5 patients total x50 minutes with a focus on sleep hygiene, which included education with interactive game, sleep self-assessment, and sleep journal for follow-through. Education was provided on importance of adequate sleep for optimal occupational performance, cognitive well-being and engagement in daily life. Pt was accepting of material and actively participated throughout group. Pt identified not drinking water within an hour of bedtime and getting out of bed if he can't sleep as changes he intends to make in his sleep preparation routine. Calm, pleasant, cooperative, and engaged.

## 2022-02-07 NOTE — PLAN OF CARE
Problem: Sleep Disturbance (Depressive Signs/Symptoms)  Goal: Improved Sleep (Depressive Signs/Symptoms)  Outcome: No Change     Slept uninterrupted for 6 hours  Compliant with his scheduled early morning medications.  No behavioral issues this shift.

## 2022-02-07 NOTE — PLAN OF CARE
Problem: Suicidal Behavior  Goal: Suicidal Behavior is Absent or Managed  Outcome: Improving  Flowsheets (Taken 2/7/2022 0809)  Mutually Determined Action Steps (Suicidal Behavior Absent/Managed): sets future-oriented goal     Problem: Mood Impairment (Depressive Signs/Symptoms)  Goal: Improved Mood Symptoms (Depressive Signs/Symptoms)  Outcome: Improving     Problem: Activity and Energy Impairment (Depressive Signs/Symptoms)  Goal: Optimized Energy Level (Depressive Signs/Symptoms)  Outcome: Improving  Flowsheets (Taken 2/7/2022 0809)  Mutually Determined Action Steps (Optimized Energy Level):    grooms self without prompting    dresses/ready for morning activity   Pt able to identify that his anxiety is slightly higher today than the weekend as he hopes to hear from a facility regarding discharge this week. Pt rated anxiety and depression at 6/10 this AM. Pt denies SI/SIB. Pt does report that he realized that this is going to be his life and that at times he does have thoughts of wishing her was no longer here. Pt is adamant that he has no plans on harming himself in any way.   Pt's affect is flat with appropriate eye contact.   Pt's AM doses of norvasc and propranolol were held as his blood pressure did not meet parameters (sitting 125/84 and standing 109/74).     Pt continues to use a medical bed to aid in repositioning and decreasing chronic pain of back, leg and knee.     Pt has been visible in common area's and has been attending programming. Pt is social with select peers. Pt's B/P continues to run on the low side (94/63 with a pulse of 80) around lunch time. Pt has been drinking adequate amounts of fluids and has been carrying around his water bottle.

## 2022-02-07 NOTE — PROGRESS NOTES
"SPIRITUAL HEALTH SERVICES  SPIRITUAL ASSESSMENT Progress Note  [facility] ([facility]) 3BW     REFERRAL SOURCE: follow up  On this visit, I met with Jose M in the lounge. Facilitated reflective conversation while playing a game of cribbage.    Jose M expressed frustration and disappointment regarding the possible placement not working out, \"I was upset for a few hours, I am over it now.\" We talked about waiting, hope, and prayer.    Plan: I will follow up 1-2x weekly.    Liss Kerns MDiv  Associate   Pager [phone]  Office [phone]  [facility] remains available 24/7 for emergent requests/referrals, either by having the switchboard page the on-call  or by entering an ASAP/STAT consult in Epic (this will also page the on-call ). Routine Epic consults receive an initial response within 24 hours.    "

## 2022-02-07 NOTE — PROGRESS NOTES
Patient seen, chart reviewed, care discussed with staff.  Blood pressure 94/63, pulse 80, temperature 98.7  F (37.1  C), temperature source Oral, resp. rate 16, weight 136.7 kg (301 lb 4.8 oz), SpO2 96 %.    By report, anxiety and depression both 6/10.  Slept 6 hours.    Tolerating increased Lamictal    General appearance: good  Alert.   Affect: fair  Mood: fair    Speech:  normal.   Eye contact:  good.    Psychomotor behavior: normal  Gait: steady with walker  Abnormal movements:  none  Delusions: none  Hallucinations:  none  Thoughts: logical  Associations: intact  Judgement: good  Insight: good  Cognitions: intact in conversation  Memory:  intact in conversation  Orientation: normal    Not suicidal.    He needs a sober setting at discharge due to likelihood of relapse.      Imp:  Bipolar 1 depressed  2.  PTSD  3.  TBI  4.  Knee pain  Patient Active Problem List   Diagnosis     Post concussive encephalopathy     H/O shoulder surgery     Alcoholism in remission (H)     Bilateral ACL tear     Chronic back pain     Social anxiety disorder     Alcohol withdrawal syndrome with complication, with unspecified complication (H)     Rib fracture     Alcohol withdrawal (H)     Alcohol dependence (H)     LFT elevation     Laceration of left hand without foreign body, initial encounter     Suicidal ideation     Intentional drug overdose, initial encounter (H)     Alcoholic intoxication with complication (H)     Severe bipolar I disorder, current or most recent episode depressed, with mixed features (H)     Medication overdose, intentional self-harm, subsequent encounter     Opioid dependence on agonist therapy (H)     2019 novel coronavirus disease (COVID-19)     Pneumonia due to COVID-19 virus     Bipolar disorder (H)     Plan: same meds, discharge when safe placement available    Current Facility-Administered Medications   Medication     acamprosate (CAMPRAL) EC tablet 666 mg     acetaminophen (TYLENOL) tablet 975 mg      albuterol (PROVENTIL HFA/VENTOLIN HFA) inhaler     alum & mag hydroxide-simethicone (MAALOX) suspension 30 mL     amLODIPine (NORVASC) tablet 5 mg     docusate sodium (COLACE) capsule 100 mg     folic acid (FOLVITE) tablet 1 mg     ketamine 5%-gabapentin 8% in PLO cream 0.25 g     lamoTRIgine (LaMICtal) tablet 50 mg     levothyroxine (SYNTHROID/LEVOTHROID) tablet 75 mcg     liothyronine (CYTOMEL) tablet 25 mcg     loperamide (IMODIUM) capsule 2 mg     melatonin tablet 3 mg     multivitamin w/minerals (THERA-VIT-M) tablet 1 tablet     naloxone (NARCAN) injection 0.2 mg    Or     naloxone (NARCAN) injection 0.4 mg    Or     naloxone (NARCAN) injection 0.2 mg    Or     naloxone (NARCAN) injection 0.4 mg     naproxen (NAPROSYN) tablet 500 mg     nicotine polacrilex (NICORETTE) gum 4-8 mg     OLANZapine (zyPREXA) tablet 10 mg    Or     OLANZapine (zyPREXA) injection 10 mg     oxyCODONE IR (ROXICODONE) tablet 20 mg     pantoprazole (PROTONIX) EC tablet 40 mg     prazosin (MINIPRESS) capsule 1 mg     pregabalin (LYRICA) capsule 300 mg     propranolol (INDERAL) tablet 20 mg     QUEtiapine (SEROquel) tablet 600 mg     sennosides (SENOKOT) tablet 1-2 tablet     thiamine (B-1) tablet 100 mg     tiZANidine (ZANAFLEX) tablet 4 mg     topiramate (TOPAMAX) tablet 100 mg     Vitamin D3 (CHOLECALCIFEROL) tablet 25 mcg     No results found for this or any previous visit (from the past 168 hour(s)).

## 2022-02-07 NOTE — PLAN OF CARE
Activities :  -Met with pt and discussed his care   -Rounded with team    -passionate care assisted living- Declined patient.     -[facility] pt's CADI  [phone]. Jessica stated that she sent a Referral to a [facility] facility called [facility] (- caren- [phone], [phone]). Jessica states that she is waiting to hear back from [facility] regarding referral  Update. Jessica will be sending an Email to [facility] and will .       -[facility] notified her that patient is on CADI waiver and has been in the hospital for 30 days as of today. [facility] asked If Counts include 234 beds at the [facility] will be closing his CADI and how that will affect the placements we are pursuing. Jessica stated that she will be speaking with her supervisor and will update writer.     -[facility] Reffered patient to [facility]. [facility] spoke with Cathie Prather (RN Clinical Manager) who notified writer that the agency is still going through licensing process through [facility]. She stated that this process will likely take 3 weeks.  She notified writer that they currently have four openings and only accept 55+ years old clients who are on waiver based programs. (AC [facility] and DD)     Addressed patient needs/concerns: Reviewed possible options for disposition, when he is stabilized.      Discharge Plan or Goal   Plan  New referrals made to assisted living facility. The new assisted living facility is currently reviewing the pt's paperwork.  Commitment  NA  Health Care Follow up Appointment:    Will need psych, therapy, pcp, and  mental michael cm  Medication Management Plan:  See providers notes  Housing:  [facility] #[phone]  Financial Follow ups:  He reports he has GA   SMRTed for SSDI benefits.  Care Team     Jr Giron MD  -[facility] #[phone]    CADI Waiver CM: [facility] #[phone]. [email or web address]  ? Arlin- supervisor for cadi   [phone]    Joseph Oliveros's supervisor # [phone]  ? Admin #[phone]

## 2022-02-08 PROCEDURE — 250N000013 HC RX MED GY IP 250 OP 250 PS 637: Performed by: PSYCHIATRY & NEUROLOGY

## 2022-02-08 PROCEDURE — G0177 OPPS/PHP; TRAIN & EDUC SERV: HCPCS

## 2022-02-08 PROCEDURE — 250N000013 HC RX MED GY IP 250 OP 250 PS 637: Performed by: NURSE PRACTITIONER

## 2022-02-08 PROCEDURE — 124N000003 HC R&B MH SENIOR/ADOLESCENT

## 2022-02-08 RX ORDER — DIPHENHYDRAMINE HCL 25 MG
50 CAPSULE ORAL EVERY 6 HOURS PRN
Status: DISCONTINUED | OUTPATIENT
Start: 2022-02-08 | End: 2022-03-24 | Stop reason: HOSPADM

## 2022-02-08 RX ADMIN — NAPROXEN 500 MG: 500 TABLET ORAL at 17:14

## 2022-02-08 RX ADMIN — PREGABALIN 300 MG: 100 CAPSULE ORAL at 13:46

## 2022-02-08 RX ADMIN — FOLIC ACID 1 MG: 1 TABLET ORAL at 08:01

## 2022-02-08 RX ADMIN — NICOTINE POLACRILEX 8 MG: 4 GUM, CHEWING ORAL at 13:15

## 2022-02-08 RX ADMIN — DOCUSATE SODIUM 100 MG: 100 CAPSULE, LIQUID FILLED ORAL at 08:00

## 2022-02-08 RX ADMIN — OXYCODONE HYDROCHLORIDE 20 MG: 10 TABLET ORAL at 09:30

## 2022-02-08 RX ADMIN — OXYCODONE HYDROCHLORIDE 20 MG: 10 TABLET ORAL at 22:17

## 2022-02-08 RX ADMIN — NICOTINE POLACRILEX 8 MG: 4 GUM, CHEWING ORAL at 07:17

## 2022-02-08 RX ADMIN — NICOTINE POLACRILEX 8 MG: 4 GUM, CHEWING ORAL at 20:05

## 2022-02-08 RX ADMIN — MULTIPLE VITAMINS W/ MINERALS TAB 1 TABLET: TAB at 08:00

## 2022-02-08 RX ADMIN — DOCUSATE SODIUM 100 MG: 100 CAPSULE, LIQUID FILLED ORAL at 20:03

## 2022-02-08 RX ADMIN — OXYCODONE HYDROCHLORIDE 20 MG: 10 TABLET ORAL at 06:01

## 2022-02-08 RX ADMIN — QUETIAPINE 700 MG: 300 TABLET, FILM COATED ORAL at 22:52

## 2022-02-08 RX ADMIN — PANTOPRAZOLE SODIUM 40 MG: 40 TABLET, DELAYED RELEASE ORAL at 06:01

## 2022-02-08 RX ADMIN — DIPHENHYDRAMINE HYDROCHLORIDE 50 MG: 25 CAPSULE ORAL at 08:44

## 2022-02-08 RX ADMIN — OXYCODONE HYDROCHLORIDE 20 MG: 10 TABLET ORAL at 16:07

## 2022-02-08 RX ADMIN — NICOTINE POLACRILEX 8 MG: 4 GUM, CHEWING ORAL at 16:08

## 2022-02-08 RX ADMIN — NICOTINE POLACRILEX 8 MG: 4 GUM, CHEWING ORAL at 15:03

## 2022-02-08 RX ADMIN — TOPIRAMATE 100 MG: 100 TABLET, FILM COATED ORAL at 08:00

## 2022-02-08 RX ADMIN — LIOTHYRONINE SODIUM 25 MCG: 25 TABLET ORAL at 08:00

## 2022-02-08 RX ADMIN — PROPRANOLOL HYDROCHLORIDE 20 MG: 20 TABLET ORAL at 20:03

## 2022-02-08 RX ADMIN — NICOTINE POLACRILEX 8 MG: 4 GUM, CHEWING ORAL at 17:14

## 2022-02-08 RX ADMIN — NAPROXEN 500 MG: 500 TABLET ORAL at 08:01

## 2022-02-08 RX ADMIN — PREGABALIN 300 MG: 100 CAPSULE ORAL at 20:02

## 2022-02-08 RX ADMIN — NICOTINE POLACRILEX 8 MG: 4 GUM, CHEWING ORAL at 06:01

## 2022-02-08 RX ADMIN — NICOTINE POLACRILEX 8 MG: 4 GUM, CHEWING ORAL at 08:02

## 2022-02-08 RX ADMIN — NICOTINE POLACRILEX 8 MG: 4 GUM, CHEWING ORAL at 11:21

## 2022-02-08 RX ADMIN — LEVOTHYROXINE SODIUM 75 MCG: 75 TABLET ORAL at 06:01

## 2022-02-08 RX ADMIN — ACAMPROSATE CALCIUM 666 MG: 333 TABLET, DELAYED RELEASE ORAL at 13:46

## 2022-02-08 RX ADMIN — THIAMINE HCL TAB 100 MG 100 MG: 100 TAB at 08:00

## 2022-02-08 RX ADMIN — OXYCODONE HYDROCHLORIDE 20 MG: 10 TABLET ORAL at 13:14

## 2022-02-08 RX ADMIN — NICOTINE POLACRILEX 8 MG: 4 GUM, CHEWING ORAL at 09:30

## 2022-02-08 RX ADMIN — CHOLECALCIFEROL TAB 25 MCG (1000 UNIT) 25 MCG: 25 TAB at 08:00

## 2022-02-08 RX ADMIN — ACAMPROSATE CALCIUM 666 MG: 333 TABLET, DELAYED RELEASE ORAL at 20:01

## 2022-02-08 RX ADMIN — TOPIRAMATE 100 MG: 100 TABLET, FILM COATED ORAL at 20:04

## 2022-02-08 RX ADMIN — ACAMPROSATE CALCIUM 666 MG: 333 TABLET, DELAYED RELEASE ORAL at 08:00

## 2022-02-08 RX ADMIN — PREGABALIN 300 MG: 100 CAPSULE ORAL at 08:00

## 2022-02-08 RX ADMIN — LAMOTRIGINE 50 MG: 25 TABLET ORAL at 08:00

## 2022-02-08 RX ADMIN — PRAZOSIN HYDROCHLORIDE 1 MG: 1 CAPSULE ORAL at 22:52

## 2022-02-08 ASSESSMENT — ACTIVITIES OF DAILY LIVING (ADL)
ORAL_HYGIENE: INDEPENDENT
HYGIENE/GROOMING: INDEPENDENT
DRESS: INDEPENDENT
LAUNDRY: UNABLE TO COMPLETE

## 2022-02-08 NOTE — PLAN OF CARE
Problem: Mood Impairment (Depressive Signs/Symptoms)  Goal: Improved Mood Symptoms (Depressive Signs/Symptoms)  Outcome: No Change     Slept for 7.75 hours, woke up with right knee pain, scheduled Oxycodone given.  Complained of stuffy nose and wanted anti histamine , no prn ordered. Pt stated he will let his morning nurse know about it.

## 2022-02-08 NOTE — PROGRESS NOTES
Patient seen, chart reviewed, care discussed with staff.  'Blood pressure 106/71, pulse 74, temperature 97.6  F (36.4  C), temperature source Temporal, resp. rate 16, weight 136.2 kg (300 lb 3.2 oz), SpO2 94 %.    By report, not sleeping well.  General appearance: good  Alert.   Affect: fair  Mood: fair    Speech:  normal.   Eye contact:  good.    Psychomotor behavior: normal  Gait: steady with walker  Abnormal movements:  none  Delusions: none  Hallucinations:  none  Thoughts: logical  Associations: intact  Judgement: good  Insight: good  Cognitions: intact in conversation  Memory:  intact in conversation  Orientation: normal    Not suicidal.  Imp:  Bipolar 1 depressed, PTSD, s/p TBI  Plan: increase Seroquel to 700mg    Current Facility-Administered Medications   Medication     acamprosate (CAMPRAL) EC tablet 666 mg     acetaminophen (TYLENOL) tablet 975 mg     albuterol (PROVENTIL HFA/VENTOLIN HFA) inhaler     alum & mag hydroxide-simethicone (MAALOX) suspension 30 mL     amLODIPine (NORVASC) tablet 5 mg     diphenhydrAMINE (BENADRYL) capsule 50 mg     docusate sodium (COLACE) capsule 100 mg     folic acid (FOLVITE) tablet 1 mg     ketamine 5%-gabapentin 8% in PLO cream 0.25 g     lamoTRIgine (LaMICtal) tablet 50 mg     levothyroxine (SYNTHROID/LEVOTHROID) tablet 75 mcg     liothyronine (CYTOMEL) tablet 25 mcg     loperamide (IMODIUM) capsule 2 mg     melatonin tablet 3 mg     multivitamin w/minerals (THERA-VIT-M) tablet 1 tablet     naloxone (NARCAN) injection 0.2 mg    Or     naloxone (NARCAN) injection 0.4 mg    Or     naloxone (NARCAN) injection 0.2 mg    Or     naloxone (NARCAN) injection 0.4 mg     naproxen (NAPROSYN) tablet 500 mg     nicotine polacrilex (NICORETTE) gum 4-8 mg     OLANZapine (zyPREXA) tablet 10 mg    Or     OLANZapine (zyPREXA) injection 10 mg     oxyCODONE IR (ROXICODONE) tablet 20 mg     pantoprazole (PROTONIX) EC tablet 40 mg     prazosin (MINIPRESS) capsule 1 mg     pregabalin (LYRICA)  capsule 300 mg     propranolol (INDERAL) tablet 20 mg     QUEtiapine (SEROquel) tablet 700 mg     sennosides (SENOKOT) tablet 1-2 tablet     thiamine (B-1) tablet 100 mg     tiZANidine (ZANAFLEX) tablet 4 mg     topiramate (TOPAMAX) tablet 100 mg     Vitamin D3 (CHOLECALCIFEROL) tablet 25 mcg     No results found for this or any previous visit (from the past 168 hour(s)).

## 2022-02-08 NOTE — PLAN OF CARE
Problem: Behavioral Health Plan of Care  Goal: Optimized Coping Skills in Response to Life Stressors  Outcome: Improving     SI, SIB, HI: absent, baseline    Current Mental Health Symptoms: Pt expressed frustration and disappointment he does not have housing yet. He states he was up off and on last night with perseverative thoughts about his lack of housing. He is trying managing his disappointment by attending groups and reading during his free time. His depression and anxiety have remained mild to moderate.Overall, he is pleasant and polite. He is appreiciatvie of the cares he receives. He has good hygiene and appetite. He maintains his boundaries with peers. Attending select groups. No c/o.     PRNs: nicotine gum as needed for nicotine withdrawal   0844 benadryl 50 mg po for nasal congestion. effective    Medical Issues: Held amlodipine and propranolol for lower BP. Repeat BP improved 112/74.  Utilizing knee immobilizer    Medical Bed: Due to medical issues regarding chronic pain, impaired mobility, and past surgeries.

## 2022-02-08 NOTE — PLAN OF CARE
Problem: Additional Goals  Goal: BEH OT Goal 1  Description: Will attend OT groups improve concentration and motivation by engaging in more challenging and success oriented options while also improving insight with comments/answers made about mental health needs.    Pt attended 1 out of 2 OT groups offered. Pt actively participated in occupational therapy clinic with 7-8 patients total x60 minutes. Pt was able to ask for assistance as needed, and independently return to a multi-step, creative expression task. Pt demonstrated excellent focus, sequencing planning, problem solving, and attention to detail. Very organized in his task approach. Social with peers and writer, and expressed satisfaction with the progress he made on his task today. He continues to be calm, pleasant, cooperative, and engaged throughout all groups he attends.

## 2022-02-08 NOTE — PROGRESS NOTES
"Behavioral Health  Note    Behavioral Health  Spirituality Group Note    UNIT 3BW    Name: Hollis Barclay YOB: 1970   MRN: [id number] Age: 51 year old      Patient attended -led group, which included discussion of spirituality, coping with illness and hope as a practice.    Patient attended group for 1.0 hrs.    The patient actively participated in group discussion and patient demonstrated an appreciation of topic's application for their personal circumstances. Participants completed reflective worksheet and discussion about hope as a practice.   Jose M spoke of hope as \"what's left after a failure to plan\" and about the return of hope \"like a Kevlar suit that covers you. The bad stuff can't get in anymore.\"    Liss Kerns MDiv  Associate   Pager [phone]  Office [phone]    "

## 2022-02-08 NOTE — PLAN OF CARE
Problem: Activity and Energy Impairment (Depressive Signs/Symptoms)  Goal: Optimized Energy Level (Depressive Signs/Symptoms)  Outcome: Improving  Intervention: Optimize Energy Level  Recent Flowsheet Documentation  Taken 2/7/2022 1918 by Annabella Palacio RN  Patient Performed Hygiene: dressed  Activity (Behavioral Health): up ad kathleen     Problem: Activity and Energy Impairment (Depressive Signs/Symptoms)  Goal: Optimized Energy Level (Depressive Signs/Symptoms)  Intervention: Optimize Energy Level  Recent Flowsheet Documentation  Taken 2/7/2022 1918 by Annabella Palacio RN  Patient Performed Hygiene: dressed  Activity (Behavioral Health): up ad kathleen    Patient is intermittently visible in the milieu today. He spent most of the shift in his room reading a book. He is calm with a full-range of affect. He is pleasant and appreciative. Patient denies SI/SIB nor hallucinations. No report of side effects of medications he is currently taking. His speech is clear and spontaneous. Thoughts are organized. He is looking forward to his placement and is hoping it will be soon. Patient declined to go to groups this shift. His eyes were glued to the book he is reading. He is med compliant. He complained of pain on his right knee and left elbow. Scheduled Oxycodone given (Pls see MAR) and Tylenol 975 mgs given at 1855 as prn for pain along with nicorette gum. Appetite is good.

## 2022-02-09 PROCEDURE — 250N000013 HC RX MED GY IP 250 OP 250 PS 637: Performed by: PSYCHIATRY & NEUROLOGY

## 2022-02-09 PROCEDURE — 250N000013 HC RX MED GY IP 250 OP 250 PS 637: Performed by: NURSE PRACTITIONER

## 2022-02-09 PROCEDURE — G0177 OPPS/PHP; TRAIN & EDUC SERV: HCPCS

## 2022-02-09 PROCEDURE — 124N000003 HC R&B MH SENIOR/ADOLESCENT

## 2022-02-09 RX ADMIN — NICOTINE POLACRILEX 8 MG: 4 GUM, CHEWING ORAL at 21:33

## 2022-02-09 RX ADMIN — NICOTINE POLACRILEX 8 MG: 4 GUM, CHEWING ORAL at 13:57

## 2022-02-09 RX ADMIN — LEVOTHYROXINE SODIUM 75 MCG: 75 TABLET ORAL at 06:39

## 2022-02-09 RX ADMIN — LAMOTRIGINE 50 MG: 25 TABLET ORAL at 07:58

## 2022-02-09 RX ADMIN — PRAZOSIN HYDROCHLORIDE 1 MG: 1 CAPSULE ORAL at 22:38

## 2022-02-09 RX ADMIN — NICOTINE POLACRILEX 8 MG: 4 GUM, CHEWING ORAL at 08:58

## 2022-02-09 RX ADMIN — NICOTINE POLACRILEX 8 MG: 4 GUM, CHEWING ORAL at 15:09

## 2022-02-09 RX ADMIN — NICOTINE POLACRILEX 8 MG: 4 GUM, CHEWING ORAL at 22:38

## 2022-02-09 RX ADMIN — OXYCODONE HYDROCHLORIDE 20 MG: 10 TABLET ORAL at 20:35

## 2022-02-09 RX ADMIN — ACAMPROSATE CALCIUM 666 MG: 333 TABLET, DELAYED RELEASE ORAL at 19:49

## 2022-02-09 RX ADMIN — LIOTHYRONINE SODIUM 25 MCG: 25 TABLET ORAL at 07:58

## 2022-02-09 RX ADMIN — DOCUSATE SODIUM 100 MG: 100 CAPSULE, LIQUID FILLED ORAL at 19:49

## 2022-02-09 RX ADMIN — PREGABALIN 300 MG: 100 CAPSULE ORAL at 13:57

## 2022-02-09 RX ADMIN — DIPHENHYDRAMINE HYDROCHLORIDE 50 MG: 25 CAPSULE ORAL at 21:33

## 2022-02-09 RX ADMIN — FOLIC ACID 1 MG: 1 TABLET ORAL at 07:58

## 2022-02-09 RX ADMIN — NICOTINE POLACRILEX 8 MG: 4 GUM, CHEWING ORAL at 10:25

## 2022-02-09 RX ADMIN — ACAMPROSATE CALCIUM 666 MG: 333 TABLET, DELAYED RELEASE ORAL at 07:58

## 2022-02-09 RX ADMIN — QUETIAPINE 400 MG: 300 TABLET, FILM COATED ORAL at 22:38

## 2022-02-09 RX ADMIN — MULTIPLE VITAMINS W/ MINERALS TAB 1 TABLET: TAB at 07:58

## 2022-02-09 RX ADMIN — OXYCODONE HYDROCHLORIDE 20 MG: 10 TABLET ORAL at 06:39

## 2022-02-09 RX ADMIN — NICOTINE POLACRILEX 4 MG: 4 GUM, CHEWING ORAL at 09:19

## 2022-02-09 RX ADMIN — NICOTINE POLACRILEX 8 MG: 4 GUM, CHEWING ORAL at 19:48

## 2022-02-09 RX ADMIN — NICOTINE POLACRILEX 8 MG: 4 GUM, CHEWING ORAL at 17:47

## 2022-02-09 RX ADMIN — NICOTINE POLACRILEX 8 MG: 4 GUM, CHEWING ORAL at 11:33

## 2022-02-09 RX ADMIN — OXYCODONE HYDROCHLORIDE 20 MG: 10 TABLET ORAL at 13:05

## 2022-02-09 RX ADMIN — ACAMPROSATE CALCIUM 666 MG: 333 TABLET, DELAYED RELEASE ORAL at 13:57

## 2022-02-09 RX ADMIN — THIAMINE HCL TAB 100 MG 100 MG: 100 TAB at 07:58

## 2022-02-09 RX ADMIN — OXYCODONE HYDROCHLORIDE 20 MG: 10 TABLET ORAL at 09:18

## 2022-02-09 RX ADMIN — PREGABALIN 300 MG: 100 CAPSULE ORAL at 07:58

## 2022-02-09 RX ADMIN — PANTOPRAZOLE SODIUM 40 MG: 40 TABLET, DELAYED RELEASE ORAL at 06:39

## 2022-02-09 RX ADMIN — NICOTINE POLACRILEX 8 MG: 4 GUM, CHEWING ORAL at 13:05

## 2022-02-09 RX ADMIN — PREGABALIN 300 MG: 100 CAPSULE ORAL at 19:49

## 2022-02-09 RX ADMIN — NICOTINE POLACRILEX 8 MG: 4 GUM, CHEWING ORAL at 16:07

## 2022-02-09 RX ADMIN — NAPROXEN 500 MG: 500 TABLET ORAL at 07:58

## 2022-02-09 RX ADMIN — TOPIRAMATE 100 MG: 100 TABLET, FILM COATED ORAL at 19:49

## 2022-02-09 RX ADMIN — NICOTINE POLACRILEX 8 MG: 4 GUM, CHEWING ORAL at 06:39

## 2022-02-09 RX ADMIN — PROPRANOLOL HYDROCHLORIDE 20 MG: 20 TABLET ORAL at 19:48

## 2022-02-09 RX ADMIN — CHOLECALCIFEROL TAB 25 MCG (1000 UNIT) 25 MCG: 25 TAB at 07:58

## 2022-02-09 RX ADMIN — DOCUSATE SODIUM 100 MG: 100 CAPSULE, LIQUID FILLED ORAL at 07:58

## 2022-02-09 RX ADMIN — OXYCODONE HYDROCHLORIDE 20 MG: 10 TABLET ORAL at 16:07

## 2022-02-09 RX ADMIN — NAPROXEN 500 MG: 500 TABLET ORAL at 17:47

## 2022-02-09 RX ADMIN — TOPIRAMATE 100 MG: 100 TABLET, FILM COATED ORAL at 07:58

## 2022-02-09 ASSESSMENT — ACTIVITIES OF DAILY LIVING (ADL)
ORAL_HYGIENE: INDEPENDENT
DRESS: INDEPENDENT
HYGIENE/GROOMING: INDEPENDENT
ORAL_HYGIENE: INDEPENDENT
HYGIENE/GROOMING: INDEPENDENT
DRESS: INDEPENDENT
LAUNDRY: UNABLE TO COMPLETE
LAUNDRY: UNABLE TO COMPLETE

## 2022-02-09 NOTE — PLAN OF CARE
Problem: Additional Goals  Goal: BEH OT Goal 1  Description: Will attend OT groups improve concentration and motivation by engaging in more challenging and success oriented options while also improving insight with comments/answers made about mental health needs.    Pt attended 3 out of 3 OT groups offered. Pt actively participated in occupational therapy clinic with 5-7 patients total x100 minutes. Pt was able to ask for assistance as needed, and independently return to a multi-step, creative expression task. Pt demonstrated good focus, sequencing, planning, problem solving, and attention to detail. Very organized in his task approach. Social with peers and writer, and politely encouraged peers regarding their projects. He calmly verbalized frustration regarding finding out that his belongings needed to be picked up from his previous living facility, though shared he has a friend who can likely pick them up for him. Reported excitement regarding new books being dropped off for him today. Pt actively participated in a structured occupational therapy group with 5 patients total x45 minutes with a focus on facilitating communication skills. Pt engaged in a discussion activity with topics including good work habits, social and leisure activities, self-esteem, and time management. Pt offered thoughtful responses throughout group, and was respectful in listening and responding to peers. A peer looked to pt for assistance, and he appropriately took on a leadership role by politely assisting this peer in comprehending/clarifying the questions. He continues to be calm, pleasant, cooperative, and engaged throughout all groups. Politely thanked writer for groups.

## 2022-02-09 NOTE — PLAN OF CARE
Problem: Mood Impairment (Depressive Signs/Symptoms)  Goal: Improved Mood Symptoms (Depressive Signs/Symptoms)  Outcome: No Change     Received sleeping at the start of the shift.  Slept for 8 hours.  Came up to the med room for his morning medication and requested prn Nicorette gum 8 mg.  Affect tense  Used his wheeled walker and right knee brace

## 2022-02-09 NOTE — PLAN OF CARE
"  Problem: Mood Impairment (Depressive Signs/Symptoms)  Goal: Improved Mood Symptoms (Depressive Signs/Symptoms)  Outcome: No Change     Assessment/ observations: Pt pulled staff aside to discuss his discouragement with placements not accepting him. He states he does not have any control over his situation. He does acknowledge he has hope for his future. Encouraged him to continue to talk to staff when these feelings arise.  His reading is his way to cope, friend dropped off new books. He identifies the smell of alcohol as a trigger that he cannot be around to remain sober. He has insight into what triggers his relapses and understands he \"cannot do this alone\". That he needs consistent professional support and not intermittent help. He has managed his disappointment without incident. Validation and support provided.     He received a call from his old landlord who says he has 14 days to get his belongings or they get thrown out. He has a friend he is going to see if they can  his belongings.     He slept longer with getting up twice to urinate. He reports it was a drugged sleep and woke a little groggy. He looked groggy with eye half open and speech a little slurred. He states this goes away after a couple of days. He appears alert and full range affect later in day.     Medical Issues:  Held amlodipine and propranolol for BP 98/63. SBP tends to be low if taken before breakfast. /81 post breakfast. He chose not to take the BP pills at this time. Will repeat at noon.   Utilizing right knee immobilizer  Refusing ace wrap to left arm    PRNs: nicotine gum as needed for nicotine withdrawal      Medical Bed: Due to medical issues regarding chronic pain, impaired mobility, and past surgeries.   "

## 2022-02-09 NOTE — PLAN OF CARE
"Pt is subdued and more isolative this shift.  Pt out for meals and medications.  Pt states his mood is \"fair to partly cloudy.\"  Pt endorses feeling hopeless about placement.  He states that reading helps to keep his mind occupied.  Pt wearing knee immobilizer.  Pt states his current pain management regime is \"helping.\"      PRN Nicotine gum x 3  "

## 2022-02-09 NOTE — PROGRESS NOTES
Patient seen, chart reviewed, care discussed with staff.  Blood pressure 129/82, pulse 82, temperature 98  F (36.7  C), temperature source Temporal, resp. rate 16, weight 136.2 kg (300 lb 3.2 oz), SpO2 98 %.    Frustrated by delays  Sedated in am from increased Seroquel    Current Facility-Administered Medications   Medication     acamprosate (CAMPRAL) EC tablet 666 mg     acetaminophen (TYLENOL) tablet 975 mg     albuterol (PROVENTIL HFA/VENTOLIN HFA) inhaler     alum & mag hydroxide-simethicone (MAALOX) suspension 30 mL     amLODIPine (NORVASC) tablet 5 mg     diphenhydrAMINE (BENADRYL) capsule 50 mg     docusate sodium (COLACE) capsule 100 mg     folic acid (FOLVITE) tablet 1 mg     ketamine 5%-gabapentin 8% in PLO cream 0.25 g     lamoTRIgine (LaMICtal) tablet 50 mg     levothyroxine (SYNTHROID/LEVOTHROID) tablet 75 mcg     liothyronine (CYTOMEL) tablet 25 mcg     loperamide (IMODIUM) capsule 2 mg     melatonin tablet 3 mg     multivitamin w/minerals (THERA-VIT-M) tablet 1 tablet     naloxone (NARCAN) injection 0.2 mg    Or     naloxone (NARCAN) injection 0.4 mg    Or     naloxone (NARCAN) injection 0.2 mg    Or     naloxone (NARCAN) injection 0.4 mg     naproxen (NAPROSYN) tablet 500 mg     nicotine polacrilex (NICORETTE) gum 4-8 mg     OLANZapine (zyPREXA) tablet 10 mg    Or     OLANZapine (zyPREXA) injection 10 mg     oxyCODONE IR (ROXICODONE) tablet 20 mg     pantoprazole (PROTONIX) EC tablet 40 mg     prazosin (MINIPRESS) capsule 1 mg     pregabalin (LYRICA) capsule 300 mg     propranolol (INDERAL) tablet 20 mg     QUEtiapine (SEROquel) tablet 700 mg     sennosides (SENOKOT) tablet 1-2 tablet     thiamine (B-1) tablet 100 mg     tiZANidine (ZANAFLEX) tablet 4 mg     topiramate (TOPAMAX) tablet 100 mg     Vitamin D3 (CHOLECALCIFEROL) tablet 25 mcg     No results found for this or any previous visit (from the past 168 hour(s)).   General appearance: good  Alert.   Affect: fair  Mood: fair    Speech:  normal.    Eye contact:  good.    Psychomotor behavior: normal  Gait: steady with walker  Abnormal movements:  none  Delusions: none  Hallucinations:   none  Thoughts: logical  Associations: intact  Judgement: good  Insight: good  Cognitions: intact in conversation  Memory:  intact in conversation  Orientation: normal    Not suicidal.  He needs sober setting and opiates until knee surgery    Imp:  Bipolar depressed, severe, recurrent  2.  PTSD  3.  [place] TBI  4.  Knee pain

## 2022-02-09 NOTE — PROGRESS NOTES
"SPIRITUAL HEALTH SERVICES   Spiritual Assessment Progress Note (Behavioral Health/CD Focus)  [facility] ([facility] 3BW    REFERRAL SOURCE: follow up    On this visit, I met with Jose M in pt room for 15 minutes. Pt returned 1 library book yesterday; writer provided 4 more today. Facilitated reflective conversation around pt experience, sources of hope and coping. Writer contacted SW at Jose M's request regarding pt concerns for placement.    EXPERIENCE OF ILLNESS/HOSPITALIZATION:  Jose M updated me on his situation, spoke about a phone call today notifying him that he will need to remove his belongings from his former group home within 2 weeks; spoke about his anxieties and hopes for \"what the future holds.\"    MEANING-MAKING:  Jose M talked about two different perspectives about hope: \"when I am in control of my life, hope is concrete expectation of what I am working toward. When I am depressed, down, hope is wishful thinking.\"    SPIRITUALITY/VALUES/Rastafari:      COPING/SPIRITUAL PRACTICES: today Jose M spoke about choosing to focus on the positive, prayer, reading    SUPPORT SYSTEMS: 1 friend who he is confident will  his belongings if needed.    PLAN: I will follow up 1-2x weekly.                                                                                                                                             Liss Kerns MDiv  Associate   Pager [phone]  Office [phone]  [facility] remains available 24/7 for emergent requests/referrals, either by having the switchboard page the on-call  or by entering an ASAP/STAT consult in Epic (this will also page the on-call ). Routine Epic consults receive an initial response within 24 hours.    "

## 2022-02-10 PROCEDURE — 250N000013 HC RX MED GY IP 250 OP 250 PS 637: Performed by: NURSE PRACTITIONER

## 2022-02-10 PROCEDURE — G0177 OPPS/PHP; TRAIN & EDUC SERV: HCPCS

## 2022-02-10 PROCEDURE — 99232 SBSQ HOSP IP/OBS MODERATE 35: CPT

## 2022-02-10 PROCEDURE — 250N000013 HC RX MED GY IP 250 OP 250 PS 637: Performed by: PSYCHIATRY & NEUROLOGY

## 2022-02-10 PROCEDURE — 124N000003 HC R&B MH SENIOR/ADOLESCENT

## 2022-02-10 RX ADMIN — LIOTHYRONINE SODIUM 25 MCG: 25 TABLET ORAL at 08:27

## 2022-02-10 RX ADMIN — DOCUSATE SODIUM 100 MG: 100 CAPSULE, LIQUID FILLED ORAL at 20:06

## 2022-02-10 RX ADMIN — TOPIRAMATE 100 MG: 100 TABLET, FILM COATED ORAL at 08:28

## 2022-02-10 RX ADMIN — PROPRANOLOL HYDROCHLORIDE 20 MG: 20 TABLET ORAL at 20:06

## 2022-02-10 RX ADMIN — TOPIRAMATE 100 MG: 100 TABLET, FILM COATED ORAL at 20:06

## 2022-02-10 RX ADMIN — LAMOTRIGINE 50 MG: 25 TABLET ORAL at 08:28

## 2022-02-10 RX ADMIN — PROPRANOLOL HYDROCHLORIDE 20 MG: 20 TABLET ORAL at 08:28

## 2022-02-10 RX ADMIN — PREGABALIN 300 MG: 100 CAPSULE ORAL at 20:06

## 2022-02-10 RX ADMIN — CHOLECALCIFEROL TAB 25 MCG (1000 UNIT) 25 MCG: 25 TAB at 08:28

## 2022-02-10 RX ADMIN — MULTIPLE VITAMINS W/ MINERALS TAB 1 TABLET: TAB at 08:28

## 2022-02-10 RX ADMIN — OXYCODONE HYDROCHLORIDE 20 MG: 10 TABLET ORAL at 16:11

## 2022-02-10 RX ADMIN — QUETIAPINE 700 MG: 300 TABLET, FILM COATED ORAL at 22:13

## 2022-02-10 RX ADMIN — ACAMPROSATE CALCIUM 666 MG: 333 TABLET, DELAYED RELEASE ORAL at 08:27

## 2022-02-10 RX ADMIN — OXYCODONE HYDROCHLORIDE 20 MG: 10 TABLET ORAL at 20:06

## 2022-02-10 RX ADMIN — ACETAMINOPHEN 975 MG: 325 TABLET, FILM COATED ORAL at 11:27

## 2022-02-10 RX ADMIN — PREGABALIN 300 MG: 100 CAPSULE ORAL at 08:28

## 2022-02-10 RX ADMIN — AMLODIPINE BESYLATE 5 MG: 5 TABLET ORAL at 08:28

## 2022-02-10 RX ADMIN — OXYCODONE HYDROCHLORIDE 20 MG: 10 TABLET ORAL at 06:18

## 2022-02-10 RX ADMIN — LEVOTHYROXINE SODIUM 75 MCG: 75 TABLET ORAL at 06:18

## 2022-02-10 RX ADMIN — NAPROXEN 500 MG: 500 TABLET ORAL at 08:28

## 2022-02-10 RX ADMIN — NICOTINE POLACRILEX 8 MG: 4 GUM, CHEWING ORAL at 21:31

## 2022-02-10 RX ADMIN — PANTOPRAZOLE SODIUM 40 MG: 40 TABLET, DELAYED RELEASE ORAL at 06:18

## 2022-02-10 RX ADMIN — THIAMINE HCL TAB 100 MG 100 MG: 100 TAB at 08:28

## 2022-02-10 RX ADMIN — NICOTINE POLACRILEX 8 MG: 4 GUM, CHEWING ORAL at 08:28

## 2022-02-10 RX ADMIN — OXYCODONE HYDROCHLORIDE 20 MG: 10 TABLET ORAL at 13:02

## 2022-02-10 RX ADMIN — NICOTINE POLACRILEX 8 MG: 4 GUM, CHEWING ORAL at 16:10

## 2022-02-10 RX ADMIN — NICOTINE POLACRILEX 8 MG: 4 GUM, CHEWING ORAL at 20:06

## 2022-02-10 RX ADMIN — ACAMPROSATE CALCIUM 666 MG: 333 TABLET, DELAYED RELEASE ORAL at 14:04

## 2022-02-10 RX ADMIN — NAPROXEN 500 MG: 500 TABLET ORAL at 17:37

## 2022-02-10 RX ADMIN — ACAMPROSATE CALCIUM 666 MG: 333 TABLET, DELAYED RELEASE ORAL at 20:06

## 2022-02-10 RX ADMIN — OXYCODONE HYDROCHLORIDE 20 MG: 10 TABLET ORAL at 09:28

## 2022-02-10 RX ADMIN — NICOTINE POLACRILEX 8 MG: 4 GUM, CHEWING ORAL at 12:38

## 2022-02-10 RX ADMIN — NICOTINE POLACRILEX 8 MG: 4 GUM, CHEWING ORAL at 17:37

## 2022-02-10 RX ADMIN — NICOTINE POLACRILEX 8 MG: 4 GUM, CHEWING ORAL at 06:19

## 2022-02-10 RX ADMIN — NICOTINE POLACRILEX 8 MG: 4 GUM, CHEWING ORAL at 22:23

## 2022-02-10 RX ADMIN — PREGABALIN 300 MG: 100 CAPSULE ORAL at 14:04

## 2022-02-10 RX ADMIN — PRAZOSIN HYDROCHLORIDE 1 MG: 1 CAPSULE ORAL at 22:15

## 2022-02-10 RX ADMIN — NICOTINE POLACRILEX 8 MG: 4 GUM, CHEWING ORAL at 09:28

## 2022-02-10 RX ADMIN — FOLIC ACID 1 MG: 1 TABLET ORAL at 08:27

## 2022-02-10 RX ADMIN — DOCUSATE SODIUM 100 MG: 100 CAPSULE, LIQUID FILLED ORAL at 08:27

## 2022-02-10 RX ADMIN — NICOTINE POLACRILEX 8 MG: 4 GUM, CHEWING ORAL at 14:04

## 2022-02-10 ASSESSMENT — ACTIVITIES OF DAILY LIVING (ADL)
LAUNDRY: UNABLE TO COMPLETE
HYGIENE/GROOMING: INDEPENDENT
DRESS: INDEPENDENT
ORAL_HYGIENE: INDEPENDENT

## 2022-02-10 NOTE — PROGRESS NOTES
[facility], [place]   Psychiatric Progress Note  Hospital Day: 33        Interim History:   The patient's care was discussed with the treatment team during the daily team meeting and staff's chart notes were reviewed. Patient seen by LATOYA Bennett CNP  Staff report patient spends most of his time reading in his room and slept about 5 hours last night. Did not take his full dose of nighttime seroquel because he did not want to be too tired, wanted to process the news he received last night about his mother. Patient did not report any acute medical concerns or side effects. No behavioral issues overnight, including violent or aggressive behaviors. Patient is medication adherent.     Upon interview, the patient reported high anxiety and depression due to finding out last night that his mother's medical condition has worsened and she may only have days-weeks to live. Patient is able to talk to his mother on the phone 1-2 times per day. States she is out in [place] and the thought of him not being there for his sister or possibly missing the  is devastating. Patient also reports increased frustration regarding his CADI waver and placement issues. Patient prefers an assisted living facility vs. A group home. Patient showed good insight today and stated he feels safe and supported in the hospital and has been coping appropriately with these stressors through spirituality, talk therapy,and reading.    Suicidal ideation: denies current or recent suicidal ideation or behaviors.    Homicidal ideation: denies current or recent homicidal ideation or behaviors.    Psychotic symptoms: Patient denies AH, VH, paranoia, delusions.     Medication side effects reported: Denies           Diagnoses:   1. Bipolar depressed, severe, recurrent  2.  PTSD  3.  Status post TBI  4.  Knee pain         Plan:     1. Patient requires sober setting and opiates until knee surgery.     2. Continue medication  "as prescribed. Next increase in Lamictal can be as early as next Wednesday 2/16.      3. Continue to provide support and reassess suicidal ideation in light of recent stressors.       Disposition Plan   Discharge location: Assisted Living   Legal Status: voluntary         Medications:       acamprosate  666 mg Oral TID     amLODIPine  5 mg Oral Daily     docusate sodium  100 mg Oral BID     folic acid  1 mg Oral Daily     lamoTRIgine  50 mg Oral Daily     levothyroxine  75 mcg Oral QAM AC     liothyronine  25 mcg Oral Daily     multivitamin w/minerals  1 tablet Oral Daily     naproxen  500 mg Oral BID w/meals     oxyCODONE IR  20 mg Oral 5x Daily     pantoprazole  40 mg Oral QAM AC     prazosin  1 mg Oral At Bedtime     pregabalin  300 mg Oral TID     propranolol  20 mg Oral BID     QUEtiapine  700 mg Oral At Bedtime     thiamine  100 mg Oral Daily     topiramate  100 mg Oral BID     cholecalciferol  25 mcg Oral Daily            Psychiatric Examination:     /73   Pulse 82   Temp 97.9  F (36.6  C) (Temporal)   Resp 16   Wt 136.9 kg (301 lb 14.4 oz)   SpO2 99%   BMI 35.99 kg/m    Weight is 301 lbs 14.4 oz  Body mass index is 35.99 kg/m .  Orthostatic Vitals  Report      Most Recent      Standing Orthostatic /69 02/10 0824    Standing Orthostatic Pulse (bpm) 83 02/10 0824            Appearance: awake, alert, adequately groomed and dressed in hospital scrubs  Attitude:  cooperative  Eye Contact:  good  Mood:  \"Frustrated, sad\"   Affect:  appropriate and in normal range and mood congruent  Speech:  clear, coherent  Psychomotor Behavior:  no evidence of tardive dyskinesia, dystonia, or tics  Thought Process:  logical, linear and goal oriented  Associations:  no loose associations  Thought Content:  no evidence of suicidal ideation or homicidal ideation and no evidence of psychotic thought  Insight:  good  Judgement:  intact  Oriented to:  time, person, and place  Attention Span and Concentration:  " intact  Recent and Remote Memory:  intact           Allergies:     Allergies   Allergen Reactions     Cucumber Extract Anaphylaxis     Cucumber the vegable     Hydroxyzine Hives     Previously unreported by patient, this is a new patient declaration as of 5/1/21.     Nsaids      No problem with oral NSAIDs--Toradol injection itching only.     Trazodone Itching     Per Patient          Labs:   No results found for this or any previous visit (from the past 24 hour(s)).         Precautions:     Behavioral Orders   Procedures     Code 2     Fall precautions     Routine Programming     As clinically indicated     Seizure precautions     Status 15     Every 15 minutes.     Suicide precautions     Patients on Suicide Precautions should have a Combination Diet ordered that includes a Diet selection(s) AND a Behavioral Tray selection for Safe Tray - with utensils, or Safe Tray - NO utensils         Entered by: Shayy Olvera on February 10, 2022 at 2:14 PM

## 2022-02-10 NOTE — PLAN OF CARE
Problem: Behavioral Health Plan of Care  Goal: Patient-Specific Goal (Individualization)  Outcome: Improving  Flowsheets (Taken 2/10/2022 0854)  Individualized Care Needs: sense of control over his situations, empowerment  Anxieties, Fears or Concerns: feeling helpless in regards to his mother's imminent death  Patient Vulnerabilities:    housing insecurity    traumatic event    substance abuse/addiction  Patient-Specific Goals (Include Timeframe): call mother and sister as support, pray, call friends for support  Patient Personal Strengths:    coping skills    family/social support    humor    insight into illness/situation    medication/treatment adherence    motivated for treatment  Note: Shift Summery:  Pt denies SI. Discussed coping skills over his current stressors regarding no housing, loss of CADI waver, need to move belongings out of his last apartment, and his mother's impending death. He expressed feelings of helplessness and powerless. He is frustrated he cannot be in [place] to support his mother and sister. Discussed what he can control and how he can support his family. He states he wants to bang his head against the wall, but understands it would not be helpful. He is insightful and able to control his impulses. He can remain safe. He continues to be able to use humor. He plans to call friends for support and pray as a means of coping. Pt isolative to his room.     Patient's Stated Goal for Shift:  call friends for support and pray    Goal Status:  Met    PRN 1130 tylenol 975 mg po for pain-beneficial    nicotine gum as needed for nicotine withdrawal          Medical Bed: Due to medical issues regarding chronic pain, impaired mobility, and past surgeries.     Goal: Optimized Coping Skills in Response to Life Stressors  Outcome: Improving

## 2022-02-10 NOTE — PLAN OF CARE
"Problem: Additional Goals  Goal: BEH OT Goal 1  Description: Will attend OT groups improve concentration and motivation by engaging in more challenging and success oriented options while also improving insight with comments/answers made about mental health needs.    Pt attended 1 out of 2 OT groups offered. Pt actively participated in occupational therapy clinic with 6 patients total x45 minutes. Pt was able to ask for assistance as needed, and independently return to a multi-step, creative expression task. Pt demonstrated excellent focus, sequencing, planning, problem solving, and attention to detail. Very organized in his task approach, with good time management observed. Social with peers and writer, and shared his satisfaction regarding how his project was turning out. He shared the news about his mom with writer, and articulated that it's hard to be \"locked up\" and unable to see his family. He appeared to be handling it as best as he could, and his project appeared to be a helpful distraction for him. He continues to be calm, pleasant, cooperative, and engaged throughout all groups he attends.    "

## 2022-02-10 NOTE — PLAN OF CARE
"Problem: Feelings of Worthlessness, Hopelessness or Excessive Guilt (Depressive Signs/Symptoms)  Goal: Enhanced Self-Esteem and Confidence (Depressive Signs/Symptoms)  Outcome: No Change     Problem: Mood Impairment (Depressive Signs/Symptoms)  Goal: Improved Mood Symptoms (Depressive Signs/Symptoms)  Outcome: No Change     Patient is engaged and cooperative when approached.  Spends most of shift reading in room.  Comes out to milieu for dinner and medications.  He endorses high anxiety and high depression explaining that he is frustrated with the situation he is in.  Patient explains that he is frustrated because he has no control over things in his life.  Patient says that he feels his depression is situational.  Patient declines PRN for anxiety stating that he feels it will not help.  Patient denies SI, HI, SIB, and hallucinations.  Patient endorses right knee pain, rating between 6-7/10 this shift.  Explains scheduled pain medications are helpful in \"taking the edge off\" but do not relieve pain entirely.      Patient reported that he found out this evening that his mother is expected to die from cancer within the next few days.  Patient states that he feels like he is \"living in a nightmare\" and expresses sadness about likely not being able to attend the .  Patient states that he feels like banging his head against the wall.  No self injurious behavior is observed and patient agrees to be safe on unit.  Patient requested to take partial dose of scheduled quetiapine stating that he does not want to be \"completely knocked out\" and states \"I have a lot to think about.\"  Patient stated that he would either take a partial dose or not take it at all.  400 mg quetiapine tablet administered and 300 mg tablet held.  Patient is otherwise compliant with medications as scheduled.  Will continue to monitor.  Tatianna Mares RN   "

## 2022-02-10 NOTE — PLAN OF CARE
Activities :  -Met with pt and discussed his care   -Rounded with team     -passionate care assisted living- Declined patient.      -[phone]. [facility] attempted to get a report on the patient's CADI waiver status. CTC left a voicemail and requested a call back.     -[facility] and Jessica received an email from [facility] stating their agency received the [place] from Jessica. [facility] requested the [place]. This [facility] does not have access to the [place]. [facility] will be waiting for CM to send these documents to this facility.      Addressed patient needs/concerns: Reviewed possible options for disposition, when he is stabilized.      Discharge Plan or Goal   Plan  Discharge to penitentiary or Group home setting when patient stabilizes.   Commitment  NA  Health Care Follow up Appointment:    Will need psych, therapy, pcp, and  mental michael cm  Medication Management Plan:  See providers notes  Housing:  [facility] #[phone]  Financial Follow ups:  He reports he has GA   SMRTed for SSDI benefits.  Care Team     Jr Giron MD  -[facility] #[phone]    CADI Waiver CM: [facility] #[phone]. [email or web address]  ? Arlin- supervisor for cadi cm [phone]    Joseph Oliveros's supervisor # [phone]  ? Admin #[phone]

## 2022-02-10 NOTE — PLAN OF CARE
Patient was still awake at the start of the shift, reading a book. He slept the whole night for 4.5  hours with few interruptions.

## 2022-02-11 PROCEDURE — 250N000013 HC RX MED GY IP 250 OP 250 PS 637: Performed by: PSYCHIATRY & NEUROLOGY

## 2022-02-11 PROCEDURE — 250N000013 HC RX MED GY IP 250 OP 250 PS 637: Performed by: NURSE PRACTITIONER

## 2022-02-11 PROCEDURE — 124N000003 HC R&B MH SENIOR/ADOLESCENT

## 2022-02-11 PROCEDURE — 99232 SBSQ HOSP IP/OBS MODERATE 35: CPT

## 2022-02-11 RX ADMIN — OXYCODONE HYDROCHLORIDE 20 MG: 10 TABLET ORAL at 13:12

## 2022-02-11 RX ADMIN — ACAMPROSATE CALCIUM 666 MG: 333 TABLET, DELAYED RELEASE ORAL at 19:58

## 2022-02-11 RX ADMIN — TOPIRAMATE 100 MG: 100 TABLET, FILM COATED ORAL at 08:41

## 2022-02-11 RX ADMIN — NICOTINE POLACRILEX 8 MG: 4 GUM, CHEWING ORAL at 19:38

## 2022-02-11 RX ADMIN — NICOTINE POLACRILEX 8 MG: 4 GUM, CHEWING ORAL at 08:49

## 2022-02-11 RX ADMIN — ACETAMINOPHEN 975 MG: 325 TABLET, FILM COATED ORAL at 18:32

## 2022-02-11 RX ADMIN — PRAZOSIN HYDROCHLORIDE 1 MG: 1 CAPSULE ORAL at 21:52

## 2022-02-11 RX ADMIN — OXYCODONE HYDROCHLORIDE 20 MG: 10 TABLET ORAL at 10:22

## 2022-02-11 RX ADMIN — ACAMPROSATE CALCIUM 666 MG: 333 TABLET, DELAYED RELEASE ORAL at 08:41

## 2022-02-11 RX ADMIN — ACAMPROSATE CALCIUM 666 MG: 333 TABLET, DELAYED RELEASE ORAL at 14:27

## 2022-02-11 RX ADMIN — PREGABALIN 300 MG: 100 CAPSULE ORAL at 14:27

## 2022-02-11 RX ADMIN — QUETIAPINE 700 MG: 300 TABLET, FILM COATED ORAL at 21:51

## 2022-02-11 RX ADMIN — LIOTHYRONINE SODIUM 25 MCG: 25 TABLET ORAL at 08:42

## 2022-02-11 RX ADMIN — PREGABALIN 300 MG: 100 CAPSULE ORAL at 19:59

## 2022-02-11 RX ADMIN — TOPIRAMATE 100 MG: 100 TABLET, FILM COATED ORAL at 19:59

## 2022-02-11 RX ADMIN — FOLIC ACID 1 MG: 1 TABLET ORAL at 08:42

## 2022-02-11 RX ADMIN — MULTIPLE VITAMINS W/ MINERALS TAB 1 TABLET: TAB at 08:41

## 2022-02-11 RX ADMIN — NICOTINE POLACRILEX 8 MG: 4 GUM, CHEWING ORAL at 06:26

## 2022-02-11 RX ADMIN — THIAMINE HCL TAB 100 MG 100 MG: 100 TAB at 08:41

## 2022-02-11 RX ADMIN — NICOTINE POLACRILEX 8 MG: 4 GUM, CHEWING ORAL at 21:50

## 2022-02-11 RX ADMIN — NICOTINE POLACRILEX 8 MG: 4 GUM, CHEWING ORAL at 14:27

## 2022-02-11 RX ADMIN — NICOTINE POLACRILEX 8 MG: 4 GUM, CHEWING ORAL at 13:13

## 2022-02-11 RX ADMIN — OXYCODONE HYDROCHLORIDE 20 MG: 10 TABLET ORAL at 16:04

## 2022-02-11 RX ADMIN — DOCUSATE SODIUM 100 MG: 100 CAPSULE, LIQUID FILLED ORAL at 08:41

## 2022-02-11 RX ADMIN — NAPROXEN 500 MG: 500 TABLET ORAL at 08:42

## 2022-02-11 RX ADMIN — OXYCODONE HYDROCHLORIDE 20 MG: 10 TABLET ORAL at 06:26

## 2022-02-11 RX ADMIN — NICOTINE POLACRILEX 8 MG: 4 GUM, CHEWING ORAL at 17:28

## 2022-02-11 RX ADMIN — LEVOTHYROXINE SODIUM 75 MCG: 75 TABLET ORAL at 06:26

## 2022-02-11 RX ADMIN — CHOLECALCIFEROL TAB 25 MCG (1000 UNIT) 25 MCG: 25 TAB at 08:42

## 2022-02-11 RX ADMIN — NAPROXEN 500 MG: 500 TABLET ORAL at 17:28

## 2022-02-11 RX ADMIN — PREGABALIN 300 MG: 100 CAPSULE ORAL at 08:41

## 2022-02-11 RX ADMIN — NICOTINE POLACRILEX 8 MG: 4 GUM, CHEWING ORAL at 18:33

## 2022-02-11 RX ADMIN — NICOTINE POLACRILEX 8 MG: 4 GUM, CHEWING ORAL at 16:12

## 2022-02-11 RX ADMIN — DOCUSATE SODIUM 100 MG: 100 CAPSULE, LIQUID FILLED ORAL at 19:59

## 2022-02-11 RX ADMIN — LAMOTRIGINE 50 MG: 25 TABLET ORAL at 08:41

## 2022-02-11 RX ADMIN — PANTOPRAZOLE SODIUM 40 MG: 40 TABLET, DELAYED RELEASE ORAL at 06:26

## 2022-02-11 RX ADMIN — OXYCODONE HYDROCHLORIDE 20 MG: 10 TABLET ORAL at 21:06

## 2022-02-11 RX ADMIN — PROPRANOLOL HYDROCHLORIDE 20 MG: 20 TABLET ORAL at 19:59

## 2022-02-11 ASSESSMENT — ACTIVITIES OF DAILY LIVING (ADL)
HYGIENE/GROOMING: INDEPENDENT
HYGIENE/GROOMING: INDEPENDENT
DRESS: INDEPENDENT
DRESS: INDEPENDENT
ORAL_HYGIENE: INDEPENDENT
ORAL_HYGIENE: INDEPENDENT
LAUNDRY: UNABLE TO COMPLETE
LAUNDRY: UNABLE TO COMPLETE

## 2022-02-11 NOTE — PLAN OF CARE
02/11/22 1516   Group Therapy Session   Group Attendance attended group session   Time Session Began 1425   Time Session Ended 1500   Total Time patient participated (minutes) 35   Total # Attendees 4   Group Type psychotherapeutic   Group Topic Covered Emotions/expression; relationship   Group Session Detail Patients were introduced to the Steve Window model and educated on how it is a tool for increasing self-awareness and team awareness. Also discussed was methods people use to avoid vulnerability and how shedding vulnerability armor builds resilience.   Patient Response/Contribution able to recall/repeat info presented;cooperative with task;expressed understanding of topic;listened actively;requested more information about topic;discussed personal experience with topic   Patient Response Detail Patient was active and appropriate in his participation in group. Appeared interested in the topic and stated he related to it.

## 2022-02-11 NOTE — PROGRESS NOTES
"[facility], [place]   Psychiatric Progress Note  Hospital Day: 34        Interim History:   The patient's care was discussed with the treatment team during the daily team meeting and staff's chart notes were reviewed. Patient seen by LATOYA Bennett CNP  Staff report patient slept soundly for 5.5 hours.     Upon interview, the patient was sleeping soundly. Patient was woken up for assessment and lunch. States he is coping as well as expected for his current stressors including his mother's health decline and difficulty with placement issues. Yesterday night he slept better than the night before but still \"not great.\" Depression 7/10.     Suicidal ideation: denies current or recent suicidal ideation or behaviors.    Homicidal ideation: denies current or recent homicidal ideation or behaviors.    Psychotic symptoms: Patient denies AH, VH, paranoia, delusions.     Medication side effects reported: No significant side effects.           Diagnoses:   1. Bipolar depressed, severe, recurrent  2.  PTSD  3.  Status post TBI  4.  Knee pain         Plan:     1. Patient requires sober setting and opiates until knee surgery.      2. Continue medication as prescribed. Next increase in Lamictal can be as early as next Wednesday 2/16.       3. Continue to provide support and reassess suicidal ideation in light of recent stressors.        Disposition Plan   Discharge location: Assisted Living   Legal Status: voluntary         Medications:       acamprosate  666 mg Oral TID     amLODIPine  5 mg Oral Daily     docusate sodium  100 mg Oral BID     folic acid  1 mg Oral Daily     lamoTRIgine  50 mg Oral Daily     levothyroxine  75 mcg Oral QAM AC     liothyronine  25 mcg Oral Daily     multivitamin w/minerals  1 tablet Oral Daily     naproxen  500 mg Oral BID w/meals     oxyCODONE IR  20 mg Oral 5x Daily     pantoprazole  40 mg Oral QAM AC     prazosin  1 mg Oral At Bedtime     pregabalin  300 mg Oral " TID     propranolol  20 mg Oral BID     QUEtiapine  700 mg Oral At Bedtime     thiamine  100 mg Oral Daily     topiramate  100 mg Oral BID     cholecalciferol  25 mcg Oral Daily            Psychiatric Examination:     /68 (BP Location: Right arm, Patient Position: Sitting)   Pulse 78   Temp 97.2  F (36.2  C) (Oral)   Resp 16   Wt 136.9 kg (301 lb 14.4 oz)   SpO2 96%   BMI 35.99 kg/m    Weight is 301 lbs 14.4 oz  Body mass index is 35.99 kg/m .  Orthostatic Vitals  Report      Most Recent      Standing Orthostatic /69 02/10 0824    Standing Orthostatic Pulse (bpm) 83 02/10 0824            Appearance: adequately groomed and dressed in hospital scrubs  Attitude:  cooperative  Eye Contact:  fair  Mood:  sad   Affect:  appropriate and in normal range and mood congruent  Speech:  clear, coherent  Psychomotor Behavior:  no evidence of tardive dyskinesia, dystonia, or tics  Thought Process:  logical, linear and goal oriented  Associations:  no loose associations  Thought Content:  no evidence of suicidal ideation or homicidal ideation and no evidence of psychotic thought  Insight:  good  Judgement:  intact  Oriented to:  time, person, and place  Attention Span and Concentration:  Unchanged  Recent and Remote Memory:  Unchanged           Allergies:     Allergies   Allergen Reactions     Cucumber Extract Anaphylaxis     Cucumber the vegable     Hydroxyzine Hives     Previously unreported by patient, this is a new patient declaration as of 5/1/21.     Nsaids      No problem with oral NSAIDs--Toradol injection itching only.     Trazodone Itching     Per Patient          Labs:   No results found for this or any previous visit (from the past 24 hour(s)).         Precautions:     Behavioral Orders   Procedures     Code 2     Fall precautions     Routine Programming     As clinically indicated     Seizure precautions     Status 15     Every 15 minutes.     Suicide precautions     Patients on Suicide Precautions  should have a Combination Diet ordered that includes a Diet selection(s) AND a Behavioral Tray selection for Safe Tray - with utensils, or Safe Tray - NO utensils         Entered by: Shayy Olvera on February 11, 2022 at 11:02 AM

## 2022-02-11 NOTE — PLAN OF CARE
"Activities :  -Met with pt and discussed his care   -Rounded with team     -passionate care assisted living- Declined patient.      [facility] called Jessica and Dona from [place] and attempted information regarding why the patient's CADI was terminated. Writer left a vm for both and requested a call back.     -Writer called [facility] front door and ask if patient's CADI can be reinstated. They notified writer that the waiver was terminated because has been in the hospital for longer than 30 days. She notified writer that patient will have to restart the process. [facility] provided information for a new referral. Yanet from front door notified writer that an  will call to schedule a [place]. She also notified writer that the process can take up to 7 weeks.      [facility] spoke with the patient regarding why he was terminated from [facility]. Vicente at [facility] told writer they terminated the patient due to failure to pay his rent. Patient told writer that he did not pay because \"I did not have any money. They also told me I didn't have to pay until I start receiving my social security money.\" Patient told writer that  he needs assistance with filling for social security disability income.   Addressed patient needs/concerns: Reviewed possible options for disposition, when he is stabilized.      Discharge Plan or Goal   Plan  New referrals made to assisted living facility. The new assisted living facility is currently reviewing the pt's paperwork.  Commitment    Health Care Follow up Appointment:    Will need psych, therapy, pcp, and  mental michael cm  Medication Management Plan:  See providers notes  Housing:  [facility] #[phone]  Financial Follow ups:  He reports he has GA   SMRTed for SSDI benefits.  Care Team     Jr Giron MD  -[facility] #[phone]    CADI Waiver CM: [facility] #[phone]. " [email or web address]  ? Arlin- supervisor for cadi  [phone]    Joseph Oliveros's supervisor # [phone]  ? Admin #[phone]

## 2022-02-11 NOTE — PLAN OF CARE
Activities :  -Met with pt and discussed his care   -Rounded with team     -passionate care assisted living- Declined patient.      CTC received a call from Jessica, patient's previous CADI worker. She notified writer that she will no longer be working with patient since he CADI waiver was terminated. She notified writer that she had been working with [facility] ([phone]) and has been trying to secure a placement for patient before his waiver was terminated. [facility] will  where Jessica left off and contact [facility] to coordinate services     Addressed patient needs/concerns: Reviewed possible options for disposition, when he is stabilized.      Discharge Plan or Goal   Plan  New referrals made to assisted living facility. The new assisted living facility is currently reviewing the pt's paperwork.  Commitment  NA  Health Care Follow up Appointment:    Will need psych, therapy, pcp, and  mental michael cm  Medication Management Plan:  See providers notes  Housing:  [facility] #[phone]  Financial Follow ups:  He reports he has GA   SMRTed for SSDI benefits.  Care Team     Jr Giron MD  -[facility] #[phone]    CADI Waiver CM: [facility] #[phone]. [email or web address]  ? Arlin- supervisor for cadi cm [phone]    Joseph Oliveros's supervisor

## 2022-02-11 NOTE — PLAN OF CARE
"  Problem: Cognitive Impairment (Depressive Signs/Symptoms)  Goal: Optimized Cognitive Function (Depressive Signs/Symptoms)  Outcome: Improving  Flowsheets (Taken 2/11/2022 1122)  Mutually Determined Action Steps (Optimized Cognitive Function):    remains focused during activity    follows step-by-step instructions     Problem: Psychomotor Impairment (Depressive Signs/Symptoms)  Goal: Improved Psychomotor Symptoms (Depressive Signs/Symptoms)  Intervention: Manage Psychomotor Movement  Recent Flowsheet Documentation  Taken 2/11/2022 0831 by Susan Iverson RN  Patient Performed Hygiene: dressed  Activity (Behavioral Health): up ad kathleen     Patient alert and oriented x4. Reports chronic knee pain 8/10 scheduled pain given - effective - patient able to nap and walk independently around the unit.   Calm and cooperative, isolative and withdrawn. States anxiety 8, depression 7, reports racing thoughts and frequently thinking about his mother and her current health decline. Patient stated that is coping with the situation , declined PRN medications, stated \"I am trying. I am doing the best I can.\" and \"I don't need more pills\". He denies SI, SIB or hallucinations. Contracts for safety.   Visible in the milieu for short time during meal time, not seen to attend group, social with selected peers.   "

## 2022-02-11 NOTE — PLAN OF CARE
"  Problem: Activity and Energy Impairment (Depressive Signs/Symptoms)  Goal: Optimized Energy Level (Depressive Signs/Symptoms)  Outcome: Improving     Problem: Cognitive Impairment (Depressive Signs/Symptoms)  Goal: Optimized Cognitive Function (Depressive Signs/Symptoms)  Outcome: Improving     Problem: Mood Impairment (Depressive Signs/Symptoms)  Goal: Improved Mood Symptoms (Depressive Signs/Symptoms)  Outcome: Improving     Patient had flat and blunted affect. Patient was visible in the milieu mostly isolative to self. Patient stated \"my mood is not the best but life goes on\" Patient declined to explain what made his mood not to the best. Patient verbalized having anxiety 8/10, depression 8/10, and right knee pain 9/10. Patient verbalized frustration on pain management because pain is reoccurring. Patient denied SI/HI/SIB/AH/VH/covid 19 symptoms and contracted for safety. Patient was given Nicotine gum 4 mg prn, Oxycodone 20 mg, and other scheduled medications. Patient had good appetite. Will continue to follow.     "

## 2022-02-11 NOTE — PLAN OF CARE
Activities :  -Met with pt and discussed his care   -Rounded with team     -passionate care assisted living- Declined patient.      [facility] called [facility] front door and placed a referral for CADI waiver.  worker will call the unit to schedule a [place]. [facility] was notified that this process could take several weeks.     [facility] Front Door  worker  [employer] : [phone]     Addressed patient needs/concerns: Reviewed possible options for disposition, when he is stabilized.      Discharge Plan or Goal   Plan  New referrals made to assisted living facility. The new assisted living facility is currently reviewing the pt's paperwork.  Commitment  NA  Health Care Follow up Appointment:    Will need psych, therapy, pcp, and  mental michael cm  Medication Management Plan:  See providers notes  Housing:  [facility] #[phone]  Financial Follow ups:  He reports he has GA   SMRTed for SSDI benefits.  Care Team     Jr Giron MD  -[facility] #[phone]    CADI Waiver CM: Jessica Lawson- [facility] #[phone]. [email or web address]  ? Arlin- supervisor for cadi cm [phone]    Joseph Oliveros's supervisor

## 2022-02-12 PROCEDURE — 250N000013 HC RX MED GY IP 250 OP 250 PS 637: Performed by: NURSE PRACTITIONER

## 2022-02-12 PROCEDURE — 250N000013 HC RX MED GY IP 250 OP 250 PS 637: Performed by: PSYCHIATRY & NEUROLOGY

## 2022-02-12 PROCEDURE — 124N000003 HC R&B MH SENIOR/ADOLESCENT

## 2022-02-12 RX ADMIN — PANTOPRAZOLE SODIUM 40 MG: 40 TABLET, DELAYED RELEASE ORAL at 06:26

## 2022-02-12 RX ADMIN — NICOTINE POLACRILEX 4 MG: 4 GUM, CHEWING ORAL at 08:20

## 2022-02-12 RX ADMIN — DOCUSATE SODIUM 100 MG: 100 CAPSULE, LIQUID FILLED ORAL at 20:04

## 2022-02-12 RX ADMIN — LIOTHYRONINE SODIUM 25 MCG: 25 TABLET ORAL at 08:20

## 2022-02-12 RX ADMIN — OXYCODONE HYDROCHLORIDE 20 MG: 10 TABLET ORAL at 06:26

## 2022-02-12 RX ADMIN — OXYCODONE HYDROCHLORIDE 20 MG: 10 TABLET ORAL at 09:06

## 2022-02-12 RX ADMIN — PREGABALIN 300 MG: 100 CAPSULE ORAL at 14:31

## 2022-02-12 RX ADMIN — PRAZOSIN HYDROCHLORIDE 1 MG: 1 CAPSULE ORAL at 22:44

## 2022-02-12 RX ADMIN — OXYCODONE HYDROCHLORIDE 20 MG: 10 TABLET ORAL at 16:01

## 2022-02-12 RX ADMIN — OXYCODONE HYDROCHLORIDE 20 MG: 10 TABLET ORAL at 20:08

## 2022-02-12 RX ADMIN — PROPRANOLOL HYDROCHLORIDE 20 MG: 20 TABLET ORAL at 20:05

## 2022-02-12 RX ADMIN — TOPIRAMATE 100 MG: 100 TABLET, FILM COATED ORAL at 08:19

## 2022-02-12 RX ADMIN — ACAMPROSATE CALCIUM 666 MG: 333 TABLET, DELAYED RELEASE ORAL at 08:19

## 2022-02-12 RX ADMIN — NICOTINE POLACRILEX 8 MG: 4 GUM, CHEWING ORAL at 22:45

## 2022-02-12 RX ADMIN — THIAMINE HCL TAB 100 MG 100 MG: 100 TAB at 08:20

## 2022-02-12 RX ADMIN — DIPHENHYDRAMINE HYDROCHLORIDE 50 MG: 25 CAPSULE ORAL at 09:09

## 2022-02-12 RX ADMIN — AMLODIPINE BESYLATE 5 MG: 5 TABLET ORAL at 08:20

## 2022-02-12 RX ADMIN — NICOTINE POLACRILEX 4 MG: 4 GUM, CHEWING ORAL at 06:26

## 2022-02-12 RX ADMIN — LEVOTHYROXINE SODIUM 75 MCG: 75 TABLET ORAL at 06:26

## 2022-02-12 RX ADMIN — MULTIPLE VITAMINS W/ MINERALS TAB 1 TABLET: TAB at 08:20

## 2022-02-12 RX ADMIN — NAPROXEN 500 MG: 500 TABLET ORAL at 08:20

## 2022-02-12 RX ADMIN — QUETIAPINE 700 MG: 300 TABLET, FILM COATED ORAL at 22:43

## 2022-02-12 RX ADMIN — ACAMPROSATE CALCIUM 666 MG: 333 TABLET, DELAYED RELEASE ORAL at 20:04

## 2022-02-12 RX ADMIN — NICOTINE POLACRILEX 8 MG: 4 GUM, CHEWING ORAL at 09:38

## 2022-02-12 RX ADMIN — OXYCODONE HYDROCHLORIDE 20 MG: 10 TABLET ORAL at 13:02

## 2022-02-12 RX ADMIN — NICOTINE POLACRILEX 8 MG: 4 GUM, CHEWING ORAL at 20:05

## 2022-02-12 RX ADMIN — PREGABALIN 300 MG: 100 CAPSULE ORAL at 08:20

## 2022-02-12 RX ADMIN — DOCUSATE SODIUM 100 MG: 100 CAPSULE, LIQUID FILLED ORAL at 08:19

## 2022-02-12 RX ADMIN — PREGABALIN 300 MG: 100 CAPSULE ORAL at 20:05

## 2022-02-12 RX ADMIN — NICOTINE POLACRILEX 8 MG: 4 GUM, CHEWING ORAL at 16:01

## 2022-02-12 RX ADMIN — NICOTINE POLACRILEX 8 MG: 4 GUM, CHEWING ORAL at 14:31

## 2022-02-12 RX ADMIN — PROPRANOLOL HYDROCHLORIDE 20 MG: 20 TABLET ORAL at 08:19

## 2022-02-12 RX ADMIN — NICOTINE POLACRILEX 8 MG: 4 GUM, CHEWING ORAL at 21:43

## 2022-02-12 RX ADMIN — NICOTINE POLACRILEX 8 MG: 4 GUM, CHEWING ORAL at 17:40

## 2022-02-12 RX ADMIN — FOLIC ACID 1 MG: 1 TABLET ORAL at 08:20

## 2022-02-12 RX ADMIN — NICOTINE POLACRILEX 8 MG: 4 GUM, CHEWING ORAL at 13:02

## 2022-02-12 RX ADMIN — LAMOTRIGINE 50 MG: 25 TABLET ORAL at 08:20

## 2022-02-12 RX ADMIN — NAPROXEN 500 MG: 500 TABLET ORAL at 17:40

## 2022-02-12 RX ADMIN — ACAMPROSATE CALCIUM 666 MG: 333 TABLET, DELAYED RELEASE ORAL at 14:31

## 2022-02-12 RX ADMIN — TOPIRAMATE 100 MG: 100 TABLET, FILM COATED ORAL at 20:04

## 2022-02-12 RX ADMIN — CHOLECALCIFEROL TAB 25 MCG (1000 UNIT) 25 MCG: 25 TAB at 08:20

## 2022-02-12 ASSESSMENT — ACTIVITIES OF DAILY LIVING (ADL)
HYGIENE/GROOMING: INDEPENDENT
DRESS: INDEPENDENT
LAUNDRY: UNABLE TO COMPLETE
LAUNDRY: UNABLE TO COMPLETE
HYGIENE/GROOMING: INDEPENDENT
ORAL_HYGIENE: INDEPENDENT
ORAL_HYGIENE: INDEPENDENT
DRESS: INDEPENDENT

## 2022-02-12 NOTE — PLAN OF CARE
Patient slept comfortably and soundly for a total of 6.75 hours the whole night. Nothing unusual noted. He took all his early morning meds.

## 2022-02-12 NOTE — PLAN OF CARE
Problem: Mood Impairment (Depressive Signs/Symptoms)  Goal: Improved Mood Symptoms (Depressive Signs/Symptoms)  Outcome: No Change  Note: Patient has been more depressed and sad after he was informed about his mother situation. He denied suicidal ideations, or wishing to be dead. Reported sadness and anxiety related to his current situation. Denied hallucinations or delusions.   Behavior is controlled, however, patient declined to attend offered group. He has been reading a book in his room. Out for meals and to request nicotine gum. Observed to be pleasant and conversing with other patients on the unit. Ate most of his double portion, but said his appetite is diminished. Followed directions well. Communicates needs well. Pleasant on approach with staff.   Thoughts are linear. Speech is clear and organized. Mood is depressed, affect is blunted. Reports racing thoughts at times, but able to focus on reading. Memory is intact.   Patient has chronic right knee pain, controlled with scheduled Oxycodone and Naproxen. Patient uses a knee brace. Rated pain between 8 and 5.   Takes medications as prescribed. No SE reported or assessed at this time.     Addendum: PRN Tylenol PO given for report of headache, later described as effective.   Intervention: Promote Mood Improvement  Recent Flowsheet Documentation  Taken 2/11/2022 1739 by Medina Baron, RN  Diversional Activity:    reading    television

## 2022-02-12 NOTE — PLAN OF CARE
Problem: Suicidal Behavior  Goal: Suicidal Behavior is Absent or Managed  Outcome: Improving  Flowsheets (Taken 2/12/2022 1003)  Mutually Determined Action Steps (Suicidal Behavior Absent/Managed):    identifies crisis plan    identifies home safety strategy    sets future-oriented goal    shares suicidal thoughts    verbalizes safety check rationale     Patient calm and cooperative. Continuous to be withdrawn and isolative but has been more talkative this morning comparing to the day before.   Patient shared with the writer his feelings about his mother deteriorating terminal illness and talked about his future safety plan since  she was 99% of what made me not pull the trigger last time when I attempted to commit suicide . Patient contracted for safety and denies any SI or SIB.    Patient rated his anxiety and depression as  high  and both about 8/10.   Patient stated that his anxiety usually goes down after calling and talking to his mom and that his depression is more  situational  and rather related to his mom health, the chronic knee pain, issues with his CADI waiver and placement.   Patient reported interrupted sleep during the night, good appetite and denies new medical issues.

## 2022-02-13 PROCEDURE — 250N000013 HC RX MED GY IP 250 OP 250 PS 637: Performed by: PSYCHIATRY & NEUROLOGY

## 2022-02-13 PROCEDURE — 250N000013 HC RX MED GY IP 250 OP 250 PS 637: Performed by: NURSE PRACTITIONER

## 2022-02-13 PROCEDURE — 124N000003 HC R&B MH SENIOR/ADOLESCENT

## 2022-02-13 RX ADMIN — DOCUSATE SODIUM 100 MG: 100 CAPSULE, LIQUID FILLED ORAL at 09:35

## 2022-02-13 RX ADMIN — LAMOTRIGINE 50 MG: 25 TABLET ORAL at 09:34

## 2022-02-13 RX ADMIN — PRAZOSIN HYDROCHLORIDE 1 MG: 1 CAPSULE ORAL at 22:14

## 2022-02-13 RX ADMIN — MULTIPLE VITAMINS W/ MINERALS TAB 1 TABLET: TAB at 09:35

## 2022-02-13 RX ADMIN — NICOTINE POLACRILEX 8 MG: 4 GUM, CHEWING ORAL at 22:14

## 2022-02-13 RX ADMIN — NICOTINE POLACRILEX 8 MG: 4 GUM, CHEWING ORAL at 16:02

## 2022-02-13 RX ADMIN — FOLIC ACID 1 MG: 1 TABLET ORAL at 09:35

## 2022-02-13 RX ADMIN — THIAMINE HCL TAB 100 MG 100 MG: 100 TAB at 09:33

## 2022-02-13 RX ADMIN — NAPROXEN 500 MG: 500 TABLET ORAL at 09:35

## 2022-02-13 RX ADMIN — PROPRANOLOL HYDROCHLORIDE 20 MG: 20 TABLET ORAL at 19:53

## 2022-02-13 RX ADMIN — PREGABALIN 300 MG: 100 CAPSULE ORAL at 19:53

## 2022-02-13 RX ADMIN — NICOTINE POLACRILEX 8 MG: 4 GUM, CHEWING ORAL at 19:53

## 2022-02-13 RX ADMIN — ACAMPROSATE CALCIUM 666 MG: 333 TABLET, DELAYED RELEASE ORAL at 14:25

## 2022-02-13 RX ADMIN — PREGABALIN 300 MG: 100 CAPSULE ORAL at 09:34

## 2022-02-13 RX ADMIN — NICOTINE POLACRILEX 8 MG: 4 GUM, CHEWING ORAL at 14:25

## 2022-02-13 RX ADMIN — LIOTHYRONINE SODIUM 25 MCG: 25 TABLET ORAL at 09:34

## 2022-02-13 RX ADMIN — NICOTINE POLACRILEX 8 MG: 4 GUM, CHEWING ORAL at 13:00

## 2022-02-13 RX ADMIN — ACAMPROSATE CALCIUM 666 MG: 333 TABLET, DELAYED RELEASE ORAL at 19:52

## 2022-02-13 RX ADMIN — OXYCODONE HYDROCHLORIDE 20 MG: 10 TABLET ORAL at 16:02

## 2022-02-13 RX ADMIN — OXYCODONE HYDROCHLORIDE 20 MG: 10 TABLET ORAL at 09:41

## 2022-02-13 RX ADMIN — CHOLECALCIFEROL TAB 25 MCG (1000 UNIT) 25 MCG: 25 TAB at 09:32

## 2022-02-13 RX ADMIN — TOPIRAMATE 100 MG: 100 TABLET, FILM COATED ORAL at 09:34

## 2022-02-13 RX ADMIN — NICOTINE POLACRILEX 8 MG: 4 GUM, CHEWING ORAL at 09:35

## 2022-02-13 RX ADMIN — OXYCODONE HYDROCHLORIDE 20 MG: 10 TABLET ORAL at 06:29

## 2022-02-13 RX ADMIN — ACETAMINOPHEN 975 MG: 325 TABLET, FILM COATED ORAL at 14:25

## 2022-02-13 RX ADMIN — ACAMPROSATE CALCIUM 666 MG: 333 TABLET, DELAYED RELEASE ORAL at 09:33

## 2022-02-13 RX ADMIN — NAPROXEN 500 MG: 500 TABLET ORAL at 17:54

## 2022-02-13 RX ADMIN — QUETIAPINE 700 MG: 300 TABLET, FILM COATED ORAL at 22:14

## 2022-02-13 RX ADMIN — NICOTINE POLACRILEX 8 MG: 4 GUM, CHEWING ORAL at 17:54

## 2022-02-13 RX ADMIN — NICOTINE POLACRILEX 8 MG: 4 GUM, CHEWING ORAL at 20:58

## 2022-02-13 RX ADMIN — LEVOTHYROXINE SODIUM 75 MCG: 75 TABLET ORAL at 06:29

## 2022-02-13 RX ADMIN — OXYCODONE HYDROCHLORIDE 20 MG: 10 TABLET ORAL at 20:58

## 2022-02-13 RX ADMIN — DOCUSATE SODIUM 100 MG: 100 CAPSULE, LIQUID FILLED ORAL at 19:53

## 2022-02-13 RX ADMIN — PANTOPRAZOLE SODIUM 40 MG: 40 TABLET, DELAYED RELEASE ORAL at 06:29

## 2022-02-13 RX ADMIN — PREGABALIN 300 MG: 100 CAPSULE ORAL at 14:25

## 2022-02-13 RX ADMIN — OXYCODONE HYDROCHLORIDE 20 MG: 10 TABLET ORAL at 13:00

## 2022-02-13 RX ADMIN — TOPIRAMATE 100 MG: 100 TABLET, FILM COATED ORAL at 19:52

## 2022-02-13 ASSESSMENT — ACTIVITIES OF DAILY LIVING (ADL)
DRESS: INDEPENDENT
LAUNDRY: UNABLE TO COMPLETE
ORAL_HYGIENE: INDEPENDENT
DRESS: INDEPENDENT
HYGIENE/GROOMING: INDEPENDENT
LAUNDRY: UNABLE TO COMPLETE
ORAL_HYGIENE: INDEPENDENT
HYGIENE/GROOMING: INDEPENDENT

## 2022-02-13 NOTE — PLAN OF CARE
Problem: Mood Impairment (Depressive Signs/Symptoms)  Goal: Improved Mood Symptoms (Depressive Signs/Symptoms)  Outcome: No Change  Note:   No changes in patient status this shift. Patient rated depression at 7/10 and anxiety at 8/10. He denied suicidal ideations, or wishing to be dead. Reported sadness and anxiety related to his current situation. Denied hallucinations or delusions.   Behavior is controlled, however, patient declined to attend offered group. He has been reading a book in his room. Out for meals and to request nicotine gum. Observed to be pleasant and conversing with other patients on the unit. Ate most of his double portion, but said his appetite is diminished. Followed directions well. Communicates needs well. Pleasant on approach with staff.   Thoughts are organized. Speech is clear. Mood is depressed, affect is blunted. Reports racing thoughts at times, but able to focus on reading. Memory is intact.   Patient has chronic right knee pain, controlled with scheduled Oxycodone and Naproxen. Patient uses a knee brace. Rated pain between 8 and 5.   Takes medications as prescribed. No SE reported or assessed at this time.  Intervention: Promote Mood Improvement  Recent Flowsheet Documentation  Taken 2/12/2022 1934 by Medina Baron, RN  Diversional Activity:   reading   television

## 2022-02-13 NOTE — PLAN OF CARE
Patient slept 3 hours this shift. Medication compliant. On 15 minute checks. No complaints this shift.

## 2022-02-13 NOTE — PLAN OF CARE
Problem: Suicidal Behavior  Goal: Suicidal Behavior is Absent or Managed  Outcome: Improving  Flowsheets (Taken 2/13/2022 1115)  Mutually Determined Action Steps (Suicidal Behavior Absent/Managed):    sets future-oriented goal    shares suicidal thoughts     Problem: Activity and Energy Impairment (Depressive Signs/Symptoms)  Goal: Optimized Energy Level (Depressive Signs/Symptoms)  Intervention: Optimize Energy Level  Recent Flowsheet Documentation  Taken 2/13/2022 0926 by Susan Iverson RN  Patient Performed Hygiene: dressed  Activity (Behavioral Health): up ad kathleen     Problem: Mood Impairment (Depressive Signs/Symptoms)  Goal: Improved Mood Symptoms (Depressive Signs/Symptoms)  Outcome: No Change     Patient has been more isolative and withdrawn this morning. Spent most of the shift in his room, resting in bed. Stated that couldn't sleep last night and is feeling tired. Affect is flat, sad, and depressed. He was visible in the milieu for both meals, appetite is good.

## 2022-02-14 PROCEDURE — 250N000013 HC RX MED GY IP 250 OP 250 PS 637: Performed by: PSYCHIATRY & NEUROLOGY

## 2022-02-14 PROCEDURE — 124N000003 HC R&B MH SENIOR/ADOLESCENT

## 2022-02-14 PROCEDURE — 250N000013 HC RX MED GY IP 250 OP 250 PS 637: Performed by: NURSE PRACTITIONER

## 2022-02-14 PROCEDURE — 99232 SBSQ HOSP IP/OBS MODERATE 35: CPT

## 2022-02-14 PROCEDURE — G0177 OPPS/PHP; TRAIN & EDUC SERV: HCPCS

## 2022-02-14 RX ADMIN — OXYCODONE HYDROCHLORIDE 20 MG: 10 TABLET ORAL at 16:06

## 2022-02-14 RX ADMIN — PREGABALIN 300 MG: 100 CAPSULE ORAL at 14:09

## 2022-02-14 RX ADMIN — NICOTINE POLACRILEX 8 MG: 4 GUM, CHEWING ORAL at 19:57

## 2022-02-14 RX ADMIN — ACETAMINOPHEN 975 MG: 325 TABLET, FILM COATED ORAL at 12:05

## 2022-02-14 RX ADMIN — NICOTINE POLACRILEX 8 MG: 4 GUM, CHEWING ORAL at 10:31

## 2022-02-14 RX ADMIN — NICOTINE POLACRILEX 8 MG: 4 GUM, CHEWING ORAL at 21:04

## 2022-02-14 RX ADMIN — DIPHENHYDRAMINE HYDROCHLORIDE 50 MG: 25 CAPSULE ORAL at 10:31

## 2022-02-14 RX ADMIN — NICOTINE POLACRILEX 8 MG: 4 GUM, CHEWING ORAL at 13:23

## 2022-02-14 RX ADMIN — CHOLECALCIFEROL TAB 25 MCG (1000 UNIT) 25 MCG: 25 TAB at 08:15

## 2022-02-14 RX ADMIN — DOCUSATE SODIUM 100 MG: 100 CAPSULE, LIQUID FILLED ORAL at 08:15

## 2022-02-14 RX ADMIN — NICOTINE POLACRILEX 8 MG: 4 GUM, CHEWING ORAL at 14:54

## 2022-02-14 RX ADMIN — TOPIRAMATE 100 MG: 100 TABLET, FILM COATED ORAL at 08:15

## 2022-02-14 RX ADMIN — NICOTINE POLACRILEX 8 MG: 4 GUM, CHEWING ORAL at 09:29

## 2022-02-14 RX ADMIN — ACAMPROSATE CALCIUM 666 MG: 333 TABLET, DELAYED RELEASE ORAL at 14:09

## 2022-02-14 RX ADMIN — OXYCODONE HYDROCHLORIDE 20 MG: 10 TABLET ORAL at 19:57

## 2022-02-14 RX ADMIN — OXYCODONE HYDROCHLORIDE 20 MG: 10 TABLET ORAL at 13:24

## 2022-02-14 RX ADMIN — NICOTINE POLACRILEX 8 MG: 4 GUM, CHEWING ORAL at 06:21

## 2022-02-14 RX ADMIN — NICOTINE POLACRILEX 8 MG: 4 GUM, CHEWING ORAL at 22:30

## 2022-02-14 RX ADMIN — THIAMINE HCL TAB 100 MG 100 MG: 100 TAB at 08:15

## 2022-02-14 RX ADMIN — OXYCODONE HYDROCHLORIDE 20 MG: 10 TABLET ORAL at 06:21

## 2022-02-14 RX ADMIN — PANTOPRAZOLE SODIUM 40 MG: 40 TABLET, DELAYED RELEASE ORAL at 06:21

## 2022-02-14 RX ADMIN — NICOTINE POLACRILEX 8 MG: 4 GUM, CHEWING ORAL at 17:27

## 2022-02-14 RX ADMIN — ACAMPROSATE CALCIUM 666 MG: 333 TABLET, DELAYED RELEASE ORAL at 08:15

## 2022-02-14 RX ADMIN — LEVOTHYROXINE SODIUM 75 MCG: 75 TABLET ORAL at 06:21

## 2022-02-14 RX ADMIN — NICOTINE POLACRILEX 8 MG: 4 GUM, CHEWING ORAL at 16:06

## 2022-02-14 RX ADMIN — PREGABALIN 300 MG: 100 CAPSULE ORAL at 19:57

## 2022-02-14 RX ADMIN — PRAZOSIN HYDROCHLORIDE 1 MG: 1 CAPSULE ORAL at 22:31

## 2022-02-14 RX ADMIN — LIOTHYRONINE SODIUM 25 MCG: 25 TABLET ORAL at 08:15

## 2022-02-14 RX ADMIN — QUETIAPINE 700 MG: 300 TABLET, FILM COATED ORAL at 22:30

## 2022-02-14 RX ADMIN — NICOTINE POLACRILEX 8 MG: 4 GUM, CHEWING ORAL at 08:17

## 2022-02-14 RX ADMIN — FOLIC ACID 1 MG: 1 TABLET ORAL at 08:15

## 2022-02-14 RX ADMIN — ACAMPROSATE CALCIUM 666 MG: 333 TABLET, DELAYED RELEASE ORAL at 19:57

## 2022-02-14 RX ADMIN — NICOTINE POLACRILEX 8 MG: 4 GUM, CHEWING ORAL at 12:05

## 2022-02-14 RX ADMIN — NAPROXEN 500 MG: 500 TABLET ORAL at 17:29

## 2022-02-14 RX ADMIN — MULTIPLE VITAMINS W/ MINERALS TAB 1 TABLET: TAB at 08:15

## 2022-02-14 RX ADMIN — LAMOTRIGINE 50 MG: 25 TABLET ORAL at 08:15

## 2022-02-14 RX ADMIN — PROPRANOLOL HYDROCHLORIDE 20 MG: 20 TABLET ORAL at 19:57

## 2022-02-14 RX ADMIN — PREGABALIN 300 MG: 100 CAPSULE ORAL at 08:15

## 2022-02-14 RX ADMIN — NAPROXEN 500 MG: 500 TABLET ORAL at 08:15

## 2022-02-14 RX ADMIN — OXYCODONE HYDROCHLORIDE 20 MG: 10 TABLET ORAL at 09:29

## 2022-02-14 RX ADMIN — TOPIRAMATE 100 MG: 100 TABLET, FILM COATED ORAL at 19:57

## 2022-02-14 RX ADMIN — DOCUSATE SODIUM 100 MG: 100 CAPSULE, LIQUID FILLED ORAL at 19:57

## 2022-02-14 ASSESSMENT — ACTIVITIES OF DAILY LIVING (ADL)
DRESS: INDEPENDENT
HYGIENE/GROOMING: INDEPENDENT
LAUNDRY: UNABLE TO COMPLETE
ORAL_HYGIENE: INDEPENDENT

## 2022-02-14 NOTE — PLAN OF CARE
Activities :  -Met with pt and discussed his care   -Rounded with team     -passionate care assisted living- Declined patient.         Addressed patient needs/concerns: Reviewed possible options for disposition, when he is stabilized.      Discharge Plan or Goal   Plan  New referrals made to assisted living facility. The new assisted living facility is currently reviewing the pt's paperwork.  Commitment  NA  Health Care Follow up Appointment:    Will need psych, therapy, pcp, and  mental michael cm  Medication Management Plan:  See providers notes  Housing:  [facility] #[phone]  Financial Follow ups:  He reports he has GA   SMRTed for SSDI benefits.  Care Team     Jr Giron MD  -[facility] #[phone]    CADI Waiver CM: Jessica Lawson- [facility] #[phone]. [email or web address]  ? Arlin- supervisor for cadi cm [phone]    Joseph Oliveros's supervisor # [phone]  ? Admin #[phone]

## 2022-02-14 NOTE — PLAN OF CARE
Problem: Suicidal Behavior  Goal: Suicidal Behavior is Absent or Managed  Outcome: Improving  Flowsheets (Taken 2/13/2022 1115)  Mutually Determined Action Steps (Suicidal Behavior Absent/Managed):    sets future-oriented goal    shares suicidal thoughts     Patient alert and oriented x4. Chronic R knee pain - scheduled pain management effective so far.   Calm and cooperative. Mood is mostly sad, tense. Reports anxiety and depression mostly caused by mother deteriorating health. Patient denies SI, SIB and hallucinations. Visible in the milieu during meal time, isolative and withdrawn but seen in group today. Apetite is good. Sleep is interrupted during the night, and patient feels unrested in the morning.

## 2022-02-14 NOTE — PLAN OF CARE
Patient slept well the whole night for 6.25   hours. Nothing unusual noted. No c/o pain on the right knee.

## 2022-02-14 NOTE — PROGRESS NOTES
[facility], [place]   Psychiatric Progress Note  Hospital Day: 37        Interim History:   The patient's care was discussed with the treatment team during the daily team meeting and staff's chart notes were reviewed. Patient seen by LATOYA Bennett CNP  Staff report patient slept the whole night for 6.25 hours. Patient is medication adherent. Patient is attending groups. Patient is sleeping well. Patient is eating adequately. Patient is attending to ADLs.    Upon interview, the patient appeared down and stated he had a hard phone call with his sister regarding his mother last night. Reports his mother may be drawing her last breaths. Patient reports to be working on a safety plan regarding suicidal ideation.     Suicidal ideation: denies current or recent suicidal ideation or behaviors.    Homicidal ideation: denies current or recent homicidal ideation or behaviors.    Psychotic symptoms: Patient denies AH, VH, paranoia, delusions.            Diagnoses:   1. Bipolar depressed, severe, recurrent  2.  PTSD  3.  Status post TBI  4.  Knee pain         Plan:     1. Patient requires sober setting and opiates until knee surgery. Social work renewing CADI waver.      2. Continue medication as prescribed. Next increase in Lamictal can be as early as next Wednesday 2/16.       3. Continue to provide support and reassess suicidal ideation in light of recent stressors.          Disposition Plan   Legal Status: voluntary         Medications:       acamprosate  666 mg Oral TID     amLODIPine  5 mg Oral Daily     docusate sodium  100 mg Oral BID     folic acid  1 mg Oral Daily     lamoTRIgine  50 mg Oral Daily     levothyroxine  75 mcg Oral QAM AC     liothyronine  25 mcg Oral Daily     multivitamin w/minerals  1 tablet Oral Daily     naproxen  500 mg Oral BID w/meals     oxyCODONE IR  20 mg Oral 5x Daily     pantoprazole  40 mg Oral QAM AC     prazosin  1 mg Oral At Bedtime     pregabalin  300  mg Oral TID     propranolol  20 mg Oral BID     QUEtiapine  700 mg Oral At Bedtime     thiamine  100 mg Oral Daily     topiramate  100 mg Oral BID     cholecalciferol  25 mcg Oral Daily            Psychiatric Examination:     BP 95/61 (BP Location: Right arm, Patient Position: Sitting)   Pulse 75   Temp 98.2  F (36.8  C) (Oral)   Resp 16   Wt 137.3 kg (302 lb 9.6 oz)   SpO2 97%   BMI 36.07 kg/m    Weight is 302 lbs 9.6 oz  Body mass index is 36.07 kg/m .  Orthostatic Vitals  Report      Most Recent      Sitting Orthostatic /73 02/13 0926    Sitting Orthostatic Pulse (bpm) 65 02/13 0926    Standing Orthostatic /65 02/13 0926    Standing Orthostatic Pulse (bpm) 80 02/13 0926            Appearance: awake, alert, adequately groomed and dressed in hospital scrubs  Attitude:  cooperative  Eye Contact:  poor   Mood:  sad   Affect:  appropriate and in normal range and mood congruent  Speech:  clear, coherent  Psychomotor Behavior:  no evidence of tardive dyskinesia, dystonia, or tics  Thought Process:  logical, linear and goal oriented  Associations:  no loose associations  Thought Content:  no evidence of suicidal ideation or homicidal ideation  Insight:  good  Judgement:  intact  Oriented to:  time, person, and place  Attention Span and Concentration:  intact  Recent and Remote Memory:  intact           Allergies:     Allergies   Allergen Reactions     Cucumber Extract Anaphylaxis     Cucumber the vegable     Hydroxyzine Hives     Previously unreported by patient, this is a new patient declaration as of 5/1/21.     Nsaids      No problem with oral NSAIDs--Toradol injection itching only.     Trazodone Itching     Per Patient          Labs:   No results found for this or any previous visit (from the past 24 hour(s)).         Precautions:     Behavioral Orders   Procedures     Code 2     Fall precautions     Routine Programming     As clinically indicated     Seizure precautions     Status 15     Every 15  minutes.     Suicide precautions     Patients on Suicide Precautions should have a Combination Diet ordered that includes a Diet selection(s) AND a Behavioral Tray selection for Safe Tray - with utensils, or Safe Tray - NO utensils         Entered by: Shayy Olvera on February 14, 2022 at 10:01 AM

## 2022-02-14 NOTE — PLAN OF CARE
Problem: Behavioral Health Plan of Care  Goal: Adheres to Safety Considerations for Self and Others  Note:   No changes in patient status this shift. Patient rated depression at 7/10 and anxiety at 8/10. He denied suicidal ideations, or wishing to be dead. Reported sadness and anxiety related to his current situation. Denied hallucinations or delusions.   Behavior is controlled, however, patient declined to attend offered group. He has been reading a book in his room. Out for meals and to request nicotine gum. Observed to be pleasant and conversing with other patients on the unit. Ate most of his double portion, but said his appetite is diminished. Followed directions well. Communicates needs well. Pleasant on approach with staff.   Thoughts are organized. Speech is clear. Mood is depressed, affect is blunted. Reports racing thoughts at times, but able to focus on reading. Memory is intact.   Patient has chronic right knee pain, controlled with scheduled Oxycodone and Naproxen. Patient uses a knee brace. Rated pain between 8 and 5.   Takes medications as prescribed. No SE reported or assessed at this time.

## 2022-02-14 NOTE — PLAN OF CARE
Problem: Additional Goals  Goal: BEH OT Goal 1  Description: Will attend OT groups improve concentration and motivation by engaging in more challenging and success oriented options while also improving insight with comments/answers made about mental health needs.    Intervention: Pt attended 2 of 3 OT groups: OT Clinic with 5 peers.     Patient Response: Pt joined for last 15 minutes of first clinic group and elected to continue working on a wood box staining project. Pt IND gathered all needed supplies and materials and appeared very comfortable requesting any additional supplies from writer. Pt was engaged and focused on project for duration of group other than to one two occassions, offer help of advice to a peer on their project. Pt helped writer clean up at end of group and thanked writer for their time.     Duration of attendance: 15 min group 1(no charge), 45 min group 2    Mood/Affect: Pleasant       Plan: Patient encouraged to maintain attendance for continued ongoing support in working towards occupational therapy goals to support overall treatment/care.

## 2022-02-14 NOTE — PLAN OF CARE
BEHAVIORAL TEAM DISCUSSION    Participants: LATOYA Wooten, Susan Iverson RN, Lorna Bui, MSW, LGSW  Progress: Adequate  Anticipated length of stay: 5-7 days.  Continued Stay Criteria/Rationale: unable to care for himself. Needs a safe and stable environment. CTC seeking [facility] placement.   Medical/Physical: see H&P  Precautions:   Behavioral Orders   Procedures     Code 2     Fall precautions     Routine Programming     As clinically indicated     Seizure precautions     Status 15     Every 15 minutes.     Suicide precautions     Patients on Suicide Precautions should have a Combination Diet ordered that includes a Diet selection(s) AND a Behavioral Tray selection for Safe Tray - with utensils, or Safe Tray - NO utensils       Plan:   unable to care for himself. Needs a safe and stable environment. CTC seeking care home placement. [place].   Rationale for change in precautions or plan: continue with current plan/

## 2022-02-15 PROCEDURE — 250N000013 HC RX MED GY IP 250 OP 250 PS 637: Performed by: PSYCHIATRY & NEUROLOGY

## 2022-02-15 PROCEDURE — 99232 SBSQ HOSP IP/OBS MODERATE 35: CPT

## 2022-02-15 PROCEDURE — G0177 OPPS/PHP; TRAIN & EDUC SERV: HCPCS

## 2022-02-15 PROCEDURE — 250N000013 HC RX MED GY IP 250 OP 250 PS 637: Performed by: NURSE PRACTITIONER

## 2022-02-15 PROCEDURE — 124N000003 HC R&B MH SENIOR/ADOLESCENT

## 2022-02-15 RX ORDER — LAMOTRIGINE 100 MG/1
100 TABLET ORAL DAILY
Status: DISCONTINUED | OUTPATIENT
Start: 2022-02-16 | End: 2022-02-23

## 2022-02-15 RX ADMIN — ACETAMINOPHEN 975 MG: 325 TABLET, FILM COATED ORAL at 18:58

## 2022-02-15 RX ADMIN — NAPROXEN 500 MG: 500 TABLET ORAL at 17:42

## 2022-02-15 RX ADMIN — TOPIRAMATE 100 MG: 100 TABLET, FILM COATED ORAL at 08:16

## 2022-02-15 RX ADMIN — PREGABALIN 300 MG: 100 CAPSULE ORAL at 20:04

## 2022-02-15 RX ADMIN — DOCUSATE SODIUM 100 MG: 100 CAPSULE, LIQUID FILLED ORAL at 20:04

## 2022-02-15 RX ADMIN — PREGABALIN 300 MG: 100 CAPSULE ORAL at 14:02

## 2022-02-15 RX ADMIN — NICOTINE POLACRILEX 8 MG: 4 GUM, CHEWING ORAL at 21:07

## 2022-02-15 RX ADMIN — OXYCODONE HYDROCHLORIDE 20 MG: 10 TABLET ORAL at 16:06

## 2022-02-15 RX ADMIN — NICOTINE POLACRILEX 8 MG: 4 GUM, CHEWING ORAL at 06:36

## 2022-02-15 RX ADMIN — MULTIPLE VITAMINS W/ MINERALS TAB 1 TABLET: TAB at 08:16

## 2022-02-15 RX ADMIN — NICOTINE POLACRILEX 8 MG: 4 GUM, CHEWING ORAL at 17:42

## 2022-02-15 RX ADMIN — NICOTINE POLACRILEX 8 MG: 4 GUM, CHEWING ORAL at 16:06

## 2022-02-15 RX ADMIN — NICOTINE POLACRILEX 8 MG: 4 GUM, CHEWING ORAL at 12:23

## 2022-02-15 RX ADMIN — OXYCODONE HYDROCHLORIDE 20 MG: 10 TABLET ORAL at 21:06

## 2022-02-15 RX ADMIN — QUETIAPINE 700 MG: 300 TABLET, FILM COATED ORAL at 21:58

## 2022-02-15 RX ADMIN — DOCUSATE SODIUM 100 MG: 100 CAPSULE, LIQUID FILLED ORAL at 08:15

## 2022-02-15 RX ADMIN — NICOTINE POLACRILEX 8 MG: 4 GUM, CHEWING ORAL at 14:04

## 2022-02-15 RX ADMIN — AMLODIPINE BESYLATE 5 MG: 5 TABLET ORAL at 08:16

## 2022-02-15 RX ADMIN — PREGABALIN 300 MG: 100 CAPSULE ORAL at 08:16

## 2022-02-15 RX ADMIN — NICOTINE POLACRILEX 8 MG: 4 GUM, CHEWING ORAL at 08:22

## 2022-02-15 RX ADMIN — ACETAMINOPHEN 975 MG: 325 TABLET, FILM COATED ORAL at 12:23

## 2022-02-15 RX ADMIN — OXYCODONE HYDROCHLORIDE 20 MG: 10 TABLET ORAL at 09:37

## 2022-02-15 RX ADMIN — LIOTHYRONINE SODIUM 25 MCG: 25 TABLET ORAL at 08:15

## 2022-02-15 RX ADMIN — PRAZOSIN HYDROCHLORIDE 1 MG: 1 CAPSULE ORAL at 21:58

## 2022-02-15 RX ADMIN — LAMOTRIGINE 50 MG: 25 TABLET ORAL at 08:15

## 2022-02-15 RX ADMIN — NAPROXEN 500 MG: 500 TABLET ORAL at 08:16

## 2022-02-15 RX ADMIN — NICOTINE POLACRILEX 8 MG: 4 GUM, CHEWING ORAL at 09:37

## 2022-02-15 RX ADMIN — LEVOTHYROXINE SODIUM 75 MCG: 75 TABLET ORAL at 06:36

## 2022-02-15 RX ADMIN — ACAMPROSATE CALCIUM 666 MG: 333 TABLET, DELAYED RELEASE ORAL at 20:04

## 2022-02-15 RX ADMIN — OXYCODONE HYDROCHLORIDE 20 MG: 10 TABLET ORAL at 06:36

## 2022-02-15 RX ADMIN — PANTOPRAZOLE SODIUM 40 MG: 40 TABLET, DELAYED RELEASE ORAL at 06:36

## 2022-02-15 RX ADMIN — CHOLECALCIFEROL TAB 25 MCG (1000 UNIT) 25 MCG: 25 TAB at 08:16

## 2022-02-15 RX ADMIN — FOLIC ACID 1 MG: 1 TABLET ORAL at 08:15

## 2022-02-15 RX ADMIN — NICOTINE POLACRILEX 8 MG: 4 GUM, CHEWING ORAL at 20:04

## 2022-02-15 RX ADMIN — OXYCODONE HYDROCHLORIDE 20 MG: 10 TABLET ORAL at 13:00

## 2022-02-15 RX ADMIN — NICOTINE POLACRILEX 8 MG: 4 GUM, CHEWING ORAL at 15:02

## 2022-02-15 RX ADMIN — NICOTINE POLACRILEX 8 MG: 4 GUM, CHEWING ORAL at 18:57

## 2022-02-15 RX ADMIN — ACAMPROSATE CALCIUM 666 MG: 333 TABLET, DELAYED RELEASE ORAL at 08:15

## 2022-02-15 RX ADMIN — PROPRANOLOL HYDROCHLORIDE 20 MG: 20 TABLET ORAL at 20:04

## 2022-02-15 RX ADMIN — THIAMINE HCL TAB 100 MG 100 MG: 100 TAB at 08:15

## 2022-02-15 RX ADMIN — PROPRANOLOL HYDROCHLORIDE 20 MG: 20 TABLET ORAL at 08:15

## 2022-02-15 RX ADMIN — ACAMPROSATE CALCIUM 666 MG: 333 TABLET, DELAYED RELEASE ORAL at 14:02

## 2022-02-15 RX ADMIN — TOPIRAMATE 100 MG: 100 TABLET, FILM COATED ORAL at 20:04

## 2022-02-15 ASSESSMENT — ACTIVITIES OF DAILY LIVING (ADL)
HYGIENE/GROOMING: INDEPENDENT
ORAL_HYGIENE: INDEPENDENT
DRESS: INDEPENDENT
LAUNDRY: UNABLE TO COMPLETE
ORAL_HYGIENE: INDEPENDENT
HYGIENE/GROOMING: INDEPENDENT
DRESS: INDEPENDENT
LAUNDRY: UNABLE TO COMPLETE

## 2022-02-15 NOTE — PLAN OF CARE
Problem: Suicidal Behavior  Goal: Suicidal Behavior is Absent or Managed  2/14/2022 2032 by Susan Iverson RN  Outcome: Improving     Patient alert and oriented x4. Chronic R knee pain - scheduled pain medications given - effective.   Calm and cooperative. Mood continuous to be sad, tense. Reports anxiety and depression. Denies SI, SIB and hallucinations. Visible in the milieu. Attended group activities. More talkative this evening comparing to the morning. Apetite is good.

## 2022-02-15 NOTE — PROGRESS NOTES
"Behavioral Health  Note    Behavioral Health  Spirituality Group Note    UNIT 3BW    Name: Hollis Barclay YOB: 1970   MRN: [id number] Age: 51 year old      Patient attended -led group, which included discussion of spirituality, coping with illness and building resilience through changes in life.    Patient attended group for 1.0 hrs.    The patient actively participated in group discussion and patient demonstrated an appreciation of topic's application for their personal circumstances.  Jose M shared about his challenging journey, \"catastrophic\" losses, and how his leena is what's most important right now as he  hears the news of his mother's imminent passing. \"when we come to the end of all we can do, we look to strength that we don't have. Prayer is what I can do right now.\"    Liss Kerns MDiv  Associate   Pager [phone]  Office [phone]    "

## 2022-02-15 NOTE — PLAN OF CARE
Problem: Suicidal Behavior  Goal: Suicidal Behavior is Absent or Managed  2/15/2022 1750 by Susan Iverson RN  Outcome: Improving  Flowsheets (Taken 2/15/2022 1134)  Mutually Determined Action Steps (Suicidal Behavior Absent/Managed):    shares suicidal thoughts    verbalizes safety check rationale    Patient continuous to be withdrawn and isolative. Anxiety and depression caused by recent decline in his mother health remain high. Denies SI, SIB, and hallucinations.    Pt and daughter updated on plan of care for md evaluation with understanding verbalized. Dressing noted to left index finger.

## 2022-02-15 NOTE — PROGRESS NOTES
[facility], [place]   Psychiatric Progress Note  Hospital Day: 38        Interim History:   The patient's care was discussed with the treatment team during the daily team meeting and staff's chart notes were reviewed. Patient seen by LATOYA Bennett CNP  Staff report patient slept soundly for 3.5 hours, is medication adherent.     Upon interview, the patient reported feeling very tired and being down about his mother who is dying.     Suicidal ideation: denies current or recent suicidal ideation or behaviors.    Homicidal ideation: denies current or recent homicidal ideation or behaviors.    Psychotic symptoms: Patient denies AH, VH, paranoia, delusions.     Medication side effects reported: No significant side effects.    Acute medical concerns: none    Other issues reported by patient: Patient had no further questions or concerns.           Diagnoses:   1. Bipolar depressed, severe, recurrent  2.  PTSD  3.  Status post TBI  4.  Knee pain         Plan:     1. Patient requires sober setting and opiates until knee surgery. Social work renewing CADI waver.      2. Lamictal increase from 50mg to 100mg starting tomorrow morning.      3. Continue to provide support and reassess suicidal ideation in light of recent stressors.          Disposition Plan   Legal Status: voluntary         Medications:       acamprosate  666 mg Oral TID     amLODIPine  5 mg Oral Daily     docusate sodium  100 mg Oral BID     folic acid  1 mg Oral Daily     [START ON 2/16/2022] lamoTRIgine  100 mg Oral Daily     levothyroxine  75 mcg Oral QAM AC     liothyronine  25 mcg Oral Daily     multivitamin w/minerals  1 tablet Oral Daily     naproxen  500 mg Oral BID w/meals     oxyCODONE IR  20 mg Oral 5x Daily     pantoprazole  40 mg Oral QAM AC     prazosin  1 mg Oral At Bedtime     pregabalin  300 mg Oral TID     propranolol  20 mg Oral BID     QUEtiapine  700 mg Oral At Bedtime     thiamine  100 mg Oral Daily      topiramate  100 mg Oral BID     cholecalciferol  25 mcg Oral Daily            Psychiatric Examination:     /74 (Patient Position: Sitting)   Pulse 75   Temp 97.5  F (36.4  C) (Tympanic)   Resp 16   Wt 139.7 kg (308 lb)   SpO2 98%   BMI 36.71 kg/m    Weight is 308 lbs 0 oz  Body mass index is 36.71 kg/m .  Orthostatic Vitals  Report    None            Appearance: awake, alert, adequately groomed and dressed in hospital scrubs  Attitude:  cooperative  Eye Contact:  fair  Mood:  sad   Affect:  appropriate and in normal range and intensity is blunted  Speech:  clear, coherent  Psychomotor Behavior:  no evidence of tardive dyskinesia, dystonia, or tics  Thought Process:  logical  Associations:  no loose associations  Thought Content:  no evidence of suicidal ideation or homicidal ideation and no evidence of psychotic thought  Insight:  fair  Judgement:  fair  Oriented to:  time, person, and place  Attention Span and Concentration:  intact  Recent and Remote Memory:  intact           Allergies:     Allergies   Allergen Reactions     Cucumber Extract Anaphylaxis     Cucumber the vegable     Hydroxyzine Hives     Previously unreported by patient, this is a new patient declaration as of 5/1/21.     Nsaids      No problem with oral NSAIDs--Toradol injection itching only.     Trazodone Itching     Per Patient          Labs:   No results found for this or any previous visit (from the past 24 hour(s)).         Precautions:     Behavioral Orders   Procedures     Code 2     Fall precautions     Routine Programming     As clinically indicated     Seizure precautions     Status 15     Every 15 minutes.     Suicide precautions     Patients on Suicide Precautions should have a Combination Diet ordered that includes a Diet selection(s) AND a Behavioral Tray selection for Safe Tray - with utensils, or Safe Tray - NO utensils         Entered by: Shayy Olvera on February 15, 2022 at 10:09 AM

## 2022-02-15 NOTE — PLAN OF CARE
Problem: Suicidal Behavior  Goal: Suicidal Behavior is Absent or Managed  Outcome: Improving  Flowsheets (Taken 2/15/2022 1134)  Mutually Determined Action Steps (Suicidal Behavior Absent/Managed):    shares suicidal thoughts    verbalizes safety check rationale     Alert and oriented x4. Chronic knee pain 8/10 - scheduled Oxycodone given - effective.   Mood calm, sad and tense. Continuous to report anxiety and depression. Denies SI, SIB and hallucinations. Visible in the milieu.

## 2022-02-15 NOTE — PROGRESS NOTES
"SPIRITUAL HEALTH SERVICES   Spiritual Assessment Progress Note (Behavioral Health/CD Focus)  [facility] ([facility]) 3BW    REFERRAL SOURCE: follow up; physician referral during team meeting regarding grief support     On this visit, I met with Jose M in pt room for 15 minutes. Provided grief support, prayer at pt request. Jose M returned 3 of 4 library books.    EXPERIENCE OF ILLNESS/HOSPITALIZATION:  Jose M talked about how his family is coping with the upcoming death of his mother, updated me on \"the new thing\" of needing to re-apply for his SHIN waiver, and engaged in some life review around how he has coped with grief and loss.    MEANING-MAKING:  Jose M began the conversation noting that he has had the opportunity to talk with his mother Enriqueta; \"We have said our goodbyes\" he stated and that her pain medications have been increased so he does not expect her to be alert again  to be able to talk to.    SPIRITUALITY/VALUES/Hinduism:      COPING/SPIRITUAL PRACTICES: jose m repeated some discussion comments from group today, \"when you have nothing left to stand on, prayer is what's left.\"    SUPPORT SYSTEMS:     PLAN: I will follow up 1-2x weekly.                                                                                                                                             Liss Kerns MDiv  Associate   Pager [phone]  Office [phone]  [facility] remains available 24/7 for emergent requests/referrals, either by having the switchboard page the on-call  or by entering an ASAP/STAT consult in Epic (this will also page the on-call ). Routine Epic consults receive an initial response within 24 hours.    "

## 2022-02-16 PROCEDURE — 250N000013 HC RX MED GY IP 250 OP 250 PS 637: Performed by: NURSE PRACTITIONER

## 2022-02-16 PROCEDURE — 90853 GROUP PSYCHOTHERAPY: CPT

## 2022-02-16 PROCEDURE — 99232 SBSQ HOSP IP/OBS MODERATE 35: CPT

## 2022-02-16 PROCEDURE — 250N000013 HC RX MED GY IP 250 OP 250 PS 637: Performed by: PSYCHIATRY & NEUROLOGY

## 2022-02-16 PROCEDURE — 250N000013 HC RX MED GY IP 250 OP 250 PS 637

## 2022-02-16 PROCEDURE — 124N000003 HC R&B MH SENIOR/ADOLESCENT

## 2022-02-16 RX ADMIN — OXYCODONE HYDROCHLORIDE 20 MG: 10 TABLET ORAL at 13:11

## 2022-02-16 RX ADMIN — AMLODIPINE BESYLATE 5 MG: 5 TABLET ORAL at 08:12

## 2022-02-16 RX ADMIN — PROPRANOLOL HYDROCHLORIDE 20 MG: 20 TABLET ORAL at 19:59

## 2022-02-16 RX ADMIN — ACAMPROSATE CALCIUM 666 MG: 333 TABLET, DELAYED RELEASE ORAL at 14:18

## 2022-02-16 RX ADMIN — NICOTINE POLACRILEX 8 MG: 4 GUM, CHEWING ORAL at 19:15

## 2022-02-16 RX ADMIN — THIAMINE HCL TAB 100 MG 100 MG: 100 TAB at 08:12

## 2022-02-16 RX ADMIN — FOLIC ACID 1 MG: 1 TABLET ORAL at 08:12

## 2022-02-16 RX ADMIN — OXYCODONE HYDROCHLORIDE 20 MG: 10 TABLET ORAL at 09:42

## 2022-02-16 RX ADMIN — PREGABALIN 300 MG: 100 CAPSULE ORAL at 14:18

## 2022-02-16 RX ADMIN — NICOTINE POLACRILEX 8 MG: 4 GUM, CHEWING ORAL at 09:43

## 2022-02-16 RX ADMIN — NICOTINE POLACRILEX 8 MG: 4 GUM, CHEWING ORAL at 20:01

## 2022-02-16 RX ADMIN — LEVOTHYROXINE SODIUM 75 MCG: 75 TABLET ORAL at 06:12

## 2022-02-16 RX ADMIN — CHOLECALCIFEROL TAB 25 MCG (1000 UNIT) 25 MCG: 25 TAB at 08:12

## 2022-02-16 RX ADMIN — ACETAMINOPHEN 975 MG: 325 TABLET, FILM COATED ORAL at 22:08

## 2022-02-16 RX ADMIN — DOCUSATE SODIUM 100 MG: 100 CAPSULE, LIQUID FILLED ORAL at 19:59

## 2022-02-16 RX ADMIN — LAMOTRIGINE 100 MG: 100 TABLET ORAL at 08:12

## 2022-02-16 RX ADMIN — NICOTINE POLACRILEX 8 MG: 4 GUM, CHEWING ORAL at 14:18

## 2022-02-16 RX ADMIN — TOPIRAMATE 100 MG: 100 TABLET, FILM COATED ORAL at 19:59

## 2022-02-16 RX ADMIN — DOCUSATE SODIUM 100 MG: 100 CAPSULE, LIQUID FILLED ORAL at 08:12

## 2022-02-16 RX ADMIN — TOPIRAMATE 100 MG: 100 TABLET, FILM COATED ORAL at 08:12

## 2022-02-16 RX ADMIN — NICOTINE POLACRILEX 8 MG: 4 GUM, CHEWING ORAL at 08:19

## 2022-02-16 RX ADMIN — PROPRANOLOL HYDROCHLORIDE 20 MG: 20 TABLET ORAL at 08:11

## 2022-02-16 RX ADMIN — NAPROXEN 500 MG: 500 TABLET ORAL at 08:12

## 2022-02-16 RX ADMIN — PANTOPRAZOLE SODIUM 40 MG: 40 TABLET, DELAYED RELEASE ORAL at 06:12

## 2022-02-16 RX ADMIN — LIOTHYRONINE SODIUM 25 MCG: 25 TABLET ORAL at 08:11

## 2022-02-16 RX ADMIN — PREGABALIN 300 MG: 100 CAPSULE ORAL at 08:12

## 2022-02-16 RX ADMIN — NICOTINE POLACRILEX 8 MG: 4 GUM, CHEWING ORAL at 13:11

## 2022-02-16 RX ADMIN — ACAMPROSATE CALCIUM 666 MG: 333 TABLET, DELAYED RELEASE ORAL at 08:11

## 2022-02-16 RX ADMIN — PRAZOSIN HYDROCHLORIDE 1 MG: 1 CAPSULE ORAL at 22:45

## 2022-02-16 RX ADMIN — NICOTINE POLACRILEX 8 MG: 4 GUM, CHEWING ORAL at 22:08

## 2022-02-16 RX ADMIN — NICOTINE POLACRILEX 8 MG: 4 GUM, CHEWING ORAL at 16:01

## 2022-02-16 RX ADMIN — ACAMPROSATE CALCIUM 666 MG: 333 TABLET, DELAYED RELEASE ORAL at 19:59

## 2022-02-16 RX ADMIN — NICOTINE POLACRILEX 8 MG: 4 GUM, CHEWING ORAL at 20:59

## 2022-02-16 RX ADMIN — NICOTINE POLACRILEX 8 MG: 4 GUM, CHEWING ORAL at 11:34

## 2022-02-16 RX ADMIN — NICOTINE POLACRILEX 8 MG: 4 GUM, CHEWING ORAL at 17:56

## 2022-02-16 RX ADMIN — PREGABALIN 300 MG: 100 CAPSULE ORAL at 19:59

## 2022-02-16 RX ADMIN — OXYCODONE HYDROCHLORIDE 20 MG: 10 TABLET ORAL at 06:12

## 2022-02-16 RX ADMIN — NAPROXEN 500 MG: 500 TABLET ORAL at 19:15

## 2022-02-16 RX ADMIN — OXYCODONE HYDROCHLORIDE 20 MG: 10 TABLET ORAL at 16:01

## 2022-02-16 RX ADMIN — QUETIAPINE 700 MG: 300 TABLET, FILM COATED ORAL at 22:45

## 2022-02-16 RX ADMIN — NICOTINE POLACRILEX 8 MG: 4 GUM, CHEWING ORAL at 10:38

## 2022-02-16 RX ADMIN — ACETAMINOPHEN 975 MG: 325 TABLET, FILM COATED ORAL at 11:34

## 2022-02-16 RX ADMIN — MULTIPLE VITAMINS W/ MINERALS TAB 1 TABLET: TAB at 08:12

## 2022-02-16 RX ADMIN — OXYCODONE HYDROCHLORIDE 20 MG: 10 TABLET ORAL at 20:59

## 2022-02-16 ASSESSMENT — ACTIVITIES OF DAILY LIVING (ADL)
HYGIENE/GROOMING: INDEPENDENT
LAUNDRY: UNABLE TO COMPLETE
ORAL_HYGIENE: INDEPENDENT
LAUNDRY: WITH SUPERVISION
DRESS: INDEPENDENT
HYGIENE/GROOMING: INDEPENDENT
DRESS: SCRUBS (BEHAVIORAL HEALTH);INDEPENDENT
ORAL_HYGIENE: INDEPENDENT

## 2022-02-16 NOTE — PLAN OF CARE
Problem: Additional Goals  Goal: BEH OT Goal 1  Description: Will attend OT groups improve concentration and motivation by engaging in more challenging and success oriented options while also improving insight with comments/answers made about mental health needs.    OT Groups Attended: Mental Health Management (6 patients, 60 minutes)    Mood/Affect/General Presentation: Calm/pleasant, engaged, blunted affect.     Patient Response: Pt attended a structured OT group with a focus on self-awareness and socialization. Pt appeared comfortable sharing positive personal information, and was respectful in listening and responding to peers. Pt was able to identify positive personal strengths, and shared with the group about how important his mother has been to him throughout his life.     Plan: Pt will be encouraged to maintain group attendance for continued ongoing assessment and support in completion of occupational therapy treatment goals.     Outcome: Improving

## 2022-02-16 NOTE — PLAN OF CARE
"  Problem: Suicidal Behavior  Goal: Suicidal Behavior is Absent or Managed  Outcome: Ongoing, Progressing  Flowsheets (Taken 2/16/2022 1432)  Mutually Determined Action Steps (Suicidal Behavior Absent/Managed):   identifies crisis plan   identifies protective factors   verbalizes safety check rationale     Problem: Activity and Energy Impairment (Depressive Signs/Symptoms)  Goal: Optimized Energy Level (Depressive Signs/Symptoms)  Outcome: Ongoing, Not Progressing  Flowsheets (Taken 2/16/2022 1432)  Mutually Determined Action Steps (Optimized Energy Level): other (see comments)  Intervention: Optimize Energy Level  Recent Flowsheet Documentation  Taken 2/16/2022 0900 by Lakshmi Black RN  Patient Performed Hygiene: dressed  Activity (Behavioral Health): up ad kathleen   Goal Outcome Evaluation:    Plan of Care Reviewed With: patient     Overall Patient Progress: improving    Pt was visible in the milieu, participated group activity. Reports continue SI thoughts with no plan \" it comes and goes, specially when I thought about my mom, who is terminally ill\". Pt's mother is in hospice,this had effected pt's mood. He appears withdraw, flat affect, and sad. Pt stated \" I am trying not think about it, but it is hard to do\". Pt reported not getting enough sleep, denies hallucination. Reports depression 7/10 and anxiety 5/10. Pain 8/10, pt received pain medication schedule and PRN tylenol. Provider increased pt's limectol medication from 50 to 100mg. Medication compliant, denies side effects. Vs stable, currently pt is in his room relaxing, no behavioral issues noted, will continue to monitor.  "

## 2022-02-16 NOTE — PLAN OF CARE
02/16/22 1533   Group Therapy Session   Group Attendance attended group session   Time Session Began 1015   Time Session Ended 1100   Total Time patient participated (minutes) 45   Total # Attendees 4   Group Type psychotherapeutic   Group Topic Covered coping skills/lifestyle management;relationship   Group Session Detail Group topic was on gratitude and the positive effects on mental health.   Patient Response/Contribution became angry or agitated;cooperative with task;listened actively   Patient Response Detail Patient shared with group he is losing his mother and is having a difficult time with the grief. Patient appeared irritable, but engaged in group. He was helpful to his peers.     Patient actively engaged in group. He stated he is struggling with the process of losing his mother. He appeared agitated, did not want to share too much regarding his grief. He continued to participate in group and helped his peers.

## 2022-02-16 NOTE — PLAN OF CARE
Activities :  -Met with pt and discussed his care   -Rounded with team     -passionate care assisted living- Declined patient.      Referral sent to: [facility]  Community Living Coordinator Will be reaching out to [facility] to schedule a virtual assessment.   [facility]  [address] [place] [address]  Phone: [phone]. Cell: [phone]    Referral sent to: [facility].   Jammie Duong, OTR/L, MOT, Patient Transition Liaison  [email or web address]  Phone: [phone]   eFax: [phone]    Referral sent [facility]:   [place] phone: [phone] Fax: [phone]    Addressed patient needs/concerns: Reviewed possible options for disposition, when he is stabilized.      Discharge Plan or Goal   Plan  New referrals made to assisted living facility. The new assisted living facility is currently reviewing the pt's paperwork.  Commitment    Health Care Follow up Appointment:    Will need psych, therapy, pcp, and  mental michael cm  Medication Management Plan:  See providers notes  Housing:  [facility] #[phone]  Financial Follow ups:  He reports he has GA   SMRTed for SSDI benefits.  Care Team     Jr Giron MD  -[facility] #[phone]    OSCAR Vazquez CM: Jessica Lawson- [facility] #[phone]. [email or web address]  ? Arlin- supervisor for cadi cm [phone]    Joseph Oliveros's supervisor # [phone]  ? Admin #[phone]

## 2022-02-16 NOTE — PLAN OF CARE
Problem: Sleep Disturbance (Depressive Signs/Symptoms)  Goal: Improved Sleep (Depressive Signs/Symptoms)  Outcome: No Change     Patient slept a total of 5 hours without any concerns raised the whole shift.

## 2022-02-16 NOTE — PLAN OF CARE
02/16/22 1545   Group Therapy Session   Group Attendance attended group session   Time Session Began 1100   Time Session Ended 1145   Total Time patient participated (minutes) 45   Group Type psychotherapeutic   Group Topic Covered relationship;coping skills/lifestyle management;problem-solving   Group Session Detail Group activity was creating thank you cards for others, to express gratitude.   Patient Response/Contribution cooperative with task   Patient Response Detail Patient was able to organize thoughts to create a thank you card for his mother. He also helped his peers in group.     Patient actively engaged in group. He was able to focus to complete the task, he created a thank you card for his mother. He was calm and cooperative with his peers and with writer.

## 2022-02-17 PROCEDURE — 99232 SBSQ HOSP IP/OBS MODERATE 35: CPT

## 2022-02-17 PROCEDURE — G0177 OPPS/PHP; TRAIN & EDUC SERV: HCPCS

## 2022-02-17 PROCEDURE — 250N000013 HC RX MED GY IP 250 OP 250 PS 637: Performed by: NURSE PRACTITIONER

## 2022-02-17 PROCEDURE — 124N000003 HC R&B MH SENIOR/ADOLESCENT

## 2022-02-17 PROCEDURE — 250N000013 HC RX MED GY IP 250 OP 250 PS 637

## 2022-02-17 PROCEDURE — 250N000013 HC RX MED GY IP 250 OP 250 PS 637: Performed by: PSYCHIATRY & NEUROLOGY

## 2022-02-17 RX ADMIN — LIOTHYRONINE SODIUM 25 MCG: 25 TABLET ORAL at 08:34

## 2022-02-17 RX ADMIN — NICOTINE POLACRILEX 8 MG: 4 GUM, CHEWING ORAL at 14:15

## 2022-02-17 RX ADMIN — PREGABALIN 300 MG: 100 CAPSULE ORAL at 08:34

## 2022-02-17 RX ADMIN — ACAMPROSATE CALCIUM 666 MG: 333 TABLET, DELAYED RELEASE ORAL at 19:52

## 2022-02-17 RX ADMIN — NAPROXEN 500 MG: 500 TABLET ORAL at 17:23

## 2022-02-17 RX ADMIN — PREGABALIN 300 MG: 100 CAPSULE ORAL at 19:52

## 2022-02-17 RX ADMIN — FOLIC ACID 1 MG: 1 TABLET ORAL at 08:34

## 2022-02-17 RX ADMIN — LEVOTHYROXINE SODIUM 75 MCG: 75 TABLET ORAL at 06:05

## 2022-02-17 RX ADMIN — DOCUSATE SODIUM 100 MG: 100 CAPSULE, LIQUID FILLED ORAL at 08:34

## 2022-02-17 RX ADMIN — NICOTINE POLACRILEX 8 MG: 4 GUM, CHEWING ORAL at 22:24

## 2022-02-17 RX ADMIN — TOPIRAMATE 100 MG: 100 TABLET, FILM COATED ORAL at 08:34

## 2022-02-17 RX ADMIN — ACAMPROSATE CALCIUM 666 MG: 333 TABLET, DELAYED RELEASE ORAL at 14:15

## 2022-02-17 RX ADMIN — PREGABALIN 300 MG: 100 CAPSULE ORAL at 14:15

## 2022-02-17 RX ADMIN — OXYCODONE HYDROCHLORIDE 20 MG: 10 TABLET ORAL at 19:52

## 2022-02-17 RX ADMIN — MULTIPLE VITAMINS W/ MINERALS TAB 1 TABLET: TAB at 08:34

## 2022-02-17 RX ADMIN — PANTOPRAZOLE SODIUM 40 MG: 40 TABLET, DELAYED RELEASE ORAL at 06:05

## 2022-02-17 RX ADMIN — DIPHENHYDRAMINE HYDROCHLORIDE 50 MG: 25 CAPSULE ORAL at 14:15

## 2022-02-17 RX ADMIN — OXYCODONE HYDROCHLORIDE 20 MG: 10 TABLET ORAL at 16:07

## 2022-02-17 RX ADMIN — NICOTINE POLACRILEX 8 MG: 4 GUM, CHEWING ORAL at 08:35

## 2022-02-17 RX ADMIN — NICOTINE POLACRILEX 8 MG: 4 GUM, CHEWING ORAL at 10:06

## 2022-02-17 RX ADMIN — TOPIRAMATE 100 MG: 100 TABLET, FILM COATED ORAL at 19:52

## 2022-02-17 RX ADMIN — LAMOTRIGINE 100 MG: 100 TABLET ORAL at 08:34

## 2022-02-17 RX ADMIN — OXYCODONE HYDROCHLORIDE 20 MG: 10 TABLET ORAL at 13:09

## 2022-02-17 RX ADMIN — ACETAMINOPHEN 975 MG: 325 TABLET, FILM COATED ORAL at 11:34

## 2022-02-17 RX ADMIN — QUETIAPINE 700 MG: 300 TABLET, FILM COATED ORAL at 22:24

## 2022-02-17 RX ADMIN — NICOTINE POLACRILEX 8 MG: 4 GUM, CHEWING ORAL at 20:59

## 2022-02-17 RX ADMIN — CHOLECALCIFEROL TAB 25 MCG (1000 UNIT) 25 MCG: 25 TAB at 08:34

## 2022-02-17 RX ADMIN — NICOTINE POLACRILEX 8 MG: 4 GUM, CHEWING ORAL at 13:09

## 2022-02-17 RX ADMIN — NICOTINE POLACRILEX 8 MG: 4 GUM, CHEWING ORAL at 19:52

## 2022-02-17 RX ADMIN — OXYCODONE HYDROCHLORIDE 20 MG: 10 TABLET ORAL at 09:07

## 2022-02-17 RX ADMIN — NICOTINE POLACRILEX 8 MG: 4 GUM, CHEWING ORAL at 17:23

## 2022-02-17 RX ADMIN — OXYCODONE HYDROCHLORIDE 20 MG: 10 TABLET ORAL at 06:05

## 2022-02-17 RX ADMIN — THIAMINE HCL TAB 100 MG 100 MG: 100 TAB at 08:34

## 2022-02-17 RX ADMIN — NICOTINE POLACRILEX 8 MG: 4 GUM, CHEWING ORAL at 16:07

## 2022-02-17 RX ADMIN — PROPRANOLOL HYDROCHLORIDE 20 MG: 20 TABLET ORAL at 19:52

## 2022-02-17 RX ADMIN — PRAZOSIN HYDROCHLORIDE 1 MG: 1 CAPSULE ORAL at 22:24

## 2022-02-17 RX ADMIN — ACETAMINOPHEN 975 MG: 325 TABLET, FILM COATED ORAL at 20:59

## 2022-02-17 RX ADMIN — DOCUSATE SODIUM 100 MG: 100 CAPSULE, LIQUID FILLED ORAL at 19:52

## 2022-02-17 RX ADMIN — ACAMPROSATE CALCIUM 666 MG: 333 TABLET, DELAYED RELEASE ORAL at 08:34

## 2022-02-17 RX ADMIN — NICOTINE POLACRILEX 8 MG: 4 GUM, CHEWING ORAL at 11:34

## 2022-02-17 RX ADMIN — NAPROXEN 500 MG: 500 TABLET ORAL at 08:34

## 2022-02-17 ASSESSMENT — ACTIVITIES OF DAILY LIVING (ADL)
LAUNDRY: WITH SUPERVISION
DRESS: SCRUBS (BEHAVIORAL HEALTH)
HYGIENE/GROOMING: INDEPENDENT
ORAL_HYGIENE: INDEPENDENT

## 2022-02-17 NOTE — PLAN OF CARE
"  Problem: Suicidal Behavior  Goal: Suicidal Behavior is Absent or Managed  Outcome: Met     Problem: Mood Impairment (Depressive Signs/Symptoms)  Goal: Improved Mood Symptoms (Depressive Signs/Symptoms)  Outcome: Ongoing, Progressing     Problem: Social, Occupational or Functional Impairment (Depressive Signs/Symptoms)  Goal: Enhanced Social, Occupational or Functional Skills (Depressive Signs/Symptoms)  Intervention: Promote Social, Occupational and Functional Ability  Recent Flowsheet Documentation  Taken 2/17/2022 1200 by Eddie Garber RN  Trust Relationship/Rapport: care explained   Goal Outcome Evaluation:    Plan of Care Reviewed With: patient     Pt reports being sad and depressed,down following the death of his mother and unsure of his future housing situation Pt  States, \"I dont like to express my feelings. I don't want to cry\".He rates depression and anxiety 8/10. Pt presents with sad, flat affect, tearly at times. Pt denies and SI,SIB states,\"It would be disrespectful to my mother if I try to kill myself.\"Pt has been in and out of his room for meals, not socializing with peers. Pt attended groups. Med complaint. Pt reports constant right pain,given scheduled and prn pain meds. With some relief. Pt requested given nicotine gum every hr and benadryl x1 for nasal congestion. No other concerns. Will continue to monitor.               "

## 2022-02-17 NOTE — PLAN OF CARE
Problem: Sleep Disturbance (Depressive Signs/Symptoms)  Goal: Improved Sleep (Depressive Signs/Symptoms)  Outcome: Ongoing, Progressing   Goal Outcome Evaluation:    Plan of Care Reviewed With: patient     Overall Patient Progress: improving       Patient slept a total of 4.25 hours.Complained of right knee pain and Oxycodone was given as scheduled with total pain relief.

## 2022-02-17 NOTE — CARE PLAN
Activities :  -Met with pt and discussed his care   -Rounded with team     -passionate care assisted living- Declined patient.   - in person assessment [facility] from [facility] scheduled for Friday 2/18 @ 9:30 am.  -[facility] gave patient phone number and address for his previous placement. He notified writer that his friend will be picking uo his belongings for him.  -[facility] updated him about placement,     Was accepted to [facility] assisted living [facility]. Patient declined to go there. He is adamant that he wants an assisted living facility where he will have his own apartment. [facility] notified him that places have declined him due to his suicidal ideation and attempts history.     Referral sent to: [facility] Living Coordinator Will be reaching out to [facility] to schedule a virtual assessment.   [place] [id number]  Phone: [phone]. Cell: [phone]     Referral sent to: [facility].   Jammie Duong, OTR/L, MOT, Patient Transition Liaison  [email or web address]  Phone: [phone]   eFax: [phone]     Referral sent [facility]:   [place] phone: [phone] Fax: [phone]     Addressed patient needs/concerns: Reviewed possible options for disposition, when he is stabilized.      Discharge Plan or Goal   Plan  New referrals made to assisted living facility. The new assisted living facility is currently reviewing the pt's paperwork.  Commitment    Health Care Follow up Appointment:    Will need psych, therapy, pcp, and  mental michael cm  Medication Management Plan:  See providers notes  Housing:  [facility] #[phone]  Financial Follow ups:  He reports he has GA   SMRTed for SSDI benefits.  Care Team     Jr Giron MD  -[facility] #[phone]    CADI Waiver CM: Jessica Lawson- [place] [facility] #[phone]. [email or web address]  ? Arlin- supervisor for cadi   [phone]    Joseph Oliveros's supervisor # [phone]  ? Admin #[phone]

## 2022-02-17 NOTE — CARE PLAN
Activities :  -Met with pt and discussed his care   -Rounded with team     -passionate care assisted living- Declined patient.   - in person assessment with [facility] from [facility] scheduled for Friday 2/18 @ 9:30 am.  -[facility] gave patient phone number and address for his previous placement. He notified writer that his friend will be picking uo his belongings for him.  -[facility] updated him about placement,      Was accepted to [facility] assisted living [facility]. Patient declined to go there. He is adamant that he wants an assisted living facility where he will have his own apartment. [facility] notified him that places have declined him due to his suicidal ideation and attempts history.      Referral sent to: [facility] Living Coordinator Will be reaching out to [facility] to schedule a virtual assessment.   [place] [id number]  Phone: [phone]. Cell: [phone]     Referral sent to: [facility].   Jammie Duong, OTR/L, MOT, Patient Transition Liaison  [email or web address]  Phone: [phone]   eFax: [phone]     Referral sent [facility]:   [place] phone: [phone] Fax: [phone]     Addressed patient needs/concerns: Reviewed possible options for disposition, when he is stabilized.      Discharge Plan or Goal   Plan  New referrals made to assisted living facility. The new assisted living facility is currently reviewing the pt's paperwork.  Commitment    Health Care Follow up Appointment:    Will need psych, therapy, pcp, and  mental michael cm  Medication Management Plan:  See providers notes  Housing:  [facility] #[phone]  Financial Follow ups:  He reports he has GA   SMRTed for SSDI benefits.  Care Team     Jr Giron MD  -[facility] #[phone]    CADI Waiver CM: Jessica Lawson- [facility] #[phone]. [email or web address]  ? Arlin- supervisor for cadi   [phone]    Joseph Oliveros's supervisor # [phone]  ? Admin #[phone]

## 2022-02-17 NOTE — PLAN OF CARE
Occupational Therapy Group Note:       02/17/22 1500   Group Therapy Session   Group Attendance attended group session   Time Session Began 1015   Time Session Ended 1400   Total Time patient participated (minutes) 100   Total # Attendees 5, 4   Group Type expressive therapy;life skill  (OT)   Group Topic Covered coping skills/lifestyle management;emotions/expression;problem-solving;structured socialization   Patient Response/Contribution cooperative with task;discussed personal experience with topic;expressed understanding of topic;listened actively;offered helpful suggestions to peers;organized     Pt attended 2 out of 2 OT groups offered by writejason. Pt actively participated in occupational therapy clinic to facilitate coping skill exploration, creative expression within personally meaningful activities, and clinical observation of social, cognitive, and kinesthetic performance skills. Pt response: Chosen activity: detailed wooden box. Independent to initiate, gather materials, sequence and adjust to workspace demands as needed. Demonstrated excellent focus, sequencing, planning, problem solving, and attention to detail for this highly complex task. Very organized and efficient in his task approach. Able to ask for assistance and occasionally socialized with peers and staff.    Pt actively participated in a structured occupational therapy group with a focus on facilitating self-esteem via self-reflection questions. Pt shared thoughtful responses regarding past achievements, positive social supports, and hobbies/skills. He briefly reflected on happy memories with his dad when he was younger. Appeared to be coping with the loss of his mom as best he could, participating in groups to the best of his ability despite his significant grief.       Plan of Care Reviewed With: patient     Overall Patient Progress: improving

## 2022-02-17 NOTE — PLAN OF CARE
Goal Outcome Evaluation:    Plan of Care Reviewed With: patient     Overall Patient Progress: improving    Patient has spent most of the shift in his room. He received news this evening that his mother . [facility]. He thanked the staff for being supportive. His affect is flat but brightens upon approach. His mood is calm, sad. He denies SI and SIB. He endorses depression and anxiety rating them 7. He endorses pain right knee which he rates a 6.  Scheduled pain medications effective. PRN tylenol given for right knee pain.  He continues to wear leg brace. Gait is steady. He did attend and participate in community meeting. He is social with peers and staff. He is medication compliant. 8 mg Nicorette gum given every one hour PRN.

## 2022-02-17 NOTE — PLAN OF CARE
Occupational Therapy Group Note:    Plan of Care Reviewed With: patient     Overall Patient Progress: improving       02/17/22 1410   Group Therapy Session   Group Attendance attended group session   Time Session Began 1115   Time Session Ended 1200   Total Time patient participated (minutes) 45   Total # Attendees 2   Group Type expressive therapy  (Occupational Therapy)   Group Topic Covered relaxation techniques;problem-solving;leisure exploration/use of leisure time   Group Session Detail     Occupational therapy clinic. Purpose of structured group: exploration/development of positive coping skills, engagement in creative expression and clinical observation of social, cognitive, and kinesthetic performance skills.   Patient Response/Contribution cooperative with task;organized   Patient Response Detail   Pt response: Pt actively participated in occupational therapy clinic. Pt was able to ask for assistance as needed, and independently return to a novel, goal-directed task without difficulty. Pt demonstrated good focus (40 minutes without interruption), planning, and problem solving during task completion. Pt appeared comfortable interacting with peers and writer, however he generally kept to himself during his time in clinic.

## 2022-02-17 NOTE — PROGRESS NOTES
SPIRITUAL HEALTH SERVICES  SPIRITUAL ASSESSMENT Progress Note  [facility] ([facility]) 3BW     REFERRAL SOURCE: follow up    Brief visit with Jose M in pt room. Provided sympathy card and condolences regarding news of his mother's death.  Jose M stated he prefers to be by himself today and suggested a visit tomorrow afternoon.    Plan: I will follow up Friday afternoon.    Liss Kerns MDiv  Associate   Pager [phone]  Office [phone]  [facility] remains available 24/7 for emergent requests/referrals, either by having the switchboard page the on-call  or by entering an ASAP/STAT consult in Epic (this will also page the on-call ). Routine Epic consults receive an initial response within 24 hours.

## 2022-02-17 NOTE — PROGRESS NOTES
[facility] [place]   Psychiatric Progress Note  Hospital Day: 40        Interim History:   The patient's care was discussed with the treatment team during the daily team meeting and staff's chart notes were reviewed. Patient seen by LATOYA Bennett CNP  Staff report patient slept a total of 4.25 hours last night. Staff report patient's mother passed away last night.     Upon interview, the patient stated he was tired and sad about his mother's passing. He states he has hope that his sleep will improve now that he is not worrying about her suffering.     Suicidal ideation: denies current or recent suicidal ideation or behaviors.    Homicidal ideation: denies current or recent homicidal ideation or behaviors.    Psychotic symptoms:  Patient denies AH, VH, paranoia, delusions.            Diagnoses:   1. Bipolar depressed, severe, recurrent  2.  PTSD  3.  Status post TBI  4.  Knee pain         Plan:     1. Patient requires sober setting and opiates until knee surgery. Social work renewing CADI waver. Patient prefers Assisted Living setting. Patient is psychiatrically stabilized on current medication regimen.      2. Increased Lamictal to 100mg once a day yesterday.     3. Continue to provide support in light of recent stressors      Disposition Plan   Legal Status: voluntary         Medications:       acamprosate  666 mg Oral TID     amLODIPine  5 mg Oral Daily     docusate sodium  100 mg Oral BID     folic acid  1 mg Oral Daily     lamoTRIgine  100 mg Oral Daily     levothyroxine  75 mcg Oral QAM AC     liothyronine  25 mcg Oral Daily     multivitamin w/minerals  1 tablet Oral Daily     naproxen  500 mg Oral BID w/meals     oxyCODONE IR  20 mg Oral 5x Daily     pantoprazole  40 mg Oral QAM AC     prazosin  1 mg Oral At Bedtime     pregabalin  300 mg Oral TID     propranolol  20 mg Oral BID     QUEtiapine  700 mg Oral At Bedtime     thiamine  100 mg Oral Daily     topiramate  100 mg  Oral BID     cholecalciferol  25 mcg Oral Daily            Psychiatric Examination:     /79 (Cuff Size: Adult Large)   Pulse 80   Temp 97.1  F (36.2  C) (Temporal)   Resp 16   Wt 139 kg (306 lb 8 oz)   SpO2 97%   BMI 36.54 kg/m    Weight is 306 lbs 8 oz  Body mass index is 36.54 kg/m .  Orthostatic Vitals  Report      Most Recent      Standing Orthostatic /71 02/17 0824    Standing Orthostatic Pulse (bpm) 85 02/17 0824            Appearance: awake, alert, adequately groomed and dressed in hospital scrubs  Attitude:  cooperative  Eye Contact:  fair  Mood:  sad   Affect:  appropriate and in normal range and mood congruent  Speech:  clear, coherent  Psychomotor Behavior:  no evidence of tardive dyskinesia, dystonia, or tics  Thought Process:  logical, linear and goal oriented  Associations:  no loose associations  Thought Content:  no evidence of suicidal ideation or homicidal ideation and no evidence of psychotic thought  Insight:  fair  Judgement:  fair  Oriented to:  time, person, and place  Attention Span and Concentration:  intact  Recent and Remote Memory:  intact           Allergies:     Allergies   Allergen Reactions     Cucumber Extract Anaphylaxis     Cucumber the vegable     Hydroxyzine Hives     Previously unreported by patient, this is a new patient declaration as of 5/1/21.     Nsaids      No problem with oral NSAIDs--Toradol injection itching only.     Trazodone Itching     Per Patient          Labs:   No results found for this or any previous visit (from the past 24 hour(s)).         Precautions:     Behavioral Orders   Procedures     Code 2     Fall precautions     Routine Programming     As clinically indicated     Seizure precautions     Status 15     Every 15 minutes.     Suicide precautions     Patients on Suicide Precautions should have a Combination Diet ordered that includes a Diet selection(s) AND a Behavioral Tray selection for Safe Tray - with utensils, or Safe Tray - NO  utensils         Entered by: Shayy Olvera on February 17, 2022 at 11:01 AM

## 2022-02-18 PROCEDURE — 99231 SBSQ HOSP IP/OBS SF/LOW 25: CPT

## 2022-02-18 PROCEDURE — 124N000003 HC R&B MH SENIOR/ADOLESCENT

## 2022-02-18 PROCEDURE — 250N000013 HC RX MED GY IP 250 OP 250 PS 637

## 2022-02-18 PROCEDURE — 250N000013 HC RX MED GY IP 250 OP 250 PS 637: Performed by: NURSE PRACTITIONER

## 2022-02-18 PROCEDURE — 250N000013 HC RX MED GY IP 250 OP 250 PS 637: Performed by: PSYCHIATRY & NEUROLOGY

## 2022-02-18 RX ADMIN — PREGABALIN 300 MG: 100 CAPSULE ORAL at 19:59

## 2022-02-18 RX ADMIN — OXYCODONE HYDROCHLORIDE 20 MG: 10 TABLET ORAL at 12:56

## 2022-02-18 RX ADMIN — NAPROXEN 500 MG: 500 TABLET ORAL at 08:19

## 2022-02-18 RX ADMIN — PANTOPRAZOLE SODIUM 40 MG: 40 TABLET, DELAYED RELEASE ORAL at 06:49

## 2022-02-18 RX ADMIN — OXYCODONE HYDROCHLORIDE 20 MG: 10 TABLET ORAL at 10:16

## 2022-02-18 RX ADMIN — TOPIRAMATE 100 MG: 100 TABLET, FILM COATED ORAL at 08:18

## 2022-02-18 RX ADMIN — NICOTINE POLACRILEX 8 MG: 4 GUM, CHEWING ORAL at 18:07

## 2022-02-18 RX ADMIN — DOCUSATE SODIUM 100 MG: 100 CAPSULE, LIQUID FILLED ORAL at 08:18

## 2022-02-18 RX ADMIN — PROPRANOLOL HYDROCHLORIDE 20 MG: 20 TABLET ORAL at 08:19

## 2022-02-18 RX ADMIN — LEVOTHYROXINE SODIUM 75 MCG: 75 TABLET ORAL at 06:49

## 2022-02-18 RX ADMIN — OXYCODONE HYDROCHLORIDE 20 MG: 10 TABLET ORAL at 16:09

## 2022-02-18 RX ADMIN — LIOTHYRONINE SODIUM 25 MCG: 25 TABLET ORAL at 08:18

## 2022-02-18 RX ADMIN — NICOTINE POLACRILEX 8 MG: 4 GUM, CHEWING ORAL at 15:06

## 2022-02-18 RX ADMIN — QUETIAPINE 700 MG: 300 TABLET, FILM COATED ORAL at 22:03

## 2022-02-18 RX ADMIN — ACAMPROSATE CALCIUM 666 MG: 333 TABLET, DELAYED RELEASE ORAL at 14:05

## 2022-02-18 RX ADMIN — NICOTINE POLACRILEX 8 MG: 4 GUM, CHEWING ORAL at 10:16

## 2022-02-18 RX ADMIN — NICOTINE POLACRILEX 8 MG: 4 GUM, CHEWING ORAL at 12:56

## 2022-02-18 RX ADMIN — THIAMINE HCL TAB 100 MG 100 MG: 100 TAB at 08:19

## 2022-02-18 RX ADMIN — OXYCODONE HYDROCHLORIDE 20 MG: 10 TABLET ORAL at 06:49

## 2022-02-18 RX ADMIN — PREGABALIN 300 MG: 100 CAPSULE ORAL at 14:05

## 2022-02-18 RX ADMIN — DOCUSATE SODIUM 100 MG: 100 CAPSULE, LIQUID FILLED ORAL at 19:59

## 2022-02-18 RX ADMIN — TOPIRAMATE 100 MG: 100 TABLET, FILM COATED ORAL at 19:58

## 2022-02-18 RX ADMIN — NICOTINE POLACRILEX 8 MG: 4 GUM, CHEWING ORAL at 08:25

## 2022-02-18 RX ADMIN — PRAZOSIN HYDROCHLORIDE 1 MG: 1 CAPSULE ORAL at 22:03

## 2022-02-18 RX ADMIN — PREGABALIN 300 MG: 100 CAPSULE ORAL at 08:18

## 2022-02-18 RX ADMIN — FOLIC ACID 1 MG: 1 TABLET ORAL at 08:18

## 2022-02-18 RX ADMIN — NAPROXEN 500 MG: 500 TABLET ORAL at 17:05

## 2022-02-18 RX ADMIN — NICOTINE POLACRILEX 8 MG: 4 GUM, CHEWING ORAL at 21:02

## 2022-02-18 RX ADMIN — ACAMPROSATE CALCIUM 666 MG: 333 TABLET, DELAYED RELEASE ORAL at 19:58

## 2022-02-18 RX ADMIN — NICOTINE POLACRILEX 8 MG: 4 GUM, CHEWING ORAL at 19:06

## 2022-02-18 RX ADMIN — AMLODIPINE BESYLATE 5 MG: 5 TABLET ORAL at 08:19

## 2022-02-18 RX ADMIN — ACAMPROSATE CALCIUM 666 MG: 333 TABLET, DELAYED RELEASE ORAL at 08:18

## 2022-02-18 RX ADMIN — CHOLECALCIFEROL TAB 25 MCG (1000 UNIT) 25 MCG: 25 TAB at 08:18

## 2022-02-18 RX ADMIN — ACETAMINOPHEN 975 MG: 325 TABLET, FILM COATED ORAL at 19:06

## 2022-02-18 RX ADMIN — DIPHENHYDRAMINE HYDROCHLORIDE 50 MG: 25 CAPSULE ORAL at 15:06

## 2022-02-18 RX ADMIN — NICOTINE POLACRILEX 8 MG: 4 GUM, CHEWING ORAL at 17:05

## 2022-02-18 RX ADMIN — LAMOTRIGINE 100 MG: 100 TABLET ORAL at 08:18

## 2022-02-18 RX ADMIN — NICOTINE POLACRILEX 8 MG: 4 GUM, CHEWING ORAL at 22:03

## 2022-02-18 RX ADMIN — PROPRANOLOL HYDROCHLORIDE 20 MG: 20 TABLET ORAL at 19:59

## 2022-02-18 RX ADMIN — NICOTINE POLACRILEX 8 MG: 4 GUM, CHEWING ORAL at 14:05

## 2022-02-18 RX ADMIN — NICOTINE POLACRILEX 8 MG: 4 GUM, CHEWING ORAL at 19:59

## 2022-02-18 RX ADMIN — NICOTINE POLACRILEX 8 MG: 4 GUM, CHEWING ORAL at 16:09

## 2022-02-18 RX ADMIN — OXYCODONE HYDROCHLORIDE 20 MG: 10 TABLET ORAL at 21:02

## 2022-02-18 RX ADMIN — MULTIPLE VITAMINS W/ MINERALS TAB 1 TABLET: TAB at 08:19

## 2022-02-18 ASSESSMENT — ACTIVITIES OF DAILY LIVING (ADL)
HYGIENE/GROOMING: INDEPENDENT
ORAL_HYGIENE: INDEPENDENT
ORAL_HYGIENE: INDEPENDENT
DRESS: SCRUBS (BEHAVIORAL HEALTH)
DRESS: SCRUBS (BEHAVIORAL HEALTH)
LAUNDRY: WITH SUPERVISION
HYGIENE/GROOMING: INDEPENDENT
LAUNDRY: WITH SUPERVISION

## 2022-02-18 NOTE — PROGRESS NOTES
SPIRITUAL HEALTH SERVICES  SPIRITUAL ASSESSMENT Progress Note  [facility] ([facility]) 3BW     REFERRAL SOURCE: follow up  Played 2 games of cribbage at pr request in the lounge. Provided grief support as Jose M reflected on his mother's life and reflected on how he can honor her with how he lives his life going forward.    Plan: I will follow up 1-2x weekly.    Liss Kerns MDiv  Associate   Pager [phone]  Office [phone]  [facility] remains available 24/7 for emergent requests/referrals, either by having the switchboard page the on-call  or by entering an ASAP/STAT consult in Epic (this will also page the on-call ). Routine Epic consults receive an initial response within 24 hours.

## 2022-02-18 NOTE — PLAN OF CARE
Problem: Suicidal Behavior  Goal: Suicidal Behavior is Absent or Managed  Outcome: Ongoing, Progressing     Problem: Activity and Energy Impairment (Depressive Signs/Symptoms)  Goal: Optimized Energy Level (Depressive Signs/Symptoms)  Outcome: Ongoing, Progressing  Intervention: Optimize Energy Level  Recent Flowsheet Documentation  Taken 2/18/2022 1400 by Bernard Mancera RN  Patient Performed Hygiene: dressed  Activity (Behavioral Health): up ad kathleen     Problem: Mood Impairment (Depressive Signs/Symptoms)  Goal: Improved Mood Symptoms (Depressive Signs/Symptoms)  Outcome: Ongoing, Progressing   Goal Outcome Evaluation:    Plan of Care Reviewed With: patient     Overall Patient Progress: improving         Patient is isolative and withdrawn, not social with staff or peers. Mood appears sad and depressed. Affect flat and blunted. Patient recently lost his mother to terminal illness 2/16. Patient is medication compliant, denies SI, SIB. Did not attend groups. Good appetite, ate 100% of meals. Pain managed with scheduled oxycodone.

## 2022-02-18 NOTE — PLAN OF CARE
Problem: Suicidal Behavior  Goal: Suicidal Behavior is Absent or Managed  Outcome: Ongoing, Progressing  Flowsheets (Taken 2/16/2022 1432 by Lakshmi Black RN)  Mutually Determined Action Steps (Suicidal Behavior Absent/Managed):    identifies crisis plan    identifies protective factors    verbalizes safety check rationale  Problem: Activity and Energy Impairment (Depressive Signs/Symptoms)  Goal: Optimized Energy Level (Depressive Signs/Symptoms)  Outcome: Ongoing, Progressing  Flowsheets (Taken 2/16/2022 2046 by Kailey Wiggins, RN)  Mutually Determined Action Steps (Optimized Energy Level):    grooms self without prompting    dresses/ready for morning activity    participates in exercise activity  Intervention: Optimize Energy Level  Recent Flowsheet Documentation  Taken 2/17/2022 1926 by Jammie Vail RN  Diversional Activity:    music    reading  Activity (Behavioral Health): up ad kathleen      Pt presents with blunted, flat affect and depressed mood. Denies SI/SIB and hallucinations. Reports depression and anxiety 8/10. Pt has had a rough day due to his mothers passing yesterday. Pt reports that he knows these next couple days are going to be bad for him. He remained isolated to his room, reading and listening to music most of the shift, did come out later in the evening to watch television and had meals in the lounge. Pt denies needing anything at this time, reminded pt to come to staff if he felt like he could not be safe or if he needed help with anything - pt was appreciative of this. He reports harming himself would be the last thing he would do, his mother would not want that. He continues to c/o R knee pain 7/10 and is adequately controlled with scheduled and PRN pain medications. VSS. Medication compliant, PRN nicotine gum given as requested. Appetite and fluid intake adequate. No other concerns or complaints noted this shift.

## 2022-02-18 NOTE — PLAN OF CARE
Activities :  -Met with pt and discussed his care   -Rounded with team     -Checked in with patient. Patient reported feeling down. Patient is grieving the death of his mother. Is asking staff for support. Patient reports he has been sleeping or reading a lot to cope with the death of his mother.      Referral sent to: [facility]  Community Living Coordinator Will be reaching out to [facility] to schedule a virtual assessment.   [facility] [address] [address]  Phone: [phone]. Cell: [phone]     Referral sent to: [facility].   Jammie Duong, OTR/L, MOT, Patient Transition Liaison  [email or web address]  Phone: [phone]   eFax: [phone]     Referral sent [facility]:   Ulises phone: [phone] Fax: [phone]     Addressed patient needs/concerns: Reviewed possible options for disposition, when he is stabilized.      Discharge Plan or Goal   Plan  New referrals made to assisted living facility. The new assisted living facility is currently reviewing the pt's paperwork.  Commitment    Health Care Follow up Appointment:    Will need psych, therapy, pcp, and  mental michael cm  Medication Management Plan:  See providers notes  Housing:  [facility] #[phone]  Financial Follow ups:  He reports he has GA   SMRTed for SSDI benefits.  Care Team     Jr Giron MD  -[facility] #[phone]    OSCAR Vazquez CM: Jessica Lawson- [facility] #[phone]. [email or web address]  ? Arlin- supervisor for cadi cm [phone]    Joseph Oliveros's supervisor # [phone]  ? Admin #[phone]

## 2022-02-18 NOTE — PROGRESS NOTES
[facility], [place]   Psychiatric Progress Note  Hospital Day: 41        Interim History:   The patient's care was discussed with the treatment team during the daily team meeting and staff's chart notes were reviewed. Patient seen by LATOYA Bennett CNP  Staff report Patient slept on and off for a total of 4 hours. He was busy reading a book when he was awake. No concerns raised the whole night.    Upon interview, the patient was sleeping in his bed. Reported no changes from yesterday.     Suicidal ideation: denies current or recent suicidal ideation or behaviors.    Homicidal ideation: denies current or recent homicidal ideation or behaviors.    Psychotic symptoms:  Patient denies AH, VH, paranoia, delusions.     Medication side effects reported: No significant side effects.         Diagnoses:   1. Bipolar depressed, severe, recurrent  2.  PTSD  3.  Status post TBI  4.  Knee pain         Plan:     1. Patient requires sober setting and opiates until knee surgery. Social work renewing CADI waver. Patient prefers Assisted Living setting. Patient is psychiatrically stabilized on current medication regimen.      2. Increased Lamictal to 100mg once a day Wednesday 2/16.     3. Continue to provide support in light of recent stressors. Work on safety plan with patient.       Disposition Plan   Legal Status: voluntary         Medications:       acamprosate  666 mg Oral TID     amLODIPine  5 mg Oral Daily     docusate sodium  100 mg Oral BID     folic acid  1 mg Oral Daily     lamoTRIgine  100 mg Oral Daily     levothyroxine  75 mcg Oral QAM AC     liothyronine  25 mcg Oral Daily     multivitamin w/minerals  1 tablet Oral Daily     naproxen  500 mg Oral BID w/meals     oxyCODONE IR  20 mg Oral 5x Daily     pantoprazole  40 mg Oral QAM AC     prazosin  1 mg Oral At Bedtime     pregabalin  300 mg Oral TID     propranolol  20 mg Oral BID     QUEtiapine  700 mg Oral At Bedtime     thiamine  100  mg Oral Daily     topiramate  100 mg Oral BID     cholecalciferol  25 mcg Oral Daily            Psychiatric Examination:     /64 (BP Location: Left arm, Patient Position: Sitting)   Pulse 81   Temp 97.2  F (36.2  C) (Temporal)   Resp 16   Wt 139 kg (306 lb 8 oz)   SpO2 94%   BMI 36.54 kg/m    Weight is 306 lbs 8 oz  Body mass index is 36.54 kg/m .  Orthostatic Vitals  Report      Most Recent      Standing Orthostatic /63 02/18 0819    Standing Orthostatic Pulse (bpm) 92 02/18 0819            Appearance: awake, alert, adequately groomed and dressed in hospital scrubs  Attitude:  guarded  Eye Contact:  fair  Mood:  sad   Affect:  mood congruent  Speech:  clear, coherent  Psychomotor Behavior:  no evidence of tardive dyskinesia, dystonia, or tics  Thought Process:  logical  Associations:  no loose associations  Thought Content:  no evidence of suicidal ideation or homicidal ideation and no evidence of psychotic thought  Insight:  fair  Judgement:  fair  Oriented to:  time, person, and place  Attention Span and Concentration:  intact  Recent and Remote Memory:  intact           Allergies:     Allergies   Allergen Reactions     Cucumber Extract Anaphylaxis     Cucumber the vegable     Hydroxyzine Hives     Previously unreported by patient, this is a new patient declaration as of 5/1/21.     Nsaids      No problem with oral NSAIDs--Toradol injection itching only.     Trazodone Itching     Per Patient          Labs:   No results found for this or any previous visit (from the past 24 hour(s)).         Precautions:     Behavioral Orders   Procedures     Code 2     Fall precautions     Routine Programming     As clinically indicated     Seizure precautions     Status 15     Every 15 minutes.     Suicide precautions     Patients on Suicide Precautions should have a Combination Diet ordered that includes a Diet selection(s) AND a Behavioral Tray selection for Safe Tray - with utensils, or Safe Tray - NO  utensils         Entered by: Shayy Olvera on February 18, 2022 at 11:48 AM

## 2022-02-18 NOTE — PROGRESS NOTES
Patient slept on and off for a total of 4 hours. He was busy reading a book when he was awake. No concerns raised the whole night.

## 2022-02-19 PROCEDURE — 250N000013 HC RX MED GY IP 250 OP 250 PS 637: Performed by: NURSE PRACTITIONER

## 2022-02-19 PROCEDURE — 124N000003 HC R&B MH SENIOR/ADOLESCENT

## 2022-02-19 PROCEDURE — 250N000013 HC RX MED GY IP 250 OP 250 PS 637: Performed by: PSYCHIATRY & NEUROLOGY

## 2022-02-19 PROCEDURE — 250N000013 HC RX MED GY IP 250 OP 250 PS 637

## 2022-02-19 RX ADMIN — NICOTINE POLACRILEX 8 MG: 4 GUM, CHEWING ORAL at 13:00

## 2022-02-19 RX ADMIN — PREGABALIN 300 MG: 100 CAPSULE ORAL at 19:55

## 2022-02-19 RX ADMIN — NICOTINE POLACRILEX 8 MG: 4 GUM, CHEWING ORAL at 22:04

## 2022-02-19 RX ADMIN — NICOTINE POLACRILEX 8 MG: 4 GUM, CHEWING ORAL at 14:12

## 2022-02-19 RX ADMIN — PRAZOSIN HYDROCHLORIDE 1 MG: 1 CAPSULE ORAL at 22:04

## 2022-02-19 RX ADMIN — OXYCODONE HYDROCHLORIDE 20 MG: 10 TABLET ORAL at 19:55

## 2022-02-19 RX ADMIN — QUETIAPINE 700 MG: 300 TABLET, FILM COATED ORAL at 22:04

## 2022-02-19 RX ADMIN — LAMOTRIGINE 100 MG: 100 TABLET ORAL at 08:23

## 2022-02-19 RX ADMIN — NICOTINE POLACRILEX 8 MG: 4 GUM, CHEWING ORAL at 11:09

## 2022-02-19 RX ADMIN — NICOTINE POLACRILEX 8 MG: 4 GUM, CHEWING ORAL at 08:23

## 2022-02-19 RX ADMIN — NICOTINE POLACRILEX 8 MG: 4 GUM, CHEWING ORAL at 17:22

## 2022-02-19 RX ADMIN — LIOTHYRONINE SODIUM 25 MCG: 25 TABLET ORAL at 08:23

## 2022-02-19 RX ADMIN — NICOTINE POLACRILEX 8 MG: 4 GUM, CHEWING ORAL at 21:03

## 2022-02-19 RX ADMIN — OXYCODONE HYDROCHLORIDE 20 MG: 10 TABLET ORAL at 09:04

## 2022-02-19 RX ADMIN — OXYCODONE HYDROCHLORIDE 20 MG: 10 TABLET ORAL at 13:00

## 2022-02-19 RX ADMIN — DOCUSATE SODIUM 100 MG: 100 CAPSULE, LIQUID FILLED ORAL at 08:23

## 2022-02-19 RX ADMIN — PANTOPRAZOLE SODIUM 40 MG: 40 TABLET, DELAYED RELEASE ORAL at 06:12

## 2022-02-19 RX ADMIN — ACETAMINOPHEN 975 MG: 325 TABLET, FILM COATED ORAL at 18:32

## 2022-02-19 RX ADMIN — PREGABALIN 300 MG: 100 CAPSULE ORAL at 08:23

## 2022-02-19 RX ADMIN — NAPROXEN 500 MG: 500 TABLET ORAL at 17:22

## 2022-02-19 RX ADMIN — ACAMPROSATE CALCIUM 666 MG: 333 TABLET, DELAYED RELEASE ORAL at 08:23

## 2022-02-19 RX ADMIN — TOPIRAMATE 100 MG: 100 TABLET, FILM COATED ORAL at 19:56

## 2022-02-19 RX ADMIN — TOPIRAMATE 100 MG: 100 TABLET, FILM COATED ORAL at 08:23

## 2022-02-19 RX ADMIN — OXYCODONE HYDROCHLORIDE 20 MG: 10 TABLET ORAL at 06:12

## 2022-02-19 RX ADMIN — NICOTINE POLACRILEX 8 MG: 4 GUM, CHEWING ORAL at 18:32

## 2022-02-19 RX ADMIN — NAPROXEN 500 MG: 500 TABLET ORAL at 08:23

## 2022-02-19 RX ADMIN — NICOTINE POLACRILEX 8 MG: 4 GUM, CHEWING ORAL at 16:10

## 2022-02-19 RX ADMIN — NICOTINE POLACRILEX 8 MG: 4 GUM, CHEWING ORAL at 19:56

## 2022-02-19 RX ADMIN — LEVOTHYROXINE SODIUM 75 MCG: 75 TABLET ORAL at 06:13

## 2022-02-19 RX ADMIN — NICOTINE POLACRILEX 8 MG: 4 GUM, CHEWING ORAL at 06:13

## 2022-02-19 RX ADMIN — FOLIC ACID 1 MG: 1 TABLET ORAL at 08:23

## 2022-02-19 RX ADMIN — ACAMPROSATE CALCIUM 666 MG: 333 TABLET, DELAYED RELEASE ORAL at 19:55

## 2022-02-19 RX ADMIN — THIAMINE HCL TAB 100 MG 100 MG: 100 TAB at 08:23

## 2022-02-19 RX ADMIN — PROPRANOLOL HYDROCHLORIDE 20 MG: 20 TABLET ORAL at 19:55

## 2022-02-19 RX ADMIN — ACAMPROSATE CALCIUM 666 MG: 333 TABLET, DELAYED RELEASE ORAL at 14:12

## 2022-02-19 RX ADMIN — PREGABALIN 300 MG: 100 CAPSULE ORAL at 14:12

## 2022-02-19 RX ADMIN — OXYCODONE HYDROCHLORIDE 20 MG: 10 TABLET ORAL at 16:09

## 2022-02-19 RX ADMIN — DIPHENHYDRAMINE HYDROCHLORIDE 50 MG: 25 CAPSULE ORAL at 19:56

## 2022-02-19 RX ADMIN — NICOTINE POLACRILEX 8 MG: 4 GUM, CHEWING ORAL at 09:39

## 2022-02-19 RX ADMIN — DIPHENHYDRAMINE HYDROCHLORIDE 50 MG: 25 CAPSULE ORAL at 09:04

## 2022-02-19 RX ADMIN — MULTIPLE VITAMINS W/ MINERALS TAB 1 TABLET: TAB at 08:23

## 2022-02-19 RX ADMIN — DOCUSATE SODIUM 100 MG: 100 CAPSULE, LIQUID FILLED ORAL at 19:55

## 2022-02-19 RX ADMIN — CHOLECALCIFEROL TAB 25 MCG (1000 UNIT) 25 MCG: 25 TAB at 08:23

## 2022-02-19 ASSESSMENT — ACTIVITIES OF DAILY LIVING (ADL)
HYGIENE/GROOMING: INDEPENDENT
DRESS: SCRUBS (BEHAVIORAL HEALTH)
LAUNDRY: WITH SUPERVISION
ORAL_HYGIENE: INDEPENDENT
LAUNDRY: WITH SUPERVISION
ORAL_HYGIENE: INDEPENDENT
HYGIENE/GROOMING: INDEPENDENT
DRESS: SCRUBS (BEHAVIORAL HEALTH)

## 2022-02-19 NOTE — PLAN OF CARE
Goal Outcome Evaluation:    Plan of Care Reviewed With: patient     Overall Patient Progress: improving  Patient awake at the start of the shift however remained in his room for the entire shift.  6 hrs of sleep.  Flat and blunted affect.  Calm on approach. Med compliant.

## 2022-02-19 NOTE — PLAN OF CARE
Problem: Behavioral Health Plan of Care  Goal: Plan of Care Review  Outcome: Ongoing, Progressing  Flowsheets  Taken 2/18/2022 1847  Plan of Care Reviewed With: patient  Patient Agreement with Plan of Care: agrees  Taken 1/29/2022 1925  Overall Patient Progress: improving     Pt presents with blunted, flat affect and depressed mood. Denies SI/SIB and hallucinations. Continues to endorse depression and anxiety 8/10. Pt spent a lot of time in his room this evening reading and listening to music, did come out for meals and to watch some television. Pt continues to have a difficult time due to the recent loss of his mother. Encouraged pt to speak to staff when he wants and needs to, pt expressed appreciation. He continues to have chronic R knee pain 7/10 that is adequately controlled with scheduled and PRN medications. PRN Tylenol given x1 this evening. VSS. Medication compliant, PRN nicotine gum given as requested. Appetite and fluid intake adequate. Gait steady and balanced with use of walker. Pt continues with the use of a medical bed due to impaired mobility. No other concerns or complains noted.

## 2022-02-19 NOTE — PLAN OF CARE
Problem: Activity and Energy Impairment (Depressive Signs/Symptoms)  Goal: Optimized Energy Level (Depressive Signs/Symptoms)  Intervention: Optimize Energy Level  Recent Flowsheet Documentation  Taken 2/19/2022 2720 by Jammie Vail RN  Diversional Activity: television  Activity (Behavioral Health): up ad kathleen        Pt presents with blunted, flat affect and depressed mood. Denies SI/SIB and hallucinations. Continues to rate depression and anxiety 8/10. He continues to struggle with the loss of his mother but does make an effort to not isolate and be social with peers and staff. Pt was present in the lounge more this AM compared to last evening. Played cards with staff and other patients on the unit. Did spend time reading and listening to music in his room. Pt continues to have chronic R knee pain that is constant, aching, and stabbing - rates this 7/10 and is adequately controlled with scheduled Oxycodone and PRN medications. Pt did refuse AM BP medications, states that his BP is normal so he should not have to take them. Educated pt on rational for continued compliance with BP meds despite normal BP readings, pt still opted to refuse the medications this morning and will discuss more with the provider. Pt otherwise medication compliant. Did request PRN Benadryl for allergy complaints. Appetite and fluid intake adequate, did have poor appetite for breakfast. Encouraged pt to shower today, states he would think about it. Pt still requires the use of medical bed due to impaired mobility. Gait steady and balanced with use of walker. Pt continues to utilize knee immobilizer for R knee. No other concerns or complaints noted at this time.

## 2022-02-20 PROCEDURE — 250N000013 HC RX MED GY IP 250 OP 250 PS 637

## 2022-02-20 PROCEDURE — 250N000013 HC RX MED GY IP 250 OP 250 PS 637: Performed by: PSYCHIATRY & NEUROLOGY

## 2022-02-20 PROCEDURE — 250N000013 HC RX MED GY IP 250 OP 250 PS 637: Performed by: NURSE PRACTITIONER

## 2022-02-20 PROCEDURE — 124N000003 HC R&B MH SENIOR/ADOLESCENT

## 2022-02-20 RX ADMIN — ACAMPROSATE CALCIUM 666 MG: 333 TABLET, DELAYED RELEASE ORAL at 08:44

## 2022-02-20 RX ADMIN — NICOTINE POLACRILEX 8 MG: 4 GUM, CHEWING ORAL at 09:04

## 2022-02-20 RX ADMIN — NICOTINE POLACRILEX 8 MG: 4 GUM, CHEWING ORAL at 15:16

## 2022-02-20 RX ADMIN — TOPIRAMATE 100 MG: 100 TABLET, FILM COATED ORAL at 20:02

## 2022-02-20 RX ADMIN — NICOTINE POLACRILEX 8 MG: 4 GUM, CHEWING ORAL at 13:05

## 2022-02-20 RX ADMIN — OXYCODONE HYDROCHLORIDE 20 MG: 10 TABLET ORAL at 16:02

## 2022-02-20 RX ADMIN — DOCUSATE SODIUM 100 MG: 100 CAPSULE, LIQUID FILLED ORAL at 20:02

## 2022-02-20 RX ADMIN — PRAZOSIN HYDROCHLORIDE 1 MG: 1 CAPSULE ORAL at 22:02

## 2022-02-20 RX ADMIN — NICOTINE POLACRILEX 8 MG: 4 GUM, CHEWING ORAL at 21:03

## 2022-02-20 RX ADMIN — CHOLECALCIFEROL TAB 25 MCG (1000 UNIT) 25 MCG: 25 TAB at 08:45

## 2022-02-20 RX ADMIN — OXYCODONE HYDROCHLORIDE 20 MG: 10 TABLET ORAL at 06:11

## 2022-02-20 RX ADMIN — LAMOTRIGINE 100 MG: 100 TABLET ORAL at 08:47

## 2022-02-20 RX ADMIN — FOLIC ACID 1 MG: 1 TABLET ORAL at 08:46

## 2022-02-20 RX ADMIN — MULTIPLE VITAMINS W/ MINERALS TAB 1 TABLET: TAB at 08:46

## 2022-02-20 RX ADMIN — NICOTINE POLACRILEX 8 MG: 4 GUM, CHEWING ORAL at 16:02

## 2022-02-20 RX ADMIN — LIOTHYRONINE SODIUM 25 MCG: 25 TABLET ORAL at 08:45

## 2022-02-20 RX ADMIN — ACAMPROSATE CALCIUM 666 MG: 333 TABLET, DELAYED RELEASE ORAL at 14:09

## 2022-02-20 RX ADMIN — PREGABALIN 300 MG: 100 CAPSULE ORAL at 20:02

## 2022-02-20 RX ADMIN — PROPRANOLOL HYDROCHLORIDE 20 MG: 20 TABLET ORAL at 20:02

## 2022-02-20 RX ADMIN — PREGABALIN 300 MG: 100 CAPSULE ORAL at 14:09

## 2022-02-20 RX ADMIN — PREGABALIN 300 MG: 100 CAPSULE ORAL at 08:46

## 2022-02-20 RX ADMIN — OXYCODONE HYDROCHLORIDE 20 MG: 10 TABLET ORAL at 13:05

## 2022-02-20 RX ADMIN — NICOTINE POLACRILEX 8 MG: 4 GUM, CHEWING ORAL at 18:03

## 2022-02-20 RX ADMIN — NICOTINE POLACRILEX 8 MG: 4 GUM, CHEWING ORAL at 17:00

## 2022-02-20 RX ADMIN — OXYCODONE HYDROCHLORIDE 20 MG: 10 TABLET ORAL at 21:03

## 2022-02-20 RX ADMIN — NAPROXEN 500 MG: 500 TABLET ORAL at 17:00

## 2022-02-20 RX ADMIN — DOCUSATE SODIUM 100 MG: 100 CAPSULE, LIQUID FILLED ORAL at 08:45

## 2022-02-20 RX ADMIN — NICOTINE POLACRILEX 8 MG: 4 GUM, CHEWING ORAL at 20:06

## 2022-02-20 RX ADMIN — NAPROXEN 500 MG: 500 TABLET ORAL at 08:45

## 2022-02-20 RX ADMIN — LEVOTHYROXINE SODIUM 75 MCG: 75 TABLET ORAL at 06:12

## 2022-02-20 RX ADMIN — ACAMPROSATE CALCIUM 666 MG: 333 TABLET, DELAYED RELEASE ORAL at 20:02

## 2022-02-20 RX ADMIN — NICOTINE POLACRILEX 8 MG: 4 GUM, CHEWING ORAL at 06:11

## 2022-02-20 RX ADMIN — QUETIAPINE 700 MG: 300 TABLET, FILM COATED ORAL at 22:01

## 2022-02-20 RX ADMIN — PANTOPRAZOLE SODIUM 40 MG: 40 TABLET, DELAYED RELEASE ORAL at 06:12

## 2022-02-20 RX ADMIN — THIAMINE HCL TAB 100 MG 100 MG: 100 TAB at 08:46

## 2022-02-20 RX ADMIN — NICOTINE POLACRILEX 8 MG: 4 GUM, CHEWING ORAL at 22:01

## 2022-02-20 RX ADMIN — NICOTINE POLACRILEX 8 MG: 4 GUM, CHEWING ORAL at 14:09

## 2022-02-20 RX ADMIN — TOPIRAMATE 100 MG: 100 TABLET, FILM COATED ORAL at 08:47

## 2022-02-20 RX ADMIN — OXYCODONE HYDROCHLORIDE 20 MG: 10 TABLET ORAL at 09:04

## 2022-02-20 ASSESSMENT — ACTIVITIES OF DAILY LIVING (ADL)
HYGIENE/GROOMING: INDEPENDENT
DRESS: INDEPENDENT
ORAL_HYGIENE: INDEPENDENT

## 2022-02-20 NOTE — PLAN OF CARE
Problem: Activity and Energy Impairment (Depressive Signs/Symptoms)  Goal: Optimized Energy Level (Depressive Signs/Symptoms)  Outcome: Ongoing, Not Progressing  Flowsheets (Taken 2/20/2022 1205)  Mutually Determined Action Steps (Optimized Energy Level): (Severely depressed) other (see comments)   Goal Outcome Evaluation:    Plan of Care Reviewed With: patient     Overall Patient Progress: Unsure if patient is progressing as he is still very depressed    Patient slept 3.5 hrs last night per NOC report. Patient came out for breakfast and did appear tired and dozing off on his tray. Patient ate 100% of breakfast before returning to be bed where he slept most part of the morning (3.5 hrs).   Patient is compliant with his medications and on point with the Oxycodone.   Patient presents with blunted and flat affect, depressed mood. Patient rates anxiety and depression 9/10. Patient continue to grief the lost of his mother but able to distract himself and engage on a conversation with staff and interact with other patients in the lounge as they watched TV together. Patient did not attend unit groups this shift.   Patient's BP medications (amLODIPine and propranolol) were held because parameters were not met (SBP<110).   APPETITE: Good  SLEEP: Poor  PRNs: Nicotine gum 8 mg X 2.

## 2022-02-20 NOTE — PLAN OF CARE
Patient awake at the start of the shift, remained in his room reading. Observed sleeping at 0315.  Med compliant.  Patient slept for 3.5 hrs.

## 2022-02-20 NOTE — PLAN OF CARE
Problem: Suicidal Behavior  Goal: Suicidal Behavior is Absent or Managed  Outcome: Ongoing, Progressing  Flowsheets (Taken 2/19/2022 1812)  Mutually Determined Action Steps (Suicidal Behavior Absent/Managed): identifies protective factors    Pt presents with blunted, flat affect and depressed mood. Denies SI/SIB and hallucinations. Continues to rate depression and anxiety 8/10. Pt more isolative to his room this shift, reports that it was too loud in the lounge at dinner so he opted to eat in his room alone. Did not attend groups. Spent most of the evening listening to music and reading. Did come out and play cards with a staff member in the lounge later in the evening. Continues to endorse grief about his mothers passing. Pt continues to have chronic R knee pain 7/10 that is adequately controlled with scheduled medications. Gait steady and balanced with walker. Prompted pt to shower this evening but he refused. Continues with use of medical bed due to impaired mobility. VSS. Medication compliant, PRN nicotine gum given as requested. Appetite and fluid intake adequate. No other concerns or complaints noted this shift.

## 2022-02-21 PROCEDURE — 250N000013 HC RX MED GY IP 250 OP 250 PS 637: Performed by: PSYCHIATRY & NEUROLOGY

## 2022-02-21 PROCEDURE — G0177 OPPS/PHP; TRAIN & EDUC SERV: HCPCS

## 2022-02-21 PROCEDURE — 250N000013 HC RX MED GY IP 250 OP 250 PS 637: Performed by: NURSE PRACTITIONER

## 2022-02-21 PROCEDURE — 250N000013 HC RX MED GY IP 250 OP 250 PS 637

## 2022-02-21 PROCEDURE — 124N000003 HC R&B MH SENIOR/ADOLESCENT

## 2022-02-21 RX ORDER — OXYCODONE HYDROCHLORIDE 10 MG/1
20 TABLET ORAL 4 TIMES DAILY
Status: DISCONTINUED | OUTPATIENT
Start: 2022-02-21 | End: 2022-02-24

## 2022-02-21 RX ADMIN — OXYCODONE HYDROCHLORIDE 20 MG: 10 TABLET ORAL at 06:15

## 2022-02-21 RX ADMIN — NICOTINE POLACRILEX 8 MG: 4 GUM, CHEWING ORAL at 13:03

## 2022-02-21 RX ADMIN — DOCUSATE SODIUM 100 MG: 100 CAPSULE, LIQUID FILLED ORAL at 19:55

## 2022-02-21 RX ADMIN — NICOTINE POLACRILEX 8 MG: 4 GUM, CHEWING ORAL at 22:15

## 2022-02-21 RX ADMIN — NICOTINE POLACRILEX 8 MG: 4 GUM, CHEWING ORAL at 08:10

## 2022-02-21 RX ADMIN — NICOTINE POLACRILEX 8 MG: 4 GUM, CHEWING ORAL at 07:23

## 2022-02-21 RX ADMIN — NAPROXEN 500 MG: 500 TABLET ORAL at 08:09

## 2022-02-21 RX ADMIN — THIAMINE HCL TAB 100 MG 100 MG: 100 TAB at 08:09

## 2022-02-21 RX ADMIN — ACETAMINOPHEN 975 MG: 325 TABLET, FILM COATED ORAL at 22:15

## 2022-02-21 RX ADMIN — OXYCODONE HYDROCHLORIDE 20 MG: 10 TABLET ORAL at 09:36

## 2022-02-21 RX ADMIN — DIPHENHYDRAMINE HYDROCHLORIDE 50 MG: 25 CAPSULE ORAL at 16:12

## 2022-02-21 RX ADMIN — NICOTINE POLACRILEX 8 MG: 4 GUM, CHEWING ORAL at 11:12

## 2022-02-21 RX ADMIN — NICOTINE POLACRILEX 8 MG: 4 GUM, CHEWING ORAL at 14:03

## 2022-02-21 RX ADMIN — PREGABALIN 300 MG: 100 CAPSULE ORAL at 19:55

## 2022-02-21 RX ADMIN — NICOTINE POLACRILEX 8 MG: 4 GUM, CHEWING ORAL at 18:06

## 2022-02-21 RX ADMIN — ACAMPROSATE CALCIUM 666 MG: 333 TABLET, DELAYED RELEASE ORAL at 19:56

## 2022-02-21 RX ADMIN — PREGABALIN 300 MG: 100 CAPSULE ORAL at 14:01

## 2022-02-21 RX ADMIN — TOPIRAMATE 100 MG: 100 TABLET, FILM COATED ORAL at 19:55

## 2022-02-21 RX ADMIN — NICOTINE POLACRILEX 8 MG: 4 GUM, CHEWING ORAL at 06:15

## 2022-02-21 RX ADMIN — TOPIRAMATE 100 MG: 100 TABLET, FILM COATED ORAL at 08:10

## 2022-02-21 RX ADMIN — LIOTHYRONINE SODIUM 25 MCG: 25 TABLET ORAL at 08:09

## 2022-02-21 RX ADMIN — DIPHENHYDRAMINE HYDROCHLORIDE 50 MG: 25 CAPSULE ORAL at 22:16

## 2022-02-21 RX ADMIN — LEVOTHYROXINE SODIUM 75 MCG: 75 TABLET ORAL at 06:16

## 2022-02-21 RX ADMIN — OXYCODONE HYDROCHLORIDE 20 MG: 10 TABLET ORAL at 16:02

## 2022-02-21 RX ADMIN — DOCUSATE SODIUM 100 MG: 100 CAPSULE, LIQUID FILLED ORAL at 08:09

## 2022-02-21 RX ADMIN — ACAMPROSATE CALCIUM 666 MG: 333 TABLET, DELAYED RELEASE ORAL at 14:01

## 2022-02-21 RX ADMIN — OXYCODONE HYDROCHLORIDE 20 MG: 10 TABLET ORAL at 21:05

## 2022-02-21 RX ADMIN — CHOLECALCIFEROL TAB 25 MCG (1000 UNIT) 25 MCG: 25 TAB at 08:09

## 2022-02-21 RX ADMIN — ACAMPROSATE CALCIUM 666 MG: 333 TABLET, DELAYED RELEASE ORAL at 08:09

## 2022-02-21 RX ADMIN — NICOTINE POLACRILEX 8 MG: 4 GUM, CHEWING ORAL at 16:02

## 2022-02-21 RX ADMIN — PANTOPRAZOLE SODIUM 40 MG: 40 TABLET, DELAYED RELEASE ORAL at 06:16

## 2022-02-21 RX ADMIN — FOLIC ACID 1 MG: 1 TABLET ORAL at 08:10

## 2022-02-21 RX ADMIN — NICOTINE POLACRILEX 8 MG: 4 GUM, CHEWING ORAL at 19:54

## 2022-02-21 RX ADMIN — OXYCODONE HYDROCHLORIDE 20 MG: 10 TABLET ORAL at 13:03

## 2022-02-21 RX ADMIN — NAPROXEN 500 MG: 500 TABLET ORAL at 18:06

## 2022-02-21 RX ADMIN — PREGABALIN 300 MG: 100 CAPSULE ORAL at 08:09

## 2022-02-21 RX ADMIN — MULTIPLE VITAMINS W/ MINERALS TAB 1 TABLET: TAB at 08:09

## 2022-02-21 RX ADMIN — NICOTINE POLACRILEX 8 MG: 4 GUM, CHEWING ORAL at 21:06

## 2022-02-21 RX ADMIN — PRAZOSIN HYDROCHLORIDE 1 MG: 1 CAPSULE ORAL at 22:15

## 2022-02-21 RX ADMIN — PROPRANOLOL HYDROCHLORIDE 20 MG: 20 TABLET ORAL at 20:01

## 2022-02-21 RX ADMIN — QUETIAPINE 700 MG: 300 TABLET, FILM COATED ORAL at 22:15

## 2022-02-21 RX ADMIN — LAMOTRIGINE 100 MG: 100 TABLET ORAL at 08:09

## 2022-02-21 RX ADMIN — NICOTINE POLACRILEX 8 MG: 4 GUM, CHEWING ORAL at 09:36

## 2022-02-21 ASSESSMENT — ACTIVITIES OF DAILY LIVING (ADL)
DRESS: INDEPENDENT
DRESS: STREET CLOTHES
HYGIENE/GROOMING: INDEPENDENT
LAUNDRY: WITH SUPERVISION
HYGIENE/GROOMING: INDEPENDENT
LAUNDRY: WITH SUPERVISION
ORAL_HYGIENE: INDEPENDENT
ORAL_HYGIENE: INDEPENDENT

## 2022-02-21 NOTE — PLAN OF CARE
02/21/22 1549   Individualization/Patient Specific Goals   Patient Personal Strengths insight into illness/situation;intellectual cognitive skills;humor;resourceful;self-reliant   Patient Vulnerabilities recent loss;substance abuse/addiction;housing insecurity;occupational insecurity;poor impulse control;history of unsuccessful treatment;poor physical health   Interprofessional Rounds   Summary Patient is isolative, grieving loss of mother last week.   Participants advanced practice nurse;nursing;psychiatrist;social work   Team Discussion   Participants Ulises Perez MD; LATOYA Wooten; Bernard Mancera RN; NORBERTO Cruz [facility]   Progress Minimal change   Anticipated length of stay ~2 weeks.   Continued Stay Criteria/Rationale Patient in need of placement, prior discharge to crisis or shelter resulted in readmission.   Medical/Physical Knee injury   Precautions Behavioral Orders   Procedures    Code 2    Fall precautions    Routine Programming     As clinically indicated    Seizure precautions    Status 15     Every 15 minutes.    Suicide precautions     Patients on Suicide Precautions should have a Combination Diet ordered that includes a Diet selection(s) AND a Behavioral Tray selection for Safe Tray - with utensils, or Safe Tray - NO utensils         Plan CTC to continue to refer to assisted living facilities.   Rationale for change in precautions or plan No change   Anticipated Discharge Disposition assisted living

## 2022-02-21 NOTE — PLAN OF CARE
Goal Outcome Evaluation:    Plan of Care Reviewed With: patient     Overall Patient Progress: improving       Patient has remained in his room sleeping throughout the night.  No conerns reported.  Med compliant.  6 hrs of sleep.           Statement Selected
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
## 2022-02-21 NOTE — PLAN OF CARE
Assessment/Intervention/Current Symtoms and Care Coordination:  Current Symptoms include the following: isolative, grieving  Upcoming appointments:  Zoom interview with [place] at [facility] 2/22/22 at 10:30 AM  Chart Review  Met with team to discuss patient care.  Checked in with patient, informed him of interview scheduled tomorrow for assisted living.    Discharge Plan or Goal:  Discharge to assisted living. Several referrals made by [facility].    Barriers to Discharge:  Patient requires further psychiatric stabilization due to current symptomology    Referral Status:  No new referrals made    Legal Status:  Patient is voluntary      Contacts:   Jr Giron MD  -[facility] Physicians [facility] #[phone]  AMELIA Vazquez CM: Jessica Lawson- [facility] #[phone]. [email or web address]  Arlin- supervisor for amelia davis [phone]  Joseph Oliveros's supervisor # [phone]  Admin #[phone]

## 2022-02-21 NOTE — PLAN OF CARE
"  Problem: Suicidal Behavior  Goal: Suicidal Behavior is Absent or Managed  Outcome: Ongoing, Progressing     Problem: Activity and Energy Impairment (Depressive Signs/Symptoms)  Goal: Optimized Energy Level (Depressive Signs/Symptoms)  Outcome: Ongoing, Progressing  Intervention: Optimize Energy Level  Recent Flowsheet Documentation  Taken 2/21/2022 1000 by Bernard Mancera RN  Patient Performed Hygiene: dressed  Activity (Behavioral Health): up ad kathleen     Problem: Decreased Participation and Engagement (Depressive Signs/Symptoms)  Goal: Increased Participation and Engagement (Depressive Signs/Symptoms)  Outcome: Ongoing, Progressing     Problem: Feelings of Worthlessness, Hopelessness or Excessive Guilt (Depressive Signs/Symptoms)  Goal: Enhanced Self-Esteem and Confidence (Depressive Signs/Symptoms)  Outcome: Ongoing, Progressing   Goal Outcome Evaluation:    Plan of Care Reviewed With: patient     Overall Patient Progress: improving         Patient is calm and cooperative, mood is sad and depressed, affect flat and blunted. Patient is withdrawn but pleasant and engages upon approach. Patient reports the need to make big life changes to better pursue happiness. Patient requested a decrease in his oxycodone dose from 5x day to QID, with hopes of being off oxycodone before discharge, stating \" I do not want to be dependent on it, its no different than alcohol\". Patient did not attend morning group but states attending groups intermittently. Good appetite, ate 100% of meals. Right knee pain managed with scheduled oxycodone.    Held amlodipine and propranolol /73   Pulse 87   Temp 97.2  F (36.2  C) (Temporal)   Resp 16   Wt 138.5 kg (305 lb 4.8 oz)   SpO2 95%   BMI 36.39 kg/m      "

## 2022-02-21 NOTE — PROGRESS NOTES
"SPIRITUAL HEALTH SERVICES  SPIRITUAL ASSESSMENT Progress Note  [facility] ([facility]) 3BW     REFERRAL SOURCE: follow up  Check-in visit with Jose M in pt room.    Jose M returned 1 library book, spoke briefly about his weekend and declined support stating \"I'm feeling more quiet today.\"    Plan: I will follow up 1-2x weekly.    Liss Kerns MDiv  Associate   Pager [phone]  Office [phone]  [facility] remains available 24/7 for emergent requests/referrals, either by having the switchboard page the on-call  or by entering an ASAP/STAT consult in Epic (this will also page the on-call ). Routine Epic consults receive an initial response within 24 hours.    "

## 2022-02-21 NOTE — PLAN OF CARE
"Goal Outcome Evaluation:    Plan of Care Reviewed With: patient     Overall Patient Progress: no change    Pt visible in milieu. Affect restricted. Friendly with staff. Minimal interactions with peers. Gait steady with leg brace. Pt reports feeling he is in an early stage of grief over his mother's death. States he wishes to view it as an impetus to \"change my life for the better.\" Feelings acknowledged, reassurance provided. Denies SI. Med-compliant. Cooperative, appreciative. Continue with current treatment plan and recommendations. Continue to monitor and reassess symptoms. Monitor response to medications. Monitor progress towards treatment goals. Encourage groups and participation.                "

## 2022-02-21 NOTE — CARE PLAN
02/21/22 1543   Team Discussion   Participants Ulises Perez MD; LATOYA Wooten; Bernard Mancera, RN; NORBERTO Cruz [facility]   Progress Minimal change   Anticipated length of stay ~2 weeks.   Continued Stay Criteria/Rationale Patient in need of placement, prior discharge to crisis or shelter resulted in readmission.   Medical/Physical Knee injury   Precautions  --    Plan CTC to continue to refer to assisted living facilities.   Rationale for change in precautions or plan No change

## 2022-02-22 PROCEDURE — 250N000013 HC RX MED GY IP 250 OP 250 PS 637

## 2022-02-22 PROCEDURE — 250N000013 HC RX MED GY IP 250 OP 250 PS 637: Performed by: PSYCHIATRY & NEUROLOGY

## 2022-02-22 PROCEDURE — 250N000013 HC RX MED GY IP 250 OP 250 PS 637: Performed by: NURSE PRACTITIONER

## 2022-02-22 PROCEDURE — G0177 OPPS/PHP; TRAIN & EDUC SERV: HCPCS

## 2022-02-22 PROCEDURE — 124N000003 HC R&B MH SENIOR/ADOLESCENT

## 2022-02-22 RX ADMIN — NICOTINE POLACRILEX 8 MG: 4 GUM, CHEWING ORAL at 17:49

## 2022-02-22 RX ADMIN — PROPRANOLOL HYDROCHLORIDE 20 MG: 20 TABLET ORAL at 08:18

## 2022-02-22 RX ADMIN — OXYCODONE HYDROCHLORIDE 20 MG: 10 TABLET ORAL at 09:24

## 2022-02-22 RX ADMIN — PANTOPRAZOLE SODIUM 40 MG: 40 TABLET, DELAYED RELEASE ORAL at 06:46

## 2022-02-22 RX ADMIN — NICOTINE POLACRILEX 8 MG: 4 GUM, CHEWING ORAL at 16:12

## 2022-02-22 RX ADMIN — TOPIRAMATE 100 MG: 100 TABLET, FILM COATED ORAL at 20:05

## 2022-02-22 RX ADMIN — PREGABALIN 300 MG: 100 CAPSULE ORAL at 08:18

## 2022-02-22 RX ADMIN — TOPIRAMATE 100 MG: 100 TABLET, FILM COATED ORAL at 08:19

## 2022-02-22 RX ADMIN — NAPROXEN 500 MG: 500 TABLET ORAL at 08:18

## 2022-02-22 RX ADMIN — NICOTINE POLACRILEX 8 MG: 4 GUM, CHEWING ORAL at 09:24

## 2022-02-22 RX ADMIN — LIOTHYRONINE SODIUM 25 MCG: 25 TABLET ORAL at 08:17

## 2022-02-22 RX ADMIN — NICOTINE POLACRILEX 8 MG: 4 GUM, CHEWING ORAL at 15:05

## 2022-02-22 RX ADMIN — ACAMPROSATE CALCIUM 666 MG: 333 TABLET, DELAYED RELEASE ORAL at 08:17

## 2022-02-22 RX ADMIN — PREGABALIN 300 MG: 100 CAPSULE ORAL at 13:59

## 2022-02-22 RX ADMIN — DIPHENHYDRAMINE HYDROCHLORIDE 50 MG: 25 CAPSULE ORAL at 14:01

## 2022-02-22 RX ADMIN — PRAZOSIN HYDROCHLORIDE 1 MG: 1 CAPSULE ORAL at 22:00

## 2022-02-22 RX ADMIN — QUETIAPINE 700 MG: 300 TABLET, FILM COATED ORAL at 22:00

## 2022-02-22 RX ADMIN — DIPHENHYDRAMINE HYDROCHLORIDE 50 MG: 25 CAPSULE ORAL at 22:00

## 2022-02-22 RX ADMIN — CHOLECALCIFEROL TAB 25 MCG (1000 UNIT) 25 MCG: 25 TAB at 08:18

## 2022-02-22 RX ADMIN — DOCUSATE SODIUM 100 MG: 100 CAPSULE, LIQUID FILLED ORAL at 20:05

## 2022-02-22 RX ADMIN — OXYCODONE HYDROCHLORIDE 20 MG: 10 TABLET ORAL at 13:02

## 2022-02-22 RX ADMIN — ACAMPROSATE CALCIUM 666 MG: 333 TABLET, DELAYED RELEASE ORAL at 20:04

## 2022-02-22 RX ADMIN — OXYCODONE HYDROCHLORIDE 20 MG: 10 TABLET ORAL at 16:12

## 2022-02-22 RX ADMIN — LEVOTHYROXINE SODIUM 75 MCG: 75 TABLET ORAL at 06:46

## 2022-02-22 RX ADMIN — NICOTINE POLACRILEX 8 MG: 4 GUM, CHEWING ORAL at 19:02

## 2022-02-22 RX ADMIN — OXYCODONE HYDROCHLORIDE 20 MG: 10 TABLET ORAL at 20:59

## 2022-02-22 RX ADMIN — AMLODIPINE BESYLATE 5 MG: 5 TABLET ORAL at 08:19

## 2022-02-22 RX ADMIN — NICOTINE POLACRILEX 8 MG: 4 GUM, CHEWING ORAL at 21:03

## 2022-02-22 RX ADMIN — ACAMPROSATE CALCIUM 666 MG: 333 TABLET, DELAYED RELEASE ORAL at 13:59

## 2022-02-22 RX ADMIN — ACETAMINOPHEN 975 MG: 325 TABLET, FILM COATED ORAL at 20:04

## 2022-02-22 RX ADMIN — NICOTINE POLACRILEX 8 MG: 4 GUM, CHEWING ORAL at 13:02

## 2022-02-22 RX ADMIN — DOCUSATE SODIUM 100 MG: 100 CAPSULE, LIQUID FILLED ORAL at 08:18

## 2022-02-22 RX ADMIN — THIAMINE HCL TAB 100 MG 100 MG: 100 TAB at 08:18

## 2022-02-22 RX ADMIN — NICOTINE POLACRILEX 8 MG: 4 GUM, CHEWING ORAL at 06:46

## 2022-02-22 RX ADMIN — NICOTINE POLACRILEX 8 MG: 4 GUM, CHEWING ORAL at 13:59

## 2022-02-22 RX ADMIN — LAMOTRIGINE 100 MG: 100 TABLET ORAL at 08:17

## 2022-02-22 RX ADMIN — NICOTINE POLACRILEX 8 MG: 4 GUM, CHEWING ORAL at 22:03

## 2022-02-22 RX ADMIN — NICOTINE POLACRILEX 8 MG: 4 GUM, CHEWING ORAL at 20:05

## 2022-02-22 RX ADMIN — PROPRANOLOL HYDROCHLORIDE 20 MG: 20 TABLET ORAL at 20:04

## 2022-02-22 RX ADMIN — MULTIPLE VITAMINS W/ MINERALS TAB 1 TABLET: TAB at 08:17

## 2022-02-22 RX ADMIN — FOLIC ACID 1 MG: 1 TABLET ORAL at 08:19

## 2022-02-22 RX ADMIN — PREGABALIN 300 MG: 100 CAPSULE ORAL at 20:03

## 2022-02-22 RX ADMIN — NAPROXEN 500 MG: 500 TABLET ORAL at 17:48

## 2022-02-22 RX ADMIN — NICOTINE POLACRILEX 8 MG: 4 GUM, CHEWING ORAL at 08:19

## 2022-02-22 ASSESSMENT — ACTIVITIES OF DAILY LIVING (ADL)
HYGIENE/GROOMING: INDEPENDENT
ORAL_HYGIENE: INDEPENDENT
ORAL_HYGIENE: INDEPENDENT
LAUNDRY: WITH SUPERVISION
HYGIENE/GROOMING: INDEPENDENT
DRESS: SCRUBS (BEHAVIORAL HEALTH)

## 2022-02-22 NOTE — PLAN OF CARE
Problem: Suicidal Behavior  Goal: Suicidal Behavior is Absent or Managed  Outcome: Ongoing, Progressing     Problem: Activity and Energy Impairment (Depressive Signs/Symptoms)  Goal: Optimized Energy Level (Depressive Signs/Symptoms)  Outcome: Ongoing, Progressing  Intervention: Optimize Energy Level  Recent Flowsheet Documentation  Taken 2/22/2022 1000 by Bernard Mancera, RN  Activity (Behavioral Health): up ad kathleen     Problem: Mood Impairment (Depressive Signs/Symptoms)  Goal: Improved Mood Symptoms (Depressive Signs/Symptoms)  Outcome: Ongoing, Progressing   Goal Outcome Evaluation:    Plan of Care Reviewed With: patient     Overall Patient Progress: improving       Patient is calm and cooperative, affect is flat and blunted. Mood is depressed. Patient engaged when approached but mostly withdrawn and isolative, did not attend groups. Reported having a bad night and feeling more down today, would not elaborate more on specifics. Good appetite for breakfast and poor appetite for lunch eating 0%. Pain managed with scheduled oxycodone, nicorette gum given prn. Patient had zoom assessment meeting regarding placement at [email or web address] today.  Denies SI, SIB.  Prn benadryl given for mucous discharge from nose

## 2022-02-22 NOTE — PLAN OF CARE
"Goal Outcome Evaluation:    Plan of Care Reviewed With: parent     Overall Patient Progress: improving       Pt is calm and appreciative of assistance.  Pt expressed feeling hopeful about the reduction of oxycodone starting tomorrow.  Pt states, \"I am taking control of my life - I don't like being controlled by that stuff.\"  Ambulation is slow and steady.  Pt wearing leg brace and using walker.  Pt states his mood is \"alright\" and that he feels sadness when he thinks about the recent loss of his mother.  Pt is visible in the lounge, mostly withdrawn to self.  Pt provided with a list of scheduled medications per his request.  /84 at start of shift; /78 at HS.    PRN Tylenol 650 mg x 1 for R leg pain  PRN Benadryl 50 mg x 2 for report of sinus symptoms        "

## 2022-02-22 NOTE — PROGRESS NOTES
Behavioral Health  Note    Behavioral Health  Spirituality Group Note    UNIT 3BW    Name: Hollis Barclay YOB: 1970   MRN: [id number] Age: 51 year old      Patient attended -led group, which included discussion of spirituality, coping with illness and building resilience.    Patient attended group for 1.0 hrs.    The patient actively participated in group discussion and patient demonstrated an appreciation of topic's application for their personal circumstances. Pt participated in reflective activities around holding on/celebrating who we are, and letting go of hurtful/negative things.     Liss Kerns MDiv  Associate   Pager [phone]  Office [phone]

## 2022-02-22 NOTE — PLAN OF CARE
Assessment/Intervention/Current Symtoms and Care Coordination:    Current Symptoms include the following: isolative, endorses grief.    Chart Review    Met with team to discuss patient care.    Patient had interview with [facility]. They will review referral and get back if he is excepted likely by the end of the week.     [facility] called to schedule [facility] assessment. Scheduled for a phone call with Angela on March 10th at 10am. She will call the unit to talk to patient.  did not have any earlier times but said sometimes the  will keep a fast-track list for when there is a cancelation. Contact information below. Informed patient and huc.    Discharge Plan or Goal:  Discharge to assisted living. Several referrals made by [facility].    Barriers to Discharge:  Patient requires further psychiatric stabilization due to current symptomology     Referral Status:  No new referrals made     Legal Status:  Patient is voluntary        Contacts:   Jr Giron MD  -[facility] #[phone]  OSCAR Vazquez CM: Jessica Lawson- [facility] #[phone]. [email or web address]  Arlin- supervisor for cadi cm [phone]  Joseph Oliveros's supervisor # [phone]  Admin #[phone]  [facility]Angela Sawyer) Office [phone] cell [phone]

## 2022-02-22 NOTE — PLAN OF CARE
Goal Outcome Evaluation:    Plan of Care Reviewed With: parent     Overall Patient Progress: improving       Patient remained in his room sleeping for the entire shift.  No concerns reported.  Med complaint.  PRN nicorette gun 8 mg X1 this shift.  6 hrs of sleep.

## 2022-02-22 NOTE — PROGRESS NOTES
Patient seen via telemedicine.  Care discussed with treatment team staff.  Blood pressure 110/80, pulse 97, temperature 97.4  F (36.3  C), temperature source Temporal, resp. rate 16, weight 138.5 kg (305 lb 6.4 oz), SpO2 96 %.  Tolerating initial opiate reduction  General appearance: good  Alert.   Affect: fair  Mood: fair    Speech:  normal.   Eye contact:  good.    Psychomotor behavior: normal  Gait: steady with walker  Abnormal movements:  none  Delusions: none  Hallucinations:  none  Thoughts: logical  Associations: intact  Judgement: good  Insight: good  Cognitions: intact in conversation  Memory:  intact in conversation  Orientation: normal    Not suicidal.    Imp: Bipolar depression, severe, recurrent, without psychosis, stabilizing  2.  PTSD  3.  S/P brain injury  And:  Patient Active Problem List   Diagnosis     Post concussive encephalopathy     H/O shoulder surgery     Alcoholism in remission (H)     Bilateral ACL tear     Chronic back pain     Social anxiety disorder     Alcohol withdrawal syndrome with complication, with unspecified complication (H)     Rib fracture     Alcohol withdrawal (H)     Alcohol dependence (H)     LFT elevation     Laceration of left hand without foreign body, initial encounter     Suicidal ideation     Intentional drug overdose, initial encounter (H)     Alcoholic intoxication with complication (H)     Severe bipolar I disorder, current or most recent episode depressed, with mixed features (H)     Medication overdose, intentional self-harm, subsequent encounter     Opioid dependence on agonist therapy (H)     2019 novel coronavirus disease (COVID-19)     Pneumonia due to COVID-19 virus     Bipolar disorder (H)     Plan: Same meds today, continue opiate taper tomorrow    Current Facility-Administered Medications   Medication     acamprosate (CAMPRAL) EC tablet 666 mg     acetaminophen (TYLENOL) tablet 975 mg     albuterol (PROVENTIL HFA/VENTOLIN HFA) inhaler     alum & mag  hydroxide-simethicone (MAALOX) suspension 30 mL     amLODIPine (NORVASC) tablet 5 mg     diphenhydrAMINE (BENADRYL) capsule 50 mg     docusate sodium (COLACE) capsule 100 mg     folic acid (FOLVITE) tablet 1 mg     ketamine 5%-gabapentin 8% in PLO cream 0.25 g     lamoTRIgine (LaMICtal) tablet 100 mg     levothyroxine (SYNTHROID/LEVOTHROID) tablet 75 mcg     liothyronine (CYTOMEL) tablet 25 mcg     loperamide (IMODIUM) capsule 2 mg     melatonin tablet 3 mg     multivitamin w/minerals (THERA-VIT-M) tablet 1 tablet     naloxone (NARCAN) injection 0.2 mg    Or     naloxone (NARCAN) injection 0.4 mg    Or     naloxone (NARCAN) injection 0.2 mg    Or     naloxone (NARCAN) injection 0.4 mg     naproxen (NAPROSYN) tablet 500 mg     nicotine polacrilex (NICORETTE) gum 4-8 mg     OLANZapine (zyPREXA) tablet 10 mg    Or     OLANZapine (zyPREXA) injection 10 mg     oxyCODONE IR (ROXICODONE) tablet 20 mg     pantoprazole (PROTONIX) EC tablet 40 mg     prazosin (MINIPRESS) capsule 1 mg     pregabalin (LYRICA) capsule 300 mg     propranolol (INDERAL) tablet 20 mg     QUEtiapine (SEROquel) tablet 700 mg     sennosides (SENOKOT) tablet 1-2 tablet     thiamine (B-1) tablet 100 mg     tiZANidine (ZANAFLEX) tablet 4 mg     topiramate (TOPAMAX) tablet 100 mg     Vitamin D3 (CHOLECALCIFEROL) tablet 25 mcg     No results found for this or any previous visit (from the past 168 hour(s)).       Video-Visit Details    Type of service:  Video Visit    Video Start Time (time video started): 1030    Video End Time (time video stopped): 1045    [email or web address]    Distant Location (provider location): Provider remote location    Mode of Communication:  Video Conference via [employer]    Physician has received verbal consent for a Video Visit from the patient? Yes      Ulises Perez MD

## 2022-02-23 PROCEDURE — G0177 OPPS/PHP; TRAIN & EDUC SERV: HCPCS

## 2022-02-23 PROCEDURE — 99231 SBSQ HOSP IP/OBS SF/LOW 25: CPT | Performed by: PHYSICIAN ASSISTANT

## 2022-02-23 PROCEDURE — 124N000003 HC R&B MH SENIOR/ADOLESCENT

## 2022-02-23 PROCEDURE — 250N000013 HC RX MED GY IP 250 OP 250 PS 637: Performed by: PHYSICIAN ASSISTANT

## 2022-02-23 PROCEDURE — 99207 PR CDG-MDM COMPONENT: MEETS LOW - DOWN CODED: CPT | Performed by: PHYSICIAN ASSISTANT

## 2022-02-23 PROCEDURE — 250N000013 HC RX MED GY IP 250 OP 250 PS 637: Performed by: NURSE PRACTITIONER

## 2022-02-23 PROCEDURE — 250N000013 HC RX MED GY IP 250 OP 250 PS 637

## 2022-02-23 PROCEDURE — H2032 ACTIVITY THERAPY, PER 15 MIN: HCPCS

## 2022-02-23 PROCEDURE — 250N000013 HC RX MED GY IP 250 OP 250 PS 637: Performed by: PSYCHIATRY & NEUROLOGY

## 2022-02-23 RX ORDER — BENZOCAINE/MENTHOL 6 MG-10 MG
LOZENGE MUCOUS MEMBRANE 2 TIMES DAILY
Status: DISCONTINUED | OUTPATIENT
Start: 2022-02-23 | End: 2022-03-18

## 2022-02-23 RX ORDER — BENZOCAINE/MENTHOL 6 MG-10 MG
LOZENGE MUCOUS MEMBRANE 3 TIMES DAILY PRN
Status: DISCONTINUED | OUTPATIENT
Start: 2022-02-23 | End: 2022-02-23

## 2022-02-23 RX ADMIN — AMLODIPINE BESYLATE 5 MG: 5 TABLET ORAL at 08:47

## 2022-02-23 RX ADMIN — LAMOTRIGINE 100 MG: 100 TABLET ORAL at 08:48

## 2022-02-23 RX ADMIN — PANTOPRAZOLE SODIUM 40 MG: 40 TABLET, DELAYED RELEASE ORAL at 06:53

## 2022-02-23 RX ADMIN — DOCUSATE SODIUM 100 MG: 100 CAPSULE, LIQUID FILLED ORAL at 08:48

## 2022-02-23 RX ADMIN — ACAMPROSATE CALCIUM 666 MG: 333 TABLET, DELAYED RELEASE ORAL at 08:47

## 2022-02-23 RX ADMIN — PREGABALIN 300 MG: 100 CAPSULE ORAL at 08:48

## 2022-02-23 RX ADMIN — MULTIPLE VITAMINS W/ MINERALS TAB 1 TABLET: TAB at 08:48

## 2022-02-23 RX ADMIN — CHOLECALCIFEROL TAB 25 MCG (1000 UNIT) 25 MCG: 25 TAB at 08:48

## 2022-02-23 RX ADMIN — FOLIC ACID 1 MG: 1 TABLET ORAL at 08:47

## 2022-02-23 RX ADMIN — OXYCODONE HYDROCHLORIDE 20 MG: 10 TABLET ORAL at 19:58

## 2022-02-23 RX ADMIN — QUETIAPINE 700 MG: 300 TABLET, FILM COATED ORAL at 22:26

## 2022-02-23 RX ADMIN — NICOTINE POLACRILEX 8 MG: 4 GUM, CHEWING ORAL at 14:04

## 2022-02-23 RX ADMIN — THIAMINE HCL TAB 100 MG 100 MG: 100 TAB at 08:48

## 2022-02-23 RX ADMIN — NAPROXEN 500 MG: 500 TABLET ORAL at 17:30

## 2022-02-23 RX ADMIN — NICOTINE POLACRILEX 8 MG: 4 GUM, CHEWING ORAL at 16:04

## 2022-02-23 RX ADMIN — HYDROCORTISONE: 1 CREAM TOPICAL at 19:59

## 2022-02-23 RX ADMIN — LIOTHYRONINE SODIUM 25 MCG: 25 TABLET ORAL at 08:48

## 2022-02-23 RX ADMIN — NICOTINE POLACRILEX 8 MG: 4 GUM, CHEWING ORAL at 06:53

## 2022-02-23 RX ADMIN — LEVOTHYROXINE SODIUM 75 MCG: 75 TABLET ORAL at 06:53

## 2022-02-23 RX ADMIN — NICOTINE POLACRILEX 8 MG: 4 GUM, CHEWING ORAL at 22:26

## 2022-02-23 RX ADMIN — PREGABALIN 300 MG: 100 CAPSULE ORAL at 19:59

## 2022-02-23 RX ADMIN — TOPIRAMATE 100 MG: 100 TABLET, FILM COATED ORAL at 08:48

## 2022-02-23 RX ADMIN — DIPHENHYDRAMINE HYDROCHLORIDE 50 MG: 25 CAPSULE ORAL at 19:58

## 2022-02-23 RX ADMIN — DOCUSATE SODIUM 100 MG: 100 CAPSULE, LIQUID FILLED ORAL at 19:58

## 2022-02-23 RX ADMIN — NICOTINE POLACRILEX 8 MG: 4 GUM, CHEWING ORAL at 17:30

## 2022-02-23 RX ADMIN — PROPRANOLOL HYDROCHLORIDE 20 MG: 20 TABLET ORAL at 08:48

## 2022-02-23 RX ADMIN — TOPIRAMATE 100 MG: 100 TABLET, FILM COATED ORAL at 19:59

## 2022-02-23 RX ADMIN — ACAMPROSATE CALCIUM 666 MG: 333 TABLET, DELAYED RELEASE ORAL at 19:58

## 2022-02-23 RX ADMIN — NICOTINE POLACRILEX 8 MG: 4 GUM, CHEWING ORAL at 19:58

## 2022-02-23 RX ADMIN — PREGABALIN 300 MG: 100 CAPSULE ORAL at 14:03

## 2022-02-23 RX ADMIN — ACAMPROSATE CALCIUM 666 MG: 333 TABLET, DELAYED RELEASE ORAL at 14:03

## 2022-02-23 RX ADMIN — NICOTINE POLACRILEX 8 MG: 4 GUM, CHEWING ORAL at 09:42

## 2022-02-23 RX ADMIN — PRAZOSIN HYDROCHLORIDE 1 MG: 1 CAPSULE ORAL at 22:26

## 2022-02-23 RX ADMIN — HYDROCORTISONE: 1 CREAM TOPICAL at 14:03

## 2022-02-23 RX ADMIN — OXYCODONE HYDROCHLORIDE 20 MG: 10 TABLET ORAL at 08:54

## 2022-02-23 RX ADMIN — PROPRANOLOL HYDROCHLORIDE 20 MG: 20 TABLET ORAL at 19:58

## 2022-02-23 RX ADMIN — OXYCODONE HYDROCHLORIDE 20 MG: 10 TABLET ORAL at 13:02

## 2022-02-23 RX ADMIN — NICOTINE POLACRILEX 8 MG: 4 GUM, CHEWING ORAL at 21:04

## 2022-02-23 RX ADMIN — NAPROXEN 500 MG: 500 TABLET ORAL at 08:48

## 2022-02-23 RX ADMIN — NICOTINE POLACRILEX 8 MG: 4 GUM, CHEWING ORAL at 08:49

## 2022-02-23 RX ADMIN — OXYCODONE HYDROCHLORIDE 20 MG: 10 TABLET ORAL at 16:04

## 2022-02-23 RX ADMIN — NICOTINE POLACRILEX 8 MG: 4 GUM, CHEWING ORAL at 13:03

## 2022-02-23 ASSESSMENT — ACTIVITIES OF DAILY LIVING (ADL)
ORAL_HYGIENE: INDEPENDENT
ORAL_HYGIENE: INDEPENDENT
HYGIENE/GROOMING: INDEPENDENT
LAUNDRY: WITH SUPERVISION
LAUNDRY: UNABLE TO COMPLETE
DRESS: INDEPENDENT
DRESS: INDEPENDENT
HYGIENE/GROOMING: INDEPENDENT

## 2022-02-23 NOTE — CONSULTS
Brief medicine note:  - Asked to see pt regarding skin lesion concerning for SJS. Met with pt and he states that within the past couple of days, a painful rash developed on R side of lower lip. This is within the context of dose of being started on lamotrigine on 2/16 with no dose adjustments thereafter. Denies currently having lesions elsewhere including eyes, mouth or groin. Denies related to shaving. States had similar rash on upper portion of R cheek that resolved with hydrocortisone cream. Denies fever, chills, chest pain, SOB and other acute physical concerns at this time.     GEN: In NAD  Blood pressure 127/79, pulse 74, temperature 96.8  F (36  C), temperature source Temporal, resp. rate 16, weight 138.5 kg (305 lb 6.4 oz), SpO2 95 %.  SKIN: Several small scabbed over superficial ulcerations over lower R side of lip without bleeding or purulence. No e/o mucosal sores or sloughing.     ASSESSMENT:  Acute R sided, lower lip rash, of unclear etiology, question atopic dermatitis. Very low suspicion for SJS given lack of other lesions and no mucosal involvement.     PLAN: Restart scheduled hydrocortisone cream 1%, BID to affected areas and monitor for improvement. Please contact medicine if pt develops new rash especially with mucosal involvement. Medicine signing off. Please feel free to call with questions.      Jean Quijano PA-C  Internal Medicine Hospitalist   [facility] Hospitalist group  [phone]

## 2022-02-23 NOTE — PROGRESS NOTES
Patient seen, chart reviewed, care discussed with staff.  Blood pressure 127/79, pulse 74, temperature 96.8  F (36  C), temperature source Temporal, resp. rate 16, weight 138.5 kg (305 lb 6.4 oz), SpO2 95 %.    He reports some rash beside the left side of his mouth, and reports feeling like his skin inside his mouth might be peeling. By report, he is subdued.      General appearance: good  Alert.   Affect: fair  Mood: fair    Speech:  normal.   Eye contact:  good.    Psychomotor behavior: normal  Gait: steady with walker  Abnormal movements:  none  Delusions: none  Hallucinations:  none  Thoughts: logical  Associations: intact  Judgement: good  Insight: good  Cognitions: intact in conversation  Memory:  intact in conversation  Orientation: normal    Not suicidal.    Imp:  Possible start of SJS  2.  Bipolar depressed  3.  PTSD  4.  S/P TBI    Plan:  Stop Lamictal, consult medical  2.  Continue Opiate taper tomorrow    Current Facility-Administered Medications   Medication     acamprosate (CAMPRAL) EC tablet 666 mg     acetaminophen (TYLENOL) tablet 975 mg     albuterol (PROVENTIL HFA/VENTOLIN HFA) inhaler     alum & mag hydroxide-simethicone (MAALOX) suspension 30 mL     amLODIPine (NORVASC) tablet 5 mg     diphenhydrAMINE (BENADRYL) capsule 50 mg     docusate sodium (COLACE) capsule 100 mg     folic acid (FOLVITE) tablet 1 mg     hydrocortisone (CORTAID) 1 % cream     ketamine 5%-gabapentin 8% in PLO cream 0.25 g     levothyroxine (SYNTHROID/LEVOTHROID) tablet 75 mcg     liothyronine (CYTOMEL) tablet 25 mcg     loperamide (IMODIUM) capsule 2 mg     melatonin tablet 3 mg     multivitamin w/minerals (THERA-VIT-M) tablet 1 tablet     naloxone (NARCAN) injection 0.2 mg    Or     naloxone (NARCAN) injection 0.4 mg    Or     naloxone (NARCAN) injection 0.2 mg    Or     naloxone (NARCAN) injection 0.4 mg     naproxen (NAPROSYN) tablet 500 mg     nicotine polacrilex (NICORETTE) gum 4-8 mg     OLANZapine (zyPREXA) tablet 10  mg    Or     OLANZapine (zyPREXA) injection 10 mg     oxyCODONE IR (ROXICODONE) tablet 20 mg     pantoprazole (PROTONIX) EC tablet 40 mg     prazosin (MINIPRESS) capsule 1 mg     pregabalin (LYRICA) capsule 300 mg     propranolol (INDERAL) tablet 20 mg     QUEtiapine (SEROquel) tablet 700 mg     sennosides (SENOKOT) tablet 1-2 tablet     thiamine (B-1) tablet 100 mg     tiZANidine (ZANAFLEX) tablet 4 mg     topiramate (TOPAMAX) tablet 100 mg     Vitamin D3 (CHOLECALCIFEROL) tablet 25 mcg     No results found for this or any previous visit (from the past 168 hour(s)).     Patient Active Problem List   Diagnosis     Post concussive encephalopathy     H/O shoulder surgery     Alcoholism in remission (H)     Bilateral ACL tear     Chronic back pain     Social anxiety disorder     Alcohol withdrawal syndrome with complication, with unspecified complication (H)     Rib fracture     Alcohol withdrawal (H)     Alcohol dependence (H)     LFT elevation     Laceration of left hand without foreign body, initial encounter     Suicidal ideation     Intentional drug overdose, initial encounter (H)     Alcoholic intoxication with complication (H)     Severe bipolar I disorder, current or most recent episode depressed, with mixed features (H)     Medication overdose, intentional self-harm, subsequent encounter     Opioid dependence on agonist therapy (H)     2019 novel coronavirus disease (COVID-19)     Pneumonia due to COVID-19 virus     Bipolar disorder (H)

## 2022-02-23 NOTE — PROGRESS NOTES
"SPIRITUAL HEALTH SERVICES  SPIRITUAL ASSESSMENT Progress Note  [facility] ([facility] 3BW     REFERRAL SOURCE: follow up; pt asked for support while on the  unit.  On this visit I [facility] provided [facility].    Pt presented as more emotionally stressed today. Jose M spoke of his decision to cut back on pain meds, as the first of his steps forward, goals for the next 18 months for how he plans to \"use the pain of my mother's death as positive motivation.\"    Liss Kerns MDiv  Associate   Pager [phone]  Office [phone]  [facility] remains available 24/7 for emergent requests/referrals, either by having the switchboard page the on-call  or by entering an ASAP/STAT consult in Epic (this will also page the on-call ). Routine Epic consults receive an initial response within 24 hours.    "

## 2022-02-23 NOTE — PLAN OF CARE
Assessment/Intervention/Current Symtoms and Care Coordination:  Current Symptoms include the following: Blunted affect, isolative in room.  Upcoming appointments:  Liss[facility] Assessment via phone by Angela. March 10 at 10am. She will call the unit  Chart Review  Met with team to discuss patient care.  Showed patient the virtual tour of [facility].. He said it looks nice, he will need to know the floor plan of the room before he agrees. Will not move there if shared room is set up side by side, would like an L shape for more privacy.  Followed up with Jammie at [place] via phone. She did not see referral come through her fax, but took patient information and will look at his chart and call [facility] back.  [facility] with [facility]  Angela at [phone] requesting return call regarding patient appointment schedule and if she keeps a wait list for cancellations or no shows.     Discharge Plan or Goal:  Discharge to assisted living. [facility]     Barriers to Discharge:  Patient requires further psychiatric stabilization due to current symptomology    Referral Status:  Jammie Duong, OTR/L, MOT, Patient Transition Liaison  [email or web address]  Phone: [phone]   eFax: [phone]    Legal Status:  Patient is voluntary      Contacts:  Jr Giron MD  -[facility] Physicians [facility] #[phone]  AMELIA Vazquez CM: Jessica Lawson- [facility] #[phone]. [email or web address]  Arlin- supervisor for amelia davis [phone]  Joseph Oliveros's supervisor # [phone]  Admin #[phone]  [facility] -Angela Sawyer) Office [phone] cell [phone]

## 2022-02-23 NOTE — PLAN OF CARE
Problem: Suicidal Behavior  Goal: Suicidal Behavior is Absent or Managed  Outcome: Ongoing, Progressing     Problem: Activity and Energy Impairment (Depressive Signs/Symptoms)  Goal: Optimized Energy Level (Depressive Signs/Symptoms)  Intervention: Optimize Energy Level  Recent Flowsheet Documentation  Taken 2/23/2022 1029 by Deja Mchugh, RN  Activity (Behavioral Health): up ad kathleen     Goal Outcome Evaluation:    Plan of Care Reviewed With: patient     Overall Patient Progress: improving    Assessment/ Observations: Pt has been unusually quiet and withdrawn. Affect blunted, mood depressed and calm. Appeared slightly tearful. Having helpless thoughts about his grief. Socially withdrawn. Speech is slow and soft. Poor eye contact. He has some insight into tapering off his OxyContin, stating the grief is better than being sedated. Appreciative of his care he receives. Attended several groups.     One episode of confusion where he did not remember taking his morning meds about 1 hour later. Blames it on poor sleep.    PRN nicotine gum for effective nicotine gum.     Medical bed for chronic back pain and need to use side rails for bed immobility.     Seen by Jean Quijano for c/o of mouth lesions per request of Dr Perez. See Jean's note regarding no concerns over Jr Ti's Syndrome.

## 2022-02-23 NOTE — PLAN OF CARE
"Goal Outcome Evaluation:  Plan of Care Reviewed With: patient   Overall Patient Progress: improving      Pt is more subdued today and states he feels \"OK, just neha blaah.\"  Affect is flat / blunted.  Pt denies SI/SIB.  Pt is visible in the milieu, mostly withdrawn to self.  Pt is calm and cooperative.  Pt is wearing leg brace and ambulating with walker independently.  Appetite is good.      Pt requires medical bed due to impaired mobility.    PRN Tylenol 650 mg x 1 for R knee pain - pt reports as little to no effectiveness  PRN Benadryl 50 mg x 1 for report of sinus allergy symptoms  PRN Nicorette gum             "

## 2022-02-23 NOTE — PLAN OF CARE
Occupational Therapy Group Note:     02/23/22 1500   Group Therapy Session   Group Attendance attended group session   Total Time patient participated (minutes) 100   Total # Attendees 7   Group Type expressive therapy   Group Topic Covered coping skills/lifestyle management;problem-solving   Patient Response/Contribution cooperative with task;organized     Pt attended 2 out of 3 OT groups offered. Pt actively participated in occupational therapy clinic to facilitate coping skill exploration, creative expression within personally meaningful activities, and clinical observation of social, cognitive, and kinesthetic performance skills. Pt response: Chosen activity: painting a wooden project. Independent to initiate, gather materials, sequence and adjust to workspace demands as needed. Demonstrated excellent focus, planning, problem solving, and attention to detail for this moderately complex task. Very organized and efficient in his task approach. Able to ask for assistance as needed. Less social with peers today, though brightened slightly on approach, which is an improvement compared to earlier in the week.      Plan of Care Reviewed With: patient     Overall Patient Progress: improving

## 2022-02-23 NOTE — PLAN OF CARE
Goal Outcome Evaluation:    Plan of Care Reviewed With: patient     Overall Patient Progress: improving     Patient has remained in his room sleeping for the entire shift.  No concerns reported.  Med complaint.  8 hrs of sleep.

## 2022-02-24 PROCEDURE — 250N000013 HC RX MED GY IP 250 OP 250 PS 637: Performed by: PSYCHIATRY & NEUROLOGY

## 2022-02-24 PROCEDURE — 250N000013 HC RX MED GY IP 250 OP 250 PS 637: Performed by: NURSE PRACTITIONER

## 2022-02-24 PROCEDURE — 124N000003 HC R&B MH SENIOR/ADOLESCENT

## 2022-02-24 RX ORDER — OXYCODONE HYDROCHLORIDE 10 MG/1
20 TABLET ORAL 3 TIMES DAILY
Status: DISCONTINUED | OUTPATIENT
Start: 2022-02-25 | End: 2022-02-27

## 2022-02-24 RX ORDER — OXYCODONE HYDROCHLORIDE 10 MG/1
10 TABLET ORAL AT BEDTIME
Status: DISCONTINUED | OUTPATIENT
Start: 2022-02-24 | End: 2022-02-27

## 2022-02-24 RX ADMIN — DOCUSATE SODIUM 100 MG: 100 CAPSULE, LIQUID FILLED ORAL at 08:07

## 2022-02-24 RX ADMIN — PANTOPRAZOLE SODIUM 40 MG: 40 TABLET, DELAYED RELEASE ORAL at 06:29

## 2022-02-24 RX ADMIN — CHOLECALCIFEROL TAB 25 MCG (1000 UNIT) 25 MCG: 25 TAB at 07:59

## 2022-02-24 RX ADMIN — NICOTINE POLACRILEX 8 MG: 4 GUM, CHEWING ORAL at 13:22

## 2022-02-24 RX ADMIN — PROPRANOLOL HYDROCHLORIDE 20 MG: 20 TABLET ORAL at 20:31

## 2022-02-24 RX ADMIN — NICOTINE POLACRILEX 8 MG: 4 GUM, CHEWING ORAL at 16:03

## 2022-02-24 RX ADMIN — QUETIAPINE 700 MG: 300 TABLET, FILM COATED ORAL at 22:02

## 2022-02-24 RX ADMIN — ACAMPROSATE CALCIUM 666 MG: 333 TABLET, DELAYED RELEASE ORAL at 20:31

## 2022-02-24 RX ADMIN — NICOTINE POLACRILEX 8 MG: 4 GUM, CHEWING ORAL at 19:12

## 2022-02-24 RX ADMIN — PREGABALIN 300 MG: 100 CAPSULE ORAL at 20:31

## 2022-02-24 RX ADMIN — LIOTHYRONINE SODIUM 25 MCG: 25 TABLET ORAL at 07:59

## 2022-02-24 RX ADMIN — PRAZOSIN HYDROCHLORIDE 1 MG: 1 CAPSULE ORAL at 22:02

## 2022-02-24 RX ADMIN — FOLIC ACID 1 MG: 1 TABLET ORAL at 08:00

## 2022-02-24 RX ADMIN — LEVOTHYROXINE SODIUM 75 MCG: 75 TABLET ORAL at 06:29

## 2022-02-24 RX ADMIN — ACAMPROSATE CALCIUM 666 MG: 333 TABLET, DELAYED RELEASE ORAL at 07:59

## 2022-02-24 RX ADMIN — TOPIRAMATE 100 MG: 100 TABLET, FILM COATED ORAL at 20:33

## 2022-02-24 RX ADMIN — NICOTINE POLACRILEX 8 MG: 4 GUM, CHEWING ORAL at 14:45

## 2022-02-24 RX ADMIN — THIAMINE HCL TAB 100 MG 100 MG: 100 TAB at 07:59

## 2022-02-24 RX ADMIN — OXYCODONE HYDROCHLORIDE 10 MG: 10 TABLET ORAL at 21:05

## 2022-02-24 RX ADMIN — NAPROXEN 500 MG: 500 TABLET ORAL at 07:59

## 2022-02-24 RX ADMIN — OXYCODONE HYDROCHLORIDE 20 MG: 10 TABLET ORAL at 08:51

## 2022-02-24 RX ADMIN — NICOTINE POLACRILEX 8 MG: 4 GUM, CHEWING ORAL at 07:59

## 2022-02-24 RX ADMIN — NICOTINE POLACRILEX 8 MG: 4 GUM, CHEWING ORAL at 17:30

## 2022-02-24 RX ADMIN — DOCUSATE SODIUM 100 MG: 100 CAPSULE, LIQUID FILLED ORAL at 20:32

## 2022-02-24 RX ADMIN — NICOTINE POLACRILEX 8 MG: 4 GUM, CHEWING ORAL at 21:18

## 2022-02-24 RX ADMIN — PREGABALIN 300 MG: 100 CAPSULE ORAL at 07:59

## 2022-02-24 RX ADMIN — HYDROCORTISONE: 1 CREAM TOPICAL at 22:04

## 2022-02-24 RX ADMIN — ACETAMINOPHEN 975 MG: 325 TABLET, FILM COATED ORAL at 14:46

## 2022-02-24 RX ADMIN — MULTIPLE VITAMINS W/ MINERALS TAB 1 TABLET: TAB at 07:59

## 2022-02-24 RX ADMIN — TOPIRAMATE 100 MG: 100 TABLET, FILM COATED ORAL at 07:59

## 2022-02-24 RX ADMIN — OXYCODONE HYDROCHLORIDE 20 MG: 10 TABLET ORAL at 13:12

## 2022-02-24 RX ADMIN — PREGABALIN 300 MG: 100 CAPSULE ORAL at 14:45

## 2022-02-24 RX ADMIN — NAPROXEN 500 MG: 500 TABLET ORAL at 17:30

## 2022-02-24 RX ADMIN — HYDROCORTISONE: 1 CREAM TOPICAL at 08:08

## 2022-02-24 RX ADMIN — OXYCODONE HYDROCHLORIDE 20 MG: 10 TABLET ORAL at 16:03

## 2022-02-24 RX ADMIN — ACAMPROSATE CALCIUM 666 MG: 333 TABLET, DELAYED RELEASE ORAL at 14:46

## 2022-02-24 RX ADMIN — NICOTINE POLACRILEX 8 MG: 4 GUM, CHEWING ORAL at 08:54

## 2022-02-24 RX ADMIN — DIPHENHYDRAMINE HYDROCHLORIDE 50 MG: 25 CAPSULE ORAL at 14:46

## 2022-02-24 ASSESSMENT — ACTIVITIES OF DAILY LIVING (ADL)
HYGIENE/GROOMING: INDEPENDENT
DRESS: INDEPENDENT
LAUNDRY: UNABLE TO COMPLETE
ORAL_HYGIENE: INDEPENDENT
DRESS: INDEPENDENT
ORAL_HYGIENE: INDEPENDENT
LAUNDRY: UNABLE TO COMPLETE
HYGIENE/GROOMING: INDEPENDENT

## 2022-02-24 NOTE — PLAN OF CARE
Problem: Decreased Participation and Engagement (Depressive Signs/Symptoms)  Goal: Increased Participation and Engagement (Depressive Signs/Symptoms)  Outcome: Ongoing, Not Progressing     Problem: Mood Impairment (Depressive Signs/Symptoms)  Goal: Improved Mood Symptoms (Depressive Signs/Symptoms)  Outcome: Ongoing, Not Progressing     Problem: Sleep Disturbance (Depressive Signs/Symptoms)  Goal: Improved Sleep (Depressive Signs/Symptoms)  Outcome: Ongoing, Not Progressing     Goal Outcome Evaluation: Pt's goal is to retain hope. Pt is experiencing a situational decline related to his mother's passing and desire for sobriety.     Plan of Care Reviewed With: patient     Overall Patient Progress: Pt denies thoughts of SI, SIB, or HI. Pt appears to have a sad, blunted affect and depressed mood. He is withdrawn when out of his room. He does not share as much as he did previously. Pt's eye contact is poor, speech is monotone. Hygiene is poor. Pt did not sleep well last night, stating he was thinking a lot. Offers for 1:1 met with silence. Went back to bed after breakfast and asked not to be disturbed. Napped all shift, up for meals only. Continues on status 15 observations and suicde precautions.     Pt is holding body rigid, states he is having pain 7/10. He denies withdrawal. OxyCotin continue on a slow taper.     Pt request to speak with Dr Perez relayed.     1450 PRN tylenol 650 ng po for pain and benadryl 50 mg po for allergies

## 2022-02-24 NOTE — PLAN OF CARE
"Problem: Suicidal Behavior  Goal: Suicidal Behavior is Absent or Managed  Outcome: Ongoing, Progressing  Flowsheets (Taken 2/23/2022 1828)  Mutually Determined Action Steps (Suicidal Behavior Absent/Managed):    identifies protective factors    sets future-oriented goal     Pt presents with blunted, flat affect and depressed, anxious mood. Denies SI/SIB and hallucinations, states that suicide would be the last thing he would do. Rates depression 5/10 and anxiety 7/10. Pt spoke about his hopefulness for the future and how he wants to do right by his mother as she is watching over him. Described how he wants to get off all opiates because he feels like they are numbing him and thus he is not dealing with his issues. Pt reports that he dealt with the grief of his daughters death in a negative way so he wants to use the grief he is experiencing from his mothers death in a positive way. Pt reported, \"I am either going to succeed or end up with a bullet in my head.\" Pt remained largely isolated and withdrawn to his room this shift. Spent his time reading and listening to music. Did come to the [facility] for meals and watch TV later in the evening. Did attend evening group. Not very social with his peers. Continues to utilize walker to ambulate, gait is steady and balanced with this. Pt continues to have a medical bed due to impaired mobility. Appetite and fluid intake adequate. VSS. Medication compliant, PRN nicotine gum given as requested. PRN Benadryl given per pt request for runny nose. Continues to complain of R knee pain 7/10 which he describes as constant, aching, and stabbing. Pt reports that this is adequately controlled with his scheduled medications. No other concerns or complaints noted this evening.        "

## 2022-02-24 NOTE — PROGRESS NOTES
"SPIRITUAL HEALTH SERVICES  SPIRITUAL ASSESSMENT Progress Note  [facility] ([facility]) 3BW     REFERRAL SOURCE: follow up  Check-in with Jose M in pt room. Jose M declined support today stating \"I'm pretty rough today\" in reference to cutting back on his pain medication.    Plan: I will follow up tomorrow as able.    Liss Kerns MDiv  Associate   Pager [phone]  Office [phone]  [facility] remains available 24/7 for emergent requests/referrals, either by having the switchboard page the on-call  or by entering an ASAP/STAT consult in Epic (this will also page the on-call ). Routine Epic consults receive an initial response within 24 hours.    "

## 2022-02-24 NOTE — PLAN OF CARE
Assessment/Intervention/Current Symtoms and Care Coordination:    Current Symptoms include the following: Blunted affect, isolative in room.    Upcoming appointments:  ? Lisseth Assessment via phone by Angela. March 10 at 10am. She will call the unit    Chart Review    Met with team and reviewed pt's care    Left a message with Marifer Vasquez  - Cara regarding cancellation wait-list. Awaiting a call back. Her voicemail stated she is gone today and will be back tomorrow (Friday - February 25).     Discharge Plan or Goal:  Discharge to assisted living. Several referrals made by [facility].     Barriers to Discharge:  Patient requires further psychiatric stabilization due to current symptomology     Referral Status:  Jammie Duong, OTR/L, MOT, Patient Transition Liaison  [email or web address]  Phone: [phone]   eFax: [phone]     Legal Status:  Patient is voluntary        Contacts:  Jr Giron MD  -[facility] #[phone]  AMELIA Vazquez CM: Jessica Lawson- [facility] #[phone]. [email or web address]  Arlin- supervisor for amelia davis [phone]  Joseph Oliveros's supervisor # [phone]  Admin #[phone]  Lisseth -Angela (Cara) Office [phone] cell [phone]

## 2022-02-24 NOTE — PLAN OF CARE
02/23/22 2000   Group Therapy Session   Group Attendance attended group session   Time Session Began 1900   Time Session Ended 2000   Total Time patient participated (minutes) 60   Total # Attendees 5   Group Type expressive therapy  (art therapy)   Group Topic Covered relationship  (mindfulness)   Group Session Detail collaborative group carousel drawings   Patient Response/Contribution cooperative with task;organized   Patient Response Detail Ray presented as calm and pleasant. He reported feeling physical pain, but preferring to emotional pain. He participated in the group carousel drawings and apperaed clam and focused while drawing. He supported male peer who was confused at times, politely showing him what to do. He expressed appreciation for what peers contributed to his drawing. He interacted appropriately with peers and was a positive participant.

## 2022-02-25 LAB — SARS-COV-2 RNA RESP QL NAA+PROBE: NEGATIVE

## 2022-02-25 PROCEDURE — 250N000013 HC RX MED GY IP 250 OP 250 PS 637

## 2022-02-25 PROCEDURE — 250N000013 HC RX MED GY IP 250 OP 250 PS 637: Performed by: PSYCHIATRY & NEUROLOGY

## 2022-02-25 PROCEDURE — 250N000013 HC RX MED GY IP 250 OP 250 PS 637: Performed by: NURSE PRACTITIONER

## 2022-02-25 PROCEDURE — 124N000003 HC R&B MH SENIOR/ADOLESCENT

## 2022-02-25 PROCEDURE — H2032 ACTIVITY THERAPY, PER 15 MIN: HCPCS

## 2022-02-25 PROCEDURE — U0003 INFECTIOUS AGENT DETECTION BY NUCLEIC ACID (DNA OR RNA); SEVERE ACUTE RESPIRATORY SYNDROME CORONAVIRUS 2 (SARS-COV-2) (CORONAVIRUS DISEASE [COVID-19]), AMPLIFIED PROBE TECHNIQUE, MAKING USE OF HIGH THROUGHPUT TECHNOLOGIES AS DESCRIBED BY CMS-2020-01-R: HCPCS | Performed by: PSYCHIATRY & NEUROLOGY

## 2022-02-25 RX ADMIN — OXYCODONE HYDROCHLORIDE 10 MG: 10 TABLET ORAL at 21:14

## 2022-02-25 RX ADMIN — LEVOTHYROXINE SODIUM 75 MCG: 75 TABLET ORAL at 06:18

## 2022-02-25 RX ADMIN — NICOTINE POLACRILEX 8 MG: 4 GUM, CHEWING ORAL at 17:09

## 2022-02-25 RX ADMIN — PREGABALIN 300 MG: 100 CAPSULE ORAL at 09:12

## 2022-02-25 RX ADMIN — OXYCODONE HYDROCHLORIDE 20 MG: 10 TABLET ORAL at 12:58

## 2022-02-25 RX ADMIN — THIAMINE HCL TAB 100 MG 100 MG: 100 TAB at 09:12

## 2022-02-25 RX ADMIN — NAPROXEN 500 MG: 500 TABLET ORAL at 09:12

## 2022-02-25 RX ADMIN — ACAMPROSATE CALCIUM 666 MG: 333 TABLET, DELAYED RELEASE ORAL at 09:11

## 2022-02-25 RX ADMIN — ACETAMINOPHEN 975 MG: 325 TABLET, FILM COATED ORAL at 11:59

## 2022-02-25 RX ADMIN — PREGABALIN 300 MG: 100 CAPSULE ORAL at 14:22

## 2022-02-25 RX ADMIN — FOLIC ACID 1 MG: 1 TABLET ORAL at 09:11

## 2022-02-25 RX ADMIN — DIPHENHYDRAMINE HYDROCHLORIDE 50 MG: 25 CAPSULE ORAL at 14:22

## 2022-02-25 RX ADMIN — TOPIRAMATE 100 MG: 100 TABLET, FILM COATED ORAL at 20:23

## 2022-02-25 RX ADMIN — NICOTINE POLACRILEX 8 MG: 4 GUM, CHEWING ORAL at 22:05

## 2022-02-25 RX ADMIN — PROPRANOLOL HYDROCHLORIDE 20 MG: 20 TABLET ORAL at 09:12

## 2022-02-25 RX ADMIN — ACAMPROSATE CALCIUM 666 MG: 333 TABLET, DELAYED RELEASE ORAL at 14:22

## 2022-02-25 RX ADMIN — OXYCODONE HYDROCHLORIDE 20 MG: 10 TABLET ORAL at 09:35

## 2022-02-25 RX ADMIN — QUETIAPINE 700 MG: 300 TABLET, FILM COATED ORAL at 22:05

## 2022-02-25 RX ADMIN — AMLODIPINE BESYLATE 5 MG: 5 TABLET ORAL at 09:12

## 2022-02-25 RX ADMIN — OXYCODONE HYDROCHLORIDE 20 MG: 10 TABLET ORAL at 16:11

## 2022-02-25 RX ADMIN — PRAZOSIN HYDROCHLORIDE 1 MG: 1 CAPSULE ORAL at 22:05

## 2022-02-25 RX ADMIN — NICOTINE POLACRILEX 8 MG: 4 GUM, CHEWING ORAL at 14:20

## 2022-02-25 RX ADMIN — TOPIRAMATE 100 MG: 100 TABLET, FILM COATED ORAL at 09:12

## 2022-02-25 RX ADMIN — NAPROXEN 500 MG: 500 TABLET ORAL at 17:03

## 2022-02-25 RX ADMIN — DOCUSATE SODIUM 100 MG: 100 CAPSULE, LIQUID FILLED ORAL at 09:12

## 2022-02-25 RX ADMIN — CHOLECALCIFEROL TAB 25 MCG (1000 UNIT) 25 MCG: 25 TAB at 09:12

## 2022-02-25 RX ADMIN — NICOTINE POLACRILEX 8 MG: 4 GUM, CHEWING ORAL at 11:59

## 2022-02-25 RX ADMIN — NICOTINE POLACRILEX 8 MG: 4 GUM, CHEWING ORAL at 16:11

## 2022-02-25 RX ADMIN — ACAMPROSATE CALCIUM 666 MG: 333 TABLET, DELAYED RELEASE ORAL at 20:23

## 2022-02-25 RX ADMIN — DOCUSATE SODIUM 100 MG: 100 CAPSULE, LIQUID FILLED ORAL at 20:24

## 2022-02-25 RX ADMIN — ACETAMINOPHEN 975 MG: 325 TABLET, FILM COATED ORAL at 20:27

## 2022-02-25 RX ADMIN — PREGABALIN 300 MG: 100 CAPSULE ORAL at 20:23

## 2022-02-25 RX ADMIN — NICOTINE POLACRILEX 8 MG: 4 GUM, CHEWING ORAL at 18:09

## 2022-02-25 RX ADMIN — PANTOPRAZOLE SODIUM 40 MG: 40 TABLET, DELAYED RELEASE ORAL at 06:17

## 2022-02-25 RX ADMIN — PROPRANOLOL HYDROCHLORIDE 20 MG: 20 TABLET ORAL at 20:24

## 2022-02-25 RX ADMIN — LIOTHYRONINE SODIUM 25 MCG: 25 TABLET ORAL at 09:12

## 2022-02-25 RX ADMIN — MULTIPLE VITAMINS W/ MINERALS TAB 1 TABLET: TAB at 09:12

## 2022-02-25 RX ADMIN — NICOTINE POLACRILEX 8 MG: 4 GUM, CHEWING ORAL at 20:27

## 2022-02-25 ASSESSMENT — ACTIVITIES OF DAILY LIVING (ADL)
HYGIENE/GROOMING: INDEPENDENT
ORAL_HYGIENE: INDEPENDENT
ORAL_HYGIENE: INDEPENDENT
DRESS: INDEPENDENT
HYGIENE/GROOMING: INDEPENDENT
LAUNDRY: UNABLE TO COMPLETE
DRESS: INDEPENDENT
LAUNDRY: UNABLE TO COMPLETE

## 2022-02-25 NOTE — PROGRESS NOTES
02/24/22 2200   Group Therapy Session   Group Attendance attended group session   Total Time patient participated (minutes) 60   Group Type expressive therapy   Group Topic Covered emotions/expression   Patient Response/Contribution cooperative with task     Pt attended an hour of Art Therapy Group from 1900 to 2000.  Art Therapy directive was to create a DBT emotional expression mandala w/ Caseyku.  Goals of directive: emotional expression, emotional regulation, mindfulness     Pt was an active participant, focused on task for the full duration of group. Pt chose to work solely on the haiku portion and wrote a few lines on his paper. Pt briefly shared with group that he had recalled various nursery rhymes from his childhood and began counting the syllables to see if the lines would work in a haiku poem format. Pt's mood was calm, pleasant participant. Further assessment is needed.

## 2022-02-25 NOTE — PROGRESS NOTES
"SPIRITUAL HEALTH SERVICES  SPIRITUAL ASSESSMENT Progress Note  [facility]     REFERRAL SOURCE: follow up  Check in visit with Jose M in the TV lounge.    Jose M stated he has just come out of his room for the day, that he just took pain medications and right now is watching TV \"to keep my mind off the pain. Come back in 20 minutes and I'll tell you how i'm doing.\" Writer informed that now is only time I have available, and that I will return Sunday with library books that are now available for .    Plan: I will follow up on Sunday.    Liss Kerns MDiv  Associate   Pager [phone]  Office [phone]  [facility] remains available 24/7 for emergent requests/referrals, either by having the switchboard page the on-call  or by entering an ASAP/STAT consult in Epic (this will also page the on-call ). Routine Epic consults receive an initial response within 24 hours.  "

## 2022-02-25 NOTE — PLAN OF CARE
Goal Outcome Evaluation:    Plan of Care Reviewed With: patient     Overall Patient Progress: improving    Patient slept well the whole night for 5.75 hours after reading a book. Nothing unusual noted. No complaints of pain on his right knee.

## 2022-02-25 NOTE — PLAN OF CARE
"  Problem: Suicidal Behavior  Goal: Suicidal Behavior is Absent or Managed  Outcome: Ongoing, Progressing     Problem: Activity and Energy Impairment (Depressive Signs/Symptoms)  Goal: Optimized Energy Level (Depressive Signs/Symptoms)  Outcome: Ongoing, Not Progressing  Intervention: Optimize Energy Level  Recent Flowsheet Documentation  Taken 2/25/2022 1200 by Bernard Mancera RN  Activity (Behavioral Health): up ad kathleen     Problem: Mood Impairment (Depressive Signs/Symptoms)  Goal: Improved Mood Symptoms (Depressive Signs/Symptoms)  Outcome: Ongoing, Not Progressing   Goal Outcome Evaluation:    Plan of Care Reviewed With: patient     Overall Patient Progress: improving     Patient is isolative and withdrawn. Patient had a sign on his door stating do not wake me up but became agitated using profanity in the lounge asking why staff did not get him for breakfast. Patient quickly calmed down and ate breakfast and took his pills with no other incident, overall patient has been pleasant when approached. Did not eat lunch, requested staff put it aside for now. Patient reports anxiety 6-7/10 and depression 5-6/10, when asked if there was anything specific that is making him feel this way patient stated \"my whole life\". Affect is blunted, mood is depressed. Patient did not attend groups, appearance is untidy and unshaven. Prn tylenol and scheduled oxycodone to manage right knee pain. Prn benadryl given d/t running nose, nicorette gum given upon requests. Patient declined hydrocortisone cream for lip rash.  Covid test negative this shift      "

## 2022-02-25 NOTE — PROGRESS NOTES
Patient seen, chart reviewed, care discussed with staff.  Blood pressure 120/75, pulse 88, temperature 97.3  F (36.3  C), resp. rate 16, weight 138.5 kg (305 lb 6.4 oz), SpO2 99 %.    By report, some increased irritability.  Sores in mouth resolved    General appearance: good  Alert.   Affect: fair, sad  Mood: fair    Speech:  normal.   Eye contact:  good.    Psychomotor behavior: normal  Gait: steady with walker  Abnormal movements:  none  Delusions: none  Hallucinations:  none  Thoughts: logical  Associations: intact  Judgement: good  Insight: good  Cognitions: intact in conversation  Memory:  intact in conversation  Orientation: normal    Not suicidal.    Imp: Irritability is probably from abrupt stopping of lamictal and increased pain  2.  Bipolar depressed  3.  PTSD.  He is constantly startled if he has a roommate and needs a single room after discharge as well as here    And:  Patient Active Problem List   Diagnosis     Post concussive encephalopathy     H/O shoulder surgery     Alcoholism in remission (H)     Bilateral ACL tear     Chronic back pain     Social anxiety disorder     Alcohol withdrawal syndrome with complication, with unspecified complication (H)     Rib fracture     Alcohol withdrawal (H)     Alcohol dependence (H)     LFT elevation     Laceration of left hand without foreign body, initial encounter     Suicidal ideation     Intentional drug overdose, initial encounter (H)     Alcoholic intoxication with complication (H)     Severe bipolar I disorder, current or most recent episode depressed, with mixed features (H)     Medication overdose, intentional self-harm, subsequent encounter     Opioid dependence on agonist therapy (H)     2019 novel coronavirus disease (COVID-19)     Pneumonia due to COVID-19 virus     Bipolar disorder (H)     Plan:  Oxycontin was decreased yesterday, will decrease again on 2/27    Current Facility-Administered Medications   Medication     acamprosate (CAMPRAL) EC  tablet 666 mg     acetaminophen (TYLENOL) tablet 975 mg     albuterol (PROVENTIL HFA/VENTOLIN HFA) inhaler     alum & mag hydroxide-simethicone (MAALOX) suspension 30 mL     amLODIPine (NORVASC) tablet 5 mg     diphenhydrAMINE (BENADRYL) capsule 50 mg     docusate sodium (COLACE) capsule 100 mg     folic acid (FOLVITE) tablet 1 mg     hydrocortisone (CORTAID) 1 % cream     ketamine 5%-gabapentin 8% in PLO cream 0.25 g     levothyroxine (SYNTHROID/LEVOTHROID) tablet 75 mcg     liothyronine (CYTOMEL) tablet 25 mcg     loperamide (IMODIUM) capsule 2 mg     melatonin tablet 3 mg     multivitamin w/minerals (THERA-VIT-M) tablet 1 tablet     naloxone (NARCAN) injection 0.2 mg    Or     naloxone (NARCAN) injection 0.4 mg    Or     naloxone (NARCAN) injection 0.2 mg    Or     naloxone (NARCAN) injection 0.4 mg     naproxen (NAPROSYN) tablet 500 mg     nicotine polacrilex (NICORETTE) gum 4-8 mg     OLANZapine (zyPREXA) tablet 10 mg    Or     OLANZapine (zyPREXA) injection 10 mg     oxyCODONE IR (ROXICODONE) tablet 10 mg     oxyCODONE IR (ROXICODONE) tablet 20 mg     pantoprazole (PROTONIX) EC tablet 40 mg     prazosin (MINIPRESS) capsule 1 mg     pregabalin (LYRICA) capsule 300 mg     propranolol (INDERAL) tablet 20 mg     QUEtiapine (SEROquel) tablet 700 mg     sennosides (SENOKOT) tablet 1-2 tablet     thiamine (B-1) tablet 100 mg     tiZANidine (ZANAFLEX) tablet 4 mg     topiramate (TOPAMAX) tablet 100 mg     Vitamin D3 (CHOLECALCIFEROL) tablet 25 mcg     No results found for this or any previous visit (from the past 168 hour(s)).

## 2022-02-25 NOTE — PLAN OF CARE
Assessment/Intervention/Current Symptoms and Care Coordination  CTC took call from Lisseth Miller  ([phone]) who indicates she had contacted her supervisor with the request to move the assessment date up from 3/10 but is unable to do so. She will still plan on the phone interview on 3/10 as scheduled      Discharge Plan or Goal  To Assisted living    Barriers to Discharge   Mental health stabilization and placement    Referral Status  [facility] referral is active.    Legal Status  Voluntary

## 2022-02-26 PROCEDURE — G0177 OPPS/PHP; TRAIN & EDUC SERV: HCPCS

## 2022-02-26 PROCEDURE — H2032 ACTIVITY THERAPY, PER 15 MIN: HCPCS

## 2022-02-26 PROCEDURE — 250N000013 HC RX MED GY IP 250 OP 250 PS 637: Performed by: NURSE PRACTITIONER

## 2022-02-26 PROCEDURE — 124N000003 HC R&B MH SENIOR/ADOLESCENT

## 2022-02-26 PROCEDURE — 250N000013 HC RX MED GY IP 250 OP 250 PS 637: Performed by: PSYCHIATRY & NEUROLOGY

## 2022-02-26 PROCEDURE — 250N000013 HC RX MED GY IP 250 OP 250 PS 637

## 2022-02-26 RX ADMIN — ACETAMINOPHEN 975 MG: 325 TABLET, FILM COATED ORAL at 19:04

## 2022-02-26 RX ADMIN — LEVOTHYROXINE SODIUM 75 MCG: 75 TABLET ORAL at 06:57

## 2022-02-26 RX ADMIN — NAPROXEN 500 MG: 500 TABLET ORAL at 17:22

## 2022-02-26 RX ADMIN — DOCUSATE SODIUM 100 MG: 100 CAPSULE, LIQUID FILLED ORAL at 19:53

## 2022-02-26 RX ADMIN — NICOTINE POLACRILEX 8 MG: 4 GUM, CHEWING ORAL at 17:22

## 2022-02-26 RX ADMIN — NICOTINE POLACRILEX 8 MG: 4 GUM, CHEWING ORAL at 19:04

## 2022-02-26 RX ADMIN — PREGABALIN 300 MG: 100 CAPSULE ORAL at 19:53

## 2022-02-26 RX ADMIN — MULTIPLE VITAMINS W/ MINERALS TAB 1 TABLET: TAB at 08:29

## 2022-02-26 RX ADMIN — LIOTHYRONINE SODIUM 25 MCG: 25 TABLET ORAL at 08:29

## 2022-02-26 RX ADMIN — ACAMPROSATE CALCIUM 666 MG: 333 TABLET, DELAYED RELEASE ORAL at 14:17

## 2022-02-26 RX ADMIN — ACAMPROSATE CALCIUM 666 MG: 333 TABLET, DELAYED RELEASE ORAL at 08:30

## 2022-02-26 RX ADMIN — ACAMPROSATE CALCIUM 666 MG: 333 TABLET, DELAYED RELEASE ORAL at 19:53

## 2022-02-26 RX ADMIN — PRAZOSIN HYDROCHLORIDE 1 MG: 1 CAPSULE ORAL at 22:26

## 2022-02-26 RX ADMIN — TOPIRAMATE 100 MG: 100 TABLET, FILM COATED ORAL at 19:54

## 2022-02-26 RX ADMIN — PREGABALIN 300 MG: 100 CAPSULE ORAL at 14:17

## 2022-02-26 RX ADMIN — NICOTINE POLACRILEX 8 MG: 4 GUM, CHEWING ORAL at 09:43

## 2022-02-26 RX ADMIN — CHOLECALCIFEROL TAB 25 MCG (1000 UNIT) 25 MCG: 25 TAB at 08:30

## 2022-02-26 RX ADMIN — NICOTINE POLACRILEX 8 MG: 4 GUM, CHEWING ORAL at 08:31

## 2022-02-26 RX ADMIN — NICOTINE POLACRILEX 8 MG: 4 GUM, CHEWING ORAL at 22:26

## 2022-02-26 RX ADMIN — OXYCODONE HYDROCHLORIDE 20 MG: 10 TABLET ORAL at 09:01

## 2022-02-26 RX ADMIN — AMLODIPINE BESYLATE 5 MG: 5 TABLET ORAL at 08:30

## 2022-02-26 RX ADMIN — NICOTINE POLACRILEX 8 MG: 4 GUM, CHEWING ORAL at 11:01

## 2022-02-26 RX ADMIN — FOLIC ACID 1 MG: 1 TABLET ORAL at 08:30

## 2022-02-26 RX ADMIN — NICOTINE POLACRILEX 8 MG: 4 GUM, CHEWING ORAL at 21:08

## 2022-02-26 RX ADMIN — OXYCODONE HYDROCHLORIDE 20 MG: 10 TABLET ORAL at 13:13

## 2022-02-26 RX ADMIN — THIAMINE HCL TAB 100 MG 100 MG: 100 TAB at 08:30

## 2022-02-26 RX ADMIN — TOPIRAMATE 100 MG: 100 TABLET, FILM COATED ORAL at 08:29

## 2022-02-26 RX ADMIN — NAPROXEN 500 MG: 500 TABLET ORAL at 08:30

## 2022-02-26 RX ADMIN — PANTOPRAZOLE SODIUM 40 MG: 40 TABLET, DELAYED RELEASE ORAL at 06:57

## 2022-02-26 RX ADMIN — NICOTINE POLACRILEX 8 MG: 4 GUM, CHEWING ORAL at 13:13

## 2022-02-26 RX ADMIN — PROPRANOLOL HYDROCHLORIDE 20 MG: 20 TABLET ORAL at 19:54

## 2022-02-26 RX ADMIN — PROPRANOLOL HYDROCHLORIDE 20 MG: 20 TABLET ORAL at 08:30

## 2022-02-26 RX ADMIN — NICOTINE POLACRILEX 8 MG: 4 GUM, CHEWING ORAL at 20:02

## 2022-02-26 RX ADMIN — HYDROCORTISONE: 1 CREAM TOPICAL at 22:25

## 2022-02-26 RX ADMIN — PREGABALIN 300 MG: 100 CAPSULE ORAL at 08:29

## 2022-02-26 RX ADMIN — NICOTINE POLACRILEX 8 MG: 4 GUM, CHEWING ORAL at 14:17

## 2022-02-26 RX ADMIN — OXYCODONE HYDROCHLORIDE 10 MG: 10 TABLET ORAL at 20:01

## 2022-02-26 RX ADMIN — QUETIAPINE 700 MG: 300 TABLET, FILM COATED ORAL at 22:26

## 2022-02-26 RX ADMIN — OXYCODONE HYDROCHLORIDE 20 MG: 10 TABLET ORAL at 16:11

## 2022-02-26 RX ADMIN — DOCUSATE SODIUM 100 MG: 100 CAPSULE, LIQUID FILLED ORAL at 08:29

## 2022-02-26 RX ADMIN — NICOTINE POLACRILEX 8 MG: 4 GUM, CHEWING ORAL at 16:10

## 2022-02-26 ASSESSMENT — ACTIVITIES OF DAILY LIVING (ADL)
LAUNDRY: UNABLE TO COMPLETE
HYGIENE/GROOMING: INDEPENDENT
LAUNDRY: UNABLE TO COMPLETE
HYGIENE/GROOMING: INDEPENDENT
DRESS: INDEPENDENT
ORAL_HYGIENE: INDEPENDENT
ORAL_HYGIENE: INDEPENDENT
DRESS: INDEPENDENT

## 2022-02-26 NOTE — PLAN OF CARE
"Goal Outcome Evaluation:    Plan of Care Reviewed With: patient     Overall Patient Progress: no change    Pt has flat / sad affect.  Pt often isolative to his room, reading a book.  Pt attended evening music group.   When asked about his mood, pt shurgged his shoulders and said \"same old stuff - different day.\"  Pt ate most of his dinner meal, although had poor appetite earlier today.  Pt reports feeling \"not well and kind of  Blaah\" since stopping Lamictal.  Pt denies SI/SIB/.  Pt declined hydrocortisone cream, pt took all other scheduled medications.  PRN Tylenol x 1 for R knee pain.  Pt using medical bed due to limited mobility.             "

## 2022-02-26 NOTE — PLAN OF CARE
Problem: Suicidal Behavior  Goal: Suicidal Behavior is Absent or Managed  Outcome: Ongoing, Progressing     Problem: Activity and Energy Impairment (Depressive Signs/Symptoms)  Goal: Optimized Energy Level (Depressive Signs/Symptoms)  Outcome: Ongoing, Progressing  Intervention: Optimize Energy Level  Recent Flowsheet Documentation  Taken 2/26/2022 0900 by Benrard Mancera, RN  Activity (Behavioral Health): up ad kathleen     Problem: Feelings of Worthlessness, Hopelessness or Excessive Guilt (Depressive Signs/Symptoms)  Goal: Enhanced Self-Esteem and Confidence (Depressive Signs/Symptoms)  Outcome: Ongoing, Progressing     Problem: Mood Impairment (Depressive Signs/Symptoms)  Goal: Improved Mood Symptoms (Depressive Signs/Symptoms)  Outcome: Ongoing, Progressing   Goal Outcome Evaluation:    Plan of Care Reviewed With: patient     Overall Patient Progress: improving       Patient is calm and cooperative, affect is flat and blunted, does engage and participate in shift assessment. Patient reports anxiety higher than depression and states mothers passing, his housing situation and the life style changes he is working on are the cause. Patient reported that he is unhappy with the housing choice he interviewed for this week. States he needs a place to cook/bake, private room, and close to the cities where he can be closer to his support system. Patient did not eat breakfast, only had few bites of  lunch. Reports decreased appetite, attributes it to stopping Lamictal. Patient shaved and took a shower this shift, reports feeling hopeful for his future. Prn nicorette gum given per request, chronic pain managed with scheduled oxycodone.

## 2022-02-26 NOTE — PLAN OF CARE
02/26/22 1300   Group Therapy Session   Group Attendance attended group session   Time Session Began 1115   Time Session Ended 1205   Total Time patient participated (minutes) 50   Total # Attendees 6   Group Type expressive therapy   Group Topic Covered balanced lifestyle   Patient Response/Contribution cooperative with task     Jose M participated in OT clinic this a.m., where he initiated a chosen project (painted wooden box), followed through with plan, and asked for support with supplies as needed.

## 2022-02-26 NOTE — PLAN OF CARE
Goal Outcome Evaluation:    Plan of Care Reviewed With: patient     Overall Patient Progress: improving    Patient slept comfortably the whole night for 6 hours. Nothing unusual noted. No concerns raised.

## 2022-02-26 NOTE — PROGRESS NOTES
02/25/22 2000   Group Therapy Session   Time Session Began 1900   Time Session Ended 1957   Total Time patient participated (minutes)   (53)   Total # Attendees 4   Group Type expressive therapy   Group Topic Covered relaxation techniques   Participated in evening relaxation group designed to address the psychosocial needs of the group in the evening hours with music chosen based on the iso-principle of Music Therapy to soothe patients and bring them to a calmer place before sleep.  Pt was receptive and engaged in practicing the techniques presented.  Jose M expressed going through some med changes which are difficult and also some memory recall difficulties. His affect was gruff at times, but he ultimately expressed wanting to be in group.

## 2022-02-27 PROCEDURE — 250N000013 HC RX MED GY IP 250 OP 250 PS 637: Performed by: PSYCHIATRY & NEUROLOGY

## 2022-02-27 PROCEDURE — 250N000013 HC RX MED GY IP 250 OP 250 PS 637: Performed by: NURSE PRACTITIONER

## 2022-02-27 PROCEDURE — 124N000003 HC R&B MH SENIOR/ADOLESCENT

## 2022-02-27 PROCEDURE — 250N000013 HC RX MED GY IP 250 OP 250 PS 637

## 2022-02-27 RX ORDER — OXYCODONE HYDROCHLORIDE 10 MG/1
20 TABLET ORAL 3 TIMES DAILY
Status: DISCONTINUED | OUTPATIENT
Start: 2022-02-27 | End: 2022-03-01

## 2022-02-27 RX ORDER — OXYCODONE HYDROCHLORIDE 10 MG/1
10 TABLET ORAL 2 TIMES DAILY
Status: DISCONTINUED | OUTPATIENT
Start: 2022-02-27 | End: 2022-02-27 | Stop reason: CLARIF

## 2022-02-27 RX ORDER — OXYCODONE HYDROCHLORIDE 10 MG/1
20 TABLET ORAL 2 TIMES DAILY
Status: DISCONTINUED | OUTPATIENT
Start: 2022-02-27 | End: 2022-02-27

## 2022-02-27 RX ADMIN — ACETAMINOPHEN 975 MG: 325 TABLET, FILM COATED ORAL at 20:05

## 2022-02-27 RX ADMIN — TOPIRAMATE 100 MG: 100 TABLET, FILM COATED ORAL at 20:03

## 2022-02-27 RX ADMIN — DOCUSATE SODIUM 100 MG: 100 CAPSULE, LIQUID FILLED ORAL at 08:25

## 2022-02-27 RX ADMIN — ACAMPROSATE CALCIUM 666 MG: 333 TABLET, DELAYED RELEASE ORAL at 14:12

## 2022-02-27 RX ADMIN — NICOTINE POLACRILEX 8 MG: 4 GUM, CHEWING ORAL at 20:10

## 2022-02-27 RX ADMIN — DOCUSATE SODIUM 100 MG: 100 CAPSULE, LIQUID FILLED ORAL at 20:04

## 2022-02-27 RX ADMIN — OXYCODONE HYDROCHLORIDE 20 MG: 10 TABLET ORAL at 10:03

## 2022-02-27 RX ADMIN — MULTIPLE VITAMINS W/ MINERALS TAB 1 TABLET: TAB at 08:26

## 2022-02-27 RX ADMIN — NAPROXEN 500 MG: 500 TABLET ORAL at 17:52

## 2022-02-27 RX ADMIN — NICOTINE POLACRILEX 8 MG: 4 GUM, CHEWING ORAL at 10:03

## 2022-02-27 RX ADMIN — PRAZOSIN HYDROCHLORIDE 1 MG: 1 CAPSULE ORAL at 22:13

## 2022-02-27 RX ADMIN — NICOTINE POLACRILEX 8 MG: 4 GUM, CHEWING ORAL at 16:35

## 2022-02-27 RX ADMIN — ACAMPROSATE CALCIUM 666 MG: 333 TABLET, DELAYED RELEASE ORAL at 20:04

## 2022-02-27 RX ADMIN — NICOTINE POLACRILEX 8 MG: 4 GUM, CHEWING ORAL at 19:12

## 2022-02-27 RX ADMIN — PREGABALIN 300 MG: 100 CAPSULE ORAL at 20:03

## 2022-02-27 RX ADMIN — FOLIC ACID 1 MG: 1 TABLET ORAL at 08:24

## 2022-02-27 RX ADMIN — CHOLECALCIFEROL TAB 25 MCG (1000 UNIT) 25 MCG: 25 TAB at 08:25

## 2022-02-27 RX ADMIN — OXYCODONE HYDROCHLORIDE 20 MG: 10 TABLET ORAL at 13:34

## 2022-02-27 RX ADMIN — NICOTINE POLACRILEX 8 MG: 4 GUM, CHEWING ORAL at 17:52

## 2022-02-27 RX ADMIN — QUETIAPINE 700 MG: 300 TABLET, FILM COATED ORAL at 22:13

## 2022-02-27 RX ADMIN — NICOTINE POLACRILEX 8 MG: 4 GUM, CHEWING ORAL at 14:42

## 2022-02-27 RX ADMIN — PANTOPRAZOLE SODIUM 40 MG: 40 TABLET, DELAYED RELEASE ORAL at 08:24

## 2022-02-27 RX ADMIN — PREGABALIN 300 MG: 100 CAPSULE ORAL at 14:12

## 2022-02-27 RX ADMIN — LIOTHYRONINE SODIUM 25 MCG: 25 TABLET ORAL at 08:24

## 2022-02-27 RX ADMIN — OXYCODONE HYDROCHLORIDE 20 MG: 10 TABLET ORAL at 16:35

## 2022-02-27 RX ADMIN — NICOTINE POLACRILEX 8 MG: 4 GUM, CHEWING ORAL at 22:13

## 2022-02-27 RX ADMIN — LEVOTHYROXINE SODIUM 75 MCG: 75 TABLET ORAL at 08:25

## 2022-02-27 RX ADMIN — PROPRANOLOL HYDROCHLORIDE 20 MG: 20 TABLET ORAL at 20:04

## 2022-02-27 RX ADMIN — NICOTINE POLACRILEX 8 MG: 4 GUM, CHEWING ORAL at 08:29

## 2022-02-27 RX ADMIN — TOPIRAMATE 100 MG: 100 TABLET, FILM COATED ORAL at 08:26

## 2022-02-27 RX ADMIN — PREGABALIN 300 MG: 100 CAPSULE ORAL at 08:24

## 2022-02-27 RX ADMIN — NAPROXEN 500 MG: 500 TABLET ORAL at 08:25

## 2022-02-27 RX ADMIN — ACAMPROSATE CALCIUM 666 MG: 333 TABLET, DELAYED RELEASE ORAL at 08:24

## 2022-02-27 RX ADMIN — NICOTINE POLACRILEX 8 MG: 4 GUM, CHEWING ORAL at 13:35

## 2022-02-27 RX ADMIN — THIAMINE HCL TAB 100 MG 100 MG: 100 TAB at 08:25

## 2022-02-27 ASSESSMENT — ACTIVITIES OF DAILY LIVING (ADL)
DRESS: PROMPTS
ORAL_HYGIENE: INDEPENDENT
HYGIENE/GROOMING: INDEPENDENT
DRESS: INDEPENDENT
HYGIENE/GROOMING: INDEPENDENT
ORAL_HYGIENE: WITH SUPERVISION

## 2022-02-27 NOTE — PROGRESS NOTES
SPIRITUAL HEALTH SERVICES  SPIRITUAL ASSESSMENT Progress Note  [facility] ([facility]) 3BW     REFERRAL SOURCE: follow up    Provided stack of scifi-fantasy books for Jose M to read while on unit.  Writer saw Jose M initially in the ross and stated he needed to use the restroom. pt was gruff and irritable upon return when I knocked on pt door and did apologize.    Followed up this afternoon at pt suggestion to play a game of cribbage. Jose M apologized again and spoke of the last few days as being difficult. Provided emotional support, validation of pt experience.    Plan: will continue to follow on unit.    Liss Kerns MDiv  Associate   Pager [phone]  Office [phone]  [facility] remains available 24/7 for emergent requests/referrals, either by having the switchboard page the on-call  or by entering an ASAP/STAT consult in Epic (this will also page the on-call ). Routine Epic consults receive an initial response within 24 hours.

## 2022-02-27 NOTE — PLAN OF CARE
"  Problem: Mood Impairment (Depressive Signs/Symptoms)  Goal: Improved Mood Symptoms (Depressive Signs/Symptoms)  Outcome: Ongoing, Progressing  Intervention: Promote Mood Improvement  Recent Flowsheet Documentation  Taken 2/26/2022 1600 by Reed Mcduffie RN  Diversional Activity: reading    Plan of Care Reviewed With: patient     Overall Patient Progress: improving    Pt has flat affect, brightens on approach.  t spent most of the shift out of his room.  Pt watched TV and attended group.  He was lightly social with select peer.  Pt states his anxiety and depression are \"not bad\" on the weekends - 'I am able to put all the negative stuff in one place, but I will have to start over on Monday.\"  Pt denies SI/SIB.  Appetite is improved - ate 100% at dinner.  Ambulation is steady, using walker and is wearing leg brace.      PRN Tylenol x 1 for R knee pain      /94, HR 74 at 1952.  BP rechecked x 2.  134/86 - 71 at 2212.               "

## 2022-02-27 NOTE — PLAN OF CARE
"   02/26/22 2100   Group Therapy Session   Group Attendance attended group session   Time Session Began 1900   Time Session Ended 2000   Total Time patient participated (minutes) 45   Total # Attendees 5   Group Type expressive therapy  (art therapy)   Group Topic Covered emotions/expression   Group Session Detail collage images   Patient Response/Contribution cooperative with task;discussed personal experience with topic;organized   Patient Response Detail Ray presented as calm and pleasant. He reported feeling physical pain, but \"okay.\" He participated in making a collage of images that resonate with him. He appeared calm and focused while collaging. He chose images that represent his time at the hospital including the words \"back from the brink.\" He shared his collage with writer and talked about how his hospitalization saved his life. He also expressed some frustration with the hospital, but was mostly appreciative. He interacted appropriately with peers and was a positive participant.                  "

## 2022-02-27 NOTE — PLAN OF CARE
Goal Outcome Evaluation:    Plan of Care Reviewed With: patient     Overall Patient Progress: improving    Patient slept after reading a book. He slept for 4.75 hours only. No complaints made.     0714 patient still sleeping. Protonix and Synthroid not given. Re-approached x 2.

## 2022-02-27 NOTE — PLAN OF CARE
"  Problem: Suicidal Behavior  Goal: Suicidal Behavior is Absent or Managed  Outcome: Ongoing, Progressing     Problem: Activity and Energy Impairment (Depressive Signs/Symptoms)  Goal: Optimized Energy Level (Depressive Signs/Symptoms)  Outcome: Ongoing, Progressing  Intervention: Optimize Energy Level  Recent Flowsheet Documentation  Taken 2/27/2022 1100 by Bernard Mancera RN  Activity (Behavioral Health): up ad kathleen     Problem: Mood Impairment (Depressive Signs/Symptoms)  Goal: Improved Mood Symptoms (Depressive Signs/Symptoms)  Outcome: Ongoing, Progressing   Goal Outcome Evaluation:    Plan of Care Reviewed With: patient     Overall Patient Progress: improving       Patient is calm cooperative, visible in the milieu at times. Remain isolative for most of the shift reading in his room. Good appetite ate 100% breakfast and 0% of lunch. Reports anxiety and depression, but feels hopeful things will change. Denies SI, SIB. Did not attend group. Scheduled oxycodone to manage right knee pain, prn nicorette gum given upon request. Held propranolol and amlodipine this shift /74. Oxycodone taper continues this shift, dosage modified, see orders for details.    Patient became very angry at ~1300 because Oxycodone dosage was modified and it was not to the specifications patient was expecting from conversations with his provider. Patient Banged his walker as hard as he could into the med room door then immediately said \"I'm Sorry\" \"Fine, i'm not taking anything, this is not what we talked about, I hate when people mess with my stuff,  I want my orders back to what the use to be, the only thing we discussed was removing the bedtime dose\". Patient went to room after the outburst.    Writer called provider and received a verbal to modify the oxycodone dose from 20mg BID & 10 mg BID to 20mg TID with no bedtime dose.     Patient received dose thanked writer and is in room reading.  "

## 2022-02-28 PROCEDURE — 124N000003 HC R&B MH SENIOR/ADOLESCENT

## 2022-02-28 PROCEDURE — 250N000013 HC RX MED GY IP 250 OP 250 PS 637

## 2022-02-28 PROCEDURE — 250N000013 HC RX MED GY IP 250 OP 250 PS 637: Performed by: PSYCHIATRY & NEUROLOGY

## 2022-02-28 PROCEDURE — 250N000013 HC RX MED GY IP 250 OP 250 PS 637: Performed by: NURSE PRACTITIONER

## 2022-02-28 PROCEDURE — G0177 OPPS/PHP; TRAIN & EDUC SERV: HCPCS

## 2022-02-28 RX ORDER — PRAZOSIN HYDROCHLORIDE 2 MG/1
2 CAPSULE ORAL AT BEDTIME
Status: DISCONTINUED | OUTPATIENT
Start: 2022-02-28 | End: 2022-03-12

## 2022-02-28 RX ADMIN — DOCUSATE SODIUM 100 MG: 100 CAPSULE, LIQUID FILLED ORAL at 20:11

## 2022-02-28 RX ADMIN — NICOTINE POLACRILEX 8 MG: 4 GUM, CHEWING ORAL at 08:16

## 2022-02-28 RX ADMIN — NICOTINE POLACRILEX 8 MG: 4 GUM, CHEWING ORAL at 09:34

## 2022-02-28 RX ADMIN — PREGABALIN 300 MG: 100 CAPSULE ORAL at 14:14

## 2022-02-28 RX ADMIN — LEVOTHYROXINE SODIUM 75 MCG: 75 TABLET ORAL at 08:14

## 2022-02-28 RX ADMIN — AMLODIPINE BESYLATE 5 MG: 5 TABLET ORAL at 08:12

## 2022-02-28 RX ADMIN — NAPROXEN 500 MG: 500 TABLET ORAL at 18:59

## 2022-02-28 RX ADMIN — ACAMPROSATE CALCIUM 666 MG: 333 TABLET, DELAYED RELEASE ORAL at 20:11

## 2022-02-28 RX ADMIN — LIOTHYRONINE SODIUM 25 MCG: 25 TABLET ORAL at 08:14

## 2022-02-28 RX ADMIN — OXYCODONE HYDROCHLORIDE 20 MG: 10 TABLET ORAL at 08:14

## 2022-02-28 RX ADMIN — CHOLECALCIFEROL TAB 25 MCG (1000 UNIT) 25 MCG: 25 TAB at 08:16

## 2022-02-28 RX ADMIN — PROPRANOLOL HYDROCHLORIDE 20 MG: 20 TABLET ORAL at 08:15

## 2022-02-28 RX ADMIN — NICOTINE POLACRILEX 8 MG: 4 GUM, CHEWING ORAL at 17:09

## 2022-02-28 RX ADMIN — OXYCODONE HYDROCHLORIDE 20 MG: 10 TABLET ORAL at 16:00

## 2022-02-28 RX ADMIN — DOCUSATE SODIUM 100 MG: 100 CAPSULE, LIQUID FILLED ORAL at 08:13

## 2022-02-28 RX ADMIN — NICOTINE POLACRILEX 8 MG: 4 GUM, CHEWING ORAL at 16:00

## 2022-02-28 RX ADMIN — ACAMPROSATE CALCIUM 666 MG: 333 TABLET, DELAYED RELEASE ORAL at 08:12

## 2022-02-28 RX ADMIN — ACETAMINOPHEN 975 MG: 325 TABLET, FILM COATED ORAL at 14:14

## 2022-02-28 RX ADMIN — NICOTINE POLACRILEX 8 MG: 4 GUM, CHEWING ORAL at 12:32

## 2022-02-28 RX ADMIN — NICOTINE POLACRILEX 8 MG: 4 GUM, CHEWING ORAL at 20:11

## 2022-02-28 RX ADMIN — ACETAMINOPHEN 975 MG: 325 TABLET, FILM COATED ORAL at 21:55

## 2022-02-28 RX ADMIN — NICOTINE POLACRILEX 8 MG: 4 GUM, CHEWING ORAL at 11:32

## 2022-02-28 RX ADMIN — TOPIRAMATE 100 MG: 100 TABLET, FILM COATED ORAL at 08:16

## 2022-02-28 RX ADMIN — PROPRANOLOL HYDROCHLORIDE 20 MG: 20 TABLET ORAL at 20:11

## 2022-02-28 RX ADMIN — THIAMINE HCL TAB 100 MG 100 MG: 100 TAB at 08:16

## 2022-02-28 RX ADMIN — PRAZOSIN HYDROCHLORIDE 2 MG: 2 CAPSULE ORAL at 21:55

## 2022-02-28 RX ADMIN — OXYCODONE HYDROCHLORIDE 20 MG: 10 TABLET ORAL at 12:04

## 2022-02-28 RX ADMIN — NICOTINE POLACRILEX 8 MG: 4 GUM, CHEWING ORAL at 14:13

## 2022-02-28 RX ADMIN — NICOTINE POLACRILEX 8 MG: 4 GUM, CHEWING ORAL at 18:15

## 2022-02-28 RX ADMIN — PREGABALIN 300 MG: 100 CAPSULE ORAL at 20:11

## 2022-02-28 RX ADMIN — NICOTINE POLACRILEX 8 MG: 4 GUM, CHEWING ORAL at 21:55

## 2022-02-28 RX ADMIN — PREGABALIN 300 MG: 100 CAPSULE ORAL at 08:15

## 2022-02-28 RX ADMIN — ACAMPROSATE CALCIUM 666 MG: 333 TABLET, DELAYED RELEASE ORAL at 14:14

## 2022-02-28 RX ADMIN — FOLIC ACID 1 MG: 1 TABLET ORAL at 08:13

## 2022-02-28 RX ADMIN — NAPROXEN 500 MG: 500 TABLET ORAL at 08:14

## 2022-02-28 RX ADMIN — MULTIPLE VITAMINS W/ MINERALS TAB 1 TABLET: TAB at 08:14

## 2022-02-28 RX ADMIN — PANTOPRAZOLE SODIUM 40 MG: 40 TABLET, DELAYED RELEASE ORAL at 08:15

## 2022-02-28 RX ADMIN — NICOTINE POLACRILEX 8 MG: 4 GUM, CHEWING ORAL at 19:14

## 2022-02-28 RX ADMIN — TOPIRAMATE 100 MG: 100 TABLET, FILM COATED ORAL at 20:11

## 2022-02-28 RX ADMIN — QUETIAPINE 700 MG: 300 TABLET, FILM COATED ORAL at 21:55

## 2022-02-28 ASSESSMENT — ACTIVITIES OF DAILY LIVING (ADL)
ORAL_HYGIENE: INDEPENDENT
DRESS: SCRUBS (BEHAVIORAL HEALTH)
LAUNDRY: UNABLE TO COMPLETE
HYGIENE/GROOMING: INDEPENDENT
DRESS: SCRUBS (BEHAVIORAL HEALTH)
ORAL_HYGIENE: INDEPENDENT
HYGIENE/GROOMING: INDEPENDENT
LAUNDRY: WITH SUPERVISION

## 2022-02-28 NOTE — CARE PLAN
02/28/22 8813   Team Discussion   Participants Ulises Perez MD; Karen Wick RN; Diane Vidal OT; Silvia Mark CTC   Progress improving   Anticipated length of stay 2 weeks   Continued Stay Criteria/Rationale Patient in need of placement, prefers private room.   Medical/Physical Knee pain   Plan CTC continues to refer to assisted living facilities   Rationale for change in precautions or plan no change

## 2022-02-28 NOTE — PLAN OF CARE
Goal Outcome Evaluation:    Plan of Care Reviewed With: patient     Overall Patient Progress: improving       Patient slept late after reading a book. He slept for a total of 5.5 hours only. No complaints made.

## 2022-02-28 NOTE — PROGRESS NOTES
Patient seen, chart reviewed, care discussed with staff.  Blood pressure (!) 159/84, pulse 64, temperature 97.7  F (36.5  C), temperature source Tympanic, resp. rate 16, weight 138.5 kg (305 lb 6.4 oz), SpO2 98 %.  By report, slept 8.5 hours  General appearance: good  Alert.   Affect: fair, some depression  Mood: fair    Speech:  normal.   Eye contact:  good.    Psychomotor behavior: normal  Gait: steady with walker  Abnormal movements:  none  Delusions: none  Hallucinations:   none  Thoughts: logical  Associations: intact  Judgement:  good  Insight: good  Cognitions: intact in conversation  Memory:  intact in conversation  Orientation: normal    Not suicidal.    Imp:  Bipolar depressed, Cd in early remission, Chronic pain, s/p TBI, PTSD  Mouth lesions resolved.  And:  Patient Active Problem List   Diagnosis     Post concussive encephalopathy     H/O shoulder surgery     Alcoholism in remission (H)     Bilateral ACL tear     Chronic back pain     Social anxiety disorder     Alcohol withdrawal syndrome with complication, with unspecified complication (H)     Rib fracture     Alcohol withdrawal (H)     Alcohol dependence (H)     LFT elevation     Laceration of left hand without foreign body, initial encounter     Suicidal ideation     Intentional drug overdose, initial encounter (H)     Alcoholic intoxication with complication (H)     Severe bipolar I disorder, current or most recent episode depressed, with mixed features (H)     Medication overdose, intentional self-harm, subsequent encounter     Opioid dependence on agonist therapy (H)     2019 novel coronavirus disease (COVID-19)     Pneumonia due to COVID-19 virus     Bipolar disorder (H)     Plan:  Increase Prazosin at night  Current Facility-Administered Medications   Medication     acamprosate (CAMPRAL) EC tablet 666 mg     acetaminophen (TYLENOL) tablet 975 mg     albuterol (PROVENTIL HFA/VENTOLIN HFA) inhaler     alum & mag hydroxide-simethicone (MAALOX)  suspension 30 mL     amLODIPine (NORVASC) tablet 5 mg     diphenhydrAMINE (BENADRYL) capsule 50 mg     docusate sodium (COLACE) capsule 100 mg     folic acid (FOLVITE) tablet 1 mg     hydrocortisone (CORTAID) 1 % cream     ketamine 5%-gabapentin 8% in PLO cream 0.25 g     levothyroxine (SYNTHROID/LEVOTHROID) tablet 75 mcg     liothyronine (CYTOMEL) tablet 25 mcg     loperamide (IMODIUM) capsule 2 mg     melatonin tablet 3 mg     multivitamin w/minerals (THERA-VIT-M) tablet 1 tablet     naloxone (NARCAN) injection 0.2 mg    Or     naloxone (NARCAN) injection 0.4 mg    Or     naloxone (NARCAN) injection 0.2 mg    Or     naloxone (NARCAN) injection 0.4 mg     naproxen (NAPROSYN) tablet 500 mg     nicotine polacrilex (NICORETTE) gum 4-8 mg     OLANZapine (zyPREXA) tablet 10 mg    Or     OLANZapine (zyPREXA) injection 10 mg     oxyCODONE IR (ROXICODONE) tablet 20 mg     pantoprazole (PROTONIX) EC tablet 40 mg     prazosin (MINIPRESS) capsule 2 mg     pregabalin (LYRICA) capsule 300 mg     propranolol (INDERAL) tablet 20 mg     QUEtiapine (SEROquel) tablet 700 mg     sennosides (SENOKOT) tablet 1-2 tablet     thiamine (B-1) tablet 100 mg     tiZANidine (ZANAFLEX) tablet 4 mg     topiramate (TOPAMAX) tablet 100 mg     Vitamin D3 (CHOLECALCIFEROL) tablet 25 mcg     Recent Results (from the past 168 hour(s))   Asymptomatic COVID-19 Virus (Coronavirus) by PCR Nose    Collection Time: 02/25/22 12:03 PM    Specimen: Nose; Swab   Result Value Ref Range    SARS CoV2 PCR Negative Negative

## 2022-02-28 NOTE — PLAN OF CARE
"Goal Outcome Evaluation:    Plan of Care Reviewed With: patient     Overall Patient Progress: improving  Problem: Mood Impairment (Depressive Signs/Symptoms)  Goal: Improved Mood Symptoms (Depressive Signs/Symptoms)  Outcome: Ongoing, Not Progressing    Pt is present in the milieu and social with peers.  Pt is calm and cooperative.  Pt states his mood is \"OK, I guess\" but my anxiety will be up tomorrow because it is Monday.\"  Pt expressed hope for \"a good week - maybe things will fall into place for me.\"  Pt states he feels good about his decision to taper off Oxycodone - \"I just want it to be behind me.\"  Pt has flat affect, occasionally bright on approach.   Pt spent time watching TV and playing cards.   Pt is wearing R leg brace and ambulating with a walker independently.    PRN Tylenol x 1 for R knee pain             "

## 2022-02-28 NOTE — PLAN OF CARE
Assessment/Intervention/Current Symptoms and Care Coordination  CTC reached out to [facility] regarding interview from last week and was informed by Odessa Hunter, admissions coordinator that they do not feel they can meet patient's needs and have declined the referral.  Writer then reached out to Jammie with [facility] to coordinate additional referrals based on this feedback. CTC also met with patient and provided update on above.  He is in agreement that [facility] was not an appropriate fit for him.  He was appreciative of the update.     [facility].   Jammie Duong, OTR/L, MOT, Patient Transition Liaison  [email or web address]  Phone: [phone]   eFax: [phone]     Discharge Plan or Goal  To Assisted living when stable and placement located. Patient requiring a private room.    Barriers to Discharge   Location of appropriate bed for aftercare and completion of mental health stabilization.      Referral Status  AL referrals in process    Legal Status  Voluntary

## 2022-02-28 NOTE — PLAN OF CARE
02/28/22 1356   Individualization/Patient Specific Goals   Patient Personal Strengths insight into illness/situation;intellectual cognitive skills;humor;resourceful;self-reliant   Patient Vulnerabilities recent loss;substance abuse/addiction;housing insecurity;occupational insecurity;poor impulse control;history of unsuccessful treatment;poor physical health   Anxieties, Fears or Concerns locating appropriate housing   Individualized Care Needs sense of control over situation, continue to address knee pain   Interprofessional Rounds   Summary Patient is calm and cooperative, somewhat isolative, was attending less groups toward end of week.   Participants nursing;occupational therapy;psychiatrist;   Team Discussion   Participants Ulises Perez MD; Karen Wick RN; Diane Vidal OT; Silvia Mark CTC   Progress improving   Anticipated length of stay 2 weeks   Continued Stay Criteria/Rationale Patient in need of placement, prefers private room.   Medical/Physical Knee pain   Plan CTC continues to refer to assisted living facilities   Rationale for change in precautions or plan no change   Safety Plan appropriate level of supervision at discharge.   Anticipated Discharge Disposition assisted living   PRECAUTIONS AND SAFETY    Behavioral Orders   Procedures    Code 2    Fall precautions    Routine Programming     As clinically indicated    Seizure precautions    Status 15     Every 15 minutes.    Suicide precautions     Patients on Suicide Precautions should have a Combination Diet ordered that includes a Diet selection(s) AND a Behavioral Tray selection for Safe Tray - with utensils, or Safe Tray - NO utensils         Safety  Safety WDL: WDL  Patient Location: patient room, own  Observed Behavior: sitting  Observed Behavior (Comment): none  Safety Measures: safety plan reviewed  Diversional Activity: television  Suicidality: Status 15  Withdrawal: seizure precautions (past history of withdrawal seizure)  Seizure  precautions: other (see comment) (pads on floor)  Assault: status 15  Elopement: Statements about wanting to leave  Elopement: status 15  Sexual: status 15

## 2022-02-28 NOTE — PLAN OF CARE
Goal Outcome Evaluation:    Plan of Care Reviewed With: patient      Patient pleasant and cooperative, visible in milieu.  Bright on approach, denies SI/SIB, denies all mental health symptoms. Good appetite, medication compliant.  Patient still endorses Right knee pain, took scheduled oxycodone.  Received prn tylenol per request this afternoon for pain.  Claimed better afterwards.  Patient presently awake in room.  Will continue to monitor closely.

## 2022-03-01 PROCEDURE — 250N000013 HC RX MED GY IP 250 OP 250 PS 637: Performed by: PSYCHIATRY & NEUROLOGY

## 2022-03-01 PROCEDURE — 250N000013 HC RX MED GY IP 250 OP 250 PS 637: Performed by: NURSE PRACTITIONER

## 2022-03-01 PROCEDURE — 124N000003 HC R&B MH SENIOR/ADOLESCENT

## 2022-03-01 PROCEDURE — G0177 OPPS/PHP; TRAIN & EDUC SERV: HCPCS

## 2022-03-01 RX ORDER — CLONIDINE HYDROCHLORIDE 0.1 MG/1
0.1 TABLET ORAL EVERY 6 HOURS PRN
Qty: 60 TABLET | Refills: 1 | Status: SHIPPED | OUTPATIENT
Start: 2022-03-01 | End: 2022-03-17

## 2022-03-01 RX ORDER — VITAMIN B COMPLEX
25 TABLET ORAL DAILY
Qty: 90 TABLET | Refills: 3 | Status: SHIPPED | OUTPATIENT
Start: 2022-03-02 | End: 2022-03-18

## 2022-03-01 RX ORDER — OXYCODONE HYDROCHLORIDE 10 MG/1
20 TABLET ORAL 2 TIMES DAILY
Status: DISCONTINUED | OUTPATIENT
Start: 2022-03-01 | End: 2022-03-04

## 2022-03-01 RX ORDER — CLONIDINE HYDROCHLORIDE 0.1 MG/1
0.1 TABLET ORAL EVERY 6 HOURS PRN
Status: DISCONTINUED | OUTPATIENT
Start: 2022-03-01 | End: 2022-03-08

## 2022-03-01 RX ORDER — PRAZOSIN HYDROCHLORIDE 2 MG/1
2 CAPSULE ORAL AT BEDTIME
Qty: 60 CAPSULE | Refills: 3 | Status: SHIPPED | OUTPATIENT
Start: 2022-03-01 | End: 2022-03-17

## 2022-03-01 RX ORDER — QUETIAPINE FUMARATE 100 MG/1
700 TABLET, FILM COATED ORAL AT BEDTIME
Qty: 210 TABLET | Refills: 3 | Status: SHIPPED | OUTPATIENT
Start: 2022-03-01 | End: 2022-03-24

## 2022-03-01 RX ORDER — BENZOCAINE/MENTHOL 6 MG-10 MG
LOZENGE MUCOUS MEMBRANE 2 TIMES DAILY
Qty: 1.5 G | Refills: 1 | Status: SHIPPED | OUTPATIENT
Start: 2022-03-01

## 2022-03-01 RX ADMIN — PROPRANOLOL HYDROCHLORIDE 20 MG: 20 TABLET ORAL at 08:59

## 2022-03-01 RX ADMIN — PROPRANOLOL HYDROCHLORIDE 20 MG: 20 TABLET ORAL at 19:59

## 2022-03-01 RX ADMIN — NICOTINE POLACRILEX 8 MG: 4 GUM, CHEWING ORAL at 19:26

## 2022-03-01 RX ADMIN — HYDROCORTISONE: 1 CREAM TOPICAL at 09:01

## 2022-03-01 RX ADMIN — QUETIAPINE 700 MG: 300 TABLET, FILM COATED ORAL at 21:55

## 2022-03-01 RX ADMIN — OXYCODONE HYDROCHLORIDE 20 MG: 10 TABLET ORAL at 12:30

## 2022-03-01 RX ADMIN — LEVOTHYROXINE SODIUM 75 MCG: 75 TABLET ORAL at 09:00

## 2022-03-01 RX ADMIN — DOCUSATE SODIUM 100 MG: 100 CAPSULE, LIQUID FILLED ORAL at 08:59

## 2022-03-01 RX ADMIN — NAPROXEN 500 MG: 500 TABLET ORAL at 08:59

## 2022-03-01 RX ADMIN — LIOTHYRONINE SODIUM 25 MCG: 25 TABLET ORAL at 08:59

## 2022-03-01 RX ADMIN — ACETAMINOPHEN 975 MG: 325 TABLET, FILM COATED ORAL at 21:55

## 2022-03-01 RX ADMIN — AMLODIPINE BESYLATE 5 MG: 5 TABLET ORAL at 08:59

## 2022-03-01 RX ADMIN — ACAMPROSATE CALCIUM 666 MG: 333 TABLET, DELAYED RELEASE ORAL at 14:08

## 2022-03-01 RX ADMIN — TOPIRAMATE 100 MG: 100 TABLET, FILM COATED ORAL at 08:59

## 2022-03-01 RX ADMIN — PANTOPRAZOLE SODIUM 40 MG: 40 TABLET, DELAYED RELEASE ORAL at 08:59

## 2022-03-01 RX ADMIN — DIPHENHYDRAMINE HYDROCHLORIDE 50 MG: 25 CAPSULE ORAL at 19:00

## 2022-03-01 RX ADMIN — NICOTINE POLACRILEX 8 MG: 4 GUM, CHEWING ORAL at 20:31

## 2022-03-01 RX ADMIN — NICOTINE POLACRILEX 8 MG: 4 GUM, CHEWING ORAL at 08:05

## 2022-03-01 RX ADMIN — NICOTINE POLACRILEX 8 MG: 4 GUM, CHEWING ORAL at 10:10

## 2022-03-01 RX ADMIN — THIAMINE HCL TAB 100 MG 100 MG: 100 TAB at 08:59

## 2022-03-01 RX ADMIN — NICOTINE POLACRILEX 8 MG: 4 GUM, CHEWING ORAL at 12:29

## 2022-03-01 RX ADMIN — PREGABALIN 300 MG: 100 CAPSULE ORAL at 09:00

## 2022-03-01 RX ADMIN — PRAZOSIN HYDROCHLORIDE 2 MG: 2 CAPSULE ORAL at 21:55

## 2022-03-01 RX ADMIN — DOCUSATE SODIUM 100 MG: 100 CAPSULE, LIQUID FILLED ORAL at 19:59

## 2022-03-01 RX ADMIN — NICOTINE POLACRILEX 4 MG: 4 GUM, CHEWING ORAL at 14:08

## 2022-03-01 RX ADMIN — NAPROXEN 500 MG: 500 TABLET ORAL at 19:00

## 2022-03-01 RX ADMIN — OXYCODONE HYDROCHLORIDE 20 MG: 10 TABLET ORAL at 08:05

## 2022-03-01 RX ADMIN — OXYCODONE HYDROCHLORIDE 20 MG: 10 TABLET ORAL at 15:59

## 2022-03-01 RX ADMIN — MULTIPLE VITAMINS W/ MINERALS TAB 1 TABLET: TAB at 09:00

## 2022-03-01 RX ADMIN — NICOTINE POLACRILEX 8 MG: 4 GUM, CHEWING ORAL at 11:29

## 2022-03-01 RX ADMIN — TOPIRAMATE 100 MG: 100 TABLET, FILM COATED ORAL at 19:59

## 2022-03-01 RX ADMIN — NICOTINE POLACRILEX 8 MG: 4 GUM, CHEWING ORAL at 15:59

## 2022-03-01 RX ADMIN — ACETAMINOPHEN 975 MG: 325 TABLET, FILM COATED ORAL at 14:08

## 2022-03-01 RX ADMIN — NICOTINE POLACRILEX 8 MG: 4 GUM, CHEWING ORAL at 17:05

## 2022-03-01 RX ADMIN — PREGABALIN 300 MG: 100 CAPSULE ORAL at 14:08

## 2022-03-01 RX ADMIN — FOLIC ACID 1 MG: 1 TABLET ORAL at 08:59

## 2022-03-01 RX ADMIN — CHOLECALCIFEROL TAB 25 MCG (1000 UNIT) 25 MCG: 25 TAB at 09:00

## 2022-03-01 RX ADMIN — NICOTINE POLACRILEX 8 MG: 4 GUM, CHEWING ORAL at 21:54

## 2022-03-01 RX ADMIN — ACAMPROSATE CALCIUM 666 MG: 333 TABLET, DELAYED RELEASE ORAL at 08:59

## 2022-03-01 RX ADMIN — PREGABALIN 300 MG: 100 CAPSULE ORAL at 19:59

## 2022-03-01 RX ADMIN — NICOTINE POLACRILEX 8 MG: 4 GUM, CHEWING ORAL at 09:02

## 2022-03-01 RX ADMIN — NICOTINE POLACRILEX 8 MG: 4 GUM, CHEWING ORAL at 18:18

## 2022-03-01 RX ADMIN — ACAMPROSATE CALCIUM 666 MG: 333 TABLET, DELAYED RELEASE ORAL at 20:31

## 2022-03-01 ASSESSMENT — ACTIVITIES OF DAILY LIVING (ADL)
ORAL_HYGIENE: INDEPENDENT
LAUNDRY: WITH SUPERVISION
DRESS: SCRUBS (BEHAVIORAL HEALTH)
HYGIENE/GROOMING: INDEPENDENT
DRESS: SCRUBS (BEHAVIORAL HEALTH)
LAUNDRY: WITH SUPERVISION
HYGIENE/GROOMING: INDEPENDENT
ORAL_HYGIENE: INDEPENDENT

## 2022-03-01 NOTE — PLAN OF CARE
Problem: Decreased Participation and Engagement (Depressive Signs/Symptoms)  Goal: Increased Participation and Engagement (Depressive Signs/Symptoms)  Intervention: Facilitate Participation and Engagement  Recent Flowsheet Documentation  Taken 3/1/2022 9025 by Susan Iverson RN  Diversional Activity: television     Problem: Activity and Energy Impairment (Depressive Signs/Symptoms)  Goal: Optimized Energy Level (Depressive Signs/Symptoms)  Outcome: Ongoing, Progressing  Flowsheets (Taken 3/1/2022 1132)  Mutually Determined Action Steps (Optimized Energy Level):    grooms self without prompting    dresses/ready for morning activity   Goal Outcome Evaluation:    Plan of Care Reviewed With: patient         No changes in patient condition throughout the shift. He remains calm and cooperative with rather flat affect. Seemed sad, depressed but hopefull. Did not present anxious. Denies SI, SIB and hallucinations. Was visible in the milieu, attended groups, social with staff and some peers.

## 2022-03-01 NOTE — PLAN OF CARE
Occupational Therapy Group Note:     03/01/22 1500   Group Therapy Session   Group Attendance attended group session   Total Time patient participated (minutes) 120   Total # Attendees 6, 7   Group Type expressive therapy;life skill   Group Topic Covered cognitive activities;coping skills/lifestyle management;emotions/expression;problem-solving   Patient Response/Contribution cooperative with task;discussed personal experience with topic;organized     Pt attended 2 out of 3 OT groups offered. Pt actively participated in occupational therapy clinic to facilitate coping skill exploration, creative expression within personally meaningful activities, and clinical observation of social, cognitive, and kinesthetic performance skills. Pt response: Chosen activity: decorating a wooden box. Independent to initiate, gather materials, sequence and adjust to workspace demands as needed. Demonstrated good focus, planning, problem solving, and attention to detail for this moderately complex task. Organized in his task approach. Able to ask for assistance and occasionally socialized with peers and staff.    Pt actively participated in a structured occupational therapy group with a focus on connecting song titles to life events and personal feelings. Using a list of song titles, pt identified A Hard Day's Night, Against the Wind, All By Myself, Comfortably Numb, and Free Falling as song titles that relate to his life. Pt identified Just Like Starting Over as a song title that indicates something about his future. Pt then completed a visual scanning task while listening to identified songs to facilitate focus and organization. Demonstrated good attention to task. Continues to present as flat/depressed, though he is pleasant and polite in interactions.

## 2022-03-01 NOTE — PROGRESS NOTES
Patient seen, chart reviewed, care discussed with staff.  Blood pressure 115/78, pulse 73, temperature 97.1  F (36.2  C), temperature source Temporal, resp. rate 16, weight 138.5 kg (305 lb 6.4 oz), SpO2 98 %.    By report, sad, compliant, sleep ok    General appearance: good  Alert.   Affect: fair, sad  Mood: fair    Speech:  normal.   Eye contact:  good.    Psychomotor behavior: normal  Gait: steady with walker  Abnormal movements:  none  Delusions: none  Hallucinations:  none  Thoughts: logical  Associations: intact  Judgement: good  Insight: good  Cognitions: intact in conversation  Memory:  intact in conversation  Orientation: normal    Not suicidal.    Hip plan to be off of opiates as soon as able was discussed    Plan:  Decrease Oxycodone to 40mg daily  2.  Add Clonidine prn  Imp:  Bipolar depressed, PTSD, Acute Grief, chronic pain, s/p TBI    Current Facility-Administered Medications   Medication     acamprosate (CAMPRAL) EC tablet 666 mg     acetaminophen (TYLENOL) tablet 975 mg     albuterol (PROVENTIL HFA/VENTOLIN HFA) inhaler     alum & mag hydroxide-simethicone (MAALOX) suspension 30 mL     amLODIPine (NORVASC) tablet 5 mg     cloNIDine (CATAPRES) tablet 0.1 mg     diphenhydrAMINE (BENADRYL) capsule 50 mg     docusate sodium (COLACE) capsule 100 mg     folic acid (FOLVITE) tablet 1 mg     hydrocortisone (CORTAID) 1 % cream     ketamine 5%-gabapentin 8% in PLO cream 0.25 g     levothyroxine (SYNTHROID/LEVOTHROID) tablet 75 mcg     liothyronine (CYTOMEL) tablet 25 mcg     loperamide (IMODIUM) capsule 2 mg     melatonin tablet 3 mg     multivitamin w/minerals (THERA-VIT-M) tablet 1 tablet     naloxone (NARCAN) injection 0.2 mg    Or     naloxone (NARCAN) injection 0.4 mg    Or     naloxone (NARCAN) injection 0.2 mg    Or     naloxone (NARCAN) injection 0.4 mg     naproxen (NAPROSYN) tablet 500 mg     nicotine polacrilex (NICORETTE) gum 4-8 mg     OLANZapine (zyPREXA) tablet 10 mg    Or     OLANZapine  (zyPREXA) injection 10 mg     oxyCODONE IR (ROXICODONE) tablet 20 mg     pantoprazole (PROTONIX) EC tablet 40 mg     prazosin (MINIPRESS) capsule 2 mg     pregabalin (LYRICA) capsule 300 mg     propranolol (INDERAL) tablet 20 mg     QUEtiapine (SEROquel) tablet 700 mg     sennosides (SENOKOT) tablet 1-2 tablet     thiamine (B-1) tablet 100 mg     tiZANidine (ZANAFLEX) tablet 4 mg     topiramate (TOPAMAX) tablet 100 mg     Vitamin D3 (CHOLECALCIFEROL) tablet 25 mcg     Recent Results (from the past 168 hour(s))   Asymptomatic COVID-19 Virus (Coronavirus) by PCR Nose    Collection Time: 02/25/22 12:03 PM    Specimen: Nose; Swab   Result Value Ref Range    SARS CoV2 PCR Negative Negative

## 2022-03-01 NOTE — PLAN OF CARE
"Problem: Suicidal Behavior  Goal: Suicidal Behavior is Absent or Managed  Outcome: Ongoing, Progressing  Flowsheets (Taken 2/23/2022 1828)  Mutually Determined Action Steps (Suicidal Behavior Absent/Managed):    identifies protective factors    sets future-oriented goal      Pt presents with blunted, flat affect and calm mood. Denies SI/SIB and hallucinations. Reports continued depression and anxiety, but is hopeful regarding his future. Pt was present in the lounge, watched movies throughout the shift. Did not attend groups. Pleasant with staff and peers. Continues to c/o R knee pain 7/10 - he is in the process of tapering off of the oxycodone, he states that it is going \"alright\", pain is managed with his scheduled pain medications + PRN Tylenol. VSS. Medication compliant, PRN nicotine gum as requested. Appetite and fluid intake adequate. Gait steady and balanced with walker. Continues with use of a medical bed due to impaired mobility and needing the side rails to assist him in getting up independently. No other concerns or complaints noted.   "

## 2022-03-01 NOTE — PLAN OF CARE
Assessment/Intervention/Current Symtoms and Care Coordination  -Attended team meeting    -Faxed Assisted Living referrals to:    [facility]  Jammie Duong, Patient Transition Liaison  [email or web address]  Phone: [phone]   eFax: [phone]   - Placed call and left message for Jammie Duong to follow up on status of referral    [facility] phone: [phone] Fax: [phone]     Discharge Plan or Goal  To Assisted living when stable and placement located. Patient requiring a private room.     Barriers to Discharge   Location of appropriate bed for aftercare and completion of mental health stabilization.     Referral Status  [facility] assisted living  Jammie Krjojo, Patient Transition Liaison  [email or web address]  Phone: [phone]   eFax: [phone]     [facility] assisted living  [facility] phone: [phone] Fax: [phone]    The above referrals were submitted on 2/18/22 by Lorna Bui and 3/1/22 by Salena Tolentino     Legal Status  Voluntary

## 2022-03-01 NOTE — PLAN OF CARE
Goal Outcome Evaluation:        Patient slept for 6 hours the whole night after reading a book. No complaints made and no emotional outburst noted.

## 2022-03-02 PROCEDURE — 124N000003 HC R&B MH SENIOR/ADOLESCENT

## 2022-03-02 PROCEDURE — 250N000013 HC RX MED GY IP 250 OP 250 PS 637: Performed by: NURSE PRACTITIONER

## 2022-03-02 PROCEDURE — 250N000013 HC RX MED GY IP 250 OP 250 PS 637: Performed by: PSYCHIATRY & NEUROLOGY

## 2022-03-02 RX ADMIN — FOLIC ACID 1 MG: 1 TABLET ORAL at 08:49

## 2022-03-02 RX ADMIN — LIOTHYRONINE SODIUM 25 MCG: 25 TABLET ORAL at 08:49

## 2022-03-02 RX ADMIN — TOPIRAMATE 100 MG: 100 TABLET, FILM COATED ORAL at 08:48

## 2022-03-02 RX ADMIN — NICOTINE POLACRILEX 8 MG: 4 GUM, CHEWING ORAL at 13:38

## 2022-03-02 RX ADMIN — MULTIPLE VITAMINS W/ MINERALS TAB 1 TABLET: TAB at 08:48

## 2022-03-02 RX ADMIN — NAPROXEN 500 MG: 500 TABLET ORAL at 08:48

## 2022-03-02 RX ADMIN — OXYCODONE HYDROCHLORIDE 20 MG: 10 TABLET ORAL at 16:12

## 2022-03-02 RX ADMIN — PANTOPRAZOLE SODIUM 40 MG: 40 TABLET, DELAYED RELEASE ORAL at 08:49

## 2022-03-02 RX ADMIN — NICOTINE POLACRILEX 8 MG: 4 GUM, CHEWING ORAL at 19:11

## 2022-03-02 RX ADMIN — PRAZOSIN HYDROCHLORIDE 2 MG: 2 CAPSULE ORAL at 21:59

## 2022-03-02 RX ADMIN — TOPIRAMATE 100 MG: 100 TABLET, FILM COATED ORAL at 20:03

## 2022-03-02 RX ADMIN — ACETAMINOPHEN 975 MG: 325 TABLET, FILM COATED ORAL at 21:59

## 2022-03-02 RX ADMIN — NICOTINE POLACRILEX 8 MG: 4 GUM, CHEWING ORAL at 14:43

## 2022-03-02 RX ADMIN — PROPRANOLOL HYDROCHLORIDE 20 MG: 20 TABLET ORAL at 20:03

## 2022-03-02 RX ADMIN — LEVOTHYROXINE SODIUM 75 MCG: 75 TABLET ORAL at 08:48

## 2022-03-02 RX ADMIN — NICOTINE POLACRILEX 8 MG: 4 GUM, CHEWING ORAL at 16:12

## 2022-03-02 RX ADMIN — NICOTINE POLACRILEX 8 MG: 4 GUM, CHEWING ORAL at 12:39

## 2022-03-02 RX ADMIN — NICOTINE POLACRILEX 8 MG: 4 GUM, CHEWING ORAL at 21:59

## 2022-03-02 RX ADMIN — PREGABALIN 300 MG: 100 CAPSULE ORAL at 20:03

## 2022-03-02 RX ADMIN — QUETIAPINE 700 MG: 300 TABLET, FILM COATED ORAL at 22:00

## 2022-03-02 RX ADMIN — ACAMPROSATE CALCIUM 666 MG: 333 TABLET, DELAYED RELEASE ORAL at 13:38

## 2022-03-02 RX ADMIN — NAPROXEN 500 MG: 500 TABLET ORAL at 17:37

## 2022-03-02 RX ADMIN — PREGABALIN 300 MG: 100 CAPSULE ORAL at 13:38

## 2022-03-02 RX ADMIN — CHOLECALCIFEROL TAB 25 MCG (1000 UNIT) 25 MCG: 25 TAB at 08:48

## 2022-03-02 RX ADMIN — PREGABALIN 300 MG: 100 CAPSULE ORAL at 08:48

## 2022-03-02 RX ADMIN — ACAMPROSATE CALCIUM 666 MG: 333 TABLET, DELAYED RELEASE ORAL at 20:25

## 2022-03-02 RX ADMIN — DOCUSATE SODIUM 100 MG: 100 CAPSULE, LIQUID FILLED ORAL at 08:48

## 2022-03-02 RX ADMIN — NICOTINE POLACRILEX 8 MG: 4 GUM, CHEWING ORAL at 08:47

## 2022-03-02 RX ADMIN — NICOTINE POLACRILEX 8 MG: 4 GUM, CHEWING ORAL at 20:25

## 2022-03-02 RX ADMIN — THIAMINE HCL TAB 100 MG 100 MG: 100 TAB at 08:48

## 2022-03-02 RX ADMIN — NICOTINE POLACRILEX 8 MG: 4 GUM, CHEWING ORAL at 17:22

## 2022-03-02 RX ADMIN — OXYCODONE HYDROCHLORIDE 20 MG: 10 TABLET ORAL at 12:30

## 2022-03-02 ASSESSMENT — ACTIVITIES OF DAILY LIVING (ADL)
ORAL_HYGIENE: INDEPENDENT
DRESS: SCRUBS (BEHAVIORAL HEALTH)
DRESS: INDEPENDENT
LAUNDRY: WITH SUPERVISION
HYGIENE/GROOMING: INDEPENDENT
HYGIENE/GROOMING: INDEPENDENT
LAUNDRY: WITH SUPERVISION
ORAL_HYGIENE: INDEPENDENT

## 2022-03-02 NOTE — PLAN OF CARE
Problem: Behavioral Health Plan of Care  Goal: Plan of Care Review  Outcome: Ongoing, Progressing  Flowsheets  Taken 3/1/2022 2127  Overall Patient Progress: improving  Taken 3/1/2022 1826  Plan of Care Reviewed With: patient  Patient Agreement with Plan of Care: agrees    Pt presents with blunted, flat affect and calm mood. Denies SI/SIB and hallucinations. Still continues to have depression and some anxiety, but he remains hopeful. [facility] and social with peers. Spent time reading in his room. Did not attend groups. Still has chronic R knee pain 7/10, controlled with scheduled and PRN medications, pt also continues with taper of oxycodone. VSS. Medication compliant, PRN nicotine gum given as requested. Appetite and fluid intake adequate. Gait steady and balanced with use of walker. No other concerns or complaints noted.

## 2022-03-02 NOTE — PLAN OF CARE
Problem: Suicidal Behavior  Goal: Suicidal Behavior is Absent or Managed  Outcome: Ongoing, Progressing     Problem: Activity and Energy Impairment (Depressive Signs/Symptoms)  Goal: Optimized Energy Level (Depressive Signs/Symptoms)  Outcome: Ongoing, Progressing  Intervention: Optimize Energy Level  Recent Flowsheet Documentation  Taken 3/2/2022 1100 by Bernard Mancera RN  Patient Performed Hygiene: dressed  Activity (Behavioral Health): up ad kathleen     Problem: Decreased Participation and Engagement (Depressive Signs/Symptoms)  Goal: Increased Participation and Engagement (Depressive Signs/Symptoms)  Outcome: Ongoing, Progressing     Problem: Mood Impairment (Depressive Signs/Symptoms)  Goal: Improved Mood Symptoms (Depressive Signs/Symptoms)  Outcome: Ongoing, Progressing   Goal Outcome Evaluation:    Plan of Care Reviewed With: patient     Patient was withdrawn with flat blunt affect this morning, mood was irritable. Patient denies SI, SIB, endorses anxiety and depression. Patient requested staff not disturb patient until 11 am as patient reports not sleeping well last night. Received all morning meds except campral which was not available on the unit until later. Patient came out for lunch in a more social pleasant mood. Prn nicorette gum given per request, right knee pain managed with scheduled medications. Patient reported feeling symptoms of withdrawal from the opiates (cold sweats, shaky, irritability) but is motivated to continue the taper.  BP 99/69 this morning, refused noon vitals.

## 2022-03-02 NOTE — PLAN OF CARE
Goal Outcome Evaluation:        Problem: Sleep Disturbance (Depressive Signs/Symptoms)  Goal: Improved Sleep (Depressive Signs/Symptoms)  Outcome: Ongoing, Progressing     Patient appeared to be asleep for 6 hours during safety checks this shift. No complaints or concerns voiced by patient or noted by staff. Patient has an order for medical bed due to mobility limitations. Will continue to monitor and update if there are changes.

## 2022-03-02 NOTE — PROGRESS NOTES
Patient seen, chart reviewed, care discussed with staff.  Blood pressure 99/69, pulse 85, temperature 97.3  F (36.3  C), temperature source Oral, resp. rate 16, weight 138.5 kg (305 lb 6.4 oz), SpO2 98 %.    Pain levels higher but tolerable.Slept less well.    General appearance: good  Alert.   Affect: fair  Mood: fair    Speech:  normal.   Eye contact:  good.    Psychomotor behavior: normal  Gait: steady with walker  Abnormal movements:  none  Delusions: none  Hallucinations:  none  Thoughts: logical  Associations: intact  Judgement: good  Insight: good  Cognitions: intact in conversation  Memory:  intact in conversation  Orientation: normal    Not suicidal.    Imp:  Bipolar depressed  PTSD  S/P TBI  Acute grief  Chronic pain  Mild opiate withdrawal as he tapers down    Plan: no med changes today    Current Facility-Administered Medications   Medication     acamprosate (CAMPRAL) EC tablet 666 mg     acetaminophen (TYLENOL) tablet 975 mg     albuterol (PROVENTIL HFA/VENTOLIN HFA) inhaler     alum & mag hydroxide-simethicone (MAALOX) suspension 30 mL     amLODIPine (NORVASC) tablet 5 mg     cloNIDine (CATAPRES) tablet 0.1 mg     diphenhydrAMINE (BENADRYL) capsule 50 mg     docusate sodium (COLACE) capsule 100 mg     folic acid (FOLVITE) tablet 1 mg     hydrocortisone (CORTAID) 1 % cream     ketamine 5%-gabapentin 8% in PLO cream 0.25 g     levothyroxine (SYNTHROID/LEVOTHROID) tablet 75 mcg     liothyronine (CYTOMEL) tablet 25 mcg     loperamide (IMODIUM) capsule 2 mg     melatonin tablet 3 mg     multivitamin w/minerals (THERA-VIT-M) tablet 1 tablet     naloxone (NARCAN) injection 0.2 mg    Or     naloxone (NARCAN) injection 0.4 mg    Or     naloxone (NARCAN) injection 0.2 mg    Or     naloxone (NARCAN) injection 0.4 mg     naproxen (NAPROSYN) tablet 500 mg     nicotine polacrilex (NICORETTE) gum 4-8 mg     OLANZapine (zyPREXA) tablet 10 mg    Or     OLANZapine (zyPREXA) injection 10 mg     oxyCODONE IR (ROXICODONE)  tablet 20 mg     pantoprazole (PROTONIX) EC tablet 40 mg     prazosin (MINIPRESS) capsule 2 mg     pregabalin (LYRICA) capsule 300 mg     propranolol (INDERAL) tablet 20 mg     QUEtiapine (SEROquel) tablet 700 mg     sennosides (SENOKOT) tablet 1-2 tablet     thiamine (B-1) tablet 100 mg     tiZANidine (ZANAFLEX) tablet 4 mg     topiramate (TOPAMAX) tablet 100 mg     Vitamin D3 (CHOLECALCIFEROL) tablet 25 mcg     Recent Results (from the past 168 hour(s))   Asymptomatic COVID-19 Virus (Coronavirus) by PCR Nose    Collection Time: 02/25/22 12:03 PM    Specimen: Nose; Swab   Result Value Ref Range    SARS CoV2 PCR Negative Negative

## 2022-03-02 NOTE — PLAN OF CARE
Problem: Suicidal Behavior  Goal: Suicidal Behavior is Absent or Managed  Outcome: Ongoing, Progressing     Problem: Activity and Energy Impairment (Depressive Signs/Symptoms)  Goal: Optimized Energy Level (Depressive Signs/Symptoms)  Outcome: Ongoing, Progressing  Intervention: Optimize Energy Level  Recent Flowsheet Documentation  Taken 3/2/2022 1653 by Johanna Rodriguez, RN  Diversional Activity: television  Activity (Behavioral Health): up ad kathleen   Goal Outcome Evaluation:    Plan of Care Reviewed With: patient               Patient reports feeling irritable, patient observed as being restless, occasionally irritable but mostly pleasant, withdrawn and isolative to self in his room. Patient is cooperative with nursing assessment.    Patient spent most of the shift in his room, did not eat dinner right away, requested to have his tray set aside until a later time. Patient finally had his dinner around 19:00.    Anxiety, 7/10, depression 6/10, denies suicidal ideation or any other mental health symptoms.     Reports right knee pain 6/10.    Patient is medication compliant.    Reports right knee pain. Pain managed with medication and rest.  Other vital signs stable.     Staff will continue to monitor patient closely.

## 2022-03-02 NOTE — PLAN OF CARE
Assessment/Intervention/Current Symtoms and Care Coordination  -Team meeting  -Made additional referrals for Assisted Living (see below)  -Made referrals for group homes (see below)  -Placed call and left message for CADI Waiver CM: Jessica Lawson- [facility] #[phone] asking for callback to coordinate care     Discharge Plan or Goal  TBD pending placement-considerations include assisted living facility that can accommodate elderly waiver, single room and sober environment     Barriers to Discharge   Placement     Referral Status  ASSISTED LIVING Facility STATUS   [facility]/Lois at [facility]  Blank   [phone]/fax [phone]   3/2 referral made-declined due to age - likely needs group home   [facility]  Skilled nursing, transitional care + assisted living  [phone]/Fax [phone]   3/2 referral made   [place] [facility] -[phone] General/Admissions (Blank) [phone]  Fax [place] [phone]  Fax [place] [phone] 3/2 referral made   Tyrell Terry-[phone]/Fax [phone] 3/2 referral made   [facility]  [phone]  Fax (SNF+TC) [phone]  Fax (skilled nursing) [phone]   3/2 referral made   [place][phone]  Jammie Odom [phone]/Fax [phone] 3/1 referral made; 3/2 [facility]   [facility]  [phone]/fax [phone] 3/2 referral made-Declined due to age - likely needs group home       GROUP HOME STATUS   Wlpbaeq-[phone]/fax [phone]  Cynthia  Email: [email or web address]  3/2 made referral   [facility]   [phone]/fax [phone]   Won (Director of Nursing) 3/2 made referral              Legal Status  Voluntary

## 2022-03-03 PROCEDURE — 250N000013 HC RX MED GY IP 250 OP 250 PS 637: Performed by: NURSE PRACTITIONER

## 2022-03-03 PROCEDURE — 124N000003 HC R&B MH SENIOR/ADOLESCENT

## 2022-03-03 PROCEDURE — 250N000013 HC RX MED GY IP 250 OP 250 PS 637: Performed by: PSYCHIATRY & NEUROLOGY

## 2022-03-03 RX ADMIN — PREGABALIN 300 MG: 100 CAPSULE ORAL at 08:12

## 2022-03-03 RX ADMIN — NICOTINE POLACRILEX 8 MG: 4 GUM, CHEWING ORAL at 15:18

## 2022-03-03 RX ADMIN — NICOTINE POLACRILEX 8 MG: 4 GUM, CHEWING ORAL at 18:54

## 2022-03-03 RX ADMIN — FOLIC ACID 1 MG: 1 TABLET ORAL at 08:13

## 2022-03-03 RX ADMIN — PROPRANOLOL HYDROCHLORIDE 20 MG: 20 TABLET ORAL at 08:14

## 2022-03-03 RX ADMIN — ACAMPROSATE CALCIUM 666 MG: 333 TABLET, DELAYED RELEASE ORAL at 08:12

## 2022-03-03 RX ADMIN — TOPIRAMATE 100 MG: 100 TABLET, FILM COATED ORAL at 19:59

## 2022-03-03 RX ADMIN — NICOTINE POLACRILEX 8 MG: 4 GUM, CHEWING ORAL at 11:29

## 2022-03-03 RX ADMIN — PROPRANOLOL HYDROCHLORIDE 20 MG: 20 TABLET ORAL at 19:59

## 2022-03-03 RX ADMIN — TOPIRAMATE 100 MG: 100 TABLET, FILM COATED ORAL at 08:14

## 2022-03-03 RX ADMIN — ACETAMINOPHEN 975 MG: 325 TABLET, FILM COATED ORAL at 22:12

## 2022-03-03 RX ADMIN — THIAMINE HCL TAB 100 MG 100 MG: 100 TAB at 08:14

## 2022-03-03 RX ADMIN — NAPROXEN 500 MG: 500 TABLET ORAL at 18:54

## 2022-03-03 RX ADMIN — PRAZOSIN HYDROCHLORIDE 2 MG: 2 CAPSULE ORAL at 22:12

## 2022-03-03 RX ADMIN — NICOTINE POLACRILEX 8 MG: 4 GUM, CHEWING ORAL at 08:12

## 2022-03-03 RX ADMIN — NICOTINE POLACRILEX 8 MG: 4 GUM, CHEWING ORAL at 19:59

## 2022-03-03 RX ADMIN — ACAMPROSATE CALCIUM 666 MG: 333 TABLET, DELAYED RELEASE ORAL at 19:59

## 2022-03-03 RX ADMIN — CHOLECALCIFEROL TAB 25 MCG (1000 UNIT) 25 MCG: 25 TAB at 08:13

## 2022-03-03 RX ADMIN — NICOTINE POLACRILEX 8 MG: 4 GUM, CHEWING ORAL at 17:18

## 2022-03-03 RX ADMIN — NICOTINE POLACRILEX 8 MG: 4 GUM, CHEWING ORAL at 22:13

## 2022-03-03 RX ADMIN — NICOTINE POLACRILEX 8 MG: 4 GUM, CHEWING ORAL at 16:12

## 2022-03-03 RX ADMIN — NAPROXEN 500 MG: 500 TABLET ORAL at 08:13

## 2022-03-03 RX ADMIN — PREGABALIN 300 MG: 100 CAPSULE ORAL at 19:59

## 2022-03-03 RX ADMIN — PREGABALIN 300 MG: 100 CAPSULE ORAL at 12:55

## 2022-03-03 RX ADMIN — OXYCODONE HYDROCHLORIDE 20 MG: 10 TABLET ORAL at 12:08

## 2022-03-03 RX ADMIN — NICOTINE POLACRILEX 8 MG: 4 GUM, CHEWING ORAL at 14:06

## 2022-03-03 RX ADMIN — PANTOPRAZOLE SODIUM 40 MG: 40 TABLET, DELAYED RELEASE ORAL at 08:14

## 2022-03-03 RX ADMIN — LEVOTHYROXINE SODIUM 75 MCG: 75 TABLET ORAL at 08:14

## 2022-03-03 RX ADMIN — NICOTINE POLACRILEX 8 MG: 4 GUM, CHEWING ORAL at 21:27

## 2022-03-03 RX ADMIN — AMLODIPINE BESYLATE 5 MG: 5 TABLET ORAL at 08:14

## 2022-03-03 RX ADMIN — LIOTHYRONINE SODIUM 25 MCG: 25 TABLET ORAL at 08:14

## 2022-03-03 RX ADMIN — NICOTINE POLACRILEX 8 MG: 4 GUM, CHEWING ORAL at 12:56

## 2022-03-03 RX ADMIN — ACAMPROSATE CALCIUM 666 MG: 333 TABLET, DELAYED RELEASE ORAL at 12:56

## 2022-03-03 RX ADMIN — QUETIAPINE 700 MG: 300 TABLET, FILM COATED ORAL at 22:12

## 2022-03-03 RX ADMIN — MULTIPLE VITAMINS W/ MINERALS TAB 1 TABLET: TAB at 08:13

## 2022-03-03 RX ADMIN — OXYCODONE HYDROCHLORIDE 20 MG: 10 TABLET ORAL at 16:01

## 2022-03-03 RX ADMIN — DOCUSATE SODIUM 100 MG: 100 CAPSULE, LIQUID FILLED ORAL at 08:13

## 2022-03-03 ASSESSMENT — ACTIVITIES OF DAILY LIVING (ADL)
DRESS: SCRUBS (BEHAVIORAL HEALTH)
LAUNDRY: WITH SUPERVISION
LAUNDRY: WITH SUPERVISION
HYGIENE/GROOMING: INDEPENDENT
ORAL_HYGIENE: INDEPENDENT
ORAL_HYGIENE: INDEPENDENT
HYGIENE/GROOMING: INDEPENDENT
DRESS: INDEPENDENT

## 2022-03-03 NOTE — PLAN OF CARE
Problem: Suicidal Behavior  Goal: Suicidal Behavior is Absent or Managed  Outcome: Ongoing, Progressing     Problem: Activity and Energy Impairment (Depressive Signs/Symptoms)  Goal: Optimized Energy Level (Depressive Signs/Symptoms)  Outcome: Ongoing, Progressing  Intervention: Optimize Energy Level  Recent Flowsheet Documentation  Taken 3/3/2022 1000 by Bernard Mancera, RN  Activity (Behavioral Health): up ad kathleen     Problem: Decreased Participation and Engagement (Depressive Signs/Symptoms)  Goal: Increased Participation and Engagement (Depressive Signs/Symptoms)  Outcome: Ongoing, Progressing     Problem: Mood Impairment (Depressive Signs/Symptoms)  Goal: Improved Mood Symptoms (Depressive Signs/Symptoms)  Outcome: Ongoing, Progressing   Goal Outcome Evaluation:    Plan of Care Reviewed With: patient     Patient was irritable at beginning of shift requested to not be disturbed after breakfast. Patient went to room and slept until ~11am. Affect is flat and blunted, did brighten somewhat this afternoon. Denies SI,SIB. Reports anxiety and depression ongoing. Minimally social with peers and staff. Medication compliant, right knee pain managed with scheduled medications. Nicorette gum given upon request. Did not attend groups. Good appetite ate 100% of meals.

## 2022-03-03 NOTE — PLAN OF CARE
"Assessment/Intervention/Current Symtoms and Care Coordination  -Team meeting  -Made additional referrals for Assisted Living (see below)  -Made referrals for group homes (see below)  -Placed call and left message for CADI Waiver [facility][phone] asking for callback to coordinate care     Discharge Plan or Goal  TBD pending placement-considerations include assisted living facility that can accommodate elderly waiver, single room and sober environment     Barriers to Discharge   Placement     Referral Status  ASSISTED LIVING Facility STATUS   [facility] at [facility]  Blank   [phone]/fax [phone]    3/2 referral made-declined due to age - likely needs group home   [facility]  Skilled nursing, transitional care + assisted living  [phone]/Fax [phone]    3/2 referral made  3/3 Declined due to age (needs to be a minimum of 55).   [place] [facility] -[phone] General/Admissions (Blank) [phone]  Fax [place] [phone]  Fax [facility] [phone] 3/2 referral made  3/3 Spoke with [place] she stated \"No appropriate bed.\" When asked for the reason to decline, she did not have the information.   Tyrell Terry-[phone]/Fax [phone] 3/2 referral made   [facility]  [phone]  Fax (SNF+TC) [phone]  Fax ([place]) [phone]    3/2 referral made  3/3 Called to follow up. Staff stated patient can go from TCU into assisted living even with his age. Left a message with TCU staff to return call. Please call to follow up.     [place][phone]  Jammie Odom [phone]/Fax [phone] 3/1 referral made; 3/2 [facility]   [facility]  [phone]/fax [phone] 3/2 referral made-Declined due to age - likely needs group home         GROUP HOME STATUS   Ylwtzsu-[phone]/fax [phone]  Cynthia  Email: [email or web address]  3/2 made referral   Dev Galindo " Coordinator:  Fabián Carbone  [phone]  Fax: [phone] 3/3 referral made (faxed to number given by ) and VM left with .   [facility]   [phone]/fax [phone]   Won (Director of Nursing)

## 2022-03-03 NOTE — PLAN OF CARE
Problem: Sleep Disturbance (Depressive Signs/Symptoms)  Goal: Improved Sleep (Depressive Signs/Symptoms)  Outcome: Ongoing, Progressing     Patient slept from the start to the end of the shift, a total of 7 hours.     Patient has an order for use of medical bed due to mobility limitations.     No behavioral concerns raised the whole shift. No complaints of knee and back  pain the whole night.

## 2022-03-04 PROCEDURE — 250N000013 HC RX MED GY IP 250 OP 250 PS 637: Performed by: PSYCHIATRY & NEUROLOGY

## 2022-03-04 PROCEDURE — 124N000003 HC R&B MH SENIOR/ADOLESCENT

## 2022-03-04 PROCEDURE — 250N000013 HC RX MED GY IP 250 OP 250 PS 637: Performed by: NURSE PRACTITIONER

## 2022-03-04 PROCEDURE — G0177 OPPS/PHP; TRAIN & EDUC SERV: HCPCS

## 2022-03-04 RX ORDER — OXYCODONE HYDROCHLORIDE 10 MG/1
20 TABLET ORAL DAILY
Status: DISCONTINUED | OUTPATIENT
Start: 2022-03-05 | End: 2022-03-07

## 2022-03-04 RX ADMIN — ACAMPROSATE CALCIUM 666 MG: 333 TABLET, DELAYED RELEASE ORAL at 08:27

## 2022-03-04 RX ADMIN — LEVOTHYROXINE SODIUM 75 MCG: 75 TABLET ORAL at 08:31

## 2022-03-04 RX ADMIN — NICOTINE POLACRILEX 8 MG: 4 GUM, CHEWING ORAL at 12:39

## 2022-03-04 RX ADMIN — NICOTINE POLACRILEX 8 MG: 4 GUM, CHEWING ORAL at 20:04

## 2022-03-04 RX ADMIN — TOPIRAMATE 100 MG: 100 TABLET, FILM COATED ORAL at 08:31

## 2022-03-04 RX ADMIN — PROPRANOLOL HYDROCHLORIDE 20 MG: 20 TABLET ORAL at 20:02

## 2022-03-04 RX ADMIN — DIPHENHYDRAMINE HYDROCHLORIDE 50 MG: 25 CAPSULE ORAL at 10:28

## 2022-03-04 RX ADMIN — NICOTINE POLACRILEX 8 MG: 4 GUM, CHEWING ORAL at 22:03

## 2022-03-04 RX ADMIN — NICOTINE POLACRILEX 8 MG: 4 GUM, CHEWING ORAL at 18:47

## 2022-03-04 RX ADMIN — NICOTINE POLACRILEX 8 MG: 4 GUM, CHEWING ORAL at 08:32

## 2022-03-04 RX ADMIN — PROPRANOLOL HYDROCHLORIDE 20 MG: 20 TABLET ORAL at 08:27

## 2022-03-04 RX ADMIN — TOPIRAMATE 100 MG: 100 TABLET, FILM COATED ORAL at 20:02

## 2022-03-04 RX ADMIN — DOCUSATE SODIUM 100 MG: 100 CAPSULE, LIQUID FILLED ORAL at 08:30

## 2022-03-04 RX ADMIN — FOLIC ACID 1 MG: 1 TABLET ORAL at 08:31

## 2022-03-04 RX ADMIN — ACETAMINOPHEN 975 MG: 325 TABLET, FILM COATED ORAL at 21:59

## 2022-03-04 RX ADMIN — QUETIAPINE 700 MG: 300 TABLET, FILM COATED ORAL at 21:59

## 2022-03-04 RX ADMIN — AMLODIPINE BESYLATE 5 MG: 5 TABLET ORAL at 08:31

## 2022-03-04 RX ADMIN — NAPROXEN 500 MG: 500 TABLET ORAL at 08:30

## 2022-03-04 RX ADMIN — OXYCODONE HYDROCHLORIDE 20 MG: 10 TABLET ORAL at 09:23

## 2022-03-04 RX ADMIN — CHOLECALCIFEROL TAB 25 MCG (1000 UNIT) 25 MCG: 25 TAB at 08:31

## 2022-03-04 RX ADMIN — PREGABALIN 300 MG: 100 CAPSULE ORAL at 14:40

## 2022-03-04 RX ADMIN — PREGABALIN 300 MG: 100 CAPSULE ORAL at 08:27

## 2022-03-04 RX ADMIN — NAPROXEN 500 MG: 500 TABLET ORAL at 18:47

## 2022-03-04 RX ADMIN — PREGABALIN 300 MG: 100 CAPSULE ORAL at 20:02

## 2022-03-04 RX ADMIN — DOCUSATE SODIUM 100 MG: 100 CAPSULE, LIQUID FILLED ORAL at 20:02

## 2022-03-04 RX ADMIN — NICOTINE POLACRILEX 8 MG: 4 GUM, CHEWING ORAL at 10:28

## 2022-03-04 RX ADMIN — NICOTINE POLACRILEX 8 MG: 4 GUM, CHEWING ORAL at 17:32

## 2022-03-04 RX ADMIN — PRAZOSIN HYDROCHLORIDE 2 MG: 2 CAPSULE ORAL at 21:59

## 2022-03-04 RX ADMIN — NICOTINE POLACRILEX 8 MG: 4 GUM, CHEWING ORAL at 14:40

## 2022-03-04 RX ADMIN — ACAMPROSATE CALCIUM 666 MG: 333 TABLET, DELAYED RELEASE ORAL at 14:40

## 2022-03-04 RX ADMIN — ACAMPROSATE CALCIUM 666 MG: 333 TABLET, DELAYED RELEASE ORAL at 20:02

## 2022-03-04 RX ADMIN — THIAMINE HCL TAB 100 MG 100 MG: 100 TAB at 08:31

## 2022-03-04 RX ADMIN — LIOTHYRONINE SODIUM 25 MCG: 25 TABLET ORAL at 08:27

## 2022-03-04 RX ADMIN — MULTIPLE VITAMINS W/ MINERALS TAB 1 TABLET: TAB at 08:31

## 2022-03-04 RX ADMIN — PANTOPRAZOLE SODIUM 40 MG: 40 TABLET, DELAYED RELEASE ORAL at 08:31

## 2022-03-04 ASSESSMENT — ACTIVITIES OF DAILY LIVING (ADL)
LAUNDRY: WITH SUPERVISION
ORAL_HYGIENE: INDEPENDENT
DRESS: SCRUBS (BEHAVIORAL HEALTH)
ORAL_HYGIENE: INDEPENDENT
HYGIENE/GROOMING: INDEPENDENT
DRESS: SCRUBS (BEHAVIORAL HEALTH)
HYGIENE/GROOMING: INDEPENDENT

## 2022-03-04 NOTE — PLAN OF CARE
"Goal Outcome Evaluation:  unchanged    Patient rates depression/anxiety as \"in the middle , anxiety is lower\".  Requesting oxycodone as daily only to be given in the morning.  Order received.  No further changes or concerns.                      "

## 2022-03-04 NOTE — PLAN OF CARE
Assessment/Intervention/Current Symtoms and Care Coordination  Pt is currently hospitalized due to chronic pain, depression, anxiety.  -Rounded with team in reviewing pt's care  -Reviewed pt's chart to get up to date with pt's care and progress  -Met with pt one on one and reviewed his status with respect to disposition and formulated a possible referral together. Referral information was sent.     Discharge Plan or Goal  TBD pending placement-considerations include assisted living facility that can accommodate elderly waiver, single room and sober environment     Barriers to Discharge   Pt still does not have a finalized placement location.   [place].

## 2022-03-04 NOTE — PROGRESS NOTES
Patient seen, chart reviewed, care discussed with staff.  Blood pressure 117/70, pulse 70, temperature 98  F (36.7  C), temperature source Temporal, resp. rate 18, weight 136.9 kg (301 lb 14.4 oz), SpO2 100 %.    By report, slept ok, isolative.  His mother's  was yesterday    General appearance: good  Alert.   Affect: fair  Mood: fair    Speech:  normal.   Eye contact:  good.    Psychomotor behavior: normal  Gait: steady with walker  Abnormal movements:  none  Delusions: none  Hallucinations:  none  Thoughts: logical  Associations: intact  Judgement: good  Insight: good  Cognitions: intact in conversation  Memory:  intact in conversation  Orientation: normal    Not suicidal.  He is escalating the opiate taper to be off of Oxycontin as soon as he can, and this causes some withdrawal as well as increased pain.  Imp:  Bipolar depressed,recurrent, recurrent, without psychosis (improved)  2.  TBI  3.  PTSD  4.  Opiate withdrawal and pain  And:   Patient Active Problem List   Diagnosis     Post concussive encephalopathy     H/O shoulder surgery     Alcoholism in remission (H)     Bilateral ACL tear     Chronic back pain     Social anxiety disorder     Alcohol withdrawal syndrome with complication, with unspecified complication (H)     Rib fracture     Alcohol withdrawal (H)     Alcohol dependence (H)     LFT elevation     Laceration of left hand without foreign body, initial encounter     Suicidal ideation     Intentional drug overdose, initial encounter (H)     Alcoholic intoxication with complication (H)     Severe bipolar I disorder, current or most recent episode depressed, with mixed features (H)     Medication overdose, intentional self-harm, subsequent encounter     Opioid dependence on agonist therapy (H)     2019 novel coronavirus disease (COVID-19)     Pneumonia due to COVID-19 virus     Bipolar disorder (H)     Plan: 1.  Reduce Oxycontin to 20mg once daily    Current Facility-Administered Medications    Medication     acamprosate (CAMPRAL) EC tablet 666 mg     acetaminophen (TYLENOL) tablet 975 mg     albuterol (PROVENTIL HFA/VENTOLIN HFA) inhaler     alum & mag hydroxide-simethicone (MAALOX) suspension 30 mL     amLODIPine (NORVASC) tablet 5 mg     cloNIDine (CATAPRES) tablet 0.1 mg     diphenhydrAMINE (BENADRYL) capsule 50 mg     docusate sodium (COLACE) capsule 100 mg     folic acid (FOLVITE) tablet 1 mg     hydrocortisone (CORTAID) 1 % cream     ketamine 5%-gabapentin 8% in PLO cream 0.25 g     levothyroxine (SYNTHROID/LEVOTHROID) tablet 75 mcg     liothyronine (CYTOMEL) tablet 25 mcg     loperamide (IMODIUM) capsule 2 mg     melatonin tablet 3 mg     multivitamin w/minerals (THERA-VIT-M) tablet 1 tablet     naloxone (NARCAN) injection 0.2 mg    Or     naloxone (NARCAN) injection 0.4 mg    Or     naloxone (NARCAN) injection 0.2 mg    Or     naloxone (NARCAN) injection 0.4 mg     naproxen (NAPROSYN) tablet 500 mg     nicotine polacrilex (NICORETTE) gum 4-8 mg     OLANZapine (zyPREXA) tablet 10 mg    Or     OLANZapine (zyPREXA) injection 10 mg     [START ON 3/5/2022] oxyCODONE IR (ROXICODONE) tablet 20 mg     pantoprazole (PROTONIX) EC tablet 40 mg     prazosin (MINIPRESS) capsule 2 mg     pregabalin (LYRICA) capsule 300 mg     propranolol (INDERAL) tablet 20 mg     QUEtiapine (SEROquel) tablet 700 mg     sennosides (SENOKOT) tablet 1-2 tablet     thiamine (B-1) tablet 100 mg     tiZANidine (ZANAFLEX) tablet 4 mg     topiramate (TOPAMAX) tablet 100 mg     Vitamin D3 (CHOLECALCIFEROL) tablet 25 mcg     No results found for this or any previous visit (from the past 168 hour(s)).

## 2022-03-04 NOTE — PLAN OF CARE
"Had a one-on one session with the patient today to better understand the status of his disposition. Pt reported he is interested in assisted living facilities, not group homes. Writer obtained release of information from the patient and referred him to [facility]. Writer spoke with staff at [facility] in [place]. She reported the facility will be opening in about 2 weeks. Writer provided pt's age and descriptive health information to avoid pt being denied later on due to age not being specified. [facility] staff reported that writer could go ahead and fax pt's information, after staff had spoken to their marketing direction. Writer followed through - faxed the requested information. Writer met with pt and updated him of the current referral. Pt reported it makes sense to send the referral since [place], which is closer to the supposed two weeks. Writer printed the list of [facility] facilities and handed them to the patient. Informed the patient the facility has several locations, so, one referral might suffice for all.   \"Assisted Living Referrals:  I made one referral from the following list. This is a good agency, from my experience. I spoke with staff at the [place] location. She said they will be opening in about 2 weeks. Since [place] 10th of [place], the timing could be promising. The name of this agency is [facility]. They have several locations listed below.       [facility]: [place]   [address]  [place]   PHONE: [phone]   FAX: [phone] [address] [place] [address]   PHONE: [phone]    FAX: [phone] [address] [place] [address]   PHONE: [phone]    FAX: [phone] [address] [place] [address]   PHONE: [phone] FAX: [phone] [address] [place] [address]   PHONE: " [phone]   FAX: [phone]      [address] [place] [address] [address], [place] [address]   PHONE: [phone]   FAX: [phone]      [address], [place] [address]   PHONE: [phone]   FAX: [phone]      [address] [facility] [address]   PHONE: [phone]   FAX: [phone] (Left a message without disclosing pt. s identity, and was told they may have an opening in about 2 weeks  (from speaking with the staff, this seems promising).  \"  "

## 2022-03-04 NOTE — PLAN OF CARE
03/04/22 1459   Group Therapy Session   Group Attendance attended group session   Total Time patient participated (minutes) 60   Total # Attendees 5   Group Type other (see comments)  (Occupational Therapy)   Group Topic Covered cognitive activities;cognitive therapy techniques;problem-solving;leisure exploration/use of leisure time;relapse prevention     Attended 1 of 3 OT groups for 60 minutes with 5 pts.  He actively participated in Occupational Therapy clinic to facilitate coping skill exploration, creative expression within personally meaningful activities, and clinical observation of social, cognitive, and kinesthetic performance skills. Pt response:  He participated in a familiar activity, took time to plan and organize his work, was independent with gathering supplies and was social on approach. He stated the splint brace he wears on his knee to be helpful while waiting for his knee surgery. Affect appeared serious. He was pleasant with others and stated looking forward to the next plan of tx.

## 2022-03-04 NOTE — PLAN OF CARE
Problem: Sleep Disturbance (Depressive Signs/Symptoms)  Goal: Improved Sleep (Depressive Signs/Symptoms)  Outcome: Ongoing, Progressing    Patient has an order for use of medical bed due to mobility issues.     He slept the whole night for a total of 7  hours. No complaints of pain the whole shift.

## 2022-03-04 NOTE — PLAN OF CARE
Problem: Mood Impairment (Depressive Signs/Symptoms)  Goal: Improved Mood Symptoms (Depressive Signs/Symptoms)  Outcome: Ongoing, Progressing  Flowsheets (Taken 3/3/2022 1913)  Mutually Determined Action Steps (Improved Mood Symptoms):    verbalizes increased insight    acknowledges progress  Intervention: Promote Mood Improvement  Recent Flowsheet Documentation  Taken 3/3/2022 1743 by Jammie Vail RN  Diversional Activity: television     Pt presents with blunted, flat affect and calm mood. Denies SI/SIB and hallucinations. Reports ongoing depression and anxiety, his mothers  was today so it has been more difficult for him today. Pt was present in the lounge, social with select peers, did not attend groups. Continues to have chronic R knee pain 8/10, controlled with scheduled and PRN pain medications. Pt did request to have his oxycodone tapered down to once daily, at 0900 - phoned MD Perez regarding this, MD advised he would make this change tomorrow morning. VSS. Medication compliant, PRN nicotine gum given as requested. Appetite and fluid intake adequate. No other concerns or complaints noted.

## 2022-03-05 LAB — SARS-COV-2 RNA RESP QL NAA+PROBE: NEGATIVE

## 2022-03-05 PROCEDURE — 250N000013 HC RX MED GY IP 250 OP 250 PS 637: Performed by: PSYCHIATRY & NEUROLOGY

## 2022-03-05 PROCEDURE — U0005 INFEC AGEN DETEC AMPLI PROBE: HCPCS | Performed by: PSYCHIATRY & NEUROLOGY

## 2022-03-05 PROCEDURE — 250N000013 HC RX MED GY IP 250 OP 250 PS 637: Performed by: NURSE PRACTITIONER

## 2022-03-05 PROCEDURE — 124N000003 HC R&B MH SENIOR/ADOLESCENT

## 2022-03-05 RX ADMIN — PRAZOSIN HYDROCHLORIDE 2 MG: 2 CAPSULE ORAL at 21:55

## 2022-03-05 RX ADMIN — THIAMINE HCL TAB 100 MG 100 MG: 100 TAB at 08:48

## 2022-03-05 RX ADMIN — QUETIAPINE 700 MG: 300 TABLET, FILM COATED ORAL at 21:54

## 2022-03-05 RX ADMIN — PREGABALIN 300 MG: 100 CAPSULE ORAL at 14:02

## 2022-03-05 RX ADMIN — NICOTINE POLACRILEX 8 MG: 4 GUM, CHEWING ORAL at 12:22

## 2022-03-05 RX ADMIN — NAPROXEN 500 MG: 500 TABLET ORAL at 08:48

## 2022-03-05 RX ADMIN — ACAMPROSATE CALCIUM 666 MG: 333 TABLET, DELAYED RELEASE ORAL at 20:00

## 2022-03-05 RX ADMIN — OXYCODONE HYDROCHLORIDE 20 MG: 10 TABLET ORAL at 09:32

## 2022-03-05 RX ADMIN — PREGABALIN 300 MG: 100 CAPSULE ORAL at 08:48

## 2022-03-05 RX ADMIN — PROPRANOLOL HYDROCHLORIDE 20 MG: 20 TABLET ORAL at 20:00

## 2022-03-05 RX ADMIN — TOPIRAMATE 100 MG: 100 TABLET, FILM COATED ORAL at 20:00

## 2022-03-05 RX ADMIN — TOPIRAMATE 100 MG: 100 TABLET, FILM COATED ORAL at 08:48

## 2022-03-05 RX ADMIN — NICOTINE POLACRILEX 8 MG: 4 GUM, CHEWING ORAL at 09:34

## 2022-03-05 RX ADMIN — NICOTINE POLACRILEX 8 MG: 4 GUM, CHEWING ORAL at 14:02

## 2022-03-05 RX ADMIN — NICOTINE POLACRILEX 8 MG: 4 GUM, CHEWING ORAL at 20:00

## 2022-03-05 RX ADMIN — PANTOPRAZOLE SODIUM 40 MG: 40 TABLET, DELAYED RELEASE ORAL at 08:48

## 2022-03-05 RX ADMIN — NICOTINE POLACRILEX 8 MG: 4 GUM, CHEWING ORAL at 08:48

## 2022-03-05 RX ADMIN — MULTIPLE VITAMINS W/ MINERALS TAB 1 TABLET: TAB at 08:48

## 2022-03-05 RX ADMIN — ACAMPROSATE CALCIUM 666 MG: 333 TABLET, DELAYED RELEASE ORAL at 14:02

## 2022-03-05 RX ADMIN — CHOLECALCIFEROL TAB 25 MCG (1000 UNIT) 25 MCG: 25 TAB at 08:48

## 2022-03-05 RX ADMIN — NICOTINE POLACRILEX 8 MG: 4 GUM, CHEWING ORAL at 10:49

## 2022-03-05 RX ADMIN — NICOTINE POLACRILEX 8 MG: 4 GUM, CHEWING ORAL at 21:54

## 2022-03-05 RX ADMIN — NAPROXEN 500 MG: 500 TABLET ORAL at 18:53

## 2022-03-05 RX ADMIN — LEVOTHYROXINE SODIUM 75 MCG: 75 TABLET ORAL at 08:48

## 2022-03-05 RX ADMIN — NICOTINE POLACRILEX 8 MG: 4 GUM, CHEWING ORAL at 17:27

## 2022-03-05 RX ADMIN — DOCUSATE SODIUM 100 MG: 100 CAPSULE, LIQUID FILLED ORAL at 20:00

## 2022-03-05 RX ADMIN — DOCUSATE SODIUM 100 MG: 100 CAPSULE, LIQUID FILLED ORAL at 08:48

## 2022-03-05 RX ADMIN — PREGABALIN 300 MG: 100 CAPSULE ORAL at 20:08

## 2022-03-05 RX ADMIN — NICOTINE POLACRILEX 8 MG: 4 GUM, CHEWING ORAL at 18:53

## 2022-03-05 RX ADMIN — FOLIC ACID 1 MG: 1 TABLET ORAL at 08:48

## 2022-03-05 RX ADMIN — LIOTHYRONINE SODIUM 25 MCG: 25 TABLET ORAL at 08:48

## 2022-03-05 RX ADMIN — ACAMPROSATE CALCIUM 666 MG: 333 TABLET, DELAYED RELEASE ORAL at 08:48

## 2022-03-05 ASSESSMENT — ACTIVITIES OF DAILY LIVING (ADL)
DRESS: INDEPENDENT
HYGIENE/GROOMING: INDEPENDENT
DRESS: SCRUBS (BEHAVIORAL HEALTH)
LAUNDRY: WITH SUPERVISION
HYGIENE/GROOMING: INDEPENDENT
ORAL_HYGIENE: INDEPENDENT
ORAL_HYGIENE: INDEPENDENT

## 2022-03-05 NOTE — PLAN OF CARE
"Problem: Suicidal Behavior  Goal: Suicidal Behavior is Absent or Managed  Outcome: Ongoing, Progressing  Flowsheets (Taken 3/5/2022 1001)  Mutually Determined Action Steps (Suicidal Behavior Absent/Managed):    identifies protective factors    sets future-oriented goal    Problem: Activity and Energy Impairment (Depressive Signs/Symptoms)  Goal: Optimized Energy Level (Depressive Signs/Symptoms)  Outcome: Ongoing, Progressing  Flowsheets (Taken 3/5/2022 1001)  Mutually Determined Action Steps (Optimized Energy Level):    grooms self without prompting    dresses/ready for morning activity  Intervention: Optimize Energy Level  Recent Flowsheet Documentation  Taken 3/5/2022 0855 by Jammie Vail RN  Diversional Activity: television  Activity (Behavioral Health): up ad kathleen    Pt presents with blunted, flat affect and calm mood. Denies SI/SIB and hallucinations. Pt continues to report depression and anxiety - a little worse today as he is tapering off his oxycodone and he is struggling today with the pain. Pt states, \"I am feeling every ache and pain now - I was not feeling them before.\" Describes having somewhat of a tough time with it, but he is hopeful and believes that being off the oxycodone is the best thing for him. He was somewhat present in the lounge, social with select peers. Pt BP medications held due to orthostatic drop - pt denies dizziness this morning. All other VS WNL. Rechecked later and all VS WNL - no significant orthostatic drop noted. Medication compliant. Continues to rate his chronic right knee pain 8/10 - received his oxycodone this morning, it is effective, has other scheduled and PRN medications which help some - pt denies any additional non-pharmacological interventions from writer. Offered PRN Tylenol with 1400 medications but pt declined. Appetite and fluid intake adequate. Pt is excited about a visitor this afternoon. No other concerns or complaints noted.   "

## 2022-03-05 NOTE — PLAN OF CARE
Patient in medical bed due to medical issues.  Patient has remained in room sleeping for the entire shift.  No complaints of pain reported for the entire shift.  7 hrs of sleep. Continue with careplan.

## 2022-03-05 NOTE — PLAN OF CARE
Goal Outcome Evaluation:    Plan of Care Reviewed With: patient     Tolerating the decrease in oxycodone.  Quiet, withdrawn, isolative.  No new concerns.

## 2022-03-06 PROCEDURE — 250N000013 HC RX MED GY IP 250 OP 250 PS 637: Performed by: PSYCHIATRY & NEUROLOGY

## 2022-03-06 PROCEDURE — 250N000013 HC RX MED GY IP 250 OP 250 PS 637: Performed by: NURSE PRACTITIONER

## 2022-03-06 PROCEDURE — 124N000003 HC R&B MH SENIOR/ADOLESCENT

## 2022-03-06 RX ADMIN — DIPHENHYDRAMINE HYDROCHLORIDE 50 MG: 25 CAPSULE ORAL at 21:05

## 2022-03-06 RX ADMIN — NICOTINE POLACRILEX 8 MG: 4 GUM, CHEWING ORAL at 08:45

## 2022-03-06 RX ADMIN — PANTOPRAZOLE SODIUM 40 MG: 40 TABLET, DELAYED RELEASE ORAL at 08:44

## 2022-03-06 RX ADMIN — ACAMPROSATE CALCIUM 666 MG: 333 TABLET, DELAYED RELEASE ORAL at 19:11

## 2022-03-06 RX ADMIN — LIOTHYRONINE SODIUM 25 MCG: 25 TABLET ORAL at 08:44

## 2022-03-06 RX ADMIN — DOCUSATE SODIUM 100 MG: 100 CAPSULE, LIQUID FILLED ORAL at 19:11

## 2022-03-06 RX ADMIN — PREGABALIN 300 MG: 100 CAPSULE ORAL at 19:11

## 2022-03-06 RX ADMIN — NICOTINE POLACRILEX 8 MG: 4 GUM, CHEWING ORAL at 11:40

## 2022-03-06 RX ADMIN — MULTIPLE VITAMINS W/ MINERALS TAB 1 TABLET: TAB at 08:44

## 2022-03-06 RX ADMIN — ACAMPROSATE CALCIUM 666 MG: 333 TABLET, DELAYED RELEASE ORAL at 08:41

## 2022-03-06 RX ADMIN — ACAMPROSATE CALCIUM 666 MG: 333 TABLET, DELAYED RELEASE ORAL at 14:00

## 2022-03-06 RX ADMIN — CHOLECALCIFEROL TAB 25 MCG (1000 UNIT) 25 MCG: 25 TAB at 08:44

## 2022-03-06 RX ADMIN — THIAMINE HCL TAB 100 MG 100 MG: 100 TAB at 08:44

## 2022-03-06 RX ADMIN — LEVOTHYROXINE SODIUM 75 MCG: 75 TABLET ORAL at 08:44

## 2022-03-06 RX ADMIN — PREGABALIN 300 MG: 100 CAPSULE ORAL at 14:00

## 2022-03-06 RX ADMIN — FOLIC ACID 1 MG: 1 TABLET ORAL at 08:44

## 2022-03-06 RX ADMIN — NICOTINE POLACRILEX 8 MG: 4 GUM, CHEWING ORAL at 19:12

## 2022-03-06 RX ADMIN — NAPROXEN 500 MG: 500 TABLET ORAL at 19:11

## 2022-03-06 RX ADMIN — PRAZOSIN HYDROCHLORIDE 2 MG: 2 CAPSULE ORAL at 21:04

## 2022-03-06 RX ADMIN — QUETIAPINE 700 MG: 300 TABLET, FILM COATED ORAL at 21:03

## 2022-03-06 RX ADMIN — PROPRANOLOL HYDROCHLORIDE 20 MG: 20 TABLET ORAL at 19:11

## 2022-03-06 RX ADMIN — NAPROXEN 500 MG: 500 TABLET ORAL at 08:45

## 2022-03-06 RX ADMIN — TOPIRAMATE 100 MG: 100 TABLET, FILM COATED ORAL at 19:11

## 2022-03-06 RX ADMIN — TOPIRAMATE 100 MG: 100 TABLET, FILM COATED ORAL at 08:41

## 2022-03-06 RX ADMIN — NICOTINE POLACRILEX 8 MG: 4 GUM, CHEWING ORAL at 14:00

## 2022-03-06 RX ADMIN — DOCUSATE SODIUM 100 MG: 100 CAPSULE, LIQUID FILLED ORAL at 08:44

## 2022-03-06 RX ADMIN — NICOTINE POLACRILEX 8 MG: 4 GUM, CHEWING ORAL at 21:03

## 2022-03-06 RX ADMIN — PREGABALIN 300 MG: 100 CAPSULE ORAL at 08:44

## 2022-03-06 RX ADMIN — OXYCODONE HYDROCHLORIDE 20 MG: 10 TABLET ORAL at 07:53

## 2022-03-06 ASSESSMENT — ACTIVITIES OF DAILY LIVING (ADL)
DRESS: INDEPENDENT
ORAL_HYGIENE: INDEPENDENT
DRESS: INDEPENDENT
HYGIENE/GROOMING: INDEPENDENT
HYGIENE/GROOMING: INDEPENDENT
ORAL_HYGIENE: INDEPENDENT

## 2022-03-06 NOTE — PLAN OF CARE
Patient continues to sleep in a medical bed due to mobility issues.  Patient has remained in his  room sleeping for the entire shift.  No complaints of pain reported overnight. Patient slept for 7 hrs.

## 2022-03-06 NOTE — PLAN OF CARE
Goal Outcome Evaluation:    Plan of Care Reviewed With: patient     Patient states he has nothing new to report today.  Overall affect seems a bit brighter than yesterday.  P:  continue same plan of care.

## 2022-03-06 NOTE — PLAN OF CARE
Goal Outcome Evaluation:    Plan of Care Reviewed With: patient     Norvasc and propanolol held due to low bp when standing. Encouraged to increase fluid intake.  Will recheck and reassess later.  BP remains on the soft side.  Will continue to watch closely.

## 2022-03-07 PROCEDURE — 124N000003 HC R&B MH SENIOR/ADOLESCENT

## 2022-03-07 PROCEDURE — 250N000013 HC RX MED GY IP 250 OP 250 PS 637: Performed by: PSYCHIATRY & NEUROLOGY

## 2022-03-07 PROCEDURE — 250N000013 HC RX MED GY IP 250 OP 250 PS 637: Performed by: NURSE PRACTITIONER

## 2022-03-07 PROCEDURE — G0177 OPPS/PHP; TRAIN & EDUC SERV: HCPCS

## 2022-03-07 RX ADMIN — PREGABALIN 300 MG: 100 CAPSULE ORAL at 08:23

## 2022-03-07 RX ADMIN — QUETIAPINE 700 MG: 300 TABLET, FILM COATED ORAL at 21:08

## 2022-03-07 RX ADMIN — PRAZOSIN HYDROCHLORIDE 2 MG: 2 CAPSULE ORAL at 21:09

## 2022-03-07 RX ADMIN — NICOTINE POLACRILEX 8 MG: 4 GUM, CHEWING ORAL at 12:43

## 2022-03-07 RX ADMIN — TOPIRAMATE 100 MG: 100 TABLET, FILM COATED ORAL at 08:23

## 2022-03-07 RX ADMIN — NICOTINE POLACRILEX 8 MG: 4 GUM, CHEWING ORAL at 07:06

## 2022-03-07 RX ADMIN — DOCUSATE SODIUM 100 MG: 100 CAPSULE, LIQUID FILLED ORAL at 19:45

## 2022-03-07 RX ADMIN — ACAMPROSATE CALCIUM 666 MG: 333 TABLET, DELAYED RELEASE ORAL at 12:43

## 2022-03-07 RX ADMIN — CHOLECALCIFEROL TAB 25 MCG (1000 UNIT) 25 MCG: 25 TAB at 08:23

## 2022-03-07 RX ADMIN — PREGABALIN 300 MG: 100 CAPSULE ORAL at 12:43

## 2022-03-07 RX ADMIN — OXYCODONE HYDROCHLORIDE 20 MG: 10 TABLET ORAL at 06:53

## 2022-03-07 RX ADMIN — NICOTINE POLACRILEX 8 MG: 4 GUM, CHEWING ORAL at 11:17

## 2022-03-07 RX ADMIN — LEVOTHYROXINE SODIUM 75 MCG: 75 TABLET ORAL at 08:22

## 2022-03-07 RX ADMIN — PROPRANOLOL HYDROCHLORIDE 20 MG: 20 TABLET ORAL at 19:45

## 2022-03-07 RX ADMIN — NICOTINE POLACRILEX 8 MG: 4 GUM, CHEWING ORAL at 08:24

## 2022-03-07 RX ADMIN — FOLIC ACID 1 MG: 1 TABLET ORAL at 08:22

## 2022-03-07 RX ADMIN — PREGABALIN 300 MG: 100 CAPSULE ORAL at 19:45

## 2022-03-07 RX ADMIN — CLONIDINE HYDROCHLORIDE 0.1 MG: 0.1 TABLET ORAL at 19:57

## 2022-03-07 RX ADMIN — LIOTHYRONINE SODIUM 25 MCG: 25 TABLET ORAL at 08:23

## 2022-03-07 RX ADMIN — ACAMPROSATE CALCIUM 666 MG: 333 TABLET, DELAYED RELEASE ORAL at 08:22

## 2022-03-07 RX ADMIN — NAPROXEN 500 MG: 500 TABLET ORAL at 17:36

## 2022-03-07 RX ADMIN — TOPIRAMATE 100 MG: 100 TABLET, FILM COATED ORAL at 19:45

## 2022-03-07 RX ADMIN — DIPHENHYDRAMINE HYDROCHLORIDE 50 MG: 25 CAPSULE ORAL at 19:45

## 2022-03-07 RX ADMIN — ACETAMINOPHEN 975 MG: 325 TABLET, FILM COATED ORAL at 21:09

## 2022-03-07 RX ADMIN — THIAMINE HCL TAB 100 MG 100 MG: 100 TAB at 08:23

## 2022-03-07 RX ADMIN — ACAMPROSATE CALCIUM 666 MG: 333 TABLET, DELAYED RELEASE ORAL at 19:45

## 2022-03-07 RX ADMIN — DOCUSATE SODIUM 100 MG: 100 CAPSULE, LIQUID FILLED ORAL at 08:22

## 2022-03-07 RX ADMIN — PANTOPRAZOLE SODIUM 40 MG: 40 TABLET, DELAYED RELEASE ORAL at 08:22

## 2022-03-07 RX ADMIN — NAPROXEN 500 MG: 500 TABLET ORAL at 08:23

## 2022-03-07 RX ADMIN — MULTIPLE VITAMINS W/ MINERALS TAB 1 TABLET: TAB at 08:23

## 2022-03-07 ASSESSMENT — ACTIVITIES OF DAILY LIVING (ADL)
ORAL_HYGIENE: INDEPENDENT
DRESS: INDEPENDENT
LAUNDRY: WITH SUPERVISION
HYGIENE/GROOMING: INDEPENDENT

## 2022-03-07 NOTE — PROGRESS NOTES
"SPIRITUAL HEALTH SERVICES Progress Note  [facility] ([facility] 3BW    Saw pt Hollis Barclay per follow up. Provided emotional support while playing game of cribbage; engaged in reflective conversation around coping and how to spend time on the unit.    Illness Narrative - Ray checked in as \"Andrew\" and spoke of boredom, having attended all the groups stating \"they are all repeats now.\"  Ray asked to search for "[employer] library books, gave suggestions for writer to search for new books.    Distress - noticing physical knee pain that is now more noticeable without opioid pain killers.  Long stay on the unit    Coping - reading 8-10 hours per day, cribbage, watching TV.     Meaning-Making -     Plan - I will follow up 1-2x weekly.    Liss Kerns MDiv  Associate   Pager [phone]  Office [phone]  [facility] remains available 24/7 for emergent requests/referrals, either by having the switchboard page the on-call  or by entering an ASAP/STAT consult in Epic (this will also page the on-call ). Routine Epic consults receive an initial response within 24 hours.    "

## 2022-03-07 NOTE — PROGRESS NOTES
Patient seen, chart reviewed, care discussed with staff.  Blood pressure 103/65, pulse 90, temperature 98.4  F (36.9  C), temperature source Oral, resp. rate 16, weight 136.9 kg (301 lb 14.4 oz), SpO2 97 %.    He notes increased irritability as pain increases with less opiate.  He also notes feelings are less blunted.    Slept well    General appearance: good  Alert.   Affect: fair  Mood: fair    Speech:  normal.   Eye contact:  good.    Psychomotor behavior: normal  Gait: steady with walker  Abnormal movements:  none  Delusions: none  Hallucinations:   none  Thoughts: logical  Associations: intact  Judgement: good  Insight: good  Cognitions: intact in conversation  Memory:  intact in conversation  Orientation: normal    Not suicidal.    Imp:  Bipolar depressed  2.  PTSD  3.  Chronic pain and acute knee pain  4.  S/p TBI    Plan: stop remaining oxycontin    Current Facility-Administered Medications   Medication     acamprosate (CAMPRAL) EC tablet 666 mg     acetaminophen (TYLENOL) tablet 975 mg     albuterol (PROVENTIL HFA/VENTOLIN HFA) inhaler     alum & mag hydroxide-simethicone (MAALOX) suspension 30 mL     amLODIPine (NORVASC) tablet 5 mg     cloNIDine (CATAPRES) tablet 0.1 mg     diphenhydrAMINE (BENADRYL) capsule 50 mg     docusate sodium (COLACE) capsule 100 mg     folic acid (FOLVITE) tablet 1 mg     hydrocortisone (CORTAID) 1 % cream     ketamine 5%-gabapentin 8% in PLO cream 0.25 g     levothyroxine (SYNTHROID/LEVOTHROID) tablet 75 mcg     liothyronine (CYTOMEL) tablet 25 mcg     loperamide (IMODIUM) capsule 2 mg     melatonin tablet 3 mg     multivitamin w/minerals (THERA-VIT-M) tablet 1 tablet     naloxone (NARCAN) injection 0.2 mg    Or     naloxone (NARCAN) injection 0.4 mg    Or     naloxone (NARCAN) injection 0.2 mg    Or     naloxone (NARCAN) injection 0.4 mg     naproxen (NAPROSYN) tablet 500 mg     nicotine polacrilex (NICORETTE) gum 4-8 mg     OLANZapine (zyPREXA) tablet 10 mg    Or      OLANZapine (zyPREXA) injection 10 mg     oxyCODONE IR (ROXICODONE) tablet 20 mg     pantoprazole (PROTONIX) EC tablet 40 mg     prazosin (MINIPRESS) capsule 2 mg     pregabalin (LYRICA) capsule 300 mg     propranolol (INDERAL) tablet 20 mg     QUEtiapine (SEROquel) tablet 700 mg     sennosides (SENOKOT) tablet 1-2 tablet     thiamine (B-1) tablet 100 mg     tiZANidine (ZANAFLEX) tablet 4 mg     topiramate (TOPAMAX) tablet 100 mg     Vitamin D3 (CHOLECALCIFEROL) tablet 25 mcg     Recent Results (from the past 168 hour(s))   Asymptomatic COVID-19 Virus (Coronavirus) by PCR Nose    Collection Time: 03/05/22  1:41 PM    Specimen: Nose; Swab   Result Value Ref Range    SARS CoV2 PCR Negative Negative

## 2022-03-07 NOTE — PLAN OF CARE
"  Problem: Suicidal Behavior  Goal: Suicidal Behavior is Absent or Managed  Outcome: Ongoing, Progressing     Problem: Activity and Energy Impairment (Depressive Signs/Symptoms)  Goal: Optimized Energy Level (Depressive Signs/Symptoms)  Outcome: Ongoing, Progressing  Intervention: Optimize Energy Level  Recent Flowsheet Documentation  Taken 3/7/2022 1100 by Bernard Mancera RN  Activity (Behavioral Health): up ad kathleen     Problem: Decreased Participation and Engagement (Depressive Signs/Symptoms)  Goal: Increased Participation and Engagement (Depressive Signs/Symptoms)  Outcome: Ongoing, Progressing   Goal Outcome Evaluation:    Plan of Care Reviewed With: patient     Patient is calm and cooperative, affect flat and blunted. Attended some groups and came out for meals but remained relatively withdrawn and isolative. Denies SI, SIB, reports anxiety and depression ongoing. Oxycodone taper ended today. BP little soft 103/65, held propranolol and amlodipine this shift. Prn nicorette gum given upon request    PM: Patient isolative and withdrawn reports withdrawal symptoms and repetitive daily routine makes him want to isolative even more. Prn clonidine given d/t complaints of cold sweats, restlessness and just feeling \"crappy\", patient reports minimal efficacy when assessed by writer. Prn benadryl given (running nose). Patient was social with select peers and attended group. Prn tylenol given d/t right knee pain. Good appetite, ate 100% of meal. Denies SI, SIB  "

## 2022-03-07 NOTE — PLAN OF CARE
Goal Outcome Evaluation:    Plan of Care Reviewed With: patient     Calm, quiet, pleasant and cooperative.  Appears sad.  Attends some groups.  Does not c/o pain and states he may not take his daily dose of oxycodone tomorrow and just be done with it.no other concerns or complaints.

## 2022-03-07 NOTE — PLAN OF CARE
"Assessment/Intervention/Current Symtoms and Care Coordination  Pt is currently hospitalized due to chronic pain, depression, anxiety.  -Rounded with team in reviewing pt's care  -Reviewed pt's chart to get up to date with pt's care and progress  -Met with pt one on one and reviewed his status with respect to disposition and formulated a possible additional referral together. Obtained [facility] for [facility]. Referral information was sent to [facility]. Writer spoke with [facility] intake person - Cynthia Ware who provided a fax number to which writer will fax pt's clinical information for their review.     Discharge Plan or Goal  TBD pending placement-considerations include assisted living facility that can accommodate elderly waiver, single room and sober environment.  Ongoing evaluation at [facility]  New referral to [facility] - [phone] (Fax) - [phone]    UPDATE: Writer spoke with Jessica SAL, Pt's CADI  who said she is no longer on this pt's case. She provided a new referral source:  \"[facility]  1) [address] [address] (2) [address] [address]  We provide in Assisting Living Faciity basic & intensive services with Nurse on site and also provide In Home Services for clients staying in their homes.\"       Kenneth Cooper MBA/HEALTHCARE ADMIN.[facility]   Cell. [phone]     Barriers to Discharge   Pt still does not have a finalized placement location.   [place] on Monday at 10:00am.    "

## 2022-03-07 NOTE — PLAN OF CARE
Patient has remained in his room sleeping for the entire shift.  No c/o of pain. Remains in medical bed due to mobility issues.  No PRNs administered.  8.5 hrs of sleep.  Visible in the lounge at 0650, requested scheduled Oxycodone early. Rated pain at 8/10.  Also requested and received PRN  Nicorette gum.  Patient appeared calm.  Flat blunted affected.

## 2022-03-07 NOTE — PLAN OF CARE
BEHAVIORAL TEAM DISCUSSION     Participants: Ulises Perez MD; Liss Kerns; Bernard Mancera, CHAPARRO; Annabella Palacio RN; Kathie MOYER OT; SCOT Leal, SCOT Medina; Jessica MAYFIELD.       Progress:  Pt was admitted due to depression with suicidal ideation. He notes increased irritability as pain increases with less opiate.  He also notes feelings are less blunted.  Continued Stay Criteria/Rationale: Stabilization is ongoing as placement is also being pursued. Pt has been denied by multiple placements.  Medical/Physical: See medical  Precautions:              Behavioral Orders   Procedures     Code 1 - Restrict to Unit             Order Specific Question:   I have performed an in person assessment of the patient       Answer:   Based on this assessment the patient does not require a one on one attendant at this point in time.       Order Specific Question:   Rationale       Answer:   Patient States able to remain safe in hospital     Status 15       Every 15 minutes.     Suicide precautions       Patients on Suicide Precautions should have a Combination Diet ordered that includes a Diet selection(s) AND a Behavioral Tray selection for Safe Tray - with utensils, or Safe Tray - NO utensils        Plan:  Imp:  Bipolar depressed  2.  PTSD  3.  Chronic pain and acute knee pain  4.  S/p TBI     Plan: stop remaining oxycontin    Pt will continue to be engaged in the therapeutic milieu. Will continue to be encouraged to participate in groups. Will continue to receive medication/medical/psychiatric interventions  Care will continue to be coordinated for after care purposes. Currently he has pending referrals. [facility] is reviewing his paperwork. They have indicated this patient's age as not being a problem. Pt also has a pending review with [facility].

## 2022-03-07 NOTE — PLAN OF CARE
03/07/22 1237   Group Therapy Session   Group Attendance attended group session   Total Time patient participated (minutes)   (50, 45)   Total # Attendees 6, 4   Group Type Occupational Therapy   Group Topic Covered balanced lifestyle;cognitive activities;cognitive therapy techniques;coping skills/lifestyle management;leisure exploration/use of leisure time;problem-solving;structured socialization        Pt attended 2 out of 3 OT groups offered. Pt actively participated in Occupational Therapy clinic for 50 minutes with 6 pts to facilitate coping skill exploration, creative expression within personally meaningful activities, and clinical observation of social, cognitive, and kinesthetic performance skills. Pt response:  He was pleasant on approach, working quietly and independently on a familiar step task. He planned, took time for decisions and followed through on ideas he designed.   He attended the 2nd group for 45 minutes with 4 pts in an activity focused on Stress management, identifying areas on one's life that are balanced and areas chosen to focus on by setting helpful goals. Pt Response: He participated quietly, offering answers in context on approach. Affect appeared serious though appeared attentive and quick to respond. He stated his limitations with his knee have caused such a lengthy delay of moving on with normal activities.

## 2022-03-08 PROCEDURE — 124N000003 HC R&B MH SENIOR/ADOLESCENT

## 2022-03-08 PROCEDURE — 250N000013 HC RX MED GY IP 250 OP 250 PS 637: Performed by: PSYCHIATRY & NEUROLOGY

## 2022-03-08 PROCEDURE — 250N000013 HC RX MED GY IP 250 OP 250 PS 637: Performed by: NURSE PRACTITIONER

## 2022-03-08 RX ORDER — ACETAMINOPHEN 325 MG/1
975 TABLET ORAL 4 TIMES DAILY
Status: DISCONTINUED | OUTPATIENT
Start: 2022-03-08 | End: 2022-03-24 | Stop reason: HOSPADM

## 2022-03-08 RX ORDER — CLONIDINE HYDROCHLORIDE 0.1 MG/1
0.2 TABLET ORAL EVERY 6 HOURS PRN
Status: DISCONTINUED | OUTPATIENT
Start: 2022-03-08 | End: 2022-03-24 | Stop reason: HOSPADM

## 2022-03-08 RX ADMIN — MULTIPLE VITAMINS W/ MINERALS TAB 1 TABLET: TAB at 08:23

## 2022-03-08 RX ADMIN — PANTOPRAZOLE SODIUM 40 MG: 40 TABLET, DELAYED RELEASE ORAL at 08:23

## 2022-03-08 RX ADMIN — LIOTHYRONINE SODIUM 25 MCG: 25 TABLET ORAL at 08:23

## 2022-03-08 RX ADMIN — NICOTINE POLACRILEX 8 MG: 4 GUM, CHEWING ORAL at 06:44

## 2022-03-08 RX ADMIN — AMLODIPINE BESYLATE 5 MG: 5 TABLET ORAL at 08:24

## 2022-03-08 RX ADMIN — CLONIDINE HYDROCHLORIDE 0.2 MG: 0.1 TABLET ORAL at 12:02

## 2022-03-08 RX ADMIN — PROPRANOLOL HYDROCHLORIDE 20 MG: 20 TABLET ORAL at 20:06

## 2022-03-08 RX ADMIN — PREGABALIN 300 MG: 100 CAPSULE ORAL at 20:06

## 2022-03-08 RX ADMIN — PROPRANOLOL HYDROCHLORIDE 20 MG: 20 TABLET ORAL at 08:24

## 2022-03-08 RX ADMIN — LEVOTHYROXINE SODIUM 75 MCG: 75 TABLET ORAL at 08:24

## 2022-03-08 RX ADMIN — NICOTINE POLACRILEX 8 MG: 4 GUM, CHEWING ORAL at 09:52

## 2022-03-08 RX ADMIN — QUETIAPINE 700 MG: 300 TABLET, FILM COATED ORAL at 21:43

## 2022-03-08 RX ADMIN — ACETAMINOPHEN 975 MG: 325 TABLET, FILM COATED ORAL at 12:02

## 2022-03-08 RX ADMIN — PRAZOSIN HYDROCHLORIDE 2 MG: 2 CAPSULE ORAL at 21:43

## 2022-03-08 RX ADMIN — NAPROXEN 500 MG: 500 TABLET ORAL at 18:52

## 2022-03-08 RX ADMIN — ACAMPROSATE CALCIUM 666 MG: 333 TABLET, DELAYED RELEASE ORAL at 20:05

## 2022-03-08 RX ADMIN — DOCUSATE SODIUM 100 MG: 100 CAPSULE, LIQUID FILLED ORAL at 08:23

## 2022-03-08 RX ADMIN — NICOTINE POLACRILEX 8 MG: 4 GUM, CHEWING ORAL at 08:23

## 2022-03-08 RX ADMIN — CHOLECALCIFEROL TAB 25 MCG (1000 UNIT) 25 MCG: 25 TAB at 08:24

## 2022-03-08 RX ADMIN — FOLIC ACID 1 MG: 1 TABLET ORAL at 08:24

## 2022-03-08 RX ADMIN — NICOTINE POLACRILEX 8 MG: 4 GUM, CHEWING ORAL at 18:52

## 2022-03-08 RX ADMIN — ACAMPROSATE CALCIUM 666 MG: 333 TABLET, DELAYED RELEASE ORAL at 08:23

## 2022-03-08 RX ADMIN — PREGABALIN 300 MG: 100 CAPSULE ORAL at 08:22

## 2022-03-08 RX ADMIN — CLONIDINE HYDROCHLORIDE 0.2 MG: 0.1 TABLET ORAL at 17:37

## 2022-03-08 RX ADMIN — DIPHENHYDRAMINE HYDROCHLORIDE 50 MG: 25 CAPSULE ORAL at 09:52

## 2022-03-08 RX ADMIN — NAPROXEN 500 MG: 500 TABLET ORAL at 08:23

## 2022-03-08 RX ADMIN — THIAMINE HCL TAB 100 MG 100 MG: 100 TAB at 08:24

## 2022-03-08 RX ADMIN — NICOTINE POLACRILEX 8 MG: 4 GUM, CHEWING ORAL at 20:06

## 2022-03-08 RX ADMIN — NICOTINE POLACRILEX 8 MG: 4 GUM, CHEWING ORAL at 12:02

## 2022-03-08 RX ADMIN — ACETAMINOPHEN 975 MG: 325 TABLET, FILM COATED ORAL at 20:06

## 2022-03-08 RX ADMIN — TOPIRAMATE 100 MG: 100 TABLET, FILM COATED ORAL at 08:23

## 2022-03-08 RX ADMIN — NICOTINE POLACRILEX 8 MG: 4 GUM, CHEWING ORAL at 14:17

## 2022-03-08 RX ADMIN — PREGABALIN 300 MG: 100 CAPSULE ORAL at 14:17

## 2022-03-08 RX ADMIN — ACAMPROSATE CALCIUM 666 MG: 333 TABLET, DELAYED RELEASE ORAL at 14:17

## 2022-03-08 RX ADMIN — ACETAMINOPHEN 975 MG: 325 TABLET, FILM COATED ORAL at 17:33

## 2022-03-08 RX ADMIN — TOPIRAMATE 100 MG: 100 TABLET, FILM COATED ORAL at 20:06

## 2022-03-08 RX ADMIN — NICOTINE POLACRILEX 8 MG: 4 GUM, CHEWING ORAL at 17:33

## 2022-03-08 ASSESSMENT — ACTIVITIES OF DAILY LIVING (ADL)
LAUNDRY: WITH SUPERVISION
ORAL_HYGIENE: INDEPENDENT
DRESS: SCRUBS (BEHAVIORAL HEALTH)
ORAL_HYGIENE: INDEPENDENT
HYGIENE/GROOMING: INDEPENDENT
HYGIENE/GROOMING: INDEPENDENT
DRESS: INDEPENDENT

## 2022-03-08 NOTE — PROGRESS NOTES
Patient seen via telemed, chart reviewed, care discussed with staff.  Blood pressure 128/85, pulse 82, temperature 97.6  F (36.4  C), temperature source Temporal, resp. rate 16, weight 136.4 kg (300 lb 9.6 oz), SpO2 97 %.    By report, a bit isolative,   Slept ok    General appearance: good  Alert.   Affect: fair  Mood: fair    Speech:  normal.   Eye contact:  good.    Psychomotor behavior: normal  Gait: steady with walker  Abnormal movements:  none  Delusions: nine  Hallucinations:  none  Thoughts: logical  Associations: intact  Judgement: good  Insight: good  Cognitions: intact in conversation  Memory:  intact in conversation  Orientation: normal    Not suicidal.  Reports more pain as he is now off opiates    Imp:  Pipolar depressed  2.  PTSD  3.  S/P TBI  4.  Chronic pain    Plan: Schedule tylenol    Current Facility-Administered Medications   Medication     acamprosate (CAMPRAL) EC tablet 666 mg     acetaminophen (TYLENOL) tablet 975 mg     albuterol (PROVENTIL HFA/VENTOLIN HFA) inhaler     alum & mag hydroxide-simethicone (MAALOX) suspension 30 mL     amLODIPine (NORVASC) tablet 5 mg     cloNIDine (CATAPRES) tablet 0.2 mg     diphenhydrAMINE (BENADRYL) capsule 50 mg     docusate sodium (COLACE) capsule 100 mg     folic acid (FOLVITE) tablet 1 mg     hydrocortisone (CORTAID) 1 % cream     ketamine 5%-gabapentin 8% in PLO cream 0.25 g     levothyroxine (SYNTHROID/LEVOTHROID) tablet 75 mcg     liothyronine (CYTOMEL) tablet 25 mcg     loperamide (IMODIUM) capsule 2 mg     melatonin tablet 3 mg     multivitamin w/minerals (THERA-VIT-M) tablet 1 tablet     naloxone (NARCAN) injection 0.2 mg    Or     naloxone (NARCAN) injection 0.4 mg    Or     naloxone (NARCAN) injection 0.2 mg    Or     naloxone (NARCAN) injection 0.4 mg     naproxen (NAPROSYN) tablet 500 mg     nicotine polacrilex (NICORETTE) gum 4-8 mg     OLANZapine (zyPREXA) tablet 10 mg    Or     OLANZapine (zyPREXA) injection 10 mg     pantoprazole (PROTONIX)  EC tablet 40 mg     prazosin (MINIPRESS) capsule 2 mg     pregabalin (LYRICA) capsule 300 mg     propranolol (INDERAL) tablet 20 mg     QUEtiapine (SEROquel) tablet 700 mg     sennosides (SENOKOT) tablet 1-2 tablet     thiamine (B-1) tablet 100 mg     tiZANidine (ZANAFLEX) tablet 4 mg     topiramate (TOPAMAX) tablet 100 mg     Vitamin D3 (CHOLECALCIFEROL) tablet 25 mcg     Recent Results (from the past 168 hour(s))   Asymptomatic COVID-19 Virus (Coronavirus) by PCR Nose    Collection Time: 03/05/22  1:41 PM    Specimen: Nose; Swab   Result Value Ref Range    SARS CoV2 PCR Negative Negative       Video-Visit Details    Type of service:  Video Visit    Video Start Time (time video started): 1000    Video End Time (time video stopped): 1010    Originating Location (pt. Location): [facility]    Distant Location (provider location): Provider remote location    Mode of Communication:  Video Conference via [employer]    Physician has received verbal consent for a Video Visit from the patient? Yes      Ulises Perez MD

## 2022-03-08 NOTE — PLAN OF CARE
"Assessment/Intervention/Current Symtoms and Care Coordination  Pt is currently hospitalized due to chronic pain, depression, anxiety.  -Rounded with team in reviewing pt's care  -Reviewed pt's chart to get up to date with pt's care and progress  -Met with pt one on one and updated him on referral status and current timeline  -Called and left a message with [facility], requested status report ( is Tammy - [phone]).  -Left a message with [facility], a new referral.     Discharge Plan or Goal  TBD pending placement-considerations include assisted living facility that can accommodate elderly waiver, single room and sober environment.  Ongoing evaluation at [facility]  New referral to [facility] - [phone] (Fax) - [phone]     UPDATE: Writer spoke with Jessica SAL, Pt's CADI  who said she is no longer on this pt's case. She provided a new referral source:  \"[facility]  1) [address] [address] (2) [address] [facility] [address]  We provide in Assisting Living Faciity basic & intensive services with Nurse on site and also provide In Home Services for clients staying in their homes.\"         Kenneth Cooper MBA/HEALTHCARE ADMIN.[facility]   Cell. [phone]     Barriers to Discharge   Pt still does not have a finalized placement location.   [place] on Monday at 10:00am.    "

## 2022-03-08 NOTE — PLAN OF CARE
Problem: Sleep Disturbance (Depressive Signs/Symptoms)  Goal: Improved Sleep (Depressive Signs/Symptoms)  Outcome: Met  Patient has remained in his room sleeping throughout the night.  No complaints of pain.  Continues in a medical bed due to mobility issues.  7.5 hrs of sleep.  Requested and received PRN nicorette gum 8 mg at 0645.

## 2022-03-08 NOTE — PLAN OF CARE
Problem: Suicidal Behavior  Goal: Suicidal Behavior is Absent or Managed  Outcome: Ongoing, Progressing     Problem: Activity and Energy Impairment (Depressive Signs/Symptoms)  Goal: Optimized Energy Level (Depressive Signs/Symptoms)  Outcome: Ongoing, Progressing  Intervention: Optimize Energy Level  Recent Flowsheet Documentation  Taken 3/8/2022 1100 by Bernard Mancera, RN  Activity (Behavioral Health): up ad kathleen     Problem: Decreased Participation and Engagement (Depressive Signs/Symptoms)  Goal: Increased Participation and Engagement (Depressive Signs/Symptoms)  Outcome: Ongoing, Progressing   Goal Outcome Evaluation:    Plan of Care Reviewed With: patient       Patient is calm cooperative, affect flat and blunted. Patient remains isolative/withdrawn most of shift. Denies SI, SIB. Endorses anxiety and depression 5/10. Prn tylenol (Right knee pain) and clonidine(withdrawal symptoms) given. nicorette gum given per request. Good appetite ate 100% of meals. No other concerns at this time.

## 2022-03-09 PROCEDURE — 124N000003 HC R&B MH SENIOR/ADOLESCENT

## 2022-03-09 PROCEDURE — 250N000013 HC RX MED GY IP 250 OP 250 PS 637: Performed by: PSYCHIATRY & NEUROLOGY

## 2022-03-09 PROCEDURE — HZ2ZZZZ DETOXIFICATION SERVICES FOR SUBSTANCE ABUSE TREATMENT: ICD-10-PCS | Performed by: PSYCHIATRY & NEUROLOGY

## 2022-03-09 PROCEDURE — 250N000013 HC RX MED GY IP 250 OP 250 PS 637: Performed by: NURSE PRACTITIONER

## 2022-03-09 RX ADMIN — ACETAMINOPHEN 975 MG: 325 TABLET, FILM COATED ORAL at 08:45

## 2022-03-09 RX ADMIN — PREGABALIN 300 MG: 100 CAPSULE ORAL at 20:00

## 2022-03-09 RX ADMIN — AMLODIPINE BESYLATE 5 MG: 5 TABLET ORAL at 08:45

## 2022-03-09 RX ADMIN — MULTIPLE VITAMINS W/ MINERALS TAB 1 TABLET: TAB at 08:45

## 2022-03-09 RX ADMIN — PROPRANOLOL HYDROCHLORIDE 20 MG: 20 TABLET ORAL at 20:00

## 2022-03-09 RX ADMIN — NICOTINE POLACRILEX 8 MG: 4 GUM, CHEWING ORAL at 08:45

## 2022-03-09 RX ADMIN — PRAZOSIN HYDROCHLORIDE 2 MG: 2 CAPSULE ORAL at 21:55

## 2022-03-09 RX ADMIN — NICOTINE POLACRILEX 8 MG: 4 GUM, CHEWING ORAL at 20:00

## 2022-03-09 RX ADMIN — NICOTINE POLACRILEX 8 MG: 4 GUM, CHEWING ORAL at 15:56

## 2022-03-09 RX ADMIN — NAPROXEN 500 MG: 500 TABLET ORAL at 08:46

## 2022-03-09 RX ADMIN — NICOTINE POLACRILEX 8 MG: 4 GUM, CHEWING ORAL at 18:50

## 2022-03-09 RX ADMIN — NAPROXEN 500 MG: 500 TABLET ORAL at 16:44

## 2022-03-09 RX ADMIN — PREGABALIN 300 MG: 100 CAPSULE ORAL at 14:04

## 2022-03-09 RX ADMIN — TOPIRAMATE 100 MG: 100 TABLET, FILM COATED ORAL at 08:45

## 2022-03-09 RX ADMIN — ACETAMINOPHEN 975 MG: 325 TABLET, FILM COATED ORAL at 12:02

## 2022-03-09 RX ADMIN — THIAMINE HCL TAB 100 MG 100 MG: 100 TAB at 08:45

## 2022-03-09 RX ADMIN — PROPRANOLOL HYDROCHLORIDE 20 MG: 20 TABLET ORAL at 08:45

## 2022-03-09 RX ADMIN — NICOTINE POLACRILEX 8 MG: 4 GUM, CHEWING ORAL at 14:07

## 2022-03-09 RX ADMIN — QUETIAPINE 700 MG: 300 TABLET, FILM COATED ORAL at 21:55

## 2022-03-09 RX ADMIN — CHOLECALCIFEROL TAB 25 MCG (1000 UNIT) 25 MCG: 25 TAB at 08:45

## 2022-03-09 RX ADMIN — ACAMPROSATE CALCIUM 666 MG: 333 TABLET, DELAYED RELEASE ORAL at 08:46

## 2022-03-09 RX ADMIN — NICOTINE POLACRILEX 8 MG: 4 GUM, CHEWING ORAL at 10:36

## 2022-03-09 RX ADMIN — NICOTINE POLACRILEX 8 MG: 4 GUM, CHEWING ORAL at 18:03

## 2022-03-09 RX ADMIN — TOPIRAMATE 100 MG: 100 TABLET, FILM COATED ORAL at 20:00

## 2022-03-09 RX ADMIN — NICOTINE POLACRILEX 8 MG: 4 GUM, CHEWING ORAL at 12:02

## 2022-03-09 RX ADMIN — FOLIC ACID 1 MG: 1 TABLET ORAL at 08:45

## 2022-03-09 RX ADMIN — DOCUSATE SODIUM 100 MG: 100 CAPSULE, LIQUID FILLED ORAL at 08:45

## 2022-03-09 RX ADMIN — PREGABALIN 300 MG: 100 CAPSULE ORAL at 08:46

## 2022-03-09 RX ADMIN — LEVOTHYROXINE SODIUM 75 MCG: 75 TABLET ORAL at 08:45

## 2022-03-09 RX ADMIN — ACETAMINOPHEN 975 MG: 325 TABLET, FILM COATED ORAL at 21:02

## 2022-03-09 RX ADMIN — ACAMPROSATE CALCIUM 666 MG: 333 TABLET, DELAYED RELEASE ORAL at 14:03

## 2022-03-09 RX ADMIN — CLONIDINE HYDROCHLORIDE 0.2 MG: 0.1 TABLET ORAL at 18:50

## 2022-03-09 RX ADMIN — ACETAMINOPHEN 975 MG: 325 TABLET, FILM COATED ORAL at 17:51

## 2022-03-09 RX ADMIN — ACAMPROSATE CALCIUM 666 MG: 333 TABLET, DELAYED RELEASE ORAL at 19:59

## 2022-03-09 RX ADMIN — PANTOPRAZOLE SODIUM 40 MG: 40 TABLET, DELAYED RELEASE ORAL at 08:46

## 2022-03-09 RX ADMIN — LIOTHYRONINE SODIUM 25 MCG: 25 TABLET ORAL at 08:45

## 2022-03-09 ASSESSMENT — ACTIVITIES OF DAILY LIVING (ADL)
LAUNDRY: WITH SUPERVISION
ORAL_HYGIENE: INDEPENDENT
HYGIENE/GROOMING: INDEPENDENT
LAUNDRY: WITH SUPERVISION
DRESS: SCRUBS (BEHAVIORAL HEALTH)
ORAL_HYGIENE: INDEPENDENT
DRESS: INDEPENDENT
HYGIENE/GROOMING: INDEPENDENT

## 2022-03-09 NOTE — PROGRESS NOTES
I saw patient and we discussed knee brace options. Patient has a right  Breg T scope that he was wearing upon arrival. Patient stated he was happy with his current brace and is having no issues with the left knee. Patient has declined a knee brace at this time as he is happy with current knee brace. We also do not carry compression sleeves however we do carry a smaller hinged knee brace.   SERA Barksdale.

## 2022-03-09 NOTE — CARE PLAN
"Assessment/Intervention/Current Symtoms and Care Coordination  Pt is currently hospitalized due to chronic pain, depression, anxiety.  -Rounded with team in reviewing pt's care  -Reviewed pt's chart to get up to date with pt's care and progress  -Met with pt one on one and updated him on referral status and current timeline      [facility] Called [facility] and spoke with  Tammy -([phone]). She notified writer that their director of nursing reviewed the referral and declined the patient due to suicide history and mental health diagnosis. She stated that they are not able to meet his needs.       [facility] sent a referral to [facility]. Fax: [phone]. [facility] spoke with Jessica from admissions who notified writer that they currently have a bed available on the second floor. Writer spoke with patient and asked if he is able to take stairs. He notified writer that he does not need the walker and can go on the stairs. Jessica will be reviewing this referral and will reach out to [facility].. [facility] faxed over H&P, MAR, and facesheet.        This [facility] sent a referral to [facility] in February, 2022. [facility] called Ulises Palacio (Phone: [phone] Fax: [phone]) from admissions and asked for the status of the referral. She notified writer that patient was accepted to their facility pending CADI waiver approval. [facility] notified her that [place]10 at 10 am. [facility] will reach out to Ulises once patient's CADI waiver is activated.        Discharge Plan or Goal  TBD pending placement-considerations include assisted living facility that can accommodate elderly waiver, single room and sober environment.  Ongoing evaluation at [facility]  New referral to [facility] - [phone] (Fax) - [phone]       \"[facility]  (1) [address] [facility] [address]  (2) [address] [address]  We provide in Assisting Living Faciity basic & intensive " "services with Nurse on site and also provide In Home Services for clients staying in their homes.\"         TRISHA ReynoldsA/HEALTHCARE ADMIN.[facility]   Cell. [phone]     Barriers to Discharge   Pt still does not have a finalized placement location.   [place]. [place] on Monday at 10:00am.  "

## 2022-03-09 NOTE — PLAN OF CARE
Problem: Suicidal Behavior  Goal: Suicidal Behavior is Absent or Managed  Outcome: Ongoing, Progressing     Problem: Activity and Energy Impairment (Depressive Signs/Symptoms)  Goal: Optimized Energy Level (Depressive Signs/Symptoms)  Outcome: Ongoing, Progressing  Intervention: Optimize Energy Level  Recent Flowsheet Documentation  Taken 3/9/2022 1000 by Johanna Rodriguez, RN  Activity (Behavioral Health): activity adjusted per tolerance   Goal Outcome Evaluation:    Plan of Care Reviewed With: patient             Patient is visibly sad, withdrawn, and isolative to self and room. Patient is pleasant, and cooperative with nursing assessment. Patient is tense. Minimal engagement in conversation.    Medication compliant.    Denies any mental health symptoms.    Right knee pain, managed with medication and rest.   Other vital signs stable.    Good appetite.     Staff will continue to monitor patient closely.

## 2022-03-09 NOTE — PLAN OF CARE
Problem: Sleep Disturbance (Depressive Signs/Symptoms)  Goal: Improved Sleep (Depressive Signs/Symptoms)  Outcome: Ongoing, Progressing    Patient is using a medical bed due to mobility issues.    Slept with a book on his hand and later laid it down on the bed. Slept a total of 6.5  hours. No complaints of pain and behavioral issues noted the whole night.     Scheduled for an Orthosis Brace Consult today.

## 2022-03-09 NOTE — PROGRESS NOTES
Patient seen, chart reviewed, care discussed with staff.  Blood pressure 110/72, pulse 85, temperature 97.4  F (36.3  C), temperature source Tympanic, resp. rate 16, weight 136.4 kg (300 lb 9.6 oz), SpO2 96 %.    Slept well.  Isolates due to pain, irritability    General appearance: good  Alert.   Affect: fair  Mood: fair    Speech:  normal.   Eye contact:  good.    Psychomotor behavior: normal  Gait: steady with walker  Abnormal movements:  none  Delusions: none  Hallucinations:   none  Thoughts: logical  Associations: intact  Judgement: good  Insight: good  Cognitions: intact in conversation  Memory:  intact in conversation  Orientation: normal    Not suicidal.    Imp: Bipolar depressed, stabilizing  2.  PTSD  3.  S/p TBI  4.  Chronic pain  And:   Patient Active Problem List   Diagnosis     Post concussive encephalopathy     H/O shoulder surgery     Alcoholism in remission (H)     Bilateral ACL tear     Chronic back pain     Social anxiety disorder     Alcohol withdrawal syndrome with complication, with unspecified complication (H)     Rib fracture     Alcohol withdrawal (H)     Alcohol dependence (H)     LFT elevation     Laceration of left hand without foreign body, initial encounter     Suicidal ideation     Intentional drug overdose, initial encounter (H)     Alcoholic intoxication with complication (H)     Severe bipolar I disorder, current or most recent episode depressed, with mixed features (H)     Medication overdose, intentional self-harm, subsequent encounter     Opioid dependence on agonist therapy (H)     2019 novel coronavirus disease (COVID-19)     Pneumonia due to COVID-19 virus     Bipolar disorder (H)     Plan:  Same meds    Current Facility-Administered Medications   Medication     acamprosate (CAMPRAL) EC tablet 666 mg     acetaminophen (TYLENOL) tablet 975 mg     albuterol (PROVENTIL HFA/VENTOLIN HFA) inhaler     alum & mag hydroxide-simethicone (MAALOX) suspension 30 mL     amLODIPine  (NORVASC) tablet 5 mg     cloNIDine (CATAPRES) tablet 0.2 mg     diphenhydrAMINE (BENADRYL) capsule 50 mg     docusate sodium (COLACE) capsule 100 mg     folic acid (FOLVITE) tablet 1 mg     hydrocortisone (CORTAID) 1 % cream     ketamine 5%-gabapentin 8% in PLO cream 0.25 g     levothyroxine (SYNTHROID/LEVOTHROID) tablet 75 mcg     liothyronine (CYTOMEL) tablet 25 mcg     loperamide (IMODIUM) capsule 2 mg     melatonin tablet 3 mg     multivitamin w/minerals (THERA-VIT-M) tablet 1 tablet     naloxone (NARCAN) injection 0.2 mg    Or     naloxone (NARCAN) injection 0.4 mg    Or     naloxone (NARCAN) injection 0.2 mg    Or     naloxone (NARCAN) injection 0.4 mg     naproxen (NAPROSYN) tablet 500 mg     nicotine polacrilex (NICORETTE) gum 4-8 mg     OLANZapine (zyPREXA) tablet 10 mg    Or     OLANZapine (zyPREXA) injection 10 mg     pantoprazole (PROTONIX) EC tablet 40 mg     prazosin (MINIPRESS) capsule 2 mg     pregabalin (LYRICA) capsule 300 mg     propranolol (INDERAL) tablet 20 mg     QUEtiapine (SEROquel) tablet 700 mg     sennosides (SENOKOT) tablet 1-2 tablet     thiamine (B-1) tablet 100 mg     tiZANidine (ZANAFLEX) tablet 4 mg     topiramate (TOPAMAX) tablet 100 mg     Vitamin D3 (CHOLECALCIFEROL) tablet 25 mcg     Recent Results (from the past 168 hour(s))   Asymptomatic COVID-19 Virus (Coronavirus) by PCR Nose    Collection Time: 03/05/22  1:41 PM    Specimen: Nose; Swab   Result Value Ref Range    SARS CoV2 PCR Negative Negative

## 2022-03-09 NOTE — PLAN OF CARE
"Problem: Activity and Energy Impairment (Depressive Signs/Symptoms)  Goal: Optimized Energy Level (Depressive Signs/Symptoms)  Intervention: Optimize Energy Level  Recent Flowsheet Documentation  Taken 3/8/2022 1807 by Jammie Vail RN  Patient Performed Hygiene: patient refused  Diversional Activity: reading  Activity (Behavioral Health): activity adjusted per tolerance    Problem: Mood Impairment (Depressive Signs/Symptoms)  Goal: Improved Mood Symptoms (Depressive Signs/Symptoms)  Outcome: Ongoing, Progressing  Flowsheets (Taken 3/8/2022 1812)  Mutually Determined Action Steps (Improved Mood Symptoms): verbalizes increased insight  Intervention: Promote Mood Improvement  Recent Flowsheet Documentation  Taken 3/8/2022 1807 by Jammie Vail RN  Diversional Activity: reading    Problem: Psychomotor Impairment (Depressive Signs/Symptoms)  Goal: Improved Psychomotor Symptoms (Depressive Signs/Symptoms)  Outcome: Ongoing, Progressing  Flowsheets (Taken 3/8/2022 1812)  Mutually Determined Action Steps (Improved Psychomotor Symptoms): adheres to medication regimen  Intervention: Manage Psychomotor Movement  Recent Flowsheet Documentation  Taken 3/8/2022 1807 by Jammie Vail RN  Patient Performed Hygiene: patient refused  Diversional Activity: reading  Activity (Behavioral Health): activity adjusted per tolerance    Pt presents with blunted, flat affect and anxious, depressed, tense mood. Denies SI/SIB and hallucinations. Reports increased anxiety r/t his pain and his depression is \"there but not bad\" (pt would not rate these for writer). Pt does appear tense this evening, reports that the lack of opiate pain medication is tough but he knows that it is the best thing for him. Remained isolated to his room reading, did come out for meals and to watch TV, but was not very social. Did not attend groups. Appetite and fluid intake adequate. Rates R knee pain 7/10 - pt is on scheduled Tylenol/Naproxen and " it is somewhat helpful, but not much per patient. PRN clonidine given this evening for opiate withdrawal symptoms which helps per pt. VSS. Medication compliant, PRN nicotine gum given as requested. No other concerns or complaints noted.

## 2022-03-10 PROCEDURE — 250N000013 HC RX MED GY IP 250 OP 250 PS 637: Performed by: PSYCHIATRY & NEUROLOGY

## 2022-03-10 PROCEDURE — 250N000013 HC RX MED GY IP 250 OP 250 PS 637: Performed by: NURSE PRACTITIONER

## 2022-03-10 PROCEDURE — 99231 SBSQ HOSP IP/OBS SF/LOW 25: CPT

## 2022-03-10 PROCEDURE — 124N000003 HC R&B MH SENIOR/ADOLESCENT

## 2022-03-10 RX ORDER — SALIVA STIMULANT COMB. NO.3
2 SPRAY, NON-AEROSOL (ML) MUCOUS MEMBRANE 4 TIMES DAILY PRN
Status: DISCONTINUED | OUTPATIENT
Start: 2022-03-10 | End: 2022-03-24 | Stop reason: HOSPADM

## 2022-03-10 RX ADMIN — NICOTINE POLACRILEX 8 MG: 4 GUM, CHEWING ORAL at 13:55

## 2022-03-10 RX ADMIN — NAPROXEN 500 MG: 500 TABLET ORAL at 16:18

## 2022-03-10 RX ADMIN — QUETIAPINE 700 MG: 300 TABLET, FILM COATED ORAL at 21:59

## 2022-03-10 RX ADMIN — LEVOTHYROXINE SODIUM 75 MCG: 75 TABLET ORAL at 07:58

## 2022-03-10 RX ADMIN — PROPRANOLOL HYDROCHLORIDE 20 MG: 20 TABLET ORAL at 19:58

## 2022-03-10 RX ADMIN — NAPROXEN 500 MG: 500 TABLET ORAL at 07:56

## 2022-03-10 RX ADMIN — DOCUSATE SODIUM 100 MG: 100 CAPSULE, LIQUID FILLED ORAL at 07:56

## 2022-03-10 RX ADMIN — ACAMPROSATE CALCIUM 666 MG: 333 TABLET, DELAYED RELEASE ORAL at 07:56

## 2022-03-10 RX ADMIN — NICOTINE POLACRILEX 8 MG: 4 GUM, CHEWING ORAL at 07:58

## 2022-03-10 RX ADMIN — NICOTINE POLACRILEX 8 MG: 4 GUM, CHEWING ORAL at 17:36

## 2022-03-10 RX ADMIN — THIAMINE HCL TAB 100 MG 100 MG: 100 TAB at 07:58

## 2022-03-10 RX ADMIN — ACETAMINOPHEN 975 MG: 325 TABLET, FILM COATED ORAL at 17:36

## 2022-03-10 RX ADMIN — LIOTHYRONINE SODIUM 25 MCG: 25 TABLET ORAL at 07:58

## 2022-03-10 RX ADMIN — PREGABALIN 300 MG: 100 CAPSULE ORAL at 13:55

## 2022-03-10 RX ADMIN — ACETAMINOPHEN 975 MG: 325 TABLET, FILM COATED ORAL at 07:59

## 2022-03-10 RX ADMIN — PANTOPRAZOLE SODIUM 40 MG: 40 TABLET, DELAYED RELEASE ORAL at 07:58

## 2022-03-10 RX ADMIN — PREGABALIN 300 MG: 100 CAPSULE ORAL at 19:58

## 2022-03-10 RX ADMIN — NICOTINE POLACRILEX 8 MG: 4 GUM, CHEWING ORAL at 10:42

## 2022-03-10 RX ADMIN — PREGABALIN 300 MG: 100 CAPSULE ORAL at 07:58

## 2022-03-10 RX ADMIN — CHOLECALCIFEROL TAB 25 MCG (1000 UNIT) 25 MCG: 25 TAB at 07:56

## 2022-03-10 RX ADMIN — FOLIC ACID 1 MG: 1 TABLET ORAL at 07:58

## 2022-03-10 RX ADMIN — ACETAMINOPHEN 975 MG: 325 TABLET, FILM COATED ORAL at 21:59

## 2022-03-10 RX ADMIN — PRAZOSIN HYDROCHLORIDE 2 MG: 2 CAPSULE ORAL at 21:59

## 2022-03-10 RX ADMIN — ACAMPROSATE CALCIUM 666 MG: 333 TABLET, DELAYED RELEASE ORAL at 19:58

## 2022-03-10 RX ADMIN — ACAMPROSATE CALCIUM 666 MG: 333 TABLET, DELAYED RELEASE ORAL at 13:54

## 2022-03-10 RX ADMIN — TOPIRAMATE 100 MG: 100 TABLET, FILM COATED ORAL at 07:58

## 2022-03-10 RX ADMIN — TOPIRAMATE 100 MG: 100 TABLET, FILM COATED ORAL at 19:59

## 2022-03-10 RX ADMIN — NICOTINE POLACRILEX 8 MG: 4 GUM, CHEWING ORAL at 16:17

## 2022-03-10 RX ADMIN — NICOTINE POLACRILEX 8 MG: 4 GUM, CHEWING ORAL at 09:13

## 2022-03-10 RX ADMIN — MULTIPLE VITAMINS W/ MINERALS TAB 1 TABLET: TAB at 07:56

## 2022-03-10 RX ADMIN — ACETAMINOPHEN 975 MG: 325 TABLET, FILM COATED ORAL at 12:34

## 2022-03-10 ASSESSMENT — ACTIVITIES OF DAILY LIVING (ADL)
LAUNDRY: WITH SUPERVISION
LAUNDRY: UNABLE TO COMPLETE
HYGIENE/GROOMING: INDEPENDENT
ORAL_HYGIENE: INDEPENDENT
ORAL_HYGIENE: INDEPENDENT
HYGIENE/GROOMING: INDEPENDENT
DRESS: INDEPENDENT;SCRUBS (BEHAVIORAL HEALTH)
DRESS: SCRUBS (BEHAVIORAL HEALTH)

## 2022-03-10 NOTE — PLAN OF CARE
Goal Outcome Evaluation:      Patient slept late after reading a book. She slept for 4.75 hours the whole night. He still is using a medical bed for mobility issues. He has a diagnosis of Bursitis on his left arm and needs a side rail for bed mobility. He also has a right knee mobility issues which needs him a medical bed.

## 2022-03-10 NOTE — PROGRESS NOTES
"SPIRITUAL HEALTH SERVICES Progress Note  [facility] ([facility] [place] 3BW    Saw pt Hollis Barclay per follow up.  Facilitated reflective conversation while playing game of cribbage around emotional/spiritual needs and coping.     Illness Narrative - Ray gave updates on placement process, books he is reading, and expressed concerns about \"feeling like I'm buzzed, like an electric field around my head\" for the past couple days. Pt states he will speak to dr. Perez about it. Writer encouraged pt to do so and mention it to RN also.    Distress -  physical pain (knee).  Emotional: length of stay    Coping - Jose M continues to identify reading books and games of cribbage as primary coping tools    Meaning-Making - pt expressed hopefulness regarding placement process and sense of accomplishment for being completely off opioid medications.    Plan - I will follow up 1-2x weekly.    Liss Kerns MDiv  Associate   Pager [phone]  Office [phone]  [facility] remains available 24/7 for emergent requests/referrals, either by having the switchboard page the on-call  or by entering an ASAP/STAT consult in Epic (this will also page the on-call ). Routine Epic consults receive an initial response within 24 hours.    "

## 2022-03-10 NOTE — PLAN OF CARE
Problem: Mood Impairment (Depressive Signs/Symptoms)  Goal: Improved Mood Symptoms (Depressive Signs/Symptoms)  Outcome: Ongoing, Progressing  Flowsheets (Taken 3/9/2022 1918)  Mutually Determined Action Steps (Improved Mood Symptoms):    verbalizes increased insight    identifies personal treatment goal  Intervention: Promote Mood Improvement  Recent Flowsheet Documentation  Taken 3/9/2022 1913 by Jammie Vail RN  Diversional Activity: reading     Pt presents with blunted, flat affect and depressed, anxious, tense mood. Denies SI/SIB and hallucinations. Still endorses increased anxiety r/t pain and depression remaining relatively the same. Pt appears very tense and uncomfortable but is doing a great job at controlling his emotions despite struggling with his chronic R knee pain without opiate pain medications. Pt did come to the lounge for dinner and to watch some television, but not very social with peers. Did not attend groups. Pt did have a friend that visited which went well. Continues to c/o chronic R knee pain which he rates 7/10 - it is somewhat controlled with his scheduled Tylenol and Naproxen - offered non-pharmacological interventions to help but pt declined. VSS. PRN clonidine given x1 for opiate withdrawal symptoms per pt request. Medication compliant, PRN nicotine gum given as requested. Appetite and fluid intake adequate. No other concerns or complaints noted.

## 2022-03-10 NOTE — PLAN OF CARE
Problem: Feelings of Worthlessness, Hopelessness or Excessive Guilt (Depressive Signs/Symptoms)  Goal: Enhanced Self-Esteem and Confidence (Depressive Signs/Symptoms)  Outcome: Ongoing, Progressing   Goal Outcome Evaluation:      RN Assessment:  SI/Self harm:  pt denies  Aggression/agitation/HI:  none reported or observed this shift  AVH: pt denies   Sleep: no reported concerns this shift  PRN Med: nicotine gum  Medication AE: none reported or observed  Physical Complaints/Issues:pt continues to report R knee pain, rates 7/10 if 10 = worst.   I & O: eating and drinking well  ADLs: independent  Visits: none this shift  Vitals:  WNL  COVID 19 Assessment:  negative  Milieu Participation: minimal, visible at meal times, watches TV, keeps to self, no groups  Behavior: irritable, short with writer, pt apologizes for irritability though will not engage with writer's assessments.     Pt does not show any s/s of opiate withdrawal. VSS. No other concerns at this time. Nursing will continue to monitor and assess.

## 2022-03-10 NOTE — PROGRESS NOTES
[facility], [place]   Psychiatric Progress Note  Hospital Day: 61        Interim History:   The patient's care was discussed with the treatment team during the daily team meeting and staff's chart notes were reviewed. Patient seen by LATOYA Bennett CNP  Staff report  Patient slept late after reading a book. She slept for 4.75 hours the whole night.    Upon interview, the patient had just completed an assessment for CADI waver over the phone. Patient was irritable and complained of extreme [place]. Nursing had asked provider about order of Clonidine since there is no opiate withdrawal protocol in place and his blood pressures have been running soft.     Suicidal ideation: denies current or recent suicidal ideation or behaviors.    Homicidal ideation: denies current or recent homicidal ideation or behaviors.    Psychotic symptoms:  Patient denies AH, VH, paranoia, delusions.            Diagnoses:   1. Bipolar depressed, stabilizing  2.  PTSD  3.  S/p TBI  4.  Chronic pain         Plan:     1. Ordered Artificial Saliva solution 2 sprays 4 times daily PRN.     2. Modified Clonidine order so nursing staff hold med if SBP is 100 or less    3. Changed Emergency Contact as his mom recently passed away.           Medications:       acamprosate  666 mg Oral TID     acetaminophen  975 mg Oral 4x Daily     amLODIPine  5 mg Oral Daily     docusate sodium  100 mg Oral BID     folic acid  1 mg Oral Daily     hydrocortisone   Topical BID     levothyroxine  75 mcg Oral QAM AC     liothyronine  25 mcg Oral Daily     multivitamin w/minerals  1 tablet Oral Daily     naproxen  500 mg Oral BID w/meals     pantoprazole  40 mg Oral QAM AC     prazosin  2 mg Oral At Bedtime     pregabalin  300 mg Oral TID     propranolol  20 mg Oral BID     QUEtiapine  700 mg Oral At Bedtime     thiamine  100 mg Oral Daily     topiramate  100 mg Oral BID     cholecalciferol  25 mcg Oral Daily            Psychiatric  Examination:     BP (P) 102/66 (Patient Position: Sitting)   Pulse (P) 82   Temp (P) 97.4  F (36.3  C) (Temporal)   Resp 16   Wt 136.4 kg (300 lb 9.6 oz)   SpO2 94%   BMI 35.83 kg/m    Weight is 300 lbs 9.6 oz  Body mass index is 35.83 kg/m .  Orthostatic Vitals  Report      Most Recent      Standing Orthostatic BP 81/56 03/09 0749    Standing Orthostatic Pulse (bpm) 86 03/09 0749          Appearance: awake, alert, adequately groomed and dressed in hospital scrubs  Attitude:  slightly uncooperative  Eye Contact:  fair  Mood:  irritable  Affect:  appropriate and in normal range and mood congruent  Speech:  clear, coherent  Psychomotor Behavior:  no evidence of tardive dyskinesia, dystonia, or tics  Thought Process:  logical, linear and goal oriented  Associations:  no loose associations  Thought Content:  no evidence of suicidal ideation or homicidal ideation and no evidence of psychotic thought  Insight:  fair  Judgement:  fair  Oriented to:  time, person, and place  Attention Span and Concentration:  intact  Recent and Remote Memory:  intact           Allergies:     Allergies   Allergen Reactions     Cucumber Extract Anaphylaxis     Cucumber the vegable     Hydroxyzine Hives     Previously unreported by patient, this is a new patient declaration as of 5/1/21.     Nsaids      No problem with oral NSAIDs--Toradol injection itching only.     Trazodone Itching     Per Patient          Labs:   No results found for this or any previous visit (from the past 24 hour(s)).         Precautions:     Behavioral Orders   Procedures     Code 2     Fall precautions     Routine Programming     As clinically indicated     Seizure precautions     Status 15     Every 15 minutes.     Suicide precautions     Patients on Suicide Precautions should have a Combination Diet ordered that includes a Diet selection(s) AND a Behavioral Tray selection for Safe Tray - with utensils, or Safe Tray - NO utensils         Entered by: Shayy EDOUARD  Wade on March 10, 2022 at 10:48 AM

## 2022-03-10 NOTE — CARE PLAN
"Assessment/Intervention/Current Symtoms and Care Coordination  Pt is currently hospitalized due to chronic pain, depression, anxiety.  -Rounded with team in reviewing pt's care  -Reviewed pt's chart to get up to date with pt's care and progress  -Met with pt one on one and updated him on referral status and current timeline       [facility] Called [facility] and spoke with  Tammy -([phone]). She notified writer that their director of nursing reviewed the referral and declined the patient due to suicide history and mental health diagnosis. She stated that they are not able to meet his needs.      [facility] sent a referral to [facility]. Fax: [phone]. [facility] spoke with Jessica from admissions who notified writer that patient is accepted to their facility.     [place] completed [place] with Angela from [facility]. [facility] spoke with Angela and notified her that patient was accepted in to [facility]. Patient is wanting to go to this facility and is ready for discharge. [facility] gave her the phone number for [facility]. Angela notified writer that she will send me paperwork patient will need to sign. Angela stated she can tried to expedite the process but it will take at least 1-2 weeks before his Cadi is activated.     [facility] gave patient the number for the social security administration office and encouraged him to complete the social security disability benefits application over the phone.        Discharge Plan or Goal  TBD pending placement-considerations include assisted living facility that can accommodate elderly waiver, single room and sober environment.  Ongoing evaluation at [facility]  New referral to [facility] - [phone] (Fax) - [phone]        \"[facility]  (1) [address] [address]  (2) [address] [address]  We provide in Assisting Living Faciity basic & intensive services with Nurse on site and also provide In Home Services for " "clients staying in their homes.\"         Kenneth Cooper MBA/HEALTHCARE ADMIN.[facility]   Cell. [phone]     Barriers to Discharge   Pt still does not have a finalized placement location.   He has yet to [place] an [place]. [place] will [place] completed on Monday at 10:00am.  "

## 2022-03-11 LAB
AMPHETAMINES UR QL SCN: NORMAL
BARBITURATES UR QL: NORMAL
BENZODIAZ UR QL: NORMAL
CANNABINOIDS UR QL SCN: NORMAL
COCAINE UR QL: NORMAL
ETHANOL UR QL SCN: NORMAL
OPIATES UR QL SCN: NORMAL

## 2022-03-11 PROCEDURE — 250N000013 HC RX MED GY IP 250 OP 250 PS 637: Performed by: NURSE PRACTITIONER

## 2022-03-11 PROCEDURE — G0177 OPPS/PHP; TRAIN & EDUC SERV: HCPCS

## 2022-03-11 PROCEDURE — 250N000013 HC RX MED GY IP 250 OP 250 PS 637: Performed by: PSYCHIATRY & NEUROLOGY

## 2022-03-11 PROCEDURE — 124N000003 HC R&B MH SENIOR/ADOLESCENT

## 2022-03-11 PROCEDURE — 80307 DRUG TEST PRSMV CHEM ANLYZR: CPT | Performed by: PSYCHIATRY & NEUROLOGY

## 2022-03-11 RX ORDER — OLANZAPINE 10 MG/2ML
10 INJECTION, POWDER, FOR SOLUTION INTRAMUSCULAR 3 TIMES DAILY PRN
Status: DISCONTINUED | OUTPATIENT
Start: 2022-03-11 | End: 2022-03-11

## 2022-03-11 RX ORDER — LIOTHYRONINE SODIUM 25 UG/1
25 TABLET ORAL DAILY
Status: DISCONTINUED | OUTPATIENT
Start: 2022-03-11 | End: 2022-03-11

## 2022-03-11 RX ORDER — OLANZAPINE 10 MG/1
10 TABLET ORAL 3 TIMES DAILY PRN
Status: DISCONTINUED | OUTPATIENT
Start: 2022-03-11 | End: 2022-03-24 | Stop reason: HOSPADM

## 2022-03-11 RX ORDER — OLANZAPINE 10 MG/1
10 TABLET ORAL 3 TIMES DAILY PRN
Status: DISCONTINUED | OUTPATIENT
Start: 2022-03-11 | End: 2022-03-11

## 2022-03-11 RX ORDER — TOPIRAMATE 50 MG/1
50 TABLET, FILM COATED ORAL 2 TIMES DAILY
Status: DISCONTINUED | OUTPATIENT
Start: 2022-03-11 | End: 2022-03-14

## 2022-03-11 RX ORDER — OLANZAPINE 10 MG/2ML
10 INJECTION, POWDER, FOR SOLUTION INTRAMUSCULAR 3 TIMES DAILY PRN
Status: DISCONTINUED | OUTPATIENT
Start: 2022-03-11 | End: 2022-03-24 | Stop reason: HOSPADM

## 2022-03-11 RX ORDER — LIOTHYRONINE SODIUM 25 UG/1
25 TABLET ORAL DAILY
Status: DISCONTINUED | OUTPATIENT
Start: 2022-03-12 | End: 2022-03-24 | Stop reason: HOSPADM

## 2022-03-11 RX ADMIN — ACAMPROSATE CALCIUM 666 MG: 333 TABLET, DELAYED RELEASE ORAL at 20:10

## 2022-03-11 RX ADMIN — LIOTHYRONINE SODIUM 25 MCG: 25 TABLET ORAL at 08:12

## 2022-03-11 RX ADMIN — TOPIRAMATE 100 MG: 100 TABLET, FILM COATED ORAL at 08:14

## 2022-03-11 RX ADMIN — DIPHENHYDRAMINE HYDROCHLORIDE 50 MG: 25 CAPSULE ORAL at 20:10

## 2022-03-11 RX ADMIN — NICOTINE POLACRILEX 8 MG: 4 GUM, CHEWING ORAL at 14:13

## 2022-03-11 RX ADMIN — THIAMINE HCL TAB 100 MG 100 MG: 100 TAB at 08:12

## 2022-03-11 RX ADMIN — MULTIPLE VITAMINS W/ MINERALS TAB 1 TABLET: TAB at 08:14

## 2022-03-11 RX ADMIN — LEVOTHYROXINE SODIUM 75 MCG: 75 TABLET ORAL at 08:11

## 2022-03-11 RX ADMIN — FOLIC ACID 1 MG: 1 TABLET ORAL at 08:14

## 2022-03-11 RX ADMIN — NAPROXEN 500 MG: 500 TABLET ORAL at 16:09

## 2022-03-11 RX ADMIN — QUETIAPINE 700 MG: 300 TABLET, FILM COATED ORAL at 22:14

## 2022-03-11 RX ADMIN — NICOTINE POLACRILEX 8 MG: 4 GUM, CHEWING ORAL at 16:09

## 2022-03-11 RX ADMIN — PREGABALIN 300 MG: 100 CAPSULE ORAL at 20:10

## 2022-03-11 RX ADMIN — ACAMPROSATE CALCIUM 666 MG: 333 TABLET, DELAYED RELEASE ORAL at 08:10

## 2022-03-11 RX ADMIN — NICOTINE POLACRILEX 8 MG: 4 GUM, CHEWING ORAL at 10:55

## 2022-03-11 RX ADMIN — CHOLECALCIFEROL TAB 25 MCG (1000 UNIT) 25 MCG: 25 TAB at 08:12

## 2022-03-11 RX ADMIN — PREGABALIN 300 MG: 100 CAPSULE ORAL at 14:12

## 2022-03-11 RX ADMIN — NICOTINE POLACRILEX 8 MG: 4 GUM, CHEWING ORAL at 17:43

## 2022-03-11 RX ADMIN — NAPROXEN 500 MG: 500 TABLET ORAL at 08:14

## 2022-03-11 RX ADMIN — ACETAMINOPHEN 975 MG: 325 TABLET, FILM COATED ORAL at 12:19

## 2022-03-11 RX ADMIN — PRAZOSIN HYDROCHLORIDE 2 MG: 2 CAPSULE ORAL at 22:14

## 2022-03-11 RX ADMIN — PANTOPRAZOLE SODIUM 40 MG: 40 TABLET, DELAYED RELEASE ORAL at 08:12

## 2022-03-11 RX ADMIN — DOCUSATE SODIUM 100 MG: 100 CAPSULE, LIQUID FILLED ORAL at 08:12

## 2022-03-11 RX ADMIN — NICOTINE POLACRILEX 8 MG: 4 GUM, CHEWING ORAL at 12:34

## 2022-03-11 RX ADMIN — PROPRANOLOL HYDROCHLORIDE 20 MG: 20 TABLET ORAL at 20:10

## 2022-03-11 RX ADMIN — ACETAMINOPHEN 975 MG: 325 TABLET, FILM COATED ORAL at 08:11

## 2022-03-11 RX ADMIN — NICOTINE POLACRILEX 8 MG: 4 GUM, CHEWING ORAL at 09:40

## 2022-03-11 RX ADMIN — NICOTINE POLACRILEX 8 MG: 4 GUM, CHEWING ORAL at 08:14

## 2022-03-11 RX ADMIN — PREGABALIN 300 MG: 100 CAPSULE ORAL at 08:12

## 2022-03-11 RX ADMIN — TOPIRAMATE 50 MG: 50 TABLET ORAL at 20:10

## 2022-03-11 RX ADMIN — ACETAMINOPHEN 975 MG: 325 TABLET, FILM COATED ORAL at 22:13

## 2022-03-11 RX ADMIN — ACETAMINOPHEN 975 MG: 325 TABLET, FILM COATED ORAL at 17:43

## 2022-03-11 ASSESSMENT — ACTIVITIES OF DAILY LIVING (ADL)
DRESS: SCRUBS (BEHAVIORAL HEALTH);INDEPENDENT
HYGIENE/GROOMING: INDEPENDENT
DRESS: INDEPENDENT
ORAL_HYGIENE: INDEPENDENT
HYGIENE/GROOMING: INDEPENDENT
LAUNDRY: UNABLE TO COMPLETE
ORAL_HYGIENE: INDEPENDENT

## 2022-03-11 NOTE — PLAN OF CARE
03/11/22 1311   Group Therapy Session   Group Attendance attended group session   Total Time patient participated (minutes) 45   Total # Attendees 7   Group Type   (Occupational Therapy)   Group Topic Covered cognitive activities;coping skills/lifestyle management;problem-solving;structured socialization   Pt attended and actively participated in Occupational Therapy clinic group for 45 minutes with 7 pts to facilitate coping skill exploration, use of cognitive skills and problem solving, creative expression, clinical observation and facilitation of social, cognitive, and kinesthetic performance skills. Pt response:  He was pleasant on approach. Sat quietly and spent time planning his next steps on task. He stated he is working through his pain and is choosing to not take medication for the pain. He explained resting and putting his leg up helps though declined to put his leg up on a chair in group.

## 2022-03-11 NOTE — PLAN OF CARE
"Goal Outcome Evaluation:    Plan of Care Reviewed With: patient     Patient has been isolative and withdrawn. He presents as guarded with flat affect.  His mood is calm. He denies SI and SIB. He endorses anxiety and depression. He endorses pain in right knee which is controlled by scheduled medications. He requested and was given nicotine gum. He stated he is \"cutting back\" on the gum and \"everything else\". He did not attend groups. He continues to wear right leg brace. He is social with select peers and staff. He is medication compliant. He voices no concerns.                "

## 2022-03-11 NOTE — PLAN OF CARE
"  Problem: Suicidal Behavior  Goal: Suicidal Behavior is Absent or Managed  Outcome: Ongoing, Progressing     Problem: Activity and Energy Impairment (Depressive Signs/Symptoms)  Goal: Optimized Energy Level (Depressive Signs/Symptoms)  Outcome: Ongoing, Progressing  Intervention: Optimize Energy Level  Recent Flowsheet Documentation  Taken 3/11/2022 1000 by Bernard Mancera RN  Activity (Behavioral Health): up ad kathleen     Problem: Feelings of Worthlessness, Hopelessness or Excessive Guilt (Depressive Signs/Symptoms)  Goal: Enhanced Self-Esteem and Confidence (Depressive Signs/Symptoms)  Outcome: Ongoing, Progressing   Goal Outcome Evaluation:    Plan of Care Reviewed With: patient       Patient is calm cooperative, medication compliant. Isolates to room for the majority of the shift. Attended at least 1 group and came out of room for meals. Denies SI SIB, endorses depression and anxiety. Patient had UA sent to lab today for drugs of abuse, patient reported to his psychiatrist he wasn't feeling right and was paranoid that someone was slipping him something with his pills. Patient does have insight to his thoughts being paranoid but wanted the UA screen done, results came back negative for all drugs of abuse tested. Prn Zyprexa added for increased paranoia but patient stated \"I don't want anything to do with that\".   Held propranolol and amlodipine this morning, standing BP 90/59. Good appetite, ate 100% of meals. Topamax decreased today from 100mg to 50mg. Declined afternoon campral d/t irritated stomach, wants to see if this medication is contributing to his symptoms.  "

## 2022-03-11 NOTE — PROGRESS NOTES
"Patient seen, chart reviewed, care discussed with staff.  Blood pressure 112/74, pulse 88, temperature 97.3  F (36.3  C), temperature source Temporal, resp. rate 16, weight 136.6 kg (301 lb 3.2 oz), SpO2 98 %.    By report, isolative.  He reports feeling his head is not working right, and notes he is experiencing some paranoia regarding friends outside of the facility.    General appearance: good  Alert.   Affect: fair  Mood: fair    Speech:  normal.   Eye contact:  good.    Psychomotor behavior: normal  Gait: steady with walker  Abnormal movements:  none  Delusions: he reports some paranoia  Hallucinations:  none  Thoughts: linwar  Associations: intact  Judgement: good  Insight: good  Cognitions: intact in conversation  Memory:  intact in conversation  Orientation: normal    Not suicidal.    Imp:  Possible effects of Topamax  2.  Possible bipolar flare-up with ongoing stress  3.  Bipolar depressed, PTSD, TBI, Chemical dependency  And:   Patient Active Problem List   Diagnosis     Post concussive encephalopathy     H/O shoulder surgery     Alcoholism in remission (H)     Bilateral ACL tear     Chronic back pain     Social anxiety disorder     Alcohol withdrawal syndrome with complication, with unspecified complication (H)     Rib fracture     Alcohol withdrawal (H)     Alcohol dependence (H)     LFT elevation     Laceration of left hand without foreign body, initial encounter     Suicidal ideation     Intentional drug overdose, initial encounter (H)     Alcoholic intoxication with complication (H)     Severe bipolar I disorder, current or most recent episode depressed, with mixed features (H)     Medication overdose, intentional self-harm, subsequent encounter     Opioid dependence on agonist therapy (H)     2019 novel coronavirus disease (COVID-19)     Pneumonia due to COVID-19 virus     Bipolar disorder (H)       Plan: Drug screen at his request, he wonders if someone is \"slipping something to him  2.  Decrease " Topamax  3.  He will decrease Caffeine    Current Facility-Administered Medications   Medication     acamprosate (CAMPRAL) EC tablet 666 mg     acetaminophen (TYLENOL) tablet 975 mg     albuterol (PROVENTIL HFA/VENTOLIN HFA) inhaler     alum & mag hydroxide-simethicone (MAALOX) suspension 30 mL     amLODIPine (NORVASC) tablet 5 mg     artificial saliva (BIOTENE MT) solution 2 spray     cloNIDine (CATAPRES) tablet 0.2 mg     diphenhydrAMINE (BENADRYL) capsule 50 mg     docusate sodium (COLACE) capsule 100 mg     folic acid (FOLVITE) tablet 1 mg     hydrocortisone (CORTAID) 1 % cream     ketamine 5%-gabapentin 8% in PLO cream 0.25 g     levothyroxine (SYNTHROID/LEVOTHROID) tablet 75 mcg     [START ON 3/12/2022] liothyronine (CYTOMEL) tablet 25 mcg     loperamide (IMODIUM) capsule 2 mg     melatonin tablet 3 mg     multivitamin w/minerals (THERA-VIT-M) tablet 1 tablet     naloxone (NARCAN) injection 0.2 mg    Or     naloxone (NARCAN) injection 0.4 mg    Or     naloxone (NARCAN) injection 0.2 mg    Or     naloxone (NARCAN) injection 0.4 mg     naproxen (NAPROSYN) tablet 500 mg     nicotine polacrilex (NICORETTE) gum 4-8 mg     OLANZapine (zyPREXA) tablet 10 mg    Or     OLANZapine (zyPREXA) injection 10 mg     pantoprazole (PROTONIX) EC tablet 40 mg     prazosin (MINIPRESS) capsule 2 mg     pregabalin (LYRICA) capsule 300 mg     propranolol (INDERAL) tablet 20 mg     QUEtiapine (SEROquel) tablet 700 mg     sennosides (SENOKOT) tablet 1-2 tablet     thiamine (B-1) tablet 100 mg     tiZANidine (ZANAFLEX) tablet 4 mg     topiramate (TOPAMAX) tablet 50 mg     Vitamin D3 (CHOLECALCIFEROL) tablet 25 mcg     Recent Results (from the past 168 hour(s))   Asymptomatic COVID-19 Virus (Coronavirus) by PCR Nose    Collection Time: 03/05/22  1:41 PM    Specimen: Nose; Swab   Result Value Ref Range    SARS CoV2 PCR Negative Negative

## 2022-03-11 NOTE — PROGRESS NOTES
"SPIRITUAL HEALTH SERVICES  SPIRITUAL ASSESSMENT Progress Note  [facility] ([facility] - [place], [place]) 3BW     REFERRAL SOURCE: follow up    Check in visit with Jose M. Provided 4 new library books; pt returned 6.  Jose M stated today is \"so-so\" and that he is planning to nap this afternoon.    Plan: I will follow up next week.    Liss Kerns MDiv  Associate   Pager [phone]  Office [phone]  [facility] remains available 24/7 for emergent requests/referrals, either by having the switchboard page the on-call  or by entering an ASAP/STAT consult in Epic (this will also page the on-call ). Routine Epic consults receive an initial response within 24 hours.    "

## 2022-03-11 NOTE — PLAN OF CARE
Goal Outcome Evaluation:      Patient slept on and off for 5.5 hours. No complaints of pain on the aread areas (right knee and right forearm). Nothing unusual noted. He uses a medical bed for mobility issues. He has a diagnosis of bursitis on his left arm.

## 2022-03-12 PROCEDURE — 250N000013 HC RX MED GY IP 250 OP 250 PS 637: Performed by: NURSE PRACTITIONER

## 2022-03-12 PROCEDURE — 124N000003 HC R&B MH SENIOR/ADOLESCENT

## 2022-03-12 PROCEDURE — 250N000013 HC RX MED GY IP 250 OP 250 PS 637: Performed by: PSYCHIATRY & NEUROLOGY

## 2022-03-12 PROCEDURE — 250N000013 HC RX MED GY IP 250 OP 250 PS 637: Performed by: PHYSICIAN ASSISTANT

## 2022-03-12 RX ORDER — PRAZOSIN HYDROCHLORIDE 1 MG/1
1 CAPSULE ORAL AT BEDTIME
Status: DISCONTINUED | OUTPATIENT
Start: 2022-03-12 | End: 2022-03-14

## 2022-03-12 RX ADMIN — DIPHENHYDRAMINE HYDROCHLORIDE 50 MG: 25 CAPSULE ORAL at 19:09

## 2022-03-12 RX ADMIN — ACETAMINOPHEN 975 MG: 325 TABLET, FILM COATED ORAL at 19:08

## 2022-03-12 RX ADMIN — LIOTHYRONINE SODIUM 25 MCG: 25 TABLET ORAL at 08:34

## 2022-03-12 RX ADMIN — ACETAMINOPHEN 975 MG: 325 TABLET, FILM COATED ORAL at 12:11

## 2022-03-12 RX ADMIN — MULTIPLE VITAMINS W/ MINERALS TAB 1 TABLET: TAB at 08:35

## 2022-03-12 RX ADMIN — PREGABALIN 300 MG: 100 CAPSULE ORAL at 13:38

## 2022-03-12 RX ADMIN — ACAMPROSATE CALCIUM 666 MG: 333 TABLET, DELAYED RELEASE ORAL at 08:34

## 2022-03-12 RX ADMIN — NICOTINE POLACRILEX 8 MG: 4 GUM, CHEWING ORAL at 12:11

## 2022-03-12 RX ADMIN — NICOTINE POLACRILEX 8 MG: 4 GUM, CHEWING ORAL at 19:09

## 2022-03-12 RX ADMIN — QUETIAPINE 700 MG: 300 TABLET, FILM COATED ORAL at 22:02

## 2022-03-12 RX ADMIN — NICOTINE POLACRILEX 8 MG: 4 GUM, CHEWING ORAL at 17:23

## 2022-03-12 RX ADMIN — FOLIC ACID 1 MG: 1 TABLET ORAL at 08:35

## 2022-03-12 RX ADMIN — NICOTINE POLACRILEX 8 MG: 4 GUM, CHEWING ORAL at 21:28

## 2022-03-12 RX ADMIN — THIAMINE HCL TAB 100 MG 100 MG: 100 TAB at 08:35

## 2022-03-12 RX ADMIN — NAPROXEN 500 MG: 500 TABLET ORAL at 17:22

## 2022-03-12 RX ADMIN — TOPIRAMATE 50 MG: 50 TABLET ORAL at 08:34

## 2022-03-12 RX ADMIN — ACETAMINOPHEN 975 MG: 325 TABLET, FILM COATED ORAL at 08:34

## 2022-03-12 RX ADMIN — PROPRANOLOL HYDROCHLORIDE 20 MG: 20 TABLET ORAL at 19:09

## 2022-03-12 RX ADMIN — NAPROXEN 500 MG: 500 TABLET ORAL at 08:34

## 2022-03-12 RX ADMIN — PREGABALIN 300 MG: 100 CAPSULE ORAL at 08:34

## 2022-03-12 RX ADMIN — LEVOTHYROXINE SODIUM 75 MCG: 75 TABLET ORAL at 08:34

## 2022-03-12 RX ADMIN — PREGABALIN 300 MG: 100 CAPSULE ORAL at 19:09

## 2022-03-12 RX ADMIN — NICOTINE POLACRILEX 8 MG: 4 GUM, CHEWING ORAL at 13:38

## 2022-03-12 RX ADMIN — ACAMPROSATE CALCIUM 666 MG: 333 TABLET, DELAYED RELEASE ORAL at 19:08

## 2022-03-12 RX ADMIN — PRAZOSIN HYDROCHLORIDE 1 MG: 1 CAPSULE ORAL at 22:02

## 2022-03-12 RX ADMIN — NICOTINE POLACRILEX 8 MG: 4 GUM, CHEWING ORAL at 08:35

## 2022-03-12 RX ADMIN — CHOLECALCIFEROL TAB 25 MCG (1000 UNIT) 25 MCG: 25 TAB at 08:35

## 2022-03-12 RX ADMIN — TOPIRAMATE 50 MG: 50 TABLET ORAL at 19:08

## 2022-03-12 RX ADMIN — ACETAMINOPHEN 975 MG: 325 TABLET, FILM COATED ORAL at 22:02

## 2022-03-12 RX ADMIN — PANTOPRAZOLE SODIUM 40 MG: 40 TABLET, DELAYED RELEASE ORAL at 08:35

## 2022-03-12 ASSESSMENT — ACTIVITIES OF DAILY LIVING (ADL)
ORAL_HYGIENE: INDEPENDENT
HYGIENE/GROOMING: INDEPENDENT
LAUNDRY: UNABLE TO COMPLETE
DRESS: INDEPENDENT;SCRUBS (BEHAVIORAL HEALTH)

## 2022-03-12 NOTE — PLAN OF CARE
"Goal Outcome Evaluation:    Problem: Mood Impairment (Depressive Signs/Symptoms)  Goal: Improved Mood Symptoms (Depressive Signs/Symptoms)  Outcome: Ongoing, Progressing     Patient came out for breakfast and medications this morning. Good appetite. Orthostatic BP: 105/63 to 79/53; BP medications were held. Internal medicine was informed. Patient was informed to increase his fluid intake which he agreed.    Staff when to recheck his BP, but patient was sleeping with a \"do not disturb\" not outside the door. BP was later rechecked: 146/91 sitting and 129/89 standing.    Patient appeared flat and withdrawn.    We'll continue to monitor.  "

## 2022-03-12 NOTE — PLAN OF CARE
Goal Outcome Evaluation:    Plan of Care Reviewed With: patient     Patient has been isolative and withdrawn. He does not socialize with peers and staff. He presents as guarded with flat affect. His mood is calm. He denies SI and SIB. He did not want to check in with this writer. He has not appeared paranoid nor made paranoid statements. He did not attend groups. [facility] and [facility] to [facility]. He endorses pain right knee which is managed with scheduled medication. He requested and was given benadryl for congestion which was effective. He is medication compliant. He voices no concerns.

## 2022-03-12 NOTE — PLAN OF CARE
Goal Outcome Evaluation:        Patient slept well the whole shift for 7 hours. He is using a medical bed for mobility issues. Patient has hx of knee surgery on his right and has a diagnosis of bursitis right arm. No complaints of pain the whole night.

## 2022-03-12 NOTE — CONSULTS
BRIEF MEDICINE NOTE    Paged by RN due to orthostatic hypotension with BP decrease from 105/66 sitting and 79/53 standing. HR 89 (change with standing not known).     #Orthostatic hypotension  Patient currently on propanolol, amlodipine and prazosin. Prazosin increased from 1mg at bedtime to 2 mg at bedtime on 2/28. Increased dose of prazosin likely contributing to orthostasis.    -Decrease prazosin back to 1mg at bedtime   -If remains orthostatic, consider stopping prazosin   -Encourage fluid intake  -Repeat orthostatic vital signs now and in am    Medicine will continue to follow BP peripherally. Please contact with any questions.     Shahrzad Johansen PA-C  Internal Medicine [facility] [place] [facility]  [facility]  Pager [phone]

## 2022-03-13 PROCEDURE — 124N000003 HC R&B MH SENIOR/ADOLESCENT

## 2022-03-13 PROCEDURE — 250N000013 HC RX MED GY IP 250 OP 250 PS 637: Performed by: PSYCHIATRY & NEUROLOGY

## 2022-03-13 PROCEDURE — 250N000013 HC RX MED GY IP 250 OP 250 PS 637: Performed by: NURSE PRACTITIONER

## 2022-03-13 RX ORDER — AMLODIPINE BESYLATE 2.5 MG/1
2.5 TABLET ORAL DAILY
Status: DISCONTINUED | OUTPATIENT
Start: 2022-03-14 | End: 2022-03-13

## 2022-03-13 RX ADMIN — NICOTINE POLACRILEX 4 MG: 4 GUM, CHEWING ORAL at 19:48

## 2022-03-13 RX ADMIN — ACAMPROSATE CALCIUM 666 MG: 333 TABLET, DELAYED RELEASE ORAL at 07:59

## 2022-03-13 RX ADMIN — PREGABALIN 300 MG: 100 CAPSULE ORAL at 20:40

## 2022-03-13 RX ADMIN — TOPIRAMATE 50 MG: 50 TABLET ORAL at 07:59

## 2022-03-13 RX ADMIN — NICOTINE POLACRILEX 8 MG: 4 GUM, CHEWING ORAL at 10:22

## 2022-03-13 RX ADMIN — MULTIPLE VITAMINS W/ MINERALS TAB 1 TABLET: TAB at 07:59

## 2022-03-13 RX ADMIN — ACETAMINOPHEN 975 MG: 325 TABLET, FILM COATED ORAL at 12:38

## 2022-03-13 RX ADMIN — FOLIC ACID 1 MG: 1 TABLET ORAL at 07:59

## 2022-03-13 RX ADMIN — LEVOTHYROXINE SODIUM 75 MCG: 75 TABLET ORAL at 07:59

## 2022-03-13 RX ADMIN — PANTOPRAZOLE SODIUM 40 MG: 40 TABLET, DELAYED RELEASE ORAL at 07:59

## 2022-03-13 RX ADMIN — ACETAMINOPHEN 975 MG: 325 TABLET, FILM COATED ORAL at 08:53

## 2022-03-13 RX ADMIN — NICOTINE POLACRILEX 8 MG: 4 GUM, CHEWING ORAL at 16:03

## 2022-03-13 RX ADMIN — LIOTHYRONINE SODIUM 25 MCG: 25 TABLET ORAL at 07:59

## 2022-03-13 RX ADMIN — PREGABALIN 300 MG: 100 CAPSULE ORAL at 14:55

## 2022-03-13 RX ADMIN — ACETAMINOPHEN 975 MG: 325 TABLET, FILM COATED ORAL at 22:21

## 2022-03-13 RX ADMIN — THIAMINE HCL TAB 100 MG 100 MG: 100 TAB at 07:59

## 2022-03-13 RX ADMIN — NICOTINE POLACRILEX 8 MG: 4 GUM, CHEWING ORAL at 14:55

## 2022-03-13 RX ADMIN — CHOLECALCIFEROL TAB 25 MCG (1000 UNIT) 25 MCG: 25 TAB at 07:59

## 2022-03-13 RX ADMIN — NAPROXEN 500 MG: 500 TABLET ORAL at 07:59

## 2022-03-13 RX ADMIN — NICOTINE POLACRILEX 8 MG: 4 GUM, CHEWING ORAL at 12:38

## 2022-03-13 RX ADMIN — QUETIAPINE 700 MG: 300 TABLET, FILM COATED ORAL at 22:21

## 2022-03-13 RX ADMIN — DOCUSATE SODIUM 100 MG: 100 CAPSULE, LIQUID FILLED ORAL at 07:59

## 2022-03-13 RX ADMIN — PREGABALIN 300 MG: 100 CAPSULE ORAL at 07:59

## 2022-03-13 RX ADMIN — NICOTINE POLACRILEX 8 MG: 4 GUM, CHEWING ORAL at 07:59

## 2022-03-13 NOTE — PLAN OF CARE
Goal Outcome Evaluation:    Patient continues to appear flat and withdrawn. He has been partially isolative to his room. Witnessed reading a book in his room. Patient is still guarded.    Good appetite. Medication compliant with most of his medications; denied having medication side effects. Patient refused 2pm campral. He did not receive his BP medications due to his BP dropping below parameters. Internal medicine is aware. Continue to encourage fluids and monitor patient closely. Denied feeling dizzy.    We'll continue to monitor.

## 2022-03-13 NOTE — CONSULTS
BRIEF MEDICINE NOTE    CC: F/U orthostatic hypotension    Called nursing for orthostatic vital signs today after decreasing dose of prazosin:   Sitting-/72 HR 79    Standing-BP94/66  HR96     #Orthostatic hypotension  Patient remains orthostatic but improved with decrease dose of prazosin 3/12. Currently on propanolol, amlodipine and prazosin.  Discussed with psychiatry 3/12. Reviewed  MAR-patient not receiving amlodipine last several days since he did not meet parameters and BP remains controlled.   -Cont propanolol  -Prazosin decreased to 1mg at bedtime 3/12  -Stop norvasc (not receiving last several days since did not meet parameters)  -If remains orthostatic, consider stopping prazosin   -Encourage fluid intake  -Repeat orthostatic vital signs in am    E-prescribed medications need to be reviewed and updated before patient discharged due to several changes    Medicine will continue to follow BP peripherally. Please contact with any questions.     Shahrzad Johansen PA-C  Internal Medicine [facility]  [facility]  Pager [phone]

## 2022-03-13 NOTE — PLAN OF CARE
Goal Outcome Evaluation:      Patient slept well the whole night for 7.25 hours. No complaints of pain on his right knee and left arm. Patient still using a medical bed for mobility issues.

## 2022-03-13 NOTE — PLAN OF CARE
"Goal Outcome Evaluation:    Patient presents as guarded and tense at times. His affect is flat. His mood is calm. When this writer attempted to check in with patient asking him how he was doing he stated \"I'm here and not gnawing at my hands yet so I guess its going\". He is isolative and withdrawn. He spent the shift in his room with a \"Do not disturb\" sign on his door. He ate 100% of his dinner. He endorses pain right knee. He is medication compliant. He is not social with peers or staff. He did not attend groups.                    "

## 2022-03-14 LAB — SARS-COV-2 RNA RESP QL NAA+PROBE: NEGATIVE

## 2022-03-14 PROCEDURE — 250N000013 HC RX MED GY IP 250 OP 250 PS 637: Performed by: PSYCHIATRY & NEUROLOGY

## 2022-03-14 PROCEDURE — 87635 SARS-COV-2 COVID-19 AMP PRB: CPT | Performed by: PSYCHIATRY & NEUROLOGY

## 2022-03-14 PROCEDURE — 124N000003 HC R&B MH SENIOR/ADOLESCENT

## 2022-03-14 PROCEDURE — L1820 KO ELAS W/ CONDYLE PADS & JO: HCPCS

## 2022-03-14 RX ORDER — QUETIAPINE FUMARATE 100 MG/1
100 TABLET, FILM COATED ORAL EVERY 12 HOURS PRN
Status: DISCONTINUED | OUTPATIENT
Start: 2022-03-14 | End: 2022-03-24 | Stop reason: HOSPADM

## 2022-03-14 RX ORDER — PROPRANOLOL HYDROCHLORIDE 20 MG/1
20 TABLET ORAL
Status: DISCONTINUED | OUTPATIENT
Start: 2022-03-14 | End: 2022-03-22

## 2022-03-14 RX ORDER — QUETIAPINE FUMARATE 300 MG/1
600 TABLET, FILM COATED ORAL AT BEDTIME
Status: DISCONTINUED | OUTPATIENT
Start: 2022-03-14 | End: 2022-03-24 | Stop reason: HOSPADM

## 2022-03-14 RX ADMIN — LEVOTHYROXINE SODIUM 75 MCG: 75 TABLET ORAL at 08:23

## 2022-03-14 RX ADMIN — NICOTINE POLACRILEX 8 MG: 4 GUM, CHEWING ORAL at 10:25

## 2022-03-14 RX ADMIN — PREGABALIN 300 MG: 100 CAPSULE ORAL at 13:52

## 2022-03-14 RX ADMIN — NICOTINE POLACRILEX 8 MG: 4 GUM, CHEWING ORAL at 17:09

## 2022-03-14 RX ADMIN — ACETAMINOPHEN 975 MG: 325 TABLET, FILM COATED ORAL at 12:18

## 2022-03-14 RX ADMIN — LIOTHYRONINE SODIUM 25 MCG: 25 TABLET ORAL at 08:23

## 2022-03-14 RX ADMIN — NICOTINE POLACRILEX 8 MG: 4 GUM, CHEWING ORAL at 08:27

## 2022-03-14 RX ADMIN — NICOTINE POLACRILEX 8 MG: 4 GUM, CHEWING ORAL at 12:18

## 2022-03-14 RX ADMIN — PREGABALIN 300 MG: 100 CAPSULE ORAL at 08:23

## 2022-03-14 RX ADMIN — NAPROXEN 500 MG: 500 TABLET ORAL at 17:09

## 2022-03-14 RX ADMIN — NICOTINE POLACRILEX 8 MG: 4 GUM, CHEWING ORAL at 18:57

## 2022-03-14 RX ADMIN — PROPRANOLOL HYDROCHLORIDE 20 MG: 20 TABLET ORAL at 17:09

## 2022-03-14 RX ADMIN — PREGABALIN 300 MG: 100 CAPSULE ORAL at 20:15

## 2022-03-14 RX ADMIN — QUETIAPINE 600 MG: 300 TABLET, FILM COATED ORAL at 21:49

## 2022-03-14 RX ADMIN — ACETAMINOPHEN 975 MG: 325 TABLET, FILM COATED ORAL at 21:49

## 2022-03-14 RX ADMIN — NICOTINE POLACRILEX 8 MG: 4 GUM, CHEWING ORAL at 13:52

## 2022-03-14 RX ADMIN — NICOTINE POLACRILEX 8 MG: 4 GUM, CHEWING ORAL at 20:15

## 2022-03-14 ASSESSMENT — ACTIVITIES OF DAILY LIVING (ADL)
LAUNDRY: WITH SUPERVISION
DRESS: SCRUBS (BEHAVIORAL HEALTH)
HYGIENE/GROOMING: INDEPENDENT
ORAL_HYGIENE: INDEPENDENT
LAUNDRY: WITH SUPERVISION
ORAL_HYGIENE: INDEPENDENT
DRESS: SCRUBS (BEHAVIORAL HEALTH)
HYGIENE/GROOMING: INDEPENDENT

## 2022-03-14 NOTE — PROGRESS NOTES
"SPIRITUAL HEALTH SERVICES   Spiritual Assessment Progress Note (Behavioral Health/CD Focus)  [facility] ([facility]) 3BW    REFERRAL SOURCE: follow up    On this visit, I met with Jose M in the lounge. Provided 4 library books, engaged in reflective conversation around coping, grief, and experience of hospitalization. Shared prayer at pt request.    EXPERIENCE OF ILLNESS/HOSPITALIZATION:  Jose M checked instating the past weekend was \"good for the most part,\" spoke of grief and thinking often of his mom,     MEANING-MAKING:  Jose M spoke of the challenge of being in the hospital this long, and choosing to \"work on myself\" while he's here: responding to stress and coping skills that will \"serve me well out there.\" Writer affirmed Jose M's sense of accomplishment, feeling good about his progress.    SPIRITUALITY/VALUES/Judaism:  Jose M asked about \"how to stay focused when I pray\" as a topic for spirituality group     COPING/SPIRITUAL PRACTICES: reading, cribbage, \"working on my thoughts\"    SUPPORT SYSTEMS: his sister    PLAN: I will follow up 1-2x weekly.                                                                                                                                             Liss Kerns MDiv  Associate   Pager [phone]  Office [phone]  [facility] remains available 24/7 for emergent requests/referrals, either by having the switchboard page the on-call  or by entering an ASAP/STAT consult in Epic (this will also page the on-call ). Routine Epic consults receive an initial response within 24 hours.    "

## 2022-03-14 NOTE — PROGRESS NOTES
"A few days ago, writer allowed pt to use the unit cellphone to talk to his sister because one of the unit phones was occupied by a very disruptive pt. Writer asked pt to sit in the [facility] Quiet Room (348) to talk, as pt's aren't to have the unit cellphone in the pt rooms. Pt was somewhat hesitant and asked why, and writer explained that the phone isn't allowed pt rooms. Pt seemed to accept the reasoning and spoke with his sister without problem.    Tonight, pt requested to speak with writer. Writer approached pt and pt appeared guarded, tense and paranoid, which is an uncommon when writer interacts with pt. Pt asked about the incident with the phone, when he was asked to use the phone in the [facility] Quiet Room. Pt asked, \"why did you have me talk in that room and not my bedroom?\" Writer explained again that staff doesn't allow the unit cellphones in pt rooms. Writer asked what the concern was about being in that room, and pt started to talk about how staff had talked to pt about something that the pt had talked to his sister about on that phone call. Writer assured pt that nothing was being recorded, and pt stated that he didn't think something was recorded, but that \"there is a computer and a telephone in that room and a fucking intercom that can be listened to\". Pt became more tense throughout the conversation. Writer again assured pt that that's not what staff does here on the unit and that would be inappropriate. Pt stated, \"well, it's either I believe my sister or I believe you, and, well, I believe my sister\". Pt stood up to close the door and started to close the door on writer before writer got out of the way.  "

## 2022-03-14 NOTE — PLAN OF CARE
Goal Outcome Evaluation:      Patient slept well the whole night for 5.75 hours. No complaints made. He uses a medical bed for mobility issues.

## 2022-03-14 NOTE — PLAN OF CARE
"Goal Outcome Evaluation:    Patient remains isolative and withdrawn. His affect is flat, tense. His mood is sad, calm. He is guarded and refused to check in with this writer. He is not social with with peers and staff. He did come to the dining room for food and TV. He did not attend groups. He refused all medications except Lyrica, Seroquel and tylenol. He stated \"I need to talk to the provider tomorrow to take away a lot of my meds, I need to get my brain cleared\". He stated he also wants to cut back on Seroquel but need it for sleep.   Nicorette gum given as requested.                     "

## 2022-03-14 NOTE — PROGRESS NOTES
Patient seen, chart reviewed, care discussed with staff.  Some increased paranoia, mild, and staff note more tension    Blood pressure 137/85, pulse 99, temperature 97.8  F (36.6  C), temperature source Temporal, resp. rate 18, weight 137.3 kg (302 lb 11.2 oz), SpO2 97 %.    General appearance: good  Alert.   Affect: fair  Mood: fair    Speech:  normal.   Eye contact:  good.    Psychomotor behavior: normal  Gait: steady with walker  Abnormal movements:  none  Delusions: Concerned the psych associate may have been listening,  Hallucinations: none  Thoughts: logical  Associations: intact  Judgement: good  Insight: good  Cognitions: intact in conversation  Memory:  intact in conversation  Orientation: normal    Not suicidal.    We discussed the paranoia; it is mild and he is logical overall.  I feel it might be the combo of feeling cloudy thinking plus opiate withdrawal or post withdrawal.  Imp:  Bipolar depressed, PTSD, post TBI, pain,  and:  Patient Active Problem List   Diagnosis     Post concussive encephalopathy     H/O shoulder surgery     Alcoholism in remission (H)     Bilateral ACL tear     Chronic back pain     Social anxiety disorder     Alcohol withdrawal syndrome with complication, with unspecified complication (H)     Rib fracture     Alcohol withdrawal (H)     Alcohol dependence (H)     LFT elevation     Laceration of left hand without foreign body, initial encounter     Suicidal ideation     Intentional drug overdose, initial encounter (H)     Alcoholic intoxication with complication (H)     Severe bipolar I disorder, current or most recent episode depressed, with mixed features (H)     Medication overdose, intentional self-harm, subsequent encounter     Opioid dependence on agonist therapy (H)     2019 novel coronavirus disease (COVID-19)     Pneumonia due to COVID-19 virus     Bipolar disorder (H)     Plan: 1.  Stop Prazosin, Topamax, and decrease Propranolol to help clear thinking  2.  Decrease  Seroquel as he feels cloudy, have a prn available  3.  Consult re: soft brace for exercising    Current Facility-Administered Medications   Medication     acamprosate (CAMPRAL) EC tablet 666 mg     acetaminophen (TYLENOL) tablet 975 mg     albuterol (PROVENTIL HFA/VENTOLIN HFA) inhaler     alum & mag hydroxide-simethicone (MAALOX) suspension 30 mL     artificial saliva (BIOTENE MT) solution 2 spray     cloNIDine (CATAPRES) tablet 0.2 mg     diphenhydrAMINE (BENADRYL) capsule 50 mg     docusate sodium (COLACE) capsule 100 mg     hydrocortisone (CORTAID) 1 % cream     ketamine 5%-gabapentin 8% in PLO cream 0.25 g     levothyroxine (SYNTHROID/LEVOTHROID) tablet 75 mcg     liothyronine (CYTOMEL) tablet 25 mcg     loperamide (IMODIUM) capsule 2 mg     melatonin tablet 3 mg     multivitamin w/minerals (THERA-VIT-M) tablet 1 tablet     naloxone (NARCAN) injection 0.2 mg    Or     naloxone (NARCAN) injection 0.4 mg    Or     naloxone (NARCAN) injection 0.2 mg    Or     naloxone (NARCAN) injection 0.4 mg     naproxen (NAPROSYN) tablet 500 mg     nicotine polacrilex (NICORETTE) gum 4-8 mg     OLANZapine (zyPREXA) tablet 10 mg    Or     OLANZapine (zyPREXA) injection 10 mg     pantoprazole (PROTONIX) EC tablet 40 mg     pregabalin (LYRICA) capsule 300 mg     propranolol (INDERAL) tablet 20 mg     QUEtiapine (SEROquel) tablet 100 mg     QUEtiapine (SEROquel) tablet 600 mg     sennosides (SENOKOT) tablet 1-2 tablet     tiZANidine (ZANAFLEX) tablet 4 mg     Vitamin D3 (CHOLECALCIFEROL) tablet 25 mcg     Recent Results (from the past 168 hour(s))   Drug abuse screen 6 urine (chem dep) ([facility])    Collection Time: 03/11/22 11:48 AM   Result Value Ref Range    Amphetamines Urine Screen Negative Screen Negative    Barbiturates Urine Screen Negative Screen Negative    Benzodiazepines Urine Screen Negative Screen Negative    Cannabinoids Urine Screen Negative Screen Negative    Cocaine Urine Screen Negative Screen Negative    Ethanol  Urine Screen Negative Screen Negative    Opiates Urine Screen Negative Screen Negative     Video-Visit Details    Type of service:  Video Visit    Video Start Time (time video started): 0945    Video End Time (time video stopped): 1000    Originating Location (pt. Location): [facility]    Distant Location (provider location): Provider remote location    Mode of Communication:  Video Conference via [employer]    Physician has received verbal consent for a Video Visit from the patient? Yes      Ulises Perez MD
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
## 2022-03-15 PROCEDURE — 250N000013 HC RX MED GY IP 250 OP 250 PS 637: Performed by: PSYCHIATRY & NEUROLOGY

## 2022-03-15 PROCEDURE — 124N000003 HC R&B MH SENIOR/ADOLESCENT

## 2022-03-15 PROCEDURE — G0177 OPPS/PHP; TRAIN & EDUC SERV: HCPCS

## 2022-03-15 RX ADMIN — NICOTINE POLACRILEX 8 MG: 4 GUM, CHEWING ORAL at 08:32

## 2022-03-15 RX ADMIN — NAPROXEN 500 MG: 500 TABLET ORAL at 16:19

## 2022-03-15 RX ADMIN — ACETAMINOPHEN 975 MG: 325 TABLET, FILM COATED ORAL at 12:49

## 2022-03-15 RX ADMIN — MULTIPLE VITAMINS W/ MINERALS TAB 1 TABLET: TAB at 08:15

## 2022-03-15 RX ADMIN — PREGABALIN 300 MG: 100 CAPSULE ORAL at 20:25

## 2022-03-15 RX ADMIN — NICOTINE POLACRILEX 8 MG: 4 GUM, CHEWING ORAL at 12:49

## 2022-03-15 RX ADMIN — QUETIAPINE 600 MG: 300 TABLET, FILM COATED ORAL at 22:01

## 2022-03-15 RX ADMIN — PANTOPRAZOLE SODIUM 40 MG: 40 TABLET, DELAYED RELEASE ORAL at 08:17

## 2022-03-15 RX ADMIN — PROPRANOLOL HYDROCHLORIDE 20 MG: 20 TABLET ORAL at 17:33

## 2022-03-15 RX ADMIN — ACETAMINOPHEN 975 MG: 325 TABLET, FILM COATED ORAL at 22:01

## 2022-03-15 RX ADMIN — NICOTINE POLACRILEX 8 MG: 4 GUM, CHEWING ORAL at 14:57

## 2022-03-15 RX ADMIN — ACETAMINOPHEN 975 MG: 325 TABLET, FILM COATED ORAL at 17:33

## 2022-03-15 RX ADMIN — LIOTHYRONINE SODIUM 25 MCG: 25 TABLET ORAL at 08:15

## 2022-03-15 RX ADMIN — PREGABALIN 300 MG: 100 CAPSULE ORAL at 14:30

## 2022-03-15 RX ADMIN — LEVOTHYROXINE SODIUM 75 MCG: 75 TABLET ORAL at 08:15

## 2022-03-15 RX ADMIN — PREGABALIN 300 MG: 100 CAPSULE ORAL at 08:15

## 2022-03-15 ASSESSMENT — ACTIVITIES OF DAILY LIVING (ADL)
DRESS: INDEPENDENT
ORAL_HYGIENE: INDEPENDENT
HYGIENE/GROOMING: INDEPENDENT
ORAL_HYGIENE: INDEPENDENT
LAUNDRY: WITH SUPERVISION
DRESS: SCRUBS (BEHAVIORAL HEALTH)
HYGIENE/GROOMING: INDEPENDENT

## 2022-03-15 NOTE — PROGRESS NOTES
"Behavioral Health  Note    Behavioral Health  Spirituality Group Note    UNIT 3BW    Name: Hollis Barclay YOB: 1970   MRN: [id number] Age: 51 year old      Patient attended -led group, which included discussion of spirituality, coping with illness and being present in the moment.    Patient attended group for 1.0 hrs.    The patient actively participated in group discussion and patient demonstrated an appreciation of topic's application for their personal circumstances. Participants engaged in breathing and centering practices; identified common distractions and possible solutions for \"living in the now\"      Liss Kerns MDiv  Associate   Pager [phone]  Office [phone]    "

## 2022-03-15 NOTE — PLAN OF CARE
Goal Outcome Evaluation:    Plan of Care Reviewed With: patient      Patient is tolerating intakes, denies pain. Pt took some of his med in am, the  rest where returned in Pyxis.   At noon Pt took his noon dose of oral Tylenol 975 mg.  Denies Visual or audio hallucinations, SI or SIB.  Denies anxiety or Depression.  Patient seen in programs and milieu engaging with peers.  Will continue with POC

## 2022-03-15 NOTE — PROGRESS NOTES
S: Order received to fit patient with a neoprene hinged knee brace as ordered.  O/G: Support and stabilize right knee.   A:  Patient's leg was measured and Fit with an XL gripper brace.  The fit of the knee brace is adequate. Patient instructed on wear and care of the brace. I did make the unit staff know that the metal hinges are able to be removed from the brace.   P: contact orthotics if any issues arise.   SERA Barksdale.

## 2022-03-15 NOTE — PLAN OF CARE
Goal Outcome Evaluation:      Patient slept soundly for 6.75 hours the whole shift. No complaints of pain on his affected areas ( right knee and left arm). He still needs a medical bed for mobility issues.

## 2022-03-15 NOTE — PLAN OF CARE
"Problem: Suicidal Behavior  Goal: Suicidal Behavior is Absent or Managed  Outcome: Met  Flowsheets (Taken 3/14/2022 2102)  Mutually Determined Action Steps (Suicidal Behavior Absent/Managed):   identifies protective factors   sets future-oriented goal       Pt presents with blunted, flat affect and calm mood. Denies SI/SIB and hallucinations. Does continue to endorse some depression and anxiety but would not specifically rate it. Pt appeared less tense and \"on edge\" compared to this morning. He was present more in the lounge and social with some peers. Attended community meeting and participated. Continues to have R knee pain, selective takes some of his scheduled pain medications, states that he is tolerating it on his own. VSS. Does only take select medications, see MAR, also MD made changes today to various medications. Appetite and fluid intake adequate. Not using his walker anymore. Pt is waiting on CADI waiver to be approved as he has secured placement at a facility. No other concerns or complaints noted.   "

## 2022-03-16 PROCEDURE — 250N000013 HC RX MED GY IP 250 OP 250 PS 637: Performed by: PSYCHIATRY & NEUROLOGY

## 2022-03-16 PROCEDURE — 124N000003 HC R&B MH SENIOR/ADOLESCENT

## 2022-03-16 RX ADMIN — NICOTINE POLACRILEX 8 MG: 4 GUM, CHEWING ORAL at 08:33

## 2022-03-16 RX ADMIN — LIOTHYRONINE SODIUM 25 MCG: 25 TABLET ORAL at 08:33

## 2022-03-16 RX ADMIN — NAPROXEN 500 MG: 500 TABLET ORAL at 17:53

## 2022-03-16 RX ADMIN — PANTOPRAZOLE SODIUM 40 MG: 40 TABLET, DELAYED RELEASE ORAL at 08:33

## 2022-03-16 RX ADMIN — ACETAMINOPHEN 975 MG: 325 TABLET, FILM COATED ORAL at 21:59

## 2022-03-16 RX ADMIN — PREGABALIN 300 MG: 100 CAPSULE ORAL at 13:53

## 2022-03-16 RX ADMIN — NICOTINE POLACRILEX 8 MG: 4 GUM, CHEWING ORAL at 19:01

## 2022-03-16 RX ADMIN — DIPHENHYDRAMINE HYDROCHLORIDE 50 MG: 25 CAPSULE ORAL at 17:53

## 2022-03-16 RX ADMIN — NICOTINE POLACRILEX 8 MG: 4 GUM, CHEWING ORAL at 10:47

## 2022-03-16 RX ADMIN — QUETIAPINE 600 MG: 300 TABLET, FILM COATED ORAL at 21:59

## 2022-03-16 RX ADMIN — LEVOTHYROXINE SODIUM 75 MCG: 75 TABLET ORAL at 08:33

## 2022-03-16 RX ADMIN — NICOTINE POLACRILEX 8 MG: 4 GUM, CHEWING ORAL at 13:53

## 2022-03-16 RX ADMIN — PREGABALIN 300 MG: 100 CAPSULE ORAL at 08:33

## 2022-03-16 RX ADMIN — ACETAMINOPHEN 975 MG: 325 TABLET, FILM COATED ORAL at 10:47

## 2022-03-16 RX ADMIN — MULTIPLE VITAMINS W/ MINERALS TAB 1 TABLET: TAB at 08:33

## 2022-03-16 ASSESSMENT — ACTIVITIES OF DAILY LIVING (ADL)
ORAL_HYGIENE: INDEPENDENT
DRESS: INDEPENDENT
ORAL_HYGIENE: INDEPENDENT
HYGIENE/GROOMING: INDEPENDENT
DRESS: SCRUBS (BEHAVIORAL HEALTH)
HYGIENE/GROOMING: INDEPENDENT

## 2022-03-16 NOTE — PROGRESS NOTES
Patient seen, chart reviewed, care discussed with staff.  Blood pressure 122/76, pulse 64, temperature 97.3  F (36.3  C), temperature source Temporal, resp. rate 18, weight 135.9 kg (299 lb 9.6 oz), SpO2 97 %.    By report, pleasant, a bit isolative, no recent paranoia  General appearance: good  Alert.   Affect: good  Mood: good  Speech:  normal.   Eye contact:  good.    Psychomotor behavior: normal  Gait: steady with walker  Abnormal movements:  none  Delusions: none  Hallucinations:  None  Thoughts: logical  Associations: intact  Judgement: good  Insight: good  Cognitions: intact in conversation  Memory:  intact in conversation  Orientation: normal    Not suicidal.  Paranoia resolved, he feels he is thinking clearer    Imp: Bipolar depressed, improved  2.  PTSD  3.  S/p TBI  And:   Patient Active Problem List   Diagnosis     Post concussive encephalopathy     H/O shoulder surgery     Alcoholism in remission (H)     Bilateral ACL tear     Chronic back pain     Social anxiety disorder     Alcohol withdrawal syndrome with complication, with unspecified complication (H)     Rib fracture     Alcohol withdrawal (H)     Alcohol dependence (H)     LFT elevation     Laceration of left hand without foreign body, initial encounter     Suicidal ideation     Intentional drug overdose, initial encounter (H)     Alcoholic intoxication with complication (H)     Severe bipolar I disorder, current or most recent episode depressed, with mixed features (H)     Medication overdose, intentional self-harm, subsequent encounter     Opioid dependence on agonist therapy (H)     2019 novel coronavirus disease (COVID-19)     Pneumonia due to COVID-19 virus     Bipolar disorder (H)     Plan:  Same meds    Current Facility-Administered Medications   Medication     acamprosate (CAMPRAL) EC tablet 666 mg     acetaminophen (TYLENOL) tablet 975 mg     albuterol (PROVENTIL HFA/VENTOLIN HFA) inhaler     alum & mag hydroxide-simethicone (MAALOX)  suspension 30 mL     artificial saliva (BIOTENE MT) solution 2 spray     cloNIDine (CATAPRES) tablet 0.2 mg     diphenhydrAMINE (BENADRYL) capsule 50 mg     docusate sodium (COLACE) capsule 100 mg     hydrocortisone (CORTAID) 1 % cream     ketamine 5%-gabapentin 8% in PLO cream 0.25 g     levothyroxine (SYNTHROID/LEVOTHROID) tablet 75 mcg     liothyronine (CYTOMEL) tablet 25 mcg     loperamide (IMODIUM) capsule 2 mg     melatonin tablet 3 mg     multivitamin w/minerals (THERA-VIT-M) tablet 1 tablet     naloxone (NARCAN) injection 0.2 mg    Or     naloxone (NARCAN) injection 0.4 mg    Or     naloxone (NARCAN) injection 0.2 mg    Or     naloxone (NARCAN) injection 0.4 mg     naproxen (NAPROSYN) tablet 500 mg     nicotine polacrilex (NICORETTE) gum 4-8 mg     OLANZapine (zyPREXA) tablet 10 mg    Or     OLANZapine (zyPREXA) injection 10 mg     pantoprazole (PROTONIX) EC tablet 40 mg     pregabalin (LYRICA) capsule 300 mg     propranolol (INDERAL) tablet 20 mg     QUEtiapine (SEROquel) tablet 100 mg     QUEtiapine (SEROquel) tablet 600 mg     sennosides (SENOKOT) tablet 1-2 tablet     tiZANidine (ZANAFLEX) tablet 4 mg     Vitamin D3 (CHOLECALCIFEROL) tablet 25 mcg     Recent Results (from the past 168 hour(s))   Drug abuse screen 6 urine (chem dep) ([facility])    Collection Time: 03/11/22 11:48 AM   Result Value Ref Range    Amphetamines Urine Screen Negative Screen Negative    Barbiturates Urine Screen Negative Screen Negative    Benzodiazepines Urine Screen Negative Screen Negative    Cannabinoids Urine Screen Negative Screen Negative    Cocaine Urine Screen Negative Screen Negative    Ethanol Urine Screen Negative Screen Negative    Opiates Urine Screen Negative Screen Negative   Asymptomatic COVID-19 Virus (Coronavirus) by PCR Nose    Collection Time: 03/14/22 12:39 PM    Specimen: Nose; Swab   Result Value Ref Range    SARS CoV2 PCR Negative Negative     Video-Visit Details    Type of service:  Video Visit    Video Start  Time (time video started): 1015    Video End Time (time video stopped): 1030    Originating Location (pt. Location): [email or web address]    Distant Location (provider location): Provider remote location    Mode of Communication:  Video Conference via [employer]    Physician has received verbal consent for a Video Visit from the patient? Yes      Ulises Perez MD

## 2022-03-16 NOTE — PLAN OF CARE
"Goal Outcome Evaluation:    Plan of Care Reviewed With: patient      Shift update: Jose M was initially irritated about his morning medications not given in time but then was agreeable and cooperative the rest of the morning. He answered questions appropriately, and engaged a little with staff about book recommendations. He has chronic pain and wants to spread out the scheduled pain meds throughout the day. He will ask for them when he wants them (Tylenol and Naproxen).   He said he attended groups but they were \"repeats\" and wasn't engaging. He likes to read books in his room.     Mood/Affect: flat/ mildly irritable/tense early this morning otherwise was calm/cooperative    SI: no   SIB: no     Hallucinations/Psychosis: denies, mild paranoia thoughts/statements at times    Activity/Participation: Active and appropriate in milieu     PRN Meds: nicotine gum  Appetite: good    Medical: chronic and acute pain - unchanged and stable. He has no complaints.                    "

## 2022-03-16 NOTE — PLAN OF CARE
Problem: Sleep Disturbance (Depressive Signs/Symptoms)  Goal: Improved Sleep (Depressive Signs/Symptoms)  Outcome: Ongoing, Progressing     Patient continues to use a medical bed due to mobility issues.     He woke up in the middle of the shift once and went back to sleep.  Slept a total of 6.5  hours. No complaints of pain and other behavioral issues.

## 2022-03-16 NOTE — PROGRESS NOTES
Patient seen, chart reviewed, care discussed with staff.  Blood pressure 122/76, pulse 64, temperature 97.3  F (36.3  C), temperature source Temporal, resp. rate 18, weight 135.9 kg (299 lb 9.6 oz), SpO2 97 %.    By report, isolates a bit, compliant with cares.

## 2022-03-16 NOTE — PLAN OF CARE
"Goal Outcome Evaluation:    Plan of Care Reviewed With: patient        Pt visible in milieu. Affect restricted, tense. Eye contact appropriate. Withdrawn from peers, watching TV. Reports knee pain which is tolerable. Scheduled meds such as Lyrica, Tylenol, gabapentin are helpful. States he no longer wishes to take opiates because \"a lot has changed for me since my mom ; I have a lot of big things to take care of and I want to have a clear mind for that.\" Denies SI or other safety concerns. Gait steady without walker, has walker PRN. Medical bed for impaired mobility. Knee brace on L knee. Declined following scheduled meds: Campral, Colace, Cortaid. No requests for nicotine gum. Appetite, sleep, hygiene good. Continue with current treatment plan and recommendations. Continue to monitor and reassess symptoms. Monitor response to medications. Monitor progress towards treatment goals. Encourage groups and participation.          "

## 2022-03-16 NOTE — PLAN OF CARE
"Goal Outcome Evaluation:    Plan of Care Reviewed With: patient        Pt visible in lounge, watching TV. Minimal to no interactions with peers. Eye contact appropriate. Affect blunted, somewhat tense. Slightly irritable at times. Pt stated pain in knee was tolerable. Declined propanolol stating \"my blood pressure is fine, I'm trying to phase that medication out.\"  Also declined Campral, Colace, Lyrica and one dose of Tylenol,  Appears less demanding and focused on medications in general. Fewer requests for nicotine gum. Reports stable mood; exhibits goal-oriented thinking. Thoughts linear and logical. Denies anxiety, SI, or safety concerns. Gait steady without wheeled walker. Hygiene, sleep, appetite good. Continue with current treatment plan and recommendations. Continue to monitor and reassess symptoms. Monitor response to medications. Monitor progress towards treatment goals. Encourage groups and participation.          "

## 2022-03-17 PROCEDURE — 124N000003 HC R&B MH SENIOR/ADOLESCENT

## 2022-03-17 PROCEDURE — 250N000013 HC RX MED GY IP 250 OP 250 PS 637: Performed by: PSYCHIATRY & NEUROLOGY

## 2022-03-17 RX ORDER — SALIVA STIMULANT COMB. NO.3
2 SPRAY, NON-AEROSOL (ML) MUCOUS MEMBRANE 4 TIMES DAILY PRN
Qty: 44.3 ML | Refills: 3 | Status: SHIPPED | OUTPATIENT
Start: 2022-03-17

## 2022-03-17 RX ORDER — LIOTHYRONINE SODIUM 25 UG/1
25 TABLET ORAL DAILY
Qty: 30 TABLET | Refills: 3 | Status: SHIPPED | OUTPATIENT
Start: 2022-03-18

## 2022-03-17 RX ORDER — QUETIAPINE FUMARATE 300 MG/1
600 TABLET, FILM COATED ORAL AT BEDTIME
Qty: 60 TABLET | Refills: 3 | Status: SHIPPED | OUTPATIENT
Start: 2022-03-17

## 2022-03-17 RX ORDER — PROPRANOLOL HYDROCHLORIDE 20 MG/1
20 TABLET ORAL
Qty: 30 TABLET | Refills: 3 | Status: SHIPPED | OUTPATIENT
Start: 2022-03-17 | End: 2022-03-22

## 2022-03-17 RX ORDER — ACETAMINOPHEN 325 MG/1
975 TABLET ORAL 4 TIMES DAILY
Qty: 100 TABLET | Refills: 3 | Status: SHIPPED | OUTPATIENT
Start: 2022-03-17

## 2022-03-17 RX ORDER — QUETIAPINE FUMARATE 100 MG/1
100 TABLET, FILM COATED ORAL EVERY 12 HOURS PRN
Qty: 30 TABLET | Refills: 1 | Status: SHIPPED | OUTPATIENT
Start: 2022-03-17 | End: 2022-03-24

## 2022-03-17 RX ADMIN — ACETAMINOPHEN 975 MG: 325 TABLET, FILM COATED ORAL at 17:05

## 2022-03-17 RX ADMIN — PREGABALIN 300 MG: 100 CAPSULE ORAL at 19:18

## 2022-03-17 RX ADMIN — PREGABALIN 300 MG: 100 CAPSULE ORAL at 13:55

## 2022-03-17 RX ADMIN — PREGABALIN 300 MG: 100 CAPSULE ORAL at 08:02

## 2022-03-17 RX ADMIN — ACETAMINOPHEN 975 MG: 325 TABLET, FILM COATED ORAL at 11:36

## 2022-03-17 RX ADMIN — MULTIPLE VITAMINS W/ MINERALS TAB 1 TABLET: TAB at 08:02

## 2022-03-17 RX ADMIN — NAPROXEN 500 MG: 500 TABLET ORAL at 17:05

## 2022-03-17 RX ADMIN — ACETAMINOPHEN 975 MG: 325 TABLET, FILM COATED ORAL at 21:56

## 2022-03-17 RX ADMIN — NICOTINE POLACRILEX 8 MG: 4 GUM, CHEWING ORAL at 08:22

## 2022-03-17 RX ADMIN — NICOTINE POLACRILEX 8 MG: 4 GUM, CHEWING ORAL at 15:20

## 2022-03-17 RX ADMIN — LIOTHYRONINE SODIUM 25 MCG: 25 TABLET ORAL at 08:02

## 2022-03-17 RX ADMIN — QUETIAPINE 600 MG: 300 TABLET, FILM COATED ORAL at 21:56

## 2022-03-17 RX ADMIN — NICOTINE POLACRILEX 8 MG: 4 GUM, CHEWING ORAL at 09:47

## 2022-03-17 RX ADMIN — NICOTINE POLACRILEX 8 MG: 4 GUM, CHEWING ORAL at 19:18

## 2022-03-17 RX ADMIN — PROPRANOLOL HYDROCHLORIDE 20 MG: 20 TABLET ORAL at 17:05

## 2022-03-17 RX ADMIN — LEVOTHYROXINE SODIUM 75 MCG: 75 TABLET ORAL at 08:02

## 2022-03-17 ASSESSMENT — ACTIVITIES OF DAILY LIVING (ADL)
LAUNDRY: UNABLE TO COMPLETE
HYGIENE/GROOMING: INDEPENDENT
ORAL_HYGIENE: INDEPENDENT
HYGIENE/GROOMING: INDEPENDENT
ORAL_HYGIENE: INDEPENDENT
DRESS: INDEPENDENT
DRESS: INDEPENDENT

## 2022-03-17 NOTE — PLAN OF CARE
"Goal Outcome Evaluation:    Care plan reviewed with patient.    Patient alert and oriented to person, place, and time, adequately groomed. Pt is blunted, appears tense, good eye contact, cooperative, declined several  medications. Prn eileen gum 8mg given x2. Pt on suicidal, seizure precautions, no behaviors observed - falls precautions removed. Pt contracts for safety. Pt declines group attendance, but played a few games of [employer] this afternoon with this writer, read in his room, observed engaged with a few peers, appropriate with peers and staff. Pt communicates and verbalizes needs to staff. No behaviors noted. Will continue to monitor behavior and encourage engagement.     NURSING ASSESSMENT  Pain: denies  Anxiety: denies  Depression: denies  SI: denies  SIB: denies  HI: denies  AVH: denies, did not appear to be responding to internal stimuli, did not demonstrate delusional thinking.  Mood/Affect: blunted, appears tense, appeared to relax later in shift  Sleep: pt states \"ok\"  Medication: noncompliant, no side effects reported or observed  PRN: eileen gum 8 mg x2  Appetite: breakfast 100%   Lunch  100%  ADLs: independent   Visits: none  Vitals: WNL   Medical: denies     Problem: Behavioral Health Plan of Care  Goal: Plan of Care Review  Outcome: Ongoing, Progressing   Problem: Suicidal Behavior  Goal: Suicidal Behavior is Absent or Managed  Outcome: Ongoing, Progressing   Problem: Activity and Energy Impairment (Depressive Signs/Symptoms)  Goal: Optimized Energy Level (Depressive Signs/Symptoms)  Outcome: Ongoing, Progressing   Problem: Decreased Participation and Engagement (Depressive Signs/Symptoms)  Goal: Increased Participation and Engagement (Depressive Signs/Symptoms)  Outcome: Ongoing, Progressing   Problem: Behavioral Health Plan of Care  Goal: Adheres to Safety Considerations for Self and Others  Outcome: Ongoing, Progressing                   "

## 2022-03-17 NOTE — PLAN OF CARE
Assessment/Intervention/Current Symtoms and Care Coordination  Pt is currently hospitalized due to chronic pain, depression, anxiety.  -Rounded with team in reviewing pt's care  -Reviewed pt's chart to get up to date with pt's care and progress  -Met with pt one on one and updated him on referral status and current timeline      ___________________________________________________________    Lorna Bui  To: [email or web address]  u 3/17/2022 2:27 PM  Hi All,     I am reaching out to see if I can get an update on the status of Ray's placement. Wondering if you guys were able to connect and if you can give me an estimate when you think patient will be able to discharge to [facility].     Thanks!    MARQUEZ Ray, LGSW     Clinical Treatment Coordinator     ____________________________________________________  Discharge Plan or Goal  Discharge to [facility] once CADI waiver is approved.      Barriers to Discharge   CADI waiver approval is pending.      Referrals:   [facility]- CADI waiver     Legal: Voluntary

## 2022-03-17 NOTE — PLAN OF CARE
Assessment/Intervention/Current Symtoms and Care Coordination  Pt is currently hospitalized due to chronic pain, depression, anxiety.  -Rounded with team in reviewing pt's care  -Reviewed pt's chart to get up to date with pt's care and progress  -Met with pt one on one and updated him on referral status and current timeline         Discharge Plan or Goal  Discharge to [facility] once CADI waiver is approved.      Barriers to Discharge   Pt still does not have a finalized placement location.   [place]. [place] on Monday at 10:00am.    Referrals:   [facility].  [facility]- CADI waiver    Legal: Voluntary

## 2022-03-17 NOTE — PLAN OF CARE
Problem: Sleep Disturbance (Depressive Signs/Symptoms)  Goal: Improved Sleep (Depressive Signs/Symptoms)  Outcome: Ongoing, Progressing    No complaints of pain and other behavioral issues noted the whole shift.     Patient continues to use a medical bed due to mobility issues.     Slept a total of 7 hours.

## 2022-03-17 NOTE — PLAN OF CARE
Assessment/Intervention/Current Symtoms and Care Coordination  Pt is currently hospitalized due to chronic pain, depression, anxiety.  -Rounded with team in reviewing pt's care  -Reviewed pt's chart to get up to date with pt's care and progress  -Met with pt one on one and updated him on referral status and current timeline     Discharge Plan or Goal  Discharge to [facility] once CADI waiver is approved.      Barriers to Discharge   CADI waiver approval is pending.     Referrals:   [facility]- CADI waiver    Legal: Voluntary

## 2022-03-17 NOTE — PLAN OF CARE
Assessment/Intervention/Current Symtoms and Care Coordination  Pt is currently hospitalized due to chronic pain, depression, anxiety.  -Rounded with team in reviewing pt's care  -Reviewed pt's chart to get up to date with pt's care and progress  -Met with pt one on one and updated him on referral status and current timeline      [facility] forms [facility] . Angela had asked patient to sign the forms. [facility] printed the forms and patient signed. [facility] faxed signed documents to Angela.      Discharge Plan or Goal  Discharge to [facility] once CADI waiver is approved.      Barriers to Discharge   Pt still does not have a finalized placement location.   [place] yet [place]. [place] on Monday at 10:00am.    Referrals:   [facility]- CADI waiver    Legal: Voluntary

## 2022-03-17 NOTE — PLAN OF CARE
Assessment/Intervention/Current Symtoms and Care Coordination  Pt is currently hospitalized due to chronic pain, depression, anxiety.  -Rounded with team in reviewing pt's care  -Reviewed pt's chart to get up to date with pt's care and progress  -Met with pt one on one and updated him on referral status and current timeline       CTC called Angela and asked for an update. She notified writer that she received the documents writer faxed over. She notified writer that she will be reaching out to [facility] and negotiate the rate.   Discharge Plan or Goal  Discharge to [facility] once CADI waiver is approved.      Barriers to Discharge   CADI waiver approval is pending.     Referrals:   [facility].  [facility]- CADI waiver    Legal: Voluntary

## 2022-03-18 PROCEDURE — 124N000003 HC R&B MH SENIOR/ADOLESCENT

## 2022-03-18 PROCEDURE — 250N000013 HC RX MED GY IP 250 OP 250 PS 637: Performed by: PSYCHIATRY & NEUROLOGY

## 2022-03-18 RX ORDER — DOCUSATE SODIUM 100 MG/1
100 CAPSULE, LIQUID FILLED ORAL 2 TIMES DAILY PRN
Status: DISCONTINUED | OUTPATIENT
Start: 2022-03-18 | End: 2022-03-24 | Stop reason: HOSPADM

## 2022-03-18 RX ORDER — BENZOCAINE/MENTHOL 6 MG-10 MG
LOZENGE MUCOUS MEMBRANE 2 TIMES DAILY PRN
Qty: 15 G | Refills: 1 | Status: SHIPPED | OUTPATIENT
Start: 2022-03-18

## 2022-03-18 RX ORDER — PANTOPRAZOLE SODIUM 40 MG/1
40 TABLET, DELAYED RELEASE ORAL DAILY PRN
Qty: 30 TABLET | Refills: 1 | Status: SHIPPED | OUTPATIENT
Start: 2022-03-18 | End: 2022-03-24

## 2022-03-18 RX ORDER — BENZOCAINE/MENTHOL 6 MG-10 MG
LOZENGE MUCOUS MEMBRANE 2 TIMES DAILY PRN
Status: DISCONTINUED | OUTPATIENT
Start: 2022-03-18 | End: 2022-03-24 | Stop reason: HOSPADM

## 2022-03-18 RX ORDER — PANTOPRAZOLE SODIUM 40 MG/1
40 TABLET, DELAYED RELEASE ORAL DAILY PRN
Status: DISCONTINUED | OUTPATIENT
Start: 2022-03-18 | End: 2022-03-24 | Stop reason: HOSPADM

## 2022-03-18 RX ADMIN — NICOTINE POLACRILEX 4 MG: 4 GUM, CHEWING ORAL at 20:24

## 2022-03-18 RX ADMIN — LEVOTHYROXINE SODIUM 75 MCG: 75 TABLET ORAL at 08:06

## 2022-03-18 RX ADMIN — NICOTINE POLACRILEX 8 MG: 4 GUM, CHEWING ORAL at 10:16

## 2022-03-18 RX ADMIN — PREGABALIN 300 MG: 100 CAPSULE ORAL at 20:17

## 2022-03-18 RX ADMIN — NAPROXEN 500 MG: 500 TABLET ORAL at 17:40

## 2022-03-18 RX ADMIN — NICOTINE POLACRILEX 8 MG: 4 GUM, CHEWING ORAL at 08:19

## 2022-03-18 RX ADMIN — NAPROXEN 500 MG: 500 TABLET ORAL at 08:06

## 2022-03-18 RX ADMIN — MULTIPLE VITAMINS W/ MINERALS TAB 1 TABLET: TAB at 08:06

## 2022-03-18 RX ADMIN — PREGABALIN 300 MG: 100 CAPSULE ORAL at 08:06

## 2022-03-18 RX ADMIN — PREGABALIN 300 MG: 100 CAPSULE ORAL at 14:23

## 2022-03-18 RX ADMIN — ACETAMINOPHEN 975 MG: 325 TABLET, FILM COATED ORAL at 17:37

## 2022-03-18 RX ADMIN — NICOTINE POLACRILEX 8 MG: 4 GUM, CHEWING ORAL at 12:33

## 2022-03-18 RX ADMIN — NICOTINE POLACRILEX 8 MG: 4 GUM, CHEWING ORAL at 14:23

## 2022-03-18 RX ADMIN — QUETIAPINE 600 MG: 300 TABLET, FILM COATED ORAL at 21:59

## 2022-03-18 RX ADMIN — ACETAMINOPHEN 975 MG: 325 TABLET, FILM COATED ORAL at 19:00

## 2022-03-18 RX ADMIN — ACETAMINOPHEN 975 MG: 325 TABLET, FILM COATED ORAL at 12:36

## 2022-03-18 RX ADMIN — ACETAMINOPHEN 975 MG: 325 TABLET, FILM COATED ORAL at 22:01

## 2022-03-18 RX ADMIN — LIOTHYRONINE SODIUM 25 MCG: 25 TABLET ORAL at 08:06

## 2022-03-18 RX ADMIN — NICOTINE POLACRILEX 8 MG: 4 GUM, CHEWING ORAL at 17:40

## 2022-03-18 ASSESSMENT — ACTIVITIES OF DAILY LIVING (ADL)
ORAL_HYGIENE: INDEPENDENT
HYGIENE/GROOMING: INDEPENDENT
HYGIENE/GROOMING: INDEPENDENT
DRESS: INDEPENDENT
ORAL_HYGIENE: INDEPENDENT
DRESS: INDEPENDENT

## 2022-03-18 NOTE — PROGRESS NOTES
Patient seen, chart reviewed, care discussed with staff.  Blood pressure 95/58, pulse 74, temperature 99.1  F (37.3  C), temperature source Oral, resp. rate 16, weight 136.4 kg (300 lb 9.6 oz), SpO2 97 %.  By report stable, does not want Vitamin D or Protonix.  General appearance: good  Alert.   Affect: good  Mood: good  Speech:  normal.   Eye contact:  good.    Psychomotor behavior: normal  Gait: steady with and without walker  Abnormal movements:  none  Delusions: none  Hallucinations:  none  Thoughts: logical  Associations: intact  Judgement: good  Insight: good  Cognitions: intact in conversation  Memory:  intact in conversation  Orientation: normal    Not suicidal.    Depression (bipolar, severe, recurrent with psychotic features) has greatly improved.  Other diagnoses:   PTSD, s/p brain injury, and:  Patient Active Problem List   Diagnosis     Post concussive encephalopathy     H/O shoulder surgery     Alcoholism in remission (H)     Bilateral ACL tear     Chronic back pain     Social anxiety disorder     Alcohol withdrawal syndrome with complication, with unspecified complication (H)     Rib fracture     Alcohol withdrawal (H)     Alcohol dependence (H)     LFT elevation     Laceration of left hand without foreign body, initial encounter     Suicidal ideation     Intentional drug overdose, initial encounter (H)     Alcoholic intoxication with complication (H)     Severe bipolar I disorder, current or most recent episode depressed, with mixed features (H)     Medication overdose, intentional self-harm, subsequent encounter     Opioid dependence on agonist therapy (H)     2019 novel coronavirus disease (COVID-19)     Pneumonia due to COVID-19 virus     Bipolar disorder (H)     Plan:  As he is now on a multivitamin, will stop vitamin D    Current Facility-Administered Medications   Medication     acamprosate (CAMPRAL) EC tablet 666 mg     acetaminophen (TYLENOL) tablet 975 mg     albuterol (PROVENTIL  HFA/VENTOLIN HFA) inhaler     alum & mag hydroxide-simethicone (MAALOX) suspension 30 mL     artificial saliva (BIOTENE MT) solution 2 spray     cloNIDine (CATAPRES) tablet 0.2 mg     diphenhydrAMINE (BENADRYL) capsule 50 mg     docusate sodium (COLACE) capsule 100 mg     hydrocortisone (CORTAID) 1 % cream     ketamine 5%-gabapentin 8% in PLO cream 0.25 g     levothyroxine (SYNTHROID/LEVOTHROID) tablet 75 mcg     liothyronine (CYTOMEL) tablet 25 mcg     loperamide (IMODIUM) capsule 2 mg     melatonin tablet 3 mg     multivitamin w/minerals (THERA-VIT-M) tablet 1 tablet     naloxone (NARCAN) injection 0.2 mg    Or     naloxone (NARCAN) injection 0.4 mg    Or     naloxone (NARCAN) injection 0.2 mg    Or     naloxone (NARCAN) injection 0.4 mg     naproxen (NAPROSYN) tablet 500 mg     nicotine polacrilex (NICORETTE) gum 4-8 mg     OLANZapine (zyPREXA) tablet 10 mg    Or     OLANZapine (zyPREXA) injection 10 mg     pantoprazole (PROTONIX) EC tablet 40 mg     pregabalin (LYRICA) capsule 300 mg     propranolol (INDERAL) tablet 20 mg     QUEtiapine (SEROquel) tablet 100 mg     QUEtiapine (SEROquel) tablet 600 mg     sennosides (SENOKOT) tablet 1-2 tablet     tiZANidine (ZANAFLEX) tablet 4 mg     Vitamin D3 (CHOLECALCIFEROL) tablet 25 mcg     Recent Results (from the past 168 hour(s))   Drug abuse screen 6 urine (chem dep) ([facility])    Collection Time: 03/11/22 11:48 AM   Result Value Ref Range    Amphetamines Urine Screen Negative Screen Negative    Barbiturates Urine Screen Negative Screen Negative    Benzodiazepines Urine Screen Negative Screen Negative    Cannabinoids Urine Screen Negative Screen Negative    Cocaine Urine Screen Negative Screen Negative    Ethanol Urine Screen Negative Screen Negative    Opiates Urine Screen Negative Screen Negative   Asymptomatic COVID-19 Virus (Coronavirus) by PCR Nose    Collection Time: 03/14/22 12:39 PM    Specimen: Nose; Swab   Result Value Ref Range    SARS CoV2 PCR Negative Negative        Video-Visit Details    Type of service:  Video Visit    Video Start Time (time video started): 0845    Video End Time (time video stopped): 0900    [email or web address]    Distant Location (provider location): Provider remote location    Mode of Communication:  Video Conference via [employer]    Physician has received verbal consent for a Video Visit from the patient? Yes      Ulises Perez MD

## 2022-03-18 NOTE — PLAN OF CARE
"Goal Outcome Evaluation:    Plan of Care Reviewed With: patient     Denies anxiety, depression, SI.  Calm, quiet, cooperative.  States he is tired of being \"locked up\".  A bit more social today with select peers.                "

## 2022-03-18 NOTE — PLAN OF CARE
Problem: Feelings of Worthlessness, Hopelessness or Excessive Guilt (Depressive Signs/Symptoms)  Goal: Enhanced Self-Esteem and Confidence (Depressive Signs/Symptoms)  Outcome: Ongoing, Progressing  Flowsheets (Taken 3/17/2022 2230)  Mutually Determined Action Steps (Enhanced Self-Esteem and Confidence):   identifies unrealistic self-expectation   maintains daily journal/log   Goal Outcome Evaluation:    The patient spent time in his room and in the lounge watching television. He appeared tense and blunted but was calm, cooperative, and pleasant. He denied any mental health symptoms. He ate dinner and snacks and took most of his medications, but he refused the Campral, Colace, and hydrocortisone cream. Despite his visibility in the milieu, he refused to attend a therapy group and had almost no peer interaction.

## 2022-03-18 NOTE — PROGRESS NOTES
"SPIRITUAL HEALTH SERVICES   Spiritual Assessment Progress Note (Behavioral Health/CD Focus)  [facility] ([facility] 3[facility]    REFERRAL SOURCE: follow up    On this visit, I met with Jose M in the lounge for 35 minutes. Facilitated reflective conversation while playing cards about coping, leena and experience of prayer.    EXPERIENCE OF ILLNESS/HOSPITALIZATION:  Jose M spoke about his mind feeling \"clear\" and his sense of accomplishment for tapering off some medications, about his struggles with waiting, managing frustrations, and his hopes for his future.    MEANING-MAKING: Jose M spoke about his journey: \"I've always been a driven, successful kind of juan. First with business/finances, then with relationships. At this point all I want is some peace; to know that I can make it again on my own.\"    Jose M spoke of his desire to honor his mom by \"getting back on my feet.\" we wondered together about the balance of using the pain of his mom's death as a positive motivation and source of strength, yet not letting anger get the better of us. \"I want to be operating on all 12 cylinders, just not at 250 mph all the time.\"    SPIRITUALITY/VALUES/[place]:      COPING/SPIRITUAL PRACTICES: daily prayer    SUPPORT SYSTEMS: his sister    PLAN: I will follow up 1-2x weekly.                                                                                                                                             Liss Kerns MDiv  Associate   Pager [phone]  Office [phone]  [facility] remains available 24/7 for emergent requests/referrals, either by having the switchboard page the on-call  or by entering an ASAP/STAT consult in Epic (this will also page the on-call ). Routine Epic consults receive an initial response within 24 hours.    "

## 2022-03-18 NOTE — CARE PLAN
Assessment/Intervention/Current Symtoms and Care Coordination  Pt is currently hospitalized due to chronic pain, depression, anxiety.  -Rounded with team in reviewing pt's care  -Reviewed pt's chart to get up to date with pt's care and progress  -Met with pt one on one and updated him on referral status and current timeline      ___________________________________________________________     Jessica and Lottie [email or web address]>  u 3/17/2022 5:23 PM  To: Lorna Bui [email or web address]  Cc: [email or web address]    Mark Rothman    We have submitted the rate sheet awaiting to hear back from the [facility].  Hopefully, if all goes well and our rate is accepted, we should be able to admit him next week Thursday 3/24.    Thank you     [facility]      ____________________________________________________  Discharge Plan or Goal  Discharge to [facility] once CADI waiver is approved.      Barriers to Discharge   CADI waiver approval is pending.      Referrals:   [facility]- CADI waiver     Legal: Voluntary

## 2022-03-18 NOTE — PLAN OF CARE
Problem: Sleep Disturbance (Depressive Signs/Symptoms)  Goal: Improved Sleep (Depressive Signs/Symptoms)  Outcome: Ongoing, Progressing     Patient slept for a total of 6 hours without any interruptions. He is using a medical bed due to mobility issues.  No complaints of pain and other behavioral concerns noted the whole shift.

## 2022-03-19 PROCEDURE — 124N000003 HC R&B MH SENIOR/ADOLESCENT

## 2022-03-19 PROCEDURE — 250N000013 HC RX MED GY IP 250 OP 250 PS 637: Performed by: PSYCHIATRY & NEUROLOGY

## 2022-03-19 RX ADMIN — NAPROXEN 500 MG: 500 TABLET ORAL at 08:20

## 2022-03-19 RX ADMIN — NICOTINE POLACRILEX 8 MG: 4 GUM, CHEWING ORAL at 09:11

## 2022-03-19 RX ADMIN — PREGABALIN 300 MG: 100 CAPSULE ORAL at 13:33

## 2022-03-19 RX ADMIN — MULTIPLE VITAMINS W/ MINERALS TAB 1 TABLET: TAB at 08:20

## 2022-03-19 RX ADMIN — LIOTHYRONINE SODIUM 25 MCG: 25 TABLET ORAL at 08:20

## 2022-03-19 RX ADMIN — LEVOTHYROXINE SODIUM 75 MCG: 75 TABLET ORAL at 08:19

## 2022-03-19 RX ADMIN — PREGABALIN 300 MG: 100 CAPSULE ORAL at 08:21

## 2022-03-19 RX ADMIN — NICOTINE POLACRILEX 8 MG: 4 GUM, CHEWING ORAL at 18:06

## 2022-03-19 RX ADMIN — QUETIAPINE 600 MG: 300 TABLET, FILM COATED ORAL at 21:59

## 2022-03-19 RX ADMIN — ACETAMINOPHEN 975 MG: 325 TABLET, FILM COATED ORAL at 12:17

## 2022-03-19 RX ADMIN — PREGABALIN 300 MG: 100 CAPSULE ORAL at 20:04

## 2022-03-19 RX ADMIN — ACETAMINOPHEN 975 MG: 325 TABLET, FILM COATED ORAL at 17:29

## 2022-03-19 RX ADMIN — NICOTINE POLACRILEX 8 MG: 4 GUM, CHEWING ORAL at 12:17

## 2022-03-19 RX ADMIN — ACETAMINOPHEN 975 MG: 325 TABLET, FILM COATED ORAL at 20:04

## 2022-03-19 RX ADMIN — NAPROXEN 500 MG: 500 TABLET ORAL at 17:29

## 2022-03-19 ASSESSMENT — ACTIVITIES OF DAILY LIVING (ADL)
DRESS: INDEPENDENT
ORAL_HYGIENE: INDEPENDENT
LAUNDRY: UNABLE TO COMPLETE
HYGIENE/GROOMING: INDEPENDENT

## 2022-03-19 NOTE — PLAN OF CARE
Problem: Suicidal Behavior  Goal: Suicidal Behavior is Absent or Managed  Outcome: Ongoing, Progressing     Problem: Activity and Energy Impairment (Depressive Signs/Symptoms)  Goal: Optimized Energy Level (Depressive Signs/Symptoms)  Outcome: Ongoing, Progressing  Flowsheets (Taken 3/19/2022 0973)  Mutually Determined Action Steps (Optimized Energy Level): grooms self without prompting   Goal Outcome Evaluation:    Plan of Care Reviewed With: patient               Patient calm and cooperative. Reports discomfort in R knee but declined PRN medications. Affect, flat/blunted, tense.   Denies anxiety, depression, SI, SIB and hallucinations. Stated that feels frustrated due to the CADI waver pending approval.   Visible in the milieu during meal time.Not observed to attend groups. Resting in his room between meals. Appetite is good.

## 2022-03-19 NOTE — PROGRESS NOTES
Total sleep hours 7. No concerns noted or reported this shift. Pt is on a medical bed for chronic pain, past surgeries and to enhance mobility.

## 2022-03-19 NOTE — PLAN OF CARE
"  Problem: Decreased Participation and Engagement (Depressive Signs/Symptoms)  Goal: Increased Participation and Engagement (Depressive Signs/Symptoms)  Outcome: Ongoing, Not Progressing  Flowsheets (Taken 3/19/2022 1331)  Mutually Determined Action Steps (Increased Participation and Engagement): other (see comments)  Note: Isolative, withdrawn, does not attend group activities    Goal Outcome Evaluation:    Plan of Care Reviewed With: patient               Patient remains isolative, withdrawn, irritable and tense. Reports R knee pain but declines PRNs or any other interventions. Visible in the milieu but does not attend groups. Speech is brief/short. Patient seemed irritated at dinner time but refused to talk about the reason for it, he only mentioned  \"I see that the staff is changing the rules for some people. I have been here long enough to see it.\" when asked to specify he stated \"I don't want to talk about it\".   "

## 2022-03-19 NOTE — PLAN OF CARE
Goal Outcome Evaluation:    Plan of Care Reviewed With: patient     No new concerns this evening.  Continues to decline certain meds throughout the day and evening.

## 2022-03-20 PROCEDURE — 99231 SBSQ HOSP IP/OBS SF/LOW 25: CPT | Performed by: PHYSICIAN ASSISTANT

## 2022-03-20 PROCEDURE — 250N000013 HC RX MED GY IP 250 OP 250 PS 637: Performed by: PSYCHIATRY & NEUROLOGY

## 2022-03-20 PROCEDURE — 124N000003 HC R&B MH SENIOR/ADOLESCENT

## 2022-03-20 RX ADMIN — PREGABALIN 300 MG: 100 CAPSULE ORAL at 14:06

## 2022-03-20 RX ADMIN — NICOTINE POLACRILEX 8 MG: 4 GUM, CHEWING ORAL at 08:55

## 2022-03-20 RX ADMIN — LEVOTHYROXINE SODIUM 75 MCG: 75 TABLET ORAL at 08:12

## 2022-03-20 RX ADMIN — MULTIPLE VITAMINS W/ MINERALS TAB 1 TABLET: TAB at 08:12

## 2022-03-20 RX ADMIN — PREGABALIN 300 MG: 100 CAPSULE ORAL at 08:12

## 2022-03-20 RX ADMIN — ACETAMINOPHEN 975 MG: 325 TABLET, FILM COATED ORAL at 21:58

## 2022-03-20 RX ADMIN — QUETIAPINE 600 MG: 300 TABLET, FILM COATED ORAL at 21:58

## 2022-03-20 RX ADMIN — NICOTINE POLACRILEX 8 MG: 4 GUM, CHEWING ORAL at 18:12

## 2022-03-20 RX ADMIN — ACETAMINOPHEN 975 MG: 325 TABLET, FILM COATED ORAL at 12:25

## 2022-03-20 RX ADMIN — NAPROXEN 500 MG: 500 TABLET ORAL at 17:20

## 2022-03-20 RX ADMIN — LIOTHYRONINE SODIUM 25 MCG: 25 TABLET ORAL at 08:12

## 2022-03-20 RX ADMIN — ACETAMINOPHEN 975 MG: 325 TABLET, FILM COATED ORAL at 17:20

## 2022-03-20 RX ADMIN — PREGABALIN 300 MG: 100 CAPSULE ORAL at 20:00

## 2022-03-20 RX ADMIN — NICOTINE POLACRILEX 8 MG: 4 GUM, CHEWING ORAL at 10:17

## 2022-03-20 RX ADMIN — ACETAMINOPHEN 975 MG: 325 TABLET, FILM COATED ORAL at 08:13

## 2022-03-20 RX ADMIN — NAPROXEN 500 MG: 500 TABLET ORAL at 08:13

## 2022-03-20 ASSESSMENT — ACTIVITIES OF DAILY LIVING (ADL)
HYGIENE/GROOMING: INDEPENDENT
LAUNDRY: UNABLE TO COMPLETE
DRESS: INDEPENDENT
ORAL_HYGIENE: INDEPENDENT
HYGIENE/GROOMING: INDEPENDENT
ORAL_HYGIENE: INDEPENDENT
DRESS: INDEPENDENT
LAUNDRY: UNABLE TO COMPLETE

## 2022-03-20 NOTE — PLAN OF CARE
Problem: Sleep Disturbance (Depressive Signs/Symptoms)  Goal: Improved Sleep (Depressive Signs/Symptoms)  Outcome: Ongoing, Progressing   Goal Outcome Evaluation:           Received reading a book . Fell asleep without problems.  Pt uses a medical bed to help with mobility issues  due to chronic pain, pt raises the bed to get out of bed &grabs on to the side rails. Also elevates the foot of the bed to help ease knee pain.   Slept for 7 hours

## 2022-03-20 NOTE — PLAN OF CARE
"  Problem: Behavioral Health Plan of Care  Goal: Adheres to Safety Considerations for Self and Others  Outcome: Ongoing, Progressing  Note: Patient reported feeling the same. Stated:\"I'm not depressed, I'm not anxious, I'm not suicidal, I don't hallucinate, I'm not paranoid\" \"What else do you need to know\". He declined to have 1:1 interaction and assessment. Patient was watching movies, and did not want to engage with staff.   Behavior includes: isolating from others, avoiding social contact, avoiding eye contact, refusing to attend groups and community meetings, poor attending to his hygiene and grooming.  Thoughts appeared linear. Speech is clear and organized. Mood is irritable, tense, and sad. Affect is flat and blunted. Memory is intact.   Patient took scheduled medications, but declined to take Campral again. Stated: \"I haven't taken it for 10 days now\", \"I don't need it\". Patient denied SE, none assessed by staff at this time.   Pain is controlled with scheduled Tylenol, Naproxen, and Lyrica. Patient takes PRN Nicotine gums 8 mg frequently.   /77   Pulse (!) 121   Temp 97.8  F (36.6  C) (Temporal)   Resp 18   Wt 137.3 kg (302 lb 11.2 oz)   SpO2 99%   BMI 36.08 kg/m      Plan of Care Reviewed With: patient                 "

## 2022-03-20 NOTE — PROGRESS NOTES
"Brief medicine note:     F/U orthostatic hypotension     Sitting /77, standing /72. Pulse 121, repeat 106. HR on exam 85.     Patient reports he is feeling well today. He reports his heart rate was elevated earlier today because \"I just drank a mountain dew and took nicotine gum.\" Denies lightheadedness, dizziness, chest pain, SOB. Denies lightheadedness or dizziness upon standing. He reports eating and drinking well.      Today's vital signs, medications and nursing notes were reviewed.     Blood pressure 129/77, pulse 106, temperature 97.8  F (36.6  C), temperature source Temporal, resp. rate 18, weight 137.3 kg (302 lb 11.2 oz), SpO2 99 %.  GENERAL: Alert and oriented x 3. NAD.   HEENT: Anicteric sclera. Mucous membranes moist and without lesions.   CV: RRR. S1, S2. No murmurs appreciated.   RESPIRATORY: Effort normal on RA. Lungs CTAB with no wheezing, rales, rhonchi.   EXTREMITIES: No peripheral edema. Intact bilateral pedal pulses.   SKIN: No jaundice. No rashes.     Assessment and plan:   # Orthostatic hypotension   Orthostatic blood pressure improved. Patient denies any lightheadedness or dizziness upon standing.   - Cont propanolol  - Prazosin decreased to 1mg at bedtime 3/12  - Norvasc stopped   - Encourage fluid intake    # Tachycardia, resolved   Patient noted to have tachycardia to 120's today, repeat 106. Patient was asymptomatic. No lightheadedness, dizziness, chest pain. HR regular rate and rhythm on exam.   - Notify medicine if HR persistently > 100     E-prescribed medications need to be reviewed and updated before patient discharged due to several changes    Currently patient is medically stable and medicine will sign off. Thank you for allowing us to be a part of this patients care. Please notify on call [facility] if any intercurrent medical issues arise.       Ambar Alonso PA-C  [facility]  Securely message with the Vocera Web Console (learn " more here)  Text page via [facility] Paging/Directory

## 2022-03-21 PROCEDURE — 124N000003 HC R&B MH SENIOR/ADOLESCENT

## 2022-03-21 PROCEDURE — 250N000013 HC RX MED GY IP 250 OP 250 PS 637: Performed by: PSYCHIATRY & NEUROLOGY

## 2022-03-21 RX ADMIN — NAPROXEN 500 MG: 500 TABLET ORAL at 16:54

## 2022-03-21 RX ADMIN — ACETAMINOPHEN 975 MG: 325 TABLET, FILM COATED ORAL at 22:24

## 2022-03-21 RX ADMIN — PREGABALIN 300 MG: 100 CAPSULE ORAL at 20:10

## 2022-03-21 RX ADMIN — NICOTINE POLACRILEX 8 MG: 4 GUM, CHEWING ORAL at 08:10

## 2022-03-21 RX ADMIN — QUETIAPINE 600 MG: 300 TABLET, FILM COATED ORAL at 22:24

## 2022-03-21 RX ADMIN — LIOTHYRONINE SODIUM 25 MCG: 25 TABLET ORAL at 08:09

## 2022-03-21 RX ADMIN — ACETAMINOPHEN 975 MG: 325 TABLET, FILM COATED ORAL at 14:14

## 2022-03-21 RX ADMIN — ACETAMINOPHEN 975 MG: 325 TABLET, FILM COATED ORAL at 08:08

## 2022-03-21 RX ADMIN — PREGABALIN 300 MG: 100 CAPSULE ORAL at 08:09

## 2022-03-21 RX ADMIN — LEVOTHYROXINE SODIUM 75 MCG: 75 TABLET ORAL at 08:08

## 2022-03-21 RX ADMIN — NAPROXEN 500 MG: 500 TABLET ORAL at 08:09

## 2022-03-21 RX ADMIN — PREGABALIN 300 MG: 100 CAPSULE ORAL at 14:14

## 2022-03-21 RX ADMIN — ACETAMINOPHEN 975 MG: 325 TABLET, FILM COATED ORAL at 18:48

## 2022-03-21 RX ADMIN — MULTIPLE VITAMINS W/ MINERALS TAB 1 TABLET: TAB at 08:09

## 2022-03-21 ASSESSMENT — ACTIVITIES OF DAILY LIVING (ADL)
ORAL_HYGIENE: INDEPENDENT
HYGIENE/GROOMING: INDEPENDENT
HYGIENE/GROOMING: INDEPENDENT
DRESS: SCRUBS (BEHAVIORAL HEALTH)
LAUNDRY: UNABLE TO COMPLETE
LAUNDRY: WITH SUPERVISION
ORAL_HYGIENE: INDEPENDENT
DRESS: INDEPENDENT

## 2022-03-21 NOTE — PROGRESS NOTES
"SPIRITUAL HEALTH SERVICES  SPIRITUAL ASSESSMENT Progress Note  [facility] ([facility]) 3BW     REFERRAL SOURCE: follow up    Visit with Jose M in the lounge for 25 minutes. Engaged in reflective conversation while playing game of cribbage.  Ray checked in stating that the weekend \"was very long\" and that it gets harder to come up with ways to spend 16-17 hours in the day.   Pt returned 4 library books and stated he still has more to read.    Plan: I will follow up 1-2x weekly.    Liss Kerns MDiv  Associate   Pager [phone]  Office [phone]  [facility] remains available 24/7 for emergent requests/referrals, either by having the switchboard page the on-call  or by entering an ASAP/STAT consult in Epic (this will also page the on-call ). Routine Epic consults receive an initial response within 24 hours.    "

## 2022-03-21 NOTE — PLAN OF CARE
Problem: Psychomotor Impairment (Depressive Signs/Symptoms)  Goal: Improved Psychomotor Symptoms (Depressive Signs/Symptoms)  Outcome: Ongoing, Progressing  Flowsheets (Taken 3/21/2022 1727)  Mutually Determined Action Steps (Improved Psychomotor Symptoms):    adheres to medication regimen    exhibits decrease in agitation  Intervention: Manage Psychomotor Movement  Recent Flowsheet Documentation  Taken 3/21/2022 1713 by Jammie Vail RN  Diversional Activity: television  Activity (Behavioral Health): up ad kathleen         Pt presents with blunted, flat affect and calm mood. Denies SI/SIB and hallucinations. Reports some anxiety and depression r/t continued hospitalization but would not specifically rate it. Pt was present in the lounge at the beginning of the shift watching television. He remains withdrawn and guarded. Not social with his peers. Did not attend groups. Spent time reading alone in his room. Continues to report chronic R knee pain that is managed with his scheduled naproxen and Tylenol. Pt is somewhat medication compliant, refused propranolol this evening. VSS. Appetite and fluid intake adequate. No other concerns or complaints noted.

## 2022-03-21 NOTE — PLAN OF CARE
"Phone call to  [facility] \"Angela\".  Left voice mail requesting an update on th approval of the CADI rate schedule.  Left detail  that Pt I scheduled to discharge Thursday 3/24.                      "

## 2022-03-21 NOTE — PLAN OF CARE
Problem: Cognitive Impairment (Depressive Signs/Symptoms)  Goal: Optimized Cognitive Function (Depressive Signs/Symptoms)  Outcome: Ongoing, Progressing  Note:   Patient denied suicidal thoughts, wishing to be dead, depression, anxiety, hallucinations, or paranoia. He was less irritable comparing to this morning. Grooming and hygiene improved.   Behavior includes: isolating from others, avoiding social contact, avoiding eye contact, refusing to attend groups and community meetings.  Thoughts appeared linear. Speech is clear and organized. Mood is still tense, and sad. Affect is flat and blunted. Memory is intact.   Patient took scheduled medications, however, he declined to take Campral and Propranolol at 17:00   Pain is controlled with scheduled Tylenol, Naproxen, and Lyrica. Patient takes PRN Nicotine gums 8 mg.   /88   Pulse 91   Temp 99  F (37.2  C) (Oral)   Resp 16   Wt 137.3 kg (302 lb 11.2 oz)   SpO2 96%   BMI 36.08 kg/m         Goal Outcome Evaluation:    Plan of Care Reviewed With: patient

## 2022-03-21 NOTE — PLAN OF CARE
03/21/22 0236   Individualization/Patient Specific Goals   Patient Vulnerabilities limited support system;substance abuse/addiction;history of unsuccessful treatment;recent loss   Anxieties, Fears or Concerns placement. CADI, Social security benefits.   Individualized Care Needs sense of control over situation, continue to address knee pain   Patient-Specific Goals (Include Timeframe) identifies sister as support, pray, call friends for support   Interprofessional Rounds   Summary Patient is calm and cooperative, somewhat isolative, was attending less groups toward end of week   Participants psychiatrist;social work;occupational therapy;nursing   Team Discussion   Participants MD Chris, Bernard Mancera RN, NORBERTO Moura, Kathie Vasquez, OT   Progress improving   Anticipated length of stay 3-7 days   Continued Stay Criteria/Rationale Patient in need of placement, CADI waiver reinstatement is currently in process.   Medical/Physical Knee pain.   Plan coordinate with [facility] and Angela at [facility] for placement.   Rationale for change in precautions or plan no change   Anticipated Discharge Disposition [facility] when [facility] approves funding.

## 2022-03-21 NOTE — PLAN OF CARE
Problem: Decreased Participation and Engagement (Depressive Signs/Symptoms)  Goal: Increased Participation and Engagement (Depressive Signs/Symptoms)  Outcome: Ongoing, Progressing   Goal Outcome Evaluation:           Received awake in bed  for 45 minutes reading a book. Fell asleep at 0015.  Remains on a medical bed to help with mobility issues due to back and knee pains.  No concerns this shift  Slept for 6 hours

## 2022-03-21 NOTE — PLAN OF CARE
Problem: Activity and Energy Impairment (Depressive Signs/Symptoms)  Goal: Optimized Energy Level (Depressive Signs/Symptoms)  Outcome: Ongoing, Not Progressing  Intervention: Optimize Energy Level  Recent Flowsheet Documentation  Taken 3/21/2022 1000 by Bernard Mancera RN  Activity (Behavioral Health): up ad kathleen     Problem: Decreased Participation and Engagement (Depressive Signs/Symptoms)  Goal: Increased Participation and Engagement (Depressive Signs/Symptoms)  Outcome: Ongoing, Not Progressing     Problem: Suicidal Behavior  Goal: Suicidal Behavior is Absent or Managed  Outcome: Adequate for Care Transition     Problem: Cognitive Impairment (Depressive Signs/Symptoms)  Goal: Optimized Cognitive Function (Depressive Signs/Symptoms)  Outcome: Adequate for Care Transition   Goal Outcome Evaluation:    Plan of Care Reviewed With: patient     Patient is calm and cooperative, affect flat and blunted. Denies all psyche symptoms. Refused Campral this shift, medication discontinued by provider. Patient is isolative and withdrawn, spent most of shift in room, was observed playing cards with staff later in the morning and afternoon. Ongoing right knee pain managed with scheduled medications. Good appetite, ate 100% of meals.

## 2022-03-21 NOTE — PROGRESS NOTES
Patient seen, chart reviewed, care discussed with staff.    Blood pressure 129/86, pulse 84, temperature 98.2  F (36.8  C), temperature source Temporal, resp. rate 16, weight 137.3 kg (302 lb 11.2 oz), SpO2 100 %.    By report, not going to groups, reads in room    General appearance: good  Alert.   Affect: fair  Mood: fair    Speech:  normal.   Eye contact:  good.    Psychomotor behavior: normal  Gait: steady   Abnormal movements:  none  Delusions: none  Hallucinations:   none  Thoughts: logical  Associations: intact  Judgement: good  Insight: good  Cognitions: intact in conversation  Memory:  intact in conversation  Orientation: normal    Not suicidal.    Imp: Bipolar depression, improved  2.  PTSD  3,  S/p TBI  He feels Campral makes him tired and fuzzy    Plan:  Stop Campral  2.  Continue discharge planing    Current Facility-Administered Medications   Medication     acetaminophen (TYLENOL) tablet 975 mg     albuterol (PROVENTIL HFA/VENTOLIN HFA) inhaler     alum & mag hydroxide-simethicone (MAALOX) suspension 30 mL     artificial saliva (BIOTENE MT) solution 2 spray     cloNIDine (CATAPRES) tablet 0.2 mg     diphenhydrAMINE (BENADRYL) capsule 50 mg     docusate sodium (COLACE) capsule 100 mg     hydrocortisone (CORTAID) 1 % cream     ketamine 5%-gabapentin 8% in PLO cream 0.25 g     levothyroxine (SYNTHROID/LEVOTHROID) tablet 75 mcg     liothyronine (CYTOMEL) tablet 25 mcg     loperamide (IMODIUM) capsule 2 mg     melatonin tablet 3 mg     multivitamin w/minerals (THERA-VIT-M) tablet 1 tablet     naloxone (NARCAN) injection 0.2 mg    Or     naloxone (NARCAN) injection 0.4 mg    Or     naloxone (NARCAN) injection 0.2 mg    Or     naloxone (NARCAN) injection 0.4 mg     naproxen (NAPROSYN) tablet 500 mg     nicotine polacrilex (NICORETTE) gum 4-8 mg     OLANZapine (zyPREXA) tablet 10 mg    Or     OLANZapine (zyPREXA) injection 10 mg     pantoprazole (PROTONIX) EC tablet 40 mg     pregabalin (LYRICA) capsule 300 mg      propranolol (INDERAL) tablet 20 mg     QUEtiapine (SEROquel) tablet 100 mg     QUEtiapine (SEROquel) tablet 600 mg     sennosides (SENOKOT) tablet 1-2 tablet     tiZANidine (ZANAFLEX) tablet 4 mg     Recent Results (from the past 168 hour(s))   Asymptomatic COVID-19 Virus (Coronavirus) by PCR Nose    Collection Time: 03/14/22 12:39 PM    Specimen: Nose; Swab   Result Value Ref Range    SARS CoV2 PCR Negative Negative

## 2022-03-22 LAB — SARS-COV-2 RNA RESP QL NAA+PROBE: NEGATIVE

## 2022-03-22 PROCEDURE — 250N000013 HC RX MED GY IP 250 OP 250 PS 637: Performed by: PSYCHIATRY & NEUROLOGY

## 2022-03-22 PROCEDURE — 124N000003 HC R&B MH SENIOR/ADOLESCENT

## 2022-03-22 PROCEDURE — U0003 INFECTIOUS AGENT DETECTION BY NUCLEIC ACID (DNA OR RNA); SEVERE ACUTE RESPIRATORY SYNDROME CORONAVIRUS 2 (SARS-COV-2) (CORONAVIRUS DISEASE [COVID-19]), AMPLIFIED PROBE TECHNIQUE, MAKING USE OF HIGH THROUGHPUT TECHNOLOGIES AS DESCRIBED BY CMS-2020-01-R: HCPCS | Performed by: PSYCHIATRY & NEUROLOGY

## 2022-03-22 RX ADMIN — NICOTINE POLACRILEX 8 MG: 4 GUM, CHEWING ORAL at 07:57

## 2022-03-22 RX ADMIN — LEVOTHYROXINE SODIUM 75 MCG: 75 TABLET ORAL at 07:57

## 2022-03-22 RX ADMIN — NAPROXEN 500 MG: 500 TABLET ORAL at 07:57

## 2022-03-22 RX ADMIN — ACETAMINOPHEN 975 MG: 325 TABLET, FILM COATED ORAL at 13:18

## 2022-03-22 RX ADMIN — PREGABALIN 300 MG: 100 CAPSULE ORAL at 19:57

## 2022-03-22 RX ADMIN — QUETIAPINE 600 MG: 300 TABLET, FILM COATED ORAL at 21:59

## 2022-03-22 RX ADMIN — NICOTINE POLACRILEX 8 MG: 4 GUM, CHEWING ORAL at 15:12

## 2022-03-22 RX ADMIN — ACETAMINOPHEN 975 MG: 325 TABLET, FILM COATED ORAL at 18:48

## 2022-03-22 RX ADMIN — NAPROXEN 500 MG: 500 TABLET ORAL at 17:17

## 2022-03-22 RX ADMIN — PREGABALIN 300 MG: 100 CAPSULE ORAL at 13:17

## 2022-03-22 RX ADMIN — ACETAMINOPHEN 975 MG: 325 TABLET, FILM COATED ORAL at 07:56

## 2022-03-22 RX ADMIN — NICOTINE POLACRILEX 8 MG: 4 GUM, CHEWING ORAL at 13:19

## 2022-03-22 RX ADMIN — LIOTHYRONINE SODIUM 25 MCG: 25 TABLET ORAL at 07:57

## 2022-03-22 RX ADMIN — PREGABALIN 300 MG: 100 CAPSULE ORAL at 07:56

## 2022-03-22 RX ADMIN — MULTIPLE VITAMINS W/ MINERALS TAB 1 TABLET: TAB at 07:57

## 2022-03-22 ASSESSMENT — ACTIVITIES OF DAILY LIVING (ADL)
DRESS: SCRUBS (BEHAVIORAL HEALTH)
HYGIENE/GROOMING: INDEPENDENT
HYGIENE/GROOMING: INDEPENDENT
ORAL_HYGIENE: INDEPENDENT
DRESS: SCRUBS (BEHAVIORAL HEALTH)

## 2022-03-22 NOTE — PLAN OF CARE
"  Problem: Activity and Energy Impairment (Depressive Signs/Symptoms)  Goal: Optimized Energy Level (Depressive Signs/Symptoms)  Outcome: Ongoing, Progressing  Intervention: Optimize Energy Level  Recent Flowsheet Documentation  Taken 3/22/2022 1000 by Bernard Mancera RN  Activity (Behavioral Health): up ad kathleen     Problem: Mood Impairment (Depressive Signs/Symptoms)  Goal: Improved Mood Symptoms (Depressive Signs/Symptoms)  Outcome: Ongoing, Progressing     Problem: Suicidal Behavior  Goal: Suicidal Behavior is Absent or Managed  Outcome: Adequate for Care Transition   Goal Outcome Evaluation:    Plan of Care Reviewed With: patient       Patient's mood is irritable, affect flat and blunted. Present in the milieu for meals otherwise spent time in room with a \"do not disturb\" sign on the door. Patient is guarded, withdrawn and isolates. Denies MH symptoms, medication compliant. Right knee pain managed with scheduled medications. Prn nicorette gum given upon request. Patient reports having a hard time filling the day with things to do and is ready to discharge.    Asymptomatic covid test pending  Propranolol discontinued  "

## 2022-03-22 NOTE — PROGRESS NOTES
Patient seen via telemedicine.  Care discussed with treatment team staff.  Blood pressure 134/81, pulse 69, temperature 97.7  F (36.5  C), temperature source Temporal, resp. rate 16, weight 137.3 kg (302 lb 11.2 oz), SpO2 95 %.    By report, polite, declining groups, reads    General appearance: good  Alert.   Affect: good  Mood: good  Speech:  normal.   Eye contact:  good.    Psychomotor behavior: normal  Gait: steady   Abnormal movements:  none  Delusions: none  Hallucinations:   none  Thoughts: logical  Associations: intact  Judgement: good  Insight: good  Cognitions: intact in conversation  Memory:  intact in conversation  Orientation: normal    Not suicidal.    Imp:  Bipolar depressed, resolving  PTSD  S/p TBI  And:   Patient Active Problem List   Diagnosis     Post concussive encephalopathy     H/O shoulder surgery     Alcoholism in remission (H)     Bilateral ACL tear     Chronic back pain     Social anxiety disorder     Alcohol withdrawal syndrome with complication, with unspecified complication (H)     Rib fracture     Alcohol withdrawal (H)     Alcohol dependence (H)     LFT elevation     Laceration of left hand without foreign body, initial encounter     Suicidal ideation     Intentional drug overdose, initial encounter (H)     Alcoholic intoxication with complication (H)     Severe bipolar I disorder, current or most recent episode depressed, with mixed features (H)     Medication overdose, intentional self-harm, subsequent encounter     Opioid dependence on agonist therapy (H)     2019 novel coronavirus disease (COVID-19)     Pneumonia due to COVID-19 virus     Bipolar disorder (H)     Plan: Stop propranolol at his request to trim out as many meds as possible    Current Facility-Administered Medications   Medication     acetaminophen (TYLENOL) tablet 975 mg     albuterol (PROVENTIL HFA/VENTOLIN HFA) inhaler     alum & mag hydroxide-simethicone (MAALOX) suspension 30 mL     artificial saliva (BIOTENE MT)  solution 2 spray     cloNIDine (CATAPRES) tablet 0.2 mg     diphenhydrAMINE (BENADRYL) capsule 50 mg     docusate sodium (COLACE) capsule 100 mg     hydrocortisone (CORTAID) 1 % cream     ketamine 5%-gabapentin 8% in PLO cream 0.25 g     levothyroxine (SYNTHROID/LEVOTHROID) tablet 75 mcg     liothyronine (CYTOMEL) tablet 25 mcg     loperamide (IMODIUM) capsule 2 mg     melatonin tablet 3 mg     multivitamin w/minerals (THERA-VIT-M) tablet 1 tablet     naloxone (NARCAN) injection 0.2 mg    Or     naloxone (NARCAN) injection 0.4 mg    Or     naloxone (NARCAN) injection 0.2 mg    Or     naloxone (NARCAN) injection 0.4 mg     naproxen (NAPROSYN) tablet 500 mg     nicotine polacrilex (NICORETTE) gum 4-8 mg     OLANZapine (zyPREXA) tablet 10 mg    Or     OLANZapine (zyPREXA) injection 10 mg     pantoprazole (PROTONIX) EC tablet 40 mg     pregabalin (LYRICA) capsule 300 mg     propranolol (INDERAL) tablet 20 mg     QUEtiapine (SEROquel) tablet 100 mg     QUEtiapine (SEROquel) tablet 600 mg     sennosides (SENOKOT) tablet 1-2 tablet     tiZANidine (ZANAFLEX) tablet 4 mg     No results found for this or any previous visit (from the past 168 hour(s)).       Video-Visit Details    Type of service:  Video Visit    Video Start Time (time video started): 1530    Video End Time (time video stopped): 1545    Originating Location (pt. Location): [facility]    Distant Location (provider location): Provider remote location    Mode of Communication:  Video Conference via [employer]    Physician has received verbal consent for a Video Visit from the patient? Yes      Ulises Perez MD

## 2022-03-22 NOTE — PLAN OF CARE
Problem: Sleep Disturbance (Depressive Signs/Symptoms)  Goal: Improved Sleep (Depressive Signs/Symptoms)  Outcome: Ongoing, Progressing   Goal Outcome Evaluation:         Received reading a book . Fell asleep without taking any medication at  0045  Pt uses a medical bed to help with mobility issues  due to chronic pain, pt raises the bed to get out of bed &grabs on to the side rails.   Slept for 6.5 hours  No requests made

## 2022-03-22 NOTE — PROGRESS NOTES
"SPIRITUAL HEALTH SERVICES Progress Note  [facility] ([facility]) 3BW    Saw pt Hollis Barclay per follow up. Visit to deliver 2 library books to pt; provided emotional support for 40 minutes as pt was distressed about discharge; facilitated connection with staff for clarification.    Illness Narrative -     Distress - Jose M was visibly distressed as writer entered pt room, Jose M shared that he just heard from staff that it may be 4-6 weeks to wait for placement. Writer provided supportive listening, processing of emotional response, spoke with RN, and stayed with pt at his request as SW gave update on placement.    Coping - Jose M spoke of his preferred way of \"getting the emotions out\" as boxing or using the punching bag; stated that on the unit he finds walking the ross helpful.    Meaning-Making -     Plan - I will follow up later this week.    Liss Kerns MDiv  Associate   Pager [phone]  Office [phone]  [facility] remains available 24/7 for emergent requests/referrals, either by having the switchboard page the on-call  or by entering an ASAP/STAT consult in Epic (this will also page the on-call ). Routine Epic consults receive an initial response within 24 hours.    "

## 2022-03-23 PROCEDURE — 124N000003 HC R&B MH SENIOR/ADOLESCENT

## 2022-03-23 PROCEDURE — 250N000013 HC RX MED GY IP 250 OP 250 PS 637: Performed by: PSYCHIATRY & NEUROLOGY

## 2022-03-23 RX ADMIN — PREGABALIN 300 MG: 100 CAPSULE ORAL at 08:14

## 2022-03-23 RX ADMIN — PREGABALIN 300 MG: 100 CAPSULE ORAL at 13:01

## 2022-03-23 RX ADMIN — ACETAMINOPHEN 975 MG: 325 TABLET, FILM COATED ORAL at 13:01

## 2022-03-23 RX ADMIN — LEVOTHYROXINE SODIUM 75 MCG: 75 TABLET ORAL at 08:14

## 2022-03-23 RX ADMIN — ACETAMINOPHEN 975 MG: 325 TABLET, FILM COATED ORAL at 21:57

## 2022-03-23 RX ADMIN — NICOTINE POLACRILEX 8 MG: 4 GUM, CHEWING ORAL at 19:45

## 2022-03-23 RX ADMIN — ACETAMINOPHEN 975 MG: 325 TABLET, FILM COATED ORAL at 08:14

## 2022-03-23 RX ADMIN — NAPROXEN 500 MG: 500 TABLET ORAL at 08:14

## 2022-03-23 RX ADMIN — LIOTHYRONINE SODIUM 25 MCG: 25 TABLET ORAL at 08:14

## 2022-03-23 RX ADMIN — QUETIAPINE 600 MG: 300 TABLET, FILM COATED ORAL at 21:57

## 2022-03-23 RX ADMIN — NICOTINE POLACRILEX 8 MG: 4 GUM, CHEWING ORAL at 13:01

## 2022-03-23 RX ADMIN — PREGABALIN 300 MG: 100 CAPSULE ORAL at 19:46

## 2022-03-23 RX ADMIN — MULTIPLE VITAMINS W/ MINERALS TAB 1 TABLET: TAB at 08:14

## 2022-03-23 RX ADMIN — NICOTINE POLACRILEX 8 MG: 4 GUM, CHEWING ORAL at 08:18

## 2022-03-23 RX ADMIN — NAPROXEN 500 MG: 500 TABLET ORAL at 16:49

## 2022-03-23 ASSESSMENT — ACTIVITIES OF DAILY LIVING (ADL)
ORAL_HYGIENE: INDEPENDENT
HYGIENE/GROOMING: INDEPENDENT
DRESS: SCRUBS (BEHAVIORAL HEALTH)
LAUNDRY: WITH SUPERVISION
HYGIENE/GROOMING: INDEPENDENT
ORAL_HYGIENE: INDEPENDENT
DRESS: SCRUBS (BEHAVIORAL HEALTH)

## 2022-03-23 NOTE — PLAN OF CARE
"  Problem: Mood Impairment (Depressive Signs/Symptoms)  Goal: Improved Mood Symptoms (Depressive Signs/Symptoms)  Outcome: Ongoing, Not Progressing    Pt has flat affect and is guarded during interactions.  Pt states his mood is \"flat.\"  Pt denies SI/SIB.  Pt's mood seemed to somewhat brighten later in the shift.  Pt expressed hope for discharge soon - \"the days here are getting really long.\"  Appetite is good.  Compliant with medications.  No PRN medications requested this shift.                      "

## 2022-03-23 NOTE — PROGRESS NOTES
Patient seen, chart reviewed, care discussed with staff.  Blood pressure 115/79, pulse 102, temperature 99.1  F (37.3  C), temperature source Tympanic, resp. rate 16, weight 137.3 kg (302 lb 11.2 oz), SpO2 97 %.    General appearance: good  Alert.   Affect: good  Mood: good  Speech:  normal.   Eye contact:  good.    Psychomotor behavior: normal  Gait: steady   Abnormal movements:  none  Delusions: none  Hallucinations:  none  Thoughts: logical  Associations: intact  Judgement: good  Insight: good  Cognitions: intact in conversation  Memory:  intact in conversation  Orientation: normal    Not suicidal.  \Hospital course discussed    Imp: Bipolar depressed, resolved  2.  PTSD  3.  S/P TBI  And:   Patient Active Problem List   Diagnosis     Post concussive encephalopathy     H/O shoulder surgery     Alcoholism in remission (H)     Bilateral ACL tear     Chronic back pain     Social anxiety disorder     Alcohol withdrawal syndrome with complication, with unspecified complication (H)     Rib fracture     Alcohol withdrawal (H)     Alcohol dependence (H)     LFT elevation     Laceration of left hand without foreign body, initial encounter     Suicidal ideation     Intentional drug overdose, initial encounter (H)     Alcoholic intoxication with complication (H)     Severe bipolar I disorder, current or most recent episode depressed, with mixed features (H)     Medication overdose, intentional self-harm, subsequent encounter     Opioid dependence on agonist therapy (H)     2019 novel coronavirus disease (COVID-19)     Pneumonia due to COVID-19 virus     Bipolar disorder (H)     Plan:  Same meds, hop for discharge tomorrow    Current Facility-Administered Medications   Medication     acetaminophen (TYLENOL) tablet 975 mg     albuterol (PROVENTIL HFA/VENTOLIN HFA) inhaler     alum & mag hydroxide-simethicone (MAALOX) suspension 30 mL     artificial saliva (BIOTENE MT) solution 2 spray     cloNIDine (CATAPRES) tablet 0.2 mg      diphenhydrAMINE (BENADRYL) capsule 50 mg     docusate sodium (COLACE) capsule 100 mg     hydrocortisone (CORTAID) 1 % cream     ketamine 5%-gabapentin 8% in PLO cream 0.25 g     levothyroxine (SYNTHROID/LEVOTHROID) tablet 75 mcg     liothyronine (CYTOMEL) tablet 25 mcg     loperamide (IMODIUM) capsule 2 mg     melatonin tablet 3 mg     multivitamin w/minerals (THERA-VIT-M) tablet 1 tablet     naloxone (NARCAN) injection 0.2 mg    Or     naloxone (NARCAN) injection 0.4 mg    Or     naloxone (NARCAN) injection 0.2 mg    Or     naloxone (NARCAN) injection 0.4 mg     naproxen (NAPROSYN) tablet 500 mg     nicotine polacrilex (NICORETTE) gum 4-8 mg     OLANZapine (zyPREXA) tablet 10 mg    Or     OLANZapine (zyPREXA) injection 10 mg     pantoprazole (PROTONIX) EC tablet 40 mg     pregabalin (LYRICA) capsule 300 mg     QUEtiapine (SEROquel) tablet 100 mg     QUEtiapine (SEROquel) tablet 600 mg     sennosides (SENOKOT) tablet 1-2 tablet     tiZANidine (ZANAFLEX) tablet 4 mg     Recent Results (from the past 168 hour(s))   Asymptomatic COVID-19 Virus (Coronavirus) by PCR Nose    Collection Time: 03/22/22  3:14 PM    Specimen: Nose; Swab   Result Value Ref Range    SARS CoV2 PCR Negative Negative

## 2022-03-23 NOTE — PLAN OF CARE
Problem: Psychomotor Impairment (Depressive Signs/Symptoms)  Goal: Improved Psychomotor Symptoms (Depressive Signs/Symptoms)  Outcome: Ongoing, Progressing   Goal Outcome Evaluation:                Received awake, reading a book  Independent with ADL's  Pt uses a medical bed to help with mobility issues  due to chronic pain, pt raises the bed to get out of bed &grabs on to the side rails.  Slept for 7.5 hours     Advancement-Rotation Flap Text: The defect edges were debeveled with a #15 scalpel blade.  Given the location of the defect, shape of the defect and the proximity to free margins an advancement-rotation flap was deemed most appropriate.  Using a sterile surgical marker, an appropriate flap was drawn incorporating the defect and placing the expected incisions within the relaxed skin tension lines where possible. The area thus outlined was incised deep to adipose tissue with a #15 scalpel blade.  The skin margins were undermined to an appropriate distance in all directions utilizing iris scissors.

## 2022-03-23 NOTE — PLAN OF CARE
Problem: Mood Impairment (Depressive Signs/Symptoms)  Goal: Improved Mood Symptoms (Depressive Signs/Symptoms)  Outcome: Met  Flowsheets (Taken 3/23/2022 1739)  Mutually Determined Action Steps (Improved Mood Symptoms):    acknowledges progress    identifies personal treatment goal    verbalizes increased insight  Intervention: Promote Mood Improvement  Recent Flowsheet Documentation  Taken 3/23/2022 1735 by Jammie Vail RN  Diversional Activity: television      Pt presents with blunted, flat affect and calm mood. Denies SI/SIB and hallucinations. Appears guarded and tense. Withdrawn to self. Reports anxiety and depression r/t continued hospitalization. Pt was present in the lounge for meals and watching television. Did play cards with another pt and staff member this evening. Does not attend groups. Visited with randall this evening. Spoke with CTC - advised [facility] will be picking up pt tomorrow at 1330. Pt advised of this and is happy about it. Concerned about not having clothing for discharge - PA went to original unit where pt was admitted but could not locate anything for pt. Continues to report chronic R knee pain 6/10 that is somewhat controlled with scheduled pain medications. VSS. Medication compliant, refused 1800 Tylenol. Appetite and fluid intake adequate. No other concerns or complaints noted.

## 2022-03-23 NOTE — PROGRESS NOTES
"SPIRITUAL HEALTH SERVICES Progress Note  [facility] ([facility]) 3BW    Saw pt Hollis Barclay per follow up for 30 minutes. Provided emotional support, facilitated reflective conversation around coping, leena, and hope in regards to discharge. Shared prayer at close of the visit.      Illness Narrative -       Distress - Pt visibly anxious, talked about his hopes and anxieties around possible discharge tomorrow and coping with the length of stay in the hospital.      Coping - Ray spoke of today: having staff provide timely, accurate updates, reading, sitting out in the lounge, walk the ross, prayer      Meaning-Making - \"No mater what happens I continue to live by the wisdom of knowing that it will become clear what is 'the next right thing' so long as I attend to what I can control.\"      Plan - I will follow up tomorrow.    Liss Kerns MDiv  Associate   Pager [phone]  Office [phone]  [facility] remains available 24/7 for emergent requests/referrals, either by having the switchboard page the on-call  or by entering an ASAP/STAT consult in Epic (this will also page the on-call ). Routine Epic consults receive an initial response within 24 hours.    "

## 2022-03-23 NOTE — CARE PLAN
Assessment/Intervention/Current Symtoms and Care Coordination  Pt is currently hospitalized due to chronic pain, depression, anxiety.  -Rounded with team in reviewing pt's care  -Reviewed pt's chart to get up to date with pt's care and progress  -Met with pt one on one and updated him on referral status and current timeline     Spoke with Jessica at [facility]. She stated that her and Cara (CESAR) agreed on a rate and that she is waiting for the CM to send the final paper work. [facility] will likely be able to admit the patient on Thursday 3/24   ___________________________________________________________     Glen <[email or web address]  Mon 3/21/2022 7:58 PM    Hello     Just wanted to give you a quick update.  We have not reached an agreement on the rate and we are waiting to hear back from [facility].  Will let you know as soon as we hear from them.     Thank you     Jessica/Lottie  [facility]  ____________________________________________________    Cara Wall <[email or web address]>  Tue 3/22/2022 1:53 PM    Good Afternoon,    I am getting the rate to be reviewed. I had it back this morning-but there was an error so I am having it corrected. I will send the rate as soon I receive it to [facility].    Thank you,    Cara Davenport) GINA Wall, MS, [facility]  Senior     ______________________________________________________________  Cara Wall <[email or web address]>  Wed 3/23/2022 1:19 PM    Good Afternoon,      I am still waiting to get the corrected rate back. The CMA reported that I would have it back by the end of the day-so hopefully any minute now.      Thank you,    Cara Wall MA (Libby), MS, [facility]  Senior   Long Term Services and Supports-Initial Assessment    ______________________________________________________________  Cara Wall <[email or web address]>  Wed 3/23/2022 2:06 PM  Good  Afternoon,    This is the rush/expedited process.  I will let [facility] review the rate as soon as I have it.    Thank you for your patience,    Cara MorelandAngela) GINA Wall, MS, [facility]  ______________________________________________________________    Jessica  [email or web address]>  Wed 3/23/2022 5:17 PM    Hi Lorna     We wanted to let you know that we will be sending our Staff - Declan to  [facility] at 1:30 pm tomorrow thursday March 23rd.  Please let me know if this time does not work for you.    Thank you for your patience.      Jessica  [facility]   ______________________________________________________________    Discharge Plan or Goal  Discharge to [facility] on 3/23 at 1:30 pm     Barriers to Discharge   CADI waiver approval is pending.      Referrals:   [facility]- CADI waiver     Legal: Voluntary

## 2022-03-23 NOTE — PLAN OF CARE
Problem: Suicidal Behavior  Goal: Suicidal Behavior is Absent or Managed  Outcome: Adequate for Care Transition   Goal Outcome Evaluation:    Plan of Care Reviewed With: patient     Patient calm and cooperative, mood is labile with irritability. Affect flat and blunted, patient is guarded and does not engage with peers, minimum engagement with staff. Good appetite, isolates to room reading books. Denies MH illness. Possible discharge 3/24 see CTC note.

## 2022-03-24 VITALS
HEART RATE: 101 BPM | RESPIRATION RATE: 16 BRPM | TEMPERATURE: 97.3 F | OXYGEN SATURATION: 95 % | BODY MASS INDEX: 36.12 KG/M2 | DIASTOLIC BLOOD PRESSURE: 88 MMHG | SYSTOLIC BLOOD PRESSURE: 133 MMHG | WEIGHT: 303 LBS

## 2022-03-24 PROCEDURE — 250N000013 HC RX MED GY IP 250 OP 250 PS 637: Performed by: PSYCHIATRY & NEUROLOGY

## 2022-03-24 RX ADMIN — LEVOTHYROXINE SODIUM 75 MCG: 75 TABLET ORAL at 08:12

## 2022-03-24 RX ADMIN — ACETAMINOPHEN 975 MG: 325 TABLET, FILM COATED ORAL at 12:23

## 2022-03-24 RX ADMIN — NICOTINE POLACRILEX 8 MG: 4 GUM, CHEWING ORAL at 08:12

## 2022-03-24 RX ADMIN — PREGABALIN 300 MG: 100 CAPSULE ORAL at 12:23

## 2022-03-24 RX ADMIN — NAPROXEN 500 MG: 500 TABLET ORAL at 08:12

## 2022-03-24 RX ADMIN — NICOTINE POLACRILEX 8 MG: 4 GUM, CHEWING ORAL at 10:20

## 2022-03-24 RX ADMIN — ACETAMINOPHEN 975 MG: 325 TABLET, FILM COATED ORAL at 08:12

## 2022-03-24 RX ADMIN — PREGABALIN 300 MG: 100 CAPSULE ORAL at 08:12

## 2022-03-24 RX ADMIN — MULTIPLE VITAMINS W/ MINERALS TAB 1 TABLET: TAB at 08:12

## 2022-03-24 RX ADMIN — NICOTINE POLACRILEX 8 MG: 4 GUM, CHEWING ORAL at 13:11

## 2022-03-24 RX ADMIN — LIOTHYRONINE SODIUM 25 MCG: 25 TABLET ORAL at 08:13

## 2022-03-24 ASSESSMENT — ACTIVITIES OF DAILY LIVING (ADL)
HYGIENE/GROOMING: INDEPENDENT
LAUNDRY: WITH SUPERVISION
DRESS: INDEPENDENT
ORAL_HYGIENE: INDEPENDENT

## 2022-03-24 NOTE — PLAN OF CARE
Problem: Sleep Disturbance (Depressive Signs/Symptoms)  Goal: Improved Sleep (Depressive Signs/Symptoms)  Outcome: Met   Goal Outcome Evaluation:             Received awake in bed, calm  Pt uses a medical bed to help with mobility issues  due to chronic pain, pt raises the bed to get out of bed &grabs on to the side rails.Pt keeps HOB elevated at night,reads a book before falling asleep.  Possible discharge , plan is for staff from [facility] will pick him up at 1330  Slept for 6.75 hours

## 2022-03-24 NOTE — PROGRESS NOTES
Pt was discharge to [facility] via facility transportation p/u  after being treated for suicidal ideation under care of Dr. Perez.    Denies SI, SIB, HI, and hallucinations.   Mood is blunted, states he has a lot on his mind. Anxious discharge might fall through again.   Future plans are attended psychiatry appointment, start process for R TKA, and will look up his own support for Chem Dep.     Reviewed AVS.   Notified of folllow up appointments, including Orthopedic appointment to be evaluated for R TKA.   Medication schedule and 30 supply of all meds sent with, expect OTC Melatonin which he needs to  at store.   Pt is aware they need to see their prescribing provider within 30 days to have prescriptions refilled.   Medication education complete.  All questions encouraged and answered.       All belongings returned. Exceptions lost sweat pants and T-shirt prior to arriving on 3B. No belongings at [facility] ED or sta 32.  Provided a change of clothing. States wallet was missing. Documented it was addressed on sta 32    No concerns at the time of discharge                Survey given:  Sent Home to Complete

## 2022-03-24 NOTE — DISCHARGE SUMMARY
51 year old man admitted for bipolar depression.  From the H&P:  Admitted: 2022     HISTORY OF PRESENT ILLNESS:  This is one of several [facility] psychiatric admissions for this 51-year-old  white male with a long history of bipolar disorder, social anxiety disorder, generalized anxiety disorder, polysubstance abuse and severe alcohol use disorder, who checked into [facility] Emergency Department complaining of being increasingly more depressed in the context of excessive drinking.  It should be noted that he was just discharged from this hospital, station 3B on 2021 and started drinking immediately after the discharge.  He presented with alcohol withdrawal symptoms and suicidal ideation.  It was felt that he needed to be admitted to the psychiatric unit and he was transferred to this hospital and placed on station 32 where he was seen by the undersigned.     HISTORY OF PRESENT ILLNESS:  The patient has a long history of bipolar disorder, manifested by both periods of depression usually aggravated by alcohol abuse and periods of hypomania.  He was hospitalized psychiatrically on numerous occasions and has been treated in the past with various antidepressants and mood stabilizers.  He has been on lithium, Seroquel, Zyprexa, Zoloft, Cymbalta, Remeron and Effexor at different stages of his life.  He was last discharged from the hospital on 2021 on the following medications:  Seroquel 600 mg at bedtime, Suboxone 8-2 mg per film 1 film t.i.d., Lyrica 200 mg t.i.d., Topamax 50 mg b.i.d., prazosin 1 mg at bedtime, gabapentin 100 mg t.i.d. p.r.n., melatonin 3 mg at bedtime p.r.n. and Zyprexa 5 mg q. 6 hours p.r.n.  He has been taking his medications sporadically after the discharge, mainly because he started drinking almost immediately.    Hospital course: he improved markedly with the combo of medicatiojn changes and stabilization.  After mood stabilized, his mother , and he wanted  to honor her by doing better.  Opiates were rapidly tapered at his request.  Seroquel was continued, other meds were tapered off except Lyrica and others.  Topamax caused mild paranoia, Campral caused fuzzy thinking.  His PTSD and vulnerability to alcohol relapse made placement difficult as he needed a single room in a sober setting       Review of your medicines      START taking      Dose / Directions   artificial saliva Soln solution  Used for: Dry mouth not due to sicca syndrome      Dose: 2 spray  Swish and spit 2 mLs (2 sprays) in mouth 4 times daily as needed for dry mouth  Quantity: 44.3 mL  Refills: 3     * hydrocortisone 1 % external cream  Commonly known as: CORTAID  Used for: Rash and nonspecific skin eruption      Apply topically 2 times daily  Quantity: 1.5 g  Refills: 1     * hydrocortisone 1 % external cream  Commonly known as: CORTAID  Used for: Rash      Apply topically 2 times daily as needed for itching  Quantity: 15 g  Refills: 1     liothyronine 25 MCG tablet  Commonly known as: CYTOMEL  Used for: Bipolar disorder, current episode depressed, severe, without psychotic features (H)      Dose: 25 mcg  Take 1 tablet (25 mcg) by mouth daily  Quantity: 30 tablet  Refills: 3         * This list has 2 medication(s) that are the same as other medications prescribed for you. Read the directions carefully, and ask your doctor or other care provider to review them with you.            CONTINUE these medicines which may have CHANGED, or have new prescriptions. If we are uncertain of the size of tablets/capsules you have at home, strength may be listed as something that might have changed.      Dose / Directions   * acetaminophen 325 MG tablet  Commonly known as: TYLENOL  This may have changed: Another medication with the same name was added. Make sure you understand how and when to take each.  Used for: Chronic bilateral low back pain with sciatica, sciatica laterality unspecified      Dose: 975 mg  Take 3  tablets (975 mg) by mouth every 6 hours as needed for mild pain, fever or headaches (to moderate pain)  Quantity: 100 tablet  Refills: 3     * acetaminophen 325 MG tablet  Commonly known as: TYLENOL  This may have changed: You were already taking a medication with the same name, and this prescription was added. Make sure you understand how and when to take each.  Used for: Chronic bilateral low back pain with sciatica, sciatica laterality unspecified      Dose: 975 mg  Take 3 tablets (975 mg) by mouth 4 times daily  Quantity: 100 tablet  Refills: 3         * This list has 2 medication(s) that are the same as other medications prescribed for you. Read the directions carefully, and ask your doctor or other care provider to review them with you.            CONTINUE these medicines which have NOT CHANGED      Dose / Directions   albuterol 108 (90 Base) MCG/ACT inhaler  Commonly known as: PROAIR HFA/PROVENTIL HFA/VENTOLIN HFA  Used for: Pneumonia due to COVID-19 virus      Dose: 2 puff  Inhale 2 puffs into the lungs every 4 hours as needed for wheezing or shortness of breath / dyspnea  Quantity: 18 g  Refills: 3     levothyroxine 75 MCG tablet  Commonly known as: SYNTHROID/LEVOTHROID  Used for: Hypothyroidism, unspecified type      Dose: 75 mcg  Take 1 tablet (75 mcg) by mouth every morning (before breakfast)  Quantity: 30 tablet  Refills: 3     multivitamin w/minerals tablet  Used for: Alcohol dependence with intoxication with complication (H)      Dose: 1 tablet  Take 1 tablet by mouth daily  Quantity: 90 tablet  Refills: 1     naproxen 500 MG tablet  Commonly known as: NAPROSYN  Used for: Rupture of anterior cruciate ligament of both knees, subsequent encounter      Dose: 500 mg  Take 1 tablet (500 mg) by mouth 2 times daily (with meals)  Quantity: 60 tablet  Refills: 3     nicotine polacrilex 4 MG gum  Commonly known as: NICORETTE  Used for: Alcohol dependence with intoxication with complication (H)      Dose: 4-8  mg  Place 1-2 each (4-8 mg) inside cheek every hour as needed for other (nicotine withdrawal symptoms)  Quantity: 100 each  Refills: 3     pregabalin 300 MG capsule  Commonly known as: LYRICA  Used for: H/O shoulder surgery  Notes to patient: Filled with 200 mg and 100 mg capsules: take 1 capsule 200 mg and 1 capsule 100 mg 3 x daily       Dose: 300 mg  Take 1 capsule (300 mg) by mouth 3 times daily  Quantity: 90 capsule  Refills: 0     QUEtiapine 300 MG tablet  Commonly known as: SEROquel  Used for: Bipolar disorder, current episode depressed, severe, without psychotic features (H)      Dose: 600 mg  Take 2 tablets (600 mg) by mouth At Bedtime  Quantity: 60 tablet  Refills: 3        STOP taking    acamprosate 333 MG EC tablet  Commonly known as: CAMPRAL        amLODIPine 5 MG tablet  Commonly known as: NORVASC        diclofenac 1 % topical gel  Commonly known as: VOLTAREN        docusate sodium 100 MG capsule  Commonly known as: COLACE        folic acid 1 MG tablet  Commonly known as: FOLVITE        ibuprofen 800 MG tablet  Commonly known as: ADVIL/MOTRIN        Lidocaine 4 % Patch  Commonly known as: LIDOCARE        melatonin 1 MG Tabs tablet        naloxone 4 MG/0.1ML nasal spray  Commonly known as: NARCAN        oxyCODONE HCl 20 MG Tabs immediate release tablet  Commonly known as: ROXICODONE        pantoprazole 40 MG EC tablet  Commonly known as: PROTONIX        prazosin 1 MG capsule  Commonly known as: MINIPRESS        propranolol 20 MG tablet  Commonly known as: INDERAL        senna-docusate 8.6-50 MG tablet  Commonly known as: SENOKOT-S/PERICOLACE        sennosides 8.6 MG tablet  Commonly known as: SENOKOT        tiZANidine 4 MG tablet  Commonly known as: ZANAFLEX        topiramate 50 MG tablet  Commonly known as: TOPAMAX              Where to get your medicines      These medications were sent to [facility] [place], [place] - [address]  [address] [address]     Phone: [phone]     acetaminophen 325 MG tablet    acetaminophen 325 MG tablet    albuterol 108 (90 Base) MCG/ACT inhaler    artificial saliva Soln solution    hydrocortisone 1 % external cream    hydrocortisone 1 % external cream    levothyroxine 75 MCG tablet    liothyronine 25 MCG tablet    multivitamin w/minerals tablet    naproxen 500 MG tablet    nicotine polacrilex 4 MG gum    pregabalin 300 MG capsule    QUEtiapine 300 MG tablet       Recent Results (from the past 2016 hour(s))   Magnesium    Collection Time: 01/07/22  4:46 PM   Result Value Ref Range    Magnesium 2.3 1.6 - 2.3 mg/dL   Phosphorus    Collection Time: 01/07/22  4:46 PM   Result Value Ref Range    Phosphorus 2.9 2.5 - 4.5 mg/dL   Comprehensive metabolic panel    Collection Time: 01/07/22  4:46 PM   Result Value Ref Range    Sodium 143 133 - 144 mmol/L    Potassium 3.3 (L) 3.4 - 5.3 mmol/L    Chloride 109 94 - 109 mmol/L    Carbon Dioxide (CO2) 29 20 - 32 mmol/L    Anion Gap 5 3 - 14 mmol/L    Urea Nitrogen 10 7 - 30 mg/dL    Creatinine 0.61 (L) 0.66 - 1.25 mg/dL    Calcium 9.1 8.5 - 10.1 mg/dL    Glucose 93 70 - 99 mg/dL    Alkaline Phosphatase 72 40 - 150 U/L    AST 32 0 - 45 U/L    ALT 59 0 - 70 U/L    Protein Total 8.9 (H) 6.8 - 8.8 g/dL    Albumin 4.3 3.4 - 5.0 g/dL    Bilirubin Total 0.4 0.2 - 1.3 mg/dL    GFR Estimate >90 >60 mL/min/1.73m2   Ethyl Alcohol Level    Collection Time: 01/07/22  4:46 PM   Result Value Ref Range    Alcohol ethyl 0.18 (H) <=0.01 g/dL   CBC with platelets and differential    Collection Time: 01/07/22  4:46 PM   Result Value Ref Range    WBC Count 7.7 4.0 - 11.0 10e3/uL    RBC Count 5.15 4.40 - 5.90 10e6/uL    Hemoglobin 15.2 13.3 - 17.7 g/dL    Hematocrit 45.8 40.0 - 53.0 %    MCV 89 78 - 100 fL    MCH 29.5 26.5 - 33.0 pg    MCHC 33.2 31.5 - 36.5 g/dL    RDW 14.8 10.0 - 15.0 %    Platelet Count 410 150 - 450 10e3/uL    % Neutrophils 57 %    % Lymphocytes 37 %    % Monocytes 5 %    % Eosinophils 0 %    %  Basophils 1 %    % Immature Granulocytes 0 %    NRBCs per 100 WBC 0 <1 /100    Absolute Neutrophils 4.4 1.6 - 8.3 10e3/uL    Absolute Lymphocytes 2.9 0.8 - 5.3 10e3/uL    Absolute Monocytes 0.4 0.0 - 1.3 10e3/uL    Absolute Eosinophils 0.0 0.0 - 0.7 10e3/uL    Absolute Basophils 0.0 0.0 - 0.2 10e3/uL    Absolute Immature Granulocytes 0.0 <=0.4 10e3/uL    Absolute NRBCs 0.0 10e3/uL   Extra Blue Top Tube    Collection Time: 01/07/22  4:47 PM   Result Value Ref Range    Hold Specimen JIC    Extra Red Top Tube    Collection Time: 01/07/22  4:47 PM   Result Value Ref Range    Hold Specimen JI    Asymptomatic COVID-19 Virus (Coronavirus) by PCR Nasopharyngeal    Collection Time: 01/07/22  5:19 PM    Specimen: Nasopharyngeal; Swab   Result Value Ref Range    SARS CoV2 PCR Negative Negative   Drug abuse screen 77 urine (FL, RH, SH)    Collection Time: 01/08/22  1:50 PM   Result Value Ref Range    Amphetamines Urine Screen Negative Screen Negative    Barbiturates Urine Screen Negative Screen Negative    Benzodiazepines Urine Screen Positive (A) Screen Negative    Cannabinoids Urine Screen Negative Screen Negative    Cocaine Urine Screen Positive (A) Screen Negative    Opiates Urine Screen Positive (A) Screen Negative    PCP Urine Screen Negative Screen Negative   Asymptomatic COVID-19 Virus (Coronavirus) by PCR Nasopharyngeal    Collection Time: 01/12/22  6:12 PM    Specimen: Nasopharyngeal; Swab   Result Value Ref Range    SARS CoV2 PCR Negative Negative   Asymptomatic COVID-19 Virus (Coronavirus) by PCR Nose    Collection Time: 01/18/22  9:47 AM    Specimen: Nose; Swab   Result Value Ref Range    SARS CoV2 PCR Negative Negative   CBC with platelets and differential    Collection Time: 01/19/22 11:32 AM   Result Value Ref Range    WBC Count 4.9 4.0 - 11.0 10e3/uL    RBC Count 4.36 (L) 4.40 - 5.90 10e6/uL    Hemoglobin 12.7 (L) 13.3 - 17.7 g/dL    Hematocrit 39.3 (L) 40.0 - 53.0 %    MCV 90 78 - 100 fL    MCH 29.1 26.5 -  33.0 pg    MCHC 32.3 31.5 - 36.5 g/dL    RDW 14.6 10.0 - 15.0 %    Platelet Count 280 150 - 450 10e3/uL   Manual Differential    Collection Time: 01/19/22 11:32 AM   Result Value Ref Range    % Neutrophils 49 %    % Lymphocytes 42 %    % Monocytes 8 %    % Eosinophils 1 %    % Basophils 0 %    NRBCs per 100 WBC 1 (H) <=0 %    Absolute Neutrophils 2.4 1.6 - 8.3 10e3/uL    Absolute Lymphocytes 2.1 0.8 - 5.3 10e3/uL    Absolute Monocytes 0.4 0.0 - 1.3 10e3/uL    Absolute Eosinophils 0.0 0.0 - 0.7 10e3/uL    Absolute Basophils 0.0 0.0 - 0.2 10e3/uL    Absolute NRBCs 0.0 <=0.0 10e3/uL    RBC Morphology Confirmed RBC Indices     Platelet Assessment  Automated Count Confirmed. Platelet morphology is normal.     Automated Count Confirmed. Platelet morphology is normal.   Asymptomatic COVID-19 Virus (Coronavirus) by PCR Nose    Collection Time: 02/25/22 12:03 PM    Specimen: Nose; Swab   Result Value Ref Range    SARS CoV2 PCR Negative Negative   Asymptomatic COVID-19 Virus (Coronavirus) by PCR Nose    Collection Time: 03/05/22  1:41 PM    Specimen: Nose; Swab   Result Value Ref Range    SARS CoV2 PCR Negative Negative   Drug abuse screen 6 urine (chem dep) ([facility])    Collection Time: 03/11/22 11:48 AM   Result Value Ref Range    Amphetamines Urine Screen Negative Screen Negative    Barbiturates Urine Screen Negative Screen Negative    Benzodiazepines Urine Screen Negative Screen Negative    Cannabinoids Urine Screen Negative Screen Negative    Cocaine Urine Screen Negative Screen Negative    Ethanol Urine Screen Negative Screen Negative    Opiates Urine Screen Negative Screen Negative   Asymptomatic COVID-19 Virus (Coronavirus) by PCR Nose    Collection Time: 03/14/22 12:39 PM    Specimen: Nose; Swab   Result Value Ref Range    SARS CoV2 PCR Negative Negative   Asymptomatic COVID-19 Virus (Coronavirus) by PCR Nose    Collection Time: 03/22/22  3:14 PM    Specimen: Nose; Swab   Result Value Ref Range    SARS CoV2 PCR Negative  Negative   3/24:  General appearance: good  Alert.   Affect: good  Mood: good  Speech:  normal.   Eye contact:  good.    Psychomotor behavior: normal  Gait: steady   Abnormal movements:  none  Delusions: none  Hallucinations:   none  Thoughts: logical  Associations: intact  Judgement: good  Insight: good  Cognitions: intact in conversation  Memory:  intact in conversation  Orientation: normal    Not suicidal.    3/24: Blood pressure 133/88, pulse 101, temperature 97.3  F (36.3  C), temperature source Tympanic, resp. rate 16, weight 137.4 kg (303 lb), SpO2 95 %.    Imp: Bipolar depression, severe, recurrent with psychotic features, resolved at discharge  2.  PTSD  3.  S/P TBI  And:   Patient Active Problem List   Diagnosis     Post concussive encephalopathy     H/O shoulder surgery     Alcoholism in remission (H)     Bilateral ACL tear     Chronic back pain     Social anxiety disorder     Alcohol withdrawal syndrome with complication, with unspecified complication (H)     Rib fracture     Alcohol withdrawal (H)     Alcohol dependence (H)     LFT elevation     Laceration of left hand without foreign body, initial encounter     Suicidal ideation     Intentional drug overdose, initial encounter (H)     Alcoholic intoxication with complication (H)     Severe bipolar I disorder, current or most recent episode depressed, with mixed features (H)     Medication overdose, intentional self-harm, subsequent encounter     Opioid dependence on agonist therapy (H)     2019 novel coronavirus disease (COVID-19)     Pneumonia due to COVID-19 virus     Bipolar disorder (H)       Video-Visit Details    Type of service:  Video Visit    Video Start Time (time video started): 1200    Video End Time (time video stopped): 1220    Originating Location (pt. Location): [facility]    Distant Location (provider location): Provider remote location    Mode of Communication:  Video Conference via [employer]    Physician has received verbal consent for  a Video Visit from the patient? Yes      Ulises Perez MD

## 2022-03-24 NOTE — DISCHARGE INSTRUCTIONS
Behavioral Discharge Planning and Instructions    Summary: You were admitted on 1/8/2022  due to depressive symptoms and suicidal ideation.  You were treated by Ulises Perez MD and discharged on 3/24/2021 from [facility] ( Station 3B) to Assisted Living    Main Diagnosis:   Bipolar disorder, depressed  Borderline personality disorder  Anxiety disorder, not otherwise specified  Alcohol use disorder, severe  Alcohol withdrawal  History of alcohol withdrawal seizures  Polysubstance abuse (opiates, cocaine, methamphetamine)  Nicotine use disorder  History of TBI  Chronic back pain  Right knee pain  Essential hypertension  GERD  Slow transit constipation  Hypothyroidism  Dilated aortic root    Health Care Follow-up: Attend all scheduled appointments with your outpatient providers. Call at least 24 hours in advance if you need to reschedule an appointment to ensure continued access to your outpatient providers.     Psychiatry: [facility]  Psychiatrist: Diane Russo CNP  In person Appointment: Wednesday March 30th, 2022 at 11 am   Address: [address] [place] [address]  Phone: [phone]    Arrive 15 minutes before your appointment. Bring your ID and a copy of your insurance card.     Orthopedic Consult- [facility]  Provider: Eric reyes MD  In person Appointment: Friday April 15th, 2022 at 1:45 pm    Address: [address] [place] [id number]  Phone: [phone]     Patient did not want [facility] to schedule him an individual therapy appointment. Patient notified [facility] that he will schedule his own therapy appointment after he discharges.      Your Care Team Includes:  Jr Giron MD  -[facility] #[phone]         : Cara Wall MA (Libby), MS, [facility]   Mobile: [phone]  Fax: [phone] [email or web address].    Assisted Living Facility: [facility]  Address:  "[address] [place] [address]  Managers: Glen  Office Phone: [phone]  Facility: [phone]    Major Treatments, Procedures and Findings:  You were provided with: a psychiatric assessment, assessed for medical stability, medication evaluation and/or management, group therapy, milieu management and medical interventions    Symptoms to Report: feeling more aggressive, increased confusion, losing more sleep, mood getting worse or thoughts of suicide    Early warning signs can include: increased depression or anxiety sleep disturbances increased thoughts or behaviors of suicide or self-harm  increased unusual thinking, such as paranoia or hearing voices    Safety and Wellness:  Take all medicines as directed.  Make no changes unless your doctor suggests them.      Follow treatment recommendations.  Refrain from alcohol and non-prescribed drugs.  If there is a concern for safety, call 911.    Resources:   Crisis Intervention: 556.635.5275 or 551-274-9262 (TTY: 513.681.6947).  Call anytime for help.  National [place] on Mental Illness (www.[place].ari.org): 206.199.5951 or 408-181-1756.  [place] Association for Children's Mental Health (www.macmh.org): 551.369.1058.  Alcoholics Anonymous (www.alcoholics-anonymous.org): Check your phone book for your local chapter.  Suicide Awareness Voices of Education (SAVE) (www.save.org): 436-111-MSLC (3159)  National Suicide Prevention Line (www.mentalhealthmn.org): 204-073-OXJU (9700)  Mental Health Consumer/Survivor Network of [place] (www.mhcsn.net): 548.260.7481 or 388-494-0391  Mental Health Association of [place] (www.mentalhealth.org): 157.879.3078 or 341-330-2741  Self- Management and Recovery Training., SMART-- Toll free: 640.347.3160  www.Metaplace.EarthLink  [facility] Crisis Response 663-682-9496  Text 4 Life: txt \"LIFE\" to 36219 for immediate support and crisis intervention  Crisis text line: Text \"[place]\" to 085726. Free, confidential, 24/7.  Crisis Intervention: " 559.477.9696 or 375-470-8086. Call anytime for help.   [facility] Crisis Team - Child: [phone]    General Medication Instructions:   See your medication sheet(s) for instructions.   Take all medicines as directed.  Make no changes unless your doctor suggests them.   Go to all your doctor visits.  Be sure to have all your required lab tests. This way, your medicines can be refilled on time.  Do not use any drugs not prescribed by your doctor.  Avoid alcohol.    Advance Directives:   Scanned document on file with [employer]ing? No scanned doc  Is document scanned? Pt states no documents  Honoring Choices Your Rights Handout: Informed and given  Was more information offered? Pt declined    The Treatment team has appreciated the opportunity to work with you. If you have any questions or concerns about your recent admission, you can contact the unit which can receive your call 24 hours a day, 7 days a week. They will be able to get in touch with a Provider if needed. The unit number is [phone] .

## 2022-03-24 NOTE — PROGRESS NOTES
"SPIRITUAL HEALTH SERVICES   Spiritual Assessment Progress Note (Behavioral Health/CD Focus)  [facility] ([facility]) 3BW    REFERRAL SOURCE: follow up    On this visit, on this visit I met with Jose M in the lounge for 45 minutes. Pt returned all books; engaged in reflective conversation around future hopes and experience on the unit while playing cribbage.    EXPERIENCE OF ILLNESS/HOSPITALIZATION:  Jose M spoke of the details of discharge with a sense of hope and also anxiety; stated he feels good about the work he has done here and expressed appreciation for spiritual care: \"Thank you, you know you have kept me sane while I've been here so long.\"    MEANING-MAKING:  Jose M stated \"This is a big step, and just a step, I have a long road forward.\"    SPIRITUALITY/VALUES/Sikhism:  Values expressed today: moving toward self-sufficiency and independence    COPING/SPIRITUAL PRACTICES: Jose M stated the most important prayers right now are for a clear mind and sticking with his plans.    SUPPORT SYSTEMS: friend in [place] he looks forward to meeting up with    PLAN: no plans for follow up due to pt discharge.                                                                                                                                             Liss Kerns MDiv  Associate   Pager [phone]  Office [phone]  [facility] remains available 24/7 for emergent requests/referrals, either by having the switchboard page the on-call  or by entering an ASAP/STAT consult in Epic (this will also page the on-call ). Routine Epic consults receive an initial response within 24 hours.    "

## 2022-03-24 NOTE — PLAN OF CARE
Assessment/Intervention/Current Symtoms and Care Coordination  Pt is currently hospitalized due to chronic pain, depression, anxiety.  -Rounded with team in reviewing pt's care  -Reviewed pt's chart to get up to date with pt's care and progress  -Met with pt one on one and updated him on referral status and current timeline        Discharge Plan or Goal  Discharge to [facility] on 3/24 at 1:30 pm     Barriers to Discharge   CADI waiver approval is pending.      Referrals:   Psychiatry: [facility]  Psychiatrist: Diane Russo CNP  In person Appointment: Wednesday March 30th, 2022 at 11 am   Address: [address] [place] [address]  Phone: [phone]     Arrive 15 minutes before your appointment. Bring your ID and a copy of your insurance card.      Orthopedic Consult- [facility]  Provider: Eric reyes MD  In person Appointment: Friday April 15th, 2022 at 1:45 pm    Address: [address] [place] [id number]  Phone: [phone]      Patient did not want [facility] to schedule him an individual therapy appointment. Patient notified [facility] that he will schedule his own therapy appointment after he discharges     Legal: Voluntary

## 2022-07-21 NOTE — TELEPHONE ENCOUNTER
LM for pt on new cell number. Pt missed his HFU appointment on 7/17. Called to assist with rescheduling. Pt needs to be seen for med refills.     ROSIO HarrisSW    
93

## 2022-10-28 NOTE — PLAN OF CARE
Problem: Activity and Energy Impairment (Depressive Signs/Symptoms)  Goal: Optimized Energy Level (Depressive Signs/Symptoms)  Outcome: Improving     Problem: Mood Impairment (Depressive Signs/Symptoms)  Goal: Improved Mood Symptoms (Depressive Signs/Symptoms)  Outcome: Improving     Problem: Sleep Disturbance (Depressive Signs/Symptoms)  Goal: Improved Sleep (Depressive Signs/Symptoms)  Outcome: Improving     Slept well for 7 hours  Compliant with his scheduled medication, Nicorette gum 8 mg given per request.  Continues to endorse Right knee pain.   no

## 2023-03-16 NOTE — PROGRESS NOTES
[facility], [place]   Psychiatric Progress Note  Hospital Day: 39        Interim History:   The patient's care was discussed with the treatment team during the daily team meeting and staff's chart notes were reviewed. Patient seen by LATOYA Bennett CNP  Staff report patient slept a total of 5 hours. Patient is not exhibiting signs/sx of psychosis or katey. Patient is medication adherent. Patient is attending groups. Patient is not sleeping well. Patient is eating adequately. Patient is attending to ADLs.    Upon interview, the patient stated he was sad about his mother who is in the process of passing away. She is no longer responsive and unable to talk to patient on the phone. Patient talked with writer about good memories about his mother and states that even tho his medication is making him tired at night, he is unable to fall asleep because of the situation.     Suicidal ideation: denies current or recent suicidal ideation or behaviors.    Homicidal ideation: denies current or recent homicidal ideation or behaviors.    Psychotic symptoms: Patient denies AH, VH, paranoia, delusions.            Diagnoses:   1. Bipolar depressed, severe, recurrent  2.  PTSD  3.  Status post TBI  4.  Knee pain         Plan:      1. Patient requires sober setting and opiates until knee surgery. Social work renewing CADI waver. Patient prefers Assisted Living setting     2. Increased Lamictal to 100mg once a day this morning.      3. Continue to provide support and reassess suicidal ideation in light of recent stressors      Disposition Plan   Legal Status: voluntary         Medications:       acamprosate  666 mg Oral TID     amLODIPine  5 mg Oral Daily     docusate sodium  100 mg Oral BID     folic acid  1 mg Oral Daily     lamoTRIgine  100 mg Oral Daily     levothyroxine  75 mcg Oral QAM AC     liothyronine  25 mcg Oral Daily     multivitamin w/minerals  1 tablet Oral Daily     naproxen  500 mg  Oral BID w/meals     oxyCODONE IR  20 mg Oral 5x Daily     pantoprazole  40 mg Oral QAM AC     prazosin  1 mg Oral At Bedtime     pregabalin  300 mg Oral TID     propranolol  20 mg Oral BID     QUEtiapine  700 mg Oral At Bedtime     thiamine  100 mg Oral Daily     topiramate  100 mg Oral BID     cholecalciferol  25 mcg Oral Daily            Psychiatric Examination:     /78   Pulse 80   Temp 97.1  F (36.2  C) (Temporal)   Resp 16   Wt 139.7 kg (308 lb)   SpO2 98%   BMI 36.71 kg/m    Weight is 308 lbs 0 oz  Body mass index is 36.71 kg/m .  Orthostatic Vitals  Report    None            Appearance: awake, alert, adequately groomed and dressed in hospital scrubs  Attitude:  cooperative  Eye Contact:  good  Mood:  sad   Affect:  appropriate and in normal range and mood congruent  Speech:  clear, coherent  Psychomotor Behavior:  no evidence of tardive dyskinesia, dystonia, or tics  Thought Process:  logical, linear and goal oriented  Associations:  no loose associations  Thought Content:  no evidence of suicidal ideation or homicidal ideation and no evidence of psychotic thought  Insight:  fair  Judgement:  fair  Oriented to:  time, person, and place  Attention Span and Concentration:  intact  Recent and Remote Memory:  intact           Allergies:     Allergies   Allergen Reactions     Cucumber Extract Anaphylaxis     Cucumber the vegable     Hydroxyzine Hives     Previously unreported by patient, this is a new patient declaration as of 5/1/21.     Nsaids      No problem with oral NSAIDs--Toradol injection itching only.     Trazodone Itching     Per Patient          Labs:   No results found for this or any previous visit (from the past 24 hour(s)).         Precautions:     Behavioral Orders   Procedures     Code 2     Fall precautions     Routine Programming     As clinically indicated     Seizure precautions     Status 15     Every 15 minutes.     Suicide precautions     Patients on Suicide Precautions should  have a Combination Diet ordered that includes a Diet selection(s) AND a Behavioral Tray selection for Safe Tray - with utensils, or Safe Tray - NO utensils         Entered by: Shayy Olvera on February 16, 2022 at 10:10 AM               Sarecycline Counseling: Patient advised regarding possible photosensitivity and discoloration of the teeth, skin, lips, tongue and gums.  Patient instructed to avoid sunlight, if possible.  When exposed to sunlight, patients should wear protective clothing, sunglasses, and sunscreen.  The patient was instructed to call the office immediately if the following severe adverse effects occur:  hearing changes, easy bruising/bleeding, severe headache, or vision changes.  The patient verbalized understanding of the proper use and possible adverse effects of sarecycline.  All of the patient's questions and concerns were addressed.

## 2023-09-07 NOTE — PLAN OF CARE
"Occupational Therapy Group Note:     02/28/22 1400   Group Therapy Session   Group Attendance attended group session   Total Time patient participated (minutes) 105   Total # Attendees 8, 4   Group Type expressive therapy;life skill   Group Topic Covered coping skills/lifestyle management;problem-solving;relaxation techniques   Patient Response/Contribution cooperative with task;listened actively;organized     Pt attended 2 out of 3 OT groups offered. Pt actively participated in occupational therapy clinic to facilitate coping skill exploration, creative expression within personally meaningful activities, and clinical observation of social, cognitive, and kinesthetic performance skills. Pt response: Chosen activity: painting a wooden box. Independent to initiate, gather materials, sequence and adjust to workspace demands as needed. Demonstrated good focus, planning, problem solving, and attention to detail for this moderately complex task. Organized in his task approach. Able to ask for assistance and occasionally socialized with peers and staff. Explained to writer: \"my concentration is shot, it's because of med changes.\" Stated he informed his provider that he noticed this change. He was still able to make progress on his task, though was not able to focus for as long as he has in previous groups, and was observed taking brief, self-initiated breaks as needed. Politely thanked writer for group.    Pt actively participated in a structured occupational therapy group with a focus on coping through movement to facilitate relaxation and stress management via chair yoga. Pt followed and engaged in about 90% of the yoga poses, and verbalized feeling \"good\" at the end of group. Affect continues to appear flat/depressed. Pleasant and polite in interactions.    " No

## 2023-12-13 NOTE — PROGRESS NOTES
"SPIRITUAL HEALTH SERVICES Progress Note  [facility] ([facility]) 3BW    Saw pt Hollis Barclay per follow up for ongoing support.  Provided 1 library book, reflective conversation while playing game of cribbage.    Illness Narrative - Ray talked about books he is reading and update he's received about placement process.  Jose M was relaxed and positive during visits today.    Distress -     Coping/Spirit - Jose M asked for prayers for placement process today.    Meaning-Making - Jose M continues to frame his time in the hospital as \"being where God wants me to be\" and spoke of choosing to be \"ok with waiting\" and to \"make the best of it.\"    Plan - I will follow up 1-2x weekly.    Liss Kerns MDiv  Associate   Pager [phone]  Office [phone]  [facility] remains available 24/7 for emergent requests/referrals, either by having the switchboard page the on-call  or by entering an ASAP/STAT consult in Epic (this will also page the on-call ). Routine Epic consults receive an initial response within 24 hours.    " Patient arrived for cardiac rehab session. Pt reports to have eaten prior to exercise session.  The patient was on continuous EKG monitoring throughout the session. The patient tolerated exercise session well with no complaints.

## 2024-06-06 NOTE — PLAN OF CARE
Pt monitored on MSSA for alcohol withdrawal, scores of 11, 11, 11. Pt received 30 mg valium during evening shift   never

## 2024-10-31 NOTE — PROGRESS NOTES
Procedure Request Form: Colonoscopy      Date Requested: 24  Patient: Afua Menjivar       2nd Request  Patient : 1970   Referring Provider: Catrachito Zhao PA-C        Date of Procedure ______most recent report________________________       The above patient has informed us that they have completed their   most recent Colonoscopy at your facility. Please complete   this form and attach all corresponding procedure reports/results.    Comments __________________________________________________________  ____________________________________________________________________  ____________________________________________________________________  ____________________________________________________________________    Facility Completing Procedure _________________________________________    Form Completed By (print name) _______________________________________      Signature __________________________________________________________      These reports are needed for  compliance.    Please fax this completed form and a copy of the procedure report to our office located at [address] [place] [address] as soon as possible to Fax [phone] attention Jaime: Phone [phone]    We thank you for your assistance in treating our mutual patient.          03/14/22 0836   Individualization/Patient Specific Goals   Patient Vulnerabilities limited support system;substance abuse/addiction;history of unsuccessful treatment;recent loss   Anxieties, Fears or Concerns placement. CADI, Social security benefits.   Individualized Care Needs sense of control over situation, continue to address knee pain   Patient-Specific Goals (Include Timeframe) identifies sister as support, pray, call friends for support   Interprofessional Rounds   Summary Patient is calm and cooperative, somewhat isolative, was attending less groups toward end of week   Participants psychiatrist;social work;occupational therapy;nursing   Team Discussion   Participants MD Chris, Jammie, RN, NORBERTO Moura, Kathie Vasquez, OT   Progress improving   Anticipated length of stay 2 weeks   Continued Stay Criteria/Rationale Patient in need of placement, CADI waiver reinstatement is currently in process.   Medical/Physical Knee pain.   Plan coordinate with [place] tanja and Angela at [facility] for placement.   Rationale for change in precautions or plan no change   Anticipated Discharge Disposition assisted living

## 2025-04-01 NOTE — PLAN OF CARE
"Number of patients attending the group:  4  Group Length: 1 Hour     Group Therapy      Summary of Group / Topics Discussed:     The  Psychotherapy group goal is to promote insight to positive choice and change. Group processing was within a supportive and safe environment. Patients will process emotions using verbal group and expressive psychotherapy interventions.     Assessment: Group used Fang's 'tip of the icebag' analogy to relate to the hidden and unhidden emotions and feelings related to mental health. Discussed how mental health symptoms could be both hidden and unhidden, and how those that are hidden are usually within below the tip of the ice bag and related to the unconscious. Using this approach, patients discussed how they feel about opening up about some of their feelings including ambivalence towards discharge. Group reviewed how to bring those ambivalence to the \"conscious' or open so as to use staff help to address them. Group reviewed use of socratic questions as a way to open up about subjects that could be difficult to present directly, including reasons for hesitation towards discharge. Practiced ways to apply questioning in tackling the challenges.     Patient Response: Patient participated fully in the group. Sat for the entire session. Was thankful for the session. Provided positive feedback to other group members.  " retired